# Patient Record
Sex: MALE | Race: WHITE | NOT HISPANIC OR LATINO | Employment: OTHER | URBAN - METROPOLITAN AREA
[De-identification: names, ages, dates, MRNs, and addresses within clinical notes are randomized per-mention and may not be internally consistent; named-entity substitution may affect disease eponyms.]

---

## 2017-01-09 ENCOUNTER — TELEPHONE (OUTPATIENT)
Dept: MEDICAL GROUP | Facility: MEDICAL CENTER | Age: 77
End: 2017-01-09

## 2017-01-09 NOTE — TELEPHONE ENCOUNTER
[facility] left message stating pt is having procedure done tomorrow and they would like labs and ekg faxed to them.     They left no call back number     Fax: [phone]

## 2017-01-10 NOTE — TELEPHONE ENCOUNTER
I have not seen the patient in nearly 1 year, we have no recent EKG or labs, you could fax the most recent EKG and labs in the system to [facility]

## 2017-02-20 RX ORDER — LISINOPRIL 40 MG/1
40 TABLET ORAL DAILY
Qty: 90 TAB | Refills: 0 | Status: SHIPPED | OUTPATIENT
Start: 2017-02-20 | End: 2017-05-22 | Stop reason: SDUPTHER

## 2017-02-20 NOTE — TELEPHONE ENCOUNTER
Please call patient, he has not been seen in one year, have him schedule a 40 minute appointment follow-up high blood pressure and blood sugar

## 2017-03-10 ENCOUNTER — HOSPITAL ENCOUNTER (OUTPATIENT)
Dept: LAB | Facility: MEDICAL CENTER | Age: 77
End: 2017-03-10
Attending: INTERNAL MEDICINE
Payer: MEDICARE

## 2017-03-10 ENCOUNTER — OFFICE VISIT (OUTPATIENT)
Dept: MEDICAL GROUP | Facility: MEDICAL CENTER | Age: 77
End: 2017-03-10
Payer: MEDICARE

## 2017-03-10 ENCOUNTER — HOSPITAL ENCOUNTER (OUTPATIENT)
Facility: MEDICAL CENTER | Age: 77
End: 2017-03-10
Attending: INTERNAL MEDICINE
Payer: MEDICARE

## 2017-03-10 VITALS
OXYGEN SATURATION: 93 % | TEMPERATURE: 98.7 F | SYSTOLIC BLOOD PRESSURE: 124 MMHG | BODY MASS INDEX: 33.18 KG/M2 | DIASTOLIC BLOOD PRESSURE: 78 MMHG | HEART RATE: 82 BPM | HEIGHT: 72 IN | WEIGHT: 245 LBS

## 2017-03-10 DIAGNOSIS — Z12.5 PROSTATE CANCER SCREENING: ICD-10-CM

## 2017-03-10 DIAGNOSIS — Z12.11 COLON CANCER SCREENING: ICD-10-CM

## 2017-03-10 DIAGNOSIS — R73.01 IMPAIRED FASTING GLUCOSE: ICD-10-CM

## 2017-03-10 DIAGNOSIS — E55.9 HYPOVITAMINOSIS D: ICD-10-CM

## 2017-03-10 DIAGNOSIS — E29.1 HYPOGONADISM IN MALE: ICD-10-CM

## 2017-03-10 DIAGNOSIS — Z98.84 S/P GASTRIC BYPASS: Chronic | ICD-10-CM

## 2017-03-10 DIAGNOSIS — E78.5 DYSLIPIDEMIA: Chronic | ICD-10-CM

## 2017-03-10 DIAGNOSIS — I10 ESSENTIAL HYPERTENSION: Chronic | ICD-10-CM

## 2017-03-10 DIAGNOSIS — E66.9 OBESITY, UNSPECIFIED OBESITY SEVERITY, UNSPECIFIED OBESITY TYPE: ICD-10-CM

## 2017-03-10 DIAGNOSIS — R60.0 EDEMA OF LOWER EXTREMITY, UNSPECIFIED LATERALITY: Chronic | ICD-10-CM

## 2017-03-10 DIAGNOSIS — Z98.1 S/P LUMBAR FUSION: Chronic | ICD-10-CM

## 2017-03-10 DIAGNOSIS — Z23 NEED FOR PNEUMOCOCCAL VACCINATION: ICD-10-CM

## 2017-03-10 DIAGNOSIS — Z00.00 MEDICARE ANNUAL WELLNESS VISIT, SUBSEQUENT: ICD-10-CM

## 2017-03-10 LAB
25(OH)D3 SERPL-MCNC: 33 NG/ML (ref 30–100)
ALBUMIN SERPL BCP-MCNC: 4.1 G/DL (ref 3.2–4.9)
ALBUMIN/GLOB SERPL: 1.5 G/DL
ALP SERPL-CCNC: 54 U/L (ref 30–99)
ALT SERPL-CCNC: 20 U/L (ref 2–50)
ANION GAP SERPL CALC-SCNC: 10 MMOL/L (ref 0–11.9)
APPEARANCE UR: CLEAR
AST SERPL-CCNC: 29 U/L (ref 12–45)
BASOPHILS # BLD AUTO: 0.3 % (ref 0–1.8)
BASOPHILS # BLD: 0.02 K/UL (ref 0–0.12)
BILIRUB SERPL-MCNC: 0.9 MG/DL (ref 0.1–1.5)
BILIRUB UR QL STRIP.AUTO: NEGATIVE
BUN SERPL-MCNC: 25 MG/DL (ref 8–22)
CALCIUM SERPL-MCNC: 9.8 MG/DL (ref 8.5–10.5)
CHLORIDE SERPL-SCNC: 106 MMOL/L (ref 96–112)
CHOLEST SERPL-MCNC: 134 MG/DL (ref 100–199)
CO2 SERPL-SCNC: 21 MMOL/L (ref 20–33)
COLOR UR: YELLOW
CREAT SERPL-MCNC: 1.33 MG/DL (ref 0.5–1.4)
CULTURE IF INDICATED INDCX: NO UA CULTURE
EOSINOPHIL # BLD AUTO: 0.23 K/UL (ref 0–0.51)
EOSINOPHIL NFR BLD: 3.9 % (ref 0–6.9)
ERYTHROCYTE [DISTWIDTH] IN BLOOD BY AUTOMATED COUNT: 47.6 FL (ref 35.9–50)
EST. AVERAGE GLUCOSE BLD GHB EST-MCNC: 140 MG/DL
ESTRADIOL SERPL-MCNC: <20 PG/ML
GLOBULIN SER CALC-MCNC: 2.8 G/DL (ref 1.9–3.5)
GLUCOSE SERPL-MCNC: 165 MG/DL (ref 65–99)
GLUCOSE UR STRIP.AUTO-MCNC: NEGATIVE MG/DL
HBA1C MFR BLD: 6.5 % (ref 0–5.6)
HCT VFR BLD AUTO: 55.9 % (ref 42–52)
HCYS SERPL-SCNC: 9.68 UMOL/L
HDLC SERPL-MCNC: 33 MG/DL
HGB BLD-MCNC: 18.1 G/DL (ref 14–18)
IMM GRANULOCYTES # BLD AUTO: 0.02 K/UL (ref 0–0.11)
IMM GRANULOCYTES NFR BLD AUTO: 0.3 % (ref 0–0.9)
IRON SERPL-MCNC: 59 UG/DL (ref 50–180)
KETONES UR STRIP.AUTO-MCNC: NEGATIVE MG/DL
LDLC SERPL CALC-MCNC: 77 MG/DL
LEUKOCYTE ESTERASE UR QL STRIP.AUTO: NEGATIVE
LYMPHOCYTES # BLD AUTO: 1.49 K/UL (ref 1–4.8)
LYMPHOCYTES NFR BLD: 25.3 % (ref 22–41)
MCH RBC QN AUTO: 26.3 PG (ref 27–33)
MCHC RBC AUTO-ENTMCNC: 32.4 G/DL (ref 33.7–35.3)
MCV RBC AUTO: 81.4 FL (ref 81.4–97.8)
MICRO URNS: NORMAL
MONOCYTES # BLD AUTO: 0.63 K/UL (ref 0–0.85)
MONOCYTES NFR BLD AUTO: 10.7 % (ref 0–13.4)
NEUTROPHILS # BLD AUTO: 3.5 K/UL (ref 1.82–7.42)
NEUTROPHILS NFR BLD: 59.5 % (ref 44–72)
NITRITE UR QL STRIP.AUTO: NEGATIVE
NRBC # BLD AUTO: 0 K/UL
NRBC BLD AUTO-RTO: 0 /100 WBC
PH UR STRIP.AUTO: 5.5 [PH]
PLATELET # BLD AUTO: 211 K/UL (ref 164–446)
PMV BLD AUTO: 10.1 FL (ref 9–12.9)
POTASSIUM SERPL-SCNC: 4.4 MMOL/L (ref 3.6–5.5)
PROT SERPL-MCNC: 6.9 G/DL (ref 6–8.2)
PROT UR QL STRIP: NEGATIVE MG/DL
PSA SERPL DL<=0.01 NG/ML-MCNC: 1.18 NG/ML (ref 0–4)
RBC # BLD AUTO: 6.87 M/UL (ref 4.7–6.1)
RBC UR QL AUTO: NEGATIVE
SODIUM SERPL-SCNC: 137 MMOL/L (ref 135–145)
SP GR UR STRIP.AUTO: 1.02
TESTOST SERPL-MCNC: 528 NG/DL (ref 175–781)
TRIGL SERPL-MCNC: 118 MG/DL (ref 0–149)
TSH SERPL DL<=0.005 MIU/L-ACNC: 0.83 UIU/ML (ref 0.35–5.5)
VIT B12 SERPL-MCNC: >1500 PG/ML (ref 211–911)
WBC # BLD AUTO: 5.9 K/UL (ref 4.8–10.8)

## 2017-03-10 PROCEDURE — 83540 ASSAY OF IRON: CPT

## 2017-03-10 PROCEDURE — 80053 COMPREHEN METABOLIC PANEL: CPT

## 2017-03-10 PROCEDURE — 84153 ASSAY OF PSA TOTAL: CPT | Mod: GA

## 2017-03-10 PROCEDURE — G0009 ADMIN PNEUMOCOCCAL VACCINE: HCPCS | Performed by: INTERNAL MEDICINE

## 2017-03-10 PROCEDURE — 82670 ASSAY OF TOTAL ESTRADIOL: CPT

## 2017-03-10 PROCEDURE — 1036F TOBACCO NON-USER: CPT | Performed by: INTERNAL MEDICINE

## 2017-03-10 PROCEDURE — 84443 ASSAY THYROID STIM HORMONE: CPT

## 2017-03-10 PROCEDURE — G8510 SCR DEP NEG, NO PLAN REQD: HCPCS | Performed by: INTERNAL MEDICINE

## 2017-03-10 PROCEDURE — 90670 PCV13 VACCINE IM: CPT | Performed by: INTERNAL MEDICINE

## 2017-03-10 PROCEDURE — 82306 VITAMIN D 25 HYDROXY: CPT

## 2017-03-10 PROCEDURE — 84402 ASSAY OF FREE TESTOSTERONE: CPT

## 2017-03-10 PROCEDURE — 81003 URINALYSIS AUTO W/O SCOPE: CPT

## 2017-03-10 PROCEDURE — 83036 HEMOGLOBIN GLYCOSYLATED A1C: CPT | Mod: GA

## 2017-03-10 PROCEDURE — 36415 COLL VENOUS BLD VENIPUNCTURE: CPT

## 2017-03-10 PROCEDURE — 85025 COMPLETE CBC W/AUTO DIFF WBC: CPT

## 2017-03-10 PROCEDURE — G0438 PPPS, INITIAL VISIT: HCPCS | Mod: 25 | Performed by: INTERNAL MEDICINE

## 2017-03-10 PROCEDURE — 83090 ASSAY OF HOMOCYSTEINE: CPT

## 2017-03-10 PROCEDURE — 84403 ASSAY OF TOTAL TESTOSTERONE: CPT

## 2017-03-10 PROCEDURE — 80061 LIPID PANEL: CPT

## 2017-03-10 PROCEDURE — 82607 VITAMIN B-12: CPT

## 2017-03-10 RX ORDER — FLUTICASONE PROPIONATE 50 MCG
2 SPRAY, SUSPENSION (ML) NASAL DAILY
Qty: 1 BOTTLE | Refills: 11 | Status: SHIPPED | OUTPATIENT
Start: 2017-03-10 | End: 2018-07-27 | Stop reason: SDUPTHER

## 2017-03-10 ASSESSMENT — PATIENT HEALTH QUESTIONNAIRE - PHQ9: CLINICAL INTERPRETATION OF PHQ2 SCORE: 0

## 2017-03-10 NOTE — MR AVS SNAPSHOT
J W Derik III   3/10/2017 11:20 AM   Office Visit   MRN: [id number]    Department:  [facility]   Dept Phone:  [phone]    Description:  Male : 1940   Provider:  Glenn Delaney M.D.           Allergies as of 3/10/2017     Allergen Noted Reactions    Norvasc [Amlodipine] 2016       Constipation      Statins [Hmg-Coa-R Inhibitors] 2010   Rash      You were diagnosed with     Medicare annual wellness visit, subsequent   [904425]       Essential hypertension   [5752880]       Dyslipidemia   [275904]       Edema of lower extremity, unspecified laterality   [8156519]       S/P gastric bypass   [787778]       S/P lumbar fusion   [772400]       Obesity, unspecified obesity severity, unspecified obesity type   [9767824]       Need for pneumococcal vaccination   [508917]       Colon cancer screening   [908432]       Hypovitaminosis D   [563256]       Prostate cancer screening   [987982]       Hypogonadism in male   [8335722]       Impaired fasting glucose   [790.21.ICD-9-CM]         Vital Signs     Blood Pressure Pulse Temperature Height Weight Body Mass Index    124/78 mmHg 82 37.1 °C (98.7 °F) 1.829 m (6') 111.131 kg (245 lb) 33.22 kg/m2    Oxygen Saturation Smoking Status                93% Former Smoker          Basic Information     Date Of Birth Sex Race Ethnicity Preferred Language    1940 Male White Non- English      Problem List              ICD-10-CM Priority Class Noted - Resolved    History of diabetes mellitus Z86.39   2009 - Present    Hypertension (Chronic) I10   2009 - Present    History of peripheral neuropathy Z86.69   2009 - Present    History of shingles Z86.19   2009 - Present    S/P knee replacement Z96.659   2009 - Present    S/P lumbar fusion (Chronic) Z98.1   2009 - Present    Dyslipidemia (Chronic) E78.5   2009 - Present    History of allergic rhinitis Z87.09   2009 - Present    Preventative health care (Chronic)  Z00.00   9/5/2009 - Present    Lower extremity edema (Chronic) R60.0   12/29/2009 - Present    S/P gastric bypass (Chronic) Z98.84   3/26/2010 - Present    Status post shoulder surgery Z98.890   8/23/2012 - Present    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   2/26/2015 - Present    Obesity E66.9   3/3/2016 - Present    Hypotestosteronism E29.1   3/3/2016 - Present      Health Maintenance        Date Due Completion Dates    IMM DTaP/Tdap/Td Vaccine (1 - Tdap) 11/7/1961 ---    IMM INFLUENZA (1) 9/1/2016 12/29/2009    COLONOSCOPY 2/24/2021 2/24/2011 (Done)    Override on 2/24/2011: Done ([facility])            Current Immunizations     13-VALENT PCV PREVNAR 3/10/2017 12:01 PM    Hepatitis A Vaccine, Adult 11/24/2014  2:28 PM    Hepatitis B Vaccine Recombivax (Adol/Adult) 11/24/2014  2:30 PM    Influenza TIV (IM) 12/29/2009    Pneumococcal polysaccharide vaccine (PPSV-23) 8/27/2013    SHINGLES VACCINE 8/27/2013      Below and/or attached are the medications your provider expects you to take. Review all of your home medications and newly ordered medications with your provider and/or pharmacist. Follow medication instructions as directed by your provider and/or pharmacist. Please keep your medication list with you and share with your provider. Update the information when medications are discontinued, doses are changed, or new medications (including over-the-counter products) are added; and carry medication information at all times in the event of emergency situations     Allergies:  NORVASC - (reactions not documented)     STATINS - Rash               Medications  Valid as of: March 10, 2017 -  1:36 PM    Generic Name Brand Name Tablet Size Instructions for use    Cholecalciferol (Tab) cholecalciferol 1000 UNIT Take 5,000 Units by mouth every day.        Coenzyme Q10 (Cap) coenzyme Q-10 30 MG Take 60 mg by mouth every day.        Ethyl Chloride (Aerosol) ethyl chloride  Ethyl chloride jet stram BAL apply prn        Fluticasone  Propionate (Suspension) FLONASE 50 MCG/ACT Spray 2 Sprays in nose every day.        Glucosamine Sulfate (Pack) Glucosamine Sulfate 1500 MG Take  by mouth 2 Times a Day.        HydroCHLOROthiazide (Tab) HYDRODIURIL 25 MG Take 1 Tab by mouth every day.        Lisinopril (Tab) PRINIVIL, ZESTRIL 40 MG Take 1 Tab by mouth every day.        Metoclopramide HCl (Tab) REGLAN 10 MG Take 10 mg by mouth 3 times a day.        Omega-3 Fatty Acids   Take 2 Caps by mouth 2 Times a Day.        Omeprazole   Take  by mouth.        Testosterone Cypionate   by Intramuscular route every 7 days.        Vitamin B1-B12   by Injection route every 7 days.        .                 Medicines prescribed today were sent to:     [facility] [address] [facility], [place] - [address]    [address] [place] [address]    Phone: [phone] Fax: [phone]    Open 24 Hours?: No      Medication refill instructions:       If your prescription bottle indicates you have medication refills left, it is not necessary to call your provider’s office. Please contact your pharmacy and they will refill your medication.    If your prescription bottle indicates you do not have any refills left, you may request refills at any time through one of the following ways: The online [employer] system (except Urgent Care), by calling your provider’s office, or by asking your pharmacy to contact your provider’s office with a refill request. Medication refills are processed only during regular business hours and may not be available until the next business day. Your provider may request additional information or to have a follow-up visit with you prior to refilling your medication.   *Please Note: Medication refills are assigned a new Rx number when refilled electronically. Your pharmacy may indicate that no refills were authorized even though a new prescription for the same medication is available at the pharmacy. Please request the medicine by name with the  pharmacy before contacting your provider for a refill.        Your To Do List     Future Labs/Procedures Complete By Expires    CBC WITH DIFFERENTIAL  As directed 3/11/2018    COMP METABOLIC PANEL  As directed 3/11/2018    ESTRADIOL  As directed 3/10/2018    HEMOGLOBIN A1C  As directed 3/10/2018    HOMOCYSTEINE  As directed 3/10/2018    IRON  As directed 3/11/2018    LIPID PROFILE  As directed 3/11/2018    PROSTATE SPECIFIC AG SCREENING  As directed 3/11/2018    TESTOSTERONE SERUM  As directed 3/11/2018    TESTOSTERONE,FREE ADULT MALE  As directed 3/11/2018    Comments:    To be run by [facility]    TSH  As directed 3/11/2018    URINALYSIS,CULTURE IF INDICATED  As directed 3/11/2018    VITAMIN B12  As directed 3/11/2018    VITAMIN D,25 HYDROXY  As directed 3/11/2018      Other Notes About Your Plan                          [employer] Access Code: [email or web address]  Expires: 3/24/2017  4:35 PM    [employer]  A secure, online tool to manage your health information     [employer] Biscoot® is a secure, online tool that connects you to your personalized health information from the privacy of your home -- day or night - making it very easy for you to manage your healthcare. Once the activation process is completed, you can even access your medical information using the Biscoot magnolia, which is available for free in the Apple Magnolia store or Google Play store.     Biscoot provides the following levels of access (as shown below):   My Chart Features   [facility] Doctor [facility]  Specialists [facility]  Urgent  Care Non-[facility]  Primary Care  Doctor   Email your healthcare team securely and privately 24/7 X X X    Manage appointments: schedule your next appointment; view details of past/upcoming appointments X      Request prescription refills. X      View recent personal medical records, including lab and immunizations X X X X   View health record, including health history, allergies, medications X X X X   Read reports  about your outpatient visits, procedures, consult and ER notes X X X X   See your discharge summary, which is a recap of your hospital and/or ER visit that includes your diagnosis, lab results, and care plan. X X       How to register for [employer]:  1. Go to  [email or web address].  2. Click on the Sign Up Now box, which takes you to the New Member Sign Up page. You will need to provide the following information:  a. Enter your Equ[employer] Access Code exactly as it appears at the top of this page. (You will not need to use this code after you’ve completed the sign-up process. If you do not sign up before the expiration date, you must request a new code.)   b. Enter your date of birth.   c. Enter your home email address.   d. Click Submit, and follow the next screen’s instructions.  3. Create a Equ[employer]ogic ID. This will be your Equ[employer]ic login ID and cannot be changed, so think of one that is secure and easy to remember.  4. Create a [employer] password. You can change your password at any time.  5. Enter your Password Reset Question and Answer. This can be used at a later time if you forget your password.   6. Enter your e-mail address. This allows you to receive e-mail notifications when new information is available in [employer].  7. Click Sign Up. You can now view your health information.    For assistance activating your [employer] account, call [phone]

## 2017-03-10 NOTE — PROGRESS NOTES
Subjective:      LIAN More III is a 76 y.o. male who presents with wellness        HPI     wellness    CC:  Health Risk Assessment Exam.  Arrived 1125    LIAN FARNSWORTH is a 76 y.o. here for Health Risk Assessment.   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 90 Tab 0   • hydrocortisone 2.5 % Cream topical cream Apply 1 Application to affected area(s) 2 times a day as needed (rash). 45 g 1   • hydrochlorothiazide (HYDRODIURIL) 25 MG Tab Take 1 Tab by mouth every day. 90 Tab 3   • ethyl chloride Aerosol Ethyl chloride jet stram BAL apply prn 1 Can 0   • VITAMIN B1-B12 INJ by Injection route every 7 days.     • Testosterone Cypionate (DEPO-TESTOSTERONE IM) by Intramuscular route every 7 days.     • fluticasone (FLONASE) 50 MCG/ACT nasal spray USE TWO SPRAY IN EACH NOSTRIL ONCE DAILY 1 Bottle prn   • metoclopramide (REGLAN) 10 MG TABS Take 10 mg by mouth 3 times a day.     • coenzyme Q-10 30 MG capsule Take 60 mg by mouth every day.     •  vitamin D (CHOLECALCIFEROL) 1000 UNIT TABS Take 5,000 Units by mouth every day.     • Glucosamine Sulfate 1500 MG PACK Take  by mouth 2 Times a Day.     • Omega-3 Fatty Acids (FISH OIL PO) Take 2 Caps by mouth 2 Times a Day.       No current facility-administered medications for this visit.      Current supplements:reviewed  Chronic narcotic pain medicines:no  Allergies: Norvasc and Statins  SH no tobacco, no etoh, retired does golf and use sunscreen   Screening: discussed  Depressive Symptoms: Denies feeling down, depressed or hopeless. Denies loss of interest or pleasure in doing things   ADLs: Denies needing help with using telephone, transportation, shopping, preparing meals, housework, laundry, or managing medication or money.   Independent with bathing, hygiene, feeding, toileting, dressing    Memory concerns: Denies difficulty remembering details of conversations, events and upcoming appointments.  Hearing problems: Denies.   Recent falls no    Current social contact/activities:  golf   Social History   Substance Use Topics   • Smoking status: Former Smoker -- 2.00 packs/day for 2 years     Types: Cigarettes     Quit date: 01/01/1964   • Smokeless tobacco: Never Used      Comment: 2 ppd 4 yrs,quit 1964   • Alcohol Use: No       Family History   Problem Relation Age of Onset   • Diabetes       Past Surgical History   Procedure Laterality Date   • Abdominoplasty  1990   • Knee arthroplasty total  2000     bilateral   • Gastric banding laparoscopic  3/24/2010     Performed by SARINA SUE at SURGERY [facility]S   • Hiatal hernia repair  3/24/2010     Performed by SARINA SUE at SURGERY [facility]   • Inj,epidural/lumb/sac single  3/5/2012     Performed by KRISTIN BALTAZAR at [facility] ORS   • Inj,epidural/lumb/sac single  3/19/2012     Performed by KRISTIN BALTAZAR at SURGERY [facility]   • Lumbar fusion posterior  3/26/2012     Performed by EDITH WHELAN at [facility]    • Lumbar decompression  3/26/2012     Performed by EDITH SEARS at SURGERY [facility]   • Gastric band laparoscopic revision  5/11/2012     Performed by DENIZ SUE at [facility]   • Drainage hematoma  5/25/2012     Performed by DENIZ SUE at [facility]   • Recovery  6/26/2012     Performed by SURGERY, IR-RECOVERY at SURGERY SAME DAY [facility]   • Lumbar fusion posterior  9/5/2013     Performed by Edith Sears M.D. at SURGERY [facility]   • Lumbar decompression  9/5/2013     Performed by Edith Sears M.D. at SURGERY [facility]   • Mass excision neuro  9/5/2013     Performed by Edith Sears M.D. at SURGERY [facility]   • Humerus orif Left 6/25/2015     Procedure: HUMERUS ORIF/ PROXIMAL;  Surgeon: Cristal Guido M.D.;  Location: [facility];  Service:    • Gastroscopy N/A 1/8/2016     Procedure: GASTROSCOPY;  Surgeon: Deniz Sue M.D.;  Location: [facility];  Service:         Ostomy or other tubes or amputations: no  Chronic oxygen use no  Gait: normal. Uses assistive device no     Depression Screening    Little interest or pleasure in doing things?  0 - not at all  Feeling down, depressed , or hopeless? 0 - not at all  Patient Health Questionnaire Score: 0     If depressive symptoms identified deferred to follow up visit unless specifically addressed in assesment and plan.    Screening for Cognitive Impairment    Three Minute Recall (banana, sunrise, fence)  3/3    Draw clock face with all 12 numbers set to the hand to show 10 minures past 11 o'clock  1    Cognitive concerns identified defferred for follow up unless specifically addressed in assesment and plan.    Fall Risk Assessment    Has the patient had two or more falls in the last year or any fall with injury in the last year?  No    Safety Assessment    Throw rugs on floor.  Yes  Handrails on all stairs.  Yes  Good lighting in all hallways.  Yes  Difficulty  hearing.  Yes  Patient counseled about all safety risks that were identified.    Functional Assessment ADLs    Are there any barriers preventing you from cooking for yourself or meeting nutritional needs?  No.    Are there any barriers preventing you from driving safely or obtaining transportation?  No.    Are there any barriers preventing you from using a telephone or calling for help?  No.    Are there any barriers preventing you from shopping?  No.    Are there any barriers preventing you from taking care of your own finances?  No.    Are there any barriers preventing you from managing your medications?  No.    Are currently engaing any exercise or physical activity?  Yes.       Health Maintenance Summary                IMM DTaP/Tdap/Td Vaccine Overdue 11/7/1961     IMM PNEUMOCOCCAL 65+ (ADULT) LOW/MEDIUM RISK SERIES Overdue 8/27/2014      Done 8/27/2013 Imm Admin: Pneumococcal polysaccharide vaccine (PPSV-23)    IMM INFLUENZA Overdue 9/1/2016      Done 12/29/2009 Imm Admin: Influenza TIV (IM)    Annual Wellness Visit Overdue 3/4/2017      Done 3/3/2016 Visit Dx: Medicare annual wellness visit, subsequent     Patient has more history with this topic...    COLONOSCOPY Next Due 2/24/2021      Done 2/24/2011 [facility]          Patient Care Team:  Glenn Delaney M.D. as PCP - General (Internal Medicine)      Health Care Screening recommendations reviewed with patient today and updated or ordered.  DPA/Advanced directive: Completed/Information provided.   Referrals for PT/OT/Nutrition counseling/Behavioral Health/Smoking cessation as above if indicated  Discussion today about general wellness and lifestyle habits:    · Prevent falls and reduce trip hazards;   · Have a working fire alarm and carbon monoxide detector;   · Engage in regular physical activity and social activities;   · Use sun protection when outdoors.    ·   ·     Current Outpatient Prescriptions   Medication Sig Dispense Refill   • lisinopril (PRINIVIL,  ZESTRIL) 40 MG tablet Take 1 Tab by mouth every day. 90 Tab 0   • hydrocortisone 2.5 % Cream topical cream Apply 1 Application to affected area(s) 2 times a day as needed (rash). 45 g 1   • hydrochlorothiazide (HYDRODIURIL) 25 MG Tab Take 1 Tab by mouth every day. 90 Tab 3   • ethyl chloride Aerosol Ethyl chloride jet stram BAL apply prn 1 Can 0   • VITAMIN B1-B12 INJ by Injection route every 7 days.     • Testosterone Cypionate (DEPO-TESTOSTERONE IM) by Intramuscular route every 7 days.     • fluticasone (FLONASE) 50 MCG/ACT nasal spray USE TWO SPRAY IN EACH NOSTRIL ONCE DAILY 1 Bottle prn   • metoclopramide (REGLAN) 10 MG TABS Take 10 mg by mouth 3 times a day.     • coenzyme Q-10 30 MG capsule Take 60 mg by mouth every day.     • vitamin D (CHOLECALCIFEROL) 1000 UNIT TABS Take 5,000 Units by mouth every day.     • Glucosamine Sulfate 1500 MG PACK Take  by mouth 2 Times a Day.     • Omega-3 Fatty Acids (FISH OIL PO) Take 2 Caps by mouth 2 Times a Day.       No current facility-administered medications for this visit.     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   • Status post shoulder surgery 08/23/2012   • S/P gastric bypass 03/26/2010   • Lower extremity edema 12/29/2009   • History of diabetes mellitus 09/05/2009   • Hypertension 09/05/2009   • History of peripheral neuropathy 09/05/2009   • History of shingles 09/05/2009   • S/P knee replacement 09/05/2009   • S/P lumbar fusion 09/05/2009   • Dyslipidemia 09/05/2009   • History of allergic rhinitis 09/05/2009   • Preventative health care 09/05/2009       Haji's  2/4/15 upper GI small bowel follow-through mild to moderate thoracic esophageal dysmotility, small sliding hiatal hernia, laparoscopic gastric band appears appropriately located  2/18/15 EGD per [facility] reactive gastropathy, and h. pylori, haji's mucosa indefinite for dysplasia, no evidence of celiac  disease  4/22/15 upper GI series postoperatively appears laparoscopic band within normal limits, small sliding hiatal hernia  5/14/15 EGD per [facility] esophageal mucosal changes large amount of food residue in stomach, 5 mucosa low grade dysplasia, intestinal metaplasia, gastric emptying study pending  5/22/15 nuclear medicine gastric emptying study per [facility] evidence of reflux, gastric emptying half-time 118 minutes  1/8/16 EGD per  surgery normal  1/4/17 [facility] note history of reflux, treatment of haji's esophagus low-grade dysplasia, discussed radiofrequency ablation and endoscopic ultrasound    Dyslipidemia  3/08 chol 159, trig 75, hdl 44, ldl 100  12/09 chol 162,chol 105, hdl 46,ldl 95  2/11 chol 135,trig 71,hdl 43,ldl 78  12/11 chol 141,trig 32,hdl 49,ldl 86  9/4/13 chol 147,trig 32,hdl 62,ldl 79  1/9/15 chol 152,trig 64,hdl 41,ldl 98   10/2/15 chol 140,trig 54,hdl 43,ldl 86,crp 1.3    History allergic rhinitis    History diabetes  3/08 A1c 5.9%  12/09 A1c 6.4% on insulin  2009 eye exam  3/10 s/p lap banding, now off insulin  2/11 BS 93  5/12 A1c 5.2% off insulin  12/12 eye exam   9/5/13 A1c 5.7% off meds  1/9/15 A1c 5.7%,bs 106  10/2/15 A1c 5.9%    History of peripheral neuropathy  Has tried and failed elavil and neurontin  6/04 lower extremity EMG nerve conduction study 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   12/09 trial of neurontin 300 mg tid    History shingles  Has tried and failed elavil and neurontin  6/04 lower extremity EMG nerve conduction study 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   12/09 trial of neurontin 300 mg tid    3/08 urine microalbumin 21  3/08 lifescan screening JUAQUIN,carotid,aorta negative  2/11 hold norvasc  2/11 urine mac 16  5/12 urine mac 18 on lisinopril 40 mg  9/4/13 urine mac 24 on lisinopril 40 mg  1/9/15 urine mac 30 on lisinopril 40 mg  7/29/15 echo moderate LVH, EF 60-65%  7/29/15  carotid ultrasound negative  3/31/16 add norvasc 5 mg to lisinopril 40 mg  5/4/16 stop norvasc constipation,still on lisinopril 40 mg and add hctz 25 mg  5/18/16 patient on hctz 25 mg, not taking lisinopril, will resume lisinopril 40 mg  (norvasc cause constipation)    preventive health  2/24/11 colon [facility] hemorrhoids  8/27/13 pneumovax  1/14/15 FIT negative  10/2/15 vit d 32  10/2/15 psa 0.6  3/3/16 prevnar and zoster vaccine written to get at pharmacy  4/18/16 zoster vaccine at [facility]    Status post gastric bypass  3/10 lap banding   2/11 cmp,cbc,b12,iron,folate,vit d normal  12/11 vit d 47, iron 87,hgb 15,hct 47,cmp normal, normal folate and B12  5/11/12 laparascopic band port revision   9/5/13 iron 92,B12 >1500, it d 48  1/8/15  surgery note; ultrasound abdomen and CT abdomen pelvis with contrast to evaluate abdominal pain  1/9/15 cbc,cmp normal, iron 101,vit d 41, b12>1500, folate 24, B1 148 normal  1/15/15  surgery note; ordered ultrasound gallbladder and CT abd/pelvis  2/4/15 upper GI small bowel follow-through mild to moderate thoracic esophageal dysmotility, small sliding hiatal hernia, laparoscopic gastric band appears appropriately located  2/18/15 EGD per [facility] reactive gastropathy, and h. pylori, haji's mucosa indefinite for dysplasia, no evidence of celiac disease  4/22/15 upper GI series postoperatively appears laparoscopic band within normal limits, small sliding hiatal hernia  5/14/15 EGD per [facility] esophageal mucosal changes large amount of food residue in stomach, await biopsy results, gastric emptying study pending  5/26/15  surgery note; difficulty with gastric emptying status post bariatric surgery, removal fluid from his band 2 cc, down to 3 cc total volume, started on reglan and nexium  1/8/16 EGD per  surgery normal  10/13/16  bariatric surgery follow-up, BMI 34.7    Status post knee replacement  History right knee replacement  1/25/13  xray knee right postsurgical change consistent with medial knee compartment replacement, DJD, chondrocalcinosis  1/25/13 xray knee left postsurgical change consistent with medial knee compartment replacement, DJD, chondrocalcinosis    Status post lumbar fusion  3/26/12  neurosurgery Laminectomy L3-4, 4-5, 5-1 with pedicle screw fixation of L4-5 and posterolateral arthrodesis   11/12  neurosurgery note; still with right sided pain, EMG NCS pending, referral to   2/13  pain note, MRI pending  2/26/13 MRI lumbar spine post surgical changes fusion L4-L5, L5-S1 severe right foraminal stenosis, L4-L5 moderate severe bilateral stenosis, L3-L4 moderate severe bilateral stenosis  3/11/13  pain; epidural steroid injection on the right at L2-3 and L3-4  7/23/13  neurosurgery note, MRI pending  8/8/13 x-ray lumbar spine postoperative changes stable L3-L5 anterolisthesis lateral flexion and extension views  8/8/13 MRI lumbar spine previous laminectomy L3-L5, bilateral pedicle screw fixation L4-L5, small to moderate right disc extrusion L3-L4, 1.5 cm synovial cyst L4-L5 region, moderate to severe multilevel foraminal stenosis  9/5/13  neurosurgery revision L3L4 decompression and resection synovial cyst, fusion L3L4 PEEK rods and screws  9/18/13 neurosurgery note status post redo L3-L4 decompression and fusion, doing well  10/1/13  neurosurgery note, x-rays look good, no fracture, bone fusion is beginning to take place, start physical therapy, followup 6 weeks AP and lateral lumbar spine  1/9/14 x-ray lumbar spine grade 1 spondylolisthesis L3-L5, status post posterior laminectomy and fusion L3-L5  1/14/14  neurosurgery note, followup as needed  11/28/14 MRI thoracic spine per  neurosurgery mild thoracic kyphosis,mild diskal degenerative changes in the midthoracic region    Status post shoulder surgery in 6/25/15  orthopedics  operative note ORIF left humerus fracture          ROS       Objective:          Physical Exam   Constitutional: He appears well-developed and well-nourished. No distress.   HENT:   Head: Normocephalic and atraumatic.   Right Ear: External ear normal.   Left Ear: External ear normal.   Nose: Nose normal.   Mouth/Throat: Oropharynx is clear and moist.   Eyes: Conjunctivae are normal. Right eye exhibits no discharge. Left eye exhibits no discharge. No scleral icterus.   Neck: Neck supple. No JVD present. No thyromegaly present.   Cardiovascular: Normal rate and regular rhythm.    No murmur heard.  Pulmonary/Chest: Effort normal and breath sounds normal. No respiratory distress. He has no wheezes.   Abdominal: He exhibits no distension.   Genitourinary: Prostate normal. Guaiac negative stool. No penile tenderness.   Musculoskeletal: He exhibits no edema.   Skin: He is not diaphoretic. No erythema.   Psychiatric: He has a normal mood and affect. His behavior is normal.   Nursing note and vitals reviewed.              Assessment/Plan:     Assessment  #! Wellness    #2 Haji's stable followed by GI, no breakthrough symptoms on omeprazole 1/4/17 [facility] note history of reflux, treatment of haji's esophagus low-grade dysplasia, discussed radiofrequency ablation and endoscopic ultrasound    #3 Status post gastric bypass obesity BMI 33.2 3/10 lap banding , most recent visit with bariatric was 10/13/16  bariatric surgery follow-up, BMI 34.7, unable to exercise because of chronic back pain, spent working on maintaining his diet    #4 history of diabetes no medication since bariatric surgery, does not check blood sugars    #5 Obesity BMI 33.2 status post bariatric surgery, has had nutrition consultation previously and education with gastric bypass surgery    #6 History right knee replacement does not affect balance, no falls, no cane or walker for ablation    #7 Status post lumbar fusion surgery done 3/26/12   neurosurgery Laminectomy L3-4, 4-5, 5-1 with pedicle screw fixation of L4-5 and posterolateral arthrodesis , stable back pain, no pain medications, no falls, has completed physical therapy    #8 preventive health  2/24/11 colon [facility] hemorrhoids  8/27/13 pneumovax  1/14/15 FIT negative  10/2/15 vit d 32  10/2/15 psa 0.6  3/3/16 prevnar and zoster vaccine written to get at pharmacy  4/18/16 zoster vaccine at [facility]    #9 Hypogonadism on testosterone supplementation per Dr. Miner, we have no records    #10 hypertension blood pressure stable and controlled on hydrochlorothiazide and lisinopril    #11 allergic rhinitis on Flonase      Plan   #1 health maintenance reviewed and updated    #2 he declines annually influenza    #3 pneumococcal 13 vaccination, has had Pneumovax, shingles vaccine previously    #4 FIT, next colonoscopy 2/21    #5 lab     #6 check iron, B-12, vitamin D status post bariatric surgery    #7 has had nutrition counseling, importance of diet, weight loss discussed with regards to obesity, bariatric surgery    #8 declines physical therapy    #9 no need for hearing test    #10 nutrition, diet, exercise, weight loss discussed and emphasized, no tobacco, no alcohol    #11 follow-up GI    #12 check blood pressure periodically and record    #13 follow-up one year

## 2017-03-11 PROBLEM — D75.1 POLYCYTHEMIA: Chronic | Status: ACTIVE | Noted: 2017-03-11

## 2017-03-11 PROBLEM — R94.4 DECREASED GFR: Status: ACTIVE | Noted: 2017-03-11

## 2017-03-12 LAB — TESTOST FREE SERPL-MCNC: 135 PG/ML (ref 47–244)

## 2017-03-17 LAB — HEMOCCULT STL QL IA: NEGATIVE

## 2017-03-20 ENCOUNTER — TELEPHONE (OUTPATIENT)
Dept: MEDICAL GROUP | Facility: MEDICAL CENTER | Age: 77
End: 2017-03-20

## 2017-03-20 NOTE — TELEPHONE ENCOUNTER
----- Message from Glenn Delaney M.D. sent at 3/17/2017 11:13 PM PDT -----  Please notify patient that the stool test is negative

## 2018-05-16 ENCOUNTER — PATIENT OUTREACH (OUTPATIENT)
Dept: HEALTH INFORMATION MANAGEMENT | Facility: OTHER | Age: 78
End: 2018-05-16

## 2018-05-16 NOTE — PROGRESS NOTES
1. Attempt #:FINAL  2. [facility] Verified: no    3. Verify PCP: yes    4. Care Team Updated:       •   DME Company (gait device, O2, CPAP, etc.): NO       •   Other Specialists (eye doctor, derm, GYN, cardiology, endo, etc): YES    5.  Reviewed/Updated the following with patient:       •   Communication Preference Obtained? YES       •   Preferred Pharmacy? YES       •   Preferred Lab? YES       •   Family History (document living status of immediate family members and if + hx of cancer, diabetes, hypertension, hyperlipidemia, heart attack, stroke) YES. Was Abstract Encounter opened and chart updated? YES    6. [employer] Activation: sent activation code    7. [employer] [place]: no    8. Annual Wellness Visit Scheduling  Scheduling Status:Scheduled      9. Care Gap Scheduling (Attempt to Schedule EACH Overdue Care Gap!)     Health Maintenance Due   Topic Date Due   • IMM DTaP/Tdap/Td Vaccine (1 - Tdap) 11/07/1959   • Annual Wellness Visit  03/11/2018        Scheduled patient for Annual Wellness Visit and Immunizations: TDAP    10. Patient was advised: “This is a free wellness visit. The provider will screen for medical conditions to help you stay healthy. If you have other concerns to address you may be asked to discuss these at a separate visit or there may be an additional fee.”     11. Patient was informed to arrive 15 min prior to their scheduled appointment and bring in their medication bottles.

## 2018-07-02 ENCOUNTER — OFFICE VISIT (OUTPATIENT)
Dept: MEDICAL GROUP | Facility: MEDICAL CENTER | Age: 78
End: 2018-07-02
Payer: MEDICARE

## 2018-07-02 ENCOUNTER — HOSPITAL ENCOUNTER (OUTPATIENT)
Dept: LAB | Facility: MEDICAL CENTER | Age: 78
End: 2018-07-02
Attending: INTERNAL MEDICINE
Payer: MEDICARE

## 2018-07-02 ENCOUNTER — HOSPITAL ENCOUNTER (OUTPATIENT)
Dept: LAB | Facility: MEDICAL CENTER | Age: 78
End: 2018-07-02
Attending: EMERGENCY MEDICINE
Payer: MEDICARE

## 2018-07-02 VITALS
DIASTOLIC BLOOD PRESSURE: 60 MMHG | TEMPERATURE: 97.4 F | WEIGHT: 243 LBS | BODY MASS INDEX: 32.91 KG/M2 | HEIGHT: 72 IN | OXYGEN SATURATION: 94 % | SYSTOLIC BLOOD PRESSURE: 102 MMHG | HEART RATE: 73 BPM

## 2018-07-02 DIAGNOSIS — E53.8 B12 DEFICIENCY: ICD-10-CM

## 2018-07-02 DIAGNOSIS — Z12.5 PROSTATE CANCER SCREENING: ICD-10-CM

## 2018-07-02 DIAGNOSIS — E61.1 IRON DEFICIENCY: ICD-10-CM

## 2018-07-02 DIAGNOSIS — E34.9 HYPOTESTOSTERONISM: ICD-10-CM

## 2018-07-02 DIAGNOSIS — E55.9 HYPOVITAMINOSIS D: ICD-10-CM

## 2018-07-02 DIAGNOSIS — E78.5 DYSLIPIDEMIA: Chronic | ICD-10-CM

## 2018-07-02 DIAGNOSIS — Z00.00 MEDICARE ANNUAL WELLNESS VISIT, SUBSEQUENT: Primary | ICD-10-CM

## 2018-07-02 DIAGNOSIS — R94.4 DECREASED GFR: ICD-10-CM

## 2018-07-02 DIAGNOSIS — Z98.84 S/P GASTRIC BYPASS: Chronic | ICD-10-CM

## 2018-07-02 DIAGNOSIS — I10 ESSENTIAL HYPERTENSION: Chronic | ICD-10-CM

## 2018-07-02 DIAGNOSIS — R60.0 LOWER EXTREMITY EDEMA: Chronic | ICD-10-CM

## 2018-07-02 DIAGNOSIS — Z00.00 PREVENTATIVE HEALTH CARE: Chronic | ICD-10-CM

## 2018-07-02 DIAGNOSIS — D75.1 POLYCYTHEMIA: Chronic | ICD-10-CM

## 2018-07-02 DIAGNOSIS — E51.9 VITAMIN B1 DEFICIENCY: ICD-10-CM

## 2018-07-02 DIAGNOSIS — R73.09 IMPAIRED GLUCOSE METABOLISM: ICD-10-CM

## 2018-07-02 DIAGNOSIS — Z23 NEED FOR SHINGLES VACCINE: ICD-10-CM

## 2018-07-02 LAB
25(OH)D3 SERPL-MCNC: 27 NG/ML (ref 30–100)
ALBUMIN SERPL BCP-MCNC: 4.4 G/DL (ref 3.2–4.9)
ALBUMIN SERPL BCP-MCNC: 4.4 G/DL (ref 3.2–4.9)
ALBUMIN/GLOB SERPL: 1.6 G/DL
ALBUMIN/GLOB SERPL: 1.6 G/DL
ALP SERPL-CCNC: 51 U/L (ref 30–99)
ALP SERPL-CCNC: 52 U/L (ref 30–99)
ALT SERPL-CCNC: 31 U/L (ref 2–50)
ALT SERPL-CCNC: 32 U/L (ref 2–50)
ANION GAP SERPL CALC-SCNC: 8 MMOL/L (ref 0–11.9)
ANION GAP SERPL CALC-SCNC: 9 MMOL/L (ref 0–11.9)
ANISOCYTOSIS BLD QL SMEAR: ABNORMAL
APPEARANCE UR: CLEAR
AST SERPL-CCNC: 27 U/L (ref 12–45)
AST SERPL-CCNC: 28 U/L (ref 12–45)
BACTERIA #/AREA URNS HPF: NEGATIVE /HPF
BASOPHILS # BLD AUTO: 0.2 % (ref 0–1.8)
BASOPHILS # BLD AUTO: 0.5 % (ref 0–1.8)
BASOPHILS # BLD: 0.01 K/UL (ref 0–0.12)
BASOPHILS # BLD: 0.03 K/UL (ref 0–0.12)
BILIRUB SERPL-MCNC: 1.1 MG/DL (ref 0.1–1.5)
BILIRUB SERPL-MCNC: 1.1 MG/DL (ref 0.1–1.5)
BILIRUB UR QL STRIP.AUTO: NEGATIVE
BUN SERPL-MCNC: 27 MG/DL (ref 8–22)
BUN SERPL-MCNC: 28 MG/DL (ref 8–22)
CALCIUM SERPL-MCNC: 9.8 MG/DL (ref 8.5–10.5)
CALCIUM SERPL-MCNC: 9.8 MG/DL (ref 8.5–10.5)
CHLORIDE SERPL-SCNC: 102 MMOL/L (ref 96–112)
CHLORIDE SERPL-SCNC: 102 MMOL/L (ref 96–112)
CHOLEST SERPL-MCNC: 135 MG/DL (ref 100–199)
CHOLEST SERPL-MCNC: 136 MG/DL (ref 100–199)
CO2 SERPL-SCNC: 26 MMOL/L (ref 20–33)
CO2 SERPL-SCNC: 27 MMOL/L (ref 20–33)
COLOR UR: ABNORMAL
COMMENT 1642: NORMAL
CREAT SERPL-MCNC: 1.57 MG/DL (ref 0.5–1.4)
CREAT SERPL-MCNC: 1.58 MG/DL (ref 0.5–1.4)
CREAT UR-MCNC: 256.8 MG/DL
EOSINOPHIL # BLD AUTO: 0.21 K/UL (ref 0–0.51)
EOSINOPHIL # BLD AUTO: 0.24 K/UL (ref 0–0.51)
EOSINOPHIL NFR BLD: 3.9 % (ref 0–6.9)
EOSINOPHIL NFR BLD: 4.1 % (ref 0–6.9)
EPI CELLS #/AREA URNS HPF: NEGATIVE /HPF
ERYTHROCYTE [DISTWIDTH] IN BLOOD BY AUTOMATED COUNT: 45.4 FL (ref 35.9–50)
ERYTHROCYTE [DISTWIDTH] IN BLOOD BY AUTOMATED COUNT: 46.2 FL (ref 35.9–50)
EST. AVERAGE GLUCOSE BLD GHB EST-MCNC: 171 MG/DL
ESTRADIOL SERPL-MCNC: <20 PG/ML
GGT SERPL-CCNC: 17 U/L (ref 7–51)
GLOBULIN SER CALC-MCNC: 2.7 G/DL (ref 1.9–3.5)
GLOBULIN SER CALC-MCNC: 2.8 G/DL (ref 1.9–3.5)
GLUCOSE SERPL-MCNC: 187 MG/DL (ref 65–99)
GLUCOSE SERPL-MCNC: 189 MG/DL (ref 65–99)
GLUCOSE UR STRIP.AUTO-MCNC: NEGATIVE MG/DL
HBA1C MFR BLD: 7.6 % (ref 0–5.6)
HCT VFR BLD AUTO: 53.3 % (ref 42–52)
HCT VFR BLD AUTO: 54.8 % (ref 42–52)
HDLC SERPL-MCNC: 31 MG/DL
HDLC SERPL-MCNC: 32 MG/DL
HGB BLD-MCNC: 16.3 G/DL (ref 14–18)
HGB BLD-MCNC: 16.3 G/DL (ref 14–18)
HYALINE CASTS #/AREA URNS LPF: ABNORMAL /LPF
HYPOCHROMIA BLD QL SMEAR: ABNORMAL
IMM GRANULOCYTES # BLD AUTO: 0.02 K/UL (ref 0–0.11)
IMM GRANULOCYTES # BLD AUTO: 0.03 K/UL (ref 0–0.11)
IMM GRANULOCYTES NFR BLD AUTO: 0.4 % (ref 0–0.9)
IMM GRANULOCYTES NFR BLD AUTO: 0.5 % (ref 0–0.9)
IRON SERPL-MCNC: 59 UG/DL (ref 50–180)
KETONES UR STRIP.AUTO-MCNC: ABNORMAL MG/DL
LDH SERPL L TO P-CCNC: 177 U/L (ref 107–266)
LDLC SERPL CALC-MCNC: 79 MG/DL
LDLC SERPL CALC-MCNC: 79 MG/DL
LEUKOCYTE ESTERASE UR QL STRIP.AUTO: ABNORMAL
LYMPHOCYTES # BLD AUTO: 1.27 K/UL (ref 1–4.8)
LYMPHOCYTES # BLD AUTO: 1.37 K/UL (ref 1–4.8)
LYMPHOCYTES NFR BLD: 23.3 % (ref 22–41)
LYMPHOCYTES NFR BLD: 23.5 % (ref 22–41)
MCH RBC QN AUTO: 22.8 PG (ref 27–33)
MCH RBC QN AUTO: 23.3 PG (ref 27–33)
MCHC RBC AUTO-ENTMCNC: 29.7 G/DL (ref 33.7–35.3)
MCHC RBC AUTO-ENTMCNC: 30.6 G/DL (ref 33.7–35.3)
MCV RBC AUTO: 76.3 FL (ref 81.4–97.8)
MCV RBC AUTO: 76.6 FL (ref 81.4–97.8)
MICRO URNS: ABNORMAL
MICROALBUMIN UR-MCNC: 7.6 MG/DL
MICROALBUMIN/CREAT UR: 30 MG/G (ref 0–30)
MICROCYTES BLD QL SMEAR: ABNORMAL
MONOCYTES # BLD AUTO: 0.69 K/UL (ref 0–0.85)
MONOCYTES # BLD AUTO: 0.72 K/UL (ref 0–0.85)
MONOCYTES NFR BLD AUTO: 11.8 % (ref 0–13.4)
MONOCYTES NFR BLD AUTO: 13.2 % (ref 0–13.4)
MORPHOLOGY BLD-IMP: NORMAL
NEUTROPHILS # BLD AUTO: 3.21 K/UL (ref 1.82–7.42)
NEUTROPHILS # BLD AUTO: 3.47 K/UL (ref 1.82–7.42)
NEUTROPHILS NFR BLD: 59 % (ref 44–72)
NEUTROPHILS NFR BLD: 59.6 % (ref 44–72)
NITRITE UR QL STRIP.AUTO: NEGATIVE
NRBC # BLD AUTO: 0 K/UL
NRBC # BLD AUTO: 0 K/UL
NRBC BLD-RTO: 0 /100 WBC
NRBC BLD-RTO: 0 /100 WBC
OVALOCYTES BLD QL SMEAR: NORMAL
PH UR STRIP.AUTO: 5 [PH]
PHOSPHATE SERPL-MCNC: 2.8 MG/DL (ref 2.5–4.5)
PLATELET # BLD AUTO: 247 K/UL (ref 164–446)
PLATELET # BLD AUTO: 255 K/UL (ref 164–446)
PLATELET BLD QL SMEAR: NORMAL
PMV BLD AUTO: 10.6 FL (ref 9–12.9)
PMV BLD AUTO: 10.8 FL (ref 9–12.9)
POIKILOCYTOSIS BLD QL SMEAR: NORMAL
POLYCHROMASIA BLD QL SMEAR: NORMAL
POTASSIUM SERPL-SCNC: 4.4 MMOL/L (ref 3.6–5.5)
POTASSIUM SERPL-SCNC: 4.4 MMOL/L (ref 3.6–5.5)
PROT SERPL-MCNC: 7.1 G/DL (ref 6–8.2)
PROT SERPL-MCNC: 7.2 G/DL (ref 6–8.2)
PROT UR QL STRIP: NEGATIVE MG/DL
PSA SERPL-MCNC: 2.52 NG/ML (ref 0–4)
PSA SERPL-MCNC: 2.6 NG/ML (ref 0–4)
RBC # BLD AUTO: 6.99 M/UL (ref 4.7–6.1)
RBC # BLD AUTO: 7.15 M/UL (ref 4.7–6.1)
RBC # URNS HPF: ABNORMAL /HPF
RBC BLD AUTO: PRESENT
RBC UR QL AUTO: NEGATIVE
SODIUM SERPL-SCNC: 137 MMOL/L (ref 135–145)
SODIUM SERPL-SCNC: 137 MMOL/L (ref 135–145)
SP GR UR STRIP.AUTO: 1.02
TRIGL SERPL-MCNC: 123 MG/DL (ref 0–149)
TRIGL SERPL-MCNC: 126 MG/DL (ref 0–149)
TSH SERPL DL<=0.005 MIU/L-ACNC: 0.95 UIU/ML (ref 0.38–5.33)
URATE SERPL-MCNC: 6.8 MG/DL (ref 2.5–8.3)
UROBILINOGEN UR STRIP.AUTO-MCNC: 0.2 MG/DL
VIT B12 SERPL-MCNC: >1500 PG/ML (ref 211–911)
WBC # BLD AUTO: 5.4 K/UL (ref 4.8–10.8)
WBC # BLD AUTO: 5.8 K/UL (ref 4.8–10.8)
WBC #/AREA URNS HPF: ABNORMAL /HPF

## 2018-07-02 PROCEDURE — 83540 ASSAY OF IRON: CPT

## 2018-07-02 PROCEDURE — 82607 VITAMIN B-12: CPT

## 2018-07-02 PROCEDURE — 82306 VITAMIN D 25 HYDROXY: CPT

## 2018-07-02 PROCEDURE — 84153 ASSAY OF PSA TOTAL: CPT | Mod: GA,91

## 2018-07-02 PROCEDURE — 82670 ASSAY OF TOTAL ESTRADIOL: CPT

## 2018-07-02 PROCEDURE — 84443 ASSAY THYROID STIM HORMONE: CPT

## 2018-07-02 PROCEDURE — 81001 URINALYSIS AUTO W/SCOPE: CPT

## 2018-07-02 PROCEDURE — 84425 ASSAY OF VITAMIN B-1: CPT

## 2018-07-02 PROCEDURE — 84270 ASSAY OF SEX HORMONE GLOBUL: CPT

## 2018-07-02 PROCEDURE — 82977 ASSAY OF GGT: CPT

## 2018-07-02 PROCEDURE — 83036 HEMOGLOBIN GLYCOSYLATED A1C: CPT | Mod: GA

## 2018-07-02 PROCEDURE — 80061 LIPID PANEL: CPT | Mod: 91

## 2018-07-02 PROCEDURE — 85025 COMPLETE CBC W/AUTO DIFF WBC: CPT

## 2018-07-02 PROCEDURE — 80061 LIPID PANEL: CPT

## 2018-07-02 PROCEDURE — G0439 PPPS, SUBSEQ VISIT: HCPCS | Performed by: INTERNAL MEDICINE

## 2018-07-02 PROCEDURE — 80053 COMPREHEN METABOLIC PANEL: CPT | Mod: 91

## 2018-07-02 PROCEDURE — 80053 COMPREHEN METABOLIC PANEL: CPT

## 2018-07-02 PROCEDURE — 82043 UR ALBUMIN QUANTITATIVE: CPT

## 2018-07-02 PROCEDURE — 84403 ASSAY OF TOTAL TESTOSTERONE: CPT

## 2018-07-02 PROCEDURE — 84305 ASSAY OF SOMATOMEDIN: CPT

## 2018-07-02 PROCEDURE — 85025 COMPLETE CBC W/AUTO DIFF WBC: CPT | Mod: 91

## 2018-07-02 PROCEDURE — 83615 LACTATE (LD) (LDH) ENZYME: CPT

## 2018-07-02 PROCEDURE — 84100 ASSAY OF PHOSPHORUS: CPT

## 2018-07-02 PROCEDURE — 82570 ASSAY OF URINE CREATININE: CPT

## 2018-07-02 PROCEDURE — 36415 COLL VENOUS BLD VENIPUNCTURE: CPT

## 2018-07-02 PROCEDURE — 84153 ASSAY OF PSA TOTAL: CPT | Mod: GA

## 2018-07-02 PROCEDURE — 84550 ASSAY OF BLOOD/URIC ACID: CPT

## 2018-07-02 ASSESSMENT — ENCOUNTER SYMPTOMS: GENERAL WELL-BEING: GOOD

## 2018-07-02 ASSESSMENT — PATIENT HEALTH QUESTIONNAIRE - PHQ9: CLINICAL INTERPRETATION OF PHQ2 SCORE: 0

## 2018-07-02 ASSESSMENT — PAIN SCALES - GENERAL: PAINLEVEL: 4=SLIGHT-MODERATE PAIN

## 2018-07-02 ASSESSMENT — ACTIVITIES OF DAILY LIVING (ADL): BATHING_REQUIRES_ASSISTANCE: 0

## 2018-07-02 NOTE — PROGRESS NOTES
Chief Complaint   Patient presents with   • Annual Wellness Visit         HPI:  LIAN FARNSWORTH is a 77 y.o. here for Medicare Annual Wellness Visit   medications, allergies, medical history, surgical history, social history, family history reviewed and updated  SH no tobacco, no etoh, golfs on a regular basis  No falls   No recent eye exam   Sees  age management who provides him testosterone, I am not involved in this and I am have not been performing any testing regarding testosterone since that is up to age .    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   • Status post shoulder surgery 08/23/2012   • S/P gastric bypass 03/26/2010   • Lower extremity edema 12/29/2009   • History of diabetes mellitus 09/05/2009   • Hypertension 09/05/2009   • History of peripheral neuropathy 09/05/2009   • History of shingles 09/05/2009   • S/P knee replacement 09/05/2009   • S/P lumbar fusion 09/05/2009   • Dyslipidemia 09/05/2009   • History of allergic rhinitis 09/05/2009   • Preventative health care 09/05/2009       Haji's  2/4/15 upper GI small bowel follow-through mild to moderate thoracic esophageal dysmotility, small sliding hiatal hernia, laparoscopic gastric band appears appropriately located  2/18/15 EGD per [facility] reactive gastropathy, and h. pylori, haji's mucosa indefinite for dysplasia, no evidence of celiac disease  4/22/15 upper GI series postoperatively appears laparoscopic band within normal limits, small sliding hiatal hernia  5/14/15 EGD per [facility] esophageal mucosal changes large amount of food residue in stomach, 5 mucosa low grade dysplasia, intestinal metaplasia, gastric emptying study pending  5/22/15 nuclear medicine gastric emptying study per [facility] evidence of reflux, gastric emptying half-time 118 minutes  1/8/16 EGD per   surgery normal  1/4/17 [facility] note history of reflux, treatment of haji's esophagus low-grade dysplasia, discussed radiofrequency ablation and endoscopic ultrasound  7/24/17 EGD per [facility] biopsies performed, follow up haji's    Decreased GFR  9/4/13 bun 22,creat 1.0  1/9/15 bun 25,creat 0.9  10/2/215 bun 22,creat 1.1,GFR>60  3/10/17 bun 25,creat 1.3,GFR 53     Dyslipidemia  3/08 chol 159, trig 75, hdl 44, ldl 100  12/09 chol 162,chol 105, hdl 46,ldl 95  2/11 chol 135,trig 71,hdl 43,ldl 78  12/11 chol 141,trig 32,hdl 49,ldl 86  9/4/13 chol 147,trig 32,hdl 62,ldl 79  1/9/15 chol 152,trig 64,hdl 41,ldl 98   10/2/15 chol 140,trig 54,hdl 43,ldl 86,crp 1.3  3/11/17 chol 134,trig 118,hdl 33,ldl 77     History allergic rhinitis     History diabetes  3/08 A1c 5.9%  12/09 A1c 6.4% on insulin  2009 eye exam  3/10 s/p lap banding, now off insulin  2/11 BS 93  5/12 A1c 5.2% off insulin  12/12 eye exam   9/5/13 A1c 5.7% off meds  1/9/15 A1c 5.7%,bs 106  10/2/15 A1c 5.9%  3/11/17 A1c 6.5%     History of peripheral neuropathy  Has tried and failed elavil and neurontin  6/04 lower extremity EMG nerve conduction study 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   12/09 trial of neurontin 300 mg tid     History shingles  Has tried and failed elavil and neurontin  6/04 lower extremity EMG nerve conduction study 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   12/09 trial of neurontin 300 mg tid     Hypertension  3/08 urine microalbumin 21  3/08 lifescan screening JUAQUIN,carotid,aorta negative  2/11 hold norvasc  2/11 urine mac 16  5/12 urine mac 18 on lisinopril 40 mg  9/4/13 urine mac 24 on lisinopril 40 mg  1/9/15 urine mac 30 on lisinopril 40 mg  7/29/15 echo moderate LVH, EF 60-65%  7/29/15 carotid ultrasound negative  3/31/16 add norvasc 5 mg to lisinopril 40 mg  5/4/16 stop norvasc constipation,still on lisinopril 40 mg and add hctz 25 mg  5/18/16  patient on hctz 25 mg, not taking lisinopril, will resume lisinopril 40 mg  (norvasc cause constipation)    Polycythemia  9/4/13 hgb 16.6,hct 46.7  1/9/15 hgb 15.4,hct 45.7  10/2/15 hgb 17,hct 50  5/18/16 hgb 18.5,hct 57.4  3/10/17 hgb 18,hct 56 on testosterone therapy per  wellness management     preventive health  2/24/11 colon [facility] hemorrhoids  8/27/13 pneumovax  [facility]  3/10/17 prevnar  3/11/17 psa 1.1  3/11/17 vit d 33  3/17/17 FIT negative     Status post gastric bypass  3/10 lap banding   2/11 cmp,cbc,b12,iron,folate,vit d normal  12/11 vit d 47, iron 87,hgb 15,hct 47,cmp normal, normal folate and B12  5/11/12 laparascopic band port revision   9/5/13 iron 92,B12 >1500, it d 48  1/8/15  surgery note; ultrasound abdomen and CT abdomen pelvis with contrast to evaluate abdominal pain  1/9/15 cbc,cmp normal, iron 101,vit d 41, b12>1500, folate 24, B1 148 normal  1/15/15  surgery note; ordered ultrasound gallbladder and CT abd/pelvis  2/4/15 upper GI small bowel follow-through mild to moderate thoracic esophageal dysmotility, small sliding hiatal hernia, laparoscopic gastric band appears appropriately located  2/18/15 EGD per [facility] reactive gastropathy, and h. pylori, haji's mucosa indefinite for dysplasia, no evidence of celiac disease  4/22/15 upper GI series postoperatively appears laparoscopic band within normal limits, small sliding hiatal hernia  5/14/15 EGD per [facility] esophageal mucosal changes large amount of food residue in stomach, await biopsy results, gastric emptying study pending  5/26/15  surgery note; difficulty with gastric emptying status post bariatric surgery, removal fluid from his band 2 cc, down to 3 cc total volume, started on reglan and nexium  1/8/16 EGD per  surgery normal  10/13/16  bariatric surgery follow-up, BMI 34.7  3/10/17 BMI 33.3  3/11/17 b12>1500,vit d 33,iron 59     Status post knee  replacement  History right knee replacement  1/25/13 xray knee right postsurgical change consistent with medial knee compartment replacement, DJD, chondrocalcinosis  1/25/13 xray knee left postsurgical change consistent with medial knee compartment replacement, DJD, chondrocalcinosis     Status post lumbar fusion  3/26/12  neurosurgery Laminectomy L3-4, 4-5, 5-1 with pedicle screw fixation of L4-5 and posterolateral arthrodesis   11/12  neurosurgery note; still with right sided pain, EMG NCS pending, referral to   2/13  pain note, MRI pending  2/26/13 MRI lumbar spine post surgical changes fusion L4-L5, L5-S1 severe right foraminal stenosis, L4-L5 moderate severe bilateral stenosis, L3-L4 moderate severe bilateral stenosis  3/11/13  pain; epidural steroid injection on the right at L2-3 and L3-4  7/23/13  neurosurgery note, MRI pending  8/8/13 x-ray lumbar spine postoperative changes stable L3-L5 anterolisthesis lateral flexion and extension views  8/8/13 MRI lumbar spine previous laminectomy L3-L5, bilateral pedicle screw fixation L4-L5, small to moderate right disc extrusion L3-L4, 1.5 cm synovial cyst L4-L5 region, moderate to severe multilevel foraminal stenosis  9/5/13  neurosurgery revision L3L4 decompression and resection synovial cyst, fusion L3L4 PEEK rods and screws  9/18/13 neurosurgery note status post redo L3-L4 decompression and fusion, doing well  10/1/13  neurosurgery note, x-rays look good, no fracture, bone fusion is beginning to take place, start physical therapy, followup 6 weeks AP and lateral lumbar spine  1/9/14 x-ray lumbar spine grade 1 spondylolisthesis L3-L5, status post posterior laminectomy and fusion L3-L5  1/14/14  neurosurgery note, followup as needed  11/28/14 MRI thoracic spine per  neurosurgery mild thoracic kyphosis,mild diskal degenerative changes in the midthoracic region     Status post  shoulder surgery   8/12  ortho note, mri pending  9/6/12 MRI right shoulder tendinopathy supraspinatus partial thickness tear, there superior glenoid labral extension the biceps, DJD  9/26/13  pain note, u/s guided right subacromial bursa injection  6/22/15 x-ray proximal left humerus fracture  6/25/15  orthopedics operative note ORIF left humerus fracture    Current Outpatient Prescriptions   Medication Sig Dispense Refill   • lisinopril (PRINIVIL, ZESTRIL) 40 MG tablet Take 1 Tab by mouth every day. 90 Tab 0   • hydroCHLOROthiazide (HYDRODIURIL) 25 MG Tab Take 1 Tab by mouth every day. 90 Tab 0   • OMEPRAZOLE PO Take  by mouth.     • VITAMIN B1-B12 INJ by Injection route every 7 days.     • Testosterone Cypionate (DEPO-TESTOSTERONE IM) by Intramuscular route every 7 days.     • metoclopramide (REGLAN) 10 MG TABS Take 10 mg by mouth 3 times a day.     • coenzyme Q-10 30 MG capsule Take 60 mg by mouth every day.     • vitamin D (CHOLECALCIFEROL) 1000 UNIT TABS Take 5,000 Units by mouth every day.     • Glucosamine Sulfate 1500 MG PACK Take  by mouth 2 Times a Day.     • Omega-3 Fatty Acids (FISH OIL PO) Take 2 Caps by mouth 2 Times a Day.     • fluticasone (FLONASE) 50 MCG/ACT nasal spray Spray 2 Sprays in nose every day. 1 Bottle 11   • ethyl chloride Aerosol Ethyl chloride jet stram BAL apply prn 1 Can 0     No current facility-administered medications for this visit.         Patient is taking medications as noted in medication list.  Current supplements as per medication list.     Allergies: Norvasc [amlodipine] and Statins [hmg-coa-r inhibitors]    Current social contact/activities: Pt plays golf      Is patient current with immunizations? No, due for TDAP and SHINGRIX (Shingles). Patient is interested in receiving ZOSTAVAX (Shingles) today.    He  reports that he quit smoking about 54 years ago. His smoking use included Cigarettes. He has a 4.00 pack-year smoking history. He has  never used smokeless tobacco. He reports that he does not drink alcohol or use drugs.  Counseling given: Not Answered        DPA/Advanced directive: Pt is not interested at this time.    ROS:    Gait: Uses no assistive device    Ostomy: No   Other tubes: No   Amputations: No    Chronic oxygen use No   Last eye exam a couple of years ago   Wears hearing aids: No    : Denies any urinary leakage during the last 6 months     No anxiety, no depression, no falls  Screening:    DIABETES    Has patient ever had diabetes education? Yes, and is NOT interested in more at this time.            Depression Screening    Little interest or pleasure in doing things?  0 - not at all  Feeling down, depressed, or hopeless? 0 - not at all  Patient Health Questionnaire Score: 0    If depressive symptoms identified deferred to follow up visit unless specifically addressed in assessment and plan.    Interpretation of PHQ-9 Total Score   Score Severity   1-4 No Depression   5-9 Mild Depression   10-14 Moderate Depression   15-19 Moderately Severe Depression   20-27 Severe Depression    Screening for Cognitive Impairment    Three Minute Recall (leader, season, table)  2/3    Juliano clock face with all 12 numbers and set the hands to show 10 past 11.  Yes 5/5  If cognitive concerns identified, deferred for follow up unless specifically addressed in assessment and plan.    Fall Risk Assessment    Has the patient had two or more falls in the last year or any fall with injury in the last year?  No  If fall risk identified, deferred for follow up unless specifically addressed in assessment and plan.    Safety Assessment    Throw rugs on floor.  Yes  Handrails on all stairs.  Yes  Good lighting in all hallways.  Yes  Difficulty hearing.  No  Patient counseled about all safety risks that were identified.    Functional Assessment ADLs    Are there any barriers preventing you from cooking for yourself or meeting nutritional needs?  No.    Are there  any barriers preventing you from driving safely or obtaining transportation?  No.    Are there any barriers preventing you from using a telephone or calling for help?  No.    Are there any barriers preventing you from shopping?  No.    Are there any barriers preventing you from taking care of your own finances?  No.    Are there any barriers preventing you from managing your medications?  No.    Are there any barriers preventing you from showering, bathing or dressing yourself?  No.    Are you currently engaging in any exercise or physical activity?  Yes.  Pt hits 500 golf balls every other day  What is your perception of your health?  Good.    Health Maintenance Summary                IMM DTaP/Tdap/Td Vaccine Overdue 11/7/1961     Annual Wellness Visit Overdue 3/11/2018      Done 3/10/2017 Visit Dx: Medicare annual wellness visit, subsequent     Patient has more history with this topic...    IMM INFLUENZA Next Due 9/1/2018      Done 12/29/2009 Imm Admin: Influenza TIV (IM)    COLONOSCOPY Next Due 2/24/2021      Done 2/24/2011 [facility]          Patient Care Team:  Glenn Delaney M.D. as PCP - General (Internal Medicine)  Joni Miner M.D. as Medical Home Care Manager (Internal Medicine)  Shalonda Loja M.D. as Consulting Physician (Gastroenterology)  Cristal Guido M.D. as Consulting Physician (Orthopaedics)    Social History   Substance Use Topics   • Smoking status: Former Smoker     Packs/day: 2.00     Years: 2.00     Types: Cigarettes     Quit date: 1/1/1964   • Smokeless tobacco: Never Used      Comment: 2 ppd 4 yrs,quit 1964   • Alcohol use No     Family History   Problem Relation Age of Onset   • No Known Problems Sister    • No Known Problems Sister    • No Known Problems Sister    • Diabetes     • No Known Problems Mother    • No Known Problems Father      He  has a past medical history of Arthritis; CATARACT; Diabetes; Hiatus hernia syndrome; Hypertension; Pain (08-29-13); Pain (6/22/2015); Pain  (1/7/16); Pneumonia (2007); Ulcer (2014); and Unspecified hemorrhagic conditions.   Past Surgical History:   Procedure Laterality Date   • GASTROSCOPY N/A 1/8/2016    Procedure: GASTROSCOPY;  Surgeon: Deniz Sue M.D.;  Location: [facility];  Service:    • HUMERUS ORIF Left 6/25/2015    Procedure: HUMERUS ORIF/ PROXIMAL;  Surgeon: Cristal Guido M.D.;  Location: [facility];  Service:    • LUMBAR FUSION POSTERIOR  9/5/2013    Performed by Edith Sears M.D. at [facility]   • LUMBAR DECOMPRESSION  9/5/2013    Performed by Edith Sears M.D. at [facility]   • MASS EXCISION NEURO  9/5/2013    Performed by Edith Sears M.D. at [facility]   • [facility]2012    [facility]   • DRAINAGE HEMATOMA  5/25/2012    Performed by DENIZ SUE at [facility]   • GASTRIC BAND LAPAROSCOPIC REVISION  5/11/2012    Performed by DENIZ SUE at [facility]   • LUMBAR FUSION POSTERIOR  3/26/2012    Performed by EDITH SEARS at [facility]   • LUMBAR DECOMPRESSION  3/26/2012    Performed by EDITH SEARS at [facility]   • INJ,EPIDURAL/LUMB/SAC SINGLE  3/19/2012    Performed by KRISTIN BALTAZAR at [facility]   • INJ,EPIDURAL/LUMB/SAC SINGLE  3/5/2012    Performed by KRISTIN BALTAZAR at [facility]   • GASTRIC BANDING LAPAROSCOPIC  3/24/2010    Performed by DENIZ SUE at [facility]   • HIATAL HERNIA REPAIR  3/24/2010    Performed by DENIZ SUE at [facility]   • KNEE ARTHROPLASTY TOTAL  2000    bilateral   • ABDOMINOPLASTY  1990           Exam:     Blood pressure 102/60, pulse 73, temperature 36.3 °C (97.4 °F), height 1.829 m (6'), weight 110.2 kg (243 lb), SpO2 94 %. Body mass index is 32.96 kg/m².    Hearing and dentition fair  Alert, oriented in no acute distress.  Eye contact is good, speech  goal directed, affect calm  Lungs clear  Cardiovascular S1-S2 regular    Assessment and Plan. The following treatment and monitoring plan is recommended:     Assessment  #1 Wellness assessment    #2 history of Haji's no dysphagia 7/24/17 EGD per [facility] biopsies performed, follow up haji's    #3 Decreased GFR 3/10/17 bun 25,creat 1.3,GFR 53 no regular NSAIDs     #4 Dyslipidemia diet-controlled 3/11/17 chol 134,trig 118,hdl 33,ldl 77     #5 History diabetes 3/11/17 A1c 6.5% not exercising, trying to watch his diet     #6 History of peripheral neuropathy stable     #7 Hypertension blood pressure controlled 5/18/16 patient on hctz 25 mg, not taking lisinopril, will resume lisinopril 40 mg    #8 Polycythemia on testosterone from age management  3/10/17 hgb 18,hct 56 on testosterone therapy per  wellness management     #9 preventive health  2/24/11 colon [facility] hemorrhoids  8/27/13 pneumovax  [facility]  3/10/17 prevnar  3/11/17 psa 1.1  3/11/17 vit d 33  3/17/17 FIT negative     #10 Status post gastric bypass in 2010  10/13/16  bariatric surgery follow-up, BMI 34.7  3/10/17 BMI 33.3 3/11/17 b12>1500,vit d 33,iron 59      #11 Status post knee replacement no falls     #12 Status post lumbar fusion no back pain 11/28/14 MRI thoracic spine per  neurosurgery mild thoracic kyphosis,mild diskal degenerative changes in the midthoracic region     #13 obesity 7/2/18 BMI 32.9     #14 history of iron, B-12, b1, vitamin D status post gastric bypass surgery    #15 occasional nocturia    Services suggested:    Health Care Screening recommendations as per orders if indicated.  Referrals offered: PT/OT/Nutrition counseling/Behavioral Health/Smoking cessation as per orders if indicated.    Discussion today about general wellness and lifestyle habits:    · Prevent falls and reduce trip hazards; Cautioned about securing or removing rugs.  · Have a working fire alarm and carbon  monoxide detector;   · Engage in regular physical activity and social activities       Follow-up:   Plan  #!  Health maintenance reviewed and updated    #2 prescription shingles vaccine provided to get at pharmacy    #3 has had pneumonia and 13, pneumonia 23 vaccine    #4 FIT card    #5 labs    #6 testosterone per age management  patient understands that testosterone replacement may increase risk for heart disease, heart attack, stroke, polycythemia, liver enzyme elevation and liver inflammation, prostate cancer, elevated cholesterol, he needs to talk to his provider about.  Blood test for testosterone levels and potential side effects    #7 recommend nutrition consultation, diet, exercise, weight loss, obesity increases risk for diabetes recurrence and heart disease    #8 recommend check blood sugar daily and record    #9 recommend physical therapy he declines    #10 recommend nutrition consultation he declines    #11 declines hearing test    #12 follow-up 6 months    #13 check blood pressure regularly and record

## 2018-07-03 ENCOUNTER — TELEPHONE (OUTPATIENT)
Dept: MEDICAL GROUP | Facility: MEDICAL CENTER | Age: 78
End: 2018-07-03

## 2018-07-03 DIAGNOSIS — E11.9 DIABETES MELLITUS WITHOUT COMPLICATION (HCC): ICD-10-CM

## 2018-07-03 LAB
IGF-I SERPL-MCNC: 165 NG/ML (ref 22–221)
IGF-I Z-SCORE SERPL: 1.1
SHBG SERPL-SCNC: 25 NMOL/L (ref 11–80)
TESTOST FREE MFR SERPL: 2.4 % (ref 1.6–2.9)
TESTOST FREE SERPL-MCNC: 243 PG/ML (ref 47–244)
TESTOST SERPL-MCNC: 1009 NG/DL (ref 300–720)

## 2018-07-03 RX ORDER — GLIMEPIRIDE 1 MG/1
1 TABLET ORAL EVERY MORNING
Qty: 30 TAB | Refills: 5 | Status: SHIPPED | OUTPATIENT
Start: 2018-07-03 | End: 2018-10-08 | Stop reason: SDUPTHER

## 2018-07-04 DIAGNOSIS — E11.9 DIABETES MELLITUS WITHOUT COMPLICATION (HCC): ICD-10-CM

## 2018-07-04 RX ORDER — LANCETS 30 GAUGE
EACH MISCELLANEOUS
Qty: 100 EACH | Refills: 11 | Status: SHIPPED
Start: 2018-07-04 | End: 2019-12-26

## 2018-07-04 NOTE — TELEPHONE ENCOUNTER
Notified with results  A1c elevated, diabetes worse, renal function worse, patient does not take NSAIDs, aspirin and avoid all NSAIDs.  Start checking blood sugars daily, One Touch ultra glucometer test strips in to pharmacy  Given renal insufficiency, avoid metformin, will start Amaryl 1 mg daily, discussed with patient side effects of hypoglycemia, check blood sugars daily, keep blood sugar diary  Patient will need to follow up with me in 3 months, understands he has worsening diabetes, renal insufficiency, he needs to start to exercise, lose weight, keep track of his blood sugars and follow-up with me on a regular basis he has not been compliant with regular follow-up.  He gets testosterone through his age , he needs to follow-up with that specialist and inform him about his current labs, I would recommend decreasing testosterone as that can increase risk for heart disease, heart attack, stroke  He will call us on Thursday to schedule his follow-up appointment for 3 months  Prescription test strips and Amaryl to pharmacy

## 2018-07-04 NOTE — TELEPHONE ENCOUNTER
Please call the patient, I spoke to him today.  He needs to schedule a follow-up appointment in 3 months which would be the first or second week of October.  Please call him and schedule that appointment for follow-up diabetes.

## 2018-07-05 LAB — VIT B1 BLD-MCNC: 291 NMOL/L (ref 70–180)

## 2018-10-08 ENCOUNTER — HOSPITAL ENCOUNTER (OUTPATIENT)
Dept: LAB | Facility: MEDICAL CENTER | Age: 78
End: 2018-10-08
Attending: INTERNAL MEDICINE
Payer: MEDICARE

## 2018-10-08 ENCOUNTER — OFFICE VISIT (OUTPATIENT)
Dept: MEDICAL GROUP | Facility: MEDICAL CENTER | Age: 78
End: 2018-10-08
Payer: MEDICARE

## 2018-10-08 ENCOUNTER — TELEPHONE (OUTPATIENT)
Dept: MEDICAL GROUP | Facility: MEDICAL CENTER | Age: 78
End: 2018-10-08

## 2018-10-08 VITALS
TEMPERATURE: 98.7 F | BODY MASS INDEX: 32.64 KG/M2 | WEIGHT: 241 LBS | SYSTOLIC BLOOD PRESSURE: 128 MMHG | OXYGEN SATURATION: 93 % | HEIGHT: 72 IN | DIASTOLIC BLOOD PRESSURE: 76 MMHG | HEART RATE: 77 BPM

## 2018-10-08 DIAGNOSIS — K20.90 ESOPHAGITIS: ICD-10-CM

## 2018-10-08 DIAGNOSIS — E11.9 DIABETES MELLITUS WITHOUT COMPLICATION (HCC): ICD-10-CM

## 2018-10-08 DIAGNOSIS — E11.9 TYPE 2 DIABETES MELLITUS WITHOUT COMPLICATION, WITHOUT LONG-TERM CURRENT USE OF INSULIN (HCC): ICD-10-CM

## 2018-10-08 DIAGNOSIS — I10 ESSENTIAL HYPERTENSION: Chronic | ICD-10-CM

## 2018-10-08 DIAGNOSIS — E29.1 HYPOGONADISM IN MALE: ICD-10-CM

## 2018-10-08 DIAGNOSIS — E78.5 DYSLIPIDEMIA: Chronic | ICD-10-CM

## 2018-10-08 DIAGNOSIS — N18.30 CKD (CHRONIC KIDNEY DISEASE) STAGE 3, GFR 30-59 ML/MIN (HCC): ICD-10-CM

## 2018-10-08 DIAGNOSIS — Z00.00 PREVENTATIVE HEALTH CARE: Chronic | ICD-10-CM

## 2018-10-08 DIAGNOSIS — E66.9 CLASS 2 OBESITY WITHOUT SERIOUS COMORBIDITY WITH BODY MASS INDEX (BMI) OF 35.0 TO 35.9 IN ADULT, UNSPECIFIED OBESITY TYPE: ICD-10-CM

## 2018-10-08 DIAGNOSIS — Z98.84 S/P GASTRIC BYPASS: Chronic | ICD-10-CM

## 2018-10-08 LAB
ALBUMIN SERPL BCP-MCNC: 4 G/DL (ref 3.2–4.9)
ALBUMIN/GLOB SERPL: 1.6 G/DL
ALP SERPL-CCNC: 45 U/L (ref 30–99)
ALT SERPL-CCNC: 28 U/L (ref 2–50)
ANION GAP SERPL CALC-SCNC: 8 MMOL/L (ref 0–11.9)
AST SERPL-CCNC: 21 U/L (ref 12–45)
BASOPHILS # BLD AUTO: 0.6 % (ref 0–1.8)
BASOPHILS # BLD: 0.03 K/UL (ref 0–0.12)
BILIRUB SERPL-MCNC: 0.7 MG/DL (ref 0.1–1.5)
BUN SERPL-MCNC: 24 MG/DL (ref 8–22)
CALCIUM SERPL-MCNC: 10 MG/DL (ref 8.5–10.5)
CHLORIDE SERPL-SCNC: 104 MMOL/L (ref 96–112)
CO2 SERPL-SCNC: 25 MMOL/L (ref 20–33)
CREAT SERPL-MCNC: 1.21 MG/DL (ref 0.5–1.4)
EOSINOPHIL # BLD AUTO: 0.15 K/UL (ref 0–0.51)
EOSINOPHIL NFR BLD: 2.8 % (ref 0–6.9)
ERYTHROCYTE [DISTWIDTH] IN BLOOD BY AUTOMATED COUNT: 55 FL (ref 35.9–50)
EST. AVERAGE GLUCOSE BLD GHB EST-MCNC: 134 MG/DL
FASTING STATUS PATIENT QL REPORTED: NORMAL
GLOBULIN SER CALC-MCNC: 2.5 G/DL (ref 1.9–3.5)
GLUCOSE SERPL-MCNC: 129 MG/DL (ref 65–99)
HBA1C MFR BLD: 6.3 % (ref 0–5.6)
HCT VFR BLD AUTO: 63.3 % (ref 42–52)
HGB BLD-MCNC: 20.9 G/DL (ref 14–18)
IMM GRANULOCYTES # BLD AUTO: 0.03 K/UL (ref 0–0.11)
IMM GRANULOCYTES NFR BLD AUTO: 0.6 % (ref 0–0.9)
LYMPHOCYTES # BLD AUTO: 1.17 K/UL (ref 1–4.8)
LYMPHOCYTES NFR BLD: 22.2 % (ref 22–41)
MCH RBC QN AUTO: 28.7 PG (ref 27–33)
MCHC RBC AUTO-ENTMCNC: 33 G/DL (ref 33.7–35.3)
MCV RBC AUTO: 86.8 FL (ref 81.4–97.8)
MONOCYTES # BLD AUTO: 0.53 K/UL (ref 0–0.85)
MONOCYTES NFR BLD AUTO: 10.1 % (ref 0–13.4)
NEUTROPHILS # BLD AUTO: 3.36 K/UL (ref 1.82–7.42)
NEUTROPHILS NFR BLD: 63.7 % (ref 44–72)
NRBC # BLD AUTO: 0 K/UL
NRBC BLD-RTO: 0 /100 WBC
PHOSPHATE SERPL-MCNC: 3.2 MG/DL (ref 2.5–4.5)
PLATELET # BLD AUTO: 184 K/UL (ref 164–446)
PMV BLD AUTO: 10.7 FL (ref 9–12.9)
POTASSIUM SERPL-SCNC: 4.4 MMOL/L (ref 3.6–5.5)
PROT SERPL-MCNC: 6.5 G/DL (ref 6–8.2)
PTH-INTACT SERPL-MCNC: 11.8 PG/ML (ref 14–72)
RBC # BLD AUTO: 7.29 M/UL (ref 4.7–6.1)
SODIUM SERPL-SCNC: 137 MMOL/L (ref 135–145)
TESTOST SERPL-MCNC: 769 NG/DL (ref 175–781)
WBC # BLD AUTO: 5.3 K/UL (ref 4.8–10.8)

## 2018-10-08 PROCEDURE — 80053 COMPREHEN METABOLIC PANEL: CPT

## 2018-10-08 PROCEDURE — 84403 ASSAY OF TOTAL TESTOSTERONE: CPT

## 2018-10-08 PROCEDURE — 84100 ASSAY OF PHOSPHORUS: CPT

## 2018-10-08 PROCEDURE — 36415 COLL VENOUS BLD VENIPUNCTURE: CPT | Mod: GA

## 2018-10-08 PROCEDURE — 84160 ASSAY OF PROTEIN ANY SOURCE: CPT

## 2018-10-08 PROCEDURE — 85025 COMPLETE CBC W/AUTO DIFF WBC: CPT

## 2018-10-08 PROCEDURE — 92250 FUNDUS PHOTOGRAPHY W/I&R: CPT | Mod: TC | Performed by: INTERNAL MEDICINE

## 2018-10-08 PROCEDURE — 99214 OFFICE O/P EST MOD 30 MIN: CPT | Performed by: INTERNAL MEDICINE

## 2018-10-08 PROCEDURE — 83036 HEMOGLOBIN GLYCOSYLATED A1C: CPT | Mod: GA

## 2018-10-08 PROCEDURE — 84165 PROTEIN E-PHORESIS SERUM: CPT

## 2018-10-08 PROCEDURE — 83970 ASSAY OF PARATHORMONE: CPT

## 2018-10-08 RX ORDER — GLIMEPIRIDE 1 MG/1
1 TABLET ORAL EVERY MORNING
Qty: 90 TAB | Refills: 3 | Status: SHIPPED | OUTPATIENT
Start: 2018-10-08 | End: 2019-09-04 | Stop reason: SDUPTHER

## 2018-10-08 NOTE — PROGRESS NOTES
Subjective:      LIAN More III is a 77 y.o. male who presents diabetes          HPI   Arrived 826 patient here for follow-up diabetes, recently started Amaryl 1 mg in July, A1c had increased,  Patient had been on insulin many years ago but after laparoscopic banding, went off insulin, with A1c 7.6% in July having increased from 6.5% a year ago, has started Amaryl, checking blood sugar once a day and recording. Blood sugar running at home , no hypoglycemia checking blood sugar once a day.  Not exercising on a regular basis. Not checking blood pressure on a regular basis, on lisinopril and hydrochlorothiazide, has declined statin therapy.   On low testosterone therapy with testosterone supplementation by health management physician Dr. Miner, blood test are due, no side effects with testosterone, this is managed and followed by his age management doctor, patient states he would like me to order test so that the insurance will cover it.  Has had decreased libido, decreased energy prior to start of injections, testosterone injections have improved libido, energy level, focus, concentration.  Patient has declined vaccinations.  Keeps active by golfing but no regular exercise program  CKD no regular NSAIDs  No tobacco, no alcohol        Current Outpatient Prescriptions   Medication Sig Dispense Refill   • hydroCHLOROthiazide (HYDRODIURIL) 25 MG Tab Take 1 Tab by mouth every day. 90 Tab 2   • lisinopril (PRINIVIL, ZESTRIL) 40 MG tablet Take 1 Tab by mouth every day. 90 Tab 2   • fluticasone (FLONASE) 50 MCG/ACT nasal spray Spray 2 Sprays in nose every day. 16 Bottle 11   • Blood Glucose Monitoring Suppl Device Meter: Accu-check. E11.9 1 Device 0   • Lancets Misc Lancets order: Accu-check meter and test strips, check blood sugar once a day, E11.9 100 Each 11   • Blood Glucose Monitoring Suppl Supplies Misc Test strips order: Accu-check test trips, check blood sugar once a day before meal, E11.9 50 Strip 11   •  glimepiride (AMARYL) 1 MG tablet Take 1 Tab by mouth every morning. 30 Tab 5   • Blood Glucose Monitoring Suppl Supplies Misc One Touch ultra test strips, check blood sugar once a day, E11.9 50 Strip 11   • OMEPRAZOLE PO Take  by mouth.     • ethyl chloride Aerosol Ethyl chloride jet stram BAL apply prn 1 Can 0   • VITAMIN B1-B12 INJ by Injection route every 7 days.     • Testosterone Cypionate (DEPO-TESTOSTERONE IM) by Intramuscular route every 7 days.     • metoclopramide (REGLAN) 10 MG TABS Take 10 mg by mouth 3 times a day.     • coenzyme Q-10 30 MG capsule Take 60 mg by mouth every day.     • vitamin D (CHOLECALCIFEROL) 1000 UNIT TABS Take 5,000 Units by mouth every day.     • Glucosamine Sulfate 1500 MG PACK Take  by mouth 2 Times a Day.     • Omega-3 Fatty Acids (FISH OIL PO) Take 2 Caps by mouth 2 Times a Day.       No current facility-administered medications for this visit.      Haji's  2/4/15 upper GI small bowel follow-through mild to moderate thoracic esophageal dysmotility, small sliding hiatal hernia, laparoscopic gastric band appears appropriately located  2/18/15 EGD per [facility] reactive gastropathy, and h. pylori, haji's mucosa indefinite for dysplasia, no evidence of celiac disease  4/22/15 upper GI series postoperatively appears laparoscopic band within normal limits, small sliding hiatal hernia  5/14/15 EGD per [facility] esophageal mucosal changes large amount of food residue in stomach, 5 mucosa low grade dysplasia, intestinal metaplasia, gastric emptying study pending  5/22/15 nuclear medicine gastric emptying study per [facility] evidence of reflux, gastric emptying half-time 118 minutes  1/8/16 EGD per  surgery normal  1/4/17 [facility] note history of reflux, treatment of haji's esophagus low-grade dysplasia, discussed radiofrequency ablation and endoscopic ultrasound  7/24/17 EGD per [facility] biopsies performed, follow up haji's  8/16/18 EGD per [facility] salmon colored mucosa suspicious for short  segment haji's, biopsies pathology reactive gastropathy with mucosal iron deposition, negative h.pylori, haji's mucosa, active esophagitis consistent with reflux, no dysplasia or malignancy seen     Decreased GFR  9/4/13 bun 22,creat 1.0  1/9/15 bun 25,creat 0.9  10/2/215 bun 22,creat 1.1,GFR>60  3/10/17 bun 25,creat 1.3,GFR 53  7/3/18 bun 28,creat 1.58,GFR 43,urine mac 30 on lisinopril 40 mg    diabetes  3/08 A1c 5.9%  12/09 A1c 6.4% on insulin  2009 eye exam  3/10 s/p lap banding, now off insulin  2/11 BS 93  5/12 A1c 5.2% off insulin  12/12 eye exam   9/5/13 A1c 5.7% off meds  1/9/15 A1c 5.7%,bs 106  10/2/15 A1c 5.9%  3/11/17 A1c 6.5%  7/3/18 A1c 7.6% (GFR 43) start amaryl 1 mg     Dyslipidemia  3/08 chol 159, trig 75, hdl 44, ldl 100  12/09 chol 162,chol 105, hdl 46,ldl 95  2/11 chol 135,trig 71,hdl 43,ldl 78  12/11 chol 141,trig 32,hdl 49,ldl 86  9/4/13 chol 147,trig 32,hdl 62,ldl 79  1/9/15 chol 152,trig 64,hdl 41,ldl 98   10/2/15 chol 140,trig 54,hdl 43,ldl 86,crp 1.3  3/11/17 chol 134,trig 118,hdl 33,ldl 77  7/3/18 chol 136,trig 123,hdl 32,ldl 79      History allergic rhinitis     History of peripheral neuropathy  Has tried and failed elavil and neurontin  6/04 lower extremity EMG nerve conduction study 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   12/09 trial of neurontin 300 mg tid     History shingles  Has tried and failed elavil and neurontin  6/04 lower extremity EMG nerve conduction study 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   12/09 trial of neurontin 300 mg tid     Hypertension  3/08 urine microalbumin 21  3/08 lifescan screening JUAQUIN,carotid,aorta negative  2/11 hold norvasc  2/11 urine mac 16  5/12 urine mac 18 on lisinopril 40 mg  9/4/13 urine mac 24 on lisinopril 40 mg  1/9/15 urine mac 30 on lisinopril 40 mg  7/29/15 echo moderate LVH, EF 60-65%  7/29/15 carotid ultrasound negative  3/31/16 add norvasc 5  mg to lisinopril 40 mg  5/4/16 stop norvasc constipation,still on lisinopril 40 mg and add hctz 25 mg  5/18/16 patient on hctz 25 mg, not taking lisinopril, will resume lisinopril 40 mg  7/2/18 urine mac 30 on hctz 25 mg and lisinopril 40 mg  (norvasc cause constipation)    Hypotestosterone  10/2/15 testosterone 356, free testosterone 50  3/3/16 total testosterone 528,free testosterone 135, on testosterone replacement per    7/2/18 testosterone 1009, free testosterone 243,SHBG 25,estradiol<20,IGF1 165,hgb 16,hct 53,AST 28,ALT 31,psa 2.5 on testosterone replacement per       Polycythemia  9/4/13 hgb 16.6,hct 46.7  1/9/15 hgb 15.4,hct 45.7  10/2/15 hgb 17,hct 50  5/18/16 hgb 18.5,hct 57.4  3/10/17 hgb 18,hct 56 on testosterone therapy per  wellness management  7/2/18 hgb 16,hct 53,testosterone 1009, free testosterone 243     preventive health  2/24/11 colon [facility] hemorrhoids  8/27/13 pneumovax  4/18/16 zoster vaccine at [facility]  3/10/17 prevnar  3/17/17 FIT negative  7/2/18 shingrix   7/2/18 prescription shingles vaccine provided to get at pharmacy  7/2/18 psa 2.5  7/2/18 vit d 27 start 2000 units     Status post gastric bypass  3/10 lap banding   2/11 cmp,cbc,b12,iron,folate,vit d normal  12/11 vit d 47, iron 87,hgb 15,hct 47,cmp normal, normal folate and B12  5/11/12 laparascopic band port revision   9/5/13 iron 92,B12 >1500, it d 48  1/8/15  surgery note; ultrasound abdomen and CT abdomen pelvis with contrast to evaluate abdominal pain  1/9/15 cbc,cmp normal, iron 101,vit d 41, b12>1500, folate 24, B1 148 normal  1/15/15  surgery note; ordered ultrasound gallbladder and CT abd/pelvis  2/4/15 upper GI small bowel follow-through mild to moderate thoracic esophageal dysmotility, small sliding hiatal hernia, laparoscopic gastric band appears appropriately located  2/18/15 EGD per [facility] reactive gastropathy, and h. pylori, haji's mucosa indefinite for  dysplasia, no evidence of celiac disease  4/22/15 upper GI series postoperatively appears laparoscopic band within normal limits, small sliding hiatal hernia  5/14/15 EGD per [facility] esophageal mucosal changes large amount of food residue in stomach, await biopsy results, gastric emptying study pending  5/26/15  surgery note; difficulty with gastric emptying status post bariatric surgery, removal fluid from his band 2 cc, down to 3 cc total volume, started on reglan and nexium  1/8/16 EGD per  surgery normal  10/13/16  bariatric surgery follow-up, BMI 34.7  3/10/17 BMI 33.3  3/11/17 b12>1500,vit d 33,iron 59  7/2/18 b12>1500,vit d  27,iron 59,b1 291  8/16/18 EGD per [facility] salmon colored mucosa suspicious for short segment haji's, biopsies pathology reactive gastropathy with mucosal iron deposition, negative h.pylori, haji's mucosa, active esophagitis consistent with reflux, no dysplasia or malignancy seen     Status post knee replacement  History right knee replacement  1/25/13 xray knee right postsurgical change consistent with medial knee compartment replacement, DJD, chondrocalcinosis  1/25/13 xray knee left postsurgical change consistent with medial knee compartment replacement, DJD, chondrocalcinosis     Status post lumbar fusion  3/26/12  neurosurgery Laminectomy L3-4, 4-5, 5-1 with pedicle screw fixation of L4-5 and posterolateral arthrodesis   11/12  neurosurgery note; still with right sided pain, EMG NCS pending, referral to   2/13  pain note, MRI pending  2/26/13 MRI lumbar spine post surgical changes fusion L4-L5, L5-S1 severe right foraminal stenosis, L4-L5 moderate severe bilateral stenosis, L3-L4 moderate severe bilateral stenosis  3/11/13  pain; epidural steroid injection on the right at L2-3 and L3-4  7/23/13  neurosurgery note, MRI pending  8/8/13 x-ray lumbar spine postoperative changes stable L3-L5 anterolisthesis  lateral flexion and extension views  8/8/13 MRI lumbar spine previous laminectomy L3-L5, bilateral pedicle screw fixation L4-L5, small to moderate right disc extrusion L3-L4, 1.5 cm synovial cyst L4-L5 region, moderate to severe multilevel foraminal stenosis  9/5/13  neurosurgery revision L3L4 decompression and resection synovial cyst, fusion L3L4 PEEK rods and screws  9/18/13 neurosurgery note status post redo L3-L4 decompression and fusion, doing well  10/1/13  neurosurgery note, x-rays look good, no fracture, bone fusion is beginning to take place, start physical therapy, followup 6 weeks AP and lateral lumbar spine  1/9/14 x-ray lumbar spine grade 1 spondylolisthesis L3-L5, status post posterior laminectomy and fusion L3-L5  1/14/14  neurosurgery note, followup as needed  11/28/14 MRI thoracic spine per  neurosurgery mild thoracic kyphosis,mild diskal degenerative changes in the midthoracic region     Status post shoulder surgery   8/12  ortho note, mri pending  9/6/12 MRI right shoulder tendinopathy supraspinatus partial thickness tear, there superior glenoid labral extension the biceps, DJD  9/26/13  pain note, u/s guided right subacromial bursa injection  6/22/15 x-ray proximal left humerus fracture  6/25/15  orthopedics operative note ORIF left humerus fracture           Patient Active Problem List   Diagnosis   • Type 2 diabetes mellitus without complication, without long-term current use of insulin (HCC)   • Hypertension   • History of peripheral neuropathy   • History of shingles   • S/P knee replacement   • S/P lumbar fusion   • Dyslipidemia   • History of allergic rhinitis   • Preventative health care   • Lower extremity edema   • S/P gastric bypass   • Status post shoulder surgery   • Anderson esophagus   • Obesity   • Hypotestosteronism   • Polycythemia   • Decreased GFR               Health Maintenance Summary                RETINAL  SCREENING Overdue 11/7/1960     IMM DTaP/Tdap/Td Vaccine Overdue 11/7/1961     IMM HEP B VACCINE Overdue 12/22/2014      Done 11/24/2014 Imm Admin: Hepatitis B Vaccine Recombivax (Adol/Adult)    IMM ZOSTER VACCINES Overdue 6/13/2016      Done 4/18/2016 Ext Imm: Zoster, Live (Zostavax)     Patient has more history with this topic...    IMM INFLUENZA Overdue 9/1/2018      Done 12/29/2009 Imm Admin: Influenza TIV (IM)    A1C SCREENING Next Due 1/2/2019      Done 7/2/2018 HEMOGLOBIN A1C      Patient has more history with this topic...    FASTING LIPID PROFILE Next Due 7/2/2019      Done 7/2/2018 LIPID PROFILE      Patient has more history with this topic...    URINE ACR / MICROALBUMIN Next Due 7/2/2019      Done 7/2/2018 MICROALBUMIN CREAT RATIO URINE     Patient has more history with this topic...    SERUM CREATININE Next Due 7/2/2019      Done 7/2/2018 COMP METABOLIC PANEL      Patient has more history with this topic...    Annual Wellness Visit Next Due 7/3/2019      Done 7/2/2018 Visit Dx: Medicare annual wellness visit, subsequent     Patient has more history with this topic...    DIABETES MONOFILAMENT / LE EXAM Next Due 10/8/2019      Done 10/8/2018 AMB DIABETIC MONOFILAMENT LOWER EXTREMITY EXAM    COLONOSCOPY Next Due 2/24/2021      Done 2/24/2011 [facility]          Patient Care Team:  Glenn Delaney M.D. as PCP - General (Internal Medicine)  Joni Miner M.D. as Medical Home Care Manager (Internal Medicine)  Shalonda Loja M.D. as Consulting Physician (Gastroenterology)  Cristal Guido M.D. as Consulting Physician (Orthopaedics)      ROS       Objective:          Physical Exam   Constitutional: He appears well-developed and well-nourished. No distress.   HENT:   Head: Normocephalic and atraumatic.   Mouth/Throat: Oropharynx is clear and moist.   Eyes: Conjunctivae are normal. Right eye exhibits no discharge. Left eye exhibits no discharge.   Neck: Neck supple. No JVD present. No thyromegaly present.    Cardiovascular: Normal rate and regular rhythm.    Pulmonary/Chest: Effort normal and breath sounds normal. No respiratory distress. He has no wheezes.   Abdominal: He exhibits no distension.   Musculoskeletal: He exhibits no edema.   Neurological: He is alert.   Skin: Skin is warm. He is not diaphoretic.   Psychiatric: He has a normal mood and affect. His behavior is normal.   Vitals reviewed.    Monofilament testing with a 10 gram force: sensation intact: intact  Visual Inspection: Feet no maceration, ulcers, fissures.  Pedal pulses: intact            Assessment/Plan:     Assessment  #1 Diabetes improved blood sugar control on Amaryl no hypoglycemia 7/3/18 A1c 7.6% (GFR 43) start amaryl 1 mg    #2 Decreased GFR 7/3/18 bun 28,creat 1.58,GFR 43,urine mac 30 on lisinopril 40 mg     #3 Dyslipidemia 7/3/18 chol 136,trig 123,hdl 32,ldl 79, has declined statin therapy     #4 Hypertension on hctz 25 mg and lisinopril 40 mg, not checking blood pressures on a regular basis    #5 Hypotestosterone on supplementation by age management physician 7/2/18 testosterone 1009, free testosterone 243,SHBG 25,estradiol<20,IGF1 165,hgb 16,hct 53,AST 28,ALT 31,psa 2.5 on testosterone replacement per       #6 BMI 32.6 status post gastric bypass     #7 gerd 8/16/18 EGD per [facility] salmon colored mucosa suspicious for short segment haji's, biopsies pathology reactive gastropathy with mucosal iron deposition, negative h.pylori, haji's mucosa, active esophagitis consistent with reflux, no dysplasia or malignancy seen      Plan   #! Declines flu vaccine    #2 declines hepatitis, tdap, shingrix vaccines    #3 needs to do eye exam     #4 poc retina today understands there is an out-of-pocket expense with Medicare approximately $40-$45, patient agrees    #5 check feet daily, annual ophthalmologic exam recommended    #6 check blood sugar once a day and record, continue Amaryl, hypoglycemic precautions    #7 check blood pressure  regularly    #8 recommend nutrition consultation or weight management evaluation he declines, understands obesity increases risk for worsening diabetes, cardiovascular disease    #9 repeat testosterone labs, understands that testosterone supplementation increases risk for cardiac disease, prostate cancer, polycythemia, liver inflammation    #10 avoid NSAIDs, monitor renal function    #11 follow-up 6 months    #12 labs

## 2018-10-09 ENCOUNTER — TELEPHONE (OUTPATIENT)
Dept: MEDICAL GROUP | Facility: MEDICAL CENTER | Age: 78
End: 2018-10-09

## 2018-10-09 NOTE — TELEPHONE ENCOUNTER
Please notify patient that:  (1)  the A1c is 6.3% which is improved, continue the glimepiride daily  (2) his blood counts show polycythemia or thickened blood because of the testosterone, thickened blood increases his risk for heart attack and stroke.  My recommendation is stopping the testosterone and following up with his age management Dr. Miner to discuss the abnormal results.  Dr. Miner prescribes his testosterone, and his testosterone level is 769.  (3) he should repeat his blood test in 4 weeks, but again since Dr. Miner his wellness doctor prescribed the testosterone he needs to follow-up with that provider and discuss discontinuing the testosterone and following up on those abnormal labs, again my recommendation is to stop the testosterone and repeat the labs in 4 weeks

## 2018-10-09 NOTE — TELEPHONE ENCOUNTER
----- Message from Glenn Delaney M.D. sent at 10/8/2018  9:05 PM PDT -----  Please notify patient that:  (1)  the A1c is 6.3% which is improved, continue the glimepiride daily  (2) his blood counts show polycythemia or thickened blood because of the testosterone, thickened blood increases his risk for heart attack and stroke.  My recommendation is stopping the testosterone and following up with his age management Dr. Miner to discuss the abnormal results.  Dr. Miner prescribes his testosterone, and his testosterone level is 769.  (3) he should repeat his blood test in 4 weeks, but again since Dr. Miner his wellness doctor prescribed the testosterone he needs to follow-up with that provider and discuss discontinuing the testosterone and following up on those abnormal labs, again my recommendation is to stop the testosterone and repeat the labs in 4 weeks

## 2018-10-10 ENCOUNTER — TELEPHONE (OUTPATIENT)
Dept: MEDICAL GROUP | Facility: MEDICAL CENTER | Age: 78
End: 2018-10-10

## 2018-10-10 DIAGNOSIS — H35.30 MACULAR DEGENERATION, UNSPECIFIED LATERALITY, UNSPECIFIED TYPE: ICD-10-CM

## 2018-10-10 LAB
ALBUMIN SERPL-MCNC: 4.02 G/DL (ref 3.75–5.01)
ALPHA1 GLOB SERPL ELPH-MCNC: 0.26 G/DL (ref 0.19–0.46)
ALPHA2 GLOB SERPL ELPH-MCNC: 0.91 G/DL (ref 0.48–1.05)
B-GLOBULIN SERPL ELPH-MCNC: 0.68 G/DL (ref 0.48–1.1)
EER PROT ELECT SER Q1092: NORMAL
GAMMA GLOB SERPL ELPH-MCNC: 0.73 G/DL (ref 0.62–1.51)
INTERPRETATION SERPL IFE-IMP: NORMAL
PROT SERPL-MCNC: 6.6 G/DL (ref 6–8.3)
RETINAL SCREEN: POSITIVE

## 2018-10-11 NOTE — TELEPHONE ENCOUNTER
Please notify patient his point-of-care retina exam shows no evidence of diabetes changes but he does have macular degeneration, it is recommended that he follow-up with a retinal specialist, I am going to refer him to [facility]

## 2018-10-12 ENCOUNTER — TELEPHONE (OUTPATIENT)
Dept: MEDICAL GROUP | Facility: MEDICAL CENTER | Age: 78
End: 2018-10-12

## 2018-10-12 NOTE — TELEPHONE ENCOUNTER
----- Message from Glenn Delaney M.D. sent at 10/10/2018  7:25 PM PDT -----  Please notify patient his point-of-care retina exam shows no evidence of diabetes changes but he does have macular degeneration, it is recommended that he follow-up with a retinal specialist, I am going to refer him to [facility]

## 2019-03-11 ENCOUNTER — OFFICE VISIT (OUTPATIENT)
Dept: URGENT CARE | Facility: CLINIC | Age: 79
End: 2019-03-11
Payer: MEDICARE

## 2019-03-11 VITALS
HEIGHT: 72 IN | HEART RATE: 76 BPM | TEMPERATURE: 98.5 F | BODY MASS INDEX: 33.86 KG/M2 | SYSTOLIC BLOOD PRESSURE: 140 MMHG | OXYGEN SATURATION: 94 % | DIASTOLIC BLOOD PRESSURE: 82 MMHG | RESPIRATION RATE: 16 BRPM | WEIGHT: 250 LBS

## 2019-03-11 DIAGNOSIS — T16.1XXA FOREIGN BODY IN RIGHT AUDITORY CANAL, INITIAL ENCOUNTER: Primary | ICD-10-CM

## 2019-03-11 PROCEDURE — 69200 CLEAR OUTER EAR CANAL: CPT | Performed by: NURSE PRACTITIONER

## 2019-03-11 ASSESSMENT — ENCOUNTER SYMPTOMS
CONSTITUTIONAL NEGATIVE: 1
FEVER: 0

## 2019-03-11 NOTE — PROGRESS NOTES
Subjective:     LIAN More III is a 78 y.o. male who presents for Foreign Body in Ear (Right ear has a foreign object from the hearing aid since last night and the patient said it was so irritated for him.)       Foreign Body in Ear   This is a new problem. The current episode started yesterday. Pertinent negatives include no fever.   Pt reports feeling of irritation and foreign object sensation in right ear canal. This morning he noticed that part of his right hearing aid is missing and believes it is in his right ear canal.    PMH:  has a past medical history of Arthritis; CATARACT; Diabetes; Hiatus hernia syndrome; Hypertension; Pain (08-29-13); Pain (6/22/2015); Pain (1/7/16); Pneumonia (2007); Ulcer (2014); and Unspecified hemorrhagic conditions.    MEDS:   Current Outpatient Prescriptions:   •  glimepiride (AMARYL) 1 MG tablet, Take 1 Tab by mouth every morning., Disp: 90 Tab, Rfl: 3  •  hydroCHLOROthiazide (HYDRODIURIL) 25 MG Tab, Take 1 Tab by mouth every day., Disp: 90 Tab, Rfl: 2  •  lisinopril (PRINIVIL, ZESTRIL) 40 MG tablet, Take 1 Tab by mouth every day., Disp: 90 Tab, Rfl: 2  •  fluticasone (FLONASE) 50 MCG/ACT nasal spray, Spray 2 Sprays in nose every day., Disp: 16 Bottle, Rfl: 11  •  Blood Glucose Monitoring Suppl Device, Meter: Accu-check. E11.9, Disp: 1 Device, Rfl: 0  •  Lancets Misc, Lancets order: Accu-check meter and test strips, check blood sugar once a day, E11.9, Disp: 100 Each, Rfl: 11  •  Blood Glucose Monitoring Suppl Supplies Misc, Test strips order: Accu-check test trips, check blood sugar once a day before meal, E11.9, Disp: 50 Strip, Rfl: 11  •  Blood Glucose Monitoring Suppl Supplies Misc, One Touch ultra test strips, check blood sugar once a day, E11.9, Disp: 50 Strip, Rfl: 11  •  OMEPRAZOLE PO, Take  by mouth., Disp: , Rfl:   •  ethyl chloride Aerosol, Ethyl chloride jet stram BAL apply prn, Disp: 1 Can, Rfl: 0  •  VITAMIN B1-B12 INJ, by Injection route every 7 days., Disp: , Rfl:    •  Testosterone Cypionate (DEPO-TESTOSTERONE IM), by Intramuscular route every 7 days., Disp: , Rfl:   •  metoclopramide (REGLAN) 10 MG TABS, Take 10 mg by mouth 3 times a day., Disp: , Rfl:   •  coenzyme Q-10 30 MG capsule, Take 60 mg by mouth every day., Disp: , Rfl:   •  vitamin D (CHOLECALCIFEROL) 1000 UNIT TABS, Take 5,000 Units by mouth every day., Disp: , Rfl:   •  Glucosamine Sulfate 1500 MG PACK, Take  by mouth 2 Times a Day., Disp: , Rfl:   •  Omega-3 Fatty Acids (FISH OIL PO), Take 2 Caps by mouth 2 Times a Day., Disp: , Rfl:     ALLERGIES:   Allergies   Allergen Reactions   • Statins [Hmg-Coa-R Inhibitors] Rash     SURGHX:   Past Surgical History:   Procedure Laterality Date   • GASTROSCOPY N/A 1/8/2016    Procedure: GASTROSCOPY;  Surgeon: Deniz Sue M.D.;  Location: [facility];  Service:    • HUMERUS ORIF Left 6/25/2015    Procedure: HUMERUS ORIF/ PROXIMAL;  Surgeon: Cristal Guido M.D.;  Location: [facility];  Service:    • LUMBAR FUSION POSTERIOR  9/5/2013    Performed by Edith Sears M.D. at [facility] ORS   • LUMBAR DECOMPRESSION  9/5/2013    Performed by Edith Sears M.D. at [facility]   • MASS EXCISION NEURO  9/5/2013    Performed by Edith Sears M.D. at [facility] ORS   • RECOVERY  6/26/2012    Performed by SURGERY, IR-RECOVERY at [facility] SAME DAY [facility]   • DRAINAGE HEMATOMA  5/25/2012    Performed by DENIZ SUE at [facility]   • GASTRIC BAND LAPAROSCOPIC REVISION  5/11/2012    Performed by DENIZ SUE at [facility]   • LUMBAR FUSION POSTERIOR  3/26/2012    Performed by EDITH SEARS at [facility] ORS   • LUMBAR DECOMPRESSION  3/26/2012    Performed by EDITH SEARS at [facility] ORS   • INJ,EPIDURAL/LUMB/SAC SINGLE  3/19/2012    Performed by KRISTIN BALTAZAR at [facility]   • INJ,EPIDURAL/LUMB/SAC SINGLE  3/5/2012    Performed by KRISTIN BALTAZAR  [facility]   • GASTRIC BANDING LAPAROSCOPIC  3/24/2010    Performed by SARINA SUE at SURGERY [facility] ORS   • HIATAL HERNIA REPAIR  3/24/2010    Performed by SARINA SUE at SURGERY [facility] ORS   • KNEE ARTHROPLASTY TOTAL  2000    bilateral   • ABDOMINOPLASTY  1990     SOCHX:  reports that he quit smoking about 55 years ago. His smoking use included Cigarettes. He has a 4.00 pack-year smoking history. He has never used smokeless tobacco. He reports that he does not drink alcohol or use drugs.     FH: Reviewed with patient, not pertinent to this visit.     Review of Systems   Constitutional: Negative.  Negative for fever.   HENT:        Foreign body sensation and irritation in right ear   All other systems reviewed and are negative.    Objective:     /82 (BP Location: Right arm, Patient Position: Sitting, BP Cuff Size: Adult)   Pulse 76   Temp 36.9 °C (98.5 °F) (Temporal)   Resp 16   Ht 1.829 m (6')   Wt 113.4 kg (250 lb)   SpO2 94%   BMI 33.91 kg/m²     Physical Exam   Constitutional: He is oriented to person, place, and time. He appears well-developed and well-nourished. He is cooperative.  Non-toxic appearance. No distress.   HENT:   Head: Normocephalic and atraumatic.   Right Ear: External ear normal.   Left Ear: External ear normal.   Nose: Nose normal.   Mouth/Throat: Mucous membranes are normal.   Translucent plastic object noted in right auditory canal   Eyes: Conjunctivae and EOM are normal.   Neck: Normal range of motion.   Cardiovascular: Normal rate, regular rhythm, normal heart sounds and normal pulses.    Pulmonary/Chest: Effort normal and breath sounds normal. No respiratory distress. He has no decreased breath sounds.   Abdominal: Soft. Bowel sounds are normal.   Musculoskeletal: Normal range of motion. He exhibits no deformity.   Neurological: He is alert and oriented to person, place, and time. He has normal strength. No sensory deficit.   Skin: Skin is warm,  dry and intact. Capillary refill takes less than 2 seconds.   Psychiatric: He has a normal mood and affect. His behavior is normal.   Vitals reviewed.       Assessment/Plan:     1. Foreign body in right auditory canal, initial encounter    Using alligator forceps, I removed the foreign object from patient's right auditory canal. It was identified as part of patient's right hearing aid. Patient's TM slightly erythematous, otherwise it is intact. No other foreign objects identified.    Patient advised to: Return for 1) Symptoms don't improve or worsen, or go to ER, 2) Follow up with primary care in 7-10 days.    Differential diagnosis, natural history, supportive care, and indications for immediate follow-up discussed. All questions answered. Patient agrees with the plan of care.

## 2019-03-14 ENCOUNTER — HOSPITAL ENCOUNTER (OUTPATIENT)
Dept: RADIOLOGY | Facility: MEDICAL CENTER | Age: 79
End: 2019-03-14
Attending: NURSE PRACTITIONER
Payer: MEDICARE

## 2019-03-14 ENCOUNTER — HOSPITAL ENCOUNTER (OUTPATIENT)
Dept: LAB | Facility: MEDICAL CENTER | Age: 79
End: 2019-03-14
Attending: NURSE PRACTITIONER
Payer: MEDICARE

## 2019-03-14 ENCOUNTER — OFFICE VISIT (OUTPATIENT)
Dept: URGENT CARE | Facility: CLINIC | Age: 79
End: 2019-03-14
Payer: MEDICARE

## 2019-03-14 VITALS
TEMPERATURE: 97.9 F | DIASTOLIC BLOOD PRESSURE: 78 MMHG | HEIGHT: 72 IN | WEIGHT: 254 LBS | BODY MASS INDEX: 34.4 KG/M2 | RESPIRATION RATE: 20 BRPM | HEART RATE: 90 BPM | OXYGEN SATURATION: 93 % | SYSTOLIC BLOOD PRESSURE: 120 MMHG

## 2019-03-14 DIAGNOSIS — R05.9 COUGH: ICD-10-CM

## 2019-03-14 DIAGNOSIS — R06.02 SHORTNESS OF BREATH: ICD-10-CM

## 2019-03-14 DIAGNOSIS — R06.02 SOB (SHORTNESS OF BREATH): ICD-10-CM

## 2019-03-14 DIAGNOSIS — L92.9 GRANULOMATOSIS: ICD-10-CM

## 2019-03-14 DIAGNOSIS — K86.9 PANCREATIC LESION: ICD-10-CM

## 2019-03-14 LAB
ALBUMIN SERPL BCP-MCNC: 4.1 G/DL (ref 3.2–4.9)
ALBUMIN/GLOB SERPL: 1.8 G/DL
ALP SERPL-CCNC: 50 U/L (ref 30–99)
ALT SERPL-CCNC: 30 U/L (ref 2–50)
ANION GAP SERPL CALC-SCNC: 9 MMOL/L (ref 0–11.9)
AST SERPL-CCNC: 32 U/L (ref 12–45)
BASOPHILS # BLD AUTO: 0.3 % (ref 0–1.8)
BASOPHILS # BLD: 0.02 K/UL (ref 0–0.12)
BILIRUB SERPL-MCNC: 0.8 MG/DL (ref 0.1–1.5)
BUN SERPL-MCNC: 25 MG/DL (ref 8–22)
CALCIUM SERPL-MCNC: 9.1 MG/DL (ref 8.4–10.2)
CHLORIDE SERPL-SCNC: 103 MMOL/L (ref 96–112)
CO2 SERPL-SCNC: 22 MMOL/L (ref 20–33)
CREAT SERPL-MCNC: 1.43 MG/DL (ref 0.5–1.4)
EOSINOPHIL # BLD AUTO: 0.09 K/UL (ref 0–0.51)
EOSINOPHIL NFR BLD: 1.3 % (ref 0–6.9)
ERYTHROCYTE [DISTWIDTH] IN BLOOD BY AUTOMATED COUNT: 44.1 FL (ref 35.9–50)
GLOBULIN SER CALC-MCNC: 2.3 G/DL (ref 1.9–3.5)
GLUCOSE SERPL-MCNC: 104 MG/DL (ref 65–99)
HCT VFR BLD AUTO: 54.3 % (ref 42–52)
HGB BLD-MCNC: 17.7 G/DL (ref 14–18)
IMM GRANULOCYTES # BLD AUTO: 0.03 K/UL (ref 0–0.11)
IMM GRANULOCYTES NFR BLD AUTO: 0.4 % (ref 0–0.9)
LYMPHOCYTES # BLD AUTO: 1.44 K/UL (ref 1–4.8)
LYMPHOCYTES NFR BLD: 20.5 % (ref 22–41)
MCH RBC QN AUTO: 27.1 PG (ref 27–33)
MCHC RBC AUTO-ENTMCNC: 32.6 G/DL (ref 33.7–35.3)
MCV RBC AUTO: 83.2 FL (ref 81.4–97.8)
MONOCYTES # BLD AUTO: 0.76 K/UL (ref 0–0.85)
MONOCYTES NFR BLD AUTO: 10.8 % (ref 0–13.4)
NEUTROPHILS # BLD AUTO: 4.67 K/UL (ref 1.82–7.42)
NEUTROPHILS NFR BLD: 66.7 % (ref 44–72)
NRBC # BLD AUTO: 0 K/UL
NRBC BLD-RTO: 0 /100 WBC
PLATELET # BLD AUTO: 220 K/UL (ref 164–446)
PMV BLD AUTO: 9.7 FL (ref 9–12.9)
POTASSIUM SERPL-SCNC: 4.1 MMOL/L (ref 3.6–5.5)
PROT SERPL-MCNC: 6.4 G/DL (ref 6–8.2)
RBC # BLD AUTO: 6.53 M/UL (ref 4.7–6.1)
SODIUM SERPL-SCNC: 134 MMOL/L (ref 135–145)
WBC # BLD AUTO: 7 K/UL (ref 4.8–10.8)

## 2019-03-14 PROCEDURE — 85025 COMPLETE CBC W/AUTO DIFF WBC: CPT

## 2019-03-14 PROCEDURE — 99214 OFFICE O/P EST MOD 30 MIN: CPT | Performed by: NURSE PRACTITIONER

## 2019-03-14 PROCEDURE — 80053 COMPREHEN METABOLIC PANEL: CPT

## 2019-03-14 PROCEDURE — 36415 COLL VENOUS BLD VENIPUNCTURE: CPT

## 2019-03-14 PROCEDURE — 71046 X-RAY EXAM CHEST 2 VIEWS: CPT

## 2019-03-14 NOTE — PROGRESS NOTES
Subjective:      LIAN More III is a 78 y.o. male who presents with Cough (chest congestion x2 month)    Denies past medical, surgical or family history that is significant to today's problem.   RX or OTC medications-- reviewed with patient today.   Allergies   Allergen Reactions   • Statins [Hmg-Coa-R Inhibitors] Rash             Cough   This is a new problem. The current episode started 1 to 4 weeks ago. The problem has been gradually worsening. The problem occurs constantly. The cough is productive of sputum. Associated symptoms include chest pain (chest wall discomfort with coughing.  Denies chest pain) and shortness of breath. Pertinent negatives include no chills, ear congestion, headaches, nasal congestion, sore throat, sweats or weight loss. Nothing aggravates the symptoms. He has tried OTC cough suppressant for the symptoms. The treatment provided mild relief. There is no history of asthma or bronchitis.        Review of Systems   Constitutional: Negative for chills and weight loss.   HENT: Negative for sore throat.    Respiratory: Positive for cough and shortness of breath.    Cardiovascular: Positive for chest pain (chest wall discomfort with coughing.  Denies chest pain).   Neurological: Negative for headaches.          Objective:     /78 (BP Location: Left arm, Patient Position: Sitting)   Pulse 90   Temp 36.6 °C (97.9 °F)   Resp 20   Ht 1.829 m (6')   Wt 115.2 kg (254 lb)   SpO2 93%   BMI 34.45 kg/m²      Physical Exam   Constitutional: He is oriented to person, place, and time. Vital signs are normal. He appears well-developed and well-nourished.  Non-toxic appearance. No distress.   HENT:   Head: Normocephalic and atraumatic.   Right Ear: External ear normal.   Left Ear: External ear normal.   Nose: Nose normal.   Mouth/Throat: Oropharynx is clear and moist.   Eyes: Pupils are equal, round, and reactive to light. Conjunctivae are normal. Right eye exhibits no discharge. Left eye exhibits  no discharge.   Neck: Neck supple.   Cardiovascular: Normal rate and regular rhythm.    Pulmonary/Chest: Effort normal. No accessory muscle usage. He has decreased breath sounds.   Lymphadenopathy:     He has no cervical adenopathy.        Right: No supraclavicular adenopathy present.        Left: No supraclavicular adenopathy present.   Neurological: He is alert and oriented to person, place, and time.   Skin: Skin is warm and dry.   Nursing note and vitals reviewed.       CXR:   3/14/2019 11:22 AM    HISTORY/REASON FOR EXAM: Exertional Shortness of Breath.    TECHNIQUE/EXAM DESCRIPTION AND NUMBER OF VIEWS:  Two views of the chest.    COMPARISON:  August 29, 2013.    FINDINGS:    Cardiomediastinal contours are again notable for mild aortic ectasia.    No pulmonary consolidation is seen. On the lateral overlying the approximately eighth vertebral body superior margin is a nodular density. This could be related to lateral endplate spurs but is only seen inferior to the endplate. There is question of a   nodule or enlarged lymph node overlying the right hilus    Several mild anterior wedge midthoracic compression deformities are unchanged    No pleural space process is evident.   Impression       No radiographic evidence of consolidation    Question right hilar adenopathy and/or nodule, perhaps posterior in location. CT thorax is advised to clarify    Page is pending for the referring clinician 11:41 AM   Reading Provider Reading Date   Nick Onofre M.D. Mar 14, 2019   Lab Information     Lab   RADIOLOGY              Last Resulted Time   Thu Mar 14, 2019 11:43 AM            Assessment/Plan:       1. SOB (shortness of breath)  Comp Metabolic Panel    CBC WITH DIFFERENTIAL   2. Cough  Comp Metabolic Panel    CBC WITH DIFFERENTIAL   3. Shortness of breath  CT-CHEST (THORAX) WITH    CANCELED: CT-CHEST (THORAX) WITH       FU by telephone with pt results of chest x-ray including incidental findings discussed with  patient.  He will go and have his labs drawn today and we will schedule a CT of his chest for tomorrow.  Patient verbalizes understanding of all the instructions.    Results of all diagnostic tests/ screens discussed with patient including incidental findings. .       3/15/2019 1:16 PM    HISTORY/REASON FOR EXAM: Shortness of breath. Abnormal chest x-ray      TECHNIQUE/EXAM DESCRIPTION:  CT scan of the chest with contrast.    Helical images were obtained from the lung apices through the adrenal glands following the bolus administration of  75 mL of Omnipaque 350 nonionic contrast. Sagittal and coronal reconstructions were performed.    Low dose optimization technique was utilized for this CT exam including automated exposure control and adjustment of the mA and/or kV according to patient size.    COMPARISON:  Plain film 3/14/2019.    FINDINGS:    Atherosclerotic plaque is seen in the aorta. Coronary artery calcification is seen. The heart is not enlarged. No pericardial or pleural effusion is identified. There are small mediastinal lymph nodes. Small hilar and axillary lymph nodes are seen.    No focal consolidation or pneumothorax is seen. There are calcified granulomata. There is mild right middle lobe, lingula and left lower lobe atelectasis.    Limited views were obtained of the upper abdomen. There is gastric lap band. Spleen measures 14 cm in length. There is a hypodense lesion in the pancreatic tail measuring 9.4 mm. Moderate amount of colonic stool is seen.    Degenerative changes are seen in the spine. There are postsurgical changes of the proximal right humerus. There are remote anterior bilateral rib fractures.   Impression       No mass or lymphadenopathy is identified.    Mild atelectasis.    Sequelae of prior granulomatous exposure.    Atherosclerotic plaque.    Hypodense 9.4 mm lesion within the pancreatic tail likely presents a cyst or cystic lesion such as IPMN. Follow-up pancreatic protocol CT or  MRI is recommended.    Splenomegaly.         Reading Provider Reading Date   Paola Mustafa M.D. Mar 15, 2019       REPEAT CMP in 5 days to evaluate renal function.   Schedule appt with Dr. Henriquez 1-2 weeks for further evaluation and management of all findings of CT scans.

## 2019-03-15 ENCOUNTER — TELEPHONE (OUTPATIENT)
Dept: MEDICAL GROUP | Facility: MEDICAL CENTER | Age: 79
End: 2019-03-15

## 2019-03-15 ENCOUNTER — HOSPITAL ENCOUNTER (OUTPATIENT)
Dept: RADIOLOGY | Facility: MEDICAL CENTER | Age: 79
End: 2019-03-15
Attending: NURSE PRACTITIONER
Payer: MEDICARE

## 2019-03-15 DIAGNOSIS — D75.1 POLYCYTHEMIA: Chronic | ICD-10-CM

## 2019-03-15 DIAGNOSIS — E11.9 TYPE 2 DIABETES MELLITUS WITHOUT COMPLICATION, WITHOUT LONG-TERM CURRENT USE OF INSULIN (HCC): ICD-10-CM

## 2019-03-15 DIAGNOSIS — E53.8 B12 DEFICIENCY: ICD-10-CM

## 2019-03-15 DIAGNOSIS — K86.89 PANCREATIC MASS: ICD-10-CM

## 2019-03-15 DIAGNOSIS — Z98.84 S/P GASTRIC BYPASS: Chronic | ICD-10-CM

## 2019-03-15 DIAGNOSIS — E55.9 VITAMIN D DEFICIENCY: ICD-10-CM

## 2019-03-15 DIAGNOSIS — E78.5 DYSLIPIDEMIA: ICD-10-CM

## 2019-03-15 DIAGNOSIS — E34.9 HYPOTESTOSTERONISM: ICD-10-CM

## 2019-03-15 DIAGNOSIS — N18.30 CKD (CHRONIC KIDNEY DISEASE) STAGE 3, GFR 30-59 ML/MIN (HCC): ICD-10-CM

## 2019-03-15 DIAGNOSIS — R06.02 SHORTNESS OF BREATH: ICD-10-CM

## 2019-03-15 DIAGNOSIS — E61.1 IRON DEFICIENCY: ICD-10-CM

## 2019-03-15 PROCEDURE — 71260 CT THORAX DX C+: CPT

## 2019-03-15 PROCEDURE — 700117 HCHG RX CONTRAST REV CODE 255: Performed by: NURSE PRACTITIONER

## 2019-03-15 RX ADMIN — IOHEXOL 75 ML: 350 INJECTION, SOLUTION INTRAVENOUS at 13:39

## 2019-03-15 ASSESSMENT — ENCOUNTER SYMPTOMS
HEADACHES: 0
SHORTNESS OF BREATH: 1
COUGH: 1
SWEATS: 0
CHILLS: 0
WEIGHT LOSS: 0
SORE THROAT: 0

## 2019-03-15 NOTE — TELEPHONE ENCOUNTER
Please call the patient and schedule a follow-up appointment for him to see myself or one of the providers here for the abnormal CT scan of the pancreas.  I spoke to him about this earlier today (Friday) but he needs to be seen.    Also let the patient know that the blood test ordered through urgent care did show decreased kidney function from his previous blood test, have him ensure adequate hydration, have him check his blood sugars regularly, and avoid any anti-inflammatories (Motrin, Aleve, Advil, ibuprofen, naproxen).    Because of the decreased kidney function, I would prefer to avoid the CT contrast if possible, I am going to check with radiology about doing an MRI of the pancreas instead that would avoid the intravenous contrast associated with the CT scan since that can irritate the kidneys further.

## 2019-03-15 NOTE — TELEPHONE ENCOUNTER
Notified the patient with chest x-ray and CT scan results ordered by urgent care.  Incidental pancreatic mass, needs follow-up.  Patient understands and agrees.  Initially I was going to order a CT pancreatic protocol but patient does have a creatinine 1.4, is diabetic, I would prefer to order an MRI pancreatic protocol as that was an option suggested by radiology, will place order for MRI abdomen with and without pancreatic protocol, labs ordered follow-up CKD and diabetes, patient will schedule follow-up to discuss.

## 2019-03-19 NOTE — TELEPHONE ENCOUNTER
Pt called through the  explaining that he needed to talk directly to only you and that we were to get you to the phone. I took the call and read mr means the message that you left for him. I tried to schedule his appt with him and he got extremely rude with me and then hung up.

## 2019-03-31 ENCOUNTER — HOSPITAL ENCOUNTER (OUTPATIENT)
Dept: RADIOLOGY | Facility: MEDICAL CENTER | Age: 79
End: 2019-03-31
Attending: INTERNAL MEDICINE
Payer: MEDICARE

## 2019-03-31 DIAGNOSIS — K86.89 PANCREATIC MASS: ICD-10-CM

## 2019-03-31 PROCEDURE — 700117 HCHG RX CONTRAST REV CODE 255: Performed by: INTERNAL MEDICINE

## 2019-03-31 PROCEDURE — A9585 GADOBUTROL INJECTION: HCPCS | Performed by: INTERNAL MEDICINE

## 2019-03-31 PROCEDURE — 74183 MRI ABD W/O CNTR FLWD CNTR: CPT

## 2019-03-31 RX ORDER — GADOBUTROL 604.72 MG/ML
10 INJECTION INTRAVENOUS ONCE
Status: COMPLETED | OUTPATIENT
Start: 2019-03-31 | End: 2019-03-31

## 2019-03-31 RX ADMIN — GADOBUTROL 10 ML: 604.72 INJECTION INTRAVENOUS at 10:10

## 2019-04-01 ENCOUNTER — TELEPHONE (OUTPATIENT)
Dept: MEDICAL GROUP | Facility: MEDICAL CENTER | Age: 79
End: 2019-04-01

## 2019-04-01 PROBLEM — K86.9 PANCREATIC LESION: Status: ACTIVE | Noted: 2019-04-01

## 2019-04-10 ENCOUNTER — HOSPITAL ENCOUNTER (OUTPATIENT)
Dept: RADIOLOGY | Facility: MEDICAL CENTER | Age: 79
End: 2019-04-10
Attending: SPECIALIST
Payer: MEDICARE

## 2019-04-10 DIAGNOSIS — M48.061 SPINAL STENOSIS, LUMBAR REGION, WITHOUT NEUROGENIC CLAUDICATION: ICD-10-CM

## 2019-04-10 DIAGNOSIS — M51.16 NEURITIS OR RADICULITIS DUE TO RUPTURE OF LUMBAR INTERVERTEBRAL DISC: ICD-10-CM

## 2019-04-10 DIAGNOSIS — M47.817 LUMBOSACRAL SPONDYLOSIS WITHOUT MYELOPATHY: ICD-10-CM

## 2019-04-10 PROCEDURE — 72148 MRI LUMBAR SPINE W/O DYE: CPT

## 2019-04-11 ENCOUNTER — HOSPITAL ENCOUNTER (OUTPATIENT)
Dept: RADIOLOGY | Facility: MEDICAL CENTER | Age: 79
End: 2019-04-11
Attending: SPECIALIST
Payer: MEDICARE

## 2019-04-11 DIAGNOSIS — M48.061 SPINAL STENOSIS OF LUMBAR REGION, UNSPECIFIED WHETHER NEUROGENIC CLAUDICATION PRESENT: ICD-10-CM

## 2019-04-11 DIAGNOSIS — M47.817 LUMBOSACRAL SPONDYLOSIS WITHOUT MYELOPATHY: ICD-10-CM

## 2019-04-11 DIAGNOSIS — M51.16 NEURITIS OR RADICULITIS DUE TO RUPTURE OF LUMBAR INTERVERTEBRAL DISC: ICD-10-CM

## 2019-04-11 PROCEDURE — 72110 X-RAY EXAM L-2 SPINE 4/>VWS: CPT

## 2019-04-15 ENCOUNTER — TELEPHONE (OUTPATIENT)
Dept: MEDICAL GROUP | Facility: MEDICAL CENTER | Age: 79
End: 2019-04-15

## 2019-04-15 NOTE — TELEPHONE ENCOUNTER
[facility]  [phone] (home)     Pt called, LM to say that Dr Callejas wants you to order PVD test STAT.

## 2019-04-16 NOTE — TELEPHONE ENCOUNTER
I need to know the diagnosis before I can order any test, I do not have any records from Dr. Callejas.    I need to know what symptoms he is having and I need the records in order to order a specific test since I have not seen him in 6 months.  Medicare requires an office appointment and the associated documentation before we can order any testing

## 2019-04-16 NOTE — TELEPHONE ENCOUNTER
I spoke with patient this morning in regards to getting an appointment. You did not have anything until 5/23, patient will be seeing Janice next Tuesday.

## 2019-04-23 ENCOUNTER — OFFICE VISIT (OUTPATIENT)
Dept: MEDICAL GROUP | Facility: MEDICAL CENTER | Age: 79
End: 2019-04-23
Payer: MEDICARE

## 2019-04-23 VITALS
HEART RATE: 93 BPM | HEIGHT: 72 IN | SYSTOLIC BLOOD PRESSURE: 126 MMHG | WEIGHT: 248 LBS | BODY MASS INDEX: 33.59 KG/M2 | OXYGEN SATURATION: 93 % | TEMPERATURE: 99.3 F | DIASTOLIC BLOOD PRESSURE: 70 MMHG

## 2019-04-23 DIAGNOSIS — R20.9 SENSATION OF COLD IN LEG: ICD-10-CM

## 2019-04-23 DIAGNOSIS — R09.89 DECREASED PEDAL PULSES: ICD-10-CM

## 2019-04-23 DIAGNOSIS — R29.898 WEAKNESS OF LEFT LEG: ICD-10-CM

## 2019-04-23 PROCEDURE — 99214 OFFICE O/P EST MOD 30 MIN: CPT | Performed by: NURSE PRACTITIONER

## 2019-04-23 RX ORDER — ANASTROZOLE 1 MG/1
1 TABLET ORAL DAILY
Status: ON HOLD | COMMUNITY
End: 2020-01-11

## 2019-04-23 ASSESSMENT — PATIENT HEALTH QUESTIONNAIRE - PHQ9: CLINICAL INTERPRETATION OF PHQ2 SCORE: 0

## 2019-04-23 NOTE — PROGRESS NOTES
Subjective:     LIAN More III is a 78 y.o. male who presents with cold legs and feet..    HPI:   Seen in f/u for cold legs and feet.  He is followed by Dr harris for back issues.  They are going to do procedure to determine what is going on numbness in left leg and foot.  They are not sure that its just back and leg circulation test.  His legs are cool and dark in color.  They feel heavy.  Left leg is weak and smaller.  He needs testing for circulation/PVD.   He has pain after exercise.  Exercises freq with golfing.  He golfs professionally.      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   • Status post shoulder surgery 08/23/2012   • S/P gastric bypass 03/26/2010   • Lower extremity edema 12/29/2009   • Type 2 diabetes mellitus without complication, without long-term current use of insulin (HCC) 09/05/2009   • Hypertension 09/05/2009   • History of peripheral neuropathy 09/05/2009   • History of shingles 09/05/2009   • S/P knee replacement 09/05/2009   • S/P lumbar fusion 09/05/2009   • Dyslipidemia 09/05/2009   • History of allergic rhinitis 09/05/2009   • Preventative health care 09/05/2009       Current medicines (including changes today)  Current Outpatient Prescriptions   Medication Sig Dispense Refill   • anastrozole (ARIMIDEX) 1 MG Tab Take 1 mg by mouth every day.     • glimepiride (AMARYL) 1 MG tablet Take 1 Tab by mouth every morning. 90 Tab 3   • hydroCHLOROthiazide (HYDRODIURIL) 25 MG Tab Take 1 Tab by mouth every day. 90 Tab 2   • lisinopril (PRINIVIL, ZESTRIL) 40 MG tablet Take 1 Tab by mouth every day. 90 Tab 2   • OMEPRAZOLE PO Take  by mouth.     • VITAMIN B1-B12 INJ by Injection route every 7 days.     • Testosterone Cypionate (DEPO-TESTOSTERONE  IM) by Intramuscular route every 7 days.     • coenzyme Q-10 30 MG capsule Take 60 mg by mouth every day.     • vitamin D (CHOLECALCIFEROL) 1000 UNIT TABS Take 5,000 Units by mouth every day.     • Glucosamine Sulfate 1500 MG PACK Take  by mouth 2 Times a Day.     • Omega-3 Fatty Acids (FISH OIL PO) Take 2 Caps by mouth 2 Times a Day.     • hydrocortisone 2.5 % Cream topical cream Apply 1 Application to affected area(s) 2 times a day as needed (rash). 28 g 1   • fluticasone (FLONASE) 50 MCG/ACT nasal spray Spray 2 Sprays in nose every day. 16 Bottle 11   • Blood Glucose Monitoring Suppl Device Meter: Accu-check. E11.9 1 Device 0   • Lancets Misc Lancets order: Accu-check meter and test strips, check blood sugar once a day, E11.9 100 Each 11   • Blood Glucose Monitoring Suppl Supplies Misc Test strips order: Accu-check test trips, check blood sugar once a day before meal, E11.9 50 Strip 11   • Blood Glucose Monitoring Suppl Supplies Misc One Touch ultra test strips, check blood sugar once a day, E11.9 50 Strip 11   • ethyl chloride Aerosol Ethyl chloride jet stram BAL apply prn 1 Can 0     No current facility-administered medications for this visit.        Allergies   Allergen Reactions   • Statins [Hmg-Coa-R Inhibitors] Rash       ROS  Constitutional: Negative. Negative for fever, chills, weight loss, malaise/fatigue and diaphoresis.   HENT: Negative. Negative for hearing loss, ear pain, nosebleeds, congestion, sore throat, neck pain, tinnitus and ear discharge.   Respiratory: Negative. Negative for cough, hemoptysis, sputum production, shortness of breath, wheezing and stridor.   Cardiovascular: Negative. Negative for chest pain, palpitations, orthopnea, claudication, PND.   Gastrointestinal: Denies nausea, vomiting, diarrhea, constipation, heartburn, melena or hematochezia.  Genitourinary: Denies dysuria, hematuria, urinary incontinence, frequency or urgency.        Objective:     /70 (BP Location: Right  arm, Patient Position: Sitting, BP Cuff Size: Adult)   Pulse 93   Temp 37.4 °C (99.3 °F)   Ht 1.829 m (6')   Wt 112.5 kg (248 lb)   SpO2 93%  Body mass index is 33.63 kg/m².    Physical Exam:  Vitals reviewed.  Constitutional: Oriented to person, place, and time. appears well-developed and well-nourished. No distress.   Cardiovascular: Normal rate, regular rhythm, normal heart sounds and intact distal pulses. Exam reveals no gallop and no friction rub. 2/6 murmur heard. No carotid bruits.   Pulmonary/Chest: Effort normal and breath sounds normal. No stridor. No respiratory distress. no wheezes or rales. exhibits no tenderness.   Musculoskeletal: Normal range of motion. exhibits no edema. jed pedal pulses 1+.    Neurological: Alert and oriented to person, place, and time. exhibits normal muscle tone.  Skin: Skin is warm and dry. No diaphoresis.   Psychiatric: Normal mood and affect. Behavior is normal.      Assessment and Plan:     The following treatment plan was discussed:    1. Sensation of cold in leg  anastrozole (ARIMIDEX) 1 MG Tab    US-EXTREMITY ARTERY LOWER BILAT W/JUAQUIN (COMBO)    do JUAQUIN.  f/u with pt with results.     2. Weakness of left leg  anastrozole (ARIMIDEX) 1 MG Tab    US-EXTREMITY ARTERY LOWER BILAT W/JUAQUIN (COMBO)   3. Decreased pedal pulses  anastrozole (ARIMIDEX) 1 MG Tab    US-EXTREMITY ARTERY LOWER BILAT W/JUAQUIN (COMBO)         Followup: Return if symptoms worsen or fail to improve.

## 2019-04-26 ENCOUNTER — TELEPHONE (OUTPATIENT)
Dept: MEDICAL GROUP | Facility: MEDICAL CENTER | Age: 79
End: 2019-04-26

## 2019-04-26 ENCOUNTER — HOSPITAL ENCOUNTER (OUTPATIENT)
Dept: RADIOLOGY | Facility: MEDICAL CENTER | Age: 79
End: 2019-04-26
Attending: NURSE PRACTITIONER
Payer: MEDICARE

## 2019-04-26 DIAGNOSIS — R29.898 WEAKNESS OF LEFT LEG: ICD-10-CM

## 2019-04-26 DIAGNOSIS — R09.89 DECREASED PEDAL PULSES: ICD-10-CM

## 2019-04-26 DIAGNOSIS — R20.9 SENSATION OF COLD IN LEG: ICD-10-CM

## 2019-04-26 PROCEDURE — 93922 UPR/L XTREMITY ART 2 LEVELS: CPT

## 2019-04-26 PROCEDURE — 93922 UPR/L XTREMITY ART 2 LEVELS: CPT | Mod: 26 | Performed by: SURGERY

## 2019-05-24 ENCOUNTER — HOSPITAL ENCOUNTER (OUTPATIENT)
Dept: RADIOLOGY | Facility: MEDICAL CENTER | Age: 79
End: 2019-05-24
Attending: NEUROLOGICAL SURGERY
Payer: MEDICARE

## 2019-05-24 DIAGNOSIS — R26.81 UNSTEADY: ICD-10-CM

## 2019-05-24 PROCEDURE — 72141 MRI NECK SPINE W/O DYE: CPT

## 2019-05-24 PROCEDURE — 72146 MRI CHEST SPINE W/O DYE: CPT

## 2019-07-03 PROBLEM — M47.812 ARTHRITIS OF NECK: Status: ACTIVE | Noted: 2019-07-03

## 2019-08-26 PROBLEM — K20.90 ESOPHAGITIS: Status: RESOLVED | Noted: 2018-10-08 | Resolved: 2019-08-26

## 2019-09-04 ENCOUNTER — OFFICE VISIT (OUTPATIENT)
Dept: MEDICAL GROUP | Facility: MEDICAL CENTER | Age: 79
End: 2019-09-04
Payer: MEDICARE

## 2019-09-04 ENCOUNTER — HOSPITAL ENCOUNTER (OUTPATIENT)
Dept: LAB | Facility: MEDICAL CENTER | Age: 79
End: 2019-09-04
Attending: NURSE PRACTITIONER
Payer: MEDICARE

## 2019-09-04 VITALS
HEIGHT: 72 IN | WEIGHT: 242 LBS | DIASTOLIC BLOOD PRESSURE: 60 MMHG | SYSTOLIC BLOOD PRESSURE: 110 MMHG | OXYGEN SATURATION: 95 % | BODY MASS INDEX: 32.78 KG/M2 | HEART RATE: 99 BPM | TEMPERATURE: 97.5 F

## 2019-09-04 DIAGNOSIS — M47.812 ARTHRITIS OF NECK: ICD-10-CM

## 2019-09-04 DIAGNOSIS — Z12.5 SCREENING FOR MALIGNANT NEOPLASM OF PROSTATE: ICD-10-CM

## 2019-09-04 DIAGNOSIS — I10 ESSENTIAL HYPERTENSION: ICD-10-CM

## 2019-09-04 DIAGNOSIS — K22.70 BARRETT'S ESOPHAGUS WITHOUT DYSPLASIA: Chronic | ICD-10-CM

## 2019-09-04 DIAGNOSIS — Z91.81 RISK FOR FALLS: ICD-10-CM

## 2019-09-04 DIAGNOSIS — E78.5 DYSLIPIDEMIA: Chronic | ICD-10-CM

## 2019-09-04 DIAGNOSIS — E11.9 DIABETES MELLITUS WITHOUT COMPLICATION (HCC): ICD-10-CM

## 2019-09-04 DIAGNOSIS — E78.5 HYPERLIPIDEMIA LDL GOAL <100: ICD-10-CM

## 2019-09-04 DIAGNOSIS — E11.9 TYPE 2 DIABETES MELLITUS WITHOUT COMPLICATION, WITHOUT LONG-TERM CURRENT USE OF INSULIN (HCC): ICD-10-CM

## 2019-09-04 DIAGNOSIS — E66.9 OBESITY (BMI 30-39.9): ICD-10-CM

## 2019-09-04 DIAGNOSIS — J30.1 ALLERGIC RHINITIS DUE TO POLLEN, UNSPECIFIED SEASONALITY: ICD-10-CM

## 2019-09-04 DIAGNOSIS — M15.9 PRIMARY OSTEOARTHRITIS INVOLVING MULTIPLE JOINTS: ICD-10-CM

## 2019-09-04 DIAGNOSIS — K86.89 PANCREATIC MASS: ICD-10-CM

## 2019-09-04 DIAGNOSIS — R26.81 UNSTEADY: ICD-10-CM

## 2019-09-04 DIAGNOSIS — K86.9 PANCREATIC LESION: ICD-10-CM

## 2019-09-04 DIAGNOSIS — Z12.11 SCREENING FOR MALIGNANT NEOPLASM OF COLON: ICD-10-CM

## 2019-09-04 DIAGNOSIS — Z87.09 HISTORY OF ALLERGIC RHINITIS: ICD-10-CM

## 2019-09-04 LAB
BASOPHILS # BLD AUTO: 0.5 % (ref 0–1.8)
BASOPHILS # BLD: 0.03 K/UL (ref 0–0.12)
CHOLEST SERPL-MCNC: 135 MG/DL (ref 100–199)
CREAT UR-MCNC: 272.3 MG/DL
EOSINOPHIL # BLD AUTO: 0.16 K/UL (ref 0–0.51)
EOSINOPHIL NFR BLD: 2.5 % (ref 0–6.9)
ERYTHROCYTE [DISTWIDTH] IN BLOOD BY AUTOMATED COUNT: 47.7 FL (ref 35.9–50)
ESTRADIOL SERPL-MCNC: 37 PG/ML
FASTING STATUS PATIENT QL REPORTED: NORMAL
GGT SERPL-CCNC: 13 U/L (ref 7–51)
HCT VFR BLD AUTO: 52.4 % (ref 42–52)
HDLC SERPL-MCNC: 35 MG/DL
HGB BLD-MCNC: 16.4 G/DL (ref 14–18)
IMM GRANULOCYTES # BLD AUTO: 0.03 K/UL (ref 0–0.11)
IMM GRANULOCYTES NFR BLD AUTO: 0.5 % (ref 0–0.9)
LDH SERPL L TO P-CCNC: 163 U/L (ref 107–266)
LDLC SERPL CALC-MCNC: 77 MG/DL
LYMPHOCYTES # BLD AUTO: 1.18 K/UL (ref 1–4.8)
LYMPHOCYTES NFR BLD: 18.8 % (ref 22–41)
MCH RBC QN AUTO: 24.6 PG (ref 27–33)
MCHC RBC AUTO-ENTMCNC: 31.3 G/DL (ref 33.7–35.3)
MCV RBC AUTO: 78.7 FL (ref 81.4–97.8)
MICROALBUMIN UR-MCNC: 10 MG/DL
MICROALBUMIN/CREAT UR: 37 MG/G (ref 0–30)
MONOCYTES # BLD AUTO: 0.7 K/UL (ref 0–0.85)
MONOCYTES NFR BLD AUTO: 11.1 % (ref 0–13.4)
NEUTROPHILS # BLD AUTO: 4.19 K/UL (ref 1.82–7.42)
NEUTROPHILS NFR BLD: 66.6 % (ref 44–72)
NRBC # BLD AUTO: 0 K/UL
NRBC BLD-RTO: 0 /100 WBC
PLATELET # BLD AUTO: 240 K/UL (ref 164–446)
PMV BLD AUTO: 10.7 FL (ref 9–12.9)
RBC # BLD AUTO: 6.66 M/UL (ref 4.7–6.1)
TRIGL SERPL-MCNC: 117 MG/DL (ref 0–149)
URATE SERPL-MCNC: 6.7 MG/DL (ref 2.5–8.3)
WBC # BLD AUTO: 6.3 K/UL (ref 4.8–10.8)

## 2019-09-04 PROCEDURE — 83615 LACTATE (LD) (LDH) ENZYME: CPT

## 2019-09-04 PROCEDURE — 84153 ASSAY OF PSA TOTAL: CPT | Mod: GA

## 2019-09-04 PROCEDURE — 82670 ASSAY OF TOTAL ESTRADIOL: CPT

## 2019-09-04 PROCEDURE — 84550 ASSAY OF BLOOD/URIC ACID: CPT

## 2019-09-04 PROCEDURE — 84154 ASSAY OF PSA FREE: CPT

## 2019-09-04 PROCEDURE — 84305 ASSAY OF SOMATOMEDIN: CPT

## 2019-09-04 PROCEDURE — 80061 LIPID PANEL: CPT

## 2019-09-04 PROCEDURE — 82043 UR ALBUMIN QUANTITATIVE: CPT

## 2019-09-04 PROCEDURE — 84403 ASSAY OF TOTAL TESTOSTERONE: CPT

## 2019-09-04 PROCEDURE — 36415 COLL VENOUS BLD VENIPUNCTURE: CPT

## 2019-09-04 PROCEDURE — 82570 ASSAY OF URINE CREATININE: CPT

## 2019-09-04 PROCEDURE — 85025 COMPLETE CBC W/AUTO DIFF WBC: CPT

## 2019-09-04 PROCEDURE — 84270 ASSAY OF SEX HORMONE GLOBUL: CPT

## 2019-09-04 PROCEDURE — G0439 PPPS, SUBSEQ VISIT: HCPCS | Performed by: NURSE PRACTITIONER

## 2019-09-04 PROCEDURE — 82977 ASSAY OF GGT: CPT

## 2019-09-04 RX ORDER — FLUTICASONE PROPIONATE 50 MCG
2 SPRAY, SUSPENSION (ML) NASAL DAILY
Qty: 3 BOTTLE | Refills: 3 | Status: SHIPPED
Start: 2019-09-04 | End: 2020-01-03

## 2019-09-04 RX ORDER — HYDROCHLOROTHIAZIDE 25 MG/1
25 TABLET ORAL DAILY
Qty: 90 TAB | Refills: 3 | Status: SHIPPED | OUTPATIENT
Start: 2019-09-04 | End: 2019-12-04

## 2019-09-04 RX ORDER — LISINOPRIL 40 MG/1
40 TABLET ORAL DAILY
Qty: 90 TAB | Refills: 0 | Status: SHIPPED | OUTPATIENT
Start: 2019-09-04 | End: 2019-12-04

## 2019-09-04 RX ORDER — GLIMEPIRIDE 1 MG/1
1 TABLET ORAL EVERY MORNING
Qty: 90 TAB | Refills: 3 | Status: SHIPPED
Start: 2019-09-04 | End: 2019-12-26

## 2019-09-04 ASSESSMENT — ENCOUNTER SYMPTOMS: GENERAL WELL-BEING: FAIR

## 2019-09-04 ASSESSMENT — ACTIVITIES OF DAILY LIVING (ADL): BATHING_REQUIRES_ASSISTANCE: 0

## 2019-09-04 ASSESSMENT — PATIENT HEALTH QUESTIONNAIRE - PHQ9: CLINICAL INTERPRETATION OF PHQ2 SCORE: 0

## 2019-09-04 NOTE — PROGRESS NOTES
Patient arrived via EMS with SOB. Patient was  Found sitting on his porch, lethargic with difficulty breathing. Patient placed on CPAP in route, Duoneb X 2 given. Patient with labored,exersion breathing, not responding to commands.    Subjective:      LIAN More III is a 78 y.o. male who presents with HRA          HPI  Seen in f/u for HRA.  He is feeling ok.    He has had a dry cough for about 6 mo.  Having sinus dg at nite.  Using cough med at nite.    He has a MRI abd last year that showed small cystic lesion in pancreas.  They recommended 1 yr f/u.  It is due now.     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  He is awaiting bx results.  Will need repeat in 2 yrs.      Type 2 diabetes mellitus without complication, without long-term current use of insulin ([facility])  His last a1c last year was 6.3.  He is due repeat.  Stable on meds    Arthritis of neck  He has had stem cell injection in both shoulders, neck, lumbar spine and left knee.  He is signicantly improved with pain levels.      Dyslipidemia  He is due updated lab.  He is stable on meds at this time.    Pancreatic lesion  He is due for updated MRI abd to monitor.  Will sched. No sx.     Hypertension  Stable on meds.  Bp controlled    History of allergic rhinitis  Has seen ENT in past.  Currently stable on otc meds.  No sx of infection            Patient Active Problem List    Diagnosis Date Noted   • Risk for falls 09/04/2019   • Obesity (BMI 30-39.9) 09/04/2019   • 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   • Pancreatic lesion 04/01/2019   • Macular degeneration 10/10/2018   • Polycythemia 03/11/2017   • Decreased GFR 03/11/2017   • Obesity 03/03/2016   • Hypotestosteronism 03/03/2016   • 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   • Status post shoulder surgery 08/23/2012   • S/P gastric bypass 03/26/2010   • Lower extremity edema 12/29/2009   • Type 2 diabetes mellitus without complication, without long-term current use of insulin (HCC) 09/05/2009   • Hypertension 09/05/2009   • History of peripheral neuropathy 09/05/2009   • History of shingles 09/05/2009   • S/P knee replacement 09/05/2009   • S/P lumbar fusion 09/05/2009   • Dyslipidemia 09/05/2009   • History of allergic  rhinitis 09/05/2009   • Preventative health care 09/05/2009     Current Outpatient Medications   Medication Sig Dispense Refill   • lisinopril (PRINIVIL, ZESTRIL) 40 MG tablet Take 1 Tab by mouth every day. 90 Tab 0   • glucose blood (ONE TOUCH ULTRA TEST) strip USE  STRIP TO CHECK GLUCOSE ONCE DAILY *DX E11.9* 50 Strip 6   • hydroCHLOROthiazide (HYDRODIURIL) 25 MG Tab Take 1 Tab by mouth every day. 90 Tab 0   • anastrozole (ARIMIDEX) 1 MG Tab Take 1 mg by mouth every day.     • hydrocortisone 2.5 % Cream topical cream Apply 1 Application to affected area(s) 2 times a day as needed (rash). 28 g 1   • glimepiride (AMARYL) 1 MG tablet Take 1 Tab by mouth every morning. 90 Tab 3   • fluticasone (FLONASE) 50 MCG/ACT nasal spray Spray 2 Sprays in nose every day. 16 Bottle 11   • Blood Glucose Monitoring Suppl Device Meter: Accu-check. E11.9 1 Device 0   • Lancets Misc Lancets order: Accu-check meter and test strips, check blood sugar once a day, E11.9 100 Each 11   • Blood Glucose Monitoring Suppl Supplies Misc Test strips order: Accu-check test trips, check blood sugar once a day before meal, E11.9 50 Strip 11   • OMEPRAZOLE PO Take  by mouth.     • ethyl chloride Aerosol Ethyl chloride jet stram BAL apply prn 1 Can 0   • VITAMIN B1-B12 INJ by Injection route every 7 days.     • Testosterone Cypionate (DEPO-TESTOSTERONE IM) by Intramuscular route every 7 days.     • coenzyme Q-10 30 MG capsule Take 60 mg by mouth every day.     • vitamin D (CHOLECALCIFEROL) 1000 UNIT TABS Take 5,000 Units by mouth every day.     • Glucosamine Sulfate 1500 MG PACK Take  by mouth 2 Times a Day.     • Omega-3 Fatty Acids (FISH OIL PO) Take 2 Caps by mouth 2 Times a Day.       No current facility-administered medications for this visit.      Statins [hmg-coa-r inhibitors]    ROS  Review of Systems   Constitutional: Negative.  Negative for fever, chills, weight loss, malaise/fatigue and diaphoresis.   HENT: Negative.  Negative for hearing  loss, ear pain, nosebleeds, congestion, sore throat, neck pain, tinnitus and ear discharge.    Eyes: Negative.  Negative for blurred vision, double vision, photophobia, pain, discharge and redness.   Respiratory: Negative.  Negative for cough, hemoptysis, sputum production, shortness of breath, wheezing and stridor.    Cardiovascular: Negative.  Negative for chest pain, palpitations, orthopnea, claudication, leg swelling and PND.   Gastrointestinal: Negative.  Negative for heartburn, nausea, vomiting, abdominal pain, diarrhea, constipation, blood in stool and melena.   Genitourinary: Negative.  Negative for dysuria, urgency, frequency, incontinence, hematuria and flank pain.   Musculoskeletal: Negative.  Negative for myalgias, back pain, joint pain and falls.   Skin: Negative.  Negative for itching and rash.   Neurological: Negative.  Negative for dizziness, tingling, tremors, sensory change, speech change, focal weakness, seizures, loss of consciousness, weakness and headaches.   Endo/Heme/Allergies: Negative.  Negative for environmental allergies and polydipsia. Does bruise/bleed easily.   Psychiatric/Behavioral: Negative.  Negative for depression, suicidal ideas, hallucinations, memory loss and substance abuse. The patient is not nervous/anxious and does not have insomnia.    All other systems reviewed and are negative.      Patient Active Problem List    Diagnosis Date Noted   • Risk for falls 09/04/2019   • Obesity (BMI 30-39.9) 09/04/2019   • 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   • Pancreatic lesion 04/01/2019   • Macular degeneration 10/10/2018   • Polycythemia 03/11/2017   • Decreased GFR 03/11/2017   • Obesity 03/03/2016   • Hypotestosteronism 03/03/2016   • 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   • Status post shoulder surgery 08/23/2012   • S/P gastric bypass 03/26/2010   • Lower extremity edema 12/29/2009   • Type 2 diabetes mellitus without complication, without long-term current use of insulin ([facility]) 09/05/2009   •  Hypertension 09/05/2009   • History of peripheral neuropathy 09/05/2009   • History of shingles 09/05/2009   • S/P knee replacement 09/05/2009   • S/P lumbar fusion 09/05/2009   • Dyslipidemia 09/05/2009   • History of allergic rhinitis 09/05/2009   • Preventative health care 09/05/2009       Current Outpatient Medications   Medication Sig Dispense Refill   • lisinopril (PRINIVIL, ZESTRIL) 40 MG tablet Take 1 Tab by mouth every day. 90 Tab 0   • glucose blood (ONE TOUCH ULTRA TEST) strip USE  STRIP TO CHECK GLUCOSE ONCE DAILY *DX E11.9* 50 Strip 6   • hydroCHLOROthiazide (HYDRODIURIL) 25 MG Tab Take 1 Tab by mouth every day. 90 Tab 0   • anastrozole (ARIMIDEX) 1 MG Tab Take 1 mg by mouth every day.     • hydrocortisone 2.5 % Cream topical cream Apply 1 Application to affected area(s) 2 times a day as needed (rash). 28 g 1   • glimepiride (AMARYL) 1 MG tablet Take 1 Tab by mouth every morning. 90 Tab 3   • fluticasone (FLONASE) 50 MCG/ACT nasal spray Spray 2 Sprays in nose every day. 16 Bottle 11   • Blood Glucose Monitoring Suppl Device Meter: Accu-check. E11.9 1 Device 0   • Lancets Misc Lancets order: Accu-check meter and test strips, check blood sugar once a day, E11.9 100 Each 11   • Blood Glucose Monitoring Suppl Supplies Misc Test strips order: Accu-check test trips, check blood sugar once a day before meal, E11.9 50 Strip 11   • OMEPRAZOLE PO Take  by mouth.     • ethyl chloride Aerosol Ethyl chloride jet stram BAL apply prn 1 Can 0   • VITAMIN B1-B12 INJ by Injection route every 7 days.     • Testosterone Cypionate (DEPO-TESTOSTERONE IM) by Intramuscular route every 7 days.     • coenzyme Q-10 30 MG capsule Take 60 mg by mouth every day.     • vitamin D (CHOLECALCIFEROL) 1000 UNIT TABS Take 5,000 Units by mouth every day.     • Glucosamine Sulfate 1500 MG PACK Take  by mouth 2 Times a Day.     • Omega-3 Fatty Acids (FISH OIL PO) Take 2 Caps by mouth 2 Times a Day.       No current facility-administered  medications for this visit.             Current supplements as per medication list.       Allergies: Statins [hmg-coa-r inhibitors]    Current social contact/activities:   1.  golf     He  reports that he has never smoked. He has never used smokeless tobacco. He reports that he does not drink alcohol or use drugs.  Counseling given: Not Answered  Comment: 2 ppd 4 yrs,quit 1964      DPA/Advanced Directive:  Patient has Advanced Directive, Living Will and Durable Power of , but it is not on file. Instructed to bring in a copy to scan into their chart.    ROS:    Gait: Uses no assistive device  Ostomy: No  Other tubes: No  Amputations: No  Chronic oxygen use: No  Last eye exam: 1 yr ago  Wears hearing aids: No   : Denies any urinary leakage during the last 6 months    Screening:  none  Depression Screening    Little interest or pleasure in doing things?  0 - not at all  Feeling down, depressed , or hopeless? 0 - not at all  Patient Health Questionnaire Score: 0     If depressive symptoms identified deferred to follow up visit unless specifically addressed in assessment and plan.    Interpretation of PHQ-9 Total Score   Score Severity   1-4 No Depression   5-9 Mild Depression   10-14 Moderate Depression   15-19 Moderately Severe Depression   20-27 Severe Depression    Screening for Cognitive Impairment    Three Minute Recall (village, kitchen, baby) 3/3    Juliano clock face with all 12 numbers and set the hands to show 10 past 10.  Yes    Cognitive concerns identified deferred for follow up unless specifically addressed in assessment and plan.    Fall Risk Assessment    Has the patient had two or more falls in the last year or any fall with injury in the last year?  Yes    Safety Assessment    Throw rugs on floor.  No  Handrails on all stairs.  Yes  Good lighting in all hallways.  Yes  Difficulty hearing.  No  Patient counseled about all safety risks that were identified.    Functional Assessment ADLs    Are  there any barriers preventing you from cooking for yourself or meeting nutritional needs?  No.    Are there any barriers preventing you from driving safely or obtaining transportation?  No.    Are there any barriers preventing you from using a telephone or calling for help?  No.    Are there any barriers preventing you from shopping?  No.    Are there any barriers preventing you from taking care of your own finances?  No.    Are there any barriers preventing you from managing your medications?  No.    Are there any barriers preventing you from showering, bathing or dressing yourself?  No.    Are you currently engaging in any exercise or physical activity?  Yes.     What is your perception of your health?  Fair.      Health Maintenance Summary                IMM DTaP/Tdap/Td Vaccine Overdue 11/7/1961     IMM HEP B VACCINE Overdue 12/22/2014      Done 11/24/2014 Imm Admin: Hepatitis B Vaccine Recombivax (Adol/Adult)    IMM ZOSTER VACCINES Overdue 6/13/2016      Done 4/18/2016 Ext Imm: Zoster, Live (Zostavax)     Patient has more history with this topic...    A1C SCREENING Overdue 4/8/2019      Done 10/8/2018 HEMOGLOBIN A1C     Patient has more history with this topic...    FASTING LIPID PROFILE Overdue 7/2/2019      Done 7/2/2018 LIPID PROFILE     Patient has more history with this topic...    URINE ACR / MICROALBUMIN Overdue 7/2/2019      Done 7/2/2018 MICROALBUMIN CREAT RATIO URINE     Patient has more history with this topic...    Annual Wellness Visit Overdue 7/3/2019      Done 7/2/2018 Visit Dx: Medicare annual wellness visit, subsequent     Patient has more history with this topic...    IMM INFLUENZA Overdue 9/1/2019      Done 12/29/2009 Imm Admin: Influenza TIV (IM)    RETINAL SCREENING Next Due 10/8/2019      Done 10/8/2018 POCT RETINAL EYE EXAM    DIABETES MONOFILAMENT / LE EXAM Next Due 10/8/2019      Done 10/8/2018 AMB DIABETIC MONOFILAMENT LOWER EXTREMITY EXAM    SERUM CREATININE Next Due 3/14/2020       Done 3/14/2019 COMP METABOLIC PANEL     Patient has more history with this topic...    COLONOSCOPY Next Due 2/24/2021      Done 2/24/2011 [facility]          Patient Care Team:  Glenn Delaney M.D. as PCP - General (Internal Medicine)  Joni Miner M.D. as Medical Home Care Manager (Internal Medicine)  Shalonda Loja M.D. as Consulting Physician (Gastroenterology)  Cristal Guido M.D. as Consulting Physician (Orthopaedics)        Social History     Tobacco Use   • Smoking status: Never Smoker   • Smokeless tobacco: Never Used   • Tobacco comment: 2 ppd 4 yrs,quit 1964   Substance Use Topics   • Alcohol use: No     Alcohol/week: 0.0 oz   • Drug use: No     Family History   Problem Relation Age of Onset   • No Known Problems Sister    • No Known Problems Sister    • No Known Problems Sister    • Diabetes Unknown    • No Known Problems Mother    • No Known Problems Father      He  has a past medical history of Arthritis, CATARACT, Diabetes, Hiatus hernia syndrome, Hypertension, Pain (08-29-13), Pain (6/22/2015), Pain (1/7/16), Pneumonia (2007), Ulcer (2014), and Unspecified hemorrhagic conditions.   Past Surgical History:   Procedure Laterality Date   • GASTROSCOPY N/A 1/8/2016    Procedure: GASTROSCOPY;  Surgeon: Deniz Rios M.D.;  Location: [facility];  Service:    • HUMERUS ORIF Left 6/25/2015    Procedure: HUMERUS ORIF/ PROXIMAL;  Surgeon: Cristal Guido M.D.;  Location: [facility];  Service:    • LUMBAR FUSION POSTERIOR  9/5/2013    Performed by Jayro Sears M.D. at [facility] ORS   • LUMBAR DECOMPRESSION  9/5/2013    Performed by Jayro Sears M.D. at [facility]   • MASS EXCISION NEURO  9/5/2013    Performed by Jayro Sears M.D. at [facility]   • RECOVERY  6/26/2012    Performed by SURGERY, IR-RECOVERY at SURGERY SAME DAY [facility]   • DRAINAGE HEMATOMA  5/25/2012    Performed by DENIZ RIOS at [facility]   • GASTRIC  BAND LAPAROSCOPIC REVISION  5/11/2012    Performed by SARINA SUE at SURGERY [facility] ORS   • LUMBAR FUSION POSTERIOR  3/26/2012    Performed by EDITH WHELAN at SURGERY [facility] ORS   • LUMBAR DECOMPRESSION  3/26/2012    Performed by EDITH WHELAN at SURGERY [facility] ORS   • INJ,EPIDURAL/LUMB/SAC SINGLE  3/19/2012    Performed by KRISTIN BALTAZAR at [facility]   • INJ,EPIDURAL/LUMB/SAC SINGLE  3/5/2012    Performed by KRISTIN BALTAZAR at [facility] ORS   • GASTRIC BANDING LAPAROSCOPIC  3/24/2010    Performed by SARINA SUE at SURGERY [facility] ORS   • HIATAL HERNIA REPAIR  3/24/2010    Performed by SARINA SUE at SURGERY [facility] ORS   • KNEE ARTHROPLASTY TOTAL  2000    bilateral   • ABDOMINOPLASTY  1990       Exam:   /60 (BP Location: Right arm, Patient Position: Sitting)   Pulse 99   Temp 36.4 °C (97.5 °F) (Temporal)   Ht 1.829 m (6')   Wt 109.8 kg (242 lb)   SpO2 95%  Body mass index is 32.82 kg/m².    Hearing excellent.    Dentition good  Alert, oriented in no acute distress.  Eye contact is good, speech goal directed, affect calm    Services suggested: No services needed at this time  Health Care Screening: Age-appropriate preventive services recommended by USPTF and ACIP covered by Medicare were discussed today. Services ordered if indicated and agreed upon by the patient.  Referrals offered: Community-based lifestyle interventions to reduce health risks and promote self-management and wellness, fall prevention, nutrition, physical activity, tobacco-use cessation, weight loss, and mental health services as per orders if indicated.    Discussion today about general wellness and lifestyle habits:    · Prevent falls and reduce trip hazards; Cautioned about securing or removing rugs.  · Have a working fire alarm and carbon monoxide detector;   · Engage in regular physical activity and social activities     Follow-up: 1 mo for lab review and cough eval      Objective:     /60 (BP Location: Right arm, Patient Position: Sitting)   Pulse 99   Temp 36.4 °C (97.5 °F) (Temporal)   Ht 1.829 m (6')   Wt 109.8 kg (242 lb)   SpO2 95%   BMI 32.82 kg/m²      Physical Exam  Physical Exam   Vitals reviewed.  Constitutional: oriented to person, place, and time. appears well-developed and well-nourished. No distress.   HENT: Head: Normocephalic and atraumatic. Bilateral tympanic membranes wnl w/o bulging.  Right Ear: External ear normal. Left Ear: External ear normal. Nose: Nose normal.  Mouth/Throat: Oropharynx is clear and moist. No oropharyngeal exudate. jed tm wnl. Eyes: Conjunctivae and EOM are normal. Pupils are equal, round, and reactive to light. Right eye exhibits no discharge. Left eye exhibits no discharge. No scleral icterus.    Neck: Normal range of motion. Neck supple. No JVD present.   Cardiovascular: Normal rate, regular rhythm, normal heart sounds and intact distal pulses.  Exam reveals no gallop and no friction rub.  No murmur heard.  No carotid bruits   Pulmonary/Chest: Effort normal and breath sounds normal. No stridor. No respiratory distress. no wheezes or rales. exhibits no tenderness.   Abdominal: Soft. Bowel sounds are normal. exhibits no distension and no mass. No tenderness. no rebound and no guarding.   Musculoskeletal: Normal range of motion. exhibits no edema or tenderness.  jed pedal pulses 1+.  Lymphadenopathy:  no cervical or supraclavicular adenopathy.   Neurological: alert and oriented to person, place, and time. has normal reflexes. displays normal reflexes. No cranial nerve deficit. exhibits normal muscle tone. Coordination normal.   Skin: Skin is warm and dry. No rash noted. no diaphoresis. No erythema. No pallor.   Psychiatric: normal mood and affect. behavior is normal.      Assessment/Plan:     1. Pancreatic mass  MR-ABDOMEN-WITH & W/O    noted last year and now due for updated eval.  do MRI abd w&w/o.  f/u wiht pt with results.      2. Essential hypertension  Subsequent Annual Wellness Visit - Includes PPPS ()    lisinopril (PRINIVIL, ZESTRIL) 40 MG tablet    hydroCHLOROthiazide (HYDRODIURIL) 25 MG Tab    CBC WITH DIFFERENTIAL    MICROALBUMIN CREAT RATIO URINE    Lipid Profile    GAMMA GT (GGT)    LDH    ESTRADIOL    TESTOSTERONE F&T MALE ADULT    IGF-1 SOMATOMEDIN    bp stable on meds.  refilled meds   3. Diabetes mellitus without complication (HCC)  Subsequent Annual Wellness Visit - Includes PPPS ()    glimepiride (AMARYL) 1 MG tablet    CBC WITH DIFFERENTIAL    Lipid Profile    GAMMA GT (GGT)    LDH    ESTRADIOL    TESTOSTERONE F&T MALE ADULT    IGF-1 SOMATOMEDIN    due for repeat lab.  his last lab stable on meds.    4. Hyperlipidemia LDL goal <100  Lipid Profile    GAMMA GT (GGT)    LDH    ESTRADIOL    TESTOSTERONE F&T MALE ADULT    IGF-1 SOMATOMEDIN    stable on meds.  do lab and f/u for review   5. Allergic rhinitis due to pollen, unspecified seasonality  Subsequent Annual Wellness Visit - Includes PPPS ()    fluticasone (FLONASE) 50 MCG/ACT nasal spray    CBC WITH DIFFERENTIAL    GAMMA GT (GGT)    LDH    ESTRADIOL    TESTOSTERONE F&T MALE ADULT    IGF-1 SOMATOMEDIN    stable.  has seen ENT in past   6. 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  just had updated EGD with them last week   7. Type 2 diabetes mellitus without complication, without long-term current use of insulin (HCC)  Subsequent Annual Wellness Visit - Includes PPPS ()    CBC WITH DIFFERENTIAL    Lipid Profile    GAMMA GT (GGT)    LDH    ESTRADIOL    TESTOSTERONE F&T MALE ADULT    IGF-1 SOMATOMEDIN    e for repeat lab.  his last lab stable on meds.    8. Arthritis of neck  Subsequent Annual Wellness Visit - Includes PPPS ()    CBC WITH DIFFERENTIAL     URIC ACID    GAMMA GT (GGT)    LDH    ESTRADIOL    TESTOSTERONE F&T MALE ADULT    IGF-1 SOMATOMEDIN    stable at this time   9. Dyslipidemia  Subsequent Annual Wellness Visit - Includes PPPS ()    CBC WITH DIFFERENTIAL    GAMMA GT (GGT)    LDH    ESTRADIOL    TESTOSTERONE F&T MALE ADULT    IGF-1 SOMATOMEDIN    stable on meds   10. Primary osteoarthritis involving multiple joints  GAMMA GT (GGT)    LDH    ESTRADIOL    TESTOSTERONE F&T MALE ADULT    IGF-1 SOMATOMEDIN    sx improved with stem cell injection in multiple sites   11. Screening for malignant neoplasm of prostate  PSA TOTAL + %FREE    GAMMA GT (GGT)    LDH    ESTRADIOL    TESTOSTERONE F&T MALE ADULT    IGF-1 SOMATOMEDIN   12. Screening for malignant neoplasm of colon  COLOGUARD (FIT DNA)   13. Risk for falls  Patient identified as fall risk.  Appropriate orders and counseling given.    Subsequent Annual Wellness Visit - Includes PPPS ()    CBC WITH DIFFERENTIAL    GAMMA GT (GGT)    LDH    ESTRADIOL    TESTOSTERONE F&T MALE ADULT    IGF-1 SOMATOMEDIN   14. Obesity (BMI 30-39.9)  Patient identified as having weight management issue.  Appropriate orders and counseling given.    Subsequent Annual Wellness Visit - Includes PPPS ()    CBC WITH DIFFERENTIAL    GAMMA GT (GGT)    LDH    ESTRADIOL    TESTOSTERONE F&T MALE ADULT    IGF-1 SOMATOMEDIN    enc improved healthy lifestyle   15. Pancreatic lesion      noted last year and now due for updated eval.  do MRI abd w&w/o.  f/u wiht pt with results.     16. History of allergic rhinitis         17.  Do lab and f/u for review

## 2019-09-04 NOTE — ASSESSMENT & PLAN NOTE
He has had stem cell injection in both shoulders, neck, lumbar spine and left knee.  He is signicantly improved with pain levels.

## 2019-09-04 NOTE — ASSESSMENT & PLAN NOTE
He had EGD last week.  It continued to show barretts.  He is awaiting bx results.  Will need repeat in 2 yrs.

## 2019-09-05 LAB
IGF-I SERPL-MCNC: 138 NG/ML (ref 22–212)
IGF-I Z-SCORE SERPL: 0.8
PSA FREE MFR SERPL: 32 %
PSA FREE SERPL-MCNC: 0.6 NG/ML
PSA SERPL-MCNC: 1.9 NG/ML (ref 0–4)
SHBG SERPL-SCNC: 32 NMOL/L (ref 11–80)
TESTOST FREE MFR SERPL: 2.1 % (ref 1.6–2.9)
TESTOST FREE SERPL-MCNC: 184 PG/ML (ref 47–244)
TESTOST SERPL-MCNC: 873 NG/DL (ref 300–720)

## 2019-09-06 ENCOUNTER — TELEPHONE (OUTPATIENT)
Dept: MEDICAL GROUP | Facility: MEDICAL CENTER | Age: 79
End: 2019-09-06

## 2019-09-06 NOTE — TELEPHONE ENCOUNTER
----- Message from DARCY Medina sent at 9/5/2019  5:07 PM PDT -----  Please have pt set appointment to review and discuss treatment for labs.

## 2019-09-10 ENCOUNTER — OFFICE VISIT (OUTPATIENT)
Dept: MEDICAL GROUP | Facility: MEDICAL CENTER | Age: 79
End: 2019-09-10
Payer: MEDICARE

## 2019-09-10 VITALS
HEIGHT: 72 IN | OXYGEN SATURATION: 92 % | DIASTOLIC BLOOD PRESSURE: 78 MMHG | WEIGHT: 242 LBS | BODY MASS INDEX: 32.78 KG/M2 | TEMPERATURE: 97.4 F | HEART RATE: 75 BPM | SYSTOLIC BLOOD PRESSURE: 130 MMHG

## 2019-09-10 DIAGNOSIS — R80.8 OTHER PROTEINURIA: ICD-10-CM

## 2019-09-10 DIAGNOSIS — E78.5 HYPERLIPIDEMIA LDL GOAL <100: ICD-10-CM

## 2019-09-10 DIAGNOSIS — I10 ESSENTIAL HYPERTENSION: Chronic | ICD-10-CM

## 2019-09-10 DIAGNOSIS — R05.3 CHRONIC COUGH: ICD-10-CM

## 2019-09-10 DIAGNOSIS — D58.2 ELEVATED HEMOGLOBIN (HCC): ICD-10-CM

## 2019-09-10 PROCEDURE — 99214 OFFICE O/P EST MOD 30 MIN: CPT | Performed by: NURSE PRACTITIONER

## 2019-09-10 RX ORDER — LOSARTAN POTASSIUM 50 MG/1
50 TABLET ORAL DAILY
Qty: 30 TAB | Refills: 1 | Status: SHIPPED | OUTPATIENT
Start: 2019-09-10 | End: 2019-11-11 | Stop reason: SDUPTHER

## 2019-09-11 NOTE — PROGRESS NOTES
Subjective:     LIAN More III is a 78 y.o. male who presents with HTN.    HPI:   Seen in f/u HTN.  He is feeling ok.  Still playing golf regularly.    Still has chronic cough that is dry cough.  Tickle in throat.  He is on meds for allergies and its not helping.  He is on lisinopril.  Will try changing to losartan.  Bp is stable today on lisinopril.  Reviewed lab with pt.  Part of lab not done by lab.  He will get it redone.  Alb/cr ratio is up to 37.  Was 30 last year.  Bp wnl.  a1c pending.   CBC shows chronically elevated h/h.  Hgb is down from last time but hct is still up.  Was 54 and now 52.     Uric acid, PSA, GGT, LDH, estradiol, testosterone, IFG-1 is wnl.  LP shows good trg and LDL.  HDL is chronically low in 30's.    He is followed by [facility] for his high h/h and hypogonadism.  He donates blood regularly.        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   • Status post shoulder surgery 08/23/2012   • S/P gastric bypass 03/26/2010   • Lower extremity edema 12/29/2009   • Type 2 diabetes mellitus without complication, without long-term current use of insulin (HCC) 09/05/2009   • Hypertension 09/05/2009   • History of peripheral neuropathy 09/05/2009   • History of shingles 09/05/2009   • S/P knee replacement 09/05/2009   • S/P lumbar fusion 09/05/2009   • Dyslipidemia 09/05/2009   • History of allergic rhinitis 09/05/2009   • Preventative health care 09/05/2009       Current medicines (including changes today)  Current Outpatient Medications   Medication Sig Dispense Refill   • losartan (COZAAR) 50 MG Tab Take 1 Tab by mouth every day. 30 Tab 1   • lisinopril (PRINIVIL, ZESTRIL) 40 MG tablet Take 1 Tab by mouth every  day. 90 Tab 0   • hydrocortisone 2.5 % Cream topical cream Apply 1 Application to affected area(s) 2 times a day as needed (rash). 28 g 1   • hydroCHLOROthiazide (HYDRODIURIL) 25 MG Tab Take 1 Tab by mouth every day. 90 Tab 3   • glimepiride (AMARYL) 1 MG tablet Take 1 Tab by mouth every morning. 90 Tab 3   • fluticasone (FLONASE) 50 MCG/ACT nasal spray Spray 2 Sprays in nose every day. 3 Bottle 3   • glucose blood (ONE TOUCH ULTRA TEST) strip USE  STRIP TO CHECK GLUCOSE ONCE DAILY *DX E11.9* 50 Strip 6   • anastrozole (ARIMIDEX) 1 MG Tab Take 1 mg by mouth every day.     • Blood Glucose Monitoring Suppl Device Meter: Accu-check. E11.9 1 Device 0   • Lancets Misc Lancets order: Accu-check meter and test strips, check blood sugar once a day, E11.9 100 Each 11   • Blood Glucose Monitoring Suppl Supplies Misc Test strips order: Accu-check test trips, check blood sugar once a day before meal, E11.9 50 Strip 11   • OMEPRAZOLE PO Take  by mouth.     • VITAMIN B1-B12 INJ by Injection route every 7 days.     • Testosterone Cypionate (DEPO-TESTOSTERONE IM) by Intramuscular route every 7 days.     • coenzyme Q-10 30 MG capsule Take 60 mg by mouth every day.     • vitamin D (CHOLECALCIFEROL) 1000 UNIT TABS Take 5,000 Units by mouth every day.     • Glucosamine Sulfate 1500 MG PACK Take  by mouth 2 Times a Day.     • Omega-3 Fatty Acids (FISH OIL PO) Take 2 Caps by mouth 2 Times a Day.     • ethyl chloride Aerosol Ethyl chloride jet stram BAL apply prn 1 Can 0     No current facility-administered medications for this visit.        Allergies   Allergen Reactions   • Statins [Hmg-Coa-R Inhibitors] Rash       ROS  Constitutional: Negative. Negative for fever, chills, weight loss, malaise/fatigue and diaphoresis.   HENT: Negative. Negative for hearing loss, ear pain, nosebleeds, congestion, sore throat, neck pain, tinnitus and ear discharge.   Respiratory: Negative. Negative for cough, hemoptysis, sputum production, shortness of  breath, wheezing and stridor.   Cardiovascular: Negative. Negative for chest pain, palpitations, orthopnea, claudication, leg swelling and PND.   Gastrointestinal: Denies nausea, vomiting, diarrhea, constipation, heartburn, melena or hematochezia.  Genitourinary: Denies dysuria, hematuria, urinary incontinence, frequency or urgency.        Objective:     /78 (BP Location: Right arm, Patient Position: Sitting)   Pulse 75   Temp 36.3 °C (97.4 °F) (Temporal)   Ht 1.829 m (6')   Wt 109.8 kg (242 lb)   SpO2 92%  Body mass index is 32.82 kg/m².    Physical Exam:  Vitals reviewed.  Constitutional: Oriented to person, place, and time. appears well-developed and well-nourished. No distress.   Cardiovascular: Normal rate, regular rhythm, normal heart sounds and intact distal pulses. Exam reveals no gallop and no friction rub. 2+ murmur heard. No carotid bruits.   Pulmonary/Chest: Effort normal and breath sounds normal. No stridor. No respiratory distress. no wheezes or rales. exhibits no tenderness.   Musculoskeletal: Normal range of motion. exhibits 1+ jed pedal edema. jed pedal pulses 1+.  Lymphadenopathy: No cervical or supraclavicular adenopathy.   Neurological: Alert and oriented to person, place, and time. exhibits normal muscle tone.  Skin: Skin is warm and dry. No diaphoresis.   Psychiatric: Normal mood and affect. Behavior is normal.      Assessment and Plan:     The following treatment plan was discussed:    1. Essential hypertension  losartan (COZAAR) 50 MG Tab    has chronic cough poss d/t lisinopril.  dc lisinopril.  chg to losartan.  monitor bp - f/u if elevated.   do additional lab and f/u w/pt w/results.  then f/u yr   2. Chronic cough      poss r/t ace cough.  chg bp med.  f/u 1 yr or sooner if lab abn or bp elevated.  call for lab slip   3. Other proteinuria      alb/cr ratio is up sl from last time.  bp controlled.  he will do a1c with additional lab.  will f/u with pt with results.     4.  Elevated hemoglobin (HCC)      this is chronic and followed by [facility].  donates blood regularly.    5. Hyperlipidemia LDL goal <100      HDL chronically low.  trg and LDL at goal.  stable at this time. repeat lab 1 yr.  call for lab slip         Followup: Return in about 1 year (around 9/10/2020).

## 2019-09-12 ENCOUNTER — HOSPITAL ENCOUNTER (OUTPATIENT)
Dept: LAB | Facility: MEDICAL CENTER | Age: 79
End: 2019-09-12
Attending: INTERNAL MEDICINE
Payer: MEDICARE

## 2019-09-12 DIAGNOSIS — N18.30 CKD (CHRONIC KIDNEY DISEASE) STAGE 3, GFR 30-59 ML/MIN (HCC): ICD-10-CM

## 2019-09-12 DIAGNOSIS — E53.8 B12 DEFICIENCY: ICD-10-CM

## 2019-09-12 DIAGNOSIS — E61.1 IRON DEFICIENCY: ICD-10-CM

## 2019-09-12 DIAGNOSIS — K86.89 PANCREATIC MASS: ICD-10-CM

## 2019-09-12 DIAGNOSIS — E11.9 TYPE 2 DIABETES MELLITUS WITHOUT COMPLICATION, WITHOUT LONG-TERM CURRENT USE OF INSULIN (HCC): ICD-10-CM

## 2019-09-12 DIAGNOSIS — E78.5 DYSLIPIDEMIA: ICD-10-CM

## 2019-09-12 LAB
25(OH)D3 SERPL-MCNC: 49 NG/ML (ref 30–100)
ALBUMIN SERPL BCP-MCNC: 4.3 G/DL (ref 3.2–4.9)
ALBUMIN/GLOB SERPL: 1.8 G/DL
ALP SERPL-CCNC: 41 U/L (ref 30–99)
ALT SERPL-CCNC: 25 U/L (ref 2–50)
ANION GAP SERPL CALC-SCNC: 9 MMOL/L (ref 0–11.9)
AST SERPL-CCNC: 26 U/L (ref 12–45)
BILIRUB SERPL-MCNC: 0.7 MG/DL (ref 0.1–1.5)
BUN SERPL-MCNC: 26 MG/DL (ref 8–22)
CALCIUM SERPL-MCNC: 9.5 MG/DL (ref 8.5–10.5)
CHLORIDE SERPL-SCNC: 105 MMOL/L (ref 96–112)
CO2 SERPL-SCNC: 27 MMOL/L (ref 20–33)
CREAT SERPL-MCNC: 1.43 MG/DL (ref 0.5–1.4)
EST. AVERAGE GLUCOSE BLD GHB EST-MCNC: 140 MG/DL
GLOBULIN SER CALC-MCNC: 2.4 G/DL (ref 1.9–3.5)
GLUCOSE SERPL-MCNC: 143 MG/DL (ref 65–99)
HBA1C MFR BLD: 6.5 % (ref 0–5.6)
IRON SERPL-MCNC: 36 UG/DL (ref 50–180)
PHOSPHATE SERPL-MCNC: 2.7 MG/DL (ref 2.5–4.5)
POTASSIUM SERPL-SCNC: 4.7 MMOL/L (ref 3.6–5.5)
PROT SERPL-MCNC: 6.7 G/DL (ref 6–8.2)
PTH-INTACT SERPL-MCNC: 20 PG/ML (ref 14–72)
SODIUM SERPL-SCNC: 141 MMOL/L (ref 135–145)
TSH SERPL DL<=0.005 MIU/L-ACNC: 1.03 UIU/ML (ref 0.38–5.33)

## 2019-09-12 PROCEDURE — 83540 ASSAY OF IRON: CPT

## 2019-09-12 PROCEDURE — 82607 VITAMIN B-12: CPT

## 2019-09-12 PROCEDURE — 83036 HEMOGLOBIN GLYCOSYLATED A1C: CPT | Mod: GA

## 2019-09-12 PROCEDURE — 84155 ASSAY OF PROTEIN SERUM: CPT

## 2019-09-12 PROCEDURE — 80053 COMPREHEN METABOLIC PANEL: CPT

## 2019-09-12 PROCEDURE — 84100 ASSAY OF PHOSPHORUS: CPT

## 2019-09-12 PROCEDURE — 83970 ASSAY OF PARATHORMONE: CPT

## 2019-09-12 PROCEDURE — 84165 PROTEIN E-PHORESIS SERUM: CPT

## 2019-09-12 PROCEDURE — 82306 VITAMIN D 25 HYDROXY: CPT

## 2019-09-12 PROCEDURE — 84443 ASSAY THYROID STIM HORMONE: CPT

## 2019-09-12 PROCEDURE — 36415 COLL VENOUS BLD VENIPUNCTURE: CPT

## 2019-09-13 ENCOUNTER — TELEPHONE (OUTPATIENT)
Dept: MEDICAL GROUP | Facility: MEDICAL CENTER | Age: 79
End: 2019-09-13

## 2019-09-13 LAB — VIT B12 SERPL-MCNC: >1500 PG/ML (ref 211–911)

## 2019-09-13 NOTE — TELEPHONE ENCOUNTER
Please notify patient that his blood test shows:  (1) his diabetes test the A1c has increased to 6.5%, we do not need to make any medication changes but I would like to repeat that in 3 months have him continue to work on optimizing his nutrition, avoiding sweets, candies, and monitor his carbohydrate portion serving size  (2) his kidney function is still decreased likely related to high blood pressure and diabetes, it is stable, have him avoid anti-inflammatories such as Motrin, Aleve, Advil, we also continue to monitor this in 3 months with a repeat blood test  (3) his vitamin D, B12 levels are normal  (4) his iron level is slightly low, have him take 1 over-the-counter iron pill daily  (5) have him schedule follow-up appointment in 3 to 4 months to repeat his blood test

## 2019-09-15 LAB
ALBUMIN SERPL ELPH-MCNC: 3.94 G/DL (ref 3.75–5.01)
ALPHA1 GLOB SERPL ELPH-MCNC: 0.24 G/DL (ref 0.19–0.46)
ALPHA2 GLOB SERPL ELPH-MCNC: 0.83 G/DL (ref 0.48–1.05)
B-GLOBULIN SERPL ELPH-MCNC: 0.68 G/DL (ref 0.48–1.1)
EER PROT ELECT SER Q1092: NORMAL
GAMMA GLOB SERPL ELPH-MCNC: 0.7 G/DL (ref 0.62–1.51)
INTERPRETATION SERPL IFE-IMP: NORMAL
PROT SERPL-MCNC: 6.4 G/DL (ref 6.3–8.2)

## 2019-09-17 ENCOUNTER — TELEPHONE (OUTPATIENT)
Dept: MEDICAL GROUP | Facility: MEDICAL CENTER | Age: 79
End: 2019-09-17

## 2019-09-17 NOTE — TELEPHONE ENCOUNTER
----- Message from Glenn Delaney M.D. sent at 9/13/2019  4:45 PM PDT -----  Please notify patient that his blood test shows:  (1) his diabetes test the A1c has increased to 6.5%, we do not need to make any medication changes but I would like to repeat that in 3 months have him continue to work on optimizing his nutrition, avoiding sweets, candies, and monitor his carbohydrate portion serving size  (2) his kidney function is still decreased likely related to high blood pressure and diabetes, it is stable, have him avoid anti-inflammatories such as Motrin, Aleve, Advil, we also continue to monitor this in 3 months with a repeat blood test  (3) his vitamin D, B12 levels are normal  (4) his iron level is slightly low, have him take 1 over-the-counter iron pill daily  (5) have him schedule follow-up appointment in 3 to 4 months to repeat his blood test

## 2019-09-20 NOTE — TELEPHONE ENCOUNTER
Spoke with pt and advised of results and recommendations. He is fine with everything EXCEPT:    States that he will not take an iron supplement everyday because the last time you requested he do that, he had an adverse reaction to that treatment.

## 2019-09-24 NOTE — TELEPHONE ENCOUNTER
Pt states that he will not take ANY iron supplementation because it drives his hemoglobin too high.

## 2019-09-24 NOTE — TELEPHONE ENCOUNTER
Noted.  We can try different iron supplementation that he can take every other day, if he is in agreement I will send that to his pharmacy.  Let me know.

## 2019-11-05 ENCOUNTER — TELEPHONE (OUTPATIENT)
Dept: MEDICAL GROUP | Facility: MEDICAL CENTER | Age: 79
End: 2019-11-05

## 2019-11-05 NOTE — TELEPHONE ENCOUNTER
Please check with pt and see if/when he is going to do the MRI abd for the yearly f/u of pancreas mass.   It was ordered in sept

## 2019-11-06 NOTE — TELEPHONE ENCOUNTER
"Pt states you told him last time to \"blow it off\" so that's what he did. Do you still want him to do it?    "

## 2019-11-11 DIAGNOSIS — I10 ESSENTIAL HYPERTENSION: Chronic | ICD-10-CM

## 2019-11-11 RX ORDER — LOSARTAN POTASSIUM 50 MG/1
50 TABLET ORAL DAILY
Qty: 90 TAB | Refills: 1 | Status: SHIPPED | OUTPATIENT
Start: 2019-11-11 | End: 2019-12-04

## 2019-11-27 ENCOUNTER — TELEPHONE (OUTPATIENT)
Dept: PULMONOLOGY | Facility: HOSPICE | Age: 79
End: 2019-11-27

## 2019-11-27 DIAGNOSIS — R55 SYNCOPE, UNSPECIFIED SYNCOPE TYPE: ICD-10-CM

## 2019-11-28 NOTE — TELEPHONE ENCOUNTER
"Pt called states needs to schedule a visit with Dr. Garcia, states urgent. Confirmed pt  has never been seen at the pulmonary or seen by pulmonologist clinic and will need a referral from physician. Pt states he was advised on the golf course after \"passing out\"  he needs a pulmonologist urgently. I informed the patient multiple times he needs a referral to be established with pulmonary. Pt was very adamant about being seen urgently and will get a referral after being seen by a pulmonologist. Pt stated he pays top dollar for his premium  insurance and shouldn't need a referral. I informed per policy needs referral for proper care. I encouraged if urgent can go to the ER and if needed a pulmonologist will be called to see him. Pt requesting call to schedule appt, I informed our referral's gals will need a referral before calling and scheduling appointment.       Please sign a referral to pulmonary    Last seen  9/10/19 LOGAN Jarrell   "

## 2019-12-02 ENCOUNTER — TELEPHONE (OUTPATIENT)
Dept: MEDICAL GROUP | Facility: MEDICAL CENTER | Age: 79
End: 2019-12-02

## 2019-12-02 NOTE — TELEPHONE ENCOUNTER
[facility] already placed the referral on 11/27/19 please ask [facility] if they could change that to urgent.    Notify the patient that this was already placed last week by T[facility]ie.

## 2019-12-02 NOTE — TELEPHONE ENCOUNTER
Please notify the patient that a loss of consciousness normally does not involve a pulmonary evaluation, typically he would need to see cardiology

## 2019-12-02 NOTE — TELEPHONE ENCOUNTER
"Pt states that he was playing with an internest and he recommended PULM because pt has all the symptoms of IPF. That dr said to let you know he has \"diagnosed\" him with IPF.   Wants a REF ASAP to PULM. Pt is very aggravated and wants a response today.  I advised the pt that he may need to be seen and he was not happy.   "

## 2019-12-02 NOTE — TELEPHONE ENCOUNTER
LIAN JAVAD [facility]  [phone]    Pt apparently lost consciousness on the golf course and wants an urgent REF to Pulm. Pt left a very frustrated message on my VM. Does not believe he should need a REF (because he has premium ins).

## 2019-12-02 NOTE — TELEPHONE ENCOUNTER
Per James @ [facility], Tootie has to make that change to URGENT. Jennifer informed and message routed to CHELSY.

## 2019-12-03 ENCOUNTER — TELEPHONE (OUTPATIENT)
Dept: MEDICAL GROUP | Facility: MEDICAL CENTER | Age: 79
End: 2019-12-03

## 2019-12-03 ENCOUNTER — OFFICE VISIT (OUTPATIENT)
Dept: MEDICAL GROUP | Facility: MEDICAL CENTER | Age: 79
End: 2019-12-03
Payer: MEDICARE

## 2019-12-03 ENCOUNTER — HOSPITAL ENCOUNTER (OUTPATIENT)
Dept: CARDIOLOGY | Facility: MEDICAL CENTER | Age: 79
End: 2019-12-03
Attending: NURSE PRACTITIONER
Payer: MEDICARE

## 2019-12-03 VITALS
SYSTOLIC BLOOD PRESSURE: 116 MMHG | TEMPERATURE: 97.5 F | DIASTOLIC BLOOD PRESSURE: 64 MMHG | WEIGHT: 252 LBS | HEART RATE: 86 BPM | HEIGHT: 72 IN | OXYGEN SATURATION: 94 % | BODY MASS INDEX: 34.13 KG/M2

## 2019-12-03 DIAGNOSIS — R09.02 HYPOXIA: ICD-10-CM

## 2019-12-03 DIAGNOSIS — G47.34 NOCTURNAL HYPOXEMIA: ICD-10-CM

## 2019-12-03 DIAGNOSIS — R06.02 SOB (SHORTNESS OF BREATH) ON EXERTION: ICD-10-CM

## 2019-12-03 DIAGNOSIS — R06.02 SOB (SHORTNESS OF BREATH): ICD-10-CM

## 2019-12-03 DIAGNOSIS — I42.0 DILATED CARDIOMYOPATHY (HCC): ICD-10-CM

## 2019-12-03 LAB
LV EJECT FRACT  99904: 25
LV EJECT FRACT MOD 2C 99903: 23.15
LV EJECT FRACT MOD 4C 99902: 29.76
LV EJECT FRACT MOD BP 99901: 25.78

## 2019-12-03 PROCEDURE — 99214 OFFICE O/P EST MOD 30 MIN: CPT | Performed by: NURSE PRACTITIONER

## 2019-12-03 PROCEDURE — 93306 TTE W/DOPPLER COMPLETE: CPT | Mod: 26 | Performed by: INTERNAL MEDICINE

## 2019-12-03 PROCEDURE — 93306 TTE W/DOPPLER COMPLETE: CPT

## 2019-12-03 NOTE — PROGRESS NOTES
Subjective:     LIAN More III is a 79 y.o. male who presents with hypoxia.    HPI:   Seen in f/u for hypoxia.  He had a procedure 3 wks ago.  During and after the procedure he had hypoxia.  He doesn't know how low it went to anesthesia wwas very concerned.  He now has a pulse ox at home that shows 80-92% during day.  Not able to sleep at nite also d/t SOB and wake up gasping for air.  He has referral to pulm but they haven't called.  Will chg to urgent.  Will do sleep apnea study and if abn will get o2 on.    revewed ct chest 3/19 with pt.  Mild atelectasis but no fibrosis.  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  Reviewed path of aortic sclerosis.        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   • Status post shoulder surgery 08/23/2012   • S/P gastric bypass 03/26/2010   • Lower extremity edema 12/29/2009   • Type 2 diabetes mellitus without complication, without long-term current use of insulin (HCC) 09/05/2009   • Hypertension 09/05/2009   • History of peripheral neuropathy 09/05/2009   • History of shingles 09/05/2009   • S/P knee replacement 09/05/2009   • S/P lumbar fusion 09/05/2009   • Dyslipidemia 09/05/2009   • History of allergic rhinitis 09/05/2009   • Preventative health care 09/05/2009       Current medicines (including changes today)  Current Outpatient Medications   Medication Sig Dispense Refill   • losartan (COZAAR) 50 MG Tab Take 1 Tab by mouth every day. 90 Tab 1   • lisinopril (PRINIVIL, ZESTRIL) 40 MG tablet Take 1 Tab by mouth every day. 90 Tab 0   • hydrocortisone 2.5 % Cream topical cream Apply 1 Application to affected area(s) 2 times a day as needed (rash). 28 g 1   • hydroCHLOROthiazide (HYDRODIURIL)  25 MG Tab Take 1 Tab by mouth every day. 90 Tab 3   • glimepiride (AMARYL) 1 MG tablet Take 1 Tab by mouth every morning. 90 Tab 3   • fluticasone (FLONASE) 50 MCG/ACT nasal spray Spray 2 Sprays in nose every day. 3 Bottle 3   • glucose blood (ONE TOUCH ULTRA TEST) strip USE  STRIP TO CHECK GLUCOSE ONCE DAILY *DX E11.9* 50 Strip 6   • anastrozole (ARIMIDEX) 1 MG Tab Take 1 mg by mouth every day.     • Blood Glucose Monitoring Suppl Device Meter: Accu-check. E11.9 1 Device 0   • Lancets Misc Lancets order: Accu-check meter and test strips, check blood sugar once a day, E11.9 100 Each 11   • Blood Glucose Monitoring Suppl Supplies Misc Test strips order: Accu-check test trips, check blood sugar once a day before meal, E11.9 50 Strip 11   • OMEPRAZOLE PO Take  by mouth.     • ethyl chloride Aerosol Ethyl chloride jet stram BAL apply prn 1 Can 0   • VITAMIN B1-B12 INJ by Injection route every 7 days.     • Testosterone Cypionate (DEPO-TESTOSTERONE IM) by Intramuscular route every 7 days.     • coenzyme Q-10 30 MG capsule Take 60 mg by mouth every day.     • vitamin D (CHOLECALCIFEROL) 1000 UNIT TABS Take 5,000 Units by mouth every day.     • Glucosamine Sulfate 1500 MG PACK Take  by mouth 2 Times a Day.     • Omega-3 Fatty Acids (FISH OIL PO) Take 2 Caps by mouth 2 Times a Day.       No current facility-administered medications for this visit.        Allergies   Allergen Reactions   • Statins [Hmg-Coa-R Inhibitors] Rash       ROS  Constitutional: Negative. Negative for fever, chills, weight loss, malaise/fatigue and diaphoresis.   HENT: Negative. Negative for hearing loss, ear pain, nosebleeds, congestion, sore throat, neck pain, tinnitus and ear discharge.   Respiratory: Negative. Negative for cough, hemoptysis, sputum production, shortness of breath, wheezing and stridor.   Cardiovascular: Negative. Negative for chest pain, palpitations, orthopnea, claudication, leg swelling and PND.   Gastrointestinal: Denies nausea,  vomiting, diarrhea, constipation, heartburn, melena or hematochezia.  Genitourinary: Denies dysuria, hematuria, urinary incontinence, frequency or urgency.        Objective:     /64 (BP Location: Right arm, Patient Position: Sitting)   Pulse 86   Temp 36.4 °C (97.5 °F) (Temporal)   Ht 1.829 m (6')   Wt 114.3 kg (252 lb)   SpO2 94%  Body mass index is 34.18 kg/m².    Physical Exam:  Vitals reviewed.  Constitutional: Oriented to person, place, and time. appears well-developed and well-nourished. No distress.   Cardiovascular: Normal rate, regular rhythm, normal heart sounds and intact distal pulses. Exam reveals no gallop and no friction rub. 3/6 murmur heard. No carotid bruits.   Pulmonary/Chest: Effort normal and breath sounds normal. No stridor. No respiratory distress. no wheezes.  Few RLL scattered crackles.  exhibits no tenderness.  amb walk with pulse ox - o2 sat never went down below 90%.  HR inc to 124.  noticable SOB.    Musculoskeletal: Normal range of motion.   Lymphadenopathy: No cervical or supraclavicular adenopathy.   Neurological: Alert and oriented to person, place, and time. exhibits normal muscle tone.  Skin: Skin is warm and dry. No diaphoresis.   Psychiatric: Normal mood and affect. Behavior is normal.      Assessment and Plan:     The following treatment plan was discussed:    1. SOB (shortness of breath) on exertion  REFERRAL TO PULMONOLOGY    EC-ECHOCARDIOGRAM COMPLETE W/O CONT    o2 sat dec to 90% with walk.  do apnea link.  refer urgently to pulm for eval. HR inc signifianctly with SOB w/walk.  do echo.   2. Hypoxia  REFERRAL TO PULMONOLOGY    EC-ECHOCARDIOGRAM COMPLETE W/O CONT    do apnea link asap.  do echo - he will try and do today.   if apnea link abn will add o2.  urgent pulm ref done.  if pt finds pulm to see him sooner will redo.          Followup: Return if symptoms worsen or fail to improve.     Addendum:  Documentation of pts sleep related hypoxia.  Documentation of  the OPO that was done due to the drop of o2 sats.  Pt symptoms might be expected to improve with o2 therapy.  Medications such as inhalers or med treatments during the day do not seem to improve hypoxia at [employer].  His o2 sat on the OPO  Showed less than 80% for 5 hr 49 min.  Mean sat was 80.25%.  This qualifys pt for o2 therapy.  He has nocturnal hypoxemia.

## 2019-12-03 NOTE — TELEPHONE ENCOUNTER
Tc to pt.  Reviewed very abn echo report.  Severe changes from previous.  EF 25%.  Severe dilated LV and LA.  + wall motion abn.  Will refer to cardiac asap

## 2019-12-04 ENCOUNTER — OFFICE VISIT (OUTPATIENT)
Dept: CARDIOLOGY | Facility: MEDICAL CENTER | Age: 79
End: 2019-12-04
Payer: MEDICARE

## 2019-12-04 ENCOUNTER — TELEPHONE (OUTPATIENT)
Dept: CARDIOLOGY | Facility: MEDICAL CENTER | Age: 79
End: 2019-12-04

## 2019-12-04 ENCOUNTER — TELEPHONE (OUTPATIENT)
Dept: MEDICAL GROUP | Facility: MEDICAL CENTER | Age: 79
End: 2019-12-04

## 2019-12-04 VITALS
WEIGHT: 250.44 LBS | DIASTOLIC BLOOD PRESSURE: 74 MMHG | BODY MASS INDEX: 33.92 KG/M2 | HEIGHT: 72 IN | HEART RATE: 80 BPM | OXYGEN SATURATION: 92 % | SYSTOLIC BLOOD PRESSURE: 122 MMHG

## 2019-12-04 DIAGNOSIS — I35.0 SEVERE AORTIC STENOSIS: ICD-10-CM

## 2019-12-04 DIAGNOSIS — E78.5 DYSLIPIDEMIA: Chronic | ICD-10-CM

## 2019-12-04 DIAGNOSIS — I50.20 SYSTOLIC HEART FAILURE, UNSPECIFIED HF CHRONICITY (HCC): ICD-10-CM

## 2019-12-04 DIAGNOSIS — I10 ESSENTIAL HYPERTENSION: Chronic | ICD-10-CM

## 2019-12-04 DIAGNOSIS — N18.9 CHRONIC KIDNEY DISEASE, UNSPECIFIED CKD STAGE: ICD-10-CM

## 2019-12-04 DIAGNOSIS — I25.10 CORONARY ARTERY DISEASE INVOLVING NATIVE CORONARY ARTERY OF NATIVE HEART WITHOUT ANGINA PECTORIS: ICD-10-CM

## 2019-12-04 DIAGNOSIS — E11.9 TYPE 2 DIABETES MELLITUS WITHOUT COMPLICATION, WITHOUT LONG-TERM CURRENT USE OF INSULIN (HCC): ICD-10-CM

## 2019-12-04 LAB — EKG IMPRESSION: NORMAL

## 2019-12-04 PROCEDURE — 99205 OFFICE O/P NEW HI 60 MIN: CPT | Performed by: INTERNAL MEDICINE

## 2019-12-04 PROCEDURE — 93000 ELECTROCARDIOGRAM COMPLETE: CPT | Performed by: INTERNAL MEDICINE

## 2019-12-04 RX ORDER — FUROSEMIDE 40 MG/1
40 TABLET ORAL DAILY
Qty: 30 TAB | Refills: 3 | Status: SHIPPED
Start: 2019-12-04 | End: 2019-12-26

## 2019-12-04 RX ORDER — METOPROLOL SUCCINATE 25 MG/1
25 TABLET, EXTENDED RELEASE ORAL DAILY
Qty: 30 TAB | Refills: 5 | Status: SHIPPED
Start: 2019-12-04 | End: 2019-12-26

## 2019-12-04 RX ORDER — EZETIMIBE 10 MG/1
10 TABLET ORAL DAILY
Qty: 30 TAB | Refills: 3 | Status: SHIPPED
Start: 2019-12-04 | End: 2019-12-26

## 2019-12-04 ASSESSMENT — ENCOUNTER SYMPTOMS
SYNCOPE: 0
ABDOMINAL PAIN: 0
ALTERED MENTAL STATUS: 0
VOMITING: 0
DIARRHEA: 0
WEIGHT LOSS: 0
COUGH: 0
NEAR-SYNCOPE: 0
SHORTNESS OF BREATH: 1
DEPRESSION: 0
PND: 1
IRREGULAR HEARTBEAT: 0
PALPITATIONS: 0
WEIGHT GAIN: 0
CLAUDICATION: 0
BACK PAIN: 0
CONSTIPATION: 0
HEARTBURN: 0
DYSPNEA ON EXERTION: 1
BLURRED VISION: 0
FLANK PAIN: 0
FEVER: 0
ORTHOPNEA: 1
DIZZINESS: 0
NAUSEA: 0
DECREASED APPETITE: 1

## 2019-12-04 NOTE — ASSESSMENT & PLAN NOTE
Describes hypersensitivity to statin. Trial zetia. Will discuss PCSK9 next visit as he is very medication averse.

## 2019-12-04 NOTE — TELEPHONE ENCOUNTER
Patient is scheduled on 12-11-19 for a [facility] w/poss with Dr. Ramirez. Patient was told to glimepiride and lasix AM day of procedure and to check in at 6:00 for a 7:30 procedure. H&P was done on 12-4-19 by Dr. Mo. Pre admit it scheduled on 12-9-19 at 8:15.

## 2019-12-04 NOTE — TELEPHONE ENCOUNTER
Called insurance at [phone]. Was transferred several times before speaking with someone who advised that pt needs prior authorization for Entresto. They do not have anything noting that previous prescriptions for losartan and lisinopril are a problem.    To Tanya

## 2019-12-04 NOTE — ASSESSMENT & PLAN NOTE
Low flow and low gradient with SVi 21. Will continue to follow and consider intervention with SAVR/TAVR. Check LHC. If no significant coronary stenosis will check dobutamine echo.

## 2019-12-04 NOTE — TELEPHONE ENCOUNTER
Returned pt's call. He had been wondering why Entresto wasn't on his AVS, but later found that it was written as the generic. He has no further questions.

## 2019-12-04 NOTE — ASSESSMENT & PLAN NOTE
Discussed asa with patient, however, states he did not tolerate in past due to epistaxis. Refer to ENT. Discussed statin however he says he had diffuse rash. Added zetia and pending pcsk9 discussion.

## 2019-12-04 NOTE — PATIENT INSTRUCTIONS
Take lasix as prescribed for 5 days then call office to discuss. Weigh yourself daily and record. Call in 5 days with results.      Heart Failure  Heart failure means your heart has trouble pumping blood. This makes it hard for your body to work well. Heart failure is usually a long-term (chronic) condition. You must take good care of yourself and follow your doctor's treatment plan.  HOME CARE  · Take your heart medicine as told by your doctor.  ¨ Do not stop taking medicine unless your doctor tells you to.  ¨ Do not skip any dose of medicine.  ¨ Refill your medicines before they run out.  ¨ Take other medicines only as told by your doctor or pharmacist.  · Stay active if told by your doctor. The elderly and people with severe heart failure should talk with a doctor about physical activity.  · Eat heart-healthy foods. Choose foods that are without trans fat and are low in saturated fat, cholesterol, and salt (sodium). This includes fresh or frozen fruits and vegetables, fish, lean meats, fat-free or low-fat dairy foods, whole grains, and high-fiber foods. Lentils and dried peas and beans (legumes) are also good choices.  · Limit salt if told by your doctor.  · Cook in a healthy way. Roast, grill, broil, bake, poach, steam, or stir-garzon foods.  · Limit fluids as told by your doctor.  · Weigh yourself every morning. Do this after you pee (urinate) and before you eat breakfast. Write down your weight to give to your doctor.  · Take your blood pressure and write it down if your doctor tells you to.  · Ask your doctor how to check your pulse. Check your pulse as told.  · Lose weight if told by your doctor.  · Stop smoking or chewing tobacco. Do not use gum or patches that help you quit without your doctor's approval.  · Schedule and go to doctor visits as told.  · Nonpregnant women should have no more than 1 drink a day. Men should have no more than 2 drinks a day. Talk to your doctor about drinking alcohol.  · Stop  illegal drug use.  · Stay current with shots (immunizations).  · Manage your health conditions as told by your doctor.  · Learn to manage your stress.  · Rest when you are tired.  · If it is really hot outside:  ¨ Avoid intense activities.  ¨ Use air conditioning or fans, or get in a cooler place.  ¨ Avoid caffeine and alcohol.  ¨ Wear loose-fitting, lightweight, and light-colored clothing.  · If it is really cold outside:  ¨ Avoid intense activities.  ¨ Layer your clothing.  ¨ Wear mittens or gloves, a hat, and a scarf when going outside.  ¨ Avoid alcohol.  · Learn about heart failure and get support as needed.  · Get help to maintain or improve your quality of life and your ability to care for yourself as needed.  GET HELP IF:   · You gain weight quickly.  · You are more short of breath than usual.  · You cannot do your normal activities.  · You tire easily.  · You cough more than normal, especially with activity.  · You have any or more puffiness (swelling) in areas such as your hands, feet, ankles, or belly (abdomen).  · You cannot sleep because it is hard to breathe.  · You feel like your heart is beating fast (palpitations).  · You get dizzy or light-headed when you stand up.  GET HELP RIGHT AWAY IF:   · You have trouble breathing.  · There is a change in mental status, such as becoming less alert or not being able to focus.  · You have chest pain or discomfort.  · You faint.  MAKE SURE YOU:   · Understand these instructions.  · Will watch your condition.  · Will get help right away if you are not doing well or get worse.  This information is not intended to replace advice given to you by your health care provider. Make sure you discuss any questions you have with your health care provider.  Document Released: 09/26/2009 Document Revised: 01/08/2016 Document Reviewed: 02/03/2014  Elsevier Interactive Patient Education © 2017 [employer]

## 2019-12-04 NOTE — ASSESSMENT & PLAN NOTE
New onset. Start lasix for symptom management. Instructed 5 days and reassess. Start entresto. Add metoprolol. Check [facility].

## 2019-12-04 NOTE — TELEPHONE ENCOUNTER
PENG Haider at [facility] left a message with the answering service today at 12:18. Insurance isn't accepting the patient's Entresto prescription due to previous prescriptions for Losartan and Lisinopril, but patient is no longer taking those medications. Please call insurance at [phone] and advise. Pharmacy #[phone].

## 2019-12-04 NOTE — TELEPHONE ENCOUNTER
"VR    Pt has questions on his after visit summary re: meds. States VR added a med that was \"new & expensive\" but doesn't see it listed. #[phone]   "

## 2019-12-04 NOTE — PROGRESS NOTES
Cardiology Note    Chief Complaint   Patient presents with   • Hypertension     NP DX: HTN       History of Present Illness: LIAN More III is a 79 y.o. male PMH HTN, HLD, DM2, CKD who presents for evaluation of HFrEF.    Currently feels well other than not getting enough oxygen. Describes orthopnea, pnd, LE swelling, shortness of breath. Used to hit 500 golf balls per day, but can't do that anymore. Now short of breath after walking as little as one hallway.     Review of Systems   Constitution: Positive for decreased appetite. Negative for fever, malaise/fatigue, weight gain and weight loss.   HENT: Positive for nosebleeds. Negative for congestion.    Eyes: Negative for blurred vision.   Cardiovascular: Positive for dyspnea on exertion, leg swelling, orthopnea and paroxysmal nocturnal dyspnea. Negative for chest pain, claudication, irregular heartbeat, near-syncope, palpitations and syncope.   Respiratory: Positive for shortness of breath. Negative for cough.    Endocrine: Negative for cold intolerance and heat intolerance.   Skin: Negative for rash.   Musculoskeletal: Negative for back pain.   Gastrointestinal: Negative for abdominal pain, constipation, diarrhea, heartburn, melena, nausea and vomiting.   Genitourinary: Negative for dysuria, flank pain and hematuria.   Neurological: Negative for dizziness.   Psychiatric/Behavioral: Negative for altered mental status and depression.         Past Medical History:   Diagnosis Date   • Arthritis    • CATARACT     removed bilat   • Diabetes     PT REPORTS DIABETES RESOLVED IN 2009. NO LONGER TAKES MEDS   • Hiatus hernia syndrome    • Hypertension    • Pain 08-29-13    back, hips and bilat legs, 4/10   • Pain 6/22/2015    left humerus   • Pain 1/7/16    knees, back and shoulder   • Pneumonia 2007   • Ulcer 2014    Dr. Porter, gastroenterologist   • Unspecified hemorrhagic conditions     bruises easily         Past Surgical History:   Procedure Laterality Date   •  GASTROSCOPY N/A 1/8/2016    Procedure: GASTROSCOPY;  Surgeon: Deniz Sue M.D.;  Location: [facility];  Service:    • HUMERUS ORIF Left 6/25/2015    Procedure: HUMERUS ORIF/ PROXIMAL;  Surgeon: Cristal Guido M.D.;  Location: [facility];  Service:    • LUMBAR FUSION POSTERIOR  9/5/2013    Performed by Edith Sears M.D. at [facility]   • LUMBAR DECOMPRESSION  9/5/2013    Performed by Edith Sears M.D. at [facility]   • MASS EXCISION NEURO  9/5/2013    Performed by Edith Sears M.D. at [facility]   • RECOVERY  6/26/2012    Performed by SURGERY, IR-RECOVERY at [facility] SAME DAY [facility]   • DRAINAGE HEMATOMA  5/25/2012    Performed by DENIZ SUE at [facility]   • GASTRIC BAND LAPAROSCOPIC REVISION  5/11/2012    Performed by DENIZ SUE at [facility]   • LUMBAR FUSION POSTERIOR  3/26/2012    Performed by EDITH SEARS at [facility]   • LUMBAR DECOMPRESSION  3/26/2012    Performed by EDITH SEARS at [facility]   • INJ,EPIDURAL/LUMB/SAC SINGLE  3/19/2012    Performed by KRISTIN BALTAZAR at [facility]   • INJ,EPIDURAL/LUMB/SAC SINGLE  3/5/2012    Performed by KRISTIN BALTAZAR at [facility]   • GASTRIC BANDING LAPAROSCOPIC  3/24/2010    Performed by DENIZ SUE at [facility]   • HIATAL HERNIA REPAIR  3/24/2010    Performed by DENIZ SUE at [facility]   • KNEE ARTHROPLASTY TOTAL  2000    bilateral   • ABDOMINOPLASTY  1990         Current Outpatient Medications   Medication Sig Dispense Refill   • metoprolol SR (TOPROL XL) 25 MG TABLET SR 24 HR Take 1 Tab by mouth every day. 30 Tab 5   • furosemide (LASIX) 40 MG Tab Take 1 Tab by mouth every day. 30 Tab 3   • sacubitril-valsartan (ENTRESTO) 24-26 MG Tab tablet Take 1 Tab by mouth 2 Times a Day. 60 Tab 3   • ezetimibe (ZETIA) 10 MG Tab Take 1 Tab by mouth every day. 30 Tab 3    • hydrocortisone 2.5 % Cream topical cream Apply 1 Application to affected area(s) 2 times a day as needed (rash). 28 g 1   • glimepiride (AMARYL) 1 MG tablet Take 1 Tab by mouth every morning. 90 Tab 3   • fluticasone (FLONASE) 50 MCG/ACT nasal spray Spray 2 Sprays in nose every day. 3 Bottle 3   • glucose blood (ONE TOUCH ULTRA TEST) strip USE  STRIP TO CHECK GLUCOSE ONCE DAILY *DX E11.9* 50 Strip 6   • anastrozole (ARIMIDEX) 1 MG Tab Take 1 mg by mouth every day.     • Blood Glucose Monitoring Suppl Device Meter: Accu-check. E11.9 1 Device 0   • Lancets Misc Lancets order: Accu-check meter and test strips, check blood sugar once a day, E11.9 100 Each 11   • Blood Glucose Monitoring Suppl Supplies Misc Test strips order: Accu-check test trips, check blood sugar once a day before meal, E11.9 50 Strip 11   • OMEPRAZOLE PO Take  by mouth.     • ethyl chloride Aerosol Ethyl chloride jet stram BAL apply prn 1 Can 0   • VITAMIN B1-B12 INJ by Injection route every 7 days.     • Testosterone Cypionate (DEPO-TESTOSTERONE IM) by Intramuscular route every 7 days.     • coenzyme Q-10 30 MG capsule Take 60 mg by mouth every day.     • vitamin D (CHOLECALCIFEROL) 1000 UNIT TABS Take 5,000 Units by mouth every day.     • Glucosamine Sulfate 1500 MG PACK Take  by mouth 2 Times a Day.     • Omega-3 Fatty Acids (FISH OIL PO) Take 2 Caps by mouth 2 Times a Day.       No current facility-administered medications for this visit.          Allergies   Allergen Reactions   • Gabapentin      Other reaction(s): Dizziness   • Statins [Hmg-Coa-R Inhibitors] Rash         Family History   Problem Relation Age of Onset   • No Known Problems Sister    • No Known Problems Sister    • No Known Problems Sister    • Diabetes Other    • No Known Problems Mother    • No Known Problems Father          Social History     Socioeconomic History   • Marital status:      Spouse name: Not on file   • Number of children: Not on file   • Years of  education: Not on file   • Highest education level: Not on file   Occupational History   • Not on file   Social Needs   • Financial resource strain: Not on file   • Food insecurity:     Worry: Not on file     Inability: Not on file   • Transportation needs:     Medical: Not on file     Non-medical: Not on file   Tobacco Use   • Smoking status: Former Smoker     Packs/day: 2.00     Years: 2.00     Pack years: 4.00     Types: Cigarettes     Last attempt to quit: 1964     Years since quittin.9   • Smokeless tobacco: Never Used   • Tobacco comment: 2 ppd 4 yrs,quit    Substance and Sexual Activity   • Alcohol use: No     Alcohol/week: 0.0 oz   • Drug use: No   • Sexual activity: Yes   Lifestyle   • Physical activity:     Days per week: Not on file     Minutes per session: Not on file   • Stress: Not on file   Relationships   • Social connections:     Talks on phone: Not on file     Gets together: Not on file     Attends Zoroastrian service: Not on file     Active member of club or organization: Not on file     Attends meetings of clubs or organizations: Not on file     Relationship status: Not on file   • Intimate partner violence:     Fear of current or ex partner: Not on file     Emotionally abused: Not on file     Physically abused: Not on file     Forced sexual activity: Not on file   Other Topics Concern   • Not on file   Social History Narrative   • Not on file         Physical Exam:  Ambulatory Vitals  /74 (BP Location: Right arm, Patient Position: Sitting, BP Cuff Size: Adult)   Pulse 80   Ht 1.829 m (6')   Wt 113.6 kg (250 lb 7.1 oz)   SpO2 92%    BP Readings from Last 4 Encounters:   19 122/74   19 116/64   09/10/19 130/78   19 110/60     Weight/BMI:   Vitals:    19 0907   BP: 122/74   Weight: 113.6 kg (250 lb 7.1 oz)   Height: 1.829 m (6')    Body mass index is 33.97 kg/m².  Wt Readings from Last 4 Encounters:   19 113.6 kg (250 lb 7.1 oz)   19 114.3 kg  (252 lb)   09/10/19 109.8 kg (242 lb)   09/04/19 109.8 kg (242 lb)       Physical Exam   Constitutional: He is oriented to person, place, and time and well-developed, well-nourished, and in no distress. No distress.   HENT:   Head: Normocephalic and atraumatic.   Eyes: Pupils are equal, round, and reactive to light. Conjunctivae are normal.   Neck: Normal range of motion. Neck supple. No JVD present.   Cardiovascular: Normal rate, regular rhythm and intact distal pulses. Exam reveals no gallop and no friction rub.   Murmur heard.   Crescendo decrescendo systolic murmur is present with a grade of 3/6.  Pulmonary/Chest: Effort normal. No respiratory distress. He has no wheezes. He has rales. He exhibits no tenderness.   Abdominal: Soft. Bowel sounds are normal. He exhibits no distension.   Musculoskeletal:         General: Edema present.   Neurological: He is alert and oriented to person, place, and time.   Skin: Skin is warm and dry.   Psychiatric: Affect and judgment normal.       Lab Data Review:  Lab Results   Component Value Date/Time    CHOLSTRLTOT 135 09/04/2019 11:57 AM    LDL 77 09/04/2019 11:57 AM    HDL 35 (A) 09/04/2019 11:57 AM    TRIGLYCERIDE 117 09/04/2019 11:57 AM       Lab Results   Component Value Date/Time    SODIUM 141 09/12/2019 11:47 AM    POTASSIUM 4.7 09/12/2019 11:47 AM    CHLORIDE 105 09/12/2019 11:47 AM    CO2 27 09/12/2019 11:47 AM    GLUCOSE 143 (H) 09/12/2019 11:47 AM    BUN 26 (H) 09/12/2019 11:47 AM    CREATININE 1.43 (H) 09/12/2019 11:47 AM    CREATININE 1.18 02/08/2011 12:00 AM    BUNCREATRAT 16 02/08/2011 12:00 AM    GLOMRATE >59 02/08/2011 12:00 AM     CrCl cannot be calculated (Patient's most recent lab result is older than the maximum 7 days allowed.).  Lab Results   Component Value Date/Time    ALKPHOSPHAT 41 09/12/2019 11:47 AM    ASTSGOT 26 09/12/2019 11:47 AM    ALTSGPT 25 09/12/2019 11:47 AM    TBILIRUBIN 0.7 09/12/2019 11:47 AM      Lab Results   Component Value Date/Time     WBC 6.3 09/04/2019 11:57 AM    WBC 4.5 02/08/2011 12:00 AM     Lab Results   Component Value Date/Time    HBA1C 6.5 (H) 09/12/2019 11:47 AM     No components found for: TROP      Cardiac Imaging and Procedures Review:      TTE 12/2019  CONCLUSIONS  Left ventricle is severely dilated.  Severely reduced left ventricular systolic function.  Left ventricular ejection fraction is visually estimated to be 25%.  Global hypokinesis with regional variation.  ** [severe aortic stenosis KHALIF 0.7 cm2, low flow SVi 21, low gradient mean 28 mmHg] **  Trace mitral regurgitation.  Severely dilated left atrium.  Mild tricuspid regurgitation.  Right ventricular systolic pressure is estimated to be 40  mmHg.  Normal pericardium without effusion.  Compared to the images of the study done on 7/29/2015  there has been   marked change in LV wall motion and function, moderate aortic stenosis   is also now present.     Medical Decision Making:  Problem List Items Addressed This Visit     Hypertension (Chronic)     D/c arb and start entresto.         Relevant Medications    metoprolol SR (TOPROL XL) 25 MG TABLET SR 24 HR    furosemide (LASIX) 40 MG Tab    sacubitril-valsartan (ENTRESTO) 24-26 MG Tab tablet    ezetimibe (ZETIA) 10 MG Tab    Other Relevant Orders    EKG (Completed)    Dyslipidemia (Chronic)     Describes hypersensitivity to statin. Trial zetia. Will discuss PCSK9 next visit as he is very medication averse.         Type 2 diabetes mellitus without complication, without long-term current use of insulin (HCC)     Add zetia for chol control         Systolic heart failure (HCC)     New onset. Start lasix for symptom management. Instructed 5 days and reassess. Start entresto. Add metoprolol. Check LHC.         Relevant Medications    metoprolol SR (TOPROL XL) 25 MG TABLET SR 24 HR    furosemide (LASIX) 40 MG Tab    sacubitril-valsartan (ENTRESTO) 24-26 MG Tab tablet    ezetimibe (ZETIA) 10 MG Tab    Other Relevant Orders    CL-LEFT  HEART CATHETERIZATION WITH POSSIBLE INTERVENTION    REFERRAL TO ENT    Chronic kidney disease     Referral to nephrology. Likely component of cardiorenal syndrome.         Relevant Orders    REFERRAL TO NEPHROLOGY    Coronary artery disease involving native coronary artery of native heart without angina pectoris     Discussed asa with patient, however, states he did not tolerate in past due to epistaxis. Refer to ENT. Discussed statin however he says he had diffuse rash. Added zetia and pending pcsk9 discussion.         Relevant Medications    metoprolol SR (TOPROL XL) 25 MG TABLET SR 24 HR    furosemide (LASIX) 40 MG Tab    sacubitril-valsartan (ENTRESTO) 24-26 MG Tab tablet    ezetimibe (ZETIA) 10 MG Tab    Severe aortic stenosis     Low flow and low gradient with SVi 21. Will continue to follow and consider intervention with SAVR/TAVR. Check LHC. If no significant coronary stenosis will check dobutamine echo.         Relevant Medications    metoprolol SR (TOPROL XL) 25 MG TABLET SR 24 HR    furosemide (LASIX) 40 MG Tab    sacubitril-valsartan (ENTRESTO) 24-26 MG Tab tablet    ezetimibe (ZETIA) 10 MG Tab          It was my pleasure to meet with Mr. More.

## 2019-12-05 NOTE — TELEPHONE ENCOUNTER
PAR sent to patient's plan  Patient informed of PA process  ID#[id number]  J W III Means ([place]ernadez: [id number])   Entresto 24-26MG tablets   Form  [employer] Electronic PA Form   Created   6 minutes ago   Sent to Plan   3 minutes ago   Plan Response   3 minutes ago   Submit Clinical Questions   1 minute ago   Determination   Wait for Determination  Please wait for [employer] [facility] 2017 to return a determination.

## 2019-12-09 DIAGNOSIS — Z01.810 PRE-OPERATIVE CARDIOVASCULAR EXAMINATION: ICD-10-CM

## 2019-12-09 DIAGNOSIS — Z01.812 PRE-OPERATIVE LABORATORY EXAMINATION: ICD-10-CM

## 2019-12-09 LAB
ALBUMIN SERPL BCP-MCNC: 4 G/DL (ref 3.2–4.9)
ALBUMIN/GLOB SERPL: 2.1 G/DL
ALP SERPL-CCNC: 46 U/L (ref 30–99)
ALT SERPL-CCNC: 20 U/L (ref 2–50)
ANION GAP SERPL CALC-SCNC: 8 MMOL/L (ref 0–11.9)
APTT PPP: 36.1 SEC (ref 24.7–36)
AST SERPL-CCNC: 19 U/L (ref 12–45)
BILIRUB SERPL-MCNC: 0.7 MG/DL (ref 0.1–1.5)
BUN SERPL-MCNC: 26 MG/DL (ref 8–22)
CALCIUM SERPL-MCNC: 8.8 MG/DL (ref 8.5–10.5)
CHLORIDE SERPL-SCNC: 106 MMOL/L (ref 96–112)
CO2 SERPL-SCNC: 26 MMOL/L (ref 20–33)
CREAT SERPL-MCNC: 1.56 MG/DL (ref 0.5–1.4)
EKG IMPRESSION: NORMAL
ERYTHROCYTE [DISTWIDTH] IN BLOOD BY AUTOMATED COUNT: 50.9 FL (ref 35.9–50)
GLOBULIN SER CALC-MCNC: 1.9 G/DL (ref 1.9–3.5)
GLUCOSE SERPL-MCNC: 131 MG/DL (ref 65–99)
HCT VFR BLD AUTO: 48.9 % (ref 42–52)
HGB BLD-MCNC: 14.3 G/DL (ref 14–18)
INR PPP: 1.2 (ref 0.87–1.13)
MCH RBC QN AUTO: 22.7 PG (ref 27–33)
MCHC RBC AUTO-ENTMCNC: 29.2 G/DL (ref 33.7–35.3)
MCV RBC AUTO: 77.6 FL (ref 81.4–97.8)
PLATELET # BLD AUTO: 229 K/UL (ref 164–446)
PMV BLD AUTO: 11 FL (ref 9–12.9)
POTASSIUM SERPL-SCNC: 4.2 MMOL/L (ref 3.6–5.5)
PROT SERPL-MCNC: 5.9 G/DL (ref 6–8.2)
PROTHROMBIN TIME: 15.5 SEC (ref 12–14.6)
RBC # BLD AUTO: 6.3 M/UL (ref 4.7–6.1)
SODIUM SERPL-SCNC: 140 MMOL/L (ref 135–145)
WBC # BLD AUTO: 6.3 K/UL (ref 4.8–10.8)

## 2019-12-09 PROCEDURE — 85730 THROMBOPLASTIN TIME PARTIAL: CPT

## 2019-12-09 PROCEDURE — 85610 PROTHROMBIN TIME: CPT

## 2019-12-09 PROCEDURE — 93010 ELECTROCARDIOGRAM REPORT: CPT | Performed by: INTERNAL MEDICINE

## 2019-12-09 PROCEDURE — 80053 COMPREHEN METABOLIC PANEL: CPT

## 2019-12-09 PROCEDURE — 36415 COLL VENOUS BLD VENIPUNCTURE: CPT

## 2019-12-09 PROCEDURE — 85027 COMPLETE CBC AUTOMATED: CPT

## 2019-12-09 PROCEDURE — 93005 ELECTROCARDIOGRAM TRACING: CPT

## 2019-12-09 RX ORDER — HYDROCORTISONE ACETATE 0.5 %
2 CREAM (GRAM) TOPICAL DAILY
COMMUNITY
End: 2019-12-26

## 2019-12-09 RX ORDER — MULTIVIT WITH MINERALS/LUTEIN
1000 TABLET ORAL 2 TIMES DAILY
COMMUNITY

## 2019-12-09 RX ORDER — VITAMIN E 268 MG
800 CAPSULE ORAL 2 TIMES DAILY
Status: ON HOLD | COMMUNITY
End: 2020-01-27

## 2019-12-09 RX ORDER — HYDROCHLOROTHIAZIDE 25 MG/1
25 TABLET ORAL DAILY
COMMUNITY
End: 2019-12-26

## 2019-12-09 RX ORDER — FERROUS SULFATE 325(65) MG
325 TABLET ORAL DAILY
Status: ON HOLD | COMMUNITY
End: 2020-01-27

## 2019-12-09 RX ORDER — LISINOPRIL 40 MG/1
40 TABLET ORAL DAILY
COMMUNITY
End: 2019-12-26

## 2019-12-10 ENCOUNTER — TELEPHONE (OUTPATIENT)
Dept: MEDICAL GROUP | Facility: MEDICAL CENTER | Age: 79
End: 2019-12-10

## 2019-12-11 ENCOUNTER — APPOINTMENT (OUTPATIENT)
Dept: CARDIOLOGY | Facility: MEDICAL CENTER | Age: 79
End: 2019-12-11
Attending: INTERNAL MEDICINE
Payer: MEDICARE

## 2019-12-11 ENCOUNTER — HOSPITAL ENCOUNTER (OUTPATIENT)
Facility: MEDICAL CENTER | Age: 79
End: 2019-12-11
Attending: INTERNAL MEDICINE | Admitting: INTERNAL MEDICINE
Payer: MEDICARE

## 2019-12-11 VITALS
TEMPERATURE: 98.6 F | HEART RATE: 71 BPM | SYSTOLIC BLOOD PRESSURE: 108 MMHG | OXYGEN SATURATION: 98 % | BODY MASS INDEX: 33.83 KG/M2 | RESPIRATION RATE: 18 BRPM | DIASTOLIC BLOOD PRESSURE: 66 MMHG | HEIGHT: 72 IN | WEIGHT: 249.78 LBS

## 2019-12-11 DIAGNOSIS — I35.0 SEVERE AORTIC STENOSIS: ICD-10-CM

## 2019-12-11 DIAGNOSIS — I50.20 SYSTOLIC HEART FAILURE, UNSPECIFIED HF CHRONICITY (HCC): ICD-10-CM

## 2019-12-11 PROCEDURE — 700105 HCHG RX REV CODE 258: Performed by: INTERNAL MEDICINE

## 2019-12-11 PROCEDURE — 700101 HCHG RX REV CODE 250

## 2019-12-11 PROCEDURE — 700111 HCHG RX REV CODE 636 W/ 250 OVERRIDE (IP)

## 2019-12-11 PROCEDURE — 99152 MOD SED SAME PHYS/QHP 5/>YRS: CPT | Performed by: INTERNAL MEDICINE

## 2019-12-11 PROCEDURE — 93572 IV DOP VEL&/PRESS C FLO EA: CPT | Mod: 26,52,LM | Performed by: INTERNAL MEDICINE

## 2019-12-11 PROCEDURE — 93454 CORONARY ARTERY ANGIO S&I: CPT | Mod: 26 | Performed by: INTERNAL MEDICINE

## 2019-12-11 PROCEDURE — 160002 HCHG RECOVERY MINUTES (STAT)

## 2019-12-11 PROCEDURE — 700117 HCHG RX CONTRAST REV CODE 255: Performed by: INTERNAL MEDICINE

## 2019-12-11 PROCEDURE — 93571 IV DOP VEL&/PRESS C FLO 1ST: CPT | Mod: 26,52,LD | Performed by: INTERNAL MEDICINE

## 2019-12-11 PROCEDURE — C1887 CATHETER, GUIDING: HCPCS

## 2019-12-11 RX ORDER — MIDAZOLAM HYDROCHLORIDE 1 MG/ML
INJECTION INTRAMUSCULAR; INTRAVENOUS
Status: COMPLETED
Start: 2019-12-11 | End: 2019-12-11

## 2019-12-11 RX ORDER — HEPARIN SODIUM,PORCINE 1000/ML
VIAL (ML) INJECTION
Status: COMPLETED
Start: 2019-12-11 | End: 2019-12-11

## 2019-12-11 RX ORDER — LIDOCAINE HYDROCHLORIDE 20 MG/ML
INJECTION, SOLUTION INFILTRATION; PERINEURAL
Status: COMPLETED
Start: 2019-12-11 | End: 2019-12-11

## 2019-12-11 RX ORDER — SODIUM CHLORIDE 9 MG/ML
INJECTION, SOLUTION INTRAVENOUS CONTINUOUS
Status: ACTIVE | OUTPATIENT
Start: 2019-12-11 | End: 2019-12-11

## 2019-12-11 RX ORDER — SODIUM CHLORIDE 9 MG/ML
1000 INJECTION, SOLUTION INTRAVENOUS
Status: DISCONTINUED | OUTPATIENT
Start: 2019-12-11 | End: 2019-12-11 | Stop reason: HOSPADM

## 2019-12-11 RX ORDER — ACETAMINOPHEN 325 MG/1
650 TABLET ORAL EVERY 6 HOURS PRN
Status: DISCONTINUED | OUTPATIENT
Start: 2019-12-11 | End: 2019-12-11 | Stop reason: HOSPADM

## 2019-12-11 RX ORDER — VERAPAMIL HYDROCHLORIDE 2.5 MG/ML
INJECTION, SOLUTION INTRAVENOUS
Status: COMPLETED
Start: 2019-12-11 | End: 2019-12-11

## 2019-12-11 RX ORDER — HEPARIN SODIUM 200 [USP'U]/100ML
INJECTION, SOLUTION INTRAVENOUS
Status: COMPLETED
Start: 2019-12-11 | End: 2019-12-11

## 2019-12-11 RX ADMIN — FENTANYL CITRATE 75 MCG: 0.05 INJECTION, SOLUTION INTRAMUSCULAR; INTRAVENOUS at 08:40

## 2019-12-11 RX ADMIN — SODIUM CHLORIDE 1000 ML: 9 INJECTION, SOLUTION INTRAVENOUS at 07:00

## 2019-12-11 RX ADMIN — VERAPAMIL HYDROCHLORIDE 5 MG: 2.5 INJECTION INTRAVENOUS at 08:17

## 2019-12-11 RX ADMIN — NITROGLYCERIN 10 ML: 20 INJECTION INTRAVENOUS at 08:17

## 2019-12-11 RX ADMIN — LIDOCAINE HYDROCHLORIDE: 20 INJECTION, SOLUTION INFILTRATION; PERINEURAL at 08:18

## 2019-12-11 RX ADMIN — HEPARIN SODIUM 2000 UNITS: 200 INJECTION, SOLUTION INTRAVENOUS at 08:17

## 2019-12-11 RX ADMIN — IOHEXOL 70 ML: 350 INJECTION, SOLUTION INTRAVENOUS at 08:41

## 2019-12-11 RX ADMIN — HEPARIN SODIUM: 1000 INJECTION, SOLUTION INTRAVENOUS; SUBCUTANEOUS at 08:17

## 2019-12-11 RX ADMIN — MIDAZOLAM HYDROCHLORIDE 2 MG: 1 INJECTION, SOLUTION INTRAMUSCULAR; INTRAVENOUS at 08:40

## 2019-12-11 NOTE — TELEPHONE ENCOUNTER
D/w AK. Order should be placed as a CT cardiac score with the comments mentioning that we want to quantify aortic valve calcium. Order placed. D/c'd CTA heart order. LVM with pt at [phone] notifying of additional imaging needed and providing with image schedulers number to set this up.     FYI to PENG Mo M.D.  You; Amauri Ramirez M.D.; Jamel French M.D. 37 minutes ago (10:58 AM)      Thank you Amauri! I am reluctant to perform  stress echo on him then given level of coronary disease. Will instead quantify aortic valve calcium to see if meets criteria for severe AS.     Routing comment       DANIELLE Swan M.D. 2 hours ago (9:18 AM)      Cath showed sig left main and TVD   I made referral to valve team   I did not do any PCI

## 2019-12-11 NOTE — OR NURSING
0900   PT RECEIVED FROM CATH LAB,  S/P LEFT HEART CATH VIA RIGHT WRIST.  TR BAND INTACT. FAMILY IS @ BEDSIDE.  PT DENIES ANY PAIN.      0915   TR BAND INTACT.  PT SLEEPING QUIETLY.    0930   TR BAND INTACT.  PT TAKING SMALL SIPS OF WATER.    1000    TR BAND INTACT.  1ST 2CC OF AIR RELEASED FROM TR BAND.  SITE IS CLEAR.  PT RESTING WELL.      1020   NEXT 3CC OF AIR RELEASED FROM TR BAND.  SITE IS CLEAR.      1040   NEXT 3CC OF AIR RELEASED FROM TR BAND.  SITE IS CLEAR.    1100   NEXT 3CC OF AIR RELEASED FROM TR BAND.  SITE IS CLEAR.    1120   LAST 2CC OF AIR RELEASED FROM TR BAND.  SITE IS CLEAR.    1140   TR BAND REMOVED AND 2X2 WITH TEGADERM AND COBAN APPLIED.  SITE IS CLEAR.    1300   PT VOIDS.   CATH SITE IS CLEAR.   DC INSTRUCTIONS GIVEN TO PT AND WIFE.  VERBALIZED UNDERSTANDING OF ALL.  HL DC.  PT DC TO HOME,  VIA WHEELCHAIR,  ESCORTED OUT BY CNA.

## 2019-12-11 NOTE — DISCHARGE INSTRUCTIONS
ACTIVITY: Rest and take it easy for the first 24 hours.  A responsible adult is recommended to remain with you during that time.  It is normal to feel sleepy.  We encourage you to not do anything that requires balance, judgment or coordination.    MILD FLU-LIKE SYMPTOMS ARE NORMAL. YOU MAY EXPERIENCE GENERALIZED MUSCLE ACHES, THROAT IRRITATION, HEADACHE AND/OR SOME NAUSEA.    FOR 24 HOURS DO NOT:  Drive, operate machinery or run household appliances.  Drink beer or alcoholic beverages.   Make important decisions or sign legal documents.    SPECIAL INSTRUCTIONS: *KEEP INCISION DRY FOR 24 HRS**    DIET: To avoid nausea, slowly advance diet as tolerated, avoiding spicy or greasy foods for the first day.  Add more substantial food to your diet according to your physician's instructions.  Babies can be fed formula or breast milk as soon as they are hungry.  INCREASE FLUIDS AND FIBER TO AVOID CONSTIPATION.    SURGICAL DRESSING/BATHING: *MAY SHOWER TOMORROW AFTERNOON THEN MAY REMOVE DRESSING**    FOLLOW-UP APPOINTMENT:  A follow-up appointment should be arranged with your doctor in **DR BRADSHAW   [phone] /  DR WHITNEY    [phone]*; call to schedule.    You should CALL YOUR PHYSICIAN if you develop:  Fever greater than 101 degrees F.  Pain not relieved by medication, or persistent nausea or vomiting.  Excessive bleeding (blood soaking through dressing) or unexpected drainage from the wound.  Extreme redness or swelling around the incision site, drainage of pus or foul smelling drainage.  Inability to urinate or empty your bladder within 8 hours.  Problems with breathing or chest pain.    You should call 911 if you develop problems with breathing or chest pain.  If you are unable to contact your doctor or surgical center, you should go to the nearest emergency room or urgent care center.  Physician's telephone #: *[phone]**    If any questions arise, call your doctor.  If your doctor is not available, please feel free to  call the Surgical Center at [phone].  The Center is open Monday through Friday from 7AM to 7PM.  You can also call the HEALTH HOTLINE open 24 hours/day, 7 days/week and speak to a nurse at [phone], or toll free at [phone].    A registered nurse may call you a few days after your surgery to see how you are doing after your procedure.    MEDICATIONS: Resume taking daily medication.  Take prescribed pain medication with food.  If no medication is prescribed, you may take non-aspirin pain medication if needed.  PAIN MEDICATION CAN BE VERY CONSTIPATING.  Take a stool softener or laxative such as senokot, pericolace, or milk of magnesia if needed.      If your physician has prescribed pain medication that includes Acetaminophen (Tylenol), do not take additional Acetaminophen (Tylenol) while taking the prescribed medication.    Depression / Suicide Risk    As you are discharged from this [facility] facility, it is important to learn how to keep safe from harming yourself.    Recognize the warning signs:  · Abrupt changes in personality, positive or negative- including increase in energy   · Giving away possessions  · Change in eating patterns- significant weight changes-  positive or negative  · Change in sleeping patterns- unable to sleep or sleeping all the time   · Unwillingness or inability to communicate  · Depression  · Unusual sadness, discouragement and loneliness  · Talk of wanting to die  · Neglect of personal appearance   · Rebelliousness- reckless behavior  · Withdrawal from people/activities they love  · Confusion- inability to concentrate     If you or a loved one observes any of these behaviors or has concerns about self-harm, here's what you can do:  · Talk about it- your feelings and reasons for harming yourself  · Remove any means that you might use to hurt yourself (examples: pills, rope, extension cords, firearm)  · Get professional help from the community (Mental Health,  Substance Abuse, psychological counseling)  · Do not be alone:Call your Safe Contact- someone whom you trust who will be there for you.  · Call your local CRISIS HOTLINE 959-4852 or 618-132-8355  · Call your local Children's Mobile Crisis Response Team [place] (069) 588-4320 or www.[employer]  · Call the toll free National Suicide Prevention Hotlines   · National Suicide Prevention Lifeline 188-754-KOBI (1973)  [facility] Line Network 800-SUICIDE (472-5723)    Radial Catherization Discharge Instructions      · Do not subject hand/arm to any forceful movements for 24 hours    i.e. supporting weight when rising from the chair or bed.   · Do not drive a car for 24 hours  · You may remove the dressing tomorrow  · You may shower on the day following your procedure.  Do not take a tub bath or submerge the puncture site in water for 3 days following the procedure.  · No Lifting more than 3-5 pounds with affected wrist for 5 days  · Follow up with Dr. LEWIS**  2-4 weeks.  · Increase fluids for 2 days post procedure.  · Continue all previous medications unless otherwise instructed.    If bleeding should occur following discharge:  · Sit down and apply firm pressure to site with your fingers for 10 minutes  · If the bleeding stops, continue to sit quietly, keeping your wrist straight for 2 hours.  Notify physician as soon as possible ( [phone])  · If bleeding does not stop after 10 minutes, or if there is a large amount of bleeding or spurting, call 911 immediately.  Do not drive yourself to the hospital.

## 2019-12-11 NOTE — TELEPHONE ENCOUNTER
"Demian Mo M.D.  You 9 minutes ago (1:49 PM)      Loraine Smith,   Please refer to cardiothoracic surgery. No need for further imaging at this time. If a surgical candidate, mod AS would qualify for AVR given need for CABG.   Thank you!      Called pt to discuss cardiac surgery referral and informed him to disregard previous VM about further imaging - no further imaging needed, only a surgery referral. Pt reports that need for cardiac surgery referral was not discussed with him after cath today. He is very frustrated and says, \"Today was a waste of time. They didn't even do anything.\" Explained that not all CAD is treatable with stents, and that sometimes it is necessary to refer for surgery. Regardless of outcome of cath, it is necessary to complete this before heart surgery. Also discussed that with his AS, it may be necessary to treat this with surgery as well. Pt continues to be upset and asks if an appointment can be made with the surgeons ASAP. Explained that the appointment would be handled by their office and I don't personally have the ability to schedule him at this time. When asked if there were any other questions or concerns that could be addressed at this time, he replies, \"Well you haven't been able to help me with anything so far, so I doubt it. You're just going to pass the work on to another office.\" Placed referral for to cardiac surgery (D/w VR, ok to place urgently) and provided pt with their office number to f/u.  "

## 2019-12-11 NOTE — OP REPORT
Cardiac catheterization report    Procedure date: 12/11/2019    Referring physician: Dr. Demian Mo    Pre-operative Diagnosis:  Systolic heart failure with aortic stenosis    Post-operative Diagnosis:   Severe triple-vessel coronary artery disease with calcified 50% left main stenosis (iFR <0.8)    Procedure:  1.  Selective coronary angiography  2.  Instantaneous wave free ratio (iFR) analysis of the left main and the left anterior descending artery  3.  Supervision of moderate conscious sedation    Complications: None    Description of Procedure:  After informed consent was obtained, the patient was brought to cardiac catheterization laboratory in fasting state.   Collin test was carried out on the right hand and was found to be negative.  Right wrist and right groin were then prepped and drapped in sterile fashion.  Versed and fentanyl were used for conscious sedation.  Lidocaine 2% was used to anesthetize the area.  A 6 Norwegian Terumo sheath was then placed in the right radial artery using Seldinger technique.  A 2.5 mg of verapamil, 100 microgram of nitroglycerine and 3000 units of heparin were administered into the radial sheath.    Selective angiography of the right coronary artery were performed in multiple views using 5 Norwegian TIG catheter.   Selective angiography of the left coronary artery were performed in multiple views using 6 Norwegian JL3.5 catheter.   After reviewing diagnostic angiography, we decided to further interrogate the stenosis in the main and left anterior descending artery (LAD) with instantaneous wave free ratio (IFR).  A 6 Norwegian EBU 3.5 guide catheter was used.  Additional 4000 units of heparin was administered via the guide catheter.  After appropriate calibration, an IFR wire was advanced pass the stenosis into the distal portion of the LAD.  The iFR were in the 0.7s range consistent with flow-limiting stenosis.  Left ventricle was not entered due to known aortic stenosis.  At the end  of the case, the catheter and the radial sheath were removed.   Hemostasis was obtained using Terumo TR wrist band.  The patient tolerated procedure well and left cardiac catheterization laboratory in stable condition.    I supervised monitoring the patient under moderate conscious sedation beginning at 8:06 AM until the end of the case at 8:41 AM.    Findings:    Triple-vessel coronary artery disease with left main disease    This is right dominant system.    Left main is large in caliber.   It trifurcated into left anterior descending, medium-sized ramus intermediate artery and left circumflex artery.   It has calcified eccentric 50% stenosis at the distal portion around the trifurcation.  As mentioned above, the IFR was consistent with flow-limiting disease.    Left anterior descending artery (LAD) is large caliber vessel and extends beyond the apex.  It gives rises to 2 relatively long medium size diagonal branches proximally.  Moderate to heavy calcification was noted in proximal LAD along with stenosis up to at least 60-70%.   As mentioned above the IFR of the LAD was in the 0.7 range.  There are also 90% stenoses at the ostia of both first and second diagonal branches.  The antegrade flow is normal.    The ramus intermediate artery is about 1 and half millimeter in diameter. It has 80-90% ostial stenosis with normal antegrade flow    Left circumflex artery is medium in caliber.   This is occluded in the midportion after giveing rise to one very small and short obtuse marginal branch.    Right coronary artery (RCA) is large caliber. It gives rise to several small acute marginal branches, posterior descending artery and posterolateral branch.  There is 99% stenosis in the distal of the right coronary artery.  The antegrade flow is slow at JEN II.      Plan:   Limit right wrist movement for 24 hours.  Risk factor modification.  Consult valve team/cardiac surgeon for further management.        Amauri  CHYNA Ramirez.

## 2019-12-12 ENCOUNTER — TELEPHONE (OUTPATIENT)
Dept: CARDIOLOGY | Facility: MEDICAL CENTER | Age: 79
End: 2019-12-12

## 2019-12-12 DIAGNOSIS — I35.0 SEVERE AORTIC STENOSIS: ICD-10-CM

## 2019-12-12 NOTE — TELEPHONE ENCOUNTER
"Message Received: Yesterday Message Contents   DANIELLE Swan R.N.     Need referral to valve team   Thx      Called pt and reviewed findings.  He is requesting for referral to be expedited.  He voiced frustrations during his last OV that there were \"3 things that they could do\" during the procedure and states when he \"woke up I was told nothing was done.\"  Reassurance given that request will be relayed to valve team.  He verbalizes understanding and states no other concerns or questions at this time.  Pt is appreciative of information given.    Valve referral placed.  Valve team notified.  "

## 2019-12-14 NOTE — TELEPHONE ENCOUNTER
"Patient Call    SHO Villegas.  You; Cely Liz R.N. Yesterday (11:09 AM)      He is seeing Dr. Willson next week. We will wait for the follow up after this visit.     Looks like low flow severe AS and Triple vessel CAD (needs possible CABG?)     Thanks,     Darya      Returned pt call and reviewed APRN recommendations.  Pt voiced frustrations again and states, \"I've been waiting since March since Urgent Care.\"  Pt is requesting for visit with cardiac surgeons to be expedited.  Explained to pt that he would need to talk to the schedulers to the cardiac surgeons to schedule appt.  He verbalizes understanding and states no other concerns or questions at this time and is appreciative of information given.  "

## 2019-12-16 NOTE — PROGRESS NOTES
REFERRING PHYSICIAN: Demian Mo MD.     CONSULTING PHYSICIAN: Enrique Willson MD, FACS     CHIEF COMPLAINT: Fatigue and shortness of breath.    HISTORY OF PRESENT ILLNESS: The patient is a 79 y.o. male with history of hypertension, hyperlipidemia, diabetes mellitus, chronic kidney disease, chronic systolic heart failure, chronic oxygen at night 2 L/min, status post bilateral knee replacement, status post lumbar fusion, coronary artery disease and aortic stenosis. Today, he states he has had increasing shortness of breath and fatigue since last March.  He recently started using nocturnal oxygen 2 L/min at night and does feel somewhat better.  He also complains of occasional dizziness, a pinching-like feeling in his chest that happens daily, and bilateral lower extremity edema. He has an exercise capacity of 20 yards mostly due to his knees and back pain. He denies  chest pain, syncope, orthopnea, or PND.      PAST MEDICAL HISTORY:   Active Ambulatory Problems     Diagnosis Date Noted   • Type 2 diabetes mellitus without complication, without long-term current use of insulin ([facility]ly [facility]) 09/05/2009   • Hypertension 09/05/2009   • History of peripheral neuropathy 09/05/2009   • History of shingles 09/05/2009   • S/P knee replacement 09/05/2009   • S/P lumbar fusion 09/05/2009   • Dyslipidemia 09/05/2009   • History of allergic rhinitis 09/05/2009   • Preventative health care 09/05/2009   • Lower extremity edema 12/29/2009   • S/P gastric bypass 03/26/2010   • Status post shoulder surgery 08/23/2012   • 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   • Obesity 03/03/2016   • Hypotestosteronism 03/03/2016   • Polycythemia 03/11/2017   • Decreased GFR 03/11/2017   • Macular degeneration 10/10/2018   • Pancreatic lesion 04/01/2019   • 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   • Risk for falls 09/04/2019   • Systolic heart failure (HCC) 12/04/2019   • Chronic kidney disease 12/04/2019   • Coronary artery disease involving native coronary artery of  native heart without angina pectoris 12/04/2019   • Severe aortic stenosis 12/04/2019     Resolved Ambulatory Problems     Diagnosis Date Noted   • Erectile dysfunction 09/05/2009   • Tinnitus 02/08/2011   • Thoracic or lumbosacral neuritis or radiculitis, unspecified 09/05/2013   • History of foreign travel 11/24/2014   • Closed fracture of greater tuberosity of humerus 06/25/2015   • Aortic valve sclerosis 07/28/2015   • Left carotid bruit 07/28/2015   • Anderson's esophagus 01/08/2016   • Status post shoulder surgery 02/28/2016   • Esophagitis 10/08/2018     Past Medical History:   Diagnosis Date   • Arthritis    • Breath shortness    • CATARACT    • Diabetes    • Heart murmur    • Heart valve disease    • Hiatus hernia syndrome    • Hyperlipidemia    • Kidney disease    • Pain 08-29-13   • Pain 6/22/2015   • Pain 1/7/16   • Pneumonia 2007   • Ulcer 2014   • Unspecified hemorrhagic conditions        PAST SURGICAL HISTORY:   Past Surgical History:   Procedure Laterality Date   • GASTROSCOPY N/A 1/8/2016    Procedure: GASTROSCOPY;  Surgeon: Deniz Sue M.D.;  Location: [facility];  Service:    • HUMERUS ORIF Left 6/25/2015    Procedure: HUMERUS ORIF/ PROXIMAL;  Surgeon: Cristal Guido M.D.;  Location: [facility];  Service:    • LUMBAR FUSION POSTERIOR  9/5/2013    Performed by Edith Sears M.D. at [facility]   • LUMBAR DECOMPRESSION  9/5/2013    Performed by Edith Sears M.D. at [facility]   • MASS EXCISION NEURO  9/5/2013    Performed by Edith Sears M.D. at [facility]   • RECOVERY  6/26/2012    Performed by SURGERY, IR-RECOVERY at [facility] SAME DAY [facility]   • DRAINAGE HEMATOMA  5/25/2012    Performed by DENIZ SUE at [facility]   • GASTRIC BAND LAPAROSCOPIC REVISION  5/11/2012    Performed by DENIZ SUE at [facility]   • LUMBAR FUSION POSTERIOR  3/26/2012    Performed by EDITH SEARS at [facility]  [facility] ORS   • LUMBAR DECOMPRESSION  3/26/2012    Performed by EDITH WHELAN at SURGERY [facility] ORS   • INJ,EPIDURAL/LUMB/SAC SINGLE  3/19/2012    Performed by KRISTIN BALTAZAR at [facility] ORS   • INJ,EPIDURAL/LUMB/SAC SINGLE  3/5/2012    Performed by KRISTIN BALTAZAR at [facility] ORS   • GASTRIC BANDING LAPAROSCOPIC  3/24/2010    Performed by SARINA SUE at SURGERY [facility]   • HIATAL HERNIA REPAIR  3/24/2010    Performed by SARINA SUE at SURGERY [facility]   • KNEE ARTHROPLASTY TOTAL  2000    bilateral   • ABDOMINOPLASTY  1990        ALLERGIES:   Allergies   Allergen Reactions   • Gabapentin      Other reaction(s): Dizziness   • Statins [Hmg-Coa-R Inhibitors] Rash        CURRENT MEDICATIONS:   Current Outpatient Medications:   •  lisinopril (PRINIVIL) 40 MG tablet, Take 40 mg by mouth every day., Disp: , Rfl:   •  hydroCHLOROthiazide (HYDRODIURIL) 25 MG Tab, Take 25 mg by mouth every day., Disp: , Rfl:   •  B Complex Vitamins (VITAMIN B COMPLEX PO), Take 1 Tab by mouth every day., Disp: , Rfl:   •  Cranberry 300 MG Tab, Take 1 Tab by mouth every day., Disp: , Rfl:   •  Garlic 1000 MG Cap, Take 1 Cap by mouth every day., Disp: , Rfl:   •  Glucosamine-Chondroit-Vit C-Mn (GLUCOSAMINE 1500 COMPLEX) Cap, Take 2 Caps by mouth every day., Disp: , Rfl:   •  ferrous sulfate 325 (65 Fe) MG tablet, Take 325 mg by mouth every day., Disp: , Rfl:   •  L-Lysine 1000 MG Tab, Take 1 Tab by mouth every day., Disp: , Rfl:   •  Non Formulary Request, Domenic red po 1 tab daily, Disp: , Rfl:   •  Probiotic Product (PROBIOTIC PO), Take  by mouth every day., Disp: , Rfl:   •  Multiple Vitamins-Minerals (EYE VITAMINS PO), Take  by mouth every day., Disp: , Rfl:   •  Ascorbic Acid (VITAMIN C) 1000 MG Tab, Take 1 Tab by mouth every day., Disp: , Rfl:   •  vitamin e (VITAMIN E) 400 UNIT Cap, Take 400 Units by mouth every day., Disp: , Rfl:   •  Zinc 50 MG Cap, Take 50 mg by mouth every day.,  Disp: , Rfl:   •  metoprolol SR (TOPROL XL) 25 MG TABLET SR 24 HR, Take 1 Tab by mouth every day., Disp: 30 Tab, Rfl: 5  •  furosemide (LASIX) 40 MG Tab, Take 1 Tab by mouth every day., Disp: 30 Tab, Rfl: 3  •  sacubitril-valsartan (ENTRESTO) 24-26 MG Tab tablet, Take 1 Tab by mouth 2 Times a Day., Disp: 60 Tab, Rfl: 3  •  ezetimibe (ZETIA) 10 MG Tab, Take 1 Tab by mouth every day., Disp: 30 Tab, Rfl: 3  •  hydrocortisone 2.5 % Cream topical cream, Apply 1 Application to affected area(s) 2 times a day as needed (rash)., Disp: 28 g, Rfl: 1  •  glimepiride (AMARYL) 1 MG tablet, Take 1 Tab by mouth every morning., Disp: 90 Tab, Rfl: 3  •  fluticasone (FLONASE) 50 MCG/ACT nasal spray, Spray 2 Sprays in nose every day., Disp: 3 Bottle, Rfl: 3  •  glucose blood (ONE TOUCH ULTRA TEST) strip, USE  STRIP TO CHECK GLUCOSE ONCE DAILY *DX E11.9*, Disp: 50 Strip, Rfl: 6  •  anastrozole (ARIMIDEX) 1 MG Tab, Take 1 mg by mouth every day., Disp: , Rfl:   •  Blood Glucose Monitoring Suppl Device, Meter: Accu-check. E11.9, Disp: 1 Device, Rfl: 0  •  Lancets Misc, Lancets order: Accu-check meter and test strips, check blood sugar once a day, E11.9, Disp: 100 Each, Rfl: 11  •  Blood Glucose Monitoring Suppl Supplies Misc, Test strips order: Accu-check test trips, check blood sugar once a day before meal, E11.9, Disp: 50 Strip, Rfl: 11  •  OMEPRAZOLE PO, Take  by mouth., Disp: , Rfl:   •  ethyl chloride Aerosol, Ethyl chloride jet stram BAL apply prn, Disp: 1 Can, Rfl: 0  •  VITAMIN B1-B12 INJ, by Injection route every 7 days., Disp: , Rfl:   •  Testosterone Cypionate (DEPO-TESTOSTERONE IM), by Intramuscular route every 7 days., Disp: , Rfl:   •  coenzyme Q-10 30 MG capsule, Take 60 mg by mouth every day., Disp: , Rfl:   •  vitamin D (CHOLECALCIFEROL) 1000 UNIT TABS, Take 5,000 Units by mouth every day., Disp: , Rfl:   •  Glucosamine Sulfate 1500 MG PACK, Take  by mouth 2 Times a Day., Disp: , Rfl:   •  Omega-3 Fatty Acids (FISH OIL  PO), Take 2 Caps by mouth 2 Times a Day., Disp: , Rfl:     FAMILY HISTORY:   Family History   Problem Relation Age of Onset   • No Known Problems Sister    • No Known Problems Sister    • No Known Problems Sister    • Diabetes Other    • No Known Problems Mother    • No Known Problems Father         SOCIAL HISTORY:   Social History     Socioeconomic History   • Marital status:      Spouse name: Not on file   • Number of children: Not on file   • Years of education: Not on file   • Highest education level: Not on file   Occupational History   • Not on file   Social Needs   • Financial resource strain: Not on file   • Food insecurity:     Worry: Not on file     Inability: Not on file   • Transportation needs:     Medical: Not on file     Non-medical: Not on file   Tobacco Use   • Smoking status: Never Smoker   • Smokeless tobacco: Never Used   Substance and Sexual Activity   • Alcohol use: No     Alcohol/week: 0.0 oz   • Drug use: No   • Sexual activity: Yes   Lifestyle   • Physical activity:     Days per week: Not on file     Minutes per session: Not on file   • Stress: Not on file   Relationships   • Social connections:     Talks on phone: Not on file     Gets together: Not on file     Attends Mosque service: Not on file     Active member of club or organization: Not on file     Attends meetings of clubs or organizations: Not on file     Relationship status: Not on file   • Intimate partner violence:     Fear of current or ex partner: Not on file     Emotionally abused: Not on file     Physically abused: Not on file     Forced sexual activity: Not on file   Other Topics Concern   • Not on file   Social History Narrative   • Not on file       REVIEW OF SYSTEMS:  Review of Systems   Constitutional: Positive for malaise/fatigue.   HENT: Positive for hearing loss.    Eyes: Negative.    Respiratory: Positive for cough.    Cardiovascular: Positive for leg swelling.   Gastrointestinal: Positive for abdominal  "pain.   Genitourinary: Negative.    Musculoskeletal: Positive for back pain, joint pain and myalgias.   Skin: Negative.    Neurological: Positive for dizziness.   Endo/Heme/Allergies: Negative.    Psychiatric/Behavioral: The patient is nervous/anxious.      PHYSICAL EXAMINATION:    Physical Exam  CONSTITUTIONAL:  /60 (BP Location: Left arm, Patient Position: Sitting, BP Cuff Size: Adult)   Pulse 79   Temp 37.2 °C (99 °F) (Temporal)   Ht 1.854 m (6' 1\")   Wt 114.6 kg (252 lb 10.4 oz)   SpO2 91%     General appearance: Frail, mild distress, normal development  HEENT:  Anicteric sclerae, moist conjunctivae, moist mucous membranes, good dentition   NECK:  Supple without jugular venous distention, without lymphadenopathy    SKIN:   Loose skin turgor, no stasis dermatitis or ulcers noted.  RESPIRATORY:  Clear to auscultation bilaterally, respirations are regular, symmetrical and unlabored.   CARDIAC:  Regular rate and rhythm, systolic murmur grade 2/6. No carotid bruits. Peripheral pulses palpable.   GASTROINTESTINAL:  Obese, soft, non-distended, non-tender with bowel sounds present, no hepatosplenomegaly  EXTREMITIES:  No clubbing, cyanosis, or changes consistent with peripheral vascular disease.  Bilateral lower extremity edema 1+.  NEUROLOGIC/ PSYCHIATRIC: Alert and oriented times three. Mood and affect normal  MUSCULOSKELETAL:  Normal gait and station    LABS REVIEWED:  Lab Results   Component Value Date/Time    SODIUM 140 12/09/2019 09:10 AM    POTASSIUM 4.2 12/09/2019 09:10 AM    CHLORIDE 106 12/09/2019 09:10 AM    CO2 26 12/09/2019 09:10 AM    GLUCOSE 131 (H) 12/09/2019 09:10 AM    BUN 26 (H) 12/09/2019 09:10 AM    CREATININE 1.56 (H) 12/09/2019 09:10 AM    CREATININE 1.18 02/08/2011 12:00 AM    BUNCREATRAT 16 02/08/2011 12:00 AM    GLOMRATE >59 02/08/2011 12:00 AM      Lab Results   Component Value Date/Time    PROTHROMBTM 15.5 (H) 12/09/2019 09:11 AM    INR 1.20 (H) 12/09/2019 09:11 AM      Lab Results "   Component Value Date/Time    WBC 6.3 12/09/2019 09:11 AM    WBC 4.5 02/08/2011 12:00 AM    RBC 6.30 (H) 12/09/2019 09:11 AM    RBC 5.36 02/08/2011 12:00 AM    HEMOGLOBIN 14.3 12/09/2019 09:11 AM    HEMATOCRIT 48.9 12/09/2019 09:11 AM    MCV 77.6 (L) 12/09/2019 09:11 AM    MCV 93 02/08/2011 12:00 AM    MCH 22.7 (L) 12/09/2019 09:11 AM    MCH 31.0 02/08/2011 12:00 AM    MCHC 29.2 (L) 12/09/2019 09:11 AM    MPV 11.0 12/09/2019 09:11 AM    NEUTSPOLYS 66.60 09/04/2019 11:57 AM    LYMPHOCYTES 18.80 (L) 09/04/2019 11:57 AM    MONOCYTES 11.10 09/04/2019 11:57 AM    EOSINOPHILS 2.50 09/04/2019 11:57 AM    BASOPHILS 0.50 09/04/2019 11:57 AM    HYPOCHROMIA 1+ 07/02/2018 10:32 AM    ANISOCYTOSIS 2+ 07/02/2018 10:32 AM        IMAGING REVIEWED AND INTERPRETED:    ECHOCARDIOGRAM [facility] 12/2/2019:  Left ventricle is severely dilated.  Severely reduced left ventricular systolic function, ejection fraction is visually estimated to be 25%.  Moderate aortic stenosis. Transvalvular gradients are - Peak: 44  mmHg, Mean: 28  mmHg.    Aortic valve area calculated from the continuity equation is 0.7 cm2 .   No aortic insufficiency.  Trace mitral regurgitation.  Severely dilated left atrium.  Mild tricuspid regurgitation.  Right ventricular systolic pressure is estimated to be 40  mmHg.    ANGIOGRAM [facility] 12/11/2019:  Triple-vessel coronary artery disease with left main disease  This is right dominant system.  Left main is large in caliber.   It trifurcated into left anterior descending, medium-sized ramus intermediate artery and left circumflex artery.   It has calcified eccentric 50% stenosis at the distal portion around the trifurcation.  As mentioned above, the IFR was consistent with flow-limiting disease.  Left anterior descending artery (LAD) is large caliber vessel and extends beyond the apex.  It gives rises to 2 relatively long medium size diagonal branches proximally.  Moderate to heavy calcification was noted in proximal LAD along  with stenosis up to at least 60-70%.   As mentioned above the IFR of the LAD was in the 0.7 range.  There are also 90% stenoses at the ostia of both first and second diagonal branches.  The antegrade flow is normal.  The ramus intermediate artery is about 1 and half millimeter in diameter. It has 80-90% ostial stenosis with normal antegrade flow  Left circumflex artery is medium in caliber.   This is occluded in the midportion after giveing rise to one very small and short obtuse marginal branch.  Right coronary artery (RCA) is large caliber. It gives rise to several small acute marginal branches, posterior descending artery and posterolateral branch.  There is 99% stenosis in the distal of the right coronary artery.  The antegrade flow is slow at JEN II.      IMPRESSION:  Severe symptomatic aortic stenosis (likely low flow low gradient), coronary artery disease, severe ischemic cardiomyopathy, acute on chronic left ventricular systolic and diastolic failure, congestive heart failure (NYHA class III), s/p gastric bypass, diabetes mellitus, hypertension, chronic kidney disease.      PLAN:  I do not recommend surgical aortic valve replacement and coronary artery bypass grafting due to excessive operative risk.  I estimate this to be greater than 10%. The STS mortality risk score is 5% and the morbidity and mortality risk score is 28%. The scores were discussed with patient.    He may be a candidate for percutaneous coronary intervention followed by transcatheter aortic valve replacement.  Therefore, I recommend proceeding with work-up.    Findings and recommendations have been discussed with Surendra Castro MD.  Thank you for this very challenging consultation and participation in the patient’s care.  I will keep you apprised of all future developments.      Sincerely,      Enrique Willson MD, FACS.

## 2019-12-17 ENCOUNTER — OFFICE VISIT (OUTPATIENT)
Dept: NEPHROLOGY | Facility: MEDICAL CENTER | Age: 79
End: 2019-12-17
Payer: MEDICARE

## 2019-12-17 VITALS
WEIGHT: 252 LBS | OXYGEN SATURATION: 92 % | SYSTOLIC BLOOD PRESSURE: 96 MMHG | HEIGHT: 72 IN | HEART RATE: 69 BPM | RESPIRATION RATE: 14 BRPM | TEMPERATURE: 98.6 F | BODY MASS INDEX: 34.13 KG/M2 | DIASTOLIC BLOOD PRESSURE: 58 MMHG

## 2019-12-17 DIAGNOSIS — I42.9 CARDIOMYOPATHY, UNSPECIFIED TYPE (HCC): ICD-10-CM

## 2019-12-17 DIAGNOSIS — N18.30 CKD (CHRONIC KIDNEY DISEASE) STAGE 3, GFR 30-59 ML/MIN (HCC): ICD-10-CM

## 2019-12-17 DIAGNOSIS — I10 ESSENTIAL HYPERTENSION: Chronic | ICD-10-CM

## 2019-12-17 DIAGNOSIS — I25.10 CORONARY ARTERY DISEASE INVOLVING NATIVE CORONARY ARTERY OF NATIVE HEART WITHOUT ANGINA PECTORIS: ICD-10-CM

## 2019-12-17 PROCEDURE — 99204 OFFICE O/P NEW MOD 45 MIN: CPT | Performed by: INTERNAL MEDICINE

## 2019-12-17 ASSESSMENT — ENCOUNTER SYMPTOMS
VOMITING: 0
SHORTNESS OF BREATH: 1
COUGH: 0
NAUSEA: 0
CHILLS: 0
FEVER: 0
NERVOUS/ANXIOUS: 1

## 2019-12-18 ENCOUNTER — OFFICE VISIT (OUTPATIENT)
Dept: CARDIOTHORACIC SURGERY | Facility: MEDICAL CENTER | Age: 79
End: 2019-12-18
Payer: MEDICARE

## 2019-12-18 VITALS
HEART RATE: 79 BPM | SYSTOLIC BLOOD PRESSURE: 114 MMHG | HEIGHT: 73 IN | WEIGHT: 252.65 LBS | DIASTOLIC BLOOD PRESSURE: 60 MMHG | TEMPERATURE: 99 F | BODY MASS INDEX: 33.48 KG/M2 | OXYGEN SATURATION: 91 %

## 2019-12-18 DIAGNOSIS — I35.0 SEVERE AORTIC STENOSIS: ICD-10-CM

## 2019-12-18 DIAGNOSIS — I25.10 CORONARY ARTERY DISEASE INVOLVING NATIVE CORONARY ARTERY OF NATIVE HEART WITHOUT ANGINA PECTORIS: ICD-10-CM

## 2019-12-18 PROCEDURE — 99205 OFFICE O/P NEW HI 60 MIN: CPT | Performed by: THORACIC SURGERY (CARDIOTHORACIC VASCULAR SURGERY)

## 2019-12-18 ASSESSMENT — ENCOUNTER SYMPTOMS
ABDOMINAL PAIN: 1
DIZZINESS: 1
EYES NEGATIVE: 1
NERVOUS/ANXIOUS: 1
MYALGIAS: 1
BACK PAIN: 1
COUGH: 1

## 2019-12-18 NOTE — PROGRESS NOTES
Subjective:      LIAN More III is a 79 y.o. male who presents with Chronic Kidney Disease            Patient was recently diagnosed with cardiomyopathy, had coronary angiogram which showed multiple vessel disease, he is scheduled to see CT surgeon tomorrow.  Patient is very anxious about his heart disease as well as kidney disease.      Chronic Kidney Disease   This is a chronic problem. The current episode started more than 1 year ago. The problem occurs constantly. The problem has been waxing and waning. Pertinent negatives include no chest pain, chills, coughing, fever, nausea, urinary symptoms or vomiting. Exacerbated by: Cardiomyopathy. The treatment provided no relief.       Review of Systems   Constitutional: Negative for chills, fever and malaise/fatigue.   Respiratory: Positive for shortness of breath. Negative for cough.         Dyspnea on exertion   Cardiovascular: Negative for chest pain and leg swelling.   Gastrointestinal: Negative for nausea and vomiting.   Genitourinary: Negative for dysuria, frequency and urgency.   Psychiatric/Behavioral: The patient is nervous/anxious.    All other systems reviewed and are negative.         Objective:     BP (!) 96/58 (BP Location: Right arm, Patient Position: Sitting, BP Cuff Size: Adult)   Pulse 69   Temp 37 °C (98.6 °F) (Temporal)   Resp 14   Ht 1.829 m (6')   Wt 114.3 kg (252 lb)   SpO2 92%   BMI 34.18 kg/m²      Physical Exam  Vitals signs and nursing note reviewed.   Constitutional:       General: He is not in acute distress.     Appearance: He is not ill-appearing, toxic-appearing or diaphoretic.   HENT:      Head: Normocephalic and atraumatic.      Right Ear: External ear normal.      Left Ear: External ear normal.      Nose: Nose normal.      Mouth/Throat:      Mouth: Mucous membranes are moist.      Pharynx: No oropharyngeal exudate or posterior oropharyngeal erythema.   Eyes:      General: No scleral icterus.        Right eye: No discharge.          Left eye: No discharge.      Conjunctiva/sclera: Conjunctivae normal.   Neck:      Musculoskeletal: Normal range of motion. No muscular tenderness.   Cardiovascular:      Rate and Rhythm: Regular rhythm. Tachycardia present.      Heart sounds: Murmur present.   Pulmonary:      Effort: Pulmonary effort is normal. No respiratory distress.      Breath sounds: Normal breath sounds. No wheezing.   Abdominal:      General: Abdomen is flat. Bowel sounds are normal. There is no distension.   Musculoskeletal:         General: No swelling, tenderness or deformity.   Lymphadenopathy:      Cervical: No cervical adenopathy.   Skin:     General: Skin is warm and dry.      Coloration: Skin is not jaundiced.   Neurological:      General: No focal deficit present.      Mental Status: He is alert and oriented to person, place, and time. Mental status is at baseline.   Psychiatric:         Mood and Affect: Mood is anxious.         Behavior: Behavior normal.         Thought Content: Thought content normal.       Past Medical History:   Diagnosis Date   • Arthritis    • Breath shortness     pt states short of breath 24/7   • CATARACT     removed bilat   • Diabetes     PT REPORTS DIABETES RESOLVED IN 2009. NO LONGER TAKES MEDS   • Heart murmur    • Heart valve disease    • Hiatus hernia syndrome    • Hypertension     pt states well controlled on meds   • Pain 08-29-13    back, hips and bilat legs, 4/10   • Pain 6/22/2015    left humerus   • Pain 1/7/16    knees, back and shoulder   • Pneumonia 2007   • Ulcer 2014    Dr. Porter, gastroenterologist   • Unspecified hemorrhagic conditions     bruises easily     Social History     Socioeconomic History   • Marital status:      Spouse name: Not on file   • Number of children: Not on file   • Years of education: Not on file   • Highest education level: Not on file   Occupational History   • Not on file   Social Needs   • Financial resource strain: Not on file   • Food insecurity:      Worry: Not on file     Inability: Not on file   • Transportation needs:     Medical: Not on file     Non-medical: Not on file   Tobacco Use   • Smoking status: Never Smoker   • Smokeless tobacco: Never Used   Substance and Sexual Activity   • Alcohol use: No     Alcohol/week: 0.0 oz   • Drug use: No   • Sexual activity: Yes   Lifestyle   • Physical activity:     Days per week: Not on file     Minutes per session: Not on file   • Stress: Not on file   Relationships   • Social connections:     Talks on phone: Not on file     Gets together: Not on file     Attends [employer] service: Not on file     Active member of club or organization: Not on file     Attends meetings of clubs or organizations: Not on file     Relationship status: Not on file   • Intimate partner violence:     Fear of current or ex partner: Not on file     Emotionally abused: Not on file     Physically abused: Not on file     Forced sexual activity: Not on file   Other Topics Concern   • Not on file   Social History Narrative   • Not on file     Family History   Problem Relation Age of Onset   • No Known Problems Sister    • No Known Problems Sister    • No Known Problems Sister    • Diabetes Other    • No Known Problems Mother    • No Known Problems Father      Recent Labs     03/14/19  1740 09/04/19  1157 09/12/19  1147 12/09/19  0910   ALBUMIN 4.1  --  3.94  4.3 4.0   HDL  --  35*  --   --    TRIGLYCERIDE  --  117  --   --    SODIUM 134*  --  141 140   POTASSIUM 4.1  --  4.7 4.2   CHLORIDE 103  --  105 106   CO2 22  --  27 26   BUN 25*  --  26* 26*   CREATININE 1.43*  --  1.43* 1.56*   PHOSPHORUS  --   --  2.7  --                Assessment/Plan:       1. CKD (chronic kidney disease) stage 3, GFR 30-59 ml/min ([facility])  Etiology is most likely cardiorenal syndrome with some age-related changes  No uremic symptoms  Renal dose of medication  Avoid nephrotoxins  Continue same medication regimen      2. Essential hypertension  Controlled  Continue  low-sodium diet    3. Coronary artery disease involving native coronary artery of native heart without angina pectoris  Patient has an appointment with cardiothoracic surgeon tomorrow    4. Cardiomyopathy, unspecified type (HCC)  Optimize cardiac function

## 2019-12-20 ENCOUNTER — TELEPHONE (OUTPATIENT)
Dept: CARDIOLOGY | Facility: MEDICAL CENTER | Age: 79
End: 2019-12-20

## 2019-12-20 NOTE — TELEPHONE ENCOUNTER
"Patient call was as soft transfer from .    Patient calling verbalizing extreme frustration that he has not been contacted by \"Cely or Billie when I saw Dr. Willson 2 days ago and he said Cely or Billie would be calling me to schedule my valve replacement on Jan 6, 2020!\"    Attempted to explain to patient that I am Cely that was being referred to and unfortunately I had not yet been informed of referral to valve program. Attempted to inform patient that the way our program work, since he has already been seen by CTS, is that he will be arranged for consult with IC, and scheduled for cardiac testing FIRST, to determine if he is a candidate for TAVR. Explained that unfortunately TAVR cannot be preformed prior to these test and consults confirming his candidacy for such.    Patient goes on to verbalize extreme frustration that \"Now I have to wait because a BAD DOCTOR DIDN'T DO HIS JOB?!\" Patient further reports that Dr. Willson told him that the physician whom previously preformed his cardiac catheterization \"Didn't do his job right and should have just placed a stent in my heart the first time instead of waiting and having me see the surgeon,and now I have to have another catheterization to NOW have the stent put in.\"     Attempted to explain that sometimes our internationalist will choose not to intervene and place stents, as there may have been questions regarding surgeons' recommendations to proceed with surgical repair of CA, as opposed to placing a stent, and this doesn't make an internationalist a \"Bad doctor\", it just means they valued the recommendations of surgeon before proceeding with interventions.  Patient interrupt by further explaining that the surgeon told him the \"other doctor messed up and should have just put the stent in\".    Redirected conversation by trying to explain that my role is to assist patient in coordinating IC consult and cardiac testing. Patient states that I " "should schedule the PCI STAT! Explained to patient that I am not a procedure , however I will reach out to Dr. Willson to send order to procedure schedulers and they will be reaching out to him to arrange such recommended PCI.     Offered patient SOONEST available clinic appointment. Patient declines stating that \"This is not soon enough! I will just go to the emergency room then!\" Atempted to explain that TAVR is not an emergent procedure done via ED admit. Explained that each patient completes testing and consultations as outpatients and are actually scheduled for procedure as outpatient. Again, tried to redirect patient and offer soonest available clinic consult. Patient again declines consult stating this is not soon enough. Patient again digresses into a rant about how frustrated he is with all the \"bad doctors\" he has encountered and how long he has had to wait for anything to be done.     Offered to transfer call to supervisor, as maybe they will be able to better help him. Unfortunately no available supervisors at this time. Assured patient I will send message to supervisor right away and request return call. Patient would like to know if he should expect supervisor call TODAY. Explained that I am unaware of time frame for return call from supervisor.     Assured patient that I will reach out and have Dr. Willson send order/referral for PCI, and procedure schedulers will contact him to arrange such. Also assured patient I will request supervisor contact patient at soonest availability. Asked patient if there is anything else I may be of assistance with today. Patient states no and call was ended.     Dr. Stringer, please advise regarding PCI.    DS, AE, [facility] please see patients request for return call from supervisor.         "

## 2019-12-21 ENCOUNTER — TELEPHONE (OUTPATIENT)
Dept: CARDIOTHORACIC SURGERY | Facility: MEDICAL CENTER | Age: 79
End: 2019-12-21

## 2019-12-21 NOTE — TELEPHONE ENCOUNTER
Mr. More called yesterday stating he had not heard from Cely or Billie.  He states his medications have also been adjusted.  I told him he needed to call cardiology.  I gave him the number [phone].

## 2019-12-24 ENCOUNTER — TELEPHONE (OUTPATIENT)
Dept: CARDIOLOGY | Facility: MEDICAL CENTER | Age: 79
End: 2019-12-24

## 2019-12-24 NOTE — TELEPHONE ENCOUNTER
"Patient called, transferred by .  Patient upset that he didn't get a phone call on Friday like he was told he would receive.      This RN unsure of status regarding TAVR consideration.  No information to give patient.      L.A.S.T. initiated.      Patient keeps repeating he's going to let VR know that he referred him to an \"idiot and he needs to be made aware immediately\".      Patient upset about initial LHC with no intervention done at that time.  Patient states he can't have a TAVR until \"his arteries are repaired\".  Patient complains since March he's \"only had idiots for doctors at [facility]\".     Patient was rude but not directly rude to this RN.     Again, apologized for patient's experience and informed patient management will be made aware of issue.  Patient would like a call back right away.  Informed patient clinic is closing at noon for holiday.  Patient states, \"yeah you all would rather go celebrate the holiday instead of care for your patients\".    Explained to the patient that if there is any emergent active symptoms patient is worried about he should go to the ER.  Otherwise will have management follow up with patient.     Patient ended the call with \"thank you\".     "

## 2019-12-26 ENCOUNTER — APPOINTMENT (OUTPATIENT)
Dept: RADIOLOGY | Facility: MEDICAL CENTER | Age: 79
DRG: 246 | End: 2019-12-26
Attending: EMERGENCY MEDICINE
Payer: MEDICARE

## 2019-12-26 ENCOUNTER — OFFICE VISIT (OUTPATIENT)
Dept: CARDIOLOGY | Facility: MEDICAL CENTER | Age: 79
End: 2019-12-26
Payer: MEDICARE

## 2019-12-26 ENCOUNTER — HOSPITAL ENCOUNTER (INPATIENT)
Facility: MEDICAL CENTER | Age: 79
LOS: 3 days | DRG: 246 | End: 2019-12-29
Attending: EMERGENCY MEDICINE | Admitting: HOSPITALIST
Payer: MEDICARE

## 2019-12-26 ENCOUNTER — TELEPHONE (OUTPATIENT)
Dept: CARDIOLOGY | Facility: MEDICAL CENTER | Age: 79
End: 2019-12-26

## 2019-12-26 VITALS
OXYGEN SATURATION: 94 % | BODY MASS INDEX: 34.16 KG/M2 | DIASTOLIC BLOOD PRESSURE: 82 MMHG | HEIGHT: 72 IN | SYSTOLIC BLOOD PRESSURE: 136 MMHG | HEART RATE: 76 BPM | WEIGHT: 252.21 LBS

## 2019-12-26 DIAGNOSIS — I25.10 CORONARY ARTERY DISEASE INVOLVING NATIVE CORONARY ARTERY OF NATIVE HEART WITHOUT ANGINA PECTORIS: ICD-10-CM

## 2019-12-26 DIAGNOSIS — I50.23 ACUTE ON CHRONIC SYSTOLIC HEART FAILURE (HCC): ICD-10-CM

## 2019-12-26 DIAGNOSIS — N18.9 CHRONIC KIDNEY DISEASE, UNSPECIFIED CKD STAGE: ICD-10-CM

## 2019-12-26 DIAGNOSIS — I10 ESSENTIAL HYPERTENSION: Chronic | ICD-10-CM

## 2019-12-26 DIAGNOSIS — E78.5 DYSLIPIDEMIA: Chronic | ICD-10-CM

## 2019-12-26 DIAGNOSIS — I50.9 CONGESTIVE HEART FAILURE, UNSPECIFIED HF CHRONICITY, UNSPECIFIED HEART FAILURE TYPE (HCC): ICD-10-CM

## 2019-12-26 DIAGNOSIS — Z79.899 HIGH RISK MEDICATION USE: ICD-10-CM

## 2019-12-26 DIAGNOSIS — I35.0 SEVERE AORTIC STENOSIS: ICD-10-CM

## 2019-12-26 DIAGNOSIS — I50.22 CHRONIC SYSTOLIC HEART FAILURE (HCC): ICD-10-CM

## 2019-12-26 LAB
ALBUMIN SERPL BCP-MCNC: 4.1 G/DL (ref 3.2–4.9)
ALBUMIN/GLOB SERPL: 1.7 G/DL
ALP SERPL-CCNC: 44 U/L (ref 30–99)
ALT SERPL-CCNC: 16 U/L (ref 2–50)
ANION GAP SERPL CALC-SCNC: 8 MMOL/L (ref 0–11.9)
ANISOCYTOSIS BLD QL SMEAR: ABNORMAL
AST SERPL-CCNC: 18 U/L (ref 12–45)
BASOPHILS # BLD AUTO: 0 % (ref 0–1.8)
BASOPHILS # BLD: 0 K/UL (ref 0–0.12)
BILIRUB SERPL-MCNC: 0.6 MG/DL (ref 0.1–1.5)
BUN SERPL-MCNC: 25 MG/DL (ref 8–22)
CALCIUM SERPL-MCNC: 9.7 MG/DL (ref 8.5–10.5)
CHLORIDE SERPL-SCNC: 104 MMOL/L (ref 96–112)
CO2 SERPL-SCNC: 27 MMOL/L (ref 20–33)
CREAT SERPL-MCNC: 1.56 MG/DL (ref 0.5–1.4)
EKG IMPRESSION: NORMAL
EOSINOPHIL # BLD AUTO: 0.38 K/UL (ref 0–0.51)
EOSINOPHIL NFR BLD: 6.1 % (ref 0–6.9)
ERYTHROCYTE [DISTWIDTH] IN BLOOD BY AUTOMATED COUNT: 57.6 FL (ref 35.9–50)
GLOBULIN SER CALC-MCNC: 2.4 G/DL (ref 1.9–3.5)
GLUCOSE BLD-MCNC: 140 MG/DL (ref 65–99)
GLUCOSE BLD-MCNC: 141 MG/DL (ref 65–99)
GLUCOSE SERPL-MCNC: 165 MG/DL (ref 65–99)
HCT VFR BLD AUTO: 52.7 % (ref 42–52)
HGB BLD-MCNC: 15.6 G/DL (ref 14–18)
LYMPHOCYTES # BLD AUTO: 1.44 K/UL (ref 1–4.8)
LYMPHOCYTES NFR BLD: 22.8 % (ref 22–41)
MANUAL DIFF BLD: NORMAL
MCH RBC QN AUTO: 23.1 PG (ref 27–33)
MCHC RBC AUTO-ENTMCNC: 29.6 G/DL (ref 33.7–35.3)
MCV RBC AUTO: 78.1 FL (ref 81.4–97.8)
MICROCYTES BLD QL SMEAR: ABNORMAL
MONOCYTES # BLD AUTO: 0.28 K/UL (ref 0–0.85)
MONOCYTES NFR BLD AUTO: 4.4 % (ref 0–13.4)
MORPHOLOGY BLD-IMP: NORMAL
NEUTROPHILS # BLD AUTO: 4.2 K/UL (ref 1.82–7.42)
NEUTROPHILS NFR BLD: 66.7 % (ref 44–72)
NRBC # BLD AUTO: 0 K/UL
NRBC BLD-RTO: 0 /100 WBC
NT-PROBNP SERPL IA-MCNC: 6512 PG/ML (ref 0–125)
OVALOCYTES BLD QL SMEAR: NORMAL
PLATELET # BLD AUTO: 202 K/UL (ref 164–446)
PLATELET BLD QL SMEAR: NORMAL
PMV BLD AUTO: 9.8 FL (ref 9–12.9)
POIKILOCYTOSIS BLD QL SMEAR: NORMAL
POLYCHROMASIA BLD QL SMEAR: NORMAL
POTASSIUM SERPL-SCNC: 4.3 MMOL/L (ref 3.6–5.5)
PROT SERPL-MCNC: 6.5 G/DL (ref 6–8.2)
RBC # BLD AUTO: 6.75 M/UL (ref 4.7–6.1)
RBC BLD AUTO: PRESENT
SODIUM SERPL-SCNC: 139 MMOL/L (ref 135–145)
TROPONIN T SERPL-MCNC: 36 NG/L (ref 6–19)
WBC # BLD AUTO: 6.3 K/UL (ref 4.8–10.8)

## 2019-12-26 PROCEDURE — 80053 COMPREHEN METABOLIC PANEL: CPT

## 2019-12-26 PROCEDURE — 82962 GLUCOSE BLOOD TEST: CPT | Mod: 91

## 2019-12-26 PROCEDURE — 85027 COMPLETE CBC AUTOMATED: CPT

## 2019-12-26 PROCEDURE — 99223 1ST HOSP IP/OBS HIGH 75: CPT | Performed by: HOSPITALIST

## 2019-12-26 PROCEDURE — 85007 BL SMEAR W/DIFF WBC COUNT: CPT

## 2019-12-26 PROCEDURE — 99214 OFFICE O/P EST MOD 30 MIN: CPT | Performed by: INTERNAL MEDICINE

## 2019-12-26 PROCEDURE — 71045 X-RAY EXAM CHEST 1 VIEW: CPT

## 2019-12-26 PROCEDURE — 83880 ASSAY OF NATRIURETIC PEPTIDE: CPT

## 2019-12-26 PROCEDURE — 700111 HCHG RX REV CODE 636 W/ 250 OVERRIDE (IP): Performed by: EMERGENCY MEDICINE

## 2019-12-26 PROCEDURE — A9270 NON-COVERED ITEM OR SERVICE: HCPCS | Performed by: HOSPITALIST

## 2019-12-26 PROCEDURE — 93005 ELECTROCARDIOGRAM TRACING: CPT | Performed by: EMERGENCY MEDICINE

## 2019-12-26 PROCEDURE — 84484 ASSAY OF TROPONIN QUANT: CPT

## 2019-12-26 PROCEDURE — 96374 THER/PROPH/DIAG INJ IV PUSH: CPT

## 2019-12-26 PROCEDURE — 99285 EMERGENCY DEPT VISIT HI MDM: CPT

## 2019-12-26 PROCEDURE — 770020 HCHG ROOM/CARE - TELE (206)

## 2019-12-26 PROCEDURE — 700102 HCHG RX REV CODE 250 W/ 637 OVERRIDE(OP): Performed by: HOSPITALIST

## 2019-12-26 PROCEDURE — 94760 N-INVAS EAR/PLS OXIMETRY 1: CPT

## 2019-12-26 PROCEDURE — 99223 1ST HOSP IP/OBS HIGH 75: CPT | Performed by: INTERNAL MEDICINE

## 2019-12-26 PROCEDURE — 93005 ELECTROCARDIOGRAM TRACING: CPT

## 2019-12-26 PROCEDURE — 700111 HCHG RX REV CODE 636 W/ 250 OVERRIDE (IP): Performed by: HOSPITALIST

## 2019-12-26 RX ORDER — OXYCODONE HYDROCHLORIDE 5 MG/1
2.5 TABLET ORAL
Status: DISCONTINUED | OUTPATIENT
Start: 2019-12-26 | End: 2019-12-29 | Stop reason: HOSPADM

## 2019-12-26 RX ORDER — FUROSEMIDE 10 MG/ML
40 INJECTION INTRAMUSCULAR; INTRAVENOUS ONCE
Status: COMPLETED | OUTPATIENT
Start: 2019-12-26 | End: 2019-12-26

## 2019-12-26 RX ORDER — GLIMEPIRIDE 1 MG/1
1 TABLET ORAL
Status: ON HOLD | COMMUNITY
End: 2022-03-31

## 2019-12-26 RX ORDER — EZETIMIBE 10 MG/1
10 TABLET ORAL EVERY MORNING
Status: DISCONTINUED | OUTPATIENT
Start: 2019-12-27 | End: 2019-12-29 | Stop reason: HOSPADM

## 2019-12-26 RX ORDER — OXYCODONE HYDROCHLORIDE 5 MG/1
5 TABLET ORAL
Status: DISCONTINUED | OUTPATIENT
Start: 2019-12-26 | End: 2019-12-29 | Stop reason: HOSPADM

## 2019-12-26 RX ORDER — ACETAMINOPHEN 325 MG/1
650 TABLET ORAL EVERY 6 HOURS PRN
Status: DISCONTINUED | OUTPATIENT
Start: 2019-12-26 | End: 2019-12-29 | Stop reason: HOSPADM

## 2019-12-26 RX ORDER — BISACODYL 10 MG
10 SUPPOSITORY, RECTAL RECTAL
Status: DISCONTINUED | OUTPATIENT
Start: 2019-12-26 | End: 2019-12-29 | Stop reason: HOSPADM

## 2019-12-26 RX ORDER — MORPHINE SULFATE 4 MG/ML
2 INJECTION, SOLUTION INTRAMUSCULAR; INTRAVENOUS
Status: DISCONTINUED | OUTPATIENT
Start: 2019-12-26 | End: 2019-12-29 | Stop reason: HOSPADM

## 2019-12-26 RX ORDER — METOPROLOL SUCCINATE 25 MG/1
25 TABLET, EXTENDED RELEASE ORAL DAILY
Status: DISCONTINUED | OUTPATIENT
Start: 2019-12-27 | End: 2019-12-29 | Stop reason: HOSPADM

## 2019-12-26 RX ORDER — FUROSEMIDE 10 MG/ML
40 INJECTION INTRAMUSCULAR; INTRAVENOUS
Status: DISCONTINUED | OUTPATIENT
Start: 2019-12-27 | End: 2019-12-28

## 2019-12-26 RX ORDER — ONDANSETRON 4 MG/1
4 TABLET, ORALLY DISINTEGRATING ORAL EVERY 4 HOURS PRN
Status: DISCONTINUED | OUTPATIENT
Start: 2019-12-26 | End: 2019-12-29 | Stop reason: HOSPADM

## 2019-12-26 RX ORDER — FUROSEMIDE 40 MG/1
40 TABLET ORAL EVERY MORNING
Status: ON HOLD | COMMUNITY
End: 2019-12-29

## 2019-12-26 RX ORDER — METOPROLOL SUCCINATE 25 MG/1
25 TABLET, EXTENDED RELEASE ORAL DAILY
COMMUNITY
End: 2020-01-03 | Stop reason: SDUPTHER

## 2019-12-26 RX ORDER — GLIMEPIRIDE 2 MG/1
1 TABLET ORAL EVERY EVENING
Status: DISCONTINUED | OUTPATIENT
Start: 2019-12-26 | End: 2019-12-26

## 2019-12-26 RX ORDER — FLUTICASONE PROPIONATE 50 MCG
2 SPRAY, SUSPENSION (ML) NASAL DAILY
Status: DISCONTINUED | OUTPATIENT
Start: 2019-12-26 | End: 2019-12-26

## 2019-12-26 RX ORDER — EZETIMIBE 10 MG/1
10 TABLET ORAL EVERY MORNING
COMMUNITY
End: 2020-04-08

## 2019-12-26 RX ORDER — ETHYL CHLORIDE 100 %
1 AEROSOL, SPRAY (ML) TOPICAL
Status: ON HOLD | COMMUNITY
End: 2020-01-27

## 2019-12-26 RX ORDER — ONDANSETRON 2 MG/ML
4 INJECTION INTRAMUSCULAR; INTRAVENOUS EVERY 4 HOURS PRN
Status: DISCONTINUED | OUTPATIENT
Start: 2019-12-26 | End: 2019-12-29 | Stop reason: HOSPADM

## 2019-12-26 RX ORDER — POLYETHYLENE GLYCOL 3350 17 G/17G
1 POWDER, FOR SOLUTION ORAL
Status: DISCONTINUED | OUTPATIENT
Start: 2019-12-26 | End: 2019-12-29 | Stop reason: HOSPADM

## 2019-12-26 RX ORDER — AMOXICILLIN 250 MG
2 CAPSULE ORAL 2 TIMES DAILY
Status: DISCONTINUED | OUTPATIENT
Start: 2019-12-27 | End: 2019-12-29 | Stop reason: HOSPADM

## 2019-12-26 RX ORDER — HEPARIN SODIUM 5000 [USP'U]/ML
5000 INJECTION, SOLUTION INTRAVENOUS; SUBCUTANEOUS EVERY 8 HOURS
Status: DISCONTINUED | OUTPATIENT
Start: 2019-12-26 | End: 2019-12-27

## 2019-12-26 RX ORDER — ANASTROZOLE 1 MG/1
1 TABLET ORAL DAILY
Status: DISCONTINUED | OUTPATIENT
Start: 2019-12-27 | End: 2019-12-29 | Stop reason: HOSPADM

## 2019-12-26 RX ADMIN — SACUBITRIL AND VALSARTAN 1 TABLET: 24; 26 TABLET, FILM COATED ORAL at 16:58

## 2019-12-26 RX ADMIN — HEPARIN SODIUM 5000 UNITS: 5000 INJECTION, SOLUTION INTRAVENOUS; SUBCUTANEOUS at 22:13

## 2019-12-26 RX ADMIN — FUROSEMIDE 40 MG: 10 INJECTION, SOLUTION INTRAVENOUS at 13:02

## 2019-12-26 RX ADMIN — GLIMEPIRIDE 1 MG: 2 TABLET ORAL at 16:59

## 2019-12-26 ASSESSMENT — ENCOUNTER SYMPTOMS
NAUSEA: 0
ORTHOPNEA: 1
DIARRHEA: 0
ORTHOPNEA: 1
FLANK PAIN: 0
IRREGULAR HEARTBEAT: 0
VOMITING: 0
NERVOUS/ANXIOUS: 1
ALTERED MENTAL STATUS: 0
DECREASED APPETITE: 1
PALPITATIONS: 0
EYES NEGATIVE: 1
GASTROINTESTINAL NEGATIVE: 1
BACK PAIN: 0
BACK PAIN: 1
CONSTIPATION: 0
NEUROLOGICAL NEGATIVE: 1
DEPRESSION: 0
DYSPNEA ON EXERTION: 1
FEVER: 0
SHORTNESS OF BREATH: 1
ABDOMINAL PAIN: 0
NEAR-SYNCOPE: 0
COUGH: 0
SHORTNESS OF BREATH: 1
HEARTBURN: 0
SYNCOPE: 0
WEIGHT GAIN: 0
BLURRED VISION: 0
PND: 1
CLAUDICATION: 0
DIZZINESS: 0
WEIGHT LOSS: 0

## 2019-12-26 ASSESSMENT — LIFESTYLE VARIABLES
EVER HAD A DRINK FIRST THING IN THE MORNING TO STEADY YOUR NERVES TO GET RID OF A HANGOVER: NO
EVER FELT BAD OR GUILTY ABOUT YOUR DRINKING: NO
EVER HAD A DRINK FIRST THING IN THE MORNING TO STEADY YOUR NERVES TO GET RID OF A HANGOVER: NO
EVER FELT BAD OR GUILTY ABOUT YOUR DRINKING: NO
TOTAL SCORE: 0
EVER_SMOKED: NEVER
EVER_SMOKED: NEVER
TOTAL SCORE: 0
ON A TYPICAL DAY WHEN YOU DRINK ALCOHOL HOW MANY DRINKS DO YOU HAVE: 0
HAVE PEOPLE ANNOYED YOU BY CRITICIZING YOUR DRINKING: NO
ALCOHOL_USE: NO
AVERAGE NUMBER OF DAYS PER WEEK YOU HAVE A DRINK CONTAINING ALCOHOL: 0
CONSUMPTION TOTAL: INCOMPLETE
TOTAL SCORE: 0
TOTAL SCORE: 0
HAVE YOU EVER FELT YOU SHOULD CUT DOWN ON YOUR DRINKING: NO
ALCOHOL_USE: NO
HOW MANY TIMES IN THE PAST YEAR HAVE YOU HAD 5 OR MORE DRINKS IN A DAY: 0
TOTAL SCORE: 0
HAVE PEOPLE ANNOYED YOU BY CRITICIZING YOUR DRINKING: NO
CONSUMPTION TOTAL: NEGATIVE
TOTAL SCORE: 0
HAVE YOU EVER FELT YOU SHOULD CUT DOWN ON YOUR DRINKING: NO

## 2019-12-26 ASSESSMENT — COPD QUESTIONNAIRES
HAVE YOU SMOKED AT LEAST 100 CIGARETTES IN YOUR ENTIRE LIFE: NO/DON'T KNOW
DO YOU EVER COUGH UP ANY MUCUS OR PHLEGM?: NO/ONLY WITH OCCASIONAL COLDS OR INFECTIONS
COPD SCREENING SCORE: 4
IN THE PAST 12 MONTHS DO YOU DO LESS THAN YOU USED TO BECAUSE OF YOUR BREATHING PROBLEMS: AGREE
DURING THE PAST 4 WEEKS HOW MUCH DID YOU FEEL SHORT OF BREATH: SOME OF THE TIME
DO YOU EVER COUGH UP ANY MUCUS OR PHLEGM?: YES, A FEW DAYS A WEEK OR MONTH
HAVE YOU SMOKED AT LEAST 100 CIGARETTES IN YOUR ENTIRE LIFE: NO/DON'T KNOW
DURING THE PAST 4 WEEKS HOW MUCH DID YOU FEEL SHORT OF BREATH: NONE/LITTLE OF THE TIME
COPD SCREENING SCORE: 3

## 2019-12-26 ASSESSMENT — COGNITIVE AND FUNCTIONAL STATUS - GENERAL
TURNING FROM BACK TO SIDE WHILE IN FLAT BAD: A LITTLE
DRESSING REGULAR UPPER BODY CLOTHING: A LITTLE
SUGGESTED CMS G CODE MODIFIER MOBILITY: CK
DRESSING REGULAR LOWER BODY CLOTHING: A LITTLE
MOVING TO AND FROM BED TO CHAIR: A LITTLE
PERSONAL GROOMING: A LITTLE
EATING MEALS: A LITTLE
TOILETING: A LOT
WALKING IN HOSPITAL ROOM: A LOT
MOVING FROM LYING ON BACK TO SITTING ON SIDE OF FLAT BED: A LITTLE
DAILY ACTIVITIY SCORE: 17
SUGGESTED CMS G CODE MODIFIER DAILY ACTIVITY: CK
HELP NEEDED FOR BATHING: A LITTLE
STANDING UP FROM CHAIR USING ARMS: A LITTLE
MOBILITY SCORE: 16
CLIMB 3 TO 5 STEPS WITH RAILING: A LOT

## 2019-12-26 ASSESSMENT — PATIENT HEALTH QUESTIONNAIRE - PHQ9
2. FEELING DOWN, DEPRESSED, IRRITABLE, OR HOPELESS: NOT AT ALL
1. LITTLE INTEREST OR PLEASURE IN DOING THINGS: NOT AT ALL
SUM OF ALL RESPONSES TO PHQ9 QUESTIONS 1 AND 2: 0

## 2019-12-26 ASSESSMENT — PAIN DESCRIPTION - DESCRIPTORS: DESCRIPTORS: SHARP

## 2019-12-26 NOTE — ED TRIAGE NOTES
"Chief Complaint   Patient presents with   • Chest Pain   • Shortness of Breath   • Weakness   States symptoms for 9 months but just  told today he has \"heart failure\".  Has no activity tolerance.  USes O2 at 2-3 L at home, mostly at night.    "

## 2019-12-26 NOTE — ASSESSMENT & PLAN NOTE
Multivessel disease in need of cabg. May need second opinion. Discussed with cardiac interventionists and low likelihood  of revascularization success with PCI.

## 2019-12-26 NOTE — ED NOTES
Pt made aware that surgery will not be able to happen until next week. If surgery is needed, pt upset at this update

## 2019-12-26 NOTE — TELEPHONE ENCOUNTER
Per Dr. Arciniega's request transferred pt to ER via wheelchair c/o chest pain and fluid retention. Report was given to ER triage nurse and patient was stable during transport.

## 2019-12-26 NOTE — ED NOTES
"Pt upset that his heart failure has not been fixed. Pt reports increased chest pain, fatigue, and decreased appetite for 9 months. States \"I am in a [place] jerk of dumb doctors\". Reports being sent here by his cardiologist for new heart failure   "

## 2019-12-26 NOTE — CONSULTS
Reason of Consult: Acute decompensated systolic heart failure and CAD    Consulting Physician: Dr. Chad Liao, ERP    HPI:  79-year-old male with a history of type 2 diabetes mellitus, hypertension recently discovered to have symptomatic systolic heart failure with an LVEF of 25% which is ischemic in nature with a  nondominant LCx, high-grade RCA, high-grade distal left main and proximal LAD stenosis with 2 diagonal stenoses in combination with moderate aortic stenosis.  He was appropriately referred to CABG with AVR is a class I indication.  He was declined by Dr. Hylton and Dr. Rivera.  He was declined for intervention by the original angiographer.  Since that time he said progressive heart failure symptoms and has become somewhat recalcitrant to diuretics.  He is about 25 pounds heavier than his baseline weight.  He is frustrated with the fact this is been taking many months since his original symptom onset 9 months ago.  In [facility] he is only been under cardiology care for less than 1 month, most of the delay came from the primary care side.  He is a current non-smoker does not drink excessively or do drugs and has no family history precocious CAD.    Past Medical History:   Diagnosis Date   • Arthritis    • Breath shortness     pt states short of breath 24/7   • CATARACT     removed bilat   • Diabetes     PT REPORTS DIABETES RESOLVED IN 2009. NO LONGER TAKES MEDS   • Heart murmur    • Heart valve disease    • Hiatus hernia syndrome    • Hyperlipidemia    • Hypertension     pt states well controlled on meds   • Kidney disease    • Pain 08-29-13    back, hips and bilat legs, 4/10   • Pain 6/22/2015    left humerus   • Pain 1/7/16    knees, back and shoulder   • Pneumonia 2007   • Ulcer 2014    Dr. Porter, gastroenterologist   • Unspecified hemorrhagic conditions     bruises easily       Social History     Socioeconomic History   • Marital status:      Spouse name: Not on file   • Number of  children: Not on file   • Years of education: Not on file   • Highest education level: Not on file   Occupational History   • Not on file   Social Needs   • Financial resource strain: Not on file   • Food insecurity:     Worry: Not on file     Inability: Not on file   • Transportation needs:     Medical: Not on file     Non-medical: Not on file   Tobacco Use   • Smoking status: Never Smoker   • Smokeless tobacco: Never Used   Substance and Sexual Activity   • Alcohol use: No     Alcohol/week: 0.0 oz   • Drug use: No   • Sexual activity: Yes   Lifestyle   • Physical activity:     Days per week: Not on file     Minutes per session: Not on file   • Stress: Not on file   Relationships   • Social connections:     Talks on phone: Not on file     Gets together: Not on file     Attends Zoroastrianism service: Not on file     Active member of club or organization: Not on file     Attends meetings of clubs or organizations: Not on file     Relationship status: Not on file   • Intimate partner violence:     Fear of current or ex partner: Not on file     Emotionally abused: Not on file     Physically abused: Not on file     Forced sexual activity: Not on file   Other Topics Concern   • Not on file   Social History Narrative   • Not on file       No current facility-administered medications on file prior to encounter.      Current Outpatient Medications on File Prior to Encounter   Medication Sig Dispense Refill   • Coenzyme Q10 (CO Q-10 PO) Take 1 Tab by mouth 2 Times a Day.     • ethyl chloride Aerosol Apply 1 Application to affected area(s) every evening as needed (pain).     • ezetimibe (ZETIA) 10 MG Tab Take 10 mg by mouth every morning.     • furosemide (LASIX) 40 MG Tab Take 40 mg by mouth every morning.     • glimepiride (AMARYL) 1 MG tablet Take 1 mg by mouth every evening.     • metoprolol SR (TOPROL XL) 25 MG TABLET SR 24 HR Take 25 mg by mouth every day.     • B Complex Vitamins (VITAMIN B COMPLEX PO) Take 1 Tab by mouth  every day.     • Cranberry 300 MG Tab Take 300 mg by mouth every morning.     • Garlic 1000 MG Cap Take 1,000 mg by mouth every morning.     • ferrous sulfate 325 (65 Fe) MG tablet Take 325 mg by mouth every day.     • L-Lysine 1000 MG Tab Take 1,000 mg by mouth every day.     • Probiotic Product (PROBIOTIC PO) Take 1 Cap by mouth every morning.     • Ascorbic Acid (VITAMIN C) 1000 MG Tab Take 1 Tab by mouth every day.     • vitamin e (VITAMIN E) 400 UNIT Cap Take 800 Units by mouth 2 times a day.     • Zinc 50 MG Cap Take 50 mg by mouth every day.     • sacubitril-valsartan (ENTRESTO) 24-26 MG Tab tablet Take 1 Tab by mouth 2 Times a Day. 60 Tab 3   • hydrocortisone 2.5 % Cream topical cream Apply 1 Application to affected area(s) 2 times a day as needed (rash). 28 g 1   • fluticasone (FLONASE) 50 MCG/ACT nasal spray Spray 2 Sprays in nose every day. 3 Bottle 3   • anastrozole (ARIMIDEX) 1 MG Tab Take 1 mg by mouth every day.     • VITAMIN B1-B12 INJ 1 Each by Injection route every 7 days. Thursday     • Testosterone Cypionate (DEPO-TESTOSTERONE IM) 1 Each by Intramuscular route every 7 days. Thursday     • vitamin D (CHOLECALCIFEROL) 1000 UNIT TABS Take 1,000 Units by mouth every day.     • GLUCOSAMINE SULFATE PO Take 1 Tab by mouth 2 Times a Day.     • Omega-3 Fatty Acids (FISH OIL PO) Take 2 Caps by mouth 2 Times a Day.         Current Facility-Administered Medications   Medication Dose Frequency Provider Last Rate Last Dose   • [START ON 12/27/2019] anastrozole (ARIMIDEX) tablet 1 mg  1 mg DAILY Alan Malcolm M.D.       • [START ON 12/27/2019] ezetimibe (ZETIA) tablet 10 mg  10 mg QAM Alan Malcolm M.D.       • glimepiride (AMARYL) tablet 1 mg  1 mg Q EVENING Alan Malcolm M.D.       • [START ON 12/27/2019] metoprolol SR (TOPROL XL) tablet 25 mg  25 mg DAILY Alan Malcolm M.D.       • sacubitril-valsartan (ENTRESTO) 24-26 MG tablet 1 Tab  1 Tab BID Alan Malcolm M.D.       • [START  ON 12/27/2019] senna-docusate (PERICOLACE or SENOKOT S) 8.6-50 MG per tablet 2 Tab  2 Tab BID Alan Malcolm M.D.        And   • polyethylene glycol/lytes (MIRALAX) PACKET 1 Packet  1 Packet QDAY PRN Alan Malcolm M.D.        And   • magnesium hydroxide (MILK OF MAGNESIA) suspension 30 mL  30 mL QDAY PRN Alan Malcolm M.D.        And   • bisacodyl (DULCOLAX) suppository 10 mg  10 mg QDAY PRN Alan Malcolm M.D.       • Respiratory Care per Protocol   Continuous RT Alan Malcolm M.D.       • heparin injection 5,000 Units  5,000 Units Q8HRS Alan Malcolm M.D.       • acetaminophen (TYLENOL) tablet 650 mg  650 mg Q6HRS PRN Alan Malcolm M.D.       • Pharmacy Consult Request ...Pain Management Review 1 Each  1 Each PHARMACY TO DOSE Alan Malcolm M.D.        And   • oxyCODONE immediate-release (ROXICODONE) tablet 2.5 mg  2.5 mg Q3HRS PRN Alan Malcolm M.D.        And   • oxyCODONE immediate-release (ROXICODONE) tablet 5 mg  5 mg Q3HRS PRN Alan Malcolm M.D.        And   • morphine (pf) 4 MG/ML injection 2 mg  2 mg Q3HRS PRN Alan Malcolm M.D.       • ondansetron (ZOFRAN) syringe/vial injection 4 mg  4 mg Q4HRS PRN Alan Malcolm M.D.       • ondansetron (ZOFRAN ODT) dispertab 4 mg  4 mg Q4HRS PRN Alan Malcolm M.D.       • insulin regular (HUMULIN R) injection 2-9 Units  2-9 Units 4X/DAY ACHSTEPHON Malcolm M.D.        And   • glucose 4 g chewable tablet 16 g  16 g Q15 MIN PRN Alan Malcolm M.D.        And   • DEXTROSE 10% BOLUS 250 mL  250 mL Q15 MIN PRN Alan Malcolm M.D.       • [START ON 12/27/2019] furosemide (LASIX) injection 40 mg  40 mg BID DIURETIC Alan Malcolm M.D.       Last reviewed on 12/26/2019 12:46 PM by Demian Mo M.D.      Statins [hmg-coa-r inhibitors] and Gabapentin    Family History   Problem Relation Age of Onset   • No Known Problems Sister    • No Known Problems Sister    • No Known Problems  "Sister    • Diabetes Other    • No Known Problems Mother    • No Known Problems Father        ROS: As per HPI all other systems reviewed and negative     Physical Exam   Blood pressure 122/85, pulse 79, temperature 36.8 °C (98.2 °F), temperature source Oral, resp. rate 18, height 1.854 m (6' 1\"), weight 113.4 kg (250 lb), SpO2 98 %.    Constitutional: Overweight.  Appears well-developed.   HENT: Normocephalic and atraumatic. No scleral icterus.   Neck: JVD present.   Cardiovascular: Normal rate. Exam reveals no gallop and no friction rub.  2/6 systolic ejection murmur heard.   Pulmonary/Chest: CTAB    Abdominal: S/NT/ND BS+   Musculoskeletal:  Pulses present. No atrophy. Strength normal.  Extremities: Exhibits 1-2+ lower extremity edema. No clubbing or cyanosis.   Skin: Skin is warm and dry.   Neuro: Non-focal, CN 2-12 intact grossly      Intake/Output Summary (Last 24 hours) at 12/26/2019 1513  Last data filed at 12/26/2019 1442  Gross per 24 hour   Intake --   Output 1050 ml   Net -1050 ml       Recent Labs     12/26/19  0955   WBC 6.3   RBC 6.75*   HEMOGLOBIN 15.6   HEMATOCRIT 52.7*   MCV 78.1*   MCH 23.1*   MCHC 29.6*   RDW 57.6*   PLATELETCT 202   MPV 9.8     Recent Labs     12/26/19  0955   SODIUM 139   POTASSIUM 4.3   CHLORIDE 104   CO2 27   GLUCOSE 165*   BUN 25*   CREATININE 1.56*   CALCIUM 9.7                       EKG (12/26/2019 ):  I have personally reviewed the EKG this visit and discussed with the patient. It shows sinus rhythm and incomplete left bundle branch block.    CORONARY ANGIOGRAM(12/11/2019):  Severe triple-vessel coronary artery disease with calcified 50% left main stenosis (iFR <0.8)    ECHO CONCLUSIONS (12/3/2019):  Left ventricle is severely dilated.  Severely reduced left ventricular systolic function.  Left ventricular ejection fraction is visually estimated to be 25%.  Global hypokinesis with regional variation.  Moderate aortic stenosis.  Trace mitral regurgitation.  Severely dilated " left atrium.  Mild tricuspid regurgitation.  Right ventricular systolic pressure is estimated to be 40  mmHg.  Normal pericardium without effusion.  Compared to the images of the study done on 7/29/2015  there has been   marked change in LV wall motion and function, moderate aortic stenosis   is also now present.       Imaging reviewed    Impressions:  1.  Acutely decompensated systolic heart failure, ischemic  2.  Ischemic cardiomyopathy, multivessel coronary disease  3.  Incomplete left bundle branch block  4.  Diabetes mellitus  5.  NYHA class III dyspnea on exertion related to systolic heart failure    Recommendations:  He has been declined for cardiothoracic surgical intervention.  He does have significant multivessel disease.  His reduced ejection fraction would make revascularization difficult but not impossible.  We discussed that there is a class I indication for surgery including AVR and multivessel bypass which is superior to PCI and TAVR.  Suggested and recommended a second opinion at an outside tertiary care facility.  In discussion he prefers to proceed with a high risk PCI if this is an option and subsequent evaluation for TAVR should it be necessary.  This is very reasonable.  In the meantime he has acutely decompensated heart failure and I recommend intravenous diuresis as already initiated in the emergency department and continuation of optimal medical therapy.    Thank you for this interesting consultation. It was my pleasure to see LIAN W Means III today.    Eliazar Queen MD, FACC, [facility]  Division of Interventional Cardiology  [facility]      Discussed with the referring physician and bedside nursing.    ADDENDUM:  Plan for staging his complex MVPCI with RCA PCI in AM if renal function stable followed by interval LM/LAD PCI as outpatient. Continue diuresis.

## 2019-12-26 NOTE — ED NOTES
Medication reconciliation updated and complete per pt at bedside  Allergies have been verified and updated   No oral ABX within the last 14 days  Pt home pharmacy:[facility]

## 2019-12-26 NOTE — ASSESSMENT & PLAN NOTE
ICM. NYHA III, stage C. Pending revasc and correction AS. Persists with symptoms. He is irate at multiple appointments and feels there is a delay in care. He is especially furious since visiting CTS as he was told he should have received a stent and was told not a surgical candidate. He is not satisfied with current control of symptoms. For these reasons, he will be admitted inpatient to optimize diuresis, titration of HF meds, and further cardiac TEAM evaluation for structural heart disease.

## 2019-12-26 NOTE — ED PROVIDER NOTES
ED Provider Note    CHIEF COMPLAINT  Chief Complaint   Patient presents with   • Chest Pain   • Shortness of Breath   • Weakness       HPI  LIAN More III is a 79 y.o. male who presents with shortness of breath and chest pain.  Patient has been having similar symptoms over the last 7 months.  Seems to be worsening.  He can barely get out of a chair and walk any significant distance.  He develops shortness of breath and chest pain.  Central aching nonradiating.  No tearing pain or pleuritic symptoms.  He is short of breath when he lays down.  Also wakes up at night short of breath.  He has not had a fever.  His legs are always swollen and they do not look much different.    Patient has a history of coronary artery disease.  He underwent angiogram earlier this month.  He was identified to have triple-vessel disease.  He has aortic valve stenosis.  He was referred to cardiothoracic surgery.  On review of the chart he was seen by Dr. Hylton who recommended against open valve repair or CABG and advised percutaneous intervention.  The patient had a follow-up with his cardiologist today.  Because of the severity of his symptoms he was referred to the ER.  At this point he does not have significant symptoms.    REVIEW OF SYSTEMS  As per HPI, otherwise a 10 point review of systems is negative    PAST MEDICAL HISTORY  Past Medical History:   Diagnosis Date   • Arthritis    • Breath shortness     pt states short of breath 24/7   • CATARACT     removed bilat   • Diabetes     PT REPORTS DIABETES RESOLVED IN 2009. NO LONGER TAKES MEDS   • Heart murmur    • Heart valve disease    • Hiatus hernia syndrome    • Hyperlipidemia    • Hypertension     pt states well controlled on meds   • Kidney disease    • Pain 08-29-13    back, hips and bilat legs, 4/10   • Pain 6/22/2015    left humerus   • Pain 1/7/16    knees, back and shoulder   • Pneumonia 2007   • Ulcer 2014    Dr. Porter, gastroenterologist   • Unspecified hemorrhagic  conditions     bruises easily       SOCIAL HISTORY  Social History     Tobacco Use   • Smoking status: Never Smoker   • Smokeless tobacco: Never Used   Substance Use Topics   • Alcohol use: No     Alcohol/week: 0.0 oz   • Drug use: No       SURGICAL HISTORY  Past Surgical History:   Procedure Laterality Date   • GASTROSCOPY N/A 1/8/2016    Procedure: GASTROSCOPY;  Surgeon: Deniz Sue M.D.;  Location: [facility];  Service:    • HUMERUS ORIF Left 6/25/2015    Procedure: HUMERUS ORIF/ PROXIMAL;  Surgeon: Cristal Guido M.D.;  Location: [facility];  Service:    • LUMBAR FUSION POSTERIOR  9/5/2013    Performed by Edith Sears M.D. at [facility]   • LUMBAR DECOMPRESSION  9/5/2013    Performed by Edith Sears M.D. at [facility]   • MASS EXCISION NEURO  9/5/2013    Performed by Edith Sears M.D. at [facility]   • RECOVERY  6/26/2012    Performed by [facility], IR-RECOVERY at [facility] SAME DAY [facility]   • DRAINAGE HEMATOMA  5/25/2012    Performed by DENIZ SUE at [facility]   • GASTRIC BAND LAPAROSCOPIC REVISION  5/11/2012    Performed by DENIZ SUE at [facility]   • LUMBAR FUSION POSTERIOR  3/26/2012    Performed by EDITH SEARS at [facility]   • LUMBAR DECOMPRESSION  3/26/2012    Performed by EDITH SEARS at [facility]   • INJ,EPIDURAL/LUMB/SAC SINGLE  3/19/2012    Performed by KRISTIN BALTAZAR at [facility]   • INJ,EPIDURAL/LUMB/SAC SINGLE  3/5/2012    Performed by KRISTIN BALTAZAR at [facility]   • GASTRIC BANDING LAPAROSCOPIC  3/24/2010    Performed by DENIZ SUE at [facility]   • HIATAL HERNIA REPAIR  3/24/2010    Performed by DENIZ SUE at [facility]   • KNEE ARTHROPLASTY TOTAL  2000    bilateral   • ABDOMINOPLASTY  1990       CURRENT MEDICATIONS  Home Medications     Reviewed by Rigoberto Loera (Pharmacy Tech)  "on 12/26/19 at 1127  Med List Status: Complete   Medication Last Dose Status   anastrozole (ARIMIDEX) 1 MG Tab 12/26/2019 Active   Ascorbic Acid (VITAMIN C) 1000 MG Tab 12/26/2019 Active   B Complex Vitamins (VITAMIN B COMPLEX PO) 12/26/2019 Active   Coenzyme Q10 (CO Q-10 PO) 12/26/2019 Active   Cranberry 300 MG Tab 12/26/2019 Active   ethyl chloride Aerosol <6months Active   ezetimibe (ZETIA) 10 MG Tab 12/26/2019 Active   ferrous sulfate 325 (65 Fe) MG tablet 12/26/2019 Active   fluticasone (FLONASE) 50 MCG/ACT nasal spray <last week Active   furosemide (LASIX) 40 MG Tab 12/26/2019 Active   Garlic 1000 MG Cap 12/26/2019 Active   glimepiride (AMARYL) 1 MG tablet 12/25/2019 Active   GLUCOSAMINE SULFATE PO 12/26/2019 Active   hydrocortisone 2.5 % Cream topical cream 12/24/2019 Active   L-Lysine 1000 MG Tab 12/26/2019 Active   metoprolol SR (TOPROL XL) 25 MG TABLET SR 24 HR 12/26/2019 Active   Omega-3 Fatty Acids (FISH OIL PO) 12/26/2019 Active   Probiotic Product (PROBIOTIC PO) 12/26/2019 Active   sacubitril-valsartan (ENTRESTO) 24-26 MG Tab tablet 12/26/2019 Active   Testosterone Cypionate (DEPO-TESTOSTERONE IM) 12/19/2019 Active   VITAMIN B1-B12 INJ 12/19/2019 Active   vitamin D (CHOLECALCIFEROL) 1000 UNIT TABS 12/26/2019 Active   vitamin e (VITAMIN E) 400 UNIT Cap 12/26/2019 Active   Zinc 50 MG Cap 12/26/2019 Active                ALLERGIES  Allergies   Allergen Reactions   • Statins [Hmg-Coa-R Inhibitors] Rash     Blisters     • Gabapentin Unspecified     Other reaction(s): Dizziness       PHYSICAL EXAM  VITAL SIGNS: /63   Pulse 68   Temp 36.8 °C (98.2 °F) (Oral)   Resp 15   Ht 1.854 m (6' 1\")   Wt 113.4 kg (250 lb)   SpO2 98%   BMI 32.98 kg/m²    Constitutional: Awake and alert  HENT:  Atraumatic, Normocephalic.Oropharynx dry mucus membranes, Nose normal inspection.   Eyes: Normal inspection  Neck: Supple  Cardiovascular: Normal heart rate, Normal rhythm.  Symmetric peripheral pulses.   Thorax & " Lungs: Decreased breath sounds.  Crackles both bases  Abdomen: Bowel sounds normal, soft, non-distended, nontender, no mass  Skin: Warm, Dry, No rash.   Back: No tenderness, No CVA tenderness.   Extremities: Fetal edema  Neurologic: Grossly normal   Psychiatric: Anxious appearing    RADIOLOGY/PROCEDURES  DX-CHEST-PORTABLE (1 VIEW)   Final Result      1.  Mild bibasilar atelectasis. No focal consolidation or pleural effusions.   2.  Borderline cardiomegaly.           Imaging is interpreted by radiologist    Labs:  Results for orders placed or performed during the hospital encounter of 12/26/19   CBC WITH DIFFERENTIAL   Result Value Ref Range    WBC 6.3 4.8 - 10.8 K/uL    RBC 6.75 (H) 4.70 - 6.10 M/uL    Hemoglobin 15.6 14.0 - 18.0 g/dL    Hematocrit 52.7 (H) 42.0 - 52.0 %    MCV 78.1 (L) 81.4 - 97.8 fL    MCH 23.1 (L) 27.0 - 33.0 pg    MCHC 29.6 (L) 33.7 - 35.3 g/dL    RDW 57.6 (H) 35.9 - 50.0 fL    Platelet Count 202 164 - 446 K/uL    MPV 9.8 9.0 - 12.9 fL    Neutrophils-Polys 66.70 44.00 - 72.00 %    Lymphocytes 22.80 22.00 - 41.00 %    Monocytes 4.40 0.00 - 13.40 %    Eosinophils 6.10 0.00 - 6.90 %    Basophils 0.00 0.00 - 1.80 %    Nucleated RBC 0.00 /100 WBC    Neutrophils (Absolute) 4.20 1.82 - 7.42 K/uL    Lymphs (Absolute) 1.44 1.00 - 4.80 K/uL    Monos (Absolute) 0.28 0.00 - 0.85 K/uL    Eos (Absolute) 0.38 0.00 - 0.51 K/uL    Baso (Absolute) 0.00 0.00 - 0.12 K/uL    NRBC (Absolute) 0.00 K/uL    Anisocytosis 2+     Microcytosis 2+    COMP METABOLIC PANEL   Result Value Ref Range    Sodium 139 135 - 145 mmol/L    Potassium 4.3 3.6 - 5.5 mmol/L    Chloride 104 96 - 112 mmol/L    Co2 27 20 - 33 mmol/L    Anion Gap 8.0 0.0 - 11.9    Glucose 165 (H) 65 - 99 mg/dL    Bun 25 (H) 8 - 22 mg/dL    Creatinine 1.56 (H) 0.50 - 1.40 mg/dL    Calcium 9.7 8.5 - 10.5 mg/dL    AST(SGOT) 18 12 - 45 U/L    ALT(SGPT) 16 2 - 50 U/L    Alkaline Phosphatase 44 30 - 99 U/L    Total Bilirubin 0.6 0.1 - 1.5 mg/dL    Albumin 4.1 3.2 -  4.9 g/dL    Total Protein 6.5 6.0 - 8.2 g/dL    Globulin 2.4 1.9 - 3.5 g/dL    A-G Ratio 1.7 g/dL   TROPONIN   Result Value Ref Range    Troponin T 36 (H) 6 - 19 ng/L   proBrain Natriuretic Peptide, NT   Result Value Ref Range    NT-proBNP 6512 (H) 0 - 125 pg/mL   ESTIMATED GFR   Result Value Ref Range    GFR If  52 (A) >60 mL/min/1.73 m 2    GFR If Non  43 (A) >60 mL/min/1.73 m 2   PLATELET ESTIMATE   Result Value Ref Range    Plt Estimation Normal    MORPHOLOGY   Result Value Ref Range    RBC Morphology Present     Polychromia 1+     Poikilocytosis 1+     Ovalocytes 1+    PERIPHERAL SMEAR REVIEW   Result Value Ref Range    Peripheral Smear Review see below    DIFFERENTIAL MANUAL   Result Value Ref Range    Manual Diff Status PERFORMED    EKG (NOW)   Result Value Ref Range    Report       [facility] Emergency Dept.    Test Date:  2019  Pt Name:    LIAN VILLA                    Department: ER  MRN:        [id number]                      Room:  Gender:     Male                         Technician: [id number]  :        [id number]                   Requested By:ER TRIAGE PROTOCOL  Order #:    [id number]                    Reading MD: GRETA URIBE MD    Measurements  Intervals                                Axis  Rate:       76                           P:          -30  WV:         232                          QRS:        -3  QRSD:       114                          T:          127  QT:         384  QTc:        432    Interpretive Statements  SINUS RHYTHM  FIRST DEGREE AV BLOCK  INCOMPLETE LEFT BUNDLE BRANCH BLOCK  Compared to ECG 2019 09:05:48  First degree AV block now present  Electronically Signed On 2019 13:18:21 PST by GRETA URIBE MD         Medications   furosemide (LASIX) injection 40 mg (40 mg Intravenous Given 19 1302)       COURSE & MEDICAL DECISION MAKING  Patient presents with history and physical consistent with congestive heart  failure.  Also having exertional dyspnea and angina is a possibility.  EKG does not show acute ischemia.    I reviewed the medical record.  Seen Dr. Hylton and advised against surgical procedure.  Advised percutaneous treatment.    I consulted Dr. Zurita back.  Reviewed the case.  He stated he would talk to his partner who sent the patient to the ER.    Work-up was initiated for congestive heart failure.  Patient does have a significantly elevated proBNP.  40 mg of Lasix was given.  .  Patient will be admitted to the hospitalist service for diuresis with plan for PCI.  I discussed this with the patient.    FINAL IMPRESSION  1.  Congestive heart failure  2.  Coronary artery disease      This dictation was created using voice recognition software. The accuracy of the dictation is limited to the abilities of the software.  The nursing notes were reviewed and certain aspects of this information were incorporated into this note.      Electronically signed by: Chad Liao, 12/26/2019 1:16 PM

## 2019-12-26 NOTE — PROGRESS NOTES
Cardiology Note    Chief Complaint   Patient presents with   • Hypertension       History of Present Illness: LIAN More III is a 79 y.o. male PMH HTN, HLD, DM2, CKD who presents for evaluation of ICM HFrEF 25%, low flow/low gradient at least mod AS, and multivessel CAD.     Denies orthopnea when using oxygen, but feels as though needs oxygen. Describes losing 5 lbs but doesn't feel much better. States still has dyspnea especially with exertion. Still has significantly decreased physical stamina. Still with LE edema, abd fullness.     Review of Systems   Constitution: Positive for decreased appetite. Negative for fever, malaise/fatigue, weight gain and weight loss.   HENT: Negative for congestion and nosebleeds.    Eyes: Negative for blurred vision.   Cardiovascular: Positive for dyspnea on exertion, leg swelling, orthopnea and paroxysmal nocturnal dyspnea. Negative for chest pain, claudication, irregular heartbeat, near-syncope, palpitations and syncope.   Respiratory: Positive for shortness of breath. Negative for cough.    Endocrine: Negative for cold intolerance and heat intolerance.   Skin: Negative for rash.   Musculoskeletal: Negative for back pain.   Gastrointestinal: Negative for abdominal pain, constipation, diarrhea, heartburn, melena, nausea and vomiting.   Genitourinary: Negative for dysuria, flank pain and hematuria.   Neurological: Negative for dizziness.   Psychiatric/Behavioral: Negative for altered mental status and depression.         Past Medical History:   Diagnosis Date   • Arthritis    • Breath shortness     pt states short of breath 24/7   • CATARACT     removed bilat   • Diabetes     PT REPORTS DIABETES RESOLVED IN 2009. NO LONGER TAKES MEDS   • Heart murmur    • Heart valve disease    • Hiatus hernia syndrome    • Hyperlipidemia    • Hypertension     pt states well controlled on meds   • Kidney disease    • Pain 08-29-13    back, hips and bilat legs, 4/10   • Pain 6/22/2015    left humerus    • Pain 1/7/16    knees, back and shoulder   • Pneumonia 2007   • Ulcer 2014    Dr. Porter, gastroenterologist   • Unspecified hemorrhagic conditions     bruises easily         Past Surgical History:   Procedure Laterality Date   • GASTROSCOPY N/A 1/8/2016    Procedure: GASTROSCOPY;  Surgeon: Deniz Sue M.D.;  Location: [facility];  Service:    • HUMERUS ORIF Left 6/25/2015    Procedure: HUMERUS ORIF/ PROXIMAL;  Surgeon: Cristal Guido M.D.;  Location: [facility];  Service:    • LUMBAR FUSION POSTERIOR  9/5/2013    Performed by Edith Sears M.D. at [facility]   • LUMBAR DECOMPRESSION  9/5/2013    Performed by Edith Sears M.D. at [facility]   • MASS EXCISION NEURO  9/5/2013    Performed by Edith Sears M.D. at [facility]   • [facility]2012    [facility]   • DRAINAGE HEMATOMA  5/25/2012    Performed by DENIZ SUE at [facility]   • GASTRIC BAND LAPAROSCOPIC REVISION  5/11/2012    Performed by DENIZ SUE at [facility]   • LUMBAR FUSION POSTERIOR  3/26/2012    Performed by EDITH SEARS at [facility]   • LUMBAR DECOMPRESSION  3/26/2012    Performed by EDITH SEARS at [facility]   • INJ,EPIDURAL/LUMB/SAC SINGLE  3/19/2012    Performed by KRISTIN BALTAZAR at [facility]   • INJ,EPIDURAL/LUMB/SAC SINGLE  3/5/2012    Performed by KRISTIN BALTAZAR at [facility]   • GASTRIC BANDING LAPAROSCOPIC  3/24/2010    Performed by DENIZ SUE at [facility]   • HIATAL HERNIA REPAIR  3/24/2010    Performed by DENIZ SUE at [facility]   • KNEE ARTHROPLASTY TOTAL  2000    bilateral   • ABDOMINOPLASTY  1990         No current facility-administered medications for this visit.      Current Outpatient Medications   Medication Sig Dispense Refill   • B Complex Vitamins (VITAMIN B COMPLEX  PO) Take 1 Tab by mouth every day.     • Cranberry 300 MG Tab Take 300 mg by mouth every morning.     • Garlic 1000 MG Cap Take 1,000 mg by mouth every morning.     • ferrous sulfate 325 (65 Fe) MG tablet Take 325 mg by mouth every day.     • L-Lysine 1000 MG Tab Take 1,000 mg by mouth every day.     • Probiotic Product (PROBIOTIC PO) Take 1 Cap by mouth every morning.     • Ascorbic Acid (VITAMIN C) 1000 MG Tab Take 1 Tab by mouth every day.     • vitamin e (VITAMIN E) 400 UNIT Cap Take 800 Units by mouth 2 times a day.     • Zinc 50 MG Cap Take 50 mg by mouth every day.     • sacubitril-valsartan (ENTRESTO) 24-26 MG Tab tablet Take 1 Tab by mouth 2 Times a Day. 60 Tab 3   • hydrocortisone 2.5 % Cream topical cream Apply 1 Application to affected area(s) 2 times a day as needed (rash). 28 g 1   • fluticasone (FLONASE) 50 MCG/ACT nasal spray Spray 2 Sprays in nose every day. 3 Bottle 3   • anastrozole (ARIMIDEX) 1 MG Tab Take 1 mg by mouth every day.     • VITAMIN B1-B12 INJ 1 Each by Injection route every 7 days. Thursday     • Testosterone Cypionate (DEPO-TESTOSTERONE IM) 1 Each by Intramuscular route every 7 days. Thursday     • vitamin D (CHOLECALCIFEROL) 1000 UNIT TABS Take 1,000 Units by mouth every day.     • GLUCOSAMINE SULFATE PO Take 1 Tab by mouth 2 Times a Day.     • Omega-3 Fatty Acids (FISH OIL PO) Take 2 Caps by mouth 2 Times a Day.     • Coenzyme Q10 (CO Q-10 PO) Take 1 Tab by mouth 2 Times a Day.     • ethyl chloride Aerosol Apply 1 Application to affected area(s) every evening as needed (pain).     • ezetimibe (ZETIA) 10 MG Tab Take 10 mg by mouth every morning.     • furosemide (LASIX) 40 MG Tab Take 40 mg by mouth every morning.     • glimepiride (AMARYL) 1 MG tablet Take 1 mg by mouth every evening.     • metoprolol SR (TOPROL XL) 25 MG TABLET SR 24 HR Take 25 mg by mouth every day.       Facility-Administered Medications Ordered in Other Visits   Medication Dose Route Frequency Provider Last  Rate Last Dose   • furosemide (LASIX) injection 40 mg  40 mg Intravenous Once Chad Liao M.D.             Allergies   Allergen Reactions   • Statins [Hmg-Coa-R Inhibitors] Rash     Blisters     • Gabapentin Unspecified     Other reaction(s): Dizziness         Family History   Problem Relation Age of Onset   • No Known Problems Sister    • No Known Problems Sister    • No Known Problems Sister    • Diabetes Other    • No Known Problems Mother    • No Known Problems Father          Social History     Socioeconomic History   • Marital status:      Spouse name: Not on file   • Number of children: Not on file   • Years of education: Not on file   • Highest education level: Not on file   Occupational History   • Not on file   Social Needs   • Financial resource strain: Not on file   • Food insecurity:     Worry: Not on file     Inability: Not on file   • Transportation needs:     Medical: Not on file     Non-medical: Not on file   Tobacco Use   • Smoking status: Never Smoker   • Smokeless tobacco: Never Used   Substance and Sexual Activity   • Alcohol use: No     Alcohol/week: 0.0 oz   • Drug use: No   • Sexual activity: Yes   Lifestyle   • Physical activity:     Days per week: Not on file     Minutes per session: Not on file   • Stress: Not on file   Relationships   • Social connections:     Talks on phone: Not on file     Gets together: Not on file     Attends Confucianist service: Not on file     Active member of club or organization: Not on file     Attends meetings of clubs or organizations: Not on file     Relationship status: Not on file   • Intimate partner violence:     Fear of current or ex partner: Not on file     Emotionally abused: Not on file     Physically abused: Not on file     Forced sexual activity: Not on file   Other Topics Concern   • Not on file   Social History Narrative   • Not on file         Physical Exam:  Ambulatory Vitals  /82 (BP Location: Left arm, Patient Position: Sitting,  BP Cuff Size: Adult)   Pulse 76   Ht 1.829 m (6')   Wt 114.4 kg (252 lb 3.3 oz)   SpO2 94%    BP Readings from Last 4 Encounters:   12/26/19 137/91   12/26/19 136/82   12/18/19 114/60   12/17/19 (!) 96/58     Weight/BMI:   Vitals:    12/26/19 0759   BP: 136/82   Weight: 114.4 kg (252 lb 3.3 oz)   Height: 1.829 m (6')    Body mass index is 34.21 kg/m².  Wt Readings from Last 4 Encounters:   12/26/19 113.4 kg (250 lb)   12/26/19 114.4 kg (252 lb 3.3 oz)   12/18/19 114.6 kg (252 lb 10.4 oz)   12/17/19 114.3 kg (252 lb)       Physical Exam   Constitutional: He is oriented to person, place, and time and well-developed, well-nourished, and in no distress. No distress.   HENT:   Head: Normocephalic and atraumatic.   Eyes: Pupils are equal, round, and reactive to light. Conjunctivae are normal.   Neck: Normal range of motion. Neck supple. No JVD present.   Cardiovascular: Normal rate, regular rhythm and intact distal pulses. Exam reveals no gallop and no friction rub.   Murmur heard.   Crescendo decrescendo systolic murmur is present with a grade of 3/6.  Pulmonary/Chest: Effort normal. No respiratory distress. He has no wheezes. He has rales. He exhibits no tenderness.   Abdominal: Soft. Bowel sounds are normal. He exhibits no distension.   Musculoskeletal:         General: Edema present.   Neurological: He is alert and oriented to person, place, and time.   Skin: Skin is warm and dry.   Psychiatric: Affect and judgment normal.       Lab Data Review:  Lab Results   Component Value Date/Time    CHOLSTRLTOT 135 09/04/2019 11:57 AM    LDL 77 09/04/2019 11:57 AM    HDL 35 (A) 09/04/2019 11:57 AM    TRIGLYCERIDE 117 09/04/2019 11:57 AM       Lab Results   Component Value Date/Time    SODIUM 139 12/26/2019 09:55 AM    POTASSIUM 4.3 12/26/2019 09:55 AM    CHLORIDE 104 12/26/2019 09:55 AM    CO2 27 12/26/2019 09:55 AM    GLUCOSE 165 (H) 12/26/2019 09:55 AM    BUN 25 (H) 12/26/2019 09:55 AM    CREATININE 1.56 (H) 12/26/2019  09:55 AM    CREATININE 1.18 02/08/2011 12:00 AM    BUNCREATRAT 16 02/08/2011 12:00 AM    GLOMRATE >59 02/08/2011 12:00 AM     Estimated Creatinine Clearance: 51.8 mL/min (A) (by C-G formula based on SCr of 1.56 mg/dL (H)).  Lab Results   Component Value Date/Time    ALKPHOSPHAT 44 12/26/2019 09:55 AM    ASTSGOT 18 12/26/2019 09:55 AM    ALTSGPT 16 12/26/2019 09:55 AM    TBILIRUBIN 0.6 12/26/2019 09:55 AM      Lab Results   Component Value Date/Time    WBC 6.3 12/26/2019 09:55 AM    WBC 4.5 02/08/2011 12:00 AM     Lab Results   Component Value Date/Time    HBA1C 6.5 (H) 09/12/2019 11:47 AM     No components found for: TROP      Cardiac Imaging and Procedures Review:      TTE 12/2019  CONCLUSIONS  Left ventricle is severely dilated.  Severely reduced left ventricular systolic function.  Left ventricular ejection fraction is visually estimated to be 25%.  Global hypokinesis with regional variation.  ** [severe aortic stenosis KHALIF 0.7 cm2, low flow SVi 21, low gradient mean 28 mmHg] **  Trace mitral regurgitation.  Severely dilated left atrium.  Mild tricuspid regurgitation.  Right ventricular systolic pressure is estimated to be 40  mmHg.  Normal pericardium without effusion.  Compared to the images of the study done on 7/29/2015  there has been   marked change in LV wall motion and function, moderate aortic stenosis   is also now present.     [facility] 12/11/19  Post-operative Diagnosis:   Severe triple-vessel coronary artery disease with calcified 50% left main stenosis (iFR <0.8)  pLAD 60-70% iFR 0.7, diag 90%, ramus 80-90%, LCx occluded, RCA 99% distal    Medical Decision Making:  Problem List Items Addressed This Visit     Hypertension (Chronic)     Goal <130/80. Will adjust regimen.         Dyslipidemia (Chronic)     Well controlled. Cont zetia. Will need statin.         Severe aortic stenosis     Low flow low gradient. Will need calcium quantification aortic valve in case falls to tavr. If surgical candidate can be  operated with mod AS given need for cabg.          Coronary artery disease involving native coronary artery of native heart without angina pectoris     Multivessel disease in need of cabg. May need second opinion. Discussed with cardiac interventionists and low likelihood  of revascularization success with PCI.          Acute on chronic systolic heart failure (HCC)     ICM. NYHA III, stage C. Pending revasc and correction AS. Persists with symptoms. He is irate at multiple appointments and feels there is a delay in care. He is especially furious since visiting CTS as he was told he should have received a stent and was told not a surgical candidate. He is not satisfied with current control of symptoms. For these reasons, he will be admitted inpatient to optimize diuresis, titration of HF meds, and further cardiac TEAM evaluation for structural heart disease.                 It was my pleasure to meet with Mr. More.

## 2019-12-26 NOTE — ASSESSMENT & PLAN NOTE
Low flow low gradient. Will need calcium quantification aortic valve in case falls to tavr. If surgical candidate can be operated with mod AS given need for cabg.

## 2019-12-27 ENCOUNTER — APPOINTMENT (OUTPATIENT)
Dept: CARDIOLOGY | Facility: MEDICAL CENTER | Age: 79
DRG: 246 | End: 2019-12-27
Attending: NURSE PRACTITIONER
Payer: MEDICARE

## 2019-12-27 LAB
ALBUMIN SERPL BCP-MCNC: 3.8 G/DL (ref 3.2–4.9)
ALBUMIN SERPL BCP-MCNC: 4.1 G/DL (ref 3.2–4.9)
ALBUMIN/GLOB SERPL: 1.6 G/DL
ALBUMIN/GLOB SERPL: 1.8 G/DL
ALP SERPL-CCNC: 40 U/L (ref 30–99)
ALP SERPL-CCNC: 46 U/L (ref 30–99)
ALT SERPL-CCNC: 13 U/L (ref 2–50)
ALT SERPL-CCNC: 17 U/L (ref 2–50)
ANION GAP SERPL CALC-SCNC: 11 MMOL/L (ref 0–11.9)
ANION GAP SERPL CALC-SCNC: 7 MMOL/L (ref 0–11.9)
APTT PPP: 36.7 SEC (ref 24.7–36)
AST SERPL-CCNC: 15 U/L (ref 12–45)
AST SERPL-CCNC: 18 U/L (ref 12–45)
BASOPHILS # BLD AUTO: 0.3 % (ref 0–1.8)
BASOPHILS # BLD: 0.02 K/UL (ref 0–0.12)
BILIRUB SERPL-MCNC: 0.7 MG/DL (ref 0.1–1.5)
BILIRUB SERPL-MCNC: 0.9 MG/DL (ref 0.1–1.5)
BUN SERPL-MCNC: 29 MG/DL (ref 8–22)
BUN SERPL-MCNC: 29 MG/DL (ref 8–22)
CALCIUM SERPL-MCNC: 9.4 MG/DL (ref 8.5–10.5)
CALCIUM SERPL-MCNC: 9.6 MG/DL (ref 8.5–10.5)
CHLORIDE SERPL-SCNC: 103 MMOL/L (ref 96–112)
CHLORIDE SERPL-SCNC: 103 MMOL/L (ref 96–112)
CHOLEST SERPL-MCNC: 105 MG/DL (ref 100–199)
CO2 SERPL-SCNC: 25 MMOL/L (ref 20–33)
CO2 SERPL-SCNC: 27 MMOL/L (ref 20–33)
CREAT SERPL-MCNC: 1.55 MG/DL (ref 0.5–1.4)
CREAT SERPL-MCNC: 1.66 MG/DL (ref 0.5–1.4)
EOSINOPHIL # BLD AUTO: 0.23 K/UL (ref 0–0.51)
EOSINOPHIL NFR BLD: 3.7 % (ref 0–6.9)
ERYTHROCYTE [DISTWIDTH] IN BLOOD BY AUTOMATED COUNT: 57.7 FL (ref 35.9–50)
ERYTHROCYTE [DISTWIDTH] IN BLOOD BY AUTOMATED COUNT: 57.8 FL (ref 35.9–50)
GLOBULIN SER CALC-MCNC: 2.1 G/DL (ref 1.9–3.5)
GLOBULIN SER CALC-MCNC: 2.5 G/DL (ref 1.9–3.5)
GLUCOSE BLD-MCNC: 117 MG/DL (ref 65–99)
GLUCOSE BLD-MCNC: 119 MG/DL (ref 65–99)
GLUCOSE BLD-MCNC: 124 MG/DL (ref 65–99)
GLUCOSE BLD-MCNC: 148 MG/DL (ref 65–99)
GLUCOSE SERPL-MCNC: 131 MG/DL (ref 65–99)
GLUCOSE SERPL-MCNC: 155 MG/DL (ref 65–99)
HCT VFR BLD AUTO: 49.6 % (ref 42–52)
HCT VFR BLD AUTO: 53.9 % (ref 42–52)
HDLC SERPL-MCNC: 29 MG/DL
HGB BLD-MCNC: 14.7 G/DL (ref 14–18)
HGB BLD-MCNC: 16.1 G/DL (ref 14–18)
IMM GRANULOCYTES # BLD AUTO: 0.03 K/UL (ref 0–0.11)
IMM GRANULOCYTES NFR BLD AUTO: 0.5 % (ref 0–0.9)
INR PPP: 1.09 (ref 0.87–1.13)
LDLC SERPL CALC-MCNC: 56 MG/DL
LYMPHOCYTES # BLD AUTO: 1.48 K/UL (ref 1–4.8)
LYMPHOCYTES NFR BLD: 23.6 % (ref 22–41)
MAGNESIUM SERPL-MCNC: 1.8 MG/DL (ref 1.5–2.5)
MCH RBC QN AUTO: 22.9 PG (ref 27–33)
MCH RBC QN AUTO: 23 PG (ref 27–33)
MCHC RBC AUTO-ENTMCNC: 29.6 G/DL (ref 33.7–35.3)
MCHC RBC AUTO-ENTMCNC: 29.9 G/DL (ref 33.7–35.3)
MCV RBC AUTO: 76.9 FL (ref 81.4–97.8)
MCV RBC AUTO: 77.4 FL (ref 81.4–97.8)
MONOCYTES # BLD AUTO: 0.77 K/UL (ref 0–0.85)
MONOCYTES NFR BLD AUTO: 12.3 % (ref 0–13.4)
NEUTROPHILS # BLD AUTO: 3.73 K/UL (ref 1.82–7.42)
NEUTROPHILS NFR BLD: 59.6 % (ref 44–72)
NRBC # BLD AUTO: 0 K/UL
NRBC BLD-RTO: 0 /100 WBC
NT-PROBNP SERPL IA-MCNC: 5539 PG/ML (ref 0–125)
PLATELET # BLD AUTO: 199 K/UL (ref 164–446)
PLATELET # BLD AUTO: 221 K/UL (ref 164–446)
PMV BLD AUTO: 10 FL (ref 9–12.9)
PMV BLD AUTO: 10.2 FL (ref 9–12.9)
POTASSIUM SERPL-SCNC: 4 MMOL/L (ref 3.6–5.5)
POTASSIUM SERPL-SCNC: 4.3 MMOL/L (ref 3.6–5.5)
PROT SERPL-MCNC: 5.9 G/DL (ref 6–8.2)
PROT SERPL-MCNC: 6.6 G/DL (ref 6–8.2)
PROTHROMBIN TIME: 14.3 SEC (ref 12–14.6)
RBC # BLD AUTO: 6.41 M/UL (ref 4.7–6.1)
RBC # BLD AUTO: 7.01 M/UL (ref 4.7–6.1)
SODIUM SERPL-SCNC: 137 MMOL/L (ref 135–145)
SODIUM SERPL-SCNC: 139 MMOL/L (ref 135–145)
TRIGL SERPL-MCNC: 101 MG/DL (ref 0–149)
WBC # BLD AUTO: 6.3 K/UL (ref 4.8–10.8)
WBC # BLD AUTO: 7 K/UL (ref 4.8–10.8)

## 2019-12-27 PROCEDURE — 027034Z DILATION OF CORONARY ARTERY, ONE ARTERY WITH DRUG-ELUTING INTRALUMINAL DEVICE, PERCUTANEOUS APPROACH: ICD-10-PCS | Performed by: INTERNAL MEDICINE

## 2019-12-27 PROCEDURE — 82962 GLUCOSE BLOOD TEST: CPT | Mod: 91

## 2019-12-27 PROCEDURE — 83735 ASSAY OF MAGNESIUM: CPT

## 2019-12-27 PROCEDURE — G0278 ILIAC ART ANGIO,CARDIAC CATH: HCPCS

## 2019-12-27 PROCEDURE — 700117 HCHG RX CONTRAST REV CODE 255: Performed by: INTERNAL MEDICINE

## 2019-12-27 PROCEDURE — 75630 X-RAY AORTA LEG ARTERIES: CPT | Mod: 26 | Performed by: INTERNAL MEDICINE

## 2019-12-27 PROCEDURE — C1760 CLOSURE DEV, VASC: HCPCS

## 2019-12-27 PROCEDURE — 92928 PRQ TCAT PLMT NTRAC ST 1 LES: CPT | Mod: RC | Performed by: INTERNAL MEDICINE

## 2019-12-27 PROCEDURE — 99152 MOD SED SAME PHYS/QHP 5/>YRS: CPT | Performed by: INTERNAL MEDICINE

## 2019-12-27 PROCEDURE — 85730 THROMBOPLASTIN TIME PARTIAL: CPT

## 2019-12-27 PROCEDURE — 80061 LIPID PANEL: CPT

## 2019-12-27 PROCEDURE — A9270 NON-COVERED ITEM OR SERVICE: HCPCS

## 2019-12-27 PROCEDURE — 80053 COMPREHEN METABOLIC PANEL: CPT | Mod: 91

## 2019-12-27 PROCEDURE — A9270 NON-COVERED ITEM OR SERVICE: HCPCS | Performed by: INTERNAL MEDICINE

## 2019-12-27 PROCEDURE — 700105 HCHG RX REV CODE 258: Performed by: NURSE PRACTITIONER

## 2019-12-27 PROCEDURE — 85025 COMPLETE CBC W/AUTO DIFF WBC: CPT

## 2019-12-27 PROCEDURE — 93454 CORONARY ARTERY ANGIO S&I: CPT | Mod: XU

## 2019-12-27 PROCEDURE — 700111 HCHG RX REV CODE 636 W/ 250 OVERRIDE (IP): Performed by: HOSPITALIST

## 2019-12-27 PROCEDURE — 700101 HCHG RX REV CODE 250

## 2019-12-27 PROCEDURE — 83880 ASSAY OF NATRIURETIC PEPTIDE: CPT

## 2019-12-27 PROCEDURE — B2111ZZ FLUOROSCOPY OF MULTIPLE CORONARY ARTERIES USING LOW OSMOLAR CONTRAST: ICD-10-PCS | Performed by: INTERNAL MEDICINE

## 2019-12-27 PROCEDURE — 93005 ELECTROCARDIOGRAM TRACING: CPT | Performed by: INTERNAL MEDICINE

## 2019-12-27 PROCEDURE — B41D1ZZ FLUOROSCOPY OF AORTA AND BILATERAL LOWER EXTREMITY ARTERIES USING LOW OSMOLAR CONTRAST: ICD-10-PCS | Performed by: INTERNAL MEDICINE

## 2019-12-27 PROCEDURE — 770020 HCHG ROOM/CARE - TELE (206)

## 2019-12-27 PROCEDURE — 700102 HCHG RX REV CODE 250 W/ 637 OVERRIDE(OP): Performed by: HOSPITALIST

## 2019-12-27 PROCEDURE — 85027 COMPLETE CBC AUTOMATED: CPT

## 2019-12-27 PROCEDURE — 700102 HCHG RX REV CODE 250 W/ 637 OVERRIDE(OP)

## 2019-12-27 PROCEDURE — 700102 HCHG RX REV CODE 250 W/ 637 OVERRIDE(OP): Performed by: INTERNAL MEDICINE

## 2019-12-27 PROCEDURE — 99233 SBSQ HOSP IP/OBS HIGH 50: CPT | Performed by: HOSPITALIST

## 2019-12-27 PROCEDURE — A9270 NON-COVERED ITEM OR SERVICE: HCPCS | Performed by: HOSPITALIST

## 2019-12-27 PROCEDURE — 97165 OT EVAL LOW COMPLEX 30 MIN: CPT

## 2019-12-27 PROCEDURE — 700111 HCHG RX REV CODE 636 W/ 250 OVERRIDE (IP)

## 2019-12-27 PROCEDURE — 700105 HCHG RX REV CODE 258: Performed by: INTERNAL MEDICINE

## 2019-12-27 PROCEDURE — 85610 PROTHROMBIN TIME: CPT

## 2019-12-27 PROCEDURE — 36415 COLL VENOUS BLD VENIPUNCTURE: CPT

## 2019-12-27 RX ORDER — MIDAZOLAM HYDROCHLORIDE 1 MG/ML
INJECTION INTRAMUSCULAR; INTRAVENOUS
Status: COMPLETED
Start: 2019-12-27 | End: 2019-12-27

## 2019-12-27 RX ORDER — BIVALIRUDIN 250 MG/5ML
INJECTION, POWDER, LYOPHILIZED, FOR SOLUTION INTRAVENOUS
Status: COMPLETED
Start: 2019-12-27 | End: 2019-12-27

## 2019-12-27 RX ORDER — ASPIRIN 300 MG/1
300 SUPPOSITORY RECTAL DAILY
Status: DISCONTINUED | OUTPATIENT
Start: 2019-12-28 | End: 2019-12-28

## 2019-12-27 RX ORDER — HEPARIN SODIUM,PORCINE 1000/ML
VIAL (ML) INJECTION
Status: COMPLETED
Start: 2019-12-27 | End: 2019-12-27

## 2019-12-27 RX ORDER — SODIUM CHLORIDE 9 MG/ML
INJECTION, SOLUTION INTRAVENOUS CONTINUOUS
Status: DISCONTINUED | OUTPATIENT
Start: 2019-12-27 | End: 2019-12-28

## 2019-12-27 RX ORDER — VERAPAMIL HYDROCHLORIDE 2.5 MG/ML
INJECTION, SOLUTION INTRAVENOUS
Status: DISPENSED
Start: 2019-12-27 | End: 2019-12-28

## 2019-12-27 RX ORDER — HEPARIN SODIUM 200 [USP'U]/100ML
INJECTION, SOLUTION INTRAVENOUS
Status: COMPLETED
Start: 2019-12-27 | End: 2019-12-27

## 2019-12-27 RX ORDER — CLOPIDOGREL BISULFATE 75 MG/1
75 TABLET ORAL DAILY
Status: DISCONTINUED | OUTPATIENT
Start: 2019-12-28 | End: 2019-12-29 | Stop reason: HOSPADM

## 2019-12-27 RX ORDER — SODIUM CHLORIDE 9 MG/ML
INJECTION, SOLUTION INTRAVENOUS CONTINUOUS
Status: DISCONTINUED | OUTPATIENT
Start: 2019-12-27 | End: 2019-12-27

## 2019-12-27 RX ORDER — ASPIRIN 81 MG/1
324 TABLET, CHEWABLE ORAL DAILY
Status: DISCONTINUED | OUTPATIENT
Start: 2019-12-28 | End: 2019-12-28

## 2019-12-27 RX ORDER — ASPIRIN 81 MG/1
TABLET, CHEWABLE ORAL
Status: COMPLETED
Start: 2019-12-27 | End: 2019-12-27

## 2019-12-27 RX ORDER — HEPARIN SODIUM 5000 [USP'U]/100ML
INJECTION, SOLUTION INTRAVENOUS
Status: COMPLETED
Start: 2019-12-27 | End: 2019-12-27

## 2019-12-27 RX ORDER — ASPIRIN 325 MG
325 TABLET ORAL DAILY
Status: DISCONTINUED | OUTPATIENT
Start: 2019-12-28 | End: 2019-12-28

## 2019-12-27 RX ORDER — CLOPIDOGREL 300 MG/1
600 TABLET, FILM COATED ORAL ONCE
Status: DISCONTINUED | OUTPATIENT
Start: 2019-12-27 | End: 2019-12-27

## 2019-12-27 RX ORDER — DIPHENHYDRAMINE HCL 25 MG
25 TABLET ORAL ONCE
Status: COMPLETED | OUTPATIENT
Start: 2019-12-27 | End: 2019-12-27

## 2019-12-27 RX ORDER — LIDOCAINE HYDROCHLORIDE 20 MG/ML
INJECTION, SOLUTION INFILTRATION; PERINEURAL
Status: COMPLETED
Start: 2019-12-27 | End: 2019-12-27

## 2019-12-27 RX ORDER — CLOPIDOGREL 300 MG/1
TABLET, FILM COATED ORAL
Status: COMPLETED
Start: 2019-12-27 | End: 2019-12-27

## 2019-12-27 RX ADMIN — FUROSEMIDE 40 MG: 10 INJECTION, SOLUTION INTRAVENOUS at 17:28

## 2019-12-27 RX ADMIN — HEPARIN SODIUM 5000 UNITS: 5000 INJECTION, SOLUTION INTRAVENOUS; SUBCUTANEOUS at 06:36

## 2019-12-27 RX ADMIN — FENTANYL CITRATE 50 MCG: 0.05 INJECTION, SOLUTION INTRAMUSCULAR; INTRAVENOUS at 16:53

## 2019-12-27 RX ADMIN — ASPIRIN 81 MG 324 MG: 81 TABLET ORAL at 16:58

## 2019-12-27 RX ADMIN — ANASTROZOLE 1 MG: 1 TABLET, COATED ORAL at 06:36

## 2019-12-27 RX ADMIN — SACUBITRIL AND VALSARTAN 1 TABLET: 24; 26 TABLET, FILM COATED ORAL at 17:28

## 2019-12-27 RX ADMIN — HEPARIN SODIUM 2000 UNITS: 200 INJECTION, SOLUTION INTRAVENOUS at 16:08

## 2019-12-27 RX ADMIN — SACUBITRIL AND VALSARTAN 1 TABLET: 24; 26 TABLET, FILM COATED ORAL at 06:39

## 2019-12-27 RX ADMIN — EZETIMIBE 10 MG: 10 TABLET ORAL at 06:35

## 2019-12-27 RX ADMIN — HEPARIN SODIUM: 1000 INJECTION, SOLUTION INTRAVENOUS; SUBCUTANEOUS at 16:08

## 2019-12-27 RX ADMIN — SENNOSIDES AND DOCUSATE SODIUM 2 TABLET: 8.6; 5 TABLET ORAL at 17:28

## 2019-12-27 RX ADMIN — METOPROLOL SUCCINATE 25 MG: 25 TABLET, EXTENDED RELEASE ORAL at 06:36

## 2019-12-27 RX ADMIN — LIDOCAINE HYDROCHLORIDE: 20 INJECTION, SOLUTION INFILTRATION; PERINEURAL at 16:08

## 2019-12-27 RX ADMIN — NITROGLYCERIN 10 ML: 20 INJECTION INTRAVENOUS at 16:08

## 2019-12-27 RX ADMIN — BIVALIRUDIN 250 MG: 250 INJECTION INTRACAVERNOUS at 16:50

## 2019-12-27 RX ADMIN — IOHEXOL 120 ML: 350 INJECTION, SOLUTION INTRAVENOUS at 16:51

## 2019-12-27 RX ADMIN — CLOPIDOGREL BISULFATE 600 MG: 300 TABLET, FILM COATED ORAL at 16:56

## 2019-12-27 RX ADMIN — FUROSEMIDE 40 MG: 10 INJECTION, SOLUTION INTRAVENOUS at 06:36

## 2019-12-27 RX ADMIN — BIVALIRUDIN 250 MG: 250 INJECTION INTRACAVERNOUS at 16:22

## 2019-12-27 RX ADMIN — DIPHENHYDRAMINE HYDROCHLORIDE 25 MG: 25 TABLET ORAL at 02:28

## 2019-12-27 RX ADMIN — SODIUM CHLORIDE: 9 INJECTION, SOLUTION INTRAVENOUS at 11:08

## 2019-12-27 RX ADMIN — SENNOSIDES AND DOCUSATE SODIUM 2 TABLET: 8.6; 5 TABLET ORAL at 06:36

## 2019-12-27 RX ADMIN — SODIUM CHLORIDE: 9 INJECTION, SOLUTION INTRAVENOUS at 21:24

## 2019-12-27 RX ADMIN — MIDAZOLAM HYDROCHLORIDE 1 MG: 1 INJECTION, SOLUTION INTRAMUSCULAR; INTRAVENOUS at 16:53

## 2019-12-27 ASSESSMENT — COGNITIVE AND FUNCTIONAL STATUS - GENERAL
SUGGESTED CMS G CODE MODIFIER DAILY ACTIVITY: CH
DAILY ACTIVITIY SCORE: 24

## 2019-12-27 ASSESSMENT — ENCOUNTER SYMPTOMS
BRUISES/BLEEDS EASILY: 0
BLURRED VISION: 0
HEMOPTYSIS: 0
MYALGIAS: 0
DIZZINESS: 0
NECK PAIN: 0
COUGH: 0
PALPITATIONS: 0
HEADACHES: 0
DEPRESSION: 0
FEVER: 0
ORTHOPNEA: 0
NAUSEA: 0
HEARTBURN: 0
VOMITING: 0
CHILLS: 0

## 2019-12-27 ASSESSMENT — ACTIVITIES OF DAILY LIVING (ADL): TOILETING: INDEPENDENT

## 2019-12-27 NOTE — PROGRESS NOTES
[facility] Medicine Daily Progress Note    Date of Service  12/27/2019    Chief Complaint  79 y.o. male admitted 12/26/2019 with acute chf exacerbation      Interval Problem Update  Patient is resting in bed, sob stable worse with exertion, no fever or chills, no chest pain.    Consultants/Specialty  cardio    Code Status  Full code    Disposition  Tbd.     Review of Systems  Review of Systems   Constitutional: Negative for chills and fever.   HENT: Negative for congestion and nosebleeds.    Eyes: Negative for blurred vision.   Respiratory: Negative for cough and hemoptysis.    Cardiovascular: Negative for chest pain, palpitations and orthopnea.   Gastrointestinal: Negative for heartburn, nausea and vomiting.   Genitourinary: Negative for dysuria and urgency.   Musculoskeletal: Negative for myalgias and neck pain.   Skin: Negative for itching.   Neurological: Negative for dizziness and headaches.   Endo/Heme/Allergies: Does not bruise/bleed easily.   Psychiatric/Behavioral: Negative for depression.        Physical Exam  Temp:  [36.4 °C (97.6 °F)-37.2 °C (99 °F)] 36.4 °C (97.6 °F)  Pulse:  [63-75] 75  Resp:  [14-17] 17  BP: (100-115)/(56-68) 104/56  SpO2:  [93 %-99 %] 95 %    Physical Exam  Vitals signs reviewed.   Constitutional:       Appearance: Normal appearance.   HENT:      Head: Normocephalic and atraumatic.      Nose: Nose normal.   Eyes:      General: No scleral icterus.     Extraocular Movements: Extraocular movements intact.      Conjunctiva/sclera: Conjunctivae normal.      Pupils: Pupils are equal, round, and reactive to light.   Neck:      Musculoskeletal: Normal range of motion and neck supple. No neck rigidity or muscular tenderness.   Cardiovascular:      Rate and Rhythm: Normal rate and regular rhythm.      Heart sounds: Murmur present.   Pulmonary:      Effort: Pulmonary effort is normal. No respiratory distress.      Breath sounds: Normal breath sounds. No wheezing.   Abdominal:      General: Bowel  sounds are normal. There is no distension.      Palpations: Abdomen is soft.      Tenderness: There is no tenderness.   Musculoskeletal: Normal range of motion.         General: No swelling.   Skin:     General: Skin is warm and dry.   Neurological:      General: No focal deficit present.      Mental Status: He is alert.      Cranial Nerves: No cranial nerve deficit.   Psychiatric:         Mood and Affect: Mood normal.         Fluids    Intake/Output Summary (Last 24 hours) at 12/27/2019 1429  Last data filed at 12/27/2019 1213  Gross per 24 hour   Intake 720 ml   Output 3650 ml   Net -2930 ml       Laboratory  Recent Labs     12/26/19  0955 12/27/19  0220 12/27/19  1001   WBC 6.3 6.3 7.0   RBC 6.75* 6.41* 7.01*   HEMOGLOBIN 15.6 14.7 16.1   HEMATOCRIT 52.7* 49.6 53.9*   MCV 78.1* 77.4* 76.9*   MCH 23.1* 22.9* 23.0*   MCHC 29.6* 29.6* 29.9*   RDW 57.6* 57.8* 57.7*   PLATELETCT 202 199 221   MPV 9.8 10.0 10.2     Recent Labs     12/26/19  0955 12/27/19  0220 12/27/19  1001   SODIUM 139 137 139   POTASSIUM 4.3 4.0 4.3   CHLORIDE 104 103 103   CO2 27 27 25   GLUCOSE 165* 131* 155*   BUN 25* 29* 29*   CREATININE 1.56* 1.55* 1.66*   CALCIUM 9.7 9.4 9.6     Recent Labs     12/27/19  1001   APTT 36.7*   INR 1.09         Recent Labs     12/27/19  0220   TRIGLYCERIDE 101   HDL 29*   LDL 56       Imaging  DX-CHEST-PORTABLE (1 VIEW)   Final Result      1.  Mild bibasilar atelectasis. No focal consolidation or pleural effusions.   2.  Borderline cardiomegaly.      CL-LEFT HEART CATHETERIZATION WITH POSSIBLE INTERVENTION    (Results Pending)        Assessment/Plan  Acute on chronic systolic heart failure (HCC)- (present on admission)  Assessment & Plan  Continue lasix, beta-blocker, Entresto    Severe aortic stenosis- (present on admission)  Assessment & Plan  Was deemed not a candidate for open valve repair, possible TAVR, revascularization is first now attempted,  Should get referred to the TAVR program at least for  evaluation  Continue monitoring.     Coronary artery disease involving native coronary artery of native heart without angina pectoris- (present on admission)  Assessment & Plan  Patient with multivessel severe disease, referred to surgery for possible intervention but was felt to be too high of a risk  Cardio consulted, cardiac cath today.       Systolic heart failure (HCC)- (present on admission)  Assessment & Plan  Recent evaluation development patient with ejection fraction of 25%  Cardio consulted  Continue lasix bid.     Chronic kidney disease- (present on admission)  Assessment & Plan  ckd stage 3, continue close monitoring.     Dyslipidemia- (present on admission)  Assessment & Plan  Continue statin    Hypertension- (present on admission)  Assessment & Plan  Continue monitoring.     Type 2 diabetes mellitus without complication, without long-term current use of insulin (HCC)- (present on admission)  Assessment & Plan  Continue close monitoring, SSI         VTE prophylaxis: heparin      Case and plan of care discussed with nurse staff  Case and plan of care discussed during multidisciplinary rounds.

## 2019-12-27 NOTE — ASSESSMENT & PLAN NOTE
ckd stage 3.  Acute on chronic creatinine increased to 1.7 holding Lasix, gentle IV hydration follow-up BMP in a.m.

## 2019-12-27 NOTE — ASSESSMENT & PLAN NOTE
Patient with multivessel severe disease, referred to surgery for possible intervention but was felt to be too high of a risk  Cardio consulted, s/p cardiac cath stent to RCA on 12/27/2019, plan for high risk left main PCI in a couple weeks after his renal has recovered

## 2019-12-27 NOTE — CARE PLAN
Problem: Bowel/Gastric:  Goal: Will not experience complications related to bowel motility  Outcome: PROGRESSING AS EXPECTED  Note:   Pt without complaints of bowel dysfunction at this time.     Problem: Skin Integrity  Goal: Risk for impaired skin integrity will decrease  Outcome: PROGRESSING AS EXPECTED  Note:   Skin breakdown prevention measures in place. Moisturizer offered at bedside; q2 turn prompting; encouraged mobilization

## 2019-12-27 NOTE — PROGRESS NOTES
2 RN skin check complete with Diego RN  Devices in place O2 tubing.  Skin assessed under devices yes, ears red/blanching.  Confirmed pressure ulcers found on n/a.  The following interventions in place grey foam pads, silicone O2 tubing, pillows to float elbows/heels, reminder to reposition.           Generalized redness/warmth  BL ears red/blanching  BL elbows pink/blanching  RLE dusky, edema 2+  LLE redness, shiny, edema 3+  BL  Heels red/boggy/slow to wilmer

## 2019-12-27 NOTE — THERAPY
Physical Therapy Contact Note    PT consult received and acknowledged. Per chart review patient pending cardiology POC. Will hold PT eval until POC established for accurate assessment of needs.    Digna Herrera, PT, DPT  [phone]

## 2019-12-27 NOTE — ASSESSMENT & PLAN NOTE
Was deemed not a candidate for open valve repair, possible TAVR, revascularization is first now attempted,  Should get referred to the TAVR program at least for evaluation

## 2019-12-27 NOTE — CARE PLAN
Problem: Safety  Goal: Will remain free from falls  Outcome: PROGRESSING AS EXPECTED  Note:   All appropriate fall precautions in place. Bed alarm on. Calls appropriately.     Problem: Infection  Goal: Will remain free from infection  Outcome: PROGRESSING AS EXPECTED  Note:   No active s/s of infection noted or reported.

## 2019-12-27 NOTE — THERAPY
"Occupational Therapy Evaluation completed.   Functional Status:  Supervision supine to sit.  Supervision sock management.  Pt stood and walked hallway without AD.  Pt's HR in 80's during session (pt had expressed concern it might get high).  Pt refused further ADL's and returned to supine supervised.  Pt initially argumentative and angry about OT eval.  As session progressed, pt became cooperative and pleasant and was motivated by talking about golf.  Plan of Care: Patient with no further skilled OT needs in the acute care setting at this time  Discharge Recommendations:  Equipment: No Equipment Needed. .Anticipate that the patient will have no further occupational therapy needs after discharge from the hospital.     Pt is 78 y/o M seen for OT evaluation.  Pt admitted with dyspnea and acute on chronic CHF.  Pt with hx of aortic stenosis, CKD, diabetes, and HTN.  Pt reports he is very active at baseline and has all needed AE.  Pt appears strong and can complete simple self care tasks without assistance at this time.  Pt educated on importance of OOB activity while he remains in house.  Pt with no further acute OT needs.     See \"Rehab Therapy-Acute\" Patient Summary Report for complete documentation.    "

## 2019-12-27 NOTE — CARE PLAN
Problem: Knowledge Deficit  Goal: Knowledge of disease process/condition, treatment plan, diagnostic tests, and medications will improve  Outcome: NOT MET     Problem: Bowel/Gastric:  Goal: Normal bowel function is maintained or improved  Outcome: PROGRESSING AS EXPECTED     Problem: Respiratory:  Goal: Respiratory status will improve  Outcome: PROGRESSING AS EXPECTED     Problem: Mobility  Goal: Risk for activity intolerance will decrease  Outcome: PROGRESSING SLOWER THAN EXPECTED     Problem: Communication  Goal: The ability to communicate needs accurately and effectively will improve  Outcome: MET     Problem: Safety  Goal: Will remain free from injury  Outcome: MET

## 2019-12-27 NOTE — H&P
Hospital Medicine History & Physical Note    Date of Service  12/26/2019    Primary Care Physician  Glenn Delaney M.D.    Consultants  Cardiology  Cardiothoracic surgery  Code Status  Full code    Chief Complaint  Dyspnea, dyspnea on exertion, congestive heart failure, coronary artery disease    History of Presenting Illness  79 y.o. male who presented 12/26/2019 with a recent complicated cardiac history, initially had complaints of dyspnea in March, eventually the patient underwent coronary artery evaluation by angiogram and was found to have multivessel diffuse disease, not initially thought to be a candidate for PCI, referred for cardiothoracic surgery eval in conjunction with findings of a aortic stenosis.  The patient was evaluated by Dr. Hylton and was felt to be too high of her risk for surgery, he was followed by cardiology and sent in now because of increasing symptoms of heart failure, dyspnea on exertion, chest pressure.  The patient here again seen by cardiology and extensive discussion was held in terms of further procedures and the patient is in agreement to undergo possible PCI to improve his coronary status making him possibly a candidate for future TAVR.  The patient is currently chest pain-free, he states that he cannot walk further than 25 yards without getting significantly dyspneic.  The case is discussed with cardiology as well    Review of Systems  Review of Systems   Constitutional: Positive for malaise/fatigue.   HENT: Negative.    Eyes: Negative.    Respiratory: Positive for shortness of breath.    Cardiovascular: Positive for chest pain, orthopnea and leg swelling.   Gastrointestinal: Negative.    Genitourinary: Negative.    Musculoskeletal: Positive for back pain.   Skin: Negative.    Neurological: Negative.    Endo/Heme/Allergies: Negative.    Psychiatric/Behavioral: The patient is nervous/anxious.    All other systems reviewed and are negative.      Past Medical History   has a past  medical history of Arthritis, Breath shortness, CATARACT, Diabetes, Heart murmur, Heart valve disease, Hiatus hernia syndrome, Hyperlipidemia, Hypertension, Kidney disease, Pain (08-29-13), Pain (6/22/2015), Pain (1/7/16), Pneumonia (2007), Ulcer (2014), and Unspecified hemorrhagic conditions.    Surgical History   has a past surgical history that includes abdominoplasty (1990); gastric banding laparoscopic (3/24/2010); hiatal hernia repair (3/24/2010); inj,epidural/lumb/sac single (3/5/2012); inj,epidural/lumb/sac single (3/19/2012); lumbar fusion posterior (3/26/2012); lumbar decompression (3/26/2012); gastric band laparoscopic revision (5/11/2012); drainage hematoma (5/25/2012); recovery (6/26/2012); lumbar fusion posterior (9/5/2013); lumbar decompression (9/5/2013); mass excision neuro (9/5/2013); gastroscopy (N/A, 1/8/2016); knee arthroplasty total (2000); and humerus orif (Left, 6/25/2015).     Family History  family history includes Diabetes in an other family member; No Known Problems in his father, mother, sister, sister, and sister.     Social History   reports that he has never smoked. He has never used smokeless tobacco. He reports that he does not drink alcohol or use drugs.    Allergies  Allergies   Allergen Reactions   • Statins [Hmg-Coa-R Inhibitors] Rash     Blisters     • Gabapentin Unspecified     Other reaction(s): Dizziness       Medications  Prior to Admission Medications   Prescriptions Last Dose Informant Patient Reported? Taking?   Ascorbic Acid (VITAMIN C) 1000 MG Tab 12/26/2019 at 0700 Patient Yes No   Sig: Take 1 Tab by mouth every day.   B Complex Vitamins (VITAMIN B COMPLEX PO) 12/26/2019 at 0700 Patient Yes No   Sig: Take 1 Tab by mouth every day.   Coenzyme Q10 (CO Q-10 PO) 12/26/2019 at 0700 Patient Yes Yes   Sig: Take 1 Tab by mouth 2 Times a Day.   Cranberry 300 MG Tab 12/26/2019 at 0700 Patient Yes No   Sig: Take 300 mg by mouth every morning.   GLUCOSAMINE SULFATE PO 12/26/2019  at 0700  Patient Yes No   Sig: Take 1 Tab by mouth 2 Times a Day.   Garlic 1000 MG Cap 2019 at 0700 Patient Yes No   Sig: Take 1,000 mg by mouth every morning.   L-Lysine 1000 MG Tab 2019 at 0700 Patient Yes No   Sig: Take 1,000 mg by mouth every day.   Omega-3 Fatty Acids (FISH OIL PO) 2019 at 0700 Patient Yes No   Sig: Take 2 Caps by mouth 2 Times a Day.   Probiotic Product (PROBIOTIC PO) 2019 at 0700 Patient Yes No   Sig: Take 1 Cap by mouth every morning.   Testosterone Cypionate (DEPO-TESTOSTERONE IM) 2019 at am Patient Yes No   Si Each by Intramuscular route every 7 days. Thursday   VITAMIN B1-B12 INJ 2019 at am Patient Yes No   Si Each by Injection route every 7 days. Thursday   Zinc 50 MG Cap 2019 at 0700 Patient Yes No   Sig: Take 50 mg by mouth every day.   anastrozole (ARIMIDEX) 1 MG Tab 2019 at 0700 Patient Yes No   Sig: Take 1 mg by mouth every day.   ethyl chloride Aerosol <6months at prn Patient Yes Yes   Sig: Apply 1 Application to affected area(s) every evening as needed (pain).   ezetimibe (ZETIA) 10 MG Tab 2019 at 0700 Patient Yes Yes   Sig: Take 10 mg by mouth every morning.   ferrous sulfate 325 (65 Fe) MG tablet 2019 at 0700 Patient Yes No   Sig: Take 325 mg by mouth every day.   fluticasone (FLONASE) 50 MCG/ACT nasal spray <last week at prn Patient No No   Sig: Spray 2 Sprays in nose every day.   furosemide (LASIX) 40 MG Tab 2019 at 0700 Patient Yes Yes   Sig: Take 40 mg by mouth every morning.   glimepiride (AMARYL) 1 MG tablet 2019 at pm Patient Yes Yes   Sig: Take 1 mg by mouth every evening.   hydrocortisone 2.5 % Cream topical cream 2019 at prn Patient No No   Sig: Apply 1 Application to affected area(s) 2 times a day as needed (rash).   metoprolol SR (TOPROL XL) 25 MG TABLET SR 24 HR 2019 at 0700 Patient Yes Yes   Sig: Take 25 mg by mouth every day.   sacubitril-valsartan (ENTRESTO) 24-26 MG Tab  tablet 12/26/2019 at 0700 Patient No No   Sig: Take 1 Tab by mouth 2 Times a Day.   vitamin D (CHOLECALCIFEROL) 1000 UNIT TABS 12/26/2019 at 0700 Patient Yes No   Sig: Take 1,000 Units by mouth every day.   vitamin e (VITAMIN E) 400 UNIT Cap 12/26/2019 at 0700 Patient Yes No   Sig: Take 800 Units by mouth 2 times a day.      Facility-Administered Medications: None       Physical Exam  Temp:  [36.7 °C (98 °F)-36.8 °C (98.2 °F)] 36.7 °C (98 °F)  Pulse:  [68-79] 75  Resp:  [14-24] 14  BP: (104-137)/(63-91) 106/65  SpO2:  [92 %-98 %] 93 %    Physical Exam  Vitals signs and nursing note reviewed.   Constitutional:       Appearance: He is well-developed.   HENT:      Head: Normocephalic.   Eyes:      Pupils: Pupils are equal, round, and reactive to light.   Neck:      Musculoskeletal: Normal range of motion.   Cardiovascular:      Rate and Rhythm: Normal rate and regular rhythm.      Heart sounds: Murmur present.   Pulmonary:      Effort: Pulmonary effort is normal.      Breath sounds: Rhonchi and rales present.   Abdominal:      General: Bowel sounds are normal.      Palpations: Abdomen is soft.   Genitourinary:     Penis: Normal.       Rectum: Normal.   Musculoskeletal: Normal range of motion.         General: Swelling present.   Skin:     General: Skin is warm.   Neurological:      Mental Status: He is alert and oriented to person, place, and time.         Laboratory:  Recent Labs     12/26/19  0955   WBC 6.3   RBC 6.75*   HEMOGLOBIN 15.6   HEMATOCRIT 52.7*   MCV 78.1*   MCH 23.1*   MCHC 29.6*   RDW 57.6*   PLATELETCT 202   MPV 9.8     Recent Labs     12/26/19  0955   SODIUM 139   POTASSIUM 4.3   CHLORIDE 104   CO2 27   GLUCOSE 165*   BUN 25*   CREATININE 1.56*   CALCIUM 9.7     Recent Labs     12/26/19  0955   ALTSGPT 16   ASTSGOT 18   ALKPHOSPHAT 44   TBILIRUBIN 0.6   GLUCOSE 165*         Recent Labs     12/26/19  0955   NTPROBNP 6512*         Recent Labs     12/26/19  0955   TROPONINT 36*       Urinalysis:    No  results found     Imaging:  DX-CHEST-PORTABLE (1 VIEW)   Final Result      1.  Mild bibasilar atelectasis. No focal consolidation or pleural effusions.   2.  Borderline cardiomegaly.            Assessment/Plan:  I anticipate this patient will require at least two midnights for appropriate medical management, necessitating inpatient admission.    Acute on chronic systolic heart failure (HCC)- (present on admission)  Assessment & Plan  Treat with increased diuretics, beta-blocker, Entresto    Severe aortic stenosis- (present on admission)  Assessment & Plan  Was deemed not a candidate for open valve repair, possible TAVR, revascularization is first now attempted,  Should get referred to the TAVR program at least for evaluation    Coronary artery disease involving native coronary artery of native heart without angina pectoris- (present on admission)  Assessment & Plan  Patient with multivessel severe disease, referred to surgery for possible intervention but was felt to be too high of a risk  Again referred to cardiology for evaluation, discussed with the current cardiologist and he will attempt PCI      Systolic heart failure (HCC)- (present on admission)  Assessment & Plan  Recent evaluation development patient with ejection fraction of 25%  Adjust diuretics for improved symptoms at this time  Cardiology evaluation    Chronic kidney disease- (present on admission)  Assessment & Plan  Monitor closely with adjusted diuretics    Dyslipidemia- (present on admission)  Assessment & Plan  Continue statin    Hypertension- (present on admission)  Assessment & Plan  Continue with control, might need to adjust medication since the patient is getting more aggressively diuresed    Type 2 diabetes mellitus without complication, without long-term current use of insulin (HCC)- (present on admission)  Assessment & Plan  Attempt tight control while in the hospital    Plan  This is a rather complicated Cardiologic issue in conjunction  with multivessel coronary disease and aortic stenosis, felt to be too high of a surgical risk for coronary artery bypass grafting and valve repair, evaluated by 2 different cardiac surgeons at this time,  Patient is referred to cardiology for further evaluation and a PCI will be attempted  Patient will be medically optimized, increased diuretics at this time  The [facility] also [facility]  With all further details refer to the orders and additional epic documentation  Patient is medically complex high risk  See orders      VTE prophylaxis: Heparin

## 2019-12-27 NOTE — ASSESSMENT & PLAN NOTE
Recent evaluation development patient with ejection fraction of 25%  Cardio consulted  Holding Lasix due to acute kidney injury

## 2019-12-27 NOTE — PROGRESS NOTES
Received bedside report from Slivia HERNANDEZ, assumed patient care. Patient is A&Ox4 and denies any pain at this time. Respirations regular and non-labored on 3L NC. Tele monitor on and verified. POC reviewed with verbalized understanding. All appropriate fall precautions are in place. Bed in low position, side rails up x2, bed alarm on and call light within reach. Hourly rounding initiated.

## 2019-12-28 LAB
ANION GAP SERPL CALC-SCNC: 9 MMOL/L (ref 0–11.9)
BASOPHILS # BLD AUTO: 0.2 % (ref 0–1.8)
BASOPHILS # BLD: 0.01 K/UL (ref 0–0.12)
BUN SERPL-MCNC: 32 MG/DL (ref 8–22)
CALCIUM SERPL-MCNC: 9.3 MG/DL (ref 8.5–10.5)
CHLORIDE SERPL-SCNC: 103 MMOL/L (ref 96–112)
CO2 SERPL-SCNC: 28 MMOL/L (ref 20–33)
CREAT SERPL-MCNC: 1.74 MG/DL (ref 0.5–1.4)
EKG IMPRESSION: NORMAL
EKG IMPRESSION: NORMAL
EOSINOPHIL # BLD AUTO: 0.23 K/UL (ref 0–0.51)
EOSINOPHIL NFR BLD: 3.5 % (ref 0–6.9)
ERYTHROCYTE [DISTWIDTH] IN BLOOD BY AUTOMATED COUNT: 58.2 FL (ref 35.9–50)
GLUCOSE BLD-MCNC: 117 MG/DL (ref 65–99)
GLUCOSE BLD-MCNC: 131 MG/DL (ref 65–99)
GLUCOSE BLD-MCNC: 165 MG/DL (ref 65–99)
GLUCOSE SERPL-MCNC: 122 MG/DL (ref 65–99)
HCT VFR BLD AUTO: 53.8 % (ref 42–52)
HGB BLD-MCNC: 15.8 G/DL (ref 14–18)
IMM GRANULOCYTES # BLD AUTO: 0.02 K/UL (ref 0–0.11)
IMM GRANULOCYTES NFR BLD AUTO: 0.3 % (ref 0–0.9)
LYMPHOCYTES # BLD AUTO: 1.43 K/UL (ref 1–4.8)
LYMPHOCYTES NFR BLD: 22 % (ref 22–41)
MCH RBC QN AUTO: 22.5 PG (ref 27–33)
MCHC RBC AUTO-ENTMCNC: 29.4 G/DL (ref 33.7–35.3)
MCV RBC AUTO: 76.7 FL (ref 81.4–97.8)
MONOCYTES # BLD AUTO: 0.86 K/UL (ref 0–0.85)
MONOCYTES NFR BLD AUTO: 13.3 % (ref 0–13.4)
NEUTROPHILS # BLD AUTO: 3.94 K/UL (ref 1.82–7.42)
NEUTROPHILS NFR BLD: 60.7 % (ref 44–72)
NRBC # BLD AUTO: 0 K/UL
NRBC BLD-RTO: 0 /100 WBC
PLATELET # BLD AUTO: 212 K/UL (ref 164–446)
PMV BLD AUTO: 9.6 FL (ref 9–12.9)
POTASSIUM SERPL-SCNC: 4 MMOL/L (ref 3.6–5.5)
RBC # BLD AUTO: 7.01 M/UL (ref 4.7–6.1)
SODIUM SERPL-SCNC: 140 MMOL/L (ref 135–145)
WBC # BLD AUTO: 6.5 K/UL (ref 4.8–10.8)

## 2019-12-28 PROCEDURE — 97161 PT EVAL LOW COMPLEX 20 MIN: CPT

## 2019-12-28 PROCEDURE — 85025 COMPLETE CBC W/AUTO DIFF WBC: CPT

## 2019-12-28 PROCEDURE — A9270 NON-COVERED ITEM OR SERVICE: HCPCS | Performed by: HOSPITALIST

## 2019-12-28 PROCEDURE — 80048 BASIC METABOLIC PNL TOTAL CA: CPT

## 2019-12-28 PROCEDURE — 93010 ELECTROCARDIOGRAM REPORT: CPT | Mod: 76 | Performed by: INTERNAL MEDICINE

## 2019-12-28 PROCEDURE — 36415 COLL VENOUS BLD VENIPUNCTURE: CPT

## 2019-12-28 PROCEDURE — 700102 HCHG RX REV CODE 250 W/ 637 OVERRIDE(OP): Performed by: HOSPITALIST

## 2019-12-28 PROCEDURE — 93005 ELECTROCARDIOGRAM TRACING: CPT | Performed by: INTERNAL MEDICINE

## 2019-12-28 PROCEDURE — A9270 NON-COVERED ITEM OR SERVICE: HCPCS | Performed by: INTERNAL MEDICINE

## 2019-12-28 PROCEDURE — 99232 SBSQ HOSP IP/OBS MODERATE 35: CPT | Performed by: HOSPITALIST

## 2019-12-28 PROCEDURE — 93010 ELECTROCARDIOGRAM REPORT: CPT | Performed by: INTERNAL MEDICINE

## 2019-12-28 PROCEDURE — 700111 HCHG RX REV CODE 636 W/ 250 OVERRIDE (IP): Performed by: HOSPITALIST

## 2019-12-28 PROCEDURE — 700105 HCHG RX REV CODE 258: Performed by: INTERNAL MEDICINE

## 2019-12-28 PROCEDURE — 82962 GLUCOSE BLOOD TEST: CPT | Mod: 91

## 2019-12-28 PROCEDURE — 770020 HCHG ROOM/CARE - TELE (206)

## 2019-12-28 PROCEDURE — 700102 HCHG RX REV CODE 250 W/ 637 OVERRIDE(OP): Performed by: INTERNAL MEDICINE

## 2019-12-28 PROCEDURE — 99233 SBSQ HOSP IP/OBS HIGH 50: CPT | Performed by: INTERNAL MEDICINE

## 2019-12-28 RX ORDER — FUROSEMIDE 10 MG/ML
40 INJECTION INTRAMUSCULAR; INTRAVENOUS
Status: DISCONTINUED | OUTPATIENT
Start: 2019-12-29 | End: 2019-12-29

## 2019-12-28 RX ORDER — FUROSEMIDE 10 MG/ML
40 INJECTION INTRAMUSCULAR; INTRAVENOUS
Status: DISCONTINUED | OUTPATIENT
Start: 2019-12-28 | End: 2019-12-28

## 2019-12-28 RX ADMIN — SACUBITRIL AND VALSARTAN 1 TABLET: 24; 26 TABLET, FILM COATED ORAL at 18:12

## 2019-12-28 RX ADMIN — FUROSEMIDE 40 MG: 10 INJECTION, SOLUTION INTRAVENOUS at 05:45

## 2019-12-28 RX ADMIN — ASPIRIN 325 MG: 325 TABLET, FILM COATED ORAL at 05:45

## 2019-12-28 RX ADMIN — CLOPIDOGREL BISULFATE 75 MG: 75 TABLET ORAL at 05:46

## 2019-12-28 RX ADMIN — INSULIN HUMAN 2 UNITS: 100 INJECTION, SOLUTION PARENTERAL at 11:19

## 2019-12-28 RX ADMIN — METOPROLOL SUCCINATE 25 MG: 25 TABLET, EXTENDED RELEASE ORAL at 05:45

## 2019-12-28 RX ADMIN — ANASTROZOLE 1 MG: 1 TABLET, COATED ORAL at 06:38

## 2019-12-28 RX ADMIN — SACUBITRIL AND VALSARTAN 1 TABLET: 24; 26 TABLET, FILM COATED ORAL at 06:39

## 2019-12-28 RX ADMIN — SODIUM CHLORIDE: 9 INJECTION, SOLUTION INTRAVENOUS at 06:41

## 2019-12-28 RX ADMIN — EZETIMIBE 10 MG: 10 TABLET ORAL at 05:45

## 2019-12-28 ASSESSMENT — ENCOUNTER SYMPTOMS
SHORTNESS OF BREATH: 1
NECK PAIN: 0
HEARTBURN: 0
ABDOMINAL PAIN: 0
PALPITATIONS: 0
FEVER: 0
BRUISES/BLEEDS EASILY: 0
SPUTUM PRODUCTION: 0
NAUSEA: 0
DIZZINESS: 0
SHORTNESS OF BREATH: 0
BLURRED VISION: 0
DEPRESSION: 0
MYALGIAS: 0
CHILLS: 0
CHEST TIGHTNESS: 0
COUGH: 0
HEADACHES: 0
VOMITING: 0
WHEEZING: 0

## 2019-12-28 ASSESSMENT — COGNITIVE AND FUNCTIONAL STATUS - GENERAL
MOBILITY SCORE: 22
SUGGESTED CMS G CODE MODIFIER MOBILITY: CJ
WALKING IN HOSPITAL ROOM: A LITTLE
CLIMB 3 TO 5 STEPS WITH RAILING: A LITTLE

## 2019-12-28 ASSESSMENT — GAIT ASSESSMENTS
DISTANCE (FEET): 350
GAIT LEVEL OF ASSIST: SUPERVISED
DEVIATION: ATAXIC

## 2019-12-28 NOTE — PROGRESS NOTES
Cardiology Follow Up Progress Note    Date of Service  12/28/2019    Attending Physician  LUX Bar.*    Chief Complaint   Acute decompensated systolic heart failure and CAD    HPI  LIAN More III is a 79 y.o. male admitted 12/26/2019 with HTN, DM 2, recently discovered HFrEF 25%, ischemic in nature with  nondominant LCx, high-grade RCA, high-grade distal left main, proximal LAD stenosis with 2 diagonals stenoses in combination with moderate AR.  He was declined for CABG and AVR.  Since that time patient's heart failure symptoms have progressed he is about 25 pounds heavier from his baseline weight.    Interim Events  12/29/2019: Patient doing well.  No issues with heart rhythm overnight.  Patient denies chest pain, palpitations.  Has ongoing lower extremity edema.  Has shortness of breath and will be discharged today on a 6 L of oxygen with ambulation.  Will refer patient to sleep studies for further evaluation of his obstructive sleep apnea.  12/28/2019: Cardiac catheterization 12/27/2019 with 3.5 x 28 mm Synergy BRANDI placed to dRCA.  Plan is for staged PCI later this week.  Patient denies chest discomfort however has issues with orthopnea and SOB.  Lower extremity edema has improved.    SBP 100s, Telemetry: SR 70s    Review of Systems  Review of Systems   Constitutional: Negative for chills and fever.   Respiratory: Negative for cough, chest tightness, shortness of breath and wheezing.    Cardiovascular: Positive for leg swelling. Negative for chest pain and palpitations.   Gastrointestinal: Negative for nausea and vomiting.   Neurological: Negative for dizziness and headaches.   All other systems reviewed and are negative.      Vital signs in last 24 hours  Temp:  [36.1 °C (97 °F)-37.2 °C (98.9 °F)] 36.9 °C (98.4 °F)  Pulse:  [66-81] 70  Resp:  [16-18] 16  BP: (100-142)/(67-95) 106/67  SpO2:  [92 %-98 %] 96 %    Physical Exam  Physical Exam  Vitals signs and nursing note reviewed.    Constitutional:       Appearance: Normal appearance.   HENT:      Head: Normocephalic and atraumatic.      Mouth/Throat:      Mouth: Mucous membranes are moist.   Eyes:      Extraocular Movements: Extraocular movements intact.   Neck:      Musculoskeletal: Normal range of motion and neck supple.      Comments: No JVD  Cardiovascular:      Rate and Rhythm: Normal rate and regular rhythm.      Pulses: Normal pulses.           Carotid pulses are 2+ on the right side and 2+ on the left side.       Radial pulses are 2+ on the right side and 2+ on the left side.      Heart sounds: Murmur present. Systolic murmur present with a grade of 3/6.   Pulmonary:      Effort: Pulmonary effort is normal.      Breath sounds: Normal breath sounds.   Abdominal:      Palpations: Abdomen is soft.   Musculoskeletal:      Right lower le+ Edema present.      Left lower le+ Edema present.   Skin:     General: Skin is warm and dry.   Neurological:      General: No focal deficit present.      Mental Status: He is alert and oriented to person, place, and time.   Psychiatric:         Mood and Affect: Mood normal.         Behavior: Behavior normal.         Lab Review  Lab Results   Component Value Date/Time    WBC 6.5 2019 03:02 AM    WBC 4.5 2011 12:00 AM    RBC 7.01 (H) 2019 03:02 AM    RBC 5.36 2011 12:00 AM    HEMOGLOBIN 15.8 2019 03:02 AM    HEMATOCRIT 53.8 (H) 2019 03:02 AM    MCV 76.7 (L) 2019 03:02 AM    MCV 93 2011 12:00 AM    MCH 22.5 (L) 2019 03:02 AM    MCH 31.0 2011 12:00 AM    MCHC 29.4 (L) 2019 03:02 AM    MPV 9.6 2019 03:02 AM      Lab Results   Component Value Date/Time    SODIUM 140 2019 03:02 AM    POTASSIUM 4.0 2019 03:02 AM    CHLORIDE 103 2019 03:02 AM    CO2 28 2019 03:02 AM    GLUCOSE 122 (H) 2019 03:02 AM    BUN 32 (H) 2019 03:02 AM    CREATININE 1.74 (H) 2019 03:02 AM    CREATININE 1.18 2011  12:00 AM    BUNCREATRAT 16 2011 12:00 AM    GLOMRATE >59 2011 12:00 AM      Lab Results   Component Value Date/Time    ASTSGOT 18 2019 10:01 AM    ALTSGPT 17 2019 10:01 AM     Lab Results   Component Value Date/Time    CHOLSTRLTOT 105 2019 02:20 AM    LDL 56 2019 02:20 AM    HDL 29 (A) 2019 02:20 AM    TRIGLYCERIDE 101 2019 02:20 AM    TROPONINT 36 (H) 2019 09:55 AM       Recent Labs     19  0955 19  0220   NTPROBNP 6512* 5539*       Cardiac Imaging and Procedures Review  EK2019  SINUS RHYTHM   IVCD   NONSPECIFIC ST-T ABNORMALITIES   BORDERLINE FIRST DEGREE AV BLOCK   BASELINE WANDER IN LEAD(S) V2   Compared to ECG 2019 09:11:05   No significant change.   Electronically Signed On 2019 9:29:53 PST by Ilan Medina MD     Cardiac Catheterization: 2019  PROCEDURE:  Cardiac catheterization and percutaneous coronary intervention.  A.  Selective coronary angiography.  B.  Coronary stent implantation, distal right coronary artery 3.5x28 mm   Synergy drug-eluting stent postdilated 4.0 mm.  C.  Distal abdominal aortogram with bilateral iliofemoral angiography.  D. Right femoral artery approach.  E.  Conscious sedation supervision.  F.  Perclose deployment.    CORONARY ANGIOGRAM(2019):  Severe triple-vessel coronary artery disease with calcified 50% left main stenosis (iFR <0.8)     ECHO CONCLUSIONS (12/3/2019):  Left ventricle is severely dilated.  Severely reduced left ventricular systolic function.  Left ventricular ejection fraction is visually estimated to be 25%.  Global hypokinesis with regional variation.  Moderate aortic stenosis.  Trace mitral regurgitation.  Severely dilated left atrium.  Mild tricuspid regurgitation.  Right ventricular systolic pressure is estimated to be 40  mmHg.  Normal pericardium without effusion.  Compared to the images of the study done on 2015  there has been   marked change in LV wall  motion and function, moderate aortic stenosis   is also now present.        Imaging  Chest X-Ray: 12/26/2019  1.  Mild bibasilar atelectasis. No focal consolidation or pleural effusions.  2.  Borderline cardiomegaly.      Assessment/Plan  1. CAD  -Stent placed to distal RCA  -Planned staged PCI later this month to left main  -Known multivessel disease  -Continue  mg, clopidogrel 75 mg, and metoprolol SR 25 mg daily      2.  Acutely decompensated systolic heart failure  -EF 25%  -Continue metoprolol SR 25 mg daily and Entresto 24/26 mg twice daily  -DC furosemide  -Start Bumex 1 mg daily starting tomorrow  -Weight 108 kg, -5.3 kg  -I/O -6,330  -GFR 38  -CMP in 1 week              Thank you for allowing me to participate in the care of this patient.  Cardiology will sign off    Please contact me with any questions.        LOGAN Butt  [facility]  [phone]    No future appointments.    Please note that this dictation was created using voice recognition software. I have worked with consultants from the vendor as well as technical experts from [facility] to optimize the interface. I have made every reasonable attempt to correct obvious errors, but I expect that there are errors of grammar and possibly content I did not discover before finalizing the note.

## 2019-12-28 NOTE — PROGRESS NOTES
Bedside report received. Pt A&Ox4. POC discussed with pt. Pt verbalizes understanding. Call light and belongings within reach. Bed locked and in lowest position. Alarm and fall precautions in place.

## 2019-12-28 NOTE — PROGRESS NOTES
Cardiology Follow Up Progress Note    Date of Service  12/28/2019    Attending Physician  LUX Bar.*    Chief Complaint   Acute decompensated systolic heart failure and CAD    HPI  LIAN More III is a 79 y.o. male admitted 12/26/2019 with HTN, DM 2, recently discovered HFrEF 25%, ischemic in nature with  nondominant LCx, high-grade RCA, high-grade distal left main, proximal LAD stenosis with 2 diagonals stenoses in combination with moderate AR.  He was declined for CABG and AVR.  Since that time patient's heart failure symptoms have progressed he is about 25 pounds heavier from his baseline weight.    Interim Events  12/28/2019: Cardiac catheterization 12/27/2019 with 3.5 x 28 mm Synergy BRANDI placed to dRCA.  Plan is for staged PCI later this week.  Patient denies chest discomfort however has issues with orthopnea and SOB.  Lower extremity edema has improved.    Review of Systems  Review of Systems   Constitutional: Negative for chills and fever.   Respiratory: Positive for shortness of breath. Negative for cough, chest tightness and wheezing.    Cardiovascular: Positive for leg swelling. Negative for chest pain and palpitations.        +Orthopnea   Gastrointestinal: Negative for nausea and vomiting.   Neurological: Negative for dizziness and headaches.   All other systems reviewed and are negative.      Vital signs in last 24 hours  Temp:  [36.1 °C (97 °F)-37.2 °C (98.9 °F)] 37 °C (98.6 °F)  Pulse:  [66-81] 78  Resp:  [16-18] 16  BP: (100-142)/(56-95) 106/70  SpO2:  [92 %-98 %] 92 %    Physical Exam  Physical Exam  Vitals signs and nursing note reviewed.   Constitutional:       Appearance: Normal appearance.   HENT:      Head: Normocephalic and atraumatic.      Mouth/Throat:      Mouth: Mucous membranes are moist.   Eyes:      Extraocular Movements: Extraocular movements intact.   Neck:      Musculoskeletal: Normal range of motion and neck supple.      Comments: No JVD  Cardiovascular:      Rate and  Rhythm: Normal rate and regular rhythm.      Pulses: Normal pulses.           Carotid pulses are 2+ on the right side and 2+ on the left side.       Radial pulses are 2+ on the right side and 2+ on the left side.      Heart sounds: Normal heart sounds. No murmur.   Pulmonary:      Effort: Pulmonary effort is normal.      Breath sounds: Normal breath sounds.   Abdominal:      Palpations: Abdomen is soft.   Musculoskeletal:      Right lower le+ Edema present.      Left lower le+ Edema present.   Skin:     General: Skin is warm and dry.      Comments: Right femoral cath site CDI, no hematoma, no ecchymoses   Neurological:      General: No focal deficit present.      Mental Status: He is alert and oriented to person, place, and time.   Psychiatric:         Mood and Affect: Mood normal.         Behavior: Behavior normal.         Lab Review  Lab Results   Component Value Date/Time    WBC 6.5 2019 03:02 AM    WBC 4.5 2011 12:00 AM    RBC 7.01 (H) 2019 03:02 AM    RBC 5.36 2011 12:00 AM    HEMOGLOBIN 15.8 2019 03:02 AM    HEMATOCRIT 53.8 (H) 2019 03:02 AM    MCV 76.7 (L) 2019 03:02 AM    MCV 93 2011 12:00 AM    MCH 22.5 (L) 2019 03:02 AM    MCH 31.0 2011 12:00 AM    MCHC 29.4 (L) 2019 03:02 AM    MPV 9.6 2019 03:02 AM      Lab Results   Component Value Date/Time    SODIUM 140 2019 03:02 AM    POTASSIUM 4.0 2019 03:02 AM    CHLORIDE 103 2019 03:02 AM    CO2 28 2019 03:02 AM    GLUCOSE 122 (H) 2019 03:02 AM    BUN 32 (H) 2019 03:02 AM    CREATININE 1.74 (H) 2019 03:02 AM    CREATININE 1.18 2011 12:00 AM    BUNCREATRAT 16 2011 12:00 AM    GLOMRATE >59 2011 12:00 AM      Lab Results   Component Value Date/Time    ASTSGOT 18 2019 10:01 AM    ALTSGPT 17 2019 10:01 AM     Lab Results   Component Value Date/Time    CHOLSTRLTOT 105 2019 02:20 AM    LDL 56 2019 02:20 AM     HDL 29 (A) 2019 02:20 AM    TRIGLYCERIDE 101 2019 02:20 AM    TROPONINT 36 (H) 2019 09:55 AM       Recent Labs     19  0955 19  0220   NTPROBNP 6512* 5539*       Cardiac Imaging and Procedures Review  EK2019  SINUS RHYTHM   IVCD   NONSPECIFIC ST-T ABNORMALITIES   BORDERLINE FIRST DEGREE AV BLOCK   BASELINE WANDER IN LEAD(S) V2   Compared to ECG 2019 09:11:05   No significant change.   Electronically Signed On 2019 9:29:53 PST by Ilan Medina MD     Cardiac Catheterization: 2019  PROCEDURE:  Cardiac catheterization and percutaneous coronary intervention.  A.  Selective coronary angiography.  B.  Coronary stent implantation, distal right coronary artery 3.5x28 mm   Synergy drug-eluting stent postdilated 4.0 mm.  C.  Distal abdominal aortogram with bilateral iliofemoral angiography.  D. Right femoral artery approach.  E.  Conscious sedation supervision.  F.  Perclose deployment.    CORONARY ANGIOGRAM(2019):  Severe triple-vessel coronary artery disease with calcified 50% left main stenosis (iFR <0.8)     ECHO CONCLUSIONS (12/3/2019):  Left ventricle is severely dilated.  Severely reduced left ventricular systolic function.  Left ventricular ejection fraction is visually estimated to be 25%.  Global hypokinesis with regional variation.  Moderate aortic stenosis.  Trace mitral regurgitation.  Severely dilated left atrium.  Mild tricuspid regurgitation.  Right ventricular systolic pressure is estimated to be 40  mmHg.  Normal pericardium without effusion.  Compared to the images of the study done on 2015  there has been   marked change in LV wall motion and function, moderate aortic stenosis   is also now present.        Imaging  Chest X-Ray: 2019  1.  Mild bibasilar atelectasis. No focal consolidation or pleural effusions.  2.  Borderline cardiomegaly.      Assessment/Plan  1. CAD  -Stent placed to distal RCA  -Planned staged PCI later this  week  -Known multivessel disease  -Continue  mg, clopidogrel 75 mg, and metoprolol SR 25 mg daily      2.  Acutely decompensated systolic heart failure  -EF 25%  -Continue metoprolol SR 25 mg daily and Entresto 24/26 mg twice daily  -Weight 108 kg, -5.3 kg  -I/O -5,330  -GFR 38              Thank you for allowing me to participate in the care of this patient.  I will continue to follow this patient    Please contact me with any questions.        LOGAN Butt  [facility]  [phone]    No future appointments.    Please note that this dictation was created using voice recognition software. I have worked with consultants from the vendor as well as technical experts from [facility] to optimize the interface. I have made every reasonable attempt to correct obvious errors, but I expect that there are errors of grammar and possibly content I did not discover before finalizing the note.

## 2019-12-28 NOTE — CARE PLAN
Problem: Safety  Goal: Will remain free from falls  Outcome: PROGRESSING AS EXPECTED  Treaded socks in use  Call light within reach and patient calls appropriately  Medication education provided prior to administration  Medications administered as ordered  Bed alarm on and bed locked in lowest position       Problem: Infection  Goal: Will remain free from infection  Outcome: PROGRESSING AS EXPECTED  No active infections

## 2019-12-28 NOTE — PROGRESS NOTES
Received report from day shift RNRebeca. Assumed care of pt. Pt reports 4/10 pain at this time. Updated pt on plan of care. Pt resting comfortably in bed. bed alarm on. Educated on use of call light. Hourly rounding and continuous monitoring in place.

## 2019-12-28 NOTE — CARE PLAN
Problem: Mobility  Goal: Risk for activity intolerance will decrease  Outcome: PROGRESSING AS EXPECTED  Intervention: Assess and monitor signs of activity intolerance  Note:   Pt ambulating unit frequently. Motivated for increased mobilization.      Problem: Pain Management  Goal: Pain level will decrease to patient's comfort goal  Outcome: PROGRESSING AS EXPECTED  Note:   Pt without complaints of acute discomfort at this time. Pt within comfort goal

## 2019-12-28 NOTE — THERAPY
"78 y/o male adm for ALLISON, dx: CHF and CAD, S/P cardiac cath thru right fem. a. Hx of bilateral TKA and lumbar sx. Cardiac education given, ect and activity pacing and progression. Oxygen utilized during mobility, pt able to propel tank indpeendnetly. No ALLISON noted with mobility. No furtther acute PT services required at this time.  Physical Therapy Evaluation completed.   Bed Mobility:  Supine to Sit: Modified Independent  Transfers: Sit to Stand: Supervised  Gait: Level Of Assist: Supervised with No Equipment Needed       Plan of Care: Patient with no further skilled PT needs in the acute care setting at this time  Discharge Recommendations: Equipment: No Equipment Needed. Post-acute therapy Currently anticipate no further skilled therapy needs once patient is discharged from the inpatient setting.    See \"Rehab Therapy-Acute\" Patient Summary Report for complete documentation.     "

## 2019-12-28 NOTE — PROCEDURES
DATE OF SERVICE:  12/27/2019    PROCEDURE:  Cardiac catheterization and percutaneous coronary intervention.  A.  Selective coronary angiography.  B.  Coronary stent implantation, distal right coronary artery 3.5x28 mm   Synergy drug-eluting stent postdilated 4.0 mm.  C.  Distal abdominal aortogram with bilateral iliofemoral angiography.  D. Right femoral artery approach.  E.  Conscious sedation supervision.  F.  Perclose deployment.    PREPROCEDURE DIAGNOSES:  1.  Acute decompensated systolic heart failure.  2.  Ischemic cardiomyopathy, left ventricular ejection fraction 25%.  3.  Multivessel coronary artery disease.  4.  Diabetes mellitus.  5.  Aortic stenosis, moderate, possible low flow, low gradient, severe aortic stenosis.  6.  Staged multivessel coronary intervention.  7.  CKD, GFR 40.    POSTPROCEDURE DIAGNOSIS:  Successful deployment of the distal right coronary   artery stent.    PHYSICIAN:  Dougie Gabriel MD    REFERRING PHYSICIAN:  Eliazar Queen MD    COMPLICATIONS:  None.    DESCRIPTION OF PROCEDURE:  After informed consent was obtained, the patient   was brought to the cardiac catheterization laboratory where he was prepped,   draped, and anesthetized in usual manner.    Using ultrasound guidance and a modified Seldinger technique, a 6-Syrian x 10   cm introducer sheath was inserted in the right femoral artery.  Next, a   4-Syrian pigtail catheter was advanced into the distal abdominal aorta,   bilateral iliofemoral angiography was performed.    Next, the 6-Syrian introducer sheath was upsized to a 7-Syrian 10 cm introducer   sheath.  Next, a 7-Syrian AL 0.75 guiding catheter was inserted, but could not   be manipulated into the right coronary artery with resultant torquing of the   guide catheter.    Next, the 7-Syrian 10 cm introducer sheath was exchanged for a 7-Syrian 25 cm   introducer sheath.  Next, a new 7-Syrian AL 0.75 guiding catheter was inserted   in the ostium of the right  coronary artery.  Next, 0.014 Whisper wire was   advanced to the distal right coronary artery.  Next, a 3.0x15 mm balloon   catheter was inserted across the distal stenosis and inflations of 13 atmospheres   were performed.  Next, a 3.5x20 mm balloon catheter was inserted across the distal   stenosis and inflated up to 8 atmospheres.  Next, a 3.5x28 mm Synergy drug-eluting   stent was advanced across the distal residual stenosis and deployed at 14 atmospheres.    Next, the stent was postdilated with a 4.0x15 mm noncompliant balloon at 14 atmospheres   and a 4.0x8 mm noncompliant balloon at 17 atmospheres.  The balloon wire removed   and final angiograms were performed.    Right femoral artery angiogram was performed and a Perclose device was   deployed for successful hemostasis.  The patient tolerated the procedure well.    CONSCIOUS SEDATION SUPERVISION:  I supervised and monitored the administration   of intravenous conscious sedation from 4:00 p.m. until 4:52 p.m.    CONTRAST DYE:  Omnipaque 350 120 mL.  GFR 40.    MEDICATIONS:  1. Versed 1 mg IV.  2. Fentanyl 50 mcg IV.  3. Lidocaine 2% subq.  4. Angiomax IV bolus and infusion.  5. Plavix 600 mg po.    FINDINGS:  HEMODYNAMICS:  Central aortic pressure systolic 131, diastolic 59, mean of 59   MmHg.    PERIPHERAL ANGIOGRAPHY: The distal abdominal aorta and both iliac   and femoral arteries are patent with at least moderate tortuosity of the   right iliac artery.    CORONARY ARTERIOGRAPHY:  Right coronary artery:  The right coronary artery is a large-caliber vessel   with a significant walsh's crook and midvessel acute tortuosity with a   distal diseased segment containing a 99% stenosis with JEN-2 antegrade flow.     POST-SENT IMPLANTATION, distal right coronary artery demonstrates a residual   10% focal eccentric stenosis in the proximal portion of the stent; otherwise,   the remainder of the stent is well expanded with no dissection or thrombus and   JEN-3  antegrade flow.    PLAN:  1.  Postprocedure Angiomax x4 hours.  2.  Dual antiplatelet therapy with aspirin and Plavix.  3.  IV fluids.  4.  Follow up basic metabolic panel.  5.  Future staged coronary intervention of the left main artery with   subsequent evaluation of need for TAVR procedure.       ____________________________________     MD MEL PIZARRO / DARIEL    DD:  12/27/2019 17:14:36  DT:  12/27/2019 18:20:06    D#:  [id number]  Job#:  [id number]

## 2019-12-28 NOTE — PROGRESS NOTES
Monitor Summary  Rhythm: SR  Rate: 66 - 76  Ectopy: (O)(F)PVC; (R)Bigem; (R)(O)Coup  0.20 / 0.10 / 0.40

## 2019-12-29 VITALS
SYSTOLIC BLOOD PRESSURE: 104 MMHG | TEMPERATURE: 97.4 F | HEIGHT: 73 IN | HEART RATE: 83 BPM | RESPIRATION RATE: 18 BRPM | DIASTOLIC BLOOD PRESSURE: 69 MMHG | OXYGEN SATURATION: 94 % | BODY MASS INDEX: 31.56 KG/M2 | WEIGHT: 238.1 LBS

## 2019-12-29 PROBLEM — I50.23 ACUTE ON CHRONIC SYSTOLIC HEART FAILURE (HCC): Status: RESOLVED | Noted: 2019-12-26 | Resolved: 2019-12-29

## 2019-12-29 LAB
ANION GAP SERPL CALC-SCNC: 8 MMOL/L (ref 0–11.9)
BUN SERPL-MCNC: 36 MG/DL (ref 8–22)
CALCIUM SERPL-MCNC: 8.7 MG/DL (ref 8.5–10.5)
CHLORIDE SERPL-SCNC: 102 MMOL/L (ref 96–112)
CO2 SERPL-SCNC: 27 MMOL/L (ref 20–33)
CREAT SERPL-MCNC: 1.75 MG/DL (ref 0.5–1.4)
GLUCOSE BLD-MCNC: 104 MG/DL (ref 65–99)
GLUCOSE BLD-MCNC: 117 MG/DL (ref 65–99)
GLUCOSE BLD-MCNC: 131 MG/DL (ref 65–99)
GLUCOSE BLD-MCNC: 136 MG/DL (ref 65–99)
GLUCOSE SERPL-MCNC: 117 MG/DL (ref 65–99)
POTASSIUM SERPL-SCNC: 4 MMOL/L (ref 3.6–5.5)
SODIUM SERPL-SCNC: 137 MMOL/L (ref 135–145)

## 2019-12-29 PROCEDURE — A9270 NON-COVERED ITEM OR SERVICE: HCPCS | Performed by: INTERNAL MEDICINE

## 2019-12-29 PROCEDURE — 700102 HCHG RX REV CODE 250 W/ 637 OVERRIDE(OP): Performed by: HOSPITALIST

## 2019-12-29 PROCEDURE — A9270 NON-COVERED ITEM OR SERVICE: HCPCS | Performed by: HOSPITALIST

## 2019-12-29 PROCEDURE — 700102 HCHG RX REV CODE 250 W/ 637 OVERRIDE(OP): Performed by: INTERNAL MEDICINE

## 2019-12-29 PROCEDURE — 99232 SBSQ HOSP IP/OBS MODERATE 35: CPT | Performed by: INTERNAL MEDICINE

## 2019-12-29 PROCEDURE — 82962 GLUCOSE BLOOD TEST: CPT | Mod: 91

## 2019-12-29 PROCEDURE — 36415 COLL VENOUS BLD VENIPUNCTURE: CPT

## 2019-12-29 PROCEDURE — 700111 HCHG RX REV CODE 636 W/ 250 OVERRIDE (IP): Performed by: HOSPITALIST

## 2019-12-29 PROCEDURE — 99239 HOSP IP/OBS DSCHRG MGMT >30: CPT | Performed by: HOSPITALIST

## 2019-12-29 PROCEDURE — 80048 BASIC METABOLIC PNL TOTAL CA: CPT

## 2019-12-29 RX ORDER — ASPIRIN 81 MG/1
81 TABLET ORAL DAILY
Qty: 30 TAB | Refills: 0 | Status: ON HOLD | OUTPATIENT
Start: 2019-12-30 | End: 2020-06-05

## 2019-12-29 RX ORDER — BUMETANIDE 1 MG/1
1 TABLET ORAL DAILY
Qty: 30 TAB | Refills: 0 | Status: SHIPPED | OUTPATIENT
Start: 2019-12-30 | End: 2020-01-03 | Stop reason: SDUPTHER

## 2019-12-29 RX ORDER — CLOPIDOGREL BISULFATE 75 MG/1
75 TABLET ORAL DAILY
Qty: 30 TAB | Refills: 0 | Status: SHIPPED | OUTPATIENT
Start: 2019-12-30 | End: 2020-01-03 | Stop reason: SDUPTHER

## 2019-12-29 RX ORDER — BUMETANIDE 1 MG/1
1 TABLET ORAL
Status: DISCONTINUED | OUTPATIENT
Start: 2019-12-30 | End: 2019-12-29 | Stop reason: HOSPADM

## 2019-12-29 RX ADMIN — ANASTROZOLE 1 MG: 1 TABLET, COATED ORAL at 05:17

## 2019-12-29 RX ADMIN — SENNOSIDES AND DOCUSATE SODIUM 2 TABLET: 8.6; 5 TABLET ORAL at 05:17

## 2019-12-29 RX ADMIN — FUROSEMIDE 40 MG: 10 INJECTION, SOLUTION INTRAMUSCULAR; INTRAVENOUS at 05:17

## 2019-12-29 RX ADMIN — EZETIMIBE 10 MG: 10 TABLET ORAL at 05:17

## 2019-12-29 RX ADMIN — SACUBITRIL AND VALSARTAN 1 TABLET: 24; 26 TABLET, FILM COATED ORAL at 16:03

## 2019-12-29 RX ADMIN — METOPROLOL SUCCINATE 25 MG: 25 TABLET, EXTENDED RELEASE ORAL at 05:17

## 2019-12-29 RX ADMIN — SACUBITRIL AND VALSARTAN 1 TABLET: 24; 26 TABLET, FILM COATED ORAL at 05:16

## 2019-12-29 RX ADMIN — ASPIRIN 81 MG: 81 TABLET, COATED ORAL at 05:16

## 2019-12-29 RX ADMIN — CLOPIDOGREL BISULFATE 75 MG: 75 TABLET ORAL at 05:16

## 2019-12-29 ASSESSMENT — PATIENT HEALTH QUESTIONNAIRE - PHQ9
SUM OF ALL RESPONSES TO PHQ9 QUESTIONS 1 AND 2: 0
1. LITTLE INTEREST OR PLEASURE IN DOING THINGS: NOT AT ALL
2. FEELING DOWN, DEPRESSED, IRRITABLE, OR HOPELESS: NOT AT ALL

## 2019-12-29 NOTE — DISCHARGE PLANNING
Agency/Facility Name: [facility]  Spoke To: Chad  Outcome: Cannot authorize ins until 12/30.    RN CM informed

## 2019-12-29 NOTE — PROGRESS NOTES
Report received from christo RN. Pt care assumed. Assessment performed. Pt AOx4. Pt laying supine in bed. Pt denies pain and no signs of distress. Bed in low, locked position. Pt educated on calling to ambulate. Call light within reach. Pt refusing treaded socks. Educated on the importance.  Hourly rounding in place.

## 2019-12-29 NOTE — DISCHARGE PLANNING
Agency/Facility Name: [facility]  Spoke To: Betzy  Outcome: Paging on-call . Awaiting callback.    RN CM informed

## 2019-12-29 NOTE — FACE TO FACE
"Face to Face Note  -  Durable Medical Equipment    Davin Adorno M.D. - NPI: [id number]  I certify that this patient is under my care and that they had a durable medical equipment(DME)face to face encounter by myself that meets the physician DME face-to-face encounter requirements with this patient on:    Date of encounter:   Patient:                    MRN:                       YOB: 2017  LIAN More III  [id number]  1940     The encounter with the patient was in whole, or in part, for the following medical condition, which is the primary reason for durable medical equipment:  CHF    I certify that, based on my findings, the following durable medical equipment is medically necessary:  Oxygen.    HOME O2 Saturation Measurements:(Values must be present for Home Oxygen orders)  Room air sat at rest: 88  Room air sat with amb: 83  With liters of O2: 2, O2 sat at rest with O2: 94  With Liters of O2: 6, O2 sat with amb with O2 : 94  Is the patient mobile?: Yes    My Clinical findings support the need for the above equipment due to:  Hypoxia    Supporting Symptoms: The patient requires supplemental oxygen, as the following interventions have been tried with limited or no improvement: \"Positive expiratory pressure therapies and \"Oral and/or IV steroids    "

## 2019-12-29 NOTE — CARE PLAN
Problem: Safety  Goal: Will remain free from falls  Outcome: PROGRESSING AS EXPECTED     Problem: Infection  Goal: Will remain free from infection  Outcome: PROGRESSING AS EXPECTED     Problem: Mobility  Goal: Risk for activity intolerance will decrease  Outcome: PROGRESSING AS EXPECTED

## 2019-12-29 NOTE — DISCHARGE PLANNING
Hospital Care management Discharge Planning     Anticipated Discharge Disposition:  · Home w/ updated O2 orders with [facility]     Action:  · This RN CM met with pt at bedside to obtain DME choice.  · Choice:  · (1) [facility]  · This RN CM faxed choice form to weekend [facility].     Barriers to Discharge:  · DME approval and delivery.     Plan:  · Follow up with [facility] and treatment team.

## 2019-12-29 NOTE — DISCHARGE PLANNING
Agency/Facility Name: [facility]  Spoke To: Chad  Outcome: Pt. Is verified to already be on service. O2 will be delivered to pt. joseph T811-2 around 1430.    RN CM informed

## 2019-12-29 NOTE — PROGRESS NOTES
[facility] Medicine Daily Progress Note    Date of Service  12/28/2019    Chief Complaint  79 y.o. male admitted 12/26/2019 with acute chf exacerbation      Interval Problem Update  Patient is resting in bed, he feels better, he has been able to ambulate with not major shortness of breath, no fever no chills no nausea no vomiting.    Consultants/Specialty  cardio    Code Status  Full code    Disposition  Home when kidney function is improved    Review of Systems  Review of Systems   Constitutional: Negative for fever.   HENT: Negative for congestion and nosebleeds.    Eyes: Negative for blurred vision.   Respiratory: Negative for cough and sputum production.    Cardiovascular: Negative for chest pain and palpitations.   Gastrointestinal: Negative for abdominal pain, heartburn and nausea.   Genitourinary: Negative for dysuria and frequency.   Musculoskeletal: Negative for myalgias and neck pain.   Skin: Negative for itching.   Neurological: Negative for dizziness and headaches.   Endo/Heme/Allergies: Does not bruise/bleed easily.   Psychiatric/Behavioral: Negative for depression.        Physical Exam  Temp:  [36.1 °C (97 °F)-37.2 °C (98.9 °F)] 36.9 °C (98.4 °F)  Pulse:  [66-81] 70  Resp:  [16-18] 16  BP: (100-142)/(67-95) 106/67  SpO2:  [92 %-98 %] 96 %    Physical Exam  Vitals signs reviewed.   Constitutional:       Appearance: Normal appearance.   HENT:      Head: Normocephalic and atraumatic.      Nose: Nose normal.   Eyes:      General: No scleral icterus.     Extraocular Movements: Extraocular movements intact.      Pupils: Pupils are equal, round, and reactive to light.   Neck:      Musculoskeletal: Normal range of motion and neck supple. No neck rigidity or muscular tenderness.   Cardiovascular:      Rate and Rhythm: Normal rate and regular rhythm.      Heart sounds: Murmur present.   Pulmonary:      Effort: Pulmonary effort is normal. No respiratory distress.      Breath sounds: Normal breath sounds. No rales.    Abdominal:      General: Bowel sounds are normal. There is no distension.      Palpations: Abdomen is soft.      Tenderness: There is no guarding.   Musculoskeletal: Normal range of motion.         General: No swelling.   Skin:     General: Skin is warm and dry.   Neurological:      General: No focal deficit present.      Mental Status: He is alert.      Cranial Nerves: No cranial nerve deficit.   Psychiatric:         Mood and Affect: Mood normal.         Fluids    Intake/Output Summary (Last 24 hours) at 12/28/2019 1624  Last data filed at 12/28/2019 0745  Gross per 24 hour   Intake 120 ml   Output 2400 ml   Net -2280 ml       Laboratory  Recent Labs     12/27/19  0220 12/27/19  1001 12/28/19  0302   WBC 6.3 7.0 6.5   RBC 6.41* 7.01* 7.01*   HEMOGLOBIN 14.7 16.1 15.8   HEMATOCRIT 49.6 53.9* 53.8*   MCV 77.4* 76.9* 76.7*   MCH 22.9* 23.0* 22.5*   MCHC 29.6* 29.9* 29.4*   RDW 57.8* 57.7* 58.2*   PLATELETCT 199 221 212   MPV 10.0 10.2 9.6     Recent Labs     12/27/19  0220 12/27/19  1001 12/28/19  0302   SODIUM 137 139 140   POTASSIUM 4.0 4.3 4.0   CHLORIDE 103 103 103   CO2 27 25 28   GLUCOSE 131* 155* 122*   BUN 29* 29* 32*   CREATININE 1.55* 1.66* 1.74*   CALCIUM 9.4 9.6 9.3     Recent Labs     12/27/19  1001   APTT 36.7*   INR 1.09         Recent Labs     12/27/19  0220   TRIGLYCERIDE 101   HDL 29*   LDL 56       Imaging  DX-CHEST-PORTABLE (1 VIEW)   Final Result      1.  Mild bibasilar atelectasis. No focal consolidation or pleural effusions.   2.  Borderline cardiomegaly.      CL-LEFT HEART CATHETERIZATION WITH POSSIBLE INTERVENTION    (Results Pending)        Assessment/Plan  Acute on chronic systolic heart failure (HCC)- (present on admission)  Assessment & Plan  Holding Lasix, continue beta-blocker, Entresto    Severe aortic stenosis- (present on admission)  Assessment & Plan  Was deemed not a candidate for open valve repair, possible TAVR, revascularization is first now attempted,  Should get referred to  the TAVR program at least for evaluation      Coronary artery disease involving native coronary artery of native heart without angina pectoris- (present on admission)  Assessment & Plan  Patient with multivessel severe disease, referred to surgery for possible intervention but was felt to be too high of a risk  Cardio consulted, s/p cardiac cath stent to RCA on 12/27/2019, plan for high risk left main PCI in a couple weeks after his renal has recovered      Systolic heart failure (HCC)- (present on admission)  Assessment & Plan  Recent evaluation development patient with ejection fraction of 25%  Cardio consulted  Holding Lasix due to acute kidney injury    Chronic kidney disease- (present on admission)  Assessment & Plan  ckd stage 3.  Acute on chronic creatinine increased to 1.7 holding Lasix, gentle IV hydration follow-up BMP in a.m.    Dyslipidemia- (present on admission)  Assessment & Plan  Continue statin.    Hypertension- (present on admission)  Assessment & Plan  Continue monitoring.     Type 2 diabetes mellitus without complication, without long-term current use of insulin (HCC)- (present on admission)  Assessment & Plan  Continue close monitoring, SSI       VTE prophylaxis: heparin      Case and plan of care discussed with nurse staff  Case and plan of care discussed during multidisciplinary rounds.

## 2019-12-29 NOTE — PROGRESS NOTES
Monitor Summary  Rhythm: SR  Rate: 70 - 89  Ectopy: (O)PVC; Bigem; (R)Coup; 4 beats VT  0.18 / 0.10 / 0.38

## 2019-12-29 NOTE — CARE PLAN
Pt is walking around unit a lot and working on stability and mobility, pt wants to know plan for discharge

## 2019-12-30 NOTE — DISCHARGE INSTRUCTIONS
Discharge Instructions    Discharged to home by car with relative. Discharged via wheelchair, hospital escort: Yes.  Special equipment needed: Not Applicable    Be sure to schedule a follow-up appointment with your primary care doctor or any specialists as instructed.     Discharge Plan:   Diet Plan: Discussed  Activity Level: Discussed  Confirmed Follow up Appointment: Appointment Scheduled  Confirmed Symptoms Management: Discussed  Medication Reconciliation Updated: Yes  Influenza Vaccine Indication: Patient Refuses    I understand that a diet low in cholesterol, fat, and sodium is recommended for good health. Unless I have been given specific instructions below for another diet, I accept this instruction as my diet prescription.   Other diet: heart healthy     Special Instructions: Diagnosis:  Acute Coronary Syndrome (ACS) is a diagnosis that encompasses cardiac-related chest pain and heart attack. ACS occurs when the blood flow to the heart muscle is severely reduced or cut off completely due to a slow process called atherosclerosis.  Atherosclerosis is a disease in which the coronary arteries become narrow from a buildup of fat, cholesterol, and other substances that combine to form plaque. If the plaque breaks, a blood clot will form and block the blood flow to the heart muscle. This lack of blood flow can cause damage or death to the heart muscle which is called a heart attack or Myocardial Infarction (MI). There are two different types of MIs:  ST Elevation Myocardial Infarction or STEMI (the most severe type of heart attack) and Non-ST Elevation Myocardial Infarction or NSTEMI.    Treatment Plan:  · Cardiac Diet  - Low fat, low salt, low cholesterol   · Cardiac Rehab  - Your doctor has ordered you a referral to [facility].  Call [phone] to schedule an appointment.  · Attend my follow-up appointment with my Cardiologist.  · Take my medications as prescribed by my doctor  · Exercise daily  · Lower my bad  cholesterol and raise my good cholesterol, lower my blood pressure, Reduce stress and Control my diabetes    Medications:  Certain medications are used to treat ACS.  Remember to always take medications as prescribed and never stop talking medications unless told by your doctor.    You have been prescribed the following medicatons:    Aspirin - Aspirin is used as a blood thinning medication and you will require this medication indefinitely.  Anti-platelet/blood thinner - Your Anti-platelet/Blood thinning medication is called clopidogrel, and is used in combination with aspirin to prevent clots from forming in your heart and/or around your stent.  Your doctor will determine how long you need to be on this medicine.  Beta-Blocker - Beta-Blocker metoprolol is used to lower blood pressure and heart rate, and/or helps your heart heal after a heart attack.  Angiotensin Receptor Blocker (ARB) - Angiotensin Receptor Blocker entresto  HF Patient Discharge Instructions  · Monitor your weight daily, and maintain a weight chart, to track your weight changes.   · Activity as tolerated, unless your Doctor has ordered otherwise. Other activity order: walking.  · Follow a low fat, low cholesterol, low salt diet unless instructed otherwise by your Doctor. Read the labels on the back of food products and track your intake of fat, cholesterol and salt.   · Fluid Restriction No. If a Fluid Restriction has been ordered by your Doctor, measure fluids with a measuring cup to ensure that you are not exceeding the restriction.   · No smoking.  · Oxygen Yes. If your Doctor has ordered that you wear Oxygen at home, it is important to wear it as ordered.  · Did you receive an explanation from staff on the importance of taking each of your medications and why it is necessary to keep taking them unless your doctor says to stop? Yes  · Were all of your questions answered about how to manage your heart failure and what to do if you have increased  signs and symptoms after you go home? Yes  · Do you feel like your heart failure care team involved you in the care treatment plan and allowed you to make decisions regarding your care while in the hospital and addressed any discharge needs you might have? Yes    See the educational handout provided at discharge for more information on monitoring your daily weight, activity and diet. This also explains more about Heart Failure, symptoms of a flare-up and some of the tests that you have undergone.     Warning Signs of a Flare-Up include:  · Swelling in the ankles or lower legs.  · Shortness of breath, while at rest, or while doing normal activities.   · Shortness of breath at night when in bed, or coughing in bed.   · Requiring more pillows to sleep at night, or needing to sit up at night to sleep.  · Feeling weak, dizzy or fatigued.     When to call your Doctor:  · Call [facility] seven days a week from 8:00 a.m. to 8:00 p.m. for medical questions [phone].  · Call your Primary Care Physician or Cardiologist if:   1. You experience any pain radiating to your jaw or neck.  2. You have any difficulty breathing.  3. You experience weight gain of 3 lbs in a day or 5 lbs in a week.   4. You feel any palpitations or irregular heartbeats.  5. You become dizzy or lose consciousness.   If you have had an angiogram or had a pacemaker or AICD placed, and experience:  1. Bleeding, drainage or swelling at the surgical / puncture site.  2. Fever greater than 100.0 F  3. Shock from internal defibrillator.  4. Cool and / or numb extremities.   is used to lower blood pressure and treat heart failure.    · Is patient discharged on Warfarin / Coumadin?   No     Depression / Suicide Risk    As you are discharged from this [facility], it is important to learn how to keep safe from harming yourself.    Recognize the warning signs:  · Abrupt changes in personality, positive or negative- including increase in  energy   · Giving away possessions  · Change in eating patterns- significant weight changes-  positive or negative  · Change in sleeping patterns- unable to sleep or sleeping all the time   · Unwillingness or inability to communicate  · Depression  · Unusual sadness, discouragement and loneliness  · Talk of wanting to die  · Neglect of personal appearance   · Rebelliousness- reckless behavior  · Withdrawal from people/activities they love  · Confusion- inability to concentrate     If you or a loved one observes any of these behaviors or has concerns about self-harm, here's what you can do:  · Talk about it- your feelings and reasons for harming yourself  · Remove any means that you might use to hurt yourself (examples: pills, rope, extension cords, firearm)  · Get professional help from the community (Mental Health, Substance Abuse, psychological counseling)  · Do not be alone:Call your Safe Contact- someone whom you trust who will be there for you.  · Call your local CRISIS HOTLINE 602-0138 or 431-056-3967  · Call your local Children's Mobile Crisis Response Team [place] (921) 244-1374 or wwwWikia  · Call the toll free National Suicide Prevention Hotlines   · National Suicide Prevention Lifeline 067-669-WUVB (2411)  · National Hope Line Network 800-SUICIDE (916-3449)      Discharge Instructions per Davni Adorno M.D.    Follow up with primary care doctor in 1 week  Follow up with cardiologist as scheduled.   Repeat bmp in 5 days to check on kidney function, primary care doctor to follow up.     DIET: healthy diet    ACTIVITY: as tolerated    DIAGNOSIS: heart failure, coronary artery disease    Return to ER if symptoms return, chest pain, dizziness, palpitation, shortness of breath.        Heart Failure  Heart failure means your heart has trouble pumping blood. This makes it hard for your body to work well. Heart failure is usually a long-term (chronic) condition. You must take good care of  yourself and follow your doctor's treatment plan.  HOME CARE  · Take your heart medicine as told by your doctor.  ¨ Do not stop taking medicine unless your doctor tells you to.  ¨ Do not skip any dose of medicine.  ¨ Refill your medicines before they run out.  ¨ Take other medicines only as told by your doctor or pharmacist.  · Stay active if told by your doctor. The elderly and people with severe heart failure should talk with a doctor about physical activity.  · Eat heart-healthy foods. Choose foods that are without trans fat and are low in saturated fat, cholesterol, and salt (sodium). This includes fresh or frozen fruits and vegetables, fish, lean meats, fat-free or low-fat dairy foods, whole grains, and high-fiber foods. Lentils and dried peas and beans (legumes) are also good choices.  · Limit salt if told by your doctor.  · Cook in a healthy way. Roast, grill, broil, bake, poach, steam, or stir-garzon foods.  · Limit fluids as told by your doctor.  · Weigh yourself every morning. Do this after you pee (urinate) and before you eat breakfast. Write down your weight to give to your doctor.  · Take your blood pressure and write it down if your doctor tells you to.  · Ask your doctor how to check your pulse. Check your pulse as told.  · Lose weight if told by your doctor.  · Stop smoking or chewing tobacco. Do not use gum or patches that help you quit without your doctor's approval.  · Schedule and go to doctor visits as told.  · Nonpregnant women should have no more than 1 drink a day. Men should have no more than 2 drinks a day. Talk to your doctor about drinking alcohol.  · Stop illegal drug use.  · Stay current with shots (immunizations).  · Manage your health conditions as told by your doctor.  · Learn to manage your stress.  · Rest when you are tired.  · If it is really hot outside:  ¨ Avoid intense activities.  ¨ Use air conditioning or fans, or get in a cooler place.  ¨ Avoid caffeine and alcohol.  ¨ Wear  loose-fitting, lightweight, and light-colored clothing.  · If it is really cold outside:  ¨ Avoid intense activities.  ¨ Layer your clothing.  ¨ Wear mittens or gloves, a hat, and a scarf when going outside.  ¨ Avoid alcohol.  · Learn about heart failure and get support as needed.  · Get help to maintain or improve your quality of life and your ability to care for yourself as needed.  GET HELP IF:   · You gain weight quickly.  · You are more short of breath than usual.  · You cannot do your normal activities.  · You tire easily.  · You cough more than normal, especially with activity.  · You have any or more puffiness (swelling) in areas such as your hands, feet, ankles, or belly (abdomen).  · You cannot sleep because it is hard to breathe.  · You feel like your heart is beating fast (palpitations).  · You get dizzy or light-headed when you stand up.  GET HELP RIGHT AWAY IF:   · You have trouble breathing.  · There is a change in mental status, such as becoming less alert or not being able to focus.  · You have chest pain or discomfort.  · You faint.  MAKE SURE YOU:   · Understand these instructions.  · Will watch your condition.  · Will get help right away if you are not doing well or get worse.  This information is not intended to replace advice given to you by your health care provider. Make sure you discuss any questions you have with your health care provider.  Document Released: 09/26/2009 Document Revised: 01/08/2016 Document Reviewed: 02/03/2014  [employer] Interactive Patient Education © 2017 [employer]      Heart Valve Problems  Heart valves are gatelike structures that control the flow of blood through and from the heart. They are made of flaps of tissue called cusps. Cusps open when a heart chamber squeezes to pump blood, and they close when the chamber relaxes. When heart valves work the way they should, blood flows forward through the heart.   The heart valves are located between the upper chambers of  the heart (atria) and the lower chambers of the heart (ventricles), and between the ventricles and blood pathways that lead out of the heart. There are four heart valves:   · Mitral The mitral valve separates the left atrium from the left ventricle.    · Tricuspid The tricuspid valve separates the right atrium from the right ventricle.    · Pulmonary The pulmonary valve separates the right ventricle and the blood pathway that leads to the lungs (pulmonary artery).    · Aortic The aortic valve separates the left ventricle and the blood pathway that leads away from the heart to the rest of the body (aorta).    A problem with any of these heart valves can cause problems with the movement of blood through and from the heart. It can also lead to other problems. For example, a problem with the aortic valve can cause the left ventricle to stretch out of shape. In general, heart valve problems continue throughout life. They may also worsen with time.  TYPES OF HEART VALVE PROBLEMS  · Stenosis This is a condition in which a valve is too narrow. Valvular stenosis results in less blood moving through the heart than is normal.    · Regurgitation This occurs when blood moves backward through the heart. Regurgitation usually happens when a cusp does not close all the way.    CAUSES  Heart valve problems may be present at birth (congenital) or may occur later in life. Causes of congenital heart valve problems are not known. Causes of acquired heart valve problems include:  · High blood pressure (hypertension).    · Arteriosclerosis.    · Heart attack that damages a wall of the heart.  · Cardiomyopathy.  · Rheumatic fever.    · Traumatic damage to the aorta (aortic dissection).  · Radiation therapy.  · Syphilis.  · Endocarditis.    · Connective tissue disorders.  · Aortitis.  SYMPTOMS   · A bluish color to the skin (cyanosis). This may only occur if you physically exert yourself.    · Shortness of breath with normal activity, such  as climbing stairs or while lying down.    · Chest pain.    · Fainting.    · An abnormally rapid heartbeat (palpitations).    · Abnormal sweating.    · Coughing up blood.    · Fatigue.    Many people with heart valve problems do not have symptoms or do not develop symptoms for many years. The problem is discovered only when an unusual heart sound (heart murmur) is heard by a caregiver during an exam.   DIAGNOSIS   A diagnosis is based on your history, symptoms, physical examination, and diagnostic tests. Diagnostic tests for heart valve problems may include:  · Electrocardiography.    · A chest X-ray.    · Blood tests.    · Echocardiography.    · CT angiography.  · Cardiac magnetic resonance imaging (cardiac MRI).  · Cardiac catheterization.  TREATMENT   Treatment depends on what type of heart valve problem you have and how severe it is. You may need to take medicines to prevent the problem from getting worse or to treat your symptoms. You may also need surgery to repair or replace the valve that is causing problems. This may be done with one of the following procedures:   · Percutaneous balloon valvuloplasty. During this procedure, a thin, flexible tube (catheter) with a tiny balloon at its tip is passed through the narrowed heart valve. The balloon is then inflated and pulled back through the narrowed valve to widen it.    · Valvotomy. During this procedure, the heart is opened and the cusps that are stuck together are .    · Valve replacement. During this procedure, the heart valve causing a problem is replaced. The replacement heart valve may be mechanical (made of a material such as plastic) or biological (made of tissue taken from an animal or person who has ). After surgery, patients with mechanical valves must take medicines to prevent blood clots.    PREVENTION   Because the causes of congenital heart valve problems are not known, it is not known whether they can be prevented. However, research  suggests that a pregnant woman may be able to lower the risk of a congenital heart valve problem in her baby by:   · Receiving the measles vaccine.    · Controlling diabetes if she has it.    · Taking medicines only as directed.    · Avoiding alcohol and other harmful substances.    · Taking folic acid during pregnancy.  Acquired heart valve problems can sometimes be prevented. If you are being treated for another condition with antibiotic medicines, take them exactly as prescribed. If you have strep throat or scarlet fever, get treatment right away. This can prevent rheumatic fever.   Document Released: 03/09/2005 Document Revised: 12/04/2013 Document Reviewed: 10/24/2013  [employer]® Patient Information ©2014 [employer].

## 2019-12-30 NOTE — DISCHARGE SUMMARY
Discharge Summary    CHIEF COMPLAINT ON ADMISSION  Chief Complaint   Patient presents with   • Chest Pain   • Shortness of Breath   • Weakness       Reason for Admission  Sent by MD     Admission Date  12/26/2019    CODE STATUS  Full Code    HPI & HOSPITAL COURSE  Please see original H&P and consult note for specific information, patient was admitted due to acute CHF exacerbation, he was sent by his cardiology, patient has history of severe aortic stenosis and coronary artery disease he has been found to be high risk for open heart surgery, cardiology was consulted, patient went for cardiac cath patient is status post RCA stent placement, patient received CHF protocol patient was started on IV diuresis, patient has oxygen at home but only at night, patient has been requiring oxygen at rest and with ambulation, patient today is feeling much better, patient will require oxygen as outpatient, patient is going to be discharged home today and he will follow-up with cardiology in 2 weeks for repeat cardiac cath and possible high risk left main PCI, patient is alert oriented he has been able to ambulate, he is tolerating diet, denies any chest pain shortness of breath palpitations.  Patient has chronic kidney disease stage III, his creatinine is stable at 1.7, patient will need repeat BMP in 5 days and follow-up with primary care physician, patient follows with Dr. Najjar nephrology recommending to follow-up with him in 1 to 2 weeks.  Patient expressed understanding of his discharge plan and agree with it all questions have been answered.       Therefore, he is discharged in good and stable condition to home with close outpatient follow-up.    The patient met 2-midnight criteria for an inpatient stay at the time of discharge.    Discharge Date  12/29/2019    FOLLOW UP ITEMS POST DISCHARGE  Primary care physician 1 week  Nephrology  Cardiology in 2 weeks  BMP in 5 days    DISCHARGE DIAGNOSES  Active Problems:    Systolic  heart failure (HCC) POA: Yes    Coronary artery disease involving native coronary artery of native heart without angina pectoris POA: Yes    Severe aortic stenosis POA: Yes    Type 2 diabetes mellitus without complication, without long-term current use of insulin (HCC) POA: Yes      Overview: 3/08 A1c 5.9%      12/09 A1c 6.4% on insulin      2009 eye exam      3/10 s/p lap banding, now off insulin      2/11 BS 93      5/12 A1c 5.2% off insulin      12/12 eye exam       9/5/13 A1c 5.7% off meds      1/9/15 A1c 5.7%,bs 106      10/2/15 A1c 5.9%      3/11/17 A1c 6.5%      7/3/18 A1c 7.6% (GFR 43) start amaryl 1 mg      10/8/18 poc retina no diabetic neuropathy, macular degeneration grade 3       recommend ophthalmology evaluation      10/8/18 A1c 6.3% on amaryl 1 mg      9/13/19 A1c 6.5%                Hypertension (Chronic) POA: Yes      Overview: 3/08 urine microalbumin 21      3/08 lifescan screening JUAQUIN,carotid,aorta negative      2/11 hold norvasc      2/11 urine mac 16      5/12 urine mac 18 on lisinopril 40 mg      9/4/13 urine mac 24 on lisinopril 40 mg      1/9/15 urine mac 30 on lisinopril 40 mg      7/29/15 echo moderate LVH, EF 60-65%      7/29/15 carotid ultrasound negative      3/31/16 add norvasc 5 mg to lisinopril 40 mg      5/4/16 stop norvasc constipation,still on lisinopril 40 mg and add hctz 25       mg      5/18/16 patient on hctz 25 mg, not taking lisinopril, will resume       lisinopril 40 mg      7/2/18 urine mac 30 on hctz 25 mg and lisinopril 40 mg      (norvasc cause constipation)          Dyslipidemia (Chronic) POA: Yes      Overview: 3/08 chol 159, trig 75, hdl 44, ldl 100      12/09 chol 162,chol 105, hdl 46,ldl 95      2/11 chol 135,trig 71,hdl 43,ldl 78      12/11 chol 141,trig 32,hdl 49,ldl 86      9/4/13 chol 147,trig 32,hdl 62,ldl 79      1/9/15 chol 152,trig 64,hdl 41,ldl 98       10/2/15 chol 140,trig 54,hdl 43,ldl 86,crp 1.3      3/11/17 chol 134,trig 118,hdl 33,ldl 77       7/3/18 chol 136,trig 123,hdl 32,ldl 79     Chronic kidney disease POA: Yes  Resolved Problems:    Acute on chronic systolic heart failure (HCC) POA: Yes      FOLLOW UP  Future Appointments   Date Time Provider Department Center   1/3/2020  8:00 AM Rebeca Harding P.A.-C. [facility] None     Glenn Delaney M.D.  [address]  [address]  [place] [id number] [phone]    In 1 week      Eliazar Queen M.D.  [address]  [address]  [place] [address]  [phone]    In 2 weeks        MEDICATIONS ON DISCHARGE     Medication List      START taking these medications      Instructions   aspirin 81 MG EC tablet  Start taking on:  December 30, 2019   Take 1 Tab by mouth every day.  Dose:  81 mg     bumetanide 1 MG Tabs  Start taking on:  December 30, 2019  Commonly known as:  BUMEX   Take 1 Tab by mouth every day.  Dose:  1 mg     clopidogrel 75 MG Tabs  Start taking on:  December 30, 2019  Commonly known as:  PLAVIX   Take 1 Tab by mouth every day.  Dose:  75 mg        CONTINUE taking these medications      Instructions   anastrozole 1 MG Tabs  Commonly known as:  ARIMIDEX   Take 1 mg by mouth every day.  Dose:  1 mg     CO Q-10 PO   Take 1 Tab by mouth 2 Times a Day.  Dose:  1 Tab     Cranberry 300 MG Tabs   Take 300 mg by mouth every morning.  Dose:  300 mg     DEPO-TESTOSTERONE IM   1 Each by Intramuscular route every 7 days. Thursday  Dose:  1 Each     ethyl chloride Aero   Apply 1 Application to affected area(s) every evening as needed (pain).  Dose:  1 Application     ezetimibe 10 MG Tabs  Commonly known as:  ZETIA   Take 10 mg by mouth every morning.  Dose:  10 mg     ferrous sulfate 325 (65 Fe) MG tablet   Take 325 mg by mouth every day.  Dose:  325 mg     FISH OIL PO   Take 2 Caps by mouth 2 Times a Day.  Dose:  2 Cap     fluticasone 50 MCG/ACT nasal spray  Commonly known as:  FLONASE   Doctor's comments:  Please consider 90 day supplies to promote better adherence  Spray 2 Sprays in nose every  day.  Dose:  2 Spray     Garlic 1000 MG Caps   Take 1,000 mg by mouth every morning.  Dose:  1,000 mg     glimepiride 1 MG tablet  Commonly known as:  AMARYL   Take 1 mg by mouth every evening.  Dose:  1 mg     GLUCOSAMINE SULFATE PO   Take 1 Tab by mouth 2 Times a Day.  Dose:  1 Tab     hydrocortisone 2.5 % Crea topical cream   Doctor's comments:  Please consider 90 day supplies to promote better adherence  Apply 1 Application to affected area(s) 2 times a day as needed (rash).  Dose:  1 Application     L-Lysine 1000 MG Tabs   Take 1,000 mg by mouth every day.  Dose:  1,000 mg     metoprolol SR 25 MG Tb24  Commonly known as:  TOPROL XL   Take 25 mg by mouth every day.  Dose:  25 mg     PROBIOTIC PO   Take 1 Cap by mouth every morning.  Dose:  1 Cap     sacubitril-valsartan 24-26 MG Tabs tablet  Commonly known as:  ENTRESTO   Take 1 Tab by mouth 2 Times a Day.  Dose:  1 Tab     VITAMIN B COMPLEX PO   Take 1 Tab by mouth every day.  Dose:  1 Tab     VITAMIN B1-B12 INJ   1 Each by Injection route every 7 days. Thursday  Dose:  1 Each     Vitamin C 1000 MG Tabs   Take 1 Tab by mouth every day.  Dose:  1 Tab     vitamin D 1000 UNIT Tabs  Commonly known as:  cholecalciferol   Take 1,000 Units by mouth every day.  Dose:  1,000 Units     vitamin e 400 UNIT Caps  Commonly known as:  VITAMIN E   Take 800 Units by mouth 2 times a day.  Dose:  800 Units     Zinc 50 MG Caps   Take 50 mg by mouth every day.  Dose:  50 mg        STOP taking these medications    furosemide 40 MG Tabs  Commonly known as:  LASIX            Allergies  Allergies   Allergen Reactions   • Statins [Hmg-Coa-R Inhibitors] Rash     Blisters     • Gabapentin Unspecified     Other reaction(s): Dizziness       DIET  Orders Placed This Encounter   Procedures   • Diet Order Diabetic     Standing Status:   Standing     Number of Occurrences:   1     Order Specific Question:   Diet:     Answer:   Diabetic [3]   • Discontinue Diet Tray     Standing Status:    Standing     Number of Occurrences:   1       ACTIVITY  As tolerated.  Weight bearing as tolerated    CONSULTATIONS  Cardiology    PROCEDURES  Cardiac cath    LABORATORY  Lab Results   Component Value Date    SODIUM 137 12/29/2019    POTASSIUM 4.0 12/29/2019    CHLORIDE 102 12/29/2019    CO2 27 12/29/2019    GLUCOSE 117 (H) 12/29/2019    BUN 36 (H) 12/29/2019    CREATININE 1.75 (H) 12/29/2019    CREATININE 1.18 02/08/2011    GLOMRATE >59 02/08/2011        Lab Results   Component Value Date    WBC 6.5 12/28/2019    WBC 4.5 02/08/2011    HEMOGLOBIN 15.8 12/28/2019    HEMATOCRIT 53.8 (H) 12/28/2019    PLATELETCT 212 12/28/2019        Total time of the discharge process exceeds 40 minutes.

## 2020-01-03 ENCOUNTER — OFFICE VISIT (OUTPATIENT)
Dept: CARDIOLOGY | Facility: MEDICAL CENTER | Age: 80
End: 2020-01-03
Payer: MEDICARE

## 2020-01-03 ENCOUNTER — DOCUMENTATION (OUTPATIENT)
Dept: CARDIOLOGY | Facility: MEDICAL CENTER | Age: 80
End: 2020-01-03

## 2020-01-03 ENCOUNTER — TELEPHONE (OUTPATIENT)
Dept: CARDIOLOGY | Facility: MEDICAL CENTER | Age: 80
End: 2020-01-03

## 2020-01-03 VITALS
HEART RATE: 114 BPM | BODY MASS INDEX: 34.59 KG/M2 | SYSTOLIC BLOOD PRESSURE: 120 MMHG | DIASTOLIC BLOOD PRESSURE: 74 MMHG | HEIGHT: 72 IN | WEIGHT: 255.4 LBS | OXYGEN SATURATION: 90 %

## 2020-01-03 DIAGNOSIS — I50.20 SYSTOLIC HEART FAILURE, UNSPECIFIED HF CHRONICITY (HCC): ICD-10-CM

## 2020-01-03 DIAGNOSIS — I35.0 SEVERE AORTIC STENOSIS: ICD-10-CM

## 2020-01-03 DIAGNOSIS — I10 ESSENTIAL HYPERTENSION: ICD-10-CM

## 2020-01-03 DIAGNOSIS — I25.10 LEFT MAIN CORONARY ARTERY DISEASE: ICD-10-CM

## 2020-01-03 DIAGNOSIS — G47.34 NOCTURNAL HYPOXIA: ICD-10-CM

## 2020-01-03 DIAGNOSIS — I25.10 CORONARY ARTERY DISEASE INVOLVING NATIVE CORONARY ARTERY OF NATIVE HEART WITHOUT ANGINA PECTORIS: ICD-10-CM

## 2020-01-03 PROBLEM — N18.9 CHRONIC KIDNEY DISEASE: Status: RESOLVED | Noted: 2019-12-04 | Resolved: 2020-01-03

## 2020-01-03 PROBLEM — M96.1 LUMBAR POST-LAMINECTOMY SYNDROME: Status: ACTIVE | Noted: 2019-07-02

## 2020-01-03 PROCEDURE — 99214 OFFICE O/P EST MOD 30 MIN: CPT | Performed by: NURSE PRACTITIONER

## 2020-01-03 RX ORDER — CLOPIDOGREL BISULFATE 75 MG/1
75 TABLET ORAL DAILY
Qty: 90 TAB | Refills: 3 | Status: ON HOLD | OUTPATIENT
Start: 2020-01-03 | End: 2020-06-05

## 2020-01-03 RX ORDER — BUMETANIDE 1 MG/1
1 TABLET ORAL DAILY
Qty: 90 TAB | Refills: 3 | Status: SHIPPED | OUTPATIENT
Start: 2020-01-03 | End: 2020-01-31

## 2020-01-03 RX ORDER — METOPROLOL SUCCINATE 25 MG/1
25 TABLET, EXTENDED RELEASE ORAL DAILY
Qty: 90 TAB | Refills: 3 | Status: ON HOLD
Start: 2020-01-03 | End: 2020-01-28

## 2020-01-03 ASSESSMENT — ENCOUNTER SYMPTOMS
PALPITATIONS: 0
HEADACHES: 0
ORTHOPNEA: 0
COUGH: 0
SHORTNESS OF BREATH: 1
VOMITING: 0
WHEEZING: 0
DOUBLE VISION: 0
FALLS: 0
LOSS OF CONSCIOUSNESS: 0
NERVOUS/ANXIOUS: 0
FOCAL WEAKNESS: 0
BLURRED VISION: 0
DIZZINESS: 0
NAUSEA: 0
WEAKNESS: 0

## 2020-01-03 NOTE — TELEPHONE ENCOUNTER
Patient is scheduled on 1-10-20 for a Pre TAVR R&L hrt cath w/PCI with Dr. Queen. Patient was told to hold glimepiride AM day of procedure and to check in at 6:00 for a 7:30 procedure. H&P was done on 1-3-20 by Rubia MAKI Pre admit is scheduled on 1-8-20 at 7:15.

## 2020-01-03 NOTE — PROGRESS NOTES
Chief Complaint   Patient presents with   • Coronary Artery Disease       Subjective:   USMAN More III is a 79 y.o. male who presents today for hospital follow up CAD.     He is followed by Dr. Mo in our clinic, history of hypertension, hyperlipidemia, severe aortic stenosis, diabetes, CKD, and ischemic cardiomyopathy ef 25%.    Patient was hospitalized December 26, 2019 for chest pain and acute CHF exacerbation.  Discovered HFrEF 25%, ischemic in nature with  nondominant LCx, high-grade RCA, high-grade distal left main, proximal LAD stenosis with 2 diagonals stenoses in combination with moderate AR. He was deemed high risk for open heart surgery underwent PCI to RCA. Needs high risk PCI to left main in couple weeks if CKD improves. He was also noted to be in heart failure with weight gain of 25lbs, IV diuretics was given. Sent home on GDMT.     CAD: denies any chest pain or dizziness. Experience some shortness of breath during exertion which can be contributed to his severe aortic stenosis.  Tolerating medication well.    Nocturnal hypoxic: Continues to wear oxygen at night    Congestive heart failure: Lower extremity edema has improved he continues to take Bumex every day.     Past Medical History:   Diagnosis Date   • Arthritis    • Breath shortness     pt states short of breath 24/7   • CATARACT     removed bilat   • Diabetes     PT REPORTS DIABETES RESOLVED IN 2009. NO LONGER TAKES MEDS   • Heart murmur    • Heart valve disease    • Hiatus hernia syndrome    • Hyperlipidemia    • Hypertension     pt states well controlled on meds   • Kidney disease    • Pain 08-29-13    back, hips and bilat legs, 4/10   • Pain 6/22/2015    left humerus   • Pain 1/7/16    knees, back and shoulder   • Pneumonia 2007   • Ulcer 2014    Dr. Porter, gastroenterologist   • Unspecified hemorrhagic conditions     bruises easily     Past Surgical History:   Procedure Laterality Date   • GASTROSCOPY N/A 1/8/2016    Procedure:  GASTROSCOPY;  Surgeon: Deniz Sue M.D.;  Location: [facility];  Service:    • HUMERUS ORIF Left 6/25/2015    Procedure: HUMERUS ORIF/ PROXIMAL;  Surgeon: Cristal Guido M.D.;  Location: [facility];  Service:    • LUMBAR FUSION POSTERIOR  9/5/2013    Performed by Edith Sears M.D. at [facility]   • LUMBAR DECOMPRESSION  9/5/2013    Performed by Edith Sears M.D. at [facility]   • MASS EXCISION NEURO  9/5/2013    Performed by Edith Sears M.D. at [facility]   • RECOVERY  6/26/2012    Performed by [facility], IRRECOVERY at [facility] SAME DAY [facility]   • DRAINAGE HEMATOMA  5/25/2012    Performed by DENIZ SUE at [facility]   • GASTRIC BAND LAPAROSCOPIC REVISION  5/11/2012    Performed by DENIZ SUE at [facility]   • LUMBAR FUSION POSTERIOR  3/26/2012    Performed by EDITH SEARS at [facility] ORS   • LUMBAR DECOMPRESSION  3/26/2012    Performed by EDITH SEARS at [facility] ORS   • INJ,EPIDURAL/LUMB/SAC SINGLE  3/19/2012    Performed by KRISITN BALTAZAR at [facility]   • INJ,EPIDURAL/LUMB/SAC SINGLE  3/5/2012    Performed by KRISTIN BALTAZAR at [facility]   • GASTRIC BANDING LAPAROSCOPIC  3/24/2010    Performed by DENIZ SUE at [facility]   • HIATAL HERNIA REPAIR  3/24/2010    Performed by DENIZ SUE at [facility]   • KNEE ARTHROPLASTY TOTAL  2000    bilateral   • ABDOMINOPLASTY  1990     Family History   Problem Relation Age of Onset   • No Known Problems Sister    • No Known Problems Sister    • No Known Problems Sister    • Diabetes Other    • No Known Problems Mother    • No Known Problems Father      Social History     Socioeconomic History   • Marital status:      Spouse name: Not on file   • Number of children: Not on file   • Years of education: Not on file   • Highest education level: Not on file   Occupational  History   • Not on file   Social Needs   • Financial resource strain: Not on file   • Food insecurity:     Worry: Not on file     Inability: Not on file   • Transportation needs:     Medical: Not on file     Non-medical: Not on file   Tobacco Use   • Smoking status: Never Smoker   • Smokeless tobacco: Never Used   Substance and Sexual Activity   • Alcohol use: No     Alcohol/week: 0.0 oz   • Drug use: No   • Sexual activity: Yes   Lifestyle   • Physical activity:     Days per week: Not on file     Minutes per session: Not on file   • Stress: Not on file   Relationships   • Social connections:     Talks on phone: Not on file     Gets together: Not on file     Attends Sabianist service: Not on file     Active member of club or organization: Not on file     Attends meetings of clubs or organizations: Not on file     Relationship status: Not on file   • Intimate partner violence:     Fear of current or ex partner: Not on file     Emotionally abused: Not on file     Physically abused: Not on file     Forced sexual activity: Not on file   Other Topics Concern   • Not on file   Social History Narrative   • Not on file     Allergies   Allergen Reactions   • Statins [Hmg-Coa-R Inhibitors] Rash     Blisters       Outpatient Encounter Medications as of 1/3/2020   Medication Sig Dispense Refill   • bumetanide (BUMEX) 1 MG Tab Take 1 Tab by mouth every day. 90 Tab 3   • clopidogrel (PLAVIX) 75 MG Tab Take 1 Tab by mouth every day. 90 Tab 3   • metoprolol SR (TOPROL XL) 25 MG TABLET SR 24 HR Take 1 Tab by mouth every day. 90 Tab 3   • aspirin EC 81 MG EC tablet Take 1 Tab by mouth every day. 30 Tab 0   • Coenzyme Q10 (CO Q-10 PO) Take 1 Tab by mouth 2 Times a Day.     • ethyl chloride Aerosol Apply 1 Application to affected area(s) every evening as needed (pain).     • ezetimibe (ZETIA) 10 MG Tab Take 10 mg by mouth every morning.     • glimepiride (AMARYL) 1 MG tablet Take 1 mg by mouth every evening.     • B Complex Vitamins  (VITAMIN B COMPLEX PO) Take 1 Tab by mouth every day.     • Cranberry 300 MG Tab Take 300 mg by mouth every morning.     • Garlic 1000 MG Cap Take 1,000 mg by mouth every morning.     • ferrous sulfate 325 (65 Fe) MG tablet Take 325 mg by mouth every day.     • L-Lysine 1000 MG Tab Take 1,000 mg by mouth every day.     • Probiotic Product (PROBIOTIC PO) Take 1 Cap by mouth every morning.     • Ascorbic Acid (VITAMIN C) 1000 MG Tab Take 1 Tab by mouth every day.     • vitamin e (VITAMIN E) 400 UNIT Cap Take 800 Units by mouth 2 times a day.     • Zinc 50 MG Cap Take 50 mg by mouth every day.     • sacubitril-valsartan (ENTRESTO) 24-26 MG Tab tablet Take 1 Tab by mouth 2 Times a Day. 60 Tab 3   • hydrocortisone 2.5 % Cream topical cream Apply 1 Application to affected area(s) 2 times a day as needed (rash). 28 g 1   • anastrozole (ARIMIDEX) 1 MG Tab Take 1 mg by mouth every day.     • VITAMIN B1-B12 INJ 1 Each by Injection route every 7 days. Thursday     • Testosterone Cypionate (DEPO-TESTOSTERONE IM) 1 Each by Intramuscular route every 7 days. Thursday     • vitamin D (CHOLECALCIFEROL) 1000 UNIT TABS Take 1,000 Units by mouth every day.     • GLUCOSAMINE SULFATE PO Take 1 Tab by mouth 2 Times a Day.     • [DISCONTINUED] bumetanide (BUMEX) 1 MG Tab Take 1 Tab by mouth every day. 30 Tab 0   • [DISCONTINUED] clopidogrel (PLAVIX) 75 MG Tab Take 1 Tab by mouth every day. 30 Tab 0   • [DISCONTINUED] metoprolol SR (TOPROL XL) 25 MG TABLET SR 24 HR Take 25 mg by mouth every day.     • [DISCONTINUED] fluticasone (FLONASE) 50 MCG/ACT nasal spray Spray 2 Sprays in nose every day. (Patient not taking: Reported on 1/3/2020) 3 Bottle 3   • [DISCONTINUED] Omega-3 Fatty Acids (FISH OIL PO) Take 2 Caps by mouth 2 Times a Day.       No facility-administered encounter medications on file as of 1/3/2020.      Review of Systems   Constitutional: Negative for malaise/fatigue.   Eyes: Negative for blurred vision and double vision.    Respiratory: Positive for shortness of breath. Negative for cough and wheezing.    Cardiovascular: Positive for leg swelling. Negative for chest pain, palpitations and orthopnea.   Gastrointestinal: Negative for nausea and vomiting.   Musculoskeletal: Negative for falls.   Neurological: Negative for dizziness, focal weakness, loss of consciousness, weakness and headaches.   Psychiatric/Behavioral: The patient is not nervous/anxious.    All other systems reviewed and are negative.       Objective:   /74 (BP Location: Left arm, Patient Position: Sitting, BP Cuff Size: Adult)   Pulse (!) 114   Ht 1.829 m (6')   Wt 115.8 kg (255 lb 6.4 oz)   SpO2 90%   BMI 34.64 kg/m²     Physical Exam   Constitutional: He is oriented to person, place, and time. He appears well-developed and well-nourished. No distress.   HENT:   Head: Normocephalic and atraumatic.   Eyes: Pupils are equal, round, and reactive to light.   Neck: No JVD present.   Cardiovascular: Normal rate and regular rhythm.   Murmur heard.  Pulmonary/Chest: Effort normal and breath sounds normal. No respiratory distress.   Abdominal: Soft. Bowel sounds are normal. He exhibits no distension.   Musculoskeletal:         General: Edema (1+ pitting edema LE) present.   Neurological: He is alert and oriented to person, place, and time.   Skin: Skin is warm and dry. He is not diaphoretic.   Psychiatric: He has a normal mood and affect. His behavior is normal. Judgment and thought content normal.       Coronary angiogram   12/27/2019  PROCEDURE:  Cardiac catheterization and percutaneous coronary intervention.  A.  Selective coronary angiography.  B.  Coronary stent implantation, distal right coronary artery 3.5x28 mm   Synergy drug-eluting stent postdilated 4.0 mm.  C.  Distal abdominal aortogram with bilateral iliofemoral angiography.  D. Right femoral artery approach.  E.  Conscious sedation supervision.  F.  Perclose deployment.     CORONARY  ANGIOGRAM  12/11/2019  Severe triple-vessel coronary artery disease with calcified 50% left main stenosis (iFR <0.8)  Left main is large in caliber.   It trifurcated into left anterior descending, medium-sized ramus intermediate artery and left circumflex artery.   It has calcified eccentric 50% stenosis at the distal portion around the trifurcation.  As mentioned above, the IFR was consistent with flow-limiting disease.     Left anterior descending artery (LAD) is large caliber vessel and extends beyond the apex.  It gives rises to 2 relatively long medium size diagonal branches proximally.  Moderate to heavy calcification was noted in proximal LAD along with stenosis up to at least 60-70%.   As mentioned above the IFR of the LAD was in the 0.7 range.  There are also 90% stenoses at the ostia of both first and second diagonal branches.  The antegrade flow is normal.     The ramus intermediate artery is about 1 and half millimeter in diameter. It has 80-90% ostial stenosis with normal antegrade flow     Left circumflex artery is medium in caliber.   This is occluded in the midportion after giveing rise to one very small and short obtuse marginal branch.     Right coronary artery (RCA) is large caliber. It gives rise to several small acute marginal branches, posterior descending artery and posterolateral branch.  There is 99% stenosis in the distal of the right coronary artery.  The antegrade flow is slow at JEN II.       ECHO   12/3/2019  Left ventricle is severely dilated.  Severely reduced left ventricular systolic function.  Left ventricular ejection fraction is visually estimated to be 25%.  Global hypokinesis with regional variation.  Moderate aortic stenosis.  Trace mitral regurgitation.  Severely dilated left atrium.  Mild tricuspid regurgitation.  Right ventricular systolic pressure is estimated to be 40  mmHg.  Normal pericardium without effusion.  Compared to the images of the study done on 7/29/2015   there has been   marked change in LV wall motion and function, moderate aortic stenosis   is also now present.       Lab Results   Component Value Date/Time    CHOLSTRLTOT 105 12/27/2019 02:20 AM    LDL 56 12/27/2019 02:20 AM    HDL 29 (A) 12/27/2019 02:20 AM    TRIGLYCERIDE 101 12/27/2019 02:20 AM       Lab Results   Component Value Date/Time    SODIUM 137 12/29/2019 12:33 AM    POTASSIUM 4.0 12/29/2019 12:33 AM    CHLORIDE 102 12/29/2019 12:33 AM    CO2 27 12/29/2019 12:33 AM    GLUCOSE 117 (H) 12/29/2019 12:33 AM    BUN 36 (H) 12/29/2019 12:33 AM    CREATININE 1.75 (H) 12/29/2019 12:33 AM    CREATININE 1.18 02/08/2011 12:00 AM    BUNCREATRAT 16 02/08/2011 12:00 AM    GLOMRATE >59 02/08/2011 12:00 AM     Lab Results   Component Value Date/Time    ALKPHOSPHAT 46 12/27/2019 10:01 AM    ASTSGOT 18 12/27/2019 10:01 AM    ALTSGPT 17 12/27/2019 10:01 AM    TBILIRUBIN 0.9 12/27/2019 10:01 AM        Assessment:     1. Nocturnal hypoxia  REFERRAL TO SLEEP STUDIES    REFERRAL TO PULMONOLOGY   2. Coronary artery disease involving native coronary artery of native heart without angina pectoris  CL-PRE-TRANSCATHETER AORTIC VALVE REPLACEMENT    CANCELED: CL-LEFT HEART CATHETERIZATION WITH POSSIBLE INTERVENTION    CANCELED: CL-RIGHT AND LEFT HEART CATHETERIZATION   3. Essential hypertension     4. Systolic heart failure, unspecified HF chronicity (HCC)  CL-PRE-TRANSCATHETER AORTIC VALVE REPLACEMENT    CANCELED: CL-LEFT HEART CATHETERIZATION WITH POSSIBLE INTERVENTION    CANCELED: CL-RIGHT AND LEFT HEART CATHETERIZATION   5. Severe aortic stenosis  CL-PRE-TRANSCATHETER AORTIC VALVE REPLACEMENT    REFERRAL TO CARDIOLOGY    CANCELED: CL-RIGHT AND LEFT HEART CATHETERIZATION   6. Left main coronary artery disease  CL-PRE-TRANSCATHETER AORTIC VALVE REPLACEMENT       Medical Decision Making:  Today's Assessment / Status / Plan:     1. CAD:  - s/p PCI to RCA and plan for high risk PCI to left main   - denies any chest pain   -Continue  aspirin 81 mg, Plavix 75 mg daily, Zetia, and metoprolol XL 25 mg daily.  -Unable to tolerate statin due to systemic rash, evaluate next follow up PCSK9 inhibitor     2. Congestive heart failure with reduced ef :  Ischemic cardiomyopathy   - today noted 1-2+ pitting LE edema   - continue entresto 24-26mg BID   - jalen diuretics with severe AS bumex 1mg qd     3. Severe AS:  - referral to valve program, has pre-tavr appt on 1/15 and 1/16  - pretavr cath ordered    4. nocturnal hypoxia:  - referral to sleep studies and pulmonary     Follow up with Dr. Castro with valve program for TAVR, sooner as needed.     Collaborating Provider: Dr. Wilkinson

## 2020-01-06 ENCOUNTER — TELEPHONE (OUTPATIENT)
Dept: CARDIOLOGY | Facility: MEDICAL CENTER | Age: 80
End: 2020-01-06

## 2020-01-06 NOTE — TELEPHONE ENCOUNTER
At 1-3-20 [facility] ordered referrals to Pulmonology, Sleep Studies, and to Dr. Castro. Pt. Is aware that it has only been 3 days; advised him that referrals can take 2 weeks.   Nurse will forward note to Cely in Valve Clinic to help expedite referral to Dr. Castro.

## 2020-01-06 NOTE — TELEPHONE ENCOUNTER
RT      Patient said Rubia told him to call her if he didn't hear back from someone about his referral. He said he hasn't heard from anyone yet. He can be reached at [phone].

## 2020-01-08 ENCOUNTER — TELEPHONE (OUTPATIENT)
Dept: CARDIOLOGY | Facility: MEDICAL CENTER | Age: 80
End: 2020-01-08

## 2020-01-08 DIAGNOSIS — Z01.812 PRE-OPERATIVE LABORATORY EXAMINATION: ICD-10-CM

## 2020-01-08 DIAGNOSIS — I35.0 SEVERE AORTIC STENOSIS: ICD-10-CM

## 2020-01-08 DIAGNOSIS — R06.02 SOB (SHORTNESS OF BREATH) ON EXERTION: ICD-10-CM

## 2020-01-08 DIAGNOSIS — Z01.810 PRE-OPERATIVE CARDIOVASCULAR EXAMINATION: ICD-10-CM

## 2020-01-08 LAB
ALBUMIN SERPL BCP-MCNC: 3.8 G/DL (ref 3.2–4.9)
ALBUMIN/GLOB SERPL: 1.7 G/DL
ALP SERPL-CCNC: 42 U/L (ref 30–99)
ALT SERPL-CCNC: 17 U/L (ref 2–50)
ANION GAP SERPL CALC-SCNC: 10 MMOL/L (ref 0–11.9)
APTT PPP: 35.2 SEC (ref 24.7–36)
AST SERPL-CCNC: 20 U/L (ref 12–45)
BASOPHILS # BLD AUTO: 0.3 % (ref 0–1.8)
BASOPHILS # BLD: 0.02 K/UL (ref 0–0.12)
BILIRUB SERPL-MCNC: 0.6 MG/DL (ref 0.1–1.5)
BUN SERPL-MCNC: 28 MG/DL (ref 8–22)
CALCIUM SERPL-MCNC: 9.4 MG/DL (ref 8.5–10.5)
CHLORIDE SERPL-SCNC: 103 MMOL/L (ref 96–112)
CO2 SERPL-SCNC: 27 MMOL/L (ref 20–33)
CREAT SERPL-MCNC: 1.52 MG/DL (ref 0.5–1.4)
EKG IMPRESSION: NORMAL
EOSINOPHIL # BLD AUTO: 0.38 K/UL (ref 0–0.51)
EOSINOPHIL NFR BLD: 6.3 % (ref 0–6.9)
ERYTHROCYTE [DISTWIDTH] IN BLOOD BY AUTOMATED COUNT: 60.4 FL (ref 35.9–50)
GLOBULIN SER CALC-MCNC: 2.2 G/DL (ref 1.9–3.5)
GLUCOSE SERPL-MCNC: 112 MG/DL (ref 65–99)
HCT VFR BLD AUTO: 52.4 % (ref 42–52)
HGB BLD-MCNC: 15.7 G/DL (ref 14–18)
IMM GRANULOCYTES # BLD AUTO: 0.02 K/UL (ref 0–0.11)
IMM GRANULOCYTES NFR BLD AUTO: 0.3 % (ref 0–0.9)
INR PPP: 1.11 (ref 0.87–1.13)
LYMPHOCYTES # BLD AUTO: 1.66 K/UL (ref 1–4.8)
LYMPHOCYTES NFR BLD: 27.4 % (ref 22–41)
MCH RBC QN AUTO: 23.4 PG (ref 27–33)
MCHC RBC AUTO-ENTMCNC: 30 G/DL (ref 33.7–35.3)
MCV RBC AUTO: 78.1 FL (ref 81.4–97.8)
MONOCYTES # BLD AUTO: 0.69 K/UL (ref 0–0.85)
MONOCYTES NFR BLD AUTO: 11.4 % (ref 0–13.4)
NEUTROPHILS # BLD AUTO: 3.29 K/UL (ref 1.82–7.42)
NEUTROPHILS NFR BLD: 54.3 % (ref 44–72)
NRBC # BLD AUTO: 0 K/UL
NRBC BLD-RTO: 0 /100 WBC
PLATELET # BLD AUTO: 202 K/UL (ref 164–446)
PMV BLD AUTO: 10.4 FL (ref 9–12.9)
POTASSIUM SERPL-SCNC: 3.8 MMOL/L (ref 3.6–5.5)
PROT SERPL-MCNC: 6 G/DL (ref 6–8.2)
PROTHROMBIN TIME: 14.5 SEC (ref 12–14.6)
RBC # BLD AUTO: 6.71 M/UL (ref 4.7–6.1)
SODIUM SERPL-SCNC: 140 MMOL/L (ref 135–145)
WBC # BLD AUTO: 6.1 K/UL (ref 4.8–10.8)

## 2020-01-08 PROCEDURE — 93005 ELECTROCARDIOGRAM TRACING: CPT

## 2020-01-08 PROCEDURE — 36415 COLL VENOUS BLD VENIPUNCTURE: CPT

## 2020-01-08 PROCEDURE — 85610 PROTHROMBIN TIME: CPT

## 2020-01-08 PROCEDURE — 80053 COMPREHEN METABOLIC PANEL: CPT

## 2020-01-08 PROCEDURE — 85730 THROMBOPLASTIN TIME PARTIAL: CPT

## 2020-01-08 PROCEDURE — 93010 ELECTROCARDIOGRAM REPORT: CPT | Performed by: INTERNAL MEDICINE

## 2020-01-08 PROCEDURE — 85025 COMPLETE CBC W/AUTO DIFF WBC: CPT

## 2020-01-08 NOTE — TELEPHONE ENCOUNTER
"Received message that patient requesting return call to discuss valve clinic referral.     Returned call to patient. He reports that he does not have any questions about his upcoming valve clinic referral, other than wanting to verify that such appointments will be notified to him via his MyChart. Assured patient that he will receive MyChart notifications for such appointments as soon as they are put into his medical records.     Patient states that the reason he called is that he would like to verify how long he is expected to be on Entresto. He states that Dr. Mo is the original prescriber of Entresto, however he has \"severed that relationship\" and is scheduled to see Dr. Wilkinson 1/27/20. He further reports that one prescription of Entresto costs him $500, and he would like to verify that he needs to continue such medication, as he does not want to spend the money to refill such prescription only to be recommended to discontinue such shortly after refilling.     Assured patient that I will send request to both Dr. Jenkins and Dr. Wilkinson to clarify length of expected treatment with such prescription and their nurse will return call to him with that advise. Patient states that he wants a call back today with answer. Explained that I do not know exact timing of return call with answer, however I will send the message right away and the nurse will return call as soon as advise is provided. Patient reiterates that he wants a call back today.     VR/BE please advise.   "

## 2020-01-08 NOTE — TELEPHONE ENCOUNTER
Called pt to discuss. Advised coupon card for Entresto for 30-day supply is available in our office. Customer service phone number and multiple numbers that are on the coupon card given to pt per request. Advised to give us a call back if he has any issues.

## 2020-01-08 NOTE — TELEPHONE ENCOUNTER
Paul Wilkinson M.D.  Cely Liz R.N. 3 hours ago (10:55 AM)      The medicine is commonly prescribed indefinitely.  If cost is a barrier there are alternate options.  Would suggest a clinic visit to ensure I am up to speak under the thing that is been happening and this circumstance      Called pt and discussed that Entresto is typically taken indefinitely if he is responding well to it. He previously s/w a nurse today about a discount card for Entresto. His pharmacy is currently working on this, but he s/w [employer] who informed him that there shouldn't be a problem using card. D/w pt the option of applying for pt assistance as well. He is open to this idea and requests that the paperwork be faxed to him at [phone]. Faxed Entresto form to this number. Receipt confirmed. Requested that pt complete his portions, sign, and return to our office.

## 2020-01-08 NOTE — TELEPHONE ENCOUNTER
RT      Patient is asking how long he will be taking Entresto. He said the medication is costing him about $500 monthly. He can be reached at [phone].

## 2020-01-10 ENCOUNTER — APPOINTMENT (OUTPATIENT)
Dept: CARDIOLOGY | Facility: MEDICAL CENTER | Age: 80
End: 2020-01-10
Attending: NURSE PRACTITIONER
Payer: MEDICARE

## 2020-01-10 ENCOUNTER — HOSPITAL ENCOUNTER (OUTPATIENT)
Facility: MEDICAL CENTER | Age: 80
End: 2020-01-11
Attending: INTERNAL MEDICINE | Admitting: INTERNAL MEDICINE
Payer: MEDICARE

## 2020-01-10 DIAGNOSIS — I50.20 SYSTOLIC HEART FAILURE, UNSPECIFIED HF CHRONICITY (HCC): ICD-10-CM

## 2020-01-10 DIAGNOSIS — I25.10 CORONARY ARTERY DISEASE INVOLVING NATIVE CORONARY ARTERY OF NATIVE HEART WITHOUT ANGINA PECTORIS: ICD-10-CM

## 2020-01-10 DIAGNOSIS — I35.0 SEVERE AORTIC STENOSIS: ICD-10-CM

## 2020-01-10 DIAGNOSIS — I25.10 LEFT MAIN CORONARY ARTERY DISEASE: ICD-10-CM

## 2020-01-10 LAB — EKG IMPRESSION: NORMAL

## 2020-01-10 PROCEDURE — 93005 ELECTROCARDIOGRAM TRACING: CPT | Performed by: INTERNAL MEDICINE

## 2020-01-10 PROCEDURE — 700102 HCHG RX REV CODE 250 W/ 637 OVERRIDE(OP): Performed by: NURSE PRACTITIONER

## 2020-01-10 PROCEDURE — 93452 LEFT HRT CATH W/VENTRCLGRPHY: CPT | Mod: 26 | Performed by: INTERNAL MEDICINE

## 2020-01-10 PROCEDURE — 700101 HCHG RX REV CODE 250

## 2020-01-10 PROCEDURE — 92978 ENDOLUMINL IVUS OCT C 1ST: CPT

## 2020-01-10 PROCEDURE — 92978 ENDOLUMINL IVUS OCT C 1ST: CPT | Mod: 26 | Performed by: INTERNAL MEDICINE

## 2020-01-10 PROCEDURE — 700111 HCHG RX REV CODE 636 W/ 250 OVERRIDE (IP)

## 2020-01-10 PROCEDURE — 92920 PRQ TRLUML C ANGIOP 1ART&/BR: CPT | Mod: 59,LD | Performed by: INTERNAL MEDICINE

## 2020-01-10 PROCEDURE — 99152 MOD SED SAME PHYS/QHP 5/>YRS: CPT | Performed by: INTERNAL MEDICINE

## 2020-01-10 PROCEDURE — 92928 PRQ TCAT PLMT NTRAC ST 1 LES: CPT | Mod: LM | Performed by: INTERNAL MEDICINE

## 2020-01-10 PROCEDURE — A9270 NON-COVERED ITEM OR SERVICE: HCPCS | Performed by: NURSE PRACTITIONER

## 2020-01-10 PROCEDURE — 93010 ELECTROCARDIOGRAM REPORT: CPT | Performed by: INTERNAL MEDICINE

## 2020-01-10 PROCEDURE — 700117 HCHG RX CONTRAST REV CODE 255: Performed by: INTERNAL MEDICINE

## 2020-01-10 PROCEDURE — 160002 HCHG RECOVERY MINUTES (STAT)

## 2020-01-10 PROCEDURE — G0378 HOSPITAL OBSERVATION PER HR: HCPCS

## 2020-01-10 RX ORDER — FERROUS SULFATE 325(65) MG
325 TABLET ORAL DAILY
Status: DISCONTINUED | OUTPATIENT
Start: 2020-01-11 | End: 2020-01-11 | Stop reason: HOSPADM

## 2020-01-10 RX ORDER — LIDOCAINE HYDROCHLORIDE 20 MG/ML
INJECTION, SOLUTION INFILTRATION; PERINEURAL
Status: COMPLETED
Start: 2020-01-10 | End: 2020-01-10

## 2020-01-10 RX ORDER — ACETAMINOPHEN 325 MG/1
650 TABLET ORAL EVERY 6 HOURS PRN
Status: DISCONTINUED | OUTPATIENT
Start: 2020-01-10 | End: 2020-01-11 | Stop reason: HOSPADM

## 2020-01-10 RX ORDER — BIVALIRUDIN 250 MG/5ML
INJECTION, POWDER, LYOPHILIZED, FOR SOLUTION INTRAVENOUS
Status: COMPLETED
Start: 2020-01-10 | End: 2020-01-10

## 2020-01-10 RX ORDER — METOPROLOL SUCCINATE 25 MG/1
25 TABLET, EXTENDED RELEASE ORAL DAILY
Status: DISCONTINUED | OUTPATIENT
Start: 2020-01-11 | End: 2020-01-11 | Stop reason: HOSPADM

## 2020-01-10 RX ORDER — HEPARIN SODIUM 200 [USP'U]/100ML
INJECTION, SOLUTION INTRAVENOUS
Status: COMPLETED
Start: 2020-01-10 | End: 2020-01-10

## 2020-01-10 RX ORDER — GLIMEPIRIDE 2 MG/1
1 TABLET ORAL EVERY EVENING
Status: DISCONTINUED | OUTPATIENT
Start: 2020-01-10 | End: 2020-01-11 | Stop reason: HOSPADM

## 2020-01-10 RX ORDER — VERAPAMIL HYDROCHLORIDE 2.5 MG/ML
INJECTION, SOLUTION INTRAVENOUS
Status: COMPLETED
Start: 2020-01-10 | End: 2020-01-10

## 2020-01-10 RX ORDER — OMEPRAZOLE 20 MG/1
20 CAPSULE, DELAYED RELEASE ORAL 2 TIMES DAILY
Status: ON HOLD | COMMUNITY
End: 2020-01-27

## 2020-01-10 RX ORDER — EZETIMIBE 10 MG/1
10 TABLET ORAL EVERY MORNING
Status: DISCONTINUED | OUTPATIENT
Start: 2020-01-11 | End: 2020-01-11 | Stop reason: HOSPADM

## 2020-01-10 RX ORDER — CLOPIDOGREL BISULFATE 75 MG/1
75 TABLET ORAL DAILY
Status: DISCONTINUED | OUTPATIENT
Start: 2020-01-11 | End: 2020-01-11 | Stop reason: HOSPADM

## 2020-01-10 RX ORDER — HEPARIN SODIUM,PORCINE 1000/ML
VIAL (ML) INJECTION
Status: COMPLETED
Start: 2020-01-10 | End: 2020-01-10

## 2020-01-10 RX ORDER — SODIUM CHLORIDE 9 MG/ML
INJECTION, SOLUTION INTRAVENOUS CONTINUOUS
Status: ACTIVE | OUTPATIENT
Start: 2020-01-10 | End: 2020-01-10

## 2020-01-10 RX ORDER — SODIUM CHLORIDE 9 MG/ML
INJECTION, SOLUTION INTRAVENOUS
Status: DISCONTINUED | OUTPATIENT
Start: 2020-01-10 | End: 2020-01-10

## 2020-01-10 RX ORDER — MIDAZOLAM HYDROCHLORIDE 1 MG/ML
INJECTION INTRAMUSCULAR; INTRAVENOUS
Status: COMPLETED
Start: 2020-01-10 | End: 2020-01-10

## 2020-01-10 RX ADMIN — NITROGLYCERIN 10 ML: 20 INJECTION INTRAVENOUS at 08:16

## 2020-01-10 RX ADMIN — GLIMEPIRIDE 1 MG: 2 TABLET ORAL at 17:16

## 2020-01-10 RX ADMIN — VERAPAMIL HYDROCHLORIDE 2.5 MG: 2.5 INJECTION INTRAVENOUS at 08:17

## 2020-01-10 RX ADMIN — LIDOCAINE HYDROCHLORIDE: 20 INJECTION, SOLUTION INFILTRATION; PERINEURAL at 08:16

## 2020-01-10 RX ADMIN — BIVALIRUDIN 250 MG: 250 INJECTION INTRACAVERNOUS at 08:28

## 2020-01-10 RX ADMIN — HEPARIN SODIUM 2000 UNITS: 200 INJECTION, SOLUTION INTRAVENOUS at 08:29

## 2020-01-10 RX ADMIN — HEPARIN SODIUM: 1000 INJECTION, SOLUTION INTRAVENOUS; SUBCUTANEOUS at 08:16

## 2020-01-10 RX ADMIN — IOHEXOL 246 ML: 350 INJECTION, SOLUTION INTRAVENOUS at 09:06

## 2020-01-10 RX ADMIN — MIDAZOLAM HYDROCHLORIDE 2 MG: 1 INJECTION, SOLUTION INTRAMUSCULAR; INTRAVENOUS at 08:56

## 2020-01-10 RX ADMIN — BIVALIRUDIN 250 MG: 250 INJECTION INTRACAVERNOUS at 09:02

## 2020-01-10 RX ADMIN — FENTANYL CITRATE 100 MCG: 0.05 INJECTION, SOLUTION INTRAMUSCULAR; INTRAVENOUS at 08:57

## 2020-01-10 ASSESSMENT — LIFESTYLE VARIABLES
TOTAL SCORE: 0
CONSUMPTION TOTAL: INCOMPLETE
TOTAL SCORE: 0
TOTAL SCORE: 0
ALCOHOL_USE: NO
EVER FELT BAD OR GUILTY ABOUT YOUR DRINKING: NO
HAVE YOU EVER FELT YOU SHOULD CUT DOWN ON YOUR DRINKING: NO
DOES PATIENT WANT TO STOP DRINKING: NO
EVER_SMOKED: NEVER
EVER HAD A DRINK FIRST THING IN THE MORNING TO STEADY YOUR NERVES TO GET RID OF A HANGOVER: NO
HAVE PEOPLE ANNOYED YOU BY CRITICIZING YOUR DRINKING: NO

## 2020-01-10 ASSESSMENT — COGNITIVE AND FUNCTIONAL STATUS - GENERAL
SUGGESTED CMS G CODE MODIFIER DAILY ACTIVITY: CH
DAILY ACTIVITIY SCORE: 24
SUGGESTED CMS G CODE MODIFIER MOBILITY: CH
MOBILITY SCORE: 24

## 2020-01-10 ASSESSMENT — PATIENT HEALTH QUESTIONNAIRE - PHQ9
1. LITTLE INTEREST OR PLEASURE IN DOING THINGS: NOT AT ALL
2. FEELING DOWN, DEPRESSED, IRRITABLE, OR HOPELESS: NOT AT ALL
SUM OF ALL RESPONSES TO PHQ9 QUESTIONS 1 AND 2: 0

## 2020-01-10 NOTE — OR NURSING
0927: Patient from cath lab to [facility] via [facility] s/p L heart cath with stent placement. Patient is awake. VSS. RUE CMS intact. R TRband intact. Vascular access care management per MD order (see assessment). No c/o pain or nausea at this time. Will monitor closely. Vital signs per MD order.   0950: VSS. Patient tolerated PO intake well. Warm blankets for comfort. Wife at bedside. Angiomax gtt infusing at 5 ml/hr X 4 hours, started in cath lab.   1045: EKG done at bedside.   1130: VSS. Patient denies pain or nausea at this time. R TRband intact.  1200: Air completely off hemoband per MD order. Gauze and tegaderm dressing to R radial site. RUE circulation, movement and sensation intact.  Patient met criteria to transfer out of PPU.

## 2020-01-10 NOTE — DISCHARGE SUMMARY
Admission date  1/10/2020  Discharge date  1/11/2020  Discharge diagnosis    Known MVCAD, surgical turndown, underwent coronary angiography 1/10/2020:  revealed 70% distal left main and proximal LAD stenosis, successful PCI to proximal LAD and distal left main, moderate aortic stenosis, mean gradient 27 to 30 mmHg.    Coronary angiography 12/27/19 successful PCI to distal RCA.    Hospitalized 12/26/19 for acute decompensated heart failure.    Coronary angiography 12/11/19 severe three-vessel CAD with calcified 50% left main stenosis ( nondominant left circumflex, high-grade RCA, high-grade distal left main and proximal LAD, 2 diagonal stenosis), patient was declined by Dr. Hylton and Dr. Richter for CABG.      Moderate aortic stenosis    Ischemic cardiomyopathy, EF 25%    Hypertension    Hyperlipidemia      Type 2 diabetes      Chronic kidney disease      Referral to pulmonary for sleep study was done 1/3/2020        Consults  None      Procedures:     Left heart cath 1/10/2020  1. LVEF 30% prior to intervention.    2. 70% distal left main and proximal LAD stenosis, IFR positive from prior procedure.    3. Successful planned PCI proximal LAD and distal left main trifurcation stenosis (3.5 x 26 mm [place] BRANDI postdilated to 4.5 mm proximally), IVUS guided.    4. Moderate aortic stenosis, mean gradient 27 to 30mmHg.       HPI/hospital course  79-year-old male with known MVCAD, surgical turndown, s/p successful PCI to RCA 12/27/19 presents to undergo elective left heart cath for PCI proximal LAD and distal left main.    Day of discharge, asymptomatic, ambulated in the [place]oo, rhythm stable, no complaints, questions answered, we discussed continuation of dual antiplatelet therapy, he verbalized understanding, right radial cath site without evidence of hematoma.        Labs reviewed    WBC 5.6, hemoglobin 14, hematocrit 49.8, platelet count 188, sodium 137, potassium 4, BUN 30, creatinine 1.59    Discharge meds    Resume  ascorbic acid 1000 mg daily  Resume aspirin 81 mg p.o. daily  Resume vitamin B complex 1 daily  Resume Bumex 1 tab daily  Resume coenzyme Q 10 1 tab twice a day  Resume cranberry 300 daily  Resume garlic thousand milligrams daily  Resume glucosamine 1 tab twice a day  Resume lysine thousand milligrams daily  Resume probiotic 1 tab daily  Resume testosterone IM every 7-day  Resume vitamin B12 injection every 7 days  Is resume vitamin D 2000 units every day  Resume vitamin D 800 twice a day  Resume zinc 50 daily  Resume Plavix 75 mg daily  Resume Zetia 10 mg daily (statin allergies, rash and blisters)  Resume ferrous sulfate 325 mg daily  Resume glimepiride 1 mg daily  Resume Entresto 24/26 daily  Resume Toprol-XL 25 mg daily        Discharge instructions    Patient is scheduled to follow-up with valve team 1/15/2020.  General cardiology follow-up 1/27/2020.  Discussed the importance of compliance with dual antiplatelet therapy (aspirin, Plavix), aspirin indefinitely, Plavix x12 months.  Close monitoring of blood pressure and heart rate.  Cath site care discussed.  Patient verbalized understanding.

## 2020-01-11 VITALS
DIASTOLIC BLOOD PRESSURE: 68 MMHG | SYSTOLIC BLOOD PRESSURE: 123 MMHG | HEART RATE: 74 BPM | BODY MASS INDEX: 32.68 KG/M2 | OXYGEN SATURATION: 95 % | HEIGHT: 73 IN | TEMPERATURE: 98.7 F | WEIGHT: 246.6 LBS | RESPIRATION RATE: 16 BRPM

## 2020-01-11 DIAGNOSIS — I25.10 CORONARY ARTERY DISEASE INVOLVING NATIVE CORONARY ARTERY OF NATIVE HEART WITHOUT ANGINA PECTORIS: ICD-10-CM

## 2020-01-11 LAB
ANION GAP SERPL CALC-SCNC: 9 MMOL/L (ref 0–11.9)
BUN SERPL-MCNC: 30 MG/DL (ref 8–22)
CALCIUM SERPL-MCNC: 8.6 MG/DL (ref 8.5–10.5)
CHLORIDE SERPL-SCNC: 102 MMOL/L (ref 96–112)
CO2 SERPL-SCNC: 26 MMOL/L (ref 20–33)
CREAT SERPL-MCNC: 1.59 MG/DL (ref 0.5–1.4)
ERYTHROCYTE [DISTWIDTH] IN BLOOD BY AUTOMATED COUNT: 61.8 FL (ref 35.9–50)
GLUCOSE SERPL-MCNC: 139 MG/DL (ref 65–99)
HCT VFR BLD AUTO: 49.8 % (ref 42–52)
HGB BLD-MCNC: 14.6 G/DL (ref 14–18)
MCH RBC QN AUTO: 22.9 PG (ref 27–33)
MCHC RBC AUTO-ENTMCNC: 29.3 G/DL (ref 33.7–35.3)
MCV RBC AUTO: 78.2 FL (ref 81.4–97.8)
PLATELET # BLD AUTO: 188 K/UL (ref 164–446)
PMV BLD AUTO: 10.5 FL (ref 9–12.9)
POTASSIUM SERPL-SCNC: 4 MMOL/L (ref 3.6–5.5)
RBC # BLD AUTO: 6.37 M/UL (ref 4.7–6.1)
SODIUM SERPL-SCNC: 137 MMOL/L (ref 135–145)
WBC # BLD AUTO: 5.6 K/UL (ref 4.8–10.8)

## 2020-01-11 PROCEDURE — 700102 HCHG RX REV CODE 250 W/ 637 OVERRIDE(OP): Performed by: NURSE PRACTITIONER

## 2020-01-11 PROCEDURE — A9270 NON-COVERED ITEM OR SERVICE: HCPCS | Performed by: NURSE PRACTITIONER

## 2020-01-11 PROCEDURE — A9270 NON-COVERED ITEM OR SERVICE: HCPCS | Performed by: INTERNAL MEDICINE

## 2020-01-11 PROCEDURE — G0378 HOSPITAL OBSERVATION PER HR: HCPCS

## 2020-01-11 PROCEDURE — 700102 HCHG RX REV CODE 250 W/ 637 OVERRIDE(OP): Performed by: INTERNAL MEDICINE

## 2020-01-11 PROCEDURE — 85027 COMPLETE CBC AUTOMATED: CPT

## 2020-01-11 PROCEDURE — 99217 PR OBSERVATION CARE DISCHARGE: CPT | Performed by: NURSE PRACTITIONER

## 2020-01-11 PROCEDURE — 36415 COLL VENOUS BLD VENIPUNCTURE: CPT

## 2020-01-11 PROCEDURE — 80048 BASIC METABOLIC PNL TOTAL CA: CPT

## 2020-01-11 RX ORDER — CLOPIDOGREL BISULFATE 75 MG/1
75 TABLET ORAL DAILY
Qty: 30 TAB | Refills: 0 | Status: SHIPPED
Start: 2020-01-11 | End: 2020-01-15

## 2020-01-11 RX ADMIN — METOPROLOL SUCCINATE 25 MG: 25 TABLET, EXTENDED RELEASE ORAL at 06:19

## 2020-01-11 RX ADMIN — ASPIRIN 81 MG: 81 TABLET, COATED ORAL at 06:20

## 2020-01-11 RX ADMIN — SACUBITRIL AND VALSARTAN 1 TABLET: 24; 26 TABLET, FILM COATED ORAL at 06:20

## 2020-01-11 RX ADMIN — CLOPIDOGREL BISULFATE 75 MG: 75 TABLET ORAL at 06:20

## 2020-01-11 RX ADMIN — EZETIMIBE 10 MG: 10 TABLET ORAL at 06:19

## 2020-01-11 NOTE — PROGRESS NOTES
Patient arrived to 731-2. No complaints. VSS. Radial site intact. Neuro vasc intact. Will continue to monitor. See assessment in chart.

## 2020-01-11 NOTE — PROGRESS NOTES
Bedside report received from night RN; assumed pt care. Pt assessment complete. Pt A&Ox4 Reviewed plan of care with pt. Tele box on and rhythm verified. Chart and labs reviewed. Bed in lowest position, and 2 side rails up. Pt educated on call light; call light with in reach.'

## 2020-01-11 NOTE — DISCHARGE INSTRUCTIONS
Discharge Instructions    Discharged to home by car with relative. Discharged via wheelchair, hospital escort: Yes.  Special equipment needed: Not Applicable    Be sure to schedule a follow-up appointment with your primary care doctor or any specialists as instructed.     Discharge Plan:   Diet Plan: Discussed  Activity Level: Discussed  Confirmed Follow up Appointment: Appointment Scheduled  Confirmed Symptoms Management: Discussed  Medication Reconciliation Updated: Yes  Influenza Vaccine Indication: Patient Refuses    I understand that a diet low in cholesterol, fat, and sodium is recommended for good health. Unless I have been given specific instructions below for another diet, I accept this instruction as my diet prescription.   Other diet: Cardiac    Special Instructions: Diagnosis:  Acute Coronary Syndrome (ACS) is a diagnosis that encompasses cardiac-related chest pain and heart attack. ACS occurs when the blood flow to the heart muscle is severely reduced or cut off completely due to a slow process called atherosclerosis.  Atherosclerosis is a disease in which the coronary arteries become narrow from a buildup of fat, cholesterol, and other substances that combine to form plaque. If the plaque breaks, a blood clot will form and block the blood flow to the heart muscle. This lack of blood flow can cause damage or death to the heart muscle which is called a heart attack or Myocardial Infarction (MI). There are two different types of MIs:  ST Elevation Myocardial Infarction or STEMI (the most severe type of heart attack) and Non-ST Elevation Myocardial Infarction or NSTEMI.    Treatment Plan:  · Cardiac Diet  - Low fat, low salt, low cholesterol   · Cardiac Rehab  - Your doctor has ordered you a referral to [facility].  Call [phone] to schedule an appointment.  · Attend my follow-up appointment with my Cardiologist.  · Take my medications as prescribed by my doctor  · Exercise daily  · Lower my bad  cholesterol and raise my good cholesterol, lower my blood pressure and Reduce stress    Medications:  Certain medications are used to treat ACS.  Remember to always take medications as prescribed and never stop talking medications unless told by your doctor.    You have been prescribed the following medicatons:    Aspirin - Aspirin is used as a blood thinning medication and you will require this medication indefinitely.  Anti-platelet/blood thinner - Your Anti-platelet/Blood thinning medication is called Plavix, and is used in combination with aspirin to prevent clots from forming in your heart and/or around your stent.  Your doctor will determine how long you need to be on this medicine.  Beta-Blocker - Beta-Blocker Metroprolol is used to lower blood pressure and heart rate, and/or helps your heart heal after a heart attack.  Statin - Statin Zetia is used to lower cholesterol.    · Is patient discharged on Warfarin / Coumadin?   No     Coronary Angiogram With Stent, Care After  Refer to this sheet in the next few weeks. These instructions provide you with information about caring for yourself after your procedure. Your health care provider may also give you more specific instructions. Your treatment has been planned according to current medical practices, but problems sometimes occur. Call your health care provider if you have any problems or questions after your procedure.  WHAT TO EXPECT AFTER THE PROCEDURE   After your procedure, it is typical to have the following:  · Bruising at the catheter insertion site that usually fades within 1-2 weeks.  · Blood collecting in the tissue (hematoma) that may be painful to the touch. It should usually decrease in size and tenderness within 1-2 weeks.  HOME CARE INSTRUCTIONS  · Take medicines only as directed by your health care provider. Blood thinners may be prescribed after your procedure to improve blood flow through the stent.  · You may shower 24-48 hours after the  procedure or as directed by your health care provider. Remove the bandage (dressing) and gently wash the catheter insertion site with plain soap and water. Pat the area dry with a clean towel. Do not rub the site, because this may cause bleeding.  · Do not take baths, swim, or use a hot tub until your health care provider approves.  · Check your catheter insertion site every day for redness, swelling, or drainage.  · Do not apply powder or lotion to the site.  · Do not lift over 10 lb (4.5 kg) for 5 days after your procedure or as directed by your health care provider.  · Ask your health care provider when it is okay to:  ¨ Return to work or school.  ¨ Resume usual physical activities or sports.  ¨ Resume sexual activity.  · Eat a heart-healthy diet. This should include plenty of fresh fruits and vegetables. Meat should be lean cuts. Avoid the following types of food:  ¨ Food that is high in salt.  ¨ Canned or highly processed food.  ¨ Food that is high in saturated fat or sugar.  ¨ Fried food.  · Make any other lifestyle changes as recommended by your health care provider. These may include:  ¨ Not using any tobacco products, including cigarettes, chewing tobacco, or electronic cigarettes. If you need help quitting, ask your health care provider.  ¨ Managing your weight.  ¨ Getting regular exercise.  ¨ Managing your blood pressure.  ¨ Limiting your alcohol intake.  ¨ Managing other health problems, such as diabetes.  · If you need an MRI after your heart stent has been placed, be sure to tell the health care provider who orders the MRI that you have a heart stent.  · Keep all follow-up visits as directed by your health care provider. This is important.  SEEK MEDICAL CARE IF:  · You have a fever.  · You have chills.  · You have increased bleeding from the catheter insertion site. Hold pressure on the site.  SEEK IMMEDIATE MEDICAL CARE IF:  · You develop chest pain or shortness of breath, feel faint, or pass  out.  · You have unusual pain at the catheter insertion site.  · You have redness, warmth, or swelling at the catheter insertion site.  · You have drainage (other than a small amount of blood on the dressing) from the catheter insertion site.  · The catheter insertion site is bleeding, and the bleeding does not stop after 30 minutes of holding steady pressure on the site.  · You develop bleeding from any other place, such as from your rectum. There may be bright red blood in your urine or stool, or it may appear as black, tarry stool.     This information is not intended to replace advice given to you by your health care provider. Make sure you discuss any questions you have with your health care provider.     Document Released: 07/07/2006 Document Revised: 01/08/2016 Document Reviewed: 07/06/2015  [employer] Interactive Patient Education ©2016 [employer]      Radial Site Care  Introduction  Refer to this sheet in the next few weeks. These instructions provide you with information about caring for yourself after your procedure. Your health care provider may also give you more specific instructions. Your treatment has been planned according to current medical practices, but problems sometimes occur. Call your health care provider if you have any problems or questions after your procedure.  What can I expect after the procedure?  After your procedure, it is typical to have the following:  · Bruising at the radial site that usually fades within 1-2 weeks.  · Blood collecting in the tissue (hematoma) that may be painful to the touch. It should usually decrease in size and tenderness within 1-2 weeks.  Follow these instructions at home:  · Take medicines only as directed by your health care provider.  · You may shower 24-48 hours after the procedure or as directed by your health care provider. Remove the bandage (dressing) and gently wash the site with plain soap and water. Pat the area dry with a clean towel. Do not  rub the site, because this may cause bleeding.  · Do not take baths, swim, or use a hot tub until your health care provider approves.  · Check your insertion site every day for redness, swelling, or drainage.  · Do not apply powder or lotion to the site.  · Do not flex or bend the affected arm for 24 hours or as directed by your health care provider.  · Do not push or pull heavy objects with the affected arm for 24 hours or as directed by your health care provider.  · Do not lift over 10 lb (4.5 kg) for 5 days after your procedure or as directed by your health care provider.  · Ask your health care provider when it is okay to:  ¨ Return to work or school.  ¨ Resume usual physical activities or sports.  ¨ Resume sexual activity.  · Do not drive home if you are discharged the same day as the procedure. Have someone else drive you.  · You may drive 24 hours after the procedure unless otherwise instructed by your health care provider.  · Do not operate machinery or power tools for 24 hours after the procedure.  · If your procedure was done as an outpatient procedure, which means that you went home the same day as your procedure, a responsible adult should be with you for the first 24 hours after you arrive home.  · Keep all follow-up visits as directed by your health care provider. This is important.  Contact a health care provider if:  · You have a fever.  · You have chills.  · You have increased bleeding from the radial site. Hold pressure on the site.  Get help right away if:  · You have unusual pain at the radial site.  · You have redness, warmth, or swelling at the radial site.  · You have drainage (other than a small amount of blood on the dressing) from the radial site.  · The radial site is bleeding, and the bleeding does not stop after 30 minutes of holding steady pressure on the site.  · Your arm or hand becomes pale, cool, tingly, or numb.  This information is not intended to replace advice given to you by  "your health care provider. Make sure you discuss any questions you have with your health care provider.  Document Released: 01/20/2012 Document Revised: 05/25/2017 Document Reviewed: 07/06/2015  © 2017 [employer]      2 Gram Low Sodium Diet  A 2 gram sodium diet restricts the amount of sodium in the diet to no more than 2 g or 2000 mg daily. Limiting the amount of sodium is often used to help lower blood pressure. It is important if you have heart, liver, or kidney problems. Many foods contain sodium for flavor and sometimes as a preservative. When the amount of sodium in a diet needs to be low, it is important to know what to look for when choosing foods and drinks. The following includes some information and guidelines to help make it easier for you to adapt to a low sodium diet.  QUICK TIPS  · Do not add salt to food.  · Avoid convenience items and fast food.  · Choose unsalted snack foods.  · Buy lower sodium products, often labeled as \"lower sodium\" or \"no salt added.\"  · Check food labels to learn how much sodium is in 1 serving.  · When eating at a restaurant, ask that your food be prepared with less salt or none, if possible.  READING FOOD LABELS FOR SODIUM INFORMATION  The nutrition facts label is a good place to find how much sodium is in foods. Look for products with no more than 500 to 600 mg of sodium per meal and no more than 150 mg per serving.  Remember that 2 g = 2000 mg.  The food label may also list foods as:  · Sodium-free: Less than 5 mg in a serving.  · Very low sodium: 35 mg or less in a serving.  · Low-sodium: 140 mg or less in a serving.  · Light in sodium: 50% less sodium in a serving. For example, if a food that usually has 300 mg of sodium is changed to become light in sodium, it will have 150 mg of sodium.  · Reduced sodium: 25% less sodium in a serving. For example, if a food that usually has 400 mg of sodium is changed to reduced sodium, it will have 300 mg of sodium.  CHOOSING " FOODS  Grains  · Avoid: Salted crackers and snack items. Some cereals, including instant hot cereals. Bread stuffing and biscuit mixes. Seasoned rice or pasta mixes.  · Choose: Unsalted snack items. Low-sodium cereals, oats, puffed wheat and rice, shredded wheat. English muffins and bread. Pasta.  Meats  · Avoid: Salted, canned, smoked, spiced, pickled meats, including fish and poultry. Akhtar, ham, sausage, cold cuts, hot dogs, anchovies.  · Choose: Low-sodium canned tuna and salmon. Fresh or frozen meat, poultry, and fish.  Dairy  · Avoid: Processed cheese and spreads. Cottage cheese. Buttermilk and condensed milk. Regular cheese.  · Choose: Milk. Low-sodium cottage cheese. Yogurt. Sour cream. Low-sodium cheese.  Fruits and Vegetables  · Avoid: Regular canned vegetables. Regular canned tomato sauce and paste. Frozen vegetables in sauces. Olives. Pickles. Relishes. Sauerkraut.  · Choose: Low-sodium canned vegetables. Low-sodium tomato sauce and paste. Frozen or fresh vegetables. Fresh and frozen fruit.  Condiments  · Avoid: Canned and packaged gravies. Worcestershire sauce. Tartar sauce. Barbecue sauce. Soy sauce. Steak sauce. Ketchup. Onion, garlic, and table salt. Meat flavorings and tenderizers.  · Choose: Fresh and dried herbs and spices. Low-sodium varieties of mustard and ketchup. Lemon juice. Tabasco sauce. Horseradish.  SAMPLE 2 GRAM SODIUM MEAL PLAN  Breakfast / Sodium (mg)  · 1 cup low-fat milk / 143 mg  · 2 slices whole-wheat toast / 270 mg  · 1 tbs heart-healthy margarine / 153 mg  · 1 hard-boiled egg / 139 mg  · 1 small orange / 0 mg  Lunch / Sodium (mg)  · 1 cup raw carrots / 76 mg  · ½ cup hummus / 298 mg  · 1 cup low-fat milk / 143 mg  · ½ cup red grapes / 2 mg  · 1 whole-wheat kezia bread / 356 mg  Dinner / Sodium (mg)  · 1 cup whole-wheat pasta / 2 mg  · 1 cup low-sodium tomato sauce / 73 mg  · 3 oz lean ground beef / 57 mg  · 1 small side salad (1 cup raw spinach leaves, ½ cup cucumber, ¼ cup  yellow bell pepper) with 1 tsp olive oil and 1 tsp red wine vinegar / 25 mg  Snack / Sodium (mg)  · 1 container low-fat vanilla yogurt / 107 mg  · 3 vladimir cracker squares / 127 mg  Nutrient Analysis  · Calories: 2033  · Protein: 77 g  · Carbohydrate: 282 g  · Fat: 72 g  · Sodium: 1971 mg  Document Released: 12/18/2006 Document Revised: 03/11/2013 Document Reviewed: 03/21/2011  [employer]® Patient Information ©2014 [employer].      Angiogram, Angioplasty, or Stent Placement  Care After  One of the following procedures was done today.  ANGIOGRAM:  A catheter was placed through the blood vessel in your groin, contrast was injected into the vessels, and pictures were taken.  ANGIOPLASTY:  A catheter was placed through the blood vessel in your groin and directed to an area of blocked blood flow. A balloon, and possibly a metal stent were used to open the blockage. If no other blockages are present below this area, your symptoms should improve. If blockages are present below this area, surgery may still be necessary.  STENT:  A catheter was placed in your groin through which a metal mesh tube was placed in a narrowed part of the artery to facilitate blood flow.  You were given intravenous sedation. These medications are rapidly cleared from your bloodstream. You may feel some discomfort at the insertion site after the local anesthetic wears off. This discomfort should gradually improve over the next several days.  · Only take over-the-counter or prescription medicines for pain, discomfort, or fever as directed by your caregiver.  · Complications are very uncommon after this procedure. Go to the nearest emergency department if you develop any of the following symptoms:  · Worsening pain.  · Bleeding.  · Swelling at the puncture site.  · Lightheadedness.  · Dizziness or fainting.  · Fever or chills.  · If oozing, bleeding, or a lump appears at the puncture site, apply firm pressure directly to the site steadily for 15  minutes and go to the emergency department.  · Keep the skin around the insertion site dry. You may take showers after 24 hours. If the area does get wet, dry the skin completely. Avoid baths until the skin puncture site heals, usually 5 to 7 days.  · Development of redness, increased soreness, or swelling may be signs of a skin infection. Contact your physician.  · Rest for the remainder of the day and avoid any heavy lifting (more than 10 pounds or 4.5 kg). Do not operate heavy machinery, drive, or make legal decisions for the first 24 hours after the procedure. Have a responsible person drive you home.  · You may resume your usual diet after the procedure. Avoid alcoholic beverages for 24 hours after the procedure.  Document Released: 12/18/2006 Document Revised: 03/11/2013 Document Reviewed: 10/17/2007  [employer]® Patient Information ©2014 [employer].      Depression / Suicide Risk    As you are discharged from this [facility] facility, it is important to learn how to keep safe from harming yourself.    Recognize the warning signs:  · Abrupt changes in personality, positive or negative- including increase in energy   · Giving away possessions  · Change in eating patterns- significant weight changes-  positive or negative  · Change in sleeping patterns- unable to sleep or sleeping all the time   · Unwillingness or inability to communicate  · Depression  · Unusual sadness, discouragement and loneliness  · Talk of wanting to die  · Neglect of personal appearance   · Rebelliousness- reckless behavior  · Withdrawal from people/activities they love  · Confusion- inability to concentrate     If you or a loved one observes any of these behaviors or has concerns about self-harm, here's what you can do:  · Talk about it- your feelings and reasons for harming yourself  · Remove any means that you might use to hurt yourself (examples: pills, rope, extension cords, firearm)  · Get professional help from the community  (Mental Health, Substance Abuse, psychological counseling)  · Do not be alone:Call your Safe Contact- someone whom you trust who will be there for you.  · Call your local CRISIS HOTLINE 810-9940 or 290-671-3950  · Call your local Children's Mobile Crisis Response Team [place] (064) 676-1470 or www.[employer]  · Call the toll free National Suicide Prevention Hotlines   · National Suicide Prevention Lifeline 659-286-EWYM (0506)  · National Hope Line Network 800-SUICIDE (371-6378)

## 2020-01-11 NOTE — CARE PLAN
Problem: Safety  Goal: Will remain free from injury  Outcome: PROGRESSING AS EXPECTED  Goal: Will remain free from falls  Outcome: PROGRESSING AS EXPECTED     Problem: Infection  Goal: Will remain free from infection  Outcome: PROGRESSING AS EXPECTED     Problem: Knowledge Deficit  Goal: Knowledge of disease process/condition, treatment plan, diagnostic tests, and medications will improve  Outcome: PROGRESSING AS EXPECTED     Problem: Communication  Goal: The ability to communicate needs accurately and effectively will improve  Outcome: MET     Problem: Knowledge Deficit  Goal: Knowledge of the prescribed therapeutic regimen will improve  Outcome: MET

## 2020-01-11 NOTE — PROGRESS NOTES
Pt. D/c'd home with family. Discharge teaching completed, no further questions or concerns. All personal belongings with pt. Pt is A&OX4. No signs of distress. All lines removed, tele box removed and placed in monitor room, and monitors notified.

## 2020-01-11 NOTE — DISCHARGE PLANNING
[facility]     Meds-to-Bed program initiated for clopidogrel 75 mg tab #30. However, pt already has a 90 day supply filled from [facility] on 1/03/2020. Confirmed w/ pt that he still has clopidogrel at home and informed him to continue that medication upon discharge. Answered pt's questions.     Bambi Yarbrough, PharmD  [phone]

## 2020-01-14 ENCOUNTER — TELEPHONE (OUTPATIENT)
Dept: CARDIOLOGY | Facility: MEDICAL CENTER | Age: 80
End: 2020-01-14

## 2020-01-14 NOTE — TELEPHONE ENCOUNTER
"Patient calling to report that he was \"just seen by Dr. Queen\" and he had his assistant \"Katalina Gonsales\" place an order for patient to be seen with pulmonology. He reports that he has been waiting for some time without any further contact regarding such order/referral.     Reviewed patients active referral orders with patient. A new pulmonology referral was placed Olayinka 3, 2020, and subsequently cancelled with note stating \"There is already a referral in to Pulmonary to address pulmonary issues + a referral to Sleep Medicine to address sleep diagnosis on this referral. Cancelling duplicate referral\".     Provided patient with contact information and instructions to contact pulmonology office for further assistance verifying referral status and arranging pulmonology appointment. Patient states understanding and thankful for assistance today.       "

## 2020-01-15 ENCOUNTER — OFFICE VISIT (OUTPATIENT)
Dept: CARDIOLOGY | Facility: MEDICAL CENTER | Age: 80
End: 2020-01-15
Payer: MEDICARE

## 2020-01-15 VITALS
BODY MASS INDEX: 33.59 KG/M2 | HEIGHT: 72 IN | OXYGEN SATURATION: 91 % | SYSTOLIC BLOOD PRESSURE: 106 MMHG | HEART RATE: 90 BPM | WEIGHT: 248.02 LBS | DIASTOLIC BLOOD PRESSURE: 58 MMHG

## 2020-01-15 DIAGNOSIS — I50.22 CHRONIC SYSTOLIC HEART FAILURE (HCC): ICD-10-CM

## 2020-01-15 DIAGNOSIS — I35.0 SEVERE AORTIC STENOSIS: ICD-10-CM

## 2020-01-15 DIAGNOSIS — Z95.5 STENTED CORONARY ARTERY: ICD-10-CM

## 2020-01-15 DIAGNOSIS — I25.10 CORONARY ARTERY DISEASE INVOLVING NATIVE CORONARY ARTERY OF NATIVE HEART WITHOUT ANGINA PECTORIS: ICD-10-CM

## 2020-01-15 PROBLEM — R94.4 DECREASED GFR: Status: RESOLVED | Noted: 2017-03-11 | Resolved: 2020-01-15

## 2020-01-15 PROBLEM — M96.1 LUMBAR POST-LAMINECTOMY SYNDROME: Status: RESOLVED | Noted: 2019-07-02 | Resolved: 2020-01-15

## 2020-01-15 PROCEDURE — 99215 OFFICE O/P EST HI 40 MIN: CPT | Performed by: INTERNAL MEDICINE

## 2020-01-15 RX ORDER — ANASTROZOLE 1 MG/1
0.5 TABLET ORAL SEE ADMIN INSTRUCTIONS
Status: ON HOLD | COMMUNITY
End: 2022-03-31

## 2020-01-15 RX ORDER — GABAPENTIN 300 MG/1
300 CAPSULE ORAL EVERY 6 HOURS PRN
Status: ON HOLD | COMMUNITY
End: 2022-02-03

## 2020-01-15 RX ORDER — FLUTICASONE PROPIONATE 50 MCG
1 SPRAY, SUSPENSION (ML) NASAL DAILY
Status: ON HOLD | COMMUNITY
End: 2020-01-27

## 2020-01-15 ASSESSMENT — ENCOUNTER SYMPTOMS
EYES NEGATIVE: 1
NEUROLOGICAL NEGATIVE: 1
FEVER: 0
WEIGHT LOSS: 0
SHORTNESS OF BREATH: 1
DYSPNEA AT REST: 0
FOCAL WEAKNESS: 0
SHORTNESS OF BREATH: 1
DEPRESSION: 0
NERVOUS/ANXIOUS: 0
NECK PAIN: 1
VOMITING: 0
HEARTBURN: 1
ABDOMINAL PAIN: 0
BLURRED VISION: 0
CHILLS: 0
CLAUDICATION: 0
DIZZINESS: 0
BRUISES/BLEEDS EASILY: 0
RESPIRATORY SYMPTOMS COMMENTS: YES
DOUBLE VISION: 0
HEMOPTYSIS: 0
COUGH: 1
CHEST TIGHTNESS: 0
WEAKNESS: 0
PSYCHIATRIC NEGATIVE: 1
NAUSEA: 0
HEADACHES: 0
PALPITATIONS: 1
MYALGIAS: 0
BACK PAIN: 1

## 2020-01-15 NOTE — PROGRESS NOTES
Chief Complaint   Patient presents with   • Aortic Stenosis     VALVE NEW DX: AS       Subjective:   LIAN More III is a 79 y.o. male who presents today for interventional consult/TAVR evaluation requested by Paul Kim for severe symptomatic aortic stenosis.    Thank you for allowing me to evaluate Mr. More, who as you know is a 79 year old male with complex cardiovascular history including severe aortic stenosis, coronary artery disease status post stent placement, chronic systolic congestive heart failure. He was well until 9 months ago when he began to experience moderate shortness of breath and dyspnea on exertion. He denies chest pain, dizziness or syncope. He likes to hit golf balls however he has been unable to do so for over one months.    Past Medical History:   Diagnosis Date   • Aortic stenosis    • Arthritis    • Breath shortness     pt states short of breath 24/7   • CAD (coronary artery disease)    • CATARACT     removed bilat   • Congestive heart failure (HCC)    • Diabetes     PT REPORTS DIABETES RESOLVED IN 2009. NO LONGER TAKES MEDS   • Heart murmur    • Heart valve disease    • Hiatus hernia syndrome    • Hyperlipidemia    • Hypertension     pt states well controlled on meds   • Kidney disease    • Pain 08-29-13    back, hips and bilat legs, 4/10   • Pain 6/22/2015    left humerus   • Pain 1/7/16    knees, back and shoulder   • Pneumonia 2007   • Ulcer 2014    Dr. Porter, gastroenterologist   • Unspecified hemorrhagic conditions     bruises easily     Past Surgical History:   Procedure Laterality Date   • GASTROSCOPY N/A 1/8/2016    Procedure: GASTROSCOPY;  Surgeon: Deniz Rios M.D.;  Location: [facility];  Service:    • HUMERUS ORIF Left 6/25/2015    Procedure: HUMERUS ORIF/ PROXIMAL;  Surgeon: Cristal Guido M.D.;  Location: [facility];  Service:    • LUMBAR FUSION POSTERIOR  9/5/2013    Performed by Jayro Sears M.D. at [facility]   •  LUMBAR DECOMPRESSION  9/5/2013    Performed by Edith Whelan M.D. at SURGERY [facility] ORS   • MASS EXCISION NEURO  9/5/2013    Performed by Edith Whelan M.D. at SURGERY [facility] ORS   • RECOVERY  6/26/2012    Performed by SURGERY, IR-RECOVERY at SURGERY SAME DAY [facility]   • DRAINAGE HEMATOMA  5/25/2012    Performed by SARINA SUE at SURGERY [facility]S   • GASTRIC BAND LAPAROSCOPIC REVISION  5/11/2012    Performed by SARINA SUE at [facility] ORS   • LUMBAR FUSION POSTERIOR  3/26/2012    Performed by EDITH WHELAN at SURGERY [facility] ORS   • LUMBAR DECOMPRESSION  3/26/2012    Performed by EDITH WHELAN at SURGERY [facility] ORS   • INJ,EPIDURAL/LUMB/SAC SINGLE  3/19/2012    Performed by KRISTIN BALTAZAR at [facility]   • INJ,EPIDURAL/LUMB/SAC SINGLE  3/5/2012    Performed by KRISTIN BALTAZAR at [facility] ORS   • GASTRIC BANDING LAPAROSCOPIC  3/24/2010    Performed by SARINA SUE at SURGERY [facility] ORS   • HIATAL HERNIA REPAIR  3/24/2010    Performed by SARINA SUE at SURGERY [facility] ORS   • KNEE ARTHROPLASTY TOTAL  2000    bilateral   • ABDOMINOPLASTY  1990   • [facility] CARDIAC CATH       Family History   Problem Relation Age of Onset   • No Known Problems Sister    • No Known Problems Sister    • No Known Problems Sister    • Diabetes Other    • No Known Problems Mother    • No Known Problems Father      Social History     Socioeconomic History   • Marital status:      Spouse name: Not on file   • Number of children: Not on file   • Years of education: Not on file   • Highest education level: Not on file   Occupational History   • Not on file   Social Needs   • Financial resource strain: Not on file   • Food insecurity:     Worry: Not on file     Inability: Not on file   • Transportation needs:     Medical: Not on file     Non-medical: Not on file   Tobacco Use   • Smoking status: Never Smoker   • Smokeless tobacco: Never Used   Substance  and Sexual Activity   • Alcohol use: No     Alcohol/week: 0.0 oz   • Drug use: No   • Sexual activity: Yes   Lifestyle   • Physical activity:     Days per week: Not on file     Minutes per session: Not on file   • Stress: Not on file   Relationships   • Social connections:     Talks on phone: Not on file     Gets together: Not on file     Attends [employer] service: Not on file     Active member of club or organization: Not on file     Attends meetings of clubs or organizations: Not on file     Relationship status: Not on file   • Intimate partner violence:     Fear of current or ex partner: Not on file     Emotionally abused: Not on file     Physically abused: Not on file     Forced sexual activity: Not on file   Other Topics Concern   • Not on file   Social History Narrative   • Not on file     Allergies   Allergen Reactions   • Statins [Hmg-Coa-R Inhibitors] Rash     Blisters       (Medications reviewed.)  Outpatient Encounter Medications as of 1/15/2020   Medication Sig Dispense Refill   • anastrozole (ARIMIDEX) 1 MG Tab Take 1 mg by mouth every day. 1/2 tab 2x/wk     • fluticasone (FLONASE) 50 MCG/ACT nasal spray Spray 1 Spray in nose every day.     • gabapentin (NEURONTIN) 300 MG Cap Take 300 mg by mouth as needed.     • omeprazole (PRILOSEC) 20 MG delayed-release capsule Take 20 mg by mouth 2 times a day. Pt takes BID, one in the morning and one at night   Indications: Gastroesophageal Reflux Disease     • bumetanide (BUMEX) 1 MG Tab Take 1 Tab by mouth every day. 90 Tab 3   • clopidogrel (PLAVIX) 75 MG Tab Take 1 Tab by mouth every day. 90 Tab 3   • metoprolol SR (TOPROL XL) 25 MG TABLET SR 24 HR Take 1 Tab by mouth every day. 90 Tab 3   • aspirin EC 81 MG EC tablet Take 1 Tab by mouth every day. 30 Tab 0   • Coenzyme Q10 (CO Q-10 PO) Take 1 Tab by mouth 2 Times a Day.     • ethyl chloride Aerosol Apply 1 Application to affected area(s) every evening as needed (pain).     • ezetimibe (ZETIA) 10 MG Tab Take 10  mg by mouth every morning.     • glimepiride (AMARYL) 1 MG tablet Take 1 mg by mouth every evening.     • B Complex Vitamins (VITAMIN B COMPLEX PO) Take 1 Tab by mouth every day.     • Cranberry 300 MG Tab Take 300 mg by mouth every morning.     • Garlic 1000 MG Cap Take 1,000 mg by mouth every morning.     • ferrous sulfate 325 (65 Fe) MG tablet Take 325 mg by mouth every day.     • L-Lysine 1000 MG Tab Take 1,000 mg by mouth every day.     • Probiotic Product (PROBIOTIC PO) Take 1 Cap by mouth every morning.     • Ascorbic Acid (VITAMIN C) 1000 MG Tab Take 1 Tab by mouth every day.     • vitamin e (VITAMIN E) 400 UNIT Cap Take 800 Units by mouth 2 times a day.     • Zinc 50 MG Cap Take 50 mg by mouth every day.     • sacubitril-valsartan (ENTRESTO) 24-26 MG Tab tablet Take 1 Tab by mouth 2 Times a Day. 60 Tab 3   • hydrocortisone 2.5 % Cream topical cream Apply 1 Application to affected area(s) 2 times a day as needed (rash). 28 g 1   • VITAMIN B1-B12 INJ 1 Each by Injection route every 7 days. Thursday     • Testosterone Cypionate (DEPO-TESTOSTERONE IM) 1 Each by Intramuscular route every 7 days. Thursday     • vitamin D (CHOLECALCIFEROL) 1000 UNIT TABS Take 1,000 Units by mouth every day.     • GLUCOSAMINE SULFATE PO Take 1 Tab by mouth 2 Times a Day.     • [DISCONTINUED] clopidogrel (PLAVIX) 75 MG Tab Take 1 Tab by mouth every day. 30 Tab 0     No facility-administered encounter medications on file as of 1/15/2020.      Review of Systems   Constitutional: Positive for malaise/fatigue. Negative for chills, fever and weight loss.   HENT: Positive for hearing loss.    Eyes: Negative.  Negative for blurred vision and double vision.   Respiratory: Positive for cough and shortness of breath.    Cardiovascular: Positive for palpitations. Negative for chest pain, claudication and leg swelling.   Gastrointestinal: Positive for heartburn. Negative for abdominal pain, nausea and vomiting.   Genitourinary: Negative.   Negative for dysuria and urgency.   Musculoskeletal: Positive for back pain, joint pain and neck pain. Negative for myalgias.   Skin: Negative.  Negative for itching and rash.   Neurological: Negative.  Negative for dizziness, focal weakness, weakness and headaches.   Endo/Heme/Allergies: Positive for environmental allergies. Does not bruise/bleed easily.   Psychiatric/Behavioral: Negative.  Negative for depression. The patient is not nervous/anxious.         Objective:   /58 (BP Location: Right arm, Patient Position: Sitting, BP Cuff Size: Adult)   Pulse 90   Ht 1.829 m (6')   Wt 112.5 kg (248 lb 0.3 oz)   SpO2 91%   BMI 33.64 kg/m²     Physical Exam   Constitutional: He is oriented to person, place, and time. He appears well-developed and well-nourished.   HENT:   Head: Normocephalic and atraumatic.   Eyes: EOM are normal.   Neck: No JVD present.   Cardiovascular: Normal rate and regular rhythm.   Murmur heard.  Pulmonary/Chest: Effort normal and breath sounds normal.   Abdominal: Soft. Bowel sounds are normal.   No hepatosplenomegaly.   Musculoskeletal: Normal range of motion.   Lymphadenopathy:     He has no cervical adenopathy.   Neurological: He is alert and oriented to person, place, and time.   Skin: Skin is warm and dry.   Psychiatric: He has a normal mood and affect.     CARDIAC STUDIES/PROCEDURES:     CARDIAC CATHETERIZATION CONCLUSIONS by Dougie Polo (12/27/19)  Coronary stent implantation, distal right coronary artery 3.5x28 mm   Synergy drug-eluting stent postdilated 4.0 mm.  (study result reviewed)    CARDIAC CATHETERIZATION CONCLUSIONS by Amauri Amador (12/11/19)  Severe triple-vessel coronary artery disease with calcified 50% left main stenosis (iFR <0.8)   (study result reviewed)    ECHOCARDIOGRAM CONCLUSIONS (12/03/19)  Left ventricle is severely dilated.  Severely reduced left ventricular systolic function.  Left ventricular ejection fraction is visually  estimated to be 25%.  Global hypokinesis with regional variation.  Moderate aortic stenosis.  Trace mitral regurgitation.  Severely dilated left atrium.  Mild tricuspid regurgitation.  Right ventricular systolic pressure is estimated to be 40  mmHg.  Normal pericardium without effusion.  Compared to the images of the study done on 7/29/2015  there has been   marked change in LV wall motion and function, moderate aortic stenosis   is also now present.  (study result reviewed)    EKG performed on (01/10/20) was reviewed: EKG personally interpreted shows sinus rhythm with inferior Q waves.    Laboratory results of (01/11/20) were reviewed. Bun of 30 mg/dl, creatinine levels of 1.59 mg/dl noted.    Assessment:     1. Severe aortic stenosis     2. Chronic systolic heart failure (HCC)     3. Coronary artery disease involving native coronary artery of native heart without angina pectoris     4. Stented coronary artery       Medical Decision Making:  Today's Assessment / Status / Plan:     1. Aortic stenosis: He is symptomatic with his severe aortic stenosis, NYHA class IIIB. His STS score >10% per Enrique Alejandro. We will schedule him for TAVR. He understands the risks and benefits and agrees with plan.  2. Chronic systolic congestive heart failure with cardiomyopathy: The overall volume status is adequate.  3. Coronary artery disease with stent placement: He is clinically doing well without recurrence of his angina.  We will continue with current medical care including aspirin, clopidogrel, metoprolol.    The risks, benefits, and alternatives to TAVR, general anesthesia and transesophageal echocardiogram were discussed in great detail. Specific risks mentioned include bleeding, infection, kidney damage, allergic reaction, cardiac perforation with possible tamponade requiring hailee-cardiocentesis or possible open heart surgery if the patient is a candidate. Lastly the risks of heart attack, stroke, and death were  discussed; the risks of major complications includingall cause mortality of 2.2%, disabling stroke of 1.1%, the risk on new pacemaker of 12% and the risk of vascular complications of 4.1%. The patient verbalized understanding of these potential complications and wishes to proceed with this procedure. ([facility]).  The procedure will be performed completely in collaboration with cardiac surgery.    We will follow up in one month in valve clinic.    Thank you for this consult.    CC Radha Avila

## 2020-01-16 ENCOUNTER — HOSPITAL ENCOUNTER (OUTPATIENT)
Dept: RADIOLOGY | Facility: MEDICAL CENTER | Age: 80
DRG: 267 | End: 2020-01-16
Attending: INTERNAL MEDICINE
Payer: MEDICARE

## 2020-01-16 ENCOUNTER — DOCUMENTATION (OUTPATIENT)
Dept: CARDIOLOGY | Facility: MEDICAL CENTER | Age: 80
End: 2020-01-16

## 2020-01-16 DIAGNOSIS — R06.02 SOB (SHORTNESS OF BREATH) ON EXERTION: ICD-10-CM

## 2020-01-16 DIAGNOSIS — I35.0 SEVERE AORTIC STENOSIS: ICD-10-CM

## 2020-01-16 DIAGNOSIS — Z01.810 PRE-OPERATIVE CARDIOVASCULAR EXAMINATION: ICD-10-CM

## 2020-01-16 DIAGNOSIS — Z00.6 EXAMINATION OF PARTICIPANT IN CLINICAL TRIAL: ICD-10-CM

## 2020-01-16 LAB
ABO GROUP BLD: NORMAL
ALBUMIN SERPL BCP-MCNC: 4.1 G/DL (ref 3.2–4.9)
ALBUMIN/GLOB SERPL: 1.8 G/DL
ALP SERPL-CCNC: 44 U/L (ref 30–99)
ALT SERPL-CCNC: 22 U/L (ref 2–50)
ANION GAP SERPL CALC-SCNC: 7 MMOL/L (ref 0–11.9)
ANISOCYTOSIS BLD QL SMEAR: ABNORMAL
APTT PPP: 35.8 SEC (ref 24.7–36)
AST SERPL-CCNC: 22 U/L (ref 12–45)
BASOPHILS # BLD AUTO: 0.4 % (ref 0–1.8)
BASOPHILS # BLD: 0.02 K/UL (ref 0–0.12)
BILIRUB SERPL-MCNC: 0.6 MG/DL (ref 0.1–1.5)
BLD GP AB SCN SERPL QL: NORMAL
BUN SERPL-MCNC: 32 MG/DL (ref 8–22)
CALCIUM SERPL-MCNC: 9.4 MG/DL (ref 8.5–10.5)
CHLORIDE SERPL-SCNC: 102 MMOL/L (ref 96–112)
CO2 SERPL-SCNC: 27 MMOL/L (ref 20–33)
COMMENT 1642: NORMAL
CREAT SERPL-MCNC: 1.6 MG/DL (ref 0.5–1.4)
EOSINOPHIL # BLD AUTO: 0.35 K/UL (ref 0–0.51)
EOSINOPHIL NFR BLD: 6.1 % (ref 0–6.9)
ERYTHROCYTE [DISTWIDTH] IN BLOOD BY AUTOMATED COUNT: 61.3 FL (ref 35.9–50)
GLOBULIN SER CALC-MCNC: 2.3 G/DL (ref 1.9–3.5)
GLUCOSE SERPL-MCNC: 141 MG/DL (ref 65–99)
HCT VFR BLD AUTO: 53.8 % (ref 42–52)
HGB BLD-MCNC: 15.8 G/DL (ref 14–18)
HYPOCHROMIA BLD QL SMEAR: ABNORMAL
IMM GRANULOCYTES # BLD AUTO: 0.02 K/UL (ref 0–0.11)
IMM GRANULOCYTES NFR BLD AUTO: 0.4 % (ref 0–0.9)
INR PPP: 1.11 (ref 0.87–1.13)
LYMPHOCYTES # BLD AUTO: 1.47 K/UL (ref 1–4.8)
LYMPHOCYTES NFR BLD: 25.7 % (ref 22–41)
MCH RBC QN AUTO: 23.2 PG (ref 27–33)
MCHC RBC AUTO-ENTMCNC: 29.4 G/DL (ref 33.7–35.3)
MCV RBC AUTO: 79.1 FL (ref 81.4–97.8)
MICROCYTES BLD QL SMEAR: ABNORMAL
MONOCYTES # BLD AUTO: 0.67 K/UL (ref 0–0.85)
MONOCYTES NFR BLD AUTO: 11.7 % (ref 0–13.4)
MORPHOLOGY BLD-IMP: NORMAL
NEUTROPHILS # BLD AUTO: 3.18 K/UL (ref 1.82–7.42)
NEUTROPHILS NFR BLD: 55.7 % (ref 44–72)
NRBC # BLD AUTO: 0 K/UL
NRBC BLD-RTO: 0 /100 WBC
NT-PROBNP SERPL IA-MCNC: 4888 PG/ML (ref 0–125)
OVALOCYTES BLD QL SMEAR: NORMAL
PLATELET # BLD AUTO: 200 K/UL (ref 164–446)
PLATELET BLD QL SMEAR: NORMAL
PMV BLD AUTO: 11 FL (ref 9–12.9)
POIKILOCYTOSIS BLD QL SMEAR: NORMAL
POTASSIUM SERPL-SCNC: 4.1 MMOL/L (ref 3.6–5.5)
PROT SERPL-MCNC: 6.4 G/DL (ref 6–8.2)
PROTHROMBIN TIME: 14.6 SEC (ref 12–14.6)
RBC # BLD AUTO: 6.8 M/UL (ref 4.7–6.1)
RBC BLD AUTO: PRESENT
RH BLD: NORMAL
SODIUM SERPL-SCNC: 136 MMOL/L (ref 135–145)
WBC # BLD AUTO: 5.7 K/UL (ref 4.8–10.8)

## 2020-01-16 PROCEDURE — 80053 COMPREHEN METABOLIC PANEL: CPT

## 2020-01-16 PROCEDURE — 71275 CT ANGIOGRAPHY CHEST: CPT

## 2020-01-16 PROCEDURE — 86850 RBC ANTIBODY SCREEN: CPT

## 2020-01-16 PROCEDURE — 85025 COMPLETE CBC W/AUTO DIFF WBC: CPT

## 2020-01-16 PROCEDURE — 93880 EXTRACRANIAL BILAT STUDY: CPT

## 2020-01-16 PROCEDURE — 85730 THROMBOPLASTIN TIME PARTIAL: CPT

## 2020-01-16 PROCEDURE — 85610 PROTHROMBIN TIME: CPT

## 2020-01-16 PROCEDURE — 86900 BLOOD TYPING SEROLOGIC ABO: CPT

## 2020-01-16 PROCEDURE — 86901 BLOOD TYPING SEROLOGIC RH(D): CPT

## 2020-01-16 PROCEDURE — 700117 HCHG RX CONTRAST REV CODE 255: Performed by: INTERNAL MEDICINE

## 2020-01-16 PROCEDURE — 36415 COLL VENOUS BLD VENIPUNCTURE: CPT

## 2020-01-16 PROCEDURE — 74174 CTA ABD&PLVS W/CONTRAST: CPT

## 2020-01-16 PROCEDURE — 83880 ASSAY OF NATRIURETIC PEPTIDE: CPT

## 2020-01-16 RX ADMIN — IOHEXOL 100 ML: 350 INJECTION, SOLUTION INTRAVENOUS at 08:31

## 2020-01-16 NOTE — PROGRESS NOTES
Valve Program Functional Assessment: 1/15/20 pre op    [id number]   1a) Showering/bathin  1b) Walking 1 block on ground: 2  1c) Hurrying or joggin  2) Swellin  3) Fatigue: 1  4) Shortness of breath: 1  5) Sleep sitting up: 4  6) Limited enjoyment of life: 2  7) Spend the rest of your life with HF: 1  8a) Hobbies, recreational activities:1  8b) Working or doing household chores:3  8c) Visiting family or friends: 4    5 meter walk test  1) __4.58____ s/5m  2) __4.39____ s/5m  3) __4.57____ s/5m     Strength   1) _27_____ kg  2) _29_____ kg  3) _29_____ kg    MOYA ADLs  Patient independently preforms...   - Bathing: Yes   - Dressing: Yes   - Toileting: Yes   - Transferring: Yes   - Continence: Yes   - Feeding: Yes     Living Situation  Patient lives:  with spouse    Mobility Aids   Patient uses:  none    Patients post operative goal: continue golfing in [employer].     Met with patient during New TAVR consult.     Reviewed patient TAVR education packet explaining that information is provided regarding preparation for TAVR the night prior, what to expect during the hospital stay, average LOS, and what to look out for post TAVR discharge.Explained that patient will require SBE prophylactic antibiotic prior to any procedures or surgery, including dental post TAVR. Patient states understanding and agrees to carry SBE antibiotic wallet card at all times.    Explained that patient should not eat or drink anything past midnight . Encouraged patient to wear something clean and comfortable, easy to get on/off to check in Monday morning at 0530. Patient may have friends and family in the pre-operational area until patient is taken to the operating room, at which time family and friends will be asked to wait on floor 1 [facility] surgical waiting area. On completion of TAVR heart team will update family. Once update is given there is some time before family/friends may visit patient in their assigned room.  Length of stay on average is one night, however patient may stay a couple days depending on specific needs at that time. Patient given printed instructions sheet with all above stated instructions.Patient states understanding of all material and education presented today and has no further questions at this time. Encouraged patient again, to contact me with any questions or concerns during this work-up process. Patient states understanding and thankful.

## 2020-01-20 ENCOUNTER — NON-PROVIDER VISIT (OUTPATIENT)
Dept: PULMONOLOGY | Facility: HOSPICE | Age: 80
End: 2020-01-20
Attending: INTERNAL MEDICINE
Payer: MEDICARE

## 2020-01-20 ENCOUNTER — OFFICE VISIT (OUTPATIENT)
Dept: PULMONOLOGY | Facility: HOSPICE | Age: 80
End: 2020-01-20
Payer: MEDICARE

## 2020-01-20 VITALS
HEART RATE: 73 BPM | HEIGHT: 73 IN | BODY MASS INDEX: 33.02 KG/M2 | DIASTOLIC BLOOD PRESSURE: 66 MMHG | OXYGEN SATURATION: 92 % | WEIGHT: 249.13 LBS | SYSTOLIC BLOOD PRESSURE: 94 MMHG

## 2020-01-20 DIAGNOSIS — I35.0 SEVERE AORTIC STENOSIS: ICD-10-CM

## 2020-01-20 DIAGNOSIS — K21.9 GASTROESOPHAGEAL REFLUX DISEASE WITHOUT ESOPHAGITIS: ICD-10-CM

## 2020-01-20 DIAGNOSIS — I50.22 CHRONIC SYSTOLIC HEART FAILURE (HCC): ICD-10-CM

## 2020-01-20 DIAGNOSIS — R06.00 DYSPNEA, UNSPECIFIED TYPE: ICD-10-CM

## 2020-01-20 DIAGNOSIS — E66.09 CLASS 1 OBESITY DUE TO EXCESS CALORIES WITHOUT SERIOUS COMORBIDITY WITH BODY MASS INDEX (BMI) OF 31.0 TO 31.9 IN ADULT: ICD-10-CM

## 2020-01-20 PROCEDURE — 94060 EVALUATION OF WHEEZING: CPT | Performed by: INTERNAL MEDICINE

## 2020-01-20 PROCEDURE — 94726 PLETHYSMOGRAPHY LUNG VOLUMES: CPT | Performed by: INTERNAL MEDICINE

## 2020-01-20 PROCEDURE — 99204 OFFICE O/P NEW MOD 45 MIN: CPT | Mod: 25 | Performed by: INTERNAL MEDICINE

## 2020-01-20 PROCEDURE — 94729 DIFFUSING CAPACITY: CPT | Performed by: INTERNAL MEDICINE

## 2020-01-20 ASSESSMENT — PULMONARY FUNCTION TESTS
FEV1/FVC_PREDICTED: 75
FEV1/FVC_PERCENT_CHANGE: 50
FVC: 3.61
FEV1_LLN: 2.73
FEV1_PREDICTED: 3.26
FEV1/FVC_PERCENT_PREDICTED: 104
FEV1/FVC: 78
FEV1_PERCENT_CHANGE: 2
FEV1: 2.76
FEV1_PERCENT_PREDICTED: 84
FEV1: 2.69
FVC_PERCENT_PREDICTED: 81
FVC_PERCENT_PREDICTED: 78
FEV1/FVC_PERCENT_PREDICTED: 102
FEV1/FVC_PERCENT_PREDICTED: 74
FVC_PREDICTED: 4.42
FEV1/FVC_PERCENT_PREDICTED: 105
FEV1_PERCENT_PREDICTED: 82
FVC: 3.47
FVC_LLN: 3.69
FEV1/FVC_PERCENT_PREDICTED: 103
FEV1/FVC: 77
FEV1/FVC_PERCENT_LLN: 62
FEV1/FVC: 76.45
FEV1/FVC_PERCENT_CHANGE: -1
FEV1/FVC: 77
FEV1_PERCENT_CHANGE: 4

## 2020-01-20 NOTE — PATIENT INSTRUCTIONS
This gentleman comes in for evaluation of dyspnea on exertion and shortness of breath.  He is referred by cardiology.  I strongly suspect this is related to deconditioning, his knees have limited him, his body mass index is high at 33 but most importantly he has significant aortic stenosis.  He is going to undergo a valve repair this coming week.    His room air saturation is adequate, his lungs are clear, I strongly suspect that as a non-smoker he will not have much in the way of abnormalities on lung function testing.  No hemoptysis or mucopurulence.  His dry cough may be his subtle congestive heart failure, I reviewed his medication list and I do not see anything obvious that would trigger cough.    He declines the flu vaccine, and the last issue is he does have elevated body mass index, has undergone gastric bypass, the CAT scan showed the lap band, and he is awaiting a sleep study.  He is wearing oxygen at night presently.  I strongly suspect he has sleep disordered breathing with marked oropharyngeal crowding, and hypoxemia noted on a prior endoscopy, sleep evaluation is pending.  We will measure his lung function testing, follow him up after the valve was repaired, and I suspect his dyspnea on exertion is predominantly on the basis of the severe aortic stenosis as my exam finds it prominently, and his lungs are remarkably clear

## 2020-01-20 NOTE — PROCEDURES
Technician: FRANCHESKA Tang    Technician Comment:  Good patient effort & cooperation.  The results of this test meet the ATS/ERS standards for acceptability & reproducibility.  Test was performed on the Livestream Body Plethysmograph-Elite DX system.  Predicted values were GLI-2012 for spirometry, GLI-2017 for DLCO, ITS for Lung Volumes.  The DLCO was uncorrected for Hgb.  A bronchodilator of Pro Air HFA -2puffs via spacer administered.  DLCO performed during dilation period.    Interpretation:  Lung function testing was completed on January 20, 2020.  The predominant pattern is 1 of mild restriction, based on reduction of expiratory reserve volume at 23%.  Body mass index was high at 32.8.  Mid flows were normal, mild reduction of forced vital capacity is consistent with restriction, as the FEV1/FVC ratio is 77%.  No bronchodilator response.  Oxygen transfer was normal.  Flow volume loop confirms a restrictive process, with good patient effort noted

## 2020-01-20 NOTE — PROGRESS NOTES
LIAN More III is a 79 y.o. male here for dyspnea on exertion. Patient was referred by cardiology.    History of Present Illness:      This gentleman comes in for evaluation of dyspnea on exertion and shortness of breath.  He is referred by cardiology.  I strongly suspect this is related to deconditioning, his knees have limited him, his body mass index is high at 33 but most importantly he has significant aortic stenosis.  He is going to undergo a valve repair this coming week.    His room air saturation is adequate, his lungs are clear, I strongly suspect that as a non-smoker he will not have much in the way of abnormalities on lung function testing.  No hemoptysis or mucopurulence.  His dry cough may be his subtle congestive heart failure, I reviewed his medication list and I do not see anything obvious that would trigger cough.    He declines the flu vaccine, and the last issue is he does have elevated body mass index, has undergone gastric bypass, the CAT scan showed the lap band, and he is awaiting a sleep study.  He is wearing oxygen at night presently.  I strongly suspect he has sleep disordered breathing with marked oropharyngeal crowding, and hypoxemia noted on a prior endoscopy, sleep evaluation is pending.  We will measure his lung function testing, follow him up after the valve was repaired, and I suspect his dyspnea on exertion is predominantly on the basis of the severe aortic stenosis as my exam finds it prominently, and his lungs are remarkably clear    Constitutional ROS: No unexpected change in weight, No unexplained fevers  Eyes: No change in vision or blurring or double vision  Mouth/Throat ROS: No sore throat, No recent change in voice or hoarseness  Pulmonary ROS: See present history for pertinent positives  Cardiovascular ROS: No chest pain to suggest acute coronary syndrome  Gastrointestinal ROS: No abdominal pain to suggest peptic disease  Musculoskeletal/Extremities ROS: pretibial  edema  Hematologic/Lymphatic ROS: No easy bleeding or unusual lymph node swelling  Neurologic ROS: No new or unusual weakness  Psychiatric ROS: No hallucinations  Allergic/Immunologic: No  urticaria or allergic rash      Current Outpatient Medications   Medication Sig Dispense Refill   • anastrozole (ARIMIDEX) 1 MG Tab Take 1 mg by mouth every day. 1/2 tab 2x/wk     • fluticasone (FLONASE) 50 MCG/ACT nasal spray Spray 1 Spray in nose every day.     • gabapentin (NEURONTIN) 300 MG Cap Take 300 mg by mouth as needed.     • omeprazole (PRILOSEC) 20 MG delayed-release capsule Take 20 mg by mouth 2 times a day. Pt takes BID, one in the morning and one at night   Indications: Gastroesophageal Reflux Disease     • bumetanide (BUMEX) 1 MG Tab Take 1 Tab by mouth every day. 90 Tab 3   • clopidogrel (PLAVIX) 75 MG Tab Take 1 Tab by mouth every day. 90 Tab 3   • metoprolol SR (TOPROL XL) 25 MG TABLET SR 24 HR Take 1 Tab by mouth every day. 90 Tab 3   • aspirin EC 81 MG EC tablet Take 1 Tab by mouth every day. 30 Tab 0   • Coenzyme Q10 (CO Q-10 PO) Take 1 Tab by mouth 2 Times a Day.     • ethyl chloride Aerosol Apply 1 Application to affected area(s) every evening as needed (pain).     • ezetimibe (ZETIA) 10 MG Tab Take 10 mg by mouth every morning.     • glimepiride (AMARYL) 1 MG tablet Take 1 mg by mouth every evening.     • B Complex Vitamins (VITAMIN B COMPLEX PO) Take 1 Tab by mouth every day.     • Cranberry 300 MG Tab Take 300 mg by mouth every morning.     • Garlic 1000 MG Cap Take 1,000 mg by mouth every morning.     • ferrous sulfate 325 (65 Fe) MG tablet Take 325 mg by mouth every day.     • L-Lysine 1000 MG Tab Take 1,000 mg by mouth every day.     • Probiotic Product (PROBIOTIC PO) Take 1 Cap by mouth every morning.     • Ascorbic Acid (VITAMIN C) 1000 MG Tab Take 1 Tab by mouth every day.     • vitamin e (VITAMIN E) 400 UNIT Cap Take 800 Units by mouth 2 times a day.     • Zinc 50 MG Cap Take 50 mg by mouth every  day.     • sacubitril-valsartan (ENTRESTO) 24-26 MG Tab tablet Take 1 Tab by mouth 2 Times a Day. 60 Tab 3   • hydrocortisone 2.5 % Cream topical cream Apply 1 Application to affected area(s) 2 times a day as needed (rash). 28 g 1   • VITAMIN B1-B12 INJ 1 Each by Injection route every 7 days. Thursday     • Testosterone Cypionate (DEPO-TESTOSTERONE IM) 1 Each by Intramuscular route every 7 days. Thursday     • vitamin D (CHOLECALCIFEROL) 1000 UNIT TABS Take 1,000 Units by mouth every day.     • GLUCOSAMINE SULFATE PO Take 1 Tab by mouth 2 Times a Day.       No current facility-administered medications for this visit.        Social History     Tobacco Use   • Smoking status: Never Smoker   • Smokeless tobacco: Never Used   • Tobacco comment: 0   Substance Use Topics   • Alcohol use: No     Alcohol/week: 0.0 oz   • Drug use: No        Past Medical History:   Diagnosis Date   • Aortic stenosis    • Arthritis    • Back pain    • Breath shortness     pt states short of breath 24/7   • CAD (coronary artery disease)    • CATARACT     removed bilat   • Chest pain    • Chest tightness    • Chickenpox    • Congestive heart failure (HCC)    • Constipation    • Coronary heart disease    • Cough    • Daytime sleepiness    • Diabetes     PT REPORTS DIABETES RESOLVED IN 2009. NO LONGER TAKES MEDS   • Difficulty breathing    • Fatigue    • Hearing difficulty    • Heart murmur    • Heart valve disease    • Heartburn    • Hiatus hernia syndrome    • Hyperlipidemia    • Hypertension     pt states well controlled on meds   • Kidney disease    • Mumps    • Pain 08-29-13    back, hips and bilat legs, 4/10   • Pain 6/22/2015    left humerus   • Pain 1/7/16    knees, back and shoulder   • Painful joint    • Palpitations    • Pneumonia 2007   • Rhinitis    • Ringing in ears    • Shortness of breath    • Swelling of lower extremity    • Tonsillitis    • Ulcer 2014    Dr. Porter, gastroenterologist   • Unspecified hemorrhagic conditions      bruises easily       Past Surgical History:   Procedure Laterality Date   • GASTROSCOPY N/A 1/8/2016    Procedure: GASTROSCOPY;  Surgeon: Deniz Sue M.D.;  Location: [facility];  Service:    • HUMERUS ORIF Left 6/25/2015    Procedure: HUMERUS ORIF/ PROXIMAL;  Surgeon: Cristal Guido M.D.;  Location: [facility];  Service:    • LUMBAR FUSION POSTERIOR  9/5/2013    Performed by Edith Sears M.D. at [facility] ORS   • LUMBAR DECOMPRESSION  9/5/2013    Performed by Edith Sears M.D. at [facility]   • MASS EXCISION NEURO  9/5/2013    Performed by Edith Sears M.D. at [facility]   • RECOVERY  6/26/2012    Performed by SURGERY, IR-RECOVERY at [facility] SAME DAY [facility]   • DRAINAGE HEMATOMA  5/25/2012    Performed by DENIZ SUE at [facility]   • GASTRIC BAND LAPAROSCOPIC REVISION  5/11/2012    Performed by DENIZ SUE at [facility]   • LUMBAR FUSION POSTERIOR  3/26/2012    Performed by EIDTH SEARS at [facility] ORS   • LUMBAR DECOMPRESSION  3/26/2012    Performed by EDITH SEARS at [facility]   • INJ,EPIDURAL/LUMB/SAC SINGLE  3/19/2012    Performed by KRISTIN BALTAZAR at [facility]   • INJ,EPIDURAL/LUMB/SAC SINGLE  3/5/2012    Performed by KRISTIN BALTAZAR at [facility]   • GASTRIC BANDING LAPAROSCOPIC  3/24/2010    Performed by DENIZ SUE at [facility] ORS   • HIATAL HERNIA REPAIR  3/24/2010    Performed by DENIZ SUE at [facility] ORS   • KNEE ARTHROPLASTY TOTAL  2000    bilateral   • ABDOMINOPLASTY  1990   • AORTIC VALVE REPLACEMENT     • ARTHROSCOPY, KNEE     • LAMINOTOMY     • PB REMV 2ND CATARACT,CORN-SCLER SECTN     • SINUSCOPE     • SLEEVE,CARRIE VASO THIGH     • TONSILLECTOMY     • ZZZ CARDIAC CATH         Allergies: Statins [hmg-coa-r inhibitors]    Family History   Problem Relation Age of Onset   • No Known Problems Sister    •  "No Known Problems Sister    • No Known Problems Sister    • Diabetes Other    • No Known Problems Mother    • No Known Problems Father    • Heart Disease Neg Hx        Physical Examination    Vitals:    01/20/20 0938   Height: 1.854 m (6' 1\")   Weight: 113 kg (249 lb 2 oz)   Weight % change since last entry.: 0 %   BP: (!) 94/66   Pulse: 73   BMI (Calculated): 32.87   O2 sat % room air: 92 %       General Appearance: alert, no distress  Skin: Skin color, texture, turgor normal. No rashes or lesions.  Eyes: negative  Oropharynx: Lips, mucosa, and tongue normal. Teeth and gums normal. Oropharynx moist and without lesion  Lungs: positive findings: Remarkably clear  Heart: negative. RRR loud systolic murmur, gallop, or rubs.  No ectopy.  Abdomen: Abdomen soft, non-tender. . No masses,  No organomegaly  Extremities:  No deformities, edema, or skin discoloration  Joints: No acute arthritis  Peripheral Pulses:perfused  Neurologic: intact grossly  Oropharyngeal crowding    IV (only hard palate visible)    Imaging: CT scan of the chest noted, no acute findings    PFTS: Ordered and pending      Assessment and Plan  1. Dyspnea, unspecified type  Suspect predominantly aortic stenosis, not pulmonary contribution  - PULMONARY FUNCTION TESTS -Test requested: Complete Pulmonary Function Test; Future  - Influenza Vaccine, High Dose (65+ Only)    2. Gastroesophageal reflux disease without esophagitis  Dry cough    3. Severe aortic stenosis  Awaiting valve replacement or repair    4. Chronic systolic heart failure (HCC)  Seems compensated presently  - Influenza Vaccine, High Dose (65+ Only)    5. Class 1 obesity due to excess calories without serious comorbidity with body mass index (BMI) of 31.0 to 31.9 in adult  - OBESITY COUNSELING (No Charge): Patient identified as having weight management issue.  Appropriate orders and counseling given.      Followup Return in about 6 weeks (around 3/2/2020) for with PFT, follow up visit with " Catrachita Arreguin.    Answers for HPI/ROS submitted by the patient on 1/15/2020   What is the reason for your visit today?: referred by doctor  Do you cough first thing in the morning or at other times during the day?: yes  Do you have a cough on most days? If so, how long have you had this cough?: yes  Bring up phlegm (mucus, sputum) in the morning or other times during the day?: no  If so, how long have you produced phlegm (Months, Years), and what is the usual color of your phlegm?: n/a  Do you cough up blood from your chest?: No  If so, how many times, and approximately how much?: n/a  How often?: rarely  Do you experience any chest tightness?: No  Experience shortness of breath?: Yes  Experience shortness of breath at rest?: No  How far can you walk before becoming short of breath or need to rest? : 25 yds  Please list what causes you to become short of breath:: unknown  Have you ever been hospitalized?: Yes  Reason, year, and hospital in which you were hospitalized:: massive congested heart  Have you ever needed to be intubated or placed on a ventilator? : No  Have you ever had problems with anesthesia?: No  Have you experienced post-operative delirium?: No  Any complications with surgery?: No  What year did you receive your last Flu shot?: 20 years ago  What year did you receive you last Pneumonia shot?: 2018  Have you had a TB skin test? If so, please list the year and result:: no  Have you had Allergy skin testing? If so, please list the year and result:: yes 1990  Please list all your occupations from your first job to your current job. Include Industry/Company, location, year, and your specific job.: professsional athlete  a edel Job: no  a Mine or Quarry: no  a Foundry: no  with Pottery: no  Cotton Mill: no  Flax Mill: no  Hemp Mill: no  Brick Plant: no  with Asbestos: no  as a Sandblaster: no  manufacturing of glass, ceramics, or abrasives: no  Acids: no  Arsenics: no  Cadmium: no  Chromium: no  Fibrogenic  Dust: no  Lead: no  Nickel: no  Plastic: no  Solvents: no  TDI: no  Uranium: no  Please list the places you have lived, the year, and Country or State in the [place], [id number][place] [id number],[place],[id number], [place] slater [id number]  Birds?: Yes  Dogs?: Yes  Cats?: Yes  Mice and/or Deer Mice?: No  Reptiles?: No  Blood Chemistries: [facility] last 20 years  Blood Count: [facility] 20 yrs  Cardiac Ultrasound: [facility] 2019  Chest X-ray: [facility] care mar. 14 2019  Pulmonary Function Test: no  CT Scan, Sinus: no  Electrocardiogram: yes 2019 and 2020  Sleep Study: no  Coffee: 0  Decaffeinated coffee: 0  Energy drinks: 1oz every other day  Tea: 0  Carbonated soft drinks: 0

## 2020-01-22 ENCOUNTER — DOCUMENTATION (OUTPATIENT)
Dept: CARDIOLOGY | Facility: MEDICAL CENTER | Age: 80
End: 2020-01-22

## 2020-01-22 NOTE — PROGRESS NOTES
Valve Conference Heart Team Plan of Care: 1/22/2020    TAVR candidate: Yes   With Possible open heart: No  Access: TF, right iliac  Valve Size: 29 +1 S#  Anesthesia: GA   Unit: Tele   Incidental findings: none  Clearance needed prior to procedure: none  Plan of care: Proceed with TAVR as planned 1/27/2020.      Spoke with patient and made aware of heart team's recommendations. Reviewed pre-op instructions including to begin holding vit E, vit C, and garlic supplement now, NPO midnight prior, and check in 0530 1/27/2020. Patient agrees with the plan and states no further questions at this time.

## 2020-01-24 ENCOUNTER — DOCUMENTATION (OUTPATIENT)
Dept: CARDIOLOGY | Facility: MEDICAL CENTER | Age: 80
End: 2020-01-24

## 2020-01-27 ENCOUNTER — HOSPITAL ENCOUNTER (INPATIENT)
Facility: MEDICAL CENTER | Age: 80
LOS: 1 days | DRG: 267 | End: 2020-01-28
Attending: INTERNAL MEDICINE | Admitting: INTERNAL MEDICINE
Payer: MEDICARE

## 2020-01-27 ENCOUNTER — ANESTHESIA (OUTPATIENT)
Dept: SURGERY | Facility: MEDICAL CENTER | Age: 80
DRG: 267 | End: 2020-01-27
Payer: MEDICARE

## 2020-01-27 ENCOUNTER — APPOINTMENT (OUTPATIENT)
Dept: CARDIOLOGY | Facility: MEDICAL CENTER | Age: 80
DRG: 267 | End: 2020-01-27
Attending: ANESTHESIOLOGY
Payer: MEDICARE

## 2020-01-27 ENCOUNTER — APPOINTMENT (OUTPATIENT)
Dept: RADIOLOGY | Facility: MEDICAL CENTER | Age: 80
DRG: 267 | End: 2020-01-27
Attending: NURSE PRACTITIONER
Payer: MEDICARE

## 2020-01-27 ENCOUNTER — ANESTHESIA EVENT (OUTPATIENT)
Dept: SURGERY | Facility: MEDICAL CENTER | Age: 80
DRG: 267 | End: 2020-01-27
Payer: MEDICARE

## 2020-01-27 DIAGNOSIS — I35.0 SEVERE AORTIC STENOSIS: ICD-10-CM

## 2020-01-27 PROBLEM — Z95.2 S/P TAVR (TRANSCATHETER AORTIC VALVE REPLACEMENT): Status: ACTIVE | Noted: 2020-01-27

## 2020-01-27 PROBLEM — R06.00 DYSPNEA: Status: RESOLVED | Noted: 2020-01-20 | Resolved: 2020-01-27

## 2020-01-27 LAB
ABO GROUP BLD: NORMAL
ACT BLD: 373 SEC (ref 74–137)
ALBUMIN SERPL BCP-MCNC: 3.6 G/DL (ref 3.2–4.9)
ALBUMIN/GLOB SERPL: 1.7 G/DL
ALP SERPL-CCNC: 36 U/L (ref 30–99)
ALT SERPL-CCNC: 16 U/L (ref 2–50)
ANION GAP SERPL CALC-SCNC: 7 MMOL/L (ref 0–11.9)
AST SERPL-CCNC: 24 U/L (ref 12–45)
BILIRUB SERPL-MCNC: 0.8 MG/DL (ref 0.1–1.5)
BLD GP AB SCN SERPL QL: NORMAL
BUN SERPL-MCNC: 30 MG/DL (ref 8–22)
CALCIUM SERPL-MCNC: 9 MG/DL (ref 8.5–10.5)
CHLORIDE SERPL-SCNC: 105 MMOL/L (ref 96–112)
CO2 SERPL-SCNC: 26 MMOL/L (ref 20–33)
CREAT SERPL-MCNC: 1.46 MG/DL (ref 0.5–1.4)
EKG IMPRESSION: NORMAL
EKG IMPRESSION: NORMAL
ERYTHROCYTE [DISTWIDTH] IN BLOOD BY AUTOMATED COUNT: 61.5 FL (ref 35.9–50)
GLOBULIN SER CALC-MCNC: 2.1 G/DL (ref 1.9–3.5)
GLUCOSE BLD-MCNC: 124 MG/DL (ref 65–99)
GLUCOSE BLD-MCNC: 126 MG/DL (ref 65–99)
GLUCOSE BLD-MCNC: 127 MG/DL (ref 65–99)
GLUCOSE BLD-MCNC: 128 MG/DL (ref 65–99)
GLUCOSE SERPL-MCNC: 147 MG/DL (ref 65–99)
HCT VFR BLD AUTO: 49.5 % (ref 42–52)
HGB BLD-MCNC: 14.9 G/DL (ref 14–18)
MCH RBC QN AUTO: 24 PG (ref 27–33)
MCHC RBC AUTO-ENTMCNC: 30.1 G/DL (ref 33.7–35.3)
MCV RBC AUTO: 79.7 FL (ref 81.4–97.8)
NT-PROBNP SERPL IA-MCNC: 3896 PG/ML (ref 0–125)
PLATELET # BLD AUTO: 152 K/UL (ref 164–446)
PMV BLD AUTO: 9.6 FL (ref 9–12.9)
POTASSIUM SERPL-SCNC: 3.7 MMOL/L (ref 3.6–5.5)
PROT SERPL-MCNC: 5.7 G/DL (ref 6–8.2)
RBC # BLD AUTO: 6.21 M/UL (ref 4.7–6.1)
RH BLD: NORMAL
SODIUM SERPL-SCNC: 138 MMOL/L (ref 135–145)
WBC # BLD AUTO: 5.2 K/UL (ref 4.8–10.8)

## 2020-01-27 PROCEDURE — A6402 STERILE GAUZE <= 16 SQ IN: HCPCS | Performed by: INTERNAL MEDICINE

## 2020-01-27 PROCEDURE — 83880 ASSAY OF NATRIURETIC PEPTIDE: CPT

## 2020-01-27 PROCEDURE — 85347 COAGULATION TIME ACTIVATED: CPT

## 2020-01-27 PROCEDURE — 86850 RBC ANTIBODY SCREEN: CPT

## 2020-01-27 PROCEDURE — 700102 HCHG RX REV CODE 250 W/ 637 OVERRIDE(OP): Performed by: NURSE PRACTITIONER

## 2020-01-27 PROCEDURE — 93005 ELECTROCARDIOGRAM TRACING: CPT | Performed by: INTERNAL MEDICINE

## 2020-01-27 PROCEDURE — 503001 HCHG PERFUSION: Performed by: INTERNAL MEDICINE

## 2020-01-27 PROCEDURE — 82962 GLUCOSE BLOOD TEST: CPT | Mod: 91

## 2020-01-27 PROCEDURE — 33361 REPLACE AORTIC VALVE PERQ: CPT | Mod: 62,Q0 | Performed by: THORACIC SURGERY (CARDIOTHORACIC VASCULAR SURGERY)

## 2020-01-27 PROCEDURE — 700105 HCHG RX REV CODE 258: Performed by: INTERNAL MEDICINE

## 2020-01-27 PROCEDURE — 71045 X-RAY EXAM CHEST 1 VIEW: CPT

## 2020-01-27 PROCEDURE — 770020 HCHG ROOM/CARE - TELE (206)

## 2020-01-27 PROCEDURE — 93005 ELECTROCARDIOGRAM TRACING: CPT | Performed by: NURSE PRACTITIONER

## 2020-01-27 PROCEDURE — 700111 HCHG RX REV CODE 636 W/ 250 OVERRIDE (IP): Performed by: ANESTHESIOLOGY

## 2020-01-27 PROCEDURE — 500002 HCHG ADHESIVE, DERMABOND: Performed by: INTERNAL MEDICINE

## 2020-01-27 PROCEDURE — B24BZZ4 ULTRASONOGRAPHY OF HEART WITH AORTA, TRANSESOPHAGEAL: ICD-10-PCS | Performed by: ANESTHESIOLOGY

## 2020-01-27 PROCEDURE — 93010 ELECTROCARDIOGRAM REPORT: CPT | Mod: 76 | Performed by: INTERNAL MEDICINE

## 2020-01-27 PROCEDURE — 160002 HCHG RECOVERY MINUTES (STAT): Performed by: INTERNAL MEDICINE

## 2020-01-27 PROCEDURE — 160031 HCHG SURGERY MINUTES - 1ST 30 MINS LEVEL 5: Performed by: INTERNAL MEDICINE

## 2020-01-27 PROCEDURE — 700101 HCHG RX REV CODE 250: Performed by: INTERNAL MEDICINE

## 2020-01-27 PROCEDURE — 700111 HCHG RX REV CODE 636 W/ 250 OVERRIDE (IP): Performed by: INTERNAL MEDICINE

## 2020-01-27 PROCEDURE — B3101ZZ FLUOROSCOPY OF THORACIC AORTA USING LOW OSMOLAR CONTRAST: ICD-10-PCS | Performed by: INTERNAL MEDICINE

## 2020-01-27 PROCEDURE — 86901 BLOOD TYPING SEROLOGIC RH(D): CPT

## 2020-01-27 PROCEDURE — C1883 ADAPT/EXT, PACING/NEURO LEAD: HCPCS | Performed by: INTERNAL MEDICINE

## 2020-01-27 PROCEDURE — C1725 CATH, TRANSLUMIN NON-LASER: HCPCS | Performed by: INTERNAL MEDICINE

## 2020-01-27 PROCEDURE — 86900 BLOOD TYPING SEROLOGIC ABO: CPT

## 2020-01-27 PROCEDURE — 160035 HCHG PACU - 1ST 60 MINS PHASE I: Performed by: INTERNAL MEDICINE

## 2020-01-27 PROCEDURE — A9270 NON-COVERED ITEM OR SERVICE: HCPCS | Performed by: NURSE PRACTITIONER

## 2020-01-27 PROCEDURE — 503000 HCHG SUTURE, OHS: Performed by: INTERNAL MEDICINE

## 2020-01-27 PROCEDURE — 700117 HCHG RX CONTRAST REV CODE 255: Performed by: INTERNAL MEDICINE

## 2020-01-27 PROCEDURE — 160009 HCHG ANES TIME/MIN: Performed by: INTERNAL MEDICINE

## 2020-01-27 PROCEDURE — 93010 ELECTROCARDIOGRAM REPORT: CPT | Performed by: INTERNAL MEDICINE

## 2020-01-27 PROCEDURE — 80053 COMPREHEN METABOLIC PANEL: CPT

## 2020-01-27 PROCEDURE — C1769 GUIDE WIRE: HCPCS | Performed by: INTERNAL MEDICINE

## 2020-01-27 PROCEDURE — 160048 HCHG OR STATISTICAL LEVEL 1-5: Performed by: INTERNAL MEDICINE

## 2020-01-27 PROCEDURE — 110372 HCHG SHELL REV 278: Performed by: INTERNAL MEDICINE

## 2020-01-27 PROCEDURE — 93355 ECHO TRANSESOPHAGEAL (TEE): CPT

## 2020-01-27 PROCEDURE — 33361 REPLACE AORTIC VALVE PERQ: CPT | Mod: 62,Q0 | Performed by: INTERNAL MEDICINE

## 2020-01-27 PROCEDURE — 700105 HCHG RX REV CODE 258: Performed by: NURSE PRACTITIONER

## 2020-01-27 PROCEDURE — 502240 HCHG MISC OR SUPPLY RC 0272: Performed by: INTERNAL MEDICINE

## 2020-01-27 PROCEDURE — 160036 HCHG PACU - EA ADDL 30 MINS PHASE I: Performed by: INTERNAL MEDICINE

## 2020-01-27 PROCEDURE — 700111 HCHG RX REV CODE 636 W/ 250 OVERRIDE (IP)

## 2020-01-27 PROCEDURE — C1760 CLOSURE DEV, VASC: HCPCS | Performed by: INTERNAL MEDICINE

## 2020-01-27 PROCEDURE — 02RF38Z REPLACEMENT OF AORTIC VALVE WITH ZOOPLASTIC TISSUE, PERCUTANEOUS APPROACH: ICD-10-PCS | Performed by: INTERNAL MEDICINE

## 2020-01-27 PROCEDURE — 160042 HCHG SURGERY MINUTES - EA ADDL 1 MIN LEVEL 5: Performed by: INTERNAL MEDICINE

## 2020-01-27 PROCEDURE — 700101 HCHG RX REV CODE 250: Performed by: ANESTHESIOLOGY

## 2020-01-27 PROCEDURE — 85027 COMPLETE CBC AUTOMATED: CPT

## 2020-01-27 PROCEDURE — C1894 INTRO/SHEATH, NON-LASER: HCPCS | Performed by: INTERNAL MEDICINE

## 2020-01-27 DEVICE — KIT TRANSCATHETER HEART VALVE  SAPIEN-3 29MM: Type: IMPLANTABLE DEVICE | Status: FUNCTIONAL

## 2020-01-27 DEVICE — DEVICE CLSR 6FR HMST IMPL SLF STS PLUS ANGIOSEAL (10EA/CA): Type: IMPLANTABLE DEVICE | Status: FUNCTIONAL

## 2020-01-27 RX ORDER — SODIUM CHLORIDE 9 MG/ML
INJECTION, SOLUTION INTRAVENOUS
Status: DISCONTINUED
Start: 2020-01-27 | End: 2020-01-27

## 2020-01-27 RX ORDER — MEPERIDINE HYDROCHLORIDE 25 MG/ML
12.5 INJECTION INTRAMUSCULAR; INTRAVENOUS; SUBCUTANEOUS
Status: DISCONTINUED | OUTPATIENT
Start: 2020-01-27 | End: 2020-01-27 | Stop reason: HOSPADM

## 2020-01-27 RX ORDER — METOCLOPRAMIDE HYDROCHLORIDE 5 MG/ML
INJECTION INTRAMUSCULAR; INTRAVENOUS PRN
Status: DISCONTINUED | OUTPATIENT
Start: 2020-01-27 | End: 2020-01-27 | Stop reason: SURG

## 2020-01-27 RX ORDER — ACETAMINOPHEN 500 MG
1500 TABLET ORAL EVERY 6 HOURS PRN
Status: ON HOLD | COMMUNITY
End: 2020-02-05

## 2020-01-27 RX ORDER — CLOPIDOGREL BISULFATE 75 MG/1
75 TABLET ORAL DAILY
Status: DISCONTINUED | OUTPATIENT
Start: 2020-01-27 | End: 2020-01-28 | Stop reason: HOSPADM

## 2020-01-27 RX ORDER — HYDROMORPHONE HYDROCHLORIDE 1 MG/ML
0.2 INJECTION, SOLUTION INTRAMUSCULAR; INTRAVENOUS; SUBCUTANEOUS
Status: DISCONTINUED | OUTPATIENT
Start: 2020-01-27 | End: 2020-01-27 | Stop reason: HOSPADM

## 2020-01-27 RX ORDER — BISACODYL 10 MG
10 SUPPOSITORY, RECTAL RECTAL
Status: DISCONTINUED | OUTPATIENT
Start: 2020-01-27 | End: 2020-01-28 | Stop reason: HOSPADM

## 2020-01-27 RX ORDER — FLUTICASONE PROPIONATE 50 MCG
1 SPRAY, SUSPENSION (ML) NASAL PRN
COMMUNITY
End: 2020-02-03

## 2020-01-27 RX ORDER — BUPIVACAINE HYDROCHLORIDE 2.5 MG/ML
INJECTION, SOLUTION EPIDURAL; INFILTRATION; INTRACAUDAL
Status: DISCONTINUED | OUTPATIENT
Start: 2020-01-27 | End: 2020-01-27 | Stop reason: HOSPADM

## 2020-01-27 RX ORDER — OXYCODONE HCL 5 MG/5 ML
5 SOLUTION, ORAL ORAL
Status: DISCONTINUED | OUTPATIENT
Start: 2020-01-27 | End: 2020-01-27 | Stop reason: HOSPADM

## 2020-01-27 RX ORDER — ACETAMINOPHEN 325 MG/1
650 TABLET ORAL EVERY 6 HOURS PRN
Status: DISCONTINUED | OUTPATIENT
Start: 2020-01-27 | End: 2020-01-28 | Stop reason: HOSPADM

## 2020-01-27 RX ORDER — OMEPRAZOLE 20 MG/1
40 CAPSULE, DELAYED RELEASE ORAL 2 TIMES DAILY
Status: DISCONTINUED | OUTPATIENT
Start: 2020-01-27 | End: 2020-01-28 | Stop reason: HOSPADM

## 2020-01-27 RX ORDER — SODIUM CHLORIDE, SODIUM LACTATE, POTASSIUM CHLORIDE, CALCIUM CHLORIDE 600; 310; 30; 20 MG/100ML; MG/100ML; MG/100ML; MG/100ML
INJECTION, SOLUTION INTRAVENOUS CONTINUOUS
Status: DISCONTINUED | OUTPATIENT
Start: 2020-01-27 | End: 2020-01-27 | Stop reason: HOSPADM

## 2020-01-27 RX ORDER — ROCURONIUM BROMIDE 10 MG/ML
INJECTION, SOLUTION INTRAVENOUS PRN
Status: DISCONTINUED | OUTPATIENT
Start: 2020-01-27 | End: 2020-01-27 | Stop reason: SURG

## 2020-01-27 RX ORDER — ACETAMINOPHEN 160 MG
2000 TABLET,DISINTEGRATING ORAL 2 TIMES DAILY
COMMUNITY

## 2020-01-27 RX ORDER — HYDROMORPHONE HYDROCHLORIDE 1 MG/ML
0.4 INJECTION, SOLUTION INTRAMUSCULAR; INTRAVENOUS; SUBCUTANEOUS
Status: DISCONTINUED | OUTPATIENT
Start: 2020-01-27 | End: 2020-01-27 | Stop reason: HOSPADM

## 2020-01-27 RX ORDER — HYDRALAZINE HYDROCHLORIDE 20 MG/ML
5 INJECTION INTRAMUSCULAR; INTRAVENOUS
Status: DISCONTINUED | OUTPATIENT
Start: 2020-01-27 | End: 2020-01-27 | Stop reason: HOSPADM

## 2020-01-27 RX ORDER — CYANOCOBALAMIN 1000 UG/ML
1000 INJECTION, SOLUTION INTRAMUSCULAR; SUBCUTANEOUS
COMMUNITY
End: 2020-01-31

## 2020-01-27 RX ORDER — DIPHENHYDRAMINE HYDROCHLORIDE 50 MG/ML
25 INJECTION INTRAMUSCULAR; INTRAVENOUS EVERY 6 HOURS PRN
Status: DISCONTINUED | OUTPATIENT
Start: 2020-01-27 | End: 2020-01-28 | Stop reason: HOSPADM

## 2020-01-27 RX ORDER — HYDRALAZINE HYDROCHLORIDE 20 MG/ML
10 INJECTION INTRAMUSCULAR; INTRAVENOUS
Status: DISCONTINUED | OUTPATIENT
Start: 2020-01-27 | End: 2020-01-28 | Stop reason: HOSPADM

## 2020-01-27 RX ORDER — AMOXICILLIN 250 MG
2 CAPSULE ORAL 2 TIMES DAILY PRN
Status: DISCONTINUED | OUTPATIENT
Start: 2020-01-27 | End: 2020-01-28 | Stop reason: HOSPADM

## 2020-01-27 RX ORDER — OXYCODONE HCL 5 MG/5 ML
10 SOLUTION, ORAL ORAL
Status: DISCONTINUED | OUTPATIENT
Start: 2020-01-27 | End: 2020-01-27 | Stop reason: HOSPADM

## 2020-01-27 RX ORDER — LIDOCAINE HYDROCHLORIDE 10 MG/ML
INJECTION, SOLUTION EPIDURAL; INFILTRATION; INTRACAUDAL; PERINEURAL
Status: COMPLETED
Start: 2020-01-27 | End: 2020-01-27

## 2020-01-27 RX ORDER — LIDOCAINE HYDROCHLORIDE 20 MG/ML
INJECTION, SOLUTION INFILTRATION; PERINEURAL
Status: DISCONTINUED | OUTPATIENT
Start: 2020-01-27 | End: 2020-01-27 | Stop reason: HOSPADM

## 2020-01-27 RX ORDER — CYANOCOBALAMIN 1000 UG/ML
1000 INJECTION, SOLUTION INTRAMUSCULAR; SUBCUTANEOUS
COMMUNITY
End: 2020-02-03

## 2020-01-27 RX ORDER — EZETIMIBE 10 MG/1
10 TABLET ORAL EVERY MORNING
Status: DISCONTINUED | OUTPATIENT
Start: 2020-01-27 | End: 2020-01-28 | Stop reason: HOSPADM

## 2020-01-27 RX ORDER — ONDANSETRON 2 MG/ML
INJECTION INTRAMUSCULAR; INTRAVENOUS PRN
Status: DISCONTINUED | OUTPATIENT
Start: 2020-01-27 | End: 2020-01-27 | Stop reason: SURG

## 2020-01-27 RX ORDER — ONDANSETRON 2 MG/ML
4 INJECTION INTRAMUSCULAR; INTRAVENOUS EVERY 4 HOURS PRN
Status: DISCONTINUED | OUTPATIENT
Start: 2020-01-27 | End: 2020-01-28 | Stop reason: HOSPADM

## 2020-01-27 RX ORDER — SODIUM CHLORIDE 9 MG/ML
INJECTION, SOLUTION INTRAVENOUS CONTINUOUS
Status: ACTIVE | OUTPATIENT
Start: 2020-01-27 | End: 2020-01-27

## 2020-01-27 RX ORDER — ETOMIDATE 2 MG/ML
INJECTION INTRAVENOUS PRN
Status: DISCONTINUED | OUTPATIENT
Start: 2020-01-27 | End: 2020-01-27 | Stop reason: SURG

## 2020-01-27 RX ORDER — GABAPENTIN 300 MG/1
300 CAPSULE ORAL 2 TIMES DAILY PRN
Status: DISCONTINUED | OUTPATIENT
Start: 2020-01-27 | End: 2020-01-28 | Stop reason: HOSPADM

## 2020-01-27 RX ORDER — GINSENG 100 MG
50 CAPSULE ORAL 2 TIMES DAILY
Status: ON HOLD | COMMUNITY
End: 2022-03-31

## 2020-01-27 RX ORDER — ONDANSETRON 2 MG/ML
4 INJECTION INTRAMUSCULAR; INTRAVENOUS
Status: DISCONTINUED | OUTPATIENT
Start: 2020-01-27 | End: 2020-01-27 | Stop reason: HOSPADM

## 2020-01-27 RX ORDER — CEFAZOLIN SODIUM 1 G/3ML
INJECTION, POWDER, FOR SOLUTION INTRAMUSCULAR; INTRAVENOUS PRN
Status: DISCONTINUED | OUTPATIENT
Start: 2020-01-27 | End: 2020-01-27 | Stop reason: SURG

## 2020-01-27 RX ORDER — OMEPRAZOLE 40 MG/1
40 CAPSULE, DELAYED RELEASE ORAL 2 TIMES DAILY
COMMUNITY

## 2020-01-27 RX ORDER — BUMETANIDE 1 MG/1
1 TABLET ORAL DAILY
Status: DISCONTINUED | OUTPATIENT
Start: 2020-01-27 | End: 2020-01-28 | Stop reason: HOSPADM

## 2020-01-27 RX ORDER — HALOPERIDOL 5 MG/ML
1 INJECTION INTRAMUSCULAR
Status: DISCONTINUED | OUTPATIENT
Start: 2020-01-27 | End: 2020-01-27 | Stop reason: HOSPADM

## 2020-01-27 RX ORDER — FLUTICASONE PROPIONATE 50 MCG
1 SPRAY, SUSPENSION (ML) NASAL
Status: DISCONTINUED | OUTPATIENT
Start: 2020-01-27 | End: 2020-01-28 | Stop reason: HOSPADM

## 2020-01-27 RX ORDER — LIDOCAINE HYDROCHLORIDE 20 MG/ML
INJECTION, SOLUTION EPIDURAL; INFILTRATION; INTRACAUDAL; PERINEURAL PRN
Status: DISCONTINUED | OUTPATIENT
Start: 2020-01-27 | End: 2020-01-27 | Stop reason: SURG

## 2020-01-27 RX ORDER — HYDROMORPHONE HYDROCHLORIDE 1 MG/ML
0.1 INJECTION, SOLUTION INTRAMUSCULAR; INTRAVENOUS; SUBCUTANEOUS
Status: DISCONTINUED | OUTPATIENT
Start: 2020-01-27 | End: 2020-01-27 | Stop reason: HOSPADM

## 2020-01-27 RX ORDER — VITAMIN E 268 MG
400 CAPSULE ORAL 2 TIMES DAILY
Status: ON HOLD | COMMUNITY
End: 2022-03-31

## 2020-01-27 RX ORDER — HEPARIN SODIUM 1000 [USP'U]/ML
INJECTION, SOLUTION INTRAVENOUS; SUBCUTANEOUS PRN
Status: DISCONTINUED | OUTPATIENT
Start: 2020-01-27 | End: 2020-01-27 | Stop reason: SURG

## 2020-01-27 RX ORDER — PROTAMINE SULFATE 10 MG/ML
INJECTION, SOLUTION INTRAVENOUS PRN
Status: DISCONTINUED | OUTPATIENT
Start: 2020-01-27 | End: 2020-01-27 | Stop reason: SURG

## 2020-01-27 RX ORDER — DIPHENHYDRAMINE HCL 25 MG
25 TABLET ORAL NIGHTLY PRN
Status: DISCONTINUED | OUTPATIENT
Start: 2020-01-27 | End: 2020-01-28 | Stop reason: HOSPADM

## 2020-01-27 RX ORDER — ANASTROZOLE 1 MG/1
0.5 TABLET ORAL DAILY
Status: DISCONTINUED | OUTPATIENT
Start: 2020-01-27 | End: 2020-01-28 | Stop reason: HOSPADM

## 2020-01-27 RX ORDER — POLYETHYLENE GLYCOL 3350 17 G/17G
1 POWDER, FOR SOLUTION ORAL
Status: DISCONTINUED | OUTPATIENT
Start: 2020-01-27 | End: 2020-01-28 | Stop reason: HOSPADM

## 2020-01-27 RX ORDER — SODIUM CHLORIDE, SODIUM LACTATE, POTASSIUM CHLORIDE, CALCIUM CHLORIDE 600; 310; 30; 20 MG/100ML; MG/100ML; MG/100ML; MG/100ML
INJECTION, SOLUTION INTRAVENOUS CONTINUOUS
Status: ACTIVE | OUTPATIENT
Start: 2020-01-27 | End: 2020-01-27

## 2020-01-27 RX ADMIN — SUGAMMADEX 200 MG: 100 INJECTION, SOLUTION INTRAVENOUS at 10:07

## 2020-01-27 RX ADMIN — OMEPRAZOLE 40 MG: 20 CAPSULE, DELAYED RELEASE ORAL at 16:49

## 2020-01-27 RX ADMIN — CLOPIDOGREL BISULFATE 75 MG: 75 TABLET ORAL at 15:14

## 2020-01-27 RX ADMIN — SODIUM CHLORIDE, POTASSIUM CHLORIDE, SODIUM LACTATE AND CALCIUM CHLORIDE: 600; 310; 30; 20 INJECTION, SOLUTION INTRAVENOUS at 07:54

## 2020-01-27 RX ADMIN — CEFAZOLIN 2 G: 330 INJECTION, POWDER, FOR SOLUTION INTRAMUSCULAR; INTRAVENOUS at 09:14

## 2020-01-27 RX ADMIN — HEPARIN SODIUM 15000 UNITS: 1000 INJECTION, SOLUTION INTRAVENOUS; SUBCUTANEOUS at 09:44

## 2020-01-27 RX ADMIN — METOCLOPRAMIDE 10 MG: 5 INJECTION, SOLUTION INTRAMUSCULAR; INTRAVENOUS at 09:59

## 2020-01-27 RX ADMIN — BUMETANIDE 1 MG: 1 TABLET ORAL at 15:14

## 2020-01-27 RX ADMIN — SACUBITRIL AND VALSARTAN 1 TABLET: 24; 26 TABLET, FILM COATED ORAL at 16:49

## 2020-01-27 RX ADMIN — EZETIMIBE 10 MG: 10 TABLET ORAL at 15:14

## 2020-01-27 RX ADMIN — LIDOCAINE HYDROCHLORIDE 0.5 ML: 10 INJECTION, SOLUTION EPIDURAL; INFILTRATION; INTRACAUDAL at 07:53

## 2020-01-27 RX ADMIN — SODIUM CHLORIDE: 9 INJECTION, SOLUTION INTRAVENOUS at 12:30

## 2020-01-27 RX ADMIN — ROCURONIUM BROMIDE 100 MG: 10 INJECTION, SOLUTION INTRAVENOUS at 09:14

## 2020-01-27 RX ADMIN — LIDOCAINE HYDROCHLORIDE 40 MG: 20 INJECTION, SOLUTION EPIDURAL; INFILTRATION; INTRACAUDAL at 09:14

## 2020-01-27 RX ADMIN — ASPIRIN 81 MG: 81 TABLET, COATED ORAL at 15:14

## 2020-01-27 RX ADMIN — ONDANSETRON 4 MG: 2 INJECTION INTRAMUSCULAR; INTRAVENOUS at 09:59

## 2020-01-27 RX ADMIN — Medication 0.5 ML: at 07:53

## 2020-01-27 RX ADMIN — PROTAMINE SULFATE 80 MG: 10 INJECTION, SOLUTION INTRAVENOUS at 09:59

## 2020-01-27 RX ADMIN — ETOMIDATE 20 MG: 2 INJECTION INTRAVENOUS at 09:14

## 2020-01-27 ASSESSMENT — LIFESTYLE VARIABLES
ALCOHOL_USE: NO
CONSUMPTION TOTAL: NEGATIVE
EVER HAD A DRINK FIRST THING IN THE MORNING TO STEADY YOUR NERVES TO GET RID OF A HANGOVER: NO
TOTAL SCORE: 0
ON A TYPICAL DAY WHEN YOU DRINK ALCOHOL HOW MANY DRINKS DO YOU HAVE: 0
AVERAGE NUMBER OF DAYS PER WEEK YOU HAVE A DRINK CONTAINING ALCOHOL: 0
DOES PATIENT WANT TO STOP DRINKING: NO
HOW MANY TIMES IN THE PAST YEAR HAVE YOU HAD 5 OR MORE DRINKS IN A DAY: 0
TOTAL SCORE: 0
HAVE PEOPLE ANNOYED YOU BY CRITICIZING YOUR DRINKING: NO
TOTAL SCORE: 0
HAVE YOU EVER FELT YOU SHOULD CUT DOWN ON YOUR DRINKING: NO
EVER FELT BAD OR GUILTY ABOUT YOUR DRINKING: NO

## 2020-01-27 ASSESSMENT — ENCOUNTER SYMPTOMS
PALPITATIONS: 0
MYALGIAS: 0
ABDOMINAL PAIN: 0
CLAUDICATION: 0
VOMITING: 0
FEVER: 0
DIZZINESS: 0
DOUBLE VISION: 0
CHILLS: 0
NERVOUS/ANXIOUS: 0
FOCAL WEAKNESS: 0
CARDIOVASCULAR NEGATIVE: 1
EYES NEGATIVE: 1
GASTROINTESTINAL NEGATIVE: 1
BLURRED VISION: 0
COUGH: 0
BRUISES/BLEEDS EASILY: 0
SHORTNESS OF BREATH: 1
NEUROLOGICAL NEGATIVE: 1
DEPRESSION: 0
PSYCHIATRIC NEGATIVE: 1
NAUSEA: 0
WEIGHT LOSS: 0
WEAKNESS: 0
HEADACHES: 0
MUSCULOSKELETAL NEGATIVE: 1

## 2020-01-27 ASSESSMENT — COGNITIVE AND FUNCTIONAL STATUS - GENERAL
STANDING UP FROM CHAIR USING ARMS: A LITTLE
SUGGESTED CMS G CODE MODIFIER MOBILITY: CJ
SUGGESTED CMS G CODE MODIFIER DAILY ACTIVITY: CH
MOVING TO AND FROM BED TO CHAIR: A LITTLE
DAILY ACTIVITIY SCORE: 24
MOBILITY SCORE: 22

## 2020-01-27 NOTE — OR NURSING
Pt requiring 4LNC.  Oxygen tank attached to [facility] reading 60% full at time pt transferred out of PACU.

## 2020-01-27 NOTE — OR SURGEON
Immediate Post OP Note    PreOp Diagnosis: Severe symptomatic aortic stenosis, coronary artery disease, cardiomyopathy.    PostOp Diagnosis: Same    Procedure(s):  TAVR (29 mm S3 Ultra Khan pericardial valve)  OLGA    Surgeon(s):  DANIELLE Quintanilla M.D.    Anesthesiologist/Type of Anesthesia:  Anesthesiologist: Hu Martinez M.D.  Anesthesia Technician: Francisco Anderson/General    Surgical Staff:  Circulator: Belkis Anaya R.N.; Santana Gan R.N.  Perfusionist: Quinton Cardona  Scrub Person: Benja Ni  Cath Lab Tech: Vj Polanco    Specimens removed if any: None    Estimated Blood Loss: Minimal    Findings: Severe aortic stenosis    Complications: None        1/27/2020 10:13 AM Enrique Willson M.D.

## 2020-01-27 NOTE — OP REPORT
DATE OF SERVICE:  01/27/2020    REFERRING PHYSICIAN:  Paul Wilkinson MD    PREOPERATIVE DIAGNOSES:  Severe symptomatic aortic stenosis, coronary artery   disease, cardiomyopathy.    POSTOPERATIVE DIAGNOSES:  Severe symptomatic aortic stenosis, coronary artery   disease, cardiomyopathy.    PROCEDURES PERFORMED:  Transcatheter aortic valve replacement (TAVR 29 mm S3   Ultra Khan pericardial valve), and intraoperative transesophageal   echocardiography.    SURGEONS:  Dr. Willson and Dr. Castro.    ANESTHESIOLOGIST:  Hu Martinez MD    ANESTHESIA:  General endotracheal.    ESTIMATED BLOOD LOSS:  Minimal.    COMPLICATIONS:  None.    INDICATIONS:  The patient is a 79-year-old male with severe symptomatic aortic   stenosis.  Echocardiography showed severe aortic stenosis and reduced left   ventricular ejection fraction of approximately 25%.    DESCRIPTION OF PROCEDURE:  The patient was brought to the operating room and   placed on the operating room table in the supine position.  After successful   induction of general anesthesia and endotracheal intubation, the patient was   prepped and draped in the usual sterile fashion.  In collaboration with Dr. Castro, bilateral femoral ultrasound-guided arteriovenous access was obtained.    Dr. Castro positioned a temporary transvenous pacemaker in the right   ventricle and a pigtail catheter in the right coronary sinus.  The deployment   angle was determined.  A 1 cm incision was made in the right groin and 2   Perclose sutures were deployed.  A 16-Japanese eSheath was then placed in the   right common femoral artery and its tip was positioned in the abdominal aorta.    The aortic valve was crossed with a wire.  A deployment catheter with a 29   mm S3 Ultra Khan pericardial valve was placed across the aortic valve   annulus.  Dr. Castro inflated the balloon during valve implantation.  The   wires and catheters were removed.  Intraoperative transesophageal   echocardiography  did not show any paravalvular or central leaks.  The right   femoral eSheath was removed and the Perclose sutures were tightened down.    Dermabond was then applied over the right groin incision.  The remainder of   the operation will be dictated by Dr. Castro.  There were no apparent   complications.  The patient tolerated the procedure well and left the   operating room in stable condition.       ____________________________________     Enrique MCCURDY / DARIEL    DD:  01/27/2020 10:21:49  DT:  01/27/2020 10:35:45    D#:  [id number]  Job#:  [id number]

## 2020-01-27 NOTE — H&P
Physician H&P    Patient ID:  LIAN More III  [id number]  79 y.o. male  1940    History:  Primary Diagnosis: Outpatient TAVR.    HPI:  79 year old male with complex cardiovascular history including severe aortic stenosis, coronary artery disease status post stent placement, chronic systolic congestive heart failure, chronic kidney disease.Due to his symptoms he was scheduled for TAVR.      Past Medical History:  has a past medical history of Aortic stenosis, Arthritis, Back pain, Breath shortness, CAD (coronary artery disease), CATARACT, Chest pain, Chest tightness, Chickenpox, Congestive heart failure (HCC), Constipation, Coronary heart disease, Cough, Daytime sleepiness, Diabetes, Difficulty breathing, Fatigue, Hearing difficulty, Heart murmur, Heart valve disease, Heartburn, Hiatus hernia syndrome, Hyperlipidemia, Hypertension, Kidney disease, Mumps, Pain (08-29-13), Pain (6/22/2015), Pain (1/7/16), Painful joint, Palpitations, Pneumonia (2007), Rhinitis, Ringing in ears, Shortness of breath, Swelling of lower extremity, Tonsillitis, Ulcer (2014), and Unspecified hemorrhagic conditions.  Past Surgical History:  has a past surgical history that includes abdominoplasty (1990); gastric banding laparoscopic (3/24/2010); hiatal hernia repair (3/24/2010); inj,epidural/lumb/sac single (3/5/2012); inj,epidural/lumb/sac single (3/19/2012); lumbar fusion posterior (3/26/2012); lumbar decompression (3/26/2012); gastric band laparoscopic revision (5/11/2012); drainage hematoma (5/25/2012); recovery (6/26/2012); lumbar fusion posterior (9/5/2013); lumbar decompression (9/5/2013); mass excision neuro (9/5/2013); gastroscopy (N/A, 1/8/2016); knee arthroplasty total (2000); humerus orif (Left, 6/25/2015); zzz cardiac cath; laminotomy; Sleeve,Grant Vaso Thigh; pr remv 2nd cataract,corn-scler sectn; sinuscope; tonsillectomy; aortic valve replacement; and arthroscopy, knee.  Past Social History:  reports that he has never smoked.  He has never used smokeless tobacco. He reports that he does not drink alcohol or use drugs.  Past Family History:   Family History   Problem Relation Age of Onset   • No Known Problems Sister    • No Known Problems Sister    • No Known Problems Sister    • Diabetes Other    • No Known Problems Mother    • No Known Problems Father    • Heart Disease Neg Hx      Allergies: Statins [hmg-coa-r inhibitors]    (Medications reviewed.)  Current Medications:  Prior to Admission medications    Medication Sig Start Date End Date Taking? Authorizing Provider   anastrozole (ARIMIDEX) 1 MG Tab Take 1 mg by mouth every day. 1/2 tab 2x/wk    Physician Outpatient   fluticasone (FLONASE) 50 MCG/ACT nasal spray Spray 1 Spray in nose every day.    Physician Outpatient   gabapentin (NEURONTIN) 300 MG Cap Take 300 mg by mouth as needed.    Physician Outpatient   omeprazole (PRILOSEC) 20 MG delayed-release capsule Take 20 mg by mouth 2 times a day. Pt takes BID, one in the morning and one at night   Indications: Gastroesophageal Reflux Disease    Physician Outpatient   bumetanide (BUMEX) 1 MG Tab Take 1 Tab by mouth every day. 1/3/20   Redet Scott, A.P.R.N.   clopidogrel (PLAVIX) 75 MG Tab Take 1 Tab by mouth every day. 1/3/20   Maxet Scott, A.P.R.N.   metoprolol SR (TOPROL XL) 25 MG TABLET SR 24 HR Take 1 Tab by mouth every day. 1/3/20   Maxet Scott, A.P.R.N.   aspirin EC 81 MG EC tablet Take 1 Tab by mouth every day. 12/30/19   Davin Adorno M.D.   Coenzyme Q10 (CO Q-10 PO) Take 1 Tab by mouth 2 Times a Day.    Physician Outpatient   ethyl chloride Aerosol Apply 1 Application to affected area(s) every evening as needed (pain).    Physician Outpatient   ezetimibe (ZETIA) 10 MG Tab Take 10 mg by mouth every morning.    Physician Outpatient   glimepiride (AMARYL) 1 MG tablet Take 1 mg by mouth every evening.    Physician Outpatient   B Complex Vitamins (VITAMIN B COMPLEX PO) Take 1 Tab by mouth every day.    Physician  Outpatient   Cranberry 300 MG Tab Take 300 mg by mouth every morning.    Physician Outpatient   Garlic 1000 MG Cap Take 1,000 mg by mouth every morning.    Physician Outpatient   ferrous sulfate 325 (65 Fe) MG tablet Take 325 mg by mouth every day.    Physician Outpatient   L-Lysine 1000 MG Tab Take 1,000 mg by mouth every day.    Physician Outpatient   Probiotic Product (PROBIOTIC PO) Take 1 Cap by mouth every morning.    Physician Outpatient   Ascorbic Acid (VITAMIN C) 1000 MG Tab Take 1 Tab by mouth every day.    Physician Outpatient   vitamin e (VITAMIN E) 400 UNIT Cap Take 800 Units by mouth 2 times a day.    Physician Outpatient   Zinc 50 MG Cap Take 50 mg by mouth every day.    Physician Outpatient   sacubitril-valsartan (ENTRESTO) 24-26 MG Tab tablet Take 1 Tab by mouth 2 Times a Day. 12/4/19   Demian Mo M.D.   hydrocortisone 2.5 % Cream topical cream Apply 1 Application to affected area(s) 2 times a day as needed (rash). 9/4/19   HAMZAH MedinaPElizabetNElizabet   VITAMIN B1-B12 INJ 1 Each by Injection route every 7 days. Thursday    Physician Outpatient   Testosterone Cypionate (DEPO-TESTOSTERONE IM) 1 Each by Intramuscular route every 7 days. Thursday    Physician Outpatient   vitamin D (CHOLECALCIFEROL) 1000 UNIT TABS Take 1,000 Units by mouth every day.    Physician Outpatient   GLUCOSAMINE SULFATE PO Take 1 Tab by mouth 2 Times a Day.    Physician Outpatient       Review of Systems:  Review of Systems   Constitutional: Positive for malaise/fatigue. Negative for chills, fever and weight loss.   HENT: Negative.  Negative for hearing loss.    Eyes: Negative.  Negative for blurred vision and double vision.   Respiratory: Positive for shortness of breath. Negative for cough.    Cardiovascular: Negative.  Negative for chest pain, palpitations, claudication and leg swelling.   Gastrointestinal: Negative.  Negative for abdominal pain, nausea and vomiting.   Genitourinary: Negative.  Negative for dysuria and urgency.  "  Musculoskeletal: Negative.  Negative for joint pain and myalgias.   Skin: Negative.  Negative for itching and rash.   Neurological: Negative.  Negative for dizziness, focal weakness, weakness and headaches.   Endo/Heme/Allergies: Negative.  Does not bruise/bleed easily.   Psychiatric/Behavioral: Negative.  Negative for depression. The patient is not nervous/anxious.      /74   Pulse 74   Temp 36.3 °C (97.4 °F) (Temporal)   Resp 18   Ht 1.854 m (6' 1\")   Wt 112.3 kg (247 lb 9.2 oz)   SpO2 95%     Physical Examination:  Physical Exam  Constitutional:       Appearance: He is well-developed.   HENT:      Head: Normocephalic and atraumatic.   Eyes:      Conjunctiva/sclera: Conjunctivae normal.      Pupils: Pupils are equal, round, and reactive to light.   Neck:      Musculoskeletal: Normal range of motion and neck supple.   Cardiovascular:      Rate and Rhythm: Normal rate and regular rhythm.      Heart sounds: Murmur present.   Pulmonary:      Effort: Pulmonary effort is normal.      Breath sounds: Normal breath sounds.   Abdominal:      General: Bowel sounds are normal.      Palpations: Abdomen is soft.   Musculoskeletal: Normal range of motion.   Skin:     General: Skin is warm and dry.   Neurological:      Mental Status: He is alert and oriented to person, place, and time.     CARDIAC STUDIES/PROCEDURES:     CARDIAC CATHETERIZATION CONCLUSIONS by Eliazar Sanchez (01/10/20)  1. LVEF 30% prior to intervention.  2. 70% distal left main and proximal LAD stenosis, IFR positive from prior procedure.  3. Successful planned PCI proximal LAD and distal left main trifurcation  stenosis (3.5 x 26 mm [place] BRANDI postdilated to 4.5 mm proximally), IVUS guided.  4. Moderate aortic stenosis, mean gradient 27 to 30mmHg.  (study result reviewed)     CARDIAC CATHETERIZATION CONCLUSIONS by Dougie Polo (12/27/19)  Coronary stent implantation, distal right coronary artery 3.5x28 mm   Synergy drug-eluting stent " postdilated 4.0 mm.     CARDIAC CATHETERIZATION CONCLUSIONS by Amauri Amador (12/11/19)  Severe triple-vessel coronary artery disease with calcified 50% left main stenosis (iFR <0.8)      CAROTID ULTRASOUND (=01/16/20)  1.  There is mild to moderate amount of atherosclerotic plaque.  Plaque is located in carotid bulbs and proximal internal carotid arteries.  Plaque characterization:  homogeneous, calcific  2.  There is no evidence of carotid occlusion.  3. Vertebral arteries demonstrate antegrade flow.  4. Diameter reduction in the internal carotid arteries: less than 50%. There is no hemodynamically significant stenosis.  (study result reviewed)    CTA OF CHEST AND ABDOMEN (01/16/20)  1.  Aortic annulus measures 660 mm?  2.  Coronary arteries or over a centimeter above the annulus  3.  Iliofemoral runoff is satisfactory with right side less tortuous  4.  Lower lobe dependent bronchial thickening could indicate aspiration. Edema or bronchitis are possible  5.  Numerous incidental chronic findings including left heart enlargement, severe coronary artery disease, colonic diverticulosis, LAP-BAND device, hepatic steatosis and splenomegaly  (study result reviewed)    ECHOCARDIOGRAM CONCLUSIONS (12/03/19)  Left ventricle is severely dilated.  Severely reduced left ventricular systolic function.  Left ventricular ejection fraction is visually estimated to be 25%.  Global hypokinesis with regional variation.  Moderate aortic stenosis.  Trace mitral regurgitation.  Severely dilated left atrium.  Mild tricuspid regurgitation.  Right ventricular systolic pressure is estimated to be 40  mmHg.  Normal pericardium without effusion.  Compared to the images of the study done on 7/29/2015  there has been   marked change in LV wall motion and function, moderate aortic stenosis   is also now present.  (study result reviewed)     EKG performed on (01/10/20) EKG shows sinus rhythm with inferior Q waves.     Laboratory  results of (01/16/20) were reviewed. Bun of 32 mg/dl, creatinine levels of 1.6 mg/dl noted.  Laboratory results of (01/11/20) Bun of 30 mg/dl, creatinine levels of 1.59 mg/dl noted.    Impression/Plan:  1. Aortic stenosis: She is symptomatic with his severe aortic stenosis, NYHA class IIIB. We will proceed with TAVR. She understands the risks and benefits and agrees with plan.  2. Chronic systolic congestive heart failure with cardiomyopathy: The overall volume status is adequate.  3. Coronary artery disease with stent placement: He is clinically doing well without recurrence of his angina. We will continue with current medical care including aspirin, clopidogrel, metoprolol.    The risks, benefits, and alternatives to TAVR, general anesthesia and transesophageal echocardiogram were discussed in great detail. Specific risks mentioned include bleeding, infection, kidney damage, allergic reaction, cardiac perforation with possible tamponade requiring hailee-cardiocentesis or possible open heart surgery if the patient is a candidate. Lastly the risks of heart attack, stroke, and death were discussed; the risks of major complications includingall cause mortality of 2.2%, disabling stroke of 1.1%, the risk on new pacemaker of 12% and the risk of vascular complications of 4.1%. The patient verbalized understanding of these potential complications and wishes to proceed with this procedure. ([facility]).  The procedure will be performed completely in collaboration with cardiac surgery.    CC Paul Kim and Radha Avila     Pre Procedure Assessment:  <EXAMSHORT2>      Pre Procedure update:   No changes.    Surendra Castro M.D.  1/27/2020

## 2020-01-27 NOTE — DISCHARGE PLANNING
Patient is a 79 year old  man who lives with his spouse, Anni between [place] and [place]. Patient is current staying in [place] for now, receives all medical treatment in [place]. 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 Insurance is [employer].     PCP is Dr. Radha Ramirez. Also see's Dr. Tee for cardiology. No issues with ADLs or IADLs.Pharmacy is [facility].     Patient uses night home O2, 2L at night with [employer]. No issues with AOD or MH.     No requests or issues currently.     Care Transition Team Assessment    Information Source  Orientation : Oriented x 4  Information Given By: Patient  Informant's Name: JW  Who is responsible for making decisions for patient? : Patient    Readmission Evaluation  Is this a readmission?: Yes - unplanned readmission  Why do you think you were readmitted?: Severe Aortic Stenosis    Elopement Risk  Legal Hold: No  Ambulatory or Self Mobile in Wheelchair: Yes  Disoriented: No  Psychiatric Symptoms: None  History of Wandering: No  Elopement this Admit: No  Vocalizing Wanting to Leave: No  Displays Behaviors, Body Language Wanting to Leave: No-Not at Risk for Elopement  Elopement Risk: Not at Risk for Elopement    Interdisciplinary Discharge Planning  Patient or legal guardian wants to designate a caregiver (see row info): No    Discharge Preparedness  What is your plan after discharge?: Home with help  What are your discharge supports?: Spouse  Prior Functional Level: Ambulatory, Independent with Activities of Daily Living, Independent with Medication Management  Difficulity with ADLs: None  Difficulity with IADLs: None    Functional Assesment  Prior Functional Level: Ambulatory, Independent with Activities of Daily Living, Independent with Medication Management    Finances  Financial Barriers to Discharge: No  Prescription Coverage: Yes    Vision / Hearing Impairment  Hearing Impairment: Both Ears, Patient Declines to Wear Hearing  Device(s)  Does Pt Need Special Equipment for the Hearing Impaired?: No         Advance Directive  Advance Directive?: None    Domestic Abuse  Have you ever been the victim of abuse or violence?: No  Physical Abuse or Sexual Abuse: No  Verbal Abuse or Emotional Abuse: No  Possible Abuse Reported to:: Not Applicable    Psychological Assessment  History of Substance Abuse: None  History of Psychiatric Problems: No  Non-compliant with Treatment: No  Newly Diagnosed Illness: Yes    Discharge Risks or Barriers  Discharge risks or barriers?: No    Anticipated Discharge Information  Anticipated discharge disposition: Home  Discharge Address: [facility]  Discharge Contact Phone Number: [phone]

## 2020-01-27 NOTE — OR NURSING
Billie Sol with cardiology updated that patient's post op TAVR 12-lead EKG revealed an incomplete left bundle branch block. Pt denies chest pain, SOB, lightheadedness or dizziness. No new orders at this time.

## 2020-01-27 NOTE — PROGRESS NOTES
Med rec updated and complete  Allergies reviewed  Interviewed pt with wife at bedside with permission from pt.  Pts wife had a list of medications at bedside, went over list of medications and returned list of medications back to pts wife.  Pt reports no antibiotics in the last 2 weeks

## 2020-01-27 NOTE — OR NURSING
Patient's spouse, Anni, called per patient request. Patient's family updated via telephone number listed on chart. Patient's family updated on current POC and patient status. All questions and concerns addressed.    1155: Anni updated that pt is now settled in room. [id number].

## 2020-01-27 NOTE — PROGRESS NOTES
Pt arrived to tele floor from PACU. Pt transferred to bed via slide board. Pt is currently AOx4, on 4L NC, no signs of distress or complaints of pain at this time. Bilateral gemoral sites assessed, CDI with small amount of swelling, marked with pen, no bruising, will continue to monitor closely. Pt placed on tele monitor, monitor room notified. Post-op vitals initiated. Pt on educated on bed rest, will be off bed rest at 1430 per order. No additional needs at this time.     Monitor room notified this RN of ST elevation in two leads. Leads repositioned, no change. Pt denies chest pain, SOB with VSS. EKG ordered. Showed no significant change from previous EKG.

## 2020-01-27 NOTE — ANESTHESIA PROCEDURE NOTES
Arterial Line  Performed by: Hu Martinez M.D.  Authorized by: Hu Martinez M.D.     Start Time:  1/27/2020 9:17 AM  End Time:  1/27/2020 9:20 AM  Localization: surface landmarks    Patient Location:  OR  Indication: continuous blood pressure monitoring and blood sampling needed    Catheter Size:  20 G  Seldinger Technique?: Yes    Laterality:  Right  Site:  Radial artery  Line Secured:  Antimicrobial disc, tape and transparent dressing  Events: patient tolerated procedure well with no complications

## 2020-01-27 NOTE — ANESTHESIA POSTPROCEDURE EVALUATION
Patient: LIAN More III    Procedure Summary     Date:  01/27/20 Room / Location:  [place] [address] / SURGERY [facility]    Anesthesia Start:  0907 Anesthesia Stop:  1016    Procedures:       REPLACEMENT, AORTIC VALVE, TRANSCATHETER- (N/A Groin)      ECHOCARDIOGRAM, TRANSESOPHAGEAL (Mouth) Diagnosis:  (SEVERE AORTIC STENOSIS)    Surgeon:  Surendra Castro M.D. Responsible Provider:  Hu Martinez M.D.    Anesthesia Type:  general ASA Status:  4          Final Anesthesia Type: general  Last vitals  BP   Blood Pressure : 126/66, NIBP: 113/72(left arm), Arterial BP: 129/62    Temp   36.4 °C (97.5 °F)    Pulse   Pulse: 72   Resp   19    SpO2   96 %      Anesthesia Post Evaluation    Patient location during evaluation: PACU  Patient participation: complete - patient participated  Level of consciousness: awake and alert    Airway patency: patent  Anesthetic complications: no  Cardiovascular status: hemodynamically stable  Respiratory status: acceptable  Hydration status: euvolemic    PONV: none           Nurse Pain Score: 0 (NPRS)

## 2020-01-27 NOTE — OP REPORT
DATE OF PROCEDURE: 01/27/20    REFERRING PHYSICIAN: Paul Kim    PROCEDURES:  1. Transcatheter aortic valve replacement.  2. Preclose closure.  3. Angio-Seal closure.  4. Ultrasound guided femoral artery and femoral vein access.    PRE PROCEDURAL DIAGNOSIS:  1. Severe symptomatic aortic stenosis, NYHA IIIB.    POST PROCEDURAL DIAGNOSIS:  1. Successful transcatheter aortic valve replacement (TAVR) with # 29 Khan Salvador S3 Ultra valve, transfemoral approach, under general anesthesia.  2. Successful Preclose closure.  3. Successful Angio-Seal closure.    INDICATION:    79 year old male with complex cardiovascular history including severe aortic stenosis, coronary artery disease status post stent placement, chronic systolic congestive heart failure, chronic kidney disease. Due to his symptoms he was scheduled for TAVR.     DESCRIPTION OF PROCEDURE:  After informed consent was signed by the   patient, the patient was brought into the OR 19.  He was prepped and draped in   usual sterile manner.  Prior to the procedure, right radial arterial insertion, intubation   and transesophageal echocardiogram were performed by Hu Patrick.    The initial timeout was performed.     A 6-[place] arterial sheath was placed in the right femoral artery by Modified Seldinger   Technique by myself. A 6-[place] sheath was placed in the left femoral artery and a 6-[place] sheath was placed in the left femoral vein by Enrique Alejandro.    Of note, all under sheath were placed under ultrasound guidance.     Through the venous femoral sheath, a temporary pacemaker was positioned at the   right ventricle.  Pacing was confirmed.  Through the left arterial sheath, a pigtail catheter   was positioned in the distal aorta and aortogram was performed. This catheter was advanced   to the ascending aorta at the valve level and aortogram was repeated. Over the right femoral   sheath PreClose closure was performed with 2 devices. An 8  -Russian Sheath was placed.   Through this 8-Russian sheath, an AL1 was positioned into the ascending aorta. This catheter   and sheath were removed after the insertion of a Lunderquist wire.  Over the Lunderquist   wire a 16-Russian expandable-sheath was advanced by myself.  IV heparin was given. Through   the expandable sheath,  an AL1 catheter was positioned at the ascending aorta.  The   Lunderquist wire was removed and exchanged for a straightwire. The AL1 catheter was   used to cross the aortic valve with this wire. An Amplatz extra stiff wire was positioned   across  the aortic valve. The Salvador S3 valve stent was loaded onto the balloon and positioned   across the aortic valve.  Rapid pacing was performed again and the stent was deployed.   The delivery system was removed. The valve was deployed by Enrique Alejandro.    The patient tolerated the procedure well. At the end of procedure, all pacemaker wire,   pigtail catheters and sheaths were removed.  The right femoral arteriotomy site was closed   via the PreClose system.  The left femoral arteriotomy site was closed via Angio-Seal.   The patient was then extubated and transferred to PACU in stable condition.    IMPRESSION:  1. Successful transcatheter aortic valve replacement (TAVR) with # 29 Khan Salvador S3 Ultra valve, transfemoral approach, under general anesthesia.  2. Successful Preclose closure.  3. Successful Angio-Seal closure.    RECOMMENDATION: Medical therapy with addition of Plavix for 3 months.

## 2020-01-27 NOTE — ANESTHESIA PREPROCEDURE EVALUATION
Relevant Problems   CARDIAC   (+) Coronary artery disease involving native coronary artery of native heart without angina pectoris   (+) Hypertension   (+) Severe aortic stenosis   (+) Stented coronary artery      ENDO   (+) Type 2 diabetes mellitus without complication, without long-term current use of insulin (HCC)      Other   (+) Arthritis of neck   (+) Dyslipidemia   (+) Obesity   (+) S/P gastric bypass   (+) S/P lumbar fusion   (+) Systolic heart failure (HCC)       Physical Exam    Airway   Mallampati: II  TM distance: >3 FB  Neck ROM: full       Cardiovascular - normal exam  Rhythm: regular  Rate: normal  (+) murmur     Dental - normal exam         Pulmonary - normal exam  (+) decreased breath sounds     Abdominal    Neurological - normal exam         Other findings: Chronic generalized pain 3/10            Anesthesia Plan    ASA 4   ASA physical status 4 criteria: severe valve dysfunction    Plan - general       Airway plan will be ETT        Induction: intravenous      Pertinent diagnostic labs and testing reviewed    Informed Consent:    Anesthetic plan and risks discussed with patient and spouse.    Use of blood products discussed with: patient and spouse whom.

## 2020-01-27 NOTE — OR NURSING
Report called to Brittney HERNANDEZ. All pertinent events, medical information and care plan details reported to receiving RN. Reviewed lines and drains including PIV, fluids received perioperatively and removed right radial arterial site. Right wrist soft/nontender after removing art line. Hemostasis achieved with 5 minutes of manual pressure. Reviewed hemodynamics, allergies, code status, applicable medications, and pertinent assessment findings including focused cardiovascular and surgical site assessment. Pt has bilateral groin sites - LLE & RLE arterial site & LLE venous site. Sites remains CDI/nontender. Very small hematoma felt on palpation of LLE site. Site marked for ongoing assessment and reassessed with receiving RN at bedside. NIH completed at bedside with receiving RN. No changes from NIH performed at bedside by this RN. All questions and concerns addressed. Patient remains HDS on 4 LNC, AOx4 at this time. Pt tolerating PO liquids. Patient educated on next phase of care.

## 2020-01-27 NOTE — CARE PLAN
Problem: Communication  Goal: The ability to communicate needs accurately and effectively will improve  Outcome: PROGRESSING AS EXPECTED  Intervention: [place] patient and significant other/support system to call light to alert staff of needs  Flowsheets (Taken 1/27/2020 1257)  Oriented to:: All of the Following : Location of Bathroom, Visiting Policy, Unit Routine, Call Light and Bedside Controls, Bedside Rail Policy, Smoking Policy, Rights and Responsibilities, Bedside Report, and Patient Education Notebook     Problem: Safety  Goal: Will remain free from falls  Outcome: PROGRESSING AS EXPECTED  Intervention: Implement fall precautions  Flowsheets (Taken 1/27/2020 1200)  Environmental Precautions: Treaded Slipper Socks on Patient;Personal Belongings, Wastebasket, Call Bell etc. in Easy Reach;Transferred to Stronger Side;Report Given to Other Health Care Providers Regarding Fall Risk;Bed in Low Position;Communication Sign for Patients & Families;Mobility Assessed & Appropriate Sign Placed

## 2020-01-27 NOTE — ANESTHESIA PROCEDURE NOTES
Airway  Date/Time: 1/27/2020 9:15 AM  Performed by: Hu Martinez M.D.  Authorized by: Hu Martinez M.D.     Location:  OR  Urgency:  Elective  Difficult Airway: No    Indications for Airway Management:  Anesthesia  Spontaneous Ventilation: absent    Sedation Level:  Deep  Preoxygenated: Yes    Patient Position:  Sniffing  Final Airway Type:  Endotracheal airway  Final Endotracheal Airway:  ETT  Cuffed: Yes    Technique Used for Successful ETT Placement:  Direct laryngoscopy  Insertion Site:  Oral  Blade Type:  Carmella  Laryngoscope Blade/Videolaryngoscope Blade Size:  4  ETT Size (mm):  7.5  Measured from:  Teeth  ETT to Teeth (cm):  23  Placement Verified by: auscultation and capnometry    Cormack-Lehane Classification:  Grade I - full view of glottis  Number of Attempts at Approach:  1

## 2020-01-27 NOTE — ANESTHESIA TIME REPORT
Anesthesia Start and Stop Event Times     Date Time Event    1/27/2020 0854 Ready for Procedure     0907 Anesthesia Start     1016 Anesthesia Stop        Responsible Staff  01/27/20    Name Role Begin End    Hu Martinez M.D. Anesth 0907 1016        Preop Diagnosis (Free Text):  Pre-op Diagnosis     SEVERE AORTIC STENOSIS        Preop Diagnosis (Codes):    Post op Diagnosis  Aortic stenosis      Premium Reason  Non-Premium    Comments:

## 2020-01-28 ENCOUNTER — TELEPHONE (OUTPATIENT)
Dept: MEDICAL GROUP | Facility: MEDICAL CENTER | Age: 80
End: 2020-01-28

## 2020-01-28 ENCOUNTER — APPOINTMENT (OUTPATIENT)
Dept: RADIOLOGY | Facility: MEDICAL CENTER | Age: 80
DRG: 267 | End: 2020-01-28
Attending: NURSE PRACTITIONER
Payer: MEDICARE

## 2020-01-28 ENCOUNTER — APPOINTMENT (OUTPATIENT)
Dept: CARDIOLOGY | Facility: MEDICAL CENTER | Age: 80
DRG: 267 | End: 2020-01-28
Attending: NURSE PRACTITIONER
Payer: MEDICARE

## 2020-01-28 VITALS
OXYGEN SATURATION: 90 % | RESPIRATION RATE: 18 BRPM | WEIGHT: 246.47 LBS | HEIGHT: 73 IN | HEART RATE: 82 BPM | SYSTOLIC BLOOD PRESSURE: 121 MMHG | DIASTOLIC BLOOD PRESSURE: 75 MMHG | TEMPERATURE: 99.8 F | BODY MASS INDEX: 32.67 KG/M2

## 2020-01-28 PROBLEM — I50.20 SYSTOLIC HEART FAILURE (HCC): Status: RESOLVED | Noted: 2019-12-04 | Resolved: 2020-01-28

## 2020-01-28 LAB
ALBUMIN SERPL BCP-MCNC: 3.7 G/DL (ref 3.2–4.9)
ALBUMIN/GLOB SERPL: 1.5 G/DL
ALP SERPL-CCNC: 39 U/L (ref 30–99)
ALT SERPL-CCNC: 13 U/L (ref 2–50)
ANION GAP SERPL CALC-SCNC: 9 MMOL/L (ref 0–11.9)
AST SERPL-CCNC: 18 U/L (ref 12–45)
BILIRUB SERPL-MCNC: 0.7 MG/DL (ref 0.1–1.5)
BUN SERPL-MCNC: 29 MG/DL (ref 8–22)
CALCIUM SERPL-MCNC: 8.8 MG/DL (ref 8.5–10.5)
CHLORIDE SERPL-SCNC: 104 MMOL/L (ref 96–112)
CO2 SERPL-SCNC: 28 MMOL/L (ref 20–33)
CREAT SERPL-MCNC: 1.61 MG/DL (ref 0.5–1.4)
EKG IMPRESSION: NORMAL
ERYTHROCYTE [DISTWIDTH] IN BLOOD BY AUTOMATED COUNT: 61.8 FL (ref 35.9–50)
GLOBULIN SER CALC-MCNC: 2.4 G/DL (ref 1.9–3.5)
GLUCOSE BLD-MCNC: 100 MG/DL (ref 65–99)
GLUCOSE SERPL-MCNC: 109 MG/DL (ref 65–99)
HCT VFR BLD AUTO: 49.9 % (ref 42–52)
HGB BLD-MCNC: 14.7 G/DL (ref 14–18)
LV EJECT FRACT  99904: 25
LV EJECT FRACT  99904: 35
LV EJECT FRACT MOD 2C 99903: 37.67
LV EJECT FRACT MOD 4C 99902: 28.61
LV EJECT FRACT MOD BP 99901: 32.52
MCH RBC QN AUTO: 23.3 PG (ref 27–33)
MCHC RBC AUTO-ENTMCNC: 29.5 G/DL (ref 33.7–35.3)
MCV RBC AUTO: 79.2 FL (ref 81.4–97.8)
NT-PROBNP SERPL IA-MCNC: 2996 PG/ML (ref 0–125)
PLATELET # BLD AUTO: 154 K/UL (ref 164–446)
PMV BLD AUTO: 9.8 FL (ref 9–12.9)
POTASSIUM SERPL-SCNC: 4.1 MMOL/L (ref 3.6–5.5)
PROT SERPL-MCNC: 6.1 G/DL (ref 6–8.2)
RBC # BLD AUTO: 6.3 M/UL (ref 4.7–6.1)
SODIUM SERPL-SCNC: 141 MMOL/L (ref 135–145)
WBC # BLD AUTO: 6.6 K/UL (ref 4.8–10.8)

## 2020-01-28 PROCEDURE — 80053 COMPREHEN METABOLIC PANEL: CPT

## 2020-01-28 PROCEDURE — 83880 ASSAY OF NATRIURETIC PEPTIDE: CPT

## 2020-01-28 PROCEDURE — 93306 TTE W/DOPPLER COMPLETE: CPT | Mod: 26 | Performed by: INTERNAL MEDICINE

## 2020-01-28 PROCEDURE — 99239 HOSP IP/OBS DSCHRG MGMT >30: CPT | Performed by: INTERNAL MEDICINE

## 2020-01-28 PROCEDURE — 93306 TTE W/DOPPLER COMPLETE: CPT

## 2020-01-28 PROCEDURE — A9270 NON-COVERED ITEM OR SERVICE: HCPCS | Performed by: NURSE PRACTITIONER

## 2020-01-28 PROCEDURE — 700102 HCHG RX REV CODE 250 W/ 637 OVERRIDE(OP): Performed by: NURSE PRACTITIONER

## 2020-01-28 PROCEDURE — 82962 GLUCOSE BLOOD TEST: CPT

## 2020-01-28 PROCEDURE — 97161 PT EVAL LOW COMPLEX 20 MIN: CPT

## 2020-01-28 PROCEDURE — 71045 X-RAY EXAM CHEST 1 VIEW: CPT

## 2020-01-28 PROCEDURE — 93005 ELECTROCARDIOGRAM TRACING: CPT | Performed by: NURSE PRACTITIONER

## 2020-01-28 PROCEDURE — 36415 COLL VENOUS BLD VENIPUNCTURE: CPT

## 2020-01-28 PROCEDURE — 93010 ELECTROCARDIOGRAM REPORT: CPT | Performed by: INTERNAL MEDICINE

## 2020-01-28 PROCEDURE — 85027 COMPLETE CBC AUTOMATED: CPT

## 2020-01-28 RX ADMIN — ASPIRIN 81 MG: 81 TABLET, COATED ORAL at 05:28

## 2020-01-28 RX ADMIN — OMEPRAZOLE 40 MG: 20 CAPSULE, DELAYED RELEASE ORAL at 05:28

## 2020-01-28 RX ADMIN — BUMETANIDE 1 MG: 1 TABLET ORAL at 05:28

## 2020-01-28 RX ADMIN — CLOPIDOGREL BISULFATE 75 MG: 75 TABLET ORAL at 05:28

## 2020-01-28 RX ADMIN — EZETIMIBE 10 MG: 10 TABLET ORAL at 05:28

## 2020-01-28 RX ADMIN — SACUBITRIL AND VALSARTAN 1 TABLET: 24; 26 TABLET, FILM COATED ORAL at 05:28

## 2020-01-28 RX ADMIN — ANASTROZOLE 0.5 MG: 1 TABLET, COATED ORAL at 05:27

## 2020-01-28 ASSESSMENT — ENCOUNTER SYMPTOMS
NERVOUS/ANXIOUS: 0
RESPIRATORY NEGATIVE: 1
FEVER: 0
BRUISES/BLEEDS EASILY: 0
NAUSEA: 0
WEAKNESS: 0
EYES NEGATIVE: 1
HEADACHES: 0
GASTROINTESTINAL NEGATIVE: 1
CONSTITUTIONAL NEGATIVE: 1
NEUROLOGICAL NEGATIVE: 1
DIZZINESS: 0
SHORTNESS OF BREATH: 0
ABDOMINAL PAIN: 0
PALPITATIONS: 0
MYALGIAS: 0
CHILLS: 0
COUGH: 0
CARDIOVASCULAR NEGATIVE: 1
MUSCULOSKELETAL NEGATIVE: 1
VOMITING: 0
PSYCHIATRIC NEGATIVE: 1

## 2020-01-28 ASSESSMENT — COGNITIVE AND FUNCTIONAL STATUS - GENERAL
SUGGESTED CMS G CODE MODIFIER MOBILITY: CH
MOBILITY SCORE: 24

## 2020-01-28 NOTE — CARE PLAN
Problem: Infection  Goal: Will remain free from infection  Outcome: PROGRESSING AS EXPECTED  Intervention: Implement standard precautions and perform hand washing before and after patient contact  Note:   Proper hand hygiene before and after patient care to ensure patient will remain free from infection.       Problem: Knowledge Deficit  Goal: Knowledge of the prescribed therapeutic regimen will improve  Outcome: PROGRESSING AS EXPECTED  Intervention: Discuss information regarding therpeutic regimen and document in education  Note:   Provided patient with a verbal discussion related to POC for POD#2 following TAVR procedure. Patient verbalized understanding and had no questions or concerns.      Problem: Communication  Goal: The ability to communicate needs accurately and effectively will improve  Outcome: MET  Intervention: [place] patient and significant other/support system to call light to alert staff of needs  Flowsheets (Taken 1/27/2020 1257 by Brittney Marie R.N.)  Oriented to:: All of the Following : Location of Bathroom, Visiting Policy, Unit Routine, Call Light and Bedside Controls, Bedside Rail Policy, Smoking Policy, Rights and Responsibilities, Bedside Report, and Patient Education Notebook     Problem: Safety  Goal: Will remain free from injury  Outcome: MET  Goal: Will remain free from falls  Outcome: MET  Intervention: Implement fall precautions  Flowsheets (Taken 1/27/2020 2000)  Environmental Precautions: Treaded Slipper Socks on Patient;Personal Belongings, Wastebasket, Call Bell etc. in Easy Reach;Transferred to Stronger Side;Report Given to Other Health Care Providers Regarding Fall Risk;Bed in Low Position;Communication Sign for Patients & Families;Mobility Assessed & Appropriate Sign Placed  Note:   Patient's gait assessed and deemed capable of being up to self ad dayo. Provided patient with directions to turn on room light, change positions slowly, to dangle legs at edge of bed prior to  standing, and to stand slowly. Patient verbalized understanding.      Problem: Pain Management  Goal: Pain level will decrease to patient's comfort goal  Outcome: MET  Intervention: Follow pain managment plan developed in collaboration with patient and Interdisciplinary Team  Note:   Assessed patient's history with chronic pain. Established mutual defined pain management goal. Pt is able to verbalize pain on a scale of 1-10 and is able to verbalize comfort goal. Pain management plan followed and pt educated on nonpharmacologic and pharmacological comfort measures.       Problem: Discharge Barriers/Planning  Goal: Patient's continuum of care needs will be met  Intervention: Explain discharge instructions and medication reconcilliation to patient and significant other/support system  Note:   Provided patient with a verbal discussion and printed handout related to after care instructions following TAVR procedure. Patient verbalized understanding and had no questions or concerns.

## 2020-01-28 NOTE — PROGRESS NOTES
Bedside report received, pt care assumed, tele box on. Groin sites checked. Site is soft to palpation and no bleeding noted. Neuro check completed with no new deficits at this time. Pt denies any additional needs at this time. Bed in lowest position, bed alarm on pt educated on fall risk and verbalized understanding, call light within reach.

## 2020-01-28 NOTE — PROGRESS NOTES
Cardiology Follow Up Progress Note    Date of Service  1/28/2020    Attending Physician  Surendra Castro M.D.    Chief Complaint   Status post TAVR.    AMBER More III is a 79 y.o. male admitted 1/27/2020 with above.    Interim Events  No significant changes noted from cardiac standpoint within the past 24 hours.    Review of Systems  Review of Systems   Constitutional: Negative.  Negative for chills and fever.   HENT: Negative.  Negative for hearing loss.    Eyes: Negative.    Respiratory: Negative.  Negative for cough and shortness of breath.    Cardiovascular: Negative.  Negative for chest pain, palpitations and leg swelling.   Gastrointestinal: Negative.  Negative for abdominal pain, nausea and vomiting.   Genitourinary: Negative.  Negative for dysuria and urgency.   Musculoskeletal: Negative.  Negative for myalgias.   Skin: Negative.  Negative for rash.   Neurological: Negative.  Negative for dizziness, weakness and headaches.   Hematological: Does not bruise/bleed easily.   Psychiatric/Behavioral: Negative.  The patient is not nervous/anxious.        Vital signs in last 24 hours  Temp:  [36.4 °C (97.5 °F)-37.3 °C (99.1 °F)] 36.9 °C (98.4 °F)  Pulse:  [69-82] 80  Resp:  [13-30] 16  BP: (102-141)/(58-81) 108/67  SpO2:  [89 %-99 %] 95 %    Physical Exam  Physical Exam  Constitutional:       Appearance: He is well-developed.   HENT:      Head: Normocephalic and atraumatic.   Eyes:      Conjunctiva/sclera: Conjunctivae normal.      Pupils: Pupils are equal, round, and reactive to light.   Neck:      Musculoskeletal: Normal range of motion and neck supple.   Cardiovascular:      Rate and Rhythm: Normal rate and regular rhythm.   Pulmonary:      Effort: Pulmonary effort is normal.      Breath sounds: Normal breath sounds.   Abdominal:      General: Bowel sounds are normal.      Palpations: Abdomen is soft.   Musculoskeletal: Normal range of motion.   Skin:     General: Skin is warm and dry.   Neurological:       Mental Status: He is alert and oriented to person, place, and time.         Lab Review  Lab Results   Component Value Date/Time    WBC 6.6 01/28/2020 04:42 AM    WBC 4.5 02/08/2011 12:00 AM    RBC 6.30 (H) 01/28/2020 04:42 AM    RBC 5.36 02/08/2011 12:00 AM    HEMOGLOBIN 14.7 01/28/2020 04:42 AM    HEMATOCRIT 49.9 01/28/2020 04:42 AM    MCV 79.2 (L) 01/28/2020 04:42 AM    MCV 93 02/08/2011 12:00 AM    MCH 23.3 (L) 01/28/2020 04:42 AM    MCH 31.0 02/08/2011 12:00 AM    MCHC 29.5 (L) 01/28/2020 04:42 AM    MPV 9.8 01/28/2020 04:42 AM      Lab Results   Component Value Date/Time    SODIUM 141 01/28/2020 04:42 AM    POTASSIUM 4.1 01/28/2020 04:42 AM    CHLORIDE 104 01/28/2020 04:42 AM    CO2 28 01/28/2020 04:42 AM    GLUCOSE 109 (H) 01/28/2020 04:42 AM    BUN 29 (H) 01/28/2020 04:42 AM    CREATININE 1.61 (H) 01/28/2020 04:42 AM    CREATININE 1.18 02/08/2011 12:00 AM    BUNCREATRAT 16 02/08/2011 12:00 AM    GLOMRATE >59 02/08/2011 12:00 AM      Lab Results   Component Value Date/Time    ASTSGOT 18 01/28/2020 04:42 AM    ALTSGPT 13 01/28/2020 04:42 AM     Lab Results   Component Value Date/Time    CHOLSTRLTOT 105 12/27/2019 02:20 AM    LDL 56 12/27/2019 02:20 AM    HDL 29 (A) 12/27/2019 02:20 AM    TRIGLYCERIDE 101 12/27/2019 02:20 AM    TROPONINT 36 (H) 12/26/2019 09:55 AM       Recent Labs     01/27/20  1052 01/28/20  0442   NTPROBNP 3896* 2996*   (Above labs reviewed.)       Current Facility-Administered Medications:   •  anastrozole (ARIMIDEX) tablet 0.5 mg, 0.5 mg, Oral, DAILY, Billie Sol, A.P.R.N., 0.5 mg at 01/28/20 0527  •  aspirin EC (ECOTRIN) tablet 81 mg, 81 mg, Oral, DAILY, LOGAN Villegas.P.R.N., 81 mg at 01/28/20 0528  •  bumetanide (BUMEX) tablet 1 mg, 1 mg, Oral, DAILY, Billie Sol A.P.R.N., 1 mg at 01/28/20 0528  •  clopidogrel (PLAVIX) tablet 75 mg, 75 mg, Oral, DAILY, Billie Sol A.P.R.N., 75 mg at 01/28/20 0528  •  ezetimibe (ZETIA) tablet 10 mg, 10 mg, Oral, QAM,  Billie Sol, A.P.R.N., 10 mg at 01/28/20 0528  •  gabapentin (NEURONTIN) capsule 300 mg, 300 mg, Oral, BID PRN, Billie Sol, A.P.R.N.  •  fluticasone (FLONASE) nasal spray 50 mcg, 1 Spray, Nasal, QDAY PRN, Billie Sol A.P.R.N.  •  omeprazole (PRILOSEC) capsule 40 mg, 40 mg, Oral, BID, Billie Sol, A.P.R.N., 40 mg at 01/28/20 0528  •  sacubitril-valsartan (ENTRESTO) 24-26 MG tablet 1 Tab, 1 Tab, Oral, BID, Billie Sol A.P.R.N., 1 Tab at 01/28/20 0528  •  hydrALAZINE (APRESOLINE) injection 10 mg, 10 mg, Intravenous, Q30 MIN PRN, Billie Sol A.P.R.N.  •  acetaminophen (TYLENOL) tablet 650 mg, 650 mg, Oral, Q6HRS PRN, Billie Sol, A.P.R.N.  •  diphenhydrAMINE (BENADRYL) tablet/capsule 25 mg, 25 mg, Oral, HS PRN, Billie Sol, A.P.R.N.  •  ondansetron (ZOFRAN) syringe/vial injection 4 mg, 4 mg, Intravenous, Q4HRS PRN, Billie Sol, A.P.R.N.  •  diphenhydrAMINE (BENADRYL) injection 25 mg, 25 mg, Intravenous, Q6HRS PRN, Billie Sol, A.P.R.N.  •  senna-docusate (PERICOLACE or SENOKOT S) 8.6-50 MG per tablet 2 Tab, 2 Tab, Oral, BID PRN **AND** polyethylene glycol/lytes (MIRALAX) PACKET 1 Packet, 1 Packet, Oral, QDAY PRN **AND** magnesium hydroxide (MILK OF MAGNESIA) suspension 30 mL, 30 mL, Oral, QDAY PRN **AND** bisacodyl (DULCOLAX) suppository 10 mg, 10 mg, Rectal, QDAY PRN, Billie Sol, A.P.R.N.  •  insulin regular (HUMULIN R) injection 2-9 Units, 2-9 Units, Subcutaneous, 4X/DAY ACHS, Stopped at 01/27/20 1700 **AND** Accu-Chek ACHS, , , Q AC AND BEDTIME(S) **AND** NOTIFY MD and PharmD, , , Once **AND** glucose 4 g chewable tablet 16 g, 16 g, Oral, Q15 MIN PRN **AND** DEXTROSE 10% BOLUS 250 mL, 250 mL, Intravenous, Q15 MIN PRN, Billie Sol, A.P.R.N.  (Medications reviewed.)    Cardiac Imaging and Procedures Review  CARDIAC STUDIES/PROCEDURES:     CARDIAC CATHETERIZATION CONCLUSIONS by Eliazar Sanchez (01/10/20)  1. LVEF  30% prior to intervention.  2. 70% distal left main and proximal LAD stenosis, IFR positive from prior procedure.  3. Successful planned PCI proximal LAD and distal left main trifurcation  stenosis (3.5 x 26 mm [place] BRANDI postdilated to 4.5 mm proximally), IVUS guided.  4. Moderate aortic stenosis, mean gradient 27 to 30mmHg.     CARDIAC CATHETERIZATION CONCLUSIONS by Dougie Polo (12/27/19)  Coronary stent implantation, distal right coronary artery 3.5x28 mm   Synergy drug-eluting stent postdilated 4.0 mm.     CARDIAC CATHETERIZATION CONCLUSIONS by Amauri Amador (12/11/19)  Severe triple-vessel coronary artery disease with calcified 50% left main stenosis (iFR <0.8)      CAROTID ULTRASOUND (=01/16/20)  1.  There is mild to moderate amount of atherosclerotic plaque.  Plaque is located in carotid bulbs and proximal internal carotid arteries.  Plaque characterization:  homogeneous, calcific  2.  There is no evidence of carotid occlusion.  3. Vertebral arteries demonstrate antegrade flow.  4. Diameter reduction in the internal carotid arteries: less than 50%. There is no hemodynamically significant stenosis.    CTA OF CHEST AND ABDOMEN (01/16/20)  1.  Aortic annulus measures 660 mm?  2.  Coronary arteries or over a centimeter above the annulus  3.  Iliofemoral runoff is satisfactory with right side less tortuous  4.  Lower lobe dependent bronchial thickening could indicate aspiration. Edema or bronchitis are possible  5.  Numerous incidental chronic findings including left heart enlargement, severe coronary artery disease, colonic diverticulosis, LAP-BAND device, hepatic steatosis and splenomegaly    ECHOCARDIOGRAM CONCLUSIONS (12/03/19)  Left ventricle is severely dilated.  Severely reduced left ventricular systolic function.  Left ventricular ejection fraction is visually estimated to be 25%.  Global hypokinesis with regional variation.  Moderate aortic stenosis.  Trace mitral  regurgitation.  Severely dilated left atrium.  Mild tricuspid regurgitation.  Right ventricular systolic pressure is estimated to be 40  mmHg.  Normal pericardium without effusion.  Compared to the images of the study done on 7/29/2015  there has been   marked change in LV wall motion and function, moderate aortic stenosis   is also now present.     EKG performed on (01/28/20) was reviewed: EKG personally interpreted shows sinus rhythm.  EKG performed on (01/10/20) EKG shows sinus rhythm with inferior Q waves.    Assessment/Plan  1. Successful transcatheter aortic valve replacement (TAVR) with # 29 Khan Salvador S3 Ultra valve, transfemoral approach, under general anesthesia (01/27/20): He is clinically doing well. We will perform an echocardiogram. He will be discharged to home today.I started discussions of discharge instructions including but not excluded to limitation of activity, medications, follow ups as well as answering questions or concerns.  2. Chronic systolic congestive heart failure with cardiomyopathy: The overall volume status is adequate.  3. Coronary artery disease with stent placement: He is clinically doing well without recurrence of his angina. We will continue with current medical care including aspirin, clopidogrel, metoprolol    CC Paul Kim and Radha Avila        Thank you for allowing me to participate in the care of this patient.  I will continue to follow this patient    Please contact me with any questions.    Surendra Castro M.D.   Cardiologist, [facility]  [phone]

## 2020-01-28 NOTE — PROGRESS NOTES
Received bedside report from RN Brittney, pt care assumed, VSS, pt assessment complete. Pt AAOx4, c/o 3/10 chronic pain at this time. No signs of acute distress noted at this time. POC discussed with pt and verbalizes no questions. Pt denies any additional needs at this time. Bed in lowest position, bed alarm off as patient is no risk, pt educated on fall risk and verbalized understanding, call light within reach, hourly rounding initiated.

## 2020-01-28 NOTE — DISCHARGE INSTRUCTIONS
Discharge Instructions    Discharged to home by car with relative. Discharged via wheelchair, hospital escort: Yes.  Special equipment needed: Not Applicable    Be sure to schedule a follow-up appointment with your primary care doctor or any specialists as instructed.     Discharge Plan:   Influenza Vaccine Indication: Patient Refuses    I understand that a diet low in cholesterol, fat, and sodium is recommended for good health. Unless I have been given specific instructions below for another diet, I accept this instruction as my diet prescription.   Other diet: Cardiac    Special Instructions:   Transcatheter Aortic Valve Replacement (TAVR)    General Instructions:  1. Take Medication as directed.  You will likely need to take aspirin and another blood thinner (antiplatelet medication) for a period.  You'll need to take the aspirin for the rest of your life.  Be sure your doctor knows about all other medicines you take, including over-the-counter medicines and dietary supplements.    Incision Care Instructions:  1. Look and feel the site(s) of your TAVR TODAY so you can recognize changes that should be called to your doctor (see below).  2. It's normal for your incision to be bruised, itchy, or sore while it's healing.  Your incision may take a week or more to heal.  3. Bruising may occur.  Some of the discoloration may travel down the leg.  As it resolves, the color should change from blue to green to yellow-brown.  4. A small, round lump under the TAVR site may remain for up to 6 weeks.  5. Wash your incision site every day with warm water and soap.  Gently pat it dry.  Don't put powder, lotion, or ointment on the incision until it's healed.    Activity:  1. No driving for one week  2. If you must take a long car ride (not as a ), stop every hour and walk around the car.  3. No lifting or pulling objects over 5 pounds for 4-5 days.  4. Warm showers or baths are permitted after the bandage is  removed.  5. Walk regularly.  One of the best ways to get stronger is to walk.  Walk a little more each day.  Take someone with you until you feel OK to walk alone.    When to call your health care provider:  1. You develop a fever.  2. Redness, swelling, bleeding, warmth, or fluid draining at the incision site.  3. Bruising appears to be new or does not look like it is getting better.  4. The small, round lump in the groin in the area of the TAVR site increases in size.    Seek immediate medical help if you have any of the following symptoms:  1. You experience any leg numbness, aching, or discomfort  2. Chest pain or trouble breathing (call 911)  3. Sudden numbness or weakness in your face, arms, or legs (call 911)  4. Bowel movement that is bright red  5. Dizziness or fainting  6. Weight gain of more than 2 pounds in 24 hours or more than 5 pounds in 1 week  7. Swelling in your hands, feet, or ankles  8. Shortness of breath that doesn't get better when you rest  9. Pain that gets worse or doesn't go away  10. Fast or irregular pulse       · Is patient discharged on Warfarin / Coumadin?   No     Depression / Suicide Risk    As you are discharged from this [facility] facility, it is important to learn how to keep safe from harming yourself.    Recognize the warning signs:  · Abrupt changes in personality, positive or negative- including increase in energy   · Giving away possessions  · Change in eating patterns- significant weight changes-  positive or negative  · Change in sleeping patterns- unable to sleep or sleeping all the time   · Unwillingness or inability to communicate  · Depression  · Unusual sadness, discouragement and loneliness  · Talk of wanting to die  · Neglect of personal appearance   · Rebelliousness- reckless behavior  · Withdrawal from people/activities they love  · Confusion- inability to concentrate     If you or a loved one observes any of these behaviors or has concerns about self-harm,  here's what you can do:  · Talk about it- your feelings and reasons for harming yourself  · Remove any means that you might use to hurt yourself (examples: pills, rope, extension cords, firearm)  · Get professional help from the community (Mental Health, Substance Abuse, psychological counseling)  · Do not be alone:Call your Safe Contact- someone whom you trust who will be there for you.  · Call your local CRISIS HOTLINE 231-8406 or 664-729-0141  · Call your local Children's Mobile Crisis Response Team [place] (922) 702-4313 or www.[place]  · Call the toll free National Suicide Prevention Hotlines   · National Suicide Prevention Lifeline 527-420-QQYO (0003)  · National Hope Line Network 800-SUICIDE (024-8758)

## 2020-01-28 NOTE — DISCHARGE SUMMARY
PRIMARY DISCHARGE DIAGNOSIS: Status post transcatheter aortic valve replacement.    PROCEDURES:    1. Successful transcatheter aortic valve replacement (TAVR) with #29 Edward Salvador 3 ultra valve, transfemoral approach under general anesthesia on 1/27/20  2. Intraoperative transesophageal echocardiogram showing Pre-Intervention:  LV:  Severely reduced left ventricular systolic function. Severe -    ejection fraction <30 %. Global hypokinesis with regional variation.   RV:  Mildly dilated right ventricle. Reduced right ventricular systolic   function (mild reduction).   AV:  Calcified aortic valve leaflets.  Aortic valve annulus measured at   2.7 cm.  Sinus of valsalva measured at 3.3 cm.  Sinotubular junction   measured at 2.9 cm.  Aortic valve area measured at 6.59 cm2.  Moderate   aortic stenosis with a mean pressure gradient of 22 mmHg.  However,   gradient may be under-estimated in the setting of low EF.     Post-Intervention:  AV:  Nicely seated bioprosthetic aortic valve.  All three leaflets seen   opening in cross sectional view.  No evidence of trans-valvular   regurgitation.  Trivial para-valvular regurgitations seen at the left   coronary cusp and at either the right or non-coronary cusp.  No   evidence of aortic valve stenosis with mean pressure gradient of 4   mmHg.    Please see body of report for further findings.  3. Echocardiogram on 1/28/20 showing:  Compared to the images of the study done 12- there has been an   interval placement of a TAVR valve.  The EF appears mildly improved.  Moderately reduced left ventricular systolic function.  Left ventricular ejection fraction is visually estimated to be 35%.  4. CXR on 1/28/20 showing: Stable cardiomegaly.   5. EKG on 1/28/20 showing:  SINUS RHYTHM rate of 81  FIRST DEGREE AV BLOCK   INCOMPLETE LEFT BUNDLE BRANCH BLOCK   PROBABLE INFERIOR INFARCT, AGE INDETERMINATE     HOSPITAL COURSE: The patient is a 79 year old male with severe symptomatic  aortic stenosis, chronic systolic heart failure, coronary artery disease with prior stent placement, obesity, DM II, HLd, and HTN. Due to the patient's symptoms, the patient underwent successful TAVR described as above. Post-procedure, the patient did well. They didn't require IV diuresis during their stay but will continue on their home dose of oral diuretics post-operatively. They were able to ambulate without difficulty. His EF remains low at 35% post-operatively. He will remain on Entresto and bumex. Due to MAKEDA on CKD, we will not add aldactone. We will follow his kidney function with outpatient labs in one week. He will not be on beta blocker therapy on discharge due to high risk of AV block and two events of post-operative pauses. No further events were noted during their stay. They are now off oxygen and are to be discharged to home with his family.    DISCHARGE MEDICATIONS:     Medication List      CONTINUE taking these medications      Instructions   acetaminophen 500 MG Tabs  Commonly known as:  TYLENOL   Take 1,500 mg by mouth every 6 hours as needed for Moderate Pain.  Dose:  1,500 mg     anastrozole 1 MG Tabs  Commonly known as:  ARIMIDEX   Take 0.5 mg by mouth every day. 1/2 tab 2x/wk On Tue and Sat  Dose:  0.5 mg     aspirin 81 MG EC tablet   Take 1 Tab by mouth every day.  Dose:  81 mg     bumetanide 1 MG Tabs  Commonly known as:  BUMEX   Take 1 Tab by mouth every day.  Dose:  1 mg     CITRACAL PO   Take 1,200 mg by mouth every day.  Dose:  1,200 mg     clopidogrel 75 MG Tabs  Commonly known as:  PLAVIX   Take 1 Tab by mouth every day.  Dose:  75 mg     CO Q-10 PO   Take 1 Tab by mouth 2 Times a Day.  Dose:  1 Tab     Cranberry 300 MG Tabs   Take 300 mg by mouth every morning.  Dose:  300 mg     * cyanocobalamin 1000 MCG/ML Soln  Commonly known as:  VITAMIN B-12   1,000 mcg by Intramuscular route every 7 days. On Thur  Dose:  1,000 mcg     * cyanocobalamin 1000 MCG/ML Soln  Commonly known as:   VITAMIN B-12   1,000 mcg by Intramuscular route every 7 days. On thur  Dose:  1,000 mcg     DEPO-TESTOSTERONE IM   1 Each by Intramuscular route every 7 days. Thursday  Dose:  1 Each     ezetimibe 10 MG Tabs  Commonly known as:  ZETIA   Take 10 mg by mouth every morning.  Dose:  10 mg     fluticasone 50 MCG/ACT nasal spray  Commonly known as:  FLONASE   Spray 1 Spray in nose as needed. Indications: Signs and Symptoms of Nose Diseases  Dose:  1 Spray     gabapentin 300 MG Caps  Commonly known as:  NEURONTIN   Take 300 mg by mouth as needed. Indications: Neuropathic Pain  Dose:  300 mg     GARLIC PO   Take 1,000 mg by mouth 2 Times a Day.  Dose:  1,000 mg     glimepiride 1 MG tablet  Commonly known as:  AMARYL   Take 1 mg by mouth every evening.  Dose:  1 mg     GLUCOSAMINE SULFATE PO   Take 1,500 mg by mouth 2 Times a Day.  Dose:  1,500 mg     hydrocortisone 2.5 % Crea topical cream   Doctor's comments:  Please consider 90 day supplies to promote better adherence  Apply 1 Application to affected area(s) 2 times a day as needed (rash).  Dose:  1 Application     L-Lysine 1000 MG Tabs   Take 1,000 mg by mouth every day.  Dose:  1,000 mg     Non Formulary Request   Inject 1 Each as instructed See Admin Instructions. HCG/ARGININE 58745t/5mg/cc, pt does not take on Thur  Dose:  1 Each     Non Formulary Request   Inject 1 Each as instructed See Admin Instructions. HGH 5.8mg/2.5mg/cc, pt does not take on THUR  Dose:  1 Each     Non Formulary Request   Take 1 Cap by mouth 2 Times a Day. Cholox (OTC)  Dose:  1 Cap     Non Formulary Request   Take 1 Cap by mouth 2 Times a Day. Domenic red (OTC)  Dose:  1 Cap     Non Formulary Request   Take 1 Cap by mouth every day. Nitric OXIDE (OTC)  Dose:  1 Cap     Non Formulary Request   Take 1 Cap by mouth every day. anit-oxidant (OTC)  Dose:  1 Cap     OCULAR VITAMINS PO   Take 1 Cap by mouth every day.  Dose:  1 Cap     omeprazole 40 MG delayed-release capsule  Commonly known as:   PRILOSEC   Take 40 mg by mouth 2 times a day.  Dose:  40 mg     PROBIOTIC DAILY PO   Take 1 Cap by mouth every day.  Dose:  1 Cap     sacubitril-valsartan 24-26 MG Tabs tablet  Commonly known as:  ENTRESTO   Take 1 Tab by mouth 2 Times a Day.  Dose:  1 Tab     VITAMIN B COMPLEX PO   Take 1 Tab by mouth every day.  Dose:  1 Tab     Vitamin C 1000 MG Tabs   Take 1,000 mg by mouth 2 Times a Day.  Dose:  1,000 mg     Vitamin D3 2000 UNIT Caps   Take 2,000 Units by mouth every day.  Dose:  2,000 Units     vitamin e 400 UNIT Caps  Commonly known as:  VITAMIN E   Take 400 Units by mouth 2 times a day.  Dose:  400 Units     Zinc 50 MG Tabs   Take 50 mg by mouth every day.  Dose:  50 mg         * This list has 2 medication(s) that are the same as other medications prescribed for you. Read the directions carefully, and ask your doctor or other care provider to review them with you.            STOP taking these medications    metoprolol SR 25 MG Tb24  Commonly known as:  TOPROL XL          DISCHARGE INSTRUCTIONS: They are given discharge instructions on potential post-operative complications and symptoms to watch out for. Their groin sites were checked and were clean, dry, and intact. Patient or family to notify us for any complications noted on the discharge instructions. They will follow up with myself, Billie FORD, on Friday in our cardiology office. They will not get labs before their follow up appointment. They will then follow up with Dr. Castro with a repeat echocardiogram in one month for post TAVR assessment.      FOLLOW UP  Future Appointments   Date Time Provider Department Center   1/31/2020  1:45 PM [facility] Villegas. [facility] None   5/1/2020  7:40 AM Sae Mera M.D. [facility] None

## 2020-01-28 NOTE — THERAPY
"Physical Therapy Cardiac Rehab Evaluation completed.     Plan of Care: Patient with no further skilled PT needs in the acute care setting at this time  Discharge Recommendations: Equipment: No Equipment Needed. Post-acute therapy Anticipate that the patient will have no further physical therapy needs after discharge from the hospital.    Mr. More is a 79 yt/o male who presents to acute secondary to TAVR. Provided pt with cardiac rehab handout and reviewed including walking program, talk test, RPE scale, and activity precautions. Discussed return to exercise. Pt demonstrated good understanding. Is able to mobilize without symptoms and vitals WFL. No additional acute PT needs.     See \"Rehab Therapy-Acute\" Patient Summary Report for complete documentation.     "

## 2020-01-28 NOTE — PROGRESS NOTES
Monitor tech notified this RN of 1.4 and 1.6 second pause and 4 beats of VT. Pt asymptomatic. LOGAN Sol notified.     Instructions to notify on-call cardiologist if pauses increase in time or frequency. Or if pt develops 2nd degree block.

## 2020-01-28 NOTE — PROGRESS NOTES
Monitor Summary    SR 68-74  (R-O) PVC  (R) Coup  1.4 and 1.6 second pause  4 bts VT  1st degree block    0.24/0.08/0.40

## 2020-01-29 NOTE — DOCUMENTATION QUERY
[facility]                                                                       Query Response Note      PATIENT:               LIAN VILLA  ACCT #:                  [id number]  MRN:                     [id number]  :                      1940  ADMIT DATE:       2020 5:34 AM  DISCH DATE:        2020 2:19 PM  RESPONDING  PROVIDER #:        [id number]           QUERY TEXT:    Please provide additional clinical indicators supportive of your documented diagnosis of acute on chronic systolic heart failure, as noted in your d/c summary.    NOTE: If an appropriate response is not listed below, please respond with a new note.    The patient's Clinical Indicators include:  Clinical indicators- BNP:19 6512; 20 3896; 20 2996  Per d/c summary they didn't require IV diuresis during stay but continuing on home oral diuretics postop  20 PN overall volume status is adequate  20 CXR no evidence of pulmonary edema    Treatment- PO bumex & entresto, repeat ProBNP, CXR    Risks- Age, s/p TAVR, chronic systolic HF w cardiomyopathy    Thank you,  Azucena Page RN  Clinical   [phone] office  [phone] cell  Options provided:   -- Condition of acute on chronic systolic heart failure exists and additional clinical indicators documented in the medical record   -- Condition of acute on chronic systolic heart failure does not exist and amended documentation provided in the medical record   -- Unable to provide additional clarity regarding the diagnosis   -- Unable to determine      Query created by: Mell Avalos on 2020 11:25 AM    RESPONSE TEXT:    Condition of acute on chronic systolic heart failure does not exist and amended documentation provided in the medical record       QUERY TEXT:    Chronic Kidney Disease (CKD) is documented in the Medical Record.  Please specify the disease stage  (includes probable or suspected)    Stages are defined by the [employer] as follows:  CKD Stage I  GFR >= 90 ml / min per 1.73 m2 and persistent albuminuria  CKD Stage 2 GFR between 60 and 89 with persistent albuminuria  CKD Stage 3 GFR between 30 and 59   CKD Stage 4 GFR between 15 and 29   CKD Stage 5 GFR between <15 or End Stage Renal Disease    The patient's Clinical Indicators include:  Clinical indicators-  documented CKD unstaged;  BUN/CR- Bun/cr- 32 & 1.60 w/GFR 42;  30 & 1.46 w/GFR 47    Treatments - BMP's    Risks- HTN; DM2    Thank You,  Mell Avalos RN, CCDS, CDIP, CCS  Clinical  Lead  [phone] .  Options provided:   -- Chronic kidney disease Stage 1   -- Chronic kidney disease Stage 2   -- Chronic kidney disease Stage 3   -- Chronic kidney disease Stage 4   -- Chronic kidney disease Stage 5   -- Chronic kidney disease Stage 5, requiring dialysis   -- Unable to determine      Query created by: Mell Avalos on 1/29/2020 11:27 AM    RESPONSE TEXT:    Unable to determine          Electronically signed by:  DENICE FORD 1/29/2020 11:46 AM

## 2020-01-31 ENCOUNTER — OFFICE VISIT (OUTPATIENT)
Dept: CARDIOLOGY | Facility: MEDICAL CENTER | Age: 80
End: 2020-01-31
Payer: MEDICARE

## 2020-01-31 ENCOUNTER — TELEPHONE (OUTPATIENT)
Dept: CARDIOLOGY | Facility: MEDICAL CENTER | Age: 80
End: 2020-01-31

## 2020-01-31 ENCOUNTER — NON-PROVIDER VISIT (OUTPATIENT)
Dept: CARDIOLOGY | Facility: MEDICAL CENTER | Age: 80
End: 2020-01-31
Payer: MEDICARE

## 2020-01-31 VITALS
HEIGHT: 73 IN | DIASTOLIC BLOOD PRESSURE: 84 MMHG | WEIGHT: 252.32 LBS | HEART RATE: 80 BPM | BODY MASS INDEX: 33.44 KG/M2 | SYSTOLIC BLOOD PRESSURE: 124 MMHG | RESPIRATION RATE: 12 BRPM | OXYGEN SATURATION: 92 %
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
## 2020-01-31 DIAGNOSIS — I10 ESSENTIAL HYPERTENSION: Chronic | ICD-10-CM

## 2020-01-31 DIAGNOSIS — E78.5 DYSLIPIDEMIA: Chronic | ICD-10-CM

## 2020-01-31 DIAGNOSIS — Z95.2 S/P TAVR (TRANSCATHETER AORTIC VALVE REPLACEMENT): ICD-10-CM

## 2020-01-31 DIAGNOSIS — Z95.5 STENTED CORONARY ARTERY: ICD-10-CM

## 2020-01-31 DIAGNOSIS — I49.3 PVC (PREMATURE VENTRICULAR CONTRACTION): ICD-10-CM

## 2020-01-31 DIAGNOSIS — I25.10 CORONARY ARTERY DISEASE INVOLVING NATIVE CORONARY ARTERY OF NATIVE HEART WITHOUT ANGINA PECTORIS: ICD-10-CM

## 2020-01-31 DIAGNOSIS — R55 SYNCOPE AND COLLAPSE: ICD-10-CM

## 2020-01-31 DIAGNOSIS — I50.23 ACUTE ON CHRONIC SYSTOLIC HEART FAILURE (HCC): ICD-10-CM

## 2020-01-31 DIAGNOSIS — E11.9 TYPE 2 DIABETES MELLITUS WITHOUT COMPLICATION, WITHOUT LONG-TERM CURRENT USE OF INSULIN (HCC): ICD-10-CM

## 2020-01-31 LAB — EKG IMPRESSION: NORMAL

## 2020-01-31 PROCEDURE — 99215 OFFICE O/P EST HI 40 MIN: CPT | Mod: 25 | Performed by: NURSE PRACTITIONER

## 2020-01-31 PROCEDURE — 93268 ECG RECORD/REVIEW: CPT | Performed by: INTERNAL MEDICINE

## 2020-01-31 PROCEDURE — 93000 ELECTROCARDIOGRAM COMPLETE: CPT | Mod: 59 | Performed by: INTERNAL MEDICINE

## 2020-01-31 RX ORDER — BUMETANIDE 1 MG/1
1 TABLET ORAL
Status: ON HOLD | COMMUNITY
Start: 2020-01-31 | End: 2020-02-05

## 2020-01-31 ASSESSMENT — ENCOUNTER SYMPTOMS
DIZZINESS: 1
FEVER: 0
PND: 0
COUGH: 0
MYALGIAS: 0
PALPITATIONS: 0
FALLS: 1
ORTHOPNEA: 0
ABDOMINAL PAIN: 0
CLAUDICATION: 0
SHORTNESS OF BREATH: 0

## 2020-01-31 NOTE — PROGRESS NOTES
Chief Complaint   Patient presents with   • Aortic Stenosis     s/p TAVR- 1 week     Subjective:   LIAN More III is a 79 y.o. male who presents today for hospital follow up S/P TAVR.    He is a patient of Dr. Wilkinson in our office. Hx of CAD with stent placements, chronic systolic heart failure with rEF of 35%, CKD, HTN, HLD, and DM.    He admits to having some lightheadedness and one fall (unknown if unconscious for some time or not). He states the fall was witnessed by his wife, but can't answer how long he was out for. He has injury to his front lip, R side, and R knee.    He has been compliant with his medication. He has no other complaints. He is eager to get back to golf.    Past Medical History:   Diagnosis Date   • Aortic stenosis    • Arthritis    • Back pain    • Breath shortness     pt states short of breath 24/7   • CAD (coronary artery disease)    • CATARACT     removed bilat   • Chest pain    • Chest tightness    • Chickenpox    • Congestive heart failure (HCC)    • Constipation    • Coronary heart disease    • Cough    • Daytime sleepiness    • Diabetes     PT REPORTS DIABETES RESOLVED IN 2009. NO LONGER TAKES MEDS   • Difficulty breathing    • Fatigue    • Hearing difficulty    • Heart murmur    • Heart valve disease    • Heartburn    • Hiatus hernia syndrome    • Hyperlipidemia    • Hypertension     pt states well controlled on meds   • Kidney disease    • Mumps    • Pain 08-29-13    back, hips and bilat legs, 4/10   • Pain 6/22/2015    left humerus   • Pain 1/7/16    knees, back and shoulder   • Painful joint    • Palpitations    • Pneumonia 2007   • Rhinitis    • Ringing in ears    • Severe aortic stenosis 12/4/2019   • Shortness of breath    • Swelling of lower extremity    • Tonsillitis    • Ulcer 2014    Dr. Porter, gastroenterologist   • Unspecified hemorrhagic conditions     bruises easily     Past Surgical History:   Procedure Laterality Date   • TRANSCATHETER AORTIC VALVE REPLACEMENT N/A  1/27/2020    Procedure: REPLACEMENT, AORTIC VALVE, TRANSCATHETER-;  Surgeon: Surendra Castro M.D.;  Location: SURGERY [facility];  Service: Cardiac   • OLGA  1/27/2020    Procedure: ECHOCARDIOGRAM, TRANSESOPHAGEAL;  Surgeon: Surendra Castro M.D.;  Location: SURGERY [facility];  Service: Cardiac   • GASTROSCOPY N/A 1/8/2016    Procedure: GASTROSCOPY;  Surgeon: Deniz Sue M.D.;  Location: [facility];  Service:    • HUMERUS ORIF Left 6/25/2015    Procedure: HUMERUS ORIF/ PROXIMAL;  Surgeon: Cristal Guido M.D.;  Location: [facility];  Service:    • LUMBAR FUSION POSTERIOR  9/5/2013    Performed by Edith Sears M.D. at [facility]   • LUMBAR DECOMPRESSION  9/5/2013    Performed by Edith Sears M.D. at [facility]   • MASS EXCISION NEURO  9/5/2013    Performed by Edith Sears M.D. at [facility]   • RECOVERY  6/26/2012    Performed by SURGERY, IR-RECOVERY at [facility] DAY [facility]   • DRAINAGE HEMATOMA  5/25/2012    Performed by DENIZ SUE at [facility]   • GASTRIC BAND LAPAROSCOPIC REVISION  5/11/2012    Performed by DENIZ SUE at [facility]   • LUMBAR FUSION POSTERIOR  3/26/2012    Performed by EDITH SEARS at [facility]   • LUMBAR DECOMPRESSION  3/26/2012    Performed by EDITH SEARS at [facility]   • INJ,EPIDURAL/LUMB/SAC SINGLE  3/19/2012    Performed by KRISTIN BALTAZAR at [facility]   • INJ,EPIDURAL/LUMB/SAC SINGLE  3/5/2012    Performed by KRISTIN BALTAZAR at [facility]   • GASTRIC BANDING LAPAROSCOPIC  3/24/2010    Performed by DENIZ SUE at [facility]   • HIATAL HERNIA REPAIR  3/24/2010    Performed by DENIZ SUE at [facility]   • KNEE ARTHROPLASTY TOTAL  2000    bilateral   • ABDOMINOPLASTY  1990   • AORTIC VALVE REPLACEMENT     • ARTHROSCOPY, KNEE     • LAMINOTOMY     • PB REMV 2ND  CATARACT,CORN-SCLER SECTN     • SINUSCOPE     • SLEEVE,CARRIE VASO THIGH     • TONSILLECTOMY     • ZZZ CARDIAC CATH       Family History   Problem Relation Age of Onset   • No Known Problems Sister    • No Known Problems Sister    • No Known Problems Sister    • Diabetes Other    • No Known Problems Mother    • No Known Problems Father    • Heart Disease Neg Hx      Social History     Socioeconomic History   • Marital status:      Spouse name: Not on file   • Number of children: Not on file   • Years of education: Not on file   • Highest education level: Not on file   Occupational History   • Not on file   Social Needs   • Financial resource strain: Not on file   • Food insecurity:     Worry: Not on file     Inability: Not on file   • Transportation needs:     Medical: Not on file     Non-medical: Not on file   Tobacco Use   • Smoking status: Never Smoker   • Smokeless tobacco: Never Used   • Tobacco comment: 0   Substance and Sexual Activity   • Alcohol use: No     Alcohol/week: 0.0 oz   • Drug use: No   • Sexual activity: Yes   Lifestyle   • Physical activity:     Days per week: Not on file     Minutes per session: Not on file   • Stress: Not on file   Relationships   • Social connections:     Talks on phone: Not on file     Gets together: Not on file     Attends [employer] service: Not on file     Active member of club or organization: Not on file     Attends meetings of clubs or organizations: Not on file     Relationship status: Not on file   • Intimate partner violence:     Fear of current or ex partner: Not on file     Emotionally abused: Not on file     Physically abused: Not on file     Forced sexual activity: Not on file   Other Topics Concern   • Not on file   Social History Narrative   • Not on file     Allergies   Allergen Reactions   • Statins [Hmg-Coa-R Inhibitors] Rash     Blisters       Outpatient Encounter Medications as of 1/31/2020   Medication Sig Dispense Refill   • bumetanide (BUMEX) 1 MG  Tab Take 1 Tab by mouth every 48 hours.     • fluticasone (FLONASE) 50 MCG/ACT nasal spray Spray 1 Spray in nose as needed. Indications: Signs and Symptoms of Nose Diseases     • Non Formulary Request Inject 1 Each as instructed See Admin Instructions. HCG/ARGININE 07538i/5mg/cc, pt does not take on Thur      • Non Formulary Request Inject 1 Each as instructed See Admin Instructions. HGH 5.8mg/2.5mg/cc, pt does not take on THUR      • GARLIC PO Take 1,000 mg by mouth 2 Times a Day.     • vitamin e (VITAMIN E) 400 UNIT Cap Take 400 Units by mouth 2 times a day.     • omeprazole (PRILOSEC) 40 MG delayed-release capsule Take 40 mg by mouth 2 times a day.     • cyanocobalamin (VITAMIN B-12) 1000 MCG/ML Solution 1,000 mcg by Intramuscular route every 7 days. On Thur     • Calcium Citrate (CITRACAL PO) Take 1,200 mg by mouth every day.     • Non Formulary Request Take 1 Cap by mouth 2 Times a Day. Cholox (OTC)     • Cholecalciferol (VITAMIN D3) 2000 UNIT Cap Take 2,000 Units by mouth every day.     • Non Formulary Request Take 1 Cap by mouth 2 Times a Day. Domenic red (OTC)     • Non Formulary Request Take 1 Cap by mouth every day. Nitric OXIDE (OTC)     • Multiple Vitamins-Minerals (OCULAR VITAMINS PO) Take 1 Cap by mouth every day.     • Probiotic Product (PROBIOTIC DAILY PO) Take 1 Cap by mouth every day.     • Non Formulary Request Take 1 Cap by mouth every day. anit-oxidant (OTC)     • Zinc 50 MG Tab Take 50 mg by mouth every day.     • acetaminophen (TYLENOL) 500 MG Tab Take 1,500 mg by mouth every 6 hours as needed for Moderate Pain.     • anastrozole (ARIMIDEX) 1 MG Tab Take 0.5 mg by mouth every day. 1/2 tab 2x/wk On Tue and Sat     • gabapentin (NEURONTIN) 300 MG Cap Take 300 mg by mouth as needed. Indications: Neuropathic Pain     • clopidogrel (PLAVIX) 75 MG Tab Take 1 Tab by mouth every day. 90 Tab 3   • aspirin EC 81 MG EC tablet Take 1 Tab by mouth every day. 30 Tab 0   • Coenzyme Q10 (CO Q-10 PO) Take 1 Tab  "by mouth 2 Times a Day.     • ezetimibe (ZETIA) 10 MG Tab Take 10 mg by mouth every morning.     • glimepiride (AMARYL) 1 MG tablet Take 1 mg by mouth every evening.     • B Complex Vitamins (VITAMIN B COMPLEX PO) Take 1 Tab by mouth every day.     • Cranberry 300 MG Tab Take 300 mg by mouth every morning.     • L-Lysine 1000 MG Tab Take 1,000 mg by mouth every day.     • Ascorbic Acid (VITAMIN C) 1000 MG Tab Take 1,000 mg by mouth 2 Times a Day.     • sacubitril-valsartan (ENTRESTO) 24-26 MG Tab tablet Take 1 Tab by mouth 2 Times a Day. 60 Tab 3   • hydrocortisone 2.5 % Cream topical cream Apply 1 Application to affected area(s) 2 times a day as needed (rash). 28 g 1   • Testosterone Cypionate (DEPO-TESTOSTERONE IM) 1 Each by Intramuscular route every 7 days. Thursday     • GLUCOSAMINE SULFATE PO Take 1,500 mg by mouth 2 Times a Day.     • [DISCONTINUED] cyanocobalamin (VITAMIN B-12) 1000 MCG/ML Solution 1,000 mcg by Intramuscular route every 7 days. On thur     • [DISCONTINUED] bumetanide (BUMEX) 1 MG Tab Take 1 Tab by mouth every day. 90 Tab 3     No facility-administered encounter medications on file as of 1/31/2020.      Review of Systems   Constitutional: Negative for fever and malaise/fatigue.   Respiratory: Negative for cough and shortness of breath.    Cardiovascular: Negative for chest pain, palpitations, orthopnea, claudication, leg swelling and PND.   Gastrointestinal: Negative for abdominal pain.   Musculoskeletal: Positive for falls. Negative for myalgias.        Abrasion to lip, R knee, and R side   Neurological: Positive for dizziness.        Lightheadedness with fall        Objective:   /84 (BP Location: Right arm, Patient Position: Standing, BP Cuff Size: Adult)   Pulse 80   Resp 12   Ht 1.854 m (6' 1\")   Wt 114.4 kg (252 lb 5.1 oz)   SpO2 92%   BMI 33.29 kg/m²     Physical Exam   Constitutional: He appears well-developed.   obese   HENT:   Head: Normocephalic and atraumatic.   Eyes: " EOM are normal.   Neck: No JVD present.   Cardiovascular: Normal rate, regular rhythm, normal heart sounds and intact distal pulses.   Pulmonary/Chest: Effort normal and breath sounds normal.   Musculoskeletal: Normal range of motion.         General: Edema present.      Comments: Bilateral LE edema 1+ pitting with micaela appearance to lower legs   Skin: Skin is warm and dry.   Psychiatric: He has a normal mood and affect.   Nursing note and vitals reviewed.      Assessment:     1. S/P TAVR (transcatheter aortic valve replacement)  EKG    Holter Monitor / Event Recorder   2. Acute on chronic systolic heart failure (HCC)  Basic Metabolic Panel    proBrain Natriuretic Peptide, NT    CBC WITHOUT DIFFERENTIAL   3. Type 2 diabetes mellitus without complication, without long-term current use of insulin (HCC)     4. Stented coronary artery     5. Essential hypertension     6. Dyslipidemia     7. Coronary artery disease involving native coronary artery of native heart without angina pectoris     8. Fall, initial encounter  Holter Monitor / Event Recorder     Medical Decision Making:  Today's Assessment / Status / Plan:     1. S/P TAVR with post-operative fall  -doing well besides one fall (witnessed, unsure of timing of unconsciousness)  -concern for AV block and hypotension post-op  -will decrease bumex to QOD, recommend watch BP/HR at home  -orthostatic hypotension assessment negative   -recommend biotel 14 day today to monitor for AV block  -cont asa, plavix (1 year for coronary stent)  -groins CDI  -EKG shows SR with wenckebach conduction with PVC  -repeat labs on Monday    2. Chronic systolic heart failure  -slight edema in legs, chronic/stable  -cont entresto, no bb for risk of AV block post TAVR, bumex reduce to QOD  -repeat echo 1 month with valve clinic follow up    3. CAD with stent placements  -no angina or ALLISON  -cont asa lifelong, plavix (1 year), statin  -follow clinically    4. HLD  -statin  -LDL goal <70 with  CAD  -yearly labs at goal    5. HTN  -good control  -follow at home  -cont entresto and bumex as above    6. Obesity with DM  -follow up with cardiac rehab, brochure given to patient    FU in clinic in 1 month with JI with echo, labs, and biotel 14 day    Patient verbalizes understanding and agrees with the plan of care.     Collaborating MD: Minh SIMMS

## 2020-02-01 NOTE — TELEPHONE ENCOUNTER
Patient enrolled in the 14 day Bio-[employer] Heart monitoring program per Billie Sol, APRN.  >In office hookup with Baseline transmitted  >Pending EOS.

## 2020-02-03 ENCOUNTER — APPOINTMENT (OUTPATIENT)
Dept: CARDIOLOGY | Facility: MEDICAL CENTER | Age: 80
DRG: 227 | End: 2020-02-03
Attending: NURSE PRACTITIONER
Payer: MEDICARE

## 2020-02-03 ENCOUNTER — TELEPHONE (OUTPATIENT)
Dept: CARDIOLOGY | Facility: MEDICAL CENTER | Age: 80
End: 2020-02-03

## 2020-02-03 ENCOUNTER — HOSPITAL ENCOUNTER (INPATIENT)
Facility: MEDICAL CENTER | Age: 80
LOS: 2 days | DRG: 227 | End: 2020-02-05
Attending: EMERGENCY MEDICINE | Admitting: HOSPITALIST
Payer: MEDICARE

## 2020-02-03 ENCOUNTER — APPOINTMENT (OUTPATIENT)
Dept: RADIOLOGY | Facility: MEDICAL CENTER | Age: 80
DRG: 227 | End: 2020-02-03
Attending: EMERGENCY MEDICINE
Payer: MEDICARE

## 2020-02-03 DIAGNOSIS — I44.2 HEART BLOCK AV COMPLETE (HCC): ICD-10-CM

## 2020-02-03 PROBLEM — R55 SYNCOPE AND COLLAPSE: Status: ACTIVE | Noted: 2020-02-03

## 2020-02-03 PROBLEM — N28.9 RENAL INSUFFICIENCY: Status: ACTIVE | Noted: 2019-12-04

## 2020-02-03 LAB
ALBUMIN SERPL BCP-MCNC: 4 G/DL (ref 3.2–4.9)
ALBUMIN/GLOB SERPL: 1.4 G/DL
ALP SERPL-CCNC: 42 U/L (ref 30–99)
ALT SERPL-CCNC: 15 U/L (ref 2–50)
ANION GAP SERPL CALC-SCNC: 8 MMOL/L (ref 0–11.9)
ANISOCYTOSIS BLD QL SMEAR: ABNORMAL
AST SERPL-CCNC: 28 U/L (ref 12–45)
BASOPHILS # BLD AUTO: 0.3 % (ref 0–1.8)
BASOPHILS # BLD: 0.02 K/UL (ref 0–0.12)
BILIRUB SERPL-MCNC: 0.8 MG/DL (ref 0.1–1.5)
BUN SERPL-MCNC: 29 MG/DL (ref 8–22)
BURR CELLS BLD QL SMEAR: NORMAL
CALCIUM SERPL-MCNC: 9.6 MG/DL (ref 8.5–10.5)
CHLORIDE SERPL-SCNC: 106 MMOL/L (ref 96–112)
CO2 SERPL-SCNC: 25 MMOL/L (ref 20–33)
COMMENT 1642: NORMAL
CREAT SERPL-MCNC: 1.64 MG/DL (ref 0.5–1.4)
EKG IMPRESSION: NORMAL
EOSINOPHIL # BLD AUTO: 0.3 K/UL (ref 0–0.51)
EOSINOPHIL NFR BLD: 5 % (ref 0–6.9)
ERYTHROCYTE [DISTWIDTH] IN BLOOD BY AUTOMATED COUNT: 63.5 FL (ref 35.9–50)
GLOBULIN SER CALC-MCNC: 2.8 G/DL (ref 1.9–3.5)
GLUCOSE BLD-MCNC: 109 MG/DL (ref 65–99)
GLUCOSE BLD-MCNC: 111 MG/DL (ref 65–99)
GLUCOSE SERPL-MCNC: 138 MG/DL (ref 65–99)
HCT VFR BLD AUTO: 53.4 % (ref 42–52)
HGB BLD-MCNC: 15.9 G/DL (ref 14–18)
IMM GRANULOCYTES # BLD AUTO: 0.02 K/UL (ref 0–0.11)
IMM GRANULOCYTES NFR BLD AUTO: 0.3 % (ref 0–0.9)
LIPASE SERPL-CCNC: 41 U/L (ref 11–82)
LYMPHOCYTES # BLD AUTO: 1.25 K/UL (ref 1–4.8)
LYMPHOCYTES NFR BLD: 20.8 % (ref 22–41)
MCH RBC QN AUTO: 23.9 PG (ref 27–33)
MCHC RBC AUTO-ENTMCNC: 29.8 G/DL (ref 33.7–35.3)
MCV RBC AUTO: 80.3 FL (ref 81.4–97.8)
MICROCYTES BLD QL SMEAR: ABNORMAL
MONOCYTES # BLD AUTO: 0.67 K/UL (ref 0–0.85)
MONOCYTES NFR BLD AUTO: 11.2 % (ref 0–13.4)
MORPHOLOGY BLD-IMP: NORMAL
NEUTROPHILS # BLD AUTO: 3.74 K/UL (ref 1.82–7.42)
NEUTROPHILS NFR BLD: 62.4 % (ref 44–72)
NRBC # BLD AUTO: 0 K/UL
NRBC BLD-RTO: 0 /100 WBC
PLATELET # BLD AUTO: 126 K/UL (ref 164–446)
PLATELET BLD QL SMEAR: NORMAL
POIKILOCYTOSIS BLD QL SMEAR: NORMAL
POLYCHROMASIA BLD QL SMEAR: NORMAL
POTASSIUM SERPL-SCNC: 4.4 MMOL/L (ref 3.6–5.5)
PROT SERPL-MCNC: 6.8 G/DL (ref 6–8.2)
RBC # BLD AUTO: 6.65 M/UL (ref 4.7–6.1)
RBC BLD AUTO: PRESENT
SODIUM SERPL-SCNC: 139 MMOL/L (ref 135–145)
TROPONIN T SERPL-MCNC: 33 NG/L (ref 6–19)
WBC # BLD AUTO: 6 K/UL (ref 4.8–10.8)

## 2020-02-03 PROCEDURE — 93005 ELECTROCARDIOGRAM TRACING: CPT

## 2020-02-03 PROCEDURE — 85025 COMPLETE CBC W/AUTO DIFF WBC: CPT

## 2020-02-03 PROCEDURE — 82962 GLUCOSE BLOOD TEST: CPT

## 2020-02-03 PROCEDURE — 80053 COMPREHEN METABOLIC PANEL: CPT

## 2020-02-03 PROCEDURE — 36415 COLL VENOUS BLD VENIPUNCTURE: CPT

## 2020-02-03 PROCEDURE — 84484 ASSAY OF TROPONIN QUANT: CPT

## 2020-02-03 PROCEDURE — 770020 HCHG ROOM/CARE - TELE (206)

## 2020-02-03 PROCEDURE — 71045 X-RAY EXAM CHEST 1 VIEW: CPT

## 2020-02-03 PROCEDURE — 83690 ASSAY OF LIPASE: CPT

## 2020-02-03 PROCEDURE — A9270 NON-COVERED ITEM OR SERVICE: HCPCS | Performed by: HOSPITALIST

## 2020-02-03 PROCEDURE — 99285 EMERGENCY DEPT VISIT HI MDM: CPT

## 2020-02-03 PROCEDURE — 99223 1ST HOSP IP/OBS HIGH 75: CPT | Performed by: INTERNAL MEDICINE

## 2020-02-03 PROCEDURE — 700102 HCHG RX REV CODE 250 W/ 637 OVERRIDE(OP): Performed by: HOSPITALIST

## 2020-02-03 PROCEDURE — 93005 ELECTROCARDIOGRAM TRACING: CPT | Performed by: EMERGENCY MEDICINE

## 2020-02-03 PROCEDURE — 99223 1ST HOSP IP/OBS HIGH 75: CPT | Performed by: HOSPITALIST

## 2020-02-03 RX ORDER — ACETAMINOPHEN 325 MG/1
650 TABLET ORAL EVERY 6 HOURS PRN
Status: DISCONTINUED | OUTPATIENT
Start: 2020-02-03 | End: 2020-02-05 | Stop reason: HOSPADM

## 2020-02-03 RX ORDER — ASCORBIC ACID 500 MG
1000 TABLET ORAL 2 TIMES DAILY
Status: DISCONTINUED | OUTPATIENT
Start: 2020-02-03 | End: 2020-02-05 | Stop reason: HOSPADM

## 2020-02-03 RX ORDER — ONDANSETRON 4 MG/1
4 TABLET, ORALLY DISINTEGRATING ORAL EVERY 4 HOURS PRN
Status: DISCONTINUED | OUTPATIENT
Start: 2020-02-03 | End: 2020-02-05 | Stop reason: HOSPADM

## 2020-02-03 RX ORDER — ANASTROZOLE 1 MG/1
0.5 TABLET ORAL
Status: DISCONTINUED | OUTPATIENT
Start: 2020-02-04 | End: 2020-02-05 | Stop reason: HOSPADM

## 2020-02-03 RX ORDER — ENALAPRILAT 1.25 MG/ML
1.25 INJECTION INTRAVENOUS EVERY 6 HOURS PRN
Status: DISCONTINUED | OUTPATIENT
Start: 2020-02-03 | End: 2020-02-05 | Stop reason: HOSPADM

## 2020-02-03 RX ORDER — ONDANSETRON 2 MG/ML
4 INJECTION INTRAMUSCULAR; INTRAVENOUS EVERY 4 HOURS PRN
Status: DISCONTINUED | OUTPATIENT
Start: 2020-02-03 | End: 2020-02-05 | Stop reason: HOSPADM

## 2020-02-03 RX ORDER — SODIUM PHOSPHATE,MONO-DIBASIC 19G-7G/118
1500 ENEMA (ML) RECTAL 2 TIMES DAILY
Status: ON HOLD | COMMUNITY
End: 2022-03-31

## 2020-02-03 RX ORDER — IBUPROFEN 200 MG
1900 CAPSULE ORAL DAILY
Status: ON HOLD | COMMUNITY
End: 2020-06-05

## 2020-02-03 RX ORDER — AMOXICILLIN 250 MG
2 CAPSULE ORAL 2 TIMES DAILY
Status: DISCONTINUED | OUTPATIENT
Start: 2020-02-03 | End: 2020-02-05 | Stop reason: HOSPADM

## 2020-02-03 RX ORDER — POLYETHYLENE GLYCOL 3350 17 G/17G
1 POWDER, FOR SOLUTION ORAL
Status: DISCONTINUED | OUTPATIENT
Start: 2020-02-03 | End: 2020-02-05 | Stop reason: HOSPADM

## 2020-02-03 RX ORDER — CLOPIDOGREL BISULFATE 75 MG/1
75 TABLET ORAL DAILY
Status: DISCONTINUED | OUTPATIENT
Start: 2020-02-04 | End: 2020-02-04

## 2020-02-03 RX ORDER — OMEPRAZOLE 20 MG/1
40 CAPSULE, DELAYED RELEASE ORAL 2 TIMES DAILY
Status: DISCONTINUED | OUTPATIENT
Start: 2020-02-03 | End: 2020-02-05 | Stop reason: HOSPADM

## 2020-02-03 RX ORDER — BISACODYL 10 MG
10 SUPPOSITORY, RECTAL RECTAL
Status: DISCONTINUED | OUTPATIENT
Start: 2020-02-03 | End: 2020-02-05 | Stop reason: HOSPADM

## 2020-02-03 RX ORDER — GABAPENTIN 300 MG/1
300 CAPSULE ORAL 3 TIMES DAILY
Status: DISCONTINUED | OUTPATIENT
Start: 2020-02-03 | End: 2020-02-05 | Stop reason: HOSPADM

## 2020-02-03 RX ORDER — EZETIMIBE 10 MG/1
10 TABLET ORAL EVERY MORNING
Status: DISCONTINUED | OUTPATIENT
Start: 2020-02-04 | End: 2020-02-05 | Stop reason: HOSPADM

## 2020-02-03 RX ORDER — VITAMIN B COMPLEX
2000 TABLET ORAL DAILY
Status: DISCONTINUED | OUTPATIENT
Start: 2020-02-04 | End: 2020-02-05 | Stop reason: HOSPADM

## 2020-02-03 RX ADMIN — SACUBITRIL AND VALSARTAN 1 TABLET: 24; 26 TABLET, FILM COATED ORAL at 20:56

## 2020-02-03 RX ADMIN — OMEPRAZOLE 40 MG: 20 CAPSULE, DELAYED RELEASE ORAL at 18:52

## 2020-02-03 RX ADMIN — ACETAMINOPHEN 650 MG: 325 TABLET, FILM COATED ORAL at 18:52

## 2020-02-03 RX ADMIN — SENNOSIDES AND DOCUSATE SODIUM 2 TABLET: 8.6; 5 TABLET ORAL at 20:56

## 2020-02-03 RX ADMIN — OXYCODONE HYDROCHLORIDE AND ACETAMINOPHEN 1000 MG: 500 TABLET ORAL at 18:52

## 2020-02-03 RX ADMIN — GABAPENTIN 300 MG: 300 CAPSULE ORAL at 18:52

## 2020-02-03 SDOH — HEALTH STABILITY: MENTAL HEALTH: HOW OFTEN DO YOU HAVE 6 OR MORE DRINKS ON ONE OCCASION?: NEVER

## 2020-02-03 SDOH — HEALTH STABILITY: MENTAL HEALTH: HOW OFTEN DO YOU HAVE A DRINK CONTAINING ALCOHOL?: NEVER

## 2020-02-03 ASSESSMENT — COGNITIVE AND FUNCTIONAL STATUS - GENERAL
SUGGESTED CMS G CODE MODIFIER DAILY ACTIVITY: CH
DAILY ACTIVITIY SCORE: 24
MOVING FROM LYING ON BACK TO SITTING ON SIDE OF FLAT BED: A LITTLE
SUGGESTED CMS G CODE MODIFIER MOBILITY: CI
MOBILITY SCORE: 23

## 2020-02-03 ASSESSMENT — ENCOUNTER SYMPTOMS
NECK PAIN: 0
BLURRED VISION: 0
LOSS OF CONSCIOUSNESS: 1
STRIDOR: 0
FEVER: 0
SHORTNESS OF BREATH: 0
DIARRHEA: 0
WEIGHT LOSS: 0
NERVOUS/ANXIOUS: 0
ABDOMINAL PAIN: 0
NAUSEA: 0
COUGH: 0
BLOOD IN STOOL: 0
DIZZINESS: 0

## 2020-02-03 ASSESSMENT — LIFESTYLE VARIABLES
EVER_SMOKED: NEVER
EVER_SMOKED: NEVER

## 2020-02-03 ASSESSMENT — COPD QUESTIONNAIRES
HAVE YOU SMOKED AT LEAST 100 CIGARETTES IN YOUR ENTIRE LIFE: NO/DON'T KNOW
DURING THE PAST 4 WEEKS HOW MUCH DID YOU FEEL SHORT OF BREATH: SOME OF THE TIME
DO YOU EVER COUGH UP ANY MUCUS OR PHLEGM?: NO/ONLY WITH OCCASIONAL COLDS OR INFECTIONS
COPD SCREENING SCORE: 3
HAVE YOU SMOKED AT LEAST 100 CIGARETTES IN YOUR ENTIRE LIFE: NO/DON'T KNOW
DURING THE PAST 4 WEEKS HOW MUCH DID YOU FEEL SHORT OF BREATH: SOME OF THE TIME
IN THE PAST 12 MONTHS DO YOU DO LESS THAN YOU USED TO BECAUSE OF YOUR BREATHING PROBLEMS: AGREE
COPD SCREENING SCORE: 4
DO YOU EVER COUGH UP ANY MUCUS OR PHLEGM?: NO/ONLY WITH OCCASIONAL COLDS OR INFECTIONS

## 2020-02-03 NOTE — TELEPHONE ENCOUNTER
----- Message from Sybil Velasquez R.N. sent at 2/3/2020  8:40 AM PST -----  Regarding: FW: request [employer] report and call patient to check in. SC    ----- Message -----  From: NEAL Villegas  Sent: 2/3/2020   8:12 AM PST  To: Sybil Velasquez R.N.  Subject: request andra report and call patient to [facility]      ----- Message -----  From: DARCY Gaming  Sent: 2/2/2020  10:23 AM PST  To: NEAL Villegas    Good morning    Received a call from Yasuu in regards to patient's rhythm activities.    On 2/1/20  at 7:30 am ( for ~ 40-second patient had what the [employer] Called  A-V dissociation )    Spoke with Mr. olivarez, he said he was asleep at 730 at that time, absolutely asymptomatic on Saturday and when I spoke with him on Sunday at 10:30 AM he was feeling very well and without any symptoms.    Thank you so much

## 2020-02-03 NOTE — TELEPHONE ENCOUNTER
Discussed with REA. Confirm 3rd degree HB post TAVR. Recommend ER until PPM can be placed. Also, please contact [facility] to let them know to see if patient can get PPM today or tomorrow. SC

## 2020-02-03 NOTE — TELEPHONE ENCOUNTER
SC      Patient is calling form the ER, he said he was told to call when he got to the ER. He can be reached at [phone].

## 2020-02-03 NOTE — ED TRIAGE NOTES
Chief Complaint   Patient presents with   • Sent by MD     Pt has heart monitor on and states that the cardiologists office called and told him to come to the ER and that he needed a pacemaker.  Denies symptoms.  Triage process explained to patient.  Pt back to waiting room.  Pt instructed to inform RN if any changes or questions arise.

## 2020-02-03 NOTE — ED NOTES
Pt resting comfortably,  Updated on POC/ NPO status.  Pt on Zoll with pads.  Call light in reach.

## 2020-02-03 NOTE — ED NOTES
Pt with multiple procedures/ testing done over last few weeks, showing aortic stenosis. Pt with BiTel heart monitor.    Pt c/o increasing SOB over last year.

## 2020-02-03 NOTE — CONSULTS
Reason of Consult: Heart block    Consulting Physician: Dr. Tse    HPI:  79-year-old male with a history of multivessel coronary artery disease, ischemic and valvular cardiomyopathy, and aortic stenosis declined for open heart surgery.  He is status post RCA and left main PCI with subsequent TAVR completed last week.  He had a syncopal event post TAVR resulting in bruised ribs knee and facial trauma.  It was brief.  It recurred with lightheadedness while he was laying down.  He had a noninvasive event recorder placed which documented episodic complete heart block.  He was contacted by the cardiology office and sent in for consideration of pacemaker placement.  Other than the lightheadedness he has had some further increase in his lower extremity edema due to Lasix being dropped to half due to his dizziness which now is explained by his heart rhythm rather than dehydration.  Non-smoker does not drink excessively or do drugs.  No precocious family history of CAD currently in sinus rhythm in the emergency department.  Intermittent bouts of complete heart block.    Past Medical History:   Diagnosis Date   • Aortic stenosis    • Arthritis    • Back pain    • Breath shortness     pt states short of breath 24/7   • CAD (coronary artery disease)    • CATARACT     removed bilat   • Chest pain    • Chest tightness    • Chickenpox    • Congestive heart failure (HCC)    • Constipation    • Coronary heart disease    • Cough    • Daytime sleepiness    • Diabetes     PT REPORTS DIABETES RESOLVED IN 2009. NO LONGER TAKES MEDS   • Difficulty breathing    • Fatigue    • Hearing difficulty    • Heart murmur    • Heart valve disease    • Heartburn    • Hiatus hernia syndrome    • Hyperlipidemia    • Hypertension     pt states well controlled on meds   • Kidney disease    • Mumps    • Pain 08-29-13    back, hips and bilat legs, 4/10   • Pain 6/22/2015    left humerus   • Pain 1/7/16    knees, back and shoulder   • Painful joint     • Palpitations    • Pneumonia 2007   • Rhinitis    • Ringing in ears    • Severe aortic stenosis 12/4/2019   • Shortness of breath    • Swelling of lower extremity    • Tonsillitis    • Ulcer 2014    Dr. Porter, gastroenterologist   • Unspecified hemorrhagic conditions     bruises easily       Social History     Socioeconomic History   • Marital status:      Spouse name: Not on file   • Number of children: Not on file   • Years of education: Not on file   • Highest education level: Not on file   Occupational History   • Not on file   Social Needs   • Financial resource strain: Not on file   • Food insecurity:     Worry: Not on file     Inability: Not on file   • Transportation needs:     Medical: Not on file     Non-medical: Not on file   Tobacco Use   • Smoking status: Never Smoker   • Smokeless tobacco: Never Used   • Tobacco comment: 0   Substance and Sexual Activity   • Alcohol use: No     Alcohol/week: 0.0 oz   • Drug use: No   • Sexual activity: Yes   Lifestyle   • Physical activity:     Days per week: Not on file     Minutes per session: Not on file   • Stress: Not on file   Relationships   • Social connections:     Talks on phone: Not on file     Gets together: Not on file     Attends Judaism service: Not on file     Active member of club or organization: Not on file     Attends meetings of clubs or organizations: Not on file     Relationship status: Not on file   • Intimate partner violence:     Fear of current or ex partner: Not on file     Emotionally abused: Not on file     Physically abused: Not on file     Forced sexual activity: Not on file   Other Topics Concern   • Not on file   Social History Narrative   • Not on file       No current facility-administered medications on file prior to encounter.      Current Outpatient Medications on File Prior to Encounter   Medication Sig Dispense Refill   • calcium citrate (CALCITRATE) 950 MG Tab Take 1,900 mg by mouth every day.     • coenzyme Q-10 30  MG capsule Take 60 mg by mouth 2 Times a Day.     • Garlic 1000 MG Cap Take 1,000 mg by mouth 2 Times a Day.     • glucosamine Sulfate 500 MG Cap Take 1,500 mg by mouth 2 Times a Day.     • bumetanide (BUMEX) 1 MG Tab Take 1 Tab by mouth every 48 hours.     • Non Formulary Request Inject 1 Each as instructed See Admin Instructions. HCG/ARGININE 40075g/5mg/cc, pt does not take on Thur      • Non Formulary Request Inject 1 Each as instructed See Admin Instructions. HGH 5.8mg/2.5mg/cc,   Monday, Tuesday, Wednesday, Friday, Saturday, sunday     • vitamin e (VITAMIN E) 400 UNIT Cap Take 400 Units by mouth 2 times a day.     • omeprazole (PRILOSEC) 40 MG delayed-release capsule Take 40 mg by mouth 2 times a day.     • Non Formulary Request Take 1 Cap by mouth 2 Times a Day. Cholox (OTC)     • Cholecalciferol (VITAMIN D3) 2000 UNIT Cap Take 2,000 Units by mouth every day.     • Non Formulary Request Take 1 Cap by mouth 2 Times a Day. Domenic red (OTC)     • Non Formulary Request Take 1 Cap by mouth every day. Nitric OXIDE (OTC)     • Multiple Vitamins-Minerals (OCULAR VITAMINS PO) Take 1 Cap by mouth every day.     • Probiotic Product (PROBIOTIC DAILY PO) Take 1 Cap by mouth every day.     • Non Formulary Request Take 1 Cap by mouth every day. anit-oxidant (OTC)     • Zinc 50 MG Tab Take 50 mg by mouth every day.     • acetaminophen (TYLENOL) 500 MG Tab Take 1,500 mg by mouth every 6 hours as needed for Moderate Pain.     • anastrozole (ARIMIDEX) 1 MG Tab Take 0.5 mg by mouth every day. 1/2 tab 2x/wk On Tue and Sat     • gabapentin (NEURONTIN) 300 MG Cap Take 300 mg by mouth 3 times a day as needed. Indications: Neuropathic Pain     • clopidogrel (PLAVIX) 75 MG Tab Take 1 Tab by mouth every day. 90 Tab 3   • aspirin EC 81 MG EC tablet Take 1 Tab by mouth every day. 30 Tab 0   • ezetimibe (ZETIA) 10 MG Tab Take 10 mg by mouth every morning.     • glimepiride (AMARYL) 1 MG tablet Take 1 mg by mouth every evening.     • B  "Complex Vitamins (VITAMIN B COMPLEX PO) Take 1 Tab by mouth every day.     • Cranberry 300 MG Tab Take 300 mg by mouth every morning.     • L-Lysine 1000 MG Tab Take 1,000 mg by mouth every day.     • Ascorbic Acid (VITAMIN C) 1000 MG Tab Take 1,000 mg by mouth 2 Times a Day.     • sacubitril-valsartan (ENTRESTO) 24-26 MG Tab tablet Take 1 Tab by mouth 2 Times a Day. 60 Tab 3   • Testosterone Cypionate (DEPO-TESTOSTERONE IM) 1 Each by Intramuscular route every 7 days. Thursday         No current facility-administered medications for this encounter.      Last reviewed on 2/3/2020  2:24 PM by Rigoberto Perry      Statins [hmg-coa-r inhibitors]    Family History   Problem Relation Age of Onset   • No Known Problems Sister    • No Known Problems Sister    • No Known Problems Sister    • Diabetes Other    • No Known Problems Mother    • No Known Problems Father    • Heart Disease Neg Hx        ROS: As per HPI all other systems reviewed and negative     Physical Exam   Blood pressure 134/64, pulse 75, temperature 36.1 °C (97 °F), temperature source Oral, resp. rate 16, height 1.854 m (6' 1\"), weight 116 kg (255 lb 11.7 oz), SpO2 93 %.    Constitutional: Elderly.  Appears well-developed.   HENT: Normocephalic and atraumatic. No scleral icterus.   Neck: No JVD present.   Cardiovascular: Normal rate. Exam reveals no gallop and no friction rub. No murmur heard.   Pulmonary/Chest: CTAB   Abdominal: S/NT/ND BS+   Musculoskeletal:  Pulses present. No atrophy. Strength normal.  Extremities: Exhibits no edema. No clubbing or cyanosis.   Skin: Skin is warm and dry.   Neuro: Non-focal, CN 2-12 intact grossly    No intake or output data in the 24 hours ending 02/03/20 1431                              EKG (2/3/2020):  I have personally reviewed the EKG this visit and discussed with the patient. It shows sinus rhythm multiple PVCs incomplete left bundle branch block first-degree AV block old inferior infarct.    Bouts over the " monitor reviewed showing episodes of complete heart block with a narrow complex QRS.    ECHO CONCLUSIONS (1/28/2020):  Compared to the images of the study done 12- there has been an   interval placement of a TAVR valve.  The EF appears mildly improved.  Moderately reduced left ventricular systolic function.  Left ventricular ejection fraction is visually estimated to be 35%.    Imaging reviewed    Impressions:  1.  Symptomatic complete heart block  2.  CAD status post multivessel PCI  3.  Ischemic and valvular cardiomyopathy, LVEF improved to 35%  4.  Volume overload related to medication changes  5.  Severe aortic stenosis status post recent TAVR  6.  Diabetes mellitus  7.  Incomplete left bundle branch block    Recommendations:  Recommend permanent pacemaker placement.  We will attempt to accomplish this today and if not today then tomorrow morning.  Recommend continuing his current cardiac medical therapy including his Entresto 24/26 mg, Zetia, aspirin, Plavix.  Recommend increasing his Bumex to daily and adding a day or 2 of intravenous furosemide to augment his diuresis.  He is currently not on a beta-blocker however once pacemaker is placed I would recommend initiation of Coreg 6.25 mg twice daily uptitrated as tolerated.    Thank you for this interesting consultation. It was my pleasure to see LIAN More III today.    Eliazar Queen MD, FACC, [facility]  Division of Interventional Cardiology  [facility]

## 2020-02-03 NOTE — ED PROVIDER NOTES
ED Provider Note    Scribed for Antoni Tse M.D. by Zoran Gallardo. 2/3/2020, 1:30 PM.    Primary care provider: DARCY Medina  Means of arrival: Walk in  History obtained from: Patient  History limited by: None    CHIEF COMPLAINT  Chief Complaint   Patient presents with   • Sent by MD CLARK  LIAN More III is a 79 y.o. male who presents to the Emergency Department for cardiac workup. Patient has a heart monitor on and he reports the cardiologist office called and told him to go to the ER, and that he will also need a pacemaker. Patient is asymptomatic, he denies lightheadedness, however had a syncopal event 6 days ago. He hit his head but denies loss of consciousness. Patient was not worked up after his syncope. He is anticoagulated with plavix.  Denies any chest pain or shortness of breath.  No fevers or chills.  Denies any other aggravating or alleviating factors or associated complaints.    REVIEW OF SYSTEMS  Review of Systems   Neurological:        Negative lightheadedness  Positive syncope   All other systems reviewed and are negative.  See [facility] for details    PAST MEDICAL HISTORY   has a past medical history of Aortic stenosis, Arthritis, Back pain, Breath shortness, CAD (coronary artery disease), CATARACT, Chest pain, Chest tightness, Chickenpox, Congestive heart failure (HCC), Constipation, Coronary heart disease, Cough, Daytime sleepiness, Diabetes, Difficulty breathing, Fatigue, Hearing difficulty, Heart murmur, Heart valve disease, Heartburn, Hiatus hernia syndrome, Hyperlipidemia, Hypertension, Kidney disease, Mumps, Pain (08-29-13), Pain (6/22/2015), Pain (1/7/16), Painful joint, Palpitations, Pneumonia (2007), Rhinitis, Ringing in ears, Severe aortic stenosis (12/4/2019), Shortness of breath, Swelling of lower extremity, Tonsillitis, Ulcer (2014), and Unspecified hemorrhagic conditions.    SURGICAL HISTORY   has a past surgical history that includes abdominoplasty (1990); gastric  banding laparoscopic (3/24/2010); hiatal hernia repair (3/24/2010); inj,epidural/lumb/sac single (3/5/2012); inj,epidural/lumb/sac single (3/19/2012); lumbar fusion posterior (3/26/2012); lumbar decompression (3/26/2012); gastric band laparoscopic revision (5/11/2012); drainage hematoma (5/25/2012); recovery (6/26/2012); lumbar fusion posterior (9/5/2013); lumbar decompression (9/5/2013); mass excision neuro (9/5/2013); gastroscopy (N/A, 1/8/2016); knee arthroplasty total (2000); humerus orif (Left, 6/25/2015); zzz cardiac cath; laminotomy; Sleeve,Grant Vaso Thigh; remv 2nd cataract,corn-scler sectn; sinuscope; tonsillectomy; aortic valve replacement; arthroscopy, knee; transcatheter aortic valve replacement (N/A, 1/27/2020); and stephon (1/27/2020).    SOCIAL HISTORY  Social History     Tobacco Use   • Smoking status: Never Smoker   • Smokeless tobacco: Never Used   • Tobacco comment: 0   Substance Use Topics   • Alcohol use: No     Alcohol/week: 0.0 oz   • Drug use: No      Social History     Substance and Sexual Activity   Drug Use No       FAMILY HISTORY  Family History   Problem Relation Age of Onset   • No Known Problems Sister    • No Known Problems Sister    • No Known Problems Sister    • Diabetes Other    • No Known Problems Mother    • No Known Problems Father    • Heart Disease Neg Hx        CURRENT MEDICATIONS  No current facility-administered medications on file prior to encounter.      Current Outpatient Medications on File Prior to Encounter   Medication Sig Dispense Refill   • bumetanide (BUMEX) 1 MG Tab Take 1 Tab by mouth every 48 hours.     • fluticasone (FLONASE) 50 MCG/ACT nasal spray Spray 1 Spray in nose as needed. Indications: Signs and Symptoms of Nose Diseases     • Non Formulary Request Inject 1 Each as instructed See Admin Instructions. HCG/ARGININE 60835y/5mg/cc, pt does not take on Thur      • Non Formulary Request Inject 1 Each as instructed See Admin Instructions. HGH 5.8mg/2.5mg/cc, pt  does not take on THUR      • GARLIC PO Take 1,000 mg by mouth 2 Times a Day.     • vitamin e (VITAMIN E) 400 UNIT Cap Take 400 Units by mouth 2 times a day.     • omeprazole (PRILOSEC) 40 MG delayed-release capsule Take 40 mg by mouth 2 times a day.     • cyanocobalamin (VITAMIN B-12) 1000 MCG/ML Solution 1,000 mcg by Intramuscular route every 7 days. On Thur     • Calcium Citrate (CITRACAL PO) Take 1,200 mg by mouth every day.     • Non Formulary Request Take 1 Cap by mouth 2 Times a Day. Cholox (OTC)     • Cholecalciferol (VITAMIN D3) 2000 UNIT Cap Take 2,000 Units by mouth every day.     • Non Formulary Request Take 1 Cap by mouth 2 Times a Day. Domenic red (OTC)     • Non Formulary Request Take 1 Cap by mouth every day. Nitric OXIDE (OTC)     • Multiple Vitamins-Minerals (OCULAR VITAMINS PO) Take 1 Cap by mouth every day.     • Probiotic Product (PROBIOTIC DAILY PO) Take 1 Cap by mouth every day.     • Non Formulary Request Take 1 Cap by mouth every day. anit-oxidant (OTC)     • Zinc 50 MG Tab Take 50 mg by mouth every day.     • acetaminophen (TYLENOL) 500 MG Tab Take 1,500 mg by mouth every 6 hours as needed for Moderate Pain.     • anastrozole (ARIMIDEX) 1 MG Tab Take 0.5 mg by mouth every day. 1/2 tab 2x/wk On Tue and Sat     • gabapentin (NEURONTIN) 300 MG Cap Take 300 mg by mouth as needed. Indications: Neuropathic Pain     • clopidogrel (PLAVIX) 75 MG Tab Take 1 Tab by mouth every day. 90 Tab 3   • aspirin EC 81 MG EC tablet Take 1 Tab by mouth every day. 30 Tab 0   • Coenzyme Q10 (CO Q-10 PO) Take 1 Tab by mouth 2 Times a Day.     • ezetimibe (ZETIA) 10 MG Tab Take 10 mg by mouth every morning.     • glimepiride (AMARYL) 1 MG tablet Take 1 mg by mouth every evening.     • B Complex Vitamins (VITAMIN B COMPLEX PO) Take 1 Tab by mouth every day.     • Cranberry 300 MG Tab Take 300 mg by mouth every morning.     • L-Lysine 1000 MG Tab Take 1,000 mg by mouth every day.     • Ascorbic Acid (VITAMIN C) 1000 MG  "Tab Take 1,000 mg by mouth 2 Times a Day.     • sacubitril-valsartan (ENTRESTO) 24-26 MG Tab tablet Take 1 Tab by mouth 2 Times a Day. 60 Tab 3   • hydrocortisone 2.5 % Cream topical cream Apply 1 Application to affected area(s) 2 times a day as needed (rash). 28 g 1   • Testosterone Cypionate (DEPO-TESTOSTERONE IM) 1 Each by Intramuscular route every 7 days. Thursday     • GLUCOSAMINE SULFATE PO Take 1,500 mg by mouth 2 Times a Day.        ALLERGIES  Allergies   Allergen Reactions   • Statins [Hmg-Coa-R Inhibitors] Rash     Blisters         PHYSICAL EXAM  VITAL SIGNS: /70   Pulse 70   Temp 36.1 °C (97 °F) (Oral)   Resp 16   Ht 1.854 m (6' 1\")   Wt 116 kg (255 lb 11.7 oz)   SpO2 92%   BMI 33.74 kg/m²   Vitals reviewed.  Constitutional: Well developed, Well nourished, No acute distress, Non-toxic appearance.   HENT: Normocephalic, Atraumatic, Bilateral external ears normal, Oropharynx moist, No oral exudates, Nose normal. Scab to upper left lip.  Eyes: PERRL, EOMI, Conjunctiva normal, No discharge.   Neck: Normal range of motion, No tenderness, Supple, No stridor.   Cardiovascular: Normal heart rate, Normal rhythm, No murmurs, No rubs, No gallops.   Thorax & Lungs: Normal breath sounds, No respiratory distress, No wheezing, No chest tenderness.   Abdomen: Bowel sounds normal, Soft, No tenderness  Skin: Warm, Dry, No erythema, No rash.   Back: No tenderness, No CVA tenderness.   Musculoskeletal: Good range of motion in all major joints. Edema noted to lower extremities. No tenderness to palpation or major deformities noted.   Neurologic: Alert, Normal motor function, Normal sensory function, No focal deficits noted.   Psychiatric: Affect normal    LABS  Results for orders placed or performed during the hospital encounter of 02/03/20   CBC WITH DIFFERENTIAL   Result Value Ref Range    WBC 6.0 4.8 - 10.8 K/uL    RBC 6.65 (H) 4.70 - 6.10 M/uL    Hemoglobin 15.9 14.0 - 18.0 g/dL    Hematocrit 53.4 (H) 42.0 - " 52.0 %    MCV 80.3 (L) 81.4 - 97.8 fL    MCH 23.9 (L) 27.0 - 33.0 pg    MCHC 29.8 (L) 33.7 - 35.3 g/dL    RDW 63.5 (H) 35.9 - 50.0 fL    Platelet Count 126 (L) 164 - 446 K/uL    Neutrophils-Polys 62.40 44.00 - 72.00 %    Lymphocytes 20.80 (L) 22.00 - 41.00 %    Monocytes 11.20 0.00 - 13.40 %    Eosinophils 5.00 0.00 - 6.90 %    Basophils 0.30 0.00 - 1.80 %    Immature Granulocytes 0.30 0.00 - 0.90 %    Nucleated RBC 0.00 /100 WBC    Neutrophils (Absolute) 3.74 1.82 - 7.42 K/uL    Lymphs (Absolute) 1.25 1.00 - 4.80 K/uL    Monos (Absolute) 0.67 0.00 - 0.85 K/uL    Eos (Absolute) 0.30 0.00 - 0.51 K/uL    Baso (Absolute) 0.02 0.00 - 0.12 K/uL    Immature Granulocytes (abs) 0.02 0.00 - 0.11 K/uL    NRBC (Absolute) 0.00 K/uL    Anisocytosis 2+     Microcytosis 1+    COMP METABOLIC PANEL   Result Value Ref Range    Sodium 139 135 - 145 mmol/L    Potassium 4.4 3.6 - 5.5 mmol/L    Chloride 106 96 - 112 mmol/L    Co2 25 20 - 33 mmol/L    Anion Gap 8.0 0.0 - 11.9    Glucose 138 (H) 65 - 99 mg/dL    Bun 29 (H) 8 - 22 mg/dL    Creatinine 1.64 (H) 0.50 - 1.40 mg/dL    Calcium 9.6 8.5 - 10.5 mg/dL    AST(SGOT) 28 12 - 45 U/L    ALT(SGPT) 15 2 - 50 U/L    Alkaline Phosphatase 42 30 - 99 U/L    Total Bilirubin 0.8 0.1 - 1.5 mg/dL    Albumin 4.0 3.2 - 4.9 g/dL    Total Protein 6.8 6.0 - 8.2 g/dL    Globulin 2.8 1.9 - 3.5 g/dL    A-G Ratio 1.4 g/dL   LIPASE   Result Value Ref Range    Lipase 41 11 - 82 U/L   TROPONIN   Result Value Ref Range    Troponin T 33 (H) 6 - 19 ng/L   ESTIMATED GFR   Result Value Ref Range    GFR If African American 50 (A) >60 mL/min/1.73 m 2    GFR If Non  41 (A) >60 mL/min/1.73 m 2   PERIPHERAL SMEAR REVIEW   Result Value Ref Range    Peripheral Smear Review see below    PLATELET ESTIMATE   Result Value Ref Range    Plt Estimation Decreased    MORPHOLOGY   Result Value Ref Range    RBC Morphology Present     Polychromia 1+     Poikilocytosis 1+     Echinocytes 1+    DIFFERENTIAL COMMENT    Result Value Ref Range    Comments-Diff see below    EKG (NOW)   Result Value Ref Range    Report       [facility] Emergency Dept.    Test Date:  2020  Pt Name:    LIAN VILLA                    Department: ER  MRN:        [id number]                      Room:  Gender:     Male                         Technician: [id number]  :        [id number]                   Requested By:ER TRIAGE PROTOCOL  Order #:    [id number]                    Reading MD: ROSY TRAMMELL. 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 AMD        All labs reviewed by me.    EKG Interpretation  Interpreted by me as abvoe    RADIOLOGY  DX-CHEST-PORTABLE (1 VIEW)   Final Result      1.  There is no acute cardiopulmonary process.      CL-PERMANENT PACEMAKER INSERTION    (Results Pending)     The radiologist's interpretation of all radiological studies have been reviewed by me.    COURSE & MEDICAL DECISION MAKING  Pertinent Labs & Imaging studies reviewed. (See chart for details)    Obtained and reviewed past medical records from outpatient physician notes and cardiology notes for plan today.  Reviewed previous records for baseline labs.    1:30 PM Patient seen and examined at bedside. The patient presents with abnormal heart monitor readings, and the differential diagnosis includes  but is not limited to syncope, ground-level fall, arrhythmia, including heart block.. Ordered for EKG, chest xray, CBC, CMP, lipase, troponin to evaluate.    1:54 PM  Paged cardiology, Dr. Waddell.    Patient was placed on the Zoll.  Per report from outpatient note reviewing outpatient chart review the patient is having intermittent heart block.  Seen by cardiology who will admit him for permanent pacemaker placement.  Case will be discussed with the hospitalist.  He requires hospitalization for ongoing work-up and treatment discussed the case hospitalist and care transferred at 3:20 PM    DISPOSITION:  Patient will be hospitalized by the [facility] hospitalist in guarded condition      FINAL IMPRESSION  1. Heart block AV complete (HCC)          Zoran ANDERSON (Scribe), am scribing for, and in the presence of, Antoni Tse M.D..    Electronically signed by: Zoran Gallardo (Scribe), 2/3/2020    Antoni ANDERSON M.D. personally performed the services described in this documentation, as scribed by Zoran Gallardo in my presence, and it is both accurate and complete. C.    The note accurately reflects work and decisions made by me.  Antoni Tse M.D.  2/3/2020  3:15 PM

## 2020-02-03 NOTE — TELEPHONE ENCOUNTER
S/w pt this morning, he states he is still feeling well. No cp or sob. Pt wondering if biotel screen always supposed to be lit up. Advised him to quickly click the on/off button for the screen to go black. Pt encouraged to call if they have any questions or concerns.

## 2020-02-03 NOTE — TELEPHONE ENCOUNTER
Received a tiger text from [place] she and Dr Castro discussed rhythm strip. Recommending pt have urgent pacer placed. S/w pt on the phone and after a lot of questions and reassurance that the rhythm he has had is very dangerous. Pt agreeable to go to ER. Discussed at length that a pacer is not open heart surgery.     Notifed ER Charge Antoni pt is on his way.

## 2020-02-04 ENCOUNTER — APPOINTMENT (OUTPATIENT)
Dept: RADIOLOGY | Facility: MEDICAL CENTER | Age: 80
DRG: 227 | End: 2020-02-04
Attending: INTERNAL MEDICINE
Payer: MEDICARE

## 2020-02-04 ENCOUNTER — APPOINTMENT (OUTPATIENT)
Dept: CARDIOLOGY | Facility: MEDICAL CENTER | Age: 80
DRG: 227 | End: 2020-02-04
Attending: INTERNAL MEDICINE
Payer: MEDICARE

## 2020-02-04 ENCOUNTER — APPOINTMENT (OUTPATIENT)
Dept: CARDIOLOGY | Facility: MEDICAL CENTER | Age: 80
DRG: 227 | End: 2020-02-04
Attending: NURSE PRACTITIONER
Payer: MEDICARE

## 2020-02-04 LAB
ANION GAP SERPL CALC-SCNC: 6 MMOL/L (ref 0–11.9)
BUN SERPL-MCNC: 27 MG/DL (ref 8–22)
CALCIUM SERPL-MCNC: 9.3 MG/DL (ref 8.5–10.5)
CHLORIDE SERPL-SCNC: 105 MMOL/L (ref 96–112)
CO2 SERPL-SCNC: 25 MMOL/L (ref 20–33)
CREAT SERPL-MCNC: 1.49 MG/DL (ref 0.5–1.4)
EKG IMPRESSION: NORMAL
ERYTHROCYTE [DISTWIDTH] IN BLOOD BY AUTOMATED COUNT: 64.2 FL (ref 35.9–50)
GLUCOSE BLD-MCNC: 104 MG/DL (ref 65–99)
GLUCOSE BLD-MCNC: 154 MG/DL (ref 65–99)
GLUCOSE BLD-MCNC: 95 MG/DL (ref 65–99)
GLUCOSE SERPL-MCNC: 125 MG/DL (ref 65–99)
HCT VFR BLD AUTO: 51.1 % (ref 42–52)
HGB BLD-MCNC: 15.2 G/DL (ref 14–18)
INR PPP: 1.09 (ref 0.87–1.13)
LV EJECT FRACT  99904: 40
LV EJECT FRACT MOD 2C 99903: 48.55
LV EJECT FRACT MOD 4C 99902: 41.44
LV EJECT FRACT MOD BP 99901: 50.41
MCH RBC QN AUTO: 24.1 PG (ref 27–33)
MCHC RBC AUTO-ENTMCNC: 29.7 G/DL (ref 33.7–35.3)
MCV RBC AUTO: 81 FL (ref 81.4–97.8)
PLATELET # BLD AUTO: 125 K/UL (ref 164–446)
PMV BLD AUTO: 9.3 FL (ref 9–12.9)
POTASSIUM SERPL-SCNC: 4 MMOL/L (ref 3.6–5.5)
PROTHROMBIN TIME: 14.3 SEC (ref 12–14.6)
RBC # BLD AUTO: 6.31 M/UL (ref 4.7–6.1)
SODIUM SERPL-SCNC: 136 MMOL/L (ref 135–145)
WBC # BLD AUTO: 5.8 K/UL (ref 4.8–10.8)

## 2020-02-04 PROCEDURE — 71045 X-RAY EXAM CHEST 1 VIEW: CPT

## 2020-02-04 PROCEDURE — 82962 GLUCOSE BLOOD TEST: CPT | Mod: 91

## 2020-02-04 PROCEDURE — 99233 SBSQ HOSP IP/OBS HIGH 50: CPT | Performed by: INTERNAL MEDICINE

## 2020-02-04 PROCEDURE — 93308 TTE F-UP OR LMTD: CPT | Mod: 26,59,76 | Performed by: INTERNAL MEDICINE

## 2020-02-04 PROCEDURE — 93308 TTE F-UP OR LMTD: CPT

## 2020-02-04 PROCEDURE — 85610 PROTHROMBIN TIME: CPT

## 2020-02-04 PROCEDURE — 770022 HCHG ROOM/CARE - ICU (200)

## 2020-02-04 PROCEDURE — 02HK3KZ INSERTION OF DEFIBRILLATOR LEAD INTO RIGHT VENTRICLE, PERCUTANEOUS APPROACH: ICD-10-PCS | Performed by: INTERNAL MEDICINE

## 2020-02-04 PROCEDURE — 33249 INSJ/RPLCMT DEFIB W/LEAD(S): CPT

## 2020-02-04 PROCEDURE — 99223 1ST HOSP IP/OBS HIGH 75: CPT | Mod: 57 | Performed by: INTERNAL MEDICINE

## 2020-02-04 PROCEDURE — 93010 ELECTROCARDIOGRAM REPORT: CPT | Performed by: INTERNAL MEDICINE

## 2020-02-04 PROCEDURE — 700102 HCHG RX REV CODE 250 W/ 637 OVERRIDE(OP): Performed by: HOSPITALIST

## 2020-02-04 PROCEDURE — 02H63KZ INSERTION OF DEFIBRILLATOR LEAD INTO RIGHT ATRIUM, PERCUTANEOUS APPROACH: ICD-10-PCS | Performed by: INTERNAL MEDICINE

## 2020-02-04 PROCEDURE — 99233 SBSQ HOSP IP/OBS HIGH 50: CPT | Performed by: HOSPITALIST

## 2020-02-04 PROCEDURE — A9270 NON-COVERED ITEM OR SERVICE: HCPCS | Performed by: HOSPITALIST

## 2020-02-04 PROCEDURE — 93306 TTE W/DOPPLER COMPLETE: CPT | Mod: 26,59 | Performed by: INTERNAL MEDICINE

## 2020-02-04 PROCEDURE — 93306 TTE W/DOPPLER COMPLETE: CPT

## 2020-02-04 PROCEDURE — 700111 HCHG RX REV CODE 636 W/ 250 OVERRIDE (IP)

## 2020-02-04 PROCEDURE — 80048 BASIC METABOLIC PNL TOTAL CA: CPT

## 2020-02-04 PROCEDURE — 93308 TTE F-UP OR LMTD: CPT | Mod: 26 | Performed by: INTERNAL MEDICINE

## 2020-02-04 PROCEDURE — 0JH608Z INSERTION OF DEFIBRILLATOR GENERATOR INTO CHEST SUBCUTANEOUS TISSUE AND FASCIA, OPEN APPROACH: ICD-10-PCS | Performed by: INTERNAL MEDICINE

## 2020-02-04 PROCEDURE — 700101 HCHG RX REV CODE 250

## 2020-02-04 PROCEDURE — 33249 INSJ/RPLCMT DEFIB W/LEAD(S): CPT | Performed by: INTERNAL MEDICINE

## 2020-02-04 PROCEDURE — 85027 COMPLETE CBC AUTOMATED: CPT

## 2020-02-04 PROCEDURE — 99152 MOD SED SAME PHYS/QHP 5/>YRS: CPT | Performed by: INTERNAL MEDICINE

## 2020-02-04 PROCEDURE — 93005 ELECTROCARDIOGRAM TRACING: CPT | Performed by: INTERNAL MEDICINE

## 2020-02-04 RX ORDER — LIDOCAINE HYDROCHLORIDE 20 MG/ML
INJECTION, SOLUTION INFILTRATION; PERINEURAL
Status: COMPLETED
Start: 2020-02-04 | End: 2020-02-04

## 2020-02-04 RX ORDER — VANCOMYCIN HYDROCHLORIDE 1 G/20ML
INJECTION, POWDER, LYOPHILIZED, FOR SOLUTION INTRAVENOUS
Status: COMPLETED
Start: 2020-02-04 | End: 2020-02-04

## 2020-02-04 RX ORDER — BUPIVACAINE HYDROCHLORIDE 2.5 MG/ML
INJECTION, SOLUTION EPIDURAL; INFILTRATION; INTRACAUDAL
Status: COMPLETED
Start: 2020-02-04 | End: 2020-02-04

## 2020-02-04 RX ORDER — MIDAZOLAM HYDROCHLORIDE 1 MG/ML
INJECTION INTRAMUSCULAR; INTRAVENOUS
Status: COMPLETED
Start: 2020-02-04 | End: 2020-02-04

## 2020-02-04 RX ADMIN — FENTANYL CITRATE 75 MCG: 0.05 INJECTION, SOLUTION INTRAMUSCULAR; INTRAVENOUS at 17:24

## 2020-02-04 RX ADMIN — CLOPIDOGREL BISULFATE 75 MG: 75 TABLET ORAL at 05:10

## 2020-02-04 RX ADMIN — GABAPENTIN 300 MG: 300 CAPSULE ORAL at 13:44

## 2020-02-04 RX ADMIN — OMEPRAZOLE 40 MG: 20 CAPSULE, DELAYED RELEASE ORAL at 18:27

## 2020-02-04 RX ADMIN — LIDOCAINE HYDROCHLORIDE: 20 INJECTION, SOLUTION INFILTRATION; PERINEURAL at 16:22

## 2020-02-04 RX ADMIN — ACETAMINOPHEN 650 MG: 325 TABLET, FILM COATED ORAL at 06:07

## 2020-02-04 RX ADMIN — BUPIVACAINE HYDROCHLORIDE: 2.5 INJECTION, SOLUTION EPIDURAL; INFILTRATION; INTRACAUDAL; PERINEURAL at 16:22

## 2020-02-04 RX ADMIN — OMEPRAZOLE 40 MG: 20 CAPSULE, DELAYED RELEASE ORAL at 05:11

## 2020-02-04 RX ADMIN — GABAPENTIN 300 MG: 300 CAPSULE ORAL at 05:11

## 2020-02-04 RX ADMIN — VANCOMYCIN HYDROCHLORIDE 1725 MG: 1 INJECTION, POWDER, LYOPHILIZED, FOR SOLUTION INTRAVENOUS at 16:22

## 2020-02-04 RX ADMIN — OXYCODONE HYDROCHLORIDE AND ACETAMINOPHEN 1000 MG: 500 TABLET ORAL at 05:10

## 2020-02-04 RX ADMIN — SENNOSIDES AND DOCUSATE SODIUM 2 TABLET: 8.6; 5 TABLET ORAL at 05:11

## 2020-02-04 RX ADMIN — EZETIMIBE 10 MG: 10 TABLET ORAL at 05:10

## 2020-02-04 RX ADMIN — SACUBITRIL AND VALSARTAN 1 TABLET: 24; 26 TABLET, FILM COATED ORAL at 18:36

## 2020-02-04 RX ADMIN — OXYCODONE HYDROCHLORIDE AND ACETAMINOPHEN 1000 MG: 500 TABLET ORAL at 18:27

## 2020-02-04 RX ADMIN — ANASTROZOLE 0.5 MG: 1 TABLET, COATED ORAL at 05:10

## 2020-02-04 RX ADMIN — MELATONIN 2000 UNITS: at 05:11

## 2020-02-04 RX ADMIN — SACUBITRIL AND VALSARTAN 1 TABLET: 24; 26 TABLET, FILM COATED ORAL at 05:22

## 2020-02-04 RX ADMIN — ASPIRIN 81 MG: 81 TABLET, COATED ORAL at 05:11

## 2020-02-04 RX ADMIN — INSULIN HUMAN 2 UNITS: 100 INJECTION, SOLUTION PARENTERAL at 22:00

## 2020-02-04 RX ADMIN — GABAPENTIN 300 MG: 300 CAPSULE ORAL at 18:27

## 2020-02-04 RX ADMIN — MIDAZOLAM HYDROCHLORIDE 1.5 MG: 1 INJECTION, SOLUTION INTRAMUSCULAR; INTRAVENOUS at 17:24

## 2020-02-04 ASSESSMENT — LIFESTYLE VARIABLES
CONSUMPTION TOTAL: NEGATIVE
ON A TYPICAL DAY WHEN YOU DRINK ALCOHOL HOW MANY DRINKS DO YOU HAVE: 0
TOTAL SCORE: 0
DOES PATIENT WANT TO STOP DRINKING: CANNOT ASSESS
AVERAGE NUMBER OF DAYS PER WEEK YOU HAVE A DRINK CONTAINING ALCOHOL: 0
HOW MANY TIMES IN THE PAST YEAR HAVE YOU HAD 5 OR MORE DRINKS IN A DAY: 0
EVER HAD A DRINK FIRST THING IN THE MORNING TO STEADY YOUR NERVES TO GET RID OF A HANGOVER: NO
TOTAL SCORE: 0
EVER FELT BAD OR GUILTY ABOUT YOUR DRINKING: NO
ALCOHOL_USE: NO
HAVE YOU EVER FELT YOU SHOULD CUT DOWN ON YOUR DRINKING: NO
TOTAL SCORE: 0
HAVE PEOPLE ANNOYED YOU BY CRITICIZING YOUR DRINKING: NO

## 2020-02-04 ASSESSMENT — ENCOUNTER SYMPTOMS
CHOKING: 0
COUGH: 0
DIZZINESS: 1
NAUSEA: 0
STRIDOR: 0
CHEST TIGHTNESS: 0
TROUBLE SWALLOWING: 0
FEVER: 0
HEMATOLOGIC/LYMPHATIC NEGATIVE: 1
EYES NEGATIVE: 1
HEMOPTYSIS: 0
DIZZINESS: 0
CHILLS: 0
SHORTNESS OF BREATH: 0
LIGHT-HEADEDNESS: 0
MUSCULOSKELETAL NEGATIVE: 1
FATIGUE: 0
ABDOMINAL PAIN: 0
PALPITATIONS: 0
ENDOCRINE NEGATIVE: 1
PSYCHIATRIC NEGATIVE: 1
SPEECH DIFFICULTY: 0
VOMITING: 0
ORTHOPNEA: 0
WEAKNESS: 0
GASTROINTESTINAL NEGATIVE: 1
BLOOD IN STOOL: 0
WHEEZING: 0
NUMBNESS: 0

## 2020-02-04 NOTE — CONSULTS
EP Consult Note    DOS: 2/5/2020    Consulting physician: Eliazar Queen    Chief complaint/Reason for consult: AV block    HPI:  Patient is a 78 yo M. He has a history of systolic CHF 2/2 to ischemic cardiomyopathy, multivessel coronary disease s/p prior stage PCI with revascularization to the RCA/LM systems completed most in January. At this time also found to have low flow low gradient severe AS. This was evaluated for TAVR, which was completed about a week ago. He went home post TAVR and when walking out of his garage had a sudden syncopal episode. Lost consciousness for about 2 seconds he thinks. Resulted in a bruised R knee when he fell on a car in his drive way. Since then has had more dizziness spells. Outpatient monitoring was done consistent with intermittent complete AV block, suggesting likely symptomatic high grade block. He was then asked to come to the hospital for consideration of permanent pacing.     ROS (+ highlighted in red):  Constitutional: Fevers/chills/fatigue/weightloss  HEENT: Blurry vision/eye pain/sore throat/hearing loss  Respiratory: Shortness of breath/cough  Cardiovascular: Chest pain/palpitations/edema/orthopnea/syncope  GI: Nausea/vomitting/diarrhea  MSK: Arthralgias/myagias/muscle weakness  Skin: Rash/sores  Neurological: Numbness/tremors/vertigo  Endocrine: Excessive thirst/polyuria/cold intolerance/heat intolerance  Psych: Depression/anxiety    Past Medical History:   Diagnosis Date   • Aortic stenosis    • Arthritis    • Back pain    • Breath shortness     pt states short of breath 24/7   • CAD (coronary artery disease)    • CATARACT     removed bilat   • Chest pain    • Chest tightness    • Chickenpox    • Congestive heart failure (HCC)    • Constipation    • Coronary heart disease    • Cough    • Daytime sleepiness    • Diabetes     PT REPORTS DIABETES RESOLVED IN 2009. NO LONGER TAKES MEDS   • Difficulty breathing    • Fatigue    • Hearing difficulty    • Heart murmur    •  Heart valve disease    • Heartburn    • Hiatus hernia syndrome    • Hyperlipidemia    • Hypertension     pt states well controlled on meds   • Kidney disease    • Mumps    • Pain 08-29-13    back, hips and bilat legs, 4/10   • Pain 6/22/2015    left humerus   • Pain 1/7/16    knees, back and shoulder   • Painful joint    • Palpitations    • Pneumonia 2007   • Rhinitis    • Ringing in ears    • Severe aortic stenosis 12/4/2019   • Shortness of breath    • Swelling of lower extremity    • Tonsillitis    • Ulcer 2014    Dr. Porter, gastroenterologist   • Unspecified hemorrhagic conditions     bruises easily       Past Surgical History:   Procedure Laterality Date   • TRANSCATHETER AORTIC VALVE REPLACEMENT N/A 1/27/2020    Procedure: REPLACEMENT, AORTIC VALVE, TRANSCATHETER-;  Surgeon: Surendra Castro M.D.;  Location: [facility];  Service: Cardiac   • OLGA  1/27/2020    Procedure: ECHOCARDIOGRAM, TRANSESOPHAGEAL;  Surgeon: Surendra Castro M.D.;  Location: [facility];  Service: Cardiac   • GASTROSCOPY N/A 1/8/2016    Procedure: GASTROSCOPY;  Surgeon: Deniz Sue M.D.;  Location: [facility];  Service:    • HUMERUS ORIF Left 6/25/2015    Procedure: HUMERUS ORIF/ PROXIMAL;  Surgeon: Cristal Guido M.D.;  Location: [facility];  Service:    • LUMBAR FUSION POSTERIOR  9/5/2013    Performed by Jayro Sears M.D. at [facility]   • LUMBAR DECOMPRESSION  9/5/2013    Performed by Jayro Sears M.D. at [facility]   • MASS EXCISION NEURO  9/5/2013    Performed by Jayro Sears M.D. at [facility]   • RECOVERY  6/26/2012    Performed by SURGERY, IR-RECOVERY at SURGERY SAME DAY [facility] [place]   • DRAINAGE HEMATOMA  5/25/2012    Performed by DENIZ SUE at [facility]   • GASTRIC BAND LAPAROSCOPIC REVISION  5/11/2012    Performed by DENIZ SUE at [facility]   • LUMBAR FUSION POSTERIOR  3/26/2012    Performed by  EDITH WHELAN at SURGERY [facility]S   • LUMBAR DECOMPRESSION  3/26/2012    Performed by EDITH WHELAN at SURGERY [facility]   • INJ,EPIDURAL/LUMB/SAC SINGLE  3/19/2012    Performed by KRISTIN BALTAZAR at SURGERY [facility] ORS   • INJ,EPIDURAL/LUMB/SAC SINGLE  3/5/2012    Performed by KRISTIN BALTAZAR at SURGERY [facility] ORS   • GASTRIC BANDING LAPAROSCOPIC  3/24/2010    Performed by SARINA SUE at SURGERY [facility]S   • HIATAL HERNIA REPAIR  3/24/2010    Performed by SARINA SUE at SURGERY [facility]   • KNEE ARTHROPLASTY TOTAL  2000    bilateral   • ABDOMINOPLASTY  1990   • AORTIC VALVE REPLACEMENT     • ARTHROSCOPY, KNEE     • LAMINOTOMY     • PB REMV 2ND CATARACT,CORN-SCLER SECTN     • SINUSCOPE     • SLEEVE,CARRIE VASO THIGH     • TONSILLECTOMY     • ZZZ CARDIAC CATH         Social History     Socioeconomic History   • Marital status:      Spouse name: Not on file   • Number of children: Not on file   • Years of education: Not on file   • Highest education level: Not on file   Occupational History   • Not on file   Social Needs   • Financial resource strain: Not on file   • Food insecurity:     Worry: Not on file     Inability: Not on file   • Transportation needs:     Medical: Not on file     Non-medical: Not on file   Tobacco Use   • Smoking status: Never Smoker   • Smokeless tobacco: Never Used   • Tobacco comment: 0   Substance and Sexual Activity   • Alcohol use: No     Alcohol/week: 0.0 oz     Frequency: Never     Binge frequency: Never   • Drug use: No   • Sexual activity: Yes   Lifestyle   • Physical activity:     Days per week: Not on file     Minutes per session: Not on file   • Stress: Not on file   Relationships   • Social connections:     Talks on phone: Not on file     Gets together: Not on file     Attends Sabianist service: Not on file     Active member of club or organization: Not on file     Attends meetings of clubs or organizations: Not on file      Relationship status: Not on file   • Intimate partner violence:     Fear of current or ex partner: Not on file     Emotionally abused: Not on file     Physically abused: Not on file     Forced sexual activity: Not on file   Other Topics Concern   • Not on file   Social History Narrative   • Not on file       Family History   Problem Relation Age of Onset   • No Known Problems Sister    • No Known Problems Sister    • No Known Problems Sister    • Diabetes Other    • No Known Problems Mother    • No Known Problems Father    • Heart Disease Neg Hx        Allergies   Allergen Reactions   • Statins [Hmg-Coa-R Inhibitors] Rash     Blisters         Current Facility-Administered Medications   Medication Dose Route Frequency Provider Last Rate Last Dose   • acetaminophen (TYLENOL) tablet 650 mg  650 mg Oral Q6HRS PRN PATEL Gil.O.   650 mg at 02/04/20 0607   • anastrozole (ARIMIDEX) tablet 0.5 mg  0.5 mg Oral Once per day on Tue Sat PATEL Gil.O.   0.5 mg at 02/04/20 0510   • ascorbic acid tablet 1,000 mg  1,000 mg Oral BID PATEL Gil.O.   1,000 mg at 02/04/20 0510   • aspirin EC (ECOTRIN) tablet 81 mg  81 mg Oral DAILY PATEL Gil.O.   81 mg at 02/04/20 0511   • vitamin D (cholecalciferol) tablet 2,000 Units  2,000 Units Oral DAILY PATEL Gil.O.   2,000 Units at 02/04/20 0511   • clopidogrel (PLAVIX) tablet 75 mg  75 mg Oral DAILY PATEL Gil.O.   75 mg at 02/04/20 0510   • ezetimibe (ZETIA) tablet 10 mg  10 mg Oral QAM PATEL Gil.O.   10 mg at 02/04/20 0510   • gabapentin (NEURONTIN) capsule 300 mg  300 mg Oral TID MELISSA GilO.   300 mg at 02/04/20 0511   • omeprazole (PRILOSEC) capsule 40 mg  40 mg Oral BID PATEL Gil.O.   40 mg at 02/04/20 0511   • sacubitril-valsartan (ENTRESTO) 24-26 MG tablet 1 Tab  1 Tab Oral BID PATEL Gil.O.   1 Tab at 02/04/20 0522   • senna-docusate (PERICOLACE or SENOKOT S) 8.6-50 MG per tablet 2 Tab  2 Tab Oral BID Augustus LEY  DORIS Smith   2 Tab at 02/04/20 0511    And   • polyethylene glycol/lytes (MIRALAX) PACKET 1 Packet  1 Packet Oral QDAY PRN Augustus Smith D.O.        And   • magnesium hydroxide (MILK OF MAGNESIA) suspension 30 mL  30 mL Oral QDAY PRN Augustus Smith D.O.        And   • bisacodyl (DULCOLAX) suppository 10 mg  10 mg Rectal QDAY PRN Augustus Smith D.O.       • Respiratory Care per Protocol   Nebulization Continuous RT Augustus Smith D.O.       • enalaprilat (VASOTEC) injection 1.25 mg  1.25 mg Intravenous Q6HRS PRN Augustus Smith D.O.       • ondansetron (ZOFRAN) syringe/vial injection 4 mg  4 mg Intravenous Q4HRS PRN Augustus Smith D.O.       • ondansetron (ZOFRAN ODT) dispertab 4 mg  4 mg Oral Q4HRS PRN Augustus Smith D.O.       • insulin regular (HUMULIN R) injection 2-9 Units  2-9 Units Subcutaneous 4X/DAY ACHS Augustus Smith D.O.   Stopped at 02/03/20 1700    And   • glucose 4 g chewable tablet 16 g  16 g Oral Q15 MIN PRN Augustus Smith D.O.        And   • DEXTROSE 10% BOLUS 250 mL  250 mL Intravenous Q15 MIN PRN Augustus Smith D.O.           Physical Exam:  Vitals:    02/03/20 2327 02/04/20 0443 02/04/20 0800 02/04/20 1200   BP: 103/60 119/72 132/71 121/68   Pulse: 64 63 65 66   Resp: 20 18 18 18   Temp: 36.3 °C (97.4 °F) 36.6 °C (97.9 °F) 36.5 °C (97.7 °F) 36.1 °C (97 °F)   TempSrc: Temporal Temporal Temporal Temporal   SpO2: 96% 96% 96% 95%   Weight:       Height:         General appearance: NAD, conversant   Eyes: anicteric sclerae, moist conjunctivae; no lid-lag; PERRLA  HENT: Atraumatic; oropharynx clear with moist mucous membranes and no mucosal ulcerations; normal hard and soft palate  Neck: Trachea midline; FROM, supple, no thyromegaly or lymphadenopathy  Lungs: CTA, with normal respiratory effort and no intercostal retractions  CV: RRR, no MRGs, no JVD   Abdomen: Soft, non-tender; no masses or HSM  Extremities: No peripheral edema or extremity lymphadenopathy  Skin: Normal temperature, turgor  and texture; no rash, ulcers or subcutaneous nodules  Psych: Appropriate affect, alert and oriented to person, place and time    Data:  Labs reviewed:  Cr 1.5    Prior echo/stress results reviewed:  LVEF 35% with focal WMAs    Prior cath results reviewed  Successful LM PCI    CXR interpreted by me:  WNL    EKG interpreted by me:   Sinus first degree AV block    Impression/Plan:  1)Complete heart block   2)Ischemic cardiomyopathy/Systolic CHF  3)PAF  4)S/p TAVR  5)CAD s/p prior PCI    -I have reviewed the outpatient strips, showing period of complete heart block with AV dissociation and escape in the low 40s  -Currently conducting 1:1 with long AV delay, suggests existence of infrahisian conduction disease and high grade AV block (Mobitz II), and will likely require higher burden of RV pacing  -I think BiV pacing if feasible is reasonable  -His LV function on repeat echo is the same at about 35%  -He did have recent revascularization, but as he is getting a device for pacing indication, he may likely not improve further in which case primary prevention CRT-D device should be the device of choice  -Risks/benefits/alternatives were discussed with him, all questions answered, he agrees to proceed    Denilson Steiner MD

## 2020-02-04 NOTE — ED NOTES
Pt sitting up at side of [facility],  Updated on POC.  Denies needs at this time. Call light in reach.    .

## 2020-02-04 NOTE — ASSESSMENT & PLAN NOTE
Creatinine was 1.64 on admission but is down to 1.49 today.  Continue to monitor.  Avoid nephro-toxic meds.  Avoid IV contrast.

## 2020-02-04 NOTE — TELEPHONE ENCOUNTER
Pt wondering if we were tracking his progress through the ER and if Billie wanted any more labs drawn that he should have the ER team draw. Advised pt that ER team has covered their bases. Pt in good spirits but anxious to get procedure done since he has been in the ER since 11am. Advised pt that I dont have any control over how soon he gets his pacer. Pt encouraged to hang in there.

## 2020-02-04 NOTE — ASSESSMENT & PLAN NOTE
Readmitted due to complete heart block.  Patient awaiting permanent pacemaker placement.  Cardiology on board.    Continue to monitor on telemetry

## 2020-02-04 NOTE — ASSESSMENT & PLAN NOTE
Monitor accuchecks and cover with SSI.  A.m. glucose 125.  Continue to hold glimepiride  A1c:6.5 in past 5 months

## 2020-02-04 NOTE — PROGRESS NOTES
Received Bedside report. Assumed care at 2000. This pt is AOx4, ambulatory with stby assist, voiding in bathroom/urinal, 0 pain. Patient and RN discussed plan of care: questions answered. Labs noted, assessment complete, patient tolerating cardiac dm diet. Tele box in place. Pt is on 2L of O2 via NC. Call light in place, fall precautions in place, patient educated on importance of calling for assistance. No additional needs at this time. VSS     2 RN skin check complete.   Devices in place cardiac monitor, pacer pads, holter monitor.  Skin assessed under devices intact.  Confirmed pressure ulcers found on n/a.  New potential pressure ulcers noted on n/a. Wound consult placed n/a.  The following interventions in place pt encouraged to change position frequently.

## 2020-02-04 NOTE — CARE PLAN
Problem: Communication  Goal: The ability to communicate needs accurately and effectively will improve  Outcome: PROGRESSING AS EXPECTED  Intervention: [place] patient and significant other/support system to call light to alert staff of needs  Note:   Patient educated to utilize call light. Patient and family oriented to hospital room. Patient encouraged to ask questions about plan of care. Patient effectively uses call light and is involved in POC.      Problem: Safety  Goal: Will remain free from injury  Outcome: PROGRESSING AS EXPECTED  Intervention: Provide assistance with mobility  Note:   Patient's risk for injury and falls assessed. Appropriate safety precautions in place. Patient educated to utilize call light for needs. Patient verbalizes understanding.

## 2020-02-04 NOTE — PROGRESS NOTES
Cardiology Follow Up Progress Note    Date of Service  2/4/2020    Attending Physician  Meaghan Irwin M.D.    Chief Complaint   Lightheadedness and edema.    Cardiology consulted for complete heart block.     AMBER More III is a 79 y.o. male advised by our office to present to the ER on  2/3/2020 after transient complete heart block was observed on his cardiac event monitor that was placed after a syncopal episode he experienced post TAVR.    Patient did admit to being symptomatic with intermittent light headedness and lower extremity edema which he recently increased his home dose of lasix for.     Past medical history significant for Ischemic and valvular CM (LVEF 20%), moderate AS S/P TAVR, MV CAD (known  of nondominant LCx, high grade RCA, distal left main and proximal LAD stenosis),     Interim Events  2/4/2020: NPO for PPM placement. NSR with intermittent Mobitz II. Denies any significant concerns or complaints at this time. Anxious to have procedure completed.     Review of Systems  Review of Systems   Constitutional: Negative for fever.   Respiratory: Negative for chest tightness and shortness of breath.    Cardiovascular: Positive for leg swelling (Improved). Negative for chest pain and palpitations.   Gastrointestinal: Negative for abdominal pain and blood in stool.   Genitourinary: Negative for hematuria. Penile discharge:     Musculoskeletal: Negative for gait problem.   Neurological: Negative for dizziness and syncope.   All other systems reviewed and are negative.      Vital signs in last 24 hours  Temp:  [36.1 °C (97 °F)-36.8 °C (98.3 °F)] 36.5 °C (97.7 °F)  Pulse:  [63-83] 65  Resp:  [16-25] 18  BP: (103-140)/(56-76) 132/71  SpO2:  [91 %-97 %] 96 %    Physical Exam  Physical Exam  Constitutional:       General: He is not in acute distress.     Appearance: Normal appearance.   HENT:      Head: Normocephalic.   Eyes:      Extraocular Movements: Extraocular movements intact.   Neck:       Musculoskeletal: Normal range of motion.   Cardiovascular:      Rate and Rhythm: Normal rate and regular rhythm.      Pulses: Normal pulses.      Heart sounds: Normal heart sounds. No murmur.   Pulmonary:      Effort: Pulmonary effort is normal.      Breath sounds: Normal breath sounds.   Abdominal:      General: There is no distension.      Palpations: Abdomen is soft.      Tenderness: There is no tenderness.   Musculoskeletal:      Right lower leg: Edema (trace) present.      Left lower leg: Edema (trace) present.   Skin:     General: Skin is warm and dry.      Comments: BLE chronic venous statis skin discoloration.    Neurological:      Mental Status: He is alert and oriented to person, place, and time.   Psychiatric:         Mood and Affect: Mood normal.         Thought Content: Thought content normal.         Judgment: Judgment normal.         Lab Review  Lab Results   Component Value Date/Time    WBC 5.8 02/04/2020 05:31 AM    WBC 4.5 02/08/2011 12:00 AM    RBC 6.31 (H) 02/04/2020 05:31 AM    RBC 5.36 02/08/2011 12:00 AM    HEMOGLOBIN 15.2 02/04/2020 05:31 AM    HEMATOCRIT 51.1 02/04/2020 05:31 AM    MCV 81.0 (L) 02/04/2020 05:31 AM    MCV 93 02/08/2011 12:00 AM    MCH 24.1 (L) 02/04/2020 05:31 AM    MCH 31.0 02/08/2011 12:00 AM    MCHC 29.7 (L) 02/04/2020 05:31 AM    MPV 9.3 02/04/2020 05:31 AM      Lab Results   Component Value Date/Time    SODIUM 136 02/04/2020 05:31 AM    POTASSIUM 4.0 02/04/2020 05:31 AM    CHLORIDE 105 02/04/2020 05:31 AM    CO2 25 02/04/2020 05:31 AM    GLUCOSE 125 (H) 02/04/2020 05:31 AM    BUN 27 (H) 02/04/2020 05:31 AM    CREATININE 1.49 (H) 02/04/2020 05:31 AM    CREATININE 1.18 02/08/2011 12:00 AM    BUNCREATRAT 16 02/08/2011 12:00 AM    GLOMRATE >59 02/08/2011 12:00 AM      Lab Results   Component Value Date/Time    ASTSGOT 28 02/03/2020 02:00 PM    ALTSGPT 15 02/03/2020 02:00 PM     Lab Results   Component Value Date/Time    CHOLSTRLTOT 105 12/27/2019 02:20 AM    LDL 56  12/27/2019 02:20 AM    HDL 29 (A) 12/27/2019 02:20 AM    TRIGLYCERIDE 101 12/27/2019 02:20 AM    TROPONINT 33 (H) 02/03/2020 02:00 PM       No results for input(s): NTPROBNP in the last 72 hours.    Cardiac Imaging and Procedures Review    Cardiac Catheterization 12/27/19:  PERIPHERAL ANGIOGRAPHY: The distal abdominal aorta and both iliac and femoral arteries are patent with at least moderate tortuosity of the right iliac artery.     CORONARY ARTERIOGRAPHY:  Right coronary artery:  The right coronary artery is a large-caliber vessel with a significant walsh's crook and midvessel acute tortuosity with a distal diseased segment containing a 99% stenosis with JEN-2 antegrade flow.      POST-SENT IMPLANTATION, distal right coronary artery demonstrates a residual   10% focal eccentric stenosis in the proximal portion of the stent; otherwise, the remainder of the stent is well expanded with no dissection or thrombus and JEN-3 antegrade flow.    Cardiac Catheterization 1/10/2020:  Conclusions  1. LVEF 30% prior to intervention.  2. 70% distal left main and proximal LAD stenosis, IFR positive from priorprocedure.  3. Successful planned PCI proximal LAD and distal left main trifurcationstenosis (3.5 x 26 mm Jack BRANDI postdilated to 4.5 mm proximally), IVUS guided.  4. Moderate aortic stenosis, mean gradient 27 to 30mmHg.     Recommendations    * Long-term dual antiplatelet therapy.    * Bivalirudin for 4 hours post procedure.    * Continue outpatient cardiac medications.    * Follow-up as scheduled after observation admission overnight.    * Continue medical therapy for diabetes.    TAVR 1/27/2020:     PRE PROCEDURAL DIAGNOSIS:  1. Severe symptomatic aortic stenosis, NYHA IIIB.     POST PROCEDURAL DIAGNOSIS:  1. Successful transcatheter aortic valve replacement (TAVR) with # 29 Khan Salvador S3 Ultra valve, transfemoral approach, under general anesthesia.  2. Successful Preclose closure.  3. Successful Angio-Seal  closure.       Assessment/Plan  CHB:  -Observed on patients Biotel cardiac event monitor.   -Plan is for PPM placement today.     Ischemic CM:  -LVEF on TTE on 1/28/2020 was 35%.   -I/O not accurately documented, discussed with nursing.   -Dry weight per patient is 240 lbs, currently weighs 253 lbs.   -Continue Entresto 24-26 mg BID and   -Coreg 6.25 mg BID once PPM is placed.   -Repeat echo ordered by EP, results pending.     CAD:  -S/P PCI of RCA on 12/27/19.   -S/P PCI with BRANDI of the proximal LAD and distal left main trifurcation on 1/10/20.  -DAPT (ASA 81 mg and Plavix 75 mg).  -Entresto and Coreg as above.     Aortic Stenosis:  -S/P TAVR on 1/27/2020.  -Repeat TTE pending.     Thank you for allowing me to participate in the care of this patient. We will continue to follow alongside you in the care of this patient. Please let us know if you have any questions or concerns.     Future Appointments   Date Time Provider Department Center   3/3/2020  8:15 AM [facility] ECHO 1 [facility] Otero   3/5/2020  8:00 AM Surendra Castro M.D. [facility] None   5/1/2020  7:40 AM Sae Mera M.D. [facility] None       SHO Brannon.   [facility]  [phone]

## 2020-02-04 NOTE — H&P
Hospital Medicine History & Physical Note    Date of Service  2/3/2020    Primary Care Physician  STEPHANIE Medina.    Consultants  cardiology    Code Status  FULL    Chief Complaint  Called by his loop recorder personal and told to come to hospital.    History of Presenting Illness  79 y.o. male w/ hx of TAVR, CAD w/ stents, obesity w/ prior gastric banding, CHF, DM, who presented 2/3/2020 with a concern of complete heart block noted on his noninvasive event recorder. He was contacted by his cardiology office and told to come to hospital immediately for pacemaker placement. He did have a recent brief syncopal episode while in his garage and fell hitting his face but other then abraision to upper lip.  He is alert, denies any SOB, dizziness, chest pain.  He does state some weight gain since his TAVR and some leg edema.    Review of Systems  Review of Systems   Constitutional: Negative for fever and weight loss.   HENT: Negative for congestion.    Eyes: Negative for blurred vision.   Respiratory: Negative for cough, shortness of breath and stridor.    Cardiovascular: Positive for leg swelling.   Gastrointestinal: Negative for abdominal pain, blood in stool, diarrhea and nausea.   Genitourinary: Negative for dysuria, frequency and hematuria.   Musculoskeletal: Negative for joint pain and neck pain.   Neurological: Positive for loss of consciousness. Negative for dizziness.   Psychiatric/Behavioral: The patient is not nervous/anxious.        Past Medical History   has a past medical history of Aortic stenosis, Arthritis, Back pain, Breath shortness, CAD (coronary artery disease), CATARACT, Chest pain, Chest tightness, Chickenpox, Congestive heart failure (HCC), Constipation, Coronary heart disease, Cough, Daytime sleepiness, Diabetes, Difficulty breathing, Fatigue, Hearing difficulty, Heart murmur, Heart valve disease, Heartburn, Hiatus hernia syndrome, Hyperlipidemia, Hypertension, Kidney disease, Mumps, Pain  (08-29-13), Pain (6/22/2015), Pain (1/7/16), Painful joint, Palpitations, Pneumonia (2007), Rhinitis, Ringing in ears, Severe aortic stenosis (12/4/2019), Shortness of breath, Swelling of lower extremity, Tonsillitis, Ulcer (2014), and Unspecified hemorrhagic conditions.    Surgical History   has a past surgical history that includes abdominoplasty (1990); gastric banding laparoscopic (3/24/2010); hiatal hernia repair (3/24/2010); inj,epidural/lumb/sac single (3/5/2012); inj,epidural/lumb/sac single (3/19/2012); lumbar fusion posterior (3/26/2012); lumbar decompression (3/26/2012); gastric band laparoscopic revision (5/11/2012); drainage hematoma (5/25/2012); recovery (6/26/2012); lumbar fusion posterior (9/5/2013); lumbar decompression (9/5/2013); mass excision neuro (9/5/2013); gastroscopy (N/A, 1/8/2016); knee arthroplasty total (2000); humerus orif (Left, 6/25/2015); zzz cardiac cath; laminotomy; Sleeve,Grant Vaso Thigh; pr remv 2nd cataract,corn-scler sectn; sinuscope; tonsillectomy; aortic valve replacement; arthroscopy, knee; transcatheter aortic valve replacement (N/A, 1/27/2020); and stephon (1/27/2020).     Family History  States father unknown  States mother was a prostitute but no know heart issues.     Social History   reports that he has never smoked. He has never used smokeless tobacco. He reports that he does not drink alcohol or use drugs.    Allergies  Allergies   Allergen Reactions   • Statins [Hmg-Coa-R Inhibitors] Rash     Blisters         Medications  Prior to Admission Medications   Prescriptions Last Dose Informant Patient Reported? Taking?   Ascorbic Acid (VITAMIN C) 1000 MG Tab 2/3/2020 at 1000 Significant Other Yes No   Sig: Take 1,000 mg by mouth 2 Times a Day.   B Complex Vitamins (VITAMIN B COMPLEX PO) 2/3/2020 at 1000 Significant Other Yes No   Sig: Take 1 Tab by mouth every day.   Cholecalciferol (VITAMIN D3) 2000 UNIT Cap 2/3/2020 at 1000 Significant Other Yes No   Sig: Take 2,000 Units  by mouth every day.   Cranberry 300 MG Tab 2/3/2020 at 1000 Significant Other Yes No   Sig: Take 300 mg by mouth every morning.   Garlic 1000 MG Cap 2/3/2020 at 1000 Significant Other Yes Yes   Sig: Take 1,000 mg by mouth 2 Times a Day.   L-Lysine 1000 MG Tab 2/3/2020 at 1000 Significant Other Yes No   Sig: Take 1,000 mg by mouth every day.   Multiple Vitamins-Minerals (OCULAR VITAMINS PO) 2/3/2020 at 1000 Significant Other Yes No   Sig: Take 1 Cap by mouth every day.   Non Formulary Request 2/3/2020 at 1000 Significant Other Yes No   Sig: Inject 1 Each as instructed See Admin Instructions. HCG/ARGININE 73377b/5mg/cc, pt does not take on Thur    Non Formulary Request 2/3/2020 at 1000 Significant Other Yes No   Sig: Inject 1 Each as instructed See Admin Instructions. HGH 5.8mg/2.5mg/cc,   Monday, Tuesday, Wednesday, Friday, Saturday,    Non Formulary Request 2/3/2020 at 1000 Significant Other Yes No   Sig: Take 1 Cap by mouth 2 Times a Day. Cholox (OTC)   Non Formulary Request 2/3/2020 at 1000 Significant Other Yes No   Sig: Take 1 Cap by mouth 2 Times a Day. Domenic red (OTC)   Non Formulary Request 2/3/2020 at 1000 Significant Other Yes No   Sig: Take 1 Cap by mouth every day. Nitric OXIDE (OTC)   Non Formulary Request 2/3/2020 at 1000 Significant Other Yes No   Sig: Take 1 Cap by mouth every day. anit-oxidant (OTC)   Probiotic Product (PROBIOTIC DAILY PO) 2/3/2020 at 1000 Significant Other Yes No   Sig: Take 1 Cap by mouth every day.   Testosterone Cypionate (DEPO-TESTOSTERONE IM) 2020 at 1000 Significant Other Yes No   Si Each by Intramuscular route every 7 days. Thursday   Zinc 50 MG Tab 2/3/2020 at 1000 Significant Other Yes No   Sig: Take 50 mg by mouth every day.   acetaminophen (TYLENOL) 500 MG Tab 2020 at 1930 Significant Other Yes No   Sig: Take 1,500 mg by mouth every 6 hours as needed for Moderate Pain.   anastrozole (ARIMIDEX) 1 MG Tab 2020 at 1000 Significant Other Yes No   Sig:  Take 0.5 mg by mouth See Admin Instructions. 1/2 tab 2x/wk On Tue and Sat   aspirin EC 81 MG EC tablet 2/3/2020 at 1000 Significant Other No No   Sig: Take 1 Tab by mouth every day.   bumetanide (BUMEX) 1 MG Tab 2/3/2020 at 1000 Significant Other Yes No   Sig: Take 1 Tab by mouth every 48 hours.   calcium citrate (CALCITRATE) 950 MG Tab 2/3/2020 at 1000 Significant Other Yes Yes   Sig: Take 1,900 mg by mouth every day.   clopidogrel (PLAVIX) 75 MG Tab 2/3/2020 at 1000 Significant Other No No   Sig: Take 1 Tab by mouth every day.   coenzyme Q-10 30 MG capsule 2/3/2020 at 1000 Significant Other Yes Yes   Sig: Take 60 mg by mouth 2 Times a Day.   ezetimibe (ZETIA) 10 MG Tab 2/3/2020 at 1000 Significant Other Yes No   Sig: Take 10 mg by mouth every morning.   gabapentin (NEURONTIN) 300 MG Cap 2/2/2020 at 1900 Significant Other Yes No   Sig: Take 300 mg by mouth 3 times a day as needed. Indications: Neuropathic Pain   glimepiride (AMARYL) 1 MG tablet 2/2/2020 at 1900 Significant Other Yes No   Sig: Take 1 mg by mouth every evening.   glucosamine Sulfate 500 MG Cap 2/3/2020 at 1000 Significant Other Yes Yes   Sig: Take 1,500 mg by mouth 2 Times a Day.   omeprazole (PRILOSEC) 40 MG delayed-release capsule 2/3/2020 at 1000 Significant Other Yes No   Sig: Take 40 mg by mouth 2 times a day.   sacubitril-valsartan (ENTRESTO) 24-26 MG Tab tablet 2/3/2020 at 1000 Significant Other No No   Sig: Take 1 Tab by mouth 2 Times a Day.   vitamin e (VITAMIN E) 400 UNIT Cap 2/3/2020 at 1000 Significant Other Yes No   Sig: Take 400 Units by mouth 2 times a day.      Facility-Administered Medications: None       Physical Exam  Temp:  [36.1 °C (97 °F)-36.8 °C (98.2 °F)] 36.1 °C (97 °F)  Pulse:  [68-83] 68  Resp:  [16-25] 20  BP: (108-143)/(56-76) 129/59  SpO2:  [91 %-97 %] 97 %    Physical Exam  Vitals signs reviewed.   Constitutional:       Appearance: Normal appearance. He is obese. He is not diaphoretic.   HENT:      Head: Normocephalic  and atraumatic.      Nose: Nose normal.      Mouth/Throat:      Mouth: Mucous membranes are moist.      Pharynx: No oropharyngeal exudate.   Eyes:      General: No scleral icterus.        Right eye: No discharge.         Left eye: No discharge.      Extraocular Movements: Extraocular movements intact.      Conjunctiva/sclera: Conjunctivae normal.   Neck:      Musculoskeletal: Neck supple. No muscular tenderness.   Cardiovascular:      Rate and Rhythm: Normal rate and regular rhythm.      Pulses:           Radial pulses are 2+ on the right side and 2+ on the left side.        Dorsalis pedis pulses are 2+ on the right side and 2+ on the left side.      Heart sounds: Normal heart sounds. No murmur.   Pulmonary:      Effort: Pulmonary effort is normal. No respiratory distress.      Breath sounds: Normal breath sounds. No stridor. No wheezing or rales.   Abdominal:      General: Bowel sounds are normal. There is no distension.      Palpations: Abdomen is soft.      Tenderness: There is no tenderness.   Musculoskeletal:         General: No swelling or tenderness.   Skin:     Coloration: Skin is not jaundiced or pale.   Neurological:      General: No focal deficit present.      Mental Status: He is alert and oriented to person, place, and time. Mental status is at baseline.      Cranial Nerves: No cranial nerve deficit.   Psychiatric:         Mood and Affect: Mood normal.         Behavior: Behavior normal.         Laboratory:  Recent Labs     02/03/20  1400   WBC 6.0   RBC 6.65*   HEMOGLOBIN 15.9   HEMATOCRIT 53.4*   MCV 80.3*   MCH 23.9*   MCHC 29.8*   RDW 63.5*   PLATELETCT 126*     Recent Labs     02/03/20  1400   SODIUM 139   POTASSIUM 4.4   CHLORIDE 106   CO2 25   GLUCOSE 138*   BUN 29*   CREATININE 1.64*   CALCIUM 9.6     Recent Labs     02/03/20  1400   ALTSGPT 15   ASTSGOT 28   ALKPHOSPHAT 42   TBILIRUBIN 0.8   LIPASE 41   GLUCOSE 138*         No results for input(s): NTPROBNP in the last 72 hours.      Recent Labs      02/03/20  1400   TROPONINT 33*       Urinalysis:    No results found     Imaging:  DX-CHEST-PORTABLE (1 VIEW)   Final Result      1.  There is no acute cardiopulmonary process.      CL-PERMANENT PACEMAKER INSERTION    (Results Pending)         Assessment/Plan:  I anticipate this patient will require at least two midnights for appropriate medical management, necessitating inpatient admission.    * Complete heart block (HCC)  Assessment & Plan  2/3 EKG in ER showing NSR  3rd degree heart block reportedly found on event monitor  Monitor on telemetry  Cardiology consulting for probable pacemaker placement    S/P TAVR (transcatheter aortic valve replacement)- (present on admission)  Assessment & Plan  Follows with cardiology  Monitor on telemetry    Coronary artery disease involving native coronary artery of native heart without angina pectoris- (present on admission)  Assessment & Plan  Ezetimibe, entresto  Allergic to statins    Renal insufficiency  Assessment & Plan  2/3 BUN:29, Cr:1.64  Follow up on labs  Avoid nephro-toxic meds    Hypertension- (present on admission)  Assessment & Plan  Monitor vitals.  Continue Entresto for active treatment  Hold bumex    Syncope and collapse  Assessment & Plan  S/p recent TAVR.  Has event monitor that captured complete heart block per report (likely etiology)  1/16/2020 US carotids w/o any significant stenosis    Obesity- (present on admission)  Assessment & Plan  Hx of gastric band per pt.  Body mass index is 33.74 kg/m².  Encourage healthy lifestyle modifications    Type 2 diabetes mellitus without complication, without long-term current use of insulin (HCC)- (present on admission)  Assessment & Plan  Monitor accuchecks and cover with SSI  Hold glimepiride  A1c:6.5 in past 5 months      VTE prophylaxis: SCDs

## 2020-02-04 NOTE — ED NOTES
Pt resting quietly,  No assessment changes, remains on cardiac monitoring.  FSBS 109, boxed lunch given.  Pt updated on POC,  Denies other needs at this time.

## 2020-02-04 NOTE — ASSESSMENT & PLAN NOTE
Hx of gastric band per pt.  Body mass index is 33.74 kg/m².  Encourage healthy lifestyle modifications

## 2020-02-04 NOTE — TELEPHONE ENCOUNTER
Spoke with pt regarding him getting pacer. Pt on the tele floor. Advised pt to follow up with his inpatient nurse to discuss what time his pacer will be placed because we are unable to change things around on our end.     Also received urgent biotel for 2/3 at 0037 for new onset afib with PVCs, pt on tele monitor since 2/3 around 1400. Notified SC. Pt monitored on inpatient side.

## 2020-02-04 NOTE — PROGRESS NOTES
Cardiology Follow Up Progress Note    Date of Service  2/4/2020    Attending Physician  Meaghan Irwin M.D.    Chief Complaint   Syncope and HB seen on Biotel     HPI  LIAN More III is a 79 y.o. male advised by our office to present to the ER on  2/3/2020 after transient complete heart block was observed on his cardiac event monitor. Patient did complain of having intermittent light headedness and lower extremity edema which he recently increased his home dose of lasix for.      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    Monitored rhythm: Sinus with first deg AVB.   NPO for PPM.     Review of Systems  Review of Systems   Constitutional: Negative for chills, fatigue and fever.   HENT: Negative for congestion, nosebleeds, tinnitus and trouble swallowing.    Eyes: Negative.    Respiratory: Negative for cough, choking, shortness of breath, wheezing and stridor.    Cardiovascular: Negative for chest pain, palpitations and leg swelling.   Gastrointestinal: Negative for abdominal pain, blood in stool, nausea and vomiting.   Endocrine: Negative.    Genitourinary: Negative for hematuria.   Musculoskeletal: Negative.    Skin: Negative.    Neurological: Negative for dizziness, syncope, speech difficulty, weakness, light-headedness and numbness.        Recent history of syncope    Hematological: Negative.    Psychiatric/Behavioral: Negative.        Vital signs in last 24 hours  Temp:  [36.1 °C (97 °F)-36.8 °C (98.3 °F)] 36.5 °C (97.7 °F)  Pulse:  [63-83] 65  Resp:  [16-25] 18  BP: (103-143)/(56-76) 132/71  SpO2:  [91 %-97 %] 96 %    Physical Exam  Physical Exam  Vitals signs and nursing note reviewed.   Constitutional:       Appearance: Normal appearance.   HENT:      Head: Normocephalic and atraumatic.      Nose: No congestion.    Eyes:      Conjunctiva/sclera: Conjunctivae normal.      Pupils: Pupils are equal, round, and reactive to light.   Neck:      Musculoskeletal: Normal range of motion.   Cardiovascular:      Rate and Rhythm: Normal rate and regular rhythm.      Pulses: Normal pulses.      Heart sounds: Normal heart sounds. No murmur. No friction rub. No gallop.    Pulmonary:      Effort: Pulmonary effort is normal.      Breath sounds: Normal breath sounds. No wheezing, rhonchi or rales.   Abdominal:      General: Bowel sounds are normal.      Tenderness: There is no tenderness.   Musculoskeletal: Normal range of motion.      Right lower leg: No edema.      Left lower leg: No edema.   Skin:     General: Skin is warm and dry.   Neurological:      Mental Status: He is alert and oriented to person, place, and time.      Gait: Gait normal.   Psychiatric:         Mood and Affect: Mood normal.         Behavior: Behavior normal.         Thought Content: Thought content normal.         Judgment: Judgment normal.         Lab Review  Lab Results   Component Value Date/Time    WBC 5.8 02/04/2020 05:31 AM    WBC 4.5 02/08/2011 12:00 AM    RBC 6.31 (H) 02/04/2020 05:31 AM    RBC 5.36 02/08/2011 12:00 AM    HEMOGLOBIN 15.2 02/04/2020 05:31 AM    HEMATOCRIT 51.1 02/04/2020 05:31 AM    MCV 81.0 (L) 02/04/2020 05:31 AM    MCV 93 02/08/2011 12:00 AM    MCH 24.1 (L) 02/04/2020 05:31 AM    MCH 31.0 02/08/2011 12:00 AM    MCHC 29.7 (L) 02/04/2020 05:31 AM    MPV 9.3 02/04/2020 05:31 AM      Lab Results   Component Value Date/Time    SODIUM 136 02/04/2020 05:31 AM    POTASSIUM 4.0 02/04/2020 05:31 AM    CHLORIDE 105 02/04/2020 05:31 AM    CO2 25 02/04/2020 05:31 AM    GLUCOSE 125 (H) 02/04/2020 05:31 AM    BUN 27 (H) 02/04/2020 05:31 AM    CREATININE 1.49 (H) 02/04/2020 05:31 AM    CREATININE 1.18 02/08/2011 12:00 AM    BUNCREATRAT 16 02/08/2011 12:00 AM    GLOMRATE >59 02/08/2011 12:00 AM      Lab Results   Component Value Date/Time    ASTSGOT 28  02/03/2020 02:00 PM    ALTSGPT 15 02/03/2020 02:00 PM     Lab Results   Component Value Date/Time    CHOLSTRLTOT 105 12/27/2019 02:20 AM    LDL 56 12/27/2019 02:20 AM    HDL 29 (A) 12/27/2019 02:20 AM    TRIGLYCERIDE 101 12/27/2019 02:20 AM    TROPONINT 33 (H) 02/03/2020 02:00 PM       No results for input(s): NTPROBNP in the last 72 hours.    Cardiac Imaging and Procedures Review  Echocardiogram:  1/28/20  Compared to the images of the study done 12- there has been an   interval placement of a TAVR valve.  The EF appears mildly improved.  Moderately reduced left ventricular systolic function.  Left ventricular ejection fraction is visually estimated to be 35%.    Cardiac Catheterization 1/10/2020:  Conclusions  1. LVEF 30% prior to intervention.  2. 70% distal left main and proximal LAD stenosis, IFR positive from priorprocedure.  3. Successful planned PCI proximal LAD and distal left main trifurcationstenosis (3.5 x 26 mm [place] BRANDI postdilated to 4.5 mm proximally), IVUS guided.  4. Moderate aortic stenosis, mean gradient 27 to 30mmHg.     Recommendations    * Long-term dual antiplatelet therapy.    * Bivalirudin for 4 hours post procedure.    * Continue outpatient cardiac medications.    * Follow-up as scheduled after observation admission overnight.    * Continue medical therapy for diabetes.     TAVR 1/27/2020:   POST PROCEDURAL DIAGNOSIS:  1. Successful transcatheter aortic valve replacement (TAVR) with # 29 Khan Salvador S3 Ultra valve, transfemoral approach, under general anesthesia.  2. Successful Preclose closure.  3. Successful Angio-Seal closure.       Assessment/Plan  1 High Grade HB with syncope:  - Biotel strips reviewed.  - Recent TAVR with prior revascularization.   - Updated echo pending to check LVEF post TAVR.   - I have discussed potential for CRT-P vs CRT-D with patient, pending results of most recent echo.  I have discussed risks/benefots of procedure with patient and answered his  questions.   - Decision on Pacemaker vs icd to be made after Dr. Steiner reviewed echo and discusses findings with patient.   - Keep NPO.       Please contact me with any questions.    SHO Glass.   [facility]  [phone]

## 2020-02-04 NOTE — ASSESSMENT & PLAN NOTE
Intermittent third-degree blocks w brief in nature without orthostasis.  Found on an event monitor hence patient counseled to return to the hospital.  He is awaiting a permanent pacemaker placement today.  Continue telemetry monitoring.

## 2020-02-04 NOTE — ASSESSMENT & PLAN NOTE
Secondary to severe aortic stenosis.  He is s/p recent TAVR.  Has event monitor that captured complete heart block per report (likely etiology)  1/16/2020 US carotids w/o any significant stenosis  Permanent pacemaker placement planned for today.

## 2020-02-05 ENCOUNTER — APPOINTMENT (OUTPATIENT)
Dept: RADIOLOGY | Facility: MEDICAL CENTER | Age: 80
DRG: 227 | End: 2020-02-05
Attending: INTERNAL MEDICINE
Payer: MEDICARE

## 2020-02-05 VITALS
TEMPERATURE: 98 F | SYSTOLIC BLOOD PRESSURE: 127 MMHG | BODY MASS INDEX: 32.72 KG/M2 | RESPIRATION RATE: 17 BRPM | OXYGEN SATURATION: 93 % | HEART RATE: 95 BPM | DIASTOLIC BLOOD PRESSURE: 71 MMHG | HEIGHT: 73 IN | WEIGHT: 246.91 LBS

## 2020-02-05 PROBLEM — R55 SYNCOPE AND COLLAPSE: Status: RESOLVED | Noted: 2020-02-03 | Resolved: 2020-02-05

## 2020-02-05 LAB — EKG IMPRESSION: NORMAL

## 2020-02-05 PROCEDURE — A9270 NON-COVERED ITEM OR SERVICE: HCPCS | Performed by: HOSPITALIST

## 2020-02-05 PROCEDURE — 93005 ELECTROCARDIOGRAM TRACING: CPT | Performed by: INTERNAL MEDICINE

## 2020-02-05 PROCEDURE — 99024 POSTOP FOLLOW-UP VISIT: CPT | Performed by: NURSE PRACTITIONER

## 2020-02-05 PROCEDURE — 93010 ELECTROCARDIOGRAM REPORT: CPT | Performed by: INTERNAL MEDICINE

## 2020-02-05 PROCEDURE — 99233 SBSQ HOSP IP/OBS HIGH 50: CPT | Performed by: INTERNAL MEDICINE

## 2020-02-05 PROCEDURE — 700102 HCHG RX REV CODE 250 W/ 637 OVERRIDE(OP): Performed by: HOSPITALIST

## 2020-02-05 PROCEDURE — 71045 X-RAY EXAM CHEST 1 VIEW: CPT

## 2020-02-05 PROCEDURE — 99239 HOSP IP/OBS DSCHRG MGMT >30: CPT | Performed by: HOSPITALIST

## 2020-02-05 PROCEDURE — 700102 HCHG RX REV CODE 250 W/ 637 OVERRIDE(OP): Performed by: INTERNAL MEDICINE

## 2020-02-05 PROCEDURE — A9270 NON-COVERED ITEM OR SERVICE: HCPCS | Performed by: INTERNAL MEDICINE

## 2020-02-05 RX ORDER — CARVEDILOL 3.12 MG/1
3.12 TABLET ORAL 2 TIMES DAILY WITH MEALS
Status: DISCONTINUED | OUTPATIENT
Start: 2020-02-05 | End: 2020-02-05 | Stop reason: HOSPADM

## 2020-02-05 RX ORDER — BUMETANIDE 1 MG/1
1 TABLET ORAL DAILY
Qty: 30 TAB | Refills: 0 | Status: SHIPPED | OUTPATIENT
Start: 2020-02-06 | End: 2020-02-12

## 2020-02-05 RX ORDER — ACETAMINOPHEN 325 MG/1
650 TABLET ORAL EVERY 6 HOURS PRN
Qty: 30 TAB | Refills: 0 | Status: SHIPPED
Start: 2020-02-05 | End: 2020-04-26

## 2020-02-05 RX ORDER — BUMETANIDE 1 MG/1
1 TABLET ORAL
Status: DISCONTINUED | OUTPATIENT
Start: 2020-02-05 | End: 2020-02-05 | Stop reason: HOSPADM

## 2020-02-05 RX ORDER — CARVEDILOL 3.12 MG/1
3.12 TABLET ORAL 2 TIMES DAILY WITH MEALS
Qty: 60 TAB | Refills: 0 | Status: SHIPPED | OUTPATIENT
Start: 2020-02-05 | End: 2020-03-05 | Stop reason: SDUPTHER

## 2020-02-05 RX ADMIN — GABAPENTIN 300 MG: 300 CAPSULE ORAL at 06:07

## 2020-02-05 RX ADMIN — CARVEDILOL 3.12 MG: 3.12 TABLET, FILM COATED ORAL at 10:45

## 2020-02-05 RX ADMIN — BUMETANIDE 1 MG: 1 TABLET ORAL at 10:45

## 2020-02-05 RX ADMIN — MELATONIN 2000 UNITS: at 06:09

## 2020-02-05 RX ADMIN — EZETIMIBE 10 MG: 10 TABLET ORAL at 06:08

## 2020-02-05 RX ADMIN — OXYCODONE HYDROCHLORIDE AND ACETAMINOPHEN 1000 MG: 500 TABLET ORAL at 06:08

## 2020-02-05 RX ADMIN — SACUBITRIL AND VALSARTAN 1 TABLET: 24; 26 TABLET, FILM COATED ORAL at 06:07

## 2020-02-05 RX ADMIN — OMEPRAZOLE 40 MG: 20 CAPSULE, DELAYED RELEASE ORAL at 06:07

## 2020-02-05 ASSESSMENT — ENCOUNTER SYMPTOMS
WEAKNESS: 0
TROUBLE SWALLOWING: 0
CHOKING: 0
NAUSEA: 0
DIZZINESS: 0
EYES NEGATIVE: 1
VOMITING: 0
BLOOD IN STOOL: 0
COUGH: 0
STRIDOR: 0
ABDOMINAL PAIN: 0
NUMBNESS: 0
HEMATOLOGIC/LYMPHATIC NEGATIVE: 1
SPEECH DIFFICULTY: 0
SHORTNESS OF BREATH: 0
CHILLS: 0
PSYCHIATRIC NEGATIVE: 1
LIGHT-HEADEDNESS: 0
PALPITATIONS: 0
ENDOCRINE NEGATIVE: 1
FEVER: 0
WHEEZING: 0
MUSCULOSKELETAL NEGATIVE: 1
FATIGUE: 0

## 2020-02-05 NOTE — PROGRESS NOTES
"Interval History:  79-year-old male with a history of multivessel coronary artery disease, ischemic and valvular cardiomyopathy, and aortic stenosis declined for open heart surgery.  He is status post RCA and left main PCI with subsequent TAVR completed last week.  He had a syncopal event post TAVR resulting in bruised ribs knee and facial trauma.  Ambulatory monitoring showed complete heart block.  2/5: Status post dual-chamber ICD placement with aborted CS lead due to CS perforation.  No obvious effusion on repeat echocardiogram.  Feels same, I/O net negative for volume overload.    Physical Exam   Blood pressure 106/65, pulse 80, temperature 36.7 °C (98 °F), temperature source Temporal, resp. rate 17, height 1.854 m (6' 1\"), weight 112 kg (246 lb 14.6 oz), SpO2 94 %.    Constitutional: Elderly.  Appears well-developed.   HENT: Normocephalic and atraumatic. No scleral icterus.   Neck: No JVD present.   Cardiovascular: Normal rate. Exam reveals no gallop and no friction rub. No murmur heard.   Pulmonary/Chest: CTAB    Abdominal: S/NT/ND BS+   Musculoskeletal: Exhibits no edema. Pulses present.  Skin: Skin is warm and dry.     ROS: As HPI other reviewed and negative       Intake/Output Summary (Last 24 hours) at 2/5/2020 0840  Last data filed at 2/5/2020 0800  Gross per 24 hour   Intake 720 ml   Output 1300 ml   Net -580 ml       Recent Labs     02/03/20  1400 02/04/20  0531   WBC 6.0 5.8   RBC 6.65* 6.31*   HEMOGLOBIN 15.9 15.2   HEMATOCRIT 53.4* 51.1   MCV 80.3* 81.0*   MCH 23.9* 24.1*   MCHC 29.8* 29.7*   RDW 63.5* 64.2*   PLATELETCT 126* 125*   MPV  --  9.3     Recent Labs     02/03/20  1400 02/04/20  0531   SODIUM 139 136   POTASSIUM 4.4 4.0   CHLORIDE 106 105   CO2 25 25   GLUCOSE 138* 125*   BUN 29* 27*   CREATININE 1.64* 1.49*   CALCIUM 9.6 9.3     Recent Labs     02/04/20  0922   INR 1.09                   No current facility-administered medications on file prior to encounter.      Current Outpatient " Medications on File Prior to Encounter   Medication Sig Dispense Refill   • calcium citrate (CALCITRATE) 950 MG Tab Take 1,900 mg by mouth every day.     • coenzyme Q-10 30 MG capsule Take 60 mg by mouth 2 Times a Day.     • Garlic 1000 MG Cap Take 1,000 mg by mouth 2 Times a Day.     • glucosamine Sulfate 500 MG Cap Take 1,500 mg by mouth 2 Times a Day.     • bumetanide (BUMEX) 1 MG Tab Take 1 Tab by mouth every 48 hours.     • Non Formulary Request Inject 1 Each as instructed See Admin Instructions. HCG/ARGININE 99109w/5mg/cc, pt does not take on Thur      • Non Formulary Request Inject 1 Each as instructed See Admin Instructions. HGH 5.8mg/2.5mg/cc,   Monday, Tuesday, Wednesday, Friday, Saturday, sunday     • vitamin e (VITAMIN E) 400 UNIT Cap Take 400 Units by mouth 2 times a day.     • omeprazole (PRILOSEC) 40 MG delayed-release capsule Take 40 mg by mouth 2 times a day.     • Non Formulary Request Take 1 Cap by mouth 2 Times a Day. Cholox (OTC)     • Cholecalciferol (VITAMIN D3) 2000 UNIT Cap Take 2,000 Units by mouth every day.     • Non Formulary Request Take 1 Cap by mouth 2 Times a Day. Domenic red (OTC)     • Non Formulary Request Take 1 Cap by mouth every day. Nitric OXIDE (OTC)     • Multiple Vitamins-Minerals (OCULAR VITAMINS PO) Take 1 Cap by mouth every day.     • Probiotic Product (PROBIOTIC DAILY PO) Take 1 Cap by mouth every day.     • Non Formulary Request Take 1 Cap by mouth every day. anit-oxidant (OTC)     • Zinc 50 MG Tab Take 50 mg by mouth every day.     • acetaminophen (TYLENOL) 500 MG Tab Take 1,500 mg by mouth every 6 hours as needed for Moderate Pain.     • anastrozole (ARIMIDEX) 1 MG Tab Take 0.5 mg by mouth See Admin Instructions. 1/2 tab 2x/wk On Tue and Sat     • gabapentin (NEURONTIN) 300 MG Cap Take 300 mg by mouth 3 times a day as needed. Indications: Neuropathic Pain     • clopidogrel (PLAVIX) 75 MG Tab Take 1 Tab by mouth every day. 90 Tab 3   • aspirin EC 81 MG EC tablet Take 1  Tab by mouth every day. 30 Tab 0   • ezetimibe (ZETIA) 10 MG Tab Take 10 mg by mouth every morning.     • glimepiride (AMARYL) 1 MG tablet Take 1 mg by mouth every evening.     • B Complex Vitamins (VITAMIN B COMPLEX PO) Take 1 Tab by mouth every day.     • Cranberry 300 MG Tab Take 300 mg by mouth every morning.     • L-Lysine 1000 MG Tab Take 1,000 mg by mouth every day.     • Ascorbic Acid (VITAMIN C) 1000 MG Tab Take 1,000 mg by mouth 2 Times a Day.     • sacubitril-valsartan (ENTRESTO) 24-26 MG Tab tablet Take 1 Tab by mouth 2 Times a Day. 60 Tab 3   • Testosterone Cypionate (DEPO-TESTOSTERONE IM) 1 Each by Intramuscular route every 7 days. Thursday         Current Facility-Administered Medications   Medication Dose Frequency Provider Last Rate Last Dose   • acetaminophen (TYLENOL) tablet 650 mg  650 mg Q6HRS PRN Augustus Smith D.O.   650 mg at 02/04/20 0607   • anastrozole (ARIMIDEX) tablet 0.5 mg  0.5 mg Once per day on Tue Sat PATEL Gil.O.   0.5 mg at 02/04/20 0510   • ascorbic acid tablet 1,000 mg  1,000 mg BID PATEL Gil.O.   1,000 mg at 02/05/20 0608   • vitamin D (cholecalciferol) tablet 2,000 Units  2,000 Units DAILY PATEL Gil.O.   2,000 Units at 02/05/20 0609   • ezetimibe (ZETIA) tablet 10 mg  10 mg QAM PATEL Gil.O.   10 mg at 02/05/20 0608   • gabapentin (NEURONTIN) capsule 300 mg  300 mg TID PATEL Gil.O.   300 mg at 02/05/20 0607   • omeprazole (PRILOSEC) capsule 40 mg  40 mg BID PATEL Gil.O.   40 mg at 02/05/20 0607   • sacubitril-valsartan (ENTRESTO) 24-26 MG tablet 1 Tab  1 Tab BID PATEL Gil.O.   1 Tab at 02/05/20 0607   • senna-docusate (PERICOLACE or SENOKOT S) 8.6-50 MG per tablet 2 Tab  2 Tab BID Augustus Smith D.O.   2 Tab at 02/04/20 0511    And   • polyethylene glycol/lytes (MIRALAX) PACKET 1 Packet  1 Packet QDAY PRN Augustus Smith D.O.        And   • magnesium hydroxide (MILK OF MAGNESIA) suspension 30 mL  30 mL QDAY PRN Augustus LEY  DORIS Smith        And   • bisacodyl (DULCOLAX) suppository 10 mg  10 mg QDAY PRN Augustus Smith D.O.       • Respiratory Care per Protocol   Continuous RT Augustus Smith D.O.       • enalaprilat (VASOTEC) injection 1.25 mg  1.25 mg Q6HRS PRN Augustus Smith D.O.       • ondansetron (ZOFRAN) syringe/vial injection 4 mg  4 mg Q4HRS PRN Augustus Smith D.O.       • ondansetron (ZOFRAN ODT) dispertab 4 mg  4 mg Q4HRS PRN Augustus Smith D.O.       • insulin regular (HUMULIN R) injection 2-9 Units  2-9 Units 4X/DAY ACHS Augustus Smith D.O.   Stopped at 02/05/20 0700    And   • glucose 4 g chewable tablet 16 g  16 g Q15 MIN PRN Augustus Smith D.O.        And   • DEXTROSE 10% BOLUS 250 mL  250 mL Q15 MIN PRN Augustus Smith D.O.       Last reviewed on 2/3/2020  9:17 PM by Shalonda Ramires R.N.    Medications reviewed    Imaging reviewed    Impressions:  1.  Symptomatic CHB status post dual-chamber ICD  2.  Procedure-related coronary sinus perforation without sequela  3.  Volume overload, improving  4.  Ischemic and valvular cardiomyopathy LVEF 35%  5.  Severe aortic stenosis status post recent TAVR  6.  Diabetes mellitus  7.  CAD status post complex multivessel and left main PCI recently    Recommendations:  I recommend transitioning him back to daily rather than every other day Bumex at discharge as he did not tolerate the every other day dosing due to volume overload.  Recommend continuing his other heart failure medications including Entresto.  Recommend addition of Coreg now that he has a pacemaker in place to augment his heart failure therapy.  Device per EP.    1.  Continue Entresto at current dosing, Zetia, aspirin, Plavix  2.  Bumex daily rather than every other day at discharge  3.  Add Coreg 3.125 mg twice daily  4.  Routine device clinic follow-up.  Device per EP.    Discussed with primary physician and bedside nursing.

## 2020-02-05 NOTE — OP REPORT
"Electrophysiology Procedure Note  [facility]    Patient ID:  Name:             LIAN More     YOB: 1940  Age:                 79 y.o.  male   MRN:               [id number]    Date of procedure: 2/4/2020     Procedure(s) Performed:   1) ICD implantation  2) Aborted CS lead    Indication(s):  Paroxysmal complete heart block  Syncope  Ischemic cardiomyopathy  Chronic systolic CHF    Physician(s): Denilson Steiner M.D.     Resident/Assistant(s): None     Anesthesia: Local and moderate sedation (start 1606, stop time 1710, total dose given 1.5 mg IV versed, 75 mcg IV fentanyl)     Specimen(s) Removed: None     Estimated Blood Loss:  50cc    Complications: Pericardial effusion    DESCRIPTION OF PROCEDURE: After informed written consent, the patient was brought to the electrophysiology lab in the fasting, unsedated state. The patient was prepped and draped in the usual sterile fashion. The procedure was performed under moderate sedation with local anesthetic. A left upper extremity venogram was performed, demonstrating a patent subclavian vein. A left infraclavicular incision was made with a scalpel and the pre-pectoral device pocket was created using a combination of blunt dissection and electrocautery. The modified Seldinger technique was used to gain access to the left axillary vein. A peel-away hemostasis sheath was placed in the vein. Under fluoroscopic guidance, the ICD lead was introduced into the heart. The ventricular lead was advanced into the RVOT position the pulled back and advanced to the RV apex. The lead was tested and had satisfactory sensing and pacing parameters. High output ventricular pacing did not produce extracardiac stimulation.  The lead was sutured to the underlying pectoral muscle with interrupted silk over a silastic suture sleeve. We accessed the coronary sinus with a combination of the standard multipurpose curve MDT CS sheath and a stiff 0.035\" glidewire. The " glidewire easily advanced into the coronary sinus but we had some difficulty advancing the CS due to tortuosity at the os, although we eventually advanced the sheath. We attempted a venogram with contrast but the proximal portion of the sheath was kinked. We advanced the glidewire back into the CS sheath along with the included dilator to straighten the kink and advanced the sheath back over wire. When we attempted another venogram, it was apparent the sheath had somehow gone into the epicardial space showing contrast in the pericardium. Reattempts to access the CS were unsuccessful (wire repeated went epicardial) and given the small effusion, we abandoned the CS placement. The RA lead was advanced to the RAA, and the lead was tested and had satisfactory sensing and pacing parameters. High output ventricular pacing did not produce extracardiac stimulation.  The lead was sutured to the underlying pectoral muscle with interrupted silk over a silastic suture sleeve. The lead was sutured to the underlying pectoral muscle with interrupted silk over a silastic suture sleeve. The device pocket was irrigated with antibiotic solution, inspected, and no bleeding was seen. The lead was connected to the ICD pulse generator and the device was inserted into the pocket. The wound was closed with three layers of absorbable sutures and covered with Steri-Strips. At the end of the case, the effusion remained trace on fluroscopy and echo. Following recovery from sedation, the patient was transferred to a monitored bed in good condition.    IMPLANTED DEVICE INFORMATION:    Pulse generator is a MDT model JIKV7J9  Serial number [id number]    LEAD INFORMATION:  1. Right atrial lead is a MDT model 5076-52, serial number [id number], P wave 2.3 millivolts, threshold 1.25 Volts at 0.4 milliseconds, pacing impedance 589 Ohms.  2. Right ventricular lead is a MDT model 4326C61, serial number [id number], R wave 15.9 millivolts, threshold 0.5  Volts at 0.4 milliseconds, pacing impedance 570 Ohms.     DEVICE PROGRAMMING:    Ricki therapy: MVP   Tachy therapy:    VF zone  250 bpm, defib 35 J x 6  VT zone 188 bpm, ATP x 3 then, CDV 35 J x 5    FLUOROSCOPY TIME: 9:18    SUMMARY/CONCLUSIONS:  1. Successful automatic implantable cardioverter defibrillator implant.  2. Unsuccessful CS lead placement due to perforation of the CS resulting in small but stable effusion    RECOMMENDATIONS:  1. Transfer to ICU  2. Chest x-ray.  3. Repeat echo in 2 hours  4. Implantable cardioverter defibrillator interrogation prior to hospital discharge.  5. Follow-up in device clinic for wound check and device interrogation.

## 2020-02-05 NOTE — PROGRESS NOTES
[facility] Medicine Daily Progress Note    Date of Service  2/4/2020    Chief Complaint  79 y.o. male admitted 2/3/2020 with recent admission for syncope who underwent TAVR procedure and sent home on event monitor found to have complete heart block and asked to come in for permanent pacemaker placement.    Hospital Course    Patient has had intermittent brief blocks without associated syncope or orthostasis.  EP is planning for permanent pacemaker placement today.      Interval Problem Update  Stable.  No hypotension.  Denies chest pain or shortness of breath.    Consultants/Specialty  EP cardiology    Code Status  DNR    Disposition  Likely to home following procedure    Review of Systems  Review of Systems   Constitutional: Negative for chills and fever.   HENT: Negative.  Negative for hearing loss.    Eyes: Negative.    Respiratory: Negative for cough, hemoptysis and shortness of breath.    Cardiovascular: Negative for chest pain, palpitations and orthopnea.   Gastrointestinal: Negative.    Genitourinary: Negative.    Neurological: Positive for dizziness.   Psychiatric/Behavioral: Negative.         Physical Exam  Temp:  [36.1 °C (97 °F)-36.8 °C (98.3 °F)] 36.3 °C (97.3 °F)  Pulse:  [63-82] 73  Resp:  [18-25] 20  BP: (103-140)/(59-83) 119/83  SpO2:  [91 %-97 %] 93 %    Physical Exam  Constitutional:       General: He is not in acute distress.     Appearance: He is not ill-appearing.      Comments: Pleasant obese male no acute distress.   HENT:      Head: Normocephalic and atraumatic.      Right Ear: External ear normal.      Left Ear: External ear normal.   Eyes:      Extraocular Movements: Extraocular movements intact.      Pupils: Pupils are equal, round, and reactive to light.   Neck:      Musculoskeletal: Normal range of motion and neck supple.   Cardiovascular:      Rate and Rhythm: Normal rate and regular rhythm.      Pulses: Normal pulses.   Pulmonary:      Effort: Pulmonary effort is normal.      Breath  sounds: Normal breath sounds. No wheezing or rales.   Abdominal:      General: Bowel sounds are normal. There is no distension.      Palpations: Abdomen is soft.      Tenderness: There is no tenderness. There is no guarding.      Comments: Obese abdomen   Skin:     General: Skin is warm and dry.      Capillary Refill: Capillary refill takes 2 to 3 seconds.   Neurological:      General: No focal deficit present.      Mental Status: He is alert and oriented to person, place, and time.   Psychiatric:         Mood and Affect: Mood normal.         Behavior: Behavior normal.         Thought Content: Thought content normal.         Judgment: Judgment normal.         Fluids    Intake/Output Summary (Last 24 hours) at 2/4/2020 1852  Last data filed at 2/4/2020 1800  Gross per 24 hour   Intake 240 ml   Output 600 ml   Net -360 ml       Laboratory  Recent Labs     02/03/20  1400 02/04/20  0531   WBC 6.0 5.8   RBC 6.65* 6.31*   HEMOGLOBIN 15.9 15.2   HEMATOCRIT 53.4* 51.1   MCV 80.3* 81.0*   MCH 23.9* 24.1*   MCHC 29.8* 29.7*   RDW 63.5* 64.2*   PLATELETCT 126* 125*   MPV  --  9.3     Recent Labs     02/03/20  1400 02/04/20  0531   SODIUM 139 136   POTASSIUM 4.4 4.0   CHLORIDE 106 105   CO2 25 25   GLUCOSE 138* 125*   BUN 29* 27*   CREATININE 1.64* 1.49*   CALCIUM 9.6 9.3     Recent Labs     02/04/20  0922   INR 1.09               Imaging  DX-CHEST-PORTABLE (1 VIEW)   Final Result      1.  No pneumothorax identified status post pacemaker/AICD placement.      EC-ECHOCARDIOGRAM LTD W/O CONT         EC-ECHOCARDIOGRAM COMPLETE W/O CONT   Final Result      DX-CHEST-PORTABLE (1 VIEW)   Final Result      1.  There is no acute cardiopulmonary process.      CL-PERMANENT PACEMAKER INSERTION    (Results Pending)   EC-ECHOCARDIOGRAM COMPLETE W/O CONT    (Results Pending)        Assessment/Plan  * Complete heart block (HCC)  Assessment & Plan  Intermittent third-degree blocks w brief in nature without orthostasis.  Found on an event monitor  hence patient counseled to return to the hospital.  He is awaiting a permanent pacemaker placement today.  Continue telemetry monitoring.        S/P TAVR (transcatheter aortic valve replacement)- (present on admission)  Assessment & Plan  Readmitted due to complete heart block.  Patient awaiting permanent pacemaker placement.  Cardiology on board.    Continue to monitor on telemetry    Coronary artery disease involving native coronary artery of native heart without angina pectoris- (present on admission)  Assessment & Plan  Continue ezetimibe, entresto  Allergic to statins    Renal insufficiency  Assessment & Plan  Creatinine was 1.64 on admission but is down to 1.49 today.  Continue to monitor.  Avoid nephro-toxic meds.  Avoid IV contrast.    Hypertension- (present on admission)  Assessment & Plan  Stable with systolic above 110.  No orthostasis.  Continue Entresto for active treatment      Syncope and collapse  Assessment & Plan  Secondary to severe aortic stenosis.  He is s/p recent TAVR.  Has event monitor that captured complete heart block per report (likely etiology)  1/16/2020 US carotids w/o any significant stenosis  Permanent pacemaker placement planned for today.    Obesity- (present on admission)  Assessment & Plan  Hx of gastric band per pt.  Body mass index is 33.74 kg/m².  Encourage healthy lifestyle modifications    Type 2 diabetes mellitus without complication, without long-term current use of insulin (HCC)- (present on admission)  Assessment & Plan  Monitor accuchecks and cover with SSI.  A.m. glucose 125.  Continue to hold glimepiride  A1c:6.5 in past 5 months       VTE prophylaxis: SCDs

## 2020-02-05 NOTE — DISCHARGE SUMMARY
Discharge Summary    CHIEF COMPLAINT ON ADMISSION  Chief Complaint   Patient presents with   • Sent by MD       Reason for Admission  Sent by MD     Admission Date  2/3/2020    CODE STATUS  Full Code    HPI & HOSPITAL COURSE  This is a 79 y.o. male here with syncope.    Mr Derik has a history of aortic stenosis, status post TAVR, coronary artery disease, and prior gastric banding.  TAVR procedure was completed recently on 1/27/2020.  He returned to the hospital on 2/3/2020 after he was called and informed that he had abnormal loop recorder findings, he came to the hospital on on interview admitted to a syncopal episode.  EKG was concerning for complete heart block and on 2/4/2020 he had successful AICD/pacemaker placement and procedure related coronary sinus perforation.  Echocardiogram on 2/4/2020 shows no significant pericardial effusion.  Patient was seen by cardiology and electrophysiology today and has been cleared for discharge.  His pacemaker has been interrogated and is functioning as expected.  Changes have been recommended to his heart failure medications and I have prescribed coreg at discharge.  He has close follow up with cardiology.    Therefore, he is discharged in fair and stable condition to home with close outpatient follow-up.    The patient met 2-midnight criteria for an inpatient stay at the time of discharge.    Discharge Date  2/5/2020    FOLLOW UP ITEMS POST DISCHARGE  Follow up with [facility] at scheduled appointments    DISCHARGE DIAGNOSES  Principal Problem:    Complete heart block (HCC) POA: Yes  Active Problems:    S/P TAVR (transcatheter aortic valve replacement) POA: Yes    Hypertension (Chronic) POA: Yes      Overview: 3/08 urine microalbumin 21      3/08 lifescan screening JUAQUIN,carotid,aorta negative      2/11 hold norvasc      2/11 urine mac 16      5/12 urine mac 18 on lisinopril 40 mg      9/4/13 urine mac 24 on lisinopril 40 mg      1/9/15 urine mac 30 on lisinopril 40  mg      7/29/15 echo moderate LVH, EF 60-65%      7/29/15 carotid ultrasound negative      3/31/16 add norvasc 5 mg to lisinopril 40 mg      5/4/16 stop norvasc constipation,still on lisinopril 40 mg and add hctz 25       mg      5/18/16 patient on hctz 25 mg, not taking lisinopril, will resume       lisinopril 40 mg      7/2/18 urine mac 30 on hctz 25 mg and lisinopril 40 mg      (norvasc cause constipation)          Renal insufficiency POA: Yes    Coronary artery disease involving native coronary artery of native heart without angina pectoris POA: Yes    Type 2 diabetes mellitus without complication, without long-term current use of insulin ([facility]) POA: Yes      Overview: 3/08 A1c 5.9%      12/09 A1c 6.4% on insulin      2009 eye exam      3/10 s/p lap banding, now off insulin      2/11 BS 93      5/12 A1c 5.2% off insulin      12/12 eye exam       9/5/13 A1c 5.7% off meds      1/9/15 A1c 5.7%,bs 106      10/2/15 A1c 5.9%      3/11/17 A1c 6.5%      7/3/18 A1c 7.6% (GFR 43) start amaryl 1 mg      10/8/18 poc retina no diabetic neuropathy, macular degeneration grade 3       recommend ophthalmology evaluation      10/8/18 A1c 6.3% on amaryl 1 mg      9/13/19 A1c 6.5%                Obesity POA: Yes      Overview: 3/3/16 BMI 33.6      3/10/17 BMI 33.3 sees  bariatric       10/8/18 BMI 32.6  Resolved Problems:    Syncope and collapse POA: Yes      FOLLOW UP  Future Appointments   Date Time Provider Department Center   2/10/2020  3:40 PM DARCY Medina [facility] [place]   2/11/2020  8:30 AM PACER CHECK-CAM B [facility] None   2/11/2020  9:45 AM NEAL Villegas [facility] None   3/3/2020  8:15 AM [facility] ECHO 1 [facility] [place]   3/5/2020  8:00 AM Surendra Castro M.D. [facility] None   5/1/2020  7:40 AM Sae Mera M.D. [facility] None     No follow-up provider specified.    MEDICATIONS ON DISCHARGE     Medication List      START taking these medications      Instructions   carvedilol 3.125 MG Tabs  Commonly  known as:  COREG   Take 1 Tab by mouth 2 times a day, with meals.  Dose:  3.125 mg        CHANGE how you take these medications      Instructions   acetaminophen 325 MG Tabs  What changed:    · medication strength  · how much to take  Commonly known as:  TYLENOL   Take 2 Tabs by mouth every 6 hours as needed for Moderate Pain.  Dose:  650 mg     bumetanide 1 MG Tabs  Start taking on:  February 6, 2020  What changed:  when to take this  Commonly known as:  BUMEX   Take 1 Tab by mouth every day.  Dose:  1 mg        CONTINUE taking these medications      Instructions   anastrozole 1 MG Tabs  Commonly known as:  ARIMIDEX   Take 0.5 mg by mouth See Admin Instructions. 1/2 tab 2x/wk On Tue and Sat  Dose:  0.5 mg     aspirin 81 MG EC tablet   Take 1 Tab by mouth every day.  Dose:  81 mg     calcium citrate 950 MG Tabs  Commonly known as:  CALCITRATE   Take 1,900 mg by mouth every day.  Dose:  1,900 mg     clopidogrel 75 MG Tabs  Commonly known as:  PLAVIX   Take 1 Tab by mouth every day.  Dose:  75 mg     coenzyme Q-10 30 MG capsule   Take 60 mg by mouth 2 Times a Day.  Dose:  60 mg     Cranberry 300 MG Tabs   Take 300 mg by mouth every morning.  Dose:  300 mg     DEPO-TESTOSTERONE IM   1 Each by Intramuscular route every 7 days. Thursday  Dose:  1 Each     ezetimibe 10 MG Tabs  Commonly known as:  ZETIA   Take 10 mg by mouth every morning.  Dose:  10 mg     gabapentin 300 MG Caps  Commonly known as:  NEURONTIN   Take 300 mg by mouth 3 times a day as needed. Indications: Neuropathic Pain  Dose:  300 mg     Garlic 1000 MG Caps   Take 1,000 mg by mouth 2 Times a Day.  Dose:  1,000 mg     glimepiride 1 MG tablet  Commonly known as:  AMARYL   Take 1 mg by mouth every evening.  Dose:  1 mg     glucosamine Sulfate 500 MG Caps   Take 1,500 mg by mouth 2 Times a Day.  Dose:  1,500 mg     L-Lysine 1000 MG Tabs   Take 1,000 mg by mouth every day.  Dose:  1,000 mg     Non Formulary Request   Inject 1 Each as instructed See Admin  Instructions. HCG/ARGININE 75266m/5mg/cc, pt does not take on Thur  Dose:  1 Each     Non Formulary Request   Inject 1 Each as instructed See Admin Instructions. HGH 5.8mg/2.5mg/cc,   Monday, Tuesday, Wednesday, Friday, Saturday, sunday  Dose:  1 Each     Non Formulary Request   Take 1 Cap by mouth 2 Times a Day. Cholox (OTC)  Dose:  1 Cap     Non Formulary Request   Take 1 Cap by mouth 2 Times a Day. Domenic red (OTC)  Dose:  1 Cap     Non Formulary Request   Take 1 Cap by mouth every day. Nitric OXIDE (OTC)  Dose:  1 Cap     Non Formulary Request   Take 1 Cap by mouth every day. anit-oxidant (OTC)  Dose:  1 Cap     OCULAR VITAMINS PO   Take 1 Cap by mouth every day.  Dose:  1 Cap     omeprazole 40 MG delayed-release capsule  Commonly known as:  PRILOSEC   Take 40 mg by mouth 2 times a day.  Dose:  40 mg     PROBIOTIC DAILY PO   Take 1 Cap by mouth every day.  Dose:  1 Cap     sacubitril-valsartan 24-26 MG Tabs tablet  Commonly known as:  Entresto   Take 1 Tab by mouth 2 Times a Day.  Dose:  1 Tab     VITAMIN B COMPLEX PO   Take 1 Tab by mouth every day.  Dose:  1 Tab     Vitamin C 1000 MG Tabs   Take 1,000 mg by mouth 2 Times a Day.  Dose:  1,000 mg     Vitamin D3 2000 UNIT Caps   Take 2,000 Units by mouth every day.  Dose:  2,000 Units     vitamin e 400 UNIT Caps  Commonly known as:  VITAMIN E   Take 400 Units by mouth 2 times a day.  Dose:  400 Units     Zinc 50 MG Tabs   Take 50 mg by mouth every day.  Dose:  50 mg            Allergies  Allergies   Allergen Reactions   • Statins [Hmg-Coa-R Inhibitors] Rash     Blisters         DIET  Orders Placed This Encounter   Procedures   • Diet Order Cardiac     Standing Status:   Standing     Number of Occurrences:   1     Order Specific Question:   Diet:     Answer:   Cardiac [6]       ACTIVITY  As tolerated. with pacemaker precautions  Weight bearing as tolerated    CONSULTATIONS  Cardiology  Electrophysiology    PROCEDURES  Echocardiogram 2/4/2020 @ 12:30:  Prior  01/28/2020, No significant change.  Left ventricular ejection fraction is visually estimated to be 35-40%.  Known TAVR aortic valve that is functioning normally with normal   transvalvular gradients.  Vmax is   2.8m/s.Transvalvular gradients are - Peak: 14 mmHg,  Mean: 30   MmHg.    Echocardiogram 2/4/2020 @ 17:29:  Trivial pericardial effusion.    Echocardiogram 2/4/2020 @ 20:28  No pericardial effusion    LABORATORY  Lab Results   Component Value Date    SODIUM 136 02/04/2020    POTASSIUM 4.0 02/04/2020    CHLORIDE 105 02/04/2020    CO2 25 02/04/2020    GLUCOSE 125 (H) 02/04/2020    BUN 27 (H) 02/04/2020    CREATININE 1.49 (H) 02/04/2020    CREATININE 1.18 02/08/2011    GLOMRATE >59 02/08/2011        Lab Results   Component Value Date    WBC 5.8 02/04/2020    WBC 4.5 02/08/2011    HEMOGLOBIN 15.2 02/04/2020    HEMATOCRIT 51.1 02/04/2020    PLATELETCT 125 (L) 02/04/2020        Total time of the discharge process exceeds 40 minutes.

## 2020-02-05 NOTE — PROGRESS NOTES
Pt discharged home with wife by this RN. VSS, pt toileted and dressed in clothing. IVs removed, pt stable and ready to go! Discussed discharge instructions with patient and wife, medications sent to their correct pharmacy, follow up appointments established.

## 2020-02-05 NOTE — PROGRESS NOTES
Cardiology Follow Up Progress Note    Date of Service  2/5/2020    Attending Physician  Meaghan Irwin M.D.    Chief Complaint   Syncope and HB seen on [employer]     HPI  LIAN More III is a 79 y.o. male advised by our office to present to the ER on  2/3/2020 after transient complete heart block was observed on his cardiac event monitor. Patient did complain of having intermittent light headedness and lower extremity edema which he recently increased his home dose of lasix for.      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 Steiner.  Could not place CS lead secondary to peferation of the CS causing small effusion.   Procedural Conclusions:  IMPLANTED DEVICE INFORMATION:    Pulse generator is a MDT model [id number]  Serial number [id number]  LEAD INFORMATION:  1. Right atrial lead is a MDT model 5076-52, serial number [id number], P wave 2.3 millivolts, threshold 1.25 Volts at 0.4 milliseconds, pacing impedance 589 Ohms.  2. Right ventricular lead is a MDT model 0463V24, serial number [id number], R wave 15.9 millivolts, threshold 0.5 Volts at 0.4 milliseconds, pacing impedance 570 Ohms.   DEVICE PROGRAMMING:    Ricki therapy: MVP   Tachy therapy:    VF zone  250 bpm, defib 35 J x 6  VT zone 188 bpm, ATP x 3 then, CDV 35 J x 5  SUMMARY/CONCLUSIONS:  1. Successful automatic implantable cardioverter defibrillator implant.  2. Unsuccessful CS lead placement due to perforation of the CS resulting in small but stable effusion      Feeling well this AM.  No chest pain, dizziness, dyspnea.   Repeat Echo last night with no effusion.   CXR this AM with no evidence of late PTX.   VSS, Afebrile.     MDT Device Interrogation: reveals appropriate function of device.  RA: p waves 2.1 mV, Impedance 513 ohms, Threshold 0.5 V.  RV:  r waves 14.4 mV, Impedance 570 ohms, Threshold 0.5 V.        Review of Systems  Review of Systems   Constitutional: Negative for chills, fatigue and fever.   HENT: Negative for congestion, nosebleeds, tinnitus and trouble swallowing.    Eyes: Negative.    Respiratory: Negative for cough, choking, shortness of breath, wheezing and stridor.    Cardiovascular: Negative for chest pain, palpitations and leg swelling.   Gastrointestinal: Negative for abdominal pain, blood in stool, nausea and vomiting.   Endocrine: Negative.    Genitourinary: Negative for hematuria.   Musculoskeletal: Negative.    Skin: Negative.    Neurological: Negative for dizziness, syncope, speech difficulty, weakness, light-headedness and numbness.        Recent history of syncope    Hematological: Negative.    Psychiatric/Behavioral: Negative.        Vital signs in last 24 hours  Temp:  [36.1 °C (97 °F)-36.9 °C (98.5 °F)] 36.9 °C (98.5 °F)  Pulse:  [65-84] 83  Resp:  [17-49] 49  BP: (108-132)/(54-83) 108/60  SpO2:  [90 %-97 %] 94 %    Physical Exam  Physical Exam  Vitals signs and nursing note reviewed.   Constitutional:       Appearance: Normal appearance.   HENT:      Head: Normocephalic and atraumatic.      Nose: No congestion.   Eyes:      Conjunctiva/sclera: Conjunctivae normal.      Pupils: Pupils are equal, round, and reactive to light.   Neck:      Musculoskeletal: Normal range of motion.   Cardiovascular:      Rate and Rhythm: Normal rate and regular rhythm.      Pulses: Normal pulses.      Heart sounds: Normal heart sounds. No murmur. No friction rub. No gallop.    Pulmonary:      Effort: Pulmonary effort is normal.      Breath sounds: Normal breath sounds. No wheezing, rhonchi or rales.   Chest:      Comments: Left chest ICD site is clean and dry.  There is mild soft ecchymosis, scant edema noted.  No hematoma present.   Abdominal:      General: Bowel sounds are normal.   Musculoskeletal: Normal range of motion.      Right lower leg:  Edema present.      Left lower leg: Edema present.   Skin:     General: Skin is warm and dry.   Neurological:      Mental Status: He is alert and oriented to person, place, and time.   Psychiatric:         Mood and Affect: Mood normal.         Behavior: Behavior normal.         Thought Content: Thought content normal.         Judgment: Judgment normal.         Lab Review  Lab Results   Component Value Date/Time    WBC 5.8 02/04/2020 05:31 AM    WBC 4.5 02/08/2011 12:00 AM    RBC 6.31 (H) 02/04/2020 05:31 AM    RBC 5.36 02/08/2011 12:00 AM    HEMOGLOBIN 15.2 02/04/2020 05:31 AM    HEMATOCRIT 51.1 02/04/2020 05:31 AM    MCV 81.0 (L) 02/04/2020 05:31 AM    MCV 93 02/08/2011 12:00 AM    MCH 24.1 (L) 02/04/2020 05:31 AM    MCH 31.0 02/08/2011 12:00 AM    MCHC 29.7 (L) 02/04/2020 05:31 AM    MPV 9.3 02/04/2020 05:31 AM      Lab Results   Component Value Date/Time    SODIUM 136 02/04/2020 05:31 AM    POTASSIUM 4.0 02/04/2020 05:31 AM    CHLORIDE 105 02/04/2020 05:31 AM    CO2 25 02/04/2020 05:31 AM    GLUCOSE 125 (H) 02/04/2020 05:31 AM    BUN 27 (H) 02/04/2020 05:31 AM    CREATININE 1.49 (H) 02/04/2020 05:31 AM    CREATININE 1.18 02/08/2011 12:00 AM    BUNCREATRAT 16 02/08/2011 12:00 AM    GLOMRATE >59 02/08/2011 12:00 AM      Lab Results   Component Value Date/Time    ASTSGOT 28 02/03/2020 02:00 PM    ALTSGPT 15 02/03/2020 02:00 PM     Lab Results   Component Value Date/Time    CHOLSTRLTOT 105 12/27/2019 02:20 AM    LDL 56 12/27/2019 02:20 AM    HDL 29 (A) 12/27/2019 02:20 AM    TRIGLYCERIDE 101 12/27/2019 02:20 AM    TROPONINT 33 (H) 02/03/2020 02:00 PM       No results for input(s): NTPROBNP in the last 72 hours.    Cardiac Imaging and Procedures Review  Echocardiogram:  1/28/20  Compared to the images of the study done 12- there has been an   interval placement of a TAVR valve.  The EF appears mildly improved.  Moderately reduced left ventricular systolic function.  Left ventricular ejection fraction is  visually estimated to be 35%.    Cardiac Catheterization 1/10/2020:  Conclusions  1. LVEF 30% prior to intervention.  2. 70% distal left main and proximal LAD stenosis, IFR positive from priorprocedure.  3. Successful planned PCI proximal LAD and distal left main trifurcationstenosis (3.5 x 26 mm [place] BRANDI postdilated to 4.5 mm proximally), IVUS guided.  4. Moderate aortic stenosis, mean gradient 27 to 30mmHg.     Recommendations    * Long-term dual antiplatelet therapy.    * Bivalirudin for 4 hours post procedure.    * Continue outpatient cardiac medications.    * Follow-up as scheduled after observation admission overnight.    * Continue medical therapy for diabetes.     TAVR 1/27/2020:   POST PROCEDURAL DIAGNOSIS:  1. Successful transcatheter aortic valve replacement (TAVR) with # 29 Khan Salvador S3 Ultra valve, transfemoral approach, under general anesthesia.  2. Successful Preclose closure.  3. Successful Angio-Seal closure.       Assessment/Plan  1 High Grade HB with syncope:  - S/P [employer] dual chamber ICD placement by Dr. Steiner.  Procedure complicated by perforation of the CS with attempted CS placement with evidence of small effusion.    - Repeat echo last night without evidence of effusion.  Discussed with Dr. Steiner, do not need to repeat LTD echo this am.   - MDT device is working normally today on interrogation.  CXR without evidence of late PTX; lead position appears stable.  Wound site is clear.   - I have discussed post ICD instructions with the patient and he verbalizes understanding.  - HF medication regimen per Dr. Queen.  OK from EP standpoint to resume ASA/Plavix.    - EP will sign off. Follow up is arranged in device clinic.  Please call with any questions.    SHO Glass.   [facility]  [phone]

## 2020-02-07 NOTE — DOCUMENTATION QUERY
[facility]                                                                       Query Response Note      PATIENT:               LIAN VILLA  ACCT #:                  [id number]  MRN:                     [id number]  :                      1940  ADMIT DATE:       2020 9:39 AM  DISCH DATE:          RESPONDING  PROVIDER #:        [id number]           QUERY TEXT:    Please clarify documentation or clinical relevance for the clinical / diagnostic findings or whether those are insignificant or unable to be further specified.    To ensure appropriate coding of the patient?s diagnosis, can you please clarify if the moderate aortic  regurgitation documented in the patient?s H&P is:    Please respond with a new note if an appropriate response is not listed below      The patient's Clinical Indicators include:  Echocardiogram:    Moderate aortic stenosis  Trace mitral regurgitation  Mild tricuspid regurgitation    The World Health Organization (WHO) and ICD 10-CM coding classification assumes a rheumatic origin when valve disease affects multiple valves and the valvular heart disease is unspecified or not described as non-rheumatic.  Options provided:   -- the aortic valve disease is rheumatic in origin   -- the aortic valve disease is non-rheumatic in origin   -- unable to determine      Query created by: Daily Rebollar on 2020 3:53 PM    RESPONSE TEXT:    the aortic valve disease is non-rheumatic in origin          Electronically signed by:  SOPHIA SÁNCHEZ MD 2020 11:20 AM

## 2020-02-08 NOTE — DOCUMENTATION QUERY
[facility]                                                                       Query Response Note      PATIENT:               LIAN VILLA  ACCT #:                  [id number]  MRN:                     [id number]  :                      1940  ADMIT DATE:       2/3/2020 1:07 PM  DISCH DATE:        2020 12:30 PM  RESPONDING  PROVIDER #:        [id number]           QUERY TEXT:    Renal insufficiency is documented in the Medical Record.  Chronic Kidney Disease (CKD) was documented in previous admit.  Further clarification is needed.     NOTE: If an appropriate response is not listed below please respond with a new note.    Stages are defined by the [employer] as follows:  CKD Stage I  GFR >= 90 ml / min per 1.73 m2 and persistent albuminuria  CKD Stage 2 GFR between 60 and 89 with persistent albuminuria  CKD Stage 3 GFR between 30 and 59   CKD Stage 4 GFR between 15 and 29   CKD Stage 5 GFR between <15 or End Stage Renal Disease    The patient's Clinical Indicators include:  Clinical indicators-   19 BUN 29, Cr 1.55, GFR 40  20 BUN 29, Cr 1.61, GFR 42  2/3/20 BUN 29, Cr 1.64, GFR 41  20 BUN 27, Cr 1.49, GFR 46    Treatment-  Continue to monitor   Avoid nephro-toxic meds.    Avoid IV contrast    Risks-  Age, DM, HTN, Obesity, chronically decreased GFR    Thank you,  Azucena Page [facility] RN  [phone]  Options provided:   -- Agree with Chronic kidney disease Stage 1   -- Agree with Chronic kidney disease Stage 2   -- Agree with Chronic kidney disease Stage 3   -- Agree with Chronic kidney disease Stage 4   -- Agree with Chronic kidney disease Stage 5   -- Agree with Chronic kidney disease, unable to determine stage   -- Disagree with Chronic kidney disease   -- Unable to determine      Query created by: Mell Avalos on 2020 8:21 AM    RESPONSE TEXT:    Agree with Chronic kidney disease Stage 3           Electronically signed by:  YFN HERNANDEZ MD 2/7/2020 5:23 PM

## 2020-02-11 ENCOUNTER — HOSPITAL ENCOUNTER (OUTPATIENT)
Dept: LAB | Facility: MEDICAL CENTER | Age: 80
End: 2020-02-11
Attending: NURSE PRACTITIONER
Payer: MEDICARE

## 2020-02-11 ENCOUNTER — OFFICE VISIT (OUTPATIENT)
Dept: CARDIOLOGY | Facility: MEDICAL CENTER | Age: 80
End: 2020-02-11
Payer: MEDICARE

## 2020-02-11 ENCOUNTER — NON-PROVIDER VISIT (OUTPATIENT)
Dept: CARDIOLOGY | Facility: MEDICAL CENTER | Age: 80
End: 2020-02-11
Payer: MEDICARE

## 2020-02-11 VITALS
WEIGHT: 246 LBS | OXYGEN SATURATION: 92 % | HEIGHT: 73 IN | SYSTOLIC BLOOD PRESSURE: 122 MMHG | HEART RATE: 88 BPM | DIASTOLIC BLOOD PRESSURE: 82 MMHG | BODY MASS INDEX: 32.6 KG/M2

## 2020-02-11 DIAGNOSIS — I50.23 ACUTE ON CHRONIC SYSTOLIC HEART FAILURE (HCC): ICD-10-CM

## 2020-02-11 DIAGNOSIS — N28.9 RENAL INSUFFICIENCY: ICD-10-CM

## 2020-02-11 DIAGNOSIS — I25.10 CORONARY ARTERY DISEASE INVOLVING NATIVE CORONARY ARTERY OF NATIVE HEART WITHOUT ANGINA PECTORIS: ICD-10-CM

## 2020-02-11 DIAGNOSIS — E11.9 TYPE 2 DIABETES MELLITUS WITHOUT COMPLICATION, WITHOUT LONG-TERM CURRENT USE OF INSULIN (HCC): ICD-10-CM

## 2020-02-11 DIAGNOSIS — Z95.0 S/P PLACEMENT OF CARDIAC PACEMAKER: ICD-10-CM

## 2020-02-11 DIAGNOSIS — E78.5 DYSLIPIDEMIA: Chronic | ICD-10-CM

## 2020-02-11 DIAGNOSIS — Z95.810 AUTOMATIC IMPLANTABLE CARDIOVERTER-DEFIBRILLATOR IN SITU: ICD-10-CM

## 2020-02-11 DIAGNOSIS — Z95.5 STENTED CORONARY ARTERY: ICD-10-CM

## 2020-02-11 DIAGNOSIS — E66.09 CLASS 1 OBESITY DUE TO EXCESS CALORIES WITHOUT SERIOUS COMORBIDITY WITH BODY MASS INDEX (BMI) OF 33.0 TO 33.9 IN ADULT: ICD-10-CM

## 2020-02-11 DIAGNOSIS — I10 ESSENTIAL HYPERTENSION: Chronic | ICD-10-CM

## 2020-02-11 DIAGNOSIS — Z95.2 S/P TAVR (TRANSCATHETER AORTIC VALVE REPLACEMENT): ICD-10-CM
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
## 2020-02-11 LAB
ERYTHROCYTE [DISTWIDTH] IN BLOOD BY AUTOMATED COUNT: 66.4 FL (ref 35.9–50)
HCT VFR BLD AUTO: 53.5 % (ref 42–52)
HGB BLD-MCNC: 15.7 G/DL (ref 14–18)
MCH RBC QN AUTO: 24 PG (ref 27–33)
MCHC RBC AUTO-ENTMCNC: 29.3 G/DL (ref 33.7–35.3)
MCV RBC AUTO: 81.8 FL (ref 81.4–97.8)
MORPHOLOGY BLD-IMP: NORMAL
NT-PROBNP SERPL IA-MCNC: 4485 PG/ML (ref 0–125)
PLATELET # BLD AUTO: 156 K/UL (ref 164–446)
RBC # BLD AUTO: 6.54 M/UL (ref 4.7–6.1)
WBC # BLD AUTO: 5.9 K/UL (ref 4.8–10.8)

## 2020-02-11 PROCEDURE — 85027 COMPLETE CBC AUTOMATED: CPT

## 2020-02-11 PROCEDURE — 93283 PRGRMG EVAL IMPLANTABLE DFB: CPT | Performed by: INTERNAL MEDICINE

## 2020-02-11 PROCEDURE — 99214 OFFICE O/P EST MOD 30 MIN: CPT | Mod: 25 | Performed by: NURSE PRACTITIONER

## 2020-02-11 PROCEDURE — 36415 COLL VENOUS BLD VENIPUNCTURE: CPT

## 2020-02-11 PROCEDURE — 83880 ASSAY OF NATRIURETIC PEPTIDE: CPT

## 2020-02-11 PROCEDURE — 80048 BASIC METABOLIC PNL TOTAL CA: CPT

## 2020-02-11 ASSESSMENT — MINNESOTA LIVING WITH HEART FAILURE QUESTIONNAIRE (MLHF)
DIFFICULTY WORKING TO EARN A LIVING: 0
DIFFICULTY SLEEPING WELL AT NIGHT: 5
MAKING YOU STAY IN A HOSPITAL: 5
COSTING YOU MONEY FOR MEDICAL CARE: 5
LOSS OF SELF CONTROL IN YOUR LIFE: 5
HAVING TO SIT OR LIE DOWN DURING THE DAY: 5
MAKING YOU FEEL DEPRESSED: 0
WORKING AROUND THE HOUSE OR YARD DIFFICULT: 5
EATING LESS FOODS YOU LIKE: 0
GIVING YOU SIDE EFFECTS FROM TREATMENTS: 0
SWELLING IN ANKLES OR LEGS: 5
MAKING YOU WORRY: 0
DIFFICULTY WITH SEXUAL ACTIVITIES: 0
TIRED, FATIGUED OR LOW ON ENERGY: 5
FEELING LIKE A BURDEN TO FAMILY AND FRIENDS: 0
DIFFICULTY GOING AWAY FROM HOME: 5
WALKING ABOUT OR CLIMBING STAIRS DIFFICULT: 5
MAKING YOU SHORT OF BREATH: 5
DIFFICULTY WITH RECREATIONAL PASTIMES, SPORTS, HOBBIES: 5
DIFFICULTY SOCIALIZING WITH FAMILY OR FRIENDS: 5
DIFFICULTY TO CONCENTRATE OR REMEMBERING THINGS: 0
TOTAL_SCORE: 65

## 2020-02-11 ASSESSMENT — 6 MINUTE WALK TEST (6MWT): TOTAL DISTANCE WALKED (METERS): 384

## 2020-02-11 ASSESSMENT — ENCOUNTER SYMPTOMS
FALLS: 0
PND: 0
FEVER: 0
DIZZINESS: 0
PALPITATIONS: 0
SHORTNESS OF BREATH: 1
ABDOMINAL PAIN: 0
CLAUDICATION: 0
ORTHOPNEA: 1
COUGH: 0
MYALGIAS: 0

## 2020-02-11 NOTE — PROGRESS NOTES
Chief Complaint   Patient presents with   • Aortic Stenosis     Subjective:   LIAN More III is a 79 y.o. male who presents today for hospital follow up S/P AICD placement for heart block one week post TAVR found on [employer] monitor.    He is a patient of Dr. Wilkinson in our office. Hx of CAD with stent placements, chronic systolic heart failure with rEF of 25-40%, CKD, HTN, HLD, DM, and now S/P AICD placement (for rEF and heart block post TAVR).    He admits to feeling okay but does have exertional shortness of breath, fatigue, and some orthopnea when laying flat.    He has been compliant with his medication. He has no other complaints. He is eager to get back to golf and feeling better.    He has moderate edema in his legs that remain.    Past Medical History:   Diagnosis Date   • Aortic stenosis    • Arthritis    • Back pain    • Breath shortness     pt states short of breath 24/7   • CAD (coronary artery disease)    • CATARACT     removed bilat   • Chest pain    • Chest tightness    • Chickenpox    • Congestive heart failure (HCC)    • Constipation    • Coronary heart disease    • Cough    • Daytime sleepiness    • Diabetes     PT REPORTS DIABETES RESOLVED IN 2009. NO LONGER TAKES MEDS   • Difficulty breathing    • Fatigue    • Hearing difficulty    • Heart murmur    • Heart valve disease    • Heartburn    • Hiatus hernia syndrome    • Hyperlipidemia    • Hypertension     pt states well controlled on meds   • Kidney disease    • Mumps    • Pain 08-29-13    back, hips and bilat legs, 4/10   • Pain 6/22/2015    left humerus   • Pain 1/7/16    knees, back and shoulder   • Painful joint    • Palpitations    • Pneumonia 2007   • Rhinitis    • Ringing in ears    • Severe aortic stenosis 12/4/2019   • Shortness of breath    • Swelling of lower extremity    • Tonsillitis    • Ulcer 2014    Dr. Porter, gastroenterologist   • Unspecified hemorrhagic conditions     bruises easily     Past Surgical History:   Procedure Laterality  Date   • TRANSCATHETER AORTIC VALVE REPLACEMENT N/A 1/27/2020    Procedure: REPLACEMENT, AORTIC VALVE, TRANSCATHETER-;  Surgeon: Surendra Castro M.D.;  Location: [facility];  Service: Cardiac   • OLGA  1/27/2020    Procedure: ECHOCARDIOGRAM, TRANSESOPHAGEAL;  Surgeon: Surendra Castro M.D.;  Location: [facility];  Service: Cardiac   • GASTROSCOPY N/A 1/8/2016    Procedure: GASTROSCOPY;  Surgeon: Deniz Sue M.D.;  Location: [facility];  Service:    • HUMERUS ORIF Left 6/25/2015    Procedure: HUMERUS ORIF/ PROXIMAL;  Surgeon: Cristal Guido M.D.;  Location: [facility];  Service:    • LUMBAR FUSION POSTERIOR  9/5/2013    Performed by Edith Sears M.D. at [facility]   • LUMBAR DECOMPRESSION  9/5/2013    Performed by Edith Sears M.D. at [facility]   • MASS EXCISION NEURO  9/5/2013    Performed by Edith Sears M.D. at [facility]   • RECOVERY  6/26/2012    Performed by SURGERY, IR-RECOVERY at [facility] SAME DAY [facility]   • DRAINAGE HEMATOMA  5/25/2012    Performed by DENIZ SUE at [facility]   • GASTRIC BAND LAPAROSCOPIC REVISION  5/11/2012    Performed by DENIZ SUE at [facility]   • LUMBAR FUSION POSTERIOR  3/26/2012    Performed by EDITH SEARS at [facility]   • LUMBAR DECOMPRESSION  3/26/2012    Performed by EDITH SEARS at [facility]   • INJ,EPIDURAL/LUMB/SAC SINGLE  3/19/2012    Performed by KRISTIN BALTAZAR at [facility]   • INJ,EPIDURAL/LUMB/SAC SINGLE  3/5/2012    Performed by KRISTIN BALTAZAR at [facility]   • GASTRIC BANDING LAPAROSCOPIC  3/24/2010    Performed by DENIZ SUE at [facility]   • HIATAL HERNIA REPAIR  3/24/2010    Performed by DENIZ SUE at [facility]   • KNEE ARTHROPLASTY TOTAL  2000    bilateral   • ABDOMINOPLASTY  1990   • AORTIC VALVE REPLACEMENT     • ARTHROSCOPY, KNEE      • LAMINOTOMY     • PB REMV 2ND CATARACT,CORN-SCLER SECTN     • SINUSCOPE     • SLEEVE,CARRIE VASO THIGH     • TONSILLECTOMY     • ZZZ CARDIAC CATH       Family History   Problem Relation Age of Onset   • No Known Problems Sister    • No Known Problems Sister    • No Known Problems Sister    • Diabetes Other    • No Known Problems Mother    • No Known Problems Father    • Heart Disease Neg Hx      Social History     Socioeconomic History   • Marital status:      Spouse name: Not on file   • Number of children: Not on file   • Years of education: Not on file   • Highest education level: Not on file   Occupational History   • Not on file   Social Needs   • Financial resource strain: Not on file   • Food insecurity:     Worry: Not on file     Inability: Not on file   • Transportation needs:     Medical: Not on file     Non-medical: Not on file   Tobacco Use   • Smoking status: Never Smoker   • Smokeless tobacco: Never Used   • Tobacco comment: 0   Substance and Sexual Activity   • Alcohol use: No     Alcohol/week: 0.0 oz     Frequency: Never     Binge frequency: Never   • Drug use: No   • Sexual activity: Yes   Lifestyle   • Physical activity:     Days per week: Not on file     Minutes per session: Not on file   • Stress: Not on file   Relationships   • Social connections:     Talks on phone: Not on file     Gets together: Not on file     Attends Religion service: Not on file     Active member of club or organization: Not on file     Attends meetings of clubs or organizations: Not on file     Relationship status: Not on file   • Intimate partner violence:     Fear of current or ex partner: Not on file     Emotionally abused: Not on file     Physically abused: Not on file     Forced sexual activity: Not on file   Other Topics Concern   • Not on file   Social History Narrative   • Not on file     Allergies   Allergen Reactions   • Statins [Hmg-Coa-R Inhibitors] Rash     Blisters       Outpatient Encounter  Medications as of 2/11/2020   Medication Sig Dispense Refill   • acetaminophen (TYLENOL) 325 MG Tab Take 2 Tabs by mouth every 6 hours as needed for Moderate Pain. 30 Tab 0   • bumetanide (BUMEX) 1 MG Tab Take 1 Tab by mouth every day. 30 Tab 0   • carvedilol (COREG) 3.125 MG Tab Take 1 Tab by mouth 2 times a day, with meals. 60 Tab 0   • calcium citrate (CALCITRATE) 950 MG Tab Take 1,900 mg by mouth every day.     • coenzyme Q-10 30 MG capsule Take 60 mg by mouth 2 Times a Day.     • Garlic 1000 MG Cap Take 1,000 mg by mouth 2 Times a Day.     • glucosamine Sulfate 500 MG Cap Take 1,500 mg by mouth 2 Times a Day.     • Non Formulary Request Inject 1 Each as instructed See Admin Instructions. HCG/ARGININE 45127a/5mg/cc, pt does not take on Thur      • Non Formulary Request Inject 1 Each as instructed See Admin Instructions. HGH 5.8mg/2.5mg/cc,   Monday, Tuesday, Wednesday, Friday, Saturday, sunday     • vitamin e (VITAMIN E) 400 UNIT Cap Take 400 Units by mouth 2 times a day.     • omeprazole (PRILOSEC) 40 MG delayed-release capsule Take 40 mg by mouth 2 times a day.     • Non Formulary Request Take 1 Cap by mouth 2 Times a Day. Cholox (OTC)     • Cholecalciferol (VITAMIN D3) 2000 UNIT Cap Take 2,000 Units by mouth every day.     • Non Formulary Request Take 1 Cap by mouth 2 Times a Day. Domenic red (OTC)     • Non Formulary Request Take 1 Cap by mouth every day. Nitric OXIDE (OTC)     • Multiple Vitamins-Minerals (OCULAR VITAMINS PO) Take 1 Cap by mouth every day.     • Probiotic Product (PROBIOTIC DAILY PO) Take 1 Cap by mouth every day.     • Non Formulary Request Take 1 Cap by mouth every day. anit-oxidant (OTC)     • Zinc 50 MG Tab Take 50 mg by mouth every day.     • anastrozole (ARIMIDEX) 1 MG Tab Take 0.5 mg by mouth See Admin Instructions. 1/2 tab 2x/wk On Tue and Sat     • gabapentin (NEURONTIN) 300 MG Cap Take 300 mg by mouth 3 times a day as needed. Indications: Neuropathic Pain     • clopidogrel (PLAVIX)  "75 MG Tab Take 1 Tab by mouth every day. 90 Tab 3   • aspirin EC 81 MG EC tablet Take 1 Tab by mouth every day. 30 Tab 0   • ezetimibe (ZETIA) 10 MG Tab Take 10 mg by mouth every morning.     • glimepiride (AMARYL) 1 MG tablet Take 1 mg by mouth every evening.     • B Complex Vitamins (VITAMIN B COMPLEX PO) Take 1 Tab by mouth every day.     • Cranberry 300 MG Tab Take 300 mg by mouth every morning.     • L-Lysine 1000 MG Tab Take 1,000 mg by mouth every day.     • Ascorbic Acid (VITAMIN C) 1000 MG Tab Take 1,000 mg by mouth 2 Times a Day.     • sacubitril-valsartan (ENTRESTO) 24-26 MG Tab tablet Take 1 Tab by mouth 2 Times a Day. 60 Tab 3   • Testosterone Cypionate (DEPO-TESTOSTERONE IM) 1 Each by Intramuscular route every 7 days. Thursday       No facility-administered encounter medications on file as of 2/11/2020.      Review of Systems   Constitutional: Negative for fever and malaise/fatigue.   Respiratory: Positive for shortness of breath. Negative for cough.    Cardiovascular: Positive for orthopnea and leg swelling. Negative for chest pain, palpitations, claudication and PND.   Gastrointestinal: Negative for abdominal pain.   Musculoskeletal: Negative for falls and myalgias.   Neurological: Negative for dizziness.        Objective:   /82 (BP Location: Right arm, Patient Position: Sitting, BP Cuff Size: Adult)   Pulse 88   Ht 1.854 m (6' 1\")   Wt 111.6 kg (246 lb)   SpO2 92%   BMI 32.46 kg/m²     Physical Exam   Constitutional: He appears well-developed.   obese   HENT:   Head: Normocephalic and atraumatic.   Eyes: EOM are normal.   Neck: No JVD present.   Cardiovascular: Normal rate, regular rhythm, normal heart sounds and intact distal pulses.   Pulmonary/Chest: Effort normal. He has decreased breath sounds in the left lower field.   Musculoskeletal: Normal range of motion.         General: Edema present.      Comments: Bilateral LE edema 1+ pitting with micaela appearance to lower legs   Skin: Skin " is warm and dry.   Psychiatric: He has a normal mood and affect.   Nursing note and vitals reviewed.      Assessment:     1. S/P TAVR (transcatheter aortic valve replacement)     2. S/P placement of cardiac pacemaker     3. Renal insufficiency     4. Essential hypertension     5. Dyslipidemia     6. Coronary artery disease involving native coronary artery of native heart without angina pectoris     7. Acute on chronic systolic heart failure (HCC)     8. Type 2 diabetes mellitus without complication, without long-term current use of insulin (HCC)     9. Stented coronary artery     10. Class 1 obesity due to excess calories without serious comorbidity with body mass index (BMI) of 33.0 to 33.9 in adult       Medical Decision Making:  Today's Assessment / Status / Plan:     1. S/P TAVR   -doing well besides heart block requiring AICD placement  -cont asa, plavix (1 year for coronary stent)  -groins CDI and healing well  -follow up in one month with echo in valve clinic    2. Chronic systolic heart failure with rEF of 25-40%  -slight improvement in EF post TAVR  -edema in legs, orthopnea, and some ALLISON/shortness of breath  -recommend urgent bnp and bmp today or tomorrow, call with results and med changes  -consider adjustment of diuretic therapy depending on lab results-will discuss with Dr. Wilkinson after resulted  -HF assessments done today, education completed  -cont entresto, coreg, and bumex for now  -repeat echo 1 month as planned for valve    3. CAD with stent placements  -no angina or ALLISON  -cont asa lifelong, plavix (1 year), statin  -follow clinically    4. HLD  -statin  -LDL goal <70 with CAD  -yearly labs at goal    5. HTN  -good control  -follow at home  -cont entresto,coreg, and bumex as above    6. Obesity with DM  -follow up with cardiac rehab, brochure given to patient    7. CKD  -follow labs this week  -diurese with caution    FU in clinic in 1 month with JI with echo; 4 weeks with device check and BE with  labs soon-call with results    Patient verbalizes understanding and agrees with the plan of care.     Collaborating MD: Gloria SIMMS

## 2020-02-12 ENCOUNTER — TELEPHONE (OUTPATIENT)
Dept: CARDIOLOGY | Facility: MEDICAL CENTER | Age: 80
End: 2020-02-12

## 2020-02-12 DIAGNOSIS — I50.23 ACUTE ON CHRONIC SYSTOLIC HEART FAILURE (HCC): ICD-10-CM

## 2020-02-12 LAB
ANION GAP SERPL CALC-SCNC: 8 MMOL/L (ref 0–11.9)
BUN SERPL-MCNC: 26 MG/DL (ref 8–22)
CALCIUM SERPL-MCNC: 10.1 MG/DL (ref 8.5–10.5)
CHLORIDE SERPL-SCNC: 103 MMOL/L (ref 96–112)
CO2 SERPL-SCNC: 27 MMOL/L (ref 20–33)
CREAT SERPL-MCNC: 1.55 MG/DL (ref 0.5–1.4)
GLUCOSE SERPL-MCNC: 131 MG/DL (ref 65–99)
POTASSIUM SERPL-SCNC: 4.1 MMOL/L (ref 3.6–5.5)
SODIUM SERPL-SCNC: 138 MMOL/L (ref 135–145)

## 2020-02-12 RX ORDER — POTASSIUM CHLORIDE 750 MG/1
10 TABLET, EXTENDED RELEASE ORAL DAILY
Qty: 30 TAB | Refills: 3 | Status: SHIPPED
Start: 2020-02-12 | End: 2020-04-16

## 2020-02-12 RX ORDER — BUMETANIDE 1 MG/1
1 TABLET ORAL 2 TIMES DAILY
Qty: 60 TAB | Refills: 3 | Status: SHIPPED | OUTPATIENT
Start: 2020-02-12 | End: 2020-02-26 | Stop reason: SDUPTHER

## 2020-02-12 NOTE — TELEPHONE ENCOUNTER
Result Notes for proBrain Natriuretic Peptide, NT     Notes recorded by NEAL Villegas on 2/12/2020 at 9:30 AM PST  Recommend increasing bumex to 2 mg BID. Wait for final CMP to result to discuss CKD and need for extra K. SC  -----     -------------------------------------------------------------    Clarified Bumex dosage with Billie FORD and bumex 2mg is an error. APRN clarified that she wants Bumex 1mg BID prescribed so patient should increase the diuretic from once a day to twice a day (morning and afternoon). APRN would also like to add Potassium 10meq and repeat BNP and BMP in one week.     Pt notified of recommendations and agrees with plan.  Instructions repeated to confirm clarification. Prescriptions sent.     Pt wants to start cardiac rehab, and wants to know if that's okay. He also has not received a call from the program. There was a note from Luh Duarte that she had called about a week ago but the patient was on his way to the emergency room so they would call back. Billie said it was okay to start ICR.    He also asked about getting a device ID card for his AICD/PPM. Pt notified that he will get one in the mail in about 2 weeks.     His lab orders were e-mailed to him upon request.

## 2020-02-13 NOTE — TELEPHONE ENCOUNTER
Pt says the pharmacy told him their is no such thing as 10meq potassium.     Called [facility] pharmacy and was told there was no issue with the prescription and that it is ready for .     Pt notified.

## 2020-02-13 NOTE — NON-PROVIDER
S/p new AICD, implanted on 2-4-2020. Appropriate device function demonstrated. Wound site appears clear. Advised to watch for redness, swelling, oozing or fever. Pt verbalized understanding. See back in 6 weeks.  Remote monitor ordered today.

## 2020-02-18 ENCOUNTER — TELEPHONE (OUTPATIENT)
Dept: CARDIOLOGY | Facility: MEDICAL CENTER | Age: 80
End: 2020-02-18

## 2020-02-18 NOTE — TELEPHONE ENCOUNTER
SC    Pt reports he's having extreme nerve shoulder pain following recent procedure. #[phone]

## 2020-02-19 ENCOUNTER — HOSPITAL ENCOUNTER (OUTPATIENT)
Dept: LAB | Facility: MEDICAL CENTER | Age: 80
End: 2020-02-19
Attending: NURSE PRACTITIONER
Payer: MEDICARE

## 2020-02-19 DIAGNOSIS — Z95.0 S/P PLACEMENT OF CARDIAC PACEMAKER: ICD-10-CM

## 2020-02-19 DIAGNOSIS — I50.23 ACUTE ON CHRONIC SYSTOLIC HEART FAILURE (HCC): ICD-10-CM

## 2020-02-19 LAB
ANION GAP SERPL CALC-SCNC: 9 MMOL/L (ref 0–11.9)
BUN SERPL-MCNC: 35 MG/DL (ref 8–22)
CALCIUM SERPL-MCNC: 9.4 MG/DL (ref 8.5–10.5)
CHLORIDE SERPL-SCNC: 102 MMOL/L (ref 96–112)
CO2 SERPL-SCNC: 28 MMOL/L (ref 20–33)
CREAT SERPL-MCNC: 1.72 MG/DL (ref 0.5–1.4)
GLUCOSE SERPL-MCNC: 141 MG/DL (ref 65–99)
NT-PROBNP SERPL IA-MCNC: 2818 PG/ML (ref 0–125)
POTASSIUM SERPL-SCNC: 4.2 MMOL/L (ref 3.6–5.5)
SODIUM SERPL-SCNC: 139 MMOL/L (ref 135–145)

## 2020-02-19 PROCEDURE — 36415 COLL VENOUS BLD VENIPUNCTURE: CPT

## 2020-02-19 PROCEDURE — 80048 BASIC METABOLIC PNL TOTAL CA: CPT

## 2020-02-19 PROCEDURE — 83880 ASSAY OF NATRIURETIC PEPTIDE: CPT

## 2020-02-19 NOTE — TELEPHONE ENCOUNTER
Spoke with patient and discussed SC's recommendations.  Patient verbalized understanding and was transferred to scheduling.    Schedulers: Please schedule patient with an EP LANCE per SC.  First available appointment please.  Thank you.    Encounter routed to Scheduling.

## 2020-02-19 NOTE — TELEPHONE ENCOUNTER
"Spoke with patient who reported 8-9 out of 10 pain in his rotator cuff area radiating down his bursa, as well as pain \"at the tip of the shoulder\" and is wondering if it is related to the recent placement of his device.  Advised patient that I would consult with [place] HERNANDEZ in  to seek more information and advice, and I would return his phone call once consulting her.    [place] HERNANDEZ stated that there can be nerve pain around the area where the device was placed, however, based on the patient's description, she is suspecting that perhaps the pain could possibly be from a shoulder injury and perhaps was aggravated during placement of the AICD.  She did state that if the pain is localized around the device, it often resolves but we are unable to give a specific amount of time.  An anti-inflammatory is often recommended if the patient is allows to take those.    Called patient back with the above information and assessed further.  He states that the pain has worsened since his procedure.  He has metal in his arm and is able to differentiate the shoulder/arm/ortho pain from the pain he is currently experiencing, and he feels that this is nerve pain related to his device.    Advised patient that this information would be routed to SC since she is the last provider to have seen him, and we will call back with any recommendations that she may have.  Patient verbalized understanding and was agreeable to this plan.     Information routed to SC for advice and recommendations.  "

## 2020-02-19 NOTE — TELEPHONE ENCOUNTER
Please have patient follow up with EP APRN to discuss shoulder pain and possible nerve injury related to placement. Thank you, SC

## 2020-02-20 ENCOUNTER — OFFICE VISIT (OUTPATIENT)
Dept: CARDIOLOGY | Facility: MEDICAL CENTER | Age: 80
End: 2020-02-20
Payer: MEDICARE

## 2020-02-20 VITALS
DIASTOLIC BLOOD PRESSURE: 74 MMHG | BODY MASS INDEX: 32.6 KG/M2 | HEART RATE: 79 BPM | HEIGHT: 73 IN | OXYGEN SATURATION: 93 % | SYSTOLIC BLOOD PRESSURE: 126 MMHG | WEIGHT: 246 LBS

## 2020-02-20 DIAGNOSIS — Z95.0 PACEMAKER: Primary | ICD-10-CM

## 2020-02-20 DIAGNOSIS — M25.512 LEFT SHOULDER PAIN, UNSPECIFIED CHRONICITY: ICD-10-CM

## 2020-02-20 PROCEDURE — 93000 ELECTROCARDIOGRAM COMPLETE: CPT | Performed by: INTERNAL MEDICINE

## 2020-02-20 PROCEDURE — 99213 OFFICE O/P EST LOW 20 MIN: CPT | Performed by: NURSE PRACTITIONER

## 2020-02-20 ASSESSMENT — ENCOUNTER SYMPTOMS
DOUBLE VISION: 0
FOCAL WEAKNESS: 0
SORE THROAT: 0
TREMORS: 0
BLURRED VISION: 0
SPEECH CHANGE: 0
ABDOMINAL PAIN: 0
ORTHOPNEA: 0
HEARTBURN: 0
SPUTUM PRODUCTION: 0
VOMITING: 0
HEMOPTYSIS: 0
CHILLS: 0
LOSS OF CONSCIOUSNESS: 0
WEIGHT LOSS: 0
HEADACHES: 0
PND: 0
TINGLING: 0
WHEEZING: 0
PALPITATIONS: 0
COUGH: 0
FEVER: 0
DIZZINESS: 0
STRIDOR: 0
SENSORY CHANGE: 0
DIARRHEA: 0
SHORTNESS OF BREATH: 0
NAUSEA: 0

## 2020-02-21 NOTE — PROGRESS NOTES
Cardiology/Electrophysiology Follow-up Note      Subjective:   Chief Complaint:   Chief Complaint   Patient presents with   • Other     post device shoulder pain        LIAN More III is a 79 y.o. male who presents today for follow up after MDT PPM placed for transient CHB and concerns for should pain post device placement.    He is followed by Dr. Castro.  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  He states that it feels like tendonitis.  Wondering if it is secondary to weight put on his shoulder device device implantation.  He would like to know what modalities he can use to alleviate his pain.    He currently denies chest pain, dizziness, palpitations, pre syncope or syncope, dyspnea, PND, orthopnea.  States lower extremity edema stable, was recently placed on BID dosing of Bumex.     Patient endorses medication compliance        Past Medical History:   Diagnosis Date   • Aortic stenosis    • Arthritis    • Back pain    • Breath shortness     pt states short of breath 24/7   • CAD (coronary artery disease)    • CATARACT     removed bilat   • Chest pain    • Chest tightness    • Chickenpox    • Congestive heart failure (HCC)    • Constipation    • Coronary heart disease    • Cough    • Daytime sleepiness    • Diabetes     PT REPORTS DIABETES RESOLVED IN 2009. NO LONGER TAKES MEDS   • Difficulty breathing    • Fatigue    • Hearing difficulty    • Heart murmur    • Heart valve disease    • Heartburn    • Hiatus hernia syndrome    • Hyperlipidemia    • Hypertension     pt states well controlled on meds   • Kidney disease    • Mumps    • Pain 08-29-13    back, hips and bilat legs, 4/10   • Pain 6/22/2015     left humerus   • Pain 1/7/16    knees, back and shoulder   • Painful joint    • Palpitations    • Pneumonia 2007   • Rhinitis    • Ringing in ears    • Severe aortic stenosis 12/4/2019   • Shortness of breath    • Swelling of lower extremity    • Tonsillitis    • Ulcer 2014    Dr. Porter, gastroenterologist   • Unspecified hemorrhagic conditions     bruises easily     Past Surgical History:   Procedure Laterality Date   • TRANSCATHETER AORTIC VALVE REPLACEMENT N/A 1/27/2020    Procedure: REPLACEMENT, AORTIC VALVE, TRANSCATHETER-;  Surgeon: Surendra Castro M.D.;  Location: [facility];  Service: Cardiac   • OLGA  1/27/2020    Procedure: ECHOCARDIOGRAM, TRANSESOPHAGEAL;  Surgeon: Surendra Castro M.D.;  Location: SURGERY [facility];  Service: Cardiac   • GASTROSCOPY N/A 1/8/2016    Procedure: GASTROSCOPY;  Surgeon: Deniz Sue M.D.;  Location: [facility];  Service:    • HUMERUS ORIF Left 6/25/2015    Procedure: HUMERUS ORIF/ PROXIMAL;  Surgeon: Cristal Guido M.D.;  Location: [facility];  Service:    • LUMBAR FUSION POSTERIOR  9/5/2013    Performed by Edith Sears M.D. at [facility]   • LUMBAR DECOMPRESSION  9/5/2013    Performed by Edith Sears M.D. at [facility]   • MASS EXCISION NEURO  9/5/2013    Performed by Edith Sears M.D. at [facility]   • RECOVERY  6/26/2012    Performed by SURGERY, IR-RECOVERY at [facility] SAME DAY [facility]   • DRAINAGE HEMATOMA  5/25/2012    Performed by DENIZ SUE at [facility]   • GASTRIC BAND LAPAROSCOPIC REVISION  5/11/2012    Performed by DENIZ SUE at [facility]   • LUMBAR FUSION POSTERIOR  3/26/2012    Performed by EDITH SEARS at [facility]   • LUMBAR DECOMPRESSION  3/26/2012    Performed by EDITH SEARS at [facility]   • INJ,EPIDURAL/LUMB/SAC SINGLE  3/19/2012    Performed by KRISTIN BALTAZAR at [facility]   •  INJ,EPIDURAL/LUMB/SAC SINGLE  3/5/2012    Performed by KRISTIN BALTAZAR at SURGERY [facility] ORS   • GASTRIC BANDING LAPAROSCOPIC  3/24/2010    Performed by SARINA SUE at SURGERY [facility] ORS   • HIATAL HERNIA REPAIR  3/24/2010    Performed by SARINA SUE at SURGERY [facility] ORS   • KNEE ARTHROPLASTY TOTAL  2000    bilateral   • ABDOMINOPLASTY  1990   • AORTIC VALVE REPLACEMENT     • ARTHROSCOPY, KNEE     • LAMINOTOMY     • PB REMV 2ND CATARACT,CORN-SCLER SECTN     • SINUSCOPE     • SLEEVE,CARRIE VASO THIGH     • TONSILLECTOMY     • ZZZ CARDIAC CATH       Family History   Problem Relation Age of Onset   • No Known Problems Sister    • No Known Problems Sister    • No Known Problems Sister    • Diabetes Other    • No Known Problems Mother    • No Known Problems Father    • Heart Disease Neg Hx      Social History     Socioeconomic History   • Marital status:      Spouse name: Not on file   • Number of children: Not on file   • Years of education: Not on file   • Highest education level: Not on file   Occupational History   • Not on file   Social Needs   • Financial resource strain: Not on file   • Food insecurity     Worry: Not on file     Inability: Not on file   • Transportation needs     Medical: Not on file     Non-medical: Not on file   Tobacco Use   • Smoking status: Never Smoker   • Smokeless tobacco: Never Used   • Tobacco comment: 0   Substance and Sexual Activity   • Alcohol use: No     Alcohol/week: 0.0 oz     Frequency: Never     Binge frequency: Never   • Drug use: No   • Sexual activity: Yes   Lifestyle   • Physical activity     Days per week: Not on file     Minutes per session: Not on file   • Stress: Not on file   Relationships   • Social connections     Talks on phone: Not on file     Gets together: Not on file     Attends [employer] service: Not on file     Active member of club or organization: Not on file     Attends meetings of clubs or organizations: Not on file      Relationship status: Not on file   • Intimate partner violence     Fear of current or ex partner: Not on file     Emotionally abused: Not on file     Physically abused: Not on file     Forced sexual activity: Not on file   Other Topics Concern   • Not on file   Social History Narrative   • Not on file     Allergies   Allergen Reactions   • Statins [Hmg-Coa-R Inhibitors] Rash     Blisters         Current Outpatient Medications   Medication Sig Dispense Refill   • bumetanide (BUMEX) 1 MG Tab Take 1 Tab by mouth 2 times a day. 60 Tab 3   • potassium chloride SA (K-DUR) 10 MEQ Tab CR Take 1 Tab by mouth every day. 30 Tab 3   • acetaminophen (TYLENOL) 325 MG Tab Take 2 Tabs by mouth every 6 hours as needed for Moderate Pain. 30 Tab 0   • carvedilol (COREG) 3.125 MG Tab Take 1 Tab by mouth 2 times a day, with meals. 60 Tab 0   • calcium citrate (CALCITRATE) 950 MG Tab Take 1,900 mg by mouth every day.     • coenzyme Q-10 30 MG capsule Take 60 mg by mouth 2 Times a Day.     • Garlic 1000 MG Cap Take 1,000 mg by mouth 2 Times a Day.     • glucosamine Sulfate 500 MG Cap Take 1,500 mg by mouth 2 Times a Day.     • Non Formulary Request Inject 1 Each as instructed See Admin Instructions. HGH 5.8mg/2.5mg/cc,   Monday, Tuesday, Wednesday, Friday, Saturday, sunday     • Non Formulary Request Take 1 Cap by mouth 2 Times a Day. Cholox (OTC)     • Cholecalciferol (VITAMIN D3) 2000 UNIT Cap Take 2,000 Units by mouth every day.     • Non Formulary Request Take 1 Cap by mouth 2 Times a Day. Domenic red (OTC)     • Non Formulary Request Take 1 Cap by mouth every day. Nitric OXIDE (OTC)     • Multiple Vitamins-Minerals (OCULAR VITAMINS PO) Take 1 Cap by mouth every day.     • Probiotic Product (PROBIOTIC DAILY PO) Take 1 Cap by mouth every day.     • Non Formulary Request Take 1 Cap by mouth every day. anit-oxidant (OTC)     • Zinc 50 MG Tab Take 50 mg by mouth every day.     • anastrozole (ARIMIDEX) 1 MG Tab Take 0.5 mg by mouth See Admin  Instructions. 1/2 tab 2x/wk On Tue and Sat     • gabapentin (NEURONTIN) 300 MG Cap Take 300 mg by mouth 3 times a day as needed. Indications: Neuropathic Pain     • clopidogrel (PLAVIX) 75 MG Tab Take 1 Tab by mouth every day. 90 Tab 3   • aspirin EC 81 MG EC tablet Take 1 Tab by mouth every day. 30 Tab 0   • B Complex Vitamins (VITAMIN B COMPLEX PO) Take 1 Tab by mouth every day.     • Cranberry 300 MG Tab Take 300 mg by mouth every morning.     • Ascorbic Acid (VITAMIN C) 1000 MG Tab Take 1,000 mg by mouth 2 Times a Day.     • Testosterone Cypionate (DEPO-TESTOSTERONE IM) 1 Each by Intramuscular route every 7 days. Thursday     • Non Formulary Request Inject 1 Each as instructed See Admin Instructions. HCG/ARGININE 37159e/5mg/cc, pt does not take on Thur      • vitamin e (VITAMIN E) 400 UNIT Cap Take 400 Units by mouth 2 times a day.     • omeprazole (PRILOSEC) 40 MG delayed-release capsule Take 40 mg by mouth 2 times a day.     • ezetimibe (ZETIA) 10 MG Tab Take 10 mg by mouth every morning.     • glimepiride (AMARYL) 1 MG tablet Take 1 mg by mouth every evening.     • L-Lysine 1000 MG Tab Take 1,000 mg by mouth every day.     • sacubitril-valsartan (ENTRESTO) 24-26 MG Tab tablet Take 1 Tab by mouth 2 Times a Day. 60 Tab 3     No current facility-administered medications for this visit.        Review of Systems   Constitutional: Negative for chills, fever, malaise/fatigue and weight loss.   HENT: Negative for congestion, sore throat and tinnitus.    Eyes: Negative for blurred vision and double vision.   Respiratory: Negative for cough, hemoptysis, sputum production, shortness of breath, wheezing and stridor.    Cardiovascular: Positive for leg swelling. Negative for chest pain, palpitations, orthopnea and PND.   Gastrointestinal: Negative for abdominal pain, diarrhea, heartburn, nausea and vomiting.   Musculoskeletal: Positive for joint pain.        Left shoulder pain    Skin: Negative for rash.   Neurological:  "Negative for dizziness, tingling, tremors, sensory change, speech change, focal weakness, loss of consciousness and headaches.     All others systems reviewed and negative.     Objective:     /74 (BP Location: Left arm, Patient Position: Sitting, BP Cuff Size: Adult)   Pulse 79   Ht 1.854 m (6' 1\")   Wt 111.6 kg (246 lb)   SpO2 93%  Body mass index is 32.46 kg/m².    Physical Exam   Constitutional: He is oriented to person, place, and time and well-developed, well-nourished, and in no distress.   HENT:   Head: Normocephalic and atraumatic.   Eyes: Pupils are equal, round, and reactive to light. Conjunctivae and EOM are normal.   Neck: Normal range of motion. Neck supple. No JVD present. No tracheal deviation present.   Cardiovascular: Normal rate, regular rhythm and intact distal pulses. Exam reveals no gallop and no friction rub.   Murmur heard.  Pulses:       Radial pulses are 2+ on the right side and 2+ on the left side.        Dorsalis pedis pulses are 2+ on the right side and 2+ on the left side.        Posterior tibial pulses are 2+ on the right side and 2+ on the left side.   Pulmonary/Chest: Effort normal and breath sounds normal. No respiratory distress. He has no wheezes. He has no rales. He exhibits no tenderness.   Left chest device site is clean and dey, incision is well approximated, no erythema.  Mild soft edema remains.    Abdominal: Soft. Bowel sounds are normal.   Musculoskeletal: Normal range of motion.         General: Edema (1+ BLE) present.   Neurological: He is alert and oriented to person, place, and time.   Skin: Skin is warm and dry.   Psychiatric: Mood, memory, affect and judgment normal.         Cardiac Imaging and Procedures Review:    EKG (2/20/20) reviewed by myself:   Sinus rhythm, incomplete LBBB, occasional PVCs.     Echo (2/4/20):   Left Ventricle  Normal left ventricular chamber size. Normal left ventricular wall   thickness. Left ventricular ejection fraction is " visually estimated to   be 35-40%. Global hypokinesis with regional variation. Indeterminate   diastolic function.     Right Ventricle  Mildly dilated right ventricle. Reduced right ventricular systolic   function.     Right Atrium  Dilated right atrium. Dilated inferior vena cava with inspiratory   collapse.     Left Atrium  Mildly dilated left atrium. Left atrial volume index is 35  mL/sq m.     Mitral Valve  Structurally normal mitral valve. Thickened mitral valve leaflets.   Trace mitral regurgitation.     Aortic Valve  Known TAVR aortic valve that is functioning normally with normal   transvalvular gradients. Vmax is   2.8m/s. Transvalvular gradients are   - Peak: 14 mmHg,  Mean: 30 mmHg. No aortic insufficiency.     Tricuspid Valve  Structurally normal tricuspid valve. No tricuspid stenosis. No   tricuspid regurgitation. Right atrial pressure is estimated to be 8   mmHg. Unable to estimate pulmonary artery pressure due to an inadequate   tricuspid regurgitant jet.     Pulmonic Valve  Structurally normal pulmonic valve without significant stenosis or   regurgitation.     Pericardium  Normal pericardium without effusion.     Aorta  Normal aortic root for body surface area. Ascending aorta diameter is   2.7 cm.    [facility] (1/10/20):   Conclusions    1. LVEF 30% prior to intervention.    2. 70% distal left main and proximal LAD stenosis, IFR positive from prior  procedure.    3. Successful planned PCI proximal LAD and distal left main trifurcation  stenosis (3.5 x 26 mm [place] BRANDI postdilated to 4.5 mm proximally), IVUS guided.    4. Moderate aortic stenosis, mean gradient 27 to 30mmHg.     Recommendations    * Long-term dual antiplatelet therapy.    * Bivalirudin for 4 hours post procedure.    * Continue outpatient cardiac medications.    * Follow-up as scheduled after observation admission overnight.    * Continue medical therapy for diabetes.    Labs (personally reviewed and notable for):   Lab Results   Component  Value Date/Time    SODIUM 139 02/19/2020 01:57 PM    POTASSIUM 4.2 02/19/2020 01:57 PM    CHLORIDE 102 02/19/2020 01:57 PM    CO2 28 02/19/2020 01:57 PM    GLUCOSE 141 (H) 02/19/2020 01:57 PM    BUN 35 (H) 02/19/2020 01:57 PM    CREATININE 1.72 (H) 02/19/2020 01:57 PM    CREATININE 1.18 02/08/2011 12:00 AM    BUNCREATRAT 16 02/08/2011 12:00 AM    GLOMRATE >59 02/08/2011 12:00 AM      Lab Results   Component Value Date/Time    WBC 5.9 02/11/2020 01:11 PM    WBC 4.5 02/08/2011 12:00 AM    RBC 6.54 (H) 02/11/2020 01:11 PM    RBC 5.36 02/08/2011 12:00 AM    HEMOGLOBIN 15.7 02/11/2020 01:11 PM    HEMATOCRIT 53.5 (H) 02/11/2020 01:11 PM    MCV 81.8 02/11/2020 01:11 PM    MCV 93 02/08/2011 12:00 AM    MCH 24.0 (L) 02/11/2020 01:11 PM    MCH 31.0 02/08/2011 12:00 AM    MCHC 29.3 (L) 02/11/2020 01:11 PM    MPV 9.3 02/04/2020 05:31 AM    NEUTSPOLYS 62.40 02/03/2020 02:00 PM    LYMPHOCYTES 20.80 (L) 02/03/2020 02:00 PM    MONOCYTES 11.20 02/03/2020 02:00 PM    EOSINOPHILS 5.00 02/03/2020 02:00 PM    BASOPHILS 0.30 02/03/2020 02:00 PM    HYPOCHROMIA 1+ 01/16/2020 09:35 AM    ANISOCYTOSIS 2+ 02/03/2020 02:00 PM      PT/INR:   Lab Results   Component Value Date/Time    PROTHROMBTM 14.3 02/04/2020 09:22 AM    INR 1.09 02/04/2020 09:22 AM         Assessment:     1. Pacemaker  EKG   2. Left shoulder pain, unspecified chronicity         Medical Decision Making:  Today's Assessment / Status / Plan:   Mr. More came in for evaluation today secondary to left shoulder pain post ICD placement.  He tells me it feels like tendinitis.  He has had extensive shoulder surgery done on that side with significant arthritis as well.  He wanted to discuss what modalities he could use for pain control that would be safe with his pacemaker.      I have discussed with him that I do not think his pain is secondary to actual ICD as placment is not close enough to his hardware in his shoulder, but rather concur that it is likely secondary to his  longstanding orthopedic issues.  we have discussed that he can continue to use his pain medication as prescribed by his pain medicine specialist, lidocaine patch if prescribed by his pain medicine specialist, possible injections to the shoulder would be okay, laser therapy to the shoulder would be okay, indirect ice pack to the shoulder would be okay as well.  He is okay to continue using Voltaren gel.    I have discussed all this with Mr. More today and he verbalizes understanding.  He will keep his future follow-up appointments and echocardiogram prior to seeing Dr. Serra.  He has a follow-up device interrogation towards the end of March.      Plan reviewed in detail with the patient and he verbalizes understanding and is in agreement.   RTC next months as scheduled, sooner if clinical condition changes  Collaborating MD/ADD: Gloria    Please note that this dictation was created using voice recognition software. I have made every reasonable attempt to correct obvious errors, but I expect that there are errors of grammar and possibly content that I did not discover before finalizing the note.    SHO Glass.

## 2020-02-24 ENCOUNTER — TELEPHONE (OUTPATIENT)
Dept: MEDICAL GROUP | Facility: MEDICAL CENTER | Age: 80
End: 2020-02-24

## 2020-02-24 ENCOUNTER — TELEPHONE (OUTPATIENT)
Dept: CARDIOLOGY | Facility: MEDICAL CENTER | Age: 80
End: 2020-02-24

## 2020-02-24 ENCOUNTER — APPOINTMENT (OUTPATIENT)
Dept: RADIOLOGY | Facility: MEDICAL CENTER | Age: 80
End: 2020-02-24
Attending: EMERGENCY MEDICINE
Payer: MEDICARE

## 2020-02-24 ENCOUNTER — HOSPITAL ENCOUNTER (OUTPATIENT)
Facility: MEDICAL CENTER | Age: 80
End: 2020-02-26
Attending: EMERGENCY MEDICINE | Admitting: INTERNAL MEDICINE
Payer: MEDICARE

## 2020-02-24 DIAGNOSIS — R06.02 SHORTNESS OF BREATH: ICD-10-CM

## 2020-02-24 DIAGNOSIS — J10.1 INFLUENZA A: ICD-10-CM

## 2020-02-24 DIAGNOSIS — N28.9 RENAL INSUFFICIENCY: ICD-10-CM

## 2020-02-24 DIAGNOSIS — I50.23 ACUTE ON CHRONIC SYSTOLIC HEART FAILURE (HCC): ICD-10-CM

## 2020-02-24 DIAGNOSIS — R06.02 SOB (SHORTNESS OF BREATH) ON EXERTION: ICD-10-CM

## 2020-02-24 LAB
ALBUMIN SERPL BCP-MCNC: 4 G/DL (ref 3.2–4.9)
ALBUMIN/GLOB SERPL: 1.6 G/DL
ALP SERPL-CCNC: 52 U/L (ref 30–99)
ALT SERPL-CCNC: 22 U/L (ref 2–50)
ANION GAP SERPL CALC-SCNC: 6 MMOL/L (ref 0–11.9)
AST SERPL-CCNC: 30 U/L (ref 12–45)
BASOPHILS # BLD AUTO: 0.3 % (ref 0–1.8)
BASOPHILS # BLD: 0.01 K/UL (ref 0–0.12)
BILIRUB SERPL-MCNC: 0.4 MG/DL (ref 0.1–1.5)
BUN SERPL-MCNC: 34 MG/DL (ref 8–22)
CALCIUM SERPL-MCNC: 8.8 MG/DL (ref 8.5–10.5)
CHLORIDE SERPL-SCNC: 108 MMOL/L (ref 96–112)
CO2 SERPL-SCNC: 25 MMOL/L (ref 20–33)
CREAT SERPL-MCNC: 1.61 MG/DL (ref 0.5–1.4)
EKG IMPRESSION: NORMAL
EKG IMPRESSION: NORMAL
EOSINOPHIL # BLD AUTO: 0.19 K/UL (ref 0–0.51)
EOSINOPHIL NFR BLD: 5.5 % (ref 0–6.9)
ERYTHROCYTE [DISTWIDTH] IN BLOOD BY AUTOMATED COUNT: 64.5 FL (ref 35.9–50)
FLUAV RNA SPEC QL NAA+PROBE: POSITIVE
FLUBV RNA SPEC QL NAA+PROBE: NEGATIVE
GLOBULIN SER CALC-MCNC: 2.5 G/DL (ref 1.9–3.5)
GLUCOSE BLD-MCNC: 104 MG/DL (ref 65–99)
GLUCOSE BLD-MCNC: 61 MG/DL (ref 65–99)
GLUCOSE SERPL-MCNC: 131 MG/DL (ref 65–99)
HCT VFR BLD AUTO: 49.1 % (ref 42–52)
HGB BLD-MCNC: 14.9 G/DL (ref 14–18)
IMM GRANULOCYTES # BLD AUTO: 0.01 K/UL (ref 0–0.11)
IMM GRANULOCYTES NFR BLD AUTO: 0.3 % (ref 0–0.9)
LYMPHOCYTES # BLD AUTO: 0.93 K/UL (ref 1–4.8)
LYMPHOCYTES NFR BLD: 26.8 % (ref 22–41)
MCH RBC QN AUTO: 25.5 PG (ref 27–33)
MCHC RBC AUTO-ENTMCNC: 30.3 G/DL (ref 33.7–35.3)
MCV RBC AUTO: 83.9 FL (ref 81.4–97.8)
MONOCYTES # BLD AUTO: 0.76 K/UL (ref 0–0.85)
MONOCYTES NFR BLD AUTO: 21.9 % (ref 0–13.4)
NEUTROPHILS # BLD AUTO: 1.57 K/UL (ref 1.82–7.42)
NEUTROPHILS NFR BLD: 45.2 % (ref 44–72)
NRBC # BLD AUTO: 0 K/UL
NRBC BLD-RTO: 0 /100 WBC
NT-PROBNP SERPL IA-MCNC: 3267 PG/ML (ref 0–125)
PLATELET # BLD AUTO: 114 K/UL (ref 164–446)
PMV BLD AUTO: 10.3 FL (ref 9–12.9)
POTASSIUM SERPL-SCNC: 4.7 MMOL/L (ref 3.6–5.5)
PROCALCITONIN SERPL-MCNC: <0.05 NG/ML
PROT SERPL-MCNC: 6.5 G/DL (ref 6–8.2)
RBC # BLD AUTO: 5.85 M/UL (ref 4.7–6.1)
SODIUM SERPL-SCNC: 139 MMOL/L (ref 135–145)
TROPONIN T SERPL-MCNC: 38 NG/L (ref 6–19)
TROPONIN T SERPL-MCNC: 38 NG/L (ref 6–19)
WBC # BLD AUTO: 3.5 K/UL (ref 4.8–10.8)

## 2020-02-24 PROCEDURE — 700102 HCHG RX REV CODE 250 W/ 637 OVERRIDE(OP): Performed by: INTERNAL MEDICINE

## 2020-02-24 PROCEDURE — G0378 HOSPITAL OBSERVATION PER HR: HCPCS

## 2020-02-24 PROCEDURE — 99220 PR INITIAL OBSERVATION CARE,LEVL III: CPT | Performed by: INTERNAL MEDICINE

## 2020-02-24 PROCEDURE — A9270 NON-COVERED ITEM OR SERVICE: HCPCS | Performed by: INTERNAL MEDICINE

## 2020-02-24 PROCEDURE — 93005 ELECTROCARDIOGRAM TRACING: CPT | Performed by: EMERGENCY MEDICINE

## 2020-02-24 PROCEDURE — 99285 EMERGENCY DEPT VISIT HI MDM: CPT

## 2020-02-24 PROCEDURE — 80053 COMPREHEN METABOLIC PANEL: CPT

## 2020-02-24 PROCEDURE — 84484 ASSAY OF TROPONIN QUANT: CPT | Mod: 91

## 2020-02-24 PROCEDURE — 83880 ASSAY OF NATRIURETIC PEPTIDE: CPT

## 2020-02-24 PROCEDURE — 71045 X-RAY EXAM CHEST 1 VIEW: CPT

## 2020-02-24 PROCEDURE — 82962 GLUCOSE BLOOD TEST: CPT

## 2020-02-24 PROCEDURE — 87502 INFLUENZA DNA AMP PROBE: CPT

## 2020-02-24 PROCEDURE — 84145 PROCALCITONIN (PCT): CPT

## 2020-02-24 PROCEDURE — 85025 COMPLETE CBC W/AUTO DIFF WBC: CPT

## 2020-02-24 RX ORDER — CYANOCOBALAMIN 1000 UG/ML
1000 INJECTION, SOLUTION INTRAMUSCULAR; SUBCUTANEOUS
Status: ON HOLD | COMMUNITY
End: 2022-03-31

## 2020-02-24 RX ORDER — ACETAMINOPHEN 325 MG/1
650 TABLET ORAL EVERY 6 HOURS PRN
Status: DISCONTINUED | OUTPATIENT
Start: 2020-02-24 | End: 2020-02-26 | Stop reason: HOSPADM

## 2020-02-24 RX ORDER — CARVEDILOL 3.12 MG/1
3.12 TABLET ORAL 2 TIMES DAILY WITH MEALS
Status: DISCONTINUED | OUTPATIENT
Start: 2020-02-25 | End: 2020-02-26 | Stop reason: HOSPADM

## 2020-02-24 RX ORDER — DOXYCYCLINE 100 MG/1
100 TABLET ORAL EVERY 12 HOURS
Status: DISCONTINUED | OUTPATIENT
Start: 2020-02-24 | End: 2020-02-26 | Stop reason: HOSPADM

## 2020-02-24 RX ORDER — ONDANSETRON 4 MG/1
4 TABLET, ORALLY DISINTEGRATING ORAL EVERY 4 HOURS PRN
Status: DISCONTINUED | OUTPATIENT
Start: 2020-02-24 | End: 2020-02-26 | Stop reason: HOSPADM

## 2020-02-24 RX ORDER — OMEPRAZOLE 20 MG/1
40 CAPSULE, DELAYED RELEASE ORAL 2 TIMES DAILY
Status: DISCONTINUED | OUTPATIENT
Start: 2020-02-24 | End: 2020-02-26 | Stop reason: HOSPADM

## 2020-02-24 RX ORDER — LACTOBACILLUS RHAMNOSUS GG 10B CELL
1 CAPSULE ORAL DAILY
Status: ON HOLD | COMMUNITY
End: 2022-03-31

## 2020-02-24 RX ORDER — GUAIFENESIN/DEXTROMETHORPHAN 100-10MG/5
10 SYRUP ORAL EVERY 6 HOURS PRN
Status: DISCONTINUED | OUTPATIENT
Start: 2020-02-24 | End: 2020-02-26 | Stop reason: HOSPADM

## 2020-02-24 RX ORDER — POLYETHYLENE GLYCOL 3350 17 G/17G
1 POWDER, FOR SOLUTION ORAL
Status: DISCONTINUED | OUTPATIENT
Start: 2020-02-24 | End: 2020-02-26 | Stop reason: HOSPADM

## 2020-02-24 RX ORDER — BUMETANIDE 1 MG/1
1 TABLET ORAL
Status: DISCONTINUED | OUTPATIENT
Start: 2020-02-25 | End: 2020-02-26 | Stop reason: HOSPADM

## 2020-02-24 RX ORDER — ONDANSETRON 2 MG/ML
4 INJECTION INTRAMUSCULAR; INTRAVENOUS EVERY 4 HOURS PRN
Status: DISCONTINUED | OUTPATIENT
Start: 2020-02-24 | End: 2020-02-26 | Stop reason: HOSPADM

## 2020-02-24 RX ORDER — VIT A/VIT C/VIT E/ZINC/COPPER 7160-113
1 TABLET, DELAYED RELEASE (ENTERIC COATED) ORAL 2 TIMES DAILY
COMMUNITY
End: 2021-04-19

## 2020-02-24 RX ORDER — BISACODYL 10 MG
10 SUPPOSITORY, RECTAL RECTAL
Status: DISCONTINUED | OUTPATIENT
Start: 2020-02-24 | End: 2020-02-26 | Stop reason: HOSPADM

## 2020-02-24 RX ORDER — ENALAPRILAT 1.25 MG/ML
1.25 INJECTION INTRAVENOUS EVERY 6 HOURS PRN
Status: DISCONTINUED | OUTPATIENT
Start: 2020-02-24 | End: 2020-02-26 | Stop reason: HOSPADM

## 2020-02-24 RX ORDER — FLUTICASONE PROPIONATE 50 MCG
2 SPRAY, SUSPENSION (ML) NASAL DAILY
COMMUNITY
End: 2020-03-31

## 2020-02-24 RX ORDER — ANASTROZOLE 1 MG/1
0.5 TABLET ORAL
Status: DISCONTINUED | OUTPATIENT
Start: 2020-02-25 | End: 2020-02-26 | Stop reason: HOSPADM

## 2020-02-24 RX ORDER — HEPARIN SODIUM 5000 [USP'U]/ML
5000 INJECTION, SOLUTION INTRAVENOUS; SUBCUTANEOUS EVERY 8 HOURS
Status: DISCONTINUED | OUTPATIENT
Start: 2020-02-24 | End: 2020-02-26 | Stop reason: HOSPADM

## 2020-02-24 RX ORDER — AMOXICILLIN 250 MG
2 CAPSULE ORAL 2 TIMES DAILY
Status: DISCONTINUED | OUTPATIENT
Start: 2020-02-24 | End: 2020-02-26 | Stop reason: HOSPADM

## 2020-02-24 RX ORDER — TESTOSTERONE CYPIONATE 200 MG/ML
160 INJECTION, SOLUTION INTRAMUSCULAR
Status: ON HOLD | COMMUNITY
End: 2022-03-31

## 2020-02-24 RX ORDER — CLOPIDOGREL BISULFATE 75 MG/1
75 TABLET ORAL DAILY
Status: DISCONTINUED | OUTPATIENT
Start: 2020-02-25 | End: 2020-02-26 | Stop reason: HOSPADM

## 2020-02-24 RX ORDER — DIPHENHYDRAMINE HCL 25 MG
25 TABLET ORAL NIGHTLY PRN
Status: DISCONTINUED | OUTPATIENT
Start: 2020-02-24 | End: 2020-02-26 | Stop reason: HOSPADM

## 2020-02-24 RX ORDER — OSELTAMIVIR PHOSPHATE 30 MG/1
30 CAPSULE ORAL 2 TIMES DAILY
Status: DISCONTINUED | OUTPATIENT
Start: 2020-02-24 | End: 2020-02-26 | Stop reason: HOSPADM

## 2020-02-24 RX ADMIN — DOXYCYCLINE 100 MG: 100 TABLET, FILM COATED ORAL at 22:07

## 2020-02-24 RX ADMIN — OSELTAMIVIR PHOSPHATE 30 MG: 30 CAPSULE ORAL at 22:07

## 2020-02-24 RX ADMIN — OMEPRAZOLE 40 MG: 20 CAPSULE, DELAYED RELEASE ORAL at 22:07

## 2020-02-24 ASSESSMENT — ENCOUNTER SYMPTOMS
DEPRESSION: 0
DIZZINESS: 0
HEADACHES: 0
CONSTIPATION: 0
COUGH: 1
PALPITATIONS: 0
SPUTUM PRODUCTION: 0
SHORTNESS OF BREATH: 1
DIARRHEA: 0
ABDOMINAL PAIN: 0
LOSS OF CONSCIOUSNESS: 0
WEAKNESS: 0
TINGLING: 0
FEVER: 0
NAUSEA: 0
MYALGIAS: 0
FALLS: 0
STRIDOR: 0
VOMITING: 0
INSOMNIA: 1
CHILLS: 1

## 2020-02-24 ASSESSMENT — COPD QUESTIONNAIRES
DURING THE PAST 4 WEEKS HOW MUCH DID YOU FEEL SHORT OF BREATH: SOME OF THE TIME
DO YOU EVER COUGH UP ANY MUCUS OR PHLEGM?: NO/ONLY WITH OCCASIONAL COLDS OR INFECTIONS
HAVE YOU SMOKED AT LEAST 100 CIGARETTES IN YOUR ENTIRE LIFE: NO/DON'T KNOW
COPD SCREENING SCORE: 4

## 2020-02-24 ASSESSMENT — LIFESTYLE VARIABLES: EVER_SMOKED: NEVER

## 2020-02-24 NOTE — TELEPHONE ENCOUNTER
"Result Notes for Basic Metabolic Panel     Notes recorded by NEAL Villegas on 2/20/2020 at 9:52 AM PST  Cr remains stable. GFR dropped a little with increase in diuretic therapy. Improvement in BNP though so hopefully his breathing and orthopnea has improved. He has apt with EP today to discuss his nerve pain from his new device. I don't see a HF apt coming up soon, this would be beneficial for him before he sees Dr. Wilkinson. SC     ---------------------------------------------------------    S/w pt and he is still out of breath and says he has \"little stamina\". He also says he has difficulty sleeping and discribes orthopnea. The pain in the left shoulder is improving post PPM placement.     He is agreeable to HF clinic consultation. Referral placed and pt is able to see Dr. Hendrix 3/26 at 3pm. Pt confirmed appt  "

## 2020-02-24 NOTE — TELEPHONE ENCOUNTER
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  i looked at the ct abd that he just had and it doesn't say anything about it.

## 2020-02-25 ENCOUNTER — APPOINTMENT (OUTPATIENT)
Dept: RADIOLOGY | Facility: MEDICAL CENTER | Age: 80
End: 2020-02-25
Attending: INTERNAL MEDICINE
Payer: MEDICARE

## 2020-02-25 PROBLEM — G47.00 INSOMNIA: Status: ACTIVE | Noted: 2020-02-25

## 2020-02-25 PROBLEM — J10.1 INFLUENZA A: Status: ACTIVE | Noted: 2020-02-25

## 2020-02-25 PROBLEM — R79.89 ELEVATED TROPONIN: Status: ACTIVE | Noted: 2020-02-25

## 2020-02-25 LAB
ANION GAP SERPL CALC-SCNC: 8 MMOL/L (ref 0–11.9)
BUN SERPL-MCNC: 28 MG/DL (ref 8–22)
CALCIUM SERPL-MCNC: 8.7 MG/DL (ref 8.5–10.5)
CHLORIDE SERPL-SCNC: 106 MMOL/L (ref 96–112)
CO2 SERPL-SCNC: 25 MMOL/L (ref 20–33)
CREAT SERPL-MCNC: 1.39 MG/DL (ref 0.5–1.4)
ERYTHROCYTE [DISTWIDTH] IN BLOOD BY AUTOMATED COUNT: 65.4 FL (ref 35.9–50)
GLUCOSE BLD-MCNC: 101 MG/DL (ref 65–99)
GLUCOSE BLD-MCNC: 111 MG/DL (ref 65–99)
GLUCOSE SERPL-MCNC: 106 MG/DL (ref 65–99)
HCT VFR BLD AUTO: 52.4 % (ref 42–52)
HGB BLD-MCNC: 15.3 G/DL (ref 14–18)
MCH RBC QN AUTO: 24.6 PG (ref 27–33)
MCHC RBC AUTO-ENTMCNC: 29.2 G/DL (ref 33.7–35.3)
MCV RBC AUTO: 84.4 FL (ref 81.4–97.8)
PLATELET # BLD AUTO: 108 K/UL (ref 164–446)
PMV BLD AUTO: 10.2 FL (ref 9–12.9)
POTASSIUM SERPL-SCNC: 4.7 MMOL/L (ref 3.6–5.5)
RBC # BLD AUTO: 6.21 M/UL (ref 4.7–6.1)
SODIUM SERPL-SCNC: 139 MMOL/L (ref 135–145)
TROPONIN T SERPL-MCNC: 38 NG/L (ref 6–19)
WBC # BLD AUTO: 3.4 K/UL (ref 4.8–10.8)

## 2020-02-25 PROCEDURE — A9270 NON-COVERED ITEM OR SERVICE: HCPCS | Performed by: INTERNAL MEDICINE

## 2020-02-25 PROCEDURE — 84484 ASSAY OF TROPONIN QUANT: CPT

## 2020-02-25 PROCEDURE — 99225 PR SUBSEQUENT OBSERVATION CARE,LEVEL II: CPT | Performed by: INTERNAL MEDICINE

## 2020-02-25 PROCEDURE — 96374 THER/PROPH/DIAG INJ IV PUSH: CPT

## 2020-02-25 PROCEDURE — G0378 HOSPITAL OBSERVATION PER HR: HCPCS

## 2020-02-25 PROCEDURE — 700102 HCHG RX REV CODE 250 W/ 637 OVERRIDE(OP): Performed by: INTERNAL MEDICINE

## 2020-02-25 PROCEDURE — 96372 THER/PROPH/DIAG INJ SC/IM: CPT | Mod: XU

## 2020-02-25 PROCEDURE — 80048 BASIC METABOLIC PNL TOTAL CA: CPT

## 2020-02-25 PROCEDURE — 99285 EMERGENCY DEPT VISIT HI MDM: CPT

## 2020-02-25 PROCEDURE — 94660 CPAP INITIATION&MGMT: CPT

## 2020-02-25 PROCEDURE — 700111 HCHG RX REV CODE 636 W/ 250 OVERRIDE (IP): Performed by: INTERNAL MEDICINE

## 2020-02-25 PROCEDURE — 82962 GLUCOSE BLOOD TEST: CPT

## 2020-02-25 PROCEDURE — 85027 COMPLETE CBC AUTOMATED: CPT

## 2020-02-25 PROCEDURE — 71045 X-RAY EXAM CHEST 1 VIEW: CPT

## 2020-02-25 RX ORDER — GLIMEPIRIDE 2 MG/1
1 TABLET ORAL EVERY EVENING
Status: DISCONTINUED | OUTPATIENT
Start: 2020-02-25 | End: 2020-02-26 | Stop reason: HOSPADM

## 2020-02-25 RX ORDER — LORAZEPAM 2 MG/ML
0.5 INJECTION INTRAMUSCULAR ONCE
Status: COMPLETED | OUTPATIENT
Start: 2020-02-25 | End: 2020-02-25

## 2020-02-25 RX ADMIN — OSELTAMIVIR PHOSPHATE 30 MG: 30 CAPSULE ORAL at 20:20

## 2020-02-25 RX ADMIN — CARVEDILOL 3.12 MG: 3.12 TABLET, FILM COATED ORAL at 10:07

## 2020-02-25 RX ADMIN — DOXYCYCLINE 100 MG: 100 TABLET, FILM COATED ORAL at 05:56

## 2020-02-25 RX ADMIN — HEPARIN SODIUM 5000 UNITS: 5000 INJECTION, SOLUTION INTRAVENOUS; SUBCUTANEOUS at 20:19

## 2020-02-25 RX ADMIN — GLIMEPIRIDE 1 MG: 2 TABLET ORAL at 20:20

## 2020-02-25 RX ADMIN — BUMETANIDE 1 MG: 1 TABLET ORAL at 05:57

## 2020-02-25 RX ADMIN — SENNOSIDES AND DOCUSATE SODIUM 2 TABLET: 8.6; 5 TABLET ORAL at 10:08

## 2020-02-25 RX ADMIN — SACUBITRIL AND VALSARTAN 1 TABLET: 24; 26 TABLET, FILM COATED ORAL at 05:56

## 2020-02-25 RX ADMIN — DOXYCYCLINE 100 MG: 100 TABLET, FILM COATED ORAL at 17:29

## 2020-02-25 RX ADMIN — LORAZEPAM 0.5 MG: 2 INJECTION INTRAMUSCULAR; INTRAVENOUS at 02:19

## 2020-02-25 RX ADMIN — OSELTAMIVIR PHOSPHATE 30 MG: 30 CAPSULE ORAL at 10:07

## 2020-02-25 RX ADMIN — OMEPRAZOLE 40 MG: 20 CAPSULE, DELAYED RELEASE ORAL at 20:22

## 2020-02-25 RX ADMIN — CLOPIDOGREL BISULFATE 75 MG: 75 TABLET ORAL at 06:18

## 2020-02-25 RX ADMIN — ANASTROZOLE 0.5 MG: 1 TABLET, COATED ORAL at 05:57

## 2020-02-25 RX ADMIN — HEPARIN SODIUM 5000 UNITS: 5000 INJECTION, SOLUTION INTRAVENOUS; SUBCUTANEOUS at 00:32

## 2020-02-25 RX ADMIN — OMEPRAZOLE 40 MG: 20 CAPSULE, DELAYED RELEASE ORAL at 10:08

## 2020-02-25 RX ADMIN — SACUBITRIL AND VALSARTAN 1 TABLET: 24; 26 TABLET, FILM COATED ORAL at 17:28

## 2020-02-25 RX ADMIN — DIPHENHYDRAMINE HYDROCHLORIDE 25 MG: 25 TABLET ORAL at 00:32

## 2020-02-25 RX ADMIN — ASPIRIN 81 MG: 81 TABLET, COATED ORAL at 05:56

## 2020-02-25 RX ADMIN — CARVEDILOL 3.12 MG: 3.12 TABLET, FILM COATED ORAL at 17:29

## 2020-02-25 RX ADMIN — BUMETANIDE 1 MG: 1 TABLET ORAL at 17:29

## 2020-02-25 RX ADMIN — SACUBITRIL AND VALSARTAN 1 TABLET: 24; 26 TABLET, FILM COATED ORAL at 00:33

## 2020-02-25 ASSESSMENT — PATIENT HEALTH QUESTIONNAIRE - PHQ9
2. FEELING DOWN, DEPRESSED, IRRITABLE, OR HOPELESS: NOT AT ALL
SUM OF ALL RESPONSES TO PHQ9 QUESTIONS 1 AND 2: 0
1. LITTLE INTEREST OR PLEASURE IN DOING THINGS: NOT AT ALL

## 2020-02-25 ASSESSMENT — ENCOUNTER SYMPTOMS
BACK PAIN: 0
CONSTIPATION: 0
ABDOMINAL PAIN: 0
ORTHOPNEA: 0
COUGH: 0
TREMORS: 0
HEADACHES: 0
INSOMNIA: 1
SHORTNESS OF BREATH: 1
DIAPHORESIS: 0
DIZZINESS: 0
SORE THROAT: 0
WHEEZING: 0
SPUTUM PRODUCTION: 0
BRUISES/BLEEDS EASILY: 0
PALPITATIONS: 0
VOMITING: 0
SINUS PAIN: 0
NERVOUS/ANXIOUS: 0
DEPRESSION: 0
MYALGIAS: 0
FEVER: 0
CHILLS: 0

## 2020-02-25 ASSESSMENT — LIFESTYLE VARIABLES
EVER HAD A DRINK FIRST THING IN THE MORNING TO STEADY YOUR NERVES TO GET RID OF A HANGOVER: NO
TOTAL SCORE: 0
ON A TYPICAL DAY WHEN YOU DRINK ALCOHOL HOW MANY DRINKS DO YOU HAVE: 0
AVERAGE NUMBER OF DAYS PER WEEK YOU HAVE A DRINK CONTAINING ALCOHOL: 0
TOTAL SCORE: 0
CONSUMPTION TOTAL: NEGATIVE
HAVE PEOPLE ANNOYED YOU BY CRITICIZING YOUR DRINKING: NO
TOTAL SCORE: 0
HOW MANY TIMES IN THE PAST YEAR HAVE YOU HAD 5 OR MORE DRINKS IN A DAY: 0
EVER FELT BAD OR GUILTY ABOUT YOUR DRINKING: NO
HAVE YOU EVER FELT YOU SHOULD CUT DOWN ON YOUR DRINKING: NO
EVER_SMOKED: NEVER
ALCOHOL_USE: NO

## 2020-02-25 ASSESSMENT — COGNITIVE AND FUNCTIONAL STATUS - GENERAL
SUGGESTED CMS G CODE MODIFIER DAILY ACTIVITY: CJ
HELP NEEDED FOR BATHING: A LITTLE
CLIMB 3 TO 5 STEPS WITH RAILING: A LITTLE
MOBILITY SCORE: 23
TOILETING: A LITTLE
DAILY ACTIVITIY SCORE: 22
SUGGESTED CMS G CODE MODIFIER MOBILITY: CI

## 2020-02-25 NOTE — ED NOTES
Report received from Cely HERNANDEZ; Pt resting on hospital bed; pt updated on plan of care; pt has no questions at this time.

## 2020-02-25 NOTE — ED NOTES
To room to medicate pt with due meds. Pt is sleeping. Pt meds held due to this and knowing he desperately wants to get some sleep. Will recheck in on pt

## 2020-02-25 NOTE — PROGRESS NOTES
[facility] Medicine Daily Progress Note    Date of Service  2/25/2020    Chief Complaint  79 y.o. male admitted 2/24/2020 with fatigue and shortness of breath    Hospital Course    This is a 79 y.o. male who presented 2/24/2020 with shortness of breath.  Patient states his symptoms initially started on Saturday evening when he was unable to sleep.  Later that night he noted shortness of breath. He was evaluated in the emergency department and found to have influenza A positive. He is on nighttime oxygen but none during the day.        Interval Problem Update  The patient is on 2-3 liters supplemental oxygen  He did sleep 2 hours after benadryl last night    Consultants/Specialty  none    Code Status  full    Disposition  home    Review of Systems  Review of Systems   Constitutional: Positive for malaise/fatigue. Negative for chills, diaphoresis and fever.   HENT: Negative for sinus pain and sore throat.    Respiratory: Positive for shortness of breath. Negative for cough, sputum production and wheezing.    Cardiovascular: Positive for leg swelling. Negative for chest pain, palpitations and orthopnea.   Gastrointestinal: Negative for abdominal pain, constipation and vomiting.   Genitourinary: Negative for hematuria and urgency.   Musculoskeletal: Negative for back pain and myalgias.   Skin: Negative for itching and rash.   Neurological: Negative for dizziness, tremors and headaches.   Endo/Heme/Allergies: Does not bruise/bleed easily.   Psychiatric/Behavioral: Negative for depression. The patient has insomnia. The patient is not nervous/anxious.         Physical Exam  Temp:  [36.4 °C (97.6 °F)] 36.4 °C (97.6 °F)  Pulse:  [68-89] 86  Resp:  [16] 16  BP: (106-148)/(62-87) 144/87  SpO2:  [94 %-99 %] 97 %    Physical Exam  Vitals signs and nursing note reviewed.   Constitutional:       Appearance: He is obese. He is not ill-appearing or diaphoretic.   HENT:      Nose: No congestion or rhinorrhea.      Mouth/Throat:       Mouth: Mucous membranes are moist.      Pharynx: Oropharynx is clear.   Eyes:      General: No scleral icterus.     Conjunctiva/sclera: Conjunctivae normal.      Pupils: Pupils are equal, round, and reactive to light.   Neck:      Musculoskeletal: Neck supple. No neck rigidity.   Cardiovascular:      Rate and Rhythm: Normal rate and regular rhythm.      Heart sounds: Normal heart sounds. No murmur. No friction rub.   Pulmonary:      Effort: No respiratory distress.      Breath sounds: Normal breath sounds. No stridor. No wheezing or rhonchi.   Abdominal:      General: Bowel sounds are normal. There is no distension.      Tenderness: There is no abdominal tenderness. There is no rebound.   Musculoskeletal:         General: No swelling or tenderness.   Skin:     General: Skin is dry.      Capillary Refill: Capillary refill takes less than 2 seconds.      Coloration: Skin is not jaundiced or pale.      Findings: No erythema.   Neurological:      General: No focal deficit present.      Mental Status: He is alert and oriented to person, place, and time.      Motor: No weakness.   Psychiatric:         Mood and Affect: Mood normal.         Thought Content: Thought content normal.         Fluids  No intake or output data in the 24 hours ending 02/25/20 0809    Laboratory  Recent Labs     02/24/20  1826 02/25/20  0355   WBC 3.5* 3.4*   RBC 5.85 6.21*   HEMOGLOBIN 14.9 15.3   HEMATOCRIT 49.1 52.4*   MCV 83.9 84.4   MCH 25.5* 24.6*   MCHC 30.3* 29.2*   RDW 64.5* 65.4*   PLATELETCT 114* 108*   MPV 10.3 10.2     Recent Labs     02/24/20  1750 02/25/20  0355   SODIUM 139 139   POTASSIUM 4.7 4.7   CHLORIDE 108 106   CO2 25 25   GLUCOSE 131* 106*   BUN 34* 28*   CREATININE 1.61* 1.39   CALCIUM 8.8 8.7                   Imaging  DX-CHEST-LIMITED (1 VIEW)   Final Result      1.  No significant change in LEFT greater than RIGHT basilar atelectasis which could obscure an additional process   2.  Persistently enlarged cardiac silhouette       DX-CHEST-PORTABLE (1 VIEW)   Final Result      1.  Left lung base atelectasis/consolidation and small left pleural effusion.      2.  Cardiomegaly.           Assessment/Plan  Influenza A- (present on admission)  Assessment & Plan  -New, symptom onset Saturday night  continue Tamiflu  continue doxycycline  procacitonin normal    Hypertension- (present on admission)  Assessment & Plan   home Entresto, Coreg      Elevated troponin- (present on admission)  Assessment & Plan  Stable, no acute ischemic changes, due to acute cardiac demand    Insomnia- (present on admission)  Assessment & Plan  PRN Benadryl  Patient is on oxygen at night and has outpatient sleep study scheduled    Lower extremity edema- (present on admission)  Assessment & Plan  bumex    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

## 2020-02-25 NOTE — ED NOTES
Noticed pt monitors where not on, to room and pt sitting to bedside. Pt had used urinal. Pt also ate 50% meal tray. Medicated per orders. Denies needs denies pain. Back on all monitors

## 2020-02-25 NOTE — ED NOTES
PT able to sleep for 2 hours after medication and CPAP placement.  Pt now awake, plan to transfer to [facility] to wait for telemetry/obs admission bed.

## 2020-02-25 NOTE — ED NOTES
Pt rounded on, resting in bed, c/o dyspnea at rest.  Provided mouth swab and updated pt and his wife about POC.

## 2020-02-25 NOTE — ED TRIAGE NOTES
"..  Chief Complaint   Patient presents with   • Shortness of Breath     pt states he hasn't slept for two days because he's not \"getting enough air\". reports my \"diaphram isn't working right\". reports weakness   ../71   Pulse 80   Temp 36.4 °C (97.6 °F) (Temporal)   Resp 16   Ht 1.854 m (6' 1\")   Wt 114.2 kg (251 lb 12.3 oz)   SpO2 97%    ..Explained triage process, to waiting room. Asked to inform RN if questions or concerns arise.   "

## 2020-02-25 NOTE — PROGRESS NOTES
Assumed care of pt, he is alert and oriented. Transported pt to floor on zoll. Transferred to tele monitor. Confirmed SR with monitor tech. Discussed POC with pt and ED RN at bedside. Discussed safety. Bed is locked in lowest position and call light/personal belongings are within reach.

## 2020-02-25 NOTE — ED PROVIDER NOTES
"ED Provider Note    Scribed for Diego Beauchamp M.D. by Alea Marie. 2/24/2020, 6:03 PM.    Primary care provider: Ebony Ramirez M.D.  Means of arrival: Walk-in  History obtained from: Patient  History limited by: None    CHIEF COMPLAINT  Chief Complaint   Patient presents with   • Shortness of Breath     pt states he hasn't slept for two days because he's not \"getting enough air\". reports my \"diaphram isn't working right\". reports weakness     HPI  J JAVAD More III is a 79 y.o. male who presents to the Emergency Department for shortness of breath that began two days ago. He states he wears oxygen when he sleeps at night. His shortness of breath is worse when he sleeps and lays down. He has associated bilateral leg swelling. He denies chest pain. The patient takes diuretics and states his dosage was increased a week ago. The patient took his dose this morning, but did not take it this afternoon due to his symptoms. His Cardiologist is Dr. Queen. The patient had a pacemaker placed last week.     REVIEW OF SYSTEMS  Pertinent positives include shortness of breath and bilateral leg swelling. Pertinent negatives include no chest pain. As above, all other systems reviewed and are negative.   See HPI for further details.     PAST MEDICAL HISTORY   has a past medical history of Aortic stenosis, Arthritis, Back pain, Breath shortness, CAD (coronary artery disease), CATARACT, Chest pain, Chest tightness, Chickenpox, Congestive heart failure (HCC), Constipation, Coronary heart disease, Cough, Daytime sleepiness, Diabetes, Difficulty breathing, Fatigue, Hearing difficulty, Heart murmur, Heart valve disease, Heartburn, Hiatus hernia syndrome, Hyperlipidemia, Hypertension, Kidney disease, Mumps, Pain (08-29-13), Pain (6/22/2015), Pain (1/7/16), Painful joint, Palpitations, Pneumonia (2007), Rhinitis, Ringing in ears, Severe aortic stenosis (12/4/2019), Shortness of breath, Swelling of lower extremity, Tonsillitis, " Ulcer (2014), and Unspecified hemorrhagic conditions.    SURGICAL HISTORY   has a past surgical history that includes abdominoplasty (1990); gastric banding laparoscopic (3/24/2010); hiatal hernia repair (3/24/2010); inj,epidural/lumb/sac single (3/5/2012); inj,epidural/lumb/sac single (3/19/2012); lumbar fusion posterior (3/26/2012); lumbar decompression (3/26/2012); gastric band laparoscopic revision (5/11/2012); drainage hematoma (5/25/2012); recovery (6/26/2012); lumbar fusion posterior (9/5/2013); lumbar decompression (9/5/2013); mass excision neuro (9/5/2013); gastroscopy (N/A, 1/8/2016); knee arthroplasty total (2000); humerus orif (Left, 6/25/2015); zzz cardiac cath; laminotomy; Sleeve,Grant Vaso Thigh; remv 2nd cataract,corn-scler sectn; sinuscope; tonsillectomy; aortic valve replacement; arthroscopy, knee; transcatheter aortic valve replacement (N/A, 1/27/2020); and stephon (1/27/2020).    SOCIAL HISTORY  Social History     Tobacco Use   • Smoking status: Never Smoker   • Smokeless tobacco: Never Used   • Tobacco comment: 0   Substance Use Topics   • Alcohol use: No     Alcohol/week: 0.0 oz     Frequency: Never     Binge frequency: Never   • Drug use: No      Social History     Substance and Sexual Activity   Drug Use No     FAMILY HISTORY  Family History   Problem Relation Age of Onset   • No Known Problems Sister    • No Known Problems Sister    • No Known Problems Sister    • Diabetes Other    • No Known Problems Mother    • No Known Problems Father    • Heart Disease Neg Hx      CURRENT MEDICATIONS  Home Medications     Reviewed by Cely Antonio R.N. (Registered Nurse) on 02/24/20 at 1931  Med List Status: Partial   Medication Last Dose Status   acetaminophen (TYLENOL) 325 MG Tab  Active   anastrozole (ARIMIDEX) 1 MG Tab  Active   Ascorbic Acid (VITAMIN C) 1000 MG Tab  Active   aspirin EC 81 MG EC tablet 2/24/2020 Active   B Complex Vitamins (VITAMIN B COMPLEX PO)  Active   bumetanide (BUMEX) 1 MG Tab  "2/24/2020 Active   calcium citrate (CALCITRATE) 950 MG Tab  Active   carvedilol (COREG) 3.125 MG Tab 2/24/2020 Active   Cholecalciferol (VITAMIN D3) 2000 UNIT Cap  Active   clopidogrel (PLAVIX) 75 MG Tab 2/24/2020 Active   coenzyme Q-10 30 MG capsule  Active   Cranberry 300 MG Tab  Active   ezetimibe (ZETIA) 10 MG Tab  Active   gabapentin (NEURONTIN) 300 MG Cap  Active   Garlic 1000 MG Cap  Active   glimepiride (AMARYL) 1 MG tablet  Active   glucosamine Sulfate 500 MG Cap  Active   L-Lysine 1000 MG Tab  Active   Multiple Vitamins-Minerals (OCULAR VITAMINS PO)  Active   Non Formulary Request  Active   Non Formulary Request  Active   Non Formulary Request  Active   Non Formulary Request  Active   Non Formulary Request  Active   Non Formulary Request  Active   omeprazole (PRILOSEC) 40 MG delayed-release capsule  Active   potassium chloride SA (K-DUR) 10 MEQ Tab CR  Active   Probiotic Product (PROBIOTIC DAILY PO)  Active   sacubitril-valsartan (ENTRESTO) 24-26 MG Tab tablet 2/24/2020 Active   Testosterone Cypionate (DEPO-TESTOSTERONE IM)  Active   vitamin e (VITAMIN E) 400 UNIT Cap  Active   Zinc 50 MG Tab  Active              ALLERGIES  Allergies   Allergen Reactions   • Statins [Hmg-Coa-R Inhibitors] Rash     Blisters       PHYSICAL EXAM  VITAL SIGNS: /71   Pulse 74   Temp 36.4 °C (97.6 °F) (Temporal)   Resp 16   Ht 1.854 m (6' 1\")   Wt 114.2 kg (251 lb 12.3 oz)   SpO2 95%   BMI 33.22 kg/m²   Vitals reviewed.  Constitutional: Alert in moderate respiratory distress on oxygen.  HENT: No signs of trauma, Bilateral external ears normal, Nose normal.   Eyes: Pupils are equal and reactive, Conjunctiva normal, Non-icteric.   Neck: Normal range of motion, No tenderness, Supple, No stridor.   Lymphatic: No lymphadenopathy noted.   Cardiovascular: Regular rate and rhythm, no murmurs.   Thorax & Lungs: Moderate respiratory distress on oxygen, Crackles to both bases. No wheezing, No chest tenderness.   Abdomen: Bowel " sounds normal, Soft, No tenderness, No peritoneal signs, No masses, No pulsatile masses.   Skin: Warm, Dry, No erythema, No rash.   Back: No bony tenderness, No CVA tenderness.   Extremities: Intact distal pulses, No edema, No tenderness, No cyanosis  Musculoskeletal: Good range of motion in all major joints. No tenderness to palpation or major deformities noted.   Neurologic: Alert , Normal motor function, Normal sensory function, No focal deficits noted.   Psychiatric: Affect normal, Judgment normal, Mood normal.     DIAGNOSTIC STUDIES / PROCEDURES    LABS  Labs Reviewed   COMP METABOLIC PANEL - Abnormal; Notable for the following components:       Result Value    Glucose 131 (*)     Bun 34 (*)     Creatinine 1.61 (*)     All other components within normal limits   INFLUENZA A/B BY PCR - Abnormal; Notable for the following components:    Influenza virus A RNA POSITIVE (*)     All other components within normal limits   CBC WITH DIFFERENTIAL - Abnormal; Notable for the following components:    WBC 3.5 (*)     MCH 25.5 (*)     MCHC 30.3 (*)     RDW 64.5 (*)     Platelet Count 114 (*)     Monocytes 21.90 (*)     Neutrophils (Absolute) 1.57 (*)     Lymphs (Absolute) 0.93 (*)     All other components within normal limits    Narrative:     SPECIMEN IS A RECOLLECT   ESTIMATED GFR - Abnormal; Notable for the following components:    GFR If  51 (*)     GFR If Non  42 (*)     All other components within normal limits   PROBRAIN NATRIURETIC PEPTIDE, NT   TROPONIN      All labs reviewed by me.    EKG Interpretation:  Interpreted by me    12 Lead EKG interpreted by me to show:  Normal sinus rhythm  Rate 75  Axis: Normal  Intervals:   SC interval prolonged 210  QRS prolonged 112  Poor R wave progression anteriorly  Nonspecific ST T wave changes  Compare with EKG on February 20, 2020, no significant changes  My impression of this EKG: Does not meet STEMI criteria at this  time.    RADIOLOGY  DX-CHEST-PORTABLE (1 VIEW)   Final Result      1.  Left lung base atelectasis/consolidation and small left pleural effusion.      2.  Cardiomegaly.        The radiologist's interpretation of all radiological studies have been reviewed by me.    COURSE & MEDICAL DECISION MAKING  Nursing notes, VS, PMSFHx reviewed in chart.  Differential diagnoses include but not limited to: influenza versus CHF versus ACS versus pneumonia          6:03 PM Patient seen and examined at bedside. Ordered for DX-Chest-Portable, Influenza A/B by PCR (Adult - Flu Only), BNP, Troponin, CBC with Differential, CMP, and EKG to evaluate.     6:50 PM - Reviewed lab and radiology results.     7:00 PM - Paged Hospitalist.     7:02 PM - I discussed the patient's case and the above findings with Dr. Grant (Hospitalist) who agrees to evaluate patient for hospitalization.      7:10 PM - Reviewed lab results, which show patient is positive for influenza A.  The patient's BNP is pending.      DISPOSITION:  Patient will be hospitalized by Dr. Grant, Hospitalist, in guarded condition.     FINAL IMPRESSION  1. Influenza A    2. Shortness of breath    3.      Hypoxia       I, Alea Marie (Ann), am scribing for, and in the presence of, Diego Beauchamp M.D..    Electronically signed by: Alea Marie (Ann), 2/24/2020    IDiego M.D. personally performed the services described in this documentation, as scribed by Alea Marie in my presence, and it is both accurate and complete. C    The note accurately reflects work and decisions made by me.  Diego Beauchamp M.D.  2/24/2020  7:48 PM

## 2020-02-25 NOTE — ED NOTES
Med rec updated and complete. Allergies reviewed. Met with pt/family at bedside.   No antibiotic use in last 14 days.    Home pharmacy [facility].

## 2020-02-25 NOTE — H&P
Hospital Medicine History & Physical Note    Date of Service  2/24/2020    Primary Care Physician  Ebony Ramirez M.D.    Consultants  None    Code Status  Full    Chief Complaint  Shortness of breath    History of Presenting Illness  79 y.o. male who presented 2/24/2020 with shortness of breath.  Patient states his symptoms initially started on Saturday evening when he was unable to sleep.  Later that night he noted shortness of breath.  Patient became fatigued which is continued to worsen.  He also complains of chills.  He has had a cough that is slightly productive but he states he swallows the sputum.  Patient was unable to sleep again Sunday night.  He does have a history of leg swelling, recently had increased diuretic dose a week ago with improvement.  Upon arrival, patient was noted to be influenza a positive.  I did discuss the case including labs and imaging with the ER physician.    Review of Systems  Review of Systems   Constitutional: Positive for chills. Negative for fever and malaise/fatigue.   HENT: Negative for congestion.    Respiratory: Positive for cough and shortness of breath. Negative for sputum production and stridor.    Cardiovascular: Negative for chest pain, palpitations and leg swelling.   Gastrointestinal: Negative for abdominal pain, constipation, diarrhea, nausea and vomiting.   Genitourinary: Negative for dysuria and urgency.   Musculoskeletal: Negative for falls and myalgias.   Neurological: Negative for dizziness, tingling, loss of consciousness, weakness and headaches.   Psychiatric/Behavioral: Negative for depression and suicidal ideas. The patient has insomnia.    All other systems reviewed and are negative.      Past Medical History   has a past medical history of Aortic stenosis, Arthritis, Back pain, Breath shortness, CAD (coronary artery disease), CATARACT, Chest pain, Chest tightness, Chickenpox, Congestive heart failure (HCC), Constipation, Coronary heart disease, Cough,  Daytime sleepiness, Diabetes, Difficulty breathing, Fatigue, Hearing difficulty, Heart murmur, Heart valve disease, Heartburn, Hiatus hernia syndrome, Hyperlipidemia, Hypertension, Kidney disease, Mumps, Pain (08-29-13), Pain (6/22/2015), Pain (1/7/16), Painful joint, Palpitations, Pneumonia (2007), Rhinitis, Ringing in ears, Severe aortic stenosis (12/4/2019), Shortness of breath, Swelling of lower extremity, Tonsillitis, Ulcer (2014), and Unspecified hemorrhagic conditions.    Surgical History   has a past surgical history that includes abdominoplasty (1990); gastric banding laparoscopic (3/24/2010); hiatal hernia repair (3/24/2010); inj,epidural/lumb/sac single (3/5/2012); inj,epidural/lumb/sac single (3/19/2012); lumbar fusion posterior (3/26/2012); lumbar decompression (3/26/2012); gastric band laparoscopic revision (5/11/2012); drainage hematoma (5/25/2012); recovery (6/26/2012); lumbar fusion posterior (9/5/2013); lumbar decompression (9/5/2013); mass excision neuro (9/5/2013); gastroscopy (N/A, 1/8/2016); knee arthroplasty total (2000); humerus orif (Left, 6/25/2015); zzz cardiac cath; laminotomy; Sleeve,Grant Vaso Thigh; pr remv 2nd cataract,corn-scler sectn; sinuscope; tonsillectomy; aortic valve replacement; arthroscopy, knee; transcatheter aortic valve replacement (N/A, 1/27/2020); and stephon (1/27/2020).     Family History  family history includes Diabetes in an other family member; No Known Problems in his father, mother, sister, sister, and sister.     Social History   reports that he has never smoked. He has never used smokeless tobacco. He reports that he does not drink alcohol or use drugs.    Allergies  Allergies   Allergen Reactions   • Statins [Hmg-Coa-R Inhibitors] Rash     Blisters         Medications  Prior to Admission Medications   Prescriptions Last Dose Informant Patient Reported? Taking?   Ascorbic Acid (VITAMIN C) 1000 MG Tab 2/24/2020 at 0700 Patient Yes No   Sig: Take 1,000 mg by mouth 2  Times a Day.   B Complex Vitamins (VITAMIN B COMPLEX PO) 2/24/2020 at 0700 Patient Yes No   Sig: Take 1 Tab by mouth every day.   Cholecalciferol (VITAMIN D3) 2000 UNIT Cap 2/24/2020 at 0700 Patient Yes No   Sig: Take 2,000 Units by mouth every day.   Cranberry 300 MG Tab 2/24/2020 at 0700 Patient Yes No   Sig: Take 300 mg by mouth every morning.   Garlic 1000 MG Cap 2/24/2020 at 0700 Patient Yes No   Sig: Take 1,000 mg by mouth 2 Times a Day.   L-Lysine 1000 MG Tab 2/24/2020 at 0700 Patient Yes No   Sig: Take 1,000 mg by mouth every day.   Multiple Vitamins-Minerals (ICAPS) Tab 2/24/2020 at 0700 Patient Yes Yes   Sig: Take 1 Tab by mouth 2 Times a Day.   Non Formulary Request 2/24/2020 at 0700 Patient Yes No   Sig: Inject 1 Each as instructed See Admin Instructions. HCG/ARGININE 03511f/5mg/cc, pt does not take on Thur    Non Formulary Request 2/24/2020 at 0700 Patient Yes No   Sig: Inject 1 Each as instructed See Admin Instructions. HGH 5.8mg/2.5mg/cc,   Monday, Tuesday, Wednesday, Friday, Saturday, sunday   Non Formulary Request 2/24/2020 at 0700 Patient Yes No   Sig: Take 1 Cap by mouth 2 Times a Day. Cholox (OTC)   Non Formulary Request 2/24/2020 at 0700 Patient Yes No   Sig: Take 1 Cap by mouth 2 Times a Day. Domenic red (OTC)   Non Formulary Request 2/24/2020 at 0700 Patient Yes No   Sig: Take 1 Cap by mouth every day. Nitric OXIDE (OTC)   Non Formulary Request 2/24/2020 at 0700 Patient Yes No   Sig: Take 1 Cap by mouth every day. anit-oxidant (OTC)   Zinc 50 MG Tab 2/24/2020 at 0700 Patient Yes No   Sig: Take 50 mg by mouth every day.   acetaminophen (TYLENOL) 325 MG Tab 2/24/2020 at 1300 Patient No No   Sig: Take 2 Tabs by mouth every 6 hours as needed for Moderate Pain.   anastrozole (ARIMIDEX) 1 MG Tab 2/22/2020 at 1300 Patient Yes No   Sig: Take 0.5 mg by mouth See Admin Instructions. On Tue and Sat   aspirin EC 81 MG EC tablet 2/24/2020 at 0700 Patient No No   Sig: Take 1 Tab by mouth every day.    bumetanide (BUMEX) 1 MG Tab 2020 at 1300 Patient No No   Sig: Take 1 Tab by mouth 2 times a day.   calcium citrate (CALCITRATE) 950 MG Tab 2020 at 1300 Patient Yes No   Sig: Take 1,900 mg by mouth every day.   carvedilol (COREG) 3.125 MG Tab 2020 at 1300 Patient No No   Sig: Take 1 Tab by mouth 2 times a day, with meals.   clopidogrel (PLAVIX) 75 MG Tab 2020 at 0700 Patient No No   Sig: Take 1 Tab by mouth every day.   coenzyme Q-10 30 MG capsule 2020 at 0700 Patient Yes No   Sig: Take 60 mg by mouth 2 Times a Day.   cyanocobalamin (VITAMIN B-12) 1000 MCG/ML Solution 2020 at 0700 Patient Yes Yes   Si,000 mcg by Intramuscular route every thursday.   ezetimibe (ZETIA) 10 MG Tab 2020 at 0700 Patient Yes No   Sig: Take 10 mg by mouth every morning.   fluticasone (FLONASE) 50 MCG/ACT nasal spray 2020 at 0700 Patient Yes Yes   Sig: Spray 2 Sprays in nose every day.   gabapentin (NEURONTIN) 300 MG Cap 2020 at 0700 Patient Yes No   Sig: Take 300 mg by mouth every 6 hours as needed. Indications: Neuropathic Pain   glimepiride (AMARYL) 1 MG tablet 2020 at 1700 Patient Yes No   Sig: Take 1 mg by mouth every evening.   glucosamine Sulfate 500 MG Cap 2020 at 0700 Patient Yes No   Sig: Take 1,500 mg by mouth 2 Times a Day.   lactobacillus rhamnosus (CULTURELLE) Cap capsule 2020 at 0700 Patient Yes Yes   Sig: Take 1 Cap by mouth every day.   omeprazole (PRILOSEC) 40 MG delayed-release capsule 2020 at 0700 Patient Yes No   Sig: Take 40 mg by mouth 2 times a day.   potassium chloride SA (K-DUR) 10 MEQ Tab CR 2020 at 0700 Patient No No   Sig: Take 1 Tab by mouth every day.   sacubitril-valsartan (ENTRESTO) 24-26 MG Tab tablet 2020 at 0700 Patient No No   Sig: Take 1 Tab by mouth 2 Times a Day.   testosterone cypionate (DEPO-TESTOSTERONE) 200 MG/ML Solution injection 2020 at 0700 Patient Yes Yes   Si mg by Intramuscular route every thursday.    vitamin e (VITAMIN E) 400 UNIT Cap 2/24/2020 at 0700 Patient Yes No   Sig: Take 400 Units by mouth 2 times a day.      Facility-Administered Medications: None       Physical Exam  Temp:  [36.4 °C (97.6 °F)] 36.4 °C (97.6 °F)  Pulse:  [69-80] 78  Resp:  [16] 16  BP: (118-144)/(62-76) 144/75  SpO2:  [95 %-97 %] 97 %    Physical Exam  Vitals signs and nursing note reviewed.   Constitutional:       General: He is not in acute distress.     Appearance: He is well-developed. He is not toxic-appearing or diaphoretic.   HENT:      Head: Normocephalic and atraumatic.      Right Ear: External ear normal.      Left Ear: External ear normal.      Nose: Nose normal. No congestion or rhinorrhea.      Mouth/Throat:      Mouth: Mucous membranes are moist.      Pharynx: No oropharyngeal exudate.   Eyes:      General: No scleral icterus.        Right eye: No discharge.         Left eye: No discharge.      Extraocular Movements: Extraocular movements intact.   Neck:      Musculoskeletal: Normal range of motion and neck supple. No edema or erythema.      Trachea: No tracheal deviation.   Cardiovascular:      Rate and Rhythm: Normal rate and regular rhythm.      Heart sounds: No murmur. No friction rub. No gallop.    Pulmonary:      Effort: Pulmonary effort is normal. No respiratory distress.      Breath sounds: No stridor. Decreased breath sounds and rales present. No wheezing.   Chest:      Chest wall: No tenderness.   Abdominal:      General: Bowel sounds are normal. There is no distension.      Palpations: Abdomen is soft.      Tenderness: There is no abdominal tenderness.   Musculoskeletal: Normal range of motion.         General: No tenderness.      Right lower leg: No edema.      Left lower leg: No edema.   Lymphadenopathy:      Cervical: No cervical adenopathy.   Skin:     General: Skin is warm and dry.      Coloration: Skin is not jaundiced.      Findings: No erythema or rash.   Neurological:      General: No focal deficit  present.      Mental Status: He is alert and oriented to person, place, and time.      Cranial Nerves: No cranial nerve deficit.   Psychiatric:         Mood and Affect: Mood normal.         Behavior: Behavior normal.         Thought Content: Thought content normal.         Judgment: Judgment normal.         Laboratory:  Recent Labs     02/24/20  1826   WBC 3.5*   RBC 5.85   HEMOGLOBIN 14.9   HEMATOCRIT 49.1   MCV 83.9   MCH 25.5*   MCHC 30.3*   RDW 64.5*   PLATELETCT 114*   MPV 10.3     Recent Labs     02/24/20  1750   SODIUM 139   POTASSIUM 4.7   CHLORIDE 108   CO2 25   GLUCOSE 131*   BUN 34*   CREATININE 1.61*   CALCIUM 8.8     Recent Labs     02/24/20  1750   ALTSGPT 22   ASTSGOT 30   ALKPHOSPHAT 52   TBILIRUBIN 0.4   GLUCOSE 131*         No results for input(s): NTPROBNP in the last 72 hours.      Recent Labs     02/24/20  1750   TROPONINT 38*       Urinalysis:    No results found     Imaging:  DX-CHEST-PORTABLE (1 VIEW)   Final Result      1.  Left lung base atelectasis/consolidation and small left pleural effusion.      2.  Cardiomegaly.            Assessment/Plan:  I anticipate this patient is appropriate for observation status at this time.    Influenza A- (present on admission)  Assessment & Plan  -New, symptom onset Saturday night  -Start Tamiflu  -I did personally review his chest x-ray, noted significant left base consolidation, concerning there is an associated pneumonia as well  -Start doxycycline and obtain a procalcitonin    Hypertension- (present on admission)  Assessment & Plan  -Continue home Entresto, Coreg  -Start PRN enalapril  -Adjust as needed    Elevated troponin- (present on admission)  Assessment & Plan  -Mild elevation, likely demand, continue to trend troponin  -Monitor on telemetry    Insomnia- (present on admission)  Assessment & Plan  -Start PRN Benadryl    Lower extremity edema- (present on admission)  Assessment & Plan  -Improved from his previous baseline with adjustment of his  diuretic    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

## 2020-02-25 NOTE — ED NOTES
Rn at bedside for morning medication administration. Pt able to swallow medications with no difficulties. Pt states he has no needs at this time.

## 2020-02-25 NOTE — ED NOTES
"Pt provided anxiety medication due to \"I cant fall asleep, because whenever I do, I awake feeling like I can't breathe.\"  Placed on CPAP as well.  "

## 2020-02-25 NOTE — ED NOTES
from Lab called with critical result of Positive Influenza A. Critical lab result read back to .   Dr. Blakely notified of critical lab result at 1913.  Critical lab result read back by Dr. Blakely.

## 2020-02-26 ENCOUNTER — OFFICE VISIT (OUTPATIENT)
Dept: CARDIOLOGY | Facility: MEDICAL CENTER | Age: 80
End: 2020-02-26
Payer: MEDICARE

## 2020-02-26 VITALS
SYSTOLIC BLOOD PRESSURE: 118 MMHG | WEIGHT: 243.17 LBS | HEIGHT: 73 IN | DIASTOLIC BLOOD PRESSURE: 72 MMHG | BODY MASS INDEX: 32.23 KG/M2 | HEART RATE: 75 BPM | OXYGEN SATURATION: 95 % | TEMPERATURE: 98 F | RESPIRATION RATE: 18 BRPM

## 2020-02-26 VITALS
DIASTOLIC BLOOD PRESSURE: 78 MMHG | HEIGHT: 73 IN | WEIGHT: 242.8 LBS | HEART RATE: 89 BPM | BODY MASS INDEX: 32.18 KG/M2 | OXYGEN SATURATION: 94 % | RESPIRATION RATE: 20 BRPM | SYSTOLIC BLOOD PRESSURE: 116 MMHG

## 2020-02-26 DIAGNOSIS — Z95.0 S/P PLACEMENT OF CARDIAC PACEMAKER: ICD-10-CM

## 2020-02-26 DIAGNOSIS — I25.10 CORONARY ARTERY DISEASE INVOLVING NATIVE CORONARY ARTERY OF NATIVE HEART WITHOUT ANGINA PECTORIS: ICD-10-CM

## 2020-02-26 DIAGNOSIS — Z95.5 STENTED CORONARY ARTERY: ICD-10-CM

## 2020-02-26 DIAGNOSIS — E78.5 DYSLIPIDEMIA: Chronic | ICD-10-CM

## 2020-02-26 DIAGNOSIS — I50.23 ACUTE ON CHRONIC SYSTOLIC HEART FAILURE (HCC): ICD-10-CM

## 2020-02-26 DIAGNOSIS — D75.1 POLYCYTHEMIA: Chronic | ICD-10-CM

## 2020-02-26 DIAGNOSIS — Z79.899 HIGH RISK MEDICATION USE: ICD-10-CM

## 2020-02-26 DIAGNOSIS — R60.0 LOWER EXTREMITY EDEMA: Chronic | ICD-10-CM

## 2020-02-26 DIAGNOSIS — Z98.84 S/P GASTRIC BYPASS: Chronic | ICD-10-CM

## 2020-02-26 DIAGNOSIS — N28.9 RENAL INSUFFICIENCY: ICD-10-CM

## 2020-02-26 DIAGNOSIS — E11.65 TYPE 2 DIABETES MELLITUS WITH HYPERGLYCEMIA, WITHOUT LONG-TERM CURRENT USE OF INSULIN (HCC): ICD-10-CM

## 2020-02-26 DIAGNOSIS — I10 ESSENTIAL HYPERTENSION: Chronic | ICD-10-CM

## 2020-02-26 DIAGNOSIS — J10.1 INFLUENZA A: ICD-10-CM

## 2020-02-26 DIAGNOSIS — Z95.2 S/P TAVR (TRANSCATHETER AORTIC VALVE REPLACEMENT): ICD-10-CM

## 2020-02-26 LAB
ANION GAP SERPL CALC-SCNC: 10 MMOL/L (ref 0–11.9)
ANISOCYTOSIS BLD QL SMEAR: ABNORMAL
BASOPHILS # BLD AUTO: 1.7 % (ref 0–1.8)
BASOPHILS # BLD: 0.06 K/UL (ref 0–0.12)
BUN SERPL-MCNC: 26 MG/DL (ref 8–22)
CALCIUM SERPL-MCNC: 8.8 MG/DL (ref 8.5–10.5)
CHLORIDE SERPL-SCNC: 104 MMOL/L (ref 96–112)
CO2 SERPL-SCNC: 27 MMOL/L (ref 20–33)
CREAT SERPL-MCNC: 1.41 MG/DL (ref 0.5–1.4)
EOSINOPHIL # BLD AUTO: 0.06 K/UL (ref 0–0.51)
EOSINOPHIL NFR BLD: 1.8 % (ref 0–6.9)
ERYTHROCYTE [DISTWIDTH] IN BLOOD BY AUTOMATED COUNT: 64.1 FL (ref 35.9–50)
GLUCOSE SERPL-MCNC: 86 MG/DL (ref 65–99)
HCT VFR BLD AUTO: 55.3 % (ref 42–52)
HGB BLD-MCNC: 16.5 G/DL (ref 14–18)
HYPOCHROMIA BLD QL SMEAR: ABNORMAL
LYMPHOCYTES # BLD AUTO: 1.19 K/UL (ref 1–4.8)
LYMPHOCYTES NFR BLD: 33.9 % (ref 22–41)
MANUAL DIFF BLD: NORMAL
MCH RBC QN AUTO: 24.8 PG (ref 27–33)
MCHC RBC AUTO-ENTMCNC: 29.8 G/DL (ref 33.7–35.3)
MCV RBC AUTO: 83.3 FL (ref 81.4–97.8)
MICROCYTES BLD QL SMEAR: ABNORMAL
MONOCYTES # BLD AUTO: 0.27 K/UL (ref 0–0.85)
MONOCYTES NFR BLD AUTO: 7.8 % (ref 0–13.4)
MORPHOLOGY BLD-IMP: NORMAL
NEUTROPHILS # BLD AUTO: 1.92 K/UL (ref 1.82–7.42)
NEUTROPHILS NFR BLD: 53.1 % (ref 44–72)
NEUTS BAND NFR BLD MANUAL: 1.7 % (ref 0–10)
NRBC # BLD AUTO: 0 K/UL
NRBC BLD-RTO: 0 /100 WBC
OVALOCYTES BLD QL SMEAR: NORMAL
PLATELET # BLD AUTO: 103 K/UL (ref 164–446)
PLATELET BLD QL SMEAR: NORMAL
POIKILOCYTOSIS BLD QL SMEAR: NORMAL
POTASSIUM SERPL-SCNC: 4.1 MMOL/L (ref 3.6–5.5)
RBC # BLD AUTO: 6.64 M/UL (ref 4.7–6.1)
RBC BLD AUTO: PRESENT
SODIUM SERPL-SCNC: 141 MMOL/L (ref 135–145)
TOXIC GRANULES BLD QL SMEAR: SLIGHT
WBC # BLD AUTO: 3.5 K/UL (ref 4.8–10.8)

## 2020-02-26 PROCEDURE — 96376 TX/PRO/DX INJ SAME DRUG ADON: CPT

## 2020-02-26 PROCEDURE — 85007 BL SMEAR W/DIFF WBC COUNT: CPT

## 2020-02-26 PROCEDURE — 700111 HCHG RX REV CODE 636 W/ 250 OVERRIDE (IP): Performed by: INTERNAL MEDICINE

## 2020-02-26 PROCEDURE — 85027 COMPLETE CBC AUTOMATED: CPT

## 2020-02-26 PROCEDURE — G0378 HOSPITAL OBSERVATION PER HR: HCPCS

## 2020-02-26 PROCEDURE — 96372 THER/PROPH/DIAG INJ SC/IM: CPT

## 2020-02-26 PROCEDURE — 700102 HCHG RX REV CODE 250 W/ 637 OVERRIDE(OP): Performed by: INTERNAL MEDICINE

## 2020-02-26 PROCEDURE — 99214 OFFICE O/P EST MOD 30 MIN: CPT | Mod: 24 | Performed by: INTERNAL MEDICINE

## 2020-02-26 PROCEDURE — 99217 PR OBSERVATION CARE DISCHARGE: CPT | Performed by: INTERNAL MEDICINE

## 2020-02-26 PROCEDURE — 80048 BASIC METABOLIC PNL TOTAL CA: CPT

## 2020-02-26 PROCEDURE — A9270 NON-COVERED ITEM OR SERVICE: HCPCS | Performed by: INTERNAL MEDICINE

## 2020-02-26 PROCEDURE — 700111 HCHG RX REV CODE 636 W/ 250 OVERRIDE (IP): Performed by: HOSPITALIST

## 2020-02-26 PROCEDURE — 94660 CPAP INITIATION&MGMT: CPT

## 2020-02-26 PROCEDURE — 36415 COLL VENOUS BLD VENIPUNCTURE: CPT

## 2020-02-26 RX ORDER — OSELTAMIVIR PHOSPHATE 30 MG/1
30 CAPSULE ORAL 2 TIMES DAILY
Qty: 6 CAP | Refills: 0 | Status: SHIPPED | OUTPATIENT
Start: 2020-02-26 | End: 2020-02-29

## 2020-02-26 RX ORDER — BUMETANIDE 1 MG/1
2 TABLET ORAL 2 TIMES DAILY
Qty: 120 TAB | Refills: 3 | Status: SHIPPED | OUTPATIENT
Start: 2020-02-26 | End: 2020-05-29 | Stop reason: SDUPTHER

## 2020-02-26 RX ORDER — LORAZEPAM 2 MG/ML
0.5 INJECTION INTRAMUSCULAR ONCE
Status: COMPLETED | OUTPATIENT
Start: 2020-02-26 | End: 2020-02-26

## 2020-02-26 RX ADMIN — ASPIRIN 81 MG: 81 TABLET, COATED ORAL at 04:45

## 2020-02-26 RX ADMIN — OMEPRAZOLE 40 MG: 20 CAPSULE, DELAYED RELEASE ORAL at 08:44

## 2020-02-26 RX ADMIN — LORAZEPAM 0.5 MG: 2 INJECTION INTRAMUSCULAR; INTRAVENOUS at 01:00

## 2020-02-26 RX ADMIN — CARVEDILOL 3.12 MG: 3.12 TABLET, FILM COATED ORAL at 08:44

## 2020-02-26 RX ADMIN — CLOPIDOGREL BISULFATE 75 MG: 75 TABLET ORAL at 04:45

## 2020-02-26 RX ADMIN — OSELTAMIVIR PHOSPHATE 30 MG: 30 CAPSULE ORAL at 08:44

## 2020-02-26 RX ADMIN — HEPARIN SODIUM 5000 UNITS: 5000 INJECTION, SOLUTION INTRAVENOUS; SUBCUTANEOUS at 04:45

## 2020-02-26 RX ADMIN — SACUBITRIL AND VALSARTAN 1 TABLET: 24; 26 TABLET, FILM COATED ORAL at 04:45

## 2020-02-26 RX ADMIN — BUMETANIDE 1 MG: 1 TABLET ORAL at 04:45

## 2020-02-26 RX ADMIN — DOXYCYCLINE 100 MG: 100 TABLET, FILM COATED ORAL at 04:45

## 2020-02-26 ASSESSMENT — MINNESOTA LIVING WITH HEART FAILURE QUESTIONNAIRE (MLHF)
DIFFICULTY SOCIALIZING WITH FAMILY OR FRIENDS: 4
COSTING YOU MONEY FOR MEDICAL CARE: 5
MAKING YOU WORRY: 4
FEELING LIKE A BURDEN TO FAMILY AND FRIENDS: 4
TIRED, FATIGUED OR LOW ON ENERGY: 4
WALKING ABOUT OR CLIMBING STAIRS DIFFICULT: 4
DIFFICULTY TO CONCENTRATE OR REMEMBERING THINGS: 2
HAVING TO SIT OR LIE DOWN DURING THE DAY: 4
MAKING YOU STAY IN A HOSPITAL: 3
SWELLING IN ANKLES OR LEGS: 4
MAKING YOU SHORT OF BREATH: 4
TOTAL_SCORE: 77
WORKING AROUND THE HOUSE OR YARD DIFFICULT: 4
DIFFICULTY WITH SEXUAL ACTIVITIES: 4
MAKING YOU FEEL DEPRESSED: 2
LOSS OF SELF CONTROL IN YOUR LIFE: 5
DIFFICULTY WITH RECREATIONAL PASTIMES, SPORTS, HOBBIES: 5
DIFFICULTY SLEEPING WELL AT NIGHT: 5
EATING LESS FOODS YOU LIKE: 4
GIVING YOU SIDE EFFECTS FROM TREATMENTS: 3
DIFFICULTY GOING AWAY FROM HOME: 3
DIFFICULTY WORKING TO EARN A LIVING: 0

## 2020-02-26 ASSESSMENT — ENCOUNTER SYMPTOMS
FEVER: 0
SPUTUM PRODUCTION: 0
MUSCULOSKELETAL NEGATIVE: 1
BRUISES/BLEEDS EASILY: 0
PND: 0
CLAUDICATION: 0
COUGH: 0
ORTHOPNEA: 0
CARDIOVASCULAR NEGATIVE: 1
CONSTITUTIONAL NEGATIVE: 1
EYES NEGATIVE: 1
CHILLS: 0
GASTROINTESTINAL NEGATIVE: 1
WEAKNESS: 0
NEUROLOGICAL NEGATIVE: 1
LOSS OF CONSCIOUSNESS: 0
SORE THROAT: 0
DIZZINESS: 0
WHEEZING: 0
PALPITATIONS: 0
SHORTNESS OF BREATH: 0
RESPIRATORY NEGATIVE: 1
STRIDOR: 0
HEMOPTYSIS: 0

## 2020-02-26 ASSESSMENT — 6 MINUTE WALK TEST (6MWT): TOTAL DISTANCE WALKED (METERS): 292

## 2020-02-26 NOTE — CARE PLAN
Problem: Communication  Goal: The ability to communicate needs accurately and effectively will improve  Outcome: PROGRESSING AS EXPECTED  Intervention: [place] patient and significant other/support system to call light to alert staff of needs  Flowsheets (Taken 2/25/2020 2051)  Oriented to:: All of the Following : Location of Bathroom, Visiting Policy, Unit Routine, Call Light and Bedside Controls, Bedside Rail Policy, Smoking Policy, Rights and Responsibilities, Bedside Report, and Patient Education Notebook  Note: Pt educated on POC and medications. Pt verbalized understanding.      Problem: Safety  Goal: Will remain free from falls  Outcome: PROGRESSING AS EXPECTED  Intervention: Assess risk factors for falls  Flowsheets  Taken 2/25/2020 1940 by Meagan Crain  Pt Calls for Assistance: Yes  Taken 2/25/2020 2051 by Juan Kramer R.N.  Fall Risk: Risk to Fall -  0 - 1 point  History of fall: 0  Mobility Status Assessment: 1-1 Healthcare Provider Required for Assistance with Ambulation & Transfer  Risk for Injury-Any positive answers results in the pt being at high risk for fall related injury: Not Applicable  Note: Pt bedside table and call-light are within reach, bed in lowest position and locked. Treaded socks on and pt has been educated on call light use and asked to call before getting up.

## 2020-02-26 NOTE — PROGRESS NOTES
Pt dc'd home. IV and monitor removed; monitor room notified. Pt left unit via walking with self. Personal belongings with pt when leaving unit. Pt given discharge instructions prior to leaving unit including prescription and when to visit with physician; verbalizes understanding. Copy of discharge instructions with pt and in the chart. Pt provided with droplet masks and advised to wear for a few days while in public.

## 2020-02-26 NOTE — PROGRESS NOTES
Assumed care of pt, beside report received from MARY Vega. Updated on POC, call light within reach all fall precautions within place. Bed locked and lowered. Pt instructed to call for assistance before getting up. All questions answered, no other needs at this time.

## 2020-02-26 NOTE — PROGRESS NOTES
Chief Complaint   Patient presents with   • Congestive Heart Failure       Subjective:   LIAN More III is a 79 y.o. male who presents today as a new consult for heart failure with reduced ejection fraction.  He is a 79-year-old male with a past medical history of aortic stenosis status post TAVR.  He had this done and then developed acute influenza A was admitted to outside hospital.  He is been on Bumex but he still has PND.  He has JVD on exam.  He otherwise has no chest pain palpitations or PND.  His blood pressure is at goal and under control.    Past Medical History:   Diagnosis Date   • Aortic stenosis    • Arthritis    • Back pain    • Breath shortness     pt states short of breath 24/7   • CAD (coronary artery disease)    • CATARACT     removed bilat   • Chest pain    • Chest tightness    • Chickenpox    • Congestive heart failure (HCC)    • Constipation    • Coronary heart disease    • Cough    • Daytime sleepiness    • Diabetes     PT REPORTS DIABETES RESOLVED IN 2009. NO LONGER TAKES MEDS   • Difficulty breathing    • Fatigue    • Hearing difficulty    • Heart murmur    • Heart valve disease    • Heartburn    • Hiatus hernia syndrome    • Hyperlipidemia    • Hypertension     pt states well controlled on meds   • Kidney disease    • Mumps    • Pain 08-29-13    back, hips and bilat legs, 4/10   • Pain 6/22/2015    left humerus   • Pain 1/7/16    knees, back and shoulder   • Painful joint    • Palpitations    • Pneumonia 2007   • Rhinitis    • Ringing in ears    • Severe aortic stenosis 12/4/2019   • Shortness of breath    • Swelling of lower extremity    • Tonsillitis    • Ulcer 2014    Dr. Porter, gastroenterologist   • Unspecified hemorrhagic conditions     bruises easily     Past Surgical History:   Procedure Laterality Date   • TRANSCATHETER AORTIC VALVE REPLACEMENT N/A 1/27/2020    Procedure: REPLACEMENT, AORTIC VALVE, TRANSCATHETER-;  Surgeon: Surendra Castro M.D.;  Location: SURGERY [facility];   Service: Cardiac   • OLGA  1/27/2020    Procedure: ECHOCARDIOGRAM, TRANSESOPHAGEAL;  Surgeon: Surendra Castro M.D.;  Location: [facility];  Service: Cardiac   • GASTROSCOPY N/A 1/8/2016    Procedure: GASTROSCOPY;  Surgeon: Deniz Sue M.D.;  Location: [facility];  Service:    • HUMERUS ORIF Left 6/25/2015    Procedure: HUMERUS ORIF/ PROXIMAL;  Surgeon: Cristal Guido M.D.;  Location: [facility];  Service:    • LUMBAR FUSION POSTERIOR  9/5/2013    Performed by Edith Sears M.D. at [facility]   • LUMBAR DECOMPRESSION  9/5/2013    Performed by Edith Sears M.D. at [facility]   • MASS EXCISION NEURO  9/5/2013    Performed by Edith Sears M.D. at [facility]   • RECOVERY  6/26/2012    Performed by SURGERY, IR-RECOVERY at [facility] SAME DAY [facility]   • DRAINAGE HEMATOMA  5/25/2012    Performed by DENIZ SUE at [facility]   • GASTRIC BAND LAPAROSCOPIC REVISION  5/11/2012    Performed by DENIZ SUE at [facility]   • LUMBAR FUSION POSTERIOR  3/26/2012    Performed by EDITH SEARS at [facility]   • LUMBAR DECOMPRESSION  3/26/2012    Performed by EDITH SEARS at [facility]   • INJ,EPIDURAL/LUMB/SAC SINGLE  3/19/2012    Performed by KRISTIN BALTAZAR at [facility]   • INJ,EPIDURAL/LUMB/SAC SINGLE  3/5/2012    Performed by KRISTIN BALTAZAR at [facility]   • GASTRIC BANDING LAPAROSCOPIC  3/24/2010    Performed by DENIZ SUE at [facility]   • HIATAL HERNIA REPAIR  3/24/2010    Performed by DENIZ SUE at [facility]   • KNEE ARTHROPLASTY TOTAL  2000    bilateral   • ABDOMINOPLASTY  1990   • AORTIC VALVE REPLACEMENT     • ARTHROSCOPY, KNEE     • LAMINOTOMY     • PB REMV 2ND CATARACT,CORN-SCLER SECTN     • SINUSCOPE     • SLEEVE,CARRIE VASO THIGH     • TONSILLECTOMY     • ZZZ CARDIAC CATH       Family History   Problem  Relation Age of Onset   • No Known Problems Sister    • No Known Problems Sister    • No Known Problems Sister    • Diabetes Other    • No Known Problems Mother    • No Known Problems Father    • Heart Disease Neg Hx      Social History     Socioeconomic History   • Marital status:      Spouse name: Not on file   • Number of children: Not on file   • Years of education: Not on file   • Highest education level: Not on file   Occupational History   • Not on file   Social Needs   • Financial resource strain: Not on file   • Food insecurity     Worry: Not on file     Inability: Not on file   • Transportation needs     Medical: Not on file     Non-medical: Not on file   Tobacco Use   • Smoking status: Never Smoker   • Smokeless tobacco: Never Used   • Tobacco comment: 0   Substance and Sexual Activity   • Alcohol use: No     Alcohol/week: 0.0 oz     Frequency: Never     Binge frequency: Never   • Drug use: No   • Sexual activity: Yes   Lifestyle   • Physical activity     Days per week: Not on file     Minutes per session: Not on file   • Stress: Not on file   Relationships   • Social connections     Talks on phone: Not on file     Gets together: Not on file     Attends [employer] service: Not on file     Active member of club or organization: Not on file     Attends meetings of clubs or organizations: Not on file     Relationship status: Not on file   • Intimate partner violence     Fear of current or ex partner: Not on file     Emotionally abused: Not on file     Physically abused: Not on file     Forced sexual activity: Not on file   Other Topics Concern   • Not on file   Social History Narrative   • Not on file     Allergies   Allergen Reactions   • Statins [Hmg-Coa-R Inhibitors] Rash     Blisters       Outpatient Encounter Medications as of 2/26/2020   Medication Sig Dispense Refill   • oseltamivir (TAMIFLU) 30 MG Cap Take 1 Cap by mouth 2 times a day for 3 days. 6 Cap 0   • bumetanide (BUMEX) 1 MG Tab Take 2  Tabs by mouth 2 times a day. 120 Tab 3   • Multiple Vitamins-Minerals (ICAPS) Tab Take 1 Tab by mouth 2 Times a Day.     • lactobacillus rhamnosus (CULTURELLE) Cap capsule Take 1 Cap by mouth every day.     • testosterone cypionate (DEPO-TESTOSTERONE) 200 MG/ML Solution injection 50 mg by Intramuscular route every thursday.     • cyanocobalamin (VITAMIN B-12) 1000 MCG/ML Solution 1,000 mcg by Intramuscular route every thursday.     • fluticasone (FLONASE) 50 MCG/ACT nasal spray Spray 2 Sprays in nose every day.     • potassium chloride SA (K-DUR) 10 MEQ Tab CR Take 1 Tab by mouth every day. 30 Tab 3   • acetaminophen (TYLENOL) 325 MG Tab Take 2 Tabs by mouth every 6 hours as needed for Moderate Pain. 30 Tab 0   • carvedilol (COREG) 3.125 MG Tab Take 1 Tab by mouth 2 times a day, with meals. 60 Tab 0   • calcium citrate (CALCITRATE) 950 MG Tab Take 1,900 mg by mouth every day.     • coenzyme Q-10 30 MG capsule Take 60 mg by mouth 2 Times a Day.     • Garlic 1000 MG Cap Take 1,000 mg by mouth 2 Times a Day.     • glucosamine Sulfate 500 MG Cap Take 1,500 mg by mouth 2 Times a Day.     • Non Formulary Request Inject 1 Each as instructed See Admin Instructions. HCG/ARGININE 90472e/5mg/cc, pt does not take on Thur      • Non Formulary Request Inject 1 Each as instructed See Admin Instructions. HGH 5.8mg/2.5mg/cc,   Monday, Tuesday, Wednesday, Friday, Saturday, sunday     • vitamin e (VITAMIN E) 400 UNIT Cap Take 400 Units by mouth 2 times a day.     • omeprazole (PRILOSEC) 40 MG delayed-release capsule Take 40 mg by mouth 2 times a day.     • Non Formulary Request Take 1 Cap by mouth 2 Times a Day. Cholox (OTC)     • Cholecalciferol (VITAMIN D3) 2000 UNIT Cap Take 2,000 Units by mouth every day.     • Non Formulary Request Take 1 Cap by mouth 2 Times a Day. Domenic red (OTC)     • Non Formulary Request Take 1 Cap by mouth every day. Nitric OXIDE (OTC)     • Non Formulary Request Take 1 Cap by mouth every day. anit-oxidant  (OTC)     • Zinc 50 MG Tab Take 50 mg by mouth every day.     • anastrozole (ARIMIDEX) 1 MG Tab Take 0.5 mg by mouth See Admin Instructions. On Tue and Sat     • gabapentin (NEURONTIN) 300 MG Cap Take 300 mg by mouth every 6 hours as needed. Indications: Neuropathic Pain     • clopidogrel (PLAVIX) 75 MG Tab Take 1 Tab by mouth every day. 90 Tab 3   • aspirin EC 81 MG EC tablet Take 1 Tab by mouth every day. 30 Tab 0   • ezetimibe (ZETIA) 10 MG Tab Take 10 mg by mouth every morning.     • glimepiride (AMARYL) 1 MG tablet Take 1 mg by mouth every evening.     • B Complex Vitamins (VITAMIN B COMPLEX PO) Take 1 Tab by mouth every day.     • Cranberry 300 MG Tab Take 300 mg by mouth every morning.     • L-Lysine 1000 MG Tab Take 1,000 mg by mouth every day.     • Ascorbic Acid (VITAMIN C) 1000 MG Tab Take 1,000 mg by mouth 2 Times a Day.     • sacubitril-valsartan (ENTRESTO) 24-26 MG Tab tablet Take 1 Tab by mouth 2 Times a Day. 60 Tab 3   • [DISCONTINUED] bumetanide (BUMEX) 1 MG Tab Take 1 Tab by mouth 2 times a day. 60 Tab 3   • [DISCONTINUED] Influenza Vaccine High-Dose pf injection 0.5 mL      • [DISCONTINUED] glimepiride (AMARYL) tablet 1 mg      • [DISCONTINUED] anastrozole (ARIMIDEX) tablet 0.5 mg      • [DISCONTINUED] aspirin EC (ECOTRIN) tablet 81 mg      • [DISCONTINUED] bumetanide (BUMEX) tablet 1 mg      • [DISCONTINUED] carvedilol (COREG) tablet 3.125 mg      • [DISCONTINUED] clopidogrel (PLAVIX) tablet 75 mg      • [DISCONTINUED] omeprazole (PRILOSEC) capsule 40 mg      • [DISCONTINUED] sacubitril-valsartan (ENTRESTO) 24-26 MG tablet 1 Tab      • [DISCONTINUED] senna-docusate (PERICOLACE or SENOKOT S) 8.6-50 MG per tablet 2 Tab      • [DISCONTINUED] polyethylene glycol/lytes (MIRALAX) PACKET 1 Packet      • [DISCONTINUED] magnesium hydroxide (MILK OF MAGNESIA) suspension 30 mL      • [DISCONTINUED] bisacodyl (DULCOLAX) suppository 10 mg      • [DISCONTINUED] Respiratory Therapy Consult      • [DISCONTINUED]  "acetaminophen (TYLENOL) tablet 650 mg      • [DISCONTINUED] heparin injection 5,000 Units      • [DISCONTINUED] enalaprilat (VASOTEC) injection 1.25 mg      • [DISCONTINUED] ondansetron (ZOFRAN) syringe/vial injection 4 mg      • [DISCONTINUED] ondansetron (ZOFRAN ODT) dispertab 4 mg      • [DISCONTINUED] guaiFENesin dextromethorphan (ROBITUSSIN DM) 100-10 MG/5ML syrup 10 mL      • [DISCONTINUED] oseltamivir (TAMIFLU) capsule 30 mg      • [DISCONTINUED] doxycycline monohydrate (ADOXA) tablet 100 mg      • [DISCONTINUED] diphenhydrAMINE (BENADRYL) tablet/capsule 25 mg        No facility-administered encounter medications on file as of 2/26/2020.      Review of Systems   Constitutional: Negative.  Negative for chills, fever and malaise/fatigue.   HENT: Negative.  Negative for sore throat.    Eyes: Negative.    Respiratory: Negative.  Negative for cough, hemoptysis, sputum production, shortness of breath, wheezing and stridor.    Cardiovascular: Negative.  Negative for chest pain, palpitations, orthopnea, claudication, leg swelling and PND.   Gastrointestinal: Negative.    Genitourinary: Negative.    Musculoskeletal: Negative.    Skin: Negative.    Neurological: Negative.  Negative for dizziness, loss of consciousness and weakness.   Endo/Heme/Allergies: Negative.  Does not bruise/bleed easily.   All other systems reviewed and are negative.       Objective:   /78 (BP Location: Left arm, Patient Position: Sitting, BP Cuff Size: Adult)   Pulse 89   Resp 20   Ht 1.854 m (6' 1\")   Wt 110.1 kg (242 lb 12.8 oz)   SpO2 94%   BMI 32.03 kg/m²     Physical Exam   Constitutional: He appears well-developed and well-nourished. No distress.   HENT:   Head: Normocephalic and atraumatic.   Right Ear: External ear normal.   Left Ear: External ear normal.   Nose: Nose normal.   Mouth/Throat: No oropharyngeal exudate.   Eyes: Pupils are equal, round, and reactive to light. Conjunctivae and EOM are normal. Right eye exhibits " no discharge. Left eye exhibits no discharge. No scleral icterus.   Neck: Neck supple. No JVD present.   Cardiovascular: Normal rate, regular rhythm and intact distal pulses. Exam reveals no gallop and no friction rub.   No murmur heard.  Pulmonary/Chest: Effort normal. No stridor. No respiratory distress. He has no wheezes. He has no rales. He exhibits no tenderness.   Abdominal: Soft. He exhibits no distension. There is no guarding.   Musculoskeletal: Normal range of motion.         General: No tenderness, deformity or edema.   Neurological: He is alert. He has normal reflexes. He displays normal reflexes. No cranial nerve deficit. He exhibits normal muscle tone. Coordination normal.   Skin: Skin is warm and dry. No rash noted. He is not diaphoretic. No erythema. No pallor.   Psychiatric: He has a normal mood and affect. His behavior is normal. Judgment and thought content normal.   Nursing note and vitals reviewed.      Assessment:     1. Acute on chronic systolic heart failure (HCC)  bumetanide (BUMEX) 1 MG Tab    Basic Metabolic Panel   2. Dyslipidemia     3. Coronary artery disease involving native coronary artery of native heart without angina pectoris     4. Essential hypertension     5. Influenza A     6. Lower extremity edema     7. Polycythemia     8. Renal insufficiency     9. S/P gastric bypass     10. S/P placement of cardiac pacemaker     11. S/P TAVR (transcatheter aortic valve replacement)     12. Stented coronary artery     13. Type 2 diabetes mellitus with hyperglycemia, without long-term current use of insulin (HCC)     14. High risk medication use         Medical Decision Making:  Today's Assessment / Status / Plan:     79-year-old male with aortic stenosis status post Noreen with heart failure with reduced ejection fraction.  I will have him increase his Bumex to 2 mg twice daily.  I would like to check a basic metabolic panel in 1 week for his high risk medication usage.  Otherwise he is  already scheduled for follow-ups.  We can see him back on as-needed basis.

## 2020-02-26 NOTE — DISCHARGE INSTRUCTIONS
Discharge Instructions per Trudi Morrow M.D.    Follow up with primary care provider as needed  Follow up with cardiology 2/26 as scheduled    DIET: cardiac    ACTIVITY: as tolerated    DIAGNOSIS: influenza A pneumonia    Return to ER if symptoms worsen          Discharge Instructions    Discharged to home by car with self. Discharged via wheelchair, hospital escort: Yes.  Special equipment needed: Not Applicable and Oxygen    Be sure to schedule a follow-up appointment with your primary care doctor or any specialists as instructed.     Discharge Plan:   Diet Plan: Discussed  Activity Level: Discussed  Confirmed Follow up Appointment: Appointment Scheduled  Confirmed Symptoms Management: Discussed  Medication Reconciliation Updated: Yes  Influenza Vaccine Indication: Indicated: 65 years and older    I understand that a diet low in cholesterol, fat, and sodium is recommended for good health. Unless I have been given specific instructions below for another diet, I accept this instruction as my diet prescription.   Other diet: Cardiac      Special Instructions: None    · Is patient discharged on Warfarin / Coumadin?   No     Depression / Suicide Risk    As you are discharged from this [facility] facility, it is important to learn how to keep safe from harming yourself.    Recognize the warning signs:  · Abrupt changes in personality, positive or negative- including increase in energy   · Giving away possessions  · Change in eating patterns- significant weight changes-  positive or negative  · Change in sleeping patterns- unable to sleep or sleeping all the time   · Unwillingness or inability to communicate  · Depression  · Unusual sadness, discouragement and loneliness  · Talk of wanting to die  · Neglect of personal appearance   · Rebelliousness- reckless behavior  · Withdrawal from people/activities they love  · Confusion- inability to concentrate     If you or a loved one observes any of these behaviors or  "has concerns about self-harm, here's what you can do:  · Talk about it- your feelings and reasons for harming yourself  · Remove any means that you might use to hurt yourself (examples: pills, rope, extension cords, firearm)  · Get professional help from the community (Mental Health, Substance Abuse, psychological counseling)  · Do not be alone:Call your Safe Contact- someone whom you trust who will be there for you.  · Call your local CRISIS HOTLINE 738-4796 or 151-481-6206  · Call your local Children's Mobile Crisis Response Team [place] (062) 724-4520 or www.Acceleforce  · Call the toll free National Suicide Prevention Hotlines   · National Suicide Prevention Lifeline 618-400-JSWT (3086)  · National Hoods Line Network 800-SUICIDE (389-0750)      Influenza A (H1N1)  H1N1 formerly called \"swine flu\" is a new influenza virus causing sickness in people. The H1N1 virus is different from seasonal influenza viruses. However, the H1N1 symptoms are similar to seasonal influenza and it is spread from person to person. You may be at higher risk for serious problems if you have underlying serious medical conditions. The CDC and the World Health Organization are following reported cases around the world.  CAUSES   · The flu is thought to spread mainly person-to-person through coughing or sneezing of infected people.  · A person may become infected by touching something with the virus on it and then touching their mouth or nose.  SYMPTOMS   · Fever.  · Headache.  · Tiredness.  · Cough.  · Sore throat.  · Runny or stuffy nose.  · Body aches.  · Diarrhea and vomiting  These symptoms are referred to as \"flu-like symptoms.\" A lot of different illnesses, including the common cold, may have similar symptoms.  DIAGNOSIS   · There are tests that can tell if you have the H1N1 virus.  · Confirmed cases of H1N1 will be reported to the state or local health department.  · A doctor's exam may be needed to tell whether you have an " infection that is a complication of the flu.  HOME CARE INSTRUCTIONS   · Stay informed. Visit the CDC website for current recommendations. Visit www.cdc.gov/N0Q9axs/. You may also call 6-705-TRK-INFO (1-297.813.7321).  · Get help early if you develop any of the above symptoms.  · If you are at high risk from complications of the flu, talk to your caregiver as soon as you develop flu-like symptoms. Those at higher risk for complications include:  · People 65 years or older.  · People with chronic medical conditions.  · Pregnant women.  · Young children.  · Your caregiver may recommend antiviral medicine to help treat the flu.  · If you get the flu, get plenty of rest, drink enough water and fluids to keep your urine clear or pale yellow, and avoid using alcohol or tobacco.  · You may take over-the-counter medicine to relieve the symptoms of the flu if your caregiver approves. (Never give aspirin to children or teenagers who have flu-like symptoms, particularly fever).  TREATMENT   If you do get sick, antiviral drugs are available. These drugs can make your illness milder and make you feel better faster. Treatment should start soon after illness starts. It is only effective if taken within the first day of becoming ill. Only your caregiver can prescribe antiviral medication.   PREVENTION   · Cover your nose and mouth with a tissue or your arm when you cough or sneeze. Throw the tissue away.  · Wash your hands often with soap and warm water, especially after you cough or sneeze. Alcohol-based  are also effective against germs.  · Avoid touching your eyes, nose or mouth. This is one way germs spread.  · Try to avoid contact with sick people. Follow public health advice regarding school closures. Avoid crowds.  · Stay home if you get sick. Limit contact with others to keep from infecting them. People infected with the H1N1 virus may be able to infect others anywhere from 1 day before feeling sick to 5-7 days  after getting flu symptoms.  · An H1N1 vaccine is available to help protect against the virus. In addition to the H1N1 vaccine, you will need to be vaccinated for seasonal influenza. The H1N1 and seasonal vaccines may be given on the same day. The CDC especially recommends the H1N1 vaccine for:  · Pregnant women.  · People who live with or care for children younger than 6 months of age.  · Health care and emergency services personnel.  · Persons between the ages of 6 months through 24 years of age.  · People from ages 25 through 64 years who are at higher risk for H1N1 because of chronic health disorders or immune system problems.  FACEMASKS  In community and home settings, the use of facemasks and N95 respirators are not normally recommended. In certain circumstances, a facemask or N95 respirator may be used for persons at increased risk of severe illness from influenza. Your caregiver can give additional recommendations for facemask use.  IN CHILDREN, EMERGENCY WARNING SIGNS THAT NEED URGENT MEDICAL CARE:  · Fast breathing or trouble breathing.  · Bluish skin color.  · Not drinking enough fluids.  · Not waking up or not interacting normally.  · Being so fussy that the child does not want to be held.  · Your child has an oral temperature above 102° F (38.9° C), not controlled by medicine.  · Your baby is older than 3 months with a rectal temperature of 102° F (38.9° C) or higher.  · Your baby is 3 months old or younger with a rectal temperature of 100.4° F (38° C) or higher.  · Flu-like symptoms improve but then return with fever and worse cough.  IN ADULTS, EMERGENCY WARNING SIGNS THAT NEED URGENT MEDICAL CARE:  · Difficulty breathing or shortness of breath.  · Pain or pressure in the chest or abdomen.  · Sudden dizziness.  · Confusion.  · Severe or persistent vomiting.  · Bluish color.  · You have a oral temperature above 102° F (38.9° C), not controlled by medicine.  · Flu-like symptoms improve but return with  fever and worse cough.  SEEK IMMEDIATE MEDICAL CARE IF:   You or someone you know is experiencing any of the above symptoms. When you arrive at the emergency center, report that you think you have the flu. You may be asked to wear a mask and/or sit in a secluded area to protect others from getting sick.  MAKE SURE YOU:   · Understand these instructions.  · Will watch your condition.  · Will get help right away if you are not doing well or get worse.  Some of this information courtesy of the CDC.   Document Released: 06/05/2009 Document Revised: 03/11/2013 Document Reviewed: 06/05/2009  ExitCare® Patient Information ©2014 [employer].        Community-Acquired Pneumonia, Adult  Introduction  Pneumonia is an infection of the lungs. One type of pneumonia can happen while a person is in a hospital. A different type can happen when a person is not in a hospital (community-acquired pneumonia). It is easy for this kind to spread from person to person. It can spread to you if you breathe near an infected person who coughs or sneezes. Some symptoms include:  · A dry cough.  · A wet (productive) cough.  · Fever.  · Sweating.  · Chest pain.  Follow these instructions at home:  · Take over-the-counter and prescription medicines only as told by your doctor.  ¨ Only take cough medicine if you are losing sleep.  ¨ If you were prescribed an antibiotic medicine, take it as told by your doctor. Do not stop taking the antibiotic even if you start to feel better.  · Sleep with your head and neck raised (elevated). You can do this by putting a few pillows under your head, or you can sleep in a recliner.  · Do not use tobacco products. These include cigarettes, chewing tobacco, and e-cigarettes. If you need help quitting, ask your doctor.  · Drink enough water to keep your pee (urine) clear or pale yellow.  A shot (vaccine) can help prevent pneumonia. Shots are often suggested for:  · People older than 65 years of age.  · People older  than 19 years of age:  ¨ Who are having cancer treatment.  ¨ Who have long-term (chronic) lung disease.  ¨ Who have problems with their body's defense system (immune system).  You may also prevent pneumonia if you take these actions:  · Get the flu (influenza) shot every year.  · Go to the dentist as often as told.  · Wash your hands often. If soap and water are not available, use hand .  Contact a doctor if:  · You have a fever.  · You lose sleep because your cough medicine does not help.  Get help right away if:  · You are short of breath and it gets worse.  · You have more chest pain.  · Your sickness gets worse. This is very serious if:  ¨ You are an older adult.  ¨ Your body's defense system is weak.  · You cough up blood.  This information is not intended to replace advice given to you by your health care provider. Make sure you discuss any questions you have with your health care provider.  Document Released: 06/05/2009 Document Revised: 05/25/2017 Document Reviewed: 04/13/2016  © 2017 Elsevier

## 2020-02-26 NOTE — CARE PLAN
Problem: Communication  Goal: The ability to communicate needs accurately and effectively will improve  Outcome: PROGRESSING AS EXPECTED     Problem: Safety  Goal: Will remain free from injury  Outcome: PROGRESSING AS EXPECTED     Problem: Bowel/Gastric:  Goal: Normal bowel function is maintained or improved  Outcome: PROGRESSING AS EXPECTED     Problem: Respiratory:  Goal: Respiratory status will improve  Outcome: PROGRESSING AS EXPECTED     Problem: Knowledge Deficit  Goal: Knowledge of disease process/condition, treatment plan, diagnostic tests, and medications will improve  Outcome: PROGRESSING SLOWER THAN EXPECTED

## 2020-02-26 NOTE — DISCHARGE SUMMARY
"Discharge Summary    CHIEF COMPLAINT ON ADMISSION  Chief Complaint   Patient presents with   • Shortness of Breath     pt states he hasn't slept for two days because he's not \"getting enough air\". reports my \"diaphram isn't working right\". reports weakness       Reason for Admission  Lack of Sleep, SOB     Admission Date  2/24/2020    CODE STATUS  Full Code    HPI & HOSPITAL COURSE  This is a 79 y.o. male who presented 2/24/2020 with shortness of breath.  Patient states his symptoms initially started on Saturday evening when he was unable to sleep.  Later that night he noted shortness of breath. He was evaluated in the emergency department and found to have influenza A positive. He is on nighttime oxygen but none during the day.   The patient was treated with tamiflu and doxycycline. His cough resolved and shortness of breath improved to his baseline. He is recommended to follow up with cardiology and for an outpatient sleep study. He did have an elevated troponin which remained stable during his hospital stay and had no evidence of acute ischemia.    Therefore, he is discharged in good and stable condition to home with close outpatient follow-up.         Discharge Date  2/26/20    FOLLOW UP ITEMS POST DISCHARGE  Primary care provider as needed Dr. Ebony Ramirez  Cardiology Dr. Castro 2/26 as scheduled at 3pm    DISCHARGE DIAGNOSES  Active Problems:    Influenza A POA: Yes    Hypertension (Chronic) POA: Yes      Overview: 3/08 urine microalbumin 21      3/08 lifescan screening JUAQUIN,carotid,aorta negative      2/11 hold norvasc      2/11 urine mac 16      5/12 urine mac 18 on lisinopril 40 mg      9/4/13 urine mac 24 on lisinopril 40 mg      1/9/15 urine mac 30 on lisinopril 40 mg      7/29/15 echo moderate LVH, EF 60-65%      7/29/15 carotid ultrasound negative      3/31/16 add norvasc 5 mg to lisinopril 40 mg      5/4/16 stop norvasc constipation,still on lisinopril 40 mg and add hctz 25       mg      5/18/16 " patient on hctz 25 mg, not taking lisinopril, will resume       lisinopril 40 mg      7/2/18 urine mac 30 on hctz 25 mg and lisinopril 40 mg      (norvasc cause constipation)          Lower extremity edema (Chronic) POA: Yes      Overview: 12/09 lasix 20 mg daily      1/8/15 on lasix 20 mg prn    Insomnia POA: Yes    Elevated troponin POA: Yes    Type 2 diabetes mellitus with hyperglycemia, without long-term current use of insulin (HCC) POA: Yes      Overview: 3/08 A1c 5.9%      12/09 A1c 6.4% on insulin      2009 eye exam      3/10 s/p lap banding, now off insulin      2/11 BS 93      5/12 A1c 5.2% off insulin      12/12 eye exam       9/5/13 A1c 5.7% off meds      1/9/15 A1c 5.7%,bs 106      10/2/15 A1c 5.9%      3/11/17 A1c 6.5%      7/3/18 A1c 7.6% (GFR 43) start amaryl 1 mg      10/8/18 poc retina no diabetic neuropathy, macular degeneration grade 3       recommend ophthalmology evaluation      10/8/18 A1c 6.3% on amaryl 1 mg      9/13/19 A1c 6.5%                Haji esophagus (Chronic) POA: Yes      Overview: 2/4/15 upper GI small bowel follow-through mild to moderate thoracic       esophageal dysmotility, small sliding hiatal hernia, laparoscopic gastric       band appears appropriately located      2/18/15 EGD per [facility] reactive gastropathy, and h. pylori, haji's mucosa       indefinite for dysplasia, no evidence of celiac disease      4/22/15 upper GI series postoperatively appears laparoscopic band within       normal limits, small sliding hiatal hernia      5/14/15 EGD per [facility] esophageal mucosal changes large amount of food       residue in stomach, 5 mucosa low grade dysplasia, intestinal metaplasia,       gastric emptying study pending      5/22/15 nuclear medicine gastric emptying study per [facility] evidence of       reflux, gastric emptying half-time 118 minutes      1/8/16 EGD per  surgery normal      1/4/17 [facility] note history of reflux, treatment of haji's esophagus       low-grade  dysplasia, discussed radiofrequency ablation and endoscopic       ultrasound      7/24/17 EGD per [facility] biopsies performed, follow up haji's      8/16/18 EGD per [facility] salmon colored mucosa suspicious for short segment       haji's, biopsies pathology reactive gastropathy with mucosal iron       deposition, negative h.pylori, haji's mucosa, active esophagitis       consistent with reflux, no dysplasia or malignancy seen      8/26/19 EGD per [facility] esophageal mucosal changes consistent with short       segment haji's, biopsied                    Resolved Problems:    * No resolved hospital problems. *      FOLLOW UP  Future Appointments   Date Time Provider Department Center   2/26/2020  3:00 PM Shar Hendrix M.D. [facility] None   3/3/2020  8:15 AM [facility] ECHO 1 [facility] [place]   3/5/2020  8:00 AM Surendra Castro M.D. [facility] None   3/10/2020  8:00 AM ICR RN ICR [facility] [place]   3/13/2020  8:00 AM Paul Wilkinson M.D. [facility] None   3/27/2020  8:15 AM PACER CHECK-CAM B 2 [facility] None   5/1/2020  7:40 AM Sae Mera M.D. [facility] None     Ebony Ramirez M.D.  [address]  [address]  [facility] [address] [phone]      As needed    Billie Sol, LOGAN.P.RElizbaetNElizabet  [address]  [address]  [facility] [address]  [phone]    On 2/26/2020  as scheduled at 3pm      MEDICATIONS ON DISCHARGE     Medication List      START taking these medications      Instructions   oseltamivir 30 MG Caps  Commonly known as:  TAMIFLU   Take 1 Cap by mouth 2 times a day for 3 days.  Dose:  30 mg        CONTINUE taking these medications      Instructions   acetaminophen 325 MG Tabs  Commonly known as:  TYLENOL   Take 2 Tabs by mouth every 6 hours as needed for Moderate Pain.  Dose:  650 mg     anastrozole 1 MG Tabs  Commonly known as:  ARIMIDEX   Take 0.5 mg by mouth See Admin Instructions. On Tue and Sat  Dose:  0.5 mg     aspirin 81 MG EC tablet   Take 1 Tab by mouth every day.  Dose:  81 mg     bumetanide 1 MG Tabs  Commonly known as:   BUMEX   Take 1 Tab by mouth 2 times a day.  Dose:  1 mg     calcium citrate 950 MG Tabs  Commonly known as:  CALCITRATE   Take 1,900 mg by mouth every day.  Dose:  1,900 mg     carvedilol 3.125 MG Tabs  Commonly known as:  COREG   Take 1 Tab by mouth 2 times a day, with meals.  Dose:  3.125 mg     clopidogrel 75 MG Tabs  Commonly known as:  PLAVIX   Take 1 Tab by mouth every day.  Dose:  75 mg     coenzyme Q-10 30 MG capsule   Take 60 mg by mouth 2 Times a Day.  Dose:  60 mg     Cranberry 300 MG Tabs   Take 300 mg by mouth every morning.  Dose:  300 mg     cyanocobalamin 1000 MCG/ML Soln  Commonly known as:  VITAMIN B-12   1,000 mcg by Intramuscular route every thursday.  Dose:  1,000 mcg     ezetimibe 10 MG Tabs  Commonly known as:  ZETIA   Take 10 mg by mouth every morning.  Dose:  10 mg     fluticasone 50 MCG/ACT nasal spray  Commonly known as:  FLONASE   Spray 2 Sprays in nose every day.  Dose:  2 Spray     gabapentin 300 MG Caps  Commonly known as:  NEURONTIN   Take 300 mg by mouth every 6 hours as needed. Indications: Neuropathic Pain  Dose:  300 mg     Garlic 1000 MG Caps   Take 1,000 mg by mouth 2 Times a Day.  Dose:  1,000 mg     glimepiride 1 MG tablet  Commonly known as:  AMARYL   Take 1 mg by mouth every evening.  Dose:  1 mg     glucosamine Sulfate 500 MG Caps   Take 1,500 mg by mouth 2 Times a Day.  Dose:  1,500 mg     ICAPS Tabs   Take 1 Tab by mouth 2 Times a Day.  Dose:  1 Tab     L-Lysine 1000 MG Tabs   Take 1,000 mg by mouth every day.  Dose:  1,000 mg     lactobacillus rhamnosus Caps capsule   Take 1 Cap by mouth every day.  Dose:  1 Cap     Non Formulary Request   Inject 1 Each as instructed See Admin Instructions. HCG/ARGININE 31918l/5mg/cc, pt does not take on Thur  Dose:  1 Each     Non Formulary Request   Inject 1 Each as instructed See Admin Instructions. HGH 5.8mg/2.5mg/cc,   Monday, Tuesday, Wednesday, Friday, Saturday, sunday  Dose:  1 Each     Non Formulary Request   Take 1 Cap by mouth  2 Times a Day. Cholox (OTC)  Dose:  1 Cap     Non Formulary Request   Take 1 Cap by mouth 2 Times a Day. Domenic red (OTC)  Dose:  1 Cap     Non Formulary Request   Take 1 Cap by mouth every day. Nitric OXIDE (OTC)  Dose:  1 Cap     Non Formulary Request   Take 1 Cap by mouth every day. anit-oxidant (OTC)  Dose:  1 Cap     omeprazole 40 MG delayed-release capsule  Commonly known as:  PRILOSEC   Take 40 mg by mouth 2 times a day.  Dose:  40 mg     potassium chloride SA 10 MEQ Tbcr  Commonly known as:  K-DUR   Take 1 Tab by mouth every day.  Dose:  10 mEq     sacubitril-valsartan 24-26 MG Tabs tablet  Commonly known as:  Entresto   Take 1 Tab by mouth 2 Times a Day.  Dose:  1 Tab     testosterone cypionate 200 MG/ML Soln injection  Commonly known as:  DEPO-TESTOSTERONE   50 mg by Intramuscular route every thursday.  Dose:  50 mg     VITAMIN B COMPLEX PO   Take 1 Tab by mouth every day.  Dose:  1 Tab     Vitamin C 1000 MG Tabs   Take 1,000 mg by mouth 2 Times a Day.  Dose:  1,000 mg     Vitamin D3 2000 UNIT Caps   Take 2,000 Units by mouth every day.  Dose:  2,000 Units     vitamin e 400 UNIT Caps  Commonly known as:  VITAMIN E   Take 400 Units by mouth 2 times a day.  Dose:  400 Units     Zinc 50 MG Tabs   Take 50 mg by mouth every day.  Dose:  50 mg            Allergies  Allergies   Allergen Reactions   • Statins [Hmg-Coa-R Inhibitors] Rash     Blisters         DIET  Orders Placed This Encounter   Procedures   • Diet Order Diabetic     Standing Status:   Standing     Number of Occurrences:   1     Order Specific Question:   Diet:     Answer:   Diabetic [3]       ACTIVITY  As tolerated.  Weight bearing as tolerated    CONSULTATIONS  none    PROCEDURES  none    LABORATORY  Lab Results   Component Value Date    SODIUM 141 02/26/2020    POTASSIUM 4.1 02/26/2020    CHLORIDE 104 02/26/2020    CO2 27 02/26/2020    GLUCOSE 86 02/26/2020    BUN 26 (H) 02/26/2020    CREATININE 1.41 (H) 02/26/2020    CREATININE 1.18 02/08/2011     GLOMRATE >59 02/08/2011        Lab Results   Component Value Date    WBC 3.5 (L) 02/26/2020    WBC 4.5 02/08/2011    HEMOGLOBIN 16.5 02/26/2020    HEMATOCRIT 55.3 (H) 02/26/2020    PLATELETCT 103 (L) 02/26/2020        Total time of the discharge process exceeds 22 minutes.

## 2020-03-03 ENCOUNTER — HOSPITAL ENCOUNTER (OUTPATIENT)
Dept: CARDIOLOGY | Facility: MEDICAL CENTER | Age: 80
End: 2020-03-03
Attending: INTERNAL MEDICINE
Payer: MEDICARE

## 2020-03-03 ENCOUNTER — HOSPITAL ENCOUNTER (OUTPATIENT)
Dept: RADIOLOGY | Facility: MEDICAL CENTER | Age: 80
End: 2020-03-03
Attending: COLON & RECTAL SURGERY
Payer: MEDICARE

## 2020-03-03 ENCOUNTER — HOSPITAL ENCOUNTER (OUTPATIENT)
Dept: LAB | Facility: MEDICAL CENTER | Age: 80
End: 2020-03-03
Attending: INTERNAL MEDICINE
Payer: MEDICARE

## 2020-03-03 DIAGNOSIS — R14.0 BLOATING: ICD-10-CM

## 2020-03-03 DIAGNOSIS — Z95.2 S/P TAVR (TRANSCATHETER AORTIC VALVE REPLACEMENT): ICD-10-CM

## 2020-03-03 DIAGNOSIS — R14.1 GAS PAIN: ICD-10-CM

## 2020-03-03 DIAGNOSIS — I50.23 ACUTE ON CHRONIC SYSTOLIC HEART FAILURE (HCC): ICD-10-CM

## 2020-03-03 DIAGNOSIS — Z98.84 HISTORY OF LAPAROSCOPIC ADJUSTABLE GASTRIC BANDING: ICD-10-CM

## 2020-03-03 DIAGNOSIS — R13.10 DYSPHAGIA, IDIOPATHIC: ICD-10-CM

## 2020-03-03 DIAGNOSIS — T85.9XXA COMPLICATIONS DUE TO NERVOUS SYSTEM DEVICE, IMPLANT, AND GRAFT, INITIAL ENCOUNTER: ICD-10-CM

## 2020-03-03 DIAGNOSIS — K21.9 GASTROESOPHAGEAL REFLUX DISEASE, ESOPHAGITIS PRESENCE NOT SPECIFIED: ICD-10-CM

## 2020-03-03 PROCEDURE — 36415 COLL VENOUS BLD VENIPUNCTURE: CPT

## 2020-03-03 PROCEDURE — 93306 TTE W/DOPPLER COMPLETE: CPT

## 2020-03-03 PROCEDURE — 80048 BASIC METABOLIC PNL TOTAL CA: CPT

## 2020-03-03 PROCEDURE — 74246 X-RAY XM UPR GI TRC 2CNTRST: CPT

## 2020-03-04 ENCOUNTER — TELEPHONE (OUTPATIENT)
Dept: CARDIOLOGY | Facility: MEDICAL CENTER | Age: 80
End: 2020-03-04

## 2020-03-04 LAB
ANION GAP SERPL CALC-SCNC: 9 MMOL/L (ref 0–11.9)
BUN SERPL-MCNC: 31 MG/DL (ref 8–22)
CALCIUM SERPL-MCNC: 9.7 MG/DL (ref 8.5–10.5)
CHLORIDE SERPL-SCNC: 100 MMOL/L (ref 96–112)
CO2 SERPL-SCNC: 31 MMOL/L (ref 20–33)
CREAT SERPL-MCNC: 1.68 MG/DL (ref 0.5–1.4)
GLUCOSE SERPL-MCNC: 121 MG/DL (ref 65–99)
LV EJECT FRACT  99904: 35
LV EJECT FRACT MOD 2C 99903: 42.99
LV EJECT FRACT MOD 4C 99902: 45.41
LV EJECT FRACT MOD BP 99901: 43.01
POTASSIUM SERPL-SCNC: 4 MMOL/L (ref 3.6–5.5)
SODIUM SERPL-SCNC: 140 MMOL/L (ref 135–145)

## 2020-03-04 PROCEDURE — 93306 TTE W/DOPPLER COMPLETE: CPT | Mod: 26 | Performed by: INTERNAL MEDICINE

## 2020-03-04 NOTE — TELEPHONE ENCOUNTER
Called and spoke with patient regarding whether or not his home monitor was set up correctly and working. Walked pt through sending a manual transmission successfully. Pt was appreciative of phone call.

## 2020-03-04 NOTE — TELEPHONE ENCOUNTER
Message   Received: Today   Message Contents   DANIELLE Macedo R.N.             Please call patient with unremarkable echocardiogram results showing normally functioning TAVR valve and known moderately reduced left ventricular systolic function.     Thanks.  REA.      ------------------------------------------------------------------------------    Pt scheduled to be seen tomorrow, 3/05. Results can be discussed at this time.

## 2020-03-05 ENCOUNTER — OFFICE VISIT (OUTPATIENT)
Dept: CARDIOLOGY | Facility: MEDICAL CENTER | Age: 80
End: 2020-03-05
Payer: MEDICARE

## 2020-03-05 ENCOUNTER — DOCUMENTATION (OUTPATIENT)
Dept: CARDIOLOGY | Facility: MEDICAL CENTER | Age: 80
End: 2020-03-05

## 2020-03-05 VITALS
HEART RATE: 74 BPM | RESPIRATION RATE: 12 BRPM | HEIGHT: 73 IN | SYSTOLIC BLOOD PRESSURE: 102 MMHG | BODY MASS INDEX: 31.69 KG/M2 | DIASTOLIC BLOOD PRESSURE: 72 MMHG | OXYGEN SATURATION: 92 % | WEIGHT: 239.09 LBS

## 2020-03-05 DIAGNOSIS — Z95.2 S/P TAVR (TRANSCATHETER AORTIC VALVE REPLACEMENT): ICD-10-CM

## 2020-03-05 DIAGNOSIS — I25.10 CORONARY ARTERY DISEASE INVOLVING NATIVE CORONARY ARTERY OF NATIVE HEART WITHOUT ANGINA PECTORIS: ICD-10-CM

## 2020-03-05 DIAGNOSIS — Z95.5 STENTED CORONARY ARTERY: ICD-10-CM

## 2020-03-05 DIAGNOSIS — I50.22 CHF (CONGESTIVE HEART FAILURE), NYHA CLASS II, CHRONIC, SYSTOLIC (HCC): ICD-10-CM

## 2020-03-05 DIAGNOSIS — Z95.0 S/P PLACEMENT OF CARDIAC PACEMAKER: ICD-10-CM

## 2020-03-05 PROCEDURE — 99215 OFFICE O/P EST HI 40 MIN: CPT | Mod: 24 | Performed by: INTERNAL MEDICINE

## 2020-03-05 RX ORDER — CARVEDILOL 3.12 MG/1
3.12 TABLET ORAL 2 TIMES DAILY WITH MEALS
Qty: 90 TAB | Refills: 3 | Status: SHIPPED | OUTPATIENT
Start: 2020-03-05 | End: 2020-08-27

## 2020-03-05 ASSESSMENT — ENCOUNTER SYMPTOMS
INSOMNIA: 1
HEADACHES: 0
GASTROINTESTINAL NEGATIVE: 1
CHILLS: 0
EYES NEGATIVE: 1
COUGH: 0
NERVOUS/ANXIOUS: 0
DOUBLE VISION: 0
NAUSEA: 0
BRUISES/BLEEDS EASILY: 0
VOMITING: 0
DEPRESSION: 0
FOCAL WEAKNESS: 0
SHORTNESS OF BREATH: 1
WEAKNESS: 0
PALPITATIONS: 0
DIZZINESS: 0
BLURRED VISION: 0
FEVER: 0
CARDIOVASCULAR NEGATIVE: 1
WEIGHT LOSS: 0
CLAUDICATION: 0
MYALGIAS: 0
ABDOMINAL PAIN: 0
NEUROLOGICAL NEGATIVE: 1

## 2020-03-05 ASSESSMENT — FIBROSIS 4 INDEX: FIB4 SCORE: 4.78

## 2020-03-05 NOTE — PROGRESS NOTES
Valve Program Functional Assessment: 3/4/20 1 month post op    [id number]   1a) Showering/bathin  1b) Walking 1 block on ground: 2  1c) Hurrying or joggin  2) Swelling: 3  3) Fatigue: 3  4) Shortness of breath: 1  5) Sleep sitting up: 1  6) Limited enjoyment of life: 1  7) Spend the rest of your life with HF: 1  8a) Hobbies, recreational activities:1  8b) Working or doing household chores:1  8c) Visiting family or friends: 3    5 meter walk test  1) __4.66____ s/5m  2) __4.97____ s/5m  3) __5.02____ s/5m     Strength   1) _22_____ kg  2) _22_____ kg  3) _22_____ kg    MOYA ADLs  Patient independently preforms...   - Bathing: Yes   - Dressing: Yes   - Toileting: Yes   - Transferring: Yes   - Continence: Yes   - Feeding: Yes     Living Situation  Patient lives: with spouse    Mobility Aids   Patient uses: none

## 2020-03-05 NOTE — PROGRESS NOTES
Chief Complaint   Patient presents with   • Aortic Stenosis     s/p TAVR- 1 month       Subjective:   LIAN More III is a 79 y.o. male who presents today for hospital follow up.    Since the discharge from [facility] on 02/26/20 for Influenza A, pacemaker placement status post TAVR, he has been doing well. He admits to moderate fatigue, shortness of breath and orthopnea. He denies dyspnea on exertion, chest pain, dizziness or syncope. His left shoulder has been painful.     Past Medical History:   Diagnosis Date   • Arthritis    • Back pain    • Breath shortness     pt states short of breath 24/7   • CAD (coronary artery disease)    • CATARACT     removed bilat   • Chest pain    • Chest tightness    • Chickenpox    • Congestive heart failure (HCC)    • Constipation    • Coronary heart disease    • Cough    • Daytime sleepiness    • Diabetes     PT REPORTS DIABETES RESOLVED IN 2009. NO LONGER TAKES MEDS   • Difficulty breathing    • Dilated cardiomyopathy (HCC)    • Fatigue    • Hearing difficulty    • Heart murmur    • Heart valve disease    • Heartburn    • Hiatus hernia syndrome    • Hyperlipidemia    • Hypertension     pt states well controlled on meds   • Kidney disease    • Mumps    • Pain 08-29-13    back, hips and bilat legs, 4/10   • Pain 6/22/2015    left humerus   • Pain 1/7/16    knees, back and shoulder   • Painful joint    • Palpitations    • Pneumonia 2007   • Rhinitis    • Ringing in ears    • Severe aortic stenosis 12/4/2019   • Shortness of breath    • Swelling of lower extremity    • Tonsillitis    • Ulcer 2014    Dr. Porter, gastroenterologist   • Unspecified hemorrhagic conditions     bruises easily     Past Surgical History:   Procedure Laterality Date   • TRANSCATHETER AORTIC VALVE REPLACEMENT N/A 1/27/2020    Procedure: REPLACEMENT, AORTIC VALVE, TRANSCATHETER-;  Surgeon: Surendra Castro M.D.;  Location: SURGERY [facility];  Service: Cardiac   • OLGA  1/27/2020    Procedure:  ECHOCARDIOGRAM, TRANSESOPHAGEAL;  Surgeon: Surendra Castro M.D.;  Location: [facility];  Service: Cardiac   • GASTROSCOPY N/A 1/8/2016    Procedure: GASTROSCOPY;  Surgeon: Deniz Sue M.D.;  Location: [facility];  Service:    • HUMERUS ORIF Left 6/25/2015    Procedure: HUMERUS ORIF/ PROXIMAL;  Surgeon: Cristal Guido M.D.;  Location: [facility];  Service:    • LUMBAR FUSION POSTERIOR  9/5/2013    Performed by Edith Sears M.D. at [facility]   • LUMBAR DECOMPRESSION  9/5/2013    Performed by Edith Sears M.D. at [facility]   • MASS EXCISION NEURO  9/5/2013    Performed by Edith Sears M.D. at [facility]   • RECOVERY  6/26/2012    Performed by [facility] IRRECOVERY at [facility] SAME DAY [facility]   • DRAINAGE HEMATOMA  5/25/2012    Performed by DENIZ SUE at [facility]   • GASTRIC BAND LAPAROSCOPIC REVISION  5/11/2012    Performed by DENIZ SUE at [facility]   • LUMBAR FUSION POSTERIOR  3/26/2012    Performed by EDITH SEARS at [facility]   • LUMBAR DECOMPRESSION  3/26/2012    Performed by EDITH SEARS at [facility]   • INJ,EPIDURAL/LUMB/SAC SINGLE  3/19/2012    Performed by KRISTIN BALTAZAR at [facility]   • INJ,EPIDURAL/LUMB/SAC SINGLE  3/5/2012    Performed by KRISTIN BALTAZAR at [facility]   • GASTRIC BANDING LAPAROSCOPIC  3/24/2010    Performed by DENIZ SUE at [facility]   • HIATAL HERNIA REPAIR  3/24/2010    Performed by DENIZ SUE at [facility]   • KNEE ARTHROPLASTY TOTAL  2000    bilateral   • ABDOMINOPLASTY  1990   • AORTIC VALVE REPLACEMENT     • ARTHROSCOPY, KNEE     • LAMINOTOMY     • PB REMV 2ND CATARACT,CORN-SCLER SECTN     • SINUSCOPE     • SLEEVE,CARRIE VASO THIGH     • TONSILLECTOMY     • ZZZ CARDIAC CATH       Family History   Problem Relation Age of Onset   • No Known Problems Sister    •  No Known Problems Sister    • No Known Problems Sister    • Diabetes Other    • No Known Problems Mother    • No Known Problems Father    • Heart Disease Neg Hx      Social History     Socioeconomic History   • Marital status:      Spouse name: Not on file   • Number of children: Not on file   • Years of education: Not on file   • Highest education level: Not on file   Occupational History   • Not on file   Social Needs   • Financial resource strain: Not on file   • Food insecurity     Worry: Not on file     Inability: Not on file   • Transportation needs     Medical: Not on file     Non-medical: Not on file   Tobacco Use   • Smoking status: Never Smoker   • Smokeless tobacco: Never Used   • Tobacco comment: 0   Substance and Sexual Activity   • Alcohol use: No     Alcohol/week: 0.0 oz     Frequency: Never     Binge frequency: Never   • Drug use: No   • Sexual activity: Yes   Lifestyle   • Physical activity     Days per week: Not on file     Minutes per session: Not on file   • Stress: Not on file   Relationships   • Social connections     Talks on phone: Not on file     Gets together: Not on file     Attends Sikh service: Not on file     Active member of club or organization: Not on file     Attends meetings of clubs or organizations: Not on file     Relationship status: Not on file   • Intimate partner violence     Fear of current or ex partner: Not on file     Emotionally abused: Not on file     Physically abused: Not on file     Forced sexual activity: Not on file   Other Topics Concern   • Not on file   Social History Narrative   • Not on file     Allergies   Allergen Reactions   • Statins [Hmg-Coa-R Inhibitors] Rash     Blisters     • Atorvastatin      (Medications reviewed.)  Outpatient Encounter Medications as of 3/5/2020   Medication Sig Dispense Refill   • bumetanide (BUMEX) 1 MG Tab Take 2 Tabs by mouth 2 times a day. 120 Tab 3   • Multiple Vitamins-Minerals (ICAPS) Tab Take 1 Tab by mouth 2  Times a Day.     • lactobacillus rhamnosus (CULTURELLE) Cap capsule Take 1 Cap by mouth every day.     • testosterone cypionate (DEPO-TESTOSTERONE) 200 MG/ML Solution injection 50 mg by Intramuscular route every thursday.     • cyanocobalamin (VITAMIN B-12) 1000 MCG/ML Solution 1,000 mcg by Intramuscular route every thursday.     • fluticasone (FLONASE) 50 MCG/ACT nasal spray Spray 2 Sprays in nose every day.     • potassium chloride SA (K-DUR) 10 MEQ Tab CR Take 1 Tab by mouth every day. 30 Tab 3   • acetaminophen (TYLENOL) 325 MG Tab Take 2 Tabs by mouth every 6 hours as needed for Moderate Pain. 30 Tab 0   • carvedilol (COREG) 3.125 MG Tab Take 1 Tab by mouth 2 times a day, with meals. 60 Tab 0   • calcium citrate (CALCITRATE) 950 MG Tab Take 1,900 mg by mouth every day.     • coenzyme Q-10 30 MG capsule Take 60 mg by mouth 2 Times a Day.     • Garlic 1000 MG Cap Take 1,000 mg by mouth 2 Times a Day.     • glucosamine Sulfate 500 MG Cap Take 1,500 mg by mouth 2 Times a Day.     • Non Formulary Request Inject 1 Each as instructed See Admin Instructions. HCG/ARGININE 10731n/5mg/cc, pt does not take on Thur      • Non Formulary Request Inject 1 Each as instructed See Admin Instructions. HGH 5.8mg/2.5mg/cc,   Monday, Tuesday, Wednesday, Friday, Saturday, sunday     • vitamin e (VITAMIN E) 400 UNIT Cap Take 400 Units by mouth 2 times a day.     • omeprazole (PRILOSEC) 40 MG delayed-release capsule Take 40 mg by mouth 2 times a day.     • Non Formulary Request Take 1 Cap by mouth 2 Times a Day. Cholox (OTC)     • Cholecalciferol (VITAMIN D3) 2000 UNIT Cap Take 2,000 Units by mouth every day.     • Non Formulary Request Take 1 Cap by mouth 2 Times a Day. Domenic red (OTC)     • Non Formulary Request Take 1 Cap by mouth every day. Nitric OXIDE (OTC)     • Non Formulary Request Take 1 Cap by mouth every day. anit-oxidant (OTC)     • Zinc 50 MG Tab Take 50 mg by mouth every day.     • anastrozole (ARIMIDEX) 1 MG Tab Take 0.5  mg by mouth See Admin Instructions. On Tue and Sat     • gabapentin (NEURONTIN) 300 MG Cap Take 300 mg by mouth every 6 hours as needed. Indications: Neuropathic Pain     • clopidogrel (PLAVIX) 75 MG Tab Take 1 Tab by mouth every day. 90 Tab 3   • aspirin EC 81 MG EC tablet Take 1 Tab by mouth every day. 30 Tab 0   • ezetimibe (ZETIA) 10 MG Tab Take 10 mg by mouth every morning.     • glimepiride (AMARYL) 1 MG tablet Take 1 mg by mouth every evening.     • B Complex Vitamins (VITAMIN B COMPLEX PO) Take 1 Tab by mouth every day.     • Cranberry 300 MG Tab Take 300 mg by mouth every morning.     • L-Lysine 1000 MG Tab Take 1,000 mg by mouth every day.     • Ascorbic Acid (VITAMIN C) 1000 MG Tab Take 1,000 mg by mouth 2 Times a Day.     • sacubitril-valsartan (ENTRESTO) 24-26 MG Tab tablet Take 1 Tab by mouth 2 Times a Day. 60 Tab 3     No facility-administered encounter medications on file as of 3/5/2020.      Review of Systems   Constitutional: Positive for malaise/fatigue. Negative for chills, fever and weight loss.   HENT: Negative.  Negative for hearing loss.    Eyes: Negative.  Negative for blurred vision and double vision.   Respiratory: Positive for shortness of breath. Negative for cough.    Cardiovascular: Negative.  Negative for chest pain, palpitations, claudication and leg swelling.   Gastrointestinal: Negative.  Negative for abdominal pain, nausea and vomiting.   Genitourinary: Negative.  Negative for dysuria and urgency.   Musculoskeletal: Positive for joint pain. Negative for myalgias.   Skin: Negative.  Negative for itching and rash.   Neurological: Negative.  Negative for dizziness, focal weakness, weakness and headaches.   Endo/Heme/Allergies: Negative.  Does not bruise/bleed easily.   Psychiatric/Behavioral: Negative for depression. The patient has insomnia. The patient is not nervous/anxious.         Objective:   /72 (BP Location: Right arm, Patient Position: Sitting, BP Cuff Size: Adult)    "Pulse 74   Resp 12   Ht 1.854 m (6' 1\")   Wt 108.4 kg (239 lb 1.4 oz)   SpO2 92%   BMI 31.54 kg/m²     Physical Exam   Constitutional: He is oriented to person, place, and time. He appears well-developed and well-nourished.   HENT:   Head: Normocephalic and atraumatic.   Eyes: EOM are normal.   Neck: No JVD present.   Cardiovascular: Normal rate, regular rhythm and normal heart sounds.   Pulmonary/Chest: Effort normal and breath sounds normal.   Abdominal: Soft. Bowel sounds are normal.   No hepatosplenomegaly.   Musculoskeletal: Normal range of motion.   Lymphadenopathy:     He has no cervical adenopathy.   Neurological: He is alert and oriented to person, place, and time.   Skin: Skin is warm and dry.   Psychiatric: He has a normal mood and affect.     CARDIAC STUDIES/PROCEDURES:     AICD PLACEMENT by Theo Knapp (02/04/20)  1. Successful automatic implantable cardioverter defibrillator implant.  2. Unsuccessful CS lead placement due to perforation of the CS resulting in small but stable effusion     CARDIAC CATHETERIZATION CONCLUSIONS by Eliazar Sanchez (01/10/20)  1. LVEF 30% prior to intervention.  2. 70% distal left main and proximal LAD stenosis, IFR positive from prior procedure.  3. Successful planned PCI proximal LAD and distal left main trifurcation  stenosis (3.5 x 26 mm Jack BRANDI postdilated to 4.5 mm proximally), IVUS guided.  4. Moderate aortic stenosis, mean gradient 27 to 30mmHg.     CARDIAC CATHETERIZATION CONCLUSIONS by Dougie Polo (12/27/19)  Coronary stent implantation, distal right coronary artery 3.5x28 mm   Synergy drug-eluting stent postdilated 4.0 mm.     CARDIAC CATHETERIZATION CONCLUSIONS by Amauri Amador (12/11/19)  Severe triple-vessel coronary artery disease with calcified 50% left main stenosis (iFR <0.8)      CAROTID ULTRASOUND (01/16/20)  1.  There is mild to moderate amount of atherosclerotic plaque.  Plaque is located in carotid bulbs and " proximal internal carotid arteries.  Plaque characterization:  homogeneous, calcific  2.  There is no evidence of carotid occlusion.  3. Vertebral arteries demonstrate antegrade flow.  4. Diameter reduction in the internal carotid arteries: less than 50%. There is no hemodynamically significant stenosis.     CTA OF CHEST AND ABDOMEN (01/16/20)  1.  Aortic annulus measures 660 mm?  2.  Coronary arteries or over a centimeter above the annulus  3.  Iliofemoral runoff is satisfactory with right side less tortuous  4.  Lower lobe dependent bronchial thickening could indicate aspiration. Edema or bronchitis are possible  5.  Numerous incidental chronic findings including left heart enlargement, severe coronary artery disease, colonic diverticulosis, LAP-BAND device, hepatic steatosis and splenomegaly    ECHOCARDIOGRAM CONCLUSIONS (03/03/20)  Compared to the report of the prior study done on 2/4/2020   there has   been no significant change.   Moderately reduced left ventricular systolic function.  Left ventricular ejection fraction is visually estimated to be 35%.  Pacer/ICD wire seen in right ventricle.  Known TAVR aortic valve that is functioning normally with normal   transvalvular gradients.  Vmax is   m/s. Transvalvular gradients are- peak 21 mmHg and mean   gradient is 11 mmHg.   Minimal paravalvular leak.  (study result reviewed)     ECHOCARDIOGRAM CONCLUSIONS (12/03/19)  Left ventricle is severely dilated.  Severely reduced left ventricular systolic function.  Left ventricular ejection fraction is visually estimated to be 25%.  Global hypokinesis with regional variation.  Moderate aortic stenosis.  Trace mitral regurgitation.  Severely dilated left atrium.  Mild tricuspid regurgitation.  Right ventricular systolic pressure is estimated to be 40  mmHg.  Normal pericardium without effusion.  Compared to the images of the study done on 7/29/2015  there has been   marked change in LV wall motion and function, moderate  aortic stenosis   is also now present.    Laboratory results of (03/03/20) were reviewed. Bun of 31 mg/dl, creatinine levels of 1.68 mg/dl noted.     EKG performed on (02/24/20) was reviewed: EKG personally interpreted shows sinus rhythm with incomplete right bundle branch block.  EKG performed on (01/28/20) EKG personally interpreted shows sinus rhythm.  EKG performed on (01/10/20) EKG shows sinus rhythm with inferior Q waves.    Assessment:     1. S/P TAVR (transcatheter aortic valve replacement)     2. CHF (congestive heart failure), NYHA class II, chronic, systolic (HCC)     3. S/P placement of cardiac pacemaker     4. Coronary artery disease involving native coronary artery of native heart without angina pectoris     5. Stented coronary artery       Medical Decision Making:  Today's Assessment / Status / Plan:     1. Status post transcatheter aortic valve replacement (TAVR) with # 29 Khan Salvador S3 Ultra valve, transfemoral approach, under general anesthesia (01/27/20): He is clinically doing well, NYHA class III. We will repeat an echocardiogram in one year.  2. Chronic systolic congestive heart failure with cardiomyopathy with AICD: The overall volume status is adequate.  Coronary artery disease with left main stent placement: He is clinically doing well without recurrence of his angina.  We will continue with current medical care including aspirin, clopidogrel and carvedilol.    We will follow up the patient in one year with echocardiogram.     CC Paul Kim and Radha Avila

## 2020-03-10 ENCOUNTER — NON-PROVIDER VISIT (OUTPATIENT)
Dept: CARDIOLOGY | Facility: MEDICAL CENTER | Age: 80
End: 2020-03-10
Payer: MEDICARE

## 2020-03-10 DIAGNOSIS — Z95.3 STATUS POST TRANSCATHETER AORTIC VALVE REPLACEMENT (TAVR) USING BIOPROSTHESIS: Primary | ICD-10-CM

## 2020-03-10 LAB — EKG IMPRESSION: NORMAL

## 2020-03-10 PROCEDURE — G0422 INTENS CARDIAC REHAB W/EXERC: HCPCS | Performed by: INTERNAL MEDICINE

## 2020-03-10 PROCEDURE — G0423 INTENS CARDIAC REHAB NO EXER: HCPCS | Mod: 59 | Performed by: INTERNAL MEDICINE

## 2020-03-10 ASSESSMENT — PATIENT HEALTH QUESTIONNAIRE - PHQ9
4. FEELING TIRED OR HAVING LITTLE ENERGY: 3
8. MOVING OR SPEAKING SO SLOWLY THAT OTHER PEOPLE COULD HAVE NOTICED. OR THE OPPOSITE, BEING SO FIGETY OR RESTLESS THAT YOU HAVE BEEN MOVING AROUND A LOT MORE THAN USUAL: 0
9. THOUGHTS THAT YOU WOULD BE BETTER OFF DEAD, OR OF HURTING YOURSELF: 0
6. FEELING BAD ABOUT YOURSELF - OR THAT YOU ARE A FAILURE OR HAVE LET YOURSELF OR YOUR FAMILY DOWN: 0
SUM OF ALL RESPONSES TO PHQ9 QUESTIONS 1 AND 2: 4
SUM OF ALL RESPONSES TO PHQ QUESTIONS 1-9: 10
2. FEELING DOWN, DEPRESSED, IRRITABLE, OR HOPELESS: 1
5. POOR APPETITE OR OVEREATING: 0
1. LITTLE INTEREST OR PLEASURE IN DOING THINGS: 3
3. TROUBLE FALLING OR STAYING ASLEEP OR SLEEPING TOO MUCH: 3
7. TROUBLE CONCENTRATING ON THINGS, SUCH AS READING THE NEWSPAPER OR WATCHING TELEVISION: 0
SUM OF ALL RESPONSES TO PHQ QUESTIONS 1-9: 10

## 2020-03-10 NOTE — PROGRESS NOTES
Individualized Treatment Plan   Cardiac Rehab Individual Treatment Plan  Initial/Reassessment/Discharge Assessment/ ReAssessment/ Discharge: Initial 03/10/20 Session #     1   MRN: [id number] Allergies: Statins [hmg-coa-r inhibitors] and Atorvastatin   Patient Name: LIAN More III : 1940 Risk Stratification: High    Diagnoses:   1. 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 Physician: Ebony Ramirez M.D.    Date of Event: 20 Specialist: Minh   Risk Factors:  Hypertension, Hyperlipidemia, Diabetes, Obesity, Stress, Sedentary Lifestyle, Age, Male > 45   Exercise Nutrition Other Core Components/ Risk Factors Psychosocial   Stages of change Stages of change Stages of change Stages of change   Preparation Preparation Preparation Preparation   Fitness Test Lipids Learning Barriers Psychosocial Plan   DASI: (na) Available: Yes Learning Barriers: None Psychosocial Plan: Yes   DIST: 1132 Date: 19 Family Support Goals   Max HR: 118 Total: 105 mg/dL Yes Assess presence or absence of depression using a valid screening: Yes   Max BP: Peak Ex BP: 138/75 Tri mg/dL Lives: Spouse Use Stress Management: Yes   RPE: 12 HDL: 29 mg/dL Other Risk Factors Plan Adverse Events: Yes   SPO2: 100 %       MET: 2.62 LDL: 56 mg/dL Other Risk Factors/Plan: Yes Unexpected Events: Yes   EF= 35(2020) Diabetes Tobacco Use Intervention   Ambulatory Status Diabetes: Yes Social History     Tobacco Use   Smoking Status Never Smoker   Smokeless Tobacco Never Used   Tobacco Comment    0    Behavioral Health Consult: Yes   Fall Risk Assessed: Yes HbA1C: 6.5 % Date: 19 Goals Physician Referral: No   Exercise Ambulatory Status Assist Devices: None Monitors BS at Home: Yes(occasionally, a couple times a week. ) Educate / Review and have understanding of cardiac disease prevention: Identifies Stressors: Yes   Exercise Plan Frequency: once a week Random BS: 121(03/10/2020)   "Drug Intervention: No     Weight Management  Outcome Survey Tools   Goals Weight: 109.8 kg (242 lb) Educate / Review and have understanding of cardiac disease prevention: Yes FP QOL Overall Score: 19.29   Home Exercise: Yes Height: 185.4 cm (6' 0.99\") Medication Compliance: Yes PHQ-9: 10   Mode: Walk, Bike(recumbant bike) BMI (Calculated): 31.94 Complete Tobacco Cessation: Education   Duration: 10 minutes up to 3 times a day as tolerated.  Goal weight: 220lb Complete Tobacco Cessation: Yes MBSR Lectures/Videos: Yes   Frequency: 7 days active.  Waist: 46.5 inch Intervention Psychosocial Education: Coping Techniques, Positive Support System, S/S Depression, MBSR Lectures   Intervention Social History     Substance and Sexual Activity   Alcohol Use No   • Alcohol/week: 0.0 oz   • Frequency: Never   • Binge frequency: Never    Smoking Cessation Referral: N/A     Intervention: Yes Nutrition Plan Ind. Education / Counseling: N/A    Exercise Prescription Nutrition Plan: Yes Tobacco Adjunct: N/A    Mode: Treadmill, NuStep, UBE, Airdyne Nutrition Related Target Goals Healthy Heart Education: Class Schedule Given, Medications Reviewed, Patient Education Binder Provided, Risk Factors Discussed, Videos Viewed per [employer]    Frequency: 3 days/week LDL-C <100 if trig. > 200: N/A Weekly Lectures/ Videos/ Interactive Cooking School: Yes    Duration: 20-30 min LDL-C < 70 for high risk patient:  LDL-C<70 for high risk patients: Yes Education    Intensity: 11-13 RPE  Healthy Heart Education: Class Schedule Given, Medications Reviewed, Patient Education Binder Provided, Risk Factors Discussed, Videos Viewed per [employer]    METS: 1.0-3.0 Non HDL-C Should be < 130:  Non HDL-C Should be < 130: Yes Hypertension Management   (Active Problem)    Progression: ^ increments of 1-3 to THR/RPE as tolerated      THR: THR: 20-30 BPM ^Resting HR(for the first 3 visits then max of 121 age predicated.) HbA1C < 7%: Yes Resting BP: 114/70    Angina " with Exercise?   Angina with Exercise: No   BMI < 25: No(<29) Hypertension Education      Dietary Goal: >=58 rate your plate, low sodium.      Resistance Training? Resistance Training: Yes Rate your Plate: Pre(65) Lectures/ Videos/ Cooking School: Yes    Education Intervention Hypertension Goals    Yes Dietician Consult/Class: Pending Medication Adherence : Yes    Equipment Orientation, Exercise Safety, S/S to Report, RPE Scale, Warm Up / Cool Down, Physically Active Nurse/Patient Discussion: Yes Home Self Monitoring : Yes    Exercise “Target Goals” Diabetes Ed Referral: Yes     Start Individual Exercise Rx Yes Discuss Maintenance /Wt Loss: Yes       Attend Cooking School: Pending     BP < 140/90 or < 130/80 if DM or CKD Yes Education       Nutrition Education: S&S Hypo/Hyper glycemia, Relate Diabetes to CAD, Healthy Plant Based Eating, Sodium Reduction Cooking/ Lectures/ Cooking School/  RD 1:1     Aerobic activity 30 + min / day 5 days / wk Yes        Initial Assessment  Heart Sounds: S1S2 WNL.           Lung Sounds: Diminished throughout bilaterally.  Encouraged incentive spirometer use at home.           Edema: 1+ non-pitting edema hayden in color, dry and flakey skin bilateral lower extremities.       Right Peripheral Pulses:  Carotid: 2+  Radial: 2+  Dorsalis Pedis: 1+  Posterior Tibialis: 1+      Left Peripheral Pulses:  Carotid: 2+  Radial: 2+  Dorsalis Pedis: 1+  Posterior Tibialis: 1+       Incision: None present.       Color: Hayden bilateral lower extremities.     Frame Size: Large       Cognitive Assessment: A&O X 4, no cognitive deficits reported.     Fall Assessment:    Gait: Unsteady, bilateral artificial knees, back fusions.   Balance: Unsteady.   Upper Body: Recent AICD placement. 2/2020.  Upper body precautions in place till 4/2020.   Lower Body: Bilateral knee replacements, hip and back problems intermittently, will modify exercise as needed.    Recent Falls: Reports that he fell early in 2020.     "  Current Medications and Vaccinations: Reviewed     Patient Stated Goals: \"I would like to lose some weight, get back to golfing, and ride my recombent bike again.\"            Exercise    Current : Not currently exercising.   Goal: To play golf again and to start riding his recumbent bike up to 30 minutes two days a week after 2nd week in the program.   Progress: Willing to start ICR and see how things go, will hold upper body exercise till 4/1/2020.      Nutrition     Current: Already eats generally heart healthy.  He does not add salt to any food, eats out only once a week, has dessert only once a week.  He has been watching his diet due to having diabetes and his wife is an RN and helps with the diet as well.   Goal: To learn about the [employer] program.   Progress: Willing to make some dietary adjustments as needed for heart health.      Hypertension     Current: Blood pressure WNL today.   Goal: Low sodium diet, medication adherence, home monitoring.   Progress: Willing to maintain above goals for good blood pressure control.      Stress     Current : He states his stress currently is lack of sleep.   Goal: Pending a sleep study in 4 months to evaluate sleep patterns and possibility of sleep apnea.   Progress: Taking Melantonin to aid with his sleep.     Other     Notes: Monitor first 3 visits, several body limitations due to prior injuries.  Upper body precautions till April 1st.  Will modify exercise as needed.                  "

## 2020-03-10 NOTE — PROGRESS NOTES
Intensive cardiac rehabilitation (ICR) individual treatment plan (ITP) reviewed and signed.    Evelin Hawthorne MD, [facility]  Cardiologist  [facility]

## 2020-03-11 ENCOUNTER — NON-PROVIDER VISIT (OUTPATIENT)
Dept: CARDIOLOGY | Facility: MEDICAL CENTER | Age: 80
End: 2020-03-11
Payer: MEDICARE

## 2020-03-11 DIAGNOSIS — Z95.3 STATUS POST TRANSCATHETER AORTIC VALVE REPLACEMENT (TAVR) USING BIOPROSTHESIS: ICD-10-CM

## 2020-03-11 LAB — EKG IMPRESSION: NORMAL

## 2020-03-11 PROCEDURE — G0422 INTENS CARDIAC REHAB W/EXERC: HCPCS | Performed by: INTERNAL MEDICINE

## 2020-03-11 PROCEDURE — G0423 INTENS CARDIAC REHAB NO EXER: HCPCS | Mod: 59 | Performed by: INTERNAL MEDICINE

## 2020-03-11 NOTE — PROGRESS NOTES
LIAN More III attended: cooking school from 9 to 10 am.  Today  prepared Black Bean Burger.    Patient received handouts and nutrition information regarding the specific recipes.

## 2020-03-11 NOTE — PROGRESS NOTES
Intensive cardiac rehabilitation (ICR) individual treatment plan (ITP) reviewed and signed.    Evelin Hawthorne MD, [facility]  Cardiologist  [facility]

## 2020-03-13 ENCOUNTER — NON-PROVIDER VISIT (OUTPATIENT)
Dept: CARDIOLOGY | Facility: MEDICAL CENTER | Age: 80
End: 2020-03-13
Payer: MEDICARE

## 2020-03-13 ENCOUNTER — OFFICE VISIT (OUTPATIENT)
Dept: CARDIOLOGY | Facility: MEDICAL CENTER | Age: 80
End: 2020-03-13
Payer: MEDICARE

## 2020-03-13 VITALS
OXYGEN SATURATION: 91 % | HEART RATE: 80 BPM | SYSTOLIC BLOOD PRESSURE: 104 MMHG | HEIGHT: 73 IN | BODY MASS INDEX: 31.98 KG/M2 | DIASTOLIC BLOOD PRESSURE: 68 MMHG | WEIGHT: 241.29 LBS

## 2020-03-13 DIAGNOSIS — I25.10 CORONARY ARTERY DISEASE INVOLVING NATIVE CORONARY ARTERY OF NATIVE HEART WITHOUT ANGINA PECTORIS: ICD-10-CM

## 2020-03-13 DIAGNOSIS — I50.22 CHF (CONGESTIVE HEART FAILURE), NYHA CLASS II, CHRONIC, SYSTOLIC (HCC): ICD-10-CM

## 2020-03-13 DIAGNOSIS — Z95.0 S/P PLACEMENT OF CARDIAC PACEMAKER: ICD-10-CM

## 2020-03-13 DIAGNOSIS — G89.29 CHRONIC LEFT SHOULDER PAIN: ICD-10-CM

## 2020-03-13 DIAGNOSIS — Z95.3 STATUS POST TRANSCATHETER AORTIC VALVE REPLACEMENT (TAVR) USING BIOPROSTHESIS: ICD-10-CM

## 2020-03-13 DIAGNOSIS — N18.30 STAGE 3 CHRONIC KIDNEY DISEASE: ICD-10-CM

## 2020-03-13 DIAGNOSIS — M25.512 CHRONIC LEFT SHOULDER PAIN: ICD-10-CM

## 2020-03-13 DIAGNOSIS — Z95.2 S/P TAVR (TRANSCATHETER AORTIC VALVE REPLACEMENT): ICD-10-CM

## 2020-03-13 LAB — EKG IMPRESSION: NORMAL

## 2020-03-13 PROCEDURE — G0422 INTENS CARDIAC REHAB W/EXERC: HCPCS | Performed by: INTERNAL MEDICINE

## 2020-03-13 PROCEDURE — G0423 INTENS CARDIAC REHAB NO EXER: HCPCS | Mod: 59 | Performed by: INTERNAL MEDICINE

## 2020-03-13 PROCEDURE — 99214 OFFICE O/P EST MOD 30 MIN: CPT | Performed by: INTERNAL MEDICINE

## 2020-03-13 ASSESSMENT — FIBROSIS 4 INDEX: FIB4 SCORE: 4.78

## 2020-03-13 NOTE — PROGRESS NOTES
CARDIOLOGY OUTPATIENT FOLLOWUP    PCP: Ebony Ramirez M.D.    1. CHF (congestive heart failure), NYHA class II, chronic, systolic (HCC)  LVEF 35%. ICM. Ongoing orthopnea- unclear if cardiac as he otherwise appears euvolemic.   - Continue carvedilol,Entresto  -Spironolactone could be cautiously added in the future  - Cardiac rehab to continue, if symptoms persist in 2 weeks I would recommend RHC with possible admission if confirmed fluid overload  - Continue bumetanide 2 mg twice daily.  - labs in 2 weeks (BNP, BMP)    2. Coronary artery disease involving native coronary artery of native heart without angina pectoris  PCI of left main and right coronary artery, recent.  No angina.  Continue dual antiplatelet therapy and Zetia.  He is statin allergic.    3. S/P TAVR (transcatheter aortic valve replacement)  Normally functioning device on recent echocardiogram.  Mild paravalvular insufficiency    4. S/P placement of cardiac pacemaker  Dual chamber ICD in place. Aborted CS lead. I informed him he no longer has activity restrictions from the ICD and cleared him for superficial laser therapy to the left shoulder    5. Chronic left shoulder pain  Worsening following pacemaker implant.  He brings a radiograph with a fixation plate and chronic angulated deformity.  - X-ray of the shoulder ordered    6. Stage 3 chronic kidney disease (HCC)      Follow up with Paul Wilkinson M.D. in 2 weeks    Chief Complaint   Patient presents with   • Aortic Stenosis       History: LIAN More III is a 79 y.o. male with a past medical history of CKD, broken shoulded/chronic pain presenting for follow up of coronary disease, recent TAVR and pacemaker/ICD. He has two main concerns today. First, he reports orthopnea which has been present since last May. He goes back and forth on whether this was improved following the recent cardiac procedures. He gets relief sleeping in a recliner and has been unsuccessful using melatonin. He is mostly  "adherent to Bumex 2 BID but has had a stable weight.     He is also concerned about left shoulder pain. There was an acute worsening of chronic discomfort following pacer implant 6 weeks ago.  He feels focal point tenderness and has been unable to get into his orthopedist for evaluation. He continues to restrict movement of the shoulder out of concerns it will dislodge the ICD.     He just started cardiac rehab. He hopes to resume golfing soon.     ROS:   All other systems reviewed and negative except as per the HPI    PE:  /68 (BP Location: Left arm, Patient Position: Sitting, BP Cuff Size: Adult)   Pulse 80   Ht 1.854 m (6' 1\")   Wt 109.5 kg (241 lb 4.7 oz)   SpO2 91%   BMI 31.83 kg/m²   Gen: Well appearing  HEENT: Symmetric face. Anicteric sclerae. Moist mucus membranes  NECK: No JVD. No lymphadenopathy  CARDIAC: Normal PMI, regular, normal S1, S2.    VASCULATURE: Normal carotid amplitude without bruit.   RESP: Clear to auscultation bilaterally  ABD: Soft, non-tender, non-distended  EXT: No edema, no clubbing or cyanosis  SKIN: Warm and dry  NEURO: No gross deficits   PSYCH: Appropriate affect, participates in conversation    Past Medical History:   Diagnosis Date   • Arthritis    • Back pain    • Breath shortness     pt states short of breath 24/7   • CAD (coronary artery disease)    • CATARACT     removed bilat   • Chest pain    • Chest tightness    • Chickenpox    • Congestive heart failure (HCC)    • Constipation    • Coronary heart disease    • Cough    • Daytime sleepiness    • Diabetes     PT REPORTS DIABETES RESOLVED IN 2009. NO LONGER TAKES MEDS   • Difficulty breathing    • Dilated cardiomyopathy (HCC)    • Fatigue    • Hearing difficulty    • Heart murmur    • Heart valve disease    • Heartburn    • Hiatus hernia syndrome    • Hyperlipidemia    • Hypertension     pt states well controlled on meds   • Kidney disease    • Mumps    • Pain 08-29-13    back, hips and bilat legs, 4/10   • Pain " 6/22/2015    left humerus   • Pain 1/7/16    knees, back and shoulder   • Painful joint    • Palpitations    • Pneumonia 2007   • Rhinitis    • Ringing in ears    • Severe aortic stenosis 12/4/2019   • Shortness of breath    • Swelling of lower extremity    • Tonsillitis    • Ulcer 2014    Dr. Porter, gastroenterologist   • Unspecified hemorrhagic conditions     bruises easily     Allergies   Allergen Reactions   • Statins [Hmg-Coa-R Inhibitors] Rash     Blisters     • Atorvastatin      Outpatient Encounter Medications as of 3/13/2020   Medication Sig Dispense Refill   • carvedilol (COREG) 3.125 MG Tab Take 1 Tab by mouth 2 times a day, with meals. 90 Tab 3   • bumetanide (BUMEX) 1 MG Tab Take 2 Tabs by mouth 2 times a day. 120 Tab 3   • Multiple Vitamins-Minerals (ICAPS) Tab Take 1 Tab by mouth 2 Times a Day.     • lactobacillus rhamnosus (CULTURELLE) Cap capsule Take 1 Cap by mouth every day.     • testosterone cypionate (DEPO-TESTOSTERONE) 200 MG/ML Solution injection 50 mg by Intramuscular route every thursday.     • cyanocobalamin (VITAMIN B-12) 1000 MCG/ML Solution 1,000 mcg by Intramuscular route every thursday.     • fluticasone (FLONASE) 50 MCG/ACT nasal spray Spray 2 Sprays in nose every day.     • potassium chloride SA (K-DUR) 10 MEQ Tab CR Take 1 Tab by mouth every day. 30 Tab 3   • acetaminophen (TYLENOL) 325 MG Tab Take 2 Tabs by mouth every 6 hours as needed for Moderate Pain. 30 Tab 0   • calcium citrate (CALCITRATE) 950 MG Tab Take 1,900 mg by mouth every day.     • coenzyme Q-10 30 MG capsule Take 60 mg by mouth 2 Times a Day.     • Garlic 1000 MG Cap Take 1,000 mg by mouth 2 Times a Day.     • glucosamine Sulfate 500 MG Cap Take 1,500 mg by mouth 2 Times a Day.     • Non Formulary Request Inject 1 Each as instructed See Admin Instructions. HCG/ARGININE 89013b/5mg/cc, pt does not take on Thur      • Non Formulary Request Inject 1 Each as instructed See Admin Instructions. HGH 5.8mg/2.5mg/cc,    Monday, Tuesday, Wednesday, Friday, Saturday, sunday     • vitamin e (VITAMIN E) 400 UNIT Cap Take 400 Units by mouth 2 times a day.     • omeprazole (PRILOSEC) 40 MG delayed-release capsule Take 40 mg by mouth 2 times a day.     • Non Formulary Request Take 1 Cap by mouth 2 Times a Day. Cholox (OTC)     • Cholecalciferol (VITAMIN D3) 2000 UNIT Cap Take 2,000 Units by mouth every day.     • Non Formulary Request Take 1 Cap by mouth 2 Times a Day. Domenic red (OTC)     • Non Formulary Request Take 1 Cap by mouth every day. Nitric OXIDE (OTC)     • Zinc 50 MG Tab Take 50 mg by mouth every day.     • anastrozole (ARIMIDEX) 1 MG Tab Take 0.5 mg by mouth See Admin Instructions. On Tue and Sat     • gabapentin (NEURONTIN) 300 MG Cap Take 300 mg by mouth every 6 hours as needed. Indications: Neuropathic Pain     • clopidogrel (PLAVIX) 75 MG Tab Take 1 Tab by mouth every day. 90 Tab 3   • aspirin EC 81 MG EC tablet Take 1 Tab by mouth every day. 30 Tab 0   • ezetimibe (ZETIA) 10 MG Tab Take 10 mg by mouth every morning.     • glimepiride (AMARYL) 1 MG tablet Take 1 mg by mouth every evening.     • B Complex Vitamins (VITAMIN B COMPLEX PO) Take 1 Tab by mouth every day.     • Cranberry 300 MG Tab Take 300 mg by mouth every morning.     • L-Lysine 1000 MG Tab Take 1,000 mg by mouth every day.     • Ascorbic Acid (VITAMIN C) 1000 MG Tab Take 1,000 mg by mouth 2 Times a Day.     • sacubitril-valsartan (ENTRESTO) 24-26 MG Tab tablet Take 1 Tab by mouth 2 Times a Day. 60 Tab 3   • Non Formulary Request Take 1 Cap by mouth every day. anit-oxidant (OTC)       No facility-administered encounter medications on file as of 3/13/2020.      Social History     Socioeconomic History   • Marital status:      Spouse name: Not on file   • Number of children: Not on file   • Years of education: Not on file   • Highest education level: Not on file   Occupational History   • Not on file   Social Needs   • Financial resource strain: Not on  file   • Food insecurity     Worry: Not on file     Inability: Not on file   • Transportation needs     Medical: Not on file     Non-medical: Not on file   Tobacco Use   • Smoking status: Never Smoker   • Smokeless tobacco: Never Used   • Tobacco comment: 0   Substance and Sexual Activity   • Alcohol use: No     Alcohol/week: 0.0 oz     Frequency: Never     Binge frequency: Never   • Drug use: No   • Sexual activity: Yes   Lifestyle   • Physical activity     Days per week: Not on file     Minutes per session: Not on file   • Stress: Not on file   Relationships   • Social connections     Talks on phone: Not on file     Gets together: Not on file     Attends Yarsanism service: Not on file     Active member of club or organization: Not on file     Attends meetings of clubs or organizations: Not on file     Relationship status: Not on file   • Intimate partner violence     Fear of current or ex partner: Not on file     Emotionally abused: Not on file     Physically abused: Not on file     Forced sexual activity: Not on file   Other Topics Concern   • Not on file   Social History Narrative   • Not on file       Studies  Lab Results   Component Value Date/Time    CHOLSTRLTOT 105 12/27/2019 02:20 AM    LDL 56 12/27/2019 02:20 AM    HDL 29 (A) 12/27/2019 02:20 AM    TRIGLYCERIDE 101 12/27/2019 02:20 AM       Lab Results   Component Value Date/Time    SODIUM 140 03/03/2020 11:15 AM    POTASSIUM 4.0 03/03/2020 11:15 AM    CHLORIDE 100 03/03/2020 11:15 AM    CO2 31 03/03/2020 11:15 AM    GLUCOSE 121 (H) 03/03/2020 11:15 AM    BUN 31 (H) 03/03/2020 11:15 AM    CREATININE 1.68 (H) 03/03/2020 11:15 AM    CREATININE 1.18 02/08/2011 12:00 AM    BUNCREATRAT 16 02/08/2011 12:00 AM    GLOMRATE >59 02/08/2011 12:00 AM     Lab Results   Component Value Date/Time    ALKPHOSPHAT 52 02/24/2020 05:50 PM    ASTSGOT 30 02/24/2020 05:50 PM    ALTSGPT 22 02/24/2020 05:50 PM    TBILIRUBIN 0.4 02/24/2020 05:50 PM        For this encounter I  directly reviewed catherization films and medical records I agree with the interpretations in the electronic health record

## 2020-03-16 ENCOUNTER — HOSPITAL ENCOUNTER (OUTPATIENT)
Dept: LAB | Facility: MEDICAL CENTER | Age: 80
End: 2020-03-16
Attending: INTERNAL MEDICINE
Payer: MEDICARE

## 2020-03-16 ENCOUNTER — NON-PROVIDER VISIT (OUTPATIENT)
Dept: CARDIOLOGY | Facility: MEDICAL CENTER | Age: 80
End: 2020-03-16
Payer: MEDICARE

## 2020-03-16 ENCOUNTER — HOSPITAL ENCOUNTER (OUTPATIENT)
Dept: RADIOLOGY | Facility: MEDICAL CENTER | Age: 80
End: 2020-03-16
Attending: INTERNAL MEDICINE
Payer: MEDICARE

## 2020-03-16 DIAGNOSIS — Z95.3 STATUS POST TRANSCATHETER AORTIC VALVE REPLACEMENT (TAVR) USING BIOPROSTHESIS: ICD-10-CM

## 2020-03-16 DIAGNOSIS — M25.512 CHRONIC LEFT SHOULDER PAIN: ICD-10-CM

## 2020-03-16 DIAGNOSIS — G89.29 CHRONIC LEFT SHOULDER PAIN: ICD-10-CM

## 2020-03-16 DIAGNOSIS — I50.22 CHF (CONGESTIVE HEART FAILURE), NYHA CLASS II, CHRONIC, SYSTOLIC (HCC): ICD-10-CM

## 2020-03-16 LAB
ANION GAP SERPL CALC-SCNC: 12 MMOL/L (ref 7–16)
BUN SERPL-MCNC: 39 MG/DL (ref 8–22)
CALCIUM SERPL-MCNC: 9.6 MG/DL (ref 8.4–10.2)
CHLORIDE SERPL-SCNC: 101 MMOL/L (ref 96–112)
CO2 SERPL-SCNC: 26 MMOL/L (ref 20–33)
CREAT SERPL-MCNC: 1.62 MG/DL (ref 0.5–1.4)
EKG IMPRESSION: NORMAL
GLUCOSE SERPL-MCNC: 152 MG/DL (ref 65–99)
NT-PROBNP SERPL IA-MCNC: 1254 PG/ML (ref 0–125)
POTASSIUM SERPL-SCNC: 4.5 MMOL/L (ref 3.6–5.5)
SODIUM SERPL-SCNC: 139 MMOL/L (ref 135–145)

## 2020-03-16 PROCEDURE — 73030 X-RAY EXAM OF SHOULDER: CPT | Mod: LT

## 2020-03-16 PROCEDURE — 36415 COLL VENOUS BLD VENIPUNCTURE: CPT

## 2020-03-16 PROCEDURE — 80048 BASIC METABOLIC PNL TOTAL CA: CPT

## 2020-03-16 PROCEDURE — G0422 INTENS CARDIAC REHAB W/EXERC: HCPCS | Performed by: INTERNAL MEDICINE

## 2020-03-16 PROCEDURE — 83880 ASSAY OF NATRIURETIC PEPTIDE: CPT

## 2020-03-16 PROCEDURE — G0423 INTENS CARDIAC REHAB NO EXER: HCPCS | Mod: 59 | Performed by: INTERNAL MEDICINE

## 2020-03-16 NOTE — PROGRESS NOTES
Patient attended Dining out and Menu Workshop from 9:00 to 10:00am    Lecture was attended and patient questions addressed. The patient will continue workshops and nutrition education.

## 2020-03-20 ENCOUNTER — TELEPHONE (OUTPATIENT)
Dept: CARDIOLOGY | Facility: MEDICAL CENTER | Age: 80
End: 2020-03-20

## 2020-03-20 NOTE — TELEPHONE ENCOUNTER
JW was called to discuss maintenance of his Cardiac Rehabilitation during our [facility] gym closure.  He says he is doing fine.  He is using his stationary bike for 5 to 7 minutes at a time and is maintaining a healthy diet.  He says that he has a good support system and that he is  to a registered nurse so all is well.  He was informed that he will be called weekly throughout the time of the gym closure to keep him informed when we will be able to reopen and answer any questions he may have.

## 2020-03-27 ENCOUNTER — NON-PROVIDER VISIT (OUTPATIENT)
Dept: CARDIOLOGY | Facility: MEDICAL CENTER | Age: 80
End: 2020-03-27
Payer: MEDICARE

## 2020-03-27 DIAGNOSIS — Z95.810 AUTOMATIC IMPLANTABLE CARDIOVERTER-DEFIBRILLATOR IN SITU: ICD-10-CM

## 2020-03-27 PROCEDURE — 93283 PRGRMG EVAL IMPLANTABLE DFB: CPT | Performed by: INTERNAL MEDICINE

## 2020-03-27 NOTE — NON-PROVIDER
"Final testing. Appropriate device function demonstrated. Wound site appears clear. Follow remotely.    Pt offers c/o \"nerve pain next to device.\" Pt will make an appt with SS to discuss.  "

## 2020-03-30 ENCOUNTER — OFFICE VISIT (OUTPATIENT)
Dept: CARDIOLOGY | Facility: MEDICAL CENTER | Age: 80
End: 2020-03-30
Payer: MEDICARE

## 2020-03-30 VITALS
HEIGHT: 73 IN | TEMPERATURE: 97.6 F | SYSTOLIC BLOOD PRESSURE: 115 MMHG | BODY MASS INDEX: 31.14 KG/M2 | HEART RATE: 85 BPM | OXYGEN SATURATION: 93 % | WEIGHT: 235 LBS | DIASTOLIC BLOOD PRESSURE: 71 MMHG

## 2020-03-30 DIAGNOSIS — Z95.2 S/P TAVR (TRANSCATHETER AORTIC VALVE REPLACEMENT): ICD-10-CM

## 2020-03-30 DIAGNOSIS — Z95.810 ICD (IMPLANTABLE CARDIOVERTER-DEFIBRILLATOR) IN PLACE: ICD-10-CM

## 2020-03-30 DIAGNOSIS — I50.22 CHF (CONGESTIVE HEART FAILURE), NYHA CLASS II, CHRONIC, SYSTOLIC (HCC): ICD-10-CM

## 2020-03-30 PROCEDURE — 99442 PR PHYSICIAN TELEPHONE EVALUATION 11-20 MIN: CPT | Performed by: INTERNAL MEDICINE

## 2020-03-30 ASSESSMENT — FIBROSIS 4 INDEX: FIB4 SCORE: 4.78

## 2020-03-30 NOTE — PROGRESS NOTES
Telephone Appointment Visit   As a means of avoiding spread of COVID-19, this visit is being conducted by telephone. This telephone visit was initiated by the patient and they verbally consented.    Time at start of call: 155 PM    Reason for Call:  Nerve pain post ICD implantation    Patient Comments / History:   Patient is a 78 yo M. He has a history of TAVR with heart block s/p ICD Implantation. Post device he has had nerve pain localizing to L shoulder. He has had multiple shoulder surgeries before, though discomfort is severe and causing him to lose sleep. S/p cortisone shot which has improved his symptoms but he is wary they may return when cortisone wears off.    Labs / Images Reviewed   Labs reviewed    L shoulder films    There are surgical changes consistent with plate and screw fixation of the proximal humerus. This extends across a chronic fracture. There is a left AICD.  There is mild degenerative changes the glenohumeral joint characterized by osteophytic spurring.     Assessment and Plan:     1. S/P TAVR (transcatheter aortic valve replacement)     2. CHF (congestive heart failure), NYHA class II, chronic, systolic (HCC)     3. ICD (implantable cardioverter-defibrillator) in place       -We will revisit this issue in 3-4 weeks  -If still persistent pain and we think the device and its proximity to the shoulder joint or nerve is the issue, I can move device to contralateral side though I warned him this may not improve his symptoms    Follow-up: F/u with me in 4 weeks    Time at end of call: 212    Total Time Spent: 11-20 minutes    Denilson Steiner M.D.

## 2020-03-31 ENCOUNTER — OFFICE VISIT (OUTPATIENT)
Dept: CARDIOLOGY | Facility: MEDICAL CENTER | Age: 80
End: 2020-03-31
Payer: MEDICARE

## 2020-03-31 VITALS
BODY MASS INDEX: 31.14 KG/M2 | HEART RATE: 64 BPM | HEIGHT: 73 IN | SYSTOLIC BLOOD PRESSURE: 116 MMHG | OXYGEN SATURATION: 93 % | WEIGHT: 235 LBS | DIASTOLIC BLOOD PRESSURE: 68 MMHG

## 2020-03-31 DIAGNOSIS — N18.30 STAGE 3 CHRONIC KIDNEY DISEASE: ICD-10-CM

## 2020-03-31 DIAGNOSIS — I50.9 HEART FAILURE, NYHA CLASS 3 (HCC): ICD-10-CM

## 2020-03-31 DIAGNOSIS — R06.02 SOB (SHORTNESS OF BREATH): ICD-10-CM

## 2020-03-31 DIAGNOSIS — Z79.899 HIGH RISK MEDICATION USE: ICD-10-CM

## 2020-03-31 DIAGNOSIS — I50.20 ACC/AHA STAGE C SYSTOLIC HEART FAILURE (HCC): ICD-10-CM

## 2020-03-31 PROCEDURE — 99443 PR PHYSICIAN TELEPHONE EVALUATION 21-30 MIN: CPT | Performed by: NURSE PRACTITIONER

## 2020-03-31 RX ORDER — FLUTICASONE PROPIONATE 50 MCG
SPRAY, SUSPENSION (ML) NASAL
Qty: 48 G | Refills: 3 | Status: SHIPPED | OUTPATIENT
Start: 2020-03-31 | End: 2022-05-10

## 2020-03-31 ASSESSMENT — FIBROSIS 4 INDEX: FIB4 SCORE: 4.78

## 2020-03-31 NOTE — PROGRESS NOTES
Telephone Appointment Visit   As a means of avoiding spread of COVID-19, this visit is being conducted by telephone. This telephone visit was initiated by the patient and they verbally consented.    Time at start of call: 08:37 AM    Reason for Call:  Symptom Follow-up    Patient Comments / History:   Patient of Dr. Wilkinson, Dr. Castro for his Valve, Dr. Hendrix for Heart Failure and Dr. Steiner for his ICD.  He was last seen yesterday with Dr. Steiner for left shoulder pain/nerve pain.  Patient did receive a cortisone shot by his orthopedic surgeon and will see if that helps with the pain before considering moving his ICD.    Aside from his left shoulder/nerve pain, patient reports occasional palpitations, shortness of breath at rest, and with some exertional activities, although patient does not exert much due to joint and back pain.  Patient also reports PND and not sleeping well.  He also reports some dizziness when standing too quickly.  He reports improvement of his lower extremity edema, denies orthopnea, chest pain.    He reports his weights have been stable, his last weight was 235 pounds, his weights tend to range between 231-238 pounds depending on bowel movements.  His weights have come down from 250 pounds.    He does report drinking about 100 ounces of water per day.    Additonally, patient has the following medical problems:    -CAD, had triple vessel disease, was not recommended for CABG, Had BRANDI to distal RCA on 12/27/2019 and subsequent staged PCI to Proximal LAD and distal Left Main on 1/10/2020    -Aortic Stenosis, not a SAVR candidate and had TAVR on 1/27/2020 with Dr. Castro    -Had Complete Heart Block from event monitor after TAVR and had a dual chamber ICD placed on 2/4/2020. Could not place CS lead secondary to perforation of the CS causing small Stable effusion    -HTN    -HLD    -Diabetes    -CKD    Labs / Images Reviewed     Lab Results   Component Value Date/Time    CHOLSTRLTOT 105 12/27/2019  02:20 AM    LDL 56 12/27/2019 02:20 AM    HDL 29 (A) 12/27/2019 02:20 AM    TRIGLYCERIDE 101 12/27/2019 02:20 AM       Lab Results   Component Value Date/Time    SODIUM 139 03/16/2020 11:02 AM    POTASSIUM 4.5 03/16/2020 11:02 AM    CHLORIDE 101 03/16/2020 11:02 AM    CO2 26 03/16/2020 11:02 AM    GLUCOSE 152 (H) 03/16/2020 11:02 AM    BUN 39 (H) 03/16/2020 11:02 AM    CREATININE 1.62 (H) 03/16/2020 11:02 AM    CREATININE 1.18 02/08/2011 12:00 AM    BUNCREATRAT 16 02/08/2011 12:00 AM    GLOMRATE >59 02/08/2011 12:00 AM     Lab Results   Component Value Date/Time    ALKPHOSPHAT 52 02/24/2020 05:50 PM    ASTSGOT 30 02/24/2020 05:50 PM    ALTSGPT 22 02/24/2020 05:50 PM    TBILIRUBIN 0.4 02/24/2020 05:50 PM      Transthoracic Echo Report 7/29/2015  Mild concentric left ventricular hypertrophy.   Mildly dilated left atrium. Left atrial volume index is 32 ml/sq m.  Left ventricular ejection fraction is 60% to 65%.     Transthoracic Echo Report 12/3/2019  Left ventricle is severely dilated.  Severely reduced left ventricular systolic function.  Left ventricular ejection fraction is visually estimated to be 25%.  Global hypokinesis with regional variation.  Moderate aortic stenosis.  Trace mitral regurgitation.  Severely dilated left atrium.  Mild tricuspid regurgitation.  Right ventricular systolic pressure is estimated to be 40  mmHg.  Normal pericardium without effusion.  Compared to the images of the study done on 7/29/2015  there has been marked change in LV wall motion and function, moderate aortic stenosis is also now present.     Heart Cath 12/11/2019  Findings:     Triple-vessel coronary artery disease with left main disease     This is right dominant system.     Left main is large in caliber.   It trifurcated into left anterior descending, medium-sized ramus intermediate artery and left circumflex artery.   It has calcified eccentric 50% stenosis at the distal portion around the trifurcation.  As mentioned above,  the IFR was consistent with flow-limiting disease.     Left anterior descending artery (LAD) is large caliber vessel and extends beyond the apex.  It gives rises to 2 relatively long medium size diagonal branches proximally.  Moderate to heavy calcification was noted in proximal LAD along with stenosis up to at least 60-70%.   As mentioned above the IFR of the LAD was in the 0.7 range.  There are also 90% stenoses at the ostia of both first and second diagonal branches.  The antegrade flow is normal.     The ramus intermediate artery is about 1 and half millimeter in diameter. It has 80-90% ostial stenosis with normal antegrade flow     Left circumflex artery is medium in caliber.   This is occluded in the midportion after giveing rise to one very small and short obtuse marginal branch.     Right coronary artery (RCA) is large caliber. It gives rise to several small acute marginal branches, posterior descending artery and posterolateral branch.  There is 99% stenosis in the distal of the right coronary artery.  The antegrade flow is slow at JEN II.        Plan:   Limit right wrist movement for 24 hours.  Risk factor modification.  Consult valve team/cardiac surgeon for further management.       Heart Cath 12/27/2019   FINDINGS:  HEMODYNAMICS:  Central aortic pressure systolic 131, diastolic 59, mean of 59   MmHg.     PERIPHERAL ANGIOGRAPHY: The distal abdominal aorta and both iliac and femoral arteries are patent with at least moderate tortuosity of the right iliac artery.     CORONARY ARTERIOGRAPHY:  Right coronary artery:  The right coronary artery is a large-caliber vessel with a significant walsh's crook and midvessel acute tortuosity with a distal diseased segment containing a 99% stenosis with JEN-2 antegrade flow.      POST-SENT IMPLANTATION, distal right coronary artery demonstrates a residual 10% focal eccentric stenosis in the proximal portion of the stent; otherwise, the remainder of the stent is  well expanded with no dissection or thrombus and JEN-3 antegrade flow.     PLAN:  1.  Postprocedure Angiomax x4 hours.  2.  Dual antiplatelet therapy with aspirin and Plavix.  3.  IV fluids.  4.  Follow up basic metabolic panel.  5.  Future staged coronary intervention of the left main artery with   subsequent evaluation of need for TAVR procedure.    PROCEDURE:  Cardiac catheterization and percutaneous coronary intervention.  A.  Selective coronary angiography.  B.  Coronary stent implantation, distal right coronary artery 3.5x28 mm Synergy drug-eluting stent postdilated 4.0 mm.  C.  Distal abdominal aortogram with bilateral iliofemoral angiography.  D. Right femoral artery approach.  E.  Conscious sedation supervision.  F.  Perclose deployment.    Coronary angiogram 1/10/2020  Conclusions    1. LVEF 30% prior to intervention.    2. 70% distal left main and proximal LAD stenosis, IFR positive from prior procedure.    3. Successful planned PCI proximal LAD and distal left main trifurcation stenosis (3.5 x 26 mm [place] BRANDI postdilated to 4.5 mm proximally), IVUS guided.    4. Moderate aortic stenosis, mean gradient 27 to 30mmHg.     Recommendations    * Long-term dual antiplatelet therapy.    * Bivalirudin for 4 hours post procedure.    * Continue outpatient cardiac medications.    * Follow-up as scheduled after observation admission overnight.    * Continue medical therapy for diabetes.       Transesophageal Echo Report 1/27/2020  Pre-Intervention:  LV:  Severely reduced left ventricular systolic function. Severe -    ejection fraction <30 %. Global hypokinesis with regional variation.   RV:  Mildly dilated right ventricle. Reduced right ventricular systolic function (mild reduction).   AV:  Calcified aortic valve leaflets.  Aortic valve annulus measured at \2.7 cm.  Sinus of valsalva measured at 3.3 cm.  Sinotubular junction   measured at 2.9 cm.  Aortic valve area measured at 6.59 cm2.  Moderate aortic stenosis  with a mean pressure gradient of 22 mmHg.  However, gradient may be under-estimated in the setting of low EF.       Post-Intervention:  AV:  Nicely seated bioprosthetic aortic valve.  All three leaflets seen opening in cross sectional view.  No evidence of trans-valvular regurgitation.  Trivial para-valvular regurgitations seen at the left coronary cusp and at either the right or non-coronary cusp.  No evidence of aortic valve stenosis with mean pressure gradient of 4 mmHg.       Please see body of report for further findings.     Transthoracic Echo Report 1/28/2020  Compared to the images of the study done 12- there has been an interval placement of a TAVR valve.  The EF appears mildly improved.  Moderately reduced left ventricular systolic function.  Left ventricular ejection fraction is visually estimated to be 35%.     Transthoracic Echo Report 2/4/2020  Prior 01/28/2020, No significant change.  Left ventricular ejection fraction is visually estimated to be 35-40%.  Known TAVR aortic valve that is functioning normally with normal   transvalvular gradients.  Vmax is   2.8m/s.Transvalvular gradients are - Peak: 14 mmHg,  Mean: 30 mmHg.     Transthoracic Echo Report 2/4/2020  Trivial pericardial effusion.     Transthoracic Echo Report 2/4/2020  Prior echocardiogram 2/4/2020.  No pericardial effusion seen. Fat pad present.     Transthoracic Echo Report 3/3/2020  Compared to the report of the prior study done on 2/4/2020   there has been no significant change.   Moderately reduced left ventricular systolic function.  Left ventricular ejection fraction is visually estimated to be 35%.  Pacer/ICD wire seen in right ventricle.  Known TAVR aortic valve that is functioning normally with normal   transvalvular gradients.  Vmax is   m/s. Transvalvular gradients are- peak 21 mmHg and mean gradient is 11 mmHg.   Minimal paravalvular leak.    Assessment and Plan:     1. Stage 3 chronic kidney disease (HCC)  - Basic  Metabolic Panel; Future  - proBrain Natriuretic Peptide, NT; Future    2. High risk medication use  - Basic Metabolic Panel; Future  - proBrain Natriuretic Peptide, NT; Future    3. ACC/AHA stage C systolic heart failure (HCC)  - Basic Metabolic Panel; Future  - proBrain Natriuretic Peptide, NT; Future    4. SOB (shortness of breath)  - proBrain Natriuretic Peptide, NT; Future    5. Heart failure, NYHA class 3 (HCC)  - proBrain Natriuretic Peptide, NT; Future    1. HFrEF, Stage C, Class 2-3, LVEF 35%: pt reports improvement of his LE   -Heart failure due to Ischemic Cardiomyopathy  -Decrease Bumex to 2 mg in the morning and 1 mg in the afternoon due to worsening kidney function  -Monitor fluid intake to about 64 ounces per day  -Continue carvedilol 3.125 mg twice a day  -Continue Entresto 24-26 mg twice a day  -Consider adding Aldactone once kidney function improves and/or stabilizes  -Obtain a BMP and NT proBNP in 2 weeks  -Consider repeat Echo in 2 to 3 months months to follow EF  -Has ICD, last device check 3/27/2020  -Reinforced s/sx of worsening heart failure with patient and weight monitoring. Pt verbalizes understanding. Pt to call office or RTC if present.     2.  CAD, PCI of the pLAD/distal left main and the distal RCA: Last LDL was 56 on 12/27/2019  -Continue aspirin 81 mg daily  -Continue clopidogrel 75 mg daily  -Continue ezetimibe 10 mg daily  -Patient has intolerance to statins  -May need to consider PCSK-9 inhibitor in the future.     3.  Aortic stenosis, s/p TAVR pm 1/27/2020:  -Valve functioning normally per recent echo  -Patient will repeat echo in 1 year for Dr. Castro at the Valve clinic    4.  Left shoulder pain/nerve pain:  -Currently being followed by orthopedic surgeon and Dr. Steiner.  -Pt Will have a follow-up in the month to see if his pain resolves or if he needs his ICD moved    5. CKD, Stage 3:  -Labs in 2 weeks  -Diuretic decreased per above for kidney function      Follow-up: Return in  about 2 weeks (around 4/14/2020). with Dr. Wilkinson.  Pt to return to the Heart failure clinic as needed. Pt given [facility] number to call for worsening symptoms.      Time at end of call: 09:01 AM  Total Time Spent: 21-30 minutes    NEAL Hanna

## 2020-04-01 ENCOUNTER — TELEPHONE (OUTPATIENT)
Dept: CARDIOLOGY | Facility: MEDICAL CENTER | Age: 80
End: 2020-04-01

## 2020-04-01 NOTE — TELEPHONE ENCOUNTER
"JW was called to support and encourage his Cardiac Rehabilitation at home due to our [facility] gym closure since COVID-19 outbreak.  He says he is exercising as much as possible which has not been much due to shortness of breath but that he is doing fine.  He had a Telehealth visit with his Doctor yesterday.  He says he is doing okay with his diet and that he is \"Hanging in there just like everyone else.\"   He was informed that we will touch base over the next week or two to check on his progress and to let him know of any changes to our new possible re-open date of 5/1/2020.  "

## 2020-04-02 ENCOUNTER — TELEPHONE (OUTPATIENT)
Dept: CARDIOLOGY | Facility: MEDICAL CENTER | Age: 80
End: 2020-04-02

## 2020-04-02 NOTE — TELEPHONE ENCOUNTER
MARIAMA    Patient stated that the orders that MARIAMA dicussed with the PT was not placed. PT would like the orders to be placed and a call back at [phone].      Thank you,  Bina NEWTON

## 2020-04-02 NOTE — TELEPHONE ENCOUNTER
Pt called. Pt states his call is about a medication and not PT. He states he distinctly remembers MARIAMA stating she was going to add a new medication to his regimen but that pharmacy has not received any Rx yet. He states he specifically asked if he could stop one of his others and she said it would be in addition to. Provider notes reviewed with patient and medication review completed. Pt states he swears there was a new medication and to please check with her. Pt reassure that I would do so.

## 2020-04-02 NOTE — TELEPHONE ENCOUNTER
Please apologize for the misunderstanding. I want to add one more med but with his kidney function, I want to decrease his diuretic as discussed (please verify this) first before adding the med to his medical regimen. I did mention the PCSK-9 inhibitor but will wait on this. Remind his to get labs done in 2 weeks, please.

## 2020-04-06 ENCOUNTER — TELEPHONE (OUTPATIENT)
Dept: CARDIOLOGY | Facility: MEDICAL CENTER | Age: 80
End: 2020-04-06

## 2020-04-08 DIAGNOSIS — E78.5 DYSLIPIDEMIA: Primary | Chronic | ICD-10-CM

## 2020-04-08 RX ORDER — EZETIMIBE 10 MG/1
TABLET ORAL
Qty: 90 TAB | Refills: 0 | Status: SHIPPED | OUTPATIENT
Start: 2020-04-08 | End: 2020-07-17

## 2020-04-13 DIAGNOSIS — I50.22 CHF (CONGESTIVE HEART FAILURE), NYHA CLASS II, CHRONIC, SYSTOLIC (HCC): Primary | ICD-10-CM

## 2020-04-13 RX ORDER — SACUBITRIL AND VALSARTAN 24; 26 MG/1; MG/1
TABLET, FILM COATED ORAL
Qty: 60 TAB | Refills: 6 | Status: SHIPPED | OUTPATIENT
Start: 2020-04-13 | End: 2020-11-30

## 2020-04-14 ENCOUNTER — TELEPHONE (OUTPATIENT)
Dept: CARDIOLOGY | Facility: MEDICAL CENTER | Age: 80
End: 2020-04-14

## 2020-04-14 ENCOUNTER — HOSPITAL ENCOUNTER (OUTPATIENT)
Dept: CARDIOLOGY | Facility: MEDICAL CENTER | Age: 80
End: 2020-04-14
Attending: INTERNAL MEDICINE
Payer: MEDICARE

## 2020-04-14 ENCOUNTER — HOSPITAL ENCOUNTER (OUTPATIENT)
Dept: LAB | Facility: MEDICAL CENTER | Age: 80
End: 2020-04-14
Attending: NURSE PRACTITIONER
Payer: MEDICARE

## 2020-04-14 DIAGNOSIS — I50.20 ACC/AHA STAGE C SYSTOLIC HEART FAILURE (HCC): ICD-10-CM

## 2020-04-14 DIAGNOSIS — I50.9 HEART FAILURE, NYHA CLASS 3 (HCC): ICD-10-CM

## 2020-04-14 DIAGNOSIS — Z95.2 S/P TAVR (TRANSCATHETER AORTIC VALVE REPLACEMENT): ICD-10-CM

## 2020-04-14 DIAGNOSIS — N18.30 STAGE 3 CHRONIC KIDNEY DISEASE: ICD-10-CM

## 2020-04-14 DIAGNOSIS — Z79.899 HIGH RISK MEDICATION USE: ICD-10-CM

## 2020-04-14 DIAGNOSIS — R06.02 SOB (SHORTNESS OF BREATH): ICD-10-CM

## 2020-04-14 LAB
ANION GAP SERPL CALC-SCNC: 13 MMOL/L (ref 7–16)
BUN SERPL-MCNC: 47 MG/DL (ref 8–22)
CALCIUM SERPL-MCNC: 9.9 MG/DL (ref 8.4–10.2)
CHLORIDE SERPL-SCNC: 99 MMOL/L (ref 96–112)
CO2 SERPL-SCNC: 25 MMOL/L (ref 20–33)
CREAT SERPL-MCNC: 1.62 MG/DL (ref 0.5–1.4)
GLUCOSE SERPL-MCNC: 151 MG/DL (ref 65–99)
LV EJECT FRACT  99904: 35
LV EJECT FRACT MOD 2C 99903: 46.25
LV EJECT FRACT MOD 4C 99902: 29.55
LV EJECT FRACT MOD BP 99901: 40.51
NT-PROBNP SERPL IA-MCNC: 1476 PG/ML (ref 0–125)
POTASSIUM SERPL-SCNC: 5 MMOL/L (ref 3.6–5.5)
SODIUM SERPL-SCNC: 137 MMOL/L (ref 135–145)

## 2020-04-14 PROCEDURE — 80048 BASIC METABOLIC PNL TOTAL CA: CPT

## 2020-04-14 PROCEDURE — 93306 TTE W/DOPPLER COMPLETE: CPT

## 2020-04-14 PROCEDURE — 83880 ASSAY OF NATRIURETIC PEPTIDE: CPT

## 2020-04-14 PROCEDURE — 36415 COLL VENOUS BLD VENIPUNCTURE: CPT

## 2020-04-14 PROCEDURE — 93306 TTE W/DOPPLER COMPLETE: CPT | Mod: 26 | Performed by: INTERNAL MEDICINE

## 2020-04-14 NOTE — TELEPHONE ENCOUNTER
Message   Received: Today   Message Contents   MARGE Roland R.N.          Previous Messages      ----- Message -----   From: Surendra Castro M.D.   Sent: 4/14/2020  11:22 AM PDT   To: Sybil Velasquez R.N.     Please call patient with unremarkable echocardiogram results showing normally functioning TAVR valve with unchanged ejection fraction of 35%.     ThanksElizabet LUCIA.      --------------------------------------------------------------------------------------------------------------    Attempted to reach patient.  Phone rang several times without going to a voicemail box or answering machine.    [employer] message sent with findings.

## 2020-04-16 ENCOUNTER — TELEMEDICINE (OUTPATIENT)
Dept: CARDIOLOGY | Facility: MEDICAL CENTER | Age: 80
End: 2020-04-16
Payer: MEDICARE

## 2020-04-16 VITALS
DIASTOLIC BLOOD PRESSURE: 81 MMHG | SYSTOLIC BLOOD PRESSURE: 127 MMHG | BODY MASS INDEX: 30.35 KG/M2 | HEIGHT: 73 IN | TEMPERATURE: 96.5 F | HEART RATE: 80 BPM | WEIGHT: 229 LBS

## 2020-04-16 DIAGNOSIS — Z95.0 S/P PLACEMENT OF CARDIAC PACEMAKER: ICD-10-CM

## 2020-04-16 DIAGNOSIS — I25.10 CORONARY ARTERY DISEASE INVOLVING NATIVE CORONARY ARTERY OF NATIVE HEART WITHOUT ANGINA PECTORIS: ICD-10-CM

## 2020-04-16 DIAGNOSIS — N28.9 RENAL INSUFFICIENCY: ICD-10-CM

## 2020-04-16 DIAGNOSIS — Z95.810 ICD (IMPLANTABLE CARDIOVERTER-DEFIBRILLATOR) IN PLACE: ICD-10-CM

## 2020-04-16 DIAGNOSIS — Z95.2 S/P TAVR (TRANSCATHETER AORTIC VALVE REPLACEMENT): ICD-10-CM

## 2020-04-16 DIAGNOSIS — E78.5 DYSLIPIDEMIA: Chronic | ICD-10-CM

## 2020-04-16 PROBLEM — J10.1 INFLUENZA A: Status: RESOLVED | Noted: 2020-02-25 | Resolved: 2020-04-16

## 2020-04-16 PROCEDURE — 99443 PR PHYSICIAN TELEPHONE EVALUATION 21-30 MIN: CPT | Performed by: INTERNAL MEDICINE

## 2020-04-16 ASSESSMENT — FIBROSIS 4 INDEX: FIB4 SCORE: 4.78

## 2020-04-16 NOTE — PROGRESS NOTES
Telephone Appointment Visit   As a means of avoiding spread of COVID-19, this visit is being conducted by telephone. This telephone visit was initiated by the patient and they verbally consented.    Time at start of call: 8:06    Reason for Call:  Symptom Follow-up    Patient Comments / History:   LIAN More III is a 79 y.o. male with a past medical history of hypertension, hyperlipidemia and diabetes and CKD presenting for follow up of coronary artery disease, TAVR, pacemaker/ICD placement and ischemic cardiomyopathy.  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  In May 2019 he began experiencing dyspnea.  He was found to have multifactorial cardiovascular disease and underwent stenting of the left main and right coronary arteries as well as TAVR and appropriate adjunct medical therapy which really has not made substantial impact on his symptoms.    She reports ongoing dyspnea.  Most of which is nonexertional and occurs a sporadic episodes of loss of breath.  He also does seem to experience breathlessness at night which is partially relieved by sitting upright or sleeping with oxygen.  Despite this, he does not have the symptoms all the time.  He tracks his weight and and is ±4 pounds on any given day.  He continues to use Bumex 2 twice daily and reports no evidence of peripheral edema per his wife who is an RN.  He is unable to perform regular physical activity due to his musculoskeletal complaints and was disappointed that cardiac rehabilitation closed on account of the pandemic.  We discussed several potential ways in which he might exercise, though he continued to mention why these would be challenging for him.    Regarding his left shoulder discomfort.  This was aggravated following pacemaker implantation.  He recently had a cortisone injection which has improved the symptoms.    Labs / Images Reviewed   Labs show hyperkalemia, stable Cr and  nt-BNP    Assessment and Plan:     1. Coronary artery disease involving native coronary artery of native heart without angina pectoris  PCI LM and RCA. No angina but ALLISON, atypical. Continue DAPT and Zetia. Statin allergic.     2. Dyslipidemia  Well controlled with Zetia. No changes advised     3. ICD (implantable cardioverter-defibrillator) in place dual chamber  Normal function. Shoulder pain resolved with cortisone injection.     4. S/P TAVR (transcatheter aortic valve replacement)  Normal device function on recent echo    5. Renal insufficiency  CKD IIIb. Hyperkalemia. K stopped. Repeat BMP in one week    6. Dyspnea and nocturnal shortness of breath  With stable weight and no peripheral edema. Doubt CHF but could consider invasive determination of filling pressures after the pandemic. Awaiting sleep study. Using O2 for palliation.     7. Chronic ischemic cardiomyopathy  LVEF 35%. ICD in place. Continue Coreg and Entresto. Await K+ repeat.  Continue Bumex 2 twice daily ambiguous volume status with some ALLISON which could be CHF or not.    I recommended engaging in a regular exercise regimen and asked that he be creative in finding a program which will work with his multiple musculoskeletal pains.    Follow-up: Return in about 2 weeks (around 4/30/2020).    Time at end of call: 8:30  Total Time Spent: 21-30 minutes    Paul Wilkinson M.D.    This encounter was conducted via Telephone.   Verbal consent was obtained. Patient's identity was verified.

## 2020-04-24 ENCOUNTER — TELEPHONE (OUTPATIENT)
Dept: CARDIOLOGY | Facility: MEDICAL CENTER | Age: 80
End: 2020-04-24

## 2020-04-24 NOTE — TELEPHONE ENCOUNTER
USMAN More III was contacted to offer Cardiac Rehabilitation support during clinic closure for the COVID-19 crisis. USMAN states that he is not exercising in or outside his home. RN offered to refer him to [facility] Exercise Physiologist to tailor an at-home weight routine for him taking into account his orthopedic restrictions and limited equipment at home. He was encouraged to use canned goods to perform upper body exercises. He states he has been receiving the emails from [facility] RD and EP. He was very vocal about being disappointed that the clinic has been closed. RN stated that he will be updated when [facility] has a predicted date to re-open.

## 2020-04-26 ENCOUNTER — APPOINTMENT (OUTPATIENT)
Dept: RADIOLOGY | Facility: MEDICAL CENTER | Age: 80
DRG: 698 | End: 2020-04-26
Attending: EMERGENCY MEDICINE
Payer: MEDICARE

## 2020-04-26 ENCOUNTER — HOSPITAL ENCOUNTER (INPATIENT)
Facility: MEDICAL CENTER | Age: 80
LOS: 4 days | DRG: 698 | End: 2020-04-30
Attending: EMERGENCY MEDICINE | Admitting: SURGERY
Payer: MEDICARE

## 2020-04-26 DIAGNOSIS — I50.31 ACUTE DIASTOLIC CONGESTIVE HEART FAILURE (HCC): ICD-10-CM

## 2020-04-26 DIAGNOSIS — R00.0 TACHYCARDIA: ICD-10-CM

## 2020-04-26 DIAGNOSIS — R10.84 GENERALIZED ABDOMINAL PAIN: ICD-10-CM

## 2020-04-26 DIAGNOSIS — I10 HYPERTENSION, UNSPECIFIED TYPE: ICD-10-CM

## 2020-04-26 DIAGNOSIS — I50.22 CHF (CONGESTIVE HEART FAILURE), NYHA CLASS II, CHRONIC, SYSTOLIC (HCC): ICD-10-CM
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
## 2020-04-26 DIAGNOSIS — R07.89 CHEST WALL PAIN: ICD-10-CM

## 2020-04-26 PROBLEM — R94.31 PROLONGED Q-T INTERVAL ON ECG: Status: ACTIVE | Noted: 2020-04-26

## 2020-04-26 PROBLEM — E11.9 TYPE 2 DIABETES MELLITUS (HCC): Status: ACTIVE | Noted: 2020-04-26

## 2020-04-26 PROBLEM — Z79.02 PLATELET INHIBITION DUE TO PLAVIX: Status: ACTIVE | Noted: 2020-04-26

## 2020-04-26 PROBLEM — I50.9 CHF (CONGESTIVE HEART FAILURE) (HCC): Status: ACTIVE | Noted: 2020-04-26

## 2020-04-26 PROBLEM — S37.012A RENAL HEMATOMA, LEFT, INITIAL ENCOUNTER: Status: ACTIVE | Noted: 2020-04-26

## 2020-04-26 PROBLEM — T14.90XA TRAUMA: Status: ACTIVE | Noted: 2020-04-26

## 2020-04-26 LAB
ABO GROUP BLD: NORMAL
ALBUMIN SERPL BCP-MCNC: 3.9 G/DL (ref 3.2–4.9)
ALBUMIN/GLOB SERPL: 1.6 G/DL
ALP SERPL-CCNC: 52 U/L (ref 30–99)
ALT SERPL-CCNC: 20 U/L (ref 2–50)
ANION GAP SERPL CALC-SCNC: 18 MMOL/L (ref 7–16)
APTT PPP: 30.3 SEC (ref 24.7–36)
AST SERPL-CCNC: 27 U/L (ref 12–45)
BARCODED ABORH UBTYP: 9500
BARCODED ABORH UBTYP: 9500
BARCODED PRD CODE UBPRD: NORMAL
BARCODED PRD CODE UBPRD: NORMAL
BARCODED UNIT NUM UBUNT: NORMAL
BARCODED UNIT NUM UBUNT: NORMAL
BASOPHILS # BLD AUTO: 0.4 % (ref 0–1.8)
BASOPHILS # BLD: 0.07 K/UL (ref 0–0.12)
BILIRUB SERPL-MCNC: 0.7 MG/DL (ref 0.1–1.5)
BLD GP AB SCN SERPL QL: NORMAL
BUN SERPL-MCNC: 45 MG/DL (ref 8–22)
CALCIUM SERPL-MCNC: 9.4 MG/DL (ref 8.5–10.5)
CFT BLD TEG: 4.8 MIN (ref 5–10)
CHLORIDE SERPL-SCNC: 97 MMOL/L (ref 96–112)
CLOT ANGLE BLD TEG: 52.3 DEGREES (ref 53–72)
CLOT LYSIS 30M P MA LENFR BLD TEG: 0 % (ref 0–8)
CO2 SERPL-SCNC: 21 MMOL/L (ref 20–33)
COMPONENT R 8504R: NORMAL
COMPONENT R 8504R: NORMAL
CREAT SERPL-MCNC: 1.93 MG/DL (ref 0.5–1.4)
CT.EXTRINSIC BLD ROTEM: 3.2 MIN (ref 1–3)
EKG IMPRESSION: NORMAL
EOSINOPHIL # BLD AUTO: 0.27 K/UL (ref 0–0.51)
EOSINOPHIL NFR BLD: 1.4 % (ref 0–6.9)
ERYTHROCYTE [DISTWIDTH] IN BLOOD BY AUTOMATED COUNT: 56.1 FL (ref 35.9–50)
GLOBULIN SER CALC-MCNC: 2.5 G/DL (ref 1.9–3.5)
GLUCOSE SERPL-MCNC: 306 MG/DL (ref 65–99)
HCT VFR BLD AUTO: 58.9 % (ref 42–52)
HGB BLD-MCNC: 14.4 G/DL (ref 14–18)
HGB BLD-MCNC: 14.7 G/DL (ref 14–18)
HGB BLD-MCNC: 18.9 G/DL (ref 14–18)
IMM GRANULOCYTES # BLD AUTO: 0.16 K/UL (ref 0–0.11)
IMM GRANULOCYTES NFR BLD AUTO: 0.8 % (ref 0–0.9)
INR PPP: 1.14 (ref 0.87–1.13)
LACTATE BLD-SCNC: 2 MMOL/L (ref 0.5–2)
LYMPHOCYTES # BLD AUTO: 4.13 K/UL (ref 1–4.8)
LYMPHOCYTES NFR BLD: 21.3 % (ref 22–41)
MCF BLD TEG: 55.8 MM (ref 50–70)
MCH RBC QN AUTO: 27.6 PG (ref 27–33)
MCHC RBC AUTO-ENTMCNC: 32.1 G/DL (ref 33.7–35.3)
MCV RBC AUTO: 86 FL (ref 81.4–97.8)
MONOCYTES # BLD AUTO: 1.5 K/UL (ref 0–0.85)
MONOCYTES NFR BLD AUTO: 7.7 % (ref 0–13.4)
NEUTROPHILS # BLD AUTO: 13.24 K/UL (ref 1.82–7.42)
NEUTROPHILS NFR BLD: 68.4 % (ref 44–72)
NRBC # BLD AUTO: 0 K/UL
NRBC BLD-RTO: 0 /100 WBC
PA AA BLD-ACNC: 100 %
PA ADP BLD-ACNC: 100 %
PLATELET # BLD AUTO: 225 K/UL (ref 164–446)
PMV BLD AUTO: 10.1 FL (ref 9–12.9)
POTASSIUM SERPL-SCNC: 4.8 MMOL/L (ref 3.6–5.5)
PRODUCT TYPE UPROD: NORMAL
PRODUCT TYPE UPROD: NORMAL
PROT SERPL-MCNC: 6.4 G/DL (ref 6–8.2)
PROTHROMBIN TIME: 14.8 SEC (ref 12–14.6)
RBC # BLD AUTO: 6.85 M/UL (ref 4.7–6.1)
RH BLD: NORMAL
SODIUM SERPL-SCNC: 136 MMOL/L (ref 135–145)
TEG ALGORITHM TGALG: ABNORMAL
TROPONIN T SERPL-MCNC: 43 NG/L (ref 6–19)
UNIT STATUS USTAT: NORMAL
UNIT STATUS USTAT: NORMAL
WBC # BLD AUTO: 19.4 K/UL (ref 4.8–10.8)

## 2020-04-26 PROCEDURE — 74175 CTA ABDOMEN W/CONTRAST: CPT

## 2020-04-26 PROCEDURE — 85347 COAGULATION TIME ACTIVATED: CPT

## 2020-04-26 PROCEDURE — 84484 ASSAY OF TROPONIN QUANT: CPT

## 2020-04-26 PROCEDURE — 85610 PROTHROMBIN TIME: CPT

## 2020-04-26 PROCEDURE — 85576 BLOOD PLATELET AGGREGATION: CPT

## 2020-04-26 PROCEDURE — 700111 HCHG RX REV CODE 636 W/ 250 OVERRIDE (IP): Performed by: EMERGENCY MEDICINE

## 2020-04-26 PROCEDURE — 93005 ELECTROCARDIOGRAM TRACING: CPT | Performed by: EMERGENCY MEDICINE

## 2020-04-26 PROCEDURE — 36415 COLL VENOUS BLD VENIPUNCTURE: CPT

## 2020-04-26 PROCEDURE — 86900 BLOOD TYPING SEROLOGIC ABO: CPT

## 2020-04-26 PROCEDURE — 86850 RBC ANTIBODY SCREEN: CPT

## 2020-04-26 PROCEDURE — 770022 HCHG ROOM/CARE - ICU (200)

## 2020-04-26 PROCEDURE — 700105 HCHG RX REV CODE 258: Performed by: EMERGENCY MEDICINE

## 2020-04-26 PROCEDURE — 700111 HCHG RX REV CODE 636 W/ 250 OVERRIDE (IP): Performed by: SURGERY

## 2020-04-26 PROCEDURE — 83605 ASSAY OF LACTIC ACID: CPT

## 2020-04-26 PROCEDURE — 700117 HCHG RX CONTRAST REV CODE 255: Performed by: EMERGENCY MEDICINE

## 2020-04-26 PROCEDURE — 700105 HCHG RX REV CODE 258: Performed by: NURSE PRACTITIONER

## 2020-04-26 PROCEDURE — 82962 GLUCOSE BLOOD TEST: CPT

## 2020-04-26 PROCEDURE — 86901 BLOOD TYPING SEROLOGIC RH(D): CPT

## 2020-04-26 PROCEDURE — 80053 COMPREHEN METABOLIC PANEL: CPT

## 2020-04-26 PROCEDURE — 85730 THROMBOPLASTIN TIME PARTIAL: CPT

## 2020-04-26 PROCEDURE — 85384 FIBRINOGEN ACTIVITY: CPT

## 2020-04-26 PROCEDURE — 96374 THER/PROPH/DIAG INJ IV PUSH: CPT

## 2020-04-26 PROCEDURE — 99291 CRITICAL CARE FIRST HOUR: CPT

## 2020-04-26 PROCEDURE — 700111 HCHG RX REV CODE 636 W/ 250 OVERRIDE (IP): Performed by: NURSE PRACTITIONER

## 2020-04-26 PROCEDURE — 85025 COMPLETE CBC W/AUTO DIFF WBC: CPT

## 2020-04-26 PROCEDURE — 96375 TX/PRO/DX INJ NEW DRUG ADDON: CPT

## 2020-04-26 PROCEDURE — 85018 HEMOGLOBIN: CPT

## 2020-04-26 PROCEDURE — 71045 X-RAY EXAM CHEST 1 VIEW: CPT

## 2020-04-26 RX ORDER — SODIUM CHLORIDE, SODIUM LACTATE, POTASSIUM CHLORIDE, CALCIUM CHLORIDE 600; 310; 30; 20 MG/100ML; MG/100ML; MG/100ML; MG/100ML
INJECTION, SOLUTION INTRAVENOUS CONTINUOUS
Status: DISCONTINUED | OUTPATIENT
Start: 2020-04-26 | End: 2020-04-28

## 2020-04-26 RX ORDER — AMOXICILLIN 250 MG
1 CAPSULE ORAL
Status: DISCONTINUED | OUTPATIENT
Start: 2020-04-26 | End: 2020-04-30 | Stop reason: HOSPADM

## 2020-04-26 RX ORDER — FAMOTIDINE 20 MG/1
20 TABLET, FILM COATED ORAL DAILY
Status: DISCONTINUED | OUTPATIENT
Start: 2020-04-27 | End: 2020-04-28

## 2020-04-26 RX ORDER — ONDANSETRON 2 MG/ML
4 INJECTION INTRAMUSCULAR; INTRAVENOUS ONCE
Status: COMPLETED | OUTPATIENT
Start: 2020-04-26 | End: 2020-04-26

## 2020-04-26 RX ORDER — AMOXICILLIN 250 MG
1 CAPSULE ORAL NIGHTLY
Status: DISCONTINUED | OUTPATIENT
Start: 2020-04-26 | End: 2020-04-30 | Stop reason: HOSPADM

## 2020-04-26 RX ORDER — ONDANSETRON 2 MG/ML
4 INJECTION INTRAMUSCULAR; INTRAVENOUS EVERY 4 HOURS PRN
Status: DISCONTINUED | OUTPATIENT
Start: 2020-04-26 | End: 2020-04-26

## 2020-04-26 RX ORDER — SODIUM CHLORIDE 9 MG/ML
1000 INJECTION, SOLUTION INTRAVENOUS ONCE
Status: COMPLETED | OUTPATIENT
Start: 2020-04-26 | End: 2020-04-26

## 2020-04-26 RX ORDER — PROCHLORPERAZINE EDISYLATE 5 MG/ML
5 INJECTION INTRAMUSCULAR; INTRAVENOUS EVERY 6 HOURS PRN
Status: DISCONTINUED | OUTPATIENT
Start: 2020-04-26 | End: 2020-04-30 | Stop reason: HOSPADM

## 2020-04-26 RX ORDER — DOCUSATE SODIUM 100 MG/1
100 CAPSULE, LIQUID FILLED ORAL 2 TIMES DAILY
Status: DISCONTINUED | OUTPATIENT
Start: 2020-04-26 | End: 2020-04-30 | Stop reason: HOSPADM

## 2020-04-26 RX ORDER — SODIUM CHLORIDE 9 MG/ML
1000 INJECTION, SOLUTION INTRAVENOUS ONCE
Status: COMPLETED | OUTPATIENT
Start: 2020-04-26 | End: 2020-04-27

## 2020-04-26 RX ORDER — ENEMA 19; 7 G/133ML; G/133ML
1 ENEMA RECTAL
Status: DISCONTINUED | OUTPATIENT
Start: 2020-04-26 | End: 2020-04-30 | Stop reason: HOSPADM

## 2020-04-26 RX ORDER — BISACODYL 10 MG
10 SUPPOSITORY, RECTAL RECTAL
Status: DISCONTINUED | OUTPATIENT
Start: 2020-04-26 | End: 2020-04-30 | Stop reason: HOSPADM

## 2020-04-26 RX ORDER — POLYETHYLENE GLYCOL 3350 17 G/17G
1 POWDER, FOR SOLUTION ORAL 2 TIMES DAILY
Status: DISCONTINUED | OUTPATIENT
Start: 2020-04-26 | End: 2020-04-30 | Stop reason: HOSPADM

## 2020-04-26 RX ORDER — DEXTROSE MONOHYDRATE 25 G/50ML
50 INJECTION, SOLUTION INTRAVENOUS
Status: DISCONTINUED | OUTPATIENT
Start: 2020-04-26 | End: 2020-04-30 | Stop reason: HOSPADM

## 2020-04-26 RX ORDER — HYDROMORPHONE HYDROCHLORIDE 1 MG/ML
1 INJECTION, SOLUTION INTRAMUSCULAR; INTRAVENOUS; SUBCUTANEOUS ONCE
Status: COMPLETED | OUTPATIENT
Start: 2020-04-26 | End: 2020-04-26

## 2020-04-26 RX ADMIN — SODIUM CHLORIDE 1000 ML: 9 INJECTION, SOLUTION INTRAVENOUS at 19:06

## 2020-04-26 RX ADMIN — IOHEXOL 100 ML: 350 INJECTION, SOLUTION INTRAVENOUS at 19:27

## 2020-04-26 RX ADMIN — FENTANYL CITRATE 100 MCG: 50 INJECTION, SOLUTION INTRAMUSCULAR; INTRAVENOUS at 22:03

## 2020-04-26 RX ADMIN — SODIUM CHLORIDE, POTASSIUM CHLORIDE, SODIUM LACTATE AND CALCIUM CHLORIDE: 600; 310; 30; 20 INJECTION, SOLUTION INTRAVENOUS at 20:33

## 2020-04-26 RX ADMIN — FAMOTIDINE 20 MG: 10 INJECTION INTRAVENOUS at 19:06

## 2020-04-26 RX ADMIN — ONDANSETRON HYDROCHLORIDE 4 MG: 2 INJECTION INTRAMUSCULAR; INTRAVENOUS at 19:06

## 2020-04-26 RX ADMIN — FENTANYL CITRATE 100 MCG: 50 INJECTION, SOLUTION INTRAMUSCULAR; INTRAVENOUS at 23:07

## 2020-04-26 RX ADMIN — FENTANYL CITRATE 50 MCG: 50 INJECTION, SOLUTION INTRAMUSCULAR; INTRAVENOUS at 19:52

## 2020-04-26 RX ADMIN — PROCHLORPERAZINE EDISYLATE 5 MG: 5 INJECTION INTRAMUSCULAR; INTRAVENOUS at 22:51

## 2020-04-26 RX ADMIN — HYDROMORPHONE HYDROCHLORIDE 1 MG: 1 INJECTION, SOLUTION INTRAMUSCULAR; INTRAVENOUS; SUBCUTANEOUS at 19:06

## 2020-04-26 RX ADMIN — INSULIN HUMAN 3 UNITS: 100 INJECTION, SOLUTION PARENTERAL at 23:55

## 2020-04-26 RX ADMIN — FENTANYL CITRATE 100 MCG: 50 INJECTION, SOLUTION INTRAMUSCULAR; INTRAVENOUS at 20:23

## 2020-04-26 RX ADMIN — FENTANYL CITRATE 50 MCG: 50 INJECTION, SOLUTION INTRAMUSCULAR; INTRAVENOUS at 19:37

## 2020-04-26 RX ADMIN — SODIUM CHLORIDE 1000 ML: 9 INJECTION, SOLUTION INTRAVENOUS at 19:59

## 2020-04-26 ASSESSMENT — LIFESTYLE VARIABLES
HAVE PEOPLE ANNOYED YOU BY CRITICIZING YOUR DRINKING: NO
HAVE YOU EVER FELT YOU SHOULD CUT DOWN ON YOUR DRINKING: NO
HOW MANY TIMES IN THE PAST YEAR HAVE YOU HAD 5 OR MORE DRINKS IN A DAY: 0
TOTAL SCORE: 0
TOTAL SCORE: 0
EVER FELT BAD OR GUILTY ABOUT YOUR DRINKING: NO
TOTAL SCORE: 0
AVERAGE NUMBER OF DAYS PER WEEK YOU HAVE A DRINK CONTAINING ALCOHOL: 0
DO YOU DRINK ALCOHOL: NO
EVER HAD A DRINK FIRST THING IN THE MORNING TO STEADY YOUR NERVES TO GET RID OF A HANGOVER: NO
DOES PATIENT WANT TO STOP DRINKING: NO
CONSUMPTION TOTAL: NEGATIVE
ON A TYPICAL DAY WHEN YOU DRINK ALCOHOL HOW MANY DRINKS DO YOU HAVE: 0

## 2020-04-26 ASSESSMENT — FIBROSIS 4 INDEX
FIB4 SCORE: 2.07
FIB4 SCORE: 4.78

## 2020-04-26 ASSESSMENT — PAIN DESCRIPTION - DESCRIPTORS: DESCRIPTORS: PRESSURE

## 2020-04-27 ENCOUNTER — APPOINTMENT (OUTPATIENT)
Dept: RADIOLOGY | Facility: MEDICAL CENTER | Age: 80
DRG: 698 | End: 2020-04-27
Attending: SURGERY
Payer: MEDICARE

## 2020-04-27 ENCOUNTER — APPOINTMENT (OUTPATIENT)
Dept: RADIOLOGY | Facility: MEDICAL CENTER | Age: 80
DRG: 698 | End: 2020-04-27
Attending: NURSE PRACTITIONER
Payer: MEDICARE

## 2020-04-27 ENCOUNTER — APPOINTMENT (OUTPATIENT)
Dept: CARDIOLOGY | Facility: MEDICAL CENTER | Age: 80
DRG: 698 | End: 2020-04-27
Attending: SURGERY
Payer: MEDICARE

## 2020-04-27 PROBLEM — Z11.9 SCREENING EXAMINATION FOR INFECTIOUS DISEASE: Status: ACTIVE | Noted: 2020-04-27

## 2020-04-27 PROBLEM — R33.9 URINARY RETENTION: Status: ACTIVE | Noted: 2020-04-27

## 2020-04-27 LAB
ALBUMIN SERPL BCP-MCNC: 2.8 G/DL (ref 3.2–4.9)
ALBUMIN/GLOB SERPL: 1.6 G/DL
ALP SERPL-CCNC: 35 U/L (ref 30–99)
ALT SERPL-CCNC: 15 U/L (ref 2–50)
ANION GAP SERPL CALC-SCNC: 11 MMOL/L (ref 7–16)
AST SERPL-CCNC: 21 U/L (ref 12–45)
BARCODED ABORH UBTYP: 7300
BARCODED PRD CODE UBPRD: NORMAL
BARCODED UNIT NUM UBUNT: NORMAL
BASOPHILS # BLD AUTO: 0.1 % (ref 0–1.8)
BASOPHILS # BLD: 0.02 K/UL (ref 0–0.12)
BILIRUB SERPL-MCNC: 0.5 MG/DL (ref 0.1–1.5)
BUN SERPL-MCNC: 45 MG/DL (ref 8–22)
CALCIUM SERPL-MCNC: 7.7 MG/DL (ref 8.5–10.5)
CHLORIDE SERPL-SCNC: 103 MMOL/L (ref 96–112)
CO2 SERPL-SCNC: 23 MMOL/L (ref 20–33)
COMPONENT P 8504P: NORMAL
CREAT SERPL-MCNC: 2.34 MG/DL (ref 0.5–1.4)
EKG IMPRESSION: NORMAL
EOSINOPHIL # BLD AUTO: 0 K/UL (ref 0–0.51)
EOSINOPHIL NFR BLD: 0 % (ref 0–6.9)
ERYTHROCYTE [DISTWIDTH] IN BLOOD BY AUTOMATED COUNT: 57.8 FL (ref 35.9–50)
GLOBULIN SER CALC-MCNC: 1.7 G/DL (ref 1.9–3.5)
GLUCOSE BLD-MCNC: 147 MG/DL (ref 65–99)
GLUCOSE BLD-MCNC: 164 MG/DL (ref 65–99)
GLUCOSE BLD-MCNC: 204 MG/DL (ref 65–99)
GLUCOSE BLD-MCNC: 295 MG/DL (ref 65–99)
GLUCOSE SERPL-MCNC: 216 MG/DL (ref 65–99)
HCT VFR BLD AUTO: 30.4 % (ref 42–52)
HCT VFR BLD AUTO: 34.5 % (ref 42–52)
HGB BLD-MCNC: 10.3 G/DL (ref 14–18)
HGB BLD-MCNC: 10.7 G/DL (ref 14–18)
HGB BLD-MCNC: 11.7 G/DL (ref 14–18)
HGB BLD-MCNC: 9.7 G/DL (ref 14–18)
IMM GRANULOCYTES # BLD AUTO: 0.09 K/UL (ref 0–0.11)
IMM GRANULOCYTES NFR BLD AUTO: 0.6 % (ref 0–0.9)
LYMPHOCYTES # BLD AUTO: 0.93 K/UL (ref 1–4.8)
LYMPHOCYTES NFR BLD: 6.6 % (ref 22–41)
MAGNESIUM SERPL-MCNC: 1.8 MG/DL (ref 1.5–2.5)
MCH RBC QN AUTO: 27.2 PG (ref 27–33)
MCHC RBC AUTO-ENTMCNC: 31 G/DL (ref 33.7–35.3)
MCV RBC AUTO: 87.6 FL (ref 81.4–97.8)
MONOCYTES # BLD AUTO: 1.41 K/UL (ref 0–0.85)
MONOCYTES NFR BLD AUTO: 10 % (ref 0–13.4)
NEUTROPHILS # BLD AUTO: 11.59 K/UL (ref 1.82–7.42)
NEUTROPHILS NFR BLD: 82.7 % (ref 44–72)
NRBC # BLD AUTO: 0 K/UL
NRBC BLD-RTO: 0 /100 WBC
PHOSPHATE SERPL-MCNC: 5.5 MG/DL (ref 2.5–4.5)
PLATELET # BLD AUTO: 174 K/UL (ref 164–446)
PMV BLD AUTO: 10.2 FL (ref 9–12.9)
POTASSIUM SERPL-SCNC: 5.1 MMOL/L (ref 3.6–5.5)
PRODUCT TYPE UPROD: NORMAL
PROT SERPL-MCNC: 4.5 G/DL (ref 6–8.2)
RBC # BLD AUTO: 3.94 M/UL (ref 4.7–6.1)
SODIUM SERPL-SCNC: 137 MMOL/L (ref 135–145)
UNIT STATUS USTAT: NORMAL
WBC # BLD AUTO: 14 K/UL (ref 4.8–10.8)

## 2020-04-27 PROCEDURE — 93306 TTE W/DOPPLER COMPLETE: CPT | Mod: 26 | Performed by: INTERNAL MEDICINE

## 2020-04-27 PROCEDURE — 36620 INSERTION CATHETER ARTERY: CPT

## 2020-04-27 PROCEDURE — 94669 MECHANICAL CHEST WALL OSCILL: CPT

## 2020-04-27 PROCEDURE — 85025 COMPLETE CBC W/AUTO DIFF WBC: CPT

## 2020-04-27 PROCEDURE — 93970 EXTREMITY STUDY: CPT

## 2020-04-27 PROCEDURE — 71045 X-RAY EXAM CHEST 1 VIEW: CPT

## 2020-04-27 PROCEDURE — 700112 HCHG RX REV CODE 229: Performed by: NURSE PRACTITIONER

## 2020-04-27 PROCEDURE — 700101 HCHG RX REV CODE 250

## 2020-04-27 PROCEDURE — 700102 HCHG RX REV CODE 250 W/ 637 OVERRIDE(OP): Performed by: SURGERY

## 2020-04-27 PROCEDURE — 80053 COMPREHEN METABOLIC PANEL: CPT

## 2020-04-27 PROCEDURE — 83735 ASSAY OF MAGNESIUM: CPT

## 2020-04-27 PROCEDURE — 700111 HCHG RX REV CODE 636 W/ 250 OVERRIDE (IP): Performed by: NURSE PRACTITIONER

## 2020-04-27 PROCEDURE — A9270 NON-COVERED ITEM OR SERVICE: HCPCS | Performed by: NURSE PRACTITIONER

## 2020-04-27 PROCEDURE — A9270 NON-COVERED ITEM OR SERVICE: HCPCS | Performed by: SURGERY

## 2020-04-27 PROCEDURE — P9034 PLATELETS, PHERESIS: HCPCS

## 2020-04-27 PROCEDURE — 03HY32Z INSERTION OF MONITORING DEVICE INTO UPPER ARTERY, PERCUTANEOUS APPROACH: ICD-10-PCS | Performed by: SURGERY

## 2020-04-27 PROCEDURE — 700102 HCHG RX REV CODE 250 W/ 637 OVERRIDE(OP): Performed by: NURSE PRACTITIONER

## 2020-04-27 PROCEDURE — 36556 INSERT NON-TUNNEL CV CATH: CPT | Performed by: SURGERY

## 2020-04-27 PROCEDURE — 93005 ELECTROCARDIOGRAM TRACING: CPT | Performed by: SURGERY

## 2020-04-27 PROCEDURE — 700105 HCHG RX REV CODE 258: Performed by: SURGERY

## 2020-04-27 PROCEDURE — 99291 CRITICAL CARE FIRST HOUR: CPT | Mod: 25 | Performed by: SURGERY

## 2020-04-27 PROCEDURE — 30243R1 TRANSFUSION OF NONAUTOLOGOUS PLATELETS INTO CENTRAL VEIN, PERCUTANEOUS APPROACH: ICD-10-PCS | Performed by: SURGERY

## 2020-04-27 PROCEDURE — 770022 HCHG ROOM/CARE - ICU (200)

## 2020-04-27 PROCEDURE — 02H633Z INSERTION OF INFUSION DEVICE INTO RIGHT ATRIUM, PERCUTANEOUS APPROACH: ICD-10-PCS | Performed by: SURGERY

## 2020-04-27 PROCEDURE — 93306 TTE W/DOPPLER COMPLETE: CPT

## 2020-04-27 PROCEDURE — 93010 ELECTROCARDIOGRAM REPORT: CPT | Performed by: INTERNAL MEDICINE

## 2020-04-27 PROCEDURE — 36430 TRANSFUSION BLD/BLD COMPNT: CPT

## 2020-04-27 PROCEDURE — 84100 ASSAY OF PHOSPHORUS: CPT

## 2020-04-27 PROCEDURE — 93970 EXTREMITY STUDY: CPT | Mod: 26 | Performed by: INTERNAL MEDICINE

## 2020-04-27 PROCEDURE — 85014 HEMATOCRIT: CPT

## 2020-04-27 PROCEDURE — 85018 HEMOGLOBIN: CPT | Mod: 91

## 2020-04-27 PROCEDURE — 700105 HCHG RX REV CODE 258: Performed by: NURSE PRACTITIONER

## 2020-04-27 PROCEDURE — 82962 GLUCOSE BLOOD TEST: CPT | Mod: 91

## 2020-04-27 PROCEDURE — C1751 CATH, INF, PER/CENT/MIDLINE: HCPCS

## 2020-04-27 PROCEDURE — 36620 INSERTION CATHETER ARTERY: CPT | Performed by: SURGERY

## 2020-04-27 PROCEDURE — 36556 INSERT NON-TUNNEL CV CATH: CPT

## 2020-04-27 RX ORDER — NOREPINEPHRINE BITARTRATE 1 MG/ML
INJECTION, SOLUTION INTRAVENOUS
Status: COMPLETED
Start: 2020-04-27 | End: 2020-04-27

## 2020-04-27 RX ORDER — SODIUM CHLORIDE, SODIUM LACTATE, POTASSIUM CHLORIDE, AND CALCIUM CHLORIDE .6; .31; .03; .02 G/100ML; G/100ML; G/100ML; G/100ML
500 INJECTION, SOLUTION INTRAVENOUS ONCE
Status: COMPLETED | OUTPATIENT
Start: 2020-04-27 | End: 2020-04-27

## 2020-04-27 RX ORDER — NOREPINEPHRINE BITARTRATE 0.03 MG/ML
0-30 INJECTION, SOLUTION INTRAVENOUS CONTINUOUS
Status: DISCONTINUED | OUTPATIENT
Start: 2020-04-27 | End: 2020-04-29

## 2020-04-27 RX ORDER — OXYCODONE HYDROCHLORIDE 5 MG/1
5 TABLET ORAL
Status: DISCONTINUED | OUTPATIENT
Start: 2020-04-27 | End: 2020-04-30 | Stop reason: HOSPADM

## 2020-04-27 RX ORDER — OXYCODONE HYDROCHLORIDE 10 MG/1
10 TABLET ORAL EVERY 4 HOURS PRN
Status: DISCONTINUED | OUTPATIENT
Start: 2020-04-27 | End: 2020-04-30 | Stop reason: HOSPADM

## 2020-04-27 RX ADMIN — OXYCODONE HYDROCHLORIDE 10 MG: 10 TABLET ORAL at 21:58

## 2020-04-27 RX ADMIN — SODIUM CHLORIDE, POTASSIUM CHLORIDE, SODIUM LACTATE AND CALCIUM CHLORIDE: 600; 310; 30; 20 INJECTION, SOLUTION INTRAVENOUS at 19:10

## 2020-04-27 RX ADMIN — SODIUM CHLORIDE, POTASSIUM CHLORIDE, SODIUM LACTATE AND CALCIUM CHLORIDE 500 ML: 600; 310; 30; 20 INJECTION, SOLUTION INTRAVENOUS at 15:16

## 2020-04-27 RX ADMIN — SENNOSIDES AND DOCUSATE SODIUM 1 TABLET: 8.6; 5 TABLET ORAL at 21:58

## 2020-04-27 RX ADMIN — FENTANYL CITRATE 100 MCG: 50 INJECTION, SOLUTION INTRAMUSCULAR; INTRAVENOUS at 05:30

## 2020-04-27 RX ADMIN — INSULIN HUMAN 2 UNITS: 100 INJECTION, SOLUTION PARENTERAL at 05:29

## 2020-04-27 RX ADMIN — POLYETHYLENE GLYCOL 3350 1 PACKET: 17 POWDER, FOR SOLUTION ORAL at 15:20

## 2020-04-27 RX ADMIN — NOREPINEPHRINE BITARTRATE 8 MG: 1 INJECTION INTRAVENOUS at 08:30

## 2020-04-27 RX ADMIN — OXYCODONE HYDROCHLORIDE 5 MG: 5 TABLET ORAL at 17:28

## 2020-04-27 RX ADMIN — INSULIN HUMAN 1 UNITS: 100 INJECTION, SOLUTION PARENTERAL at 17:37

## 2020-04-27 RX ADMIN — DOCUSATE SODIUM 100 MG: 100 CAPSULE, LIQUID FILLED ORAL at 15:20

## 2020-04-27 RX ADMIN — FAMOTIDINE 20 MG: 10 INJECTION INTRAVENOUS at 05:30

## 2020-04-27 RX ADMIN — DOCUSATE SODIUM 100 MG: 100 CAPSULE, LIQUID FILLED ORAL at 17:27

## 2020-04-27 RX ADMIN — FENTANYL CITRATE 100 MCG: 50 INJECTION, SOLUTION INTRAMUSCULAR; INTRAVENOUS at 03:50

## 2020-04-27 RX ADMIN — FENTANYL CITRATE 100 MCG: 50 INJECTION, SOLUTION INTRAMUSCULAR; INTRAVENOUS at 00:35

## 2020-04-27 RX ADMIN — FENTANYL CITRATE 100 MCG: 50 INJECTION, SOLUTION INTRAMUSCULAR; INTRAVENOUS at 01:49

## 2020-04-27 ASSESSMENT — FIBROSIS 4 INDEX: FIB4 SCORE: 2.07

## 2020-04-27 ASSESSMENT — ENCOUNTER SYMPTOMS
ARTHRALGIAS: 1
MYALGIAS: 1

## 2020-04-27 NOTE — CARE PLAN
Problem: Safety  Goal: Will remain free from injury  Note: bed in low locked position, bed alarm on, call light in reach.      Problem: Pain Management  Goal: Pain level will decrease to patient's comfort goal  Note: Assess pts pain level and treat as needed.

## 2020-04-27 NOTE — PROGRESS NOTES
"TRAUMA TERTIARY SURVEY     Mental status adequate for full examination?: Yes    Spine cleared (radiologically and/or clinically): Yes    PHYSICAL EXAMINATION:  Vitals: BP (!) 93/63   Pulse (!) 101   Temp 36.1 °C (97 °F)   Resp (!) 28   Ht 1.854 m (6' 1\")   Wt 113 kg (249 lb 1.9 oz)   SpO2 100%   BMI 32.87 kg/m²   Constitutional:     General Appearance: appears stated age.  HEENT:     No significant external craniofacial trauma. The pupils are equal, round, and reactive to light bilaterally. The extraocular muscles are intact bilaterally.. The nares and oropharynx are clear. The midface and jaw are stable. No malocclusion is evident.  Neck:    Normal range of motion.  Respiratory:   Inspection: Unlabored respirations, no intercostal retractions, paradoxical motion, or accessory muscle use.   Palpation:  The chest is nontender. The clavicles are non deformed bilaterally..   Auscultation: clear to auscultation.  Cardiovascular:   Auscultation: regular rate and rhythm.   Peripheral Pulses: Normal.   Abdomen:   palpation left sided/flank tenderness with palpation tenderness: LLQ moderate  Genitourinary:   (MALE): minimal urine output, concentrated.  Musculoskeletal:   The pelvis is stable.  No significant angulation, deformity, or soft tissue injury involving the upper and lower extremities. Normal range of motion.   Back:   The thoracolumbar spine was examined. Examination is remarkable for no significant tenderness, swelling, or deformity in the thoracolumbar region.  Skin:   The skin is warm.  Neurologic:    Yolie Coma Scale (GCS) 15 [id number]. Neurologic examination revealed no focal deficits noted.  Psychiatric:   The patient does not appear depressed or anxious.    IMAGING:  EC-ECHOCARDIOGRAM COMPLETE W/O CONT   Final Result      DX-CHEST-FOR LINE PLACEMENT Perform procedure in: Patient's Room   Final Result      1.  Supportive tubing as described above.   2.  No pneumothorax.   3.  No other significant " change from prior exam.         DX-CHEST-PORTABLE (1 VIEW)   Final Result      1.  Slight increased inflation from prior exam.   2.  No other significant change.         CT-CTA COMPLETE THORACOABDOMINAL AORTA   Final Result      1.  Left subcapsular hematoma with hemorrhage extending into the left perinephric space and pelvis. Several foci of active extravasation in the left mid kidney and lower pole. No solid mass is identified in the left kidney on prior CTA.      2.  Atherosclerosis and cardiomegaly.      3.  Diverticulosis.            Comment: Results discussed with Dr. Kramer at approximately 7:35 PM            DX-CHEST-PORTABLE (1 VIEW)   Final Result      1.  Low lung volumes with elevation of the left hemidiaphragm. Mild left basilar opacification likely represents atelectasis.      2.  Stable cardiomegaly        All current laboratory studies/radiology exams reviewed: Yes    Completed Consultations:  Dr. SUKHWINDER Lee - Cardiology     Pending Consultations:  none    Newly Identified Diagnoses and Injuries:  No  Further findings    TOTAL RAP SCORE:  RAP Score Total: 8      ETOH Screening  CAGE Score: 0  Assessment complete date: 4/27/2020

## 2020-04-27 NOTE — PROGRESS NOTES
[facility] Cardiology Follow-up Note    Date of Service:    4/27/2020      Name:   LIAN More     YOB: 1940  Age:   79 y.o.  male   MRN:   [id number]      Chief Complaint: hypovolemic shock    HPI:  Mr More is a 80 y/o fellow with PMH including HRr EF, CAD with hx of PCI to the RCA in 12/2019 and LM - LAD in 1/2020, severe AS s/p TAVR 1/27/20, CKD III, paroxysmal atrial fibrillation (low burden - not on OAC).    He presented to [facility] on 4/26/20 after a ground level fall where his L elbow traumatized his L abdomen.  CT shows L subcapsular  with extravasation into the L kidney.  His hgb has declined from 18.9 to 10.7 this morning.   He has received IV fluid (5 L) + a unit of platelet.    Dr. Lee was consulted 4/26 for recommendations given his recent PCI and antiplatelet therapy.      Interim Events:  This morning I received call from RN noting BP 58/38, trauma surgeon requesting Cards recommendation on starting pressor.     I went to see patient promptly.  He states his L side abd pain is improved.   He is somewhat sleepy, but alert and oriented.    Denies dizziness, shortness of breath or CP.    He was started on 4 mcg/min of levophed - MAP now in the 60-65 range.       ROS  Constitutional:  + fatigue.  Respiratory:  Denies shortness of breath, no cough.  Cardiovascular:  No chest pain.  no lower extremity edema.  Denies orthopnea or PND.  : oliguria - per nurse.  GI:  Denies nausea/vomiting. LLQ abd pain.  Neuro:  Denies dizziness, syncope..      All other review of systems reviewed and negative.    Past medical, surgical, social, and family history reviewed and unchanged from admission except as noted in assessment and plan.    Medications: Reviewed in MAR  Current Facility-Administered Medications   Medication Dose Frequency Provider Last Rate Last Dose   • norepinephrine (Levophed) infusion 8 mg/250 mL (premix)  0-30 mcg/min Continuous Rebeca Harding P.A.-C. 11.3  mL/hr at 04/27/20 0900 6 mcg/min at 04/27/20 0900   • Respiratory Therapy Consult   Continuous RT EDIE WhiteRLATOYA.       • Pharmacy Consult Request ...Pain Management Review 1 Each  1 Each PHARMACY TO DOSE EDIE WhiteRLATOYA.       • docusate sodium (COLACE) capsule 100 mg  100 mg BID HAMZAH WhiteP.R.N.   Stopped at 04/26/20 2100   • senna-docusate (PERICOLACE or SENOKOT S) 8.6-50 MG per tablet 1 Tab  1 Tab Nightly HAMZAH WhiteP.R.N.   Stopped at 04/26/20 2100   • senna-docusate (PERICOLACE or SENOKOT S) 8.6-50 MG per tablet 1 Tab  1 Tab Q24HRS PRN HAMZAH WhitePElizabetRLATOYA.       • polyethylene glycol/lytes (MIRALAX) PACKET 1 Packet  1 Packet BID HAMZAH WhitePElizabetRElizabetN.   Stopped at 04/26/20 2100   • magnesium hydroxide (MILK OF MAGNESIA) suspension 30 mL  30 mL DAILY HAMZAH WhitePElizabetR.N.   Stopped at 04/27/20 0600   • bisacodyl (DULCOLAX) suppository 10 mg  10 mg Q24HRS PRN HAMZAH WhiteP.R.N.       • fleet enema 133 mL  1 Each Once PRN HAMZAH WhitePElizabetRLATOYA.       • LR infusion   Continuous EDIE WhiteR.N. 50 mL/hr at 04/26/20 2033     • fentaNYL (SUBLIMAZE) injection  mcg   mcg Q HOUR PRN HAMZAH WhiteP.R.N.   100 mcg at 04/27/20 0530   • famotidine (PEPCID) tablet 20 mg  20 mg DAILY HAMZAH WhitePElizabetRElizabetN.        Or   • famotidine (PEPCID) injection 20 mg  20 mg DAILY HAMZAH WhiteP.R.N.   20 mg at 04/27/20 0530   • insulin regular (HUMULIN R) injection 1-6 Units  1-6 Units Q6HRS HAMZAH WhiteP.RElizabetN.   2 Units at 04/27/20 0529    And   • glucose 4 g chewable tablet 16 g  16 g Q15 MIN PRN HAMZAH WhitePElizabetRLATOYA.        And   • dextrose 50% (D50W) injection 50 mL  50 mL Q15 MIN PRN HAMZAH WhitePElizabetRElizabetN.       • prochlorperazine (COMPAZINE) injection 5 mg  5 mg Q6HRS PRN Vj Rajan M.D.   5 mg at 04/26/20 2251   Last reviewed on 4/26/2020  8:23 PM by Rigoberto Perry    Allergies   Allergen Reactions   • Statins [Hmg-Coa-R  "Inhibitors] Rash     Blisters     • Atorvastatin        Physical Exam  Body mass index is 32.87 kg/m². BP (!) 83/51   Pulse (!) 103   Temp 36.1 °C (97 °F)   Resp (!) 29   Ht 1.854 m (6' 0.99\")   Wt 113 kg (249 lb 1.9 oz)   SpO2 98%    Vitals:    04/27/20 0530 04/27/20 0800 04/27/20 0952 04/27/20 0953   BP: (!) 93/63 (!) 83/51     Pulse: (!) 101 (!) 102 (!) 103    Resp: (!) 28 (!) 0 (!) 29    Temp:       TempSrc:       SpO2: 100% 98% 98%    Weight:       Height:    1.854 m (6' 0.99\")    Oxygen Therapy:  Pulse Oximetry: 98 %, O2 (LPM): 4, O2 Delivery Device: Oxymask    General: no apparent distress.  Eyes: normal conjunctiva  ENT: OP clear  Neck: no JVD   Lungs: normal respiratory effort,  without crackles, no wheezing or rhonchi.  Heart: normal rate,  regular rhythm, no murmur, no rubs or gallops.   EXT: no edema bilateral lower extremities. + bilateral pedal pulses. no cyanosis  Abdomen: soft, no ecchymosis noted.  LLQ tenderness to palpation.  Neurological: No focal deficits, no facial asymmetry.  Normal speech.  Psychiatric: Appropriate affect, alert and oriented x 3.   Skin: Warm extremities, no rash. Pale.     Labs (personally reviewed):     Lab Results   Component Value Date/Time    SODIUM 137 04/27/2020 05:26 AM    POTASSIUM 5.1 04/27/2020 05:26 AM    CHLORIDE 103 04/27/2020 05:26 AM    CO2 23 04/27/2020 05:26 AM    GLUCOSE 216 (H) 04/27/2020 05:26 AM    BUN 45 (H) 04/27/2020 05:26 AM    CREATININE 2.34 (H) 04/27/2020 05:26 AM    CREATININE 1.18 02/08/2011 12:00 AM    BUNCREATRAT 16 02/08/2011 12:00 AM    GLOMRATE >59 02/08/2011 12:00 AM     Lab Results   Component Value Date/Time    ALKPHOSPHAT 35 04/27/2020 05:26 AM    ASTSGOT 21 04/27/2020 05:26 AM    ALTSGPT 15 04/27/2020 05:26 AM    TBILIRUBIN 0.5 04/27/2020 05:26 AM      Lab Results   Component Value Date/Time    CHOLSTRLTOT 105 12/27/2019 02:20 AM    LDL 56 12/27/2019 02:20 AM    HDL 29 (A) 12/27/2019 02:20 AM    TRIGLYCERIDE 101 12/27/2019 02:20 " AM     No results found for: BNPBTYPENAT      Cardiac Imaging and Procedures Review:      Personal Telemetry Review:    Personal EKG Interpretation:    Echo 20:  CONCLUSIONS  Compared to the images of the prior study done 2020 - estimated   ejection fraction is improved, previously 35%. The patient is now   tachycardic with smaller ventricular size suggesting hypovolemia.     Moderately reduced left ventricular systolic function.  Left ventricular ejection fraction is visually estimated to be 40%.  Global hypokinesis.  Diastolic function is abnormal, but grade cannot be determined.  Normal right ventricular systolic function.  Known TAVR aortic valve that is functioning normally with normal   transvalvular gradients.    [facility] 2019:  3.5 x 28 mm Syndergy BRANDI to the distal RCA, post dilated to 4 cm.    [facility] 1/10/2020:  3.5 x 26 mm Onxy BRANDI to the distal LM into the LAD.        Assessment and Medical Decision Makin   Shock, likely hypovolemic in the setting of acute blood loss.  I did recommend starting levophed this AM when patient's BP was 58/38 per nurse.  MAP was 60-65 when I saw him several minutes later.   Ultimately, volume replacement and cessation of bleeding is paramount.      I did communicate with Dr. Gan and Dr. Queen.    2   Acute blood loss anemia.  hgb drop from 18.9 to 10.7 this AM.  Needs PRBC transfusion.    3   CAD with recent PCI to the RCA 2019, LM into LAD 2020.  May hold DAPT for now, however, resume aspirin 81 mg and Plavix 75 mg ASAP after bleeding has been stopped.     4   HFrEF, NYHA class III, Stage C. EF 35%.      5   Ischemic cardiomyopathy.  S/p revascularization, on GDMT, resume when appropriate.    6   Acute on chronic renal failure. Worsening renal function in the setting of hypovolemic shock.     7   Hx of AFIB seen on Holter monitor, very low burden and not on OAC.        Please see further recommendations in Dr. Queen's attestation.      Rebeca  MELANY Harding  [facility]

## 2020-04-27 NOTE — ED PROVIDER NOTES
ED Provider Note    CHIEF COMPLAINT  Chief Complaint   Patient presents with   • GLF   • LLQ Pain       HPI  Mr. More is a 79-year-old male with past medical history of hypertension, dyslipidemia, diabetes, CKD and history of TAVR/pacemaker/ICD placement and ischemic cardiomyopathy.  He had TAVR along with stenting of left main and right coronary arteries in May 2019.  Who presents emergency room for acute midline and left upper tenderness.  The patient reportedly was doing well at his rehab facility however he had a ground-level fall on the left side where he landed on his arm tucked against his chest.  This caused immediate abdominal distention, firmness and severe 10 out of 10 pain throughout the left side of the belly radiating to the back.  Per EMS he was diaphoretic and tachycardic and dyspneic on arrival which improved with pain medications.  Denies any history of any known vascular abnormalities of the aorta though he does have extensive cardiovascular disease.    Does not recall being on blood thinners, poor historian for hx, chart review shows antiplatelet medications    Respiratory Precautions  During the information gathering assessment, Pt noted that he has not travelled outside the [place], has not had contact with sick individuals, or mentions any other concerning risk factors at this time.    Based on the concern for risk of exposures, and the possibility of delayed exposure notification, this practitioner used extensive PPI during the history gathering, exam and discussions with the patient.  Pt was wearing a mask during these encounters    REVIEW OF SYSTEMS  Constitutional: No fevers, chills, or recent illness.  Skin: No rashes or diaphoresis.  Eyes: No change in vision, no discharge.  ENT: No hearing change. No rhinorrhea or nasal congestion, no ST or difficulty swallowing.  Respiratory: No SOB. No coughing or hemoptysis. No Wheezing.  Cardiac: as above, no currentpalpitations, edema. No PND or  orthopnea.  GI: as above, no N/V; diarrhea or blood in stool.  Hc of chronic constipation.   : No dysuria/hematuria. No D/C. No frequency or urgency. No hesitancy.   MSK: No pain in joints or muscles. No calf pain or swelling.  Neuro: No HA or paresthesias. No focal weakness.  Endocrine: No polyuria or polydipsia. No heat or cold intolerance.  Heme: No easy bruising. No history of bleeding disorders or anemia.    PAST MEDICAL HISTORY   has a past medical history of Arthritis, Back pain, Breath shortness, CAD (coronary artery disease), CATARACT, Chest pain, Chest tightness, Chickenpox, Congestive heart failure (HCC), Constipation, Coronary heart disease, Cough, Daytime sleepiness, Diabetes, Difficulty breathing, Dilated cardiomyopathy (HCC), Fatigue, Hearing difficulty, Heart murmur, Heart valve disease, Heartburn, Hiatus hernia syndrome, Hyperlipidemia, Hypertension, Kidney disease, Mumps, Pain (08-29-13), Pain (6/22/2015), Pain (1/7/16), Painful joint, Palpitations, Pneumonia (2007), Rhinitis, Ringing in ears, Severe aortic stenosis (12/4/2019), Shortness of breath, Swelling of lower extremity, Tonsillitis, Ulcer (2014), and Unspecified hemorrhagic conditions.    SOCIAL HISTORY  Social History     Tobacco Use   • Smoking status: Never Smoker   • Smokeless tobacco: Never Used   • Tobacco comment: 0   Substance and Sexual Activity   • Alcohol use: No     Alcohol/week: 0.0 oz     Frequency: Never     Binge frequency: Never   • Drug use: No   • Sexual activity: Yes       SURGICAL HISTORY   has a past surgical history that includes abdominoplasty (1990); gastric banding laparoscopic (3/24/2010); hiatal hernia repair (3/24/2010); inj,epidural/lumb/sac single (3/5/2012); inj,epidural/lumb/sac single (3/19/2012); lumbar fusion posterior (3/26/2012); lumbar decompression (3/26/2012); gastric band laparoscopic revision (5/11/2012); drainage hematoma (5/25/2012); recovery (6/26/2012); lumbar fusion posterior (9/5/2013);  "lumbar decompression (9/5/2013); mass excision neuro (9/5/2013); gastroscopy (N/A, 1/8/2016); knee arthroplasty total (2000); humerus orif (Left, 6/25/2015); zzz cardiac cath; laminotomy; Sleeve,Grant Vaso Thigh; remv 2nd cataract,corn-scler sectn; sinuscope; tonsillectomy; aortic valve replacement; arthroscopy, knee; transcatheter aortic valve replacement (N/A, 1/27/2020); and stephon (1/27/2020).    CURRENT MEDICATIONS  Home Medications    **Home medications have not yet been reviewed for this encounter**       ALLERGIES  Allergies   Allergen Reactions   • Statins [Hmg-Coa-R Inhibitors] Rash     Blisters     • Atorvastatin        PHYSICAL EXAM  VITAL SIGNS: /69   Pulse (!) 133   Temp 36.2 °C (97.2 °F) (Oral)   Resp (!) 58   Ht 1.854 m (6' 1\")   Wt 105.2 kg (232 lb)   SpO2 91%   BMI 30.61 kg/m²   Pulse ox interpretation: I interpret this pulse ox as normal.  Genl: M sitting in gurney uncomfortably, speaking clearly, appears in moderate acute distress  Head: NC/AT   ENT: Mucous membranes moist, posterior pharynx clear, uvula midline, nares patent bilaterally   Eyes: Normal sclera, pupils equal round reactive to light  Neck: Supple, FROM, no LAD appreciated  Pulmonary: Lungs are clear to auscultation bilaterally  Chest: No TTP  CV:  tachycardia, no murmur appreciated, pulses 2+ in both upper and lower extremities,  Abdomen: soft, mild distention in the left abdomen with severe tenderness globally throughout the epigastrium, lower left chest wall, left flank and.  Left lower quadrant.  Unable to fully assess true rebound and guarding due to the severity of his pain.  No obvious masses or hepatosplenomegaly though this is difficult due to the patient's habitus  : Generalized flank discomfort without bruising.  No midline spinal tenderness   Musculoskeletal: Pain free ROM of the neck. Moving upper and lower extremities and spontaneous in coordinated fashion  Neuro: A&Ox4 (person, place, time, situation), " speech fluent, gait not assessed, no focal deficits appreciated, sensory, motor and cerebellar exams are difficult to fully assess due to the patient's acute pain complaints though no gross deficits appreciated.    Psych: Patient has an appropriate affect and behavior  Skin: No rash or lesions.  No pallor or jaundice.  No cyanosis.  Warm and dry.     DIAGNOSTIC STUDIES / PROCEDURES    EKG  Results for orders placed or performed during the hospital encounter of 20   EKG   Result Value Ref Range    Report       [facility] Emergency Dept.    Test Date:  2020  Pt Name:    LIAN VILLA                    Department: ER  MRN:        [id number]                      Room:        22  Gender:     Male                         Technician: [id number]  :        1940                   Requested By:ANDREW WHITEHEAD  Order #:    [id number]                    Reading MD:    Measurements  Intervals                                Axis  Rate:       127                          P:          0  ME:         220                          QRS:        -8  QRSD:       104                          T:          105  QT:         356  QTc:        518    Interpretive Statements  SINUS TACHYCARDIA  VENTRICULAR PREMATURE COMPLEX  FIRST DEGREE AV BLOCK  LEFT ATRIAL ABNORMALITY  LVH WITH SECONDARY REPOLARIZATION ABNORMALITY  PROBABLE INFERIOR INFARCT, AGE INDETERMINATE  PROLONGED QT INTERVAL  Compared to ECG 2020 18:35:19  Ventricular premature complex(es) now present  First degree AV block now present   Atrial abnormality now present  Left ventricular hypertrophy now present  Early repolarization now present  Myocardial infarct finding now present  Prolonged QT interval now present  Sinus rhythm no longer present  Left bundle-branch block no longer present  ST (T wave) deviation no longer present       LABS  Labs Reviewed   CBC WITH DIFFERENTIAL - Abnormal; Notable for the following components:       Result Value    WBC  19.4 (*)     RBC 6.85 (*)     Hemoglobin 18.9 (*)     Hematocrit 58.9 (*)     MCHC 32.1 (*)     RDW 56.1 (*)     Lymphocytes 21.30 (*)     Neutrophils (Absolute) 13.24 (*)     Monos (Absolute) 1.50 (*)     Immature Granulocytes (abs) 0.16 (*)     All other components within normal limits    Narrative:     Indicate which anticoagulants the patient is on:->UNKNOWN   COMP METABOLIC PANEL - Abnormal; Notable for the following components:    Anion Gap 18.0 (*)     Glucose 306 (*)     Bun 45 (*)     Creatinine 1.93 (*)     All other components within normal limits    Narrative:     Indicate which anticoagulants the patient is on:->UNKNOWN   TROPONIN - Abnormal; Notable for the following components:    Troponin T 43 (*)     All other components within normal limits    Narrative:     Indicate which anticoagulants the patient is on:->UNKNOWN   PROTHROMBIN TIME - Abnormal; Notable for the following components:    PT 14.8 (*)     INR 1.14 (*)     All other components within normal limits    Narrative:     Indicate which anticoagulants the patient is on:->UNKNOWN   ESTIMATED GFR - Abnormal; Notable for the following components:    GFR If  41 (*)     GFR If Non  34 (*)     All other components within normal limits    Narrative:     Indicate which anticoagulants the patient is on:->UNKNOWN   LACTIC ACID   APTT    Narrative:     Indicate which anticoagulants the patient is on:->UNKNOWN   COD (ADULT)   PLATELET MAPPING WITH BASIC TEG    Narrative:     Is the patient on heparin (including low molecular weight  heparin, subcutaneous heparin, or lovenox)?->No  Do you want to extend TEG graph to LY30? (If no, graph will  terminate at MA)->Yes   URINALYSIS,CULTURE IF INDICATED     RADIOLOGY  CT-CTA COMPLETE THORACOABDOMINAL AORTA   Final Result      1.  Left subcapsular hematoma with hemorrhage extending into the left perinephric space and pelvis. Several foci of active extravasation in the left mid  kidney and lower pole. No solid mass is identified in the left kidney on prior CTA.      2.  Atherosclerosis and cardiomegaly.      3.  Diverticulosis.            Comment: Results discussed with Dr. Kramer at approximately 7:35 PM            DX-CHEST-PORTABLE (1 VIEW)   Final Result      1.  Low lung volumes with elevation of the left hemidiaphragm. Mild left basilar opacification likely represents atelectasis.      2.  Stable cardiomegaly        COURSE & MEDICAL DECISION MAKING  Pertinent Labs & Imaging studies reviewed. (See chart for details)    DDX:  Dissection  Splenic rupture  Constipation  Volvulus  Rib fracture  Pneumothorax  Abdominal Viscera injury    MDM  Number of Diagnoses or Management Options  Chest wall pain:   Fall, initial encounter:   Generalized abdominal pain:   Hypertension, unspecified type:   Tachycardia:       Initial evaluation at 1855:  Patient presents emergency room brought in by EMS and appears to be extremely severe distress.  He had an apparent ground-level fall and does not have any evidence of gross angulation or extremity trauma but he is tachycardic and hypertensive and any slight palpation of the abdomen or chest wall on the left side causes excruciating pain for the individual.  He concern based on his age, risk factors and the severity of his pain based on the relatively minimal mechanism as described above though dissections leading concern is possible diaphragmatic rupture and the patient is elevated to a code aorta.  IV access established, patient made n.p.o. and hydration initiated in anticipation of possible surgical intervention.  Multiple doses of pain medications are administered and portable chest and CTA rapidly obtained.  Chest x-ray was difficult due to the patient's habitus though there is some mediastinal widening, tracheal deviation that is extremely concerning.    Discussed the case with the radiologist at approximately 1940: Per the CT there is evidence of Left  subcapsular hematoma with hemorrhage extending into the left perinephric space and pelvis. Several foci of active extravasation in the left mid kidney and lower pole. No solid mass is identified in the left kidney on prior CTA.     Discussed with Dr. Velasquez of trauma surgery who would like a thromboelastogram added on, the patient will be admitted to the trauma service.  Currently we are holding off on blood product administration as the patient does not have adequate pain control and is not hypotensive.  HYDRATION: Based on the patient's presentation of Tachycardia the patient was given IV fluids. IV Hydration was used because oral hydration was not adequate alone. Upon recheck following hydration, the patient was improved.    CRITICAL CARE:  I saw and evaluated this patient. I personally provided 40 minutes of critical care time to the patient excluding billable procedures and directly and personally provided the following treatment and critical care management:  Critical Care Interventions  Multispecialty coordination, Multiple bedside assessments, coordination of care with family and other historical sources, Continuous hemodynamic and respiratory monitoring, Serial neurologic exams, Serial airway observations, Fluid resuscitation and Accompany patient to the CT scan    BLOOD CONSENT  I have explained to the patient the risks and benefits of transfusion of blood products.  This includes, as appropriate, the risk of mild allergic reaction, hemolytic reaction, transfusion-associated lung injury, febrile reactions, circulatory or iron overload, and infection.  We discussed possible alternatives and their risks, including directed donation, autologous transfusion, and no transfusion, including IV or oral iron supplementation, as appropriate.  I believe the patient understands the risks and benefits and was able to express understanding.    FINAL IMPRESSION  Visit Diagnoses     ICD-10-CM   1. Generalized abdominal  pain R10.84   2. Fall, initial encounter W19.XXXA   3. Chest wall pain R07.89   4. Hypertension, unspecified type I10   5. Tachycardia R00.0     Electronically signed by: Zeb Kramer M.D., 4/26/2020 6:51 PM

## 2020-04-27 NOTE — ASSESSMENT & PLAN NOTE
Chronic condition treated with glimepiride.  Insulin sliding scale   Glimepiride held secondary to renal injury

## 2020-04-27 NOTE — CONSULTS
Asked by Dr Oconnell to assist in cardiac management given severe CAD and ICM    Would advise hold plavix but as little as possible given recent PCI to LEFT MAIN/LAD 1/2020 and RCA 12/2019.  Would hold aspirin only if absolutely necessary, if becomes unstable or has more evidence of bleeding could consider platelet transfusion.    His weight has been stable around 235 on home readings, would track daily weight as best marker for CHF, avoid excessive fluids but may need IVF in setting of hematoma, keep MAP >60    Given his functional class and history (left main disease with severely reduced EF) he is at high risk of clinical decompensation.with his worrisome presentation     Cardiology will assess in the AM. Certainly call overnight for acute concerns.    It is my pleasure to participate in the care of Mr. More.  Please do not hesitate to contact me with questions or concerns.    Shar Lee MD PhD [facility]  Cardiologist [facility]    Please note that this dictation was created using voice recognition software. There may be errors I did not discover before finalizing the note.

## 2020-04-27 NOTE — ASSESSMENT & PLAN NOTE
Acute exacerbation of a chronic condition, CHF may be playing a role  Avoid nephrotoxins  Trend renal indices.

## 2020-04-27 NOTE — ED NOTES
Patient still very painful with movement and decreased respirations with no movement, see Mar for medication administration, O2 increased for decrease in SPo2

## 2020-04-27 NOTE — PROGRESS NOTES
"Trauma Progress Note 4/27/2020 2:15 AM    This is a 79 y.o. male who fell at home. His elbow struck his left abdomen during the fall. He sustained acute left kidney injury.   The patient has a significant cardiac history including recent TAVR procedure and placement of two cardiac stents, he has been on ASA and Plavix since the surgery (5/2019). Cardiology was consulted on admission (Dr Lee).   Cardiac ECHO was performed with cardiac function unchanged from 4/14/2020.    Approximate resuscitation given to this point includes: 1 U platelets and 6500 L crystalloid.    Plan:   - continue serial hemograms and abdominal exams    Assessment: lethargic, alert, pain control adequate.     BP (!) 72/49   Pulse (!) 104   Temp 36.1 °C (97 °F)   Resp (!) 25   Ht 1.854 m (6' 1\")   Wt 109.6 kg (241 lb 10 oz)   SpO2 95%   BMI 31.88 kg/m²     Hemoglobin: 10.7 g/dL  Hematocrit: 34.5 %  BUN 45   Cr 2.34 from 1.93  GFR 27 from 34    Urine Output: Gutierres catheter / gross hematuria / minimal output    Recent Labs     04/26/20 1902   APTT 30.3   INR 1.14*      Recent Labs     04/26/20 1956   REACTMIN 4.8*   CLOTKINET 3.2*   CLOTANGL 52.3*   MAXCLOTS 55.8   MOV84HOU 0.0   PRCINADP 100.0   PRCINAA 100.0       Platelet inhibition due to Plavix- (present on admission)  Assessment & Plan  Long term use of ASA and Plavix   Admission platelet mapping thromboelastogram showed 100% inhibition at AA and ADP receptors  Platelet reversal decision pending trajectory of serial h/h and hemodynamics    Renal hematoma, left, initial encounter- (present on admission)  Assessment & Plan  Left subcapsular hematoma with hemorrhage extending into the left perinephric space and pelvis. Several foci of active extravasation in the left mid kidney and lower pole.   ICU admission  Serial hemograms and abdominal exams.    Urinary retention- (present on admission)  Assessment & Plan  4/26 Gutierres catheter placed  Gross hematuria.   Strict I&O    Screening " examination for infectious disease- (present on admission)  Assessment & Plan  4/26 COVID-19 Screening completed.  No fever, pulmonary symptoms, contact with infected individual, and travel to/from a high risk region.    Prolonged Q-T interval on ECG- (present on admission)  Assessment & Plan  4/26 QTc >500  Caution with medications affecting the QTc.    Type 2 diabetes mellitus (HCC)- (present on admission)  Assessment & Plan  Chronic condition treated with glimepiride.  Definitive medication reconciliation pending.   Insulin sliding scale during acute illness.    CHF (congestive heart failure) (HCC)- (present on admission)  Assessment & Plan  Chronic condition treated with bumetanide and carvedilol  Definitive medication reconciliation pending.    Essential hypertension- (present on admission)  Assessment & Plan  Chronic condition treated with sacubitlril and valsartan, carvedilol,  .  Definitive medication reconciliation pending.    S/P TAVR (transcatheter aortic valve replacement)- (present on admission)  Assessment & Plan  Stenting of the left main and right coronary arteries as well as TAVR in May 2019  Patient is on ASA and Plavix.  Dr Lee, Cardiology consulted    CKD (chronic kidney disease)- (present on admission)  Assessment & Plan  Chronic condition  BUN 45 / Cr 1.93 / GFR 34  Gentle hydration.  Trend renal indices.    Trauma- (present on admission)  Assessment & Plan  GLF. Struck his left abdomen.  Non Trauma Activation.  Vj Rajan MD. Trauma Surgery.

## 2020-04-27 NOTE — ASSESSMENT & PLAN NOTE
4/26 Lopez catheter placed  Gross hematuria, likely placement related.   Strict I&O  4/29 Trial lopez removal

## 2020-04-27 NOTE — CARE PLAN
Problem: Pain Management  Goal: Pain level will decrease to patient's comfort goal  Intervention: Follow pain managment plan developed in collaboration with patient and Interdisciplinary Team  Note: Use pharmacologocal and nonpharmacological methods to control pain.      Problem: Hemodynamic Status  Goal: Vital Signs and Fluid Balance Management  Intervention: Hemodynamic Monitoring  Note: Levophed for MAP > 65, q 6 hemoglobin

## 2020-04-27 NOTE — ED TRIAGE NOTES
Chief Complaint   Patient presents with   • GLF   • LLQ Pain     Patient bib ambulance. Patient had a GLF earlier today with his Left elbow jamming into the Left side of his abdomin. Patient then began having 10/10 LLQ abdominal pain with radiation to his back. Patient stated immediate abdominal distention. Patient found to be clammy and diaphoretic. 50 mcg of fentanyl and 4 mg zofran by ems.

## 2020-04-27 NOTE — ASSESSMENT & PLAN NOTE
Long term use of ASA and Plavix   Admission platelet mapping thromboelastogram showed 100% inhibition at AA and ADP receptors  Platelet reversal decision pending trajectory of serial h/h and hemodynamics  4/27 One unit of platelets transfused.  4/29 ASA and Plavix resumed

## 2020-04-27 NOTE — PROGRESS NOTES
2 RN pt belonging check.     - pair of white and navy blue new balance sneakers  - pair of white socks  - black [employer] bag with multiple cards and pill pack  - white athletic shorts  - purple t-shirt that was cut off pt; pt state it was okay to throw away  - cell phone at bedside    Safe keeping offered to pt. Pt declined at this time and wishes for belongings to remain in [address] closet.

## 2020-04-27 NOTE — ED NOTES
Med rec updated and  Complete.  Allergies reviewed. VIA phone call with family ( wife).   all morning medications taken.    No antibiotic use in last 14 days.      Home pharmacy [facility]  [place].

## 2020-04-27 NOTE — OP REPORT
DATE OF OPERATION: 4/27/2020    DIAGNOSES: hemodynamic instability and hemorrhagic shock.    PROCEDURE PERFORMED: Right  subclavian central venous catheter placement.     SURGEON: Mayur Gan M.D.     INDICATIONS: The patient is a critically ill 79 year-old elderly man with hemodynamic instability and hemorrhagic shock. A central venous line is placed at the bedside.     DESCRIPTION OF PROCEDURE: Following informed consent, the patient was properly identified and optimally positioned. Intravenous sedation and analgesia was administered. The procedural site was prepped with ChloraPrep® and draped in a standard fashion. Full barrier precautions were employed. The patient was placed in shallow Trendelenburg position. The subclavian vein was accessed percutaneously and a .032 flexible guide wire was advanced without resistance. A 7 Fr ARROWg+leni® Blue PLUS triple lumen catheter was passed using sterile Seldinger technique and secured to the skin with silk sutures. A sterile dressing was applied. All ports flushed and aspirated freely.      The patient tolerated the procedure well. There were no apparent complications. A stat portable chest radiograph was ordered.         ____________________________________     Mayur Gan M.D.    DD: 4/27/2020  1:04 AM

## 2020-04-27 NOTE — PROGRESS NOTES
0743 Discussed with Dr Gan pt's blood pressure 50-60/40s at times.  Pt remains awake and oriented.    3970 Page placed to cardiology    0805 Cardiology repaged, no response >15 min

## 2020-04-27 NOTE — PROGRESS NOTES
Trauma / Surgical Daily Progress Note    Date of Service  4/27/2020    Chief Complaint  79 y.o. male admitted 4/26/2020 after glf with renal injury    Interval Events  Critically ill  Requires continued ICU and hospital admission  Seen on rounds and discussed with multidisciplinary team  Critical care interventions include:  integration of multiple data points and associated complex medical decisions   Ongoing post traumatic resuscitation with fluids and pressors  Close monitoring of hemodynamics and hemoglobin  Aggressive pulmonary toilet-at high risk for decompensation  Mgt of constipation  Pain control    Review of Systems  Review of Systems   Musculoskeletal: Positive for arthralgias and myalgias.   All other systems reviewed and are negative.       Vital Signs for last 24 hours  Temp:  [35.9 °C (96.6 °F)-36.2 °C (97.2 °F)] 36.1 °C (97 °F)  Pulse:  [] 109  Resp:  [0-58] 34  BP: ()/() 101/60  SpO2:  [91 %-100 %] 96 %    Hemodynamic parameters for last 24 hours  CVP:  [3 MM HG-19 MM HG] 3 MM HG    Respiratory Data     Respiration: (!) 34, Pulse Oximetry: 96 %     Work Of Breathing / Effort: Mild;Shallow  RUL Breath Sounds: Diminished, RML Breath Sounds: Diminished, RLL Breath Sounds: Diminished, EDWIGE Breath Sounds: Diminished, LLL Breath Sounds: Diminished    Physical Exam  Physical Exam  HENT:      Head: Normocephalic and atraumatic.      Right Ear: External ear normal.      Left Ear: External ear normal.      Mouth/Throat:      Mouth: Mucous membranes are moist.   Eyes:      General:         Right eye: No discharge.         Left eye: No discharge.      Extraocular Movements: Extraocular movements intact.      Pupils: Pupils are equal, round, and reactive to light.   Neck:      Musculoskeletal: Normal range of motion and neck supple.   Cardiovascular:      Rate and Rhythm: Regular rhythm. Tachycardia present.      Pulses: Normal pulses.      Heart sounds: Normal heart sounds.   Pulmonary:       Effort: No respiratory distress.      Breath sounds: No wheezing or rales.   Neurological:      Mental Status: He is alert.         Laboratory  Recent Results (from the past 24 hour(s))   CBC w/ Differential    Collection Time: 04/26/20  7:02 PM   Result Value Ref Range    WBC 19.4 (H) 4.8 - 10.8 K/uL    RBC 6.85 (H) 4.70 - 6.10 M/uL    Hemoglobin 18.9 (H) 14.0 - 18.0 g/dL    Hematocrit 58.9 (H) 42.0 - 52.0 %    MCV 86.0 81.4 - 97.8 fL    MCH 27.6 27.0 - 33.0 pg    MCHC 32.1 (L) 33.7 - 35.3 g/dL    RDW 56.1 (H) 35.9 - 50.0 fL    Platelet Count 225 164 - 446 K/uL    MPV 10.1 9.0 - 12.9 fL    Neutrophils-Polys 68.40 44.00 - 72.00 %    Lymphocytes 21.30 (L) 22.00 - 41.00 %    Monocytes 7.70 0.00 - 13.40 %    Eosinophils 1.40 0.00 - 6.90 %    Basophils 0.40 0.00 - 1.80 %    Immature Granulocytes 0.80 0.00 - 0.90 %    Nucleated RBC 0.00 /100 WBC    Neutrophils (Absolute) 13.24 (H) 1.82 - 7.42 K/uL    Lymphs (Absolute) 4.13 1.00 - 4.80 K/uL    Monos (Absolute) 1.50 (H) 0.00 - 0.85 K/uL    Eos (Absolute) 0.27 0.00 - 0.51 K/uL    Baso (Absolute) 0.07 0.00 - 0.12 K/uL    Immature Granulocytes (abs) 0.16 (H) 0.00 - 0.11 K/uL    NRBC (Absolute) 0.00 K/uL   Complete Metabolic Panel (CMP)    Collection Time: 04/26/20  7:02 PM   Result Value Ref Range    Sodium 136 135 - 145 mmol/L    Potassium 4.8 3.6 - 5.5 mmol/L    Chloride 97 96 - 112 mmol/L    Co2 21 20 - 33 mmol/L    Anion Gap 18.0 (H) 7.0 - 16.0    Glucose 306 (H) 65 - 99 mg/dL    Bun 45 (H) 8 - 22 mg/dL    Creatinine 1.93 (H) 0.50 - 1.40 mg/dL    Calcium 9.4 8.5 - 10.5 mg/dL    AST(SGOT) 27 12 - 45 U/L    ALT(SGPT) 20 2 - 50 U/L    Alkaline Phosphatase 52 30 - 99 U/L    Total Bilirubin 0.7 0.1 - 1.5 mg/dL    Albumin 3.9 3.2 - 4.9 g/dL    Total Protein 6.4 6.0 - 8.2 g/dL    Globulin 2.5 1.9 - 3.5 g/dL    A-G Ratio 1.6 g/dL   Troponin STAT    Collection Time: 04/26/20  7:02 PM   Result Value Ref Range    Troponin T 43 (H) 6 - 19 ng/L   LACTIC ACID    Collection Time:  20  7:02 PM   Result Value Ref Range    Lactic Acid 2.0 0.5 - 2.0 mmol/L   PT/INR    Collection Time: 20  7:02 PM   Result Value Ref Range    PT 14.8 (H) 12.0 - 14.6 sec    INR 1.14 (H) 0.87 - 1.13   APTT    Collection Time: 20  7:02 PM   Result Value Ref Range    APTT 30.3 24.7 - 36.0 sec   ESTIMATED GFR    Collection Time: 20  7:02 PM   Result Value Ref Range    GFR If  41 (A) >60 mL/min/1.73 m 2    GFR If Non  34 (A) >60 mL/min/1.73 m 2   EKG    Collection Time: 20  7:32 PM   Result Value Ref Range    Report       [facility] Emergency Dept.    Test Date:  2020  Pt Name:    LIAN VILLA                    Department: ER  MRN:        [id number]                      Room:       [facility]  Gender:     Male                         Technician: [id number]  :        1940                   Requested By:ANDREW WHITEHEAD  Order #:    [id number]                    Reading MD:    Measurements  Intervals                                Axis  Rate:       127                          P:          0  CT:         220                          QRS:        -8  QRSD:       104                          T:          105  QT:         356  QTc:        518    Interpretive Statements  SINUS TACHYCARDIA  VENTRICULAR PREMATURE COMPLEX  FIRST DEGREE AV BLOCK  LEFT ATRIAL ABNORMALITY  LVH WITH SECONDARY REPOLARIZATION ABNORMALITY  PROBABLE INFERIOR INFARCT, AGE INDETERMINATE  PROLONGED QT INTERVAL  Compared to ECG 2020 18:35:19  Ventricular premature complex(es) now present  First degree AV block now present   Atrial abnormality now present  Left ventricular hypertrophy now present  Early repolarization now present  Myocardial infarct finding now present  Prolonged QT interval now present  Sinus rhythm no longer present  Left bundle-branch block no longer present  ST (T wave) deviation no longer present     COD - Adult (Type and Screen)    Collection Time: 20   7:42 PM   Result Value Ref Range    ABO Grouping Only O     Rh Grouping Only NEG     Antibody Screen-Cod NEG    PLATELET MAPPING WITH BASIC TEG    Collection Time: 20  7:56 PM   Result Value Ref Range    Reaction Time Initial-R 4.8 (L) 5.0 - 10.0 min    Clot Kinetics-K 3.2 (H) 1.0 - 3.0 min    Clot Angle-Angle 52.3 (L) 53.0 - 72.0 degrees    Maximum Clot Strength-MA 55.8 50.0 - 70.0 mm    Lysis 30 minutes-LY30 0.0 0.0 - 8.0 %    % Inhibition .0 %    % Inhibition .0 %    TEG Algorithm Link Algorithm    Hemoglobin - STAT    Collection Time: 20  8:30 PM   Result Value Ref Range    Hemoglobin 14.4 14.0 - 18.0 g/dL   Hemoglobin - Q6 hours x4    Collection Time: 20 11:52 PM   Result Value Ref Range    Hemoglobin 14.7 14.0 - 18.0 g/dL   ACCU-CHEK GLUCOSE    Collection Time: 20 11:52 PM   Result Value Ref Range    Glucose - Accu-Ck 295 (H) 65 - 99 mg/dL   EKG    Collection Time: 20 12:17 AM   Result Value Ref Range    Report       [facility]    Test Date:  2020  Pt Name:    LIAN VILLA                    Department: 19  MRN:        [id number]                      Room:       [facility]  Gender:     Male                         Technician: FRANKO  :        1940                   Requested By:SAI BLUM  Order #:    [id number]                    Reading MD: Ilan Medina MD    Measurements  Intervals                                Axis  Rate:       122                          P:          -11  OH:         152                          QRS:        10  QRSD:       94                           T:          207  QT:         296  QTc:        422    Interpretive Statements  SINUS TACHYCARDIA  VENTRICULAR PREMATURE COMPLEX    BORDERLINE INFERIOR Q WAVES  NONSPECIFIC ST-T ABNORMALITIES  Compared to ECG 2020 19:32:23  First degree AV block no longer present  Electronically Signed On 2020 7:53:14 PDT by Ilan Medina MD     HGB    Collection Time: 20  2:22 AM    Result Value Ref Range    Hemoglobin 11.7 (L) 14.0 - 18.0 g/dL   PLATELETS REQUEST    Collection Time: 04/27/20  2:42 AM   Result Value Ref Range    Component P       P1                  Plt Pheresis        [id number]   issued       04/27/20   02:47      Product Type P1     Dispense Status issued     Unit Number (Barcoded) [id number]     Product Code (Barcoded) J5658I65     Blood Type (Barcoded) 7300    CBC with Differential: Tomorrow AM    Collection Time: 04/27/20  5:26 AM   Result Value Ref Range    WBC 14.0 (H) 4.8 - 10.8 K/uL    RBC 3.94 (L) 4.70 - 6.10 M/uL    Hemoglobin 10.7 (L) 14.0 - 18.0 g/dL    Hematocrit 34.5 (L) 42.0 - 52.0 %    MCV 87.6 81.4 - 97.8 fL    MCH 27.2 27.0 - 33.0 pg    MCHC 31.0 (L) 33.7 - 35.3 g/dL    RDW 57.8 (H) 35.9 - 50.0 fL    Platelet Count 174 164 - 446 K/uL    MPV 10.2 9.0 - 12.9 fL    Neutrophils-Polys 82.70 (H) 44.00 - 72.00 %    Lymphocytes 6.60 (L) 22.00 - 41.00 %    Monocytes 10.00 0.00 - 13.40 %    Eosinophils 0.00 0.00 - 6.90 %    Basophils 0.10 0.00 - 1.80 %    Immature Granulocytes 0.60 0.00 - 0.90 %    Nucleated RBC 0.00 /100 WBC    Neutrophils (Absolute) 11.59 (H) 1.82 - 7.42 K/uL    Lymphs (Absolute) 0.93 (L) 1.00 - 4.80 K/uL    Monos (Absolute) 1.41 (H) 0.00 - 0.85 K/uL    Eos (Absolute) 0.00 0.00 - 0.51 K/uL    Baso (Absolute) 0.02 0.00 - 0.12 K/uL    Immature Granulocytes (abs) 0.09 0.00 - 0.11 K/uL    NRBC (Absolute) 0.00 K/uL   Comp Metabolic Panel (CMP): Tomorrow AM    Collection Time: 04/27/20  5:26 AM   Result Value Ref Range    Sodium 137 135 - 145 mmol/L    Potassium 5.1 3.6 - 5.5 mmol/L    Chloride 103 96 - 112 mmol/L    Co2 23 20 - 33 mmol/L    Anion Gap 11.0 7.0 - 16.0    Glucose 216 (H) 65 - 99 mg/dL    Bun 45 (H) 8 - 22 mg/dL    Creatinine 2.34 (H) 0.50 - 1.40 mg/dL    Calcium 7.7 (L) 8.5 - 10.5 mg/dL    AST(SGOT) 21 12 - 45 U/L    ALT(SGPT) 15 2 - 50 U/L    Alkaline Phosphatase 35 30 - 99 U/L    Total Bilirubin 0.5 0.1 - 1.5 mg/dL    Albumin 2.8  (L) 3.2 - 4.9 g/dL    Total Protein 4.5 (L) 6.0 - 8.2 g/dL    Globulin 1.7 (L) 1.9 - 3.5 g/dL    A-G Ratio 1.6 g/dL   MAGNESIUM    Collection Time: 04/27/20  5:26 AM   Result Value Ref Range    Magnesium 1.8 1.5 - 2.5 mg/dL   PHOSPHORUS    Collection Time: 04/27/20  5:26 AM   Result Value Ref Range    Phosphorus 5.5 (H) 2.5 - 4.5 mg/dL   ACCU-CHEK GLUCOSE    Collection Time: 04/27/20  5:26 AM   Result Value Ref Range    Glucose - Accu-Ck 204 (H) 65 - 99 mg/dL   ESTIMATED GFR    Collection Time: 04/27/20  5:26 AM   Result Value Ref Range    GFR If  33 (A) >60 mL/min/1.73 m 2    GFR If Non  27 (A) >60 mL/min/1.73 m 2   Hemoglobin - Q6 hours x4    Collection Time: 04/27/20 11:12 AM   Result Value Ref Range    Hemoglobin 10.3 (L) 14.0 - 18.0 g/dL   ACCU-CHEK GLUCOSE    Collection Time: 04/27/20 11:12 AM   Result Value Ref Range    Glucose - Accu-Ck 147 (H) 65 - 99 mg/dL       Fluids    Intake/Output Summary (Last 24 hours) at 4/27/2020 1503  Last data filed at 4/27/2020 1200  Gross per 24 hour   Intake 6314.57 ml   Output 350 ml   Net 5964.57 ml       Core Measures & Quality Metrics  Core Measures & Quality Metrics  SHERIN Score  ETOH Screening    Assessment/Plan  Platelet inhibition due to Plavix- (present on admission)  Assessment & Plan  Long term use of ASA and Plavix   Admission platelet mapping thromboelastogram showed 100% inhibition at AA and ADP receptors  Platelet reversal decision pending trajectory of serial h/h and hemodynamics  4/27 One unit of platelets transfused    Renal hematoma, left, initial encounter- (present on admission)  Assessment & Plan  Left subcapsular hematoma with hemorrhage extending into the left perinephric space and pelvis.   Several foci of active extravasation in the left mid kidney and lower pole.   ICU admission.  Serial hemograms and abdominal exams.    Urinary retention- (present on admission)  Assessment & Plan  4/26 Gutierres catheter placed  Gross  hematuria.   Strict I&O    Screening examination for infectious disease- (present on admission)  Assessment & Plan  4/26 COVID-19 Screening completed.  No fever, pulmonary symptoms, contact with infected individual, and travel to/from a high risk region.    Prolonged Q-T interval on ECG- (present on admission)  Assessment & Plan  4/26 QTc >500  Caution with medications affecting the QTc.    Type 2 diabetes mellitus (HCC)- (present on admission)  Assessment & Plan  Chronic condition treated with glimepiride.  Definitive medication reconciliation pending.   Insulin sliding scale during acute illness.    CHF (congestive heart failure) (HCC)- (present on admission)  Assessment & Plan  Chronic condition treated with bumetanide and carvedilol  Definitive medication reconciliation pending.    Essential hypertension- (present on admission)  Assessment & Plan  Chronic condition treated with sacubitlril and valsartan, carvedilol,  .  Definitive medication reconciliation pending.    S/P TAVR (transcatheter aortic valve replacement)- (present on admission)  Assessment & Plan  Stenting of the left main and right coronary arteries as well as TAVR in May 2019  Patient is on ASA and Plavix.  Dr Lee, Cardiology consulted    CKD (chronic kidney disease)- (present on admission)  Assessment & Plan  Chronic condition  BUN 45 / Cr 1.93 / GFR 34  Gentle hydration.  Trend renal indices.    Trauma- (present on admission)  Assessment & Plan  GLF. Struck his left abdomen.  Non Trauma Activation.  Vj Rajan MD. Trauma Surgery.        Discussed patient condition with RN, RT, Pharmacy and trauma surgery.  CRITICAL CARE TIME EXCLUDING PROCEDURES: 33    minutes

## 2020-04-27 NOTE — H&P
Trauma History and Physical  4/26/2020    Attending Physician: Vj Rajan MD.     CC: Trauma The patient was triaged as a T-5000 in accordance with established pre hospital protocols. An expeditious primary and secondary survey with required adjuncts was conducted. See trauma narrator for full details.    HPI: This is a 79 y.o. male with many many chronic medical problems who sustained a ground level fall today, landing with his left elbow jamming him in the left abdomen. He did not hit his head or lose consciousness but [place] [facility] [place]. Subsequent CT work up revealed a kidney injury and I have therefore been asked to admit the patient. He denies taking blood thinners, though his MAR and his cardiology note from ten days ago indicate he takes Plavix and aspirin. No thromboelastogram is available at this time.     Past Medical History:   Diagnosis Date   • Arthritis    • Back pain    • Breath shortness     pt states short of breath 24/7   • CAD (coronary artery disease)    • CATARACT     removed bilat   • Chest pain    • Chest tightness    • Chickenpox    • Congestive heart failure (HCC)    • Constipation    • Coronary heart disease    • Cough    • Daytime sleepiness    • Diabetes     PT REPORTS DIABETES RESOLVED IN 2009. NO LONGER TAKES MEDS   • Difficulty breathing    • Dilated cardiomyopathy (HCC)    • Fatigue    • Hearing difficulty    • Heart murmur    • Heart valve disease    • Heartburn    • Hiatus hernia syndrome    • Hyperlipidemia    • Hypertension     pt states well controlled on meds   • Kidney disease    • Mumps    • Pain 08-29-13    back, hips and bilat legs, 4/10   • Pain 6/22/2015    left humerus   • Pain 1/7/16    knees, back and shoulder   • Painful joint    • Palpitations    • Pneumonia 2007   • Rhinitis    • Ringing in ears    • Severe aortic stenosis 12/4/2019   • Shortness of breath    • Swelling of lower extremity    • Tonsillitis    • Ulcer 2014     Dr. Porter, gastroenterologist   • Unspecified hemorrhagic conditions     bruises easily       Past Surgical History:   Procedure Laterality Date   • TRANSCATHETER AORTIC VALVE REPLACEMENT N/A 1/27/2020    Procedure: REPLACEMENT, AORTIC VALVE, TRANSCATHETER-;  Surgeon: Surendra Castro M.D.;  Location: [facility];  Service: Cardiac   • OLGA  1/27/2020    Procedure: ECHOCARDIOGRAM, TRANSESOPHAGEAL;  Surgeon: Surendra Castro M.D.;  Location: [facility];  Service: Cardiac   • GASTROSCOPY N/A 1/8/2016    Procedure: GASTROSCOPY;  Surgeon: Deniz Sue M.D.;  Location: [facility];  Service:    • HUMERUS ORIF Left 6/25/2015    Procedure: HUMERUS ORIF/ PROXIMAL;  Surgeon: Crisatl Guido M.D.;  Location: [facility];  Service:    • LUMBAR FUSION POSTERIOR  9/5/2013    Performed by Edith Sears M.D. at [facility]   • LUMBAR DECOMPRESSION  9/5/2013    Performed by Edith Sears M.D. at [facility]   • MASS EXCISION NEURO  9/5/2013    Performed by Edith Sears M.D. at [facility]   • RECOVERY  6/26/2012    Performed by [facility], IR-RECOVERY at [facility] SAME DAY [facility]   • DRAINAGE HEMATOMA  5/25/2012    Performed by DEINZ SUE at [facility]   • GASTRIC BAND LAPAROSCOPIC REVISION  5/11/2012    Performed by DENIZ SUE at [facility]   • LUMBAR FUSION POSTERIOR  3/26/2012    Performed by EDITH SEARS at [facility]   • LUMBAR DECOMPRESSION  3/26/2012    Performed by EDITH SEARS at [facility]   • INJ,EPIDURAL/LUMB/SAC SINGLE  3/19/2012    Performed by KRISTIN BALTAZAR at [facility]   • INJ,EPIDURAL/LUMB/SAC SINGLE  3/5/2012    Performed by KRISTIN BALTAZAR at [facility]   • GASTRIC BANDING LAPAROSCOPIC  3/24/2010    Performed by DENIZ SUE at [facility]   • HIATAL HERNIA REPAIR  3/24/2010    Performed by DENIZ SUE at [facility]  [facility] ORS   • KNEE ARTHROPLASTY TOTAL  2000    bilateral   • ABDOMINOPLASTY  1990   • AORTIC VALVE REPLACEMENT     • ARTHROSCOPY, KNEE     • LAMINOTOMY     • PB REMV 2ND CATARACT,CORN-SCLER SECTN     • SINUSCOPE     • SLEEVE,CARRIE VASO THIGH     • TONSILLECTOMY     • ZZZ CARDIAC CATH         Current Facility-Administered Medications   Medication Dose Route Frequency Provider Last Rate Last Dose   • Respiratory Therapy Consult   Nebulization Continuous RT HAMZAH WhitePElizabetRMARIA VICTORIA       • Pharmacy Consult Request ...Pain Management Review 1 Each  1 Each Other PHARMACY TO DOSE HAMZAH WhiteP.RLATOYA.       • docusate sodium (COLACE) capsule 100 mg  100 mg Oral BID HAMZAH WhitePElizabetRLATOYA.       • senna-docusate (PERICOLACE or SENOKOT S) 8.6-50 MG per tablet 1 Tab  1 Tab Oral Nightly HAMZAH WhiteP.R.N.       • senna-docusate (PERICOLACE or SENOKOT S) 8.6-50 MG per tablet 1 Tab  1 Tab Oral Q24HRS PRN HAMZAH WhiteP.R.N.       • polyethylene glycol/lytes (MIRALAX) PACKET 1 Packet  1 Packet Oral BID HAMZAH WhitePElizabetRLATOYA.       • [START ON 4/27/2020] magnesium hydroxide (MILK OF MAGNESIA) suspension 30 mL  30 mL Oral DAILY HAMZAH WhiteP.R.N.       • bisacodyl (DULCOLAX) suppository 10 mg  10 mg Rectal Q24HRS PRN HAMZAH WhiteP.R.JESSICA.       • fleet enema 133 mL  1 Each Rectal Once PRN HAMZAH WhiteP.R.N.       • LR infusion   Intravenous Continuous HAMZAH WhiteP.R.JESSICA.       • fentaNYL (SUBLIMAZE) injection  mcg   mcg Intravenous Q HOUR PRN HAMZAH WhiteP.R.N.       • famotidine (PEPCID) tablet 20 mg  20 mg Oral BID HAMZAH WhiteP.RLATOYA.        Or   • famotidine (PEPCID) injection 20 mg  20 mg Intravenous BID HAMZAH WhiteP.RElizabetN.       • ondansetron (ZOFRAN) syringe/vial injection 4 mg  4 mg Intravenous Q4HRS PRN NEAL White       • [START ON 4/27/2020] insulin regular (HUMULIN R) injection 1-6 Units  1-6 Units Subcutaneous Q6HRS Alyson Calvo,  "A.P.RElizabetN.        And   • glucose 4 g chewable tablet 16 g  16 g Oral Q15 MIN PRN NEAL Whtie        And   • dextrose 50% (D50W) injection 50 mL  50 mL Intravenous Q15 MIN PRN NEAL White           Social History     Tobacco Use   • Smoking status: Never Smoker   • Smokeless tobacco: Never Used   • Tobacco comment: 0   Substance Use Topics   • Alcohol use: No     Alcohol/week: 0.0 oz     Frequency: Never     Binge frequency: Never       Family History   Problem Relation Age of Onset   • No Known Problems Sister    • No Known Problems Sister    • No Known Problems Sister    • Diabetes Other    • No Known Problems Mother    • No Known Problems Father    • Heart Disease Neg Hx        Allergies:  Statins [hmg-coa-r inhibitors] and Atorvastatin    Review of Systems:  Constitutional: Negative for fever, chills, weight loss, malaise/fatigue and diaphoresis.   HENT: Negative for hearing loss, ear pain, nosebleeds, congestion, sore throat, neck pain, and ear discharge.    Eyes: Negative for blurred vision, double vision, and redness.   Respiratory: Negative for cough, sputum production. He has chronic shortness of breath. Denies wheezing.  Cardiovascular: Negative for chest pain, palpitations.   Gastrointestinal: Negative for heartburn, nausea, vomiting, abdominal pain, diarrhea, constipation.  Genitourinary: Negative for dysuria, urgency, frequency.   Musculoskeletal: Positive for myalgias, back pain, joint pain and falls.   Skin: Negative for itching and rash.  Neurological: Negative for dizziness, loss of consciousness, weakness and headaches.   Endo/Heme/Allergies: Negative for environmental allergies. Does bruise/bleed easily.   Psychiatric/Behavioral: Negative for depression and substance abuse. The patient is not nervous/anxious.    Physical Exam:  /69   Pulse 66   Temp 36.2 °C (97.2 °F) (Oral)   Resp (!) 29   Ht 1.854 m (6' 1\")   Wt 105.2 kg (232 lb)   SpO2 98%     Constitutional: " Awake, alert, oriented x3. No acute distress. GCS 15. E4 V5 M6.  Head: No cephalohematoma. Pupils 4-3 reactive bilaterally. Midface stable. No malocclusion.    Neck: No tracheal deviation. No midline cervical spine tenderness. C-collar in place. No cervical seatbelt sign.  Cardiovascular: Normal rate, regular rhythm, normal heart sounds and intact distal pulses.  Exam reveals no gallop and no friction rub.  No murmur heard.  Pulmonary/Chest: Clavicles nontender to palpation. There is not any chest wall tenderness bilaterally.  No crepitus. Positive breath sounds bilaterally.   Abdominal: Soft, nondistended. Nontender to palpation. Pelvis is stable to anterior-posterior compression. No abdominal seatbelt sign.   Musculoskeletal: Right upper extremity grossly atraumatic, palpable radial pulse. 5/5  strength. Full ROM and strength at elbow.  Left upper extremity grossly atraumatic, palpable radial pulse. 5/5  strength. Full ROM and strength at elbow.  Right lower extremity grossly atraumatic. 5/5 strength in ankle plantar flexion and dorsiflexion. No pain and full ROM at right knee and hip. 2+ DP pulse.  Left  lower extremity grossly atraumatic. 5/5 strength in ankle plantar flexion and dorsiflexion. No pain and full ROM at left knee and hip. 2+ DP pulse.  Back: Midline thoracic and lumbar spines are generally nontender to palpation. No step-offs. Mild sacral erythema present.  : Normal male external genitalia. Rectal exam not done. No blood visible at urethral meatus.   Neurological: Sensation grossly intact to light touch dorsum and plantar surfaces of both feet and the medial and lateral aspects of both lower legs.  Sensation grossly intact to light touch dorsum and plantar surfaces of both hands.   Skin: Skin is warm and dry.  No diaphoresis. No erythema. No pallor.   Psychiatric:  Normal mood and affect.  Behavior is appropriate.       Labs:  Recent Labs     04/26/20 1902   WBC 19.4*   RBC 6.85*    HEMOGLOBIN 18.9*   HEMATOCRIT 58.9*   MCV 86.0   MCH 27.6   MCHC 32.1*   RDW 56.1*   PLATELETCT 225   MPV 10.1     Recent Labs     04/26/20 1902   SODIUM 136   POTASSIUM 4.8   CHLORIDE 97   CO2 21   GLUCOSE 306*   BUN 45*   CREATININE 1.93*   CALCIUM 9.4     Recent Labs     04/26/20 1902   APTT 30.3   INR 1.14*     Recent Labs     04/26/20 1902   ASTSGOT 27   ALTSGPT 20   TBILIRUBIN 0.7   ALKPHOSPHAT 52   GLOBULIN 2.5   INR 1.14*         Radiology:  CT-CTA COMPLETE THORACOABDOMINAL AORTA   Final Result      1.  Left subcapsular hematoma with hemorrhage extending into the left perinephric space and pelvis. Several foci of active extravasation in the left mid kidney and lower pole. No solid mass is identified in the left kidney on prior CTA.      2.  Atherosclerosis and cardiomegaly.      3.  Diverticulosis.            Comment: Results discussed with Dr. Kramer at approximately 7:35 PM            DX-CHEST-PORTABLE (1 VIEW)   Final Result      1.  Low lung volumes with elevation of the left hemidiaphragm. Mild left basilar opacification likely represents atelectasis.      2.  Stable cardiomegaly      DX-CHEST-PORTABLE (1 VIEW)    (Results Pending)         Assessment: This is a 79 y.o. male with a large cohort of chronic medical problems now with an acute kidney injury from a ground level fall. I have ordered a STAT platelet mapping TEG. He is at very high risk of cardiac complications due to his Left mainstem PCI and for that reason I have not empirically reversed his Plavix and Aspirin. I will await the results of his platelet mapping TEG and his serial h/h to determine whether or not platelet transfusion is worth the risk. His condition is critical.     I have asked Dr. Lee of cardiology to help manage the complicated acute and chronic cardiac issues.     Plan: Admit to ICU  Platelet inhibition due to Plavix- (present on admission)  Assessment & Plan  Long term use of ASA and Plavix   TEG pending     Renal  hematoma, left, initial encounter- (present on admission)  Assessment & Plan  Left subcapsular hematoma with hemorrhage extending into the left perinephric space and pelvis. Several foci of active extravasation in the left mid kidney and lower pole.   ICU admission  Serial hemograms and abdominal exams.    Type 2 diabetes mellitus (HCC)- (present on admission)  Assessment & Plan  Chronic condition treated with glimepiride.  Definitive medication reconciliation pending.   Insulin sliding scale during acute illness.    CHF (congestive heart failure) (HCC)- (present on admission)  Assessment & Plan  Chronic condition treated with bumetanide.  Definitive medication reconciliation pending.    Essential hypertension- (present on admission)  Assessment & Plan  Chronic condition treated with sacubitlril and valsartan, carvedilol,  .  Definitive medication reconciliation pending.    S/P TAVR (transcatheter aortic valve replacement)- (present on admission)  Assessment & Plan  Stenting of the left main and right coronary arteries as well as TAVR in May 2019  Patient is on ASA and Plavix.  Dr Lee, Cardiology consulted    CKD (chronic kidney disease)- (present on admission)  Assessment & Plan  Chronic condition  BUN 45 / Cr 1.93 / GFR 34  Gentle hydration.  Trend renal indices.    Trauma- (present on admission)  Assessment & Plan  GLF. Struck his left abdomen.  Non Trauma Activation.  Vj Rajan MD. Trauma Surgery.        The patient is/remains critically ill with severe solid organ injury, anticoagulation.    I provided the following critical care services: resuscitation, management of above.    Critical care time spent exclusive of procedures: 85 minutes thus far.              Vj Rajan MD  [phone]

## 2020-04-27 NOTE — ASSESSMENT & PLAN NOTE
4/26 COVID-19 Screening completed.  No fever, pulmonary symptoms, contact with infected individual, and travel to/from a high risk region.

## 2020-04-27 NOTE — HEART FAILURE PROGRAM
"Cardiovascular Nurse Navigator () Advanced Heart Failure Program Inpatient Progress Note:    Per Dr. Landers's H&P, this patient's known HF is not acutely decompensated. Therefore, a seven day HF f/u appt is not currently indicated. Nor does the HF discharge checklist need to be used.    Patient is being primarily treated for GLF causing renal hematoma in the setting of DAPT use for PCI in December 2019.    Given his need for fluids and blood products, patient will be very high risk for HF decompensation, cardiology is fallowing. I've ordered I's and O's and daily weights to assist with monitoring of volume status.    Should an iatrogenic exacerbation happen, patient will require a seven calendar day f/u appt which can be achieved by placing a \"schedule heart failure follow up appointment\" order per protocol or by calling the hospital schedulers at [phone].      Many thanks, Kirstin, Cardiovascular Nurse Navigator, RN, [facility] [phone], & [employer] M-F (excluding holidays).      "

## 2020-04-27 NOTE — PROGRESS NOTES
Pt to room S121 at 2017.    Skin assessment completed. Discoloration to feet and lower legs. Abrasions to bilateral knees. Elbows red but blanching. Photos taken.

## 2020-04-27 NOTE — OP REPORT
DATE OF OPERATION: 4/27/2020    DIAGNOSES:   1. hemodynamic instability and hemorrhagic shock.   2. same.     PROCEDURE PERFORMED: Left radial artery catheter placement.     SURGEON: Mayur Gan M.D.     ASSISTANT: LOGAN White.    INDICATIONS: The patient is a 79 year-old elderly man with hemodynamic instability and hemorrhagic shock. A radial arterial line is placed at the bedside.     DESCRIPTION OF PROCEDURE: Following informed consent, the patient was properly identified and optimally positioned. Intravenous sedation and analgesia was administered. The procedural site was prepped and draped in a standard fashion. The left radial artery was accessed percutaneously and a wire was advanced without resistance. A single lumen arterial catheter was passed using sterile Seldinger technique. The catheter was connected to the invasive hemodynamic monitoring apparatus. A satisfactory arterial waveform was observed. The catheter was secured to the skin with silk sutures.  A sterile dressing was applied. The patient tolerated the procedure well. There were no apparent complications.          ____________________________________     Mayur Gan M.D.    DD: 4/27/2020  1:05 AM

## 2020-04-27 NOTE — ASSESSMENT & PLAN NOTE
Left subcapsular hematoma with hemorrhage extending into the left perinephric space and pelvis.   Several foci of active extravasation in the left mid kidney and lower pole.   4/28 IR Angio for hypotension and hemoglobin drop, negative for bleed, no interventions  Serial hemograms and abdominal exams.

## 2020-04-28 ENCOUNTER — APPOINTMENT (OUTPATIENT)
Dept: RADIOLOGY | Facility: MEDICAL CENTER | Age: 80
DRG: 698 | End: 2020-04-28
Attending: SURGERY
Payer: MEDICARE

## 2020-04-28 ENCOUNTER — APPOINTMENT (OUTPATIENT)
Dept: CARDIOLOGY | Facility: MEDICAL CENTER | Age: 80
End: 2020-04-28
Payer: MEDICARE

## 2020-04-28 ENCOUNTER — APPOINTMENT (OUTPATIENT)
Dept: RADIOLOGY | Facility: MEDICAL CENTER | Age: 80
DRG: 698 | End: 2020-04-28
Attending: NURSE PRACTITIONER
Payer: MEDICARE

## 2020-04-28 PROBLEM — R57.9 SHOCK CIRCULATORY (HCC): Status: ACTIVE | Noted: 2020-04-28

## 2020-04-28 LAB
ALBUMIN SERPL BCP-MCNC: 2.8 G/DL (ref 3.2–4.9)
ALBUMIN/GLOB SERPL: 1.4 G/DL
ALP SERPL-CCNC: 39 U/L (ref 30–99)
ALT SERPL-CCNC: 23 U/L (ref 2–50)
ANION GAP SERPL CALC-SCNC: 11 MMOL/L (ref 7–16)
AST SERPL-CCNC: 27 U/L (ref 12–45)
BASOPHILS # BLD AUTO: 0.1 % (ref 0–1.8)
BASOPHILS # BLD: 0.01 K/UL (ref 0–0.12)
BILIRUB SERPL-MCNC: 1 MG/DL (ref 0.1–1.5)
BUN SERPL-MCNC: 48 MG/DL (ref 8–22)
CALCIUM SERPL-MCNC: 7.7 MG/DL (ref 8.5–10.5)
CHLORIDE SERPL-SCNC: 101 MMOL/L (ref 96–112)
CO2 SERPL-SCNC: 24 MMOL/L (ref 20–33)
CORTIS SERPL-MCNC: 11 UG/DL (ref 0–23)
CREAT SERPL-MCNC: 2.64 MG/DL (ref 0.5–1.4)
EOSINOPHIL # BLD AUTO: 0.19 K/UL (ref 0–0.51)
EOSINOPHIL NFR BLD: 1.9 % (ref 0–6.9)
ERYTHROCYTE [DISTWIDTH] IN BLOOD BY AUTOMATED COUNT: 56.1 FL (ref 35.9–50)
GLOBULIN SER CALC-MCNC: 2 G/DL (ref 1.9–3.5)
GLUCOSE BLD-MCNC: 111 MG/DL (ref 65–99)
GLUCOSE BLD-MCNC: 134 MG/DL (ref 65–99)
GLUCOSE BLD-MCNC: 159 MG/DL (ref 65–99)
GLUCOSE BLD-MCNC: 176 MG/DL (ref 65–99)
GLUCOSE SERPL-MCNC: 180 MG/DL (ref 65–99)
HCT VFR BLD AUTO: 29.7 % (ref 42–52)
HGB BLD-MCNC: 6.4 G/DL (ref 14–18)
HGB BLD-MCNC: 9.2 G/DL (ref 14–18)
HGB BLD-MCNC: 9.5 G/DL (ref 14–18)
IMM GRANULOCYTES # BLD AUTO: 0.04 K/UL (ref 0–0.11)
IMM GRANULOCYTES NFR BLD AUTO: 0.4 % (ref 0–0.9)
LYMPHOCYTES # BLD AUTO: 0.99 K/UL (ref 1–4.8)
LYMPHOCYTES NFR BLD: 9.6 % (ref 22–41)
MCH RBC QN AUTO: 27.7 PG (ref 27–33)
MCHC RBC AUTO-ENTMCNC: 32 G/DL (ref 33.7–35.3)
MCV RBC AUTO: 86.6 FL (ref 81.4–97.8)
MONOCYTES # BLD AUTO: 1.22 K/UL (ref 0–0.85)
MONOCYTES NFR BLD AUTO: 11.9 % (ref 0–13.4)
NEUTROPHILS # BLD AUTO: 7.82 K/UL (ref 1.82–7.42)
NEUTROPHILS NFR BLD: 76.1 % (ref 44–72)
NRBC # BLD AUTO: 0 K/UL
NRBC BLD-RTO: 0 /100 WBC
PLATELET # BLD AUTO: 120 K/UL (ref 164–446)
PMV BLD AUTO: 9.6 FL (ref 9–12.9)
POTASSIUM SERPL-SCNC: 4.7 MMOL/L (ref 3.6–5.5)
PROT SERPL-MCNC: 4.8 G/DL (ref 6–8.2)
RBC # BLD AUTO: 3.43 M/UL (ref 4.7–6.1)
SODIUM SERPL-SCNC: 136 MMOL/L (ref 135–145)
WBC # BLD AUTO: 10.3 K/UL (ref 4.8–10.8)

## 2020-04-28 PROCEDURE — A9270 NON-COVERED ITEM OR SERVICE: HCPCS | Performed by: SURGERY

## 2020-04-28 PROCEDURE — 80053 COMPREHEN METABOLIC PANEL: CPT

## 2020-04-28 PROCEDURE — 99232 SBSQ HOSP IP/OBS MODERATE 35: CPT | Performed by: PHYSICIAN ASSISTANT

## 2020-04-28 PROCEDURE — 700102 HCHG RX REV CODE 250 W/ 637 OVERRIDE(OP): Performed by: SURGERY

## 2020-04-28 PROCEDURE — 700117 HCHG RX CONTRAST REV CODE 255: Performed by: SURGERY

## 2020-04-28 PROCEDURE — 85018 HEMOGLOBIN: CPT

## 2020-04-28 PROCEDURE — 71045 X-RAY EXAM CHEST 1 VIEW: CPT

## 2020-04-28 PROCEDURE — 85025 COMPLETE CBC W/AUTO DIFF WBC: CPT

## 2020-04-28 PROCEDURE — B410YZZ FLUOROSCOPY OF ABDOMINAL AORTA USING OTHER CONTRAST: ICD-10-PCS | Performed by: RADIOLOGY

## 2020-04-28 PROCEDURE — 700111 HCHG RX REV CODE 636 W/ 250 OVERRIDE (IP)

## 2020-04-28 PROCEDURE — 82533 TOTAL CORTISOL: CPT

## 2020-04-28 PROCEDURE — 700101 HCHG RX REV CODE 250: Performed by: PHYSICIAN ASSISTANT

## 2020-04-28 PROCEDURE — 770022 HCHG ROOM/CARE - ICU (200)

## 2020-04-28 PROCEDURE — B417YZZ FLUOROSCOPY OF LEFT RENAL ARTERY USING OTHER CONTRAST: ICD-10-PCS | Performed by: RADIOLOGY

## 2020-04-28 PROCEDURE — A9270 NON-COVERED ITEM OR SERVICE: HCPCS | Performed by: NURSE PRACTITIONER

## 2020-04-28 PROCEDURE — 700112 HCHG RX REV CODE 229: Performed by: NURSE PRACTITIONER

## 2020-04-28 PROCEDURE — P9016 RBC LEUKOCYTES REDUCED: HCPCS | Mod: 91

## 2020-04-28 PROCEDURE — 86923 COMPATIBILITY TEST ELECTRIC: CPT | Mod: 91

## 2020-04-28 PROCEDURE — 700111 HCHG RX REV CODE 636 W/ 250 OVERRIDE (IP): Performed by: RADIOLOGY

## 2020-04-28 PROCEDURE — 94669 MECHANICAL CHEST WALL OSCILL: CPT

## 2020-04-28 PROCEDURE — 36430 TRANSFUSION BLD/BLD COMPNT: CPT

## 2020-04-28 PROCEDURE — 99291 CRITICAL CARE FIRST HOUR: CPT | Performed by: SURGERY

## 2020-04-28 PROCEDURE — 82962 GLUCOSE BLOOD TEST: CPT | Mod: 91

## 2020-04-28 PROCEDURE — 99153 MOD SED SAME PHYS/QHP EA: CPT

## 2020-04-28 PROCEDURE — 30243N1 TRANSFUSION OF NONAUTOLOGOUS RED BLOOD CELLS INTO CENTRAL VEIN, PERCUTANEOUS APPROACH: ICD-10-PCS | Performed by: INTERNAL MEDICINE

## 2020-04-28 PROCEDURE — 700111 HCHG RX REV CODE 636 W/ 250 OVERRIDE (IP): Performed by: SURGERY

## 2020-04-28 PROCEDURE — 700102 HCHG RX REV CODE 250 W/ 637 OVERRIDE(OP): Performed by: NURSE PRACTITIONER

## 2020-04-28 RX ORDER — IODIXANOL 270 MG/ML
78 INJECTION, SOLUTION INTRAVASCULAR ONCE
Status: COMPLETED | OUTPATIENT
Start: 2020-04-28 | End: 2020-04-28

## 2020-04-28 RX ORDER — MIDAZOLAM HYDROCHLORIDE 1 MG/ML
.5-2 INJECTION INTRAMUSCULAR; INTRAVENOUS PRN
Status: ACTIVE | OUTPATIENT
Start: 2020-04-28 | End: 2020-04-28

## 2020-04-28 RX ORDER — SODIUM CHLORIDE 9 MG/ML
500 INJECTION, SOLUTION INTRAVENOUS
Status: ACTIVE | OUTPATIENT
Start: 2020-04-28 | End: 2020-04-28

## 2020-04-28 RX ORDER — ONDANSETRON 2 MG/ML
4 INJECTION INTRAMUSCULAR; INTRAVENOUS PRN
Status: ACTIVE | OUTPATIENT
Start: 2020-04-28 | End: 2020-04-28

## 2020-04-28 RX ORDER — MIDAZOLAM HYDROCHLORIDE 1 MG/ML
INJECTION INTRAMUSCULAR; INTRAVENOUS
Status: COMPLETED
Start: 2020-04-28 | End: 2020-04-28

## 2020-04-28 RX ORDER — CEFAZOLIN SODIUM 2 G/100ML
2 INJECTION, SOLUTION INTRAVENOUS ONCE
Status: COMPLETED | OUTPATIENT
Start: 2020-04-28 | End: 2020-04-28

## 2020-04-28 RX ORDER — CARVEDILOL 6.25 MG/1
3.12 TABLET ORAL 2 TIMES DAILY WITH MEALS
Status: DISCONTINUED | OUTPATIENT
Start: 2020-04-28 | End: 2020-04-30 | Stop reason: HOSPADM

## 2020-04-28 RX ADMIN — OXYCODONE HYDROCHLORIDE 10 MG: 10 TABLET ORAL at 12:56

## 2020-04-28 RX ADMIN — HYDROCORTISONE SODIUM SUCCINATE 50 MG: 100 INJECTION, POWDER, FOR SOLUTION INTRAMUSCULAR; INTRAVENOUS at 20:35

## 2020-04-28 RX ADMIN — MIDAZOLAM HYDROCHLORIDE 1 MG: 1 INJECTION INTRAMUSCULAR; INTRAVENOUS at 01:33

## 2020-04-28 RX ADMIN — MIDAZOLAM HYDROCHLORIDE 1 MG: 1 INJECTION, SOLUTION INTRAMUSCULAR; INTRAVENOUS at 01:33

## 2020-04-28 RX ADMIN — SENNOSIDES AND DOCUSATE SODIUM 1 TABLET: 8.6; 5 TABLET ORAL at 20:32

## 2020-04-28 RX ADMIN — IODIXANOL 78 ML: 270 INJECTION, SOLUTION INTRAVASCULAR at 01:45

## 2020-04-28 RX ADMIN — CARVEDILOL 3.12 MG: 6.25 TABLET, FILM COATED ORAL at 18:10

## 2020-04-28 RX ADMIN — FAMOTIDINE 20 MG: 20 TABLET ORAL at 05:25

## 2020-04-28 RX ADMIN — INSULIN HUMAN 1 UNITS: 100 INJECTION, SOLUTION PARENTERAL at 11:43

## 2020-04-28 RX ADMIN — CARVEDILOL 3.12 MG: 6.25 TABLET, FILM COATED ORAL at 11:45

## 2020-04-28 RX ADMIN — MAGNESIUM HYDROXIDE 30 ML: 400 SUSPENSION ORAL at 05:25

## 2020-04-28 RX ADMIN — INSULIN HUMAN 1 UNITS: 100 INJECTION, SOLUTION PARENTERAL at 05:25

## 2020-04-28 RX ADMIN — POLYETHYLENE GLYCOL 3350 1 PACKET: 17 POWDER, FOR SOLUTION ORAL at 05:25

## 2020-04-28 RX ADMIN — CEFAZOLIN SODIUM 2 G: 2 INJECTION, SOLUTION INTRAVENOUS at 03:39

## 2020-04-28 RX ADMIN — DOCUSATE SODIUM 100 MG: 100 CAPSULE, LIQUID FILLED ORAL at 18:10

## 2020-04-28 RX ADMIN — HYDROCORTISONE SODIUM SUCCINATE 50 MG: 100 INJECTION, POWDER, FOR SOLUTION INTRAMUSCULAR; INTRAVENOUS at 11:46

## 2020-04-28 RX ADMIN — FENTANYL CITRATE 25 MCG: 0.05 INJECTION, SOLUTION INTRAMUSCULAR; INTRAVENOUS at 01:30

## 2020-04-28 RX ADMIN — FENTANYL CITRATE 25 MCG: 50 INJECTION, SOLUTION INTRAMUSCULAR; INTRAVENOUS at 01:37

## 2020-04-28 RX ADMIN — NOREPINEPHRINE BITARTRATE 8 MCG/MIN: 1 INJECTION INTRAVENOUS at 00:36

## 2020-04-28 RX ADMIN — DOCUSATE SODIUM 100 MG: 100 CAPSULE, LIQUID FILLED ORAL at 05:25

## 2020-04-28 RX ADMIN — FENTANYL CITRATE 25 MCG: 50 INJECTION, SOLUTION INTRAMUSCULAR; INTRAVENOUS at 01:30

## 2020-04-28 ASSESSMENT — FIBROSIS 4 INDEX
FIB4 SCORE: 3.61
FIB4 SCORE: 2.4

## 2020-04-28 NOTE — PROGRESS NOTES
Left renal angiogram performed by Dr Fan via right femoral access. Procedure BARs explained to patient by physician and consent obtained. Patient to IR2 and assisted to table. Patient was monitored and assessed continuously throughout procedure; capnography with consistent waveform. Active bleeding site not identified. Procedure completed without complication. Right femoral puncture closed with angioseal and site CDI; sterile guaze/tegaderm dressing applied. Patient tolerated procedure well, was slightly drowsy but appropriately responsive afterward. Patient returned to SICU with intensive care staff.    [id number] ref [id number]

## 2020-04-28 NOTE — PROGRESS NOTES
Trauma / Surgical Daily Progress Note    Date of Service  4/28/2020    Interval Events  Events overnight discussed at length with Dr. Mejia  Cardiology documentation reviewed  Remains vasopressor dependent this morning  Critical cortisol addressed  Resumed Coreg for ongoing, frequent ectopy on bedside telemetry    Review of Systems  Review of Systems     Vital Signs for last 24 hours  Temp:  [37.5 °C (99.5 °F)] 37.5 °C (99.5 °F)  Pulse:  [] 105  Resp:  [16-40] 24  BP: ()/(44-87) 135/68  SpO2:  [92 %-100 %] 98 %    Hemodynamic parameters for last 24 hours  CVP:  [3 MM HG-18 MM HG] 11 MM HG    Respiratory Data     Respiration: (!) 24, Pulse Oximetry: 98 %     Work Of Breathing / Effort: Moderate  RUL Breath Sounds: Diminished, RML Breath Sounds: Diminished, RLL Breath Sounds: Diminished, EDWIGE Breath Sounds: Diminished, LLL Breath Sounds: Diminished    Physical Exam  Physical Exam  Vitals signs and nursing note reviewed.   Constitutional:       General: He is not in acute distress.     Appearance: He is obese. He is not toxic-appearing.      Interventions: Nasal cannula in place.   HENT:      Head: Normocephalic and atraumatic.      Right Ear: Hearing and external ear normal.      Left Ear: Hearing and external ear normal.      Nose: Nose normal.      Mouth/Throat:      Mouth: Mucous membranes are moist.      Pharynx: Oropharynx is clear.   Eyes:      General:         Right eye: No discharge.         Left eye: No discharge.      Extraocular Movements: Extraocular movements intact.      Pupils: Pupils are equal, round, and reactive to light.   Neck:      Musculoskeletal: Normal range of motion and neck supple.      Trachea: Trachea normal.   Cardiovascular:      Rate and Rhythm: Regular rhythm. Tachycardia present.  Extrasystoles are present.     Pulses: Normal pulses.   Pulmonary:      Effort: No respiratory distress.      Breath sounds: No stridor, decreased air movement or transmitted upper airway sounds.  No wheezing or rales.   Chest:      Chest wall: No tenderness.   Abdominal:      General: Abdomen is protuberant. Bowel sounds are normal. There is no distension.      Palpations: Abdomen is soft.      Tenderness: There is no abdominal tenderness.   Genitourinary:     Comments: Gutierres in place draining clear yellow urine  Skin:     General: Skin is warm and dry.      Capillary Refill: Capillary refill takes less than 2 seconds.   Neurological:      Mental Status: He is alert.      GCS: GCS eye subscore is 4. GCS verbal subscore is 5. GCS motor subscore is 6.      Cranial Nerves: Cranial nerves are intact.      Sensory: Sensation is intact.   Psychiatric:         Attention and Perception: Attention normal.         Mood and Affect: Mood normal.         Behavior: Behavior is cooperative.         Laboratory  Recent Results (from the past 24 hour(s))   Hemoglobin - Q6 hours x4    Collection Time: 04/27/20 11:12 AM   Result Value Ref Range    Hemoglobin 10.3 (L) 14.0 - 18.0 g/dL   ACCU-CHEK GLUCOSE    Collection Time: 04/27/20 11:12 AM   Result Value Ref Range    Glucose - Accu-Ck 147 (H) 65 - 99 mg/dL   ACCU-CHEK GLUCOSE    Collection Time: 04/27/20  5:34 PM   Result Value Ref Range    Glucose - Accu-Ck 164 (H) 65 - 99 mg/dL   HEMOGLOBIN AND HEMATOCRIT    Collection Time: 04/27/20  5:35 PM   Result Value Ref Range    Hemoglobin 9.7 (L) 14.0 - 18.0 g/dL    Hematocrit 30.4 (L) 42.0 - 52.0 %   ACCU-CHEK GLUCOSE    Collection Time: 04/27/20 11:53 PM   Result Value Ref Range    Glucose - Accu-Ck 111 (H) 65 - 99 mg/dL   HGB    Collection Time: 04/27/20 11:55 PM   Result Value Ref Range    Hemoglobin 6.4 (L) 14.0 - 18.0 g/dL   CBC with Differential: Tomorrow AM    Collection Time: 04/28/20  5:10 AM   Result Value Ref Range    WBC 10.3 4.8 - 10.8 K/uL    RBC 3.43 (L) 4.70 - 6.10 M/uL    Hemoglobin 9.5 (L) 14.0 - 18.0 g/dL    Hematocrit 29.7 (L) 42.0 - 52.0 %    MCV 86.6 81.4 - 97.8 fL    MCH 27.7 27.0 - 33.0 pg    MCHC 32.0 (L)  33.7 - 35.3 g/dL    RDW 56.1 (H) 35.9 - 50.0 fL    Platelet Count 120 (L) 164 - 446 K/uL    MPV 9.6 9.0 - 12.9 fL    Neutrophils-Polys 76.10 (H) 44.00 - 72.00 %    Lymphocytes 9.60 (L) 22.00 - 41.00 %    Monocytes 11.90 0.00 - 13.40 %    Eosinophils 1.90 0.00 - 6.90 %    Basophils 0.10 0.00 - 1.80 %    Immature Granulocytes 0.40 0.00 - 0.90 %    Nucleated RBC 0.00 /100 WBC    Neutrophils (Absolute) 7.82 (H) 1.82 - 7.42 K/uL    Lymphs (Absolute) 0.99 (L) 1.00 - 4.80 K/uL    Monos (Absolute) 1.22 (H) 0.00 - 0.85 K/uL    Eos (Absolute) 0.19 0.00 - 0.51 K/uL    Baso (Absolute) 0.01 0.00 - 0.12 K/uL    Immature Granulocytes (abs) 0.04 0.00 - 0.11 K/uL    NRBC (Absolute) 0.00 K/uL   Comp Metabolic Panel (CMP): Tomorrow AM    Collection Time: 04/28/20  5:10 AM   Result Value Ref Range    Sodium 136 135 - 145 mmol/L    Potassium 4.7 3.6 - 5.5 mmol/L    Chloride 101 96 - 112 mmol/L    Co2 24 20 - 33 mmol/L    Anion Gap 11.0 7.0 - 16.0    Glucose 180 (H) 65 - 99 mg/dL    Bun 48 (H) 8 - 22 mg/dL    Creatinine 2.64 (H) 0.50 - 1.40 mg/dL    Calcium 7.7 (L) 8.5 - 10.5 mg/dL    AST(SGOT) 27 12 - 45 U/L    ALT(SGPT) 23 2 - 50 U/L    Alkaline Phosphatase 39 30 - 99 U/L    Total Bilirubin 1.0 0.1 - 1.5 mg/dL    Albumin 2.8 (L) 3.2 - 4.9 g/dL    Total Protein 4.8 (L) 6.0 - 8.2 g/dL    Globulin 2.0 1.9 - 3.5 g/dL    A-G Ratio 1.4 g/dL   ESTIMATED GFR    Collection Time: 04/28/20  5:10 AM   Result Value Ref Range    GFR If  29 (A) >60 mL/min/1.73 m 2    GFR If Non  24 (A) >60 mL/min/1.73 m 2   ACCU-CHEK GLUCOSE    Collection Time: 04/28/20  5:10 AM   Result Value Ref Range    Glucose - Accu-Ck 159 (H) 65 - 99 mg/dL   CORTISOL    Collection Time: 04/28/20  8:25 AM   Result Value Ref Range    Cortisol 11.0 0.0 - 23.0 ug/dL       Fluids    Intake/Output Summary (Last 24 hours) at 4/28/2020 1040  Last data filed at 4/28/2020 1000  Gross per 24 hour   Intake 5090.29 ml   Output 1210 ml   Net 3880.29 ml        Core Measures & Quality Metrics  Labs reviewed, Medications reviewed and Radiology images reviewed  Gutierres catheter: Critically Ill - Requiring Accurate Measurement of Urinary Output  Central line in place: Shock and Vasopressors    DVT Prophylaxis: Contraindicated - High bleeding risk  DVT prophylaxis - mechanical: SCDs  Ulcer prophylaxis: Not indicated        SHERIN Score  ETOH Screening    Assessment/Plan  Shock circulatory (HCC)  Assessment & Plan  Requiring continuous infusion of vasoactive medication  4/28 Cortisol 11, stress dose steroids initiated    Platelet inhibition due to Plavix- (present on admission)  Assessment & Plan  Long term use of ASA and Plavix   Admission platelet mapping thromboelastogram showed 100% inhibition at AA and ADP receptors  Platelet reversal decision pending trajectory of serial h/h and hemodynamics  4/27 One unit of platelets transfused.    Renal hematoma, left, initial encounter- (present on admission)  Assessment & Plan  Left subcapsular hematoma with hemorrhage extending into the left perinephric space and pelvis.   Several foci of active extravasation in the left mid kidney and lower pole.   4/28 IR Angio for hypotension and hemoglobin drop, negative for bleed, no interventions  Serial hemograms and abdominal exams.    Urinary retention- (present on admission)  Assessment & Plan  4/26 Gutierres catheter placed  Gross hematuria, likely placement related.   Strict I&O    Screening examination for infectious disease- (present on admission)  Assessment & Plan  4/26 COVID-19 Screening completed.  No fever, pulmonary symptoms, contact with infected individual, and travel to/from a high risk region.    Prolonged Q-T interval on ECG- (present on admission)  Assessment & Plan  4/26 QTc >500  Caution with medications affecting the QTc.    Type 2 diabetes mellitus (HCC)- (present on admission)  Assessment & Plan  Chronic condition treated with glimepiride.  Insulin sliding scale     CHF  (congestive heart failure) (HCC)- (present on admission)  Assessment & Plan  Chronic condition treated with bumetanide and carvedilol  4/28 Carvedilol resumed    Essential hypertension- (present on admission)  Assessment & Plan  Chronic condition treated with sacubitlril and valsartan  Presently held    S/P TAVR (transcatheter aortic valve replacement)- (present on admission)  Assessment & Plan  Stenting of the left main and right coronary arteries as well as TAVR in May 2019  Patient is on ASA and Plavix - admit platelet mapping with 100% AA/ADP inhibition.  Dr Lee, Cardiology consulted    CKD (chronic kidney disease)- (present on admission)  Assessment & Plan  Acute exacerbation of a chronic condition, CHF may be playing a role  Avoid nephrotoxins  Trend renal indices.    Trauma- (present on admission)  Assessment & Plan  GLF. Struck his left abdomen.  Non Trauma Activation.  Vj Rajan MD. Trauma Surgery.    ICD (implantable cardioverter-defibrillator) in place- (present on admission)  Assessment & Plan  Placed 2/4/2020 for:  Paroxysmal complete heart block  Syncope  Ischemic cardiomyopathy  Chronic systolic CHF  [facility]      I independently reviewed pertinent clinical lab tests from the last 48 hours and ordered additional follow up clinical lab tests.  I independently reviewed pertinent radiographic images and the radiologist's reports from the last 48 hours and ordered additional follow up radiographic studies.  The details of the available patient records in [facility] (including laboratory tests, culture data, medications, imaging, and other pertinent diagnostic tests) and documentation by consulting physicians were reviewed, summated, and that information was utilized as warranted in today's medical decision making for this patient.  I personally evaluated the patient condition at bedside, discussed the daily plan(s) with available nursing staff, dieticians, social workers, pharmacists on  multi-disciplinary rounds, and performed frequent reassessments through out the day as clinically warranted.    The patient is critically ill with high gradae renal injury and circulatory shock.  This patient requires continued ICU management and hospital admission.  The patient has impairment of one or more vital organ systems and a high probability of imminent or life-threatening deterioration in condition. High complexity decision making and medically necessary care were provided by frequent assessment, manipulation, and support of circulatory function and cardiac function to prevent further life-threatening deterioration of the patient's condition.     Critical care interventions include: integration of multiple data points and associated complex medical decision making and titration of vasopressors.    Aggregated critical care time spent evaluating, reassessing, reviewing documentation, providing care, and managing this patient exclusive of procedures: 45 minutes    Mehran Gomez MD

## 2020-04-28 NOTE — PROGRESS NOTES
2  RN skin note with MARY Wilcox       Skin assessment completed. Discoloration to feet and lower legs. Abrasions to bilateral knees. Elbows red but blanching. Devices checked (sequentials, oxy Mask, IVs), mepilex in place on coccyx, mepilex placed under mask. Low air loss mattress, pressure redistribution.

## 2020-04-28 NOTE — OR SURGEON
Immediate Post- Operative Note        PostOp Diagnosis: trauma, anemia, LEFT renal injury      Procedure(s): LEFT upper/main and lower/accessory renal arteriogram, no bleeding or vessel injury seen, no therapy, d/w Dr Mejia      Estimated Blood Loss: Less than 5 ml        Complications: None            4/28/2020     2:01 AM     Diomedes Fan M.D.

## 2020-04-28 NOTE — ASSESSMENT & PLAN NOTE
Requiring continuous infusion of vasoactive medication  4/28 Cortisol 11, stress dose steroids initiated  4/29 Decrease solucortef dose  Taper over next two days

## 2020-04-28 NOTE — PROGRESS NOTES
2 RN skin check completed with Dianna HERNANDEZ.    Bony prominences assessed. Elbows red and blanching, floated on pillows.  Lower extremities pale and micaela.  Abrasions to bilateral knees.  Sacrum intact.

## 2020-04-28 NOTE — ASSESSMENT & PLAN NOTE
Placed 2/4/2020 for:  Paroxysmal complete heart block  Syncope  Ischemic cardiomyopathy  Chronic systolic CHF  [facility]

## 2020-04-28 NOTE — PROGRESS NOTES
Lab called with Hbg drop from 9.7 to 6.4. Dr. Mejia notified, order received for 2 units of PRBCs.

## 2020-04-28 NOTE — DISCHARGE PLANNING
Care Transition Team Assessment    RN IMELDA spoke with patient's wife via the phone to complete assessment.  Patient was independent w/ all ADLs/IADLs PTA.  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  Verified that all information on face sheet is correct.  Wife denies any needs on discharge.  States they have several friends and neighbors that can help if needed.  When asked about advance directives, wife states that they have the information, however, the patient has not agreed to complete them to this point.      DC Plan:  Unsure what patient's dc needs will be at this point.  CM will continue to follow as needed.    Information Source  Orientation : Oriented x 4  Information Given By: Spouse  Informant's Name: José Luis Means  Who is responsible for making decisions for patient? : Patient    Readmission Evaluation  Is this a readmission?: No    Elopement Risk  Legal Hold: No  Ambulatory or Self Mobile in Wheelchair: No-Not an Elopement Risk  Elopement Risk: Not at Risk for Elopement    Interdisciplinary Discharge Planning  Primary Care Physician: Dr. Ebony Ramirez  Lives with - Patient's Self Care Capacity: Spouse  Patient or legal guardian wants to designate a caregiver (see row info): No  Support Systems: Friends / Neighbors, Spouse / Significant Other  Housing / Facility: [address]  Do You Take your Prescribed Medications Regularly: Yes  Able to Return to Previous ADL's: Future Time w/Therapy  Prior Services: None  Patient Expects to be Discharged to:: Home w/ assistance  Assistance Needed: Unknown at this Time  Durable Medical Equipment: Not Applicable    Discharge Preparedness  What is your plan after discharge?: Home with help  What are your discharge supports?: Spouse, Other (comment)(Friends and neighbors available to help as needed per wife)  Prior Functional Level: Independent with Activities of Daily Living, Independent with Medication Management  Difficulity with ADLs:  None  Difficulity with IADLs: None    Functional Assesment  Prior Functional Level: Independent with Activities of Daily Living, Independent with Medication Management    Finances  Financial Barriers to Discharge: No  Prescription Coverage: Yes              Advance Directive  Advance Directive?: None  Advance Directive offered?: AD Booklet refused(Wife states they have information but haven't completed)    Domestic Abuse  Have you ever been the victim of abuse or violence?: No  Physical Abuse or Sexual Abuse: No  Verbal Abuse or Emotional Abuse: No  Possible Abuse Reported to:: Not Applicable    Psychological Assessment  History of Substance Abuse: None  History of Psychiatric Problems: No    Discharge Risks or Barriers  Discharge risks or barriers?: No    Anticipated Discharge Information  Anticipated discharge disposition: Home  Discharge Address: [address] [place] [address] , [place]  [id number]  Discharge Contact Phone Number: [phone]

## 2020-04-28 NOTE — CARE PLAN
Problem: Pain Management  Goal: Pain level will decrease to patient's comfort goal  Intervention: Follow pain managment plan developed in collaboration with patient and Interdisciplinary Team  Note: Pt assessed for pain and medication administered as needed per the MAR.     Problem: Skin Integrity  Goal: Risk for impaired skin integrity will decrease  Intervention: Assess risk factors for impaired skin integrity and/or pressure ulcers  Note: Pt assessed for risk factors for impaired skin integrity and/or pressure ulcers.

## 2020-04-28 NOTE — PROGRESS NOTES
"       [facility] Cardiology Follow-up Note    Date of Service:    2020      Name:   LIAN More     YOB: 1940  Age:   79 y.o.  male   MRN:   [id number]      Chief Complaint: hypovolemic shock    HPI:  Mr More is a 78 y/o fellow with PMH including HRr EF, CAD with hx of PCI to the RCA in 2019 and LM - LAD in 2020, severe AS s/p TAVR 20 with heart block s/p Medtronic dual chamber ICD, CKD III, paroxysmal atrial fibrillation (low burden - not on OAC).    He presented to [facility] on 20 after a ground level fall where his L elbow traumatized his L abdomen.  CT shows L subcapsular renal hematoma with extravasation.  His hgb has declined from 18.9 6.4 overnight.  He was transfused 2 U PRBC in addition to the 5 L of IV fluid + a unit of platelet on .    He was taken back to IR overnight as well, no signs of active bleedin/28/20 \"Procedure(s): LEFT upper/main and lower/accessory renal arteriogram, no bleeding or vessel injury seen, no therapy, d/w Dr Mejia\"    Interim Events:  Patient's BP improved this morning.  MAPs in the 70s.  Still on 4 mcg/min of Levo - weaning in progress  States his LLQ pain is somewhat improved.   Feels distended.      ROS  Constitutional:  + fatigue.  Respiratory:  Denies shortness of breath, no cough.  Cardiovascular:  No chest pain.  no lower extremity edema.  Denies orthopnea or PND.  : oliguria - per nurse.  GI:  Denies nausea/vomiting. + LLQ abd pain.  Neuro:  Denies dizziness, syncope.    All other review of systems reviewed and negative.    Past medical, surgical, social, and family history reviewed and unchanged from admission except as noted in assessment and plan.    Medications: Reviewed in MAR  Current Facility-Administered Medications   Medication Dose Frequency Provider Last Rate Last Dose   • norepinephrine (Levophed) infusion 8 mg/250 mL (premix)  0-30 mcg/min Continuous Rebeca Harding P.AHIRA 7.5 mL/hr at 20 " 0615 4 mcg/min at 04/28/20 0615   • oxyCODONE immediate-release (ROXICODONE) tablet 5 mg  5 mg Q3HRS PRN Jun Mejia M.D.   5 mg at 04/27/20 1728    Or   • oxyCODONE immediate release (ROXICODONE) tablet 10 mg  10 mg Q4HRS PRN Jun Mejia M.D.   10 mg at 04/27/20 2158   • Respiratory Therapy Consult   Continuous RT HAMZAH WhitePElizabetRMARIA VICTORIA       • Pharmacy Consult Request ...Pain Management Review 1 Each  1 Each PHARMACY TO DOSE EDIE WhiteRLATOYA.       • docusate sodium (COLACE) capsule 100 mg  100 mg BID HAMZAH WhiteP.R.N.   100 mg at 04/28/20 0525   • senna-docusate (PERICOLACE or SENOKOT S) 8.6-50 MG per tablet 1 Tab  1 Tab Nightly HAMZAH WhiteP.R.N.   1 Tab at 04/27/20 2158   • senna-docusate (PERICOLACE or SENOKOT S) 8.6-50 MG per tablet 1 Tab  1 Tab Q24HRS PRN HAMZAH WhitePElizabetRElizabetN.       • polyethylene glycol/lytes (MIRALAX) PACKET 1 Packet  1 Packet BID HAMZAH WhiteP.R.N.   1 Packet at 04/28/20 0525   • magnesium hydroxide (MILK OF MAGNESIA) suspension 30 mL  30 mL DAILY HAMZAH WhiteP.R.N.   30 mL at 04/28/20 0525   • bisacodyl (DULCOLAX) suppository 10 mg  10 mg Q24HRS PRN HAMZAH WhiteP.R.N.       • fleet enema 133 mL  1 Each Once PRN HAMZAH WhitePElizabetR.N.       • LR infusion   Continuous DEVEN White.R.N. 50 mL/hr at 04/27/20 1910     • fentaNYL (SUBLIMAZE) injection  mcg   mcg Q HOUR PRN HAMZAH WhiteP.R.N.   100 mcg at 04/27/20 0530   • famotidine (PEPCID) tablet 20 mg  20 mg DAILY HAMZAH WhiteP.R.N.   20 mg at 04/28/20 0525    Or   • famotidine (PEPCID) injection 20 mg  20 mg DAILY HAMZAH WhiteP.R.N.   20 mg at 04/27/20 0530   • insulin regular (HUMULIN R) injection 1-6 Units  1-6 Units Q6HRS HAMZAH WhiteP.R.N.   1 Units at 04/28/20 0525    And   • glucose 4 g chewable tablet 16 g  16 g Q15 MIN PRN HAMZAH WhiteP.R.N.        And   • dextrose 50% (D50W) injection 50 mL  50 mL Q15 MIN PRN Alyson Calvo  "A.P.R.N.       • prochlorperazine (COMPAZINE) injection 5 mg  5 mg Q6HRS PRN Vj Rajan M.D.   5 mg at 04/26/20 2251   Last reviewed on 4/26/2020  8:23 PM by Rigoberto Perry    Allergies   Allergen Reactions   • Statins [Hmg-Coa-R Inhibitors] Rash     Blisters     • Atorvastatin        Physical Exam  Body mass index is 33.83 kg/m². /62   Pulse (!) 105   Temp 37.5 °C (99.5 °F)   Resp (!) 25   Ht 1.854 m (6' 0.99\")   Wt 116.3 kg (256 lb 6.3 oz)   SpO2 98%    Vitals:    04/28/20 0530 04/28/20 0600 04/28/20 0630 04/28/20 0700   BP: 117/57 127/63 121/66 117/62   Pulse:  (!) 105  (!) 105   Resp:  (!) 27  (!) 25   Temp:       TempSrc:  Bladder     SpO2:  99%  98%   Weight:       Height:        Oxygen Therapy:  Pulse Oximetry: 98 %, O2 (LPM): 4, O2 Delivery Device: Oxymask    General: no apparent distress.  Neck: no JVD   Lungs: normal respiratory effort,  without crackles, no wheezing or rhonchi.  Heart: normal rate,  regular rhythm, no murmur, no rubs or gallops.   EXT: no edema bilateral lower extremities. + bilateral pedal pulses. no cyanosis  Abdomen: slightly distended, no ecchymosis noted.  LLQ tenderness to palpation.  Neurological: No focal deficits, no facial asymmetry.  Normal speech.  Psychiatric: Appropriate affect, alert and oriented x 3.   Skin: Warm extremities, no rash. Pale.     Labs (personally reviewed):     Lab Results   Component Value Date/Time    SODIUM 136 04/28/2020 05:10 AM    POTASSIUM 4.7 04/28/2020 05:10 AM    CHLORIDE 101 04/28/2020 05:10 AM    CO2 24 04/28/2020 05:10 AM    GLUCOSE 180 (H) 04/28/2020 05:10 AM    BUN 48 (H) 04/28/2020 05:10 AM    CREATININE 2.64 (H) 04/28/2020 05:10 AM    CREATININE 1.18 02/08/2011 12:00 AM    BUNCREATRAT 16 02/08/2011 12:00 AM    GLOMRATE >59 02/08/2011 12:00 AM     Lab Results   Component Value Date/Time    ALKPHOSPHAT 39 04/28/2020 05:10 AM    ASTSGOT 27 04/28/2020 05:10 AM    ALTSGPT 23 04/28/2020 05:10 AM    TBILIRUBIN 1.0 04/28/2020 " 05:10 AM      Lab Results   Component Value Date/Time    CHOLSTRLTOT 105 2019 02:20 AM    LDL 56 2019 02:20 AM    HDL 29 (A) 2019 02:20 AM    TRIGLYCERIDE 101 2019 02:20 AM     No results found for: BNPBTYPENAT      Cardiac Imaging and Procedures Review:      Echo 20:  CONCLUSIONS  Compared to the images of the prior study done 2020 - estimated   ejection fraction is improved, previously 35%. The patient is now   tachycardic with smaller ventricular size suggesting hypovolemia.     Moderately reduced left ventricular systolic function.  Left ventricular ejection fraction is visually estimated to be 40%.  Global hypokinesis.  Diastolic function is abnormal, but grade cannot be determined.  Normal right ventricular systolic function.  Known TAVR aortic valve that is functioning normally with normal   transvalvular gradients.    [facility] 2019:  3.5 x 28 mm Syndergy BRANDI to the distal RCA, post dilated to 4 cm.    [facility] 1/10/2020:  3.5 x 26 mm Onxy BRANDI to the distal LM into the LAD.    Device Implant 20:  Medtronic dual chamber PPM - for occasional 2nd and 3rd degree HB.      Assessment and Medical Decision Makin   Hypovolemic shock.  Improved, MAP in the 70s this morning.  Encouraged weaning levophed.    2   L renal hematoma/hemorrhage.  Taken back to IR this morning around 0200, no active bleeding noted.     3   Acute blood loss anemia.  hgb drop from 18.9 to 6.4 overnight.  Transfused 2 U PRBC.  hgb improved to 9.5 at 0510.    4   CAD with recent PCI to the RCA 2019, LM into LAD 2020.  May hold DAPT for now, however, resume aspirin 81 mg and Plavix 75 mg ASAP when safe from bleeding perspective.     5   HFrEF, NYHA class III, Stage C. EF 35% and stable.    6   Ischemic cardiomyopathy.  S/p revascularization, on GDMT, resume when appropriate.    7   Acute on chronic renal failure. Worsening renal function in the setting of hypovolemic shock.     8   Hx of AFIB seen on  Holter monitor, very low burden and not on OAC.      9   History of intermittent high degree heart block s/p Medtronic dual chamber ICD.    10  Severe AS s/p TAVR 1/27/2020.    See further recommendations in Dr. Queen's attestation above.     Rebeca Harding PA-C  [facility]

## 2020-04-28 NOTE — CARE PLAN
Problem: Hyperinflation:  Goal: Prevent or improve atelectasis  Outcome: PROGRESSING AS EXPECTED  Note:     Respiratory Update    Treatment modality: IS  Frequency:QID    Pt tolerating current treatments well with no adverse reactions.

## 2020-04-28 NOTE — CARE PLAN
Problem: Respiratory:  Goal: Respiratory status will improve  Note: Pt medicated per MAR. Non-pharmacological interventions per flowsheets.     Problem: Hemodynamic Status  Goal: Vital Signs and Fluid Balance Management  Note: Levophed titrated to keep MAP > 65 see MAR.

## 2020-04-29 ENCOUNTER — APPOINTMENT (OUTPATIENT)
Dept: RADIOLOGY | Facility: MEDICAL CENTER | Age: 80
DRG: 698 | End: 2020-04-29
Attending: NURSE PRACTITIONER
Payer: MEDICARE

## 2020-04-29 ENCOUNTER — APPOINTMENT (OUTPATIENT)
Dept: RADIOLOGY | Facility: MEDICAL CENTER | Age: 80
DRG: 698 | End: 2020-04-29
Attending: SURGERY
Payer: MEDICARE

## 2020-04-29 PROBLEM — E87.5 HYPERKALEMIA: Status: ACTIVE | Noted: 2020-04-29

## 2020-04-29 PROBLEM — M79.89 LEFT LEG SWELLING: Status: ACTIVE | Noted: 2020-04-29

## 2020-04-29 LAB
ANION GAP SERPL CALC-SCNC: 7 MMOL/L (ref 7–16)
BASOPHILS # BLD AUTO: 0 % (ref 0–1.8)
BASOPHILS # BLD: 0 K/UL (ref 0–0.12)
BUN SERPL-MCNC: 44 MG/DL (ref 8–22)
CALCIUM SERPL-MCNC: 7.9 MG/DL (ref 8.5–10.5)
CHLORIDE SERPL-SCNC: 102 MMOL/L (ref 96–112)
CO2 SERPL-SCNC: 27 MMOL/L (ref 20–33)
CREAT SERPL-MCNC: 1.9 MG/DL (ref 0.5–1.4)
EOSINOPHIL # BLD AUTO: 0.07 K/UL (ref 0–0.51)
EOSINOPHIL NFR BLD: 1 % (ref 0–6.9)
ERYTHROCYTE [DISTWIDTH] IN BLOOD BY AUTOMATED COUNT: 55.7 FL (ref 35.9–50)
GLUCOSE BLD-MCNC: 128 MG/DL (ref 65–99)
GLUCOSE BLD-MCNC: 152 MG/DL (ref 65–99)
GLUCOSE BLD-MCNC: 170 MG/DL (ref 65–99)
GLUCOSE BLD-MCNC: 173 MG/DL (ref 65–99)
GLUCOSE BLD-MCNC: 221 MG/DL (ref 65–99)
GLUCOSE SERPL-MCNC: 143 MG/DL (ref 65–99)
HCT VFR BLD AUTO: 28 % (ref 42–52)
HGB BLD-MCNC: 8.7 G/DL (ref 14–18)
IMM GRANULOCYTES # BLD AUTO: 0.02 K/UL (ref 0–0.11)
IMM GRANULOCYTES NFR BLD AUTO: 0.3 % (ref 0–0.9)
LYMPHOCYTES # BLD AUTO: 0.75 K/UL (ref 1–4.8)
LYMPHOCYTES NFR BLD: 10.4 % (ref 22–41)
MCH RBC QN AUTO: 27.3 PG (ref 27–33)
MCHC RBC AUTO-ENTMCNC: 31.1 G/DL (ref 33.7–35.3)
MCV RBC AUTO: 87.8 FL (ref 81.4–97.8)
MONOCYTES # BLD AUTO: 0.76 K/UL (ref 0–0.85)
MONOCYTES NFR BLD AUTO: 10.6 % (ref 0–13.4)
NEUTROPHILS # BLD AUTO: 5.59 K/UL (ref 1.82–7.42)
NEUTROPHILS NFR BLD: 77.7 % (ref 44–72)
NRBC # BLD AUTO: 0 K/UL
NRBC BLD-RTO: 0 /100 WBC
PLATELET # BLD AUTO: 102 K/UL (ref 164–446)
PMV BLD AUTO: 10 FL (ref 9–12.9)
POTASSIUM SERPL-SCNC: 5.3 MMOL/L (ref 3.6–5.5)
RBC # BLD AUTO: 3.19 M/UL (ref 4.7–6.1)
SODIUM SERPL-SCNC: 136 MMOL/L (ref 135–145)
WBC # BLD AUTO: 7.2 K/UL (ref 4.8–10.8)

## 2020-04-29 PROCEDURE — A9270 NON-COVERED ITEM OR SERVICE: HCPCS | Performed by: SURGERY

## 2020-04-29 PROCEDURE — 700102 HCHG RX REV CODE 250 W/ 637 OVERRIDE(OP): Performed by: NURSE PRACTITIONER

## 2020-04-29 PROCEDURE — 82962 GLUCOSE BLOOD TEST: CPT | Mod: 91

## 2020-04-29 PROCEDURE — A9270 NON-COVERED ITEM OR SERVICE: HCPCS | Performed by: NURSE PRACTITIONER

## 2020-04-29 PROCEDURE — 80048 BASIC METABOLIC PNL TOTAL CA: CPT

## 2020-04-29 PROCEDURE — 700111 HCHG RX REV CODE 636 W/ 250 OVERRIDE (IP): Performed by: SURGERY

## 2020-04-29 PROCEDURE — 770020 HCHG ROOM/CARE - TELE (206)

## 2020-04-29 PROCEDURE — 700112 HCHG RX REV CODE 229: Performed by: NURSE PRACTITIONER

## 2020-04-29 PROCEDURE — 97165 OT EVAL LOW COMPLEX 30 MIN: CPT

## 2020-04-29 PROCEDURE — 97161 PT EVAL LOW COMPLEX 20 MIN: CPT

## 2020-04-29 PROCEDURE — 700111 HCHG RX REV CODE 636 W/ 250 OVERRIDE (IP): Performed by: NURSE PRACTITIONER

## 2020-04-29 PROCEDURE — 99232 SBSQ HOSP IP/OBS MODERATE 35: CPT | Performed by: PHYSICIAN ASSISTANT

## 2020-04-29 PROCEDURE — 700102 HCHG RX REV CODE 250 W/ 637 OVERRIDE(OP): Performed by: SURGERY

## 2020-04-29 PROCEDURE — 94669 MECHANICAL CHEST WALL OSCILL: CPT

## 2020-04-29 PROCEDURE — 93971 EXTREMITY STUDY: CPT | Mod: LT

## 2020-04-29 PROCEDURE — 85025 COMPLETE CBC W/AUTO DIFF WBC: CPT

## 2020-04-29 PROCEDURE — 99233 SBSQ HOSP IP/OBS HIGH 50: CPT | Performed by: SURGERY

## 2020-04-29 PROCEDURE — 71045 X-RAY EXAM CHEST 1 VIEW: CPT

## 2020-04-29 RX ORDER — EZETIMIBE 10 MG/1
10 TABLET ORAL DAILY
Status: DISCONTINUED | OUTPATIENT
Start: 2020-04-29 | End: 2020-04-30 | Stop reason: HOSPADM

## 2020-04-29 RX ORDER — CLOPIDOGREL BISULFATE 75 MG/1
75 TABLET ORAL DAILY
Status: DISCONTINUED | OUTPATIENT
Start: 2020-04-29 | End: 2020-04-30 | Stop reason: HOSPADM

## 2020-04-29 RX ORDER — BUMETANIDE 1 MG/1
2 TABLET ORAL 2 TIMES DAILY
Status: DISCONTINUED | OUTPATIENT
Start: 2020-04-29 | End: 2020-04-30 | Stop reason: HOSPADM

## 2020-04-29 RX ORDER — OMEPRAZOLE 20 MG/1
40 CAPSULE, DELAYED RELEASE ORAL 2 TIMES DAILY
Status: DISCONTINUED | OUTPATIENT
Start: 2020-04-29 | End: 2020-04-30 | Stop reason: HOSPADM

## 2020-04-29 RX ADMIN — CARVEDILOL 3.12 MG: 6.25 TABLET, FILM COATED ORAL at 07:50

## 2020-04-29 RX ADMIN — CARVEDILOL 3.12 MG: 6.25 TABLET, FILM COATED ORAL at 16:57

## 2020-04-29 RX ADMIN — DOCUSATE SODIUM 100 MG: 100 CAPSULE, LIQUID FILLED ORAL at 05:58

## 2020-04-29 RX ADMIN — SACUBITRIL AND VALSARTAN 1 TABLET: 24; 26 TABLET, FILM COATED ORAL at 16:57

## 2020-04-29 RX ADMIN — HYDROCORTISONE SODIUM SUCCINATE 25 MG: 100 INJECTION, POWDER, FOR SOLUTION INTRAMUSCULAR; INTRAVENOUS at 21:19

## 2020-04-29 RX ADMIN — INSULIN HUMAN 1 UNITS: 100 INJECTION, SOLUTION PARENTERAL at 00:18

## 2020-04-29 RX ADMIN — POLYETHYLENE GLYCOL 3350 1 PACKET: 17 POWDER, FOR SOLUTION ORAL at 05:57

## 2020-04-29 RX ADMIN — INSULIN HUMAN 1 UNITS: 100 INJECTION, SOLUTION PARENTERAL at 23:23

## 2020-04-29 RX ADMIN — INSULIN HUMAN 2 UNITS: 100 INJECTION, SOLUTION PARENTERAL at 11:35

## 2020-04-29 RX ADMIN — EZETIMIBE 10 MG: 10 TABLET ORAL at 11:37

## 2020-04-29 RX ADMIN — MAGNESIUM HYDROXIDE 30 ML: 400 SUSPENSION ORAL at 05:58

## 2020-04-29 RX ADMIN — CLOPIDOGREL BISULFATE 75 MG: 75 TABLET ORAL at 11:37

## 2020-04-29 RX ADMIN — OMEPRAZOLE 40 MG: 20 CAPSULE, DELAYED RELEASE ORAL at 11:37

## 2020-04-29 RX ADMIN — ASPIRIN 81 MG: 81 TABLET, COATED ORAL at 11:38

## 2020-04-29 RX ADMIN — OMEPRAZOLE 40 MG: 20 CAPSULE, DELAYED RELEASE ORAL at 21:16

## 2020-04-29 RX ADMIN — BUMETANIDE 1 MG: 1 TABLET ORAL at 21:16

## 2020-04-29 RX ADMIN — HYDROCORTISONE SODIUM SUCCINATE 50 MG: 100 INJECTION, POWDER, FOR SOLUTION INTRAMUSCULAR; INTRAVENOUS at 05:56

## 2020-04-29 RX ADMIN — BUMETANIDE 2 MG: 1 TABLET ORAL at 11:37

## 2020-04-29 RX ADMIN — HYDROCORTISONE SODIUM SUCCINATE 25 MG: 100 INJECTION, POWDER, FOR SOLUTION INTRAMUSCULAR; INTRAVENOUS at 14:15

## 2020-04-29 ASSESSMENT — ENCOUNTER SYMPTOMS
VOMITING: 0
CONSTIPATION: 0
DIZZINESS: 0
SENSORY CHANGE: 0
HEADACHES: 0
NAUSEA: 0
ROS GI COMMENTS: BM 4/29
FEVER: 0
SHORTNESS OF BREATH: 1
COUGH: 0
ABDOMINAL PAIN: 0
BLURRED VISION: 0

## 2020-04-29 ASSESSMENT — COGNITIVE AND FUNCTIONAL STATUS - GENERAL
MOBILITY SCORE: 17
SUGGESTED CMS G CODE MODIFIER MOBILITY: CK
CLIMB 3 TO 5 STEPS WITH RAILING: A LOT
SUGGESTED CMS G CODE MODIFIER DAILY ACTIVITY: CJ
STANDING UP FROM CHAIR USING ARMS: A LITTLE
DAILY ACTIVITIY SCORE: 22
MOVING TO AND FROM BED TO CHAIR: A LITTLE
MOVING FROM LYING ON BACK TO SITTING ON SIDE OF FLAT BED: A LITTLE
TURNING FROM BACK TO SIDE WHILE IN FLAT BAD: A LITTLE
DRESSING REGULAR LOWER BODY CLOTHING: A LITTLE
TOILETING: A LITTLE
WALKING IN HOSPITAL ROOM: A LITTLE

## 2020-04-29 ASSESSMENT — GAIT ASSESSMENTS
DISTANCE (FEET): 500
ASSISTIVE DEVICE: FRONT WHEEL WALKER
GAIT LEVEL OF ASSIST: CONTACT GUARD ASSIST

## 2020-04-29 ASSESSMENT — ACTIVITIES OF DAILY LIVING (ADL): TOILETING: INDEPENDENT

## 2020-04-29 NOTE — THERAPY
"Occupational Therapy Evaluation completed.   Functional Status:  Currently min/CGA with ADLs and ADL transfers. Pt currently limited by decreased functional mobility, activity tolerance, balance, which are affecting pt's ability to complete ADLs/IADLs at baseline. Pt would benefit from OT services in the acute care setting to maximize functional recovery.   Plan of Care: Will benefit from Occupational Therapy 3 times per week  Discharge Recommendations: Recommend home health for continued occupational therapy services.   See \"Rehab Therapy-Acute\" Patient Summary Report for complete documentation.    "

## 2020-04-29 NOTE — PROGRESS NOTES
Trauma / Surgical Daily Progress Note    Date of Service  4/29/2020    Chief Complaint  79 y.o. male admitted 4/26/2020 with Trauma    Interval Events  Up ambulating with therapies  Renal indices slightly improved  Elevated K, stable Hgb, no EKG changes noted    - Home medications reviewed, Plavix, ASA, Bumex resumed  - Diabetic diet  - PT eval  - Central line and arterial catheter removed  - Check labs in AM  - Transfer to [facility] with tele    Review of Systems  Review of Systems   Constitutional: Negative for fever and malaise/fatigue.   HENT: Negative for hearing loss.    Eyes: Negative for blurred vision.   Respiratory: Positive for shortness of breath. Negative for cough.    Cardiovascular: Positive for leg swelling. Negative for chest pain.   Gastrointestinal: Negative for abdominal pain, constipation, nausea and vomiting.        BM 4/29   Genitourinary:        Urinary retention   Musculoskeletal:        Chronic back, knee and left shoulder pain   Skin: Negative for rash.   Neurological: Negative for dizziness, sensory change and headaches.        Vital Signs  Pulse:  [] 89  Resp:  [19-48] 29  BP: (150)/(54) 150/54  SpO2:  [91 %-100 %] 98 %    Physical Exam  Physical Exam  Vitals signs and nursing note reviewed.   Constitutional:       General: He is not in acute distress.     Appearance: He is not toxic-appearing.   HENT:      Head: Normocephalic.      Nose: Nose normal.      Mouth/Throat:      Mouth: Mucous membranes are moist.   Eyes:      Pupils: Pupils are equal, round, and reactive to light.   Neck:      Musculoskeletal: Normal range of motion and neck supple. No muscular tenderness.   Cardiovascular:      Rate and Rhythm: Normal rate and regular rhythm.      Pulses: Normal pulses.   Pulmonary:      Effort: Pulmonary effort is normal.      Breath sounds: Normal breath sounds.      Comments: Increased work of breathing on exertion  Supplemental O2  Chest:      Chest wall: No tenderness.   Abdominal:       General: Abdomen is flat. Bowel sounds are normal. There is no distension.      Tenderness: There is no abdominal tenderness.   Genitourinary:     Comments: Trial lopez removal  Musculoskeletal: Normal range of motion.         General: Swelling present. No tenderness.   Skin:     General: Skin is warm and dry.      Comments: PVD   Neurological:      Mental Status: He is alert and oriented to person, place, and time.   Psychiatric:         Mood and Affect: Mood normal.         Behavior: Behavior normal.         Thought Content: Thought content normal.         Laboratory  Recent Results (from the past 24 hour(s))   HGB    Collection Time: 04/28/20 11:40 AM   Result Value Ref Range    Hemoglobin 9.2 (L) 14.0 - 18.0 g/dL   ACCU-CHEK GLUCOSE    Collection Time: 04/28/20 11:40 AM   Result Value Ref Range    Glucose - Accu-Ck 176 (H) 65 - 99 mg/dL   ACCU-CHEK GLUCOSE    Collection Time: 04/28/20  6:06 PM   Result Value Ref Range    Glucose - Accu-Ck 134 (H) 65 - 99 mg/dL   ACCU-CHEK GLUCOSE    Collection Time: 04/29/20 12:17 AM   Result Value Ref Range    Glucose - Accu-Ck 170 (H) 65 - 99 mg/dL   CBC with Differential: Tomorrow AM    Collection Time: 04/29/20  5:00 AM   Result Value Ref Range    WBC 7.2 4.8 - 10.8 K/uL    RBC 3.19 (L) 4.70 - 6.10 M/uL    Hemoglobin 8.7 (L) 14.0 - 18.0 g/dL    Hematocrit 28.0 (L) 42.0 - 52.0 %    MCV 87.8 81.4 - 97.8 fL    MCH 27.3 27.0 - 33.0 pg    MCHC 31.1 (L) 33.7 - 35.3 g/dL    RDW 55.7 (H) 35.9 - 50.0 fL    Platelet Count 102 (L) 164 - 446 K/uL    MPV 10.0 9.0 - 12.9 fL    Neutrophils-Polys 77.70 (H) 44.00 - 72.00 %    Lymphocytes 10.40 (L) 22.00 - 41.00 %    Monocytes 10.60 0.00 - 13.40 %    Eosinophils 1.00 0.00 - 6.90 %    Basophils 0.00 0.00 - 1.80 %    Immature Granulocytes 0.30 0.00 - 0.90 %    Nucleated RBC 0.00 /100 WBC    Neutrophils (Absolute) 5.59 1.82 - 7.42 K/uL    Lymphs (Absolute) 0.75 (L) 1.00 - 4.80 K/uL    Monos (Absolute) 0.76 0.00 - 0.85 K/uL    Eos (Absolute)  0.07 0.00 - 0.51 K/uL    Baso (Absolute) 0.00 0.00 - 0.12 K/uL    Immature Granulocytes (abs) 0.02 0.00 - 0.11 K/uL    NRBC (Absolute) 0.00 K/uL   Basic Metabolic Panel    Collection Time: 04/29/20  5:00 AM   Result Value Ref Range    Sodium 136 135 - 145 mmol/L    Potassium 5.3 3.6 - 5.5 mmol/L    Chloride 102 96 - 112 mmol/L    Co2 27 20 - 33 mmol/L    Glucose 143 (H) 65 - 99 mg/dL    Bun 44 (H) 8 - 22 mg/dL    Creatinine 1.90 (H) 0.50 - 1.40 mg/dL    Calcium 7.9 (L) 8.5 - 10.5 mg/dL    Anion Gap 7.0 7.0 - 16.0   ESTIMATED GFR    Collection Time: 04/29/20  5:00 AM   Result Value Ref Range    GFR If  42 (A) >60 mL/min/1.73 m 2    GFR If Non African American 35 (A) >60 mL/min/1.73 m 2   ACCU-CHEK GLUCOSE    Collection Time: 04/29/20  5:03 AM   Result Value Ref Range    Glucose - Accu-Ck 128 (H) 65 - 99 mg/dL       Fluids    Intake/Output Summary (Last 24 hours) at 4/29/2020 1018  Last data filed at 4/29/2020 1000  Gross per 24 hour   Intake 857.88 ml   Output 1435 ml   Net -577.12 ml       Core Measures & Quality Metrics  Labs reviewed, Medications reviewed, Radiology images reviewed and EKG reviewed  Lopez Catheter: trial lopez removal.      DVT: Plavix.  DVT prophylaxis - mechanical: SCDs  Ulcer prophylaxis: Not indicated    Assessed for rehab: Patient returned to prior level of function, rehabilitation not indicated at this time    RAP Score Total: 8    ETOH Screening  CAGE Score: 0  Assessment complete date: 4/27/2020        Assessment/Plan  Platelet inhibition due to Plavix- (present on admission)  Assessment & Plan  Long term use of ASA and Plavix   Admission platelet mapping thromboelastogram showed 100% inhibition at AA and ADP receptors  Platelet reversal decision pending trajectory of serial h/h and hemodynamics  4/27 One unit of platelets transfused.  4/29 ASA and Plavix resumed    Renal hematoma, left, initial encounter- (present on admission)  Assessment & Plan  Left subcapsular hematoma  with hemorrhage extending into the left perinephric space and pelvis.   Several foci of active extravasation in the left mid kidney and lower pole.   4/28 IR Angio for hypotension and hemoglobin drop, negative for bleed, no interventions  Serial hemograms and abdominal exams.    Hyperkalemia- (present on admission)  Assessment & Plan  Bumex resumed  Trend laboratory studies     Shock circulatory ([facility])  Assessment & Plan  Requiring continuous infusion of vasoactive medication  4/28 Cortisol 11, stress dose steroids initiated  4/29 Decrease solucortef dose  Taper over next two days    Urinary retention- (present on admission)  Assessment & Plan  4/26 Lopez catheter placed  Gross hematuria, likely placement related.   Strict I&O  4/29 Trial lopez removal    Prolonged Q-T interval on ECG- (present on admission)  Assessment & Plan  4/26 QTc >500  Caution with medications affecting the QTc.    Type 2 diabetes mellitus ([facility])- (present on admission)  Assessment & Plan  Chronic condition treated with glimepiride.  Insulin sliding scale   Glimepiride held secondary to renal injury    CHF (congestive heart failure) ([facility])- (present on admission)  Assessment & Plan  Chronic condition treated with bumetanide and carvedilol  4/28 Carvedilol resumed  4/29 Zetia and Bumex resumed    Essential hypertension- (present on admission)  Assessment & Plan  Chronic condition treated with Entresto, Zetia, Coreg and Bumex  Resumed all but Entresto    S/P TAVR (transcatheter aortic valve replacement)- (present on admission)  Assessment & Plan  Stenting of the left main and right coronary arteries as well as TAVR in May 2019  Patient is on ASA and Plavix - admit platelet mapping with 100% AA/ADP inhibition.  4/29 ASA and Plavix resumed  Dr. Lee, Cardiology consulted    CKD (chronic kidney disease)- (present on admission)  Assessment & Plan  Acute exacerbation of a chronic condition, CHF may be playing a role  Avoid nephrotoxins  Trend renal  indices.    Screening examination for infectious disease- (present on admission)  Assessment & Plan  4/26 COVID-19 Screening completed.  No fever, pulmonary symptoms, contact with infected individual, and travel to/from a high risk region.    Trauma- (present on admission)  Assessment & Plan  GLF. Struck his left abdomen.  Non Trauma Activation.  Vj Rajan MD. Trauma Surgery.    ICD (implantable cardioverter-defibrillator) in place- (present on admission)  Assessment & Plan  Placed 2/4/2020 for:  Paroxysmal complete heart block  Syncope  Ischemic cardiomyopathy  Chronic systolic CHF  [facility]    I independently reviewed pertinent clinical lab tests from the last 48 hours and ordered additional follow up clinical lab tests.  I independently reviewed pertinent radiographic images and the radiologist's reports from the last 48 hours and ordered additional follow up radiographic studies.  The details of the available patient records in [facility] (including laboratory tests, culture data, medications, imaging, and other pertinent diagnostic tests) and documentation by consulting physicians were reviewed, summated, and that information was utilized as warranted in today's medical decision making for this patient.  I personally evaluated the patient condition at bedside, discussed the daily plan(s) with available nursing staff, dieticians, social workers, pharmacists on multi-disciplinary rounds.    Aggregated care time spent evaluating, reassessing, reviewing documentation, providing care, and managing this patient exclusive of procedures: 35 minutes    Mehran Gomez MD

## 2020-04-29 NOTE — CARE PLAN
Problem: Communication  Goal: The ability to communicate needs accurately and effectively will improve  Outcome: PROGRESSING AS EXPECTED  Intervention: Educate patient and significant other/support system about the plan of care, procedures, treatments, medications and allow for questions  Note: Patient updated on POC.  All questions answered     Problem: Mobility  Goal: Risk for activity intolerance will decrease  Outcome: PROGRESSING AS EXPECTED  Intervention: Encourage patient to increase activity level in collaboration with Interdisciplinary Team  Note: Patient encouraged to sit in chair and ambulate.

## 2020-04-29 NOTE — CARE PLAN
Problem: Safety  Goal: Will remain free from injury  Outcome: PROGRESSING AS EXPECTED  Intervention: Provide assistance with mobility  Note: Educated patient on use of call light. Identified fall risks. Appropriate fall precautions in place. Patient calling appropriately       Problem: Pain Management  Goal: Pain level will decrease to patient's comfort goal  Outcome: PROGRESSING AS EXPECTED  Intervention: Follow pain managment plan developed in collaboration with patient and Interdisciplinary Team  Note: Medicating patient appropriately with prescribed regimen. Patient reporting decreased pain and showing no signs of distress

## 2020-04-29 NOTE — ASSESSMENT & PLAN NOTE
4/29/2020 left leg swelling.  - Venous duplex with no evidence of deep vein thrombosis or superficial thrombophlebitis in the left lower extremity

## 2020-04-29 NOTE — PROGRESS NOTES
Patient to be transferred to [facility] bed 2.  Report given to MARY Samuel.  All questions answered.

## 2020-04-29 NOTE — PROGRESS NOTES
2 RN skin check complete with Ryan HERNANDEZ  Bony prominences assessed. Elbows red and blanching, floated on pillows.  Lower extremities pale and micaela. Floated on pillows  Abrasions to bilateral knees.  Sacrum red, blanching. Mepilex in place

## 2020-04-29 NOTE — THERAPY
"Physical Therapy Evaluation completed.   Bed Mobility: Found up in and returned to chair    Transfers: Contact Guard Assist    Gait: Contact Guard Assist with Front-Wheel Walker       Plan of Care: Will benefit from Physical Therapy 3 times per week  Discharge Recommendations: Equipment: Will Continue to Assess for Equipment Needs. Post-acute therapy Discharge to home with home health for additional skilled therapy services.    Pt admitted after suffering a fall at home with subsequent L abdominal pain. He presented to PT today rather mobile and was able to complete x500' of gait with FWW, but with erratic pattern and quick gait velocity. Pt reports he does not use FWW at home, but he demonstrates a wide base of support and flexed trunk posture suggesting balance impairments. Recommend continued use of FWW to reduce fall risk. While here, PT will follow to address gait deviations, instability, and stair management. Anticipate pt to progress such that he will be functionally capable of return home with HHPT.     See \"Rehab Therapy-Acute\" Patient Summary Report for complete documentation.     "

## 2020-04-29 NOTE — CARE PLAN
Problem: Hyperinflation:  Goal: Prevent or improve atelectasis  Outcome: PROGRESSING AS EXPECTED  Note:     Respiratory Update    Treatment modality: PEP  Frequency:QID    Pt tolerating current treatments well with no adverse reactions.

## 2020-04-29 NOTE — PROGRESS NOTES
2 RN skin check with MARY Aguilera.    - Bilat elbows red but blanching, floated on pillow  - Sacrum red, intact but blanching - mepilex removed r/t patient having multiple BMs  - Bilat LE pale and micaela, floated on pillows  - Abrasions to Bilat knees  - Bilat posterior ears red but blanching - gray foam and nasal cannula in place   - Bruising to Bilat arms

## 2020-04-29 NOTE — PROGRESS NOTES
"       [facility] Cardiology Follow-up Note    Date of Service:    2020      Name:   LIAN More     YOB: 1940  Age:   79 y.o.  male   MRN:   [id number]      Chief Complaint: hypovolemic shock    HPI:  Mr More is a 78 y/o fellow with PMH including HRr EF, CAD with hx of PCI to the RCA in 2019 and LM - LAD in 2020, severe AS s/p TAVR 20 with heart block s/p Medtronic dual chamber ICD, CKD III, paroxysmal atrial fibrillation (low burden - not on OAC).    He presented to [facility] on 20 after a ground level fall where his L elbow traumatized his L abdomen.  CT shows L subcapsular renal hematoma with extravasation.  His hgb has declined from 18.9 6.4 overnight.  He was transfused 2 U PRBC in addition to the 5 L of IV fluid + a unit of platelet on .    He was taken back to IR overnight as well, no signs of active bleedin/28/20 \"Procedure(s): LEFT upper/main and lower/accessory renal arteriogram, no bleeding or vessel injury seen, no therapy, d/w Dr Mejia\"    Given 2 U PRBC 20.     Interim Events:  Patient is feeling much better today  BP stable off levophed  He states he feels \"full or bloated\"  His diuretic also resumed  Otherwise, stronger.    Denies shortness of breath at rest or CP.  His L foot is swollen, red and warm.    ROS  Constitutional:  improved fatigue.  Respiratory:  Denies shortness of breath, no cough.  Cardiovascular:  No chest pain.  no lower extremity edema.  Denies orthopnea or PND.  : notes more UOP.  GI:  Denies nausea/vomiting. + LLQ abd pain improved.  Neuro:  Denies dizziness, syncope.  Ext:  R swollen R foot    All other review of systems reviewed and negative.    Past medical, surgical, social, and family history reviewed and unchanged from admission except as noted in assessment and plan.    Medications: Reviewed in MAR  Current Facility-Administered Medications   Medication Dose Frequency Provider Last Rate Last Dose   • " aspirin EC (ECOTRIN) tablet 81 mg  81 mg DAILY Brenden Gomez M.D.   81 mg at 04/29/20 1138   • bumetanide (BUMEX) tablet 2 mg  2 mg BID Brenden Gomez M.D.   2 mg at 04/29/20 1137   • clopidogrel (PLAVIX) tablet 75 mg  75 mg DAILY Brenden Gomez M.D.   75 mg at 04/29/20 1137   • ezetimibe (ZETIA) tablet 10 mg  10 mg DAILY Brenden Gomez M.D.   10 mg at 04/29/20 1137   • omeprazole (PRILOSEC) capsule 40 mg  40 mg BID Brenden Gomez M.D.   40 mg at 04/29/20 1137   • hydrocortisone sodium succinate PF (SOLU-CORTEF) 100 MG injection 25 mg  25 mg Q8HRS Odette Gutierres A.P.R.N.   25 mg at 04/29/20 1415   • [START ON 4/30/2020] hydrocortisone sodium succinate PF (SOLU-CORTEF) 100 MG injection 25 mg  25 mg BID Odette Gutierres, A.P.R.N.        Followed by   • [START ON 5/1/2020] hydrocortisone sodium succinate PF (SOLU-CORTEF) 100 MG injection 25 mg  25 mg DAILY Odette Gutierres, A.P.R.N.       • sacubitril-valsartan (ENTRESTO) 24-26 MG tablet 1 Tab  1 Tab BID Brenden Gomez M.D.       • carvedilol (COREG) tablet 3.125 mg  3.125 mg BID WITH MEALS Brenden Gomez M.D.   3.125 mg at 04/29/20 0750   • oxyCODONE immediate-release (ROXICODONE) tablet 5 mg  5 mg Q3HRS PRN Jun Mejia M.D.   5 mg at 04/27/20 1728    Or   • oxyCODONE immediate release (ROXICODONE) tablet 10 mg  10 mg Q4HRS PRN Jun Mejia M.D.   10 mg at 04/28/20 1256   • Respiratory Therapy Consult   Continuous RT EDIE WhiteRMARIA VICTORIA       • Pharmacy Consult Request ...Pain Management Review 1 Each  1 Each PHARMACY TO DOSE EDIE WhiteRLATOYA.       • docusate sodium (COLACE) capsule 100 mg  100 mg BID EDIE WhiteRElizabetN.   100 mg at 04/29/20 0558   • senna-docusate (PERICOLACE or SENOKOT S) 8.6-50 MG per tablet 1 Tab  1 Tab Nightly HAMZAH WhiteP.R.N.   1 Tab at 04/28/20 2032   • senna-docusate (PERICOLACE or SENOKOT S) 8.6-50 MG per tablet 1 Tab  1 Tab Q24HRS PRN EDIE WhiteRLATOYA.       • polyethylene glycol/lytes  "(MIRALAX) PACKET 1 Packet  1 Packet BID HAMZAH WhitePElizabetRElizabetN.   1 Packet at 04/29/20 0557   • magnesium hydroxide (MILK OF MAGNESIA) suspension 30 mL  30 mL DAILY HAMZAH WhitePElizabetRElizabetN.   30 mL at 04/29/20 0558   • bisacodyl (DULCOLAX) suppository 10 mg  10 mg Q24HRS PRN HAMZAH WhitePElizabetRElizabetN.       • fleet enema 133 mL  1 Each Once PRN HAMZAH WhiteP.RElizabetN.       • insulin regular (HUMULIN R) injection 1-6 Units  1-6 Units Q6HRS HAMZAH WhiteP.R.N.   2 Units at 04/29/20 1135    And   • glucose 4 g chewable tablet 16 g  16 g Q15 MIN PRN HAMZAH WhiteP.R.JESSICA.        And   • dextrose 50% (D50W) injection 50 mL  50 mL Q15 MIN PRN HAMZAH WhiteP.R.N.       • prochlorperazine (COMPAZINE) injection 5 mg  5 mg Q6HRS PRN Vj Rajan M.D.   5 mg at 04/26/20 2251   Last reviewed on 4/26/2020  8:23 PM by Rigoberto Perry    Allergies   Allergen Reactions   • Statins [Hmg-Coa-R Inhibitors] Rash     Blisters     • Atorvastatin        Physical Exam  Body mass index is 38.11 kg/m². /68   Pulse 93   Temp 37.2 °C (98.9 °F) (Temporal)   Resp (!) 21   Ht 1.854 m (6' 0.99\")   Wt (!) 131 kg (288 lb 12.8 oz)   SpO2 95%    Vitals:    04/29/20 1200 04/29/20 1300 04/29/20 1400 04/29/20 1500   BP: 126/58   134/68   Pulse: 97 91 99 93   Resp: 16 12 (!) 11 (!) 21   Temp: 37.2 °C (98.9 °F)  37.2 °C (98.9 °F)    TempSrc: Temporal  Temporal    SpO2: 98% 95% 94% 95%   Weight:       Height:        Oxygen Therapy:  Pulse Oximetry: 95 %, O2 (LPM): 2, O2 Delivery Device: Silicone Nasal Cannula    General: no apparent distress.  Neck: no JVD   Lungs: normal respiratory effort,  without crackles, no wheezing or rhonchi.  Heart: normal rate,  regular rhythm, no murmur, no rubs or gallops.   EXT: + bilateral pedal pulses.  L foot looks red, swollen, mild pitting edema, the foot and toes are warm compared to the R.    Abdomen: slightly distended, no ecchymosis noted.   Neurological: No focal deficits, no facial " asymmetry.  Normal speech.  Psychiatric: Appropriate affect, alert and oriented x 3.   Skin: Warm extremities, no rash.  Color improved.     Labs (personally reviewed):     Lab Results   Component Value Date/Time    SODIUM 136 2020 05:00 AM    POTASSIUM 5.3 2020 05:00 AM    CHLORIDE 102 2020 05:00 AM    CO2 27 2020 05:00 AM    GLUCOSE 143 (H) 2020 05:00 AM    BUN 44 (H) 2020 05:00 AM    CREATININE 1.90 (H) 2020 05:00 AM    CREATININE 1.18 2011 12:00 AM    BUNCREATRAT 16 2011 12:00 AM    GLOMRATE >59 2011 12:00 AM     Lab Results   Component Value Date/Time    ALKPHOSPHAT 39 2020 05:10 AM    ASTSGOT 27 2020 05:10 AM    ALTSGPT 23 2020 05:10 AM    TBILIRUBIN 1.0 2020 05:10 AM      Lab Results   Component Value Date/Time    CHOLSTRLTOT 105 2019 02:20 AM    LDL 56 2019 02:20 AM    HDL 29 (A) 2019 02:20 AM    TRIGLYCERIDE 101 2019 02:20 AM         Cardiac Imaging and Procedures Review:      Tele:  NS in the 80- 90s.    Echo 20:  CONCLUSIONS  Compared to the images of the prior study done 2020 - estimated   ejection fraction is improved, previously 35%. The patient is now   tachycardic with smaller ventricular size suggesting hypovolemia.     Moderately reduced left ventricular systolic function.  Left ventricular ejection fraction is visually estimated to be 40%.  Global hypokinesis.  Diastolic function is abnormal, but grade cannot be determined.  Normal right ventricular systolic function.  Known TAVR aortic valve that is functioning normally with normal   transvalvular gradients.    [facility] 2019:  3.5 x 28 mm Syndergy BRANDI to the distal RCA, post dilated to 4 cm.    [facility] 1/10/2020:  3.5 x 26 mm Onxy BRANDI to the distal LM into the LAD.    Device Implant 20:  Medtronic dual chamber PPM - for occasional 2nd and 3rd degree HB.      Assessment and Medical Decision Makin   Hypovolemic shock.   Resolved, off Levophed 4/28 .  /68 and stable.    2   L renal hematoma/hemorrhage.  Taken back to IR 4/28/20 around 0200, no active bleeding noted.     3   Acute blood loss anemia.  hgb drop from 18.9 to 6.4 4/28 - transfused 2 U PRBC.    4   CAD with recent PCI to the RCA 12/2019, LM into LAD 1/2020.  ASA and Plavix resumed this AM 4/29/20, Zetia started back.    5   HFrEF, NYHA class III, Stage C. EF 35% and stable.  Bumex resumed 2 mg BID.      6   Ischemic cardiomyopathy.  S/p revascularization, all GDMT resumed by surgeon this AM: Entresto 24/26 and carvedilol 3.125.    7   Acute on chronic renal failure. Renal function improving from Cr 2.6 to 1.9 today.    8   Hx of AFIB seen on Holter monitor, very low burden and not on OAC.      9   History of intermittent high degree heart block s/p Medtronic dual chamber ICD.    10  Severe AS s/p TAVR 1/27/2020.    11   Red, swollen L foot.  Concern for either cellulitis or thrombus.  Alerted nurse, she said she noted this AM and will page primary team NP to address.     Cardiology will sign off at this time.  Please contact our service directly with further questions/concerns.      He has a virtual visit with Dr. Castro tomorrow.  This can be reschedule for 1-2 weeks.      Rebeca Harding PA-C  [facility]

## 2020-04-30 ENCOUNTER — APPOINTMENT (OUTPATIENT)
Dept: RADIOLOGY | Facility: MEDICAL CENTER | Age: 80
DRG: 698 | End: 2020-04-30
Attending: NURSE PRACTITIONER
Payer: MEDICARE

## 2020-04-30 ENCOUNTER — HOME HEALTH ADMISSION (OUTPATIENT)
Dept: HOME HEALTH SERVICES | Facility: HOME HEALTHCARE | Age: 80
End: 2020-04-30
Payer: MEDICARE

## 2020-04-30 VITALS
BODY MASS INDEX: 38.28 KG/M2 | HEART RATE: 96 BPM | TEMPERATURE: 98.4 F | HEIGHT: 73 IN | OXYGEN SATURATION: 91 % | RESPIRATION RATE: 18 BRPM | DIASTOLIC BLOOD PRESSURE: 70 MMHG | SYSTOLIC BLOOD PRESSURE: 125 MMHG | WEIGHT: 288.8 LBS

## 2020-04-30 PROBLEM — G47.33 OSA (OBSTRUCTIVE SLEEP APNEA): Status: ACTIVE | Noted: 2020-04-30

## 2020-04-30 LAB
ANION GAP SERPL CALC-SCNC: 10 MMOL/L (ref 7–16)
BASOPHILS # BLD AUTO: 0 % (ref 0–1.8)
BASOPHILS # BLD: 0 K/UL (ref 0–0.12)
BUN SERPL-MCNC: 44 MG/DL (ref 8–22)
CALCIUM SERPL-MCNC: 8 MG/DL (ref 8.5–10.5)
CHLORIDE SERPL-SCNC: 99 MMOL/L (ref 96–112)
CO2 SERPL-SCNC: 28 MMOL/L (ref 20–33)
CREAT SERPL-MCNC: 1.57 MG/DL (ref 0.5–1.4)
EOSINOPHIL # BLD AUTO: 0.08 K/UL (ref 0–0.51)
EOSINOPHIL NFR BLD: 1.1 % (ref 0–6.9)
ERYTHROCYTE [DISTWIDTH] IN BLOOD BY AUTOMATED COUNT: 57 FL (ref 35.9–50)
GLUCOSE BLD-MCNC: 163 MG/DL (ref 65–99)
GLUCOSE BLD-MCNC: 173 MG/DL (ref 65–99)
GLUCOSE SERPL-MCNC: 202 MG/DL (ref 65–99)
HCT VFR BLD AUTO: 27.2 % (ref 42–52)
HGB BLD-MCNC: 8.4 G/DL (ref 14–18)
IMM GRANULOCYTES # BLD AUTO: 0.03 K/UL (ref 0–0.11)
IMM GRANULOCYTES NFR BLD AUTO: 0.4 % (ref 0–0.9)
LYMPHOCYTES # BLD AUTO: 0.86 K/UL (ref 1–4.8)
LYMPHOCYTES NFR BLD: 12.3 % (ref 22–41)
MCH RBC QN AUTO: 27.5 PG (ref 27–33)
MCHC RBC AUTO-ENTMCNC: 30.9 G/DL (ref 33.7–35.3)
MCV RBC AUTO: 89.2 FL (ref 81.4–97.8)
MONOCYTES # BLD AUTO: 0.81 K/UL (ref 0–0.85)
MONOCYTES NFR BLD AUTO: 11.6 % (ref 0–13.4)
NEUTROPHILS # BLD AUTO: 5.21 K/UL (ref 1.82–7.42)
NEUTROPHILS NFR BLD: 74.6 % (ref 44–72)
NRBC # BLD AUTO: 0 K/UL
NRBC BLD-RTO: 0 /100 WBC
PLATELET # BLD AUTO: 120 K/UL (ref 164–446)
PMV BLD AUTO: 10.7 FL (ref 9–12.9)
POTASSIUM SERPL-SCNC: 4.4 MMOL/L (ref 3.6–5.5)
RBC # BLD AUTO: 3.05 M/UL (ref 4.7–6.1)
SODIUM SERPL-SCNC: 137 MMOL/L (ref 135–145)
WBC # BLD AUTO: 7 K/UL (ref 4.8–10.8)

## 2020-04-30 PROCEDURE — A9270 NON-COVERED ITEM OR SERVICE: HCPCS | Performed by: SURGERY

## 2020-04-30 PROCEDURE — 82962 GLUCOSE BLOOD TEST: CPT | Mod: 91

## 2020-04-30 PROCEDURE — 85025 COMPLETE CBC W/AUTO DIFF WBC: CPT

## 2020-04-30 PROCEDURE — 36415 COLL VENOUS BLD VENIPUNCTURE: CPT

## 2020-04-30 PROCEDURE — 80048 BASIC METABOLIC PNL TOTAL CA: CPT

## 2020-04-30 PROCEDURE — 94669 MECHANICAL CHEST WALL OSCILL: CPT

## 2020-04-30 PROCEDURE — 71045 X-RAY EXAM CHEST 1 VIEW: CPT

## 2020-04-30 PROCEDURE — 700102 HCHG RX REV CODE 250 W/ 637 OVERRIDE(OP): Performed by: SURGERY

## 2020-04-30 PROCEDURE — 94760 N-INVAS EAR/PLS OXIMETRY 1: CPT

## 2020-04-30 PROCEDURE — 700111 HCHG RX REV CODE 636 W/ 250 OVERRIDE (IP): Performed by: NURSE PRACTITIONER

## 2020-04-30 RX ORDER — OXYCODONE HYDROCHLORIDE 5 MG/1
5 TABLET ORAL EVERY 4 HOURS PRN
Qty: 20 TAB | Refills: 0 | Status: SHIPPED | OUTPATIENT
Start: 2020-04-30 | End: 2020-05-04

## 2020-04-30 RX ORDER — OXYCODONE HYDROCHLORIDE 5 MG/1
5 TABLET ORAL EVERY 4 HOURS PRN
Qty: 20 TAB | Refills: 0 | Status: SHIPPED | OUTPATIENT
Start: 2020-04-30 | End: 2020-04-30

## 2020-04-30 RX ADMIN — SACUBITRIL AND VALSARTAN 1 TABLET: 24; 26 TABLET, FILM COATED ORAL at 06:06

## 2020-04-30 RX ADMIN — BUMETANIDE 2 MG: 1 TABLET ORAL at 09:50

## 2020-04-30 RX ADMIN — CARVEDILOL 3.12 MG: 6.25 TABLET, FILM COATED ORAL at 09:51

## 2020-04-30 RX ADMIN — HYDROCORTISONE SODIUM SUCCINATE 25 MG: 100 INJECTION, POWDER, FOR SOLUTION INTRAMUSCULAR; INTRAVENOUS at 05:44

## 2020-04-30 RX ADMIN — EZETIMIBE 10 MG: 10 TABLET ORAL at 05:44

## 2020-04-30 RX ADMIN — INSULIN HUMAN 1 UNITS: 100 INJECTION, SOLUTION PARENTERAL at 05:45

## 2020-04-30 RX ADMIN — INSULIN HUMAN 1 UNITS: 100 INJECTION, SOLUTION PARENTERAL at 12:50

## 2020-04-30 RX ADMIN — CARVEDILOL 3.12 MG: 6.25 TABLET, FILM COATED ORAL at 16:49

## 2020-04-30 RX ADMIN — CLOPIDOGREL BISULFATE 75 MG: 75 TABLET ORAL at 05:44

## 2020-04-30 RX ADMIN — ASPIRIN 81 MG: 81 TABLET, COATED ORAL at 05:44

## 2020-04-30 RX ADMIN — HYDROCORTISONE SODIUM SUCCINATE 25 MG: 100 INJECTION, POWDER, FOR SOLUTION INTRAMUSCULAR; INTRAVENOUS at 16:46

## 2020-04-30 RX ADMIN — OMEPRAZOLE 40 MG: 20 CAPSULE, DELAYED RELEASE ORAL at 09:51

## 2020-04-30 ASSESSMENT — ENCOUNTER SYMPTOMS
NAUSEA: 0
VOMITING: 0
SENSORY CHANGE: 0
ROS GI COMMENTS: BM 4/29
DIZZINESS: 0
HEADACHES: 0
BLURRED VISION: 0
COUGH: 0
SHORTNESS OF BREATH: 1
CONSTIPATION: 0
ABDOMINAL PAIN: 0
FEVER: 0

## 2020-04-30 NOTE — PROGRESS NOTES
Patient transferred via wheelchair to T4 bed 2.  All belongings including cell phone, shoes,  Shorts and pill box.

## 2020-04-30 NOTE — DISCHARGE PLANNING
Thank you for sending this referral to [facility]. Please provide us the address the patient will be discharging too. Referral on hold until the receipt of the following information.

## 2020-04-30 NOTE — DISCHARGE PLANNING
Received Choice form at 7160  Agency/Facility Name: [facility]  Referral sent per Choice form @ 4219

## 2020-04-30 NOTE — FACE TO FACE
Face to Face Supporting Documentation - Home Health    The encounter with this patient was in whole or in part the primary reason for home health admission.    Date of encounter:   Patient:                    MRN:                       YOB: 2018  LIAN More III  [id number]  1940     Home health to see patient for:  Skilled Nursing care for assessment, interventions & education, Physical Therapy evaluation and treatment and Occupational therapy evaluation and treatment    Skilled need for:  Exacerbation of Chronic Disease State CHF    Skilled nursing interventions to include:  Comment: home check    Homebound status evidenced by:  Need the aid of supportive devices such as crutches, canes, wheelchairs or walkers or Needs the assistance of another person in order to leave the home. Leaving home requires a considerable and taxing effort. There is a normal inability to leave the home.    Community Physician to provide follow up care: Ebony Ramirez M.D.     Optional Interventions? Yes, Details: Pt/OT      I certify the face to face encounter for this home health care referral meets the CMS requirements and the encounter/clinical assessment with the patient was, in whole, or in part, for the medical condition(s) listed above, which is the primary reason for home health care. Based on my clinical findings: the service(s) are medically necessary, support the need for home health care, and the homebound criteria are met.  I certify that this patient has had a face to face encounter by myself and the nurse practitioner working collaboratively with me.  STEPHANIE Mcknight. - NPI: [id number]

## 2020-04-30 NOTE — PROGRESS NOTES
Pt received on floor in T436 bed 2, recieved report from Kristie RN via telephone before arrival. Placed on tele monitor. Agree with previous RN assessment.    2 RN skin check completed with Dacia HERNANDEZ.   *Scabbing on left knee  *Hot, swollen, pink/red, warm left foot  *Bilat shins to bottom of feet rubor/dutsy/red color  *Bruising on right/left forearm and left elbow  *Dressing from art line CDI  *Blanching redness in sacrum area/buttox    Dacia retrieved pts IPad, IPad  and phone  from pts wife Gigi and returned it to him in T436.    Pt states no needs at this time. Resting comfortably in bed with Iphone in hand.

## 2020-04-30 NOTE — DISCHARGE SUMMARY
Trauma Discharge Summary    DATE OF ADMISSION: 4/26/2020    DATE OF DISCHARGE: 4/30/2020    ATTENDING PHYSICIAN: Vj Rajan M.D.    CONSULTING PHYSICIAN:   1.  Shar Lee MD, cardiology      DISCHARGE DIAGNOSIS:  1.  Left leg swelling  2.  Platelet inhibition due to Plavix  3.  Renal hematoma  4. Congestive heart failure, chronic  5. Chronic kidney disease, chronic  6.  Shock circulatory  7.  Type 2 diabetes, chronic  8.  Urinary retention  9.  ICD, chronic  10.  Trauma  11.  Prolonged QT interval on EKG  12.  Hyperkalemia, resolved  13.  Essential hypertension    PROCEDURES:  1. Procedure completed by Dr. Fan, on April 28, 2020, left upper/mid and lower/accessory renal arteriogram      HISTORY OF PRESENT ILLNESS: The patient is a 79 y.o. male who was injured in a ground-level fall in which his abdomen was jammed by his left elbow during the fall..  He was subsequently transferred to [facility] for definite trauma care.  He was triaged as a Trauma in accordance with established pre-hospital protocols.    HOSPITAL COURSE: On arrival, he underwent extensive radiographic and laboratory studies and was admitted to the critical care team under the direction and supervision of Dr. Rajan.  He sustained the listed injuries and incurred the listed diagnosis during his  stay.    He was transferred from the emergency department to the trauma ICU where a tertiary exam was performed.     Patient is had a left subcapsular hematoma with hemorrhage extending to the left perinephric space and pelvis, several foci of active extrication of the left mid kidney and lower pole are noted.  Patient was noted to be hypotensive and therefore was taken to interventional radiology for an angiogram.  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  Reversal was done with 1 units of platelets.  His laboratory studies and abdominal  exams were trended.  Upon stabilization of his hemoglobin his aspirin and Plavix were resumed.    Patient was found to have left leg swelling on April 22 is an 20 he did have a venous duplex which did not show any evidence of DVT thrombosis or superficial thrombophlebitis.    Patient does have a chronic history of CHF, CKD, diabetes, hypertension, ICD placement history which were treated with his home medications during his acute hospital stay.  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  Cardiology was consulted to monitor his brittle congestive heart failure regiment and his antiplatelet therapy.  Recommendations were appreciated.    Patient was found to have a prolonged QT on his EKG with a QTC measuring greater than 500.  Precaution was taken with medications that could prolong his QTC even further.  Patient did have hyperkalemia during his stay.  His Bumex was resumed which did resolve his hyperkalemia.    Patient have episode of urine retention a Gutierres catheter was placed at that time.  He was found to have gross hematuria.  The Gutierres was removed on April 29 and patient was able to void successfully with complete emptying of his bladder post removal.  No hematuria was noted post Gutierres removal.    Patient was screened for COVID-19 on arrival.  Did not show any high risk categories of recent travel, exposure or unexplained fever.    On the day of discharge patient is very eager to be discharged secondary to a sleep study evaluation that he has waited 6 months for on May 1.  He does state he has oxygen at home.  Home health was set up for skilled nursing, physical therapy and Occupational Therapy for patient.  His pain is been managed, his weight is near baseline and his respiratory status is near baseline.    DISCHARGE PHYSICAL EXAM: See [facility] physical exam dated 4/30/2020    DISCHARGE MEDICATIONS:  I reviewed the patients controlled  substance history and obtained a controlled substance use informed consent (if applicable) provided by [facility] and the patient has been prescribed.       Medication List      START taking these medications      Instructions   oxyCODONE immediate-release 5 MG Tabs  Commonly known as:  ROXICODONE   Take 1 Tab by mouth every four hours as needed for up to 7 days.  Dose:  5 mg        CONTINUE taking these medications      Instructions   anastrozole 1 MG Tabs  Commonly known as:  ARIMIDEX   Take 0.5 mg by mouth See Admin Instructions. On Tue and Sat  Dose:  0.5 mg     aspirin 81 MG EC tablet   Take 1 Tab by mouth every day.  Dose:  81 mg     bumetanide 1 MG Tabs  Commonly known as:  BUMEX   Take 2 Tabs by mouth 2 times a day.  Dose:  2 mg     calcium citrate 950 MG Tabs  Commonly known as:  CALCITRATE   Take 1,900 mg by mouth every day.  Dose:  1,900 mg     carvedilol 3.125 MG Tabs  Commonly known as:  COREG   Take 1 Tab by mouth 2 times a day, with meals.  Dose:  3.125 mg     clopidogrel 75 MG Tabs  Commonly known as:  PLAVIX   Take 1 Tab by mouth every day.  Dose:  75 mg     coenzyme Q-10 30 MG capsule   Take 60 mg by mouth 2 Times a Day.  Dose:  60 mg     Cranberry 300 MG Tabs   Take 300 mg by mouth every morning.  Dose:  300 mg     cyanocobalamin 1000 MCG/ML Soln  Commonly known as:  VITAMIN B-12   1,000 mcg by Intramuscular route every thursday.  Dose:  1,000 mcg     Entresto 24-26 MG Tabs tablet  Generic drug:  sacubitril-valsartan   Take 1 tablet by mouth twice daily     ezetimibe 10 MG Tabs  Commonly known as:  ZETIA   Take 1 tablet by mouth once daily     fluticasone 50 MCG/ACT nasal spray  Commonly known as:  FLONASE   Use 2 spray(s) in each nostril once daily     gabapentin 300 MG Caps  Commonly known as:  NEURONTIN   Take 300 mg by mouth every 6 hours as needed. Indications: Neuropathic Pain  Dose:  300 mg     Garlic 1000 MG Caps   Take 1,000 mg by mouth 2 Times a Day.  Dose:  1,000  mg     glimepiride 1 MG tablet  Commonly known as:  AMARYL   Take 1 mg by mouth every evening.  Dose:  1 mg     glucosamine Sulfate 500 MG Caps   Take 1,500 mg by mouth 2 Times a Day.  Dose:  1,500 mg     ICAPS Tabs   Take 1 Tab by mouth 2 Times a Day.  Dose:  1 Tab     L-Lysine 1000 MG Tabs   Take 1,000 mg by mouth every day.  Dose:  1,000 mg     lactobacillus rhamnosus Caps capsule   Take 1 Cap by mouth every day.  Dose:  1 Cap     Non Formulary Request   Inject 1 Each as instructed See Admin Instructions. HCG/ARGININE 30322b/5mg/cc, pt does not take on Thur  Dose:  1 Each     Non Formulary Request   Inject 1 Each as instructed See Admin Instructions. HGH 5.8mg/2.5mg/cc,   Monday, Tuesday, Wednesday, Friday, Saturday, sunday  Dose:  1 Each     Non Formulary Request   Take 1 Cap by mouth 2 Times a Day. Cholox (OTC)  Dose:  1 Cap     Non Formulary Request   Take 1 Cap by mouth 2 Times a Day. Domenic red (OTC)  Dose:  1 Cap     Non Formulary Request   Take 1 Cap by mouth every day. Nitric OXIDE (OTC)  Dose:  1 Cap     Non Formulary Request   Take 1 Cap by mouth every day. anit-oxidant (OTC)  Dose:  1 Cap     omeprazole 40 MG delayed-release capsule  Commonly known as:  PRILOSEC   Take 40 mg by mouth 2 times a day.  Dose:  40 mg     testosterone cypionate 200 MG/ML Soln injection  Commonly known as:  DEPO-TESTOSTERONE   50 mg by Intramuscular route every thursday.  Dose:  50 mg     VITAMIN B COMPLEX PO   Take 1 Tab by mouth every day.  Dose:  1 Tab     Vitamin C 1000 MG Tabs   Take 1,000 mg by mouth 2 Times a Day.  Dose:  1,000 mg     Vitamin D3 2000 UNIT Caps   Take 2,000 Units by mouth every day.  Dose:  2,000 Units     vitamin e 400 UNIT Caps  Commonly known as:  VITAMIN E   Take 400 Units by mouth 2 times a day.  Dose:  400 Units     Zinc 50 MG Tabs   Take 50 mg by mouth every day.  Dose:  50 mg            DISPOSITION: The patient will be discharged home in stable condition on April 30, 2020.  He will follow up with  cardiology, pulmonology and primary care in the next week.    The patient has and family have been extensively counseled and all questions have been answered. Special attention was paid to respiratory decompensation, follow-up and signs and symptoms of infection and to seek immediate medical attention if these develop. The patient demonstrates understanding and gives verbal compliance with discharge instructions.    TIME SPENT ON DISCHARGE: 40 minutes      ____________________________________________  STEPHANIE Mcknight.    DD: 4/30/2020 1:19 PM

## 2020-04-30 NOTE — PROGRESS NOTES
Called US Vascular tech. US Tech stated he could perform the exam in 1 hour. Notified patient who stated he called 911 because it had been 7 hours since US was ordered and it still wasn't done. Informed patient that use of 911 was inappropriate and that the US tech may have been busy with emergent exams.

## 2020-04-30 NOTE — PROGRESS NOTES
[employer] dispatch called U charge nurse to inform that this patient has called 911. Pt is upset that a procedure that has been ordered has not been performed yet. Pt is upset with the level of care that he is not receiving. Charge RN notified [facility] security to come educated the patient about calling 911 for non-emergent requests. Primary nurse Zachery, Charge RN, and 2 security guards spoke with the patient and educated that the procedure that has been ordered is not STAT nor is it an emergency. Pt educated that calling 911 will no longer be tolerated. Pt educated that US tech will arrive and perform the procedure as soon as available.

## 2020-04-30 NOTE — PROGRESS NOTES
Report received. Assessment complete. Patient AOx4. Incontinence episode while assisting patient to bathroom. [facility] dvascular US and left message to call back for time estimate. Call light within reach.

## 2020-04-30 NOTE — DISCHARGE PLANNING
ATTN: Case Management  RE: Referral for Home Health    As of 04/30/2020, we have accepted the Home Health referral for the patient listed above.    A [facility] clinician will be out to see the patient within 48 hours. If you have any questions or concerns regarding the patient's transition to Home Health, please do not hesitate to contact us at [phone].      We look forward to collaborating with you,  [facility] Home Health Team

## 2020-04-30 NOTE — DISCHARGE INSTRUCTIONS
Discharge Instructions    Discharged to home by car with relative. Discharged via wheelchair, hospital escort: Yes.  Special equipment needed: Not Applicable    Be sure to schedule a follow-up appointment with your primary care doctor or any specialists as instructed.     Discharge Plan:   Diet Plan: Discussed  Activity Level: Discussed  Confirmed Follow up Appointment: Patient to Call and Schedule Appointment  Confirmed Symptoms Management: Discussed  Medication Reconciliation Updated: Yes  Influenza Vaccine Indication: Patient Refuses    I understand that a diet low in cholesterol, fat, and sodium is recommended for good health. Unless I have been given specific instructions below for another diet, I accept this instruction as my diet prescription.       Special Instructions:      Fall Prevention in the Home  Introduction  Falls can cause injuries. They can happen to people of all ages. There are many things you can do to make your home safe and to help prevent falls.  What can I do on the outside of my home?  Regularly fix the edges of walkways and driveways and fix any cracks.  Remove anything that might make you trip as you walk through a door, such as a raised step or threshold.  Trim any bushes or trees on the path to your home.  Use bright outdoor lighting.  Clear any walking paths of anything that might make someone trip, such as rocks or tools.  Regularly check to see if handrails are loose or broken. Make sure that both sides of any steps have handrails.  Any raised decks and porches should have guardrails on the edges.  Have any leaves, snow, or ice cleared regularly.  Use sand or salt on walking paths during winter.  Clean up any spills in your garage right away. This includes oil or grease spills.  What can I do in the bathroom?  Use night lights.  Install grab bars by the toilet and in the tub and shower. Do not use towel bars as grab bars.  Use non-skid mats or decals in the tub or shower.  If you  need to sit down in the shower, use a plastic, non-slip stool.  Keep the floor dry. Clean up any water that spills on the floor as soon as it happens.  Remove soap buildup in the tub or shower regularly.  Attach bath mats securely with double-sided non-slip rug tape.  Do not have throw rugs and other things on the floor that can make you trip.  What can I do in the bedroom?  Use night lights.  Make sure that you have a light by your bed that is easy to reach.  Do not use any sheets or blankets that are too big for your bed. They should not hang down onto the floor.  Have a firm chair that has side arms. You can use this for support while you get dressed.  Do not have throw rugs and other things on the floor that can make you trip.  What can I do in the kitchen?  Clean up any spills right away.  Avoid walking on wet floors.  Keep items that you use a lot in easy-to-reach places.  If you need to reach something above you, use a strong step stool that has a grab bar.  Keep electrical cords out of the way.  Do not use floor polish or wax that makes floors slippery. If you must use wax, use non-skid floor wax.  Do not have throw rugs and other things on the floor that can make you trip.  What can I do with my stairs?  Do not leave any items on the stairs.  Make sure that there are handrails on both sides of the stairs and use them. Fix handrails that are broken or loose. Make sure that handrails are as long as the stairways.  Check any carpeting to make sure that it is firmly attached to the stairs. Fix any carpet that is loose or worn.  Avoid having throw rugs at the top or bottom of the stairs. If you do have throw rugs, attach them to the floor with carpet tape.  Make sure that you have a light switch at the top of the stairs and the bottom of the stairs. If you do not have them, ask someone to add them for you.  What else can I do to help prevent falls?  Wear shoes that:  Do not have high heels.  Have rubber  bottoms.  Are comfortable and fit you well.  Are closed at the toe. Do not wear sandals.  If you use a stepladder:  Make sure that it is fully opened. Do not climb a closed stepladder.  Make sure that both sides of the stepladder are locked into place.  Ask someone to hold it for you, if possible.  Clearly kalpana and make sure that you can see:  Any grab bars or handrails.  First and last steps.  Where the edge of each step is.  Use tools that help you move around (mobility aids) if they are needed. These include:  Canes.  Walkers.  Scooters.  Crutches.  Turn on the lights when you go into a dark area. Replace any light bulbs as soon as they burn out.  Set up your furniture so you have a clear path. Avoid moving your furniture around.  If any of your floors are uneven, fix them.  If there are any pets around you, be aware of where they are.  Review your medicines with your doctor. Some medicines can make you feel dizzy. This can increase your chance of falling.  Ask your doctor what other things that you can do to help prevent falls.  This information is not intended to replace advice given to you by your health care provider. Make sure you discuss any questions you have with your health care provider.  Document Released: 10/14/2010 Document Revised: 05/25/2017 Document Reviewed: 01/22/2016  © 2017 [employer]  · Is patient discharged on Warfarin / Coumadin?   No     Depression / Suicide Risk    As you are discharged from this [facility] facility, it is important to learn how to keep safe from harming yourself.    Recognize the warning signs:  · Abrupt changes in personality, positive or negative- including increase in energy   · Giving away possessions  · Change in eating patterns- significant weight changes-  positive or negative  · Change in sleeping patterns- unable to sleep or sleeping all the time   · Unwillingness or inability to communicate  · Depression  · Unusual sadness, discouragement and  loneliness  · Talk of wanting to die  · Neglect of personal appearance   · Rebelliousness- reckless behavior  · Withdrawal from people/activities they love  · Confusion- inability to concentrate     If you or a loved one observes any of these behaviors or has concerns about self-harm, here's what you can do:  · Talk about it- your feelings and reasons for harming yourself  · Remove any means that you might use to hurt yourself (examples: pills, rope, extension cords, firearm)  · Get professional help from the community (Mental Health, Substance Abuse, psychological counseling)  · Do not be alone:Call your Safe Contact- someone whom you trust who will be there for you.  · Call your local CRISIS HOTLINE 646-9891 or 580-112-3464  · Call your local Children's Mobile Crisis Response Team [place] (601) 647-7592 or www.Patreon  · Call the toll free National Suicide Prevention Hotlines   · National Suicide Prevention Lifeline 659-469-YRMU (4338)  · National Hope Line Network 800-SUICIDE (722-9815)

## 2020-04-30 NOTE — DISCHARGE PLANNING
Anticipated Disposition:  Home with Home Health     Action:   PASTORA Farah came to this CM's office, reported, Pt needs Home Health service.   This CM called wife, [facility] selected, choice form signed and faxed to [facility].   Wife stated, Pt has sleep study appointment tomorrow at 8am, and unknown if its can be done over the phone or in office in person. W also reported Pt uses home oxygen at night at home via [employer]. Pt also has a FWW at home.           Barriers to Discharge:   Home Health acceptance.   Medical clearance.      Plan:  Please follow up with [facility] for HH acceptance.   Please follow up with attending physician for medical clearance.

## 2020-04-30 NOTE — PROGRESS NOTES
"CC: \"Referral Dr. Castro\"    HPI:  Mr. USMAN olivarez is a 79-year-old retired male referred by  NEAL Proctor  for evaluation of possible obstructive sleep apnea.    He describes his sleep problem as \"SOB due to cardiac issues since age 78.\"  This affects his life and daily activities by \"HOB elevated and low flow O2 to facilitate sleep.\"  He rates the severity of his problem is 8 out of 10.    He has a regular bed partner.  His sleep latency is variable.  He may watch TV just prior to turn off lights and attempting to go to sleep.  He experiences 2 nocturnal awakenings and reports one episode of nocturia.    Symptoms include waking up too early in the morning and being unable to return to sleep, napping or returning to bed after arising, sleepiness during the day, too little sleep at night, and tiredness during the day which she attributes to cardiac issues.  He may fall asleep accidentally and his naps can last 15 minutes to 1 hour but they do not provide refreshment.    He reports nocturnal shortness of breath 5-7 times per week.  He snores occasionally and this may disturb his bed partner.  He does not report any parasomnias and has not been told that his legs or arms jerk or twitch during sleep.    He does use nocturnal oxygen.      Significant comorbidities modifying factors include recent trauma admission discharged yesterday, ICD in place, chronic kidney disease, renal hematoma, type 2 diabetes, hypertension, back pain, pneumonia, lap band, and never smoker.    His trauma admission was secondary to a ground-level fall with a subsequent left subcapsular kidney hematoma.  Labs revealed a hemoglobin of 8.4, hematocrit of 27.2, last BUN 44 creatinine 1.57, last glucose 202, at his last x-ray showed slightly increased inflation but persisting left sided parenchymal opacities, prostatic heart valve, and left chest pacemaker/AICD.    4/27/2020 echo showed a left ventricular ejection fraction of 40%, global " hypokinesis, abnormal diastolic function, TAVR aortic valve with normal transvalvular gradients, but no mention of RVSP.    Patient Active Problem List    Diagnosis Date Noted   • Left leg swelling 04/29/2020     Priority: High   • Renal hematoma, left, initial encounter 04/26/2020     Priority: High   • Platelet inhibition due to Plavix 04/26/2020     Priority: High   • S/P placement of cardiac pacemaker 02/03/2020     Priority: High   • Hyperkalemia 04/29/2020     Priority: Medium   • Shock circulatory ([facility]) 04/28/2020     Priority: Medium   • Urinary retention 04/27/2020     Priority: Medium   • Essential hypertension 04/26/2020     Priority: Medium   • CHF (congestive heart failure) ([facility]) 04/26/2020     Priority: Medium   • Type 2 diabetes mellitus ([facility]) 04/26/2020     Priority: Medium   • Prolonged Q-T interval on ECG 04/26/2020     Priority: Medium   • S/P TAVR (transcatheter aortic valve replacement) 01/27/2020     Priority: Medium   • CHF (congestive heart failure), NYHA class II, chronic, systolic ([facility]) 12/26/2019     Priority: Medium   • CKD (chronic kidney disease) 12/04/2019     Priority: Medium   • Coronary artery disease involving native coronary artery of native heart without angina pectoris 12/04/2019     Priority: Medium   • Hypertension 09/05/2009     Priority: Medium   • Dyslipidemia 09/05/2009     Priority: Medium   • Screening examination for infectious disease 04/27/2020     Priority: Low   • Trauma 04/26/2020     Priority: Low   • ICD (implantable cardioverter-defibrillator) in place 03/30/2020     Priority: Low   • Gastroesophageal reflux disease without esophagitis 01/20/2020     Priority: Low   • Stented coronary artery 01/15/2020     Priority: Low   • Risk for falls 09/04/2019     Priority: Low   • 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     Priority: Low   • Pancreatic lesion 04/01/2019     Priority: Low   • Macular degeneration 10/10/2018     Priority: Low   • Polycythemia 03/11/2017     Priority:  Low   • Obesity 03/03/2016     Priority: Low   • Hypotestosteronism 03/03/2016     Priority: Low   • 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     Priority: Low   • S/P gastric bypass 03/26/2010     Priority: Low   • Type 2 diabetes mellitus with hyperglycemia, without long-term current use of insulin (HCC) 09/05/2009     Priority: Low   • S/P lumbar fusion 09/05/2009     Priority: Low   • JAMES (obstructive sleep apnea) 04/30/2020   • High risk medication use 02/26/2020   • Insomnia 02/25/2020   • Elevated troponin 02/25/2020   • Lower extremity edema 12/29/2009       Past Medical History:   Diagnosis Date   • Arthritis    • Back pain    • Breath shortness     pt states short of breath 24/7   • CAD (coronary artery disease)    • CATARACT     removed bilat   • Chest pain    • Chest tightness    • Chickenpox    • Congestive heart failure (HCC)    • Constipation    • Coronary heart disease    • Cough    • Daytime sleepiness    • Diabetes     PT REPORTS DIABETES RESOLVED IN 2009. NO LONGER TAKES MEDS   • Difficulty breathing    • Dilated cardiomyopathy (HCC)    • Fatigue    • Hearing difficulty    • Heart murmur    • Heart valve disease    • Heartburn    • Hiatus hernia syndrome    • Hyperlipidemia    • Hypertension     pt states well controlled on meds   • Kidney disease    • Mumps    • Pain 08-29-13    back, hips and bilat legs, 4/10   • Pain 6/22/2015    left humerus   • Pain 1/7/16    knees, back and shoulder   • Painful joint    • Palpitations    • Pneumonia 2007   • Rhinitis    • Ringing in ears    • Severe aortic stenosis 12/4/2019   • Shortness of breath    • Swelling of lower extremity    • Tonsillitis    • Ulcer 2014    Dr. Porter, gastroenterologist   • Unspecified hemorrhagic conditions     bruises easily        Past Surgical History:   Procedure Laterality Date   • TRANSCATHETER AORTIC VALVE REPLACEMENT N/A 1/27/2020    Procedure: REPLACEMENT, AORTIC VALVE, TRANSCATHETER-;  Surgeon: Surendra Castro M.D.;  Location: SURGERY  [facility];  Service: Cardiac   • OLGA  1/27/2020    Procedure: ECHOCARDIOGRAM, TRANSESOPHAGEAL;  Surgeon: Surendra Castro M.D.;  Location: [facility];  Service: Cardiac   • GASTROSCOPY N/A 1/8/2016    Procedure: GASTROSCOPY;  Surgeon: Deniz Sue M.D.;  Location: [facility];  Service:    • HUMERUS ORIF Left 6/25/2015    Procedure: HUMERUS ORIF/ PROXIMAL;  Surgeon: Cristal Guido M.D.;  Location: [facility];  Service:    • LUMBAR FUSION POSTERIOR  9/5/2013    Performed by Edith Sears M.D. at [facility]   • LUMBAR DECOMPRESSION  9/5/2013    Performed by Edith Sears M.D. at [facility]   • MASS EXCISION NEURO  9/5/2013    Performed by Edith Sears M.D. at [facility]   • RECOVERY  6/26/2012    Performed by SURGERY, IR-RECOVERY at SURGERY SAME DAY [facility]   • DRAINAGE HEMATOMA  5/25/2012    Performed by DENIZ SUE at [facility]   • GASTRIC BAND LAPAROSCOPIC REVISION  5/11/2012    Performed by DENIZ SUE at [facility]   • LUMBAR FUSION POSTERIOR  3/26/2012    Performed by EDITH SEARS at [facility]   • LUMBAR DECOMPRESSION  3/26/2012    Performed by EDITH SEARS at [facility]   • INJ,EPIDURAL/LUMB/SAC SINGLE  3/19/2012    Performed by KRISTIN BALTAZAR at [facility]   • INJ,EPIDURAL/LUMB/SAC SINGLE  3/5/2012    Performed by KRISTIN BALTAZAR at [facility]   • GASTRIC BANDING LAPAROSCOPIC  3/24/2010    Performed by DENIZ SUE at [facility]   • HIATAL HERNIA REPAIR  3/24/2010    Performed by DENIZ SUE at [facility]   • KNEE ARTHROPLASTY TOTAL  2000    bilateral   • ABDOMINOPLASTY  1990   • AORTIC VALVE REPLACEMENT     • ARTHROSCOPY, KNEE     • LAMINOTOMY     • PB REMV 2ND CATARACT,CORN-SCLER SECTN     • SINUSCOPE     • SLEEVE,CARRIE VASO THIGH     • TONSILLECTOMY     • ZZZ CARDIAC CATH         Family  History   Problem Relation Age of Onset   • No Known Problems Sister    • No Known Problems Sister    • No Known Problems Sister    • Diabetes Other    • No Known Problems Mother    • No Known Problems Father    • Heart Disease Neg Hx        Social History     Socioeconomic History   • Marital status:      Spouse name: Not on file   • Number of children: Not on file   • Years of education: Not on file   • Highest education level: Not on file   Occupational History   • Not on file   Social Needs   • Financial resource strain: Not on file   • Food insecurity     Worry: Not on file     Inability: Not on file   • Transportation needs     Medical: Not on file     Non-medical: Not on file   Tobacco Use   • Smoking status: Never Smoker   • Smokeless tobacco: Never Used   • Tobacco comment: 0   Substance and Sexual Activity   • Alcohol use: No     Alcohol/week: 0.0 oz     Frequency: Never     Binge frequency: Never   • Drug use: No   • Sexual activity: Yes   Lifestyle   • Physical activity     Days per week: Not on file     Minutes per session: Not on file   • Stress: Not on file   Relationships   • Social connections     Talks on phone: Not on file     Gets together: Not on file     Attends [employer] service: Not on file     Active member of club or organization: Not on file     Attends meetings of clubs or organizations: Not on file     Relationship status: Not on file   • Intimate partner violence     Fear of current or ex partner: Not on file     Emotionally abused: Not on file     Physically abused: Not on file     Forced sexual activity: Not on file   Other Topics Concern   • Not on file   Social History Narrative   • Not on file       Current Outpatient Medications   Medication Sig Dispense Refill   • zolpidem (AMBIEN) 5 MG Tab Take 1 Tab by mouth at bedtime as needed for Sleep (1 to 2 po qhs prn insomnia/sleep study. Bring to sleep study.) for up to 2 doses. 2 Tab 0   • ENTRESTO 24-26 MG Tab tablet Take 1  tablet by mouth twice daily 60 Tab 6   • ezetimibe (ZETIA) 10 MG Tab Take 1 tablet by mouth once daily 90 Tab 0   • fluticasone (FLONASE) 50 MCG/ACT nasal spray Use 2 spray(s) in each nostril once daily 48 g 3   • carvedilol (COREG) 3.125 MG Tab Take 1 Tab by mouth 2 times a day, with meals. 90 Tab 3   • bumetanide (BUMEX) 1 MG Tab Take 2 Tabs by mouth 2 times a day. 120 Tab 3   • Multiple Vitamins-Minerals (ICAPS) Tab Take 1 Tab by mouth 2 Times a Day.     • lactobacillus rhamnosus (CULTURELLE) Cap capsule Take 1 Cap by mouth every day.     • testosterone cypionate (DEPO-TESTOSTERONE) 200 MG/ML Solution injection 50 mg by Intramuscular route every thursday.     • cyanocobalamin (VITAMIN B-12) 1000 MCG/ML Solution 1,000 mcg by Intramuscular route every thursday.     • calcium citrate (CALCITRATE) 950 MG Tab Take 1,900 mg by mouth every day.     • coenzyme Q-10 30 MG capsule Take 60 mg by mouth 2 Times a Day.     • Garlic 1000 MG Cap Take 1,000 mg by mouth 2 Times a Day.     • glucosamine Sulfate 500 MG Cap Take 1,500 mg by mouth 2 Times a Day.     • Non Formulary Request Inject 1 Each as instructed See Admin Instructions. HCG/ARGININE 55629k/5mg/cc, pt does not take on Thur      • Non Formulary Request Inject 1 Each as instructed See Admin Instructions. HGH 5.8mg/2.5mg/cc,   Monday, Tuesday, Wednesday, Friday, Saturday, sunday     • vitamin e (VITAMIN E) 400 UNIT Cap Take 400 Units by mouth 2 times a day.     • omeprazole (PRILOSEC) 40 MG delayed-release capsule Take 40 mg by mouth 2 times a day.     • Non Formulary Request Take 1 Cap by mouth 2 Times a Day. Cholox (OTC)     • Cholecalciferol (VITAMIN D3) 2000 UNIT Cap Take 2,000 Units by mouth every day.     • Non Formulary Request Take 1 Cap by mouth 2 Times a Day. Domenic red (OTC)     • Non Formulary Request Take 1 Cap by mouth every day. Nitric OXIDE (OTC)     • Non Formulary Request Take 1 Cap by mouth every day. anit-oxidant (OTC)     • Zinc 50 MG Tab Take 50 mg  "by mouth every day.     • anastrozole (ARIMIDEX) 1 MG Tab Take 0.5 mg by mouth See Admin Instructions. On Tue and Sat     • gabapentin (NEURONTIN) 300 MG Cap Take 300 mg by mouth every 6 hours as needed. Indications: Neuropathic Pain     • clopidogrel (PLAVIX) 75 MG Tab Take 1 Tab by mouth every day. 90 Tab 3   • aspirin EC 81 MG EC tablet Take 1 Tab by mouth every day. 30 Tab 0   • glimepiride (AMARYL) 1 MG tablet Take 1 mg by mouth every evening.     • B Complex Vitamins (VITAMIN B COMPLEX PO) Take 1 Tab by mouth every day.     • Cranberry 300 MG Tab Take 300 mg by mouth every morning.     • L-Lysine 1000 MG Tab Take 1,000 mg by mouth every day.     • Ascorbic Acid (VITAMIN C) 1000 MG Tab Take 1,000 mg by mouth 2 Times a Day.     • oxyCODONE immediate-release (ROXICODONE) 5 MG Tab Take 1 Tab by mouth every four hours as needed for up to 7 days. (Patient not taking: Reported on 5/1/2020) 20 Tab 0     No current facility-administered medications for this visit.     \"CURRENT RX\"    ALLERGIES: Statins [hmg-coa-r inhibitors] and Atorvastatin    ROS    Constitutional: Denies fever, chills, sweats,  weight loss, positive for fatigue.  Eyes: Denies vision loss, pain, drainage, double vision, glasses.  Ears/Nose/Mouth/Throat: Positive for difficulty hearing, ringing or buzzing in the ears, and rhinitis or nasal congestion  Cardiovascular: Positive for chest pain, chest tightness, palpitations, swelling in legs or feet, difficulty breathing lying down..   Respiratory: Positive for shortness of breath and cough  Sleep: per HPI  Gastrointestinal: Positive for heartburn and constipation  Genitourinary: Denies  blood in urine, discharge, frequent urination.   Musculoskeletal: Positive for painful joints and back pain  Integumentary: Denies rashes, lumps, color changes.   Neurological: Denies frequent headaches,weakness, dizziness.    PHYSICAL EXAM  Obese    /56 (BP Location: Right arm, Patient Position: Sitting, BP Cuff " "Size: Adult)   Pulse (!) 102   Resp 18   Ht 1.854 m (6' 1\")   Wt 116.6 kg (257 lb)   SpO2 88%   BMI 33.91 kg/m²   Appearance: Well-nourished, well-developed, no acute distress  Eyes:  PERRLA, EOMI; googles on  ENMT: without lesions, deformities;hearing grossly intact; tongue normal, posterior pharynx without erythema or exudate;   Modified Mallampati (AKA Sommer) Score: Not examined as he has a mask for COVID-19 prophylaxis  Neck: Supple, trachea midline, no masses  Respiratory effort:  No intercostal retractions or use of accessory muscles  Lung auscultation:  No wheezes rhonchi rubs or rales  Cardiac: No murmurs, rubs, or gallops; regular rhythm, normal rate; 3+ leg edema  Abdomen:  Left abd tenderness  Obese  Musculoskeletal:  Grossly normal; gait and station normal; digits and nails normal  Skin:  No rashes, petechiae, cyanosis; finger eccymoses from fall  Neurologic: without focal signs; oriented to person, time, place, and purpose; judgement intact  Psychiatric:  No depression, anxiety, agitation        PROBLEMS:  1. JAMES (obstructive sleep apnea)    - zolpidem (AMBIEN) 5 MG Tab; Take 1 Tab by mouth at bedtime as needed for Sleep (1 to 2 po qhs prn insomnia/sleep study. Bring to sleep study.) for up to 2 doses.  Dispense: 2 Tab; Refill: 0  - Polysomnography, 4 or More; Future    2. CHF (congestive heart failure), NYHA class II, chronic, systolic (HCC)      3. Coronary artery disease involving native coronary artery of native heart without angina pectoris      4. Essential hypertension      5. Gastroesophageal reflux disease without esophagitis      6. ICD (implantable cardioverter-defibrillator) in place      7. Class 3 severe obesity due to excess calories in adult, unspecified BMI, unspecified whether serious comorbidity present ([facility])        - OBESITY COUNSELING (No Charge): Patient identified as having weight management issue.  Appropriate orders and counseling given.      PLAN:   The patient has " "signs and symptoms consistent with obstructive sleep apnea hypopnea syndrome. Will schedule to have a nocturnal polysomnogram using zolpidem to assist with sleep onset and maintenance should the need arise. Will return after the results are available to determine further diagnostic needs and/or treatment options.    The risks of untreated sleep apnea were discussed with the patient at length. Patients with JAMES are at increased risk of cardiovascular disease including coronary artery disease, systemic arterial hypertension, pulmonary arterial hypertension, cardiac arrythmias, and stroke. JAMES patients have an increased risk of motor vehicle accidents, type 2 diabetes, chronic kidney disease, and non-alcoholic liver disease. The patient was advised to avoid driving a motor vehicle when drowsy.    Positive airway pressure, such as CPAP, is considered first-line and preferred therapy for sleep apnea and may reverse both symptoms and risks.    He will likely need positive airway pressure and bleed-in oxygen as his saturation on room air today is 88%.  Review of his chest x-ray shows significant left basilar opacification compatible with contusion/atelectasis.  His CT scan from 4/26/2020 did not show any pleural effusion.    Have advised the patient to follow up with the appropriate healthcare practitioners for all other medical problems and issues.          Return for after sleep study.                      Answers for HPI/ROS submitted by the patient on 4/30/2020   Year of your last physical exam: 2019  Results of exam: Massive heart failure  Occupation : retired  Height: 6'1\"  Current weight: 232 lbs  6 months ago: 252  At age 22: 524  What is the reason for your visit today?: Referral Dr Castro  Name of person referring you to the Sleep Center: Dr Castro  Have you ever been hospitalized?: Yes  Reason, year, and hospital in which you were hospitalized:: Multiple admits 2020  Have you ever had problems with anesthesia?: " No  Have you experienced post-operative delirium?: No  What year did you receive your last Flu shot?: 2020  What year did you receive you last Pneumonia shot?: unsure  Have you had a TB skin test? If so, please list the year and result:: no  Have you had Allergy skin testing? If so, please list the year and result:: 1975 - seasonal allergies  Please briefly describe your sleep problem and how old you were when it began.: SOB due to cardiac issues - 78  How does this affect your daily life and activities? Please also rate how serious of a problem this is (1 = Not at all, 10 = Very Serious).: HOB elevated and low flow O2 facilitates sleep. 8  Have you had any previous evaluations, examinations, or treatment for this sleep problem or any other problems with your sleep? If so, please describe the evaluation, treatment, and results.: No  Have you used any medications (prescribed or otherwise) to help your sleep problem? If yes, include name, amount, frequency, and the prescribing physician.: No  If employed, what time do you usually start and end work?: N/A  Do you ever change work shifts? If yes, describe how often (never, infrequently, regularly).: N/A  What time do you usually go to bed and wake up on: Weekdays? Weekends?: 2 AM; 10 AM daily  Do you have a regular bed partner?: Yes  How many minutes does it usually take to fall asleep at night after turning off the lights?: Variable  What do you ordinarily do just prior to turning out the lights and attempting to go to sleep (e.g., reading, TV, baths, etc.)?: TV  On average, how many times do you wake up during the night?: 2  On average, how many times do you wake up to use the bathroom?: 1  Do you often wake up too early in the morning and are unable to return to sleep?: 2-3  On average, how many hours of sleep do you get per night?: 4-6  How do you usually awaken?  Alarm, spontaneously, or other?: spontaneously; with alarm if have a morning appointment  Is it  "difficult for you to awaken and get out of bed after sleeping? (Not at all, Sometimes, Very): Not at all  Do you nap or return to bed after arising?: Yes  Are you bothered by sleepiness during the day?: Yes  Do you feel that you get too much sleep at night?: No  Do you feel that you get too little sleep at night?: Yes  Do you usually feel tired during the day? If so, what do you attribute this to?: Yes. Cardiac issues  Do you find yourself falling asleep when you don't mean to? : Yes  If yes, how long does your sleep episode last?: Variable. 15 min to 1 hr  Do you feel rested or refreshed after the sleep episode?: No  Have you ever suddenly fallen?: Yes  Have you ever experienced sudden body weakness?: Yes  If yes, were you aware of the things around you?: Yes  Was the weakness brought on by any particular event or feeling? If so, briefly describe.: No  Have you ever experienced weakness or paralysis upon going to sleep?: No  Have you ever experienced weakness or paralysis upon awakening from sleep?: No  Have you ever experienced seeing things or hearing voices/noises: That weren't real? On going to sleep? During the night? On awakening from sleep? During the day?: No  Do you have difficulty breathing at night? If yes, briefly describe.: Yes. Seem to be unable to get enough air; but oximeter indicates my O2 sat >92%.  How many times per week?: 5-7  How did you become aware of this, at what age did this first occur, and how many years has this occurred?: 2020. 79. 1  Have you been told you snore while asleep? If so, does it disturb a bed partner (or someone in the same room), or someone in the next room?: Occasionaly; same room  Have you ever experienced doing something without being aware of the action? If yes, please describe.: No  How many times per week does this occur?: N/A  Have you ever experienced upon lying in bed before sleep or on awakening from sleep: Restlessness of legs, \"nervous legs\", \"creeping " "crawling\" sensation of legs, or twitching of legs?: No  How many times per week does this occur, and how many minutes does the sensation last?: N/A  Have you ever been told that your arms or legs jerk or twitch while you are asleep? If yes, how many times per night does this occur?: No  Do you know, or have you ever been told that you do any of the following while sleeping: talk, walk, grit teeth, wet the bed, wake up screaming or seemingly afraid, have disturbing dreams, have unusual movements, wake up with headaches, (males) have erections? If yes to any of these, please indicate how many times per week, age started, last occurrence, treatment received.: no  Has anyone in your family been known to have any sleep problems? If yes, please list the type of problem (e.g., trouble getting to sleep, too sleepy, bed wetting, etc.), the relationship of this person to you, and the treatment received.: No    "

## 2020-04-30 NOTE — CARE PLAN
Problem: Safety  Goal: Will remain free from falls  Outcome: PROGRESSING AS EXPECTED  Note: Hourly rounding.  Non-skid socks. Bed locked & in low position. Personal belongings and call light  within reach. .      Problem: Knowledge Deficit  Goal: Knowledge of disease process/condition, treatment plan, diagnostic tests, and medications will improve  Outcome: PROGRESSING AS EXPECTED  Note: Information regarding disease process/condition explained,question answered.  Updated with plan of care, verbalized understanding.      Problem: Pain Management  Goal: Pain level will decrease to patient's comfort goal  Outcome: PROGRESSING AS EXPECTED  Note: Taught pt 0-10 pain scale and  non pharmacological method of pain mgt, encouraged to verbalize when in pain. Administered PRN pain med as needed.

## 2020-04-30 NOTE — PROGRESS NOTES
Trauma / Surgical Daily Progress Note    Date of Service  4/30/2020    Chief Complaint  79 y.o. male admitted 4/26/2020 with Trauma    Interval Events  [facility]   Per nursing, patient called 911 regarding his ultrasound behind performed  Home health recommendations reviewed   No DVT noted on US  Cardiology signed off 4/29    -home health ordered for PT/OT  -DME walker for DC   -Ok to dc with home health, verification of oxygen and walker      Review of Systems  Review of Systems   Constitutional: Negative for fever and malaise/fatigue.   HENT: Negative for hearing loss.    Eyes: Negative for blurred vision.   Respiratory: Positive for shortness of breath (chronic). Negative for cough.    Cardiovascular: Positive for leg swelling. Negative for chest pain.   Gastrointestinal: Negative for abdominal pain, constipation, nausea and vomiting.        BM 4/29   Genitourinary:        Urinary retention   Musculoskeletal:        Chronic back, knee and left shoulder pain   Skin: Negative for rash.   Neurological: Negative for dizziness, sensory change and headaches.        Vital Signs  Temp:  [36 °C (96.8 °F)-37.3 °C (99.1 °F)] 37.1 °C (98.7 °F)  Pulse:  [83-99] 83  Resp:  [11-21] 18  BP: (110-141)/(61-71) 118/71  SpO2:  [93 %-99 %] 98 %    Physical Exam  Physical Exam  Vitals signs and nursing note reviewed.   Constitutional:       General: He is not in acute distress.     Appearance: He is not toxic-appearing.   HENT:      Head: Normocephalic.      Nose: Nose normal.      Mouth/Throat:      Mouth: Mucous membranes are moist.   Eyes:      Pupils: Pupils are equal, round, and reactive to light.   Neck:      Musculoskeletal: Normal range of motion and neck supple. No muscular tenderness.   Cardiovascular:      Rate and Rhythm: Normal rate and regular rhythm.      Pulses: Normal pulses.   Pulmonary:      Effort: Pulmonary effort is normal.      Breath sounds: Normal breath sounds.      Comments: Increased work of  breathing on exertion  Supplemental O2  Chest:      Chest wall: No tenderness.   Abdominal:      General: Abdomen is flat. Bowel sounds are normal. There is no distension.      Tenderness: There is no abdominal tenderness.   Genitourinary:     Comments: voiding  Musculoskeletal: Normal range of motion.         General: Swelling present. No tenderness.      Comments: Chronic venous status    Skin:     General: Skin is warm and dry.      Comments: PVD   Neurological:      Mental Status: He is alert and oriented to person, place, and time.   Psychiatric:         Mood and Affect: Mood normal.         Behavior: Behavior normal.         Thought Content: Thought content normal.         Laboratory  Recent Results (from the past 24 hour(s))   ACCU-CHEK GLUCOSE    Collection Time: 04/29/20  5:27 PM   Result Value Ref Range    Glucose - Accu-Ck 152 (H) 65 - 99 mg/dL   ACCU-CHEK GLUCOSE    Collection Time: 04/29/20 11:19 PM   Result Value Ref Range    Glucose - Accu-Ck 173 (H) 65 - 99 mg/dL   CBC WITH DIFFERENTIAL    Collection Time: 04/30/20  4:06 AM   Result Value Ref Range    WBC 7.0 4.8 - 10.8 K/uL    RBC 3.05 (L) 4.70 - 6.10 M/uL    Hemoglobin 8.4 (L) 14.0 - 18.0 g/dL    Hematocrit 27.2 (L) 42.0 - 52.0 %    MCV 89.2 81.4 - 97.8 fL    MCH 27.5 27.0 - 33.0 pg    MCHC 30.9 (L) 33.7 - 35.3 g/dL    RDW 57.0 (H) 35.9 - 50.0 fL    Platelet Count 120 (L) 164 - 446 K/uL    MPV 10.7 9.0 - 12.9 fL    Neutrophils-Polys 74.60 (H) 44.00 - 72.00 %    Lymphocytes 12.30 (L) 22.00 - 41.00 %    Monocytes 11.60 0.00 - 13.40 %    Eosinophils 1.10 0.00 - 6.90 %    Basophils 0.00 0.00 - 1.80 %    Immature Granulocytes 0.40 0.00 - 0.90 %    Nucleated RBC 0.00 /100 WBC    Neutrophils (Absolute) 5.21 1.82 - 7.42 K/uL    Lymphs (Absolute) 0.86 (L) 1.00 - 4.80 K/uL    Monos (Absolute) 0.81 0.00 - 0.85 K/uL    Eos (Absolute) 0.08 0.00 - 0.51 K/uL    Baso (Absolute) 0.00 0.00 - 0.12 K/uL    Immature Granulocytes (abs) 0.03 0.00 - 0.11 K/uL    NRBC  (Absolute) 0.00 K/uL   Basic Metabolic Panel    Collection Time: 04/30/20  4:06 AM   Result Value Ref Range    Sodium 137 135 - 145 mmol/L    Potassium 4.4 3.6 - 5.5 mmol/L    Chloride 99 96 - 112 mmol/L    Co2 28 20 - 33 mmol/L    Glucose 202 (H) 65 - 99 mg/dL    Bun 44 (H) 8 - 22 mg/dL    Creatinine 1.57 (H) 0.50 - 1.40 mg/dL    Calcium 8.0 (L) 8.5 - 10.5 mg/dL    Anion Gap 10.0 7.0 - 16.0   ESTIMATED GFR    Collection Time: 04/30/20  4:06 AM   Result Value Ref Range    GFR If  52 (A) >60 mL/min/1.73 m 2    GFR If Non  43 (A) >60 mL/min/1.73 m 2   ACCU-CHEK GLUCOSE    Collection Time: 04/30/20  5:38 AM   Result Value Ref Range    Glucose - Accu-Ck 163 (H) 65 - 99 mg/dL       Fluids    Intake/Output Summary (Last 24 hours) at 4/30/2020 1251  Last data filed at 4/30/2020 0800  Gross per 24 hour   Intake 820 ml   Output 890 ml   Net -70 ml       Core Measures & Quality Metrics  Labs reviewed, Medications reviewed, Radiology images reviewed and EKG reviewed  Lopez Catheter: trial lopez removal.      DVT: Plavix.  DVT prophylaxis - mechanical: SCDs  Ulcer prophylaxis: Not indicated    Assessed for rehab: Patient returned to prior level of function, rehabilitation not indicated at this time    RAP Score Total: 8    ETOH Screening  CAGE Score: 0  Assessment complete date: 4/27/2020        Assessment/Plan  Left leg swelling- (present on admission)  Assessment & Plan  4/29/2020 left leg swelling.  - Venous duplex with no evidence of deep vein thrombosis or superficial thrombophlebitis in the left lower extremity    Platelet inhibition due to Plavix- (present on admission)  Assessment & Plan  Long term use of ASA and Plavix   Admission platelet mapping thromboelastogram showed 100% inhibition at AA and ADP receptors  Platelet reversal decision pending trajectory of serial h/h and hemodynamics  4/27 One unit of platelets transfused.  4/29 ASA and Plavix resumed     Renal hematoma, left,  initial encounter- (present on admission)  Assessment & Plan  Left subcapsular hematoma with hemorrhage extending into the left perinephric space and pelvis.   Several foci of active extravasation in the left mid kidney and lower pole.   4/28 IR Angio for hypotension and hemoglobin drop, negative for bleed, no interventions  Serial hemograms and abdominal exams.     Hyperkalemia- (present on admission)  Assessment & Plan  Bumex resumed  Trend laboratory studies     Shock circulatory ([facility])  Assessment & Plan  Requiring continuous infusion of vasoactive medication  4/28 Cortisol 11, stress dose steroids initiated  4/29 Decrease solucortef dose  Taper over next two days    Urinary retention- (present on admission)  Assessment & Plan  4/26 Lopez catheter placed  Gross hematuria, likely placement related.   Strict I&O  4/29 Trial lopez removal    Prolonged Q-T interval on ECG- (present on admission)  Assessment & Plan  4/26 QTc >500  Caution with medications affecting the QTc.    Type 2 diabetes mellitus ([facility])- (present on admission)  Assessment & Plan  Chronic condition treated with glimepiride.  Insulin sliding scale   Glimepiride held secondary to renal injury    CHF (congestive heart failure) ([facility])- (present on admission)  Assessment & Plan  Chronic condition treated with bumetanide and carvedilol  4/28 Carvedilol resumed  4/29 Zetia and Bumex resumed    Essential hypertension- (present on admission)  Assessment & Plan  Chronic condition treated with Entresto, Zetia, Coreg and Bumex  Resumed all but Entresto    S/P TAVR (transcatheter aortic valve replacement)- (present on admission)  Assessment & Plan  Stenting of the left main and right coronary arteries as well as TAVR in May 2019  Patient is on ASA and Plavix - admit platelet mapping with 100% AA/ADP inhibition.  4/29 ASA and Plavix resumed  Dr. Lee, Cardiology consulted    CKD (chronic kidney disease)- (present on admission)  Assessment & Plan  Acute  exacerbation of a chronic condition, CHF may be playing a role  Avoid nephrotoxins  Trend renal indices.    Screening examination for infectious disease- (present on admission)  Assessment & Plan  4/26 COVID-19 Screening completed.  No fever, pulmonary symptoms, contact with infected individual, and travel to/from a high risk region.    Trauma- (present on admission)  Assessment & Plan  GLF. Struck his left abdomen.  Non Trauma Activation.  Vj Rajan MD. Trauma Surgery.    ICD (implantable cardioverter-defibrillator) in place- (present on admission)  Assessment & Plan  Placed 2/4/2020 for:  Paroxysmal complete heart block  Syncope  Ischemic cardiomyopathy  Chronic systolic CHF  [facility]        Discussed patient condition with RN, , Patient and trauma surgery.     Patient seen, data reviewed and discussed.  Agree with assessment and plan.         Vj Rajan MD  [phone]

## 2020-05-01 ENCOUNTER — SLEEP CENTER VISIT (OUTPATIENT)
Dept: SLEEP MEDICINE | Facility: MEDICAL CENTER | Age: 80
End: 2020-05-01
Payer: MEDICARE

## 2020-05-01 VITALS
HEIGHT: 73 IN | BODY MASS INDEX: 34.06 KG/M2 | SYSTOLIC BLOOD PRESSURE: 126 MMHG | OXYGEN SATURATION: 88 % | WEIGHT: 257 LBS | DIASTOLIC BLOOD PRESSURE: 56 MMHG | RESPIRATION RATE: 18 BRPM | HEART RATE: 102 BPM

## 2020-05-01 DIAGNOSIS — I50.22 CHF (CONGESTIVE HEART FAILURE), NYHA CLASS II, CHRONIC, SYSTOLIC (HCC): ICD-10-CM

## 2020-05-01 DIAGNOSIS — I25.10 CORONARY ARTERY DISEASE INVOLVING NATIVE CORONARY ARTERY OF NATIVE HEART WITHOUT ANGINA PECTORIS: ICD-10-CM

## 2020-05-01 DIAGNOSIS — Z95.810 ICD (IMPLANTABLE CARDIOVERTER-DEFIBRILLATOR) IN PLACE: ICD-10-CM

## 2020-05-01 DIAGNOSIS — E66.01 CLASS 3 SEVERE OBESITY DUE TO EXCESS CALORIES IN ADULT, UNSPECIFIED BMI, UNSPECIFIED WHETHER SERIOUS COMORBIDITY PRESENT (HCC): ICD-10-CM

## 2020-05-01 DIAGNOSIS — I10 ESSENTIAL HYPERTENSION: ICD-10-CM

## 2020-05-01 DIAGNOSIS — K21.9 GASTROESOPHAGEAL REFLUX DISEASE WITHOUT ESOPHAGITIS: ICD-10-CM

## 2020-05-01 DIAGNOSIS — G47.33 OSA (OBSTRUCTIVE SLEEP APNEA): ICD-10-CM

## 2020-05-01 PROBLEM — Z11.9 SCREENING EXAMINATION FOR INFECTIOUS DISEASE: Status: RESOLVED | Noted: 2020-04-27 | Resolved: 2020-05-01

## 2020-05-01 PROCEDURE — 99214 OFFICE O/P EST MOD 30 MIN: CPT | Performed by: INTERNAL MEDICINE

## 2020-05-01 RX ORDER — ZOLPIDEM TARTRATE 5 MG/1
5 TABLET ORAL NIGHTLY PRN
Qty: 2 TAB | Refills: 0 | Status: SHIPPED | OUTPATIENT
Start: 2020-05-01 | End: 2020-05-20

## 2020-05-01 ASSESSMENT — FIBROSIS 4 INDEX: FIB4 SCORE: 3.61

## 2020-05-01 NOTE — PROGRESS NOTES
Discharge document and RX was Provided and explained to pt. Verbalized understanding. Iv removed, tip intact. personal belongings ready, patient waiting for his wife to pick him up, Report given to Roselyn HERNANDEZ

## 2020-05-01 NOTE — PROGRESS NOTES
Monitor Summary    SR 83-90  Frequent PVCs, rare couplets, 1.4 second pause at 0800, rare multifocal couplets, occasional PVCs, 1.2 second pause at 1200  .16/.10/.36    Per monitor strip report

## 2020-05-02 ENCOUNTER — HOME CARE VISIT (OUTPATIENT)
Dept: HOME HEALTH SERVICES | Facility: HOME HEALTHCARE | Age: 80
End: 2020-05-02
Payer: MEDICARE

## 2020-05-02 VITALS
BODY MASS INDEX: 33.25 KG/M2 | OXYGEN SATURATION: 88 % | SYSTOLIC BLOOD PRESSURE: 100 MMHG | HEART RATE: 88 BPM | TEMPERATURE: 98.9 F | RESPIRATION RATE: 20 BRPM | DIASTOLIC BLOOD PRESSURE: 50 MMHG | WEIGHT: 252 LBS

## 2020-05-02 PROCEDURE — 665001 SOC-HOME HEALTH

## 2020-05-02 PROCEDURE — 665999 HH PPS REVENUE DEBIT

## 2020-05-02 PROCEDURE — G0493 RN CARE EA 15 MIN HH/HOSPICE: HCPCS

## 2020-05-02 PROCEDURE — 665998 HH PPS REVENUE CREDIT

## 2020-05-02 SDOH — ECONOMIC STABILITY: HOUSING INSECURITY: EVIDENCE OF SMOKING MATERIAL: 0

## 2020-05-02 ASSESSMENT — PATIENT HEALTH QUESTIONNAIRE - PHQ9
5. POOR APPETITE OR OVEREATING: 2 - MORE THAN HALF THE DAYS
1. LITTLE INTEREST OR PLEASURE IN DOING THINGS: 03
CLINICAL INTERPRETATION OF PHQ2 SCORE: 3
2. FEELING DOWN, DEPRESSED, IRRITABLE, OR HOPELESS: 00

## 2020-05-02 ASSESSMENT — FIBROSIS 4 INDEX: FIB4 SCORE: 3.61

## 2020-05-03 ENCOUNTER — HOME CARE VISIT (OUTPATIENT)
Dept: HOME HEALTH SERVICES | Facility: HOME HEALTHCARE | Age: 80
End: 2020-05-03
Payer: MEDICARE

## 2020-05-03 PROCEDURE — 665998 HH PPS REVENUE CREDIT

## 2020-05-03 PROCEDURE — 665999 HH PPS REVENUE DEBIT

## 2020-05-03 SDOH — ECONOMIC STABILITY: HOUSING INSECURITY
HOME SAFETY: IN THE HOME. SMOKE ALARMS ARE PRESENT AND FUNCTIONAL ON EACH LEVEL OF THE HOME. PATIENT DOES HAVE A FIRE ESCAPE PLAN DEVELOPED. PATIENT DOES NOT HAVE FLAMMABLE MATERIALS PRESENT IN THE HOME PRESENTING A FIRE HAZARD. NO EVIDENCE FOUND OF SMOKING MATER

## 2020-05-03 SDOH — ECONOMIC STABILITY: HOUSING INSECURITY
HOME SAFETY: OXYGEN SAFETY RISK ASSESSMENT PERFORMED. PATIENT DOES NOT HAVE A NO SMOKING SIGN POSTED IN THE HOME., PT WAS GIVEN A NO SMOKING SIGN AND PROVIDED EDUCATION ABOUT WHY IT IS IMPORTANT TO PLACE ONE. PATIENT DOES HAVE A WORKING FIRE EXTINGUISHER PRESENT

## 2020-05-03 SDOH — ECONOMIC STABILITY: HOUSING INSECURITY: HOME SAFETY: IALS PRESENT IN THE HOME.

## 2020-05-03 ASSESSMENT — ENCOUNTER SYMPTOMS
SHORTNESS OF BREATH: T
SEVERE DYSPNEA: 1
DEPRESSED MOOD: 1

## 2020-05-03 ASSESSMENT — PATIENT HEALTH QUESTIONNAIRE - PHQ9: SUM OF ALL RESPONSES TO PHQ QUESTIONS 1-9: 12

## 2020-05-03 NOTE — PROGRESS NOTES
Primary dx/Skilled need:CHF, Kidney laceration from fall.  SN frequency: 1w1,2w3,1w6,3 PRN.  Zip code:[id number]   Disciplines ordered: PT/OT  Insurance & authorization: [employer]  Certification period:5/2-6/30  Special considerations: Cardiac Rehab closed due to COVID. Dyspneic walking short distances, does not always wear O2

## 2020-05-04 ENCOUNTER — HOME CARE VISIT (OUTPATIENT)
Dept: HOME HEALTH SERVICES | Facility: HOME HEALTHCARE | Age: 80
End: 2020-05-04
Payer: MEDICARE

## 2020-05-04 PROCEDURE — 665999 HH PPS REVENUE DEBIT

## 2020-05-04 PROCEDURE — G0151 HHCP-SERV OF PT,EA 15 MIN: HCPCS

## 2020-05-04 PROCEDURE — 665998 HH PPS REVENUE CREDIT

## 2020-05-05 ENCOUNTER — SLEEP STUDY (OUTPATIENT)
Dept: SLEEP MEDICINE | Facility: MEDICAL CENTER | Age: 80
End: 2020-05-05
Attending: INTERNAL MEDICINE
Payer: MEDICARE

## 2020-05-05 ENCOUNTER — HOME CARE VISIT (OUTPATIENT)
Dept: HOME HEALTH SERVICES | Facility: HOME HEALTHCARE | Age: 80
End: 2020-05-05
Payer: MEDICARE

## 2020-05-05 VITALS
OXYGEN SATURATION: 94 % | RESPIRATION RATE: 20 BRPM | HEART RATE: 88 BPM | DIASTOLIC BLOOD PRESSURE: 60 MMHG | TEMPERATURE: 98.9 F | SYSTOLIC BLOOD PRESSURE: 112 MMHG

## 2020-05-05 VITALS
WEIGHT: 248.5 LBS | SYSTOLIC BLOOD PRESSURE: 110 MMHG | BODY MASS INDEX: 32.79 KG/M2 | HEART RATE: 94 BPM | TEMPERATURE: 99.1 F | OXYGEN SATURATION: 94 % | RESPIRATION RATE: 20 BRPM | DIASTOLIC BLOOD PRESSURE: 60 MMHG

## 2020-05-05 DIAGNOSIS — G47.33 OSA (OBSTRUCTIVE SLEEP APNEA): ICD-10-CM

## 2020-05-05 PROCEDURE — 665998 HH PPS REVENUE CREDIT

## 2020-05-05 PROCEDURE — G0152 HHCP-SERV OF OT,EA 15 MIN: HCPCS

## 2020-05-05 PROCEDURE — 95811 POLYSOM 6/>YRS CPAP 4/> PARM: CPT | Performed by: INTERNAL MEDICINE

## 2020-05-05 PROCEDURE — G0299 HHS/HOSPICE OF RN EA 15 MIN: HCPCS

## 2020-05-05 PROCEDURE — 665999 HH PPS REVENUE DEBIT

## 2020-05-05 ASSESSMENT — FIBROSIS 4 INDEX: FIB4 SCORE: 3.61

## 2020-05-05 ASSESSMENT — ACTIVITIES OF DAILY LIVING (ADL)
ADLS_COMMENTS: PLEASE REFER TO OT ASSESSMENT
BATHING_COMMENTS: PLEASE REFER TO OT ASSESSMENT
GROOMING_COMMENTS: PLEASE REFER TO OT ASSESSMENT
AMBULATION ASSISTANCE ON FLAT SURFACES: 1
FEEDING_COMMENTS: PLEASE REFER TO OT ASSESSMENT

## 2020-05-05 ASSESSMENT — ENCOUNTER SYMPTOMS
ARTHRALGIAS: 1
MUSCLE WEAKNESS: 1

## 2020-05-06 VITALS
OXYGEN SATURATION: 94 % | HEART RATE: 94 BPM | SYSTOLIC BLOOD PRESSURE: 110 MMHG | TEMPERATURE: 99.1 F | DIASTOLIC BLOOD PRESSURE: 60 MMHG | RESPIRATION RATE: 20 BRPM

## 2020-05-06 PROCEDURE — 665998 HH PPS REVENUE CREDIT

## 2020-05-06 PROCEDURE — 665999 HH PPS REVENUE DEBIT

## 2020-05-06 SDOH — ECONOMIC STABILITY: HOUSING INSECURITY: EVIDENCE OF SMOKING MATERIAL: 0

## 2020-05-06 ASSESSMENT — ACTIVITIES OF DAILY LIVING (ADL)
FEEDING_WITHIN_DEFINED_LIMITS: 1
GROOMING_WITHIN_DEFINED_LIMITS: 1
FEEDING: INDEPENDENT
TELEPHONE USE ASSESSED: 1
TOILETING: 1
ORAL_CARE_CURRENT_FUNCTION: INDEPENDENT
GROOMING_CURRENT_FUNCTION: INDEPENDENT
ORAL_CARE_ASSESSED: 1
AMBULATION ASSISTANCE: 1
GROOMING ASSESSED: 1
PHYSICAL TRANSFERS ASSESSED: 1
DRESSING_LB_CURRENT_FUNCTION: MAXIMUM ASSIST
DRESSING_UB_CURRENT_FUNCTION: INDEPENDENT
USING THE TELPHONE: INDEPENDENT
TOILETING: INDEPENDENT
FEEDING ASSESSED: 1
AMBULATION ASSISTANCE: STAND BY ASSIST

## 2020-05-06 ASSESSMENT — ENCOUNTER SYMPTOMS
NAUSEA: DENIES
MUSCLE WEAKNESS: 1
MENTAL STATUS CHANGE: 0
VOMITING: DENIES

## 2020-05-06 NOTE — PROCEDURES
Clinical Comments:    The patient underwent a split night polysomnogram with a CPAP titration using the standard montage for measurement of paramaters of sleep, respiratory events, movement abnormalities, heart rate and rhythm.  A Microphone was used to monitior snoring.  Interpretation:  Study start time was 09:05:29 PM.  Total recording time was 3h 42.5m (222 minutes) with a total sleep time of 2h 38.0m (158 minutes) resulting in a sleep efficiency of 71.01%.  Sleep latency from the start fo the study was 02 minutes minutes and REM latency from sleep onset was 00 minutes minutes.  Respiratory:   There were 9 apneas in total consisting of 9 obstructive apneas, 0 mixed apneas, and 0 central apneas.  There were 127 hypopneas in total.  The apnea index was 3.42 per hour and the hypopnea index was 48.23 per hour.  The overall AHI was 51.6, with a REM AHI of 0.00, and a supine AHI of 51.65.  Limb Movements:  There were a total of 4 periodic leg movements, of which 1 were PLMS arousals.  This resulted in a PLMS index of 1.5 and a PLMS arousal index of 0.4  Oximetry:  The mean SaO2 was 82.0% for the diagnostic portion of the study, with a minimum SaO2 of 67.0%.    Treatment:  Interpretation:  Treatment recording time was 5h 0.0m (300 minutes) with a total sleep time of 4h 32.0m (272 minutes) resulting in a sleep efficiency of 90.7%.    Sleep latency from the start of treatment was 01 minutes minutes and REM latency from sleep onset was 0h 32.5m minutes.    The patient had 74 arousals in total for an arousal index of 16.3.  Respiratory:   There were 7 apneas in total consisting of  5 obstructive apneas, 2 central apneas, and 0 mixed apneas for an apnea index of 1.54.    The patient had 60 hypopneas in total, which resulted in a hypopnea index of 13.24.    The overall AHI was 14.78, with a REM AHI of 15.37, and a supine AHI of 14.83.     Limb Movements:  There were a total of 46 periodic leg movements, of which 2 were PLMS  arousals.  This resulted in a PLMS index of 10.1 and a PLMS arousal index of 0.4.  Oximetry:  The mean SaO2 during treatment was 88.0%, with a minimum oxygen saturation of 70.0%.     CPAP was tried from 5 to 15cm H2O. 2Lpm. nocturnal supplemental O2 was added.      On the baseline component of the study sleep efficiency was 71% and there was a lack of stage III and REM sleep.  Sleep latency was reduced at 2 minutes.  No periodic limb movements observed.  EKG showed sinus rhythm.    Once asleep moderate snoring was noted associated with obstructive hypopneas.  The overall AHI was 51/h and associated with severe desaturations, to a kenny of 67%.  The patient desaturated below 90% for 81% of the night.    After meeting split-night criteria CPAP therapy was started with REM rebound noted.  Sleep efficiency improved to 90%.  CPAP was titrated to 15 cm of water with resultant AHI:4;  oxygen bleed in was required up to 2 L/min to maintain mean oximetry at 92%.    Interpretation:  Severe JAMES, AHI 51/h associated with desaturations to 67% SPO2.  Successful CPAP titration at 15 cm of water with 2 L oxygen bleed in.    Recommendations:  CPAP: 15 cm of water with 2 L oxygen bleed in using a large F20 mask.

## 2020-05-07 ENCOUNTER — SLEEP CENTER VISIT (OUTPATIENT)
Dept: SLEEP MEDICINE | Facility: MEDICAL CENTER | Age: 80
End: 2020-05-07
Payer: MEDICARE

## 2020-05-07 VITALS
HEIGHT: 73 IN | DIASTOLIC BLOOD PRESSURE: 50 MMHG | SYSTOLIC BLOOD PRESSURE: 106 MMHG | BODY MASS INDEX: 32.87 KG/M2 | WEIGHT: 248 LBS | HEART RATE: 88 BPM | RESPIRATION RATE: 20 BRPM | OXYGEN SATURATION: 92 %

## 2020-05-07 DIAGNOSIS — G47.10 HYPERSOMNOLENCE: ICD-10-CM

## 2020-05-07 DIAGNOSIS — R06.83 SNORING: ICD-10-CM

## 2020-05-07 DIAGNOSIS — D75.1 POLYCYTHEMIA: Chronic | ICD-10-CM

## 2020-05-07 DIAGNOSIS — I50.22 CHF (CONGESTIVE HEART FAILURE), NYHA CLASS II, CHRONIC, SYSTOLIC (HCC): ICD-10-CM

## 2020-05-07 DIAGNOSIS — G47.33 OBSTRUCTIVE SLEEP APNEA SYNDROME: ICD-10-CM

## 2020-05-07 DIAGNOSIS — I10 ESSENTIAL HYPERTENSION: Chronic | ICD-10-CM

## 2020-05-07 PROCEDURE — 99214 OFFICE O/P EST MOD 30 MIN: CPT | Performed by: INTERNAL MEDICINE

## 2020-05-07 PROCEDURE — 665998 HH PPS REVENUE CREDIT

## 2020-05-07 PROCEDURE — 665999 HH PPS REVENUE DEBIT

## 2020-05-07 ASSESSMENT — FIBROSIS 4 INDEX: FIB4 SCORE: 3.61

## 2020-05-07 NOTE — PROGRESS NOTES
CC: Sleep apnea hypopnea syndrome.    HPI:   Mr. More was evaluated here on May 1, 2024 snoring, fragmented sleep and daytime fatigue and somnolence as well as documented nocturnal hypoxemia.  He returns now to review the results of a polysomnogram.    He has a history of cardiomyopathy and previous TAVR aortic valve repair.  He has an implantable cardiac defibrillator in place.  An echocardiogram on April 27, 2020 demonstrated a left ventricular systolic ejection fraction of 40% with global hypokinesis.  The right ventricle appeared normal in size and systolic function but right-sided pressures could not be evaluated.  The prosthetic aortic valve was functioning appropriately.  He also has a history of systemic arterial hypertension and coronary artery disease.    The split-night polysomnogram dated May 5, 2020 is reviewed with the patient.  In the diagnostic phase there is fragmentation of sleep and no slow-wave sleep or REM sleep time are included.  The apnea hypopnea index is 51.6 events per hour including hypopnea and occasional obstructive apnea episodes.  All the diagnostic study was done in the supine body position.  The lowest arterial oxygen saturation was 67% and he spent 81% of the diagnostic time with a saturation below 90%.  In the treatment phase of the study there was a marked improvement in sleep consolidation and the 101 minutes of REM sleep time were included.  CPAP was applied at 5 to 15 cm of water pressure.  In the final pressure stage the apnea hypopnea index was 4 events per hour with a mean arterial oxygen saturation of 92% but a minimum saturation of 83%.  Oxygen was added at 2 L/min with preservation of arterial oxygen saturation.  The final stage in the titration included 53 minutes of REM sleep time and was done in the supine body position.    He did sustained a ground-level fall without syncope on April 26, 2020 resulting in a left subcapsular renal hematoma.  He still has significant  pain related to that injury but he is improving.      Patient Active Problem List    Diagnosis Date Noted   • Left leg swelling 04/29/2020     Priority: High   • Renal hematoma, left, initial encounter 04/26/2020     Priority: High   • Platelet inhibition due to Plavix 04/26/2020     Priority: High   • S/P placement of cardiac pacemaker 02/03/2020     Priority: High   • Hyperkalemia 04/29/2020     Priority: Medium   • Shock circulatory ([facility]) 04/28/2020     Priority: Medium   • Urinary retention 04/27/2020     Priority: Medium   • Essential hypertension 04/26/2020     Priority: Medium   • CHF (congestive heart failure) ([facility]) 04/26/2020     Priority: Medium   • Type 2 diabetes mellitus ([facility]) 04/26/2020     Priority: Medium   • Prolonged Q-T interval on ECG 04/26/2020     Priority: Medium   • S/P TAVR (transcatheter aortic valve replacement) 01/27/2020     Priority: Medium   • CHF (congestive heart failure), NYHA class II, chronic, systolic ([facility]) 12/26/2019     Priority: Medium   • CKD (chronic kidney disease) 12/04/2019     Priority: Medium   • Coronary artery disease involving native coronary artery of native heart without angina pectoris 12/04/2019     Priority: Medium   • Hypertension 09/05/2009     Priority: Medium   • Dyslipidemia 09/05/2009     Priority: Medium   • Trauma 04/26/2020     Priority: Low   • ICD (implantable cardioverter-defibrillator) in place 03/30/2020     Priority: Low   • Gastroesophageal reflux disease without esophagitis 01/20/2020     Priority: Low   • Stented coronary artery 01/15/2020     Priority: Low   • Risk for falls 09/04/2019     Priority: Low   • 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     Priority: Low   • Pancreatic lesion 04/01/2019     Priority: Low   • Macular degeneration 10/10/2018     Priority: Low   • Polycythemia 03/11/2017     Priority: Low   • Obesity 03/03/2016     Priority: Low   • Hypotestosteronism 03/03/2016     Priority: Low   • 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     Priority: Low    • S/P gastric bypass 03/26/2010     Priority: Low   • Type 2 diabetes mellitus with hyperglycemia, without long-term current use of insulin (HCC) 09/05/2009     Priority: Low   • S/P lumbar fusion 09/05/2009     Priority: Low   • JAMES (obstructive sleep apnea) 04/30/2020   • High risk medication use 02/26/2020   • Insomnia 02/25/2020   • Elevated troponin 02/25/2020   • Lower extremity edema 12/29/2009       Past Medical History:   Diagnosis Date   • Arthritis    • Back pain    • Breath shortness     pt states short of breath 24/7   • CAD (coronary artery disease)    • CATARACT     removed bilat   • Chest pain    • Chest tightness    • Chickenpox    • Congestive heart failure (HCC)    • Constipation    • Coronary heart disease    • Cough    • Daytime sleepiness    • Diabetes     PT REPORTS DIABETES RESOLVED IN 2009. NO LONGER TAKES MEDS   • Difficulty breathing    • Dilated cardiomyopathy ([facility])    • Fatigue    • Hearing difficulty    • Heart murmur    • Heart valve disease    • Heartburn    • Hiatus hernia syndrome    • Hyperlipidemia    • Hypertension     pt states well controlled on meds   • Kidney disease    • Mumps    • Pain 08-29-13    back, hips and bilat legs, 4/10   • Pain 6/22/2015    left humerus   • Pain 1/7/16    knees, back and shoulder   • Painful joint    • Palpitations    • Pneumonia 2007   • Rhinitis    • Ringing in ears    • Severe aortic stenosis 12/4/2019   • Shortness of breath    • Swelling of lower extremity    • Tonsillitis    • Ulcer 2014    Dr. Porter, gastroenterologist   • Unspecified hemorrhagic conditions     bruises easily       Past Surgical History:   Procedure Laterality Date   • TRANSCATHETER AORTIC VALVE REPLACEMENT N/A 1/27/2020    Procedure: REPLACEMENT, AORTIC VALVE, TRANSCATHETER-;  Surgeon: Surendra Castro M.D.;  Location: SURGERY [facility];  Service: Cardiac   • OLGA  1/27/2020    Procedure: ECHOCARDIOGRAM, TRANSESOPHAGEAL;  Surgeon: Surendra Castro M.D.;  Location:  [facility];  Service: Cardiac   • GASTROSCOPY N/A 1/8/2016    Procedure: GASTROSCOPY;  Surgeon: Deniz Sue M.D.;  Location: [facility];  Service:    • HUMERUS ORIF Left 6/25/2015    Procedure: HUMERUS ORIF/ PROXIMAL;  Surgeon: Cristal Guido M.D.;  Location: [facility];  Service:    • LUMBAR FUSION POSTERIOR  9/5/2013    Performed by Edith Sears M.D. at [facility]   • LUMBAR DECOMPRESSION  9/5/2013    Performed by Edith Sears M.D. at [facility]   • MASS EXCISION NEURO  9/5/2013    Performed by Edith Sears M.D. at [facility]   • RECOVERY  6/26/2012    Performed by SURGERY, IR-RECOVERY at [facility]   • DRAINAGE HEMATOMA  5/25/2012    Performed by DENIZ SUE at [facility]   • GASTRIC BAND LAPAROSCOPIC REVISION  5/11/2012    Performed by DENIZ SUE at [facility]   • LUMBAR FUSION POSTERIOR  3/26/2012    Performed by EDITH SEARS at [facility]   • LUMBAR DECOMPRESSION  3/26/2012    Performed by EDITH SEARS at [facility]   • INJ,EPIDURAL/LUMB/SAC SINGLE  3/19/2012    Performed by KRISTIN BALTAZAR at [facility]   • INJ,EPIDURAL/LUMB/SAC SINGLE  3/5/2012    Performed by KRISTIN BALTAZAR at [facility]   • GASTRIC BANDING LAPAROSCOPIC  3/24/2010    Performed by DENIZ SUE at [facility]   • HIATAL HERNIA REPAIR  3/24/2010    Performed by DENIZ SUE at [facility]   • KNEE ARTHROPLASTY TOTAL  2000    bilateral   • ABDOMINOPLASTY  1990   • AORTIC VALVE REPLACEMENT     • ARTHROSCOPY, KNEE     • LAMINOTOMY     • PB REMV 2ND CATARACT,CORN-SCLER SECTN     • SINUSCOPE     • SLEEVE,CARRIE VASO THIGH     • TONSILLECTOMY     • ZZZ CARDIAC CATH         Family History   Problem Relation Age of Onset   • No Known Problems Sister    • No Known Problems Sister    • No Known Problems Sister    • Diabetes  Other    • No Known Problems Mother    • No Known Problems Father    • Heart Disease Neg Hx        Social History     Socioeconomic History   • Marital status:      Spouse name: Not on file   • Number of children: Not on file   • Years of education: Not on file   • Highest education level: Not on file   Occupational History   • Not on file   Social Needs   • Financial resource strain: Not on file   • Food insecurity     Worry: Not on file     Inability: Not on file   • Transportation needs     Medical: Not on file     Non-medical: Not on file   Tobacco Use   • Smoking status: Never Smoker   • Smokeless tobacco: Never Used   • Tobacco comment: 0   Substance and Sexual Activity   • Alcohol use: No     Alcohol/week: 0.0 oz     Frequency: Never     Binge frequency: Never   • Drug use: No   • Sexual activity: Yes   Lifestyle   • Physical activity     Days per week: Not on file     Minutes per session: Not on file   • Stress: Not on file   Relationships   • Social connections     Talks on phone: Not on file     Gets together: Not on file     Attends Buddhist service: Not on file     Active member of club or organization: Not on file     Attends meetings of clubs or organizations: Not on file     Relationship status: Not on file   • Intimate partner violence     Fear of current or ex partner: Not on file     Emotionally abused: Not on file     Physically abused: Not on file     Forced sexual activity: Not on file   Other Topics Concern   • Not on file   Social History Narrative   • Not on file       Current Outpatient Medications   Medication Sig Dispense Refill   • Diclofenac Sodium 1 % Gel Apply 1 Application to affected area(s) 4 times a day as needed (joint pain).     • Hydrocortisone 1 % Cream Apply 1 Application to affected area(s) 2 times a day as needed (itching).     • Home Care Oxygen Inhale 2 L/min by mouth Continuous. Indications: Hypoxia, to keep O2 sat over 90%     • zolpidem (AMBIEN) 5 MG Tab Take 1  Tab by mouth at bedtime as needed for Sleep (1 to 2 po qhs prn insomnia/sleep study. Bring to sleep study.) for up to 2 doses. 2 Tab 0   • ENTRESTO 24-26 MG Tab tablet Take 1 tablet by mouth twice daily 60 Tab 6   • ezetimibe (ZETIA) 10 MG Tab Take 1 tablet by mouth once daily 90 Tab 0   • fluticasone (FLONASE) 50 MCG/ACT nasal spray Use 2 spray(s) in each nostril once daily 48 g 3   • carvedilol (COREG) 3.125 MG Tab Take 1 Tab by mouth 2 times a day, with meals. 90 Tab 3   • bumetanide (BUMEX) 1 MG Tab Take 2 Tabs by mouth 2 times a day. 120 Tab 3   • Multiple Vitamins-Minerals (ICAPS) Tab Take 1 Tab by mouth 2 Times a Day.     • lactobacillus rhamnosus (CULTURELLE) Cap capsule Take 1 Cap by mouth every day.     • testosterone cypionate (DEPO-TESTOSTERONE) 200 MG/ML Solution injection 50 mg by Intramuscular route every thursday.     • cyanocobalamin (VITAMIN B-12) 1000 MCG/ML Solution 1,000 mcg by Intramuscular route every thursday.     • calcium citrate (CALCITRATE) 950 MG Tab Take 1,900 mg by mouth every day.     • coenzyme Q-10 30 MG capsule Take 60 mg by mouth 2 Times a Day.     • Garlic 1000 MG Cap Take 1,000 mg by mouth 2 Times a Day.     • glucosamine Sulfate 500 MG Cap Take 1,500 mg by mouth 2 Times a Day.     • Non Formulary Request Inject 1 Each as instructed See Admin Instructions. HCG/ARGININE 08911d/5mg/cc, pt does not take on Thur      • Non Formulary Request Inject 1 Each as instructed See Admin Instructions. HGH 5.8mg/2.5mg/cc,   Monday, Tuesday, Wednesday, Friday, Saturday, sunday     • vitamin e (VITAMIN E) 400 UNIT Cap Take 400 Units by mouth 2 times a day.     • omeprazole (PRILOSEC) 40 MG delayed-release capsule Take 40 mg by mouth 2 times a day.     • Non Formulary Request Take 1 Cap by mouth 2 Times a Day. Cholox (OTC)     • Cholecalciferol (VITAMIN D3) 2000 UNIT Cap Take 2,000 Units by mouth every day.     • Non Formulary Request Take 1 Cap by mouth 2 Times a Day. Domenic red (OTC)     • Non  "Formulary Request Take 1 Cap by mouth every day. Nitric OXIDE (OTC)     • Non Formulary Request Take 1 Cap by mouth every day. anit-oxidant (OTC)     • Zinc 50 MG Tab Take 50 mg by mouth every day.     • anastrozole (ARIMIDEX) 1 MG Tab Take 0.5 mg by mouth See Admin Instructions. On Tue and Sat     • gabapentin (NEURONTIN) 300 MG Cap Take 300 mg by mouth every 6 hours as needed. Indications: Neuropathic Pain     • clopidogrel (PLAVIX) 75 MG Tab Take 1 Tab by mouth every day. 90 Tab 3   • aspirin EC 81 MG EC tablet Take 1 Tab by mouth every day. 30 Tab 0   • glimepiride (AMARYL) 1 MG tablet Take 1 mg by mouth every evening.     • B Complex Vitamins (VITAMIN B COMPLEX PO) Take 1 Tab by mouth every day.     • Cranberry 300 MG Tab Take 300 mg by mouth every morning.     • L-Lysine 1000 MG Tab Take 1,000 mg by mouth every day.     • Ascorbic Acid (VITAMIN C) 1000 MG Tab Take 1,000 mg by mouth 2 Times a Day.       No current facility-administered medications for this visit.     \"CURRENT RX\"      Allergies: Statins [hmg-coa-r inhibitors] and Atorvastatin      ROS  Unchanged from prior.      Physical Exam:   /50 (BP Location: Right arm, Patient Position: Sitting, BP Cuff Size: Adult)   Pulse 88   Resp 20   Ht 1.854 m (6' 1\")   Wt 112.5 kg (248 lb)   SpO2 92%   BMI 32.72 kg/m²    Limited by the coronavirus precautions.  He is a well-developed, well-nourished man who is alert, oriented and appropriately responsive.  He is not dyspneic and is not coughing.  He is wearing a mask.      Problems:  1. Obstructive sleep apnea syndrome  He presented with snoring and excessive daytime somnolence.  The polysomnogram confirms severe obstructive sleep apnea hypopnea with an apnea hypopnea index of 51.6 events per hour and a lowest arterial oxygen saturation of 67%.  He responded well to CPAP at 15 cm of water with normalization of the apnea hypopnea index but some hypoxemia persisted at optimal CPAP pressures requiring the " addition of supplemental oxygen.  Effective treatment is required to improve daytime alertness and also to reduce the cardiac and neurologic risks associated with untreated sleep apnea hypopnea.    2. BMI 32.0-32.9,adult    3. Snoring    4. Hypersomnolence  Related to the sleep disordered breathing.    5. Essential hypertension  Controlled with a blood pressure of 106/50 mmHg today.    6. Polycythemia  His blood counts on April 26, 2020 included a hemoglobin of 18.9 g% and a hematocrit of 58.9%.  After his injury with renal hematoma his most recent hemoglobin is 8.4 g%.    7. CHF (congestive heart failure), NYHA class II, chronic, systolic (HCC)  The echocardiogram confirms a systolic ejection fraction of 40%.      Plan:   1.  Initiate CPAP at 15 cm water pressure with oxygen at 2 L/min.  He did best with a large air fit F 20 fullface mask.  We have talked about acclimating to the CPAP with proper mask fit, effective humidification and consistent use.    2.  Return visit here about 2 months after starting therapy, bringing the CPAP data recording chip with him.  After the coronavirus precautions are lifted he may drop into the technician clinic at any time for assistance.  He will message us with any questions or concerns.    We appreciate the opportunity to assist in his care.

## 2020-05-08 ENCOUNTER — HOME CARE VISIT (OUTPATIENT)
Dept: HOME HEALTH SERVICES | Facility: HOME HEALTHCARE | Age: 80
End: 2020-05-08
Payer: MEDICARE

## 2020-05-08 VITALS
BODY MASS INDEX: 31.8 KG/M2 | WEIGHT: 241 LBS | HEART RATE: 89 BPM | TEMPERATURE: 99.1 F | DIASTOLIC BLOOD PRESSURE: 70 MMHG | OXYGEN SATURATION: 98 % | RESPIRATION RATE: 20 BRPM | SYSTOLIC BLOOD PRESSURE: 120 MMHG

## 2020-05-08 DIAGNOSIS — G47.33 OBSTRUCTIVE SLEEP APNEA SYNDROME: ICD-10-CM

## 2020-05-08 PROCEDURE — 665998 HH PPS REVENUE CREDIT

## 2020-05-08 PROCEDURE — G0495 RN CARE TRAIN/EDU IN HH: HCPCS

## 2020-05-08 PROCEDURE — 665999 HH PPS REVENUE DEBIT

## 2020-05-08 ASSESSMENT — FIBROSIS 4 INDEX: FIB4 SCORE: 3.61

## 2020-05-08 ASSESSMENT — ENCOUNTER SYMPTOMS: SHORTNESS OF BREATH: T

## 2020-05-08 NOTE — PROGRESS NOTES
And ordering to discontinue nocturnal oxygen is written as the patient will now be using BiPAP and an oxygen bleed in.

## 2020-05-09 PROCEDURE — 665999 HH PPS REVENUE DEBIT

## 2020-05-09 PROCEDURE — 665998 HH PPS REVENUE CREDIT

## 2020-05-10 PROCEDURE — 665998 HH PPS REVENUE CREDIT

## 2020-05-10 PROCEDURE — 665999 HH PPS REVENUE DEBIT

## 2020-05-10 ASSESSMENT — ACTIVITIES OF DAILY LIVING (ADL): CURRENT_FUNCTION: STAND BY ASSIST

## 2020-05-11 ENCOUNTER — HOME CARE VISIT (OUTPATIENT)
Dept: HOME HEALTH SERVICES | Facility: HOME HEALTHCARE | Age: 80
End: 2020-05-11
Payer: MEDICARE

## 2020-05-11 ENCOUNTER — TELEPHONE (OUTPATIENT)
Dept: CARDIOLOGY | Facility: MEDICAL CENTER | Age: 80
End: 2020-05-11

## 2020-05-11 VITALS
HEART RATE: 72 BPM | DIASTOLIC BLOOD PRESSURE: 52 MMHG | TEMPERATURE: 99 F | OXYGEN SATURATION: 94 % | SYSTOLIC BLOOD PRESSURE: 106 MMHG | BODY MASS INDEX: 31.4 KG/M2 | RESPIRATION RATE: 36 BRPM | WEIGHT: 238 LBS

## 2020-05-11 PROCEDURE — G0495 RN CARE TRAIN/EDU IN HH: HCPCS

## 2020-05-11 PROCEDURE — 665998 HH PPS REVENUE CREDIT

## 2020-05-11 PROCEDURE — 665999 HH PPS REVENUE DEBIT

## 2020-05-11 SDOH — ECONOMIC STABILITY: HOUSING INSECURITY: EVIDENCE OF SMOKING MATERIAL: 0

## 2020-05-11 ASSESSMENT — ENCOUNTER SYMPTOMS: SHORTNESS OF BREATH: T

## 2020-05-11 ASSESSMENT — FIBROSIS 4 INDEX: FIB4 SCORE: 3.61

## 2020-05-11 NOTE — TELEPHONE ENCOUNTER
Called pt to discuss converting 5/14/20 BE appt to an in person appt. He is agreeable to this. He denies any recent travel, exposure to COVID 19, or respiratory symptoms or fever. Baseline SOB but this has remained stable. Instructed him to notify us if he develops any of the above prior to appt. Informed him that he must wear a mask to appt.     Paul Wilkinson M.D.  Sarah Holden R.N.             This patient needs an in person visit    Previous Messages      ----- Message -----   From: Marietta Sainz R.N.   Sent: 5/11/2020   2:43 PM PDT   To: Paul Wilkinson M.D.     Patient does not wear oxygen most of the time. His RR is 36-40 both on and off oxygen and at rest. His LLL is dimished and he has dyspnea with walking short distances.

## 2020-05-12 ENCOUNTER — HOME CARE VISIT (OUTPATIENT)
Dept: HOME HEALTH SERVICES | Facility: HOME HEALTHCARE | Age: 80
End: 2020-05-12
Payer: MEDICARE

## 2020-05-12 VITALS
DIASTOLIC BLOOD PRESSURE: 65 MMHG | RESPIRATION RATE: 20 BRPM | SYSTOLIC BLOOD PRESSURE: 112 MMHG | WEIGHT: 238 LBS | HEART RATE: 76 BPM | OXYGEN SATURATION: 92 % | TEMPERATURE: 97.5 F | BODY MASS INDEX: 31.4 KG/M2

## 2020-05-12 PROCEDURE — 665999 HH PPS REVENUE DEBIT

## 2020-05-12 PROCEDURE — G0151 HHCP-SERV OF PT,EA 15 MIN: HCPCS

## 2020-05-12 PROCEDURE — 665998 HH PPS REVENUE CREDIT

## 2020-05-12 ASSESSMENT — ACTIVITIES OF DAILY LIVING (ADL): AMBULATION ASSISTANCE ON FLAT SURFACES: 1

## 2020-05-12 ASSESSMENT — ENCOUNTER SYMPTOMS: DEBILITATING PAIN: 1

## 2020-05-12 ASSESSMENT — FIBROSIS 4 INDEX: FIB4 SCORE: 3.61

## 2020-05-13 PROCEDURE — 665998 HH PPS REVENUE CREDIT

## 2020-05-13 PROCEDURE — 665999 HH PPS REVENUE DEBIT

## 2020-05-14 ENCOUNTER — TELEPHONE (OUTPATIENT)
Dept: CARDIOLOGY | Facility: MEDICAL CENTER | Age: 80
End: 2020-05-14

## 2020-05-14 ENCOUNTER — HOME CARE VISIT (OUTPATIENT)
Dept: HOME HEALTH SERVICES | Facility: HOME HEALTHCARE | Age: 80
End: 2020-05-14
Payer: MEDICARE

## 2020-05-14 ENCOUNTER — OFFICE VISIT (OUTPATIENT)
Dept: CARDIOLOGY | Facility: MEDICAL CENTER | Age: 80
End: 2020-05-14
Payer: MEDICARE

## 2020-05-14 VITALS
HEART RATE: 92 BPM | DIASTOLIC BLOOD PRESSURE: 62 MMHG | SYSTOLIC BLOOD PRESSURE: 98 MMHG | HEIGHT: 73 IN | WEIGHT: 248.79 LBS | RESPIRATION RATE: 16 BRPM | OXYGEN SATURATION: 92 % | BODY MASS INDEX: 32.97 KG/M2

## 2020-05-14 DIAGNOSIS — I50.22 CHF (CONGESTIVE HEART FAILURE), NYHA CLASS II, CHRONIC, SYSTOLIC (HCC): ICD-10-CM

## 2020-05-14 DIAGNOSIS — I25.10 CORONARY ARTERY DISEASE INVOLVING NATIVE CORONARY ARTERY OF NATIVE HEART WITHOUT ANGINA PECTORIS: ICD-10-CM

## 2020-05-14 DIAGNOSIS — R79.89 ELEVATED BRAIN NATRIURETIC PEPTIDE (BNP) LEVEL: ICD-10-CM

## 2020-05-14 DIAGNOSIS — Z95.2 S/P TAVR (TRANSCATHETER AORTIC VALVE REPLACEMENT): ICD-10-CM

## 2020-05-14 DIAGNOSIS — E78.5 DYSLIPIDEMIA: Chronic | ICD-10-CM

## 2020-05-14 DIAGNOSIS — Z01.810 PREOPERATIVE CARDIOVASCULAR EXAMINATION: ICD-10-CM

## 2020-05-14 DIAGNOSIS — N18.9 CHRONIC KIDNEY DISEASE, UNSPECIFIED CKD STAGE: ICD-10-CM

## 2020-05-14 DIAGNOSIS — Z95.810 ICD (IMPLANTABLE CARDIOVERTER-DEFIBRILLATOR) IN PLACE: ICD-10-CM

## 2020-05-14 PROCEDURE — 665998 HH PPS REVENUE CREDIT

## 2020-05-14 PROCEDURE — 99214 OFFICE O/P EST MOD 30 MIN: CPT | Performed by: INTERNAL MEDICINE

## 2020-05-14 PROCEDURE — 665999 HH PPS REVENUE DEBIT

## 2020-05-14 PROCEDURE — G0151 HHCP-SERV OF PT,EA 15 MIN: HCPCS

## 2020-05-14 RX ORDER — METOLAZONE 5 MG/1
5 TABLET ORAL
Qty: 45 TAB | Refills: 3 | Status: SHIPPED | OUTPATIENT
Start: 2020-05-14 | End: 2020-05-21

## 2020-05-14 ASSESSMENT — FIBROSIS 4 INDEX: FIB4 SCORE: 3.61

## 2020-05-14 ASSESSMENT — ACTIVITIES OF DAILY LIVING (ADL): OASIS_M1830: 03

## 2020-05-14 NOTE — TELEPHONE ENCOUNTER
BE    Pharmacist @ [facility] called re: metOLazone. He says pt is on other diuretics prescribed by other doctors, he is asking for clarification. [phone]

## 2020-05-14 NOTE — LETTER
[facility]   [address], [address]  [place] [address]  Phone: [phone]  Fax: [phone]              LIAN More III  1940    Encounter Date: 5/14/2020    Paul Wilkinson M.D.          PROGRESS NOTE:  CARDIOLOGY OUTPATIENT FOLLOWUP    PCP: No primary care provider on file.    1. Coronary artery disease involving native coronary artery of native heart without angina pectoris  PCI of RCA December, LM-LAD 1/10/20 with BRANDI. LVEF 35-40%. No angina.   - Continue DAPT with ASA/Plavix  - Statin allergic    2. CHF (congestive heart failure), NYHA class II, chronic, systolic (HCC)  LVEF 35%. Worsening LE edema and weight continues to be up 10 lbs over baseline  - Metolazone q48 hours x2 weeks. BMP weekly with nt-BNP    3. ICD (implantable cardioverter-defibrillator) in place  Normal function    4. S/P TAVR (transcatheter aortic valve replacement)  Statin allergic. Normal function. Placed 1/2020    5. Dyslipidemia  Good control with zetia. No changes advised.     6. Chronic kidney disease, unspecified CKD stage    7. Preoperative cardiovascular examination  Wishing to have gastric band removed.  Currently with excess volume and undergoing diuresis.  Once optimized and having completed 6 months of dual antiplatelet therapy (mid July 2020) would be reasonable to proceed with surgery.     Follow up with Paul Wilkinson M.D. in 1 month    Chief Complaint   Patient presents with   • Congestive Heart Failure       History: LIAN More III is a 79 y.o. male presenting for follow up of hospitalization following a fall with internal bleeding.  He has a history of coronary disease, TAVR, pacemaker/ICD, ischemic cardiomyopathy, CKD, diabetes, hypertension and hyperlipidemia.  In May 2019 he was diagnosed with cardiovascular disease and underwent the above treatments which he feels have made ambiguous impact on his symptoms of exertional dyspnea.  He has however predominantly  "limited by multiple musculoskeletal complaints.    He was recently hospitalized after a fall which he believes was precipitated by tremors.  He suffered multiple bleeding complications including subcapsular renal hematoma.  He lost a substantial amount of blood; with hemoglobin falling from normal to around 6-ultimately recovering to around 8 at hospital discharge.    Since discharge he continues to be 10 to 15 pounds above his dry weight and is bothered by lower extremity edema.  Furthermore, he continues to work with physical therapy on rehab.  This is incredibly difficult for him as he has extensive musculoskeletal problems.  He also is in the process of converting from nocturnal oxygen to CPAP and is found it a frustrating process which is left him without any nocturnal therapy for the past week.  He otherwise continues to experience orthopnea but does not experience chest pain, palpitations or near syncope.    He is also bothered by tremors which are aggravated by looking upward.  He finds that are present in his arms and his legs.  The producing profound weakness which has him prone to falling.  He has struggled to connect with a primary care provider and I provided him with a few names today    ROS:  All other systems reviewed and negative except as per the HPI    PE:  BP (!) 98/62 (BP Location: Right arm, Patient Position: Sitting, BP Cuff Size: Adult)   Pulse 92   Resp 16   Ht 1.854 m (6' 1\")   Wt 112.9 kg (248 lb 12.6 oz)   SpO2 92%   BMI 32.82 kg/m²   Gen: Well appearing  HEENT: Symmetric face. Anicteric sclerae. Moist mucus membranes  NECK: No JVD. No lymphadenopathy  CARDIAC: Normal PMI, regular, normal S1, S2.  VASCULATURE: Normal carotid amplitude without bruit.   RESP: Clear to auscultation bilaterally  ABD: Soft, non-tender, non-distended  EXT: No edema, no clubbing or cyanosis  SKIN: Warm and dry  NEURO: No gross deficits  PSYCH: Appropriate affect, participates in conversation    Past " Medical History:   Diagnosis Date   • Arthritis    • Back pain    • Breath shortness     pt states short of breath 24/7   • CAD (coronary artery disease)    • CATARACT     removed bilat   • Chest pain    • Chest tightness    • Chickenpox    • Congestive heart failure (HCC)    • Constipation    • Coronary heart disease    • Cough    • Daytime sleepiness    • Diabetes     PT REPORTS DIABETES RESOLVED IN 2009. NO LONGER TAKES MEDS   • Difficulty breathing    • Dilated cardiomyopathy (HCC)    • Fatigue    • Hearing difficulty    • Heart murmur    • Heart valve disease    • Heartburn    • Hiatus hernia syndrome    • Hyperlipidemia    • Hypertension     pt states well controlled on meds   • Kidney disease    • Mumps    • Pain 08-29-13    back, hips and bilat legs, 4/10   • Pain 6/22/2015    left humerus   • Pain 1/7/16    knees, back and shoulder   • Painful joint    • Palpitations    • Pneumonia 2007   • Rhinitis    • Ringing in ears    • Severe aortic stenosis 12/4/2019   • Shortness of breath    • Swelling of lower extremity    • Tonsillitis    • Ulcer 2014    Dr. Porter, gastroenterologist   • Unspecified hemorrhagic conditions     bruises easily     Allergies   Allergen Reactions   • Statins [Hmg-Coa-R Inhibitors] Rash     Blisters     • Atorvastatin      Outpatient Encounter Medications as of 5/14/2020   Medication Sig Dispense Refill   • metOLazone (ZAROXOLYN) 5 MG Tab Take 1 Tab by mouth every 48 hours. 45 Tab 3   • oxyCODONE HCl 5 MG Tablet Abuse-Deterrent Take 5 mg by mouth every four hours as needed.     • Diclofenac Sodium 1 % Gel Apply 1 Application to affected area(s) 4 times a day as needed (joint pain).     • Hydrocortisone 1 % Cream Apply 1 Application to affected area(s) 2 times a day as needed (itching).     • Home Care Oxygen Inhale 2 L/min by mouth Continuous. Indications: Hypoxia, to keep O2 sat over 90%     • zolpidem (AMBIEN) 5 MG Tab Take 1 Tab by mouth at bedtime as needed for Sleep (1 to 2 po qhs  prn insomnia/sleep study. Bring to sleep study.) for up to 2 doses. 2 Tab 0   • ENTRESTO 24-26 MG Tab tablet Take 1 tablet by mouth twice daily 60 Tab 6   • ezetimibe (ZETIA) 10 MG Tab Take 1 tablet by mouth once daily 90 Tab 0   • fluticasone (FLONASE) 50 MCG/ACT nasal spray Use 2 spray(s) in each nostril once daily 48 g 3   • carvedilol (COREG) 3.125 MG Tab Take 1 Tab by mouth 2 times a day, with meals. 90 Tab 3   • bumetanide (BUMEX) 1 MG Tab Take 2 Tabs by mouth 2 times a day. 120 Tab 3   • Multiple Vitamins-Minerals (ICAPS) Tab Take 1 Tab by mouth 2 Times a Day.     • lactobacillus rhamnosus (CULTURELLE) Cap capsule Take 1 Cap by mouth every day.     • testosterone cypionate (DEPO-TESTOSTERONE) 200 MG/ML Solution injection 50 mg by Intramuscular route every thursday.     • cyanocobalamin (VITAMIN B-12) 1000 MCG/ML Solution 1,000 mcg by Intramuscular route every thursday.     • calcium citrate (CALCITRATE) 950 MG Tab Take 1,900 mg by mouth every day.     • coenzyme Q-10 30 MG capsule Take 60 mg by mouth 2 Times a Day.     • Garlic 1000 MG Cap Take 1,000 mg by mouth 2 Times a Day.     • glucosamine Sulfate 500 MG Cap Take 1,500 mg by mouth 2 Times a Day.     • Non Formulary Request Inject 1 Each as instructed See Admin Instructions. HCG/ARGININE 69859x/5mg/cc, pt does not take on Thur      • Non Formulary Request Inject 1 Each as instructed See Admin Instructions. HGH 5.8mg/2.5mg/cc,   Monday, Tuesday, Wednesday, Friday, Saturday, sunday     • vitamin e (VITAMIN E) 400 UNIT Cap Take 400 Units by mouth 2 times a day.     • omeprazole (PRILOSEC) 40 MG delayed-release capsule Take 40 mg by mouth 2 times a day.     • Non Formulary Request Take 1 Cap by mouth 2 Times a Day. Cholox (OTC)     • Cholecalciferol (VITAMIN D3) 2000 UNIT Cap Take 2,000 Units by mouth every day.     • Non Formulary Request Take 1 Cap by mouth 2 Times a Day. Domenic red (OTC)     • Non Formulary Request Take 1 Cap by mouth every day. Nitric OXIDE  (OTC)     • Non Formulary Request Take 1 Cap by mouth every day. anit-oxidant (OTC)     • Zinc 50 MG Tab Take 50 mg by mouth every day.     • anastrozole (ARIMIDEX) 1 MG Tab Take 0.5 mg by mouth See Admin Instructions. On Tue and Sat     • gabapentin (NEURONTIN) 300 MG Cap Take 300 mg by mouth every 6 hours as needed. Indications: Neuropathic Pain     • clopidogrel (PLAVIX) 75 MG Tab Take 1 Tab by mouth every day. 90 Tab 3   • aspirin EC 81 MG EC tablet Take 1 Tab by mouth every day. 30 Tab 0   • glimepiride (AMARYL) 1 MG tablet Take 1 mg by mouth every evening.     • B Complex Vitamins (VITAMIN B COMPLEX PO) Take 1 Tab by mouth every day.     • Cranberry 300 MG Tab Take 300 mg by mouth every morning.     • L-Lysine 1000 MG Tab Take 1,000 mg by mouth every day.     • Ascorbic Acid (VITAMIN C) 1000 MG Tab Take 1,000 mg by mouth 2 Times a Day.       No facility-administered encounter medications on file as of 5/14/2020.      Social History     Socioeconomic History   • Marital status:      Spouse name: Not on file   • Number of children: Not on file   • Years of education: Not on file   • Highest education level: Not on file   Occupational History   • Not on file   Social Needs   • Financial resource strain: Not on file   • Food insecurity     Worry: Not on file     Inability: Not on file   • Transportation needs     Medical: Not on file     Non-medical: Not on file   Tobacco Use   • Smoking status: Never Smoker   • Smokeless tobacco: Never Used   • Tobacco comment: 0   Substance and Sexual Activity   • Alcohol use: No     Alcohol/week: 0.0 oz     Frequency: Never     Binge frequency: Never   • Drug use: No   • Sexual activity: Yes   Lifestyle   • Physical activity     Days per week: Not on file     Minutes per session: Not on file   • Stress: Not on file   Relationships   • Social connections     Talks on phone: Not on file     Gets together: Not on file     Attends [employer] service: Not on file     Active  member of club or organization: Not on file     Attends meetings of clubs or organizations: Not on file     Relationship status: Not on file   • Intimate partner violence     Fear of current or ex partner: Not on file     Emotionally abused: Not on file     Physically abused: Not on file     Forced sexual activity: Not on file   Other Topics Concern   • Not on file   Social History Narrative   • Not on file       Studies  Lab Results   Component Value Date/Time    CHOLSTRLTOT 105 12/27/2019 02:20 AM    LDL 56 12/27/2019 02:20 AM    HDL 29 (A) 12/27/2019 02:20 AM    TRIGLYCERIDE 101 12/27/2019 02:20 AM       Lab Results   Component Value Date/Time    SODIUM 137 04/30/2020 04:06 AM    POTASSIUM 4.4 04/30/2020 04:06 AM    CHLORIDE 99 04/30/2020 04:06 AM    CO2 28 04/30/2020 04:06 AM    GLUCOSE 202 (H) 04/30/2020 04:06 AM    BUN 44 (H) 04/30/2020 04:06 AM    CREATININE 1.57 (H) 04/30/2020 04:06 AM    CREATININE 1.18 02/08/2011 12:00 AM    BUNCREATRAT 16 02/08/2011 12:00 AM    GLOMRATE >59 02/08/2011 12:00 AM     Lab Results   Component Value Date/Time    ALKPHOSPHAT 39 04/28/2020 05:10 AM    ASTSGOT 27 04/28/2020 05:10 AM    ALTSGPT 23 04/28/2020 05:10 AM    TBILIRUBIN 1.0 04/28/2020 05:10 AM        For this encounter I directly reviewed ECG tracings and medical records I agree with the interpretations in the electronic health record          [facility]  [id number]  [place] [id number]  VIA Facsimile: [phone]

## 2020-05-14 NOTE — PROGRESS NOTES
CARDIOLOGY OUTPATIENT FOLLOWUP    PCP: No primary care provider on file.    1. Coronary artery disease involving native coronary artery of native heart without angina pectoris  PCI of RCA December, LM-LAD 1/10/20 with BRANDI. LVEF 35-40%. No angina.   - Continue DAPT with ASA/Plavix  - Statin allergic    2. CHF (congestive heart failure), NYHA class II, chronic, systolic (HCC)  LVEF 35%. Worsening LE edema and weight continues to be up 10 lbs over baseline  - Metolazone q48 hours x2 weeks. BMP weekly with nt-BNP    3. ICD (implantable cardioverter-defibrillator) in place  Normal function    4. S/P TAVR (transcatheter aortic valve replacement)  Statin allergic. Normal function. Placed 1/2020    5. Dyslipidemia  Good control with zetia. No changes advised.     6. Chronic kidney disease, unspecified CKD stage    7. Preoperative cardiovascular examination  Wishing to have gastric band removed.  Currently with excess volume and undergoing diuresis.  Once optimized and having completed 6 months of dual antiplatelet therapy (mid July 2020) would be reasonable to proceed with surgery.     Follow up with Paul Wilkinson M.D. in 1 month    Chief Complaint   Patient presents with   • Congestive Heart Failure       History: LIAN More III is a 79 y.o. male presenting for follow up of hospitalization following a fall with internal bleeding.  He has a history of coronary disease, TAVR, pacemaker/ICD, ischemic cardiomyopathy, CKD, diabetes, hypertension and hyperlipidemia.  In May 2019 he was diagnosed with cardiovascular disease and underwent the above treatments which he feels have made ambiguous impact on his symptoms of exertional dyspnea.  He has however predominantly limited by multiple musculoskeletal complaints.    He was recently hospitalized after a fall which he believes was precipitated by tremors.  He suffered multiple bleeding complications including subcapsular renal hematoma.  He lost a substantial amount of blood;  "with hemoglobin falling from normal to around 6-ultimately recovering to around 8 at hospital discharge.    Since discharge he continues to be 10 to 15 pounds above his dry weight and is bothered by lower extremity edema.  Furthermore, he continues to work with physical therapy on rehab.  This is incredibly difficult for him as he has extensive musculoskeletal problems.  He also is in the process of converting from nocturnal oxygen to CPAP and is found it a frustrating process which is left him without any nocturnal therapy for the past week.  He otherwise continues to experience orthopnea but does not experience chest pain, palpitations or near syncope.    He is also bothered by tremors which are aggravated by looking upward.  He finds that are present in his arms and his legs.  The producing profound weakness which has him prone to falling.  He has struggled to connect with a primary care provider and I provided him with a few names today    ROS:  All other systems reviewed and negative except as per the HPI    PE:  BP (!) 98/62 (BP Location: Right arm, Patient Position: Sitting, BP Cuff Size: Adult)   Pulse 92   Resp 16   Ht 1.854 m (6' 1\")   Wt 112.9 kg (248 lb 12.6 oz)   SpO2 92%   BMI 32.82 kg/m²   Gen: Well appearing  HEENT: Symmetric face. Anicteric sclerae. Moist mucus membranes  NECK: No JVD. No lymphadenopathy  CARDIAC: Normal PMI, regular, normal S1, S2.  VASCULATURE: Normal carotid amplitude without bruit.   RESP: Clear to auscultation bilaterally  ABD: Soft, non-tender, non-distended  EXT: No edema, no clubbing or cyanosis  SKIN: Warm and dry  NEURO: No gross deficits  PSYCH: Appropriate affect, participates in conversation    Past Medical History:   Diagnosis Date   • Arthritis    • Back pain    • Breath shortness     pt states short of breath 24/7   • CAD (coronary artery disease)    • CATARACT     removed bilat   • Chest pain    • Chest tightness    • Chickenpox    • Congestive heart failure " ([facility])    • Constipation    • Coronary heart disease    • Cough    • Daytime sleepiness    • Diabetes     PT REPORTS DIABETES RESOLVED IN 2009. NO LONGER TAKES MEDS   • Difficulty breathing    • Dilated cardiomyopathy ([facility])    • Fatigue    • Hearing difficulty    • Heart murmur    • Heart valve disease    • Heartburn    • Hiatus hernia syndrome    • Hyperlipidemia    • Hypertension     pt states well controlled on meds   • Kidney disease    • Mumps    • Pain 08-29-13    back, hips and bilat legs, 4/10   • Pain 6/22/2015    left humerus   • Pain 1/7/16    knees, back and shoulder   • Painful joint    • Palpitations    • Pneumonia 2007   • Rhinitis    • Ringing in ears    • Severe aortic stenosis 12/4/2019   • Shortness of breath    • Swelling of lower extremity    • Tonsillitis    • Ulcer 2014    Dr. Porter, gastroenterologist   • Unspecified hemorrhagic conditions     bruises easily     Allergies   Allergen Reactions   • Statins [Hmg-Coa-R Inhibitors] Rash     Blisters     • Atorvastatin      Outpatient Encounter Medications as of 5/14/2020   Medication Sig Dispense Refill   • metOLazone (ZAROXOLYN) 5 MG Tab Take 1 Tab by mouth every 48 hours. 45 Tab 3   • oxyCODONE HCl 5 MG Tablet Abuse-Deterrent Take 5 mg by mouth every four hours as needed.     • Diclofenac Sodium 1 % Gel Apply 1 Application to affected area(s) 4 times a day as needed (joint pain).     • Hydrocortisone 1 % Cream Apply 1 Application to affected area(s) 2 times a day as needed (itching).     • Home Care Oxygen Inhale 2 L/min by mouth Continuous. Indications: Hypoxia, to keep O2 sat over 90%     • zolpidem (AMBIEN) 5 MG Tab Take 1 Tab by mouth at bedtime as needed for Sleep (1 to 2 po qhs prn insomnia/sleep study. Bring to sleep study.) for up to 2 doses. 2 Tab 0   • ENTRESTO 24-26 MG Tab tablet Take 1 tablet by mouth twice daily 60 Tab 6   • ezetimibe (ZETIA) 10 MG Tab Take 1 tablet by mouth once daily 90 Tab 0   • fluticasone (FLONASE) 50 MCG/ACT  nasal spray Use 2 spray(s) in each nostril once daily 48 g 3   • carvedilol (COREG) 3.125 MG Tab Take 1 Tab by mouth 2 times a day, with meals. 90 Tab 3   • bumetanide (BUMEX) 1 MG Tab Take 2 Tabs by mouth 2 times a day. 120 Tab 3   • Multiple Vitamins-Minerals (ICAPS) Tab Take 1 Tab by mouth 2 Times a Day.     • lactobacillus rhamnosus (CULTURELLE) Cap capsule Take 1 Cap by mouth every day.     • testosterone cypionate (DEPO-TESTOSTERONE) 200 MG/ML Solution injection 50 mg by Intramuscular route every thursday.     • cyanocobalamin (VITAMIN B-12) 1000 MCG/ML Solution 1,000 mcg by Intramuscular route every thursday.     • calcium citrate (CALCITRATE) 950 MG Tab Take 1,900 mg by mouth every day.     • coenzyme Q-10 30 MG capsule Take 60 mg by mouth 2 Times a Day.     • Garlic 1000 MG Cap Take 1,000 mg by mouth 2 Times a Day.     • glucosamine Sulfate 500 MG Cap Take 1,500 mg by mouth 2 Times a Day.     • Non Formulary Request Inject 1 Each as instructed See Admin Instructions. HCG/ARGININE 35276u/5mg/cc, pt does not take on Thur      • Non Formulary Request Inject 1 Each as instructed See Admin Instructions. HGH 5.8mg/2.5mg/cc,   Monday, Tuesday, Wednesday, Friday, Saturday, sunday     • vitamin e (VITAMIN E) 400 UNIT Cap Take 400 Units by mouth 2 times a day.     • omeprazole (PRILOSEC) 40 MG delayed-release capsule Take 40 mg by mouth 2 times a day.     • Non Formulary Request Take 1 Cap by mouth 2 Times a Day. Cholox (OTC)     • Cholecalciferol (VITAMIN D3) 2000 UNIT Cap Take 2,000 Units by mouth every day.     • Non Formulary Request Take 1 Cap by mouth 2 Times a Day. Domenic red (OTC)     • Non Formulary Request Take 1 Cap by mouth every day. Nitric OXIDE (OTC)     • Non Formulary Request Take 1 Cap by mouth every day. anit-oxidant (OTC)     • Zinc 50 MG Tab Take 50 mg by mouth every day.     • anastrozole (ARIMIDEX) 1 MG Tab Take 0.5 mg by mouth See Admin Instructions. On Tue and Sat     • gabapentin (NEURONTIN)  300 MG Cap Take 300 mg by mouth every 6 hours as needed. Indications: Neuropathic Pain     • clopidogrel (PLAVIX) 75 MG Tab Take 1 Tab by mouth every day. 90 Tab 3   • aspirin EC 81 MG EC tablet Take 1 Tab by mouth every day. 30 Tab 0   • glimepiride (AMARYL) 1 MG tablet Take 1 mg by mouth every evening.     • B Complex Vitamins (VITAMIN B COMPLEX PO) Take 1 Tab by mouth every day.     • Cranberry 300 MG Tab Take 300 mg by mouth every morning.     • L-Lysine 1000 MG Tab Take 1,000 mg by mouth every day.     • Ascorbic Acid (VITAMIN C) 1000 MG Tab Take 1,000 mg by mouth 2 Times a Day.       No facility-administered encounter medications on file as of 5/14/2020.      Social History     Socioeconomic History   • Marital status:      Spouse name: Not on file   • Number of children: Not on file   • Years of education: Not on file   • Highest education level: Not on file   Occupational History   • Not on file   Social Needs   • Financial resource strain: Not on file   • Food insecurity     Worry: Not on file     Inability: Not on file   • Transportation needs     Medical: Not on file     Non-medical: Not on file   Tobacco Use   • Smoking status: Never Smoker   • Smokeless tobacco: Never Used   • Tobacco comment: 0   Substance and Sexual Activity   • Alcohol use: No     Alcohol/week: 0.0 oz     Frequency: Never     Binge frequency: Never   • Drug use: No   • Sexual activity: Yes   Lifestyle   • Physical activity     Days per week: Not on file     Minutes per session: Not on file   • Stress: Not on file   Relationships   • Social connections     Talks on phone: Not on file     Gets together: Not on file     Attends Restoration[employer] service: Not on file     Active member of club or organization: Not on file     Attends meetings of clubs or organizations: Not on file     Relationship status: Not on file   • Intimate partner violence     Fear of current or ex partner: Not on file     Emotionally abused: Not on file      Physically abused: Not on file     Forced sexual activity: Not on file   Other Topics Concern   • Not on file   Social History Narrative   • Not on file       Studies  Lab Results   Component Value Date/Time    CHOLSTRLTOT 105 12/27/2019 02:20 AM    LDL 56 12/27/2019 02:20 AM    HDL 29 (A) 12/27/2019 02:20 AM    TRIGLYCERIDE 101 12/27/2019 02:20 AM       Lab Results   Component Value Date/Time    SODIUM 137 04/30/2020 04:06 AM    POTASSIUM 4.4 04/30/2020 04:06 AM    CHLORIDE 99 04/30/2020 04:06 AM    CO2 28 04/30/2020 04:06 AM    GLUCOSE 202 (H) 04/30/2020 04:06 AM    BUN 44 (H) 04/30/2020 04:06 AM    CREATININE 1.57 (H) 04/30/2020 04:06 AM    CREATININE 1.18 02/08/2011 12:00 AM    BUNCREATRAT 16 02/08/2011 12:00 AM    GLOMRATE >59 02/08/2011 12:00 AM     Lab Results   Component Value Date/Time    ALKPHOSPHAT 39 04/28/2020 05:10 AM    ASTSGOT 27 04/28/2020 05:10 AM    ALTSGPT 23 04/28/2020 05:10 AM    TBILIRUBIN 1.0 04/28/2020 05:10 AM        For this encounter I directly reviewed ECG tracings and medical records I agree with the interpretations in the electronic health record

## 2020-05-15 ENCOUNTER — HOME CARE VISIT (OUTPATIENT)
Dept: HOME HEALTH SERVICES | Facility: HOME HEALTHCARE | Age: 80
End: 2020-05-15
Payer: MEDICARE

## 2020-05-15 ENCOUNTER — TELEPHONE (OUTPATIENT)
Dept: CARDIOLOGY | Facility: MEDICAL CENTER | Age: 80
End: 2020-05-15

## 2020-05-15 VITALS
HEART RATE: 96 BPM | BODY MASS INDEX: 31.8 KG/M2 | WEIGHT: 241 LBS | DIASTOLIC BLOOD PRESSURE: 64 MMHG | OXYGEN SATURATION: 98 % | TEMPERATURE: 98.7 F | RESPIRATION RATE: 32 BRPM | SYSTOLIC BLOOD PRESSURE: 102 MMHG

## 2020-05-15 VITALS
OXYGEN SATURATION: 92 % | TEMPERATURE: 97.8 F | RESPIRATION RATE: 20 BRPM | DIASTOLIC BLOOD PRESSURE: 60 MMHG | SYSTOLIC BLOOD PRESSURE: 100 MMHG | HEART RATE: 89 BPM

## 2020-05-15 PROCEDURE — 665999 HH PPS REVENUE DEBIT

## 2020-05-15 PROCEDURE — G0495 RN CARE TRAIN/EDU IN HH: HCPCS

## 2020-05-15 PROCEDURE — 665998 HH PPS REVENUE CREDIT

## 2020-05-15 ASSESSMENT — ACTIVITIES OF DAILY LIVING (ADL): AMBULATION ASSISTANCE ON FLAT SURFACES: 1

## 2020-05-15 ASSESSMENT — FIBROSIS 4 INDEX: FIB4 SCORE: 3.61

## 2020-05-15 ASSESSMENT — ENCOUNTER SYMPTOMS
SHORTNESS OF BREATH: T
LIMITED RANGE OF MOTION: 1
DEBILITATING PAIN: 1
SEVERE DYSPNEA: 1
MUSCLE WEAKNESS: 1
MENTAL STATUS CHANGE: 0

## 2020-05-15 NOTE — TELEPHONE ENCOUNTER
BE    Received STAT clearance request from [facility] for a EGD & requesting to hold plavix for 5 days.    Procedure date: TBD - pt will be scheduled within the next 2-4 weeks      Clearance form placed on RN's desk by Zulema salamanca the daily mail.          Just an FYI

## 2020-05-15 NOTE — TELEPHONE ENCOUNTER
Received clearance request for pt to hold Plavix 5 days prior to EGD. Per BE note yesterday 5/14/20, pt will need to remain on DAPT for 6 months, until mid July 2020. Faxed note to [facility] at [phone] notifying them of this. Receipt confirmed.

## 2020-05-16 PROCEDURE — 665998 HH PPS REVENUE CREDIT

## 2020-05-16 PROCEDURE — 665999 HH PPS REVENUE DEBIT

## 2020-05-17 PROCEDURE — 665999 HH PPS REVENUE DEBIT

## 2020-05-17 PROCEDURE — 665998 HH PPS REVENUE CREDIT

## 2020-05-18 ENCOUNTER — HOME CARE VISIT (OUTPATIENT)
Dept: HOME HEALTH SERVICES | Facility: HOME HEALTHCARE | Age: 80
End: 2020-05-18
Payer: MEDICARE

## 2020-05-18 VITALS
RESPIRATION RATE: 32 BRPM | HEART RATE: 77 BPM | OXYGEN SATURATION: 93 % | DIASTOLIC BLOOD PRESSURE: 60 MMHG | WEIGHT: 241 LBS | SYSTOLIC BLOOD PRESSURE: 100 MMHG | BODY MASS INDEX: 31.8 KG/M2 | TEMPERATURE: 98.5 F

## 2020-05-18 PROCEDURE — 665999 HH PPS REVENUE DEBIT

## 2020-05-18 PROCEDURE — 665998 HH PPS REVENUE CREDIT

## 2020-05-18 PROCEDURE — G0299 HHS/HOSPICE OF RN EA 15 MIN: HCPCS

## 2020-05-18 SDOH — ECONOMIC STABILITY: HOUSING INSECURITY: EVIDENCE OF SMOKING MATERIAL: 0

## 2020-05-18 SDOH — ECONOMIC STABILITY: HOUSING INSECURITY
HOME SAFETY: HAS AN OXYGEN CONCENTRATOR FOR HIS CPAP AT NIGHT. HE HAS ONE SPARE OXYGEN TANK. INSTRUCTED HIM TO LAY IT DOWN ON FLOOR.

## 2020-05-18 ASSESSMENT — ENCOUNTER SYMPTOMS
SHORTNESS OF BREATH: T
SEVERE DYSPNEA: 1
DEBILITATING PAIN: 1
MUSCLE WEAKNESS: 1

## 2020-05-18 ASSESSMENT — FIBROSIS 4 INDEX: FIB4 SCORE: 3.61

## 2020-05-19 ENCOUNTER — HOME CARE VISIT (OUTPATIENT)
Dept: HOME HEALTH SERVICES | Facility: HOME HEALTHCARE | Age: 80
End: 2020-05-19
Payer: MEDICARE

## 2020-05-19 PROCEDURE — G0180 MD CERTIFICATION HHA PATIENT: HCPCS | Performed by: FAMILY MEDICINE

## 2020-05-19 PROCEDURE — 665998 HH PPS REVENUE CREDIT

## 2020-05-19 PROCEDURE — G0151 HHCP-SERV OF PT,EA 15 MIN: HCPCS

## 2020-05-19 PROCEDURE — 665999 HH PPS REVENUE DEBIT

## 2020-05-19 ASSESSMENT — ACTIVITIES OF DAILY LIVING (ADL): AMBULATION ASSISTANCE ON FLAT SURFACES: 1

## 2020-05-19 ASSESSMENT — ENCOUNTER SYMPTOMS: SEVERE DYSPNEA: 1

## 2020-05-20 ENCOUNTER — TELEPHONE (OUTPATIENT)
Dept: CARDIOLOGY | Facility: MEDICAL CENTER | Age: 80
End: 2020-05-20

## 2020-05-20 ENCOUNTER — HOSPITAL ENCOUNTER (OUTPATIENT)
Dept: LAB | Facility: MEDICAL CENTER | Age: 80
End: 2020-05-20
Attending: INTERNAL MEDICINE
Payer: MEDICARE

## 2020-05-20 DIAGNOSIS — I25.10 CORONARY ARTERY DISEASE INVOLVING NATIVE CORONARY ARTERY OF NATIVE HEART WITHOUT ANGINA PECTORIS: ICD-10-CM

## 2020-05-20 DIAGNOSIS — R25.1 OCCASIONAL TREMORS: ICD-10-CM

## 2020-05-20 LAB
ANION GAP SERPL CALC-SCNC: 13 MMOL/L (ref 7–16)
BUN SERPL-MCNC: 49 MG/DL (ref 8–22)
CALCIUM SERPL-MCNC: 9.4 MG/DL (ref 8.4–10.2)
CHLORIDE SERPL-SCNC: 99 MMOL/L (ref 96–112)
CO2 SERPL-SCNC: 28 MMOL/L (ref 20–33)
CREAT SERPL-MCNC: 2.33 MG/DL (ref 0.5–1.4)
GLUCOSE SERPL-MCNC: 135 MG/DL (ref 65–99)
NT-PROBNP SERPL IA-MCNC: 1730 PG/ML (ref 0–125)
POTASSIUM SERPL-SCNC: 4.2 MMOL/L (ref 3.6–5.5)
SODIUM SERPL-SCNC: 140 MMOL/L (ref 135–145)

## 2020-05-20 PROCEDURE — 665999 HH PPS REVENUE DEBIT

## 2020-05-20 PROCEDURE — 83880 ASSAY OF NATRIURETIC PEPTIDE: CPT | Mod: GA

## 2020-05-20 PROCEDURE — 665998 HH PPS REVENUE CREDIT

## 2020-05-20 PROCEDURE — 36415 COLL VENOUS BLD VENIPUNCTURE: CPT | Mod: GA

## 2020-05-20 PROCEDURE — 80048 BASIC METABOLIC PNL TOTAL CA: CPT

## 2020-05-20 NOTE — TELEPHONE ENCOUNTER
Jaqueline Ruiz, Med Ass't  Sarah Holden R.N.               Carlos Smith,       Patient would like a referral placed to neurology to see Dr. Valderrama?      LVM with pt at [phone] requesting call back.

## 2020-05-20 NOTE — TELEPHONE ENCOUNTER
Pt returned call. He said that he continues to have tremors, which have resulted in 2 major falls, one of them resulting in an ICU admission. He wants Dr. Wilkinson to place a referral to neurology with Dr. Valderrama for further evaluation.    To BE

## 2020-05-20 NOTE — TELEPHONE ENCOUNTER
Paul Wilkinson M.D.  You 1 hour ago (3:17 PM)       Please make referral to neurology.  Thank you      Referral placed. Called pt and notified.

## 2020-05-21 ENCOUNTER — TELEPHONE (OUTPATIENT)
Dept: CARDIOLOGY | Facility: MEDICAL CENTER | Age: 80
End: 2020-05-21

## 2020-05-21 ENCOUNTER — HOME CARE VISIT (OUTPATIENT)
Dept: HOME HEALTH SERVICES | Facility: HOME HEALTHCARE | Age: 80
End: 2020-05-21
Payer: MEDICARE

## 2020-05-21 DIAGNOSIS — I50.9 HEART FAILURE, NYHA CLASS 3 (HCC): ICD-10-CM

## 2020-05-21 DIAGNOSIS — N28.9 RENAL INSUFFICIENCY: ICD-10-CM

## 2020-05-21 DIAGNOSIS — I50.22 CHF (CONGESTIVE HEART FAILURE), NYHA CLASS II, CHRONIC, SYSTOLIC (HCC): ICD-10-CM

## 2020-05-21 PROCEDURE — 665999 HH PPS REVENUE DEBIT

## 2020-05-21 PROCEDURE — G0151 HHCP-SERV OF PT,EA 15 MIN: HCPCS

## 2020-05-21 PROCEDURE — 665998 HH PPS REVENUE CREDIT

## 2020-05-21 NOTE — TELEPHONE ENCOUNTER
Result Notes for proBrain Natriuretic Peptide, NT   Notes recorded by Paul Wilkinson M.D. on 5/21/2020 at 12:41 PM PDT   The laboratory studies suggest that he may not be tolerating the recently added metolazone.  I suggest discontinuing doing this medication.  Basic metabolic panel and NT BNP again in 1 week      Called pt and notified of lab results and instructed him to stop metolazone and repeat labs in one week. Med list updated and orders placed. He said that he was previously instructed to reduce Bumex to a total of 3mg daily and he is wondering if he should continue with this or go back to 2mg BID.    To BE

## 2020-05-22 ENCOUNTER — HOME CARE VISIT (OUTPATIENT)
Dept: HOME HEALTH SERVICES | Facility: HOME HEALTHCARE | Age: 80
End: 2020-05-22
Payer: MEDICARE

## 2020-05-22 VITALS
HEART RATE: 88 BPM | WEIGHT: 237 LBS | TEMPERATURE: 98.2 F | RESPIRATION RATE: 12 BRPM | BODY MASS INDEX: 31.27 KG/M2 | SYSTOLIC BLOOD PRESSURE: 98 MMHG | OXYGEN SATURATION: 99 % | DIASTOLIC BLOOD PRESSURE: 52 MMHG

## 2020-05-22 PROCEDURE — G0495 RN CARE TRAIN/EDU IN HH: HCPCS

## 2020-05-22 PROCEDURE — 665999 HH PPS REVENUE DEBIT

## 2020-05-22 PROCEDURE — 665998 HH PPS REVENUE CREDIT

## 2020-05-22 SDOH — ECONOMIC STABILITY: HOUSING INSECURITY: EVIDENCE OF SMOKING MATERIAL: 0

## 2020-05-22 ASSESSMENT — ENCOUNTER SYMPTOMS
SHORTNESS OF BREATH: T
MUSCLE WEAKNESS: 1
SEVERE DYSPNEA: 1

## 2020-05-22 ASSESSMENT — ACTIVITIES OF DAILY LIVING (ADL): AMBULATION ASSISTANCE ON FLAT SURFACES: 1

## 2020-05-22 ASSESSMENT — FIBROSIS 4 INDEX: FIB4 SCORE: 3.61

## 2020-05-22 NOTE — TELEPHONE ENCOUNTER
You  You; Paul Wilkinson M.D. 16 hours ago (4:49 PM)       Should pt be taking Bumex 2mg BID?      Paul Wilkinson M.D.  You 15 hours ago (5:48 PM)       I would stick with that dose of bumex as he had been stable with it until I added metolazone. Thanks for the thought!      Called pt and notified him that he should be taking Bumex 2mg BID.

## 2020-05-23 PROCEDURE — 665999 HH PPS REVENUE DEBIT

## 2020-05-23 PROCEDURE — 665998 HH PPS REVENUE CREDIT

## 2020-05-24 PROCEDURE — 665999 HH PPS REVENUE DEBIT

## 2020-05-24 PROCEDURE — 665998 HH PPS REVENUE CREDIT

## 2020-05-25 ENCOUNTER — HOME CARE VISIT (OUTPATIENT)
Dept: HOME HEALTH SERVICES | Facility: HOME HEALTHCARE | Age: 80
End: 2020-05-25
Payer: MEDICARE

## 2020-05-25 VITALS
RESPIRATION RATE: 24 BRPM | HEART RATE: 68 BPM | DIASTOLIC BLOOD PRESSURE: 46 MMHG | TEMPERATURE: 97.6 F | SYSTOLIC BLOOD PRESSURE: 96 MMHG | OXYGEN SATURATION: 96 % | WEIGHT: 232 LBS | BODY MASS INDEX: 30.61 KG/M2

## 2020-05-25 PROCEDURE — G0495 RN CARE TRAIN/EDU IN HH: HCPCS

## 2020-05-25 PROCEDURE — 665999 HH PPS REVENUE DEBIT

## 2020-05-25 PROCEDURE — 665998 HH PPS REVENUE CREDIT

## 2020-05-25 SDOH — ECONOMIC STABILITY: HOUSING INSECURITY: EVIDENCE OF SMOKING MATERIAL: 0

## 2020-05-25 ASSESSMENT — ENCOUNTER SYMPTOMS
MUSCLE WEAKNESS: 1
SHORTNESS OF BREATH: T
DEBILITATING PAIN: 1
LIMITED RANGE OF MOTION: 1
SEVERE DYSPNEA: 1

## 2020-05-25 ASSESSMENT — FIBROSIS 4 INDEX
FIB4 SCORE: 3.61
FIB4 SCORE: 3.61

## 2020-05-26 ENCOUNTER — TELEPHONE (OUTPATIENT)
Dept: NEPHROLOGY | Facility: MEDICAL CENTER | Age: 80
End: 2020-05-26

## 2020-05-26 ENCOUNTER — HOME CARE VISIT (OUTPATIENT)
Dept: HOME HEALTH SERVICES | Facility: HOME HEALTHCARE | Age: 80
End: 2020-05-26
Payer: MEDICARE

## 2020-05-26 PROCEDURE — 665999 HH PPS REVENUE DEBIT

## 2020-05-26 PROCEDURE — G0151 HHCP-SERV OF PT,EA 15 MIN: HCPCS

## 2020-05-26 PROCEDURE — 665998 HH PPS REVENUE CREDIT

## 2020-05-26 ASSESSMENT — ACTIVITIES OF DAILY LIVING (ADL): AMBULATION ASSISTANCE ON FLAT SURFACES: 1

## 2020-05-26 NOTE — TELEPHONE ENCOUNTER
"Patient has a follow up appointment with you on 6/30/2020. Patient wanted me to let you know that he was in the ICU for a few days back in April because he \"ruptured his kidney\" after a fall.     Thank you  "

## 2020-05-27 PROCEDURE — 665998 HH PPS REVENUE CREDIT

## 2020-05-27 PROCEDURE — 665999 HH PPS REVENUE DEBIT

## 2020-05-28 ENCOUNTER — HOME CARE VISIT (OUTPATIENT)
Dept: HOME HEALTH SERVICES | Facility: HOME HEALTHCARE | Age: 80
End: 2020-05-28
Payer: MEDICARE

## 2020-05-28 PROCEDURE — G0151 HHCP-SERV OF PT,EA 15 MIN: HCPCS

## 2020-05-28 PROCEDURE — 665999 HH PPS REVENUE DEBIT

## 2020-05-28 PROCEDURE — 665998 HH PPS REVENUE CREDIT

## 2020-05-29 ENCOUNTER — TELEPHONE (OUTPATIENT)
Dept: CARDIOLOGY | Facility: MEDICAL CENTER | Age: 80
End: 2020-05-29

## 2020-05-29 ENCOUNTER — HOME CARE VISIT (OUTPATIENT)
Dept: HOME HEALTH SERVICES | Facility: HOME HEALTHCARE | Age: 80
End: 2020-05-29
Payer: MEDICARE

## 2020-05-29 ENCOUNTER — HOSPITAL ENCOUNTER (OUTPATIENT)
Dept: LAB | Facility: MEDICAL CENTER | Age: 80
End: 2020-05-29
Attending: INTERNAL MEDICINE
Payer: MEDICARE

## 2020-05-29 VITALS
DIASTOLIC BLOOD PRESSURE: 60 MMHG | RESPIRATION RATE: 20 BRPM | SYSTOLIC BLOOD PRESSURE: 100 MMHG | BODY MASS INDEX: 29.95 KG/M2 | WEIGHT: 227 LBS | TEMPERATURE: 97.7 F | OXYGEN SATURATION: 90 % | HEART RATE: 90 BPM

## 2020-05-29 DIAGNOSIS — I50.9 HEART FAILURE, NYHA CLASS 3 (HCC): ICD-10-CM

## 2020-05-29 DIAGNOSIS — N28.9 RENAL INSUFFICIENCY: ICD-10-CM

## 2020-05-29 DIAGNOSIS — I50.22 CHF (CONGESTIVE HEART FAILURE), NYHA CLASS II, CHRONIC, SYSTOLIC (HCC): ICD-10-CM

## 2020-05-29 DIAGNOSIS — N18.9 CHRONIC KIDNEY DISEASE, UNSPECIFIED CKD STAGE: ICD-10-CM

## 2020-05-29 DIAGNOSIS — I25.10 CORONARY ARTERY DISEASE INVOLVING NATIVE CORONARY ARTERY OF NATIVE HEART WITHOUT ANGINA PECTORIS: ICD-10-CM

## 2020-05-29 DIAGNOSIS — I50.23 ACUTE ON CHRONIC SYSTOLIC HEART FAILURE (HCC): ICD-10-CM

## 2020-05-29 LAB
ANION GAP SERPL CALC-SCNC: 13 MMOL/L (ref 7–16)
BUN SERPL-MCNC: 75 MG/DL (ref 8–22)
CALCIUM SERPL-MCNC: 9.7 MG/DL (ref 8.4–10.2)
CHLORIDE SERPL-SCNC: 100 MMOL/L (ref 96–112)
CO2 SERPL-SCNC: 26 MMOL/L (ref 20–33)
CREAT SERPL-MCNC: 2.58 MG/DL (ref 0.5–1.4)
GLUCOSE SERPL-MCNC: 173 MG/DL (ref 65–99)
NT-PROBNP SERPL IA-MCNC: 1945 PG/ML (ref 0–125)
POTASSIUM SERPL-SCNC: 4.3 MMOL/L (ref 3.6–5.5)
SODIUM SERPL-SCNC: 139 MMOL/L (ref 135–145)

## 2020-05-29 PROCEDURE — 665999 HH PPS REVENUE DEBIT

## 2020-05-29 PROCEDURE — 665997 HH PPS REVENUE ADJ

## 2020-05-29 PROCEDURE — 36415 COLL VENOUS BLD VENIPUNCTURE: CPT

## 2020-05-29 PROCEDURE — 80048 BASIC METABOLIC PNL TOTAL CA: CPT

## 2020-05-29 PROCEDURE — 83880 ASSAY OF NATRIURETIC PEPTIDE: CPT

## 2020-05-29 PROCEDURE — 665998 HH PPS REVENUE CREDIT

## 2020-05-29 PROCEDURE — G0493 RN CARE EA 15 MIN HH/HOSPICE: HCPCS

## 2020-05-29 RX ORDER — BUMETANIDE 1 MG/1
1 TABLET ORAL 2 TIMES DAILY
Qty: 120 TAB | Refills: 3 | COMMUNITY
Start: 2020-05-29 | End: 2020-12-14

## 2020-05-29 SDOH — ECONOMIC STABILITY: HOUSING INSECURITY: EVIDENCE OF SMOKING MATERIAL: 0

## 2020-05-29 ASSESSMENT — PATIENT HEALTH QUESTIONNAIRE - PHQ9: CLINICAL INTERPRETATION OF PHQ2 SCORE: 0

## 2020-05-29 ASSESSMENT — FIBROSIS 4 INDEX: FIB4 SCORE: 3.61

## 2020-05-29 ASSESSMENT — ACTIVITIES OF DAILY LIVING (ADL)
OASIS_M1830: 00
HOME_HEALTH_OASIS: 01

## 2020-05-29 NOTE — LETTER
PROCEDURE/SURGERY CLEARANCE FORM      Encounter Date: 6/3/2020    Patient: LIAN More III  YOB: 1940    CARDIOLOGIST:  Paul Wilkinson MD    REFERRING DOCTOR:  Deniz Rios MD        The above patient is ok to proceed to have the following procedure/surgery: Gastric Band Removal                                           Additional comments: Patient should remain on uninterrupted DAPT with Plavix and aspirin until mid July 2020. He may hold Entresto 24 hours prior to surgery if needed, but this may result in elevation in perioperative BP.                  MD Signature   Paul Wilkinson MD

## 2020-05-29 NOTE — TELEPHONE ENCOUNTER
D/w VR. If pt sounds like he is decompensated, he should go to ER. If he is still feeling well, We can cut Bumex dose in half and repeat labs in one week. Called pt. He states that his SOB with activity and his daily weights have remained stable. He denies any SOB at rest. His edema has improved. Advised pt that he can cut Bumex to 1mg BID and repeat labs in 1 week. Orders placed and med list updated.

## 2020-05-30 PROCEDURE — 665998 HH PPS REVENUE CREDIT

## 2020-05-30 PROCEDURE — 665999 HH PPS REVENUE DEBIT

## 2020-05-31 PROCEDURE — 665999 HH PPS REVENUE DEBIT

## 2020-05-31 PROCEDURE — 665997 HH PPS REVENUE ADJ

## 2020-05-31 PROCEDURE — 665998 HH PPS REVENUE CREDIT

## 2020-06-01 ENCOUNTER — NON-PROVIDER VISIT (OUTPATIENT)
Dept: CARDIOLOGY | Facility: MEDICAL CENTER | Age: 80
End: 2020-06-01
Payer: MEDICARE

## 2020-06-01 DIAGNOSIS — Z95.2 S/P TAVR (TRANSCATHETER AORTIC VALVE REPLACEMENT): ICD-10-CM

## 2020-06-01 DIAGNOSIS — Z95.3 STATUS POST TRANSCATHETER AORTIC VALVE REPLACEMENT (TAVR) USING BIOPROSTHESIS: ICD-10-CM

## 2020-06-01 LAB — EKG IMPRESSION: NORMAL

## 2020-06-01 PROCEDURE — G0422 INTENS CARDIAC REHAB W/EXERC: HCPCS | Performed by: INTERNAL MEDICINE

## 2020-06-01 PROCEDURE — G0423 INTENS CARDIAC REHAB NO EXER: HCPCS | Mod: 59 | Performed by: INTERNAL MEDICINE

## 2020-06-01 ASSESSMENT — PATIENT HEALTH QUESTIONNAIRE - PHQ9: SUM OF ALL RESPONSES TO PHQ QUESTIONS 1-9: 10

## 2020-06-01 NOTE — PROGRESS NOTES
Intensive Cardiac Rehabilitation (ICR) and Individual Treatment Plan (ITP) reviewed and signed.  Kristina Reich MD

## 2020-06-01 NOTE — PROGRESS NOTES
LIAN More III attended the Nutrition Workshop from 8:00 am- 8:55 am  Workshop Title: Fueling a Health Body.      Lecture was attended and patient questions addressed. The patient will continue workshops and nutrition education.

## 2020-06-01 NOTE — PROGRESS NOTES
Individualized Treatment Plan   Cardiac Rehab Individual Treatment Plan  Initial/Reassessment/Discharge Assessment/ ReAssessment/ Discharge: (P) 60 day 20 Session #     (P) 5   MRN: [id number] Allergies: Statins [hmg-coa-r inhibitors] and Atorvastatin   Patient Name: LIAN More III : 1940 Risk Stratification: (P) High    Diagnoses:   1. S/P TAVR (transcatheter aortic valve replacement)    2. 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 Physician: No primary care provider on file.    Date of Event: (P) 20 Specialist: (P) Minh   Risk Factors:  (P) Hypertension, Hyperlipidemia, Diabetes, Obesity, Stress, Sedentary Lifestyle, Age, Male > 45   Exercise Nutrition Other Core Components/ Risk Factors Psychosocial   Stages of change Stages of change Stages of change Stages of change   (P) Preparation (P) Preparation (P) Preparation (P) Preparation   Fitness Test Lipids Learning Barriers Psychosocial Plan   DASI: (P) (na) Available: (P) Yes Learning Barriers: (P) None Psychosocial Plan: (P) Yes   DIST: (P) 1132 Date: (P) 19 Family Support Goals   Max HR: (P) 118 Total: (P) 105 mg/dL (P) Yes Assess presence or absence of depression using a valid screening: (P) Yes   Max BP: Peak Ex BP: (P) 138/75 Trig: (P) 101 mg/dL Lives: (P) Spouse Use Stress Management: (P) Yes   RPE: (P) 12 HDL: (P) 29 mg/dL Other Risk Factors Plan Adverse Events: (P) Yes   SPO2: (P) 100 %       MET: (P) 2.62 LDL: (P) 56 mg/dL Other Risk Factors/Plan: (P) Yes Unexpected Events: (P) Yes   EF= (P) 35(2020) Diabetes Tobacco Use Intervention   Ambulatory Status Diabetes: (P) Yes Social History     Tobacco Use   Smoking Status Never Smoker   Smokeless Tobacco Never Used   Tobacco Comment    0    Behavioral Health Consult: (P) Yes   Fall Risk Assessed: (P) Yes HbA1C: (P) 6.5 % Date: (P) 19 Goals Physician Referral: (P) No   Exercise Ambulatory Status Assist Devices: (P) None  "Monitors BS at Home: (P) Yes(occasionally, a couple times a week. ) Educate / Review and have understanding of cardiac disease prevention: Identifies Stressors: (P) Yes   Exercise Plan Frequency: (P) once a week Random BS: (P) 121(03/10/2020)  Drug Intervention: (P) No     Weight Management  Outcome Survey Tools   Goals Weight: (P) 109.8 kg (242 lb) Educate / Review and have understanding of cardiac disease prevention: (P) Yes FP QOL Overall Score: (P) 19.29   Home Exercise: (P) Yes Height: (P) 185.4 cm (6' 0.99\") Medication Compliance: (P) Yes PHQ-9: (P) 10   Mode: (P) Walk, Bike(recumbant bike) BMI (Calculated): (P) 31.94 Complete Tobacco Cessation: Education   Duration: (P) 10 minutes up to 3 times a day as tolerated.  Goal weight: (P) 220lb Complete Tobacco Cessation: (P) Yes MBSR Lectures/Videos: (P) Yes   Frequency: (P) 7 days active.  Waist: (P) 46.5 inch Intervention Psychosocial Education: (P) Coping Techniques, Positive Support System, S/S Depression, MBSR Lectures   Intervention Social History     Substance and Sexual Activity   Alcohol Use No   • Alcohol/week: 0.0 oz   • Frequency: Never   • Binge frequency: Never    Smoking Cessation Referral: (P) N/A     Intervention: (P) Yes Nutrition Plan Ind. Education / Counseling: (P) N/A    Exercise Prescription Nutrition Plan: (P) Yes Tobacco Adjunct: (P) N/A    Mode: (P) Treadmill, NuStep, UBE, Airdyne Nutrition Related Target Goals Healthy Heart Education: (P) Class Schedule Given, Medications Reviewed, Patient Education Binder Provided, Risk Factors Discussed, Videos Viewed per [employer]    Frequency: (P) 3 days/week LDL-C <100 if trig. > 200: (P) N/A Weekly Lectures/ Videos/ Interactive Cooking School: (P) Yes    Duration: (P) 20-30 min LDL-C < 70 for high risk patient:  LDL-C<70 for high risk patients: (P) Yes Education    Intensity: (P) 11-13 RPE  Healthy Heart Education: (P) Class Schedule Given, Medications Reviewed, Patient Education Binder Provided, " Risk Factors Discussed, Videos Viewed per [employer]    METS: (P) 1.0-3.0 Non HDL-C Should be < 130:  Non HDL-C Should be < 130: (P) Yes Hypertension Management   (Active Problem)    Progression: (P) ^ increments of 1-3 to THR/RPE as tolerated      THR: THR: (P) 20-30 BPM ^Resting HR(for the first 3 visits then max of 121 age predicated.) HbA1C < 7%: (P) Yes Resting BP: (P) 114/70    Angina with Exercise?   Angina with Exercise: (P) No   BMI < 25: (P) No(<29) Hypertension Education      Dietary Goal: (P) >=58 rate your plate, low sodium.      Resistance Training? Resistance Training: (P) Yes Rate your Plate: (P) Pre(65) Lectures/ Videos/ Cooking School: (P) Yes    Education Intervention Hypertension Goals    (P) Yes Dietician Consult/Class: (P) Pending Medication Adherence : (P) Yes    (P) Equipment Orientation, Exercise Safety, S/S to Report, RPE Scale, Warm Up / Cool Down, Physically Active Nurse/Patient Discussion: (P) Yes Home Self Monitoring : (P) Yes    Exercise “Target Goals” Diabetes Ed Referral: (P) Yes     Start Individual Exercise Rx (P) Yes Discuss Maintenance /Wt Loss: (P) Yes       Attend Cooking School: (P) Pending     BP < 140/90 or < 130/80 if DM or CKD (P) Yes Education       Nutrition Education: (P) S&S Hypo/Hyper glycemia, Relate Diabetes to CAD, Healthy Plant Based Eating, Sodium Reduction Cooking/ Lectures/ Cooking School/  RD 1:1     Aerobic activity 30 + min / day 5 days / wk (P) Yes          Exercise    Current : During ICR shut down USMAN had a fall and burst his kidney. He feels that he is recovered from that but he did have physical therapy coming to his house weekly to show him exercises. USMAN said he exercised alot on his own with bands.  Goal: To be able to exercise without getting SOB.  Progress: USMAN is feeling pretty good with his breathlessness and was able to walk on the treadmill for 20 minutes.     Nutrition     Current: Already eats generally heart healthy.  He does not add salt to any  "food, eats out only once a week, has dessert only once a week.  He has been watching his diet due to having diabetes and his wife is an RN and helps with the diet as well.   Goal: JW says he is still following a heart healthy low sodium diet.  Progress:  Willing to make some dietary adjustments as needed for heart health.      Hypertension     Current: Blood pressure WNL today 110/67.   Goal: Low sodium diet, medication adherence, home monitoring.   Progress: Willing to maintain above goals for good blood pressure control.      Stress     Current :\"I am still having difficulty sleeping.\"  Goal: Pending a sleep study in 4 months to evaluate sleep patterns and possibility of sleep apnea.   Progress:  \"I am hanging in there just like everyone else.\"     Other                       "

## 2020-06-02 ENCOUNTER — OFFICE VISIT (OUTPATIENT)
Dept: ADMISSIONS | Facility: MEDICAL CENTER | Age: 80
End: 2020-06-02
Attending: COLON & RECTAL SURGERY
Payer: MEDICARE

## 2020-06-02 DIAGNOSIS — Z01.812 PRE-OPERATIVE LABORATORY EXAMINATION: ICD-10-CM

## 2020-06-02 LAB
COVID ORDER STATUS COVID19: NORMAL
ERYTHROCYTE [DISTWIDTH] IN BLOOD BY AUTOMATED COUNT: 56.7 FL (ref 35.9–50)
EST. AVERAGE GLUCOSE BLD GHB EST-MCNC: 134 MG/DL
HBA1C MFR BLD: 6.3 % (ref 0–5.6)
HCT VFR BLD AUTO: 50.7 % (ref 42–52)
HGB BLD-MCNC: 15.8 G/DL (ref 14–18)
MCH RBC QN AUTO: 28.3 PG (ref 27–33)
MCHC RBC AUTO-ENTMCNC: 31.2 G/DL (ref 33.7–35.3)
MCV RBC AUTO: 90.9 FL (ref 81.4–97.8)
PLATELET # BLD AUTO: 183 K/UL (ref 164–446)
PMV BLD AUTO: 9.8 FL (ref 9–12.9)
RBC # BLD AUTO: 5.58 M/UL (ref 4.7–6.1)
WBC # BLD AUTO: 5.8 K/UL (ref 4.8–10.8)

## 2020-06-02 PROCEDURE — C9803 HOPD COVID-19 SPEC COLLECT: HCPCS

## 2020-06-02 PROCEDURE — 83036 HEMOGLOBIN GLYCOSYLATED A1C: CPT

## 2020-06-02 PROCEDURE — 85027 COMPLETE CBC AUTOMATED: CPT

## 2020-06-02 PROCEDURE — 36415 COLL VENOUS BLD VENIPUNCTURE: CPT

## 2020-06-02 RX ORDER — ACETAMINOPHEN 325 MG/1
650 TABLET ORAL EVERY 4 HOURS PRN
Status: ON HOLD | COMMUNITY
End: 2020-06-05

## 2020-06-02 ASSESSMENT — FIBROSIS 4 INDEX: FIB4 SCORE: 3.61

## 2020-06-02 NOTE — OR NURSING
Kristie at Dr Rios's office notified that patient needs instructions on ASA, Plavix and check in time.  Also informed that anesthesia requests that Entresto be held 24 hours prior to surgery

## 2020-06-02 NOTE — TELEPHONE ENCOUNTER
Pt returned call. States that the anesthesiologist for his procedure (removal of gastric band) on 6/5 wants to know if pt can come of his Entresto for 24 hours prior to sx. Pt states that he was told he did not have to come off his plavix or aspirin though.     To BE

## 2020-06-02 NOTE — TELEPHONE ENCOUNTER
ILDA/jonathan    Pt calling with more information about 6/5 surgery for stomach band to be removed.    Pt states Sarah knows all about this surgery.    Please call .

## 2020-06-03 ENCOUNTER — NON-PROVIDER VISIT (OUTPATIENT)
Dept: CARDIOLOGY | Facility: MEDICAL CENTER | Age: 80
End: 2020-06-03
Payer: MEDICARE

## 2020-06-03 DIAGNOSIS — Z95.3 STATUS POST TRANSCATHETER AORTIC VALVE REPLACEMENT (TAVR) USING BIOPROSTHESIS: ICD-10-CM

## 2020-06-03 LAB — EKG IMPRESSION: NORMAL

## 2020-06-03 PROCEDURE — G0422 INTENS CARDIAC REHAB W/EXERC: HCPCS | Performed by: INTERNAL MEDICINE

## 2020-06-03 PROCEDURE — G0423 INTENS CARDIAC REHAB NO EXER: HCPCS | Mod: 59 | Performed by: INTERNAL MEDICINE

## 2020-06-03 NOTE — TELEPHONE ENCOUNTER
Paul Wilkinson M.D.  Sydnie Jacobs R.N. 48 minutes ago (8:13 AM)       It is ok to hold entresto if the anesthesia team prefers. This may result in elevation in perioperative BP.       D/w BE. Pt ok to proceed as long as remains on DAPT. Letter drafted, signed by BE, faxed to Dr. Deniz Rios's office at [phone]. Receipt confirmed. Letter to scanning. Called and notified pt.

## 2020-06-04 NOTE — OR NURSING
COVID-19 Pre-surgery screenin. Do you have an undiagnosed respiratory illness or symptoms such as coughing or sneezing? No  a. Onset of Sx   b. Acute vs. chronic respiratory illness     2. Do you have an unexplained fever greater than 100.4 degrees Fahrenheit or 38 degrees Celsius?     No    3. Have you had direct exposure to a patient who tested positive for Covid-19?    No    4. Have you traveled outside [facility] in the last 14 days? no    5. Have you had any lost of taste, smell, N/V or sore throat? no    Patient has been informed of no visitor policy and asked to wear a mask upon entering the hospital   Yes

## 2020-06-05 ENCOUNTER — ANESTHESIA EVENT (OUTPATIENT)
Dept: SURGERY | Facility: MEDICAL CENTER | Age: 80
End: 2020-06-05
Payer: MEDICARE

## 2020-06-05 ENCOUNTER — HOSPITAL ENCOUNTER (OUTPATIENT)
Facility: MEDICAL CENTER | Age: 80
End: 2020-06-05
Attending: COLON & RECTAL SURGERY | Admitting: COLON & RECTAL SURGERY
Payer: MEDICARE

## 2020-06-05 ENCOUNTER — ANESTHESIA (OUTPATIENT)
Dept: SURGERY | Facility: MEDICAL CENTER | Age: 80
End: 2020-06-05
Payer: MEDICARE

## 2020-06-05 VITALS
OXYGEN SATURATION: 94 % | HEIGHT: 73 IN | WEIGHT: 228.62 LBS | RESPIRATION RATE: 18 BRPM | DIASTOLIC BLOOD PRESSURE: 74 MMHG | HEART RATE: 78 BPM | TEMPERATURE: 97 F | SYSTOLIC BLOOD PRESSURE: 124 MMHG | BODY MASS INDEX: 30.3 KG/M2

## 2020-06-05 DIAGNOSIS — G89.18 POST-OP PAIN: ICD-10-CM

## 2020-06-05 LAB
GLUCOSE BLD-MCNC: 137 MG/DL (ref 65–99)
SARS-COV-2 RNA RESP QL NAA+PROBE: NOT DETECTED
SPECIMEN SOURCE: NORMAL

## 2020-06-05 PROCEDURE — A9270 NON-COVERED ITEM OR SERVICE: HCPCS | Performed by: COLON & RECTAL SURGERY

## 2020-06-05 PROCEDURE — 502570 HCHG PACK, GASTRIC BANDING: Performed by: COLON & RECTAL SURGERY

## 2020-06-05 PROCEDURE — 700111 HCHG RX REV CODE 636 W/ 250 OVERRIDE (IP): Performed by: ANESTHESIOLOGY

## 2020-06-05 PROCEDURE — 82962 GLUCOSE BLOOD TEST: CPT

## 2020-06-05 PROCEDURE — 700102 HCHG RX REV CODE 250 W/ 637 OVERRIDE(OP): Performed by: COLON & RECTAL SURGERY

## 2020-06-05 PROCEDURE — 501838 HCHG SUTURE GENERAL: Performed by: COLON & RECTAL SURGERY

## 2020-06-05 PROCEDURE — 160025 RECOVERY II MINUTES (STATS): Performed by: COLON & RECTAL SURGERY

## 2020-06-05 PROCEDURE — 501497 HCHG SURGICLIP: Performed by: COLON & RECTAL SURGERY

## 2020-06-05 PROCEDURE — 160035 HCHG PACU - 1ST 60 MINS PHASE I: Performed by: COLON & RECTAL SURGERY

## 2020-06-05 PROCEDURE — 501570 HCHG TROCAR, SEPARATOR: Performed by: COLON & RECTAL SURGERY

## 2020-06-05 PROCEDURE — 160048 HCHG OR STATISTICAL LEVEL 1-5: Performed by: COLON & RECTAL SURGERY

## 2020-06-05 PROCEDURE — 700101 HCHG RX REV CODE 250: Performed by: ANESTHESIOLOGY

## 2020-06-05 PROCEDURE — 501583 HCHG TROCAR, THRD CAN&SEAL 5X100: Performed by: COLON & RECTAL SURGERY

## 2020-06-05 PROCEDURE — 500800 HCHG LAPAROSCOPIC J/L HOOK: Performed by: COLON & RECTAL SURGERY

## 2020-06-05 PROCEDURE — 160046 HCHG PACU - 1ST 60 MINS PHASE II: Performed by: COLON & RECTAL SURGERY

## 2020-06-05 PROCEDURE — 700102 HCHG RX REV CODE 250 W/ 637 OVERRIDE(OP): Performed by: ANESTHESIOLOGY

## 2020-06-05 PROCEDURE — 160041 HCHG SURGERY MINUTES - EA ADDL 1 MIN LEVEL 4: Performed by: COLON & RECTAL SURGERY

## 2020-06-05 PROCEDURE — 502648 HCHG APPLICATOR, EVICEL: Performed by: COLON & RECTAL SURGERY

## 2020-06-05 PROCEDURE — 501338 HCHG SHEARS, ENDO: Performed by: COLON & RECTAL SURGERY

## 2020-06-05 PROCEDURE — 160029 HCHG SURGERY MINUTES - 1ST 30 MINS LEVEL 4: Performed by: COLON & RECTAL SURGERY

## 2020-06-05 PROCEDURE — 160009 HCHG ANES TIME/MIN: Performed by: COLON & RECTAL SURGERY

## 2020-06-05 PROCEDURE — A6266 IMPREG GAUZE NO H20/SAL/YARD: HCPCS | Performed by: COLON & RECTAL SURGERY

## 2020-06-05 PROCEDURE — 501571 HCHG TROCAR, SEPARATOR 12X100: Performed by: COLON & RECTAL SURGERY

## 2020-06-05 PROCEDURE — 160002 HCHG RECOVERY MINUTES (STAT): Performed by: COLON & RECTAL SURGERY

## 2020-06-05 PROCEDURE — 160047 HCHG PACU  - EA ADDL 30 MINS PHASE II: Performed by: COLON & RECTAL SURGERY

## 2020-06-05 PROCEDURE — 700105 HCHG RX REV CODE 258: Performed by: COLON & RECTAL SURGERY

## 2020-06-05 PROCEDURE — A6402 STERILE GAUZE <= 16 SQ IN: HCPCS | Performed by: COLON & RECTAL SURGERY

## 2020-06-05 PROCEDURE — 700101 HCHG RX REV CODE 250: Performed by: COLON & RECTAL SURGERY

## 2020-06-05 PROCEDURE — A9270 NON-COVERED ITEM OR SERVICE: HCPCS | Performed by: ANESTHESIOLOGY

## 2020-06-05 PROCEDURE — 110454 HCHG SHELL REV 250: Performed by: COLON & RECTAL SURGERY

## 2020-06-05 PROCEDURE — 160036 HCHG PACU - EA ADDL 30 MINS PHASE I: Performed by: COLON & RECTAL SURGERY

## 2020-06-05 DEVICE — MATRISTEM MICROMATRIX 500MG (1/EA): Type: IMPLANTABLE DEVICE | Status: FUNCTIONAL

## 2020-06-05 RX ORDER — OXYCODONE HCL 5 MG/5 ML
10 SOLUTION, ORAL ORAL
Status: COMPLETED | OUTPATIENT
Start: 2020-06-05 | End: 2020-06-05

## 2020-06-05 RX ORDER — HYDROMORPHONE HYDROCHLORIDE 1 MG/ML
0.2 INJECTION, SOLUTION INTRAMUSCULAR; INTRAVENOUS; SUBCUTANEOUS
Status: DISCONTINUED | OUTPATIENT
Start: 2020-06-05 | End: 2020-06-05 | Stop reason: HOSPADM

## 2020-06-05 RX ORDER — ONDANSETRON 2 MG/ML
4 INJECTION INTRAMUSCULAR; INTRAVENOUS
Status: DISCONTINUED | OUTPATIENT
Start: 2020-06-05 | End: 2020-06-05 | Stop reason: HOSPADM

## 2020-06-05 RX ORDER — HALOPERIDOL 5 MG/ML
1 INJECTION INTRAMUSCULAR
Status: DISCONTINUED | OUTPATIENT
Start: 2020-06-05 | End: 2020-06-05 | Stop reason: HOSPADM

## 2020-06-05 RX ORDER — HYDROMORPHONE HYDROCHLORIDE 1 MG/ML
0.1 INJECTION, SOLUTION INTRAMUSCULAR; INTRAVENOUS; SUBCUTANEOUS
Status: DISCONTINUED | OUTPATIENT
Start: 2020-06-05 | End: 2020-06-05 | Stop reason: HOSPADM

## 2020-06-05 RX ORDER — SODIUM CHLORIDE, SODIUM LACTATE, POTASSIUM CHLORIDE, CALCIUM CHLORIDE 600; 310; 30; 20 MG/100ML; MG/100ML; MG/100ML; MG/100ML
INJECTION, SOLUTION INTRAVENOUS CONTINUOUS
Status: DISCONTINUED | OUTPATIENT
Start: 2020-06-05 | End: 2020-06-05 | Stop reason: HOSPADM

## 2020-06-05 RX ORDER — OXYCODONE HCL 10 MG/1
10 TABLET, FILM COATED, EXTENDED RELEASE ORAL ONCE
Status: COMPLETED | OUTPATIENT
Start: 2020-06-05 | End: 2020-06-05

## 2020-06-05 RX ORDER — HYDROCODONE BITARTRATE AND ACETAMINOPHEN 5; 325 MG/1; MG/1
1 TABLET ORAL EVERY 6 HOURS PRN
Qty: 16 TAB | Refills: 0 | Status: SHIPPED | OUTPATIENT
Start: 2020-06-05 | End: 2020-06-09

## 2020-06-05 RX ORDER — OXYCODONE HCL 5 MG/5 ML
5 SOLUTION, ORAL ORAL
Status: COMPLETED | OUTPATIENT
Start: 2020-06-05 | End: 2020-06-05

## 2020-06-05 RX ORDER — ROCURONIUM BROMIDE 10 MG/ML
INJECTION, SOLUTION INTRAVENOUS PRN
Status: DISCONTINUED | OUTPATIENT
Start: 2020-06-05 | End: 2020-06-05 | Stop reason: SURG

## 2020-06-05 RX ORDER — MEPERIDINE HYDROCHLORIDE 25 MG/ML
6.25 INJECTION INTRAMUSCULAR; INTRAVENOUS; SUBCUTANEOUS
Status: DISCONTINUED | OUTPATIENT
Start: 2020-06-05 | End: 2020-06-05 | Stop reason: HOSPADM

## 2020-06-05 RX ORDER — ACETAMINOPHEN 500 MG
1000 TABLET ORAL ONCE
Status: COMPLETED | OUTPATIENT
Start: 2020-06-05 | End: 2020-06-05

## 2020-06-05 RX ORDER — DIPHENHYDRAMINE HYDROCHLORIDE 50 MG/ML
12.5 INJECTION INTRAMUSCULAR; INTRAVENOUS
Status: DISCONTINUED | OUTPATIENT
Start: 2020-06-05 | End: 2020-06-05 | Stop reason: HOSPADM

## 2020-06-05 RX ORDER — LIDOCAINE HYDROCHLORIDE 20 MG/ML
INJECTION, SOLUTION EPIDURAL; INFILTRATION; INTRACAUDAL; PERINEURAL PRN
Status: DISCONTINUED | OUTPATIENT
Start: 2020-06-05 | End: 2020-06-05 | Stop reason: SURG

## 2020-06-05 RX ORDER — HYDROMORPHONE HYDROCHLORIDE 1 MG/ML
0.4 INJECTION, SOLUTION INTRAMUSCULAR; INTRAVENOUS; SUBCUTANEOUS
Status: DISCONTINUED | OUTPATIENT
Start: 2020-06-05 | End: 2020-06-05 | Stop reason: HOSPADM

## 2020-06-05 RX ORDER — CEFAZOLIN SODIUM 1 G/3ML
INJECTION, POWDER, FOR SOLUTION INTRAMUSCULAR; INTRAVENOUS PRN
Status: DISCONTINUED | OUTPATIENT
Start: 2020-06-05 | End: 2020-06-05 | Stop reason: SURG

## 2020-06-05 RX ORDER — GABAPENTIN 300 MG/1
300 CAPSULE ORAL ONCE
Status: COMPLETED | OUTPATIENT
Start: 2020-06-05 | End: 2020-06-05

## 2020-06-05 RX ORDER — BUPIVACAINE HYDROCHLORIDE AND EPINEPHRINE 5; 5 MG/ML; UG/ML
INJECTION, SOLUTION PERINEURAL
Status: DISCONTINUED | OUTPATIENT
Start: 2020-06-05 | End: 2020-06-05 | Stop reason: HOSPADM

## 2020-06-05 RX ADMIN — GABAPENTIN 300 MG: 300 CAPSULE ORAL at 07:30

## 2020-06-05 RX ADMIN — SODIUM CHLORIDE, POTASSIUM CHLORIDE, SODIUM LACTATE AND CALCIUM CHLORIDE: 600; 310; 30; 20 INJECTION, SOLUTION INTRAVENOUS at 07:57

## 2020-06-05 RX ADMIN — OXYCODONE HYDROCHLORIDE 10 MG: 5 SOLUTION ORAL at 10:46

## 2020-06-05 RX ADMIN — FENTANYL CITRATE 50 MCG: 50 INJECTION INTRAMUSCULAR; INTRAVENOUS at 10:49

## 2020-06-05 RX ADMIN — LIDOCAINE HYDROCHLORIDE 100 MG: 20 INJECTION, SOLUTION EPIDURAL; INFILTRATION; INTRACAUDAL at 09:29

## 2020-06-05 RX ADMIN — ROCURONIUM BROMIDE 40 MG: 10 INJECTION, SOLUTION INTRAVENOUS at 09:29

## 2020-06-05 RX ADMIN — ACETAMINOPHEN 1000 MG: 500 TABLET ORAL at 07:30

## 2020-06-05 RX ADMIN — POVIDONE-IODINE 15 ML: 10 SOLUTION TOPICAL at 07:30

## 2020-06-05 RX ADMIN — LIDOCAINE HYDROCHLORIDE 0.5 ML: 10 INJECTION, SOLUTION EPIDURAL; INFILTRATION; INTRACAUDAL at 07:30

## 2020-06-05 RX ADMIN — PROPOFOL 200 MG: 10 INJECTION, EMULSION INTRAVENOUS at 09:29

## 2020-06-05 RX ADMIN — FENTANYL CITRATE 100 MCG: 50 INJECTION INTRAMUSCULAR; INTRAVENOUS at 09:29

## 2020-06-05 RX ADMIN — FENTANYL CITRATE 50 MCG: 50 INJECTION INTRAMUSCULAR; INTRAVENOUS at 10:57

## 2020-06-05 RX ADMIN — SUGAMMADEX 200 MG: 100 INJECTION, SOLUTION INTRAVENOUS at 10:18

## 2020-06-05 RX ADMIN — CEFAZOLIN 2 G: 330 INJECTION, POWDER, FOR SOLUTION INTRAMUSCULAR; INTRAVENOUS at 09:20

## 2020-06-05 RX ADMIN — Medication 100 MG: at 09:29

## 2020-06-05 RX ADMIN — OXYCODONE HYDROCHLORIDE 10 MG: 10 TABLET, FILM COATED, EXTENDED RELEASE ORAL at 07:30

## 2020-06-05 ASSESSMENT — FIBROSIS 4 INDEX: FIB4 SCORE: 2.37

## 2020-06-05 NOTE — DISCHARGE INSTRUCTIONS
ACTIVITY: Rest and take it easy for the first 24 hours.  A responsible adult is recommended to remain with you during that time.  It is normal to feel sleepy.  We encourage you to not do anything that requires balance, judgment or coordination.    MILD FLU-LIKE SYMPTOMS ARE NORMAL. YOU MAY EXPERIENCE GENERALIZED MUSCLE ACHES, THROAT IRRITATION, HEADACHE AND/OR SOME NAUSEA.    FOR 24 HOURS DO NOT:  Drive, operate machinery or run household appliances.  Drink beer or alcoholic beverages.   Make important decisions or sign legal documents.    SPECIAL INSTRUCTIONS:   Discharge instructions:     1. DIET: Upon discharge from the hospital you may resume your normal preoperative diet. Depending on how you are feeling and whether you have nausea or not, you may wish to stay with a bland diet for the first few days. However, you can advance this as quickly as you feel ready.     2. ACTIVITIES: After discharge from the hospital, you may resume full routine activities. However, there should be no heavy lifting (greater than 15 pounds) and no strenuous activities until after your follow-up visit. Otherwise, routine activities of daily living are acceptable.     3. DRIVING: You may drive whenever you are off pain medications and are able to perform the activities needed to drive, i.e. turning, bending, twisting, etc.     4. BATHING: You may get the wound wet at any time after leaving the hospital. You may shower, but do not submerge in a bath for at least a week. Dressings may come off after 48 hours.     5. BOWEL FUNCTION: Constipation is common after an operation, especially with pain medications. The combination of pain medication and decreased activity level can cause constipation in otherwise normal patients. If you feel this is occurring, take a laxative (Miralax, Milk of Magnesia, Ex-Lax, Senokot, etc.) until the problem has resolved.     6. PAIN MEDICATION: You will be given a prescription for pain medication at  discharge. Please take these as directed. It is important to remember not to take medications on an empty stomach as this may cause nausea.     7.CALL IF YOU HAVE: (1) Fevers to more than 101.o0 F, (2) Unusual chest or leg pain, (3) Drainage or fluid from incision that may be foul smelling, increased tenderness or soreness at the wound or the wound edges are no longer together, redness or swelling at the incision site. Please do not hesitate to call with any other questions.     8. APPOINTMENT: Contact our office at [phone] for a follow up appointment Monday or Tuesday following your procedure for packing/dressing change.     9. Medications: Hold Plavix for 2 days, ideally hold OTC supplements for 2 days.     If you have any additional questions, please do not hesitate to call the office and speak to either myself or the physician on call.     Office address:   [facility].   [address] [place] [id number] NOTICE    You should CALL YOUR PHYSICIAN if you develop:  Fever greater than 101 degrees F.  Pain not relieved by medication, or persistent nausea or vomiting.  Excessive bleeding (blood soaking through dressing) or unexpected drainage from the wound.  Extreme redness or swelling around the incision site, drainage of pus or foul smelling drainage.  Inability to urinate or empty your bladder within 8 hours.  Problems with breathing or chest pain.    You should call [phone] if you develop problems with breathing or chest pain.  If you are unable to contact your doctor or surgical center, you should go to the nearest emergency room or urgent care center.  Physician's telephone #: [phone]    If any questions arise, call your doctor.  If your doctor is not available, please feel free to call the Surgical Center at [phone].  The Center is open Monday through Friday from 7AM to 7PM.  You can also call the [employer] HOTLINE open 24 hours/day, 7 days/week and speak to a nurse at  [phone], or toll free at [phone].    A registered nurse may call you a few days after your surgery to see how you are doing after your procedure.    MEDICATIONS: Resume taking daily medication.  Take prescribed pain medication with food.  If no medication is prescribed, you may take non-aspirin pain medication if needed.  PAIN MEDICATION CAN BE VERY CONSTIPATING.  Take a stool softener or laxative such as senokot, pericolace, or milk of magnesia if needed.    Prescription given for Norco.  Last pain medication given at 1046.    If your physician has prescribed pain medication that includes Acetaminophen (Tylenol), do not take additional Acetaminophen (Tylenol) while taking the prescribed medication.    Depression / Suicide Risk    As you are discharged from this [facility] facility, it is important to learn how to keep safe from harming yourself.    Recognize the warning signs:  · Abrupt changes in personality, positive or negative- including increase in energy   · Giving away possessions  · Change in eating patterns- significant weight changes-  positive or negative  · Change in sleeping patterns- unable to sleep or sleeping all the time   · Unwillingness or inability to communicate  · Depression  · Unusual sadness, discouragement and loneliness  · Talk of wanting to die  · Neglect of personal appearance   · Rebelliousness- reckless behavior  · Withdrawal from people/activities they love  · Confusion- inability to concentrate     If you or a loved one observes any of these behaviors or has concerns about self-harm, here's what you can do:  · Talk about it- your feelings and reasons for harming yourself  · Remove any means that you might use to hurt yourself (examples: pills, rope, extension cords, firearm)  · Get professional help from the community (Mental Health, Substance Abuse, psychological counseling)  · Do not be alone:Call your Safe Contact- someone whom you trust who will be there for  you.  · Call your local CRISIS HOTLINE 762-6636 or 349-915-2061  · Call your local Children's Mobile Crisis Response Team [place] (499) 129-6486 or www.Sendbloom  · Call the toll free National Suicide Prevention Hotlines   · National Suicide Prevention Lifeline 735-981-YBBO (7820)  · National [employer] Line Network 800-SUICIDE (721-7719)

## 2020-06-05 NOTE — ANESTHESIA POSTPROCEDURE EVALUATION
Patient: LIAN FARNSWORTH Means III    Procedure Summary     Date:  06/05/20 Room / Location:  [place] [address] / SURGERY [facility]    Anesthesia Start:  0920 Anesthesia Stop:  1028    Procedure:  REMOVAL, GASTRIC BAND, LAPAROSCOPIC - ADJUSTABLE BAND AND PORT (Abdomen) Diagnosis:  (DYSPHAGIA)    Surgeon:  Deniz Rios M.D. Responsible Provider:  Joni Melo M.D.    Anesthesia Type:  general ASA Status:  4          Final Anesthesia Type: general  Last vitals  BP   Blood Pressure : 109/67    Temp   37.1 °C (98.7 °F)    Pulse   Pulse: 89   Resp   14    SpO2   92 %      Anesthesia Post Evaluation    Patient location during evaluation: PACU  Patient participation: complete - patient participated  Level of consciousness: awake and alert    Airway patency: patent  Anesthetic complications: no  Cardiovascular status: hemodynamically stable  Respiratory status: acceptable  Hydration status: euvolemic    PONV: none           Nurse Pain Score: 5 (NPRS)

## 2020-06-05 NOTE — ANESTHESIA TIME REPORT
Anesthesia Start and Stop Event Times     Date Time Event    6/5/2020 0920 Anesthesia Start     1028 Anesthesia Stop        Responsible Staff  06/05/20    Name Role Begin End    Joni Melo M.D. Anesth 0920 1028        Preop Diagnosis (Free Text):  Pre-op Diagnosis     DYSPHAGIA        Preop Diagnosis (Codes):    Post op Diagnosis  Dysphagia      Premium Reason  Non-Premium    Comments:

## 2020-06-05 NOTE — ANESTHESIA PREPROCEDURE EVALUATION
Relevant Problems   ANESTHESIA   (+) JAMES (obstructive sleep apnea)      NEURO   (+) S/P placement of cardiac pacemaker      CARDIAC   (+) CHF (congestive heart failure) ([facility])   (+) Coronary artery disease involving native coronary artery of native heart without angina pectoris   (+) Essential hypertension   (+) Hypertension   (+) Stented coronary artery      GI   (+) Gastroesophageal reflux disease without esophagitis         (+) CKD (chronic kidney disease)   (+) Renal hematoma, left, initial encounter      ENDO   (+) Type 2 diabetes mellitus (HCC)   (+) Type 2 diabetes mellitus with hyperglycemia, without long-term current use of insulin ([facility])      Other   (+) Arthritis of neck       Physical Exam    Airway   Mallampati: II  TM distance: >3 FB  Neck ROM: full       Cardiovascular - normal exam  Rhythm: regular  Rate: normal  (-) murmur     Dental - normal exam           Pulmonary - normal exam  Breath sounds clear to auscultation     Abdominal    Neurological - normal exam                 Anesthesia Plan    ASA 4   ASA physical status 4 criteria: severe reduction of ejection fractions    Plan - general       Airway plan will be ETT        Induction: intravenous    Postoperative Plan: Postoperative administration of opioids is intended.    Pertinent diagnostic labs and testing reviewed    Informed Consent:    Anesthetic plan and risks discussed with patient.    Use of blood products discussed with: patient whom consented to blood products.

## 2020-06-05 NOTE — ANESTHESIA PROCEDURE NOTES
Airway    Date/Time: 6/5/2020 9:30 AM  Performed by: Joni Melo M.D.  Authorized by: Joni Melo M.D.     Location:  OR  Urgency:  Elective  Indications for Airway Management:  Anesthesia      Spontaneous Ventilation: absent    Sedation Level:  Deep  Preoxygenated: Yes    Patient Position:  Sniffing  Final Airway Type:  Endotracheal airway  Final Endotracheal Airway:  ETT  Cuffed: Yes    Technique Used for Successful ETT Placement:  Direct laryngoscopy    Insertion Site:  Oral  Blade Type:  Carmella  Laryngoscope Blade/Videolaryngoscope Blade Size:  4  ETT Size (mm):  8.0  Measured from:  Teeth  ETT to Teeth (cm):  22  Placement Verified by: auscultation and capnometry    Cormack-Lehane Classification:  Grade I - full view of glottis  Number of Attempts at Approach:  1

## 2020-06-05 NOTE — OR NURSING
Patient arrived to phase 2. Patient is alert and oriented. Lap stabs X6 noted, small bloody drainage noted. Patient reports 4/10 pain, no nausea.

## 2020-06-08 ENCOUNTER — NON-PROVIDER VISIT (OUTPATIENT)
Dept: CARDIOLOGY | Facility: MEDICAL CENTER | Age: 80
End: 2020-06-08
Payer: MEDICARE

## 2020-06-08 DIAGNOSIS — Z95.3 STATUS POST TRANSCATHETER AORTIC VALVE REPLACEMENT (TAVR) USING BIOPROSTHESIS: ICD-10-CM

## 2020-06-08 LAB — EKG IMPRESSION: NORMAL

## 2020-06-08 PROCEDURE — G0423 INTENS CARDIAC REHAB NO EXER: HCPCS | Mod: 59 | Performed by: INTERNAL MEDICINE

## 2020-06-08 PROCEDURE — G0422 INTENS CARDIAC REHAB W/EXERC: HCPCS | Performed by: INTERNAL MEDICINE

## 2020-06-08 NOTE — PROGRESS NOTES
LIAN More III attended: Exercise Workshop from 8 to 9 am.     The topic was: Biomechanical Limitations.    Patient received handouts regarding the specific exercise information

## 2020-06-10 ENCOUNTER — NON-PROVIDER VISIT (OUTPATIENT)
Dept: CARDIOLOGY | Facility: MEDICAL CENTER | Age: 80
End: 2020-06-10
Payer: MEDICARE

## 2020-06-10 DIAGNOSIS — Z95.3 STATUS POST TRANSCATHETER AORTIC VALVE REPLACEMENT (TAVR) USING BIOPROSTHESIS: ICD-10-CM

## 2020-06-10 LAB — EKG IMPRESSION: NORMAL

## 2020-06-10 PROCEDURE — G0422 INTENS CARDIAC REHAB W/EXERC: HCPCS | Performed by: INTERNAL MEDICINE

## 2020-06-10 PROCEDURE — G0423 INTENS CARDIAC REHAB NO EXER: HCPCS | Mod: 59 | Performed by: INTERNAL MEDICINE

## 2020-06-11 ENCOUNTER — OFFICE VISIT (OUTPATIENT)
Dept: CARDIOLOGY | Facility: MEDICAL CENTER | Age: 80
End: 2020-06-11
Payer: MEDICARE

## 2020-06-11 VITALS
BODY MASS INDEX: 30.53 KG/M2 | WEIGHT: 230.38 LBS | RESPIRATION RATE: 12 BRPM | HEART RATE: 93 BPM | HEIGHT: 73 IN | DIASTOLIC BLOOD PRESSURE: 72 MMHG | SYSTOLIC BLOOD PRESSURE: 100 MMHG | OXYGEN SATURATION: 94 %

## 2020-06-11 DIAGNOSIS — I10 ESSENTIAL HYPERTENSION: ICD-10-CM

## 2020-06-11 DIAGNOSIS — E78.5 DYSLIPIDEMIA: Chronic | ICD-10-CM

## 2020-06-11 DIAGNOSIS — I25.10 CORONARY ARTERY DISEASE INVOLVING NATIVE CORONARY ARTERY OF NATIVE HEART WITHOUT ANGINA PECTORIS: ICD-10-CM

## 2020-06-11 DIAGNOSIS — Z95.810 ICD (IMPLANTABLE CARDIOVERTER-DEFIBRILLATOR) IN PLACE: ICD-10-CM

## 2020-06-11 DIAGNOSIS — I50.22 CHF (CONGESTIVE HEART FAILURE), NYHA CLASS II, CHRONIC, SYSTOLIC (HCC): ICD-10-CM

## 2020-06-11 DIAGNOSIS — Z95.2 S/P TAVR (TRANSCATHETER AORTIC VALVE REPLACEMENT): ICD-10-CM

## 2020-06-11 DIAGNOSIS — N18.4 STAGE 4 CHRONIC KIDNEY DISEASE (HCC): ICD-10-CM

## 2020-06-11 PROBLEM — E87.5 HYPERKALEMIA: Status: RESOLVED | Noted: 2020-04-29 | Resolved: 2020-06-11

## 2020-06-11 PROBLEM — I50.9 CHF (CONGESTIVE HEART FAILURE) (HCC): Status: RESOLVED | Noted: 2020-04-26 | Resolved: 2020-06-11

## 2020-06-11 PROBLEM — R57.9 SHOCK CIRCULATORY (HCC): Status: RESOLVED | Noted: 2020-04-28 | Resolved: 2020-06-11

## 2020-06-11 PROBLEM — M79.89 LEFT LEG SWELLING: Status: RESOLVED | Noted: 2020-04-29 | Resolved: 2020-06-11

## 2020-06-11 PROBLEM — Z95.5 STENTED CORONARY ARTERY: Status: RESOLVED | Noted: 2020-01-15 | Resolved: 2020-06-11

## 2020-06-11 PROCEDURE — 99214 OFFICE O/P EST MOD 30 MIN: CPT | Performed by: INTERNAL MEDICINE

## 2020-06-11 RX ORDER — CLOPIDOGREL BISULFATE 75 MG/1
75 TABLET ORAL DAILY
COMMUNITY
End: 2020-08-10 | Stop reason: SDUPTHER

## 2020-06-11 RX ORDER — HYDROCODONE BITARTRATE AND ACETAMINOPHEN 5; 325 MG/1; MG/1
1 TABLET ORAL EVERY 4 HOURS PRN
COMMUNITY
End: 2021-02-26

## 2020-06-11 RX ORDER — ACETAMINOPHEN 325 MG/1
650 TABLET ORAL EVERY 4 HOURS PRN
COMMUNITY
End: 2022-01-25

## 2020-06-11 ASSESSMENT — FIBROSIS 4 INDEX: FIB4 SCORE: 2.37

## 2020-06-11 NOTE — PROGRESS NOTES
CARDIOLOGY OUTPATIENT FOLLOWUP    PCP: No primary care provider on file.    1. Coronary artery disease involving native coronary artery of native heart without angina pectoris  PCI of RCA December, LM-LAD 1/10/20 with BRANDI. LVEF 35-40%. No angina.   - Continue DAPT with ASA/Plavix  - Statin allergic    2. CHF (congestive heart failure), NYHA class II, chronic, systolic (HCC)  LVEF 35%.  Euvolemic and back to dry weight at the expense of elevated creatinine.  I asked that he liberalize fluid intake to 60-70 oz daily, continue sodium restriction  - Continue entresto/coreg  - Bumetanide changed to as needed    3. S/P TAVR (transcatheter aortic valve replacement)  1/2020    4. ICD (implantable cardioverter-defibrillator) in place  Normal function    5. Dyslipidemia  Well-controlled.  Discussed the merits and limitations of Zetia.  Agreed to proceed with this    6. Essential hypertension  Well-controlled.  No changes.  May need to reduce antihypertensives in the future    7. Stage 4 chronic kidney disease (HCC)  He is following with nephrology.  He will liberalize his fluid intake      Follow up with myself in 3 months    Chief Complaint   Patient presents with   • Coronary Artery Disease       History: LIAN More III is a 79 y.o. male with a past medical history of CKD and GI disturbance presenting for follow up of cardiovascular disease including coronary disease, TAVR, pacemaker/ICD, ischemic cardiomyopathy, hypertension hyperlipidemia.    Since her last evaluation he underwent removal of gastric band.  Since that time he has developed acid reflux.  He is awaiting esophageal surgery planned for July when dual antiplatelet therapy can be interrupted.  He also has returned to his dry weight after increasing diuretics.  This came at the expense of a rising creatinine.  The diuretics have since been pared back but the creatinine remains elevated.  He has been restricting his water to 30 ounces a day and avidly works to  "eliminate salt in his diet.  He also is frustrated by balance disturbance and is hopeful to engage in physical therapy and cardiac rehab.  Scheduling is been challenged due to changes from the coronavirus pandemic and he finds it frustrating that he is unable to do these sessions twice per week.  During the sessions he feels well.    ROS:  All other systems reviewed and negative except as per the HPI    PE:  /72 (BP Location: Right arm, Patient Position: Sitting, BP Cuff Size: Adult)   Pulse 93   Resp 12   Ht 1.854 m (6' 1\")   Wt 104.5 kg (230 lb 6.1 oz)   SpO2 94%   BMI 30.40 kg/m²   Gen: Well appearing  HEENT: Symmetric face. Anicteric sclerae. Moist mucus membranes  NECK: No JVD. No lymphadenopathy  CARDIAC: Normal PMI, regular, normal S1, S2.  1 out of 6 systolic ejection murmur, early peaking  VASCULATURE: Normal carotid amplitude without bruit.   RESP: Clear to auscultation bilaterally  ABD: Soft, non-tender, non-distended  EXT: No edema, no clubbing or cyanosis  SKIN: Warm and dry  NEURO: No gross deficits  PSYCH: Appropriate affect, participates in conversation    Past Medical History:   Diagnosis Date   • Arthritis    • Back pain    • Breath shortness     pt states short of breath 24/7 O2 at night only   • CAD (coronary artery disease)    • CATARACT     removed bilat   • Chest pain    • Chest tightness    • Chickenpox    • Congestive heart failure (HCC)    • Constipation    • Coronary heart disease    • Cough    • Daytime sleepiness    • Diabetes     oral medication   • Difficulty breathing    • Dilated cardiomyopathy (HCC)    • Fatigue    • GERD (gastroesophageal reflux disease)    • Hearing difficulty    • Heart murmur    • Heart valve disease    • Heartburn    • Hiatus hernia syndrome    • Hyperlipidemia    • Hypertension     pt states well controlled on meds   • Kidney disease 05/2020    ruptered left kidney    • Mumps    • Pacemaker     AICD   • Pain 08-29-13    back, hips and bilat legs, " 4/10   • Pain 6/22/2015    left humerus   • Pain 1/7/16    knees, back and shoulder   • Pain     stomach   • Painful joint    • Palpitations    • Pneumonia 2007   • Rhinitis    • Ringing in ears    • Severe aortic stenosis 12/4/2019   • Shortness of breath    • Sleep apnea     CPAp with O2   • Swelling of lower extremity    • Tonsillitis    • Tremor    • Ulcer 2014    Dr. Porter, gastroenterologist   • Unspecified hemorrhagic conditions     on plavix     Allergies   Allergen Reactions   • Statins [Hmg-Coa-R Inhibitors] Rash     Blisters     • Atorvastatin      Outpatient Encounter Medications as of 6/11/2020   Medication Sig Dispense Refill   • aspirin EC (ECOTRIN) 81 MG Tablet Delayed Response Take 81 mg by mouth every day.     • clopidogrel (PLAVIX) 75 MG Tab Take 75 mg by mouth every day.     • HYDROcodone-acetaminophen (NORCO) 5-325 MG Tab per tablet Take 1 Tab by mouth every four hours as needed.     • acetaminophen (TYLENOL) 325 MG Tab Take 650 mg by mouth every four hours as needed.     • clomiPHENE (CLOMID) 50 MG tablet Take 50 mg by mouth 2X A WEEK.     • hydrocortisone 2.5 % Ointment Apply 1 Each to affected area(s) as needed.     • Calcium Citrate (CITRACAL PO) Take 1 Tab by mouth 2 Times a Day.     • bumetanide (BUMEX) 1 MG Tab Take 1 Tab by mouth 2 times a day. 120 Tab 3   • ENTRESTO 24-26 MG Tab tablet Take 1 tablet by mouth twice daily 60 Tab 6   • ezetimibe (ZETIA) 10 MG Tab Take 1 tablet by mouth once daily 90 Tab 0   • fluticasone (FLONASE) 50 MCG/ACT nasal spray Use 2 spray(s) in each nostril once daily (Patient taking differently: 1 time daily as needed.) 48 g 3   • carvedilol (COREG) 3.125 MG Tab Take 1 Tab by mouth 2 times a day, with meals. 90 Tab 3   • Multiple Vitamins-Minerals (ICAPS) Tab Take 1 Tab by mouth 2 Times a Day.     • lactobacillus rhamnosus (CULTURELLE) Cap capsule Take 1 Cap by mouth every day.     • testosterone cypionate (DEPO-TESTOSTERONE) 200 MG/ML Solution injection 50 mg  by Intramuscular route every thursday.     • cyanocobalamin (VITAMIN B-12) 1000 MCG/ML Solution 1,000 mcg by Intramuscular route every thursday.     • coenzyme Q-10 30 MG capsule Take 60 mg by mouth 2 Times a Day.     • Garlic 1000 MG Cap Take 1,000 mg by mouth 2 Times a Day.     • glucosamine Sulfate 500 MG Cap Take 1,500 mg by mouth 2 Times a Day.     • vitamin e (VITAMIN E) 400 UNIT Cap Take 400 Units by mouth 2 times a day.     • omeprazole (PRILOSEC) 40 MG delayed-release capsule Take 40 mg by mouth 2 times a day.     • Non Formulary Request Take 1 Cap by mouth 2 Times a Day. Cholox (OTC)     • Cholecalciferol (VITAMIN D3) 2000 UNIT Cap Take 2,000 Units by mouth 2 Times a Day.     • Non Formulary Request Take 1 Cap by mouth 2 Times a Day. Domenic red (OTC)     • Non Formulary Request Take 1 Cap by mouth every day. Nitric OXIDE (OTC)     • Non Formulary Request Take 1 Cap by mouth every day. anit-oxidant (OTC)     • Zinc 50 MG Tab Take 50 mg by mouth every day.     • anastrozole (ARIMIDEX) 1 MG Tab Take 0.5 mg by mouth See Admin Instructions. On Tue and Sat     • gabapentin (NEURONTIN) 300 MG Cap Take 300 mg by mouth every 6 hours as needed. Indications: Neuropathic Pain     • glimepiride (AMARYL) 1 MG tablet Take 1 mg by mouth every morning.     • B Complex Vitamins (VITAMIN B COMPLEX PO) Take 1 Tab by mouth every day.     • Cranberry 300 MG Tab Take 300 mg by mouth every morning.     • L-Lysine 1000 MG Tab Take 1,000 mg by mouth every day.     • Ascorbic Acid (VITAMIN C) 1000 MG Tab Take 1,000 mg by mouth 2 Times a Day.     • [DISCONTINUED] Home Care Oxygen Inhale 2 L/min by mouth Continuous.   Respiratory route via: Nasal Cannula   Oxygen supplier: [facility]  Indications: Hypoxia, to keep O2 sat over 90%     • [DISCONTINUED] Non Formulary Request Inject 1 Each as instructed See Admin Instructions. HGH 5.8mg/2.5mg/cc,   Monday, Tuesday, Wednesday, Friday, Saturday, sunday       No facility-administered encounter  medications on file as of 6/11/2020.      Social History     Socioeconomic History   • Marital status:      Spouse name: Not on file   • Number of children: Not on file   • Years of education: Not on file   • Highest education level: Not on file   Occupational History   • Not on file   Social Needs   • Financial resource strain: Not on file   • Food insecurity     Worry: Not on file     Inability: Not on file   • Transportation needs     Medical: Not on file     Non-medical: Not on file   Tobacco Use   • Smoking status: Never Smoker   • Smokeless tobacco: Never Used   • Tobacco comment: 0   Substance and Sexual Activity   • Alcohol use: No     Alcohol/week: 0.0 oz     Frequency: Never     Binge frequency: Never   • Drug use: No   • Sexual activity: Yes   Lifestyle   • Physical activity     Days per week: Not on file     Minutes per session: Not on file   • Stress: Not on file   Relationships   • Social connections     Talks on phone: Not on file     Gets together: Not on file     Attends Advent service: Not on file     Active member of club or organization: Not on file     Attends meetings of clubs or organizations: Not on file     Relationship status: Not on file   • Intimate partner violence     Fear of current or ex partner: Not on file     Emotionally abused: Not on file     Physically abused: Not on file     Forced sexual activity: Not on file   Other Topics Concern   • Not on file   Social History Narrative   • Not on file       Studies  Lab Results   Component Value Date/Time    CHOLSTRLTOT 105 12/27/2019 02:20 AM    LDL 56 12/27/2019 02:20 AM    HDL 29 (A) 12/27/2019 02:20 AM    TRIGLYCERIDE 101 12/27/2019 02:20 AM       Lab Results   Component Value Date/Time    SODIUM 139 05/29/2020 01:47 PM    POTASSIUM 4.3 05/29/2020 01:47 PM    CHLORIDE 100 05/29/2020 01:47 PM    CO2 26 05/29/2020 01:47 PM    GLUCOSE 173 (H) 05/29/2020 01:47 PM    BUN 75 (HH) 05/29/2020 01:47 PM    CREATININE 2.58 (H)  05/29/2020 01:47 PM    CREATININE 1.18 02/08/2011 12:00 AM    BUNCREATRAT 16 02/08/2011 12:00 AM    GLOMRATE >59 02/08/2011 12:00 AM     Lab Results   Component Value Date/Time    ALKPHOSPHAT 39 04/28/2020 05:10 AM    ASTSGOT 27 04/28/2020 05:10 AM    ALTSGPT 23 04/28/2020 05:10 AM    TBILIRUBIN 1.0 04/28/2020 05:10 AM        For this encounter I directly reviewed ECG tracings and medical records I agree with the interpretations in the electronic health record

## 2020-06-12 ENCOUNTER — NON-PROVIDER VISIT (OUTPATIENT)
Dept: CARDIOLOGY | Facility: MEDICAL CENTER | Age: 80
End: 2020-06-12
Payer: MEDICARE

## 2020-06-12 DIAGNOSIS — Z95.2 S/P TAVR (TRANSCATHETER AORTIC VALVE REPLACEMENT): ICD-10-CM

## 2020-06-12 DIAGNOSIS — Z95.3 STATUS POST TRANSCATHETER AORTIC VALVE REPLACEMENT (TAVR) USING BIOPROSTHESIS: ICD-10-CM

## 2020-06-12 LAB — EKG IMPRESSION: NORMAL

## 2020-06-12 PROCEDURE — G0423 INTENS CARDIAC REHAB NO EXER: HCPCS | Mod: 59 | Performed by: INTERNAL MEDICINE

## 2020-06-12 PROCEDURE — G0422 INTENS CARDIAC REHAB W/EXERC: HCPCS | Performed by: INTERNAL MEDICINE

## 2020-06-12 NOTE — PROGRESS NOTES
Name: USMAN More  Date: 6/12/20  Time: Biomechanical Assessment: 1:30-1:45.  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  Patient’s Comments/ concerns: “My balance has gotten worse and worse the last 5 yrs. I took a fall a little over a month or so ago and I ruptured my kidney, I have these tremors that I am worried about and I have a Neurology appointment for December.  Exercise: Two days a week at [facility]. Patient did not know what to do while at home.  Dietary: USMAN just got his Lat band off due to pain. With that said USMAN has lost over a 100 lbs. and he feels he has his diet locked in. He knows what to eat and how much to eat. His diet is not of concern at this time. USMAN is not open to talk about his diet.  Stress: USMAN denies having any stress. He does hate going to a doctor appointment everyday but is otherwise happy.  Barefoot Walk: Both feet flay out to the side. Left foot drop, right shoulder depressed.  Marching: right hip depressed, flat feet bow legged. Poor balance  Wall [place]: Right shoulder depressed, right shoulder rotated forward, left shoulder shortened and elevated as well as rotated forward. Limited movement in shoulders. Back rounded. USMAN unable to put his left arm on the wall. USMAN is very tight. This tightness definitely impacts USMAN’s quality of life.     Overhead Squat (FMS Screen):  USMAN has very poor balance and has to drop his head low and push of the chair to stand up. Both JW knees go in when he stands up. JW back is rounded.  [facility] Fall / Balance Screen: Fail, Patient is very unstable  Instep L: Fail  Instep R: Fail  Heel Toe L: Fail  Heel Toe R: Fail  Leg Lift L: Fail  Leg Lift R: Fail         Goal Setting: FAST and SMART goals:  Goals: USMAN has a goal to improve his  quality of life through movement. He is very limited with his body due to tightness in his chest, weakness of his hamstrings/glutes and the roundedness of his back. USMAN would like to work on his biomechanics before it gets worse. USMAN is aware he needs to work on his balance at least 3 x per week due to the Heart Association recommendations. USMAN will buy a resistance band and do his home resistance/balance routine at least one day a week on his own. This combined with ICR will be 3 days a week total of balance training.  Barriers: USMAN doesn’t have a resistance band and will need to go buy one at the store. USMAN is at risk for falls.   Plan: USMAN is going to buy a resistance band. USMAN is a fall risk at this time due to the back and leg weakness/tightness. USMAN will be working his posterior chain while stretching is anterior chain. Three days a week JW will work on balance. Balance exercises are mixed in with strengthening exercises.

## 2020-06-15 ENCOUNTER — NON-PROVIDER VISIT (OUTPATIENT)
Dept: CARDIOLOGY | Facility: MEDICAL CENTER | Age: 80
End: 2020-06-15
Payer: MEDICARE

## 2020-06-15 DIAGNOSIS — Z95.2 S/P TAVR (TRANSCATHETER AORTIC VALVE REPLACEMENT): ICD-10-CM

## 2020-06-15 DIAGNOSIS — Z95.3 STATUS POST TRANSCATHETER AORTIC VALVE REPLACEMENT (TAVR) USING BIOPROSTHESIS: ICD-10-CM

## 2020-06-15 LAB — EKG IMPRESSION: NORMAL

## 2020-06-15 PROCEDURE — G0422 INTENS CARDIAC REHAB W/EXERC: HCPCS | Performed by: INTERNAL MEDICINE

## 2020-06-15 PROCEDURE — G0423 INTENS CARDIAC REHAB NO EXER: HCPCS | Mod: 59 | Performed by: INTERNAL MEDICINE

## 2020-06-15 NOTE — PROGRESS NOTES
LIAN More III attended the following workshop from 8:00am- 9:00 am  Workshop Title: Taking Charge of Stress.      Lecture was attended and patient questions addressed. The patient will continue workshops and nutrition education.

## 2020-06-17 ENCOUNTER — HOSPITAL ENCOUNTER (OUTPATIENT)
Dept: LAB | Facility: MEDICAL CENTER | Age: 80
End: 2020-06-17
Attending: INTERNAL MEDICINE
Payer: MEDICARE

## 2020-06-17 ENCOUNTER — NON-PROVIDER VISIT (OUTPATIENT)
Dept: CARDIOLOGY | Facility: MEDICAL CENTER | Age: 80
End: 2020-06-17
Payer: MEDICARE

## 2020-06-17 DIAGNOSIS — I25.10 CORONARY ARTERY DISEASE INVOLVING NATIVE CORONARY ARTERY OF NATIVE HEART WITHOUT ANGINA PECTORIS: ICD-10-CM

## 2020-06-17 DIAGNOSIS — I50.22 CHF (CONGESTIVE HEART FAILURE), NYHA CLASS II, CHRONIC, SYSTOLIC (HCC): ICD-10-CM

## 2020-06-17 DIAGNOSIS — I10 ESSENTIAL HYPERTENSION: Chronic | ICD-10-CM

## 2020-06-17 DIAGNOSIS — I42.9 CARDIOMYOPATHY, UNSPECIFIED TYPE (HCC): ICD-10-CM

## 2020-06-17 DIAGNOSIS — N18.30 CKD (CHRONIC KIDNEY DISEASE) STAGE 3, GFR 30-59 ML/MIN: ICD-10-CM

## 2020-06-17 DIAGNOSIS — Z95.3 STATUS POST TRANSCATHETER AORTIC VALVE REPLACEMENT (TAVR) USING BIOPROSTHESIS: ICD-10-CM

## 2020-06-17 LAB
ANION GAP SERPL CALC-SCNC: 13 MMOL/L (ref 7–16)
ANION GAP SERPL CALC-SCNC: 13 MMOL/L (ref 7–16)
BASOPHILS # BLD AUTO: 0.3 % (ref 0–1.8)
BASOPHILS # BLD: 0.02 K/UL (ref 0–0.12)
BUN SERPL-MCNC: 47 MG/DL (ref 8–22)
BUN SERPL-MCNC: 47 MG/DL (ref 8–22)
CALCIUM SERPL-MCNC: 9.7 MG/DL (ref 8.4–10.2)
CALCIUM SERPL-MCNC: 9.7 MG/DL (ref 8.4–10.2)
CHLORIDE SERPL-SCNC: 103 MMOL/L (ref 96–112)
CHLORIDE SERPL-SCNC: 104 MMOL/L (ref 96–112)
CO2 SERPL-SCNC: 21 MMOL/L (ref 20–33)
CO2 SERPL-SCNC: 21 MMOL/L (ref 20–33)
CREAT SERPL-MCNC: 2.16 MG/DL (ref 0.5–1.4)
CREAT SERPL-MCNC: 2.17 MG/DL (ref 0.5–1.4)
EKG IMPRESSION: NORMAL
EOSINOPHIL # BLD AUTO: 0.43 K/UL (ref 0–0.51)
EOSINOPHIL NFR BLD: 6.3 % (ref 0–6.9)
ERYTHROCYTE [DISTWIDTH] IN BLOOD BY AUTOMATED COUNT: 53.1 FL (ref 35.9–50)
FASTING STATUS PATIENT QL REPORTED: NORMAL
FASTING STATUS PATIENT QL REPORTED: NORMAL
GLUCOSE SERPL-MCNC: 129 MG/DL (ref 65–99)
GLUCOSE SERPL-MCNC: 132 MG/DL (ref 65–99)
HCT VFR BLD AUTO: 46.7 % (ref 42–52)
HGB BLD-MCNC: 14.8 G/DL (ref 14–18)
IMM GRANULOCYTES # BLD AUTO: 0.04 K/UL (ref 0–0.11)
IMM GRANULOCYTES NFR BLD AUTO: 0.6 % (ref 0–0.9)
LYMPHOCYTES # BLD AUTO: 1.17 K/UL (ref 1–4.8)
LYMPHOCYTES NFR BLD: 17.1 % (ref 22–41)
MCH RBC QN AUTO: 28 PG (ref 27–33)
MCHC RBC AUTO-ENTMCNC: 31.7 G/DL (ref 33.7–35.3)
MCV RBC AUTO: 88.3 FL (ref 81.4–97.8)
MONOCYTES # BLD AUTO: 0.55 K/UL (ref 0–0.85)
MONOCYTES NFR BLD AUTO: 8 % (ref 0–13.4)
NEUTROPHILS # BLD AUTO: 4.64 K/UL (ref 1.82–7.42)
NEUTROPHILS NFR BLD: 67.7 % (ref 44–72)
NRBC # BLD AUTO: 0 K/UL
NRBC BLD-RTO: 0 /100 WBC
NT-PROBNP SERPL IA-MCNC: 1768 PG/ML (ref 0–125)
PLATELET # BLD AUTO: 174 K/UL (ref 164–446)
PMV BLD AUTO: 9.8 FL (ref 9–12.9)
POTASSIUM SERPL-SCNC: 4.9 MMOL/L (ref 3.6–5.5)
POTASSIUM SERPL-SCNC: 5 MMOL/L (ref 3.6–5.5)
RBC # BLD AUTO: 5.29 M/UL (ref 4.7–6.1)
SODIUM SERPL-SCNC: 137 MMOL/L (ref 135–145)
SODIUM SERPL-SCNC: 138 MMOL/L (ref 135–145)
WBC # BLD AUTO: 6.9 K/UL (ref 4.8–10.8)

## 2020-06-17 PROCEDURE — 36415 COLL VENOUS BLD VENIPUNCTURE: CPT

## 2020-06-17 PROCEDURE — G0422 INTENS CARDIAC REHAB W/EXERC: HCPCS | Performed by: INTERNAL MEDICINE

## 2020-06-17 PROCEDURE — 80048 BASIC METABOLIC PNL TOTAL CA: CPT | Mod: 91

## 2020-06-17 PROCEDURE — 83880 ASSAY OF NATRIURETIC PEPTIDE: CPT

## 2020-06-17 PROCEDURE — G0423 INTENS CARDIAC REHAB NO EXER: HCPCS | Mod: 59 | Performed by: INTERNAL MEDICINE

## 2020-06-17 PROCEDURE — 80048 BASIC METABOLIC PNL TOTAL CA: CPT

## 2020-06-17 PROCEDURE — 85025 COMPLETE CBC W/AUTO DIFF WBC: CPT

## 2020-06-17 NOTE — PROGRESS NOTES
Video watched: 06 Label Reading Part 1. Length: 32.25 minutes.  Video  And discussion took place from 8 to 8:50 am.

## 2020-06-18 ENCOUNTER — TELEPHONE (OUTPATIENT)
Dept: CARDIOLOGY | Facility: MEDICAL CENTER | Age: 80
End: 2020-06-18

## 2020-06-18 NOTE — TELEPHONE ENCOUNTER
Result Notes for proBrain Natriuretic Peptide, NT   Notes recorded by Paul Wilkinson M.D. on 6/17/2020 at 4:20 PM PDT   The labs look good.  Continue plan of care      Called pt and notified of stable labs.

## 2020-06-22 ENCOUNTER — NON-PROVIDER VISIT (OUTPATIENT)
Dept: CARDIOLOGY | Facility: MEDICAL CENTER | Age: 80
End: 2020-06-22
Payer: MEDICARE

## 2020-06-22 DIAGNOSIS — Z95.3 STATUS POST TRANSCATHETER AORTIC VALVE REPLACEMENT (TAVR) USING BIOPROSTHESIS: ICD-10-CM

## 2020-06-22 LAB — EKG IMPRESSION: NORMAL

## 2020-06-22 PROCEDURE — G0422 INTENS CARDIAC REHAB W/EXERC: HCPCS | Performed by: INTERNAL MEDICINE

## 2020-06-22 PROCEDURE — G0423 INTENS CARDIAC REHAB NO EXER: HCPCS | Mod: 59 | Performed by: INTERNAL MEDICINE

## 2020-06-22 ASSESSMENT — PATIENT HEALTH QUESTIONNAIRE - PHQ9: SUM OF ALL RESPONSES TO PHQ QUESTIONS 1-9: 10

## 2020-06-22 NOTE — PROGRESS NOTES
Individualized Treatment Plan   Cardiac Rehab Individual Treatment Plan  Initial/Reassessment/Discharge Assessment/ ReAssessment/ Discharge: (P) 90 day 20 Session #     (P) 12   MRN: [id number] Allergies: Statins [hmg-coa-r inhibitors] and Atorvastatin   Patient Name: LIAN More III : 1940 Risk Stratification: (P) High    Diagnoses:   1. 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 Physician: No primary care provider on file.    Date of Event: (P) 20 Specialist: (P) Minh   Risk Factors:  (P) Hypertension, Hyperlipidemia, Diabetes, Obesity, Stress, Sedentary Lifestyle, Age, Male > 45   Exercise Nutrition Other Core Components/ Risk Factors Psychosocial   Stages of change Stages of change Stages of change Stages of change   (P) Preparation (P) Preparation (P) Preparation (P) Preparation   Fitness Test Lipids Learning Barriers Psychosocial Plan   DASI: (P) (na) Available: (P) Yes Learning Barriers: (P) None Psychosocial Plan: (P) Yes   DIST: (P) (No Data) Date: (P) 19 Family Support Goals   Max HR: (P) (No Data) Total: (P) 105 mg/dL (P) Yes Assess presence or absence of depression using a valid screening: (P) Yes   Max BP: Peak Ex BP: (P) (No Data) Trig: (P) 101 mg/dL Lives: (P) Spouse Use Stress Management: (P) Yes   RPE: (P) (No Data) HDL: (P) 29 mg/dL Other Risk Factors Plan Adverse Events: (P) Yes   SPO2: (P) (No Data)       MET: (P) (No Data) LDL: (P) 56 mg/dL Other Risk Factors/Plan: (P) Yes Unexpected Events: (P) Yes   EF= (P) 35(2020) Diabetes Tobacco Use Intervention   Ambulatory Status Diabetes: (P) Yes Social History     Tobacco Use   Smoking Status Never Smoker   Smokeless Tobacco Never Used   Tobacco Comment    0    Behavioral Health Consult: (P) Yes   Fall Risk Assessed: (P) Yes HbA1C: (P) 6.5 % Date: (P) 19 Goals Physician Referral: (P) No   Exercise Ambulatory Status Assist Devices: (P) None Monitors BS at Home: (P)  "Yes(occasionally, a couple times a week. ) Educate / Review and have understanding of cardiac disease prevention: Identifies Stressors: (P) Yes   Exercise Plan Frequency: (P) once a week Random BS: (P) 121(03/10/2020)  Drug Intervention: (P) No     Weight Management  Outcome Survey Tools   Goals Weight: (P) 104.8 kg (231 lb) Educate / Review and have understanding of cardiac disease prevention: (P) Yes FP QOL Overall Score: (P) 19.29   Home Exercise: (P) Yes Height: (P) 185.4 cm (6' 0.99\") Medication Compliance: (P) Yes PHQ-9: (P) 10   Mode: (P) Walk, Bike(recumbant bike) BMI (Calculated): (P) 30.48 Complete Tobacco Cessation: Education   Duration: (P) 20-30 minute walks Goal weight: (P) 220lb Complete Tobacco Cessation: (P) Yes MBSR Lectures/Videos: (P) Yes   Frequency: (P) 7 days active.  Waist: (P) 46.5 inch Intervention Psychosocial Education: (P) Coping Techniques, Positive Support System, S/S Depression, MBSR Lectures   Intervention Social History     Substance and Sexual Activity   Alcohol Use No   • Alcohol/week: 0.0 oz   • Frequency: Never   • Binge frequency: Never    Smoking Cessation Referral: (P) N/A     Intervention: (P) Yes Nutrition Plan Ind. Education / Counseling: (P) N/A    Exercise Prescription Nutrition Plan: (P) Yes Tobacco Adjunct: (P) N/A    Mode: (P) Treadmill, NuStep, UBE, Airdyne Nutrition Related Target Goals Healthy Heart Education: (P) Class Schedule Given, Medications Reviewed, Patient Education Binder Provided, Risk Factors Discussed, Videos Viewed per Pritideborah    Frequency: (P) 3 days/week LDL-C <100 if trig. > 200: (P) N/A Weekly Lectures/ Videos/ Interactive Cooking School: (P) Yes    Duration: (P) 20-30 min LDL-C < 70 for high risk patient:  LDL-C<70 for high risk patients: (P) Yes Education    Intensity: (P) 11-13 RPE  Healthy Heart Education: (P) Class Schedule Given, Medications Reviewed, Patient Education Binder Provided, Risk Factors Discussed, Videos Viewed per Pritideborah  "   METS: (P) 1.0-3.0 Non HDL-C Should be < 130:  Non HDL-C Should be < 130: (P) Yes Hypertension Management   (Active Problem)    Progression: (P) ^ increments of 1-3 to THR/RPE as tolerated      THR: THR: (P) 20-30 BPM ^Resting HR(for the first 3 visits then max of 121 age predicated.) HbA1C < 7%: (P) Yes Resting BP: (P) 122/63    Angina with Exercise?   Angina with Exercise: (P) No   BMI < 25: (P) No(<29) Hypertension Education      Dietary Goal: (P) >=58 rate your plate, low sodium.      Resistance Training? Resistance Training: (P) Yes Rate your Plate: (P) Pre(65) Lectures/ Videos/ Cooking School: (P) Yes    Education Intervention Hypertension Goals    (P) Yes Dietician Consult/Class: (P) Yes Medication Adherence : (P) Yes    (P) Equipment Orientation, Exercise Safety, S/S to Report, RPE Scale, Warm Up / Cool Down, Physically Active Nurse/Patient Discussion: (P) Yes Home Self Monitoring : (P) Yes    Exercise “Target Goals” Diabetes Ed Referral: (P) Yes     Start Individual Exercise Rx (P) Yes Discuss Maintenance /Wt Loss: (P) Yes       Attend Cooking School: (P) Yes     BP < 140/90 or < 130/80 if DM or CKD (P) Yes Education       Nutrition Education: (P) S&S Hypo/Hyper glycemia, Relate Diabetes to CAD, Healthy Plant Based Eating, Sodium Reduction Cooking/ Lectures/ Cooking School/  RD 1:1     Aerobic activity 30 + min / day 5 days / wk (P) Yes          Exercise    Current : USMAN has been working out 2x a week at [facility] for a total of 30 minutes plus a group weight routine that is programed for a push day and a pull day. USMAN met with an exercise physiologist and had a tailor made home routine plan made for him. USMAN went and bought a resistance band. USMAN also rides his home stationary bike for 20 - 30 minutes 2-3 days a week.  Goal:  USMAN has a goal to do his home routine 2 extra days a week.  Progress: USMAN is feeling good with his exercise routine and has began adding in his home weight routine.     Nutrition     Current: USMAN  "has lost 10 lbs since starting at [facility]. DELVIN is already eats generally heart healthy.  He does not add salt to any food, eats out only once a week, has dessert only once a week.  He has been watching his diet due to having diabetes and his wife is an RN and helps with the diet as well.   Goal: DELVIN says he is still following a heart healthy low sodium diet.  Progress:  Willing to make some dietary adjustments as needed for heart health.      Hypertension     Current:  Blood pressure WNL today 110/67.   Goal: Low sodium diet, medication adherence, home monitoring.   Progress: Willing to maintain above goals for good blood pressure control.      Stress     Current : DELVIN is happy [facility] has opened up. He is feeling pretty good with stress management.Delvin is seeing a neurologist in January to help with some \"tremors\" that DELVIN has some concern about.  Goal: DELVIN is seeing a neurologist in January.  Progress:  DELVIN is feeling good right now and is happy that he can exercise.     Other           "

## 2020-06-22 NOTE — PROGRESS NOTES
LIAN More III attended the following workshop from 8 to 9 am.  Workshop Title: Label Reading.      Lecture was attended and patient questions addressed. The patient will continue workshops and nutrition education.

## 2020-06-23 NOTE — PROGRESS NOTES
Intensive cardiac rehabilitation (ICR) individual treatment plan (ITP) reviewed and signed.    Evelin Hawthorne MD, [facility]  Cardiologist  [facility]

## 2020-06-24 ENCOUNTER — NON-PROVIDER VISIT (OUTPATIENT)
Dept: CARDIOLOGY | Facility: MEDICAL CENTER | Age: 80
End: 2020-06-24
Payer: MEDICARE

## 2020-06-24 DIAGNOSIS — Z95.3 STATUS POST TRANSCATHETER AORTIC VALVE REPLACEMENT (TAVR) USING BIOPROSTHESIS: ICD-10-CM

## 2020-06-24 LAB — EKG IMPRESSION: NORMAL

## 2020-06-24 PROCEDURE — G0422 INTENS CARDIAC REHAB W/EXERC: HCPCS | Performed by: INTERNAL MEDICINE

## 2020-06-24 PROCEDURE — G0423 INTENS CARDIAC REHAB NO EXER: HCPCS | Mod: 59 | Performed by: INTERNAL MEDICINE

## 2020-06-24 NOTE — PROGRESS NOTES
Video watched: 09 Diseases of Our Time Part 1. Length: 34.4 minutes.  Following video, group discussion question and answer session completed till 0850.

## 2020-06-29 ENCOUNTER — NON-PROVIDER VISIT (OUTPATIENT)
Dept: CARDIOLOGY | Facility: MEDICAL CENTER | Age: 80
End: 2020-06-29
Payer: MEDICARE

## 2020-06-29 DIAGNOSIS — Z95.3 STATUS POST TRANSCATHETER AORTIC VALVE REPLACEMENT (TAVR) USING BIOPROSTHESIS: ICD-10-CM

## 2020-06-29 LAB — EKG IMPRESSION: NORMAL

## 2020-06-29 PROCEDURE — G0423 INTENS CARDIAC REHAB NO EXER: HCPCS | Mod: 59 | Performed by: INTERNAL MEDICINE

## 2020-06-29 PROCEDURE — G0422 INTENS CARDIAC REHAB W/EXERC: HCPCS | Performed by: INTERNAL MEDICINE

## 2020-06-29 NOTE — PROGRESS NOTES
LIAN More III attended: cooking school from [id number]. Today  prepared Marinara Sauce.    Patient received handouts and nutrition information regarding the specific recipes.

## 2020-06-30 ENCOUNTER — OFFICE VISIT (OUTPATIENT)
Dept: NEPHROLOGY | Facility: MEDICAL CENTER | Age: 80
End: 2020-06-30
Payer: MEDICARE

## 2020-06-30 VITALS
WEIGHT: 229 LBS | SYSTOLIC BLOOD PRESSURE: 106 MMHG | BODY MASS INDEX: 30.35 KG/M2 | HEART RATE: 82 BPM | HEIGHT: 73 IN | DIASTOLIC BLOOD PRESSURE: 64 MMHG | OXYGEN SATURATION: 95 % | TEMPERATURE: 99.1 F | RESPIRATION RATE: 16 BRPM

## 2020-06-30 DIAGNOSIS — I10 ESSENTIAL HYPERTENSION: ICD-10-CM

## 2020-06-30 DIAGNOSIS — I42.9 CARDIOMYOPATHY, UNSPECIFIED TYPE (HCC): ICD-10-CM

## 2020-06-30 DIAGNOSIS — N18.30 CKD (CHRONIC KIDNEY DISEASE) STAGE 3, GFR 30-59 ML/MIN: ICD-10-CM

## 2020-06-30 PROCEDURE — 99214 OFFICE O/P EST MOD 30 MIN: CPT | Performed by: INTERNAL MEDICINE

## 2020-06-30 ASSESSMENT — ENCOUNTER SYMPTOMS
COUGH: 0
NAUSEA: 0
FEVER: 0
VOMITING: 0
WEAKNESS: 1
TREMORS: 1
CHILLS: 0
SHORTNESS OF BREATH: 0
HYPERTENSION: 1

## 2020-06-30 ASSESSMENT — FIBROSIS 4 INDEX: FIB4 SCORE: 2.49

## 2020-06-30 NOTE — PROGRESS NOTES
"Subjective:      LIAN More III is a 79 y.o. male who presents with Chronic Kidney Disease and Hypertension            Patient had cardiac surgery, he is undergoing cardiac rehab.  He feels better overall    Chronic Kidney Disease   This is a chronic problem. The current episode started more than 1 year ago. The problem occurs constantly. The problem has been waxing and waning. Associated symptoms include weakness. Pertinent negatives include no chest pain, chills, coughing, fever, nausea, urinary symptoms or vomiting.   Hypertension   This is a chronic problem. The current episode started more than 1 year ago. The problem is unchanged. The problem is controlled. Pertinent negatives include no chest pain, malaise/fatigue, peripheral edema or shortness of breath. Risk factors for coronary artery disease include male gender and diabetes mellitus. Past treatments include diuretics. The current treatment provides significant improvement. There are no compliance problems.  Hypertensive end-organ damage includes kidney disease. Identifiable causes of hypertension include chronic renal disease.       Review of Systems   Constitutional: Negative for chills, fever and malaise/fatigue.   Respiratory: Negative for cough and shortness of breath.    Cardiovascular: Negative for chest pain and leg swelling.   Gastrointestinal: Negative for nausea and vomiting.   Genitourinary: Negative for dysuria, frequency and urgency.   Neurological: Positive for tremors and weakness.          Objective:     /64 (BP Location: Right arm, Patient Position: Sitting, BP Cuff Size: Adult)   Pulse 82   Temp 37.3 °C (99.1 °F) (Temporal)   Resp 16   Ht 1.854 m (6' 1\")   Wt 103.9 kg (229 lb)   SpO2 95%   BMI 30.21 kg/m²      Physical Exam  Vitals signs and nursing note reviewed.   Constitutional:       General: He is not in acute distress.     Appearance: He is not ill-appearing.   HENT:      Head: Normocephalic and atraumatic.      Right " Ear: External ear normal.      Left Ear: External ear normal.      Nose: Nose normal.   Eyes:      General:         Right eye: No discharge.         Left eye: No discharge.      Conjunctiva/sclera: Conjunctivae normal.   Cardiovascular:      Rate and Rhythm: Normal rate and regular rhythm.      Heart sounds: No murmur.   Pulmonary:      Effort: Pulmonary effort is normal. No respiratory distress.      Breath sounds: Normal breath sounds. No wheezing.   Musculoskeletal:         General: No tenderness or deformity.      Right lower leg: No edema.      Left lower leg: No edema.   Skin:     General: Skin is warm.   Neurological:      General: No focal deficit present.      Mental Status: He is alert and oriented to person, place, and time.   Psychiatric:         Mood and Affect: Mood normal.         Behavior: Behavior normal.       Past Medical History:   Diagnosis Date   • Arthritis    • Back pain    • Breath shortness     pt states short of breath 24/7 O2 at night only   • CAD (coronary artery disease)    • CATARACT     removed bilat   • Chest pain    • Chest tightness    • Chickenpox    • Congestive heart failure (HCC)    • Constipation    • Coronary heart disease    • Cough    • Daytime sleepiness    • Diabetes     oral medication   • Difficulty breathing    • Dilated cardiomyopathy (HCC)    • Fatigue    • GERD (gastroesophageal reflux disease)    • Hearing difficulty    • Heart murmur    • Heart valve disease    • Heartburn    • Hiatus hernia syndrome    • Hyperlipidemia    • Hypertension     pt states well controlled on meds   • Kidney disease 05/2020    ruptered left kidney    • Mumps    • Pacemaker     AICD   • Pain 08-29-13    back, hips and bilat legs, 4/10   • Pain 6/22/2015    left humerus   • Pain 1/7/16    knees, back and shoulder   • Pain     stomach   • Painful joint    • Palpitations    • Pneumonia 2007   • Rhinitis    • Ringing in ears    • Severe aortic stenosis 12/4/2019   • Shortness of breath    •  Sleep apnea     CPAp with O2   • Swelling of lower extremity    • Tonsillitis    • Tremor    • Ulcer 2014    Dr. Porter, gastroenterologist   • Unspecified hemorrhagic conditions     on plavix     Social History     Socioeconomic History   • Marital status:      Spouse name: Not on file   • Number of children: Not on file   • Years of education: Not on file   • Highest education level: Not on file   Occupational History   • Not on file   Social Needs   • Financial resource strain: Not on file   • Food insecurity     Worry: Not on file     Inability: Not on file   • Transportation needs     Medical: Not on file     Non-medical: Not on file   Tobacco Use   • Smoking status: Never Smoker   • Smokeless tobacco: Never Used   • Tobacco comment: 0   Substance and Sexual Activity   • Alcohol use: No     Alcohol/week: 0.0 oz     Frequency: Never     Binge frequency: Never   • Drug use: No   • Sexual activity: Yes   Lifestyle   • Physical activity     Days per week: Not on file     Minutes per session: Not on file   • Stress: Not on file   Relationships   • Social connections     Talks on phone: Not on file     Gets together: Not on file     Attends Denominational service: Not on file     Active member of club or organization: Not on file     Attends meetings of clubs or organizations: Not on file     Relationship status: Not on file   • Intimate partner violence     Fear of current or ex partner: Not on file     Emotionally abused: Not on file     Physically abused: Not on file     Forced sexual activity: Not on file   Other Topics Concern   • Not on file   Social History Narrative   • Not on file     Family History   Problem Relation Age of Onset   • No Known Problems Sister    • No Known Problems Sister    • No Known Problems Sister    • Diabetes Other    • No Known Problems Mother    • No Known Problems Father    • Heart Disease Neg Hx      Recent Labs     09/04/19  1157 09/12/19  1147  12/27/19  0220  04/26/20  1902  04/27/20  0526 04/28/20  0510  05/29/20  1347 06/17/20  1032 06/17/20  1033   ALBUMIN  --  3.94  4.3   < > 3.8   < > 3.9 2.8* 2.8*  --   --   --   --    HDL 35*  --   --  29*  --   --   --   --   --   --   --   --    TRIGLYCERIDE 117  --   --  101  --   --   --   --   --   --   --   --    SODIUM  --  141   < > 137   < > 136 137 136   < > 139 138 137   POTASSIUM  --  4.7   < > 4.0   < > 4.8 5.1 4.7   < > 4.3 5.0 4.9   CHLORIDE  --  105   < > 103   < > 97 103 101   < > 100 104 103   CO2  --  27   < > 27   < > 21 23 24   < > 26 21 21   BUN  --  26*   < > 29*   < > 45* 45* 48*   < > 75* 47* 47*   CREATININE  --  1.43*   < > 1.55*   < > 1.93* 2.34* 2.64*   < > 2.58* 2.16* 2.17*   PHOSPHORUS  --  2.7  --   --   --   --  5.5*  --   --   --   --   --     < > = values in this interval not displayed.                 Assessment/Plan:       1. CKD (chronic kidney disease) stage 3, GFR 30-59 ml/min ([facility])  Creatinine is fluctuating slightly which I expect secondary to cardiorenal syndrome  No uremic symptoms  Renal dose of medication  Avoid nephrotoxins  Continue same medication regimen  Check labs in 6 months    2. Essential hypertension  Controlled  Continue same medication regimen  Continue low-sodium diet\      3. Cardiomyopathy, unspecified type ([facility])  Improving

## 2020-07-01 ENCOUNTER — NON-PROVIDER VISIT (OUTPATIENT)
Dept: CARDIOLOGY | Facility: MEDICAL CENTER | Age: 80
End: 2020-07-01
Payer: MEDICARE

## 2020-07-01 DIAGNOSIS — Z95.3 STATUS POST TRANSCATHETER AORTIC VALVE REPLACEMENT (TAVR) USING BIOPROSTHESIS: ICD-10-CM

## 2020-07-01 LAB — EKG IMPRESSION: NORMAL

## 2020-07-01 PROCEDURE — G0422 INTENS CARDIAC REHAB W/EXERC: HCPCS | Performed by: INTERNAL MEDICINE

## 2020-07-01 PROCEDURE — G0423 INTENS CARDIAC REHAB NO EXER: HCPCS | Mod: 59 | Performed by: INTERNAL MEDICINE

## 2020-07-01 NOTE — PROGRESS NOTES
Video watched: 35 Introduction to Yoga. Length: 33.8 minutes.  Video and discussion took place from 8 to 8:50 am.

## 2020-07-04 NOTE — ASSESSMENT & PLAN NOTE
His last a1c last year was 6.3.  He is due repeat.  Stable on meds   The patient is a 89y Female complaining of

## 2020-07-06 ENCOUNTER — NON-PROVIDER VISIT (OUTPATIENT)
Dept: CARDIOLOGY | Facility: MEDICAL CENTER | Age: 80
End: 2020-07-06
Payer: MEDICARE

## 2020-07-06 DIAGNOSIS — Z95.3 STATUS POST TRANSCATHETER AORTIC VALVE REPLACEMENT (TAVR) USING BIOPROSTHESIS: ICD-10-CM

## 2020-07-06 LAB — EKG IMPRESSION: NORMAL

## 2020-07-06 PROCEDURE — G0422 INTENS CARDIAC REHAB W/EXERC: HCPCS | Performed by: INTERNAL MEDICINE

## 2020-07-06 PROCEDURE — G0423 INTENS CARDIAC REHAB NO EXER: HCPCS | Mod: 59 | Performed by: INTERNAL MEDICINE

## 2020-07-06 NOTE — PROGRESS NOTES
LIAN More III attended the following workshop from 0800 to 0855  Workshop Title: Balance Training Fall Prevention      Lecture was attended and patient questions addressed. The patient will continue workshops and nutrition education.

## 2020-07-08 ENCOUNTER — NON-PROVIDER VISIT (OUTPATIENT)
Dept: CARDIOLOGY | Facility: MEDICAL CENTER | Age: 80
End: 2020-07-08
Payer: MEDICARE

## 2020-07-08 DIAGNOSIS — Z95.3 STATUS POST TRANSCATHETER AORTIC VALVE REPLACEMENT (TAVR) USING BIOPROSTHESIS: ICD-10-CM

## 2020-07-08 LAB — EKG IMPRESSION: NORMAL

## 2020-07-08 PROCEDURE — G0423 INTENS CARDIAC REHAB NO EXER: HCPCS | Mod: 59 | Performed by: INTERNAL MEDICINE

## 2020-07-08 PROCEDURE — G0422 INTENS CARDIAC REHAB W/EXERC: HCPCS | Performed by: INTERNAL MEDICINE

## 2020-07-08 NOTE — PROGRESS NOTES
Video watched: 19 Heart Disease Risk Reduction. Length: 32.2 minutes. with discussion following.  From 0800 To 0850.

## 2020-07-10 ENCOUNTER — OFFICE VISIT (OUTPATIENT)
Dept: NEUROLOGY | Facility: MEDICAL CENTER | Age: 80
End: 2020-07-10
Payer: MEDICARE

## 2020-07-10 VITALS
BODY MASS INDEX: 29.82 KG/M2 | HEART RATE: 80 BPM | SYSTOLIC BLOOD PRESSURE: 118 MMHG | OXYGEN SATURATION: 94 % | RESPIRATION RATE: 18 BRPM | DIASTOLIC BLOOD PRESSURE: 70 MMHG | WEIGHT: 225 LBS | HEIGHT: 73 IN

## 2020-07-10 DIAGNOSIS — G25.0 ESSENTIAL TREMOR: ICD-10-CM

## 2020-07-10 DIAGNOSIS — E11.42 DIABETIC POLYNEUROPATHY ASSOCIATED WITH TYPE 2 DIABETES MELLITUS (HCC): ICD-10-CM

## 2020-07-10 DIAGNOSIS — M54.16 LUMBAR RADICULOPATHY, CHRONIC: ICD-10-CM

## 2020-07-10 DIAGNOSIS — R27.0 ATAXIA: Primary | ICD-10-CM

## 2020-07-10 PROCEDURE — 99205 OFFICE O/P NEW HI 60 MIN: CPT | Performed by: PSYCHIATRY & NEUROLOGY

## 2020-07-10 ASSESSMENT — ENCOUNTER SYMPTOMS
TREMORS: 1
SENSORY CHANGE: 1
MEMORY LOSS: 0
FALLS: 1

## 2020-07-10 ASSESSMENT — FIBROSIS 4 INDEX: FIB4 SCORE: 2.49

## 2020-07-10 NOTE — PROGRESS NOTES
"Subjective:      USMAN MEANS III Derik SEGAL is a 79 y.o. male who presents from the office of Dr. Paul Wilkinson MD, for consultation, with a history of progressive and severe gait ataxia in the setting of tremor and known history of severe diabetes-related polyneuropathy.     HPI    USMAN is a pleasant 79-year-old formally ambidextrous  gentleman who is balance difficulties became much more noticeable starting about 5 years ago.  Prior to that he had felt unsteady but nothing that reached to the level of severity he has now.  The instability he describes as non-directional, he denies that the legs actually feel weak, though he then describes episodes where he \"loses my legs\" while walking.  When he stands to walk, he has difficulty getting going, he looks at the ground to make sure he knows where he is putting his feet.  His station is widened.  In fact he states he cannot stand with his eyes open and his feet together at any time.  He has a chronic history of weakness with the left foot related to sciatica, this has not worsened.  He also describes chronic numbness in both feet.    When he does go to ground, he describes a diffuse tremulousness involving the upper extremities and his entire body.  He does not seem to be able to grab onto something to prevent himself from falling, he describes his legs as \"losing them\".  He is never dizzy, he does not lose consciousness with these episodes.  He is not incontinent.  The episodes are happening on a very regular basis, despite his own efforts to prevent them from happening.    Though he has tingling and numbness in the hands bilaterally, especially in the mornings, he denies a loss of dexterity or actual strength.  Voice volume and quality are maintained.  There have been no problems with difficulty swallowing, drooling, or anosmia.    He has a history of chronic low back pain with sciatica leaving him with weakness of the left foot, he is status post multiple " decompressive and fusion procedures, he thinks the last was about 5 years ago.  During this entire time, his back pain has not increased.    He does describe a fine tremulousness in the hands, present mostly when he is eating or used him for fine motor control activities.  It always improves when he is more relaxed and in repose.  It has been present for quite some time, though has never been disabling.  At its worst such as when he is fatigued or stressed, he even feels an internalized sense of tremulousness.  The head and voice are never involved.  It is this tremor that becomes more severe and generalized when he is about the fall.    Prior to his pacemaker placement, imaging of the entire spinal axis was done between April and May, 2019.  The cervical and thoracic spines revealed multilevel degenerative changes but only moderate central stenosis at the C3/4 level.  Cord signal was normal throughout.  The lumbar spine revealed the postoperative changes including instrumentation and fusion, no evidence of central stenosis at any level, no evidence of arachnoiditis, but significant foraminal narrowing bilaterally, right more than left-sided, most marked at L3/4, L4/5 and L5/S1.  Imaging of the brain is never been done.    He has a history of diabetes with a rather significant neuropathy, dating back over 10 years.  He has a history of macular degeneration bilaterally, no history of a diabetic retinopathy.  He also has a history of hypertension, CAD associated with CHF, PVD, dyslipidemia, chronic renal insufficiency, but no diagnosis of neurodegenerative disease, MS, seizure, migraine, malignancy, autoimmune disease or significant psychiatric disease.  He is status post TAVR and pacemaker placement.    No one in the family has a history of tremor, though he knows nothing of his birth father.  His 3 sisters are alive and well.  He has no children.    He does not smoke or drink.  He is a retired professional bowler  "and .  He is an active golfer, would like to get back to swinging the clubs if at all possible.    He is on Bumex, Entresto, Zetia, Arimidex, testosterone injections, baby aspirin, Coreg, Clomid, vitamin D with calcium, Plavix, gabapentin 300 mg every 6 hours, Amaryl, vitamin D, B complex, zinc, and a long list of other supplements.    Review of Systems   Constitutional: Negative for malaise/fatigue.   Musculoskeletal: Positive for falls.   Neurological: Positive for tremors and sensory change.   Psychiatric/Behavioral: Negative for memory loss.   All other systems reviewed and are negative.       Objective:     /70 (BP Location: Left arm, Patient Position: Sitting, BP Cuff Size: Adult)   Pulse 80   Resp 18   Ht 1.854 m (6' 1\")   Wt 102.1 kg (225 lb)   SpO2 94%   BMI 29.69 kg/m²      Physical Exam    He appears in no acute distress.  His vital signs are stable.  There is no malar rash.  The neck is supple, range of motion is full.  Cardiac evaluation reveals a regular rhythm.  There is significant bilateral lower extremity vascular insufficiency changes seen distal to the shins.  Reddened discoloration with thinning of the skin and poorly healing ulcerations over both anterior shins is remarkable.  Distal pulses are intact.  Straight leg raising is negative bilaterally.  There is nail hypertrophy.    Cognition is intact, fully oriented, there is no aphasia, agnosia,  apraxia, or inattention.    PERRLA/EOMI, visual fields are full to finger counting on confrontation bilaterally.  I could not visualize the fundus on either side.  Facial movements are symmetric, there is no hypophonia or tachyphemia.  The tongue and uvula are midline without bulbar dysfunction.  Sensory exam is intact to temperature and pinprick bilaterally.  Shoulder shrug and head rotation are intact and symmetric.    When the arms are held extended against gravity, a fine tremulousness can be seen in the hands bilaterally.  " This attenuates at rest.  Tone is increased in the lower extremities, there is no rigidity.  There is no asterixis or drift.  Strength is intact in the upper extremities in their entirety.  There was weakness with the left foot especially with plantar flexion as well as lateral movements.  Strength is intact with the right foot.  There is no weakness with the proximal lower extremity musculatures.  Reflexes are diffusely hypoactive the biceps and triceps can be elicited bilaterally.  The knee jerks and ankle jerks are absent bilaterally.  The right toe is upgoing, the left is downgoing.    His walking is severely distorted.  Station is widened, he walks with an apractic nature.  Initiation is difficult, he looks at the ground, stride length is diminished initially though it improved slightly.  He does not shuffle.  His feet are flattened, there is a bit of a foot drop on the left.  Tandem walking is an impossibility.  With some assistance he can get back on his heels, standing on his toes is another thing.  There is no appendicular dystaxia with any of the extremities.  Fine motor control is intact in the upper extremities.  Movements are severely curtailed in the left foot proportionate to the degree of weakness.  They were only slowed with the right foot.    Sensory exam reveals a dense pattern loss of small and large fiber functions in the lower extremities.  Vibration is lost above the knees bilaterally, JPS impaired below the ankles, temperature below the midshin.  Romberg cannot be assessed given how unsteady he is even with his eyes open.    Assessment/Plan:     1. Ataxia  Multifactoral in nature, most of this is related to a sensory ataxia from the neuropathy, but he also has a history of radiculopathy and residual weakness with the left foot.  We need to be sure that there was nothing else that is more active.  In addition he has some upper motor neuron findings as well as some EPS dysfunction with his  gait, and though MRI cannot be done, at least CT imaging of the brain can be done to assess for the presence of extensive CNS atherosclerotic change.  He certainly has risk for the latter.    I do not believe that this is NPH or Parkinson's disease.  He is not myelopathic, MRI of the neck has already ruled out stenosis.  This is not a spinal claudicating issue.    Face-to-face time spent talking with him about all of the above.  Physical therapy for gait stabilization would be critical.  He was told that the balance problems he has will not improve beyond what he can achieve with physical therapy.  We are basically looking to make sure it does not progress any further.    - CT-HEAD W/O; Future  - REFERRAL TO PHYSICAL THERAPY Reason for Therapy: Eval/Treat/Report    2. Lumbar radiculopathy, chronic  Though clearly a chronic problem, imaging has revealed rather severe lateral foraminal narrowing on both sides, though it is not clear whether this is clinically significant.  EMG studies will be critical in this regard.    - REFERRAL TO NEURODIAGNOSTICS (EEG,EP,EMG/NCS/DBS) Modality Requested: EMG/NCS-Comment Extremities  - REFERRAL TO PHYSICAL THERAPY Reason for Therapy: Eval/Treat/Report    3. Diabetic polyneuropathy associated with type 2 diabetes mellitus (HCC)  The bigger issue overall, it is the loss of sensation that his impaired his ability to balance himself.  This would explain his complaints of worsening problems with reduced ambient length, the need to look at the ground, etc.    - REFERRAL TO NEURODIAGNOSTICS (EEG,EP,EMG/NCS/DBS) Modality Requested: EMG/NCS-Comment Extremities  - REFERRAL TO PHYSICAL THERAPY Reason for Therapy: Eval/Treat/Report    4. Essential tremor  A separate issue, clearly this will worsen when he feels unsteady and is about to fall.  At best is minimally symptomatic, it does not need to be treated at this time that we did talk about the natural course of this condition and what he  might expect over the longer interval.    Time: 60-minute spent face-to-face for exam, review, discussion, and education, of this over 50% of the time spent counseling and coordinating care.

## 2020-07-11 ENCOUNTER — HOSPITAL ENCOUNTER (OUTPATIENT)
Dept: RADIOLOGY | Facility: MEDICAL CENTER | Age: 80
End: 2020-07-11
Attending: PSYCHIATRY & NEUROLOGY
Payer: MEDICARE

## 2020-07-11 DIAGNOSIS — R27.0 ATAXIA: ICD-10-CM

## 2020-07-11 PROCEDURE — 70450 CT HEAD/BRAIN W/O DYE: CPT

## 2020-07-13 ENCOUNTER — NON-PROVIDER VISIT (OUTPATIENT)
Dept: CARDIOLOGY | Facility: MEDICAL CENTER | Age: 80
End: 2020-07-13
Payer: MEDICARE

## 2020-07-13 DIAGNOSIS — Z95.3 STATUS POST TRANSCATHETER AORTIC VALVE REPLACEMENT (TAVR) USING BIOPROSTHESIS: ICD-10-CM

## 2020-07-13 LAB — EKG IMPRESSION: NORMAL

## 2020-07-13 PROCEDURE — G0422 INTENS CARDIAC REHAB W/EXERC: HCPCS | Performed by: INTERNAL MEDICINE

## 2020-07-13 PROCEDURE — G0423 INTENS CARDIAC REHAB NO EXER: HCPCS | Mod: 59 | Performed by: INTERNAL MEDICINE

## 2020-07-13 ASSESSMENT — PATIENT HEALTH QUESTIONNAIRE - PHQ9
SUM OF ALL RESPONSES TO PHQ9 QUESTIONS 1 AND 2: 3
7. TROUBLE CONCENTRATING ON THINGS, SUCH AS READING THE NEWSPAPER OR WATCHING TELEVISION: 0
4. FEELING TIRED OR HAVING LITTLE ENERGY: 3
6. FEELING BAD ABOUT YOURSELF - OR THAT YOU ARE A FAILURE OR HAVE LET YOURSELF OR YOUR FAMILY DOWN: 0
8. MOVING OR SPEAKING SO SLOWLY THAT OTHER PEOPLE COULD HAVE NOTICED. OR THE OPPOSITE, BEING SO FIGETY OR RESTLESS THAT YOU HAVE BEEN MOVING AROUND A LOT MORE THAN USUAL: 0
3. TROUBLE FALLING OR STAYING ASLEEP OR SLEEPING TOO MUCH: 3
5. POOR APPETITE OR OVEREATING: 3
2. FEELING DOWN, DEPRESSED, IRRITABLE, OR HOPELESS: 0
SUM OF ALL RESPONSES TO PHQ QUESTIONS 1-9: 12
SUM OF ALL RESPONSES TO PHQ QUESTIONS 1-9: 12
1. LITTLE INTEREST OR PLEASURE IN DOING THINGS: 3
9. THOUGHTS THAT YOU WOULD BE BETTER OFF DEAD, OR OF HURTING YOURSELF: 0

## 2020-07-13 NOTE — PROGRESS NOTES
Individualized Treatment Plan   Cardiac Rehab Individual Treatment Plan  Initial/Reassessment/Discharge Assessment/ ReAssessment/ Discharge: Discharge(Graduation ) 20 Session #     18   MRN: [id number] Allergies: Statins [hmg-coa-r inhibitors] and Atorvastatin   Patient Name: LIAN More III : 1940 Risk Stratification: High    Diagnoses: No diagnosis found. Age: 79 y.o. Physician: Pcp Pt States None    Date of Event: 20 Specialist: Minh   Risk Factors:  Hypertension, Hyperlipidemia, Diabetes, Obesity, Stress, Sedentary Lifestyle, Age, Male > 45   Exercise Nutrition Other Core Components/ Risk Factors Psychosocial   Stages of change Stages of change Stages of change Stages of change   Action Action Action Preparation   Fitness Test Lipids Learning Barriers Plan   DASI: (NA) Available: Yes Learning Barriers: None Psychosocial Plan: Yes   DIST: 1270 Date: 19 Family Support Intervention   Max HR: 109 Total: 105 mg/dL Yes Behavioral Health Consult: Yes   RPE: 14 Tri mg/dL Lives: Spouse Physician Referral: No   SPO2: 95 % HDL: 29 mg/dL Other Risk Factors Plan Identifies Stressors: Yes   MET: 2.76 LDL: 56 mg/dL Other Risk Factors/Plan: Yes Drug Intervention: No   EF= 35(2020) Diabetes Tobacco Use Outcome Survey Tools   Ambulatory Status Diabetes: Yes Social History     Tobacco Use   Smoking Status Never Smoker   Smokeless Tobacco Never Used   Tobacco Comment    0    FP QOL Overall Score: 17.31   Fall Risk Assessed: Yes HbA1C: 6.3 % Date: 20 Smoking Intervention PHQ-9: 12   Exercise Ambulatory Status Assist Devices: None Monitors BS at Home: Yes(occasionally, a couple times a week. ) Smoking Cessation Referral: N/A     Intervention Frequency: once a week Random BS: 129(03/10/2020) Ind. Education / Counseling: N/A Education   Intervention: Yes    MBSR Lectures/Videos: Yes   Exercise Prescription/ Exercise Plan       Mode: Treadmill, NuStep, UBE, Airdyne Weight Management Tobacco  "Adjunct: N/A Psychosocial Education: Coping Techniques, Positive Support System, S/S Depression, MBSR Lectures   Frequency: 3 days/week Weight: 104.3 kg (230 lb) Target Goal Target Goal   Duration: 20-30 min Height: 185.4 cm (6' 0.99\") Complete Tobacco Cessation: Assess presence or absence of depression using a valid screening: Yes   Intensity: 11-13 RPE BMI (Calculated): 30.35 Complete Tobacco Cessation: Yes Use Stress Management: Yes   METS: 3.0-5.0 Goal weight: 220lb Intervention Adverse Events: Yes   Progression: ^ increments of 1-3 to THR/RPE as tolerated Waist: 47 inch Healthy Heart Education: Class Schedule Given, Medications Reviewed, Patient Education Binder Provided, Risk Factors Discussed, Videos Viewed per [employer] Unexpected Events: Yes      Goals     THR: THR: 20-30 BPM ^Resting HR(for the first 3 visits then max of 121 age predicated.) Social History     Substance and Sexual Activity   Alcohol Use No   • Alcohol/week: 0.0 oz   • Frequency: Never   • Binge frequency: Never    Educate / Review and have understanding of cardiac disease prevention:    Angina with Exercise? Angina with Exercise: No Nutrition Plan Educate / Review and have understanding of cardiac disease prevention: Yes      Nutrition Plan: Yes Medication Compliance: Yes    Resistance Training? Resistance Training: Yes Intervention Hypertension Intervention      Dietician Consult/Class: Yes     Goals Nurse/Patient Discussion: Yes Weekly Lectures/ Videos/ Interactive Cooking School: Yes    Home Exercise: Yes Diabetes Ed Referral: Yes Hypertension Management    Mode: Walk, Bike(recumbant bike) Discuss Maintenance /Wt Loss: Yes Resting BP: 122/72    Duration: 20-30 minute walks Attend Cooking School: Yes     Frequency: 7 days active.  Dietary Goal: >=58 rate your plate, low sodium.     Education Education    Yes Nutrition Education: S&S Hypo/Hyper glycemia, Relate Diabetes to CAD, Healthy Plant Based Eating, Sodium Reduction Cooking/ " Lectures/ Cooking School/  RD 1:1     Equipment Orientation, Exercise Safety, S/S to Report, RPE Scale, Warm Up / Cool Down, Physically Active Target Goal    Target Goal LDL-C < 100 if trig. > 200:  N/A    Start Individual Exercise Rx Yes LDL-C < 70 for high risk patient:  Yes    BP < 140/90 or < 130/80 if DM or CKD Yes Non HDL-C Should be < 130:  Yes    Aerobic activity 30 + min / day 5 days / wk Yes HbA1C < 7%: Yes      BMI < 25: No(<29)        Exercise     Current: Current : USMAN has been working out 2x a week at [facility] for a total of 30 minutes plus a group weight routine that is programed for a push day and a pull day. USMAN met with an exercise physiologist and had a tailor made home routine plan made for him. USMAN went and bought a resistance band. USMAN also rides his home stationary bike for 20 - 30 minutes 2-3 days a week. USMAN feels stronger since starting the program. He will miss coming to [facility].  Goal: Goal: USMAN had a goal to start incoorporating a band routine 2x a week on his own at home. He has met this goal and surpassed it by doing three days a week.  Progress: Progress: USMAN is feeling good with his exercise routine and has began adding in his home weight routine. USMAN says he will continue exercising 5 days a week on his own at home. He will walk and ride his stationary bike.     Nutrition     Current: Current: USMAN has lost 14 lbs since starting at [facility]. USMAN is already eats generally heart healthy.  He does not add salt to any food, eats out only once a week, has dessert only once a week.  He has been watching his diet due to having diabetes and his wife is an RN and helps with the diet as well.   Goal: Goal: USMAN says he is still following a heart healthy low sodium diet.  Progress: Progress: USMAN is more aware of sodium in regards to heart health. USMAN says he watches his salt intake.     Hypertension     Current: Current: Blood pressure WNL today 122/72.  Goal: Goal: Low sodium diet, medication adherence, home monitoring.  "  Progress: Progress: Willing to maintain above goals for good blood pressure control.      Stress     Current: Current : DELVIN is happy ICR has opened up. He is feeling pretty good with stress management.Delvin is seeing a neurologist in January to help with some \"tremors\" that DELVIN has some concern about.  Goal: Goal: DELVIN is seeing a neurologist in January.  Progress: Progress: (P) DELVIN scored high on the PHQ9. DELVIN had a one-on-one Health coaching where staff talked DELVIN in debth about his stress. 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 He would like to know what is causing them.      Other                  "

## 2020-07-13 NOTE — PROGRESS NOTES
LIAN More III attended: Healthy Mindset Workshop from 8:00 am  to 8:50 am    The topic was: Stress and Health.  Patient received handouts regarding the specific information

## 2020-07-17 DIAGNOSIS — E78.5 DYSLIPIDEMIA: Chronic | ICD-10-CM

## 2020-07-17 RX ORDER — EZETIMIBE 10 MG/1
TABLET ORAL
Qty: 90 TAB | Refills: 0 | Status: SHIPPED | OUTPATIENT
Start: 2020-07-17 | End: 2020-10-07

## 2020-07-21 ENCOUNTER — PHYSICAL THERAPY (OUTPATIENT)
Dept: PHYSICAL THERAPY | Facility: MEDICAL CENTER | Age: 80
End: 2020-07-21
Attending: PSYCHIATRY & NEUROLOGY
Payer: MEDICARE

## 2020-07-21 DIAGNOSIS — R26.89 BALANCE PROBLEM: ICD-10-CM

## 2020-07-21 PROCEDURE — 97112 NEUROMUSCULAR REEDUCATION: CPT

## 2020-07-21 PROCEDURE — 97161 PT EVAL LOW COMPLEX 20 MIN: CPT

## 2020-07-21 ASSESSMENT — ENCOUNTER SYMPTOMS
PAIN SCALE AT LOWEST: 3
QUALITY: ACHING
PAIN SCALE AT HIGHEST: 7
PAIN LOCATION: LOW BACK
PAIN SCALE: 4

## 2020-07-21 ASSESSMENT — BALANCE ASSESSMENTS
BALANCE - STANDING STATIC: FAIR +
BALANCE - SITTING DYNAMIC: NORMAL
BALANCE - SITTING STATIC: NORMAL
BALANCE - STANDING DYNAMIC: FAIR -

## 2020-07-21 ASSESSMENT — ACTIVITIES OF DAILY LIVING (ADL): POOR_BALANCE: 1

## 2020-07-21 NOTE — OP THERAPY EVALUATION
"  Outpatient Physical Therapy  INITIAL EVALUATION    [facility] Outpatient Physical Therapy  [address]  [place] [address]  Phone:  [phone]  Fax:  [phone]    Date of Evaluation: 07/21/2020    Patient: USMAN VILLA III Means III  YOB: 1940  MRN: [id number]     Referring Provider: Rafael Valderrama M.D.  [address] [place] [address]  [place] [address]   Referring Diagnosis Radiculopathy, lumbar region [M54.16];Type 2 diabetes mellitus with diabetic polyneuropathy [E11.42];Ataxia, unspecified [R27.0]     Time Calculation    Start time: 1030  Stop time: 1130 Time Calculation (min): 60 minutes             Chief Complaint: Loss Of Balance    Visit Diagnoses     ICD-10-CM   1. Balance problem  R26.89         Subjective   History of Present Illness:     Date of onset:  7/21/2018    History of chief complaint:  Patient is a 79 year old male who presents to physical therapy for evaluation and treatment of balance and gait instability with history of falls x 2-3 years. Patient reports progressive balance deficits and states that he sees significant decrease in balance if standing in a dark room, when closing his eyes, and when taking off his shirts over his head. Also reports being seen by neurology for tremors in his UEs.   Patient reports significant history of falls with 1 fall in the last month, and additional 3 falls over the last 6 months. 1 fall resulted in serious injury with ruptured kidney, and another resulting in shoulder fracture.   States some balance/\"dizziness\" when first arising from a chair. Difficulty in stepping/walking backwards, and inability to place feet close together and stay standing.   Patient has significant history of low back pain and back surgeries, states that pain is not a contributing factor to his balance problems and his current desire is to work on balance deficits.   Reports no consistent direction with falls, stating that he falls " "forward, sideways, and backwards frequently.     Patient is former professional golfer, bowler, and .     PMH:Hypertension, Dyslipidemia,Lower extremity edema,S/P lumbar fusion   S/P gastric bypass,Anderson esophagus,Polycythemia,S/P placement of cardiac pacemaker,Renal hematoma, left, initial encounter,Platelet inhibition due to Plavix    CKD (chronic kidney disease)    Coronary artery disease involving native coronary artery of native heart without angina pectoris    CHF (congestive heart failure), NYHA class II, chronic, systolic (HCC)    S/P TAVR (transcatheter aortic valve replacement)    Essential hypertension    Type 2 diabetes mellitus (HCC)    Prolonged Q-T interval on ECG    Urinary retention    Insomnia    Elevated troponin    High risk medication use    JAMES (obstructive sleep apnea)    Lumbar radiculopathy, chronic    Type 2 diabetes mellitus with hyperglycemia, without long-term current use of insulin ([facility])    Obesity    Hypotestosteronism    Macular degeneration    Pancreatic lesion    Arthritis of neck    Risk for falls    Gastroesophageal reflux disease without esophagitis    ICD (implantable cardioverter-defibrillator) in place    Trauma          Prior level of function:  Independent without limitations.     Pain:     Current pain ratin    At best pain rating:  3    At worst pain ratin    Location:  Low back    Quality:  Aching    Aggravating factors:  Patient reports that his low back pain is chronic in nature and he has \"learned to live with it\" and states that it is not really a limiting factor at this time.    Progression:  Unchanged    Activity Tolerance:     Current activity tolerance / Recreational activities:  Patient reports ability to walk approximately 1/4 mile before he has to stop due to balance deficits.     Social Support:     Lives in:  Multiple-level home    Lives with:  Spouse    Hand Dominance:     Hand dominance:  Right    Treatments:     Treatments to date: "  None    Patient Goals:     Patient goals for therapy:  Improve balance, decrease falls, return to playing golf.       Past Medical History:   Diagnosis Date   • Arthritis    • Back pain    • Breath shortness     pt states short of breath 24/7 O2 at night only   • CAD (coronary artery disease)    • CATARACT     removed bilat   • Chest pain    • Chest tightness    • Chickenpox    • Congestive heart failure (HCC)    • Constipation    • Coronary heart disease    • Cough    • Daytime sleepiness    • Diabetes     oral medication   • Difficulty breathing    • Dilated cardiomyopathy (HCC)    • Fatigue    • GERD (gastroesophageal reflux disease)    • Hearing difficulty    • Heart murmur    • Heart valve disease    • Heartburn    • Hiatus hernia syndrome    • Hyperlipidemia    • Hypertension     pt states well controlled on meds   • 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 Porter, gastroenterologist   • Unspecified hemorrhagic conditions     on plavix     Past Surgical History:   Procedure Laterality Date   • PB LAP, REMOVE ADJUST LUIS RESTRICT D*  6/5/2020    Procedure: REMOVAL, GASTRIC BAND, LAPAROSCOPIC - ADJUSTABLE BAND AND PORT;  Surgeon: Deniz Rios M.D.;  Location: [facility];  Service: General   • TRANSCATHETER AORTIC VALVE REPLACEMENT N/A 1/27/2020    Procedure: REPLACEMENT, AORTIC VALVE, TRANSCATHETER-;  Surgeon: Surendra Castro M.D.;  Location: SUR[facility]ERY [facility];  Service: Cardiac   • OLGA  1/27/2020    Procedure: ECHOCARDIOGRAM,  TRANSESOPHAGEAL;  Surgeon: Surendra Castro M.D.;  Location: [facility];  Service: Cardiac   • GASTROSCOPY N/A 1/8/2016    Procedure: GASTROSCOPY;  Surgeon: Deniz Sue M.D.;  Location: [facility];  Service:    • HUMERUS ORIF Left 6/25/2015    Procedure: HUMERUS ORIF/ PROXIMAL;  Surgeon: Cristal Guido M.D.;  Location: [facility];  Service:    • LUMBAR FUSION POSTERIOR  9/5/2013    Performed by Edith Sears M.D. at [facility]   • LUMBAR DECOMPRESSION  9/5/2013    Performed by Edith Sears M.D. at [facility]   • MASS EXCISION NEURO  9/5/2013    Performed by Edith Sears M.D. at [facility]   • RECOVERY  6/26/2012    Performed by [facility], IR-RECOVERY at [facility] SAME DAY [facility]   • DRAINAGE HEMATOMA  5/25/2012    Performed by DENIZ SUE at [facility]   • GASTRIC BAND LAPAROSCOPIC REVISION  5/11/2012    Performed by DENIZ SUE at [facility]   • LUMBAR FUSION POSTERIOR  3/26/2012    Performed by EDITH SEARS at [facility]   • LUMBAR DECOMPRESSION  3/26/2012    Performed by EDITH SEARS at [facility]   • INJ,EPIDURAL/LUMB/SAC SINGLE  3/19/2012    Performed by KRISTIN BALTAZAR at [facility]   • INJ,EPIDURAL/LUMB/SAC SINGLE  3/5/2012    Performed by KRISTIN BALTAZAR at [facility]   • GASTRIC BANDING LAPAROSCOPIC  3/24/2010    Performed by DENIZ SUE at [facility]   • HIATAL HERNIA REPAIR  3/24/2010    Performed by DENIZ SUE at [facility]   • KNEE ARTHROPLASTY TOTAL  2000    bilateral   • ABDOMINOPLASTY  1990   • AORTIC VALVE REPLACEMENT     • ARTHROSCOPY, KNEE     • LAMINOTOMY     • OTHER CARDIAC SURGERY      stents   • PB REMV 2ND CATARACT,CORN-SCLER SECTN     • SINUSCOPE     • SLEEVE,CARRIE VASO THIGH     • TONSILLECTOMY     • ZZZ CARDIAC CATH         Precautions:       Objective   Observation and functional  movement:  Stands with wide LORRIE, arms outstretched for balance  Difficulty rising from chair with 2 seconds of unsteadiness upon initial rising.     Tone is increased in the lower extremities    Mild Left foot drop    Range of motion and strength:    Decreased left PF strength of 3/5, left DF, INV, EV of 4- to 4/5  B hip strength 4/5  B knee strength 4+/5    ROM WFL throughout upper and lower extremities.     weakness with the left foot especially with plantar flexion as well as lateral movements, right foot strength WFL    Sensation and reflexes:     Decreased sensation throughout B LEs, L>R. Decreased proprioception and joint position sensation in B ankles.     Hyporeflexive in B quad and achilles.     Palpation and joint mobility:     No tenderness to palpation noted.    Joint mobility is normal.    Special tests:      TUG 15.6 seconds  5x repeated STS 31 seconds    Balance:     Sitting (static): Normal    Sitting (dynamic): Normal    Standing (static): Fair +    Standing (dynamic): Fair -    DLS with eyes open, 25 seconds, moderate sway and then step  DLS with eyes closed, 6 seconds, severe sway with resulting step  DLS on foam with eyes open, severe sway, maintained for 2 seconds before stepping    Inability to attain tandem stance  Semi tandem stance for 3 seconds before LOB requiring CGA to Jerry to correct      Gait:      Gait with widened LORRIE, decreased step length,stride length and gait speed. Frequent use of UEs in outstretched position for balance/postural control    Turning: slow with small steps and intermittent LOB requiring UE support to correct.     Poor path maintenance during straight line gait on even surfaces   Unsafe to perform gait without assistance on uneven surfaces.     Basic self care and IADL's:     Patient reports difficulty with dressing, stating he has to be seated to put on shirt due to inability to maintain balance.     Cognition and visual perception:     History of macular  degeneration with subsequent deficits            Therapeutic Treatments and Modalities:     1. Neuromuscular Re-education (CPT 63160), see below    Therapeutic Treatment and Modalities Summary: DLS on solid with CGA-SBA, [id number]  Repeated STS 3x5   360 degree turns x5 bilaterally    Time-based treatments/modalities:    Physical Therapy Timed Treatment Charges  Neuromusc re-ed, balance, coor, post minutes (CPT 36309): 10 minutes      Assessment, Response and Plan:   Impairments: abnormal gait, abnormal muscle tone, activity intolerance, impaired functional mobility, impaired balance, impaired physical strength, lacks appropriate home exercise program, limited ADL's and pain with function    Assessment details:  Mr. More is a 79 year old male who presents to physical therapy for evaluation and treatment of gait and balance instability with history of falls and injury, worsening over the last 2 years. Patient presents with significant deficits in both static and dynamic postural control, poor gait pattern/maintenance, and reported history of falls. Shows deficits in all major balance systems, and shows high reliance on visual input when maintaining balance. He would benefit from skilled therapy services to address deficits to allow for improved postural control, decreased fall risk, and improved tolerance to functional activities.   Barriers to therapy:  Comorbidities  Prognosis: fair    Goals:   Short Term Goals:   1. Establish initial HEP  2. Report no falls over 2 week period  3. Demonstrate improved TUG time to less than 13 seconds  Short term goal time span:  2-4 weeks      Long Term Goals:    1. Independent and compliant in HEP  2. Report no falls over 1 month period  3. Improve Guzman balance score to greater than 45/56  4. Improve TUG to less than 11 seconds  Long term goal time span:  6-8 weeks    Plan:   Therapy options:  Physical therapy treatment to continue  Planned therapy interventions:  E Stim  Unattended (CPT 20956), Therapeutic Exercise (CPT 14047), Therapeutic Activities (CPT 06826), Manual Therapy (CPT 51677) and Neuromuscular Re-education (CPT 96582)  Frequency:  3x week  Duration in weeks:  6  Duration in visits:  18  Discussed with:  Patient      Functional Assessment Used        Referring provider co-signature:  I have reviewed this plan of care and my co-signature certifies the need for services.    Certification Period: 07/21/2020 to  09/22/20    Physician Signature: ________________________________ Date: ______________

## 2020-07-23 ENCOUNTER — PHYSICAL THERAPY (OUTPATIENT)
Dept: PHYSICAL THERAPY | Facility: MEDICAL CENTER | Age: 80
End: 2020-07-23
Attending: PSYCHIATRY & NEUROLOGY
Payer: MEDICARE

## 2020-07-23 DIAGNOSIS — R26.89 BALANCE PROBLEM: ICD-10-CM

## 2020-07-23 PROCEDURE — 97112 NEUROMUSCULAR REEDUCATION: CPT

## 2020-07-23 NOTE — OP THERAPY DAILY TREATMENT
Outpatient Physical Therapy  DAILY TREATMENT     [facility] Outpatient Physical Therapy  [address]  [place] [address]  Phone:  [phone]  Fax:  [phone]    Date: 07/23/2020    Patient: USMAN MEANS III Means III  YOB: 1940  MRN: [id number]     Time Calculation    Start time: 1030  Stop time: 1058 Time Calculation (min): 28 minutes         Chief Complaint: Loss Of Balance    Visit #: 2    SUBJECTIVE:  Patient reports no falls since evaluation and no significant changes over the last couple of days.     OBJECTIVE:  Current objective measures:         Therapeutic Treatments and Modalities:     1. Neuromuscular Re-education (CPT 69538), see below    Therapeutic Treatment and Modalities Summary: DLS on solid with CGA-SBA, [id number]  Repeated STS 3x5   360 degree turns x5 bilaterally  DLS on solid with comfortable LORRIE and head turns, [id number]  DLS on solid with comfortable LORRIE and head nods, [id number]  DLS on solid with wide LORRIE, eyes closed, [id number].     Time-based treatments/modalities:    Physical Therapy Timed Treatment Charges  Neuromusc re-ed, balance, coor, post minutes (CPT 75471): 25 minutes      Pain rating (1-10) before treatment:  0  Pain rating (1-10) after treatment:  0    ASSESSMENT:   Response to treatment: Patient with good response to treatment. Requires occasional CGA for LOB correction. Continue to progress as tolerated.     PLAN/RECOMMENDATIONS:   Plan for treatment: therapy treatment to continue next visit.  Planned interventions for next visit: continue with current treatment.

## 2020-07-24 ENCOUNTER — NON-PROVIDER VISIT (OUTPATIENT)
Dept: NEUROLOGY | Facility: MEDICAL CENTER | Age: 80
End: 2020-07-24
Payer: MEDICARE

## 2020-07-24 DIAGNOSIS — E11.42 DIABETIC POLYNEUROPATHY ASSOCIATED WITH TYPE 2 DIABETES MELLITUS (HCC): ICD-10-CM

## 2020-07-24 DIAGNOSIS — M54.16 LUMBAR RADICULOPATHY, CHRONIC: ICD-10-CM

## 2020-07-24 PROCEDURE — 95886 MUSC TEST DONE W/N TEST COMP: CPT | Performed by: PSYCHIATRY & NEUROLOGY

## 2020-07-24 PROCEDURE — 95913 NRV CNDJ TEST 13/> STUDIES: CPT | Performed by: PSYCHIATRY & NEUROLOGY

## 2020-07-24 NOTE — PROCEDURES
"NERVE CONDUCTION STUDIES AND ELECTROMYOGRAPHY REPORT  [facility]  07/24/2020           IMPRESSION:  This is a significantly abnormal electrodiagnostic study due to:  1. 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 This can be seen in multilevel degenerative disc disease of the lumbosacral spine.  A superimposed length dependent large fiber peripheral neuropathy or superimposed sciatic neuropathy cannot be excluded.   2. Moderate left median neuropathy at the wrist (carpal tunnel syndrome).     There is no evidence of left cervical radiculopathy.  An underlying ulnar neuropathy cannot be ruled out.  Recommend clinical correlation of the above.    Shalonda Hernandez MD  Neurology - Neurophysiology  [facility]        REASON FOR REFERRAL:  Mr. LIAN FARNSWORTH Means III 79 y.o. referred by Dr. Rafael Cintron for an evaluation of unsteady gait.  Patient has noticed wavering and unsteady gait for at least over a year.  He has a history of degenerative disc disease of the lumbar spine status post intervention approximately 2014.  At that time he has significant difficulty with walking and sensation.  He has also a history of diabetes that is otherwise well controlled.  He does wake up with numbness and tingling in the bilateral hands.    Height: 6'1\"  Weight: 224 lbs    Symptom focused neurological exam shows significant weakness with plantar flexion more so than dorsiflexion.  Strength in the left upper extremity is good.  He has loss of sensation to light touch in the distal lower extremities.      ELECTRODIAGNOSTIC EXAMINATION:  Nerve conduction studies (NCS) and electromyography (EMG) are utilized to evaluate direct or indirect damage to the peripheral nervous system. NCS are performed to measure the nerve(s) response(s) to electrostimulation across a given nerve segment. EMG evaluates the passive " and active electrical activity of the muscle(s) in question.  Muscles are innervated by specific peripheral nerves and roots. Often times, several nerves the muscle to be examined in order to determine the presence or absence of the disease process. Furthermore, nerves and muscles may need to be tested in a [id number] comparison, as well as in additional extremities, as this may be crucial in characterizing the extent of the disease process, which may be diffuse or isolated and of varying degree of severity. The extent of the neurodiagnostic exam is justified as it may help arrive to a proper diagnosis, which ultimately may contribute to better management of the patient. Therefore, the nerves to muscles examined during the study were medically necessary.    Unless otherwise noted, temperature of the extremity(s) study was monitored before and during the examination and remained between 32 and 36 degrees C for the upper extremities, and between 30 and 36 degrees C for the lower extremities. The patient tolerated testing well, without any complications.       NERVE CONDUCTION STUDY SUMMARY:  Selected nerves of the bilateral lower extremity and left upper extremity are studied.    Abnormal left median sensory response due to prolonged latency.  Abnormal left median motor response due to prolonged distal latency.  Left median F wave is prolonged.  Normal left ulnar sensory response.  Abnormal left ulnar motor response due to decreased amplitude and secondary slowing.  Left ulnar F wave is prolonged.  Normal radial sensory response.  Unobtainable bilateral sural sensory responses.  Unobtainable bilateral common peroneal motor responses at the extensor digitorum brevis.  Abnormal bilateral common peroneal motor responses at the tibialis anterior due to low amplitude.  Unobtainable bilateral tibial motor responses at the abductor hallucis brevis.      NEEDLE EMG SUMMARY:  Concentric needle study of selected bilateral  lower extremity and left upper extremity muscles is performed.     Insertion activity is increased in the bilateral tibialis anterior due to presence of fibrillation potentials and positive waves.  Insertion activity is decreased in the left medial gastrocnemius.  There is no appreciable motor activity in the left medial gastrocnemius.  Large amplitude prolonged duration motor unit potentials are appreciated in the bilateral vastus lateralis/medialis, bilateral tibialis anterior, bilateral gluteus medius, right medial gastrocnemius.  Otherwise, with activation, there are normal morphology (amplitude/duration) motor unit action potentials firing with normal recruitment in remaining muscles tested.       PATIENT DATA TABLES  Nerve Conduction Studies     Stim Site NR Onset (ms) Norm Onset (ms) O-P Amp (µV) Norm O-P Amp Site1 Site2 Delta-P (ms) Dist (cm) Kofi (m/s) Norm Kofi (m/s)   Left Median Anti Sensory (2nd Digit)   Wrist    3.1 <3.8 10.6 >10 Wrist 2nd Digit 4.6 16.0 *35 >50   Left Radial Anti Sensory (Base 1st Digit)   Wrist    1.9 <2.8 12.2 >10 Wrist Base 1st Digit 2.7 10.0 *37 >50   Site 2    1.9  9.4          Left Sural Anti Sensory (Lat Mall)   Calf *NR  <4.6  >3 Calf Lat Mall  14.0  >40   Right Sural Anti Sensory (Lat Mall)   Calf *NR  <4.6  >3 Calf Lat Mall  14.0  >40   Left Ulnar Anti Sensory (5th Digit)   Wrist    2.8 <3.2 7.3 >5 Wrist 5th Digit 3.4 14.0 *41 >50        Stim Site NR Onset (ms) Norm Onset (ms) O-P Amp (mV) Norm O-P Amp Site1 Site2 Delta-0 (ms) Dist (cm) Kofi (m/s) Norm Kofi (m/s)   Left Median Motor (Abd Poll Brev)   Wrist    *5.0 <4 7.5 >5 Elbow Wrist 5.2 25.0 *48 >50   Elbow    10.2  6.6          Left Peroneal EDB Motor (Ext Dig Brev)   Ankle *NR  <6  >2.5 B Fib Ankle  0.0  >40   B Fib *NR     Poplt B Fib  0.0     Poplt *NR             Right Peroneal EDB Motor (Ext Dig Brev)   Ankle *NR  <6  >2.5 B Fib Ankle  0.0  >40   B Fib *NR     Poplt B Fib  0.0     Poplt *NR             Left Peroneal TA  Motor (AntTibialis)   Fib Head    3.8 <4.5 *1.0 >3 Poplit Fib Head 1.4 10.0 71 >40   Poplit    5.2  0.8          Right Peroneal TA Motor (AntTibialis)   Fib Head    3.0 <4.5 *2.5 >3 Poplit Fib Head 2.0 10.0 50 >40   Poplit    5.0  2.4          Left Tibial Motor (Abd Vivas Brev)   Ankle *NR  <6  >4 Knee Ankle  0.0  >40   Knee *NR             Right Tibial Motor (Abd Vivas Brev)   Ankle *NR  <6  >4 Knee Ankle  0.0  >40   Knee *NR             Left Ulnar Motor (Abd Dig Min)   Wrist    3.1 <3.1 *6.0 >7 B Elbow Wrist 4.6 21.5 *47 >50   B Elbow    7.7  5.6  A Elbow B Elbow 1.9 10.0 53    A Elbow    9.6  5.5               Stim Site NR Peak (ms) Norm Peak (ms) P-T Amp (µV) Site1 Site2 Delta-P (ms) Norm Delta (ms)   Left Median/Ulnar Palm Comparison (Wrist - 8cm)   Median Palm    *2.8 <2.3 13.2 Median Palm Ulnar Palm *0.5 <0.3   Ulnar Palm    2.3 <2.3 1.9         F Wave Studies     NR F-Lat (ms) Lat Norm (ms)   Left Median (Abd Poll Brev)      *34.48 <31   Left Ulnar (Abd Dig Min)      *32.68 <32                                             Electromyography     Side Muscle Nerve Root Ins Act Fibs Psw Amp Dur Poly Recrt Int Pat Comment   Left AntTibialis Dp Br Fibular L4-5 *Incr *2+ *2+ *Incr *>12ms 0 Nml *50%    Left Gastroc Tibial S1-2 Decr     0 *NoActivity  No activity   Left VastusLat Femoral L2-4 Nml Nml Nml *Incr *>12ms 0 Nml *75%    Left GluteusMed SupGluteal L5-S1 Nml Nml Nml *Incr *>12ms 0 Nml Nml    Left VastusMed Femoral L2-4 Nml Nml Nml *Incr *>12ms 0 Nml Nml    Right AntTibialis Dp Br Fibular L4-5 *Incr *2+ *2+ *Incr *>12ms 0 Nml *50%    Right Gastroc Tibial S1-2 Nml Nml Nml *Incr *>12ms 0 Nml *25% few MUPs   Right VastusLat Femoral L2-4 Nml Nml Nml *Incr *>12ms 0 Nml Nml    Right GluteusMed SupGluteal L5-S1 Nml Nml Nml *Incr *>12ms 0 Nml Nml    Right VastusMed Femoral L2-4 Nml Nml Nml *Incr *>12ms 0 Nml Nml    Left 1stDorInt Ulnar C8-T1 Nml Nml Nml Nml Nml 0 Nml Nml    Left PronatorTeres Median C6-7 Nml Nml Nml Nml  Nml 0 Nml Nml    Left Biceps Musculocut C5-6 Nml Nml Nml Nml Nml 0 Nml Nml    Left Triceps Radial C6-7-8 Nml Nml Nml Nml Nml 0 Nml Nml    Left Deltoid Axillary C5-6 Nml Nml Nml Nml Nml 0 Nml Nml    Left Abd Poll Brev Median C8-T1 Nml Nml Nml Nml Nml 0 Nml Nml

## 2020-07-28 ENCOUNTER — PHYSICAL THERAPY (OUTPATIENT)
Dept: PHYSICAL THERAPY | Facility: MEDICAL CENTER | Age: 80
End: 2020-07-28
Attending: PSYCHIATRY & NEUROLOGY
Payer: MEDICARE

## 2020-07-28 DIAGNOSIS — R26.89 BALANCE PROBLEM: ICD-10-CM

## 2020-07-28 PROCEDURE — 97112 NEUROMUSCULAR REEDUCATION: CPT

## 2020-07-28 NOTE — OP THERAPY DAILY TREATMENT
Outpatient Physical Therapy  DAILY TREATMENT     [facility] Outpatient Physical Therapy  [address]  [place] [address]  Phone:  [phone]  Fax:  [phone]    Date: 07/28/2020    Patient: USMAN MEANS III Means III  YOB: 1940  MRN: [id number]     Time Calculation    Start time: 1000  Stop time: 1030 Time Calculation (min): 30 minutes         Chief Complaint: Loss Of Balance    Visit #: 3    SUBJECTIVE:  Patient reports not falls since last session. States compliance with HEP    OBJECTIVE:  Current objective measures:         Therapeutic Treatments and Modalities:     1. Neuromuscular Re-education (CPT 06618), see below    Therapeutic Treatment and Modalities Summary: DLS on solid with CGA-SBA, [id number]  Repeated STS 3x5   360 degree turns x5 bilaterally  DLS on solid with comfortable LORRIE and head turns, [id number]  DLS on solid with comfortable LORRIE and head nods, [id number]  DLS on solid with wide LORRIE, eyes closed, [id number].   Diagonal stepping with forward progression 30'x2  Lateral stepping in parallel bars.     Time-based treatments/modalities:    Physical Therapy Timed Treatment Charges  Neuromusc re-ed, balance, coor, post minutes (CPT 73542): 26 minutes      Pain rating (1-10) before treatment:  0  Pain rating (1-10) after treatment:  0    ASSESSMENT:   Response to treatment: Patient showing improved static postural control/stability. Continues to have significant deficits in dynamic stability/control. Progress as tolerated.     PLAN/RECOMMENDATIONS:   Plan for treatment: therapy treatment to continue next visit.  Planned interventions for next visit: continue with current treatment.

## 2020-07-29 ENCOUNTER — TELEPHONE (OUTPATIENT)
Dept: CARDIOLOGY | Facility: MEDICAL CENTER | Age: 80
End: 2020-07-29

## 2020-07-29 NOTE — TELEPHONE ENCOUNTER
Received clearance request from [facility] for pt's EGD on 8/5. They would like to know if low, intermediate, or high risk, and for pt to hold Plavix prior.     To BE

## 2020-07-29 NOTE — TELEPHONE ENCOUNTER
BE    Fabiola Gonzalez from [facility] calling in regards to a clearance she just faxed over and I scanned in patients media. She will like a call back at [phone].      Thank you!

## 2020-07-30 ENCOUNTER — PHYSICAL THERAPY (OUTPATIENT)
Dept: PHYSICAL THERAPY | Facility: MEDICAL CENTER | Age: 80
End: 2020-07-30
Attending: PSYCHIATRY & NEUROLOGY
Payer: MEDICARE

## 2020-07-30 DIAGNOSIS — R26.89 BALANCE PROBLEM: ICD-10-CM

## 2020-07-30 PROCEDURE — 97112 NEUROMUSCULAR REEDUCATION: CPT

## 2020-07-30 NOTE — TELEPHONE ENCOUNTER
Received call from Shalonda at [facility]. She isn't sure if VM was received this morning, as both she and Fabiola are working on this clearance. Reiterated that pt should proceed with EGD without holding Plavix if possible. She thinks this might be possible, though there is the possibility for biopsies. She will check with the team. Faxed below note from BE to [phone]. Receipt confirmed.

## 2020-07-30 NOTE — OP THERAPY DAILY TREATMENT
Outpatient Physical Therapy  DAILY TREATMENT     [facility] Outpatient Physical Therapy  [address]  [place] [address]  Phone:  [phone]  Fax:  [phone]    Date: 07/30/2020    Patient: USMAN MEANS III Means III  YOB: 1940  MRN: [id number]     Time Calculation    Start time: 1030  Stop time: 1100 Time Calculation (min): 30 minutes         Chief Complaint: Loss Of Balance    Visit #: 4    SUBJECTIVE:  Patient reports compliance with HEP and states he has had no falls    OBJECTIVE:  Current objective measures:         Therapeutic Treatments and Modalities:     1. Neuromuscular Re-education (CPT 68328), see below    Therapeutic Treatment and Modalities Summary: DLS on solid with CGA-SBA, [id number]  Repeated STS 3x5   Step on foam, forward, lateral and retro. x15 bilaterally. UE support as needed  DLS on solid with comfortable LORRIE and head turns, [id number]  DLS on solid with comfortable LORRIE and head nods, [id number]  [place]pentine through cones 30'x6  Diagonal stepping with forward progression 30'x2  Lateral stepping in parallel bars.     Time-based treatments/modalities:    Physical Therapy Timed Treatment Charges  Neuromusc re-ed, balance, coor, post minutes (CPT 76977): 27 minutes      Pain rating (1-10) before treatment:  0  Pain rating (1-10) after treatment:  0    ASSESSMENT:   Response to treatment: Patient showing improved ability in stance/control on solid ground and progression to foam. Continue to progress as tolerated.     PLAN/RECOMMENDATIONS:   Plan for treatment: therapy treatment to continue next visit.  Planned interventions for next visit: continue with current treatment.

## 2020-07-30 NOTE — TELEPHONE ENCOUNTER
You  Paul Wilkinson M.D. 16 hours ago (3:46 PM)       Is pt low, intermediate, or high risk for EGD? Has he been on DAPT long enough to hold Plavix prior?       Paul Wilkinson M.D.  You 15 hours ago (5:13 PM)       He is at intermediate risk for a low risk surgery. I suggest continuing plavix and planning on diagnostic EGD- this is preferred given the stent is in the left main. If discontinuation of plavix is felt needed, we could have a more in depth discussion      Left detailed VM with Fabiola at [phone] notifying that since 01/20 stent was in left main, it is preferred that pt stay on Plavix for EGD. If it is absolutely necessary for pt to hold Plavix, then we will need to have a more in depth discussion.

## 2020-08-03 ENCOUNTER — PHYSICAL THERAPY (OUTPATIENT)
Dept: PHYSICAL THERAPY | Facility: MEDICAL CENTER | Age: 80
End: 2020-08-03
Attending: PSYCHIATRY & NEUROLOGY
Payer: MEDICARE

## 2020-08-03 DIAGNOSIS — R26.89 BALANCE PROBLEM: ICD-10-CM

## 2020-08-03 PROCEDURE — 97112 NEUROMUSCULAR REEDUCATION: CPT

## 2020-08-03 PROCEDURE — 97110 THERAPEUTIC EXERCISES: CPT

## 2020-08-03 NOTE — OP THERAPY DAILY TREATMENT
Outpatient Physical Therapy  DAILY TREATMENT     [facility] Outpatient Physical Therapy  [address]  [place] [address]  Phone:  [phone]  Fax:  [phone]    Date: 08/03/2020    Patient: USMAN MEANS III Means III  YOB: 1940  MRN: [id number]     Time Calculation    Start time: 0930  Stop time: 1000 Time Calculation (min): 30 minutes         Chief Complaint: Loss Of Balance    Visit #: 5    SUBJECTIVE:  Patient reports no falls and states no new changes. Reports compliance with HEP.    OBJECTIVE:  Current objective measures:         Therapeutic Exercises (CPT 76283):     1. Bridges, [id number]    2. Clamshells, x15, bilaterally, green band    3. Wall squats with band, x20, green band at knees    Therapeutic Treatments and Modalities:     1. Neuromuscular Re-education (CPT 56160), see below    Therapeutic Treatment and Modalities Summary: DLS on solid with CGA-SBA, [id number]  Step on foam, forward, lateral and retro. x15 bilaterally. UE support as needed  DLS on solid with comfortable LORRIE and head turns, [id number]  DLS on solid with comfortable LORRIE and head nods, [id number]  Serpentine through cones 30'x6  Step up and down on foam  Lateral stepping in parallel bars.     Time-based treatments/modalities:    Physical Therapy Timed Treatment Charges  Neuromusc re-ed, balance, coor, post minutes (CPT 03780): 15 minutes  Therapeutic exercise minutes (CPT 00068): 10 minutes      Pain rating (1-10) before treatment:  0  Pain rating (1-10) after treatment:  0    ASSESSMENT:   Response to treatment: Patient showing improved postural stability/balance in narrow LORRIE standing and dynamic stability during gait. Continue to progress as tolerated.     PLAN/RECOMMENDATIONS:   Plan for treatment: therapy treatment to continue next visit.  Planned interventions for next visit: continue with current treatment.

## 2020-08-05 ENCOUNTER — TELEPHONE (OUTPATIENT)
Dept: CARDIOLOGY | Facility: MEDICAL CENTER | Age: 80
End: 2020-08-05

## 2020-08-05 NOTE — TELEPHONE ENCOUNTER
GI doctor, Dr. Sunshine called again and wanted to wait on hold until BE was available. He then put me on the phone with anesthesiologist, Dr. Schmitt. Read through device interrogation flow sheet from 3/27/20. He has no further questions at this time and no longer needs to s/w BE.

## 2020-08-05 NOTE — TELEPHONE ENCOUNTER
BE/[address] RN in [facility]'s same day surgery requesting a call be placed ASAP to Dr Schmitt, anesthesiologist, to discuss the most recent pacer interrogation.  (Dr Schmitt will be taking pt into surgery momentarily)    Specifically, Yenifer states the conversation will be about the pacer setting and also if there were any problems with the interrogation itself.    Contact information:  Dr Schmitt cell# [phone]  Yenifer ph# [phone]  (if problems reaching Dr Schmitt)

## 2020-08-06 ENCOUNTER — PHYSICAL THERAPY (OUTPATIENT)
Dept: PHYSICAL THERAPY | Facility: MEDICAL CENTER | Age: 80
End: 2020-08-06
Attending: PSYCHIATRY & NEUROLOGY
Payer: MEDICARE

## 2020-08-06 DIAGNOSIS — R26.89 BALANCE PROBLEM: ICD-10-CM

## 2020-08-06 PROCEDURE — 97110 THERAPEUTIC EXERCISES: CPT

## 2020-08-06 NOTE — OP THERAPY DAILY TREATMENT
"  Outpatient Physical Therapy  DAILY TREATMENT     [facility] Outpatient Physical Therapy  [address]  [place] [address]  Phone:  [phone]  Fax:  [phone]    Date: 08/06/2020    Patient: USMAN VILLA III Means III  YOB: 1940  MRN: [id number]     Time Calculation    Start time: 1000  Stop time: 1035 Time Calculation (min): 35 minutes         Chief Complaint: Back Problem, Difficulty Walking, and Loss Of Balance    Visit #: 6    SUBJECTIVE:  Patient reports no falls and states no new changes. Reports compliance with HEP.    OBJECTIVE:  Current objective measures:         Therapeutic Exercises (CPT 55191):     1. Nu step , Seat at 13, R arm 11, L arm 12    2. Gastroc , slant board     3. Hs, 6\"    4. Upright bike , 2 holes showing, 5 mins     Therapeutic Treatments and Modalities:     1. Neuromuscular Re-education (CPT 39347), see below    Therapeutic Treatment and Modalities Summary: TRX rows 2 x 10   Golf rotation pull 25# 2 x10  Pallof press 20#, 2 x 10     Time-based treatments/modalities:    Physical Therapy Timed Treatment Charges  Therapeutic exercise minutes (CPT 83124): 30 minutes      Pain rating (1-10) before treatment:  0  Pain rating (1-10) after treatment:  0    ASSESSMENT:   Response to treatment: Doing really well. Loves golf and has a desire to be active. He gets fatigued and a little wobbly      PLAN/RECOMMENDATIONS:   Plan for treatment: therapy treatment to continue next visit.  Planned interventions for next visit: continue with current treatment.       "

## 2020-08-10 DIAGNOSIS — Z95.2 S/P TAVR (TRANSCATHETER AORTIC VALVE REPLACEMENT): ICD-10-CM

## 2020-08-10 RX ORDER — CLOPIDOGREL BISULFATE 75 MG/1
75 TABLET ORAL DAILY
Qty: 30 TAB | Refills: 1 | Status: SHIPPED | OUTPATIENT
Start: 2020-08-10 | End: 2020-10-07

## 2020-08-11 ENCOUNTER — PHYSICAL THERAPY (OUTPATIENT)
Dept: PHYSICAL THERAPY | Facility: MEDICAL CENTER | Age: 80
End: 2020-08-11
Attending: PSYCHIATRY & NEUROLOGY
Payer: MEDICARE

## 2020-08-11 DIAGNOSIS — R26.89 BALANCE PROBLEM: ICD-10-CM

## 2020-08-11 PROCEDURE — 97110 THERAPEUTIC EXERCISES: CPT

## 2020-08-11 NOTE — OP THERAPY DAILY TREATMENT
Outpatient Physical Therapy  DAILY TREATMENT     [facility] Outpatient Physical Therapy  [address]  [place] [address]  Phone:  [phone]  Fax:  [phone]    Date: 08/11/2020    Patient: USMAN MEANS III Means III  YOB: 1940  MRN: [id number]     Time Calculation    Start time: 0930  Stop time: 1000 Time Calculation (min): 30 minutes         Chief Complaint: Loss Of Balance    Visit #: 7    SUBJECTIVE:  Patient reports no significant changes and states no falls since last session.     OBJECTIVE:  Current objective measures:         Therapeutic Exercises (CPT 98627):     1. Nu step , seat @ 13, no arms, resistance 3-5, x5 min    2. Repeated STS, 2x10 reps    3. Golf swing with Tband, 3x10, bilaterally    4. Mid, low, high Rows, x25, purple    5. Step up on foam, x15 bilaterally    6. Lateral stepping with band, x10 bilaterally    Therapeutic Treatments and Modalities:     1. Neuromuscular Re-education (CPT 31398), see below    Time-based treatments/modalities:    Physical Therapy Timed Treatment Charges  Therapeutic exercise minutes (CPT 51071): 28 minutes      Pain rating (1-10) before treatment:  0  Pain rating (1-10) after treatment:  0    ASSESSMENT:   Response to treatment: Patient showing improved dynamic stability and increased LE strength. Progress as tolerated.     PLAN/RECOMMENDATIONS:   Plan for treatment: therapy treatment to continue next visit.  Planned interventions for next visit: continue with current treatment.

## 2020-08-12 ENCOUNTER — PHYSICAL THERAPY (OUTPATIENT)
Dept: PHYSICAL THERAPY | Facility: MEDICAL CENTER | Age: 80
End: 2020-08-12
Attending: PSYCHIATRY & NEUROLOGY
Payer: MEDICARE

## 2020-08-12 DIAGNOSIS — R26.89 BALANCE PROBLEM: ICD-10-CM

## 2020-08-12 PROCEDURE — 97530 THERAPEUTIC ACTIVITIES: CPT

## 2020-08-12 NOTE — OP THERAPY DAILY TREATMENT
Outpatient Physical Therapy  DAILY TREATMENT     [facility] Outpatient Physical Therapy  [address]  [place] [address]  Phone:  [phone]  Fax:  [phone]    Date: 08/12/2020    Patient: USMAN MEANS III Means III  YOB: 1940  MRN: [id number]     Time Calculation    Start time: 1000  Stop time: 1030 Time Calculation (min): 30 minutes         Chief Complaint: Loss Of Balance and Weakness    Visit #: 8    SUBJECTIVE:  Patient reports no significant changes since session yesterday.    OBJECTIVE:  Current objective measures:   Repeated STS: 16.9 seconds  TUG 12.1 seconds  DLS on solid EO: 30 seconds with minimal sway  DLS on solid EC: 11seconds, with moderate to severe sway  DLS on foam EO: 30 seconds with mild to moderate sway  DLS on foam EC: 3 seconds with severe sway  Semi tandem stance: 30seconds with moderate sway  Tandem stance: 2 seconds with severe sway          Therapeutic Exercises (CPT 82089):     1. Nu step , seat @ 13, no arms, resistance 3-5, x5 min    2. Repeated STS, 2x10 reps, not performed    3. Golf swing with Tband, 3x10, bilaterally, not performed    4. Mid, low, high Rows, x25, purple, not performed    5. Step up on foam, x15 bilaterally, not performed    6. Lateral stepping with band, x10 bilaterally, not performed    Therapeutic Treatments and Modalities:     1. Neuromuscular Re-education (CPT 92166), see below    Therapeutic Treatment and Modalities Summary: Repeated STS: 16.9 seconds  TUG 12.1 seconds  DLS on solid EO: 30 seconds with minimal sway  DLS on solid EC: 11seconds, with moderate to severe sway  DLS on foam EO: 30 seconds with mild to moderate sway  DLS on foam EC: 3 seconds with severe sway  Semi tandem stance: 30seconds with moderate sway  Tandem stance: 2 seconds with severe sway      Time-based treatments/modalities:    Physical Therapy Timed Treatment Charges  Therapeutic activity minutes (CPT 32763): 25 minutes      Pain rating  (1-10) before treatment:  0  Pain rating (1-10) after treatment:  0    ASSESSMENT:   Response to treatment: Patient showing significant improvement in all areas since initial evaluation with improving static dynamic postural control.    PLAN/RECOMMENDATIONS:   Plan for treatment: therapy treatment to continue next visit.

## 2020-08-13 ENCOUNTER — PHYSICAL THERAPY (OUTPATIENT)
Dept: PHYSICAL THERAPY | Facility: MEDICAL CENTER | Age: 80
End: 2020-08-13
Attending: PSYCHIATRY & NEUROLOGY
Payer: MEDICARE

## 2020-08-13 DIAGNOSIS — R26.89 BALANCE PROBLEM: ICD-10-CM

## 2020-08-13 PROCEDURE — 97110 THERAPEUTIC EXERCISES: CPT

## 2020-08-13 NOTE — OP THERAPY DAILY TREATMENT
Outpatient Physical Therapy  DAILY TREATMENT     [facility] Outpatient Physical Therapy  [address]  [place] [address]  Phone:  [phone]  Fax:  [phone]    Date: 08/13/2020    Patient: USMAN MEANS III Means III  YOB: 1940  MRN: [id number]     Time Calculation    Start time: 0900  Stop time: 0927 Time Calculation (min): 27 minutes         Chief Complaint: Loss Of Balance and Weakness    Visit #: 9    SUBJECTIVE:  Reports no changes. States compliance with HEP.    OBJECTIVE:  Current objective measures:         Therapeutic Exercises (CPT 11135):     1. Nu step , seat @ 13, no arms, resistance 3-5, x5 min    2. Repeated STS, 2x10 reps    3. Golf swing with Tband, 3x10, bilaterally    4. Mid, low, high Rows, x25, purple, not performed    5. Step up on foam, x15 bilaterally, not performed    6. Lateral stepping with band, x10 bilaterally    7. Pallof Press, x15 bilaterally, blue band    8. Bridges, [id number]    9. Seated march with TrA, [id number] bilaterally    10. Retro stepping with band, x5 with blue band    Therapeutic Treatments and Modalities:     1. Neuromuscular Re-education (CPT 43399), see below    Time-based treatments/modalities:    Physical Therapy Timed Treatment Charges  Therapeutic exercise minutes (CPT 45766): 25 minutes      Pain rating (1-10) before treatment:  0  Pain rating (1-10) after treatment:  0    ASSESSMENT:   Response to treatment: Good tolerance to treatment with no pain reported and no significant LOB. Patient showing improved dynamic stability. Continue to progress as tolerated.     PLAN/RECOMMENDATIONS:   Plan for treatment: therapy treatment to continue next visit.  Planned interventions for next visit: continue with current treatment.

## 2020-08-14 ENCOUNTER — TELEPHONE (OUTPATIENT)
Dept: NEPHROLOGY | Facility: MEDICAL CENTER | Age: 80
End: 2020-08-14

## 2020-08-14 NOTE — TELEPHONE ENCOUNTER
Patient called asking if you could please place a referral to a urologist. He also wants to know who you would specifically recommend for him to see. Please advise.    Thank you

## 2020-08-17 ENCOUNTER — PHYSICAL THERAPY (OUTPATIENT)
Dept: PHYSICAL THERAPY | Facility: MEDICAL CENTER | Age: 80
End: 2020-08-17
Attending: PSYCHIATRY & NEUROLOGY
Payer: MEDICARE

## 2020-08-17 DIAGNOSIS — R26.89 BALANCE PROBLEM: ICD-10-CM

## 2020-08-17 PROCEDURE — 97110 THERAPEUTIC EXERCISES: CPT

## 2020-08-17 PROCEDURE — 97112 NEUROMUSCULAR REEDUCATION: CPT

## 2020-08-17 NOTE — OP THERAPY DAILY TREATMENT
Outpatient Physical Therapy  DAILY TREATMENT     [facility] Outpatient Physical Therapy  [address]  [place] [address]  Phone:  [phone]  Fax:  [phone]    Date: 08/17/2020    Patient: USMAN MEANS III Means III  YOB: 1940  MRN: [id number]     Time Calculation    Start time: 0800  Stop time: 0830 Time Calculation (min): 30 minutes         Chief Complaint: Loss Of Balance    Visit #: 10    SUBJECTIVE:  Patient reports no changes since last week. Continued compliance with HEP stated.    OBJECTIVE:  Current objective measures:         Therapeutic Exercises (CPT 99235):     1. Nu step , seat @ 13, no arms, resistance 3-5, x5 min    2. Repeated STS, 2x10 reps    3. Golf swing with Tband, 3x10, bilaterally, not performed    4. DLS, narrow LORRIE with perturbations, x45 seconds    5. DLS, narrow LORRIE with UE reach to target, x45 seconds    6. Lateral stepping with band, x15 bilaterally    7. Pallof Press, x20 bilaterally, blue band    8. Bridges, [id number]    9. Seated B leg lift with TrA, [id number]    10. Retro stepping with band, x15 with blue band    11. DLS , narrow LORRIE with trunk turns, x45 seconds    15. UPOC 9/21/2020    Therapeutic Treatments and Modalities:     1. Neuromuscular Re-education (CPT 08251), see below    Time-based treatments/modalities:    Physical Therapy Timed Treatment Charges  Neuromusc re-ed, balance, coor, post minutes (CPT 68519): 16 minutes  Therapeutic exercise minutes (CPT 79455): 10 minutes      Pain rating (1-10) before treatment:  0  Pain rating (1-10) after treatment:  0    ASSESSMENT:   Response to treatment: patient showing improved strength and dynamic stability. Continue to progress as tolerated.     PLAN/RECOMMENDATIONS:   Plan for treatment: therapy treatment to continue next visit.  Planned interventions for next visit: continue with current treatment, neuromuscular re-education (CPT 06198) and therapeutic exercise (CPT  99087).

## 2020-08-17 NOTE — OP THERAPY PROGRESS SUMMARY
Outpatient Physical Therapy  PROGRESS SUMMARY NOTE      [facility]  [address]  [place] [address]  Phone:  [phone]  Fax:  [phone]    Date of Visit: 08/17/2020    Patient: USMAN VILLA III Means III  YOB: 1940  MRN: [id number]     Referring Provider: Rafael Valderrama M.D.  [address] [place] [address]  [place] [address]   Referring Diagnosis Radiculopathy, lumbar region [M54.16];Type 2 diabetes mellitus with diabetic polyneuropathy [E11.42];Ataxia, unspecified [R27.0]     Visit Diagnoses     ICD-10-CM   1. Balance problem  R26.89       Rehab Potential: fair    Progress Report Period: 7/21/2020-8/17/2020    Functional Assessment Used          Objective Findings and Assessment:   Patient progression towards goals: Short Term Goals:   1. Establish initial HEP-goal met  2. Report no falls over 2 week period-goal met  3. Demonstrate improved TUG time to less than 13 seconds-goal met      Long Term Goals:    1. Independent and compliant in HEP-progressing  2. Report no falls over 1 month period-progressing  3. Improve Guzman balance score to greater than 45/56-progressing  4. Improve TUG to less than 11 seconds-progressing    Objective findings and assessment details: Repeated STS: 16.9 seconds  TUG 12.1 seconds  DLS on solid EO: 30 seconds with minimal sway  DLS on solid EC: 11seconds, with moderate to severe sway  DLS on foam EO: 30 seconds with mild to moderate sway  DLS on foam EC: 3 seconds with severe sway  Semi tandem stance: 30seconds with moderate sway  Tandem stance: 2 seconds with severe sway    Patient showing significant improvement in all measured areas and progressing towards all established goals. Continues to have scores in functional testing with Guzman, DGI, TUG, and Modified CTSIB that place patient in high fall risk category. Would benefit from continued treatment to improve postural control/balance, increase strength and  decrease fall risk.     Goals:   Short Term Goals:   1. Continue with current HEP  2. DGI of 15 or greater  Short term goal time span:  1-2 weeks      Long Term Goals:    1. Independent and compliant in HEP  2. Report no falls over 1 month period  3. Improve Guzman balance score to greater than 45/56  4. Improve TUG to less than 11 seconds  Long term goal time span:  4-6 weeks    Plan:   Planned therapy interventions:  Therapeutic Activities (CPT 87992), Therapeutic Exercise (CPT 43379), Manual Therapy (CPT 78610) and Neuromuscular Re-education (CPT 13651)  Frequency:  3x week  Duration in weeks:  4  Duration in visits:  12      Referring provider co-signature:  I have reviewed this plan of care and my co-signature certifies the need for services.     Certification Period: 08/17/2020 to 09/21/20    Physician Signature: ________________________________ Date: ______________

## 2020-08-18 DIAGNOSIS — R33.9 URINARY RETENTION: ICD-10-CM

## 2020-08-18 DIAGNOSIS — N18.30 CKD (CHRONIC KIDNEY DISEASE) STAGE 3, GFR 30-59 ML/MIN: ICD-10-CM

## 2020-08-19 ENCOUNTER — PHYSICAL THERAPY (OUTPATIENT)
Dept: PHYSICAL THERAPY | Facility: MEDICAL CENTER | Age: 80
End: 2020-08-19
Attending: PSYCHIATRY & NEUROLOGY
Payer: MEDICARE

## 2020-08-19 DIAGNOSIS — R26.89 BALANCE PROBLEM: ICD-10-CM

## 2020-08-19 PROCEDURE — 97112 NEUROMUSCULAR REEDUCATION: CPT

## 2020-08-19 NOTE — OP THERAPY DAILY TREATMENT
Outpatient Physical Therapy  DAILY TREATMENT     [facility] Outpatient Physical Therapy  [address]  [place] [address]  Phone:  [phone]  Fax:  [phone]    Date: 08/19/2020    Patient: USMAN MEANS III Means III  YOB: 1940  MRN: [id number]     Time Calculation    Start time: 0830  Stop time: 0900 Time Calculation (min): 30 minutes         Chief Complaint: Loss Of Balance    Visit #: 11    SUBJECTIVE:  Patient reporting continued progression.    OBJECTIVE:  Current objective measures:       Exercises/Treatment  Time-based treatments/modalities:    Physical Therapy Timed Treatment Charges  Neuromusc re-ed, balance, coor, post minutes (CPT 37300): 28 minutes      Pain rating (1-10) before treatment:  0  Pain rating (1-10) after treatment:  0    ASSESSMENT:   Response to treatment: Patient showing improved dynamic stability and postural control     PLAN/RECOMMENDATIONS:   Plan for treatment: therapy treatment to continue next visit.  Planned interventions for next visit: continue with current treatment.

## 2020-08-21 ENCOUNTER — PHYSICAL THERAPY (OUTPATIENT)
Dept: PHYSICAL THERAPY | Facility: MEDICAL CENTER | Age: 80
End: 2020-08-21
Attending: PSYCHIATRY & NEUROLOGY
Payer: MEDICARE

## 2020-08-21 DIAGNOSIS — R26.89 BALANCE PROBLEM: ICD-10-CM

## 2020-08-21 PROCEDURE — 97112 NEUROMUSCULAR REEDUCATION: CPT

## 2020-08-21 NOTE — OP THERAPY DAILY TREATMENT
Outpatient Physical Therapy  DAILY TREATMENT     [facility] Outpatient Physical Therapy  [address]  [place] [address]  Phone:  [phone]  Fax:  [phone]    Date: 08/21/2020    Patient: USMAN MEANS III Means III  YOB: 1940  MRN: [id number]     Time Calculation    Start time: 0900  Stop time: 0930 Time Calculation (min): 30 minutes         Chief Complaint: Loss Of Balance    Visit #: 12    SUBJECTIVE:  Patient states compliance with HEP, reports no falls    OBJECTIVE:  Current objective measures:         Therapeutic Exercises (CPT 46608):     3. Golf swing with Tband, 3x10, bilaterally, not performed    4. DLS, narrow LORRIE with perturbations, x45 seconds    5. DLS, narrow LORRIE with UE reach to target, x45 seconds    6. Lateral stepping with band, x15 bilaterally    7. Pallof Press, x20 bilaterally, blue band    8. Forward step with retro return on foam, x10 bilaterally    9. Heel raises on foam, x30    10. Marching on foam, [id number]    15. UPOC 9/21/2020      Time-based treatments/modalities:    Physical Therapy Timed Treatment Charges  Neuromusc re-ed, balance, coor, post minutes (CPT 17836): 28 minutes      Pain rating (1-10) before treatment:  0  Pain rating (1-10) after treatment:  0    ASSESSMENT:   Response to treatment: Showing continued improvement in all measured areas. Deficits in dynamic postural control with proprioceptive and visual challenges. Continue to progress as tolerated.     PLAN/RECOMMENDATIONS:   Plan for treatment: therapy treatment to continue next visit.  Planned interventions for next visit: continue with current treatment.

## 2020-08-24 ENCOUNTER — PHYSICAL THERAPY (OUTPATIENT)
Dept: PHYSICAL THERAPY | Facility: MEDICAL CENTER | Age: 80
End: 2020-08-24
Attending: PSYCHIATRY & NEUROLOGY
Payer: MEDICARE

## 2020-08-24 DIAGNOSIS — R26.89 BALANCE PROBLEM: ICD-10-CM

## 2020-08-24 PROCEDURE — 97110 THERAPEUTIC EXERCISES: CPT

## 2020-08-24 PROCEDURE — 97112 NEUROMUSCULAR REEDUCATION: CPT

## 2020-08-24 NOTE — OP THERAPY DAILY TREATMENT
Outpatient Physical Therapy  DAILY TREATMENT     [facility]  [address]  [place] [address]  Phone:  [phone]  Fax:  [phone]    Date: 08/24/2020    Patient: USMAN MEANS III Means III  YOB: 1940  MRN: [id number]     Time Calculation    Start time: 1050  Stop time: 1131 Time Calculation (min): 41 minutes         Chief Complaint: Loss Of Balance    Visit #: 13    SUBJECTIVE:  Patient reports overall improvement and states compliance with HEP    OBJECTIVE:  Current objective measures:         Therapeutic Exercises (CPT 30266):     1. HSC on ball in supine, x30    2. LUmbar rotation on ball, x20    3. Double leg lift with TrA, [id number]    4. 30 second repeated STS, x3, 9 reps, 9 reps, 10 reps    5. Retro step with trunk rotation bilaterally, x15 blue    6. Lateral stepping with band with pallot press, x15 bilaterally, blue    15. UPOC 9/21/2020    Therapeutic Treatments and Modalities:     1. Neuromuscular Re-education (CPT 32022), see below    Therapeutic Treatment and Modalities Summary: Step up forward on foam x20 bilatearally  Step up with retro return on foam x15 bilaterally  Standing with 360 degree turns, bilaterally x10  DLS on foam with with arms crossed, eyes closed, x5 reps  DLS with head turns L/R/Up/down [id number]    Time-based treatments/modalities:    Physical Therapy Timed Treatment Charges  Neuromusc re-ed, balance, coor, post minutes (CPT 68575): 18 minutes  Therapeutic exercise minutes (CPT 82180): 21 minutes      Pain rating (1-10) before treatment:  0  Pain rating (1-10) after treatment:  0    ASSESSMENT:   Response to treatment: Patient with good overall response to treatment and showing improved dynamic postural control. Continue to progress as tolerated.     PLAN/RECOMMENDATIONS:   Plan for treatment: therapy treatment to continue next visit.  Planned interventions for next visit: continue with current  treatment.

## 2020-08-25 ENCOUNTER — PHYSICAL THERAPY (OUTPATIENT)
Dept: PHYSICAL THERAPY | Facility: MEDICAL CENTER | Age: 80
End: 2020-08-25
Attending: PSYCHIATRY & NEUROLOGY
Payer: MEDICARE

## 2020-08-25 DIAGNOSIS — R26.89 BALANCE PROBLEM: ICD-10-CM

## 2020-08-25 PROCEDURE — 97112 NEUROMUSCULAR REEDUCATION: CPT

## 2020-08-25 PROCEDURE — 97110 THERAPEUTIC EXERCISES: CPT

## 2020-08-25 NOTE — OP THERAPY DAILY TREATMENT
Outpatient Physical Therapy  DAILY TREATMENT     [facility] Outpatient Physical Therapy  [address]  [place] [address]  Phone:  [phone]  Fax:  [phone]    Date: 08/25/2020    Patient: USMAN MEANS III Means III  YOB: 1940  MRN: [id number]     Time Calculation    Start time: 0900  Stop time: 0930 Time Calculation (min): 30 minutes         Chief Complaint: Loss Of Balance    Visit #: 14    SUBJECTIVE:  Patient reporting overall improvement, no falls since last session    OBJECTIVE:  Current objective measures:         Therapeutic Exercises (CPT 77656):     1. HSC on ball in supine, x30    2. LUmbar rotation on ball, x20    3. Resisted squat with Tband, x30, blue    4. 30 second repeated STS, x3, 9 reps, 9 reps, 10 reps    5. Retro step with trunk rotation bilaterally, x15 blue    6. Lateral stepping with band with pallot press, x15 bilaterally, blue    15. UPOC 9/21/2020    Therapeutic Treatments and Modalities:     1. Neuromuscular Re-education (CPT 72864), see below    Therapeutic Treatment and Modalities Summary: Step up forward on foam x20 bilatearally  Step up with retro return on foam x15 bilaterally  Standing with 360 degree turns, bilaterally x10  DLS on foam with with arms crossed, eyes closed, x5 reps  DLS with head turns L/R/Up/down [id number]    Time-based treatments/modalities:    Physical Therapy Timed Treatment Charges  Neuromusc re-ed, balance, coor, post minutes (CPT 54943): 10 minutes  Therapeutic exercise minutes (CPT 07220): 17 minutes      Pain rating (1-10) before treatment:  0  Pain rating (1-10) after treatment:  0    ASSESSMENT:   Response to treatment: Patient showing overall improvement in postural control in both static and dynamic activities and on variety of surfaces. Continues to demonstrate imbalance and gait difficulties with continued fall risk. Continue to progress as tolerated.     PLAN/RECOMMENDATIONS:   Plan for treatment:  therapy treatment to continue next visit.  Planned interventions for next visit: continue with current treatment, neuromuscular re-education (CPT 36769) and therapeutic exercise (CPT 47666).

## 2020-08-27 DIAGNOSIS — I10 ESSENTIAL HYPERTENSION: Chronic | ICD-10-CM

## 2020-08-27 RX ORDER — CARVEDILOL 3.12 MG/1
TABLET ORAL
Qty: 90 TAB | Refills: 0 | Status: SHIPPED | OUTPATIENT
Start: 2020-08-27 | End: 2020-10-07

## 2020-08-28 ENCOUNTER — PHYSICAL THERAPY (OUTPATIENT)
Dept: PHYSICAL THERAPY | Facility: MEDICAL CENTER | Age: 80
End: 2020-08-28
Attending: PSYCHIATRY & NEUROLOGY
Payer: MEDICARE

## 2020-08-28 DIAGNOSIS — R26.89 BALANCE PROBLEM: ICD-10-CM

## 2020-08-28 PROCEDURE — 97112 NEUROMUSCULAR REEDUCATION: CPT

## 2020-08-28 PROCEDURE — 97110 THERAPEUTIC EXERCISES: CPT

## 2020-08-28 NOTE — OP THERAPY DAILY TREATMENT
Outpatient Physical Therapy  DAILY TREATMENT     [facility] Outpatient Physical Therapy  [address]  [place] [address]  Phone:  [phone]  Fax:  [phone]    Date: 08/28/2020    Patient: USMAN MEANS III Means III  YOB: 1940  MRN: [id number]     Time Calculation    Start time: 1000  Stop time: 1030 Time Calculation (min): 30 minutes         Chief Complaint: Loss Of Balance    Visit #: 15    SUBJECTIVE:  Patient reports compliance with HEP and no significant changes since last session    OBJECTIVE:  Current objective measures:         Therapeutic Exercises (CPT 84750):     1. HSC on ball in supine, x30    2. LUmbar rotation on ball, x20    3. Bridge on swiss ball, 15x5 seconds    5. Retro step with trunk rotation bilaterally, x15 blue    15. UPOC 9/21/2020    Therapeutic Treatments and Modalities:     1. Neuromuscular Re-education (CPT 27055), see below    Therapeutic Treatment and Modalities Summary: Step up forward on perla-disc x20 bilatearally  Golf swing bilaterally with 1 foot on foam x15 with blue band  DLS on tampoline with with arms crossed, therapist perturbations, [id number]  DLS with head turns L/R/Up/down, with therapist [id number]    Time-based treatments/modalities:    Physical Therapy Timed Treatment Charges  Neuromusc re-ed, balance, coor, post minutes (CPT 62525): 15 minutes  Therapeutic exercise minutes (CPT 94981): 13 minutes      Pain rating (1-10) before treatment:  0  Pain rating (1-10) after treatment:  0    ASSESSMENT:   Response to treatment: patient showing improved dynamic postural control on variety of surfaces. Continue to progress.     PLAN/RECOMMENDATIONS:   Plan for treatment: therapy treatment to continue next visit.  Planned interventions for next visit: continue with current treatment.

## 2020-08-31 ENCOUNTER — PHYSICAL THERAPY (OUTPATIENT)
Dept: PHYSICAL THERAPY | Facility: MEDICAL CENTER | Age: 80
End: 2020-08-31
Attending: PSYCHIATRY & NEUROLOGY
Payer: MEDICARE

## 2020-08-31 DIAGNOSIS — R26.89 BALANCE PROBLEM: ICD-10-CM

## 2020-08-31 PROCEDURE — 97110 THERAPEUTIC EXERCISES: CPT

## 2020-08-31 PROCEDURE — 97112 NEUROMUSCULAR REEDUCATION: CPT

## 2020-08-31 NOTE — OP THERAPY DAILY TREATMENT
Outpatient Physical Therapy  DAILY TREATMENT     [facility] Outpatient Physical Therapy  [address]  [place] [address]  Phone:  [phone]  Fax:  [phone]    Date: 08/31/2020    Patient: USMAN MEANS III Means III  YOB: 1940  MRN: [id number]     Time Calculation    Start time: 0830  Stop time: 0900 Time Calculation (min): 30 minutes         Chief Complaint: Loss Of Balance    Visit #: 16    SUBJECTIVE:  Patient reports continued compliance with HEP.     OBJECTIVE:  Current objective measures:         Therapeutic Exercises (CPT 40703):     1. HSC on ball in supine, x30    2. LUmbar rotation on ball, x20    3. Bridge on swiss ball, 15x5 seconds    5. Retro step with trunk rotation bilaterally, x15 blue    15. UPOC 9/21/2020    Therapeutic Treatments and Modalities:     1. Neuromuscular Re-education (CPT 10621), see below    Therapeutic Treatment and Modalities Summary: Step up forward on perla-disc x20 bilatearally  Golf swing bilaterally with 1 foot on foam x15 with blue band  DLS on tampoline with with arms crossed, therapist perturbations, [id number]  DLS with head turns L/R/Up/down, with therapist [id number]  DLS on foam with head turns [id number]    Time-based treatments/modalities:    Physical Therapy Timed Treatment Charges  Neuromusc re-ed, balance, coor, post minutes (CPT 59292): 10 minutes  Therapeutic exercise minutes (CPT 14312): 15 minutes      Pain rating (1-10) before treatment:  0  Pain rating (1-10) after treatment:  0    ASSESSMENT:   Response to treatment: Patient with overall improvement in static and dynamic postural control. Showing improved ability to maintain control during activities involving perturbations. Continue to progress as tolerated.     PLAN/RECOMMENDATIONS:   Plan for treatment: therapy treatment to continue next visit.  Planned interventions for next visit: continue with current treatment.

## 2020-09-02 ENCOUNTER — PHYSICAL THERAPY (OUTPATIENT)
Dept: PHYSICAL THERAPY | Facility: MEDICAL CENTER | Age: 80
End: 2020-09-02
Attending: PSYCHIATRY & NEUROLOGY
Payer: MEDICARE

## 2020-09-02 DIAGNOSIS — R26.89 BALANCE PROBLEM: ICD-10-CM

## 2020-09-02 PROCEDURE — 97112 NEUROMUSCULAR REEDUCATION: CPT

## 2020-09-02 PROCEDURE — 97110 THERAPEUTIC EXERCISES: CPT

## 2020-09-02 NOTE — OP THERAPY DAILY TREATMENT
Outpatient Physical Therapy  DAILY TREATMENT     [facility] Outpatient Physical Therapy  [address]  [place] [address]  Phone:  [phone]  Fax:  [phone]    Date: 09/02/2020    Patient: USMAN MEANS III Means III  YOB: 1940  MRN: [id number]     Time Calculation    Start time: 0830  Stop time: 0900 Time Calculation (min): 30 minutes         Chief Complaint: Loss Of Balance    Visit #: 17    SUBJECTIVE:  Patient reports feeling stronger and more balanced when ascending/descending stairs at home    OBJECTIVE:  Current objective measures:         Therapeutic Exercises (CPT 98791):     1. HSC on ball in supine, x30    2. LUmbar rotation on ball, x20    3. Bridge on swiss ball, 15x5 seconds    4. Repeated STS, varying heights, 20.5, 20, 19.5, 19, 18.75, 5 reps at each height without UE support    5. Retro step with trunk rotation bilaterally, x15 blue    15. UPOC 9/21/2020    Therapeutic Treatments and Modalities:     1. Neuromuscular Re-education (CPT 18477), see below    Therapeutic Treatment and Modalities Summary: Step up forward on perla-disc x20 bilatearally  Golf swing bilaterally with 1 foot on foam x15 with blue band  DLS on tampoline with with arms crossed, therapist perturbations, [id number]  DLS with head turns L/R/Up/down, with therapist [id number]  DLS on foam with head turns [id number]    Time-based treatments/modalities:    Physical Therapy Timed Treatment Charges  Neuromusc re-ed, balance, coor, post minutes (CPT 09547): 14 minutes  Therapeutic exercise minutes (CPT 44163): 14 minutes      Pain rating (1-10) before treatment:  0  Pain rating (1-10) after treatment:  0    ASSESSMENT:   Response to treatment: Patient demonstrated improved balance in dynamic activities. Continue to progress as tolerated.     PLAN/RECOMMENDATIONS:   Plan for treatment: therapy treatment to continue next visit.  Planned interventions for next visit: continue  with current treatment.

## 2020-09-04 ENCOUNTER — SLEEP CENTER VISIT (OUTPATIENT)
Dept: SLEEP MEDICINE | Facility: MEDICAL CENTER | Age: 80
End: 2020-09-04
Payer: MEDICARE

## 2020-09-04 ENCOUNTER — PHYSICAL THERAPY (OUTPATIENT)
Dept: PHYSICAL THERAPY | Facility: MEDICAL CENTER | Age: 80
End: 2020-09-04
Attending: PSYCHIATRY & NEUROLOGY
Payer: MEDICARE

## 2020-09-04 VITALS
OXYGEN SATURATION: 94 % | WEIGHT: 225 LBS | BODY MASS INDEX: 29.82 KG/M2 | HEART RATE: 59 BPM | DIASTOLIC BLOOD PRESSURE: 50 MMHG | RESPIRATION RATE: 16 BRPM | SYSTOLIC BLOOD PRESSURE: 90 MMHG | HEIGHT: 73 IN

## 2020-09-04 DIAGNOSIS — I50.22 CHF (CONGESTIVE HEART FAILURE), NYHA CLASS II, CHRONIC, SYSTOLIC (HCC): ICD-10-CM

## 2020-09-04 DIAGNOSIS — G47.33 OSA (OBSTRUCTIVE SLEEP APNEA): ICD-10-CM

## 2020-09-04 DIAGNOSIS — Z95.2 S/P TAVR (TRANSCATHETER AORTIC VALVE REPLACEMENT): ICD-10-CM

## 2020-09-04 DIAGNOSIS — D75.1 POLYCYTHEMIA: Chronic | ICD-10-CM

## 2020-09-04 DIAGNOSIS — R26.89 BALANCE PROBLEM: ICD-10-CM

## 2020-09-04 DIAGNOSIS — Z95.0 S/P PLACEMENT OF CARDIAC PACEMAKER: ICD-10-CM

## 2020-09-04 PROCEDURE — 97112 NEUROMUSCULAR REEDUCATION: CPT

## 2020-09-04 PROCEDURE — 97110 THERAPEUTIC EXERCISES: CPT

## 2020-09-04 PROCEDURE — 99213 OFFICE O/P EST LOW 20 MIN: CPT | Performed by: INTERNAL MEDICINE

## 2020-09-04 ASSESSMENT — FIBROSIS 4 INDEX: FIB4 SCORE: 2.49

## 2020-09-04 NOTE — PROGRESS NOTES
No chief complaint on file.  CC: JAMES follow up    HPI: This patient is a 79 y.o. male who returns for obstructive sleep apnea and 1st CPAP compliance check. PSG noted severe JAMES with AHI 51 events per hour and he was started on CPAP: 15 cm of water with 2 L oxygen bleed in.  Compliance card shows 100% CPAP usage for over 6 hours nightly.  His average AHI is reduced to 1.2.  He switched to a full facemask and notes periodic mask leakage.  He denies any specific issues with CPAP therapy.  Not seen any significant change in sleep quality.  He has a history of congestive heart failure and polycythemia.    Past Medical History:   Diagnosis Date   • Arthritis    • Back pain    • Breath shortness     pt states short of breath 24/7 O2 at night only   • CAD (coronary artery disease)    • CATARACT     removed bilat   • Chest pain    • Chest tightness    • Chickenpox    • Congestive heart failure (HCC)    • Constipation    • Coronary heart disease    • Cough    • Daytime sleepiness    • Diabetes     oral medication   • Difficulty breathing    • Dilated cardiomyopathy (HCC)    • Fatigue    • GERD (gastroesophageal reflux disease)    • Hearing difficulty    • Heart murmur    • Heart valve disease    • Heartburn    • Hiatus hernia syndrome    • Hyperlipidemia    • Hypertension     pt states well controlled on meds   • 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 Porter, gastroenterologist   • Unspecified hemorrhagic conditions     on plavix       Social History     Socioeconomic  History   • Marital status:      Spouse name: Not on file   • Number of children: Not on file   • Years of education: Not on file   • Highest education level: Not on file   Occupational History   • Not on file   Social Needs   • Financial resource strain: Not on file   • Food insecurity     Worry: Not on file     Inability: Not on file   • Transportation needs     Medical: Not on file     Non-medical: Not on file   Tobacco Use   • Smoking status: Never Smoker   • Smokeless tobacco: Never Used   • Tobacco comment: 0   Substance and Sexual Activity   • Alcohol use: No     Alcohol/week: 0.0 oz     Frequency: Never     Binge frequency: Never   • Drug use: No   • Sexual activity: Yes   Lifestyle   • Physical activity     Days per week: Not on file     Minutes per session: Not on file   • Stress: Not on file   Relationships   • Social connections     Talks on phone: Not on file     Gets together: Not on file     Attends Jainism service: Not on file     Active member of club or organization: Not on file     Attends meetings of clubs or organizations: Not on file     Relationship status: Not on file   • Intimate partner violence     Fear of current or ex partner: Not on file     Emotionally abused: Not on file     Physically abused: Not on file     Forced sexual activity: Not on file   Other Topics Concern   • Not on file   Social History Narrative   • Not on file       Family History   Problem Relation Age of Onset   • No Known Problems Sister    • No Known Problems Sister    • No Known Problems Sister    • Diabetes Other    • No Known Problems Mother    • No Known Problems Father    • Heart Disease Neg Hx        Current Outpatient Medications on File Prior to Visit   Medication Sig Dispense Refill   • carvedilol (COREG) 3.125 MG Tab TAKE 1 TABLET BY MOUTH TWICE DAILY WITH MEALS 90 Tab 0   • clopidogrel (PLAVIX) 75 MG Tab Take 1 Tab by mouth every day. 30 Tab 1   • ezetimibe (ZETIA) 10 MG Tab Take 1 tablet by  mouth once daily 90 Tab 0   • Ferrous Sulfate (IRON PO) Take  by mouth.     • Probiotic Product (PROBIOTIC PO) Take  by mouth.     • aspirin EC (ECOTRIN) 81 MG Tablet Delayed Response Take 81 mg by mouth every day.     • HYDROcodone-acetaminophen (NORCO) 5-325 MG Tab per tablet Take 1 Tab by mouth every four hours as needed.     • acetaminophen (TYLENOL) 325 MG Tab Take 650 mg by mouth every four hours as needed.     • clomiPHENE (CLOMID) 50 MG tablet Take 50 mg by mouth 2X A WEEK.     • hydrocortisone 2.5 % Ointment Apply 1 Each to affected area(s) as needed.     • Calcium Citrate (CITRACAL PO) Take 1 Tab by mouth 2 Times a Day.     • bumetanide (BUMEX) 1 MG Tab Take 1 Tab by mouth 2 times a day. 120 Tab 3   • ENTRESTO 24-26 MG Tab tablet Take 1 tablet by mouth twice daily 60 Tab 6   • fluticasone (FLONASE) 50 MCG/ACT nasal spray Use 2 spray(s) in each nostril once daily (Patient taking differently: 1 time daily as needed.) 48 g 3   • Multiple Vitamins-Minerals (ICAPS) Tab Take 1 Tab by mouth 2 Times a Day.     • lactobacillus rhamnosus (CULTURELLE) Cap capsule Take 1 Cap by mouth every day.     • testosterone cypionate (DEPO-TESTOSTERONE) 200 MG/ML Solution injection 50 mg by Intramuscular route every thursday.     • cyanocobalamin (VITAMIN B-12) 1000 MCG/ML Solution 1,000 mcg by Intramuscular route every thursday.     • coenzyme Q-10 30 MG capsule Take 60 mg by mouth 2 Times a Day.     • Garlic 1000 MG Cap Take 1,000 mg by mouth 2 Times a Day.     • glucosamine Sulfate 500 MG Cap Take 1,500 mg by mouth 2 Times a Day.     • vitamin e (VITAMIN E) 400 UNIT Cap Take 400 Units by mouth 2 times a day.     • omeprazole (PRILOSEC) 40 MG delayed-release capsule Take 40 mg by mouth 2 times a day.     • Non Formulary Request Take 1 Cap by mouth 2 Times a Day. Cholox (OTC)     • Cholecalciferol (VITAMIN D3) 2000 UNIT Cap Take 2,000 Units by mouth 2 Times a Day.     • Non Formulary Request Take 1 Cap by mouth 2 Times a Day.  Domenic red (OTC)     • Non Formulary Request Take 1 Cap by mouth every day. Nitric OXIDE (OTC)     • Non Formulary Request Take 1 Cap by mouth every day. anit-oxidant (OTC)     • Zinc 50 MG Tab Take 50 mg by mouth every day.     • anastrozole (ARIMIDEX) 1 MG Tab Take 0.5 mg by mouth See Admin Instructions. On Tue and Sat     • gabapentin (NEURONTIN) 300 MG Cap Take 300 mg by mouth every 6 hours as needed. Indications: Neuropathic Pain     • glimepiride (AMARYL) 1 MG tablet Take 1 mg by mouth every morning.     • B Complex Vitamins (VITAMIN B COMPLEX PO) Take 1 Tab by mouth every day.     • Cranberry 300 MG Tab Take 300 mg by mouth every morning.     • L-Lysine 1000 MG Tab Take 1,000 mg by mouth every day.     • Ascorbic Acid (VITAMIN C) 1000 MG Tab Take 1,000 mg by mouth 2 Times a Day.     • [DISCONTINUED] Home Care Oxygen Inhale 2 L/min by mouth Continuous.   Respiratory route via: Nasal Cannula   Oxygen supplier: [facility]  Indications: Hypoxia, to keep O2 sat over 90%       No current facility-administered medications on file prior to visit.        Allergies: Statins [hmg-coa-r inhibitors] and Atorvastatin    ROS:   Constitutional: Denies fevers, chills, night sweats, fatigue or weight loss  Eyes: Denies vision loss, pain, drainage, double vision  Ears, Nose, Throat: Denies earache, difficulty hearing, tinnitus, nasal congestion, hoarseness  Cardiovascular: Denies chest pain, tightness, palpitations, orthopnea or edema  Respiratory: Denies shortness of breath, cough, wheezing, hemoptysis  Sleep: Denies daytime sleepiness, snoring, apneas, insomnia, morning headaches  GI: Denies heartburn, dysphagia, nausea, abdominal pain, diarrhea or constipation  : Denies frequent urination, hematuria, discharge or painful urination  Musculoskeletal: Denies back pain, painful joints, sore muscles  Neurological: Denies weakness or headaches  Skin: No rashes    BP (!) 90/50 (BP Location: Left arm, Patient Position: Sitting, BP Cuff  "Size: Adult)   Pulse (!) 59   Resp 16   Ht 1.854 m (6' 1\")   Wt 102.1 kg (225 lb)   SpO2 94%     Physical Exam:  Appearance: Well-nourished, well-developed, in no acute distress  HEENT: Normocephalic, atraumatic, white sclera, PERRLA, masked  Neck: No masses  Respiratory: no intercostal retractions or accessory muscle use  Lungs auscultation: no audible wheezing  Cardiovascular: No LE edema  Abdomen: Nondistended  Gait: Normal  Digits: No clubbing, cyanosis  Motor: No focal deficits  Orientation: Oriented to time, person and place    Diagnosis:  1. JAMES (obstructive sleep apnea)     2. S/P placement of cardiac pacemaker     3. S/P TAVR (transcatheter aortic valve replacement)     4. Polycythemia     5. CHF (congestive heart failure), NYHA class II, chronic, systolic (HCC)         Plan:  The patient has severe JAMES and hypoxia, AHI 51/h, now on CPAP:15 cm H20 with 2 L oxygen bleed in.  He shows excellent compliance with AHI reduced to 1.    He was encouraged to continue CPAP therapy to help treat cardiovascular comorbidities.  Last hematocrit was normal.  Encouraged to discuss mask refit with his DME.  Return in about 6 months (around 3/4/2021).      "

## 2020-09-04 NOTE — OP THERAPY DAILY TREATMENT
Outpatient Physical Therapy  DAILY TREATMENT     [facility] Physical Therapy  [address]  [place] [address]  Phone:  [phone]  Fax:  [phone]    Date: 09/04/2020    Patient: LIAN More III  YOB: 1940  MRN: [id number]     Time Calculation    Start time: 1100  Stop time: 1130 Time Calculation (min): 30 minutes         Chief Complaint: Loss Of Balance    Visit #: 18    SUBJECTIVE:  No significant changes reported. Continued treatment for balance/instability.     OBJECTIVE:  Current objective measures:         Therapeutic Exercises (CPT 13016):     3. Bridge on swiss ball, 15x5 seconds    4. Repeated STS, varying heights,  19, 18.75, 18.5, 18, 5 reps at each height without UE support    5. Retro step with trunk rotation bilaterally, x15 blue    15. UPOC 9/21/2020    Therapeutic Treatments and Modalities:     1. Neuromuscular Re-education (CPT 95458), see below    Therapeutic Treatment and Modalities Summary: Step up forward on perla-disc x20 bilatearally  Golf swing bilaterally with 1 foot on foam x15 with blue band  DLS on tampoline with with arms crossed, therapist perturbations, [id number]  Forward lateral weight shift on BOSU, x20, bilaterally  DLS on foam with head turns [id number]    Time-based treatments/modalities:    Physical Therapy Timed Treatment Charges  Neuromusc re-ed, balance, coor, post minutes (CPT 11360): 18 minutes  Therapeutic exercise minutes (CPT 21761): 10 minutes      Pain rating (1-10) before treatment:  0  Pain rating (1-10) after treatment:  0    ASSESSMENT:   Response to treatment: Patient continue to show improved overall postural control and stability. Continued limitations and medium fall risk. Progress as tolerated.     PLAN/RECOMMENDATIONS:   Plan for treatment: therapy treatment to continue next visit.  Planned interventions for next visit: continue with current treatment.

## 2020-09-08 ENCOUNTER — PHYSICAL THERAPY (OUTPATIENT)
Dept: PHYSICAL THERAPY | Facility: MEDICAL CENTER | Age: 80
End: 2020-09-08
Attending: PSYCHIATRY & NEUROLOGY
Payer: MEDICARE

## 2020-09-08 DIAGNOSIS — R26.89 BALANCE PROBLEM: ICD-10-CM

## 2020-09-08 PROCEDURE — 97530 THERAPEUTIC ACTIVITIES: CPT

## 2020-09-08 NOTE — OP THERAPY DAILY TREATMENT
Outpatient Physical Therapy  DAILY TREATMENT     [facility] Out[facility] Physical [facility]  [address]  [place] [address]  Phone:  [phone]  Fax:  [phone]    Date: 09/08/2020    Patient: LIAN More III  YOB: 1940  MRN: [id number]     Time Calculation    Start time: 1030  Stop time: 1100 Time Calculation (min): 30 minutes         Chief Complaint: Loss Of Balance    Visit #: 19    SUBJECTIVE:  Patient reporting overall improvement but continued limitations in balance and gait.     OBJECTIVE:  Current objective measures:   5x repeated STS 14.1second  TUG 10.7 seconds  DLS on solid with EC  13 seconds (improved from 11 at progress note)  DLS on foam with EC: 6.5 seconds, (improved from 3 at last progress note)  Tandem stance 3 seconds  Guzman Balance 36/56            Therapeutic Treatments and Modalities:     1. Therapeutic Activities (CPT 13739), see below    Therapeutic Treatment and Modalities Summary: TUG  Repeated STS  30 second repeated STS  Modified CTSIB  Guzman balance    Time-based treatments/modalities:    Physical Therapy Timed Treatment Charges  Therapeutic activity minutes (CPT 50540): 25 minutes      Pain rating (1-10) before treatment:  0  Pain rating (1-10) after treatment:  0    ASSESSMENT:   Response to treatment: Patient showing improvement in all measured areas and progression functional test scores.  Continued deficits with scores in moderate fall risk category. Would benefit from continued treatment to address.     PLAN/RECOMMENDATIONS:   Plan for treatment: therapy treatment to continue next visit.  Planned interventions for next visit: continue with current treatment.

## 2020-09-08 NOTE — OP THERAPY PROGRESS SUMMARY
Outpatient Physical Therapy  PROGRESS SUMMARY NOTE      [facility] Outpatient Physical Therapy  [address]  [place] [address]  Phone:  [phone]  Fax:  [phone]    Date of Visit: 09/08/2020    Patient: LIAN More III  YOB: 1940  MRN: [id number]     Referring Provider: Rafael Valderrama M.D.  [address] [place] [address]  [place] [address]   Referring Diagnosis Radiculopathy, lumbar region [M54.16];Type 2 diabetes mellitus with diabetic polyneuropathy [E11.42];Ataxia, unspecified [R27.0]     Visit Diagnoses     ICD-10-CM   1. Balance problem  R26.89       Rehab Potential: fair    Progress Report Period: 8/17/2020-9/8/2020    Functional Assessment Used          Objective Findings and Assessment:   Patient progression towards goals: Short Term Goals:   1. Continue with current HEP-goal met  2. DGI of 15 or greater-goal mte        Long Term Goals:    1. Independent and compliant in HEP-progressing  2. Report no falls over 1 month period-goal met  3. Improve Guzman balance score to greater than 45/56-progressing  4. Improve TUG to less than 11 seconds-goal met    Objective findings and assessment details:  5x repeated STS 14.1second  TUG 10.7 seconds  DLS on solid with EC  13 seconds (improved from 11 at progress note)  DLS on foam with EC: 6.5 seconds, (improved from 3 at last progress note)  Tandem stance 3 seconds  Guzman Balance 36/56    Patient with continued limitations in strength, postural control/balance and tolerance to activity with functional test scores placing at moderate fall risk. Would benefit from continued treatment to address problem areas and decrease fall risk.    Goals:   Short Term Goals:   1. TUG of less than 10 seconds  2. No falls over next 4 weeks  Short term goal time span:  1-2 weeks      Long Term Goals:    1. Guzman Balance of 45/56 or greater  2. DGI of 18/24 or greater    Long term goal time span:  4-6 weeks    Plan:   Planned therapy  interventions:  Therapeutic Exercise (CPT 97322), Therapeutic Activities (CPT 05590) and Neuromuscular Re-education (CPT 10766)  Frequency:  2x week  Duration in weeks:  4  Duration in visits:  8      Referring provider co-signature:  I have reviewed this plan of care and my co-signature certifies the need for services.     Certification Period: 09/08/2020 to 10/13/20    Physician Signature: ________________________________ Date: ______________

## 2020-09-10 ENCOUNTER — OFFICE VISIT (OUTPATIENT)
Dept: CARDIOLOGY | Facility: MEDICAL CENTER | Age: 80
End: 2020-09-10
Payer: MEDICARE

## 2020-09-10 VITALS
BODY MASS INDEX: 30.35 KG/M2 | WEIGHT: 229 LBS | HEIGHT: 73 IN | DIASTOLIC BLOOD PRESSURE: 58 MMHG | HEART RATE: 84 BPM | SYSTOLIC BLOOD PRESSURE: 102 MMHG | OXYGEN SATURATION: 95 % | RESPIRATION RATE: 16 BRPM

## 2020-09-10 DIAGNOSIS — E11.22 TYPE 2 DIABETES MELLITUS WITH CHRONIC KIDNEY DISEASE, WITHOUT LONG-TERM CURRENT USE OF INSULIN, UNSPECIFIED CKD STAGE (HCC): ICD-10-CM

## 2020-09-10 DIAGNOSIS — G47.33 OSA (OBSTRUCTIVE SLEEP APNEA): ICD-10-CM

## 2020-09-10 DIAGNOSIS — Z98.84 S/P GASTRIC BYPASS: Chronic | ICD-10-CM

## 2020-09-10 DIAGNOSIS — I50.22 CHF (CONGESTIVE HEART FAILURE), NYHA CLASS II, CHRONIC, SYSTOLIC (HCC): ICD-10-CM

## 2020-09-10 DIAGNOSIS — E78.5 DYSLIPIDEMIA: Chronic | ICD-10-CM

## 2020-09-10 DIAGNOSIS — Z95.2 S/P TAVR (TRANSCATHETER AORTIC VALVE REPLACEMENT): ICD-10-CM

## 2020-09-10 DIAGNOSIS — I10 ESSENTIAL HYPERTENSION: ICD-10-CM

## 2020-09-10 DIAGNOSIS — I25.10 CORONARY ARTERY DISEASE INVOLVING NATIVE CORONARY ARTERY OF NATIVE HEART WITHOUT ANGINA PECTORIS: ICD-10-CM

## 2020-09-10 DIAGNOSIS — Z95.810 ICD (IMPLANTABLE CARDIOVERTER-DEFIBRILLATOR) IN PLACE: ICD-10-CM

## 2020-09-10 DIAGNOSIS — N18.30 STAGE 3 CHRONIC KIDNEY DISEASE: ICD-10-CM

## 2020-09-10 PROBLEM — R94.31 PROLONGED Q-T INTERVAL ON ECG: Status: RESOLVED | Noted: 2020-04-26 | Resolved: 2020-09-10

## 2020-09-10 PROBLEM — Z79.02 PLATELET INHIBITION DUE TO PLAVIX: Status: RESOLVED | Noted: 2020-04-26 | Resolved: 2020-09-10

## 2020-09-10 PROBLEM — Z95.0 S/P PLACEMENT OF CARDIAC PACEMAKER: Status: RESOLVED | Noted: 2020-02-03 | Resolved: 2020-09-10

## 2020-09-10 PROBLEM — R79.89 ELEVATED TROPONIN: Status: RESOLVED | Noted: 2020-02-25 | Resolved: 2020-09-10

## 2020-09-10 PROCEDURE — 99214 OFFICE O/P EST MOD 30 MIN: CPT | Performed by: INTERNAL MEDICINE

## 2020-09-10 ASSESSMENT — FIBROSIS 4 INDEX: FIB4 SCORE: 2.49

## 2020-09-10 NOTE — PROGRESS NOTES
CARDIOLOGY OUTPATIENT FOLLOWUP    PCP: No primary care provider on file.    1. Coronary artery disease involving native coronary artery of native heart without angina pectoris  PCI of RCA December, LM-LAD 1/10/20 with BRANDI. LVEF 35-40%. No angina.   - Continue DAPT with ASA/Plavix  - Statin allergic  - On coreg    2. CHF (congestive heart failure), NYHA class II, chronic, systolic (HCC)  LVEF 35%.  Euvolemic. Continue sodium restriction.   - Continue entresto/coreg    3. S/P TAVR (transcatheter aortic valve replacement)  2020; normal device function  - Referral placed again to cardiac rehab    4. Stage 3-4 chronic kidney disease (HCC)    5. Dyslipidemia  Statin intolerant. Well controlled with zetia    6. Essential hypertension  Well controlled. No changes    7. ICD (implantable cardioverter-defibrillator) in place  Normal device function.     8. Type 2 diabetes mellitus with chronic kidney disease, without long-term current use of insulin, unspecified CKD stage ([facility])  - with microalbuminuria  - Will check BMP, A1c. If Cr >30, will prescribe Jardiance 10 mg and DC Amaryl.      9. JAMES (obstructive sleep apnea)  Excellent result with CPAP, follows with sleep clinic.     10. S/P gastric bypass      Follow up with Paul Wilkinson M.D. in 3 months    Chief Complaint   Patient presents with   • Coronary Artery Disease       History: LIAN More III is a 79 y.o. male with a past medical history of Chronic kidney disease, hypertension, dyslipidemia, diabetes and JAMES presenting for follow up of chronic heart failure with ischemic cardiomyopathy prior PCI and valvular heart disease with TAVR.  He also has an ICD.    Since her last evaluation he continues to struggle predominantly with gait and balance disturbance.  He is working through physical therapy which is improving both his strength and balance.  This is his main source of exercise is cardiac rehab prescription .  He was frustrated by this as he did not receive  "the full cardiac rehab program due to the coronavirus pandemic he is interested to return.  He does not experience chest pain shortness of breath orthopnea or PND.  He responds very good results with CPAP.    ROS:  All other systems reviewed and negative except as per the HPI    PE:  /58 (BP Location: Left arm, Patient Position: Sitting, BP Cuff Size: Adult)   Pulse 84   Resp 16   Ht 1.854 m (6' 1\")   Wt 103.9 kg (229 lb)   SpO2 95%   BMI 30.21 kg/m²   Gen: Well appearing  HEENT: Symmetric face. Anicteric sclerae. Moist mucus membranes  NECK: No JVD. No lymphadenopathy  CARDIAC: Normal PMI, regular, normal S1, S2. with a systolic murmur  VASCULATURE: Normal carotid amplitude without bruit.   RESP: Clear to auscultation bilaterally  ABD: Soft, non-tender, non-distended  EXT: Ruborous, no clubbing  SKIN: Warm and dry  NEURO: No gross deficits  PSYCH: Appropriate affect, participates in conversation    Past Medical History:   Diagnosis Date   • Arthritis    • Back pain    • Breath shortness     pt states short of breath 24/7 O2 at night only   • CAD (coronary artery disease)    • CATARACT     removed bilat   • Chest pain    • Chest tightness    • Chickenpox    • Congestive heart failure (HCC)    • Constipation    • Coronary heart disease    • Cough    • Daytime sleepiness    • Diabetes     oral medication   • Difficulty breathing    • Dilated cardiomyopathy (HCC)    • Fatigue    • GERD (gastroesophageal reflux disease)    • Hearing difficulty    • Heart murmur    • Heart valve disease    • Heartburn    • Hiatus hernia syndrome    • Hyperlipidemia    • Hypertension     pt states well controlled on meds   • Kidney disease 05/2020    ruptered left kidney    • Mumps    • Pacemaker     AICD   • Pain 08-29-13    back, hips and bilat legs, 4/10   • Pain 6/22/2015    left humerus   • Pain 1/7/16    knees, back and shoulder   • Pain     stomach   • Painful joint    • Palpitations    • Pneumonia 2007   • Rhinitis  "   • Ringing in ears    • Severe aortic stenosis 12/4/2019   • Shortness of breath    • Sleep apnea     CPAp with O2   • Swelling of lower extremity    • Tonsillitis    • Tremor    • Ulcer 2014    Dr. Porter, gastroenterologist   • Unspecified hemorrhagic conditions     on plavix     Allergies   Allergen Reactions   • Statins [Hmg-Coa-R Inhibitors] Rash     Blisters     • Atorvastatin      Outpatient Encounter Medications as of 9/10/2020   Medication Sig Dispense Refill   • carvedilol (COREG) 3.125 MG Tab TAKE 1 TABLET BY MOUTH TWICE DAILY WITH MEALS 90 Tab 0   • clopidogrel (PLAVIX) 75 MG Tab Take 1 Tab by mouth every day. 30 Tab 1   • ezetimibe (ZETIA) 10 MG Tab Take 1 tablet by mouth once daily 90 Tab 0   • Ferrous Sulfate (IRON PO) Take  by mouth.     • Probiotic Product (PROBIOTIC PO) Take  by mouth.     • aspirin EC (ECOTRIN) 81 MG Tablet Delayed Response Take 81 mg by mouth every day.     • HYDROcodone-acetaminophen (NORCO) 5-325 MG Tab per tablet Take 1 Tab by mouth every four hours as needed.     • acetaminophen (TYLENOL) 325 MG Tab Take 650 mg by mouth every four hours as needed.     • clomiPHENE (CLOMID) 50 MG tablet Take 50 mg by mouth 2X A WEEK.     • hydrocortisone 2.5 % Ointment Apply 1 Each to affected area(s) as needed.     • Calcium Citrate (CITRACAL PO) Take 1 Tab by mouth 2 Times a Day.     • bumetanide (BUMEX) 1 MG Tab Take 1 Tab by mouth 2 times a day. 120 Tab 3   • ENTRESTO 24-26 MG Tab tablet Take 1 tablet by mouth twice daily 60 Tab 6   • fluticasone (FLONASE) 50 MCG/ACT nasal spray Use 2 spray(s) in each nostril once daily (Patient taking differently: 1 time daily as needed.) 48 g 3   • Multiple Vitamins-Minerals (ICAPS) Tab Take 1 Tab by mouth 2 Times a Day.     • lactobacillus rhamnosus (CULTURELLE) Cap capsule Take 1 Cap by mouth every day.     • testosterone cypionate (DEPO-TESTOSTERONE) 200 MG/ML Solution injection 50 mg by Intramuscular route every thursday.     • cyanocobalamin  (VITAMIN B-12) 1000 MCG/ML Solution 1,000 mcg by Intramuscular route every thursday.     • coenzyme Q-10 30 MG capsule Take 60 mg by mouth 2 Times a Day.     • Garlic 1000 MG Cap Take 1,000 mg by mouth 2 Times a Day.     • glucosamine Sulfate 500 MG Cap Take 1,500 mg by mouth 2 Times a Day.     • vitamin e (VITAMIN E) 400 UNIT Cap Take 400 Units by mouth 2 times a day.     • omeprazole (PRILOSEC) 40 MG delayed-release capsule Take 40 mg by mouth 2 times a day.     • Non Formulary Request Take 1 Cap by mouth 2 Times a Day. Cholox (OTC)     • Cholecalciferol (VITAMIN D3) 2000 UNIT Cap Take 2,000 Units by mouth 2 Times a Day.     • Non Formulary Request Take 1 Cap by mouth 2 Times a Day. Domenic red (OTC)     • Non Formulary Request Take 1 Cap by mouth every day. Nitric OXIDE (OTC)     • Non Formulary Request Take 1 Cap by mouth every day. anit-oxidant (OTC)     • Zinc 50 MG Tab Take 50 mg by mouth every day.     • anastrozole (ARIMIDEX) 1 MG Tab Take 0.5 mg by mouth See Admin Instructions. On Tue and Sat     • gabapentin (NEURONTIN) 300 MG Cap Take 300 mg by mouth every 6 hours as needed. Indications: Neuropathic Pain     • glimepiride (AMARYL) 1 MG tablet Take 1 mg by mouth every morning.     • B Complex Vitamins (VITAMIN B COMPLEX PO) Take 1 Tab by mouth every day.     • Cranberry 300 MG Tab Take 300 mg by mouth every morning.     • L-Lysine 1000 MG Tab Take 1,000 mg by mouth every day.     • Ascorbic Acid (VITAMIN C) 1000 MG Tab Take 1,000 mg by mouth 2 Times a Day.     • [DISCONTINUED] Home Care Oxygen Inhale 2 L/min by mouth Continuous.   Respiratory route via: Nasal Cannula   Oxygen supplier: [facility]  Indications: Hypoxia, to keep O2 sat over 90%       No facility-administered encounter medications on file as of 9/10/2020.      Social History     Socioeconomic History   • Marital status:      Spouse name: Not on file   • Number of children: Not on file   • Years of education: Not on file   • Highest education  level: Not on file   Occupational History   • Not on file   Social Needs   • Financial resource strain: Not on file   • Food insecurity     Worry: Not on file     Inability: Not on file   • Transportation needs     Medical: Not on file     Non-medical: Not on file   Tobacco Use   • Smoking status: Never Smoker   • Smokeless tobacco: Never Used   • Tobacco comment: 0   Substance and Sexual Activity   • Alcohol use: No     Alcohol/week: 0.0 oz     Frequency: Never     Binge frequency: Never   • Drug use: No   • Sexual activity: Yes   Lifestyle   • Physical activity     Days per week: Not on file     Minutes per session: Not on file   • Stress: Not on file   Relationships   • Social connections     Talks on phone: Not on file     Gets together: Not on file     Attends Orthodox service: Not on file     Active member of club or organization: Not on file     Attends meetings of clubs or organizations: Not on file     Relationship status: Not on file   • Intimate partner violence     Fear of current or ex partner: Not on file     Emotionally abused: Not on file     Physically abused: Not on file     Forced sexual activity: Not on file   Other Topics Concern   • Not on file   Social History Narrative   • Not on file       Studies  Lab Results   Component Value Date/Time    CHOLSTRLTOT 105 12/27/2019 02:20 AM    LDL 56 12/27/2019 02:20 AM    HDL 29 (A) 12/27/2019 02:20 AM    TRIGLYCERIDE 101 12/27/2019 02:20 AM       Lab Results   Component Value Date/Time    SODIUM 137 06/17/2020 10:33 AM    POTASSIUM 4.9 06/17/2020 10:33 AM    CHLORIDE 103 06/17/2020 10:33 AM    CO2 21 06/17/2020 10:33 AM    GLUCOSE 129 (H) 06/17/2020 10:33 AM    BUN 47 (H) 06/17/2020 10:33 AM    CREATININE 2.17 (H) 06/17/2020 10:33 AM    CREATININE 1.18 02/08/2011 12:00 AM    BUNCREATRAT 16 02/08/2011 12:00 AM    GLOMRATE >59 02/08/2011 12:00 AM     Lab Results   Component Value Date/Time    ALKPHOSPHAT 39 04/28/2020 05:10 AM    ASTSGOT 27 04/28/2020  05:10 AM    ALTSGPT 23 04/28/2020 05:10 AM    TBILIRUBIN 1.0 04/28/2020 05:10 AM        For this encounter I directly reviewed ECG tracings and medical records I agree with the interpretations in the electronic health record

## 2020-09-11 ENCOUNTER — OFFICE VISIT (OUTPATIENT)
Dept: NEUROLOGY | Facility: MEDICAL CENTER | Age: 80
End: 2020-09-11
Payer: MEDICARE

## 2020-09-11 VITALS
HEIGHT: 73 IN | OXYGEN SATURATION: 96 % | RESPIRATION RATE: 18 BRPM | SYSTOLIC BLOOD PRESSURE: 128 MMHG | WEIGHT: 229.28 LBS | DIASTOLIC BLOOD PRESSURE: 70 MMHG | BODY MASS INDEX: 30.39 KG/M2 | HEART RATE: 80 BPM

## 2020-09-11 DIAGNOSIS — M54.16 LUMBAR RADICULOPATHY, CHRONIC: ICD-10-CM

## 2020-09-11 DIAGNOSIS — R27.0 ATAXIA: Primary | ICD-10-CM

## 2020-09-11 DIAGNOSIS — I10 ESSENTIAL HYPERTENSION: ICD-10-CM

## 2020-09-11 PROCEDURE — 99214 OFFICE O/P EST MOD 30 MIN: CPT | Performed by: PSYCHIATRY & NEUROLOGY

## 2020-09-11 ASSESSMENT — ENCOUNTER SYMPTOMS
FOCAL WEAKNESS: 1
TREMORS: 0
TINGLING: 1
DOUBLE VISION: 0
HEADACHES: 0
DIZZINESS: 1
FALLS: 0
SENSORY CHANGE: 1
MEMORY LOSS: 0

## 2020-09-11 ASSESSMENT — FIBROSIS 4 INDEX: FIB4 SCORE: 2.49

## 2020-09-11 NOTE — PROGRESS NOTES
Subjective:      LIAN More III is a 79 y.o. male who presents for follow-up, with a history of multifactorial gait ataxia, left lower extremity weakness, with a positive EMG/NCV study.    HPI    The balance difficulties continue unchanged, though with physical therapy and gait stabilization, he actually feels things are better.  He still has the unsteadiness getting worse when he is not looking at the ground, closes eyes, etc.  Sustained neck extension essentially does the same thing but get some them more quickly, also associated with dizziness.  This improved by assuming neutral head position.  There is never been vertigo.  He still has the sensory distortions in the feet from his neuropathy.  The tremulousness in the hands is unchanged, still an action based process, always improved when he is relaxed.  There is no involvement of the voice or head.  The focal weakness of the left foot is unchanged, with difficulty both on dorsiflexion as well as plantar flexion.  Hip and knee movements are unaffected.    EMG/NCV studies from July, 2020, revealed evidence of a severe large fiber, length dependent sensorimotor polyneuropathy, in addition superimposed bilateral lumbosacral radiculopathy, mostly chronic in nature.  The upper extremities he also had evidence of left-sided CTS.  CT scan of the brain revealed minimal bilateral white matter changes consistent with microvascular disease.  There is no evidence of larger ischemic insult.  There is no evidence of NPH, simply generalized atrophy with ventriculomegaly ex vacuo.  Physical therapy for gait stabilization has helped, they are asking for additional time spent.    Medical, surgical and family histories are reviewed, there are no new drug allergies.  He is on Zetia, baby aspirin, Plavix, Neurontin 300 mg 4 times daily as needed, Amaryl, and a very long list of vitamin and mineral supplements along with antioxidants.    Review of Systems   Eyes: Negative for double  "vision.   Musculoskeletal: Negative for falls.   Neurological: Positive for dizziness, tingling, sensory change and focal weakness. Negative for tremors and headaches.   Psychiatric/Behavioral: Negative for memory loss.   All other systems reviewed and are negative.       Objective:     /70 (BP Location: Left arm, Patient Position: Sitting, BP Cuff Size: Adult)   Pulse 80   Resp 18   Ht 1.854 m (6' 1\")   Wt 104 kg (229 lb 4.5 oz)   SpO2 96%   BMI 30.25 kg/m²      Physical Exam    He appears in no acute distress.  Vital signs are stable.  There is no malar rash.  The neck is supple.  Cardiac evaluation is unremarkable.  The chronic vascular insufficiency changes in the distal lower extremities are unchanged.    He is fully oriented, there is no aphasia or inattention.    PERRLA/EOMI, visual fields are full, facial movements are symmetric, there is no sensory loss to temperature bilaterally.  The tongue and uvula are midline.  There is no bulbar dysfunction.  Shoulder shrug is symmetric.    Musculoskeletal exam reveals no tremor on today's exam with sustained posture against gravity.  There is certainly no tremor at rest.  There is no drift.  Strength is intact throughout except with the left lower extremity, where weakness is present both with dorsiflexion as well as plantar flexion and lateral movements.  The atrophy of the calf and anterior tibialis muscle on the left are unchanged.  There are no fasciculations.  Reflexes are absent at the knees and ankles, symmetric but still difficult to elicit in the upper extremities.  This is unchanged.    Repetitive movements are intact throughout except with the left foot when they are nearly absent.  Station is widened, he needs to keep his eyes open, he looks at the ground continuously while walking.  There is no appendicular dystaxia.    Sensory exam again was remarkable for impaired perception of vibration below the knees, temperature below the knees, JPS " below the ankles.     Assessment/Plan:     1. Ataxia  Structurally, though he has some mild likely microvascular ischemic change in both hemispheres, there is nothing else that would explain the symptoms.  My initial impression is still that he has a very severe sensorimotor polyneuropathy, demonstrated on EMG/NCV studies, with a superimposed lumbosacral radiculopathy, left more than right, resulting in persistent weakness in the left foot.  All of this has resulted in significant gait ataxia, he was told that this is his new baseline, he will never be able to walk and do those things he had once been able to do in the past without thinking.  Physical therapy will be continued, he will certainly benefit, but there is no other prescribed treatment available for this type of sensory ataxia.  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    - REFERRAL TO PHYSICAL THERAPY Reason for Therapy: Eval/Treat/Report    2. Lumbar radiculopathy, chronic  Chronic in nature, this has resulted in the left lower extremity symptoms of weakness in addition to the sensory distortions he has from the neuropathy itself.  There is nothing active on EMG suggestive of critical lumbosacral process that might need to be imaged and treated.    - REFERRAL TO PHYSICAL THERAPY Reason for Therapy: Eval/Treat/Report    We talked at length talk about all of the above.  We can follow-up into the future only as needed, there is no need for long-term neurologic follow-up at this time.    Time: 25-minute spent face-to-face for exam, review, discussion, and education, of this over 60% of the time spent counseling and coordinating care.

## 2020-09-15 ENCOUNTER — HOSPITAL ENCOUNTER (OUTPATIENT)
Dept: RADIOLOGY | Facility: MEDICAL CENTER | Age: 80
End: 2020-09-15
Attending: PHYSICIAN ASSISTANT
Payer: MEDICARE

## 2020-09-15 DIAGNOSIS — N50.82 SCROTAL PAIN: ICD-10-CM

## 2020-09-15 PROCEDURE — 76870 US EXAM SCROTUM: CPT

## 2020-09-16 ENCOUNTER — PHYSICAL THERAPY (OUTPATIENT)
Dept: PHYSICAL THERAPY | Facility: MEDICAL CENTER | Age: 80
End: 2020-09-16
Attending: PSYCHIATRY & NEUROLOGY
Payer: MEDICARE

## 2020-09-16 ENCOUNTER — HOSPITAL ENCOUNTER (OUTPATIENT)
Dept: LAB | Facility: MEDICAL CENTER | Age: 80
End: 2020-09-16
Attending: PHYSICIAN ASSISTANT
Payer: MEDICARE

## 2020-09-16 ENCOUNTER — HOSPITAL ENCOUNTER (OUTPATIENT)
Dept: LAB | Facility: MEDICAL CENTER | Age: 80
End: 2020-09-16
Attending: INTERNAL MEDICINE
Payer: MEDICARE

## 2020-09-16 DIAGNOSIS — I25.10 CORONARY ARTERY DISEASE INVOLVING NATIVE CORONARY ARTERY OF NATIVE HEART WITHOUT ANGINA PECTORIS: ICD-10-CM

## 2020-09-16 DIAGNOSIS — R26.89 BALANCE PROBLEM: ICD-10-CM

## 2020-09-16 LAB
ANION GAP SERPL CALC-SCNC: 15 MMOL/L (ref 7–16)
BUN SERPL-MCNC: 34 MG/DL (ref 8–22)
CALCIUM SERPL-MCNC: 9.6 MG/DL (ref 8.4–10.2)
CHLORIDE SERPL-SCNC: 102 MMOL/L (ref 96–112)
CO2 SERPL-SCNC: 24 MMOL/L (ref 20–33)
CREAT SERPL-MCNC: 1.86 MG/DL (ref 0.5–1.4)
EST. AVERAGE GLUCOSE BLD GHB EST-MCNC: 134 MG/DL
GLUCOSE SERPL-MCNC: 70 MG/DL (ref 65–99)
HBA1C MFR BLD: 6.3 % (ref 0–5.6)
POTASSIUM SERPL-SCNC: 4.8 MMOL/L (ref 3.6–5.5)
PSA SERPL-MCNC: 2.25 NG/ML (ref 0–4)
SODIUM SERPL-SCNC: 141 MMOL/L (ref 135–145)

## 2020-09-16 PROCEDURE — 36415 COLL VENOUS BLD VENIPUNCTURE: CPT | Mod: GA

## 2020-09-16 PROCEDURE — 97112 NEUROMUSCULAR REEDUCATION: CPT

## 2020-09-16 PROCEDURE — 80048 BASIC METABOLIC PNL TOTAL CA: CPT

## 2020-09-16 PROCEDURE — 83036 HEMOGLOBIN GLYCOSYLATED A1C: CPT | Mod: GA

## 2020-09-16 PROCEDURE — 84153 ASSAY OF PSA TOTAL: CPT | Mod: GA

## 2020-09-16 PROCEDURE — 97110 THERAPEUTIC EXERCISES: CPT

## 2020-09-16 NOTE — OP THERAPY DAILY TREATMENT
Outpatient Physical Therapy  DAILY TREATMENT     [facility] Outpatient Physical Therapy  [address]  [place] [address]  Phone:  [phone]  Fax:  [phone]    Date: 09/16/2020    Patient: USAMN MEANS III Means III  YOB: 1940  MRN: [id number]     Time Calculation    Start time: 1330  Stop time: 1410 Time Calculation (min): 40 minutes         Chief Complaint: Loss Of Balance    Visit #: 20    SUBJECTIVE:  Patient reports overall improvement. Saw MD yesterday who is pleased with progress    OBJECTIVE:  Current objective measures:         Therapeutic Exercises (CPT 06375):     3. Bridge on swiss ball, 15x5 seconds    4. Repeated STS, varying heights,  19, 18.75, 18.5, 18, 5 reps at each height without UE support    5. Retro step with trunk rotation bilaterally, x15 blue    6. TKE with band for control, x20 bilaterally, blue    15. UPOC 9/21/2020    Therapeutic Treatments and Modalities:     1. Neuromuscular Re-education (CPT 42733), see below    Therapeutic Treatment and Modalities Summary: Step up forward on perla-disc x20 bilatearally  Golf swing bilaterally with 1 foot on foam x15 with blue band  DLS on tampoline with with arms crossed, therapist perturbations, [id number]  Forward lateral weight shift on BOSU, x20, bilaterally  DLS on foam with head turns [id number]    Time-based treatments/modalities:    Physical Therapy Timed Treatment Charges  Neuromusc re-ed, balance, coor, post minutes (CPT 71837): 20 minutes  Therapeutic exercise minutes (CPT 46893): 18 minutes      Pain rating (1-10) before treatment:  0  Pain rating (1-10) after treatment:  0    ASSESSMENT:   Response to treatment: showing improved dynamic control. Decreased eccentric control during sitting, TKE to address control added as part of HEP. Continue to progress as tolerated.     PLAN/RECOMMENDATIONS:   Plan for treatment: therapy treatment to continue next visit.  Planned interventions for next  visit: continue with current treatment.

## 2020-09-18 ENCOUNTER — APPOINTMENT (OUTPATIENT)
Dept: PHYSICAL THERAPY | Facility: MEDICAL CENTER | Age: 80
End: 2020-09-18
Attending: PSYCHIATRY & NEUROLOGY
Payer: MEDICARE

## 2020-09-22 ENCOUNTER — PHYSICAL THERAPY (OUTPATIENT)
Dept: PHYSICAL THERAPY | Facility: MEDICAL CENTER | Age: 80
End: 2020-09-22
Attending: PSYCHIATRY & NEUROLOGY
Payer: MEDICARE

## 2020-09-22 DIAGNOSIS — R26.89 BALANCE PROBLEM: ICD-10-CM

## 2020-09-22 PROCEDURE — 97110 THERAPEUTIC EXERCISES: CPT

## 2020-09-22 PROCEDURE — 97112 NEUROMUSCULAR REEDUCATION: CPT

## 2020-09-22 NOTE — OP THERAPY DAILY TREATMENT
"  Outpatient Physical Therapy  DAILY TREATMENT     [facility] Outpatient Physical Therapy  [address]  [place] [address]  Phone:  [phone]  Fax:  [phone]    Date: 2020    Patient: USMAN MEANS III Means III  YOB: 1940  MRN: [id number]     Time Calculation    Start time: 1000  Stop time: 1032 Time Calculation (min): 32 minutes         Chief Complaint: Loss Of Balance    Visit #: 21    SUBJECTIVE:  Patient reports no new complaints and states he has not had any falls.     OBJECTIVE:  Current objective measures:         Therapeutic Exercises (CPT 96991):     1. Stationary bike, x5 min, Resistance 2-5    2. Repeated STS, 7x5 reps from 18\", no UE support    3. Bridge on ball, [id number]    15. UPOC 2020    Therapeutic Treatments and Modalities:     1. Neuromuscular Re-education (CPT 03816), see below    Therapeutic Treatment and Modalities Summary: Stair taps 3x30, alternating LE, 8\" step  Forward step and hold on foam, x15 bilaterally  Lateral step up and hold on foam, x15 bilaterally  Forward step on perla disc, x15 bilaterally, UE support as needed for control/stability  DLS on solid with EC and head turns, [id number]  Walkin foot on foam, 1 on solid. 8'x10       Time-based treatments/modalities:    Physical Therapy Timed Treatment Charges  Neuromusc re-ed, balance, coor, post minutes (CPT 46327): 20 minutes  Therapeutic exercise minutes (CPT 75137): 10 minutes      Pain rating (1-10) before treatment:  0  Pain rating (1-10) after treatment:  0    ASSESSMENT:   Response to treatment: Patient demonstrating improved stability and control in dynamic activities and when on uneven surfaces.     PLAN/RECOMMENDATIONS:   Plan for treatment: therapy treatment to continue next visit.  Planned interventions for next visit: continue with current treatment.       "

## 2020-09-23 ENCOUNTER — PHYSICAL THERAPY (OUTPATIENT)
Dept: PHYSICAL THERAPY | Facility: MEDICAL CENTER | Age: 80
End: 2020-09-23
Attending: PSYCHIATRY & NEUROLOGY
Payer: MEDICARE

## 2020-09-23 DIAGNOSIS — R26.89 BALANCE PROBLEM: ICD-10-CM

## 2020-09-23 PROCEDURE — 97112 NEUROMUSCULAR REEDUCATION: CPT

## 2020-09-23 NOTE — OP THERAPY DAILY TREATMENT
"  Outpatient Physical Therapy  DAILY TREATMENT     [facility] Outpatient Physical Therapy  [address]  [place] [address]  Phone:  [phone]  Fax:  [phone]    Date: 09/23/2020    Patient: USMAN MEANS III Means III  YOB: 1940  MRN: [id number]     Time Calculation    Start time: 0830  Stop time: 0900 Time Calculation (min): 30 minutes         Chief Complaint: Loss Of Balance    Visit #: 22    SUBJECTIVE:  No new complaints reproted.     OBJECTIVE:  Current objective measures:         Therapeutic Exercises (CPT 30588):     2. Repeated STS, 4x10 reps from 18\", no UE support    15. UPOC 9/21/2020    Therapeutic Treatments and Modalities:     1. Neuromuscular Re-education (CPT 01884), see below    Therapeutic Treatment and Modalities Summary: Stair taps 3x30, alternating LE, 8\" step  Forward/lateral  step on perla disc, x15 bilaterally, UE support as needed for control/stability  DLS on solid with EO and head turns L/R 2x45 seconds  DLS on solid with EO, head nods x45 seconds  DLS on solid with perturbations, [id number]   DLS on solid with EC x40 seconds        Time-based treatments/modalities:    Physical Therapy Timed Treatment Charges  Neuromusc re-ed, balance, coor, post minutes (CPT 54234): 23 minutes  Therapeutic exercise minutes (CPT 19651): 5 minutes      Pain rating (1-10) before treatment:  0  Pain rating (1-10) after treatment:  0    ASSESSMENT:   Response to treatment: Patient with good response to treatment and showing improved dynamic stability.     PLAN/RECOMMENDATIONS:   Plan for treatment: therapy treatment to continue next visit.  Planned interventions for next visit: continue with current treatment.       "

## 2020-09-25 ENCOUNTER — PHYSICAL THERAPY (OUTPATIENT)
Dept: PHYSICAL THERAPY | Facility: MEDICAL CENTER | Age: 80
End: 2020-09-25
Attending: PSYCHIATRY & NEUROLOGY
Payer: MEDICARE

## 2020-09-25 DIAGNOSIS — R26.89 BALANCE PROBLEM: ICD-10-CM

## 2020-09-25 PROCEDURE — 97112 NEUROMUSCULAR REEDUCATION: CPT

## 2020-09-25 PROCEDURE — 97110 THERAPEUTIC EXERCISES: CPT

## 2020-09-25 NOTE — OP THERAPY DAILY TREATMENT
"  Outpatient Physical Therapy  DAILY TREATMENT     [facility] Outpatient Physical Therapy  [address]  [place] [address]  Phone:  [phone]  Fax:  [phone]    Date: 09/25/2020    Patient: USMAN MEANS III Means III  YOB: 1940  MRN: [id number]     Time Calculation                   Chief Complaint: No chief complaint on file.    Visit #: 23    SUBJECTIVE:  Patient reports no new changes    OBJECTIVE:  Current objective measures:         Therapeutic Exercises (CPT 06726):     1. Squats, x15    2. Repeated STS, 4x10 reps from 18\", no UE support    15. UPOC 9/21/2020    Therapeutic Treatments and Modalities:     1. Neuromuscular Re-education (CPT 52272), see below    Therapeutic Treatment and Modalities Summary: Stair taps 3x30, alternating LE, 8\" step  Forward/lateral  step on perla disc, x15 bilaterally, UE support as needed for control/stability  DLS on solid with EO and head turns L/R 2x45 seconds  DLS on solid with EO, head nods x45 seconds  DLS on solid with perturbations, [id number]   DLS on solid with EC x40 seconds        Time-based treatments/modalities:           Pain rating (1-10) before treatment:  0  Pain rating (1-10) after treatment:  0    ASSESSMENT:   Response to treatment: Patient with continued improvement in dynamic stability and postural control. Continue to progress    PLAN/RECOMMENDATIONS:   Plan for treatment: therapy treatment to continue next visit.  Planned interventions for next visit: continue with current treatment.       "

## 2020-09-29 ENCOUNTER — PHYSICAL THERAPY (OUTPATIENT)
Dept: PHYSICAL THERAPY | Facility: MEDICAL CENTER | Age: 80
End: 2020-09-29
Attending: PSYCHIATRY & NEUROLOGY
Payer: MEDICARE

## 2020-09-29 DIAGNOSIS — R26.89 BALANCE PROBLEM: ICD-10-CM

## 2020-09-29 PROCEDURE — 97112 NEUROMUSCULAR REEDUCATION: CPT

## 2020-10-02 ENCOUNTER — PHYSICAL THERAPY (OUTPATIENT)
Dept: PHYSICAL THERAPY | Facility: MEDICAL CENTER | Age: 80
End: 2020-10-02
Attending: PSYCHIATRY & NEUROLOGY
Payer: MEDICARE

## 2020-10-02 DIAGNOSIS — R26.89 BALANCE PROBLEM: ICD-10-CM

## 2020-10-02 PROCEDURE — 97112 NEUROMUSCULAR REEDUCATION: CPT

## 2020-10-02 NOTE — OP THERAPY DAILY TREATMENT
"  Outpatient Physical Therapy  DAILY TREATMENT     [facility] Outpatient Physical Therapy  [address]  [place] [address]  Phone:  [phone]  Fax:  [phone]    Date: 10/02/2020    Patient: USMAN MEANS III Means III  YOB: 1940  MRN: [id number]     Time Calculation    Start time: 0930  Stop time: 1000 Time Calculation (min): 30 minutes         Chief Complaint: Loss Of Balance    Visit #: 25    SUBJECTIVE:  No new complaints, patient reporting continued compliance with HEP.     OBJECTIVE:  Current objective measures:         Therapeutic Exercises (CPT 65361):     1. Squats, x15    2. Repeated STS, 5x10 reps from 18\", no UE support    15. UPOC 9/21/2020    Therapeutic Treatments and Modalities:     1. Neuromuscular Re-education (CPT 29054), see below    Therapeutic Treatment and Modalities Summary: Stair taps x3 min, alternating LE, 8\" step  Forward/lateral  step on perla disc, x15 bilaterally, UE support as needed for control/stability  Walking on foam, 6'x10  Tandem stance on foam, x2 min bilaterally, UE support as needed  DLS on solid with EO and head turns L/R 2x45 seconds  DLS on solid with EO, head nods x45 seconds  DLS on solid with perturbations, [id number]   DLS on solid with EC [id number]         Time-based treatments/modalities:    Physical Therapy Timed Treatment Charges  Neuromusc re-ed, balance, coor, post minutes (CPT 45384): 20 minutes  Therapeutic exercise minutes (CPT 62292): 5 minutes      ASSESSMENT:   Response to treatment: patient showing significant improvement in static and dynamic postural control on variety of surfaces. Continued deficits with narrow LORRIE, low vision, and on uneven surfaces. Progress as tolerated.     PLAN/RECOMMENDATIONS:   Plan for treatment: therapy treatment to continue next visit.  Planned interventions for next visit: continue with current treatment.       "

## 2020-10-05 ENCOUNTER — APPOINTMENT (OUTPATIENT)
Dept: PHYSICAL THERAPY | Facility: MEDICAL CENTER | Age: 80
End: 2020-10-05
Attending: PSYCHIATRY & NEUROLOGY
Payer: MEDICARE

## 2020-10-06 ENCOUNTER — PHYSICAL THERAPY (OUTPATIENT)
Dept: PHYSICAL THERAPY | Facility: MEDICAL CENTER | Age: 80
End: 2020-10-06
Attending: PSYCHIATRY & NEUROLOGY
Payer: MEDICARE

## 2020-10-06 DIAGNOSIS — R26.89 BALANCE PROBLEM: ICD-10-CM

## 2020-10-06 PROCEDURE — 97110 THERAPEUTIC EXERCISES: CPT

## 2020-10-06 PROCEDURE — 97112 NEUROMUSCULAR REEDUCATION: CPT

## 2020-10-06 NOTE — OP THERAPY DAILY TREATMENT
"  Outpatient Physical Therapy  DAILY TREATMENT     [facility] Outpatient Physical Therapy  [address]  [place] [address]  Phone:  [phone]  Fax:  [phone]    Date: 10/06/2020    Patient: USMAN VILLA III Means III  YOB: 1940  MRN: [id number]     Time Calculation    Start time: 0900  Stop time: 0930 Time Calculation (min): 30 minutes         Chief Complaint: Loss Of Balance    Visit #: 26    SUBJECTIVE:  Doing okay today, did 15 minutes on the bike prior to PT treatment. Strained his R quad yesterday doing the sit to stands and it is sore today.     OBJECTIVE:  Current objective measures: unsteadiness noted with initial  sit to stand but improved as he progressed through the sets.           Therapeutic Exercises (CPT 32434):     1. Squats, x15    2. Repeated STS, 3x15 reps from 18\", no UE support    15. UPOC 10/13/2020    Therapeutic Treatments and Modalities:     1. Neuromuscular Re-education (CPT 79163), see below    Therapeutic Treatment and Modalities Summary: Stair taps x3 min, alternating LE, 8\" step  Forward/lateral  step on perla disc, x15 bilaterally, UE support as needed for control/stability  Walking on foam, 6'x10  Tandem stance on foam, x2 min bilaterally, UE support as needed  DLS on solid with EO and head turns L/R 2x45 seconds  DLS on solid with EO, head nods x45 seconds  DLS on solid with perturbations, [id number]   DLS on solid with EC [id number]         Time-based treatments/modalities:    Physical Therapy Timed Treatment Charges  Neuromusc re-ed, balance, coor, post minutes (CPT 30222): 15 minutes  Therapeutic exercise minutes (CPT 67882): 15 minutes        ASSESSMENT:   Response to treatment: the patient responded well today, increased shortness of breath but patient declines feeling fatigued or needing breaks. He demonstrates improved stability with eyes closed.     PLAN/RECOMMENDATIONS:   Plan for treatment: therapy treatment to " continue next visit.  Planned interventions for next visit: continue with current treatment.

## 2020-10-07 DIAGNOSIS — Z95.2 S/P TAVR (TRANSCATHETER AORTIC VALVE REPLACEMENT): ICD-10-CM

## 2020-10-07 DIAGNOSIS — I10 ESSENTIAL HYPERTENSION: Chronic | ICD-10-CM

## 2020-10-07 DIAGNOSIS — E78.5 DYSLIPIDEMIA: Chronic | ICD-10-CM

## 2020-10-07 RX ORDER — CLOPIDOGREL BISULFATE 75 MG/1
TABLET ORAL
Qty: 90 TAB | Refills: 0 | Status: SHIPPED | OUTPATIENT
Start: 2020-10-07 | End: 2021-01-21

## 2020-10-07 RX ORDER — EZETIMIBE 10 MG/1
TABLET ORAL
Qty: 90 TAB | Refills: 0 | Status: SHIPPED | OUTPATIENT
Start: 2020-10-07 | End: 2021-01-09

## 2020-10-07 RX ORDER — CARVEDILOL 3.12 MG/1
TABLET ORAL
Qty: 90 TAB | Refills: 0 | Status: SHIPPED | OUTPATIENT
Start: 2020-10-07 | End: 2020-11-30

## 2020-10-09 ENCOUNTER — PHYSICAL THERAPY (OUTPATIENT)
Dept: PHYSICAL THERAPY | Facility: MEDICAL CENTER | Age: 80
End: 2020-10-09
Attending: PSYCHIATRY & NEUROLOGY
Payer: MEDICARE

## 2020-10-09 DIAGNOSIS — R26.89 BALANCE PROBLEM: ICD-10-CM

## 2020-10-09 PROCEDURE — 97530 THERAPEUTIC ACTIVITIES: CPT | Mod: KX

## 2020-10-09 ASSESSMENT — BALANCE ASSESSMENTS
STANDING ON ONE LEG: 2
STANDING UNSUPPORTED WITH EYES CLOSED: 4
PLACE ALTERNATE FOOT ON STEP OR STOOL WHILE STANDING UNSUPPORTED: 4
SITTING TO STANDING: 4
STANDING UNSUPPORTED ONE FOOT IN FRONT: 3
TRANSFERS: 3
STANDING UNSUPPORTED WITH FEET TOGETHER: 4
REACHING FORWARD WITH OUTSTRETCHED ARM WHILE STANDING: 4
LONG VERSION TOTAL SCORE (MAX 56): 49
TURN 360 DEGREES: 3
STANDING UNSUPPORTED: 4
SITTING UNSUPPORTED: 4
STANDING TO SITTING: 3
LONG VERSION TOTAL SCORE (MAX 56): 49
PICK UP OBJECT FROM THE FLOOR FROM A STANDING POSITION: 4
LOOK OVER LEFT AND RIGHT SHOULDERS WHILE STANDING: 3

## 2020-10-09 ASSESSMENT — GAIT ASSESSMENTS
GAIT WITH HORIZONTAL HEAD TURNS: MILD IMPAIRMENT: PERFORMS HEAD TURNS SMOOTHLY WITH SLIGHT CHANGE IN GAIT VELOCITY, IE, MINOR DISRUPTION TO SMOOTH GAIT PATH OR USES WALKING AID
GAIT AND PIVOT TURN: NORMAL: PIVOT TURNS SAFELY WITHIN 3 SECONDS AND STOPS QUICKLY WITH NO LOSS OF BALANCE
STEP OVER OBSTACLE: NORMAL: IS ABLE TO STEP OVER THE BOX WITHOUT CHANGING GAIT SPEED, NO EVIDENCE OF IMBALANCE
STEPS: MILD IMPAIRMENT: ALTERNATING FEET, MUST USE RAIL
DYNAMIC GAIT INDEX SCORE: 87.5
CHANGE IN GAIT SPEED: MILD IMPAIRMENT: IS ABLE TO CHANGE SPEED BUT DEMONSTRATES MILD GAIT DEVIATIONS, OR NO GAIT DEVIATIONS BUT UNABLE TO ACHIEVE A SIGNIFICANT CHANGE IN VELOCITY, OR USES AN ASSISTIVE DEVICE
STEP AROUND OBSTACLES: NORMAL: IS ABLE TO WALK AROUND CONES SAFELY WITHOUT CHANGING GAIT SPEED, NO EVIDENCE OF IMBALANCE
GAIT WITH VERTICAL HEAD TURNS: NORMAL: PERFORMS HEAD TURNS SMOOTHLY WITH NO CHANGE IN GAIT
GAIT LEVEL SURFACE: NORMAL: WALKS 20', NO ASSISTIVE DEVICES, GOOD SPEED, NO EVIDENCE FOR IMBALANCE, NORMAL GAIT PATTERN

## 2020-10-09 NOTE — OP THERAPY DISCHARGE SUMMARY
Outpatient Physical Therapy  DISCHARGE SUMMARY NOTE      [facility] Outpatient Physical Therapy  [address]  [place] [address]  Phone:  [phone]  Fax:  [phone]    Date of Visit: 10/09/2020    Patient: USMAN MEANS III Means III  YOB: 1940  MRN: [id number]     Referring Provider: Rafael Valderrama M.D.  [address] [place] [address]  [place] [address]   Referring Diagnosis Radiculopathy, lumbar region [M54.16];Type 2 diabetes mellitus with diabetic polyneuropathy [E11.42];Ataxia, unspecified [R27.0]         Functional Assessment Used  Guzman Balance Total Score (0-56): 49  Dynamic Gait Index Total Score: 87.5     Your patient is being discharged from Physical Therapy with the following comments:   · Goals met    Comments:Patient has met the following goals as established at last reassessment.  Short Term Goals:   1. TUG of less than 10 seconds  2. No falls over next 4 weeks      Long Term Goals:    1. Guzman Balance of 45/56 or greater  2. DGI of 18/24 or greater     Limitations Remaining:  Patient with continued deficits in postural control secondary to chronic neuropathy in B LEs, however, functional testing now shows patient to be in low fall risk category.    Recommendations:  Continue with HEP and follow up with MD as needed.     Aleksey Verma, PT, DPT    Date: 10/9/2020

## 2020-10-09 NOTE — OP THERAPY DAILY TREATMENT
Outpatient Physical Therapy  DAILY TREATMENT     [facility] Outpatient Physical Therapy  [address]  [place] [address]  Phone:  [phone]  Fax:  [phone]    Date: 10/09/2020    Patient: USMAN MEANS III Means III  YOB: 1940  MRN: [id number]     Time Calculation    Start time: 0900  Stop time: 0948 Time Calculation (min): 48 minutes         Chief Complaint: Loss Of Balance    Visit #: 27    SUBJECTIVE:  Quads were a little sore from the STS but feeling better today    OBJECTIVE:  Current objective measures: 5 time repeated STS 13.5 seconds  TUG 9 seconds  DLS on solid EO and EC 30+ seconds  DGI 21/24  Guzman 50/56  Guzman Balance Total Score (0-56): 49  Dynamic Gait Index Total Score: 87.5       Therapeutic Treatments and Modalities:     1. Therapeutic Activities (CPT 32230), reassessment and functional testing, see below    Therapeutic Treatment and Modalities Summary: Modified CTSIB  Repeated STS  TUG  DGI  Guzman    Time-based treatments/modalities:    Physical Therapy Timed Treatment Charges  Therapeutic activity minutes (CPT 39456): 45 minutes      Pain rating (1-10) before treatment:  0  Pain rating (1-10) after treatment:  0    ASSESSMENT:   Response to treatment: Patient has progressed in all measured areas and met all established goals. Functional testing now showing patient in low fall risk category. He is independent in HEP and ready for DC from skilled therapy.     PLAN/RECOMMENDATIONS:   Plan for treatment: DC from skilled treatment due to meeting established goals.

## 2020-11-25 DIAGNOSIS — I50.22 CHF (CONGESTIVE HEART FAILURE), NYHA CLASS II, CHRONIC, SYSTOLIC (HCC): ICD-10-CM

## 2020-11-25 DIAGNOSIS — I10 ESSENTIAL HYPERTENSION: Chronic | ICD-10-CM

## 2020-11-30 ENCOUNTER — APPOINTMENT (OUTPATIENT)
Dept: RADIOLOGY | Facility: MEDICAL CENTER | Age: 80
End: 2020-11-30
Attending: EMERGENCY MEDICINE
Payer: MEDICARE

## 2020-11-30 ENCOUNTER — HOSPITAL ENCOUNTER (EMERGENCY)
Facility: MEDICAL CENTER | Age: 80
End: 2020-12-01
Attending: EMERGENCY MEDICINE
Payer: MEDICARE

## 2020-11-30 DIAGNOSIS — S02.2XXA CLOSED FRACTURE OF NASAL BONE, INITIAL ENCOUNTER: ICD-10-CM
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
## 2020-11-30 PROCEDURE — 73030 X-RAY EXAM OF SHOULDER: CPT | Mod: LT

## 2020-11-30 PROCEDURE — 70450 CT HEAD/BRAIN W/O DYE: CPT

## 2020-11-30 PROCEDURE — 70486 CT MAXILLOFACIAL W/O DYE: CPT

## 2020-11-30 PROCEDURE — 304999 HCHG REPAIR-SIMPLE/INTERMED LEVEL 1

## 2020-11-30 PROCEDURE — 90715 TDAP VACCINE 7 YRS/> IM: CPT | Performed by: EMERGENCY MEDICINE

## 2020-11-30 PROCEDURE — 700101 HCHG RX REV CODE 250

## 2020-11-30 PROCEDURE — 90471 IMMUNIZATION ADMIN: CPT

## 2020-11-30 PROCEDURE — 700111 HCHG RX REV CODE 636 W/ 250 OVERRIDE (IP): Performed by: EMERGENCY MEDICINE

## 2020-11-30 PROCEDURE — 303747 HCHG EXTRA SUTURE

## 2020-11-30 PROCEDURE — 99283 EMERGENCY DEPT VISIT LOW MDM: CPT

## 2020-11-30 RX ORDER — CARVEDILOL 3.12 MG/1
TABLET ORAL
Qty: 90 TAB | Refills: 0 | Status: SHIPPED | OUTPATIENT
Start: 2020-11-30 | End: 2021-02-26

## 2020-11-30 RX ORDER — LIDOCAINE HYDROCHLORIDE 10 MG/ML
INJECTION, SOLUTION INFILTRATION; PERINEURAL
Status: COMPLETED
Start: 2020-11-30 | End: 2020-11-30

## 2020-11-30 RX ORDER — SACUBITRIL AND VALSARTAN 24; 26 MG/1; MG/1
TABLET, FILM COATED ORAL
Qty: 60 TAB | Refills: 3 | Status: SHIPPED | OUTPATIENT
Start: 2020-11-30 | End: 2021-02-26

## 2020-11-30 RX ADMIN — LIDOCAINE HYDROCHLORIDE: 10 INJECTION, SOLUTION INFILTRATION; PERINEURAL at 22:30

## 2020-11-30 RX ADMIN — CLOSTRIDIUM TETANI TOXOID ANTIGEN (FORMALDEHYDE INACTIVATED), CORYNEBACTERIUM DIPHTHERIAE TOXOID ANTIGEN (FORMALDEHYDE INACTIVATED), BORDETELLA PERTUSSIS TOXOID ANTIGEN (GLUTARALDEHYDE INACTIVATED), BORDETELLA PERTUSSIS FILAMENTOUS HEMAGGLUTININ ANTIGEN (FORMALDEHYDE INACTIVATED), BORDETELLA PERTUSSIS PERTACTIN ANTIGEN, AND BORDETELLA PERTUSSIS FIMBRIAE 2/3 ANTIGEN 0.5 ML: 5; 2; 2.5; 5; 3; 5 INJECTION, SUSPENSION INTRAMUSCULAR at 23:08

## 2020-11-30 ASSESSMENT — FIBROSIS 4 INDEX: FIB4 SCORE: 2.52

## 2020-12-01 ENCOUNTER — TELEPHONE (OUTPATIENT)
Dept: CARDIOLOGY | Facility: MEDICAL CENTER | Age: 80
End: 2020-12-01

## 2020-12-01 VITALS
TEMPERATURE: 97.7 F | BODY MASS INDEX: 30.09 KG/M2 | HEIGHT: 73 IN | HEART RATE: 99 BPM | RESPIRATION RATE: 14 BRPM | WEIGHT: 227 LBS | DIASTOLIC BLOOD PRESSURE: 73 MMHG | SYSTOLIC BLOOD PRESSURE: 114 MMHG | OXYGEN SATURATION: 92 %

## 2020-12-01 PROCEDURE — 700102 HCHG RX REV CODE 250 W/ 637 OVERRIDE(OP): Performed by: EMERGENCY MEDICINE

## 2020-12-01 PROCEDURE — A9270 NON-COVERED ITEM OR SERVICE: HCPCS | Performed by: EMERGENCY MEDICINE

## 2020-12-01 RX ORDER — ACETAMINOPHEN 325 MG/1
650 TABLET ORAL ONCE
Status: COMPLETED | OUTPATIENT
Start: 2020-12-01 | End: 2020-12-01

## 2020-12-01 RX ADMIN — ACETAMINOPHEN 650 MG: 325 TABLET, FILM COATED ORAL at 00:23

## 2020-12-01 NOTE — ED TRIAGE NOTES
Pt presents by self from home for a mechanical GLF 1 hour ago. Laceration to end of nose noted. actively bleeding. Is controlled with direct pressure. +blood thinner. No LOC. Also reports knee and hand pain. Pt A&Ox4 and in wheelchair.     Patient masked. No respiratory symptoms, no recent travel, denies known COVID exposure.

## 2020-12-01 NOTE — ED PROVIDER NOTES
ED Provider Note    CHIEF COMPLAINT  Fall, nose laceration    HPI  LIAN More III is a 80 y.o. male who presents to the emergency department for evaluation after fall.  The patient states that he was walking when he lost his balance and fell.  He hit the tip of his nose on a trash can and landed on his left shoulder.  He did not have any loss of consciousness.  He has not had any vomiting since this happened.  He came to the emergency room because a laceration on the tip of his nose would not stop bleeding at home.  He is on Plavix.  He is currently complaining of nose pain and left shoulder pain were he impacted the ground.  He denies any numbness or tingling.  He denies any chest pain or shortness of breath on the ordinary for him.  He has not had any recent illnesses including fevers, coughing, wheezing, congestion, runny nose, sore throat, nausea, vomiting, or diarrhea.    REVIEW OF SYSTEMS  See HPI for further details. All other systems are negative.     PAST MEDICAL HISTORY   has a past medical history of Arthritis, Back pain, Breath shortness, CAD (coronary artery disease), CATARACT, Chest pain, Chest tightness, Chickenpox, Congestive heart failure (HCC), Constipation, Coronary heart disease, Cough, Daytime sleepiness, Diabetes, Difficulty breathing, Dilated cardiomyopathy (HCC), Fatigue, GERD (gastroesophageal reflux disease), Hearing difficulty, Heart murmur, Heart valve disease, Heartburn, Hiatus hernia syndrome, Hyperlipidemia, Hypertension, Kidney disease (05/2020), Mumps, Pacemaker, Pain (08-29-13), Pain (6/22/2015), Pain (1/7/16), Pain, Painful joint, Palpitations, Pneumonia (2007), Rhinitis, Ringing in ears, Severe aortic stenosis (12/4/2019), Shortness of breath, Sleep apnea, Swelling of lower extremity, Tonsillitis, Tremor, Ulcer (2014), and Unspecified hemorrhagic conditions.    SOCIAL HISTORY  Social History     Tobacco Use   • Smoking status: Never Smoker   • Smokeless tobacco: Never Used   •  Tobacco comment: 0   Substance and Sexual Activity   • Alcohol use: No     Alcohol/week: 0.0 oz     Frequency: Never     Binge frequency: Never   • Drug use: No   • Sexual activity: Yes       SURGICAL HISTORY   has a past surgical history that includes abdominoplasty (1990); gastric banding laparoscopic (3/24/2010); hiatal hernia repair (3/24/2010); inj,epidural/lumb/sac single (3/5/2012); inj,epidural/lumb/sac single (3/19/2012); lumbar fusion posterior (3/26/2012); lumbar decompression (3/26/2012); gastric band laparoscopic revision (5/11/2012); drainage hematoma (5/25/2012); recovery (6/26/2012); lumbar fusion posterior (9/5/2013); lumbar decompression (9/5/2013); mass excision neuro (9/5/2013); gastroscopy (N/A, 1/8/2016); knee arthroplasty total (2000); humerus orif (Left, 6/25/2015); zzz cardiac cath; laminotomy; Sleeve,Grant Vaso Thigh; remv 2nd cataract,corn-scler sectn; sinuscope; tonsillectomy; arthroscopy, knee; transcatheter aortic valve replacement (N/A, 1/27/2020); stephon (1/27/2020); aortic valve replacement; other cardiac surgery; and lap, remove adjust pasha restrict d* (6/5/2020).    CURRENT MEDICATIONS  Home Medications     Reviewed by Jannet Mcdonald R.N. (Registered Nurse) on 11/30/20 at 2210  Med List Status: Not Addressed   Medication Last Dose Status   acetaminophen (TYLENOL) 325 MG Tab  Active   anastrozole (ARIMIDEX) 1 MG Tab  Active   Ascorbic Acid (VITAMIN C) 1000 MG Tab  Active   aspirin EC (ECOTRIN) 81 MG Tablet Delayed Response  Active   B Complex Vitamins (VITAMIN B COMPLEX PO)  Active   bumetanide (BUMEX) 1 MG Tab  Active   Calcium Citrate (CITRACAL PO)  Active   carvedilol (COREG) 3.125 MG Tab  Active   Cholecalciferol (VITAMIN D3) 2000 UNIT Cap  Active   clomiPHENE (CLOMID) 50 MG tablet  Active   clopidogrel (PLAVIX) 75 MG Tab  Active   coenzyme Q-10 30 MG capsule  Active   Cranberry 300 MG Tab  Active   cyanocobalamin (VITAMIN B-12) 1000 MCG/ML Solution  Active   ENTRESTO 24-26 MG  "Tab tablet  Active   ezetimibe (ZETIA) 10 MG Tab  Active   Ferrous Sulfate (IRON PO)  Active   fluticasone (FLONASE) 50 MCG/ACT nasal spray  Active   gabapentin (NEURONTIN) 300 MG Cap  Active   Garlic 1000 MG Cap  Active   glimepiride (AMARYL) 1 MG tablet  Active   glucosamine Sulfate 500 MG Cap  Active   HYDROcodone-acetaminophen (NORCO) 5-325 MG Tab per tablet  Active   hydrocortisone 2.5 % Ointment  Active   L-Lysine 1000 MG Tab  Active   lactobacillus rhamnosus (CULTURELLE) Cap capsule  Active   Multiple Vitamins-Minerals (ICAPS) Tab  Active   Non Formulary Request  Active   Non Formulary Request  Active   Non Formulary Request  Active   Non Formulary Request  Active   omeprazole (PRILOSEC) 40 MG delayed-release capsule  Active   Probiotic Product (PROBIOTIC PO)  Active   testosterone cypionate (DEPO-TESTOSTERONE) 200 MG/ML Solution injection  Active   vitamin e (VITAMIN E) 400 UNIT Cap  Active   Zinc 50 MG Tab  Active                ALLERGIES  Allergies   Allergen Reactions   • Statins [Hmg-Coa-R Inhibitors] Rash     Blisters     • Atorvastatin        PHYSICAL EXAM  VITAL SIGNS: /73   Pulse (!) 111   Temp 37.3 °C (99.1 °F) (Temporal)   Resp 20   Ht 1.854 m (6' 1\")   Wt 103 kg (227 lb)   SpO2 94%   BMI 29.95 kg/m²    Constitutional: Alert and mild distress.  There is active bleeding from his nose.  HENT: Normocephalic atraumatic. Bilateral external ears normal. Mucous membranes are moist.  The patient has a 2.5 cm curvilinear laceration on the tip of his nose that is actively bleeding.  No obvious nasal septal hematoma is noted.  Eyes: Pupils are equal and reactive. Conjunctiva normal. Non-icteric sclera.   Neck: Normal range of motion without tenderness. Supple. No midline cervical spine tenderness.  Cardiovascular: Tachycardic rate and regular rhythm. No murmurs, gallops or rubs.  Thorax & Lungs: Breath sounds are clear to auscultation bilaterally. No wheezing, rhonchi or rales.  Abdomen: Soft, " nontender and nondistended. No peritoneal signs noted.  Skin: Warm and dry. No rashes are noted.  Back: No bony tenderness, No CVA tenderness.   Extremities: 2+ peripheral pulses. Cap refill is less than 2 seconds. No edema, cyanosis, or clubbing.  Musculoskeletal: There is tenderness palpation over the left shoulder.  There is obvious bruising on the lateral aspect of the left shoulder.  No tenderness over the clavicle, elbow, forearm, or wrist.  2+ radial pulse.  Sensation motor grossly intact.  Neurologic: Alert and oriented ×3. The patient moves all 4 extremities and follows commands.  Psychiatric: Affect is normal. Judgment appears to be intact.    DIAGNOSTIC STUDIES / PROCEDURES    RADIOLOGY  DX-SHOULDER 2+ LEFT   Final Result         1.  No acute traumatic bony injury.         CT-HEAD W/O   Final Result         1.  No acute intracranial abnormality is identified, there are nonspecific white matter changes, commonly associated with small vessel ischemic disease.  Associated mild cerebral atrophy is noted.   2.  Atherosclerosis.      CT-MAXILLOFACIAL W/O PLUS RECONS   Final Result         1.  Suspected nasal spine fracture.   2.  Soft tissue gas in the upper lip, likely related to laceration.   3.  Atherosclerosis        Laceration Repair Procedure    Indication: Laceration    Location/Description: 2.5 cm curvilinear laceration on the tip of the nose    Procedure: The patient was placed in the appropriate position and anesthesia around the laceration was obtained by infiltration using 1% Lidocaine without epinephrine. The area was then unable to be prepped due to the emergent nature of the procedure. The laceration was closed with 5-0 Ethilon using interrupted sutures. There were no additional lacerations requiring repair. The wound area was then dressed with bacitracin.      Total repaired wound length: 2.5 cm.     Other Items: Suture count: 5    The patient tolerated the procedure well.    Complications:  None    COURSE & MEDICAL DECISION MAKING  Pertinent Labs & Imaging studies reviewed. (See chart for details)    This is a 80-year-old male presenting to the emergency department for evaluation after a fall.  On my initial evaluation, he is having active bleeding from a laceration on the end of his nose.  This was sutured as pressure was not obtaining hemostasis.  His tetanus was updated.  After the bleeding was controlled, inspection of bilateral nares did not reveal any obvious nasal septal hematoma.  CTs of the face and head were obtained.  No intracranial hemorrhage or skull fracture was noted.  A suspected nasal spine fracture was noted.  Additionally, soft tissue gas in the upper lip was noted.  This is likely secondary to a small mucosal tear.  I did not note any large mucosal lacerations requiring repair, however.  Additionally, a plain film of the left shoulder was noted as he had some significant bruising and tenderness.  No acute fracture or dislocation was noted.  The patient was treated with Tylenol.  Upon reassessment, his heart rate was improved.  He was able to ambulate without assistance.  I encouraged him to follow-up with ENT for suture removal and further evaluation of his nasal bone fracture in 7 to 8 days.  He understands return to the ED with any worsening signs or symptoms including but not limited to vomiting, worsening pain, or bleeding.    I verified that the patient was wearing a mask and I was wearing appropriate PPE every time I entered the room. The patient's mask was on the patient at all times during my encounter except for a brief view of the oropharynx.    FINAL IMPRESSION  1. Closed fracture of nasal bone, initial encounter    2. Fall, initial encounter      PRESCRIPTIONS  New Prescriptions    No medications on file     FOLLOW UP  Dorian Raman M.D.  [address] [facility]  [place] [address]  [phone]    Call in 1 day  To schedule a follow up appointment    [facility]  [facility], Emergency Dept  [address]  [place] [address]  [phone]  Go to   As needed    -DISCHARGE-    Electronically signed by: Bina Oneill D.O., 11/30/2020 10:45 PM

## 2020-12-02 NOTE — TELEPHONE ENCOUNTER
BE    TO: 130p/Tues/Ofc  NM: USMAN More   PH: [phone]   PT NM: USMAN More   : 1940   REG DR: Dr Wilkinson   RE: Needs to speak to a medical  assistant. By the way, auto attend is  not working, per caller.   DISP HIST: 2020 01:17P RK, pt  dsp  2020 01:20P  checked

## 2020-12-02 NOTE — TELEPHONE ENCOUNTER
Returned call. Pt wants to know if he is due for lab work. Informed pt that he has fairly recent lab results from September, and if additional labs are needed they will be ordered at 12/10 BE appt.

## 2020-12-04 ENCOUNTER — OFFICE VISIT (OUTPATIENT)
Dept: NEUROLOGY | Facility: MEDICAL CENTER | Age: 80
End: 2020-12-04
Payer: MEDICARE

## 2020-12-04 VITALS
TEMPERATURE: 98.2 F | SYSTOLIC BLOOD PRESSURE: 124 MMHG | DIASTOLIC BLOOD PRESSURE: 62 MMHG | HEART RATE: 89 BPM | BODY MASS INDEX: 31.59 KG/M2 | WEIGHT: 238.32 LBS | OXYGEN SATURATION: 96 % | HEIGHT: 73 IN

## 2020-12-04 DIAGNOSIS — E11.42 DIABETIC POLYNEUROPATHY ASSOCIATED WITH TYPE 2 DIABETES MELLITUS (HCC): Primary | ICD-10-CM

## 2020-12-04 DIAGNOSIS — M54.16 LUMBAR RADICULOPATHY, CHRONIC: ICD-10-CM

## 2020-12-04 PROCEDURE — 99213 OFFICE O/P EST LOW 20 MIN: CPT | Performed by: PSYCHIATRY & NEUROLOGY

## 2020-12-04 ASSESSMENT — ENCOUNTER SYMPTOMS
FALLS: 1
TINGLING: 0
SPEECH CHANGE: 0
FOCAL WEAKNESS: 1

## 2020-12-04 ASSESSMENT — FIBROSIS 4 INDEX: FIB4 SCORE: 2.52

## 2020-12-04 NOTE — PROGRESS NOTES
"Subjective:      USMAN More III is a 80 y.o. male who presents for follow-up, with a history of a severe diabetic-related polyneuropathy and bilateral lumbosacral radiculopathies with symptomatic gait ataxia.     HPI    Seen just 6 months ago, the balance difficulties remain.  He has continued with physical therapy which he states helps, though he still has problems that really have not improved much.  The left lower extremity weakness from his radiculopathy is unchanged.  He denies burning dysesthesias or paresthesias in the feet or hands.    EMG/NCV studies from July of this year revealed evidence of this rather severe sensorimotor, large fiber polyneuropathy.  There was no evidence of active denervation of the lumbar spine roots, just chronic changes consistent with radiculopathies bilaterally.  He is on Neurontin 300 mg every 6 hours as needed.    The unsteadiness is there when it is darker, he is fallen on a couple of occasions when he missteps in the circumstances.    Medical, surgical and family histories are reviewed, there are no new drug allergies.  Other than the gabapentin, from my standpoint, he is on no other significant medications, though his medication list overall is quite extensive, reviewed in the electronic health record.    Review of Systems   Musculoskeletal: Positive for falls.   Neurological: Positive for focal weakness. Negative for tingling and speech change.   All other systems reviewed and are negative.       Objective:     /62 (BP Location: Right arm, Patient Position: Sitting, BP Cuff Size: Adult)   Pulse 89   Temp 36.8 °C (98.2 °F) (Temporal)   Ht 1.854 m (6' 1\")   Wt 108.1 kg (238 lb 5.1 oz)   SpO2 96%   BMI 31.44 kg/m²      Physical Exam    He appears in no acute distress.  Vital signs are stable.  There is no malar rash.  The neck is supple, range of motion is full.  Cardiac evaluation is unremarkable.  The chronic vascular insufficiency changes distal to the " midshin are unchanged.  There is some minimal edema.    Fully oriented, there is no aphasia or inattention.  Cranial nerve exam is grossly intact.    Musculoskeletal exam again reveals the atrophy in the distal left lower extremity below the knee.  Tone is diminished throughout. Strength is intact throughout except in the LLE with dorsi- and plantar flexion.  Reflexes are present in the upper extremities though diminished, knee jerks are absent status post bilateral TKA in the past, ankle jerks are absent.  There are no pathologic reflexes.    Repetitive movements with the left foot are slowed proportionate to the degree of weakness.  They are intact in the right lower extremity as well as both upper extremities.  There is no appendicular dystaxia with the upper cavities.  He walks with a foot drop on the left, station is widened.    Sensory exam again reveals the stocking pattern loss of vibration below the knees bilaterally, temperature below the upper shin bilaterally, JPS to the ankles.    Assessment/Plan:     1. Diabetic polyneuropathy associated with type 2 diabetes mellitus (HCC)  No real time and money change at this time, I will continue to provide some physical therapy for gait stabilization though he was told over time even this will make no big difference, we will allow him some additional therapies into early 2021, but afterwards it is simply a waste of time and money for long-term benefit.  He will call the office into the new year so that an order can be written.    2. Lumbar radiculopathy, chronic  Chronic only, it explains any focal weakness in the left lower extremity.  There is nothing to suggest additional compression, EMG/NCV studies revealed no active disease.    Face-to-face time was spent reviewing all the above.  He was told that he will not walk as he once had.  The deficits he has now are probably permanent, frustrating as it may be.  A 6-month follow-up is scheduled.    Time: 20-minute  spent face-to-face for exam, review, discussion, and education, of this over 50% of the time spent counseling and coordinating care.

## 2020-12-08 ENCOUNTER — HOSPITAL ENCOUNTER (EMERGENCY)
Facility: MEDICAL CENTER | Age: 80
End: 2020-12-08
Attending: EMERGENCY MEDICINE
Payer: MEDICARE

## 2020-12-08 VITALS
OXYGEN SATURATION: 94 % | TEMPERATURE: 97.6 F | DIASTOLIC BLOOD PRESSURE: 72 MMHG | BODY MASS INDEX: 31.09 KG/M2 | WEIGHT: 235.67 LBS | RESPIRATION RATE: 18 BRPM | SYSTOLIC BLOOD PRESSURE: 112 MMHG | HEART RATE: 82 BPM

## 2020-12-08 PROCEDURE — 99281 EMR DPT VST MAYX REQ PHY/QHP: CPT

## 2020-12-08 PROCEDURE — 302449 STATCHG TRIAGE ONLY (STATISTIC)

## 2020-12-08 ASSESSMENT — FIBROSIS 4 INDEX: FIB4 SCORE: 2.52

## 2020-12-09 NOTE — ED NOTES
Late Entry    1430: Here for suture removal, pt was here on 11/30 for fall. Sutures placed here at [facility]. Wound crusted, scabbed, thick eschar. One suture visible. Wound poorly visualized. Peroxide soak to facilitate suture removal.       1445: Eschar partially removed, 2nd peroxide soak       1500: Eschar partially removed, 3rd peroxide soak    1515: Visible sutures removed, no signs or symptoms of infection, wound poorly approximated in places. Suture line reinforced with steri strips, secured with benzoin         1530: Discharge instruction reviewed. Wound care reinforces, pt educated to return if signs of infection are noted, Pt educated to keep steri strips in place. Pt ambulated out with steady gait.

## 2020-12-10 ENCOUNTER — OFFICE VISIT (OUTPATIENT)
Dept: CARDIOLOGY | Facility: MEDICAL CENTER | Age: 80
End: 2020-12-10
Payer: MEDICARE

## 2020-12-10 VITALS
DIASTOLIC BLOOD PRESSURE: 70 MMHG | RESPIRATION RATE: 14 BRPM | BODY MASS INDEX: 30.48 KG/M2 | SYSTOLIC BLOOD PRESSURE: 112 MMHG | HEIGHT: 73 IN | HEART RATE: 92 BPM | OXYGEN SATURATION: 95 % | WEIGHT: 230 LBS

## 2020-12-10 DIAGNOSIS — Z95.810 ICD (IMPLANTABLE CARDIOVERTER-DEFIBRILLATOR) IN PLACE: ICD-10-CM

## 2020-12-10 DIAGNOSIS — I10 ESSENTIAL HYPERTENSION: ICD-10-CM

## 2020-12-10 DIAGNOSIS — I50.22 CHF (CONGESTIVE HEART FAILURE), NYHA CLASS II, CHRONIC, SYSTOLIC (HCC): ICD-10-CM

## 2020-12-10 DIAGNOSIS — Z95.2 S/P TAVR (TRANSCATHETER AORTIC VALVE REPLACEMENT): ICD-10-CM

## 2020-12-10 DIAGNOSIS — N18.30 STAGE 3 CHRONIC KIDNEY DISEASE, UNSPECIFIED WHETHER STAGE 3A OR 3B CKD: ICD-10-CM

## 2020-12-10 DIAGNOSIS — E78.5 DYSLIPIDEMIA: Chronic | ICD-10-CM

## 2020-12-10 DIAGNOSIS — I25.10 CORONARY ARTERY DISEASE INVOLVING NATIVE CORONARY ARTERY OF NATIVE HEART WITHOUT ANGINA PECTORIS: ICD-10-CM

## 2020-12-10 PROCEDURE — 99214 OFFICE O/P EST MOD 30 MIN: CPT | Performed by: INTERNAL MEDICINE

## 2020-12-10 ASSESSMENT — FIBROSIS 4 INDEX: FIB4 SCORE: 2.52

## 2020-12-10 NOTE — PATIENT INSTRUCTIONS
On Olayinka 10 2021 Stop aspirin  Continue Clopidogrel    Take a two week break from Carvedilol- if balance doesn't improve, resume    In one month can try to take a two week break from Entresto.

## 2020-12-10 NOTE — PROGRESS NOTES
CARDIOLOGY OUTPATIENT FOLLOWUP    PCP: Pcp Pt States None    1. 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 Coronary artery disease involving native coronary artery of native heart without angina pectoris - PCI LM, RCA Jan 2020  3. ICD (implantable cardioverter-defibrillator) in place  4. Essential hypertension  5. Dyslipidemia  6. CHF (congestive heart failure), NYHA class II, chronic, systolic (HCC)  7. Stage 3 chronic kidney disease, unspecified whether stage 3a or 3b CKD    Appears stable from a cardiovascular standpoint with main issues being gait instability and recent fall.  I recommended a trial off antihypertensive medications-Coreg followed by Entresto to see if this improves stability.  If not he will resume these agents.  Given his tendency towards falls I asked that he discontinue aspirin at the 1 year anniversary of the PCI procedure which is January 10.  I also arrange for device interrogation as he seems to be overdue.  He otherwise continue the chronic cardiovascular medications    Follow up with Paul Wilkinson M.D. in 3 months    Chief Complaint   Patient presents with   • Coronary Artery Disease       History: LIAN More III is a 80 y.o. male with a past medical history of Chronic kidney disease, hypertension, dyslipidemia, diabetes, JAMES and gastric bypass presenting for follow up of chronic ischemic cardiomyopathy and valvular heart disease.  Last winter he underwent PCI of the RCA as well as left main, TAVR as well as ICD implantation.  LVEF is around 35 to 40%.    Since his last evaluation he continues to bryan gait instability and recently tripped off a curb cutting his nose and requiring stitches.  He also reports easy bruising with the dual antiplatelet therapy and recalls clinical stability without these agents while undergoing surgery this past year.  He does not experience orthopnea or angina.  He does feel weak and lightheaded when standing quickly and reports  "chronic blood pressure around 110 systolic.  He is taking a over-the-counter supplement to improve nerve function and he believes this may be working.    ROS:  All other systems reviewed and negative except as per the HPI    PE:  /70 (BP Location: Left arm, Patient Position: Sitting, BP Cuff Size: Adult)   Pulse 92   Resp 14   Ht 1.854 m (6' 1\")   Wt 104.3 kg (230 lb)   SpO2 95%   BMI 30.34 kg/m²   Gen: Well appearing  HEENT: Symmetric face. Anicteric sclerae. Moist mucus membranes  NECK: No JVD. No lymphadenopathy  CARDIAC: Normal PMI, regular, normal S1, S2. with a systolic murmur  VASCULATURE: Normal carotid amplitude without bruit.   RESP: Clear to auscultation bilaterally  ABD: Soft, non-tender, non-distended  EXT: No edema, no clubbing or cyanosis  SKIN: Warm and dry  NEURO: No gross deficits  PSYCH: Appropriate affect, participates in conversation    Past Medical History:   Diagnosis Date   • Arthritis    • Back pain    • Breath shortness     pt states short of breath 24/7 O2 at night only   • CAD (coronary artery disease)    • CATARACT     removed bilat   • Chest pain    • Chest tightness    • Chickenpox    • Congestive heart failure (HCC)    • Constipation    • Coronary heart disease    • Cough    • Daytime sleepiness    • Diabetes     oral medication   • Difficulty breathing    • Dilated cardiomyopathy (HCC)    • Fatigue    • GERD (gastroesophageal reflux disease)    • Hearing difficulty    • Heart murmur    • Heart valve disease    • Heartburn    • Hiatus hernia syndrome    • Hyperlipidemia    • Hypertension     pt states well controlled on meds   • Kidney disease 05/2020    ruptered left kidney    • Mumps    • Pacemaker     AICD   • Pain 08-29-13    back, hips and bilat legs, 4/10   • Pain 6/22/2015    left humerus   • Pain 1/7/16    knees, back and shoulder   • Pain     stomach   • Painful joint    • Palpitations    • Pneumonia 2007   • Rhinitis    • Ringing in ears    • Severe aortic " stenosis 12/4/2019   • Shortness of breath    • Sleep apnea     CPAp with O2   • Swelling of lower extremity    • Tonsillitis    • Tremor    • Ulcer 2014    Dr. Porter, gastroenterologist   • Unspecified hemorrhagic conditions     on plavix     Allergies   Allergen Reactions   • Statins [Hmg-Coa-R Inhibitors] Rash     Blisters     • Atorvastatin      Outpatient Encounter Medications as of 12/10/2020   Medication Sig Dispense Refill   • Silodosin 4 MG Cap Take  by mouth every day.     • carvedilol (COREG) 3.125 MG Tab TAKE 1 TABLET BY MOUTH TWICE DAILY WITH MEALS 90 Tab 0   • ENTRESTO 24-26 MG Tab tablet Take 1 tablet by mouth twice daily 60 Tab 3   • ezetimibe (ZETIA) 10 MG Tab Take 1 tablet by mouth once daily 90 Tab 0   • clopidogrel (PLAVIX) 75 MG Tab Take 1 tablet by mouth once daily 90 Tab 0   • Ferrous Sulfate (IRON PO) Take  by mouth.     • Probiotic Product (PROBIOTIC PO) Take  by mouth.     • aspirin EC (ECOTRIN) 81 MG Tablet Delayed Response Take 81 mg by mouth every day.     • HYDROcodone-acetaminophen (NORCO) 5-325 MG Tab per tablet Take 1 Tab by mouth every four hours as needed.     • acetaminophen (TYLENOL) 325 MG Tab Take 650 mg by mouth every four hours as needed.     • clomiPHENE (CLOMID) 50 MG tablet Take 50 mg by mouth 2X A WEEK.     • hydrocortisone 2.5 % Ointment Apply 1 Each to affected area(s) as needed.     • Calcium Citrate (CITRACAL PO) Take 1 Tab by mouth 2 Times a Day.     • bumetanide (BUMEX) 1 MG Tab Take 1 Tab by mouth 2 times a day. 120 Tab 3   • fluticasone (FLONASE) 50 MCG/ACT nasal spray Use 2 spray(s) in each nostril once daily (Patient taking differently: 1 time daily as needed.) 48 g 3   • Multiple Vitamins-Minerals (ICAPS) Tab Take 1 Tab by mouth 2 Times a Day.     • lactobacillus rhamnosus (CULTURELLE) Cap capsule Take 1 Cap by mouth every day.     • testosterone cypionate (DEPO-TESTOSTERONE) 200 MG/ML Solution injection 50 mg by Intramuscular route every thursday.     •  cyanocobalamin (VITAMIN B-12) 1000 MCG/ML Solution 1,000 mcg by Intramuscular route every thursday.     • coenzyme Q-10 30 MG capsule Take 60 mg by mouth 2 Times a Day.     • Garlic 1000 MG Cap Take 1,000 mg by mouth 2 Times a Day.     • glucosamine Sulfate 500 MG Cap Take 1,500 mg by mouth 2 Times a Day.     • vitamin e (VITAMIN E) 400 UNIT Cap Take 400 Units by mouth 2 times a day.     • omeprazole (PRILOSEC) 40 MG delayed-release capsule Take 40 mg by mouth 2 times a day.     • Non Formulary Request Take 1 Cap by mouth 2 Times a Day. Cholox (OTC)     • Cholecalciferol (VITAMIN D3) 2000 UNIT Cap Take 2,000 Units by mouth 2 Times a Day.     • Non Formulary Request Take 1 Cap by mouth 2 Times a Day. Domenic red (OTC)     • Non Formulary Request Take 1 Cap by mouth every day. Nitric OXIDE (OTC)     • Non Formulary Request Take 1 Cap by mouth every day. anit-oxidant (OTC)     • Zinc 50 MG Tab Take 50 mg by mouth every day.     • anastrozole (ARIMIDEX) 1 MG Tab Take 0.5 mg by mouth See Admin Instructions. On Tue and Sat     • gabapentin (NEURONTIN) 300 MG Cap Take 300 mg by mouth every 6 hours as needed. Indications: Neuropathic Pain     • glimepiride (AMARYL) 1 MG tablet Take 1 mg by mouth every morning.     • B Complex Vitamins (VITAMIN B COMPLEX PO) Take 1 Tab by mouth every day.     • Cranberry 300 MG Tab Take 300 mg by mouth every morning.     • L-Lysine 1000 MG Tab Take 1,000 mg by mouth every day.     • Ascorbic Acid (VITAMIN C) 1000 MG Tab Take 1,000 mg by mouth 2 Times a Day.     • [DISCONTINUED] Home Care Oxygen Inhale 2 L/min by mouth Continuous.   Respiratory route via: Nasal Cannula   Oxygen supplier: [facility]  Indications: Hypoxia, to keep O2 sat over 90%       No facility-administered encounter medications on file as of 12/10/2020.      Social History     Socioeconomic History   • Marital status:      Spouse name: Not on file   • Number of children: Not on file   • Years of education: Not on file   •  Highest education level: Not on file   Occupational History   • Not on file   Social Needs   • Financial resource strain: Not on file   • Food insecurity     Worry: Not on file     Inability: Not on file   • Transportation needs     Medical: Not on file     Non-medical: Not on file   Tobacco Use   • Smoking status: Never Smoker   • Smokeless tobacco: Never Used   • Tobacco comment: 0   Substance and Sexual Activity   • Alcohol use: No     Alcohol/week: 0.0 oz     Frequency: Never     Binge frequency: Never   • Drug use: No   • Sexual activity: Yes   Lifestyle   • Physical activity     Days per week: Not on file     Minutes per session: Not on file   • Stress: Not on file   Relationships   • Social connections     Talks on phone: Not on file     Gets together: Not on file     Attends [employer] service: Not on file     Active member of club or organization: Not on file     Attends meetings of clubs or organizations: Not on file     Relationship status: Not on file   • Intimate partner violence     Fear of current or ex partner: Not on file     Emotionally abused: Not on file     Physically abused: Not on file     Forced sexual activity: Not on file   Other Topics Concern   • Not on file   Social History Narrative   • Not on file       Studies  Lab Results   Component Value Date/Time    CHOLSTRLTOT 105 12/27/2019 02:20 AM    LDL 56 12/27/2019 02:20 AM    HDL 29 (A) 12/27/2019 02:20 AM    TRIGLYCERIDE 101 12/27/2019 02:20 AM       Lab Results   Component Value Date/Time    SODIUM 141 09/16/2020 02:44 PM    POTASSIUM 4.8 09/16/2020 02:44 PM    CHLORIDE 102 09/16/2020 02:44 PM    CO2 24 09/16/2020 02:44 PM    GLUCOSE 70 09/16/2020 02:44 PM    BUN 34 (H) 09/16/2020 02:44 PM    CREATININE 1.86 (H) 09/16/2020 02:44 PM    CREATININE 1.18 02/08/2011 12:00 AM    BUNCREATRAT 16 02/08/2011 12:00 AM    GLOMRATE >59 02/08/2011 12:00 AM     Lab Results   Component Value Date/Time    ALKPHOSPHAT 39 04/28/2020 05:10 AM    ASTSGOT 27  04/28/2020 05:10 AM    ALTSGPT 23 04/28/2020 05:10 AM    TBILIRUBIN 1.0 04/28/2020 05:10 AM        For this encounter I reviewed medical records.     For this encounter I directly reviewed ECG tracings. I agree with the interpretations in the electronic health record

## 2020-12-12 NOTE — ED NOTES
Followed up with pt regarding after care for broken nose and laceration. Had previously spoke with pt regarding his inability to get in to see ENT for follow up. Pt states he was still unable to get a follow up to physician he was referred to at [facility]. States they were reluctant to see him. I offered to call them regarding follow up but pt declines at this point. I advised pt to return for any concerning symptoms or if wound is not healing properly. My phone number was provided for pt to reach out if he has any needs regarding his care.

## 2020-12-13 DIAGNOSIS — I50.23 ACUTE ON CHRONIC SYSTOLIC HEART FAILURE (HCC): ICD-10-CM

## 2020-12-14 RX ORDER — BUMETANIDE 1 MG/1
TABLET ORAL
Qty: 360 TAB | Refills: 1 | Status: SHIPPED | OUTPATIENT
Start: 2020-12-14 | End: 2022-03-09

## 2020-12-15 ENCOUNTER — APPOINTMENT (OUTPATIENT)
Dept: NEPHROLOGY | Facility: MEDICAL CENTER | Age: 80
End: 2020-12-15
Payer: MEDICARE

## 2021-01-05 ENCOUNTER — OFFICE VISIT (OUTPATIENT)
Dept: NEPHROLOGY | Facility: MEDICAL CENTER | Age: 81
End: 2021-01-05
Payer: MEDICARE

## 2021-01-05 VITALS
TEMPERATURE: 98.1 F | OXYGEN SATURATION: 95 % | HEART RATE: 90 BPM | BODY MASS INDEX: 31.03 KG/M2 | WEIGHT: 234.1 LBS | SYSTOLIC BLOOD PRESSURE: 116 MMHG | DIASTOLIC BLOOD PRESSURE: 68 MMHG | HEIGHT: 73 IN

## 2021-01-05 DIAGNOSIS — N18.30 STAGE 3 CHRONIC KIDNEY DISEASE, UNSPECIFIED WHETHER STAGE 3A OR 3B CKD: ICD-10-CM

## 2021-01-05 DIAGNOSIS — I10 ESSENTIAL HYPERTENSION: ICD-10-CM

## 2021-01-05 DIAGNOSIS — E11.22 TYPE 2 DIABETES MELLITUS WITH CHRONIC KIDNEY DISEASE, WITHOUT LONG-TERM CURRENT USE OF INSULIN, UNSPECIFIED CKD STAGE (HCC): ICD-10-CM

## 2021-01-05 PROCEDURE — 99214 OFFICE O/P EST MOD 30 MIN: CPT | Performed by: INTERNAL MEDICINE

## 2021-01-05 ASSESSMENT — FIBROSIS 4 INDEX: FIB4 SCORE: 2.52

## 2021-01-05 ASSESSMENT — ENCOUNTER SYMPTOMS
VOMITING: 0
COUGH: 0
CHILLS: 0
HYPERTENSION: 1
SHORTNESS OF BREATH: 0
FEVER: 0
NAUSEA: 0

## 2021-01-06 NOTE — PROGRESS NOTES
"Subjective:      USMAN More III is a 80 y.o. male who presents with Chronic Kidney Disease and Hypertension            Chronic Kidney Disease  This is a chronic problem. The current episode started more than 1 year ago. The problem occurs constantly. The problem has been waxing and waning. Pertinent negatives include no chest pain, chills, coughing, fever, nausea, urinary symptoms or vomiting.   Hypertension  This is a chronic problem. The current episode started more than 1 year ago. The problem is unchanged. The problem is controlled. Pertinent negatives include no chest pain, malaise/fatigue, peripheral edema or shortness of breath. Risk factors for coronary artery disease include male gender and diabetes mellitus. Past treatments include beta blockers. The current treatment provides significant improvement. There are no compliance problems.  Hypertensive end-organ damage includes kidney disease. Identifiable causes of hypertension include chronic renal disease.       Review of Systems   Constitutional: Negative for chills, fever and malaise/fatigue.   Respiratory: Negative for cough and shortness of breath.    Cardiovascular: Negative for chest pain and leg swelling.   Gastrointestinal: Negative for nausea and vomiting.   Genitourinary: Negative for dysuria, frequency and urgency.          Objective:     /68 (BP Location: Right arm, Patient Position: Sitting, BP Cuff Size: Adult)   Pulse 90   Temp 36.7 °C (98.1 °F) (Temporal)   Ht 1.854 m (6' 1\") Comment: pt reported  Wt 106.2 kg (234 lb 1.6 oz)   SpO2 95%   BMI 30.89 kg/m²      Physical Exam  Vitals signs and nursing note reviewed.   Constitutional:       General: He is not in acute distress.     Appearance: He is not ill-appearing.   HENT:      Head: Normocephalic and atraumatic.      Right Ear: External ear normal.      Left Ear: External ear normal.      Nose: Nose normal.   Eyes:      General:         Right eye: No discharge.         Left eye: No " discharge.      Conjunctiva/sclera: Conjunctivae normal.   Cardiovascular:      Rate and Rhythm: Normal rate and regular rhythm.      Heart sounds: No murmur.   Pulmonary:      Effort: Pulmonary effort is normal. No respiratory distress.      Breath sounds: Normal breath sounds. No wheezing.   Musculoskeletal:         General: No tenderness or deformity.      Right lower leg: No edema.      Left lower leg: No edema.   Skin:     General: Skin is warm.   Neurological:      General: No focal deficit present.      Mental Status: He is alert and oriented to person, place, and time.   Psychiatric:         Mood and Affect: Mood normal.         Behavior: Behavior normal.       Past Medical History:   Diagnosis Date   • Arthritis    • Back pain    • Breath shortness     pt states short of breath 24/7 O2 at night only   • CAD (coronary artery disease)    • CATARACT     removed bilat   • Chest pain    • Chest tightness    • Chickenpox    • Congestive heart failure (HCC)    • Constipation    • Coronary heart disease    • Cough    • Daytime sleepiness    • Diabetes     oral medication   • Difficulty breathing    • Dilated cardiomyopathy (HCC)    • Fatigue    • GERD (gastroesophageal reflux disease)    • Hearing difficulty    • Heart murmur    • Heart valve disease    • Heartburn    • Hiatus hernia syndrome    • Hyperlipidemia    • Hypertension     pt states well controlled on meds   • 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 Porter, gastroenterologist   • Unspecified  hemorrhagic conditions     on plavix     Social History     Socioeconomic History   • Marital status:      Spouse name: Not on file   • Number of children: Not on file   • Years of education: Not on file   • Highest education level: Not on file   Occupational History   • Not on file   Social Needs   • Financial resource strain: Not on file   • Food insecurity     Worry: Not on file     Inability: Not on file   • Transportation needs     Medical: Not on file     Non-medical: Not on file   Tobacco Use   • Smoking status: Never Smoker   • Smokeless tobacco: Never Used   • Tobacco comment: 0   Substance and Sexual Activity   • Alcohol use: No     Alcohol/week: 0.0 oz     Frequency: Never     Binge frequency: Never   • Drug use: No   • Sexual activity: Yes   Lifestyle   • Physical activity     Days per week: Not on file     Minutes per session: Not on file   • Stress: Not on file   Relationships   • Social connections     Talks on phone: Not on file     Gets together: Not on file     Attends [employer] service: Not on file     Active member of club or organization: Not on file     Attends meetings of clubs or organizations: Not on file     Relationship status: Not on file   • Intimate partner violence     Fear of current or ex partner: Not on file     Emotionally abused: Not on file     Physically abused: Not on file     Forced sexual activity: Not on file   Other Topics Concern   • Not on file   Social History Narrative   • Not on file     Family History   Problem Relation Age of Onset   • No Known Problems Sister    • No Known Problems Sister    • No Known Problems Sister    • Diabetes Other    • No Known Problems Mother    • No Known Problems Father    • Heart Disease Neg Hx      Recent Labs     04/26/20  1902 04/27/20  0526 04/28/20  0510 04/28/20  0510 06/17/20  1032 06/17/20  1033 09/16/20  1444   ALBUMIN 3.9 2.8* 2.8*  --   --   --   --    SODIUM 136 137 136   < > 138 137 141   POTASSIUM 4.8 5.1 4.7   < >  5.0 4.9 4.8   CHLORIDE 97 103 101   < > 104 103 102   CO2 21 23 24   < > 21 21 24   BUN 45* 45* 48*   < > 47* 47* 34*   CREATININE 1.93* 2.34* 2.64*   < > 2.16* 2.17* 1.86*   PHOSPHORUS  --  5.5*  --   --   --   --   --     < > = values in this interval not displayed.                 Assessment/Plan:        1. Essential hypertension  Controlled  Continue same medication regimen  Continue low-sodium diet      2. Stage 3 chronic kidney disease, unspecified whether stage 3a or 3b CKD  Stable  No uremic symptoms  Renal dose of medication  Avoid nephrotoxins  Continue same medication regimen      3. Type 2 diabetes mellitus with chronic kidney disease, without long-term current use of insulin, unspecified CKD stage (HCC)  Continue to optimize diabetes control for hemoglobin A1c below 7%

## 2021-01-07 DIAGNOSIS — E78.5 DYSLIPIDEMIA: Chronic | ICD-10-CM

## 2021-01-11 RX ORDER — EZETIMIBE 10 MG/1
TABLET ORAL
Qty: 90 TAB | Refills: 3 | Status: SHIPPED | OUTPATIENT
Start: 2021-01-11 | End: 2021-11-22

## 2021-01-14 DIAGNOSIS — Z23 NEED FOR VACCINATION: ICD-10-CM

## 2021-01-21 DIAGNOSIS — Z95.2 S/P TAVR (TRANSCATHETER AORTIC VALVE REPLACEMENT): ICD-10-CM

## 2021-01-21 RX ORDER — CLOPIDOGREL BISULFATE 75 MG/1
TABLET ORAL
Qty: 90 TAB | Refills: 0 | Status: SHIPPED | OUTPATIENT
Start: 2021-01-21 | End: 2021-04-09

## 2021-02-03 ENCOUNTER — IMMUNIZATION (OUTPATIENT)
Dept: FAMILY PLANNING/WOMEN'S HEALTH CLINIC | Facility: IMMUNIZATION CENTER | Age: 81
End: 2021-02-03
Attending: INTERNAL MEDICINE
Payer: MEDICARE

## 2021-02-03 DIAGNOSIS — Z23 ENCOUNTER FOR VACCINATION: Primary | ICD-10-CM

## 2021-02-03 DIAGNOSIS — Z23 NEED FOR VACCINATION: ICD-10-CM

## 2021-02-03 PROCEDURE — 91300 PFIZER SARS-COV-2 VACCINE: CPT

## 2021-02-03 PROCEDURE — 0001A PFIZER SARS-COV-2 VACCINE: CPT

## 2021-02-24 ENCOUNTER — HOSPITAL ENCOUNTER (OUTPATIENT)
Dept: CARDIOLOGY | Facility: MEDICAL CENTER | Age: 81
End: 2021-02-24
Attending: INTERNAL MEDICINE
Payer: MEDICARE

## 2021-02-24 ENCOUNTER — IMMUNIZATION (OUTPATIENT)
Dept: FAMILY PLANNING/WOMEN'S HEALTH CLINIC | Facility: IMMUNIZATION CENTER | Age: 81
End: 2021-02-24
Attending: INTERNAL MEDICINE
Payer: MEDICARE

## 2021-02-24 DIAGNOSIS — Z95.2 S/P TAVR (TRANSCATHETER AORTIC VALVE REPLACEMENT): ICD-10-CM

## 2021-02-24 DIAGNOSIS — Z23 ENCOUNTER FOR VACCINATION: Primary | ICD-10-CM

## 2021-02-24 LAB
LV EJECT FRACT  99904: 40
LV EJECT FRACT MOD 2C 99903: 46.3
LV EJECT FRACT MOD 4C 99902: 34.07
LV EJECT FRACT MOD BP 99901: 40.8

## 2021-02-24 PROCEDURE — 93306 TTE W/DOPPLER COMPLETE: CPT | Mod: 26 | Performed by: INTERNAL MEDICINE

## 2021-02-24 PROCEDURE — 93306 TTE W/DOPPLER COMPLETE: CPT

## 2021-02-24 PROCEDURE — 0002A PFIZER SARS-COV-2 VACCINE: CPT

## 2021-02-24 PROCEDURE — 91300 PFIZER SARS-COV-2 VACCINE: CPT

## 2021-02-26 ENCOUNTER — OFFICE VISIT (OUTPATIENT)
Dept: CARDIOLOGY | Facility: MEDICAL CENTER | Age: 81
End: 2021-02-26
Payer: MEDICARE

## 2021-02-26 ENCOUNTER — DOCUMENTATION (OUTPATIENT)
Dept: CARDIOLOGY | Facility: MEDICAL CENTER | Age: 81
End: 2021-02-26

## 2021-02-26 VITALS
HEIGHT: 73 IN | HEART RATE: 88 BPM | SYSTOLIC BLOOD PRESSURE: 110 MMHG | DIASTOLIC BLOOD PRESSURE: 60 MMHG | RESPIRATION RATE: 16 BRPM | BODY MASS INDEX: 31.28 KG/M2 | WEIGHT: 236 LBS | OXYGEN SATURATION: 92 %

## 2021-02-26 DIAGNOSIS — I25.10 CORONARY ARTERY DISEASE INVOLVING NATIVE CORONARY ARTERY OF NATIVE HEART WITHOUT ANGINA PECTORIS: ICD-10-CM

## 2021-02-26 DIAGNOSIS — Z95.810 ICD (IMPLANTABLE CARDIOVERTER-DEFIBRILLATOR) IN PLACE: ICD-10-CM

## 2021-02-26 DIAGNOSIS — I50.22 CHF (CONGESTIVE HEART FAILURE), NYHA CLASS II, CHRONIC, SYSTOLIC (HCC): ICD-10-CM

## 2021-02-26 DIAGNOSIS — Z95.2 S/P TAVR (TRANSCATHETER AORTIC VALVE REPLACEMENT): ICD-10-CM

## 2021-02-26 PROCEDURE — 99214 OFFICE O/P EST MOD 30 MIN: CPT | Performed by: INTERNAL MEDICINE

## 2021-02-26 ASSESSMENT — ENCOUNTER SYMPTOMS
PALPITATIONS: 0
DIZZINESS: 0
CONSTITUTIONAL NEGATIVE: 1
CHILLS: 0
CLAUDICATION: 0
NEUROLOGICAL NEGATIVE: 1
NERVOUS/ANXIOUS: 0
FOCAL WEAKNESS: 0
MYALGIAS: 0
GASTROINTESTINAL NEGATIVE: 1
CARDIOVASCULAR NEGATIVE: 1
BLURRED VISION: 0
DOUBLE VISION: 0
DEPRESSION: 0
WEAKNESS: 0
EYES NEGATIVE: 1
HEADACHES: 0
PSYCHIATRIC NEGATIVE: 1
NAUSEA: 0
SHORTNESS OF BREATH: 0
FEVER: 0
ABDOMINAL PAIN: 0
VOMITING: 0
WEIGHT LOSS: 0
COUGH: 0
BRUISES/BLEEDS EASILY: 0
RESPIRATORY NEGATIVE: 1

## 2021-02-26 ASSESSMENT — FIBROSIS 4 INDEX: FIB4 SCORE: 2.52

## 2021-02-26 NOTE — PROGRESS NOTES
Valve Program Functional Assessment: 1 year s/p TAVR    [id number]   1a) Showering/bathing: 3  1b) Walking 1 block on ground: 1  1c) Hurrying or joggin  2) Swellin  3) Fatigue: 3  4) Shortness of breath: 2  5) Sleep sitting up: 5  6) Limited enjoyment of life: 2  7) Spend the rest of your life with HF: 1  8a) Hobbies, recreational activities:1  8b) Working or doing household chores:1  8c) Visiting family or friends: 1    5 meter walk test  1) __3.92____ s/5m  2) __3.44____ s/5m  3) __4.00____ s/5m     Strength   1) _32_____ kg  2) _35_____ kg  3) _34_____ kg    MOYA ADLs  Patient independently preforms...   - Bathing: Yes   - Dressing: Yes   - Toileting: Yes   - Transferring: Yes   - Continence: Yes   - Feeding: Yes     Living Situation  Patient lives:  with spouse    Mobility Aids   Patient uses:  none

## 2021-02-26 NOTE — PROGRESS NOTES
Chief Complaint   Patient presents with   • Aortic Stenosis     F/V DX: S/P TAVR (transcatheter aortic valve replacement)       Subjective:   USMAN More III is a 80 y.o. male who presents today for one year TAVR follow up.    Since the patient's last visit on 03/05/20, he has been doing well clinically. He denies fatigue, shortness of breath, dyspnea on exertion, chest pain, dizziness or syncope. He reinjured his right knee and is undergoing physical therapy.    Past Medical History:   Diagnosis Date   • Arthritis    • Back pain    • Breath shortness     pt states short of breath 24/7 O2 at night only   • CAD (coronary artery disease)    • CATARACT     removed bilat   • Chest pain    • Chest tightness    • Chickenpox    • Congestive heart failure (HCC)    • Constipation    • Coronary heart disease    • Cough    • Daytime sleepiness    • Diabetes     oral medication   • Difficulty breathing    • Dilated cardiomyopathy (HCC)    • Fatigue    • GERD (gastroesophageal reflux disease)    • Hearing difficulty    • Heart murmur    • Heart valve disease    • Heartburn    • Hiatus hernia syndrome    • Hyperlipidemia    • Hypertension     pt states well controlled on meds   • 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 Porter, gastroenterologist   • Unspecified hemorrhagic conditions     on plavix     Past Surgical History:   Procedure Laterality Date   • PB LAP, REMOVE ADJUST LUIS RESTRICT D*  6/5/2020    Procedure: REMOVAL, GASTRIC BAND, LAPAROSCOPIC - ADJUSTABLE BAND AND  PORT;  Surgeon: Deniz Sue M.D.;  Location: [facility];  Service: General   • TRANSCATHETER AORTIC VALVE REPLACEMENT N/A 1/27/2020    Procedure: REPLACEMENT, AORTIC VALVE, TRANSCATHETER-;  Surgeon: Surendra Castro M.D.;  Location: [facility];  Service: Cardiac   • OLGA  1/27/2020    Procedure: ECHOCARDIOGRAM, TRANSESOPHAGEAL;  Surgeon: Surendra Castro M.D.;  Location: [facility];  Service: Cardiac   • GASTROSCOPY N/A 1/8/2016    Procedure: GASTROSCOPY;  Surgeon: Deniz Sue M.D.;  Location: [facility];  Service:    • HUMERUS ORIF Left 6/25/2015    Procedure: HUMERUS ORIF/ PROXIMAL;  Surgeon: Cristal Guido M.D.;  Location: [facility];  Service:    • LUMBAR FUSION POSTERIOR  9/5/2013    Performed by Edith Sears M.D. at [facility]   • LUMBAR DECOMPRESSION  9/5/2013    Performed by Edith Sears M.D. at [facility]   • MASS EXCISION NEURO  9/5/2013    Performed by Edith Sears M.D. at [facility]   • RECOVERY  6/26/2012    Performed by [facility], IR-RECOVERY at [facility] SAME DAY [facility]   • DRAINAGE HEMATOMA  5/25/2012    Performed by DENIZ SUE at [facility]   • GASTRIC BAND LAPAROSCOPIC REVISION  5/11/2012    Performed by DENIZ SUE at [facility]   • LUMBAR FUSION POSTERIOR  3/26/2012    Performed by EDITH SEARS at [facility]   • LUMBAR DECOMPRESSION  3/26/2012    Performed by EDITH SEARS at [facility]   • INJ,EPIDURAL/LUMB/SAC SINGLE  3/19/2012    Performed by KRISTIN BALTAZAR at [facility]   • INJ,EPIDURAL/LUMB/SAC SINGLE  3/5/2012    Performed by KRISTIN BALTAZAR at [facility]   • GASTRIC BANDING LAPAROSCOPIC  3/24/2010    Performed by DENIZ SUE at [facility]   • HIATAL HERNIA REPAIR  3/24/2010    Performed by DENIZ SUE at [facility]   • KNEE ARTHROPLASTY TOTAL  2000     bilateral   • ABDOMINOPLASTY  1990   • AORTIC VALVE REPLACEMENT     • ARTHROSCOPY, KNEE     • LAMINOTOMY     • OTHER CARDIAC SURGERY      stents   • PB REMV 2ND CATARACT,CORN-SCLER SECTN     • SINUSCOPE     • SLEEVE,CARRIE VASO THIGH     • TONSILLECTOMY     • ZZZ CARDIAC CATH       Family History   Problem Relation Age of Onset   • No Known Problems Sister    • No Known Problems Sister    • No Known Problems Sister    • Diabetes Other    • No Known Problems Mother    • No Known Problems Father    • Heart Disease Neg Hx      Social History     Socioeconomic History   • Marital status:      Spouse name: Not on file   • Number of children: Not on file   • Years of education: Not on file   • Highest education level: Not on file   Occupational History   • Not on file   Tobacco Use   • Smoking status: Never Smoker   • Smokeless tobacco: Never Used   • Tobacco comment: 0   Substance and Sexual Activity   • Alcohol use: No     Alcohol/week: 0.0 oz   • Drug use: No   • Sexual activity: Yes   Other Topics Concern   • Not on file   Social History Narrative   • Not on file     Social Determinants of Health     Financial Resource Strain:    • Difficulty of Paying Living Expenses:    Food Insecurity:    • Worried About Running Out of Food in the Last Year:    • Ran Out of Food in the Last Year:    Transportation Needs:    • Lack of Transportation (Medical):    • Lack of Transportation (Non-Medical):    Physical Activity:    • Days of Exercise per Week:    • Minutes of Exercise per Session:    Stress:    • Feeling of Stress :    Social Connections:    • Frequency of Communication with Friends and Family:    • Frequency of Social Gatherings with Friends and Family:    • Attends Taoism Services:    • Active Member of Clubs or Organizations:    • Attends Club or Organization Meetings:    • Marital Status:    Intimate Partner Violence:    • Fear of Current or Ex-Partner:    • Emotionally Abused:    • Physically Abused:    •  Sexually Abused:      Allergies   Allergen Reactions   • Statins [Hmg-Coa-R Inhibitors] Rash     Blisters     • Atorvastatin      Outpatient Encounter Medications as of 2/26/2021   Medication Sig Dispense Refill   • clopidogrel (PLAVIX) 75 MG Tab Take 1 tablet by mouth once daily 90 Tab 0   • ezetimibe (ZETIA) 10 MG Tab Take 1 tablet by mouth once daily 90 Tab 3   • bumetanide (BUMEX) 1 MG Tab Take 2 tablets by mouth twice daily 360 Tab 1   • Silodosin 4 MG Cap Take 8 mg by mouth every day.     • Ferrous Sulfate (IRON PO) Take  by mouth.     • Probiotic Product (PROBIOTIC PO) Take  by mouth.     • acetaminophen (TYLENOL) 325 MG Tab Take 650 mg by mouth every four hours as needed.     • clomiPHENE (CLOMID) 50 MG tablet Take 50 mg by mouth 2X A WEEK.     • hydrocortisone 2.5 % Ointment Apply 1 Each to affected area(s) as needed.     • Calcium Citrate (CITRACAL PO) Take 1 Tab by mouth 2 Times a Day.     • fluticasone (FLONASE) 50 MCG/ACT nasal spray Use 2 spray(s) in each nostril once daily (Patient taking differently: 1 time daily as needed.) 48 g 3   • Multiple Vitamins-Minerals (ICAPS) Tab Take 1 Tab by mouth 2 Times a Day.     • lactobacillus rhamnosus (CULTURELLE) Cap capsule Take 1 Cap by mouth every day.     • testosterone cypionate (DEPO-TESTOSTERONE) 200 MG/ML Solution injection 50 mg by Intramuscular route every thursday.     • cyanocobalamin (VITAMIN B-12) 1000 MCG/ML Solution 1,000 mcg by Intramuscular route every thursday.     • coenzyme Q-10 30 MG capsule Take 60 mg by mouth 2 Times a Day.     • Garlic 1000 MG Cap Take 1,000 mg by mouth 2 Times a Day.     • glucosamine Sulfate 500 MG Cap Take 1,500 mg by mouth 2 Times a Day.     • vitamin e (VITAMIN E) 400 UNIT Cap Take 400 Units by mouth 2 times a day.     • omeprazole (PRILOSEC) 40 MG delayed-release capsule Take 40 mg by mouth 2 times a day.     • Non Formulary Request Take 1 Cap by mouth 2 Times a Day. Cholox (OTC)     • Cholecalciferol (VITAMIN D3)  2000 UNIT Cap Take 2,000 Units by mouth 2 Times a Day.     • Non Formulary Request Take 1 Cap by mouth 2 Times a Day. Domenic red (OTC)     • Non Formulary Request Take 1 Cap by mouth every day. Nitric OXIDE (OTC)     • Non Formulary Request Take 1 Cap by mouth every day. anit-oxidant (OTC)     • Zinc 50 MG Tab Take 50 mg by mouth every day.     • anastrozole (ARIMIDEX) 1 MG Tab Take 0.5 mg by mouth See Admin Instructions. On Tue and Sat     • gabapentin (NEURONTIN) 300 MG Cap Take 300 mg by mouth every 6 hours as needed. Indications: Neuropathic Pain     • glimepiride (AMARYL) 1 MG tablet Take 1 mg by mouth every morning.     • B Complex Vitamins (VITAMIN B COMPLEX PO) Take 1 Tab by mouth every day.     • Cranberry 300 MG Tab Take 300 mg by mouth every morning.     • L-Lysine 1000 MG Tab Take 1,000 mg by mouth every day.     • Ascorbic Acid (VITAMIN C) 1000 MG Tab Take 1,000 mg by mouth 2 Times a Day.     • [DISCONTINUED] carvedilol (COREG) 3.125 MG Tab TAKE 1 TABLET BY MOUTH TWICE DAILY WITH MEALS 90 Tab 0   • [DISCONTINUED] ENTRESTO 24-26 MG Tab tablet Take 1 tablet by mouth twice daily 60 Tab 3   • [DISCONTINUED] aspirin EC (ECOTRIN) 81 MG Tablet Delayed Response Take 81 mg by mouth every day.     • [DISCONTINUED] HYDROcodone-acetaminophen (NORCO) 5-325 MG Tab per tablet Take 1 Tab by mouth every four hours as needed.     • [DISCONTINUED] Home Care Oxygen Inhale 2 L/min by mouth Continuous.   Respiratory route via: Nasal Cannula   Oxygen supplier: [employer]  Indications: Hypoxia, to keep O2 sat over 90%       No facility-administered encounter medications on file as of 2/26/2021.     Review of Systems   Constitutional: Negative.  Negative for chills, fever, malaise/fatigue and weight loss.   HENT: Negative.  Negative for hearing loss.    Eyes: Negative.  Negative for blurred vision and double vision.   Respiratory: Negative.  Negative for cough and shortness of breath.    Cardiovascular: Negative.  Negative for chest  "pain, palpitations, claudication and leg swelling.   Gastrointestinal: Negative.  Negative for abdominal pain, nausea and vomiting.   Genitourinary: Negative.  Negative for dysuria and urgency.   Musculoskeletal: Positive for joint pain. Negative for myalgias.   Skin: Negative.  Negative for itching and rash.   Neurological: Negative.  Negative for dizziness, focal weakness, weakness and headaches.   Endo/Heme/Allergies: Negative.  Does not bruise/bleed easily.   Psychiatric/Behavioral: Negative.  Negative for depression. The patient is not nervous/anxious.         Objective:   /60 (BP Location: Left arm, Patient Position: Sitting, BP Cuff Size: Adult)   Pulse 88   Resp 16   Ht 1.854 m (6' 1\")   Wt 107 kg (236 lb)   SpO2 92%   BMI 31.14 kg/m²     Physical Exam   Constitutional: He is oriented to person, place, and time. He appears well-developed and well-nourished.   HENT:   Head: Normocephalic and atraumatic.   Eyes: EOM are normal.   Neck: No JVD present.   Cardiovascular: Normal rate, regular rhythm and normal heart sounds.   Pulmonary/Chest: Effort normal and breath sounds normal.   Abdominal: Soft. Bowel sounds are normal.   No hepatosplenomegaly.   Musculoskeletal:         General: Normal range of motion.   Lymphadenopathy:     He has no cervical adenopathy.   Neurological: He is alert and oriented to person, place, and time.   Skin: Skin is warm and dry.   Psychiatric: He has a normal mood and affect.     CARDIAC STUDIES/PROCEDURES:      AICD PLACEMENT by Teho Knapp (02/04/20)  1. Successful automatic implantable cardioverter defibrillator implant.  2. Unsuccessful CS lead placement due to perforation of the CS resulting in small but stable effusion     CARDIAC CATHETERIZATION CONCLUSIONS by Eliazar Sanchez (01/10/20)  1. LVEF 30% prior to intervention.  2. 70% distal left main and proximal LAD stenosis, IFR positive from prior procedure.  3. Successful planned PCI proximal LAD and " distal left main trifurcation  stenosis (3.5 x 26 mm Jack BRANDI postdilated to 4.5 mm proximally), IVUS guided.  4. Moderate aortic stenosis, mean gradient 27 to 30mmHg.     CARDIAC CATHETERIZATION CONCLUSIONS by Dougie Polo (12/27/19)  Coronary stent implantation, distal right coronary artery 3.5x28 mm   Synergy drug-eluting stent postdilated 4.0 mm.     CARDIAC CATHETERIZATION CONCLUSIONS by Amauri Amador (12/11/19)  Severe triple-vessel coronary artery disease with calcified 50% left main stenosis (iFR <0.8)      CAROTID ULTRASOUND (01/16/20)  1.  There is mild to moderate amount of atherosclerotic plaque.  Plaque is located in carotid bulbs and proximal internal carotid arteries.  Plaque characterization:  homogeneous, calcific  2.  There is no evidence of carotid occlusion.  3. Vertebral arteries demonstrate antegrade flow.  4. Diameter reduction in the internal carotid arteries: less than 50%. There is no hemodynamically significant stenosis.     CTA OF CHEST AND ABDOMEN (01/16/20)  1.  Aortic annulus measures 660 mm?  2.  Coronary arteries or over a centimeter above the annulus  3.  Iliofemoral runoff is satisfactory with right side less tortuous  4.  Lower lobe dependent bronchial thickening could indicate aspiration. Edema or bronchitis are possible  5.  Numerous incidental chronic findings including left heart enlargement, severe coronary artery disease, colonic diverticulosis, LAP-BAND device, hepatic steatosis and splenomegaly.    ECHOCARDIOGRAM CONCLUSIONS (02/24/21)  Prior Echo - 4/27/20; compared to the images of the prior study done -    there has been no significant change.   Moderately reduced left ventricular systolic function.  Left ventricular ejection fraction is visually estimated to be 40%.  Pacer/ICD wire seen in right ventricle.  Known TAVR aortic valve that is functioning normally with normal transvalvular gradients.  Vmax is 1.78 m/s. Transvalvular gradients are -  Peak: 12.6 mmHg, Mean: 8 mmHg.   (study result reviewed)     ECHOCARDIOGRAM CONCLUSIONS (03/03/20)  Compared to the report of the prior study done on 2/4/2020   there has   been no significant change.   Moderately reduced left ventricular systolic function.  Left ventricular ejection fraction is visually estimated to be 35%.  Pacer/ICD wire seen in right ventricle.  Known TAVR aortic valve that is functioning normally with normal   transvalvular gradients.  Vmax is   m/s. Transvalvular gradients are- peak 21 mmHg and mean   gradient is 11 mmHg.   Minimal paravalvular leak.     ECHOCARDIOGRAM CONCLUSIONS (12/03/19)  Left ventricle is severely dilated.  Severely reduced left ventricular systolic function.  Left ventricular ejection fraction is visually estimated to be 25%.  Global hypokinesis with regional variation.  Moderate aortic stenosis.  Trace mitral regurgitation.  Severely dilated left atrium.  Mild tricuspid regurgitation.  Right ventricular systolic pressure is estimated to be 40  mmHg.  Normal pericardium without effusion.  Compared to the images of the study done on 7/29/2015  there has been   marked change in LV wall motion and function, moderate aortic stenosis   is also now present.     Laboratory results of (09/16/20) were reviewed. Bun of 34 mg/dl, creatinine levels of 1.86 mg/dl noted.  Laboratory results of (03/03/20) Bun of 31 mg/dl, creatinine levels of 1.68 mg/dl noted.     EKG performed on (04/27/20) was reviewed: EKG personally interpreted shows sinus tachycardia with premature ventricular contraction.  EKG performed on (02/24/20) EKG personally interpreted shows sinus rhythm with incomplete right bundle branch block.  EKG performed on (01/28/20) EKG personally interpreted shows sinus rhythm.  EKG performed on (01/10/20) EKG shows sinus rhythm with inferior Q waves.    Assessment:     1. S/P TAVR (transcatheter aortic valve replacement)     2. CHF (congestive heart failure), NYHA class II,  chronic, systolic (HCC)     3. ICD (implantable cardioverter-defibrillator) in place     4. Coronary artery disease involving native coronary artery of native heart without angina pectoris         Medical Decision Making:  Today's Assessment / Status / Plan:     1. Status post transcatheter aortic valve replacement (TAVR) with # 29 Khan Salvador S3 Ultra valve, transfemoral approach, under general anesthesia (01/27/20): He is clinically doing well.   2. Chronic systolic congestive heart failure with cardiomyopathy and AICD: The overall volume status is adequate.  3. Coronary artery disease with stent placement: He is clinically doing well without recurrence of his angina.  We will continue with current medical care including aspirin, clopidogrel, carvedilol.    We will follow up with him in one year from TAVR standpoint and also have him follow up with his primary cardiologist, Paul Kim.    CC Paul Kim and Radha Avila

## 2021-04-02 ENCOUNTER — OFFICE VISIT (OUTPATIENT)
Dept: SLEEP MEDICINE | Facility: MEDICAL CENTER | Age: 81
End: 2021-04-02
Payer: MEDICARE

## 2021-04-02 ENCOUNTER — APPOINTMENT (OUTPATIENT)
Dept: SLEEP MEDICINE | Facility: MEDICAL CENTER | Age: 81
End: 2021-04-02
Payer: MEDICARE

## 2021-04-02 VITALS
SYSTOLIC BLOOD PRESSURE: 110 MMHG | OXYGEN SATURATION: 91 % | HEART RATE: 82 BPM | RESPIRATION RATE: 20 BRPM | WEIGHT: 238.9 LBS | HEIGHT: 73 IN | DIASTOLIC BLOOD PRESSURE: 64 MMHG | BODY MASS INDEX: 31.66 KG/M2

## 2021-04-02 DIAGNOSIS — I10 ESSENTIAL HYPERTENSION: ICD-10-CM

## 2021-04-02 DIAGNOSIS — G47.33 OSA (OBSTRUCTIVE SLEEP APNEA): ICD-10-CM

## 2021-04-02 DIAGNOSIS — Z95.2 S/P TAVR (TRANSCATHETER AORTIC VALVE REPLACEMENT): ICD-10-CM

## 2021-04-02 DIAGNOSIS — D75.1 POLYCYTHEMIA: Chronic | ICD-10-CM

## 2021-04-02 DIAGNOSIS — I25.10 CORONARY ARTERY DISEASE INVOLVING NATIVE CORONARY ARTERY OF NATIVE HEART WITHOUT ANGINA PECTORIS: ICD-10-CM

## 2021-04-02 PROCEDURE — 99213 OFFICE O/P EST LOW 20 MIN: CPT | Performed by: INTERNAL MEDICINE

## 2021-04-02 ASSESSMENT — FIBROSIS 4 INDEX: FIB4 SCORE: 2.52

## 2021-04-02 NOTE — PROGRESS NOTES
Chief Complaint   Patient presents with   • Follow-Up     3/30/21 FV. last seen 9/4/20 by Dr Garcia for JAMES,S/P placement of cardiac pacemaker,S/P TAVR,Polycythemia,CHF (congestive heart failure), NYHA class II, chronic, systolic     HPI: This patient is a 80 y.o. male who returns for obstructive sleep apnea and CPAP compliance check. PSG noted severe JAMES with AHI 51 events per hour and he uses CPAP: 15 cm of water with 2 L oxygen bleed in.  Compliance card shows 100% CPAP usage for 6.5 hours nightly.  His average AHI is reduced to 1.2.  He switched to a full facemask however would like to switch to nasal mask.  He denies any specific issues with CPAP therapy. He has a history of congestive heart failure and polycythemia.   He is frustrated with difficulty in losing weight.      Past Medical History:   Diagnosis Date   • Arthritis    • Back pain    • Breath shortness     pt states short of breath 24/7 O2 at night only   • CAD (coronary artery disease)    • CATARACT     removed bilat   • Chest pain    • Chest tightness    • Chickenpox    • Congestive heart failure (HCC)    • Constipation    • Coronary heart disease    • Cough    • Daytime sleepiness    • Diabetes     oral medication   • Difficulty breathing    • Dilated cardiomyopathy (HCC)    • Fatigue    • GERD (gastroesophageal reflux disease)    • Hearing difficulty    • Heart murmur    • Heart valve disease    • Heartburn    • Hiatus hernia syndrome    • Hyperlipidemia    • Hypertension     pt states well controlled on meds   • Kidney disease 05/2020    ruptered left kidney    • Mumps    • Pacemaker     AICD   • Pain 08-29-13    back, hips and bilat legs, 4/10   • Pain 6/22/2015    left humerus   • Pain 1/7/16    knees, back and shoulder   • Pain     stomach   • Painful joint    • Palpitations    • Pneumonia 2007   • Rhinitis    • Ringing in ears    • Severe aortic stenosis 12/4/2019   • Shortness of breath    • Sleep apnea     CPAp with O2   • Swelling of lower  extremity    • Tonsillitis    • Tremor    • Ulcer 2014    Dr. Porter, gastroenterologist   • Unspecified hemorrhagic conditions     on plavix       Social History     Socioeconomic History   • Marital status:      Spouse name: Not on file   • Number of children: Not on file   • Years of education: Not on file   • Highest education level: Not on file   Occupational History   • Not on file   Tobacco Use   • Smoking status: Never Smoker   • Smokeless tobacco: Never Used   • Tobacco comment: 0   Substance and Sexual Activity   • Alcohol use: No     Alcohol/week: 0.0 oz   • Drug use: No   • Sexual activity: Yes   Other Topics Concern   • Not on file   Social History Narrative   • Not on file     Social Determinants of Health     Financial Resource Strain:    • Difficulty of Paying Living Expenses:    Food Insecurity:    • Worried About Running Out of Food in the Last Year:    • Ran Out of Food in the Last Year:    Transportation Needs:    • Lack of Transportation (Medical):    • Lack of Transportation (Non-Medical):    Physical Activity:    • Days of Exercise per Week:    • Minutes of Exercise per Session:    Stress:    • Feeling of Stress :    Social Connections:    • Frequency of Communication with Friends and Family:    • Frequency of Social Gatherings with Friends and Family:    • Attends [employer] Services:    • Active Member of Clubs or Organizations:    • Attends Club or Organization Meetings:    • Marital Status:    Intimate Partner Violence:    • Fear of Current or Ex-Partner:    • Emotionally Abused:    • Physically Abused:    • Sexually Abused:        Family History   Problem Relation Age of Onset   • No Known Problems Sister    • No Known Problems Sister    • No Known Problems Sister    • Diabetes Other    • No Known Problems Mother    • No Known Problems Father    • Heart Disease Neg Hx        Current Outpatient Medications on File Prior to Visit   Medication Sig Dispense Refill   • clopidogrel  (PLAVIX) 75 MG Tab Take 1 tablet by mouth once daily 90 Tab 0   • ezetimibe (ZETIA) 10 MG Tab Take 1 tablet by mouth once daily 90 Tab 3   • bumetanide (BUMEX) 1 MG Tab Take 2 tablets by mouth twice daily 360 Tab 1   • Silodosin 4 MG Cap Take 8 mg by mouth every day.     • Ferrous Sulfate (IRON PO) Take  by mouth.     • Probiotic Product (PROBIOTIC PO) Take  by mouth.     • acetaminophen (TYLENOL) 325 MG Tab Take 650 mg by mouth every four hours as needed.     • clomiPHENE (CLOMID) 50 MG tablet Take 50 mg by mouth 2X A WEEK.     • hydrocortisone 2.5 % Ointment Apply 1 Each to affected area(s) as needed.     • Calcium Citrate (CITRACAL PO) Take 1 Tab by mouth 2 Times a Day.     • fluticasone (FLONASE) 50 MCG/ACT nasal spray Use 2 spray(s) in each nostril once daily (Patient taking differently: 1 time daily as needed.) 48 g 3   • Multiple Vitamins-Minerals (ICAPS) Tab Take 1 Tab by mouth 2 Times a Day.     • lactobacillus rhamnosus (CULTURELLE) Cap capsule Take 1 Cap by mouth every day.     • testosterone cypionate (DEPO-TESTOSTERONE) 200 MG/ML Solution injection 50 mg by Intramuscular route every thursday.     • cyanocobalamin (VITAMIN B-12) 1000 MCG/ML Solution 1,000 mcg by Intramuscular route every thursday.     • coenzyme Q-10 30 MG capsule Take 60 mg by mouth 2 Times a Day.     • Garlic 1000 MG Cap Take 1,000 mg by mouth 2 Times a Day.     • glucosamine Sulfate 500 MG Cap Take 1,500 mg by mouth 2 Times a Day.     • vitamin e (VITAMIN E) 400 UNIT Cap Take 400 Units by mouth 2 times a day.     • omeprazole (PRILOSEC) 40 MG delayed-release capsule Take 40 mg by mouth 2 times a day.     • Non Formulary Request Take 1 Cap by mouth 2 Times a Day. Cholox (OTC)     • Cholecalciferol (VITAMIN D3) 2000 UNIT Cap Take 2,000 Units by mouth 2 Times a Day.     • Non Formulary Request Take 1 Cap by mouth 2 Times a Day. Domenic red (OTC)     • Non Formulary Request Take 1 Cap by mouth every day. Nitric OXIDE (OTC)     • Non  "Formulary Request Take 1 Cap by mouth every day. anit-oxidant (OTC)     • Zinc 50 MG Tab Take 50 mg by mouth every day.     • anastrozole (ARIMIDEX) 1 MG Tab Take 0.5 mg by mouth See Admin Instructions. On Tue and Sat     • gabapentin (NEURONTIN) 300 MG Cap Take 300 mg by mouth every 6 hours as needed. Indications: Neuropathic Pain     • glimepiride (AMARYL) 1 MG tablet Take 1 mg by mouth every morning.     • B Complex Vitamins (VITAMIN B COMPLEX PO) Take 1 Tab by mouth every day.     • Cranberry 300 MG Tab Take 300 mg by mouth every morning.     • L-Lysine 1000 MG Tab Take 1,000 mg by mouth every day.     • Ascorbic Acid (VITAMIN C) 1000 MG Tab Take 1,000 mg by mouth 2 Times a Day.     • [DISCONTINUED] Home Care Oxygen Inhale 2 L/min by mouth Continuous.   Respiratory route via: Nasal Cannula   Oxygen supplier: [facility]  Indications: Hypoxia, to keep O2 sat over 90%       No current facility-administered medications on file prior to visit.       Allergies: Statins [hmg-coa-r inhibitors] and Atorvastatin    ROS:   Constitutional: Denies fevers, chills, night sweats, fatigue or weight loss  Eyes: Denies vision loss, pain, drainage, double vision  Ears, Nose, Throat: Denies earache, difficulty hearing, tinnitus, nasal congestion, hoarseness  Cardiovascular: Denies chest pain, tightness, palpitations, orthopnea or edema  Respiratory: Denies shortness of breath, cough, wheezing, hemoptysis  Sleep: Denies daytime sleepiness, snoring, apneas, insomnia, morning headaches  GI: Denies heartburn, dysphagia, nausea, abdominal pain, diarrhea or constipation  : Denies frequent urination, hematuria, discharge or painful urination  Musculoskeletal: Denies back pain, painful joints, sore muscles  Neurological: Denies weakness or headaches  Skin: No rashes    /64   Pulse 82   Resp 20   Ht 1.854 m (6' 1\")   Wt 108 kg (238 lb 14.4 oz)   SpO2 91%     Physical Exam:  Appearance: Well-nourished, well-developed, in no acute " distress  HEENT: Normocephalic, atraumatic, white sclera, PERRLA, masked  Neck: No adenopathy or masses  Respiratory: no intercostal retractions or accessory muscle use  Lungs auscultation: Clear to auscultation bilaterally  Cardiovascular: Regular rate rhythm. No murmurs, rubs or gallops.  No LE edema  Abdomen: soft, nondistended  Gait: Normal  Digits: No clubbing, cyanosis  Motor: No focal deficits  Orientation: Oriented to time, person and place    Diagnosis:  1. JAMES (obstructive sleep apnea)     2. Coronary artery disease involving native coronary artery of native heart without angina pectoris     3. Essential hypertension     4. Polycythemia     5. S/P TAVR (transcatheter aortic valve replacement)         Plan:  The patient has severe JAMES and hypoxia, AHI 51/h, now on CPAP:15 cm H20 with 2 L oxygen bleed in, with excellent compliance and response to treatment.  His AHI is normal.  Continue with treatment.    Encouraged mask refit with his DME.  Patient's body mass index is 31.52 kg/m². Exercise and nutrition counseling were performed at this visit.  Return in about 6 months (around 10/2/2021).

## 2021-04-09 DIAGNOSIS — Z95.2 S/P TAVR (TRANSCATHETER AORTIC VALVE REPLACEMENT): ICD-10-CM

## 2021-04-09 RX ORDER — CLOPIDOGREL BISULFATE 75 MG/1
TABLET ORAL
Qty: 90 TABLET | Refills: 2 | Status: SHIPPED | OUTPATIENT
Start: 2021-04-09 | End: 2021-11-22

## 2021-04-12 ENCOUNTER — TELEPHONE (OUTPATIENT)
Dept: SLEEP MEDICINE | Facility: MEDICAL CENTER | Age: 81
End: 2021-04-12

## 2021-04-12 DIAGNOSIS — G47.33 OSA (OBSTRUCTIVE SLEEP APNEA): ICD-10-CM

## 2021-04-12 NOTE — TELEPHONE ENCOUNTER
Patient calling he needs a new order faxed to [facility] for mask and supplies,     Order pended for approval and signature.

## 2021-04-13 NOTE — TELEPHONE ENCOUNTER
Order completed and faxed to [facility]. Confirmation scanned into the account.     Patient notified request completed.

## 2021-04-19 ENCOUNTER — OFFICE VISIT (OUTPATIENT)
Dept: CARDIOLOGY | Facility: MEDICAL CENTER | Age: 81
End: 2021-04-19
Payer: MEDICARE

## 2021-04-19 ENCOUNTER — NON-PROVIDER VISIT (OUTPATIENT)
Dept: CARDIOLOGY | Facility: MEDICAL CENTER | Age: 81
End: 2021-04-19
Payer: MEDICARE

## 2021-04-19 VITALS
WEIGHT: 223 LBS | HEIGHT: 73 IN | DIASTOLIC BLOOD PRESSURE: 80 MMHG | SYSTOLIC BLOOD PRESSURE: 110 MMHG | HEART RATE: 82 BPM | RESPIRATION RATE: 14 BRPM | BODY MASS INDEX: 29.55 KG/M2 | OXYGEN SATURATION: 93 %

## 2021-04-19 DIAGNOSIS — I25.10 CORONARY ARTERY DISEASE DUE TO CALCIFIED CORONARY LESION: ICD-10-CM

## 2021-04-19 DIAGNOSIS — E78.5 DYSLIPIDEMIA: ICD-10-CM

## 2021-04-19 DIAGNOSIS — I50.22 CHF (CONGESTIVE HEART FAILURE), NYHA CLASS II, CHRONIC, SYSTOLIC (HCC): ICD-10-CM

## 2021-04-19 DIAGNOSIS — I25.84 CORONARY ARTERY DISEASE DUE TO CALCIFIED CORONARY LESION: ICD-10-CM

## 2021-04-19 DIAGNOSIS — R26.81 GAIT INSTABILITY: ICD-10-CM

## 2021-04-19 DIAGNOSIS — N18.31 STAGE 3A CHRONIC KIDNEY DISEASE: ICD-10-CM

## 2021-04-19 DIAGNOSIS — Z95.810 ICD (IMPLANTABLE CARDIOVERTER-DEFIBRILLATOR) IN PLACE: ICD-10-CM

## 2021-04-19 DIAGNOSIS — Z95.2 S/P TAVR (TRANSCATHETER AORTIC VALVE REPLACEMENT): ICD-10-CM

## 2021-04-19 DIAGNOSIS — I25.5 ISCHEMIC CARDIOMYOPATHY: ICD-10-CM

## 2021-04-19 DIAGNOSIS — I10 HYPERTENSION, ESSENTIAL: ICD-10-CM

## 2021-04-19 PROCEDURE — 99214 OFFICE O/P EST MOD 30 MIN: CPT | Performed by: INTERNAL MEDICINE

## 2021-04-19 PROCEDURE — 93283 PRGRMG EVAL IMPLANTABLE DFB: CPT | Performed by: INTERNAL MEDICINE

## 2021-04-19 RX ORDER — LISINOPRIL 5 MG/1
5 TABLET ORAL DAILY
Qty: 90 TABLET | Refills: 3 | Status: SHIPPED | OUTPATIENT
Start: 2021-04-19 | End: 2022-02-24 | Stop reason: SINTOL

## 2021-04-19 RX ORDER — CARVEDILOL 3.12 MG/1
3.12 TABLET ORAL 2 TIMES DAILY WITH MEALS
Qty: 180 TABLET | Refills: 3 | Status: SHIPPED | OUTPATIENT
Start: 2021-04-19 | End: 2022-08-08

## 2021-04-19 ASSESSMENT — FIBROSIS 4 INDEX: FIB4 SCORE: 2.52

## 2021-04-19 NOTE — PROGRESS NOTES
CARDIOLOGY OUTPATIENT FOLLOWUP    1. Coronary artery disease due to calcified coronary lesion    2. S/P TAVR (transcatheter aortic valve replacement)    3. Ischemic cardiomyopathy    4. ICD (implantable cardioverter-defibrillator) in place    5. Gait instability    6. Dyslipidemia    7. Hypertension, essential    8. Stage 3a chronic kidney disease        LIAN More III is stable from a cardiovascular standpoint with compensated chronic class II heart failure.  Given the lack of improvement in gait/disequilibrium with discontinuing antihypertensives, I recommend he resume RAAS and beta-blockade and as such provided prescription for lisinopril 5 mg as well as carvedilol 3.125 twice daily.  We will update a basic metabolic panel lipid profile in the near future.    Follow up: in 6 months    Chief Complaint   Patient presents with   • Dyslipidemia   • Coronary Artery Disease     F/V Dx: Coronary artery disease involving native coronary artery of native heart without angina pectoris   • Congestive Heart Failure     F/V Dx: CHF (congestive heart failure), NYHA class II, chronic, systolic (HCC)       History: LIAN More III is a 80 y.o. male with a past medical history of Chronic kidney disease, hypertension, dyslipidemia, diabetes, JAMES and gastric bypass presenting for follow up of chronic ischemic cardiomyopathy and valvular heart disease.  Last winter he underwent PCI of the RCA as well as left main, TAVR as well as ICD implantation.  LVEF is around 40%.    Since her last evaluation USMAN has remained in stable cardiovascular health.  He discontinued Entresto and carvedilol as instructed but found no improvement in orthostasis or gait instability.  He is concerned about medication costs and does find clopidogrel to be costly there we did review the [employer] website which identified several sources of a 90-day supply for $12-$14.    He is in a dispute with [employer] over ongoing coverage for foot orthotics and is  "being compelled to travel to [place] for reexam in the near future, for which he requests a letter stating the necessity of rest during his journey.    ROS:  All other systems reviewed and negative except as per the HPI    PE:  /80 (BP Location: Left arm, Patient Position: Sitting, BP Cuff Size: Adult)   Pulse 82   Resp 14   Ht 1.854 m (6' 1\")   Wt 101 kg (223 lb)   SpO2 93%   BMI 29.42 kg/m²   Gen: Well appearing  HEENT: Symmetric face. Anicteric sclerae. Moist mucus membranes  NECK: No JVD. No lymphadenopathy  CARDIAC: Normal PMI, regular, normal S1, S2. with a systolic murmur  VASCULATURE: Normal carotid amplitude without bruit.   RESP: Clear to auscultation bilaterally  ABD: Soft, non-tender, non-distended  EXT: No edema, no clubbing or cyanosis  SKIN: Warm and dry  NEURO: No gross deficits  PSYCH: Appropriate affect, participates in conversation    Past Medical History:   Diagnosis Date   • Arthritis    • Back pain    • Breath shortness     pt states short of breath 24/7 O2 at night only   • CAD (coronary artery disease)    • CATARACT     removed bilat   • Chest pain    • Chest tightness    • Chickenpox    • Congestive heart failure (HCC)    • Constipation    • Coronary heart disease    • Cough    • Daytime sleepiness    • Diabetes     oral medication   • Difficulty breathing    • Dilated cardiomyopathy (HCC)    • Fatigue    • GERD (gastroesophageal reflux disease)    • Hearing difficulty    • Heart murmur    • Heart valve disease    • Heartburn    • Hiatus hernia syndrome    • Hyperlipidemia    • Hypertension     pt states well controlled on meds   • Kidney disease 05/2020    ruptered left kidney    • Mumps    • Pacemaker     AICD   • Pain 08-29-13    back, hips and bilat legs, 4/10   • Pain 6/22/2015    left humerus   • Pain 1/7/16    knees, back and shoulder   • Pain     stomach   • Painful joint    • Palpitations    • Pneumonia 2007   • Rhinitis    • Ringing in ears    • Severe aortic stenosis " 12/4/2019   • Shortness of breath    • Sleep apnea     CPAp with O2   • Swelling of lower extremity    • Tonsillitis    • Tremor    • Ulcer 2014    Dr. Porter, gastroenterologist   • Unspecified hemorrhagic conditions     on plavix     Allergies   Allergen Reactions   • Statins [Hmg-Coa-R Inhibitors] Rash     Blisters     • Atorvastatin      Outpatient Encounter Medications as of 4/19/2021   Medication Sig Dispense Refill   • lisinopril (PRINIVIL) 5 MG Tab Take 1 tablet by mouth every day. 90 tablet 3   • carvedilol (COREG) 3.125 MG Tab Take 1 tablet by mouth 2 times a day with meals. 180 tablet 3   • clopidogrel (PLAVIX) 75 MG Tab Take 1 tablet by mouth once daily 90 tablet 2   • ezetimibe (ZETIA) 10 MG Tab Take 1 tablet by mouth once daily 90 Tab 3   • bumetanide (BUMEX) 1 MG Tab Take 2 tablets by mouth twice daily 360 Tab 1   • Silodosin 4 MG Cap Take 8 mg by mouth every day.     • Ferrous Sulfate (IRON PO) Take  by mouth.     • Probiotic Product (PROBIOTIC PO) Take  by mouth.     • acetaminophen (TYLENOL) 325 MG Tab Take 650 mg by mouth every four hours as needed.     • clomiPHENE (CLOMID) 50 MG tablet Take 50 mg by mouth 2X A WEEK.     • hydrocortisone 2.5 % Ointment Apply 1 Each to affected area(s) as needed.     • Calcium Citrate (CITRACAL PO) Take 1 Tab by mouth 2 Times a Day.     • fluticasone (FLONASE) 50 MCG/ACT nasal spray Use 2 spray(s) in each nostril once daily (Patient taking differently: 1 time daily as needed.) 48 g 3   • lactobacillus rhamnosus (CULTURELLE) Cap capsule Take 1 Cap by mouth every day.     • testosterone cypionate (DEPO-TESTOSTERONE) 200 MG/ML Solution injection 50 mg by Intramuscular route every thursday.     • cyanocobalamin (VITAMIN B-12) 1000 MCG/ML Solution 1,000 mcg by Intramuscular route every thursday.     • coenzyme Q-10 30 MG capsule Take 60 mg by mouth 2 Times a Day.     • Garlic 1000 MG Cap Take 1,000 mg by mouth 2 Times a Day.     • glucosamine Sulfate 500 MG Cap Take  1,500 mg by mouth 2 Times a Day.     • vitamin e (VITAMIN E) 400 UNIT Cap Take 400 Units by mouth 2 times a day.     • omeprazole (PRILOSEC) 40 MG delayed-release capsule Take 40 mg by mouth 2 times a day.     • Non Formulary Request Take 1 Cap by mouth 2 Times a Day. Cholox (OTC)     • Cholecalciferol (VITAMIN D3) 2000 UNIT Cap Take 2,000 Units by mouth 2 Times a Day.     • Non Formulary Request Take 1 Cap by mouth 2 Times a Day. Domenic red (OTC)     • Non Formulary Request Take 1 Cap by mouth every day. Nitric OXIDE (OTC)     • Non Formulary Request Take 1 Cap by mouth every day. anit-oxidant (OTC)     • Zinc 50 MG Tab Take 50 mg by mouth every day.     • anastrozole (ARIMIDEX) 1 MG Tab Take 0.5 mg by mouth See Admin Instructions. On Tue and Sat     • gabapentin (NEURONTIN) 300 MG Cap Take 300 mg by mouth every 6 hours as needed. Indications: Neuropathic Pain     • glimepiride (AMARYL) 1 MG tablet Take 1 mg by mouth every morning.     • B Complex Vitamins (VITAMIN B COMPLEX PO) Take 1 Tab by mouth every day.     • Cranberry 300 MG Tab Take 300 mg by mouth every morning.     • L-Lysine 1000 MG Tab Take 1,000 mg by mouth every day.     • Ascorbic Acid (VITAMIN C) 1000 MG Tab Take 1,000 mg by mouth 2 Times a Day.     • [DISCONTINUED] Home Care Oxygen Inhale 2 L/min by mouth Continuous.   Respiratory route via: Nasal Cannula   Oxygen supplier: [facility]  Indications: Hypoxia, to keep O2 sat over 90%     • [DISCONTINUED] Multiple Vitamins-Minerals (ICAPS) Tab Take 1 Tab by mouth 2 Times a Day.       No facility-administered encounter medications on file as of 4/19/2021.     Social History     Socioeconomic History   • Marital status:      Spouse name: Not on file   • Number of children: Not on file   • Years of education: Not on file   • Highest education level: Not on file   Occupational History   • Not on file   Tobacco Use   • Smoking status: Never Smoker   • Smokeless tobacco: Never Used   • Tobacco comment: 0    Substance and Sexual Activity   • Alcohol use: No     Alcohol/week: 0.0 oz   • Drug use: No   • Sexual activity: Yes   Other Topics Concern   • Not on file   Social History Narrative   • Not on file     Social Determinants of Health     Financial Resource Strain:    • Difficulty of Paying Living Expenses:    Food Insecurity:    • Worried About Running Out of Food in the Last Year:    • Ran Out of Food in the Last Year:    Transportation Needs:    • Lack of Transportation (Medical):    • Lack of Transportation (Non-Medical):    Physical Activity:    • Days of Exercise per Week:    • Minutes of Exercise per Session:    Stress:    • Feeling of Stress :    Social Connections:    • Frequency of Communication with Friends and Family:    • Frequency of Social Gatherings with Friends and Family:    • Attends [employer] Services:    • Active Member of Clubs or Organizations:    • Attends Club or Organization Meetings:    • Marital Status:    Intimate Partner Violence:    • Fear of Current or Ex-Partner:    • Emotionally Abused:    • Physically Abused:    • Sexually Abused:        Studies  Lab Results   Component Value Date/Time    CHOLSTRLTOT 105 12/27/2019 02:20 AM    LDL 56 12/27/2019 02:20 AM    HDL 29 (A) 12/27/2019 02:20 AM    TRIGLYCERIDE 101 12/27/2019 02:20 AM       Lab Results   Component Value Date/Time    SODIUM 141 09/16/2020 02:44 PM    POTASSIUM 4.8 09/16/2020 02:44 PM    CHLORIDE 102 09/16/2020 02:44 PM    CO2 24 09/16/2020 02:44 PM    GLUCOSE 70 09/16/2020 02:44 PM    BUN 34 (H) 09/16/2020 02:44 PM    CREATININE 1.86 (H) 09/16/2020 02:44 PM    CREATININE 1.18 02/08/2011 12:00 AM    BUNCREATRAT 16 02/08/2011 12:00 AM    GLOMRATE >59 02/08/2011 12:00 AM     Lab Results   Component Value Date/Time    ALKPHOSPHAT 39 04/28/2020 05:10 AM    ASTSGOT 27 04/28/2020 05:10 AM    ALTSGPT 23 04/28/2020 05:10 AM    TBILIRUBIN 1.0 04/28/2020 05:10 AM        For this encounter I reviewed the following medical records PCP  notes, Specialist (Valve clinic) notes, BMP and Lipid profile   For this encounter I directly reviewed device interrogation. I agree with the interpretations in the electronic health record.

## 2021-04-22 ENCOUNTER — TELEPHONE (OUTPATIENT)
Dept: CARDIOLOGY | Facility: MEDICAL CENTER | Age: 81
End: 2021-04-22

## 2021-04-22 NOTE — TELEPHONE ENCOUNTER
Device check 4/19--functioning appropriately.  Patient had VT episode with ATP delivered 1/6 @ 1:10 pm--appropriate and successful.  Patient stated was not aware of any symptoms,

## 2021-05-04 ENCOUNTER — TELEPHONE (OUTPATIENT)
Dept: CARDIOLOGY | Facility: MEDICAL CENTER | Age: 81
End: 2021-05-04

## 2021-05-04 NOTE — LETTER
May 4, 2021        LIAN More III  [address] [id number]  [facility] [address]        To Whom it May Concern:     Our ongoing patient, Mr. USMAN More, has an upcoming trip to [place]. Due to his cardiac conditions, it is recommended that Mr. More rest overnight prior to his return flight to [place]. He requires CPAP machine and oxygen concentrator for overnight rest.     If you have any questions or concerns, please don't hesitate to call.           Sincerely,        Paul Wilkinson MD  Electronically Signed

## 2021-05-04 NOTE — TELEPHONE ENCOUNTER
BE    Pt called stating he needs BE to add to the letter he wrote that Pt needs a C-PAP and oxygenator in order to stay overnight. Pt is supposed to leave for [place] on May 6th and needs the letter ASAP.  Please fax the letter to fax# [phone].  If any questions please call Pt at [phone].    Thank you

## 2021-05-19 ENCOUNTER — HOSPITAL ENCOUNTER (OUTPATIENT)
Dept: LAB | Facility: MEDICAL CENTER | Age: 81
End: 2021-05-19
Attending: INTERNAL MEDICINE
Payer: MEDICARE

## 2021-05-19 DIAGNOSIS — N18.31 STAGE 3A CHRONIC KIDNEY DISEASE: ICD-10-CM

## 2021-05-19 LAB
ANION GAP SERPL CALC-SCNC: 10 MMOL/L (ref 7–16)
BUN SERPL-MCNC: 56 MG/DL (ref 8–22)
CALCIUM SERPL-MCNC: 8.9 MG/DL (ref 8.4–10.2)
CHLORIDE SERPL-SCNC: 107 MMOL/L (ref 96–112)
CHOLEST SERPL-MCNC: 72 MG/DL (ref 100–199)
CO2 SERPL-SCNC: 21 MMOL/L (ref 20–33)
CREAT SERPL-MCNC: 2.04 MG/DL (ref 0.5–1.4)
FASTING STATUS PATIENT QL REPORTED: NORMAL
GLUCOSE SERPL-MCNC: 103 MG/DL (ref 65–99)
HDLC SERPL-MCNC: 24 MG/DL
LDLC SERPL CALC-MCNC: 28 MG/DL
POTASSIUM SERPL-SCNC: 4.9 MMOL/L (ref 3.6–5.5)
SODIUM SERPL-SCNC: 138 MMOL/L (ref 135–145)
TRIGL SERPL-MCNC: 102 MG/DL (ref 0–149)

## 2021-05-19 PROCEDURE — 80061 LIPID PANEL: CPT | Mod: GA

## 2021-05-19 PROCEDURE — 80048 BASIC METABOLIC PNL TOTAL CA: CPT

## 2021-05-19 PROCEDURE — 36415 COLL VENOUS BLD VENIPUNCTURE: CPT | Mod: GA

## 2021-06-04 ENCOUNTER — OFFICE VISIT (OUTPATIENT)
Dept: NEUROLOGY | Facility: MEDICAL CENTER | Age: 81
End: 2021-06-04
Attending: PSYCHIATRY & NEUROLOGY
Payer: MEDICARE

## 2021-06-04 VITALS
TEMPERATURE: 98.6 F | SYSTOLIC BLOOD PRESSURE: 118 MMHG | OXYGEN SATURATION: 92 % | RESPIRATION RATE: 14 BRPM | BODY MASS INDEX: 31.26 KG/M2 | HEART RATE: 93 BPM | WEIGHT: 235.89 LBS | HEIGHT: 73 IN | DIASTOLIC BLOOD PRESSURE: 60 MMHG

## 2021-06-04 DIAGNOSIS — E11.42 DIABETIC POLYNEUROPATHY ASSOCIATED WITH TYPE 2 DIABETES MELLITUS (HCC): ICD-10-CM

## 2021-06-04 DIAGNOSIS — M54.16 LUMBAR RADICULOPATHY, CHRONIC: Primary | ICD-10-CM

## 2021-06-04 PROCEDURE — 99212 OFFICE O/P EST SF 10 MIN: CPT | Performed by: PSYCHIATRY & NEUROLOGY

## 2021-06-04 PROCEDURE — 99213 OFFICE O/P EST LOW 20 MIN: CPT | Performed by: PSYCHIATRY & NEUROLOGY

## 2021-06-04 ASSESSMENT — ENCOUNTER SYMPTOMS
FOCAL WEAKNESS: 1
BACK PAIN: 1
FALLS: 0
SPEECH CHANGE: 1
TINGLING: 0

## 2021-06-04 ASSESSMENT — FIBROSIS 4 INDEX: FIB4 SCORE: 2.52

## 2021-06-04 ASSESSMENT — PATIENT HEALTH QUESTIONNAIRE - PHQ9: CLINICAL INTERPRETATION OF PHQ2 SCORE: 0

## 2021-06-04 NOTE — PROGRESS NOTES
Subjective:      USMAN More III is a 80 y.o. 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  He is now complaining of an internalized tremulousness which is becoming more apparent, at times now involving the hands.    HPI    Clinically, USMAN states that his symptoms are about the same degree of intensity.  He will use Neurontin 300 mg 4 times daily as needed.  He still mostly has the numbness in the feet, he has not been injuring them, but he is always cautious.  The weakness in the feet, left more than right, still is frustrating for him.  He is wondering if things are improving, getting worse, etc.  He continues to deny any symptoms in the hands though there is some weakness related to arthritis.  His back pain is chronic, nothing to suggest a worsening sciatica.  Bowel and bladder functions are maintained, nothing to suggest central stenosis.    The balance difficulties are there, they are no worse than before.  He still has to look at the ground to maintain stability, it is always worse when it is darker or when he walks uneven surfaces.  He tends to catch his toes, he does not shuffle, if he is not careful.    The tremulousness internally is something new, it fluctuates, but it can occur whether or not he is standing or sitting down.  No one is aware of this.  He denies any involvement of the voice or head.  There is some tremulousness of the hands with certain types of positions, such as holding a cup of liquid, this attenuates at rest.  It is of a lower amplitude and high-frequency nature.  It is more of an embarrassment than a disabling problem.    Review of electronic health record includes EMG/NCV studies done last July, 2020, demonstrating multilevel, chronic lumbosacral radiculopathies, the possibility of an underlying large fiber polyneuropathy was mentioned.   "Imaging of the lumbar spine had been done prior revealing stable fusion without central stenosis, but multilevel foraminal narrowing of mild to moderate degree bilaterally from L3-S1.    Medical, surgical and family histories are reviewed, there are no new drug allergies.  He is on Amaryl, Neurontin 300 mg every 6 hours as needed, vitamin B12, Arimidex, Coreg, Clomid, Prinivil, Bumex, Zetia, Plavix, Prilosec, and a long list of vitamin and mineral supplements taken daily.    Review of Systems   Musculoskeletal: Positive for back pain. Negative for falls.   Neurological: Positive for speech change and focal weakness. Negative for tingling.   All other systems reviewed and are negative.       Objective:     /60 (BP Location: Right arm, Patient Position: Sitting, BP Cuff Size: Adult long)   Pulse 93   Temp 37 °C (98.6 °F) (Temporal)   Resp 14   Ht 1.854 m (6' 1\")   Wt 107 kg (235 lb 14.3 oz)   SpO2 92%   BMI 31.12 kg/m²      Physical Exam    He appears in no acute distress.  His vital signs are stable.  There is no malar rash.  His neck is supple.  Cardiac evaluation is unremarkable.  Straight leg raising is negative bilaterally.  The rather significant chronic vascular ischemic changes in the distal lower extremities are unchanged.  The skin is diminished in turgor, quite dry, scaly.  Distal pulses are absent.    Neurological Exam    He is fully oriented, there is no aphasia or inattention.  PERRLA/EOMI, visual fields are full, facial movements are symmetric, sensory exam is intact to temperature and light touch.  There is no bulbar dysfunction.  There is no hypophonia or bradykinesia, tremulousness of the voice or head.    Musculoskeletal exam reveals normal to slightly diminished tone throughout.  There is no tremor or drift at rest or with sustained posture.  Strength in the upper extremities is intact.  In the lower extremities the distal weakness and atrophy, greater on the left side, are unchanged.  " In the feet both inversion, eversion as well as dorsiflexion and plantar flexion are involved, on the right it is mostly a dorsiflexion and plantarflexion issue.    Reflexes are absent at the knees and ankles, unchanged.  They are diminished but present at the elbows bilaterally.  There are no pathologic reflexes.    He is very unsteady as he walks, station remains widened, he looks at the ground.  Repetitive movements are essentially absent with the left foot, profoundly slowed with the right.  In the upper extremities these movements are intact.  There is no appendicular dystaxia with the upper extremities.    Sensory exam again reveals the stocking pattern loss in both lower extremities, vibration is above the knees, temperature below the knees, JPS below the ankles.     Assessment/Plan:     1. Lumbar radiculopathy, chronic  Chronic in nature, this is not likely playing anything new or major in terms of worsening.  Clinically, he has no signs suggestive of central stenosis with claudicating symptoms or changes in bowel or bladder function.  Even his back pain itself has not progressed or changed.  Because his peripheral neurologic examination is so severely distorted, this may also mask subtle changes, so neurophysiologic studies should be done for thoroughness.  In the meantime we will continue the symptomatic relief with gabapentin.    2. Diabetic polyneuropathy associated with type 2 diabetes mellitus (HCC)  EMG/NCV studies will be repeated, and though I doubt we are going to see major changes, especially an improvement, it is always good to make sure.  There are two superimposed processes here, both structural as well as inherent neuropathology, both manifest with similar clinical symptoms.  We will follow-up in 6 months otherwise.  Phone contact in the interim.    Time: 20-minute spent face-to-face for exam, review and discussion, of this over 60% of the time spent counseling and coordinating care.

## 2021-06-24 ENCOUNTER — NON-PROVIDER VISIT (OUTPATIENT)
Dept: NEUROLOGY | Facility: MEDICAL CENTER | Age: 81
End: 2021-06-24
Attending: PSYCHIATRY & NEUROLOGY
Payer: MEDICARE

## 2021-06-24 DIAGNOSIS — M54.16 LUMBAR RADICULOPATHY, CHRONIC: ICD-10-CM

## 2021-06-24 PROCEDURE — 95886 MUSC TEST DONE W/N TEST COMP: CPT | Mod: 26 | Performed by: PSYCHIATRY & NEUROLOGY

## 2021-06-24 PROCEDURE — 95911 NRV CNDJ TEST 9-10 STUDIES: CPT | Performed by: PSYCHIATRY & NEUROLOGY

## 2021-06-24 PROCEDURE — 95911 NRV CNDJ TEST 9-10 STUDIES: CPT | Mod: 26 | Performed by: PSYCHIATRY & NEUROLOGY

## 2021-06-24 PROCEDURE — 95886 MUSC TEST DONE W/N TEST COMP: CPT | Performed by: PSYCHIATRY & NEUROLOGY

## 2021-06-24 NOTE — PROCEDURES
"NERVE CONDUCTION STUDIES AND ELECTROMYOGRAPHY REPORT  [facility]  06/24/21          IMPRESSION:  This is an abnormal electrodiagnostic study due to evidence suggestive of a severe symmetric, length dependent, sensorimotor, axonal polyneuropathy affecting the bilateral lower extremities associated with chronic active denervation changes in the bilateral tibialis anteriors.  These findings can also be seen in a bilateral sciatic neuropathy/S1 radiculopathy given involvement of bilateral biceps femoris short head and medial gastrocnemius.  Mild asymmetry on the left on EMG suggest superimposed etiologies.    Compared to July 2020 electrodiagnostic study, there has been relative stability except for new mild active denervation changes in the right medial gastrocnemius.  Recommend clinical correlation.    Shalonda Hernandez MD  Neurology - Neurophysiology      REASON FOR REFERRAL:  Mr. LIAN More III 80 y.o. referred by Dr. Rafael Valderrama for an evaluation of gait instability.  Patient has associated low back pain and polyneuropathy affecting the bilateral lower extremities in the setting of type 2 diabetes of uncertain duration.    Height: 6'1\"  Weight: 228 lbs    Symptom focused neurological exam shows atrophy in the left gastrocnemius.  There is weakness with bilateral dorsiflexion.  Lower extremity reflexes are unobtainable bilaterally.  He has stocking distribution numbness in the bilateral lower extremities.      ELECTRODIAGNOSTIC EXAMINATION:  Nerve conduction studies (NCS) and electromyography (EMG) are utilized to evaluate direct or indirect damage to the peripheral nervous system. NCS are performed to measure the nerve(s) response(s) to electrostimulation across a given nerve segment. EMG evaluates the passive and active electrical activity of the muscle(s) in question.  Muscles are innervated by specific peripheral nerves and roots. Often times, several nerves the muscle to be examined in " order to determine the presence or absence of the disease process. Furthermore, nerves and muscles may need to be tested in a [id number] comparison, as well as in additional extremities, as this may be crucial in characterizing the extent of the disease process, which may be diffuse or isolated and of varying degree of severity. The extent of the neurodiagnostic exam is justified as it may help arrive to a proper diagnosis, which ultimately may contribute to better management of the patient. Therefore, the nerves to muscles examined during the study were medically necessary.    Unless otherwise noted, temperature of the extremity(s) study was monitored before and during the examination and remained between 32 and 36 degrees C for the upper extremities, and between 30 and 36 degrees C for the lower extremities. The patient tolerated testing well, without any complications.       NERVE CONDUCTION STUDY SUMMARY:  Selected nerves of the bilateral lower extremity and right upper extremity are studied.    Normal right radial sensory response.  Unobtainable bilateral sural sensory responses.  Unobtainable bilateral common peroneal motor responses at the extensor digitorum brevis.  Abnormal bilateral common peroneal motor responses at the tibialis anterior due to low amplitude.  Unobtainable bilateral tibial motor responses at the abductor hallucis brevis.      NEEDLE EMG SUMMARY:  Concentric needle study of selected bilateral lower extremity muscles is performed.     Insertion activity is increased in the bilateral tibialis anterior and right medial gastrocnemius due to presence of fibrillation potentials and positive waves.  Insertional activity is reduced in the left medial gastrocnemius.  There are large amplitude prolonged duration motor unit potentials appreciated in the bilateral tibialis anterior and right medial gastrocnemius as well as bilateral biceps femoris short head.  There is no appreciable motor activity  in the left medial gastrocnemius.  Otherwise with activation, there are normal morphology (amplitude/duration) motor unit action potentials firing with normal recruitment in remaining muscles tested.       PATIENT DATA TABLES  Nerve Conduction Studies     Stim Site NR Onset (ms) Norm Onset (ms) O-P Amp (µV) Norm O-P Amp Site1 Site2 Delta-P (ms) Dist (cm) Kofi (m/s) Norm Kofi (m/s)   Right Radial Anti Sensory (Base 1st Digit)   Wrist    2.3 <2.8 19.2 >10 Wrist Base 1st Digit 3.2 10.0 *31 >50   Left Sural Anti Sensory (Lat Mall)   Calf *NR  <4.6  >3 Calf Lat Mall  14.0  >40   Right Sural Anti Sensory (Lat Mall)   Calf *NR  <4.6  >3 Calf Lat Mall  14.0  >40        Stim Site NR Onset (ms) Norm Onset (ms) O-P Amp (mV) Norm O-P Amp Site1 Site2 Delta-0 (ms) Dist (cm) Kofi (m/s) Norm Kofi (m/s)   Left Peroneal EDB Motor (Ext Dig Brev)   Ankle *NR  <6  >2.5 B Fib Ankle  0.0  >40   B Fib *NR     Poplt B Fib  0.0     Poplt *NR             Right Peroneal EDB Motor (Ext Dig Brev)   Ankle *NR  <6  >2.5 B Fib Ankle  0.0  >40   B Fib *NR     Poplt B Fib  0.0     Poplt *NR             Left Peroneal TA Motor (AntTibialis)   Fib Head    4.1 <4.5 *0.3 >3 Poplit Fib Head 2.2 10.0 45 >40   Poplit    6.3  0.2          Right Peroneal TA Motor (AntTibialis)   Fib Head    4.1 <4.5 *1.6 >3 Poplit Fib Head 1.3 10.0 77 >40   Poplit    5.4  1.2          Left Tibial Motor (Abd Vivas Brev)   Ankle *NR  <6  >4 Knee Ankle  0.0  >40   Knee *NR             Right Tibial Motor (Abd Vivas Brev)   Ankle *NR  <6  >4 Knee Ankle  0.0  >40   Knee *NR                                    Electromyography     Side Muscle Nerve Root Ins Act Fibs Psw Amp Dur Poly Recrt Int Pat Comment   Right AntTibialis Dp Br Fibular L4-5 *Incr *2+ *2+ *Incr *>12ms 0 *Reduced *50%    Right Gastroc Tibial S1-2 *Incr *2+ *2+ *Incr *>12ms 0 *Reduced *75%    Right VastusLat Femoral L2-4 Nml Nml Nml Nml Nml 0 Nml Nml    Right GluteusMed SupGluteal L5-S1 Nml Nml Nml Nml Nml 0 Nml Nml    Left  AntTibialis Dp Br Fibular L4-5 *Incr *2+ *2+ *Incr *>12ms 0 *Reduced *25%    Left Gastroc Tibial S1-2 *Red        No activity   Left VastusLat Femoral L2-4 Nml Nml Nml Nml Nml 0 Nml Nml    Left GluteusMed SupGluteal L5-S1 Nml Nml Nml Nml Nml 0 Nml Nml    Left BicepsFemS Sciatic L5-S1 Nml Nml Nml *Incr *>12ms 0 Nml Nml    Right BicepsFemS Sciatic L5-S1 Nml Nml Nml *Incr *>12ms 0 Nml Nml

## 2021-09-21 ENCOUNTER — TELEPHONE (OUTPATIENT)
Dept: SLEEP MEDICINE | Facility: MEDICAL CENTER | Age: 81
End: 2021-09-21

## 2021-10-04 ENCOUNTER — OFFICE VISIT (OUTPATIENT)
Dept: SLEEP MEDICINE | Facility: MEDICAL CENTER | Age: 81
End: 2021-10-04
Payer: MEDICARE

## 2021-10-04 VITALS
BODY MASS INDEX: 29.29 KG/M2 | WEIGHT: 221 LBS | DIASTOLIC BLOOD PRESSURE: 60 MMHG | RESPIRATION RATE: 16 BRPM | HEART RATE: 84 BPM | SYSTOLIC BLOOD PRESSURE: 136 MMHG | OXYGEN SATURATION: 93 % | HEIGHT: 73 IN

## 2021-10-04 DIAGNOSIS — I50.22 CHF (CONGESTIVE HEART FAILURE), NYHA CLASS II, CHRONIC, SYSTOLIC (HCC): ICD-10-CM

## 2021-10-04 DIAGNOSIS — Z78.9 NONSMOKER: ICD-10-CM

## 2021-10-04 DIAGNOSIS — I10 ESSENTIAL HYPERTENSION: ICD-10-CM

## 2021-10-04 DIAGNOSIS — G47.33 OSA (OBSTRUCTIVE SLEEP APNEA): ICD-10-CM

## 2021-10-04 PROCEDURE — 99213 OFFICE O/P EST LOW 20 MIN: CPT | Performed by: NURSE PRACTITIONER

## 2021-10-04 ASSESSMENT — FIBROSIS 4 INDEX: FIB4 SCORE: 2.52

## 2021-10-04 NOTE — PROGRESS NOTES
Chief Complaint   Patient presents with   • Apnea     last seen 4/2/2021        HPI:  LIAN More III is a 80 y.o. year old male here today for follow-up on JAMES.  Last OV 4/2/21 with Dr. Garcia    [facility]: CHF with EF 40% and unable to calculate RVSP per ECHO 2/24/21, HTN, cardiac pacemaker, status post TAVR, polycythemia.    Currently using CPAP @ 15cm H20 nightly; RESMED; device obtained 2020.  Compliance report 9/4/2021 through 10/3/2021 notes 100% compliance, average nightly use 6 hours 1 minute, moderate to significant mask leak with reduced AHI of 4.4/h.  Reviewed finds with patient. He feels he sleeps well on therapy and getting benefit from it. He denies morning headaches. He notes switching from full facemask to nasal mask due to mouth dryness and this is significantly improved. He does still have some. We reviewed the use of CPAP mouth tape. He denies issues with morning headaches. He notes overall consistent energy during the day but will nap 4 out of 7 days of the week up to 2 hours at a time. We reviewed the need of using his device if having to nap. Discussed sleep hygiene. He denies any major changes in health since last office visit denies cardiac or respiratory symptoms today. He does have a chronic dry cough which she is on an ACE inhibitor and we reviewed this. He also has a history of some sinus drainage and uses fluticasone on a daily basis. Chronic pedal edema well controlled on diuretics.    Sleep hx:  PSG 5/5/2020 noted severe JAMES with AHI 51/h and O2 kenny 67%.  Successful CPAP titration of CPAP 15 cm with reduced AHI of 4/h and supplemental O2 required 2 L/min to maintain greater than 90% saturation.      ROS: As per HPI and otherwise negative if not stated.    Past Medical History:   Diagnosis Date   • Arthritis    • Back pain    • Breath shortness     pt states short of breath 24/7 O2 at night only   • CAD (coronary artery disease)    • CATARACT     removed bilat   • Chest pain    • Chest  tightness    • Chickenpox    • Congestive heart failure (HCC)    • Constipation    • Coronary heart disease    • Cough    • Daytime sleepiness    • Diabetes     oral medication   • Difficulty breathing    • Dilated cardiomyopathy (HCC)    • Fatigue    • GERD (gastroesophageal reflux disease)    • Hearing difficulty    • Heart murmur    • Heart valve disease    • Heartburn    • Hiatus hernia syndrome    • Hyperlipidemia    • Hypertension     pt states well controlled on meds   • 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 Porter, gastroenterologist   • Unspecified hemorrhagic conditions     on plavix       Past Surgical History:   Procedure Laterality Date   • PB LAP, REMOVE ADJUST LUIS RESTRICT D*  6/5/2020    Procedure: REMOVAL, GASTRIC BAND, LAPAROSCOPIC - ADJUSTABLE BAND AND PORT;  Surgeon: Deniz Rios M.D.;  Location: [facility];  Service: General   • TRANSCATHETER AORTIC VALVE REPLACEMENT N/A 1/27/2020    Procedure: REPLACEMENT, AORTIC VALVE, TRANSCATHETER-;  Surgeon: Surendra Castro M.D.;  Location: [facility];  Service: Cardiac   • OLGA  1/27/2020    Procedure: ECHOCARDIOGRAM, TRANSESOPHAGEAL;  Surgeon: Surendra Castro M.D.;  Location: [facility];  Service: Cardiac   • GASTROSCOPY N/A 1/8/2016    Procedure: GASTROSCOPY;  Surgeon: Deniz Rios M.D.;  Location: [facility];  Service:    • HUMERUS ORIF Left 6/25/2015    Procedure: HUMERUS ORIF/ PROXIMAL;  Surgeon: Cristal Guido M.D.;  Location: SURGERY  [facility];  Service:    • LUMBAR FUSION POSTERIOR  9/5/2013    Performed by Edith Whelan M.D. at SURGERY [facility] ORS   • LUMBAR DECOMPRESSION  9/5/2013    Performed by Edith Whelan M.D. at SURGERY [facility]   • MASS EXCISION NEURO  9/5/2013    Performed by Edith Whelan M.D. at SURGERY [facility] ORS   • RECOVERY  6/26/2012    Performed by SURGERY, IR-RECOVERY at SURGERY SAME DAY [facility]   • DRAINAGE HEMATOMA  5/25/2012    Performed by SARINA SUE at [facility]   • GASTRIC BAND LAPAROSCOPIC REVISION  5/11/2012    Performed by SARINA SUE at [facility]   • LUMBAR FUSION POSTERIOR  3/26/2012    Performed by EDITH WHELAN at [facility] ORS   • LUMBAR DECOMPRESSION  3/26/2012    Performed by EDITH WHELAN at [facility] ORS   • INJ,EPIDURAL/LUMB/SAC SINGLE  3/19/2012    Performed by KRISTIN BALTAZAR at [facility]   • INJ,EPIDURAL/LUMB/SAC SINGLE  3/5/2012    Performed by KRISTIN BALTAZAR at [facility] ORS   • GASTRIC BANDING LAPAROSCOPIC  3/24/2010    Performed by SARINA SUE at SURGERY [facility] ORS   • HIATAL HERNIA REPAIR  3/24/2010    Performed by SARINA SUE at [facility]   • KNEE ARTHROPLASTY TOTAL  2000    bilateral   • ABDOMINOPLASTY  1990   • AORTIC VALVE REPLACEMENT     • ARTHROSCOPY, KNEE     • LAMINOTOMY     • OTHER CARDIAC SURGERY      stents   • PB REMV 2ND CATARACT,CORN-SCLER SECTN     • SINUSCOPE     • SLEEVE,CARRIE VASO THIGH     • TONSILLECTOMY     • ZZZ CARDIAC CATH         Family History   Problem Relation Age of Onset   • No Known Problems Sister    • No Known Problems Sister    • No Known Problems Sister    • Diabetes Other    • No Known Problems Mother    • No Known Problems Father    • Heart Disease Neg Hx        Social History     Socioeconomic History   • Marital status:      Spouse name: Not on file   • Number of children: Not on file   • Years of education: Not on  "file   • Highest education level: Not on file   Occupational History   • Not on file   Tobacco Use   • Smoking status: Never Smoker   • Smokeless tobacco: Never Used   • Tobacco comment: 0   Vaping Use   • Vaping Use: Never used   Substance and Sexual Activity   • Alcohol use: No     Alcohol/week: 0.0 oz   • Drug use: No   • Sexual activity: Yes   Other Topics Concern   • Not on file   Social History Narrative   • Not on file     Social Determinants of Health     Financial Resource Strain:    • Difficulty of Paying Living Expenses:    Food Insecurity:    • Worried About Running Out of Food in the Last Year:    • Ran Out of Food in the Last Year:    Transportation Needs:    • Lack of Transportation (Medical):    • Lack of Transportation (Non-Medical):    Physical Activity:    • Days of Exercise per Week:    • Minutes of Exercise per Session:    Stress:    • Feeling of Stress :    Social Connections:    • Frequency of Communication with Friends and Family:    • Frequency of Social Gatherings with Friends and Family:    • Attends Restoration Services:    • Active Member of Clubs or Organizations:    • Attends Club or Organization Meetings:    • Marital Status:    Intimate Partner Violence:    • Fear of Current or Ex-Partner:    • Emotionally Abused:    • Physically Abused:    • Sexually Abused:        Allergies as of 10/04/2021 - Reviewed 10/04/2021   Allergen Reaction Noted   • Statins [hmg-coa-r inhibitors] Rash 03/24/2010   • Atorvastatin  02/26/2020        Vitals:  /60 (BP Location: Left arm, Patient Position: Sitting, BP Cuff Size: Adult)   Pulse 84   Resp 16   Ht 1.854 m (6' 1\")   Wt 100 kg (221 lb)   SpO2 93%     Current medications as of today   Current Outpatient Medications   Medication Sig Dispense Refill   • lisinopril (PRINIVIL) 5 MG Tab Take 1 tablet by mouth every day. 90 tablet 3   • carvedilol (COREG) 3.125 MG Tab Take 1 tablet by mouth 2 times a day with meals. 180 tablet 3   • clopidogrel " (PLAVIX) 75 MG Tab Take 1 tablet by mouth once daily 90 tablet 2   • ezetimibe (ZETIA) 10 MG Tab Take 1 tablet by mouth once daily 90 Tab 3   • bumetanide (BUMEX) 1 MG Tab Take 2 tablets by mouth twice daily 360 Tab 1   • Silodosin 4 MG Cap Take 8 mg by mouth every day.     • Ferrous Sulfate (IRON PO) Take  by mouth.     • Probiotic Product (PROBIOTIC PO) Take  by mouth.     • acetaminophen (TYLENOL) 325 MG Tab Take 650 mg by mouth every four hours as needed.     • clomiPHENE (CLOMID) 50 MG tablet Take 50 mg by mouth 2X A WEEK.     • hydrocortisone 2.5 % Ointment Apply 1 Each to affected area(s) as needed.     • Calcium Citrate (CITRACAL PO) Take 1 Tab by mouth 2 Times a Day.     • fluticasone (FLONASE) 50 MCG/ACT nasal spray Use 2 spray(s) in each nostril once daily (Patient taking differently: 1 time daily as needed.) 48 g 3   • lactobacillus rhamnosus (CULTURELLE) Cap capsule Take 1 Cap by mouth every day.     • testosterone cypionate (DEPO-TESTOSTERONE) 200 MG/ML Solution injection 50 mg by Intramuscular route every thursday.     • cyanocobalamin (VITAMIN B-12) 1000 MCG/ML Solution 1,000 mcg by Intramuscular route every thursday.     • coenzyme Q-10 30 MG capsule Take 60 mg by mouth 2 Times a Day.     • Garlic 1000 MG Cap Take 1,000 mg by mouth 2 Times a Day.     • glucosamine Sulfate 500 MG Cap Take 1,500 mg by mouth 2 Times a Day.     • vitamin e (VITAMIN E) 400 UNIT Cap Take 400 Units by mouth 2 times a day.     • omeprazole (PRILOSEC) 40 MG delayed-release capsule Take 40 mg by mouth 2 times a day.     • Non Formulary Request Take 1 Cap by mouth 2 Times a Day. Cholox (OTC)     • Cholecalciferol (VITAMIN D3) 2000 UNIT Cap Take 2,000 Units by mouth 2 Times a Day.     • Non Formulary Request Take 1 Cap by mouth 2 Times a Day. Domenic red (OTC)     • Non Formulary Request Take 1 Cap by mouth every day. Nitric OXIDE (OTC)     • Non Formulary Request Take 1 Cap by mouth every day. anit-oxidant (OTC)     • Zinc 50 MG  Tab Take 50 mg by mouth every day.     • anastrozole (ARIMIDEX) 1 MG Tab Take 0.5 mg by mouth See Admin Instructions. On Tue and Sat     • gabapentin (NEURONTIN) 300 MG Cap Take 300 mg by mouth every 6 hours as needed. Indications: Neuropathic Pain     • glimepiride (AMARYL) 1 MG tablet Take 1 mg by mouth every morning.     • B Complex Vitamins (VITAMIN B COMPLEX PO) Take 1 Tab by mouth every day.     • Cranberry 300 MG Tab Take 300 mg by mouth every morning.     • L-Lysine 1000 MG Tab Take 1,000 mg by mouth every day.     • Ascorbic Acid (VITAMIN C) 1000 MG Tab Take 1,000 mg by mouth 2 Times a Day.       No current facility-administered medications for this visit.         Physical Exam:   Gen:           Alert and oriented, No apparent distress. Mood and affect appropriate, normal interaction with examiner.  Eyes:          PERRL, EOM intact, sclere white, conjunctive moist.  Ears:          Not examined.   Hearing:     Grossly intact.  Nose:          Normal, no lesions or deformities.  Dentition:    mask  Oropharynx:   mask  Mallampati Classification: mask  Neck:        Supple, trachea midline, no masses.  Respiratory Effort: No intercostal retractions or use of accessory muscles.   Lung Auscultation:      Clear to auscultation bilaterally; no rales, rhonchi or wheezing.  CV:            Regular rate and rhythm. No murmurs, rubs or gallops.  Abd:           Not examined.   Lymphadenopathy: Not examined.  Gait and Station: Normal.  Digits and Nails: No clubbing, cyanosis, petechiae, or nodes.   Cranial Nerves: II-XII grossly intact.  Skin:        No rashes, lesions or ulcers noted.               Ext:           No cyanosis or edema.      Assessment:  1. JAMES (obstructive sleep apnea)     2. CHF (congestive heart failure), NYHA class II, chronic, systolic (HCC)     3. Essential hypertension     4. BMI 29.0-29.9,adult     5. Nonsmoker         Immunizations:    Flu:declines  Pneumovax 23:2013  Prevnar 13:2017  COVID-19:  2/24/21, 2/3/21    Plan:  1. JAMES is well controlled; continue CPAP nightly.  DME mask/supplies with somnifix mouth tape  2. Discussed sleep hygiene and avoidance of long naps or using device if needing to nap  3. Follow-up cardiology for ongoing management of heart failure and hypertension  4. For primary care for the health concerns  5. Follow-up in 1 year's compliance poor, sooner if needed.    Please note that this dictation was created using voice recognition software. I have made every reasonable attempt to correct obvious errors, but it is possible there are errors of grammar and possibly content that I did not discover before finalizing the note.

## 2021-10-19 ENCOUNTER — OFFICE VISIT (OUTPATIENT)
Dept: CARDIOLOGY | Facility: MEDICAL CENTER | Age: 81
End: 2021-10-19
Payer: MEDICARE

## 2021-10-19 VITALS
HEART RATE: 74 BPM | DIASTOLIC BLOOD PRESSURE: 58 MMHG | BODY MASS INDEX: 29.69 KG/M2 | SYSTOLIC BLOOD PRESSURE: 100 MMHG | OXYGEN SATURATION: 95 % | RESPIRATION RATE: 16 BRPM | HEIGHT: 73 IN | WEIGHT: 224 LBS

## 2021-10-19 DIAGNOSIS — I50.22 CHF (CONGESTIVE HEART FAILURE), NYHA CLASS II, CHRONIC, SYSTOLIC (HCC): Chronic | ICD-10-CM

## 2021-10-19 DIAGNOSIS — Z95.2 S/P TAVR (TRANSCATHETER AORTIC VALVE REPLACEMENT): ICD-10-CM

## 2021-10-19 DIAGNOSIS — I10 ESSENTIAL HYPERTENSION: ICD-10-CM

## 2021-10-19 DIAGNOSIS — Z95.810 ICD (IMPLANTABLE CARDIOVERTER-DEFIBRILLATOR) IN PLACE: ICD-10-CM

## 2021-10-19 DIAGNOSIS — E78.5 DYSLIPIDEMIA: Chronic | ICD-10-CM

## 2021-10-19 DIAGNOSIS — I25.10 CORONARY ARTERY DISEASE DUE TO CALCIFIED CORONARY LESION: ICD-10-CM

## 2021-10-19 DIAGNOSIS — I25.84 CORONARY ARTERY DISEASE DUE TO CALCIFIED CORONARY LESION: ICD-10-CM

## 2021-10-19 PROCEDURE — 99214 OFFICE O/P EST MOD 30 MIN: CPT | Performed by: INTERNAL MEDICINE

## 2021-10-19 ASSESSMENT — FIBROSIS 4 INDEX: FIB4 SCORE: 2.52

## 2021-10-19 NOTE — PROGRESS NOTES
"CARDIOLOGY OUTPATIENT FOLLOWUP    PCP: Pcp Pt States None    1. S/P TAVR (transcatheter aortic valve replacement)    2. Coronary artery disease due to calcified coronary lesion    3. CHF (congestive heart failure), NYHA class II, chronic, systolic (HCC)    4. Dyslipidemia    5. Essential hypertension    6. ICD (implantable cardioverter-defibrillator) in place        LIAN More III is stable from a cardiovascular standpoint with well-controlled risk factors.  He does continue to be plagued by fatigue, which continues to be unexplained after multiple cardiovascular interventions as well as trials without various pharmacologic agents.  I advised expectant management, will update the echocardiogram in the late winter.  Should the symptoms become progressive, I would consider additional ischemic evaluation.    Follow up: 6 months    Chief Complaint   Patient presents with   • Coronary Artery Disease   • Hypertension       History: LIAN More III is a 80 y.o. male history of chronic kidney disease, diabetes, sleep apnea, gastric bypass, gait instability presenting for follow-up of hypertension, dyslipidemia, coronary artery disease with PCI of the left main and RCA, TAVR, ICD implantation, LVEF of 40%.    Since our last evaluation USMAN remains in stable health.  Friends have commented that his balance seems better though he is not sure.  He continues to exercise daily doing a combination of squats, leg lifts and push-ups.  He does experience intermittent left precordial tenderness.  He continues to be bothered by fatigue-which was the symptom that initially prompted the extensive cardiac work-up and management.  Unfortunately this did not improve his symptoms.  He has found medications more affordable.      ROS:   All other systems reviewed and negative except as per the HPI    PE:  /58 (BP Location: Left arm, Patient Position: Sitting, BP Cuff Size: Adult)   Pulse 74   Resp 16   Ht 1.854 m (6' 1\")   Wt 102 kg (224 " lb)   SpO2 95%   BMI 29.55 kg/m²   Gen: no acute distress  HEENT: Symmetric face. Anicteric sclerae. Moist mucus membranes  NECK: No JVD. No lymphadenopathy  CARDIAC: Regular, Normal S1, S2, +systolic murmur  VASCULATURE: left sided bruit  RESP: Clear to auscultation bilaterally  ABD: Soft, non-tender, non-distended  EXT: No edema, no clubbing or cyanosis  SKIN: Warm and dry  NEURO: No gross deficits  PSYCH: Appropriate affect, participates in conversation    The ASCVD Risk score (Clayton DESTINEE Jr, et al., 2013) failed to calculate.    Past Medical History:   Diagnosis Date   • Arthritis    • Back pain    • Breath shortness     pt states short of breath 24/7 O2 at night only   • CAD (coronary artery disease)    • CATARACT     removed bilat   • Chest pain    • Chest tightness    • Chickenpox    • Congestive heart failure (HCC)    • Constipation    • Coronary heart disease    • Cough    • Daytime sleepiness    • Diabetes     oral medication   • Difficulty breathing    • Dilated cardiomyopathy (HCC)    • Fatigue    • GERD (gastroesophageal reflux disease)    • Hearing difficulty    • Heart murmur    • Heart valve disease    • Heartburn    • Hiatus hernia syndrome    • Hyperlipidemia    • Hypertension     pt states well controlled on meds   • 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 Porter, gastroenterologist   • Unspecified hemorrhagic conditions     on plavix     Allergies   Allergen Reactions   • Statins [Hmg-Coa-R Inhibitors] Rash     Blisters     • Atorvastatin       Outpatient Encounter Medications as of 10/19/2021   Medication Sig Dispense Refill   • lisinopril (PRINIVIL) 5 MG Tab Take 1 tablet by mouth every day. 90 tablet 3   • carvedilol (COREG) 3.125 MG Tab Take 1 tablet by mouth 2 times a day with meals. 180 tablet 3   • clopidogrel (PLAVIX) 75 MG Tab Take 1 tablet by mouth once daily 90 tablet 2   • ezetimibe (ZETIA) 10 MG Tab Take 1 tablet by mouth once daily 90 Tab 3   • bumetanide (BUMEX) 1 MG Tab Take 2 tablets by mouth twice daily 360 Tab 1   • Silodosin 4 MG Cap Take 8 mg by mouth every day.     • Ferrous Sulfate (IRON PO) Take  by mouth.     • Probiotic Product (PROBIOTIC PO) Take  by mouth.     • acetaminophen (TYLENOL) 325 MG Tab Take 650 mg by mouth every four hours as needed.     • clomiPHENE (CLOMID) 50 MG tablet Take 50 mg by mouth 2X A WEEK.     • hydrocortisone 2.5 % Ointment Apply 1 Each to affected area(s) as needed.     • Calcium Citrate (CITRACAL PO) Take 1 Tab by mouth 2 Times a Day.     • fluticasone (FLONASE) 50 MCG/ACT nasal spray Use 2 spray(s) in each nostril once daily (Patient taking differently: 1 time daily as needed.) 48 g 3   • lactobacillus rhamnosus (CULTURELLE) Cap capsule Take 1 Cap by mouth every day.     • testosterone cypionate (DEPO-TESTOSTERONE) 200 MG/ML Solution injection 50 mg by Intramuscular route every thursday.     • cyanocobalamin (VITAMIN B-12) 1000 MCG/ML Solution 1,000 mcg by Intramuscular route every thursday.     • coenzyme Q-10 30 MG capsule Take 60 mg by mouth 2 Times a Day.     • Garlic 1000 MG Cap Take 1,000 mg by mouth 2 Times a Day.     • glucosamine Sulfate 500 MG Cap Take 1,500 mg by mouth 2 Times a Day.     • vitamin e (VITAMIN E) 400 UNIT Cap Take 400 Units by mouth 2 times a day.     • omeprazole (PRILOSEC) 40 MG delayed-release capsule Take 40 mg by mouth 2 times a day.     • Non Formulary Request Take 1 Cap by mouth 2 Times a Day. Cholox (OTC)     • Cholecalciferol (VITAMIN D3) 2000 UNIT Cap Take 2,000  Units by mouth 2 Times a Day.     • Non Formulary Request Take 1 Cap by mouth 2 Times a Day. Domenic red (OTC)     • Non Formulary Request Take 1 Cap by mouth every day. Nitric OXIDE (OTC)     • Non Formulary Request Take 1 Cap by mouth every day. anit-oxidant (OTC)     • Zinc 50 MG Tab Take 50 mg by mouth every day.     • anastrozole (ARIMIDEX) 1 MG Tab Take 0.5 mg by mouth See Admin Instructions. On Tue and Sat     • gabapentin (NEURONTIN) 300 MG Cap Take 300 mg by mouth every 6 hours as needed. Indications: Neuropathic Pain     • glimepiride (AMARYL) 1 MG tablet Take 1 mg by mouth every morning.     • B Complex Vitamins (VITAMIN B COMPLEX PO) Take 1 Tab by mouth every day.     • Cranberry 300 MG Tab Take 300 mg by mouth every morning.     • L-Lysine 1000 MG Tab Take 1,000 mg by mouth every day.     • Ascorbic Acid (VITAMIN C) 1000 MG Tab Take 1,000 mg by mouth 2 Times a Day.     • [DISCONTINUED] Home Care Oxygen Inhale 2 L/min by mouth Continuous.   Respiratory route via: Nasal Cannula   Oxygen supplier: [facility]  Indications: Hypoxia, to keep O2 sat over 90%       No facility-administered encounter medications on file as of 10/19/2021.     Social History     Socioeconomic History   • Marital status:      Spouse name: Not on file   • Number of children: Not on file   • Years of education: Not on file   • Highest education level: Not on file   Occupational History   • Not on file   Tobacco Use   • Smoking status: Never Smoker   • Smokeless tobacco: Never Used   • Tobacco comment: 0   Vaping Use   • Vaping Use: Never used   Substance and Sexual Activity   • Alcohol use: No     Alcohol/week: 0.0 oz   • Drug use: No   • Sexual activity: Yes   Other Topics Concern   • Not on file   Social History Narrative   • Not on file     Social Determinants of Health     Financial Resource Strain:    • Difficulty of Paying Living Expenses:    Food Insecurity:    • Worried About Running Out of Food in the Last Year:    • Ran Out of  Food in the Last Year:    Transportation Needs:    • Lack of Transportation (Medical):    • Lack of Transportation (Non-Medical):    Physical Activity:    • Days of Exercise per Week:    • Minutes of Exercise per Session:    Stress:    • Feeling of Stress :    Social Connections:    • Frequency of Communication with Friends and Family:    • Frequency of Social Gatherings with Friends and Family:    • Attends Alevism Services:    • Active Member of Clubs or Organizations:    • Attends Club or Organization Meetings:    • Marital Status:    Intimate Partner Violence:    • Fear of Current or Ex-Partner:    • Emotionally Abused:    • Physically Abused:    • Sexually Abused:        Studies  Lab Results   Component Value Date/Time    CHOLSTRLTOT 72 (L) 05/19/2021 08:26 AM    LDL 28 05/19/2021 08:26 AM    HDL 24 (A) 05/19/2021 08:26 AM    TRIGLYCERIDE 102 05/19/2021 08:26 AM       Lab Results   Component Value Date/Time    SODIUM 138 05/19/2021 08:26 AM    POTASSIUM 4.9 05/19/2021 08:26 AM    CHLORIDE 107 05/19/2021 08:26 AM    CO2 21 05/19/2021 08:26 AM    GLUCOSE 103 (H) 05/19/2021 08:26 AM    BUN 56 (H) 05/19/2021 08:26 AM    CREATININE 2.04 (H) 05/19/2021 08:26 AM    CREATININE 1.18 02/08/2011 12:00 AM    BUNCREATRAT 16 02/08/2011 12:00 AM    GLOMRATE >59 02/08/2011 12:00 AM     Lab Results   Component Value Date/Time    ALKPHOSPHAT 39 04/28/2020 05:10 AM    ASTSGOT 27 04/28/2020 05:10 AM    ALTSGPT 23 04/28/2020 05:10 AM    TBILIRUBIN 1.0 04/28/2020 05:10 AM        For this encounter I reviewed the following medical records BMP, Lipid profile, LFT and CBC   For this encounter I directly reviewed device interrogation.  Rare, brief mode switching episodes.  Excellent battery life.

## 2021-11-13 ENCOUNTER — HOSPITAL ENCOUNTER (OUTPATIENT)
Dept: LAB | Facility: MEDICAL CENTER | Age: 81
End: 2021-11-13
Attending: INTERNAL MEDICINE
Payer: MEDICARE

## 2021-11-13 DIAGNOSIS — I10 ESSENTIAL HYPERTENSION: ICD-10-CM

## 2021-11-13 DIAGNOSIS — N18.30 STAGE 3 CHRONIC KIDNEY DISEASE, UNSPECIFIED WHETHER STAGE 3A OR 3B CKD: ICD-10-CM

## 2021-11-13 LAB
ANION GAP SERPL CALC-SCNC: 13 MMOL/L (ref 7–16)
BUN SERPL-MCNC: 46 MG/DL (ref 8–22)
CALCIUM SERPL-MCNC: 8.9 MG/DL (ref 8.5–10.5)
CHLORIDE SERPL-SCNC: 108 MMOL/L (ref 96–112)
CO2 SERPL-SCNC: 19 MMOL/L (ref 20–33)
CREAT SERPL-MCNC: 2.08 MG/DL (ref 0.5–1.4)
CREAT UR-MCNC: 52.15 MG/DL
GLUCOSE SERPL-MCNC: 149 MG/DL (ref 65–99)
MICROALBUMIN UR-MCNC: 2.4 MG/DL
MICROALBUMIN/CREAT UR: 46 MG/G (ref 0–30)
POTASSIUM SERPL-SCNC: 4.4 MMOL/L (ref 3.6–5.5)
SODIUM SERPL-SCNC: 140 MMOL/L (ref 135–145)

## 2021-11-13 PROCEDURE — 80048 BASIC METABOLIC PNL TOTAL CA: CPT

## 2021-11-13 PROCEDURE — 82043 UR ALBUMIN QUANTITATIVE: CPT

## 2021-11-13 PROCEDURE — 82570 ASSAY OF URINE CREATININE: CPT

## 2021-11-13 PROCEDURE — 36415 COLL VENOUS BLD VENIPUNCTURE: CPT

## 2021-11-20 DIAGNOSIS — Z95.2 S/P TAVR (TRANSCATHETER AORTIC VALVE REPLACEMENT): ICD-10-CM

## 2021-11-20 DIAGNOSIS — E78.5 DYSLIPIDEMIA: Chronic | ICD-10-CM

## 2021-11-22 RX ORDER — CLOPIDOGREL BISULFATE 75 MG/1
TABLET ORAL
Qty: 90 TABLET | Refills: 3 | Status: SHIPPED | OUTPATIENT
Start: 2021-11-22 | End: 2022-01-11

## 2021-11-22 RX ORDER — EZETIMIBE 10 MG/1
TABLET ORAL
Qty: 90 TABLET | Refills: 3 | Status: SHIPPED | OUTPATIENT
Start: 2021-11-22 | End: 2022-12-14

## 2021-12-16 ENCOUNTER — OFFICE VISIT (OUTPATIENT)
Dept: NEUROLOGY | Facility: MEDICAL CENTER | Age: 81
End: 2021-12-16
Attending: PSYCHIATRY & NEUROLOGY
Payer: MEDICARE

## 2021-12-16 VITALS
DIASTOLIC BLOOD PRESSURE: 58 MMHG | BODY MASS INDEX: 30.65 KG/M2 | TEMPERATURE: 98 F | WEIGHT: 231.26 LBS | HEART RATE: 71 BPM | SYSTOLIC BLOOD PRESSURE: 128 MMHG | HEIGHT: 73 IN | OXYGEN SATURATION: 95 %

## 2021-12-16 DIAGNOSIS — E11.42 DIABETIC POLYNEUROPATHY ASSOCIATED WITH TYPE 2 DIABETES MELLITUS (HCC): ICD-10-CM

## 2021-12-16 PROCEDURE — 99214 OFFICE O/P EST MOD 30 MIN: CPT | Performed by: PSYCHIATRY & NEUROLOGY

## 2021-12-16 RX ORDER — PANCRELIPASE LIPASE, PANCRELIPASE PROTEASE, PANCRELIPASE AMYLASE 15000; 47000; 63000 [USP'U]/1; [USP'U]/1; [USP'U]/1
CAPSULE, DELAYED RELEASE ORAL
COMMUNITY
End: 2022-01-25

## 2021-12-16 ASSESSMENT — ENCOUNTER SYMPTOMS
SENSORY CHANGE: 1
FOCAL WEAKNESS: 1
TINGLING: 1
FALLS: 0
TREMORS: 0

## 2021-12-16 ASSESSMENT — FIBROSIS 4 INDEX: FIB4 SCORE: 2.56

## 2021-12-16 NOTE — PROGRESS NOTES
Aman More III is a 81 y.o. male who presents for follow-up, with a history of a very severe diabetic polyneuropathy with a superimposed bilateral lumbosacral radiculopathy.  He is now status post repeat EMG/NCV studies.    HPI    JW states that the symptoms in his feet have remained essentially unchanged.  Compared to his evaluations from June of this year when he was last seen, he still has mostly a numbness in the lower extremities, he will use Neurontin as needed, he denies significant burning dysesthesias that have developed in the interim.  He has no sensory distortions in his hands.  The weakness in his feet, left greater than right, has unchanged.  The back pain is chronic, it to has not changed, he has no significant sciatic radiating symptoms from the back on either side.  He has not been injuring his feet, always cautious.    Gait ataxia persists, most frustrating for him though he is incredibly independent despite this.  He still swings a golf club routinely, goes to the gym, exercising as many of his muscles as he can, both upper and lower extremity.    MRI scan from April, 2019 revealed postoperative changes with multilevel fusion and L3-L5 laminectomies, no clear central stenosis, and multilevel mild to severe foraminal narrowing, most marked on the left at L4-5 and L5-S1, on the right at L4-5.  EMG/NCV studies repeated on June 24, 2021, compared to the study from July, 2020, was essentially unchanged and stable.  Both revealed a very severe length dependent sensory and motor axonal polyneuropathy in both lower extremities, the upper extremities were not assessed.  There is also evidence of either bilateral S1 radiculopathy versus sciatic neuropathy with denervation changes seen in medial gastrocnemius and the biceps for more short head musculatures bilaterally.    The internalized tremulousness he had noted with his examination and evaluation  6 months ago has not changed.  He denies  "anything in the hands, head, or voice.    Medical, surgical and family histories are reviewed, there are no new drug allergies.  Still on gabapentin 300 mg every 6 hours as needed, he is certainly on a long list of other medications, including Plavix, Zetia, Coreg, vitamin D with calcium, vitamin B12, Amaryl, pancrelipase, silodosin, Prinivil, and the rest as per record, noncontributory from my standpoint.    Review of Systems   Musculoskeletal: Negative for falls.   Neurological: Positive for tingling, sensory change and focal weakness. Negative for tremors.   All other systems reviewed and are negative.    Objective     /58   Pulse 71   Temp 36.7 °C (98 °F)   Ht 1.854 m (6' 1\")   Wt 105 kg (231 lb 4.2 oz)   SpO2 95%   BMI 30.51 kg/m²      Physical Exam    He appears in no acute distress.  Vital signs are stable.  There is no malar rash.  His neck is supple.  Straight leg raising is negative bilaterally. Cardiac evaluation reveals a regular rhythm.  Bilateral lower extremity vascular insufficiency changes distal to the midshin are unchanged.  The skin is darkening color, skin turgor and thickness diminished bilaterally.     Neurological Exam    Fully oriented, there is no aphasia, apraxia, or inattention.    PERRLA/EOMI, visual fields are full, facial movements are symmetric, sensory exam is intact on both sides of the face to light touch.  Shoulder shrug and head rotation are intact.    Musculoskeletal exam again reveals no evidence of tremor in the upper extremities either at rest or with sustained posture against gravity.  Atrophy is seen distally to the knees including the anterior tibialis and gastrocnemius musculatures bilaterally, left more than right.  There are no fasciculations.  Strength assessment of the upper extremities is intact, grasp diminished because of arthritic changes only.  Hip stabilizers and knee flexors extensors are intact.  Distally, left more than right, there is profound " weakness with dorsiflexion, plantar flexion, inversion and eversion.  EHL and toe flexion is nearly absent on the left, profoundly weakened on the right.    Ankle jerks remain absent, knee jerks severely diminished bilaterally.  Biceps are more easily elicited.    He still has the steppage quality to his walking, station is widened.  Armswing is symmetric.  He looks at the ground consistently as he walks.  He accommodates well with the foot drop.  There is no appendicular dystaxia with the upper extremities, repetitive movements are only minimally slowed but are symmetric.  These movements are absent in the lower extremities.    Sensory exam again reveals the dense pattern of loss of vibration up to the knees, JPS below the ankles, temperature below the knees.  These modalities are intact in the upper extremities.    Assessment & Plan     1. Diabetic polyneuropathy associated with type 2 diabetes mellitus (HCC)  The more severe problem, I doubt the active denervation changes seen on  the most recent study, present on the same study from 2 years ago, warrant reevaluation from a surgical standpoint.  Given that he has a pacemaker placed, MRI imaging is contraindicated, this will require evaluation with CT myelography.  Clinically he is stable, examination findings unchanged over the interval of time, and given that these active changes could be due to a sciatic nerve process, not a lumbosacral localization, it is even less likely a surgeon will want to touch this.  His exam findings would suggest more than just an S1 radiculopathy, regardless.    We spent some time talking at all of the above.  He was told the neuropathy is the bigger issue for him, encouraged him to remain physically active, but the weakness and sensory distortions he has in the lower extremities are static, will not improve, though they could progress slowly over time.  This cannot be predicted.  He can continue the gabapentin use as needed, he and  I can simply follow-up in 1 year.     Time: 30 minutes in total spent on patient care including precharting, record review, discussions with healthcare staff and documentation.  This included face-to-face time with the patient for exam, review, education, as well as counseling and coordinating care.

## 2022-01-03 NOTE — TELEPHONE ENCOUNTER
Returned call. Metolazone Rx was sent today after appt, and pharmacy wanted to make sure we knew pt was also taking Bumex. Confirmed that this should also be part of pt's active medications. They also say that HCTZ used to be on pt's med list, but per pt this was removed from their list. Confirmed that pt should not be taking HCTZ.   poor balance

## 2022-01-11 ENCOUNTER — TELEPHONE (OUTPATIENT)
Dept: CARDIOLOGY | Facility: MEDICAL CENTER | Age: 82
End: 2022-01-11

## 2022-01-11 DIAGNOSIS — I48.92 ATRIAL FLUTTER, UNSPECIFIED TYPE (HCC): ICD-10-CM

## 2022-01-11 NOTE — TELEPHONE ENCOUNTER
Patient called back. He states he feels well and did not notice any symptoms associated with AFL. Advised of transmission and MD's recommendations. Discussed AFL and potential risks as well as new medications and potential risks. Patient does not have any questions for me. Scheduled appt with Dr. Caldwell next Tuesday.

## 2022-01-11 NOTE — TELEPHONE ENCOUNTER
Paul Wilkinson M.D.  Ruchi Holt, Med Ass't 39 minutes ago (1:57 PM)       Cant see device transmission- if confirmed I would switch to eliquis 5 BID, aspirin 81, stop plavix. Wait for me to confirm first.     Thanks   BE      Talked with Ruchi to make sure she saw Dr. Wilkinson's message. She was sending him a message when I walked over. She also states the transmission shows AFL not AF.     Voalte message from Dr. Wilkinson at 1432  He would like to start patient on Eliquis 5mg BID and ASA 81mg, stop Plavix. Refer to EP for possible ablation and can schedule general cardiology appt to review as well.     Called and LVM on patient's phone (which is a golf club phone number) to call back and discuss. Called patient's wife Anni, and she said she will have patient call us to discuss.

## 2022-01-11 NOTE — TELEPHONE ENCOUNTER
FYI--patients device transmitted via home monitor.  Patient in atrial fib at time of transmission 1/10/22 @ 9:30 pm--lasting > 30 minutes.    Not on AC

## 2022-01-12 NOTE — TELEPHONE ENCOUNTER
Called patient back, he wanted to know more specifics about his device transmission (including HR) because he thought his home monitor wasn't working. He troubleshot it with [employer], and they ended up suggested sending him a new one.    He also states the cost of Eliquis is very high. We discussed contacting his RX insurance [employer] to see if they prefer Xarelto instead and which pharmacy they prefer for maximum benefit coverage. He will call us back to update.

## 2022-01-25 ENCOUNTER — OFFICE VISIT (OUTPATIENT)
Dept: CARDIOLOGY | Facility: MEDICAL CENTER | Age: 82
End: 2022-01-25
Attending: INTERNAL MEDICINE
Payer: MEDICARE

## 2022-01-25 ENCOUNTER — TELEPHONE (OUTPATIENT)
Dept: CARDIOLOGY | Facility: MEDICAL CENTER | Age: 82
End: 2022-01-25

## 2022-01-25 VITALS
BODY MASS INDEX: 28.89 KG/M2 | DIASTOLIC BLOOD PRESSURE: 60 MMHG | HEIGHT: 73 IN | HEART RATE: 63 BPM | RESPIRATION RATE: 14 BRPM | WEIGHT: 218 LBS | OXYGEN SATURATION: 97 % | SYSTOLIC BLOOD PRESSURE: 108 MMHG

## 2022-01-25 DIAGNOSIS — I49.5 SSS (SICK SINUS SYNDROME) (HCC): ICD-10-CM

## 2022-01-25 DIAGNOSIS — I48.19 PERSISTENT ATRIAL FIBRILLATION (HCC): ICD-10-CM

## 2022-01-25 DIAGNOSIS — I10 ESSENTIAL HYPERTENSION: ICD-10-CM

## 2022-01-25 DIAGNOSIS — Z95.810 ICD (IMPLANTABLE CARDIOVERTER-DEFIBRILLATOR) IN PLACE: ICD-10-CM

## 2022-01-25 DIAGNOSIS — Z95.2 S/P TAVR (TRANSCATHETER AORTIC VALVE REPLACEMENT): ICD-10-CM

## 2022-01-25 PROCEDURE — 99214 OFFICE O/P EST MOD 30 MIN: CPT | Mod: 25 | Performed by: INTERNAL MEDICINE

## 2022-01-25 PROCEDURE — 93283 PRGRMG EVAL IMPLANTABLE DFB: CPT | Performed by: INTERNAL MEDICINE

## 2022-01-25 ASSESSMENT — FIBROSIS 4 INDEX: FIB4 SCORE: 2.56

## 2022-01-25 NOTE — PROGRESS NOTES
Arrhythmia Clinic Note (Established patient)    DOS: 1/25/2022    Chief complaint/Reason for consult: F/u AF    Interval History:  Pt is a 80 yo M. 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 Starting about 16 days ago he went into persistent AF. Complains of worsening fatigue though he says this has been going for many years. Sees Dr. Wilkinson who started him on OAC and he says he is taking Eliquis BID along with low dose ASA.    ROS (+ highlighted in red):  General--Negative for fatigue, weight loss or weight gain  Cardiovascular--Negative for CP, orthopnea, PND    Past Medical History:   Diagnosis Date   • Arthritis    • Back pain    • Breath shortness     pt states short of breath 24/7 O2 at night only   • CAD (coronary artery disease)    • CATARACT     removed bilat   • Chest pain    • Chest tightness    • Chickenpox    • Congestive heart failure (HCC)    • Constipation    • Coronary heart disease    • Cough    • Daytime sleepiness    • Diabetes     oral medication   • Difficulty breathing    • Dilated cardiomyopathy (HCC)    • Fatigue    • GERD (gastroesophageal reflux disease)    • Hearing difficulty    • Heart murmur    • Heart valve disease    • Heartburn    • Hiatus hernia syndrome    • Hyperlipidemia    • Hypertension     pt states well controlled on meds   • 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 Porter, gastroenterologist   • Unspecified hemorrhagic conditions     on plavix       Past Surgical History:    Procedure Laterality Date   • LAP, REMOVE ADJUST LUIS RESTRICT D*  6/5/2020    Procedure: REMOVAL, GASTRIC BAND, LAPAROSCOPIC - ADJUSTABLE BAND AND PORT;  Surgeon: Deniz Sue M.D.;  Location: [facility];  Service: General   • TRANSCATHETER AORTIC VALVE REPLACEMENT N/A 1/27/2020    Procedure: REPLACEMENT, AORTIC VALVE, TRANSCATHETER-;  Surgeon: Surendra Castro M.D.;  Location: [facility];  Service: Cardiac   • OLGA  1/27/2020    Procedure: ECHOCARDIOGRAM, TRANSESOPHAGEAL;  Surgeon: Surendra Castro M.D.;  Location: SURGERY [facility];  Service: Cardiac   • GASTROSCOPY N/A 1/8/2016    Procedure: GASTROSCOPY;  Surgeon: Deniz Sue M.D.;  Location: [facility];  Service:    • ORIF, FRACTURE, HUMERUS Left 6/25/2015    Procedure: HUMERUS ORIF/ PROXIMAL;  Surgeon: Cristal Guido M.D.;  Location: [facility];  Service:    • FUSION, SPINE, LUMBAR, PLIF  9/5/2013    Performed by Edith Sears M.D. at [facility]   • LUMBAR DECOMPRESSION  9/5/2013    Performed by Edith Sears M.D. at [facility]   • MASS EXCISION NEURO  9/5/2013    Performed by Edith Sears M.D. at [facility]   • RECOVERY  6/26/2012    Performed by [facility], IR-RECOVERY at [facility] SAME DAY [facility]   • DRAINAGE HEMATOMA  5/25/2012    Performed by DENIZ SUE at [facility]   • GASTRIC BAND LAPAROSCOPIC REVISION  5/11/2012    Performed by DENIZ SUE at [facility]   • FUSION, SPINE, LUMBAR, PLIF  3/26/2012    Performed by EDITH SEARS at [facility]   • LUMBAR DECOMPRESSION  3/26/2012    Performed by EDITH SEARS at [facility]   • INJ,EPIDURAL/LUMB/SAC SINGLE  3/19/2012    Performed by KRISTIN BALTAZAR at [facility]   • INJ,EPIDURAL/LUMB/SAC SINGLE  3/5/2012    Performed by KRISTIN BALTAZAR at [facility]   • GASTRIC BANDING LAPAROSCOPIC  3/24/2010    Performed by  SARINA SUE at SURGERY [facility]   • HIATAL HERNIA REPAIR  3/24/2010    Performed by SARINA SUE at SURGERY [facility]   • KNEE ARTHROPLASTY TOTAL  2000    bilateral   • ABDOMINOPLASTY  1990   • AORTIC VALVE REPLACEMENT     • ARTHROSCOPY, KNEE     • LAMINOTOMY     • OTHER CARDIAC SURGERY      stents   • MA REMV 2ND CATARACT,CORN-SCLER SECTN     • SINUSCOPE     • SLEEVE,CARRIE VASO THIGH     • TONSILLECTOMY     • ZZZ CARDIAC CATH         Social History     Socioeconomic History   • Marital status:      Spouse name: Not on file   • Number of children: Not on file   • Years of education: Not on file   • Highest education level: Not on file   Occupational History   • Not on file   Tobacco Use   • Smoking status: Never Smoker   • Smokeless tobacco: Never Used   • Tobacco comment: 0   Vaping Use   • Vaping Use: Never used   Substance and Sexual Activity   • Alcohol use: No     Alcohol/week: 0.0 oz   • Drug use: No   • Sexual activity: Yes   Other Topics Concern   • Not on file   Social History Narrative   • Not on file     Social Determinants of Health     Financial Resource Strain:    • Difficulty of Paying Living Expenses: Not on file   Food Insecurity:    • Worried About Running Out of Food in the Last Year: Not on file   • Ran Out of Food in the Last Year: Not on file   Transportation Needs:    • Lack of Transportation (Medical): Not on file   • Lack of Transportation (Non-Medical): Not on file   Physical Activity:    • Days of Exercise per Week: Not on file   • Minutes of Exercise per Session: Not on file   Stress:    • Feeling of Stress : Not on file   Social Connections:    • Frequency of Communication with Friends and Family: Not on file   • Frequency of Social Gatherings with Friends and Family: Not on file   • Attends Jainism Services: Not on file   • Active Member of Clubs or Organizations: Not on file   • Attends Club or Organization Meetings: Not on file   • Marital Status: Not on file    Intimate Partner Violence:    • Fear of Current or Ex-Partner: Not on file   • Emotionally Abused: Not on file   • Physically Abused: Not on file   • Sexually Abused: Not on file   Housing Stability:    • Unable to Pay for Housing in the Last Year: Not on file   • Number of Places Lived in the Last Year: Not on file   • Unstable Housing in the Last Year: Not on file       Family History   Problem Relation Age of Onset   • No Known Problems Sister    • No Known Problems Sister    • No Known Problems Sister    • Diabetes Other    • No Known Problems Mother    • No Known Problems Father    • Heart Disease Neg Hx        Allergies   Allergen Reactions   • Statins [Hmg-Coa-R Inhibitors] Rash     Blisters     • Atorvastatin        Current Outpatient Medications   Medication Sig Dispense Refill   • apixaban (ELIQUIS) 5mg Tab Take 1 Tablet by mouth 2 times a day. 180 Tablet 3   • aspirin EC (ECOTRIN) 81 MG Tablet Delayed Response Take 1 Tablet by mouth every day. 90 Tablet 3   • ezetimibe (ZETIA) 10 MG Tab Take 1 tablet by mouth once daily 90 Tablet 3   • lisinopril (PRINIVIL) 5 MG Tab Take 1 tablet by mouth every day. 90 tablet 3   • carvedilol (COREG) 3.125 MG Tab Take 1 tablet by mouth 2 times a day with meals. 180 tablet 3   • bumetanide (BUMEX) 1 MG Tab Take 2 tablets by mouth twice daily 360 Tab 1   • Silodosin 4 MG Cap Take 8 mg by mouth every day.     • Probiotic Product (PROBIOTIC PO) Take  by mouth.     • clomiPHENE (CLOMID) 50 MG tablet Take 50 mg by mouth 2X A WEEK.     • hydrocortisone 2.5 % Ointment Apply 1 Each to affected area(s) as needed.     • Calcium Citrate (CITRACAL PO) Take 1 Tab by mouth 2 Times a Day.     • fluticasone (FLONASE) 50 MCG/ACT nasal spray Use 2 spray(s) in each nostril once daily (Patient taking differently: 1 time daily as needed.) 48 g 3   • lactobacillus rhamnosus (CULTURELLE) Cap capsule Take 1 Cap by mouth every day.     • testosterone cypionate (DEPO-TESTOSTERONE) 200 MG/ML  Solution injection 50 mg by Intramuscular route every thursday.     • cyanocobalamin (VITAMIN B-12) 1000 MCG/ML Solution 1,000 mcg by Intramuscular route every thursday.     • coenzyme Q-10 30 MG capsule Take 60 mg by mouth 2 Times a Day.     • Garlic 1000 MG Cap Take 1,000 mg by mouth 2 Times a Day.     • glucosamine Sulfate 500 MG Cap Take 1,500 mg by mouth 2 Times a Day.     • vitamin e (VITAMIN E) 400 UNIT Cap Take 400 Units by mouth 2 times a day.     • omeprazole (PRILOSEC) 40 MG delayed-release capsule Take 40 mg by mouth 2 times a day.     • Non Formulary Request Take 1 Cap by mouth 2 Times a Day. Cholox (OTC)     • Cholecalciferol (VITAMIN D3) 2000 UNIT Cap Take 2,000 Units by mouth 2 Times a Day.     • Non Formulary Request Take 1 Cap by mouth 2 Times a Day. Domenic red (OTC)     • Non Formulary Request Take 1 Cap by mouth every day. Nitric OXIDE (OTC)     • Non Formulary Request Take 1 Cap by mouth every day. anit-oxidant (OTC)     • Zinc 50 MG Tab Take 50 mg by mouth 2 times a day.     • anastrozole (ARIMIDEX) 1 MG Tab Take 0.5 mg by mouth See Admin Instructions. On Tue and Sat     • gabapentin (NEURONTIN) 300 MG Cap Take 300 mg by mouth every 6 hours as needed. Indications: Neuropathic Pain     • glimepiride (AMARYL) 1 MG tablet Take 1 mg by mouth every morning.     • B Complex Vitamins (VITAMIN B COMPLEX PO) Take 1 Tab by mouth every day.     • Cranberry 300 MG Tab Take 300 mg by mouth every morning.     • L-Lysine 1000 MG Tab Take 1,000 mg by mouth every day.     • Ascorbic Acid (VITAMIN C) 1000 MG Tab Take 1,000 mg by mouth 2 Times a Day.     • Pancrelipase, Lip-Prot-Amyl, (ZENPEP) 48831-77628 units Cap DR Particles Zenpep 15,000 unit-47,000 unit-63,000 unit capsule,delayed release (Patient not taking: Reported on 1/25/2022)     • Ferrous Sulfate (IRON PO) Take  by mouth. (Patient not taking: Reported on 1/25/2022)     • acetaminophen (TYLENOL) 325 MG Tab Take 650 mg by mouth every four hours as needed.  "(Patient not taking: Reported on 1/25/2022)       No current facility-administered medications for this visit.       Physical Exam:  Vitals:    01/25/22 1035   BP: 108/60   BP Location: Left arm   Patient Position: Sitting   BP Cuff Size: Adult   Pulse: 63   Resp: 14   SpO2: 97%   Weight: 98.9 kg (218 lb)   Height: 1.854 m (6' 1\")     General appearance: NAD, conversant  HEENT: PERRL, neck is supple with FROM  Lungs: Clear to auscultation, normal respiratory effort  CV: irregular, no murmurs/rubs/gallops, no JVD  Abdomen: Soft, non-tender with normal bowel sounds  Extremities: No peripheral edema, no clubbing or cyanosis  Skin: No rash, lesions, or ulcers  Psych: Alert and oriented to person, place and time    Data:  Labs reviewed    Prior echo/stress reviewed:  LVEF 40%    EKG interpreted by me:  AF    Device interrogation showing persistent AF, <5% V pacing burden    Impression/Plan:  1. Persistent atrial fibrillation (HCC)     2. S/P TAVR (transcatheter aortic valve replacement)  EKG   3. Essential hypertension       -Early persistent fib  -We will schedule OLGA/DCCV next available    Denilson Steiner MD    "

## 2022-01-25 NOTE — TELEPHONE ENCOUNTER
Denilson Steiner M.D.  Kalie Pinzon, Med Ass't  Let's schedule OLGA/DCCV next available. No meds to hold.     Denilson

## 2022-01-25 NOTE — TELEPHONE ENCOUNTER
Patient scheduled for OLGA/CV w/anesthesia on 2-3-22 with Dr. French. Patient has been instructed to check in at 8:00 for 10:00 case time. Hold Amaryl am of. Message sent to josé miguel Sifuentes with [employer] will call me back for case information.

## 2022-02-01 ENCOUNTER — PRE-ADMISSION TESTING (OUTPATIENT)
Dept: ADMISSIONS | Facility: MEDICAL CENTER | Age: 82
End: 2022-02-01
Attending: INTERNAL MEDICINE
Payer: MEDICARE

## 2022-02-01 DIAGNOSIS — Z01.812 PRE-OPERATIVE LABORATORY EXAMINATION: ICD-10-CM

## 2022-02-01 LAB
ANION GAP SERPL CALC-SCNC: 11 MMOL/L (ref 7–16)
BUN SERPL-MCNC: 39 MG/DL (ref 8–22)
CALCIUM SERPL-MCNC: 9 MG/DL (ref 8.5–10.5)
CHLORIDE SERPL-SCNC: 104 MMOL/L (ref 96–112)
CO2 SERPL-SCNC: 23 MMOL/L (ref 20–33)
CREAT SERPL-MCNC: 1.93 MG/DL (ref 0.5–1.4)
ERYTHROCYTE [DISTWIDTH] IN BLOOD BY AUTOMATED COUNT: 53.9 FL (ref 35.9–50)
GLUCOSE SERPL-MCNC: 172 MG/DL (ref 65–99)
HCT VFR BLD AUTO: 52.4 % (ref 42–52)
HGB BLD-MCNC: 17.1 G/DL (ref 14–18)
INR PPP: 1.34 (ref 0.87–1.13)
MCH RBC QN AUTO: 30.7 PG (ref 27–33)
MCHC RBC AUTO-ENTMCNC: 32.6 G/DL (ref 33.7–35.3)
MCV RBC AUTO: 94.1 FL (ref 81.4–97.8)
PLATELET # BLD AUTO: 136 K/UL (ref 164–446)
PMV BLD AUTO: 10.2 FL (ref 9–12.9)
POTASSIUM SERPL-SCNC: 4.8 MMOL/L (ref 3.6–5.5)
PROTHROMBIN TIME: 16.1 SEC (ref 12–14.6)
RBC # BLD AUTO: 5.57 M/UL (ref 4.7–6.1)
SARS-COV+SARS-COV-2 AG RESP QL IA.RAPID: NOTDETECTED
SODIUM SERPL-SCNC: 138 MMOL/L (ref 135–145)
SPECIMEN SOURCE: NORMAL
WBC # BLD AUTO: 5.5 K/UL (ref 4.8–10.8)

## 2022-02-01 PROCEDURE — 80048 BASIC METABOLIC PNL TOTAL CA: CPT

## 2022-02-01 PROCEDURE — 85027 COMPLETE CBC AUTOMATED: CPT

## 2022-02-01 PROCEDURE — 36415 COLL VENOUS BLD VENIPUNCTURE: CPT

## 2022-02-01 PROCEDURE — 85610 PROTHROMBIN TIME: CPT

## 2022-02-01 PROCEDURE — 87426 SARSCOV CORONAVIRUS AG IA: CPT

## 2022-02-01 ASSESSMENT — FIBROSIS 4 INDEX: FIB4 SCORE: 2.56

## 2022-02-03 ENCOUNTER — HOSPITAL ENCOUNTER (OUTPATIENT)
Facility: MEDICAL CENTER | Age: 82
End: 2022-02-03
Attending: INTERNAL MEDICINE | Admitting: INTERNAL MEDICINE
Payer: MEDICARE

## 2022-02-03 ENCOUNTER — ANESTHESIA EVENT (OUTPATIENT)
Dept: CARDIOLOGY | Facility: MEDICAL CENTER | Age: 82
End: 2022-02-03
Payer: MEDICARE

## 2022-02-03 ENCOUNTER — APPOINTMENT (OUTPATIENT)
Dept: CARDIOLOGY | Facility: MEDICAL CENTER | Age: 82
End: 2022-02-03
Attending: INTERNAL MEDICINE
Payer: MEDICARE

## 2022-02-03 ENCOUNTER — ANESTHESIA (OUTPATIENT)
Dept: CARDIOLOGY | Facility: MEDICAL CENTER | Age: 82
End: 2022-02-03
Payer: MEDICARE

## 2022-02-03 VITALS
WEIGHT: 242.29 LBS | OXYGEN SATURATION: 96 % | SYSTOLIC BLOOD PRESSURE: 120 MMHG | HEIGHT: 73 IN | DIASTOLIC BLOOD PRESSURE: 65 MMHG | HEART RATE: 80 BPM | BODY MASS INDEX: 32.11 KG/M2 | TEMPERATURE: 96.8 F | RESPIRATION RATE: 16 BRPM

## 2022-02-03 DIAGNOSIS — I48.19 PERSISTENT ATRIAL FIBRILLATION (HCC): ICD-10-CM

## 2022-02-03 LAB
EKG IMPRESSION: NORMAL
EKG IMPRESSION: NORMAL
GLUCOSE BLD-MCNC: 150 MG/DL (ref 65–99)
INR PPP: 1.29 (ref 0.87–1.13)
LV EJECT FRACT  99904: 40
PROTHROMBIN TIME: 15.8 SEC (ref 12–14.6)

## 2022-02-03 PROCEDURE — 700105 HCHG RX REV CODE 258: Performed by: INTERNAL MEDICINE

## 2022-02-03 PROCEDURE — 700101 HCHG RX REV CODE 250: Performed by: ANESTHESIOLOGY

## 2022-02-03 PROCEDURE — 85610 PROTHROMBIN TIME: CPT

## 2022-02-03 PROCEDURE — 93312 ECHO TRANSESOPHAGEAL: CPT

## 2022-02-03 PROCEDURE — 93010 ELECTROCARDIOGRAM REPORT: CPT | Mod: 76,59 | Performed by: INTERNAL MEDICINE

## 2022-02-03 PROCEDURE — 92960 CARDIOVERSION ELECTRIC EXT: CPT

## 2022-02-03 PROCEDURE — 160002 HCHG RECOVERY MINUTES (STAT)

## 2022-02-03 PROCEDURE — 160035 HCHG PACU - 1ST 60 MINS PHASE I

## 2022-02-03 PROCEDURE — 700111 HCHG RX REV CODE 636 W/ 250 OVERRIDE (IP): Performed by: ANESTHESIOLOGY

## 2022-02-03 PROCEDURE — 93005 ELECTROCARDIOGRAM TRACING: CPT | Performed by: INTERNAL MEDICINE

## 2022-02-03 PROCEDURE — 93312 ECHO TRANSESOPHAGEAL: CPT | Performed by: INTERNAL MEDICINE

## 2022-02-03 PROCEDURE — 92960 CARDIOVERSION ELECTRIC EXT: CPT | Performed by: INTERNAL MEDICINE

## 2022-02-03 PROCEDURE — 160036 HCHG PACU - EA ADDL 30 MINS PHASE I

## 2022-02-03 PROCEDURE — 93320 DOPPLER ECHO COMPLETE: CPT | Performed by: INTERNAL MEDICINE

## 2022-02-03 PROCEDURE — 76376 3D RENDER W/INTRP POSTPROCES: CPT | Performed by: INTERNAL MEDICINE

## 2022-02-03 PROCEDURE — 82962 GLUCOSE BLOOD TEST: CPT

## 2022-02-03 PROCEDURE — 160046 HCHG PACU - 1ST 60 MINS PHASE II

## 2022-02-03 RX ORDER — ONDANSETRON 2 MG/ML
4 INJECTION INTRAMUSCULAR; INTRAVENOUS
Status: DISCONTINUED | OUTPATIENT
Start: 2022-02-03 | End: 2022-02-03 | Stop reason: HOSPADM

## 2022-02-03 RX ORDER — METOPROLOL TARTRATE 1 MG/ML
1 INJECTION, SOLUTION INTRAVENOUS
Status: DISCONTINUED | OUTPATIENT
Start: 2022-02-03 | End: 2022-02-03 | Stop reason: HOSPADM

## 2022-02-03 RX ORDER — HYDROMORPHONE HYDROCHLORIDE 1 MG/ML
0.1 INJECTION, SOLUTION INTRAMUSCULAR; INTRAVENOUS; SUBCUTANEOUS
Status: DISCONTINUED | OUTPATIENT
Start: 2022-02-03 | End: 2022-02-03 | Stop reason: HOSPADM

## 2022-02-03 RX ORDER — HALOPERIDOL 5 MG/ML
1 INJECTION INTRAMUSCULAR
Status: DISCONTINUED | OUTPATIENT
Start: 2022-02-03 | End: 2022-02-03 | Stop reason: HOSPADM

## 2022-02-03 RX ORDER — SODIUM CHLORIDE, SODIUM LACTATE, POTASSIUM CHLORIDE, CALCIUM CHLORIDE 600; 310; 30; 20 MG/100ML; MG/100ML; MG/100ML; MG/100ML
INJECTION, SOLUTION INTRAVENOUS CONTINUOUS
Status: DISCONTINUED | OUTPATIENT
Start: 2022-02-03 | End: 2022-02-03 | Stop reason: HOSPADM

## 2022-02-03 RX ORDER — DIPHENHYDRAMINE HYDROCHLORIDE 50 MG/ML
12.5 INJECTION INTRAMUSCULAR; INTRAVENOUS
Status: DISCONTINUED | OUTPATIENT
Start: 2022-02-03 | End: 2022-02-03 | Stop reason: HOSPADM

## 2022-02-03 RX ORDER — OXYCODONE HCL 5 MG/5 ML
5 SOLUTION, ORAL ORAL
Status: DISCONTINUED | OUTPATIENT
Start: 2022-02-03 | End: 2022-02-03 | Stop reason: HOSPADM

## 2022-02-03 RX ORDER — KETAMINE HYDROCHLORIDE 50 MG/ML
INJECTION, SOLUTION INTRAMUSCULAR; INTRAVENOUS PRN
Status: DISCONTINUED | OUTPATIENT
Start: 2022-02-03 | End: 2022-02-03 | Stop reason: SURG

## 2022-02-03 RX ORDER — HYDROMORPHONE HYDROCHLORIDE 1 MG/ML
0.2 INJECTION, SOLUTION INTRAMUSCULAR; INTRAVENOUS; SUBCUTANEOUS
Status: DISCONTINUED | OUTPATIENT
Start: 2022-02-03 | End: 2022-02-03 | Stop reason: HOSPADM

## 2022-02-03 RX ORDER — LIDOCAINE HYDROCHLORIDE 20 MG/ML
INJECTION, SOLUTION EPIDURAL; INFILTRATION; INTRACAUDAL; PERINEURAL PRN
Status: DISCONTINUED | OUTPATIENT
Start: 2022-02-03 | End: 2022-02-03 | Stop reason: SURG

## 2022-02-03 RX ORDER — HYDRALAZINE HYDROCHLORIDE 20 MG/ML
5 INJECTION INTRAMUSCULAR; INTRAVENOUS
Status: DISCONTINUED | OUTPATIENT
Start: 2022-02-03 | End: 2022-02-03 | Stop reason: HOSPADM

## 2022-02-03 RX ORDER — HYDROMORPHONE HYDROCHLORIDE 1 MG/ML
0.4 INJECTION, SOLUTION INTRAMUSCULAR; INTRAVENOUS; SUBCUTANEOUS
Status: DISCONTINUED | OUTPATIENT
Start: 2022-02-03 | End: 2022-02-03 | Stop reason: HOSPADM

## 2022-02-03 RX ORDER — OXYCODONE HCL 5 MG/5 ML
10 SOLUTION, ORAL ORAL
Status: DISCONTINUED | OUTPATIENT
Start: 2022-02-03 | End: 2022-02-03 | Stop reason: HOSPADM

## 2022-02-03 RX ORDER — LABETALOL HYDROCHLORIDE 5 MG/ML
5 INJECTION, SOLUTION INTRAVENOUS
Status: DISCONTINUED | OUTPATIENT
Start: 2022-02-03 | End: 2022-02-03 | Stop reason: HOSPADM

## 2022-02-03 RX ADMIN — PROPOFOL 50 MG: 10 INJECTION, EMULSION INTRAVENOUS at 10:31

## 2022-02-03 RX ADMIN — PROPOFOL 25 MG: 10 INJECTION, EMULSION INTRAVENOUS at 10:34

## 2022-02-03 RX ADMIN — KETAMINE HYDROCHLORIDE 25 MG: 50 INJECTION INTRAMUSCULAR; INTRAVENOUS at 10:32

## 2022-02-03 RX ADMIN — SODIUM CHLORIDE, POTASSIUM CHLORIDE, SODIUM LACTATE AND CALCIUM CHLORIDE: 600; 310; 30; 20 INJECTION, SOLUTION INTRAVENOUS at 10:22

## 2022-02-03 RX ADMIN — LIDOCAINE HYDROCHLORIDE 80 MG: 20 INJECTION, SOLUTION EPIDURAL; INFILTRATION; INTRACAUDAL at 10:32

## 2022-02-03 ASSESSMENT — PAIN DESCRIPTION - PAIN TYPE
TYPE: ACUTE PAIN
TYPE: SURGICAL PAIN
TYPE: ACUTE PAIN
TYPE: CHRONIC PAIN

## 2022-02-03 ASSESSMENT — PAIN SCALES - GENERAL: PAIN_LEVEL: 0

## 2022-02-03 ASSESSMENT — FIBROSIS 4 INDEX: FIB4 SCORE: 3.27

## 2022-02-03 NOTE — ANESTHESIA TIME REPORT
Anesthesia Start and Stop Event Times     Date Time Event    2/3/2022 1022 Anesthesia Start     1053 Anesthesia Stop        Responsible Staff  02/03/22    Name Role Begin End    Dougie Harris M.D. Anesth 1022 1053        Preop Diagnosis (Free Text):  Pre-op Diagnosis             Preop Diagnosis (Codes):    Premium Reason  Non-Premium    Comments:

## 2022-02-03 NOTE — DISCHARGE INSTRUCTIONS
ACTIVITY: Rest and take it easy for the first 24 hours.  A responsible adult is recommended to remain with you during that time.  It is normal to feel sleepy.  We encourage you to not do anything that requires balance, judgment or coordination.    MILD FLU-LIKE SYMPTOMS ARE NORMAL. YOU MAY EXPERIENCE GENERALIZED MUSCLE ACHES, THROAT IRRITATION, HEADACHE AND/OR SOME NAUSEA.    FOR 24 HOURS DO NOT:  Drive, operate machinery or run household appliances.  Drink beer or alcoholic beverages.   Make important decisions or sign legal documents.    SPECIAL INSTRUCTIONS: ENDOSCOPY HOME CARE INSTRUCTIONS    OLGA - TRANSESOPHAGEAL ECHOCARDIOGRAM  1. Don't drive or drink alcohol for 24 hours. The medication you received will make you too drowsy.  2. If you begin to vomit bloody material, or develop black or bloody stools, call your doctor as soon as possible.  3. If you have any neck, chest, abdominal pain or temp of 100 degrees, call your doctor.  4. See your doctor Gillian     You should call 911 if you develop problems with breathing or chest pain.  If any questions arise, call your doctor. If your doctor is not available, please feel free to call [phone]. You can also call the [employer] HOTLINE open 24 hours/day, 7 days/week and speak to a nurse at [phone], or toll free [phone].    I acknowledge receipt and understanding of these Home Care Instructions.    Electrical Cardioversion, Care After  This sheet gives you information about how to care for yourself after your procedure. Your health care provider may also give you more specific instructions. If you have problems or questions, contact your health care provider.  What can I expect after the procedure?  After the procedure, it is common to have:  · Some redness on the skin where the shocks were given.  Follow these instructions at home:    · Do not drive for 24 hours if you were given a medicine to help you relax (sedative).  · Take over-the-counter  and prescription medicines only as told by your health care provider.  · Ask your health care provider how to check your pulse. Check it often.  · Rest for 48 hours after the procedure or as told by your health care provider.  · Avoid or limit your caffeine use as told by your health care provider.  Contact a health care provider if:  · You feel like your heart is beating too quickly or your pulse is not regular.  · You have a serious muscle cramp that does not go away.  Get help right away if:    · You have discomfort in your chest.  · You are dizzy or you feel faint.  · You have trouble breathing or you are short of breath.  · Your speech is slurred.  · You have trouble moving an arm or leg on one side of your body.  · Your fingers or toes turn cold or blue.  This information is not intended to replace advice given to you by your health care provider. Make sure you discuss any questions you have with your health care provider.  Document Released: 10/08/2014 Document Revised: 11/30/2018 Document Reviewed: 06/23/2017  [employer] Patient Education © 2020 [employer]    DIET: To avoid nausea, slowly advance diet as tolerated, avoiding spicy or greasy foods for the first day.  Add more substantial food to your diet according to your physician's instructions.  Babies can be fed formula or breast milk as soon as they are hungry.  INCREASE FLUIDS AND FIBER TO AVOID CONSTIPATION.    SURGICAL DRESSING/BATHING: You may return to your usual bathing routine.    FOLLOW-UP APPOINTMENT:  A follow-up appointment should be arranged with your doctor in 1-2 Weeks; call to schedule.    You should CALL YOUR PHYSICIAN if you develop:  Fever greater than 101 degrees F.  Pain not relieved by medication, or persistent nausea or vomiting.  Excessive bleeding (blood soaking through dressing) or unexpected drainage from the wound.  Extreme redness or swelling around the incision site, drainage of pus or foul smelling drainage.  Inability to  urinate or empty your bladder within 8 hours.  Problems with breathing or chest pain.    You should call 911 if you develop problems with breathing or chest pain.  If you are unable to contact your doctor or surgical center, you should go to the nearest emergency room or urgent care center.      Physician's telephone #: [phone] Dr. French    If any questions arise, call your doctor.  If your doctor is not available, please feel free to call the Surgical Center at [phone].     A registered nurse may call you a few days after your surgery to see how you are doing after your procedure.    MEDICATIONS: Resume taking daily medication.  Take prescribed pain medication with food.  If no medication is prescribed, you may take non-aspirin pain medication if needed.  PAIN MEDICATION CAN BE VERY CONSTIPATING.  Take a stool softener or laxative such as senokot, pericolace, or milk of magnesia if needed.        If your physician has prescribed pain medication that includes Acetaminophen (Tylenol), do not take additional Acetaminophen (Tylenol) while taking the prescribed medication.    Depression / Suicide Risk    As you are discharged from this [facility] facility, it is important to learn how to keep safe from harming yourself.    Recognize the warning signs:  · Abrupt changes in personality, positive or negative- including increase in energy   · Giving away possessions  · Change in eating patterns- significant weight changes-  positive or negative  · Change in sleeping patterns- unable to sleep or sleeping all the time   · Unwillingness or inability to communicate  · Depression  · Unusual sadness, discouragement and loneliness  · Talk of wanting to die  · Neglect of personal appearance   · Rebelliousness- reckless behavior  · Withdrawal from people/activities they love  · Confusion- inability to concentrate     If you or a loved one observes any of these behaviors or has concerns about self-harm, here's  what you can do:  · Talk about it- your feelings and reasons for harming yourself  · Remove any means that you might use to hurt yourself (examples: pills, rope, extension cords, firearm)  · Get professional help from the community (Mental Health, Substance Abuse, psychological counseling)  · Do not be alone:Call your Safe Contact- someone whom you trust who will be there for you.  · Call your local CRISIS HOTLINE 835-2785 or 488-331-6152  · Call your local Children's Mobile Crisis Response Team [place] (049) 766-1936 or www.[employer] [facility]  · Call the toll free National Suicide Prevention Hotlines   · National Suicide Prevention Lifeline 235-377-MKUE (6132)  · National Hope Line Network 800-SUICIDE (453-5210)

## 2022-02-03 NOTE — OR NURSING
1048 Pt arrived from Cath Lab via gurney. Report given by anesthesia and RN. Awake, oriented x4. 6L mask, even non labored breathing, lungs clear, airway patent. 100% v paced. Skin pink warm dry. PIV patent. DENIES pain, nausea, sob, chest pain, numbness tingling. States L side weakness, baseline.     1100 EKG at bedside.     1121 resting comfortably, even non labored breathing, face relaxed, skin pink warm dry.     1200 no changes. Awake, alert, DENIES pain, nausea, sob, chest pain.     1230 eating otter pop    1300 report given to Paulino HERNANDEZ, phase 2, rm 31. VSS pt ready for transfer to phase 2. Pt has personal belongings.

## 2022-02-03 NOTE — ANESTHESIA POSTPROCEDURE EVALUATION
Patient: LIAN More III    Procedure Summary     Date: 02/03/22 Room / Location: [facility] ECHOCARDIOLOGY [facility]    Anesthesia Start: 1022 Anesthesia Stop: 1053    Procedures:       CL-CARDIOVERSION      EC-OLGA W/O CONT Diagnosis:       Persistent atrial fibrillation (HCC)      Other persistent atrial fibrillation      (See Associated Dx)    Scheduled Providers: Jamel French M.D.; Sae Shultz M.D. Responsible Provider: Dougie Harris M.D.    Anesthesia Type: MAC ASA Status: 3          Final Anesthesia Type: MAC  Last vitals  BP   Blood Pressure : 117/68    Temp   36.8 °C (98.2 °F)    Pulse   67   Resp   20    SpO2   95 %      Anesthesia Post Evaluation    Patient location during evaluation: PACU  Patient participation: complete - patient participated  Level of consciousness: awake and alert  Pain score: 0    Airway patency: patent  Anesthetic complications: no  Cardiovascular status: hemodynamically stable  Respiratory status: acceptable  Hydration status: euvolemic    PONV: none          No complications documented.     Nurse Pain Score: 3 (NPRS)

## 2022-02-03 NOTE — ANESTHESIA PREPROCEDURE EVALUATION
Date/Time: 02/03/22 1000    Scheduled providers: Jamel French M.D.; Sae Shultz M.D.    Procedures:       CL-CARDIOVERSION      EC-OLGA W/ CONT    Diagnosis:       Persistent atrial fibrillation (HCC) [I48.19]      Other persistent atrial fibrillation [I48.19]    Indications: See Associated Dx    Location: [facility] - ECHOCARDIOLOGY [facility]      80 yo male with multiple medical problems    P Med Hx:  Ulcer  Cataract  Hiatus hernia syndrome  Hypertension  Heart murmur  Heart valve disease  Pneumonia  Hyperlipidemia  Congestive heart failure (HCC)  CAD (coronary artery disease)  Coronary heart disease  Fatigue  Hearing difficulty  Ringing in ears  Rhinitis  Chest tightness  Palpitations  Swelling of lower extremity  Difficulty breathing  Cough  Daytime sleepiness  Constipation  Chickenpox  Mumps  Tonsillitis  Severe aortic stenosis  Dilated cardiomyopathy (HCC)  Diabetes  Pacemaker  GERD (gastroesophageal reflux disease)  Sleep apnea  Tremor  Kidney disease  Persistent atrial fibrillation (HCC)  Arthritis  Urinary incontinence  History of BPH  Bowel habit changes  History of chronic cough  Breath shortness  Oxygen dependent      Relevant Problems   ANESTHESIA   (positive) JAMES (obstructive sleep apnea)      CARDIAC   (positive) Coronary artery disease due to calcified coronary lesion   (positive) Essential hypertension      GI   (positive) Gastroesophageal reflux disease without esophagitis         (positive) CKD (chronic kidney disease)   (positive) Renal hematoma, left, initial encounter      ENDO   (positive) Type 2 diabetes mellitus (HCC)   (positive) Type 2 diabetes mellitus with hyperglycemia, without long-term current use of insulin (HCC)      Other   (positive) Arthritis of neck       Physical Exam    Airway   Mallampati: III  TM distance: >3 FB  Neck ROM: full       Cardiovascular - normal exam  Rhythm: regular  Rate: normal  (-) murmur     Dental - normal exam            Pulmonary - normal exam  Breath sounds clear to auscultation     Abdominal    Neurological - normal exam                 Anesthesia Plan    ASA 3       Plan - MAC               Induction: intravenous      Pertinent diagnostic labs and testing reviewed    Informed Consent:    Anesthetic plan and risks discussed with patient.    Use of blood products discussed with: patient whom consented to blood products.

## 2022-02-04 NOTE — PROCEDURES
Procedure performed: External DC Cardioversion    Assistant: None    Anesthesia: Per anesthesia services    Indication: Atrial fibrillation    Preprocedural Diagnosis:   Atrial Fibrillation  Postprocedural Diagnosis: Sinus Rhythm    Description of procedure:    Patient was brought to the pre/post procedure area of the cath lab. Informed consent was obtained. Defibrillator pads were placed in the anterior and posterior position.  Adequate sedation was obtained with assistance of anesthesia. A OLGA performed confirmed that there was NO left atrial thrombus. Patient was successfully cardioverted with 200 J synchronized biphasic energy into sinus rhythm. he was monitored in the recovery area until criteria met and will be discharged home.    Conclusion: Successful DC cardioversion    EBL: None    Complications: None      Electronically signed:   Jamel French  Interventional Cardiologist [facility]

## 2022-02-08 LAB — EKG IMPRESSION: NORMAL

## 2022-02-08 PROCEDURE — 93000 ELECTROCARDIOGRAM COMPLETE: CPT | Performed by: INTERNAL MEDICINE

## 2022-02-10 ENCOUNTER — TELEPHONE (OUTPATIENT)
Dept: CARDIOLOGY | Facility: MEDICAL CENTER | Age: 82
End: 2022-02-10

## 2022-02-10 NOTE — TELEPHONE ENCOUNTER
SS    Pt would like a call back to discuss a difibulator implant.      - [phone]    Thank you,   Nhung LOPEZ

## 2022-02-11 NOTE — TELEPHONE ENCOUNTER
S/w pt who states he read about the watchman device and wondered if this would be something that could be done to treat his a-fib. Educated pt on the indication for a watchman and the function of this device. Pt states he was previously having problems with nose bleeds on eliquis and ASA but this currently has not been an issue. Discussed possible next steps to treat his a-fib. Pt encouraged to discuss further with Dr. Steiner next office visit. Pt appreciated.

## 2022-02-14 ENCOUNTER — TELEPHONE (OUTPATIENT)
Dept: CARDIOLOGY | Facility: MEDICAL CENTER | Age: 82
End: 2022-02-14

## 2022-02-14 ENCOUNTER — APPOINTMENT (OUTPATIENT)
Dept: RADIOLOGY | Facility: MEDICAL CENTER | Age: 82
DRG: 291 | End: 2022-02-14
Attending: EMERGENCY MEDICINE
Payer: MEDICARE

## 2022-02-14 ENCOUNTER — HOSPITAL ENCOUNTER (INPATIENT)
Facility: MEDICAL CENTER | Age: 82
LOS: 1 days | DRG: 291 | End: 2022-02-15
Attending: EMERGENCY MEDICINE | Admitting: HOSPITALIST
Payer: MEDICARE

## 2022-02-14 DIAGNOSIS — I50.21 ACUTE SYSTOLIC (CONGESTIVE) HEART FAILURE (HCC): ICD-10-CM

## 2022-02-14 DIAGNOSIS — I50.9 ACUTE ON CHRONIC CONGESTIVE HEART FAILURE, UNSPECIFIED HEART FAILURE TYPE (HCC): ICD-10-CM

## 2022-02-14 PROBLEM — I48.11 LONGSTANDING PERSISTENT ATRIAL FIBRILLATION (HCC): Status: ACTIVE | Noted: 2022-02-14

## 2022-02-14 LAB
ALBUMIN SERPL BCP-MCNC: 4.1 G/DL (ref 3.2–4.9)
ALBUMIN/GLOB SERPL: 1.6 G/DL
ALP SERPL-CCNC: 57 U/L (ref 30–99)
ALT SERPL-CCNC: 27 U/L (ref 2–50)
ANION GAP SERPL CALC-SCNC: 10 MMOL/L (ref 7–16)
AST SERPL-CCNC: 35 U/L (ref 12–45)
BASOPHILS # BLD AUTO: 0.4 % (ref 0–1.8)
BASOPHILS # BLD: 0.03 K/UL (ref 0–0.12)
BILIRUB SERPL-MCNC: 0.5 MG/DL (ref 0.1–1.5)
BUN SERPL-MCNC: 56 MG/DL (ref 8–22)
CALCIUM SERPL-MCNC: 9 MG/DL (ref 8.4–10.2)
CHLORIDE SERPL-SCNC: 105 MMOL/L (ref 96–112)
CO2 SERPL-SCNC: 23 MMOL/L (ref 20–33)
CREAT SERPL-MCNC: 1.97 MG/DL (ref 0.5–1.4)
EKG IMPRESSION: NORMAL
EOSINOPHIL # BLD AUTO: 0.23 K/UL (ref 0–0.51)
EOSINOPHIL NFR BLD: 3 % (ref 0–6.9)
ERYTHROCYTE [DISTWIDTH] IN BLOOD BY AUTOMATED COUNT: 52.5 FL (ref 35.9–50)
GLOBULIN SER CALC-MCNC: 2.5 G/DL (ref 1.9–3.5)
GLUCOSE BLD-MCNC: 105 MG/DL (ref 65–99)
GLUCOSE SERPL-MCNC: 154 MG/DL (ref 65–99)
HCT VFR BLD AUTO: 54.2 % (ref 42–52)
HGB BLD-MCNC: 17.6 G/DL (ref 14–18)
IMM GRANULOCYTES # BLD AUTO: 0.05 K/UL (ref 0–0.11)
IMM GRANULOCYTES NFR BLD AUTO: 0.6 % (ref 0–0.9)
LYMPHOCYTES # BLD AUTO: 1.6 K/UL (ref 1–4.8)
LYMPHOCYTES NFR BLD: 20.6 % (ref 22–41)
MCH RBC QN AUTO: 30.3 PG (ref 27–33)
MCHC RBC AUTO-ENTMCNC: 32.5 G/DL (ref 33.7–35.3)
MCV RBC AUTO: 93.4 FL (ref 81.4–97.8)
MONOCYTES # BLD AUTO: 0.77 K/UL (ref 0–0.85)
MONOCYTES NFR BLD AUTO: 9.9 % (ref 0–13.4)
NEUTROPHILS # BLD AUTO: 5.07 K/UL (ref 1.82–7.42)
NEUTROPHILS NFR BLD: 65.5 % (ref 44–72)
NRBC # BLD AUTO: 0 K/UL
NRBC BLD-RTO: 0 /100 WBC
NT-PROBNP SERPL IA-MCNC: 1182 PG/ML (ref 0–125)
PLATELET # BLD AUTO: 148 K/UL (ref 164–446)
PMV BLD AUTO: 10.3 FL (ref 9–12.9)
POTASSIUM SERPL-SCNC: 4.4 MMOL/L (ref 3.6–5.5)
PROT SERPL-MCNC: 6.6 G/DL (ref 6–8.2)
RBC # BLD AUTO: 5.8 M/UL (ref 4.7–6.1)
SODIUM SERPL-SCNC: 138 MMOL/L (ref 135–145)
TROPONIN T SERPL-MCNC: 45 NG/L (ref 6–19)
WBC # BLD AUTO: 7.8 K/UL (ref 4.8–10.8)

## 2022-02-14 PROCEDURE — 71045 X-RAY EXAM CHEST 1 VIEW: CPT

## 2022-02-14 PROCEDURE — 84484 ASSAY OF TROPONIN QUANT: CPT

## 2022-02-14 PROCEDURE — 770020 HCHG ROOM/CARE - TELE (206)

## 2022-02-14 PROCEDURE — 85025 COMPLETE CBC W/AUTO DIFF WBC: CPT

## 2022-02-14 PROCEDURE — 36415 COLL VENOUS BLD VENIPUNCTURE: CPT

## 2022-02-14 PROCEDURE — 80053 COMPREHEN METABOLIC PANEL: CPT

## 2022-02-14 PROCEDURE — A9270 NON-COVERED ITEM OR SERVICE: HCPCS | Performed by: HOSPITALIST

## 2022-02-14 PROCEDURE — 700111 HCHG RX REV CODE 636 W/ 250 OVERRIDE (IP): Performed by: EMERGENCY MEDICINE

## 2022-02-14 PROCEDURE — 83880 ASSAY OF NATRIURETIC PEPTIDE: CPT

## 2022-02-14 PROCEDURE — 96374 THER/PROPH/DIAG INJ IV PUSH: CPT

## 2022-02-14 PROCEDURE — 99285 EMERGENCY DEPT VISIT HI MDM: CPT

## 2022-02-14 PROCEDURE — 99223 1ST HOSP IP/OBS HIGH 75: CPT | Mod: AI | Performed by: HOSPITALIST

## 2022-02-14 PROCEDURE — 700102 HCHG RX REV CODE 250 W/ 637 OVERRIDE(OP): Performed by: HOSPITALIST

## 2022-02-14 PROCEDURE — 93005 ELECTROCARDIOGRAM TRACING: CPT | Performed by: EMERGENCY MEDICINE

## 2022-02-14 PROCEDURE — 82962 GLUCOSE BLOOD TEST: CPT

## 2022-02-14 RX ORDER — DEXTROSE MONOHYDRATE 25 G/50ML
50 INJECTION, SOLUTION INTRAVENOUS
Status: DISCONTINUED | OUTPATIENT
Start: 2022-02-14 | End: 2022-02-15 | Stop reason: HOSPADM

## 2022-02-14 RX ORDER — CARVEDILOL 3.12 MG/1
3.12 TABLET ORAL 2 TIMES DAILY WITH MEALS
Status: DISCONTINUED | OUTPATIENT
Start: 2022-02-14 | End: 2022-02-15 | Stop reason: HOSPADM

## 2022-02-14 RX ORDER — EZETIMIBE 10 MG/1
10 TABLET ORAL DAILY
Status: DISCONTINUED | OUTPATIENT
Start: 2022-02-15 | End: 2022-02-15 | Stop reason: HOSPADM

## 2022-02-14 RX ORDER — ISOSORBIDE DINITRATE 10 MG/1
10 TABLET ORAL EVERY 8 HOURS
Status: DISCONTINUED | OUTPATIENT
Start: 2022-02-14 | End: 2022-02-15 | Stop reason: HOSPADM

## 2022-02-14 RX ORDER — ASCORBIC ACID 500 MG
1000 TABLET ORAL 2 TIMES DAILY
Status: DISCONTINUED | OUTPATIENT
Start: 2022-02-14 | End: 2022-02-15 | Stop reason: HOSPADM

## 2022-02-14 RX ORDER — BISACODYL 10 MG
10 SUPPOSITORY, RECTAL RECTAL
Status: DISCONTINUED | OUTPATIENT
Start: 2022-02-14 | End: 2022-02-15 | Stop reason: HOSPADM

## 2022-02-14 RX ORDER — ONDANSETRON 2 MG/ML
4 INJECTION INTRAMUSCULAR; INTRAVENOUS EVERY 4 HOURS PRN
Status: DISCONTINUED | OUTPATIENT
Start: 2022-02-14 | End: 2022-02-15 | Stop reason: HOSPADM

## 2022-02-14 RX ORDER — LISINOPRIL 5 MG/1
5 TABLET ORAL EVERY EVENING
Status: DISCONTINUED | OUTPATIENT
Start: 2022-02-14 | End: 2022-02-15 | Stop reason: HOSPADM

## 2022-02-14 RX ORDER — FLUTICASONE PROPIONATE 50 MCG
2 SPRAY, SUSPENSION (ML) NASAL
Status: DISCONTINUED | OUTPATIENT
Start: 2022-02-14 | End: 2022-02-15 | Stop reason: HOSPADM

## 2022-02-14 RX ORDER — OMEPRAZOLE 20 MG/1
40 CAPSULE, DELAYED RELEASE ORAL 2 TIMES DAILY
Status: DISCONTINUED | OUTPATIENT
Start: 2022-02-14 | End: 2022-02-15 | Stop reason: HOSPADM

## 2022-02-14 RX ORDER — AMOXICILLIN 250 MG
2 CAPSULE ORAL 2 TIMES DAILY
Status: DISCONTINUED | OUTPATIENT
Start: 2022-02-15 | End: 2022-02-15 | Stop reason: HOSPADM

## 2022-02-14 RX ORDER — HYDRALAZINE HYDROCHLORIDE 25 MG/1
50 TABLET, FILM COATED ORAL EVERY 6 HOURS
Status: DISCONTINUED | OUTPATIENT
Start: 2022-02-14 | End: 2022-02-15

## 2022-02-14 RX ORDER — ANASTROZOLE 1 MG/1
0.5 TABLET ORAL
Status: DISCONTINUED | OUTPATIENT
Start: 2022-02-15 | End: 2022-02-15 | Stop reason: HOSPADM

## 2022-02-14 RX ORDER — FUROSEMIDE 10 MG/ML
40 INJECTION INTRAMUSCULAR; INTRAVENOUS ONCE
Status: COMPLETED | OUTPATIENT
Start: 2022-02-14 | End: 2022-02-14

## 2022-02-14 RX ORDER — POTASSIUM CHLORIDE 20 MEQ/1
20 TABLET, EXTENDED RELEASE ORAL 2 TIMES DAILY
Status: DISCONTINUED | OUTPATIENT
Start: 2022-02-14 | End: 2022-02-15 | Stop reason: HOSPADM

## 2022-02-14 RX ORDER — POLYETHYLENE GLYCOL 3350 17 G/17G
1 POWDER, FOR SOLUTION ORAL
Status: DISCONTINUED | OUTPATIENT
Start: 2022-02-14 | End: 2022-02-15 | Stop reason: HOSPADM

## 2022-02-14 RX ORDER — FUROSEMIDE 10 MG/ML
80 INJECTION INTRAMUSCULAR; INTRAVENOUS
Status: DISCONTINUED | OUTPATIENT
Start: 2022-02-14 | End: 2022-02-14

## 2022-02-14 RX ORDER — ONDANSETRON 4 MG/1
4 TABLET, ORALLY DISINTEGRATING ORAL EVERY 4 HOURS PRN
Status: DISCONTINUED | OUTPATIENT
Start: 2022-02-14 | End: 2022-02-15 | Stop reason: HOSPADM

## 2022-02-14 RX ORDER — FUROSEMIDE 10 MG/ML
80 INJECTION INTRAMUSCULAR; INTRAVENOUS
Status: DISCONTINUED | OUTPATIENT
Start: 2022-02-15 | End: 2022-02-15

## 2022-02-14 RX ORDER — GLIMEPIRIDE 2 MG/1
1 TABLET ORAL
Status: DISCONTINUED | OUTPATIENT
Start: 2022-02-14 | End: 2022-02-15 | Stop reason: HOSPADM

## 2022-02-14 RX ADMIN — FUROSEMIDE 40 MG: 10 INJECTION, SOLUTION INTRAVENOUS at 18:41

## 2022-02-14 RX ADMIN — ISOSORBIDE DINITRATE 10 MG: 10 TABLET ORAL at 20:32

## 2022-02-14 RX ADMIN — Medication 60 MG: at 22:02

## 2022-02-14 RX ADMIN — GLIMEPIRIDE 1 MG: 2 TABLET ORAL at 22:01

## 2022-02-14 RX ADMIN — HYDRALAZINE HYDROCHLORIDE 50 MG: 25 TABLET, FILM COATED ORAL at 20:32

## 2022-02-14 RX ADMIN — POTASSIUM CHLORIDE 20 MEQ: 1500 TABLET, EXTENDED RELEASE ORAL at 20:32

## 2022-02-14 RX ADMIN — OMEPRAZOLE 40 MG: 20 CAPSULE, DELAYED RELEASE ORAL at 22:01

## 2022-02-14 RX ADMIN — OXYCODONE HYDROCHLORIDE AND ACETAMINOPHEN 1000 MG: 500 TABLET ORAL at 22:00

## 2022-02-14 RX ADMIN — CARVEDILOL 3.12 MG: 6.25 TABLET, FILM COATED ORAL at 20:31

## 2022-02-14 RX ADMIN — APIXABAN 5 MG: 5 TABLET, FILM COATED ORAL at 22:01

## 2022-02-14 RX ADMIN — LISINOPRIL 5 MG: 5 TABLET ORAL at 22:01

## 2022-02-14 ASSESSMENT — ENCOUNTER SYMPTOMS
PND: 0
DEPRESSION: 0
BACK PAIN: 0
EYE DISCHARGE: 0
BLOOD IN STOOL: 0
SENSORY CHANGE: 0
FLANK PAIN: 0
EYE REDNESS: 0
COUGH: 0
FEVER: 0
PHOTOPHOBIA: 0
DOUBLE VISION: 0
MUSCULOSKELETAL NEGATIVE: 1
SHORTNESS OF BREATH: 1
FOCAL WEAKNESS: 0
NEUROLOGICAL NEGATIVE: 1
BRUISES/BLEEDS EASILY: 0
PSYCHIATRIC NEGATIVE: 1
DIZZINESS: 0
POLYDIPSIA: 0
SEIZURES: 0
STRIDOR: 0
TINGLING: 0
HEARTBURN: 0
GASTROINTESTINAL NEGATIVE: 1
SINUS PAIN: 0
MYALGIAS: 0
ABDOMINAL PAIN: 0
DIAPHORESIS: 0
WHEEZING: 0
EYES NEGATIVE: 1
ORTHOPNEA: 0
INSOMNIA: 0
FALLS: 0
CHILLS: 0
SPUTUM PRODUCTION: 0

## 2022-02-14 ASSESSMENT — COGNITIVE AND FUNCTIONAL STATUS - GENERAL
DAILY ACTIVITIY SCORE: 24
MOBILITY SCORE: 24
SUGGESTED CMS G CODE MODIFIER MOBILITY: CH
SUGGESTED CMS G CODE MODIFIER DAILY ACTIVITY: CH

## 2022-02-14 ASSESSMENT — CHA2DS2 SCORE
SEX: MALE
VASCULAR DISEASE: YES
DIABETES: YES
CHF OR LEFT VENTRICULAR DYSFUNCTION: YES
AGE 65 TO 74: NO
CHA2DS2 VASC SCORE: 6
AGE 75 OR GREATER: YES
HYPERTENSION: YES
PRIOR STROKE OR TIA OR THROMBOEMBOLISM: NO

## 2022-02-14 ASSESSMENT — FIBROSIS 4 INDEX: FIB4 SCORE: 3.27

## 2022-02-14 ASSESSMENT — LIFESTYLE VARIABLES
DO YOU DRINK ALCOHOL: NO
SUBSTANCE_ABUSE: 0

## 2022-02-14 NOTE — TELEPHONE ENCOUNTER
"JARED Parsons,    This patient called to state he left leg is extremely swollen and looks like an \"elephant\" leg. He is concerned about it possibly being a blood clot and if you can call him back at [phone].      Thank you,    CLARA  "

## 2022-02-15 ENCOUNTER — PATIENT OUTREACH (OUTPATIENT)
Dept: HEALTH INFORMATION MANAGEMENT | Facility: OTHER | Age: 82
End: 2022-02-15

## 2022-02-15 ENCOUNTER — APPOINTMENT (OUTPATIENT)
Dept: CARDIOLOGY | Facility: MEDICAL CENTER | Age: 82
DRG: 291 | End: 2022-02-15
Attending: HOSPITALIST
Payer: MEDICARE

## 2022-02-15 VITALS
BODY MASS INDEX: 31.12 KG/M2 | WEIGHT: 234.79 LBS | SYSTOLIC BLOOD PRESSURE: 114 MMHG | HEIGHT: 73 IN | HEART RATE: 87 BPM | OXYGEN SATURATION: 90 % | RESPIRATION RATE: 19 BRPM | DIASTOLIC BLOOD PRESSURE: 71 MMHG | TEMPERATURE: 97.8 F

## 2022-02-15 LAB
ANION GAP SERPL CALC-SCNC: 12 MMOL/L (ref 7–16)
BASOPHILS # BLD AUTO: 0.4 % (ref 0–1.8)
BASOPHILS # BLD: 0.02 K/UL (ref 0–0.12)
BUN SERPL-MCNC: 57 MG/DL (ref 8–22)
CALCIUM SERPL-MCNC: 8.8 MG/DL (ref 8.4–10.2)
CHLORIDE SERPL-SCNC: 106 MMOL/L (ref 96–112)
CO2 SERPL-SCNC: 23 MMOL/L (ref 20–33)
CREAT SERPL-MCNC: 2.14 MG/DL (ref 0.5–1.4)
EOSINOPHIL # BLD AUTO: 0.29 K/UL (ref 0–0.51)
EOSINOPHIL NFR BLD: 5.6 % (ref 0–6.9)
ERYTHROCYTE [DISTWIDTH] IN BLOOD BY AUTOMATED COUNT: 53.8 FL (ref 35.9–50)
GLUCOSE BLD-MCNC: 113 MG/DL (ref 65–99)
GLUCOSE BLD-MCNC: 138 MG/DL (ref 65–99)
GLUCOSE SERPL-MCNC: 129 MG/DL (ref 65–99)
HCT VFR BLD AUTO: 52.1 % (ref 42–52)
HGB BLD-MCNC: 16.8 G/DL (ref 14–18)
IMM GRANULOCYTES # BLD AUTO: 0.05 K/UL (ref 0–0.11)
IMM GRANULOCYTES NFR BLD AUTO: 1 % (ref 0–0.9)
LV EJECT FRACT  99904: 55
LV EJECT FRACT MOD 2C 99903: 44.22
LV EJECT FRACT MOD 4C 99902: 60.89
LV EJECT FRACT MOD BP 99901: 52.51
LYMPHOCYTES # BLD AUTO: 1.34 K/UL (ref 1–4.8)
LYMPHOCYTES NFR BLD: 25.9 % (ref 22–41)
MCH RBC QN AUTO: 30.3 PG (ref 27–33)
MCHC RBC AUTO-ENTMCNC: 32.2 G/DL (ref 33.7–35.3)
MCV RBC AUTO: 94 FL (ref 81.4–97.8)
MONOCYTES # BLD AUTO: 0.73 K/UL (ref 0–0.85)
MONOCYTES NFR BLD AUTO: 14.1 % (ref 0–13.4)
NEUTROPHILS # BLD AUTO: 2.74 K/UL (ref 1.82–7.42)
NEUTROPHILS NFR BLD: 53 % (ref 44–72)
NRBC # BLD AUTO: 0 K/UL
NRBC BLD-RTO: 0 /100 WBC
NT-PROBNP SERPL IA-MCNC: 1027 PG/ML (ref 0–125)
PLATELET # BLD AUTO: 141 K/UL (ref 164–446)
PMV BLD AUTO: 10.6 FL (ref 9–12.9)
POTASSIUM SERPL-SCNC: 4.1 MMOL/L (ref 3.6–5.5)
RBC # BLD AUTO: 5.54 M/UL (ref 4.7–6.1)
SODIUM SERPL-SCNC: 141 MMOL/L (ref 135–145)
WBC # BLD AUTO: 5.2 K/UL (ref 4.8–10.8)

## 2022-02-15 PROCEDURE — 99239 HOSP IP/OBS DSCHRG MGMT >30: CPT | Performed by: INTERNAL MEDICINE

## 2022-02-15 PROCEDURE — A9270 NON-COVERED ITEM OR SERVICE: HCPCS | Performed by: HOSPITALIST

## 2022-02-15 PROCEDURE — 83880 ASSAY OF NATRIURETIC PEPTIDE: CPT

## 2022-02-15 PROCEDURE — 93306 TTE W/DOPPLER COMPLETE: CPT | Mod: 26 | Performed by: INTERNAL MEDICINE

## 2022-02-15 PROCEDURE — 82962 GLUCOSE BLOOD TEST: CPT

## 2022-02-15 PROCEDURE — 80048 BASIC METABOLIC PNL TOTAL CA: CPT

## 2022-02-15 PROCEDURE — 700102 HCHG RX REV CODE 250 W/ 637 OVERRIDE(OP): Performed by: HOSPITALIST

## 2022-02-15 PROCEDURE — 93306 TTE W/DOPPLER COMPLETE: CPT

## 2022-02-15 PROCEDURE — 700111 HCHG RX REV CODE 636 W/ 250 OVERRIDE (IP): Performed by: HOSPITALIST

## 2022-02-15 PROCEDURE — 99223 1ST HOSP IP/OBS HIGH 75: CPT | Performed by: INTERNAL MEDICINE

## 2022-02-15 PROCEDURE — 85025 COMPLETE CBC W/AUTO DIFF WBC: CPT

## 2022-02-15 RX ADMIN — POTASSIUM CHLORIDE 20 MEQ: 1500 TABLET, EXTENDED RELEASE ORAL at 05:57

## 2022-02-15 RX ADMIN — ISOSORBIDE DINITRATE 10 MG: 10 TABLET ORAL at 06:06

## 2022-02-15 RX ADMIN — EZETIMIBE 10 MG: 10 TABLET ORAL at 05:57

## 2022-02-15 RX ADMIN — OXYCODONE HYDROCHLORIDE AND ACETAMINOPHEN 1000 MG: 500 TABLET ORAL at 05:57

## 2022-02-15 RX ADMIN — APIXABAN 5 MG: 5 TABLET, FILM COATED ORAL at 05:57

## 2022-02-15 RX ADMIN — SENNOSIDES AND DOCUSATE SODIUM 2 TABLET: 50; 8.6 TABLET ORAL at 05:57

## 2022-02-15 RX ADMIN — ANASTROZOLE 0.5 MG: 1 TABLET, COATED ORAL at 05:58

## 2022-02-15 RX ADMIN — FUROSEMIDE 80 MG: 10 INJECTION, SOLUTION INTRAVENOUS at 05:50

## 2022-02-15 RX ADMIN — Medication 60 MG: at 05:59

## 2022-02-15 RX ADMIN — OMEPRAZOLE 40 MG: 20 CAPSULE, DELAYED RELEASE ORAL at 05:58

## 2022-02-15 RX ADMIN — ASPIRIN 81 MG: 81 TABLET, COATED ORAL at 05:57

## 2022-02-15 ASSESSMENT — FIBROSIS 4 INDEX: FIB4 SCORE: 3.77

## 2022-02-15 NOTE — PROGRESS NOTES
Report received from [facility]. Pt arrived to unit via gurney.  Tele monitor applied, vitals taken. Pt assessed. A&Ox4. Admit profile and med rec completed. Discussed POC with pt, including monitoring, trending lab levels and pertinent tests. Welcome folder provided and discussed. Communication board filled out. Questions and concerns addressed - patient verbalized understanding. Fall precautions in place. Pt demonstrates ability to use call light appropriately. Pt left in lowest position. Bed locked and low. Bed alarm on.     COVID-19 surge in effect. Tier 2 crisis charting.

## 2022-02-15 NOTE — PROGRESS NOTES
Patient admitted with 15 lb weight gain, presumed CHF. Will have [place] cardiology see in the AM. Agree with IV diuresis. Continue chronic cardiac meds/

## 2022-02-15 NOTE — ED NOTES
ERP at bedside. Pt agrees with plan of care discussed by ERP. AIDET acknowledged with patient. IV established. Blood sent to lab. Medicinal interventions carried out per ERP orders. Bennettrvinita in low position, side rail up for pt safety. Call light within reach. Will continue to monitor.   ,DirectAddress_Unknown

## 2022-02-15 NOTE — ED NOTES
Medication history updated by interviewing patient.  Patient reports Clomiphene is now MWF and that he took all morning medications prior to arrival.    No new OTC medications or recent antibiotics reported.    Vel Gomez, PharmD, BCPS

## 2022-02-15 NOTE — PROGRESS NOTES
Rechecked bp this am, lower than previous reading.  Attempted to get daily weight, but echo tech came to bedside.

## 2022-02-15 NOTE — ASSESSMENT & PLAN NOTE
Patient recently had cardioversion.  Continue with rate control and anticoagulation currently in sinus rhythm

## 2022-02-15 NOTE — DIETARY
Nutrition Services: Pt with consult for DM diet education. Most recent hemoglobin A1c on 9/16/20 was 6.3 indicating glucose with good control at that time. No recent A1c. Order placed for hemoglobin A1c lab tomorrow. Current glucose accu-cks indicate overall good control. Pt currently with order for 2 gram sodium diet. PO noted of % of his breakfast this morning. Based on results of cardiology consult and hemoglobin A1c lab, RD will determine appropriate diet education. RD will follow.

## 2022-02-15 NOTE — PROGRESS NOTES
2/15/22- CHW attempted to meet pt bedside. Pt was sleeping and could not be awaken. Will try later.     2/16/22- CHW Gamaliel contacted pt post d/c via phone call to offer CCM services. Pt declined all CCM services/resources at this time. Pt would like to continue to manage their care.

## 2022-02-15 NOTE — ASSESSMENT & PLAN NOTE
Secondary to diabetic condition that was uncontrolled  Continue with outpatient follow-up with ophthalmology

## 2022-02-15 NOTE — H&P
Hospital Medicine History & Physical Note    Date of Service  2/14/2022    Primary Care Physician  Pcp Pt States None    Consultants  None    Specialist Names: None    Code Status  Full Code    Chief Complaint  Chief Complaint   Patient presents with   • Peripheral Edema     BLE since Thurs Lt > RT Pt reports onset after cardioversion for A.Fib Thurs Chronic leg pain due to neuropathy Currently on elequist   • Shortness of Breath     x 2 wks Denies CP Hx CHF AICD/PPM and cardiac stents Vaccinated for Covid minus booster       History of Presenting Illness  LIAN More III is a 81 y.o. male who presented 2/14/2022 with shortness of breath and weight gain.  Patient has gained about 15 pounds of weight in the past several days.  He recently had cardioversion for atrial fibrillation.  Ever since then he says his weight gain has gotten worse.  Patient will be admitted to the telemetric unit under telemetric monitoring we will monitor his cardiac enzymes.  Patient will be diuresed and optimized with fluid management.  Continue diabetic management.  Patient will need continued electrolyte management..    I discussed the plan of care with patient, bedside RN and Emergency room physician.    Review of Systems  Review of Systems   Constitutional: Positive for malaise/fatigue. Negative for chills, diaphoresis and fever.   HENT: Negative.  Negative for nosebleeds and sinus pain.    Eyes: Negative.  Negative for double vision, photophobia, discharge and redness.   Respiratory: Positive for shortness of breath. Negative for cough, sputum production, wheezing and stridor.    Cardiovascular: Positive for leg swelling. Negative for chest pain, orthopnea and PND.   Gastrointestinal: Negative.  Negative for abdominal pain, blood in stool and heartburn.   Genitourinary: Negative.  Negative for dysuria, flank pain and frequency.   Musculoskeletal: Negative.  Negative for back pain, falls and myalgias.   Skin: Negative.  Negative for  itching.   Neurological: Negative.  Negative for dizziness, tingling, sensory change, focal weakness and seizures.   Endo/Heme/Allergies: Negative.  Negative for polydipsia. Does not bruise/bleed easily.   Psychiatric/Behavioral: Negative.  Negative for depression, substance abuse and suicidal ideas. The patient does not have insomnia.    All other systems reviewed and are negative.      Past Medical History   has a past medical history of Arthritis, Bowel habit changes, Breath shortness, CAD (coronary artery disease), CATARACT, Chest pain, Chest tightness, Chickenpox, Congestive heart failure (HCC), Constipation, Coronary heart disease, Cough, Daytime sleepiness, Diabetes, Difficulty breathing, Dilated cardiomyopathy (HCC), Fatigue, GERD (gastroesophageal reflux disease), Hearing difficulty, Heart murmur, Heart valve disease, Hiatus hernia syndrome, History of BPH, History of chronic cough, Hyperlipidemia, Hypertension, Kidney disease (05/2020), Mumps, Oxygen dependent, Pacemaker, Pain (02/01/2022), Palpitations, Peripheral neuropathy, Persistent atrial fibrillation (HCC), Pneumonia (2007), Rhinitis, Ringing in ears, Severe aortic stenosis (12/4/2019), Sleep apnea, Swelling of lower extremity, Tonsillitis, Tremor, Ulcer (2014), and Urinary incontinence.    Surgical History   has a past surgical history that includes abdominoplasty (1990); gastric banding laparoscopic (3/24/2010); hiatal hernia repair (3/24/2010); inj,epidural/lumb/sac single (3/5/2012); inj,epidural/lumb/sac single (3/19/2012); fusion, spine, lumbar, plif (3/26/2012); lumbar decompression (3/26/2012); gastric band laparoscopic revision (5/11/2012); drainage hematoma (5/25/2012); recovery (6/26/2012); fusion, spine, lumbar, plif (9/5/2013); lumbar decompression (9/5/2013); mass excision neuro (9/5/2013); gastroscopy (N/A, 1/8/2016); zzz cardiac cath; laminotomy; Sleeve,Grant Vaso Thigh; pr remv 2nd cataract,corn-scler sectn; sinuscope; tonsillectomy;  arthroscopy, knee; transcatheter aortic valve replacement (N/A, 1/27/2020); stephon (1/27/2020); aortic valve replacement; other cardiac surgery; lap, remove adjust pasha restrict d* (6/5/2020); knee arthroplasty total (2000); and orif, fracture, humerus (Left, 6/25/2015).     Family History  family history includes Diabetes in an other family member; No Known Problems in his father, mother, sister, sister, and sister.   Family history reviewed with patient. There is no family history that is pertinent to the chief complaint.     Social History   reports that he has never smoked. He has never used smokeless tobacco. He reports previous alcohol use. He reports previous drug use.    Allergies  Allergies   Allergen Reactions   • Statins [Hmg-Coa-R Inhibitors] Rash     Blisters         Medications  Prior to Admission Medications   Prescriptions Last Dose Informant Patient Reported? Taking?   Ascorbic Acid (VITAMIN C) 1000 MG Tab  Patient Yes No   Sig: Take 1,000 mg by mouth 2 Times a Day.   B Complex Vitamins (VITAMIN B COMPLEX PO)  Patient Yes No   Sig: Take 1 Tab by mouth every day.   Calcium Citrate (CITRACAL PO)  Patient Yes No   Sig: Take 1 Tab by mouth 2 Times a Day.   Cholecalciferol (VITAMIN D3) 2000 UNIT Cap  Patient Yes No   Sig: Take 2,000 Units by mouth 2 Times a Day.   Cranberry 300 MG Tab  Patient Yes No   Sig: Take 300 mg by mouth every morning.   Garlic 1000 MG Cap  Patient Yes No   Sig: Take 1,000 mg by mouth 2 Times a Day.   L-Lysine 1000 MG Tab  Patient Yes No   Sig: Take 1,000 mg by mouth every day.   Non Formulary Request  Patient Yes No   Sig: Take 1 Cap by mouth 2 Times a Day. Cholox (OTC)   Non Formulary Request  Patient Yes No   Sig: Take 1 Cap by mouth 2 Times a Day. Domenic red (OTC)   Non Formulary Request  Patient Yes No   Sig: Take 1 Cap by mouth every day. Nitric OXIDE (OTC)   Non Formulary Request  Patient Yes No   Sig: Take 1 Cap by mouth every day. anit-oxidant (OTC)   Probiotic Product  (PROBIOTIC PO)  Patient Yes No   Sig: Take 1 Tablet by mouth every day.   SILODOSIN PO  Patient Yes No   Sig: Take 1 Tablet by mouth every evening. Indications: Benign Enlargement of Prostate   Zinc 50 MG Tab  Patient Yes No   Sig: Take 50 mg by mouth 2 times a day.   anastrozole (ARIMIDEX) 1 MG Tab  Patient Yes No   Sig: Take 0.5 mg by mouth See Admin Instructions. On Tue and Sat   apixaban (ELIQUIS) 5mg Tab  Patient No No   Sig: Take 1 Tablet by mouth 2 times a day.   aspirin EC (ECOTRIN) 81 MG Tablet Delayed Response  Patient No No   Sig: Take 1 Tablet by mouth every day.   bumetanide (BUMEX) 1 MG Tab  Patient No No   Sig: Take 2 tablets by mouth twice daily   carvedilol (COREG) 3.125 MG Tab  Patient No No   Sig: Take 1 tablet by mouth 2 times a day with meals.   clomiPHENE (CLOMID) 50 MG tablet  Patient Yes No   Sig: Take 50 mg by mouth 2X A WEEK.   coenzyme Q-10 30 MG capsule  Patient Yes No   Sig: Take 60 mg by mouth 2 Times a Day.   cyanocobalamin (VITAMIN B-12) 1000 MCG/ML Solution  Patient Yes No   Si,000 mcg by Intramuscular route every thursday.   ezetimibe (ZETIA) 10 MG Tab  Patient No No   Sig: Take 1 tablet by mouth once daily   fluticasone (FLONASE) 50 MCG/ACT nasal spray  Patient No No   Sig: Use 2 spray(s) in each nostril once daily   Patient taking differently: Administer 2 Sprays into affected nostril(S) 1 time a day as needed.   glimepiride (AMARYL) 1 MG tablet  Patient Yes No   Sig: Take 1 mg by mouth at bedtime.   glucosamine Sulfate 500 MG Cap  Patient Yes No   Sig: Take 1,500 mg by mouth 2 Times a Day.   hydrocortisone 2.5 % Ointment  Patient Yes No   Sig: Apply 1 Each to affected area(s) as needed.   lactobacillus rhamnosus (CULTURELLE) Cap capsule  Patient Yes No   Sig: Take 1 Cap by mouth every day.   lisinopril (PRINIVIL) 5 MG Tab  Patient No No   Sig: Take 1 tablet by mouth every day.   Patient taking differently: Take 5 mg by mouth every evening.   omeprazole (PRILOSEC) 40 MG  delayed-release capsule  Patient Yes No   Sig: Take 40 mg by mouth 2 times a day.   testosterone cypionate (DEPO-TESTOSTERONE) 200 MG/ML Solution injection  Patient Yes No   Si mg by Intramuscular route every thursday.   vitamin e (VITAMIN E) 400 UNIT Cap  Patient Yes No   Sig: Take 400 Units by mouth 2 times a day.      Facility-Administered Medications: None       Physical Exam  Temp:  [36.6 °C (97.9 °F)] 36.6 °C (97.9 °F)  Pulse:  [95] 95  Resp:  [16] 16  BP: (124)/(74) 124/74  SpO2:  [94 %] 94 %  Blood Pressure : 124/74   Temperature: 36.6 °C (97.9 °F)   Pulse: 95   Respiration: 16   Pulse Oximetry: 94 %       Physical Exam  Vitals and nursing note reviewed.   Constitutional:       General: He is not in acute distress.     Appearance: Normal appearance. He is obese. He is ill-appearing. He is not toxic-appearing.   HENT:      Head: Normocephalic and atraumatic.      Right Ear: External ear normal.      Left Ear: External ear normal.      Nose: Nose normal.      Mouth/Throat:      Mouth: Mucous membranes are moist.      Pharynx: Oropharynx is clear.   Eyes:      Extraocular Movements: Extraocular movements intact.      Conjunctiva/sclera: Conjunctivae normal.      Pupils: Pupils are equal, round, and reactive to light.   Neck:      Thyroid: No thyromegaly.      Vascular: No JVD.   Cardiovascular:      Rate and Rhythm: Normal rate and regular rhythm.      Pulses: Normal pulses.      Heart sounds: Normal heart sounds. No murmur heard.      Pulmonary:      Effort: Prolonged expiration present.      Breath sounds: Decreased air movement present. Examination of the right-middle field reveals decreased breath sounds. Examination of the left-middle field reveals decreased breath sounds. Examination of the right-lower field reveals decreased breath sounds. Examination of the left-lower field reveals decreased breath sounds. Decreased breath sounds present. No wheezing or rales.   Chest:      Chest wall: No  tenderness.   Abdominal:      General: Abdomen is flat. Bowel sounds are normal. There is no distension.      Palpations: Abdomen is soft. There is no mass.      Tenderness: There is no abdominal tenderness. There is no guarding or rebound.   Musculoskeletal:         General: No tenderness. Normal range of motion.      Cervical back: Normal range of motion and neck supple.      Right lower leg: Edema present.      Left lower leg: Edema present.   Lymphadenopathy:      Cervical: No cervical adenopathy.   Skin:     General: Skin is warm and dry.      Capillary Refill: Capillary refill takes less than 2 seconds.      Findings: No erythema or rash.   Neurological:      General: No focal deficit present.      Mental Status: He is alert and oriented to person, place, and time. Mental status is at baseline.      GCS: GCS eye subscore is 4. GCS verbal subscore is 5. GCS motor subscore is 6.      Cranial Nerves: No cranial nerve deficit.      Deep Tendon Reflexes: Reflexes are normal and symmetric.   Psychiatric:         Mood and Affect: Mood normal.         Behavior: Behavior normal.         Thought Content: Thought content normal.         Judgment: Judgment normal.         Laboratory:  Recent Labs     02/14/22  1736   WBC 7.8   RBC 5.80   HEMOGLOBIN 17.6   HEMATOCRIT 54.2*   MCV 93.4   MCH 30.3   MCHC 32.5*   RDW 52.5*   PLATELETCT 148*   MPV 10.3     Recent Labs     02/14/22  1736   SODIUM 138   POTASSIUM 4.4   CHLORIDE 105   CO2 23   GLUCOSE 154*   BUN 56*   CREATININE 1.97*   CALCIUM 9.0     Recent Labs     02/14/22  1736   ALTSGPT 27   ASTSGOT 35   ALKPHOSPHAT 57   TBILIRUBIN 0.5   GLUCOSE 154*         Recent Labs     02/14/22  1736   NTPROBNP 1182*         Recent Labs     02/14/22  1736   TROPONINT 45*       Imaging:  DX-CHEST-PORTABLE (1 VIEW)   Final Result      1.  Cardiomegaly and ICD.   2.  Elevated left hemidiaphragm with gaseous filled stomach.      EC-ECHOCARDIOGRAM COMPLETE W/O CONT    (Results Pending)        X-Ray:  I have personally reviewed the images and compared with prior images.  EKG:  I have personally reviewed the images and compared with prior images.    Assessment/Plan:  I anticipate this patient will require at least two midnights for appropriate medical management, necessitating inpatient admission.    * Acute systolic (congestive) heart failure (HCC)- (present on admission)  Assessment & Plan  Patient has gained 15 pounds in the last few days  Patient has developed worsening shortness of breath  Patient has worsening lower extremity edema  Patient recently had a cardioversion  Patient needs diuresis with Lasix 80 mg IV twice daily  Patient will have strict CRUZ's  Continue with weight monitoring daily  Medication optimization    Longstanding persistent atrial fibrillation (HCC)  Assessment & Plan  Patient recently had cardioversion.  Continue with rate control and anticoagulation currently in sinus rhythm    Type 2 diabetes mellitus (HCC)- (present on admission)  Assessment & Plan  -accus with sliding scale coverage  -diabetic diet  -diabetic education  -follow glycohemoglobin levels long term, most recent hemoglobin A1c 6.3  -continue with oral antihyperglycemics  -monitor for hypoglycemic episodes and adjust control if he should get low    Essential hypertension- (present on admission)  Assessment & Plan  Optimize blood pressure management keep systolic blood pressure less than 140 diastolic under 90    Coronary artery disease due to calcified coronary lesion- (present on admission)  Assessment & Plan  Optimize blood pressure management  Chest pain management currently chest pain-free  Continue with medication management using aspirin beta-blocker statin    JAMES (obstructive sleep apnea)- (present on admission)  Assessment & Plan  Continue nocturnal oxygen support    ICD (implantable cardioverter-defibrillator) in place- (present on admission)  Assessment & Plan  Stable  Continue follow-ups with  outpatient cardiology    S/P TAVR (transcatheter aortic valve replacement)- (present on admission)  Assessment & Plan  History of TAVR currently stable follow-up with outpatient cardiology    Gastroesophageal reflux disease without esophagitis- (present on admission)  Assessment & Plan  Continue with omeprazole 40 mg twice daily    CKD (chronic kidney disease)- (present on admission)  Assessment & Plan  Monitor renal functions avoid nephrotoxic medications.    Macular degeneration- (present on admission)  Assessment & Plan  Secondary to diabetic condition that was uncontrolled  Continue with outpatient follow-up with ophthalmology    Hypotestosteronism- (present on admission)  Assessment & Plan  Patient receives weekly injections continue    Obesity- (present on admission)  Assessment & Plan  Body mass index is 32.05 kg/m².  Outpatient weight loss management program highly recommended    Dyslipidemia- (present on admission)  Assessment & Plan  Low-fat low-cholesterol diet  Statin  Fasting lipid panel        VTE prophylaxis: therapeutic anticoagulation with Apixaban

## 2022-02-15 NOTE — TELEPHONE ENCOUNTER
S/w pt, started on the 12th, always have numbness or tingling has peripheral neuropathy. Pt denies any wounds. Most swelling in L leg. Pt started taking eliquis on the 10th. Pt has gained 12 lbs since the 10th. Somewhat inc in shortness of breath. Confirms taking bumex no other diuretics    Legs is warm now where it was always cool before. Denies color changes or pain in the leg.     Advised due to drastic weight gain, unilateral swelling with in the last 4 days needs to be evaluated at the ER.  ER charge notified pt is on his way over

## 2022-02-15 NOTE — ASSESSMENT & PLAN NOTE
Optimize blood pressure management  Chest pain management currently chest pain-free  Continue with medication management using aspirin beta-blocker statin

## 2022-02-15 NOTE — DISCHARGE SUMMARY
"Discharge Summary    CHIEF COMPLAINT ON ADMISSION  Chief Complaint   Patient presents with   • Peripheral Edema     BLE since Thurs Lt > RT Pt reports onset after cardioversion for A.Fib Thurs Chronic leg pain due to neuropathy Currently on elequist   • Shortness of Breath     x 2 wks Denies CP Hx CHF AICD/PPM and cardiac stents Vaccinated for Covid minus booster       Reason for Admission  Sent by MD     Admission Date  2/14/2022    CODE STATUS  Full Code    HPI & HOSPITAL COURSE  Per notes, \"81 y.o. male who presented 2/14/2022 with shortness of breath and weight gain.  Patient has gained about 15 pounds of weight in the past several days.  He recently had cardioversion for atrial fibrillation.  Ever since then he says his weight gain has gotten worse.  Patient will be admitted to the telemetric unit under telemetric monitoring we will monitor his cardiac enzymes.  Patient will be diuresed and optimized with fluid management.  Continue diabetic management.  Patient will need continued electrolyte management.\"    Patient was admitted for volume overload and started on IV Lasix.  He had good urine output and resolution of all symptoms by next pulmonary morning.  He did undergo an echocardiogram on 2/15 was actually showed significant improvement in cardiac function when compared to previous study, normal systolic and diastolic function, EF of 55%.  I discussed case with Dr. Dillon, cardiologist on-call, given patient's improvement we both in agreement that he could be discharged and follow-up in the outpatient setting.  She recommended cardiology follow-up within 7 to 10 days.  I discussed all findings and treatments in detail with the patient who is in agreement with this plan.    Therefore, he is discharged in good and stable condition to home with close outpatient follow-up.    The patient recovered much more quickly than anticipated on admission.    Discharge Date  02/15/2022    FOLLOW UP ITEMS POST DISCHARGE  FU " with PCP  FU with cardiology in 7-10 days    DISCHARGE DIAGNOSES  Principal Problem:    Acute systolic (congestive) heart failure (HCC) POA: Yes  Active Problems:    Dyslipidemia (Chronic) POA: Yes      Overview: 3/08 chol 159, trig 75, hdl 44, ldl 100      12/09 chol 162,chol 105, hdl 46,ldl 95      2/11 chol 135,trig 71,hdl 43,ldl 78      12/11 chol 141,trig 32,hdl 49,ldl 86      9/4/13 chol 147,trig 32,hdl 62,ldl 79      1/9/15 chol 152,trig 64,hdl 41,ldl 98       10/2/15 chol 140,trig 54,hdl 43,ldl 86,crp 1.3      3/11/17 chol 134,trig 118,hdl 33,ldl 77      7/3/18 chol 136,trig 123,hdl 32,ldl 79     Obesity POA: Yes      Overview: 3/3/16 BMI 33.6      3/10/17 BMI 33.3 sees  bariatric       10/8/18 BMI 32.6    Hypotestosteronism POA: Yes      Overview: 10/2/15 testosterone 356, free testosterone 50      3/3/16 total testosterone 528,free testosterone 135, on testosterone       replacement per        10/9/18 hemoglobin 20, hematocrit 63, testosterone 769 on replacement per        recommend discontinue testosterone repeat labs 1 month, he       will need to follow-up with his  who prescribes       testosterone      3/15/19 hgb 17,hct 54    Macular degeneration POA: Yes      Overview: 10/8/18 poc retina no diabetic neuropathy, macular degeneration grade 3       recommend ophthalmology evaluation    CKD (chronic kidney disease) POA: Yes    Coronary artery disease due to calcified coronary lesion POA: Yes    Gastroesophageal reflux disease without esophagitis POA: Yes    S/P TAVR (transcatheter aortic valve replacement) POA: Yes    ICD (implantable cardioverter-defibrillator) in place POA: Yes    Essential hypertension POA: Yes    Type 2 diabetes mellitus (HCC) POA: Yes    JAMES (obstructive sleep apnea) (Chronic) POA: Yes    Longstanding persistent atrial fibrillation (HCC) POA: Unknown  Resolved Problems:    * No resolved hospital problems. *      FOLLOW UP  Future  Appointments   Date Time Provider Department Center   2/24/2022  2:00 PM NEAL Hanna [facility] None   3/15/2022 11:00 AM DANIELLE Whitaker   5/10/2022  8:20 AM Paul Wilkinson M.D. [facility] None   12/6/2022  9:40 AM Rafael Valderrama M.D. [facility] None     [facility]iology    Schedule an appointment as soon as possible for a visit in 1 week        MEDICATIONS ON DISCHARGE     Medication List      CHANGE how you take these medications      Instructions   fluticasone 50 MCG/ACT nasal spray  What changed:   · how to take this  · when to take this  · reasons to take this  Commonly known as: FLONASE   Use 2 spray(s) in each nostril once daily     lisinopril 5 MG Tabs  What changed: when to take this  Commonly known as: PRINIVIL   Take 1 tablet by mouth every day.  Dose: 5 mg        CONTINUE taking these medications      Instructions   anastrozole 1 MG Tabs  Commonly known as: ARIMIDEX   Take 0.5 mg by mouth see administration instructions. On Tues & Sat  Dose: 0.5 mg     apixaban 5mg Tabs  Commonly known as: ELIQUIS   Take 1 Tablet by mouth 2 times a day.  Dose: 5 mg     aspirin EC 81 MG Tbec  Commonly known as: ECOTRIN   Take 1 Tablet by mouth every day.  Dose: 81 mg     bumetanide 1 MG Tabs  Commonly known as: BUMEX   Take 2 tablets by mouth twice daily     carvedilol 3.125 MG Tabs  Commonly known as: COREG   Take 1 tablet by mouth 2 times a day with meals.  Dose: 3.125 mg     CITRACAL PO   Take 1 Tab by mouth 2 Times a Day.  Dose: 1 Tablet     clomiPHENE 50 MG tablet  Commonly known as: CLOMID   Take 50 mg by mouth see administration instructions. Mon Wed Fri  Dose: 50 mg     coenzyme Q-10 30 MG capsule   Take 60 mg by mouth 2 Times a Day.  Dose: 60 mg     Cranberry 300 MG Tabs   Take 300 mg by mouth every morning.  Dose: 300 mg     cyanocobalamin 1000 MCG/ML Soln  Commonly known as: VITAMIN B-12   1,000 mcg by Intramuscular route every thursday.  Dose: 1,000 mcg     ezetimibe 10 MG  Tabs  Commonly known as: ZETIA   Take 1 tablet by mouth once daily     Garlic 1000 MG Caps   Take 1,000 mg by mouth 2 Times a Day.  Dose: 1,000 mg     glimepiride 1 MG tablet  Commonly known as: AMARYL   Take 1 mg by mouth at bedtime.  Dose: 1 mg     glucosamine Sulfate 500 MG Caps   Take 1,500 mg by mouth 2 Times a Day.  Dose: 1,500 mg     hydrocortisone 2.5 % Oint   Apply 1 Each to affected area(s) as needed.  Dose: 1 Each     L-Lysine 1000 MG Tabs   Take 1,000 mg by mouth every day.  Dose: 1,000 mg     lactobacillus rhamnosus Caps capsule   Take 1 Cap by mouth every day.  Dose: 1 Capsule     Non Formulary Request   Take 1 Cap by mouth every day. anit-oxidant (OTC)  Dose: 1 Capsule     omeprazole 40 MG delayed-release capsule  Commonly known as: PRILOSEC   Take 40 mg by mouth 2 times a day.  Dose: 40 mg     SILODOSIN PO   Take 1 Tablet by mouth every evening. Indications: Benign Enlargement of Prostate  Dose: 1 Tablet     testosterone cypionate 200 MG/ML Soln injection  Commonly known as: DEPO-TESTOSTERONE   50 mg by Intramuscular route every thursday.  Dose: 50 mg     VITAMIN B COMPLEX PO   Take 1 Tab by mouth every day.  Dose: 1 Tablet     Vitamin C 1000 MG Tabs   Take 1,000 mg by mouth 2 Times a Day.  Dose: 1,000 mg     Vitamin D3 2000 UNIT Caps   Take 2,000 Units by mouth 2 Times a Day.  Dose: 2,000 Units     vitamin e 400 UNIT Caps  Commonly known as: VITAMIN E   Take 400 Units by mouth 2 times a day.  Dose: 400 Units     Zinc 50 MG Tabs   Take 50 mg by mouth 2 times a day.  Dose: 50 mg        STOP taking these medications    Non Formulary Request     Non Formulary Request     Non Formulary Request            Allergies  Allergies   Allergen Reactions   • Statins [Hmg-Coa-R Inhibitors] Rash     Blisters         DIET  Orders Placed This Encounter   Procedures   • Diet Order Diet: 2 Gram Sodium     Standing Status:   Standing     Number of Occurrences:   1     Order Specific Question:   Diet:     Answer:   2  Gram Sodium [7]       ACTIVITY  As tolerated.  Weight bearing as tolerated    CONSULTATIONS  Cardiology    PROCEDURES  None    LABORATORY  Lab Results   Component Value Date    SODIUM 141 02/15/2022    POTASSIUM 4.1 02/15/2022    CHLORIDE 106 02/15/2022    CO2 23 02/15/2022    GLUCOSE 129 (H) 02/15/2022    BUN 57 (H) 02/15/2022    CREATININE 2.14 (H) 02/15/2022    CREATININE 1.18 02/08/2011    GLOMRATE >59 02/08/2011        Lab Results   Component Value Date    WBC 5.2 02/15/2022    WBC 4.5 02/08/2011    HEMOGLOBIN 16.8 02/15/2022    HEMATOCRIT 52.1 (H) 02/15/2022    PLATELETCT 141 (L) 02/15/2022        Total time of the discharge process exceeds 45 minutes.

## 2022-02-15 NOTE — CONSULTS
Cardiology Initial Consult Note    Date of note:    2/15/2022      Consulting Physician: Aime Garcia M.D.    Name:   LIAN More     YOB: 1940  Age:   81 y.o.  male   MRN:   [id number]      Reason for Consultation: Acute on chronic systolic heart failure    HPI:  LIAN More III is a 81 y.o. male with a history of multivessel CAD, systolic heart failure with prior EF 40%, severe AS s/p TAVR and paroxysmal AV block s/p dual-chamber ICD who presented to the hospital on 2/14/2022 with 15 pound weight gain.  Patient states that in January 2022 he went into persistent atrial fibrillation and underwent successful OLGA/cardioversion on 2/3/2022.  Has noted significant weight gain since then.  Has been noticing bilateral leg swelling along with shortness of breath and ongoing fatigue.    On presentation to the ED, patient was in sinus rhythm with a heart rate 93 bpm.  Labs were significant for elevated NT proBNP 1182 (prior 1768), creatinine 2.14 (is at baseline), troponin T 45    Received IV Lasix 40 mg in the emergency department and IV Lasix 80 mg this morning with resulting low BP.  Morning antihypertensive medications were held due to low BP of 90/56.    Patient reports feeling well this morning without any shortness of breath or orthopnea.      Review of Systems   Constitutional: Positive for malaise/fatigue. Negative for decreased appetite, diaphoresis, fever and night sweats.   HENT: Negative for congestion and sore throat.    Eyes: Negative for blurred vision and double vision.   Cardiovascular: Positive for leg swelling. Negative for chest pain, cyanosis, dyspnea on exertion, irregular heartbeat, near-syncope, orthopnea, palpitations, paroxysmal nocturnal dyspnea and syncope.   Respiratory: Negative for cough, sleep disturbances due to breathing and wheezing.    Endocrine: Negative for cold intolerance and heat intolerance.   Musculoskeletal: Negative for back pain, falls and myalgias.  "  Gastrointestinal: Negative for bloating, constipation, diarrhea, nausea and vomiting.   Genitourinary: Negative for dysuria and flank pain.   Neurological: Negative for excessive daytime sleepiness, dizziness, headaches, light-headedness, loss of balance, numbness, paresthesias and weakness.       Past Medical History:   Diagnosis Date   • Arthritis     \"everywhere\"   • Bowel habit changes     constipation   • Breath shortness     pt states short of breath 24/7 O2 at night only   • CAD (coronary artery disease)    • CATARACT     removed bilat   • Chest pain    • Chest tightness    • Chickenpox    • Congestive heart failure (HCC)    • Constipation    • Coronary heart disease    • Cough    • Daytime sleepiness    • Diabetes     oral medication   • Difficulty breathing    • Dilated cardiomyopathy (HCC)    • Fatigue    • GERD (gastroesophageal reflux disease)    • Hearing difficulty    • Heart murmur    • Heart valve disease    • Hiatus hernia syndrome    • History of BPH    • History of chronic cough    • Hyperlipidemia    • Hypertension     pt states well controlled on meds   • 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 Porter, gastroenterologist   • Urinary incontinence        Past Surgical History:   Procedure Laterality Date   • LAP, REMOVE ADJUST LUIS RESTRICT D*  6/5/2020    Procedure: REMOVAL, GASTRIC BAND, LAPAROSCOPIC - ADJUSTABLE BAND AND PORT;  Surgeon: Deniz Rios M.D.;  Location: SURGERY [facility];  Service: General   • TRANSCATHETER AORTIC VALVE REPLACEMENT N/A 1/27/2020    Procedure: " REPLACEMENT, AORTIC VALVE, TRANSCATHETER-;  Surgeon: Surendra Castro M.D.;  Location: [facility];  Service: Cardiac   • OLGA  1/27/2020    Procedure: ECHOCARDIOGRAM, TRANSESOPHAGEAL;  Surgeon: Surendra Castro M.D.;  Location: [facility];  Service: Cardiac   • GASTROSCOPY N/A 1/8/2016    Procedure: GASTROSCOPY;  Surgeon: Deniz Sue M.D.;  Location: [facility];  Service:    • ORIF, FRACTURE, HUMERUS Left 6/25/2015    Procedure: HUMERUS ORIF/ PROXIMAL;  Surgeon: Cristal Guido M.D.;  Location: [facility];  Service:    • FUSION, SPINE, LUMBAR, PLIF  9/5/2013    Performed by Edith Sears M.D. at [facility]   • LUMBAR DECOMPRESSION  9/5/2013    Performed by Edith Sears M.D. at [facility]   • MASS EXCISION NEURO  9/5/2013    Performed by Edith Sears M.D. at [facility]   • RECOVERY  6/26/2012    Performed by SURGERY, IR-RECOVERY at [facility] SAME DAY [facility]   • DRAINAGE HEMATOMA  5/25/2012    Performed by DENIZ SUE at [facility]   • GASTRIC BAND LAPAROSCOPIC REVISION  5/11/2012    Performed by DENIZ SUE at [facility]   • FUSION, SPINE, LUMBAR, PLIF  3/26/2012    Performed by EDITH SEARS at [facility]   • LUMBAR DECOMPRESSION  3/26/2012    Performed by EDITH SEARS at [facility]   • INJ,EPIDURAL/LUMB/SAC SINGLE  3/19/2012    Performed by KRISTIN BALTAZAR at [facility]   • INJ,EPIDURAL/LUMB/SAC SINGLE  3/5/2012    Performed by KRISTIN BALTAZAR at [facility]   • GASTRIC BANDING LAPAROSCOPIC  3/24/2010    Performed by DENIZ SUE at [facility]   • HIATAL HERNIA REPAIR  3/24/2010    Performed by DENIZ SUE at [facility]   • KNEE ARTHROPLASTY TOTAL  2000    bilateral   • ABDOMINOPLASTY  1990   • AORTIC VALVE REPLACEMENT     • ARTHROSCOPY, KNEE     • LAMINOTOMY     • OTHER CARDIAC SURGERY      stents    • OR REMV 2ND CATARACT,CORN-SCLER SECTN     • SINUSCOPE     • SLEEVE,CARRIE VASO THIGH     • TONSILLECTOMY     • ZZZ CARDIAC CATH           Medications Prior to Admission   Medication Sig Dispense Refill Last Dose   • apixaban (ELIQUIS) 5mg Tab Take 1 Tablet by mouth 2 times a day. 180 Tablet 3 2/14/2022 at AM   • aspirin EC (ECOTRIN) 81 MG Tablet Delayed Response Take 1 Tablet by mouth every day. 90 Tablet 3 2/14/2022 at AM   • ezetimibe (ZETIA) 10 MG Tab Take 1 tablet by mouth once daily 90 Tablet 3 2/14/2022 at am   • lisinopril (PRINIVIL) 5 MG Tab Take 1 tablet by mouth every day. (Patient taking differently: Take 5 mg by mouth every evening.) 90 tablet 3 2/13/2022 at pm   • carvedilol (COREG) 3.125 MG Tab Take 1 tablet by mouth 2 times a day with meals. 180 tablet 3 2/14/2022 at AM   • bumetanide (BUMEX) 1 MG Tab Take 2 tablets by mouth twice daily 360 Tab 1 2/14/2022 at AM   • SILODOSIN PO Take 1 Tablet by mouth every evening. Indications: Benign Enlargement of Prostate   2/13/2022 at pm   • clomiPHENE (CLOMID) 50 MG tablet Take 50 mg by mouth see administration instructions. Mon Wed Fri 2/14/2022 at am   • hydrocortisone 2.5 % Ointment Apply 1 Each to affected area(s) as needed.   prn   • Calcium Citrate (CITRACAL PO) Take 1 Tab by mouth 2 Times a Day.   2/14/2022 at AM   • fluticasone (FLONASE) 50 MCG/ACT nasal spray Use 2 spray(s) in each nostril once daily (Patient taking differently: Administer 2 Sprays into affected nostril(S) 1 time a day as needed.) 48 g 3 prn   • lactobacillus rhamnosus (CULTURELLE) Cap capsule Take 1 Cap by mouth every day.   2/14/2022 at am   • testosterone cypionate (DEPO-TESTOSTERONE) 200 MG/ML Solution injection 50 mg by Intramuscular route every thursday.   2/10/2022   • cyanocobalamin (VITAMIN B-12) 1000 MCG/ML Solution 1,000 mcg by Intramuscular route every thursday.   2/10/2022   • coenzyme Q-10 30 MG capsule Take 60 mg by mouth 2 Times a Day.   2/14/2022 at am   • Garlic  1000 MG Cap Take 1,000 mg by mouth 2 Times a Day.   2/14/2022 at am   • glucosamine Sulfate 500 MG Cap Take 1,500 mg by mouth 2 Times a Day.   2/14/2022 at am   • vitamin e (VITAMIN E) 400 UNIT Cap Take 400 Units by mouth 2 times a day.   2/14/2022 at am   • omeprazole (PRILOSEC) 40 MG delayed-release capsule Take 40 mg by mouth 2 times a day.   2/14/2022 at am   • Non Formulary Request Take 1 Cap by mouth 2 Times a Day. Cholox (OTC)   2/14/2022 at am   • Cholecalciferol (VITAMIN D3) 2000 UNIT Cap Take 2,000 Units by mouth 2 Times a Day.   2/14/2022 at AM   • Non Formulary Request Take 1 Cap by mouth 2 Times a Day. Domenic red (OTC)   2/14/2022 at am   • Non Formulary Request Take 1 Cap by mouth every day. Nitric OXIDE (OTC)   2/14/2022 at am   • Non Formulary Request Take 1 Cap by mouth every day. anit-oxidant (OTC)   2/14/2022 at am   • Zinc 50 MG Tab Take 50 mg by mouth 2 times a day.   2/14/2022 at am   • anastrozole (ARIMIDEX) 1 MG Tab Take 0.5 mg by mouth see administration instructions. On Tues & Sat   2/12/2022 at AM   • glimepiride (AMARYL) 1 MG tablet Take 1 mg by mouth at bedtime.   2/13/2022 at pm   • B Complex Vitamins (VITAMIN B COMPLEX PO) Take 1 Tab by mouth every day.   2/14/2022 at AM   • Cranberry 300 MG Tab Take 300 mg by mouth every morning.   2/14/2022 at am   • L-Lysine 1000 MG Tab Take 1,000 mg by mouth every day.   2/14/2022 at am   • Ascorbic Acid (VITAMIN C) 1000 MG Tab Take 1,000 mg by mouth 2 Times a Day.   2/14/2022 at AM     Current Facility-Administered Medications   Medication Dose Route Frequency Provider Last Rate Last Admin   • anastrozole (ARIMIDEX) tablet 0.5 mg  0.5 mg Oral Once per day on Tue Sat Bebo Acuna M.D.   0.5 mg at 02/15/22 0558   • apixaban (ELIQUIS) tablet 5 mg  5 mg Oral BID Bebo Acuna M.D.   5 mg at 02/15/22 0557   • ascorbic acid (Vitamin C) tablet 1,000 mg  1,000 mg Oral BID LUX Marino.D.   1,000 mg at 02/15/22 0557   • aspirin EC (ECOTRIN) tablet 81  mg  81 mg Oral DAILY REGINO MarinoDElizabet   81 mg at 02/15/22 0557   • carvedilol (COREG) tablet 3.125 mg  3.125 mg Oral BID WITH MEALS REGINO MarinoDElizabet   3.125 mg at 02/14/22 2031   • coenzyme Q-10 capsule 60 mg  60 mg Oral BID REGINO MarinoD.   60 mg at 02/15/22 0559   • ezetimibe (ZETIA) tablet 10 mg  10 mg Oral DAILY REGINO MarinoD.   10 mg at 02/15/22 0557   • fluticasone (FLONASE) nasal spray 100 mcg  2 Spray Nasal QDAY PRN Bebo Acuna M.D.       • glimepiride (AMARYL) tablet 1 mg  1 mg Oral QHS REGINO MarinoD.   1 mg at 02/14/22 2201   • lisinopril (PRINIVIL) tablet 5 mg  5 mg Oral Q EVENING REGINO MarinoDElizabet   5 mg at 02/14/22 2201   • omeprazole (PRILOSEC) capsule 40 mg  40 mg Oral BID REGINO MarinoDElizabet   40 mg at 02/15/22 0558   • senna-docusate (PERICOLACE or SENOKOT S) 8.6-50 MG per tablet 2 Tablet  2 Tablet Oral BID Bebo Acuna M.D.   2 Tablet at 02/15/22 0557    And   • polyethylene glycol/lytes (MIRALAX) PACKET 1 Packet  1 Packet Oral QDAY PRN Bebo Acuna M.D.        And   • magnesium hydroxide (MILK OF MAGNESIA) suspension 30 mL  30 mL Oral QDAY PRN Bebo Acuna M.D.        And   • bisacodyl (DULCOLAX) suppository 10 mg  10 mg Rectal QDAY PRN Bebo Acuna M.D.       • potassium chloride SA (Kdur) tablet 20 mEq  20 mEq Oral BID Bebo Acuna M.D.   20 mEq at 02/15/22 0557   • hydrALAZINE (APRESOLINE) tablet 50 mg  50 mg Oral Q6HRS REGINO MarinoDElizabet   50 mg at 02/14/22 2032   • isosorbide dinitrate (ISORDIL) tablet 10 mg  10 mg Oral Q8HR Bebo Acuna M.D.   10 mg at 02/15/22 0606   • ondansetron (ZOFRAN) syringe/vial injection 4 mg  4 mg Intravenous Q4HRS PRN Bebo Acuna M.D.       • ondansetron (ZOFRAN ODT) dispertab 4 mg  4 mg Oral Q4HRS PRN Bebo Acuna M.D.       • insulin regular (HumuLIN R,NovoLIN R) injection  3-14 Units Subcutaneous 4X/DAY EVANGELIST Acuna M.D.        And   • dextrose 50% (D50W) injection 50 mL  50 mL Intravenous Q15 MIN PRN Bebo  DANIELLE Acuna       • furosemide (LASIX) injection 80 mg  80 mg Intravenous BID DIURETIC Bebo Acuna M.D.   80 mg at 02/15/22 0550         Allergies   Allergen Reactions   • Statins [Hmg-Coa-R Inhibitors] Rash     Blisters           Family History   Problem Relation Age of Onset   • No Known Problems Sister    • No Known Problems Sister    • No Known Problems Sister    • Diabetes Other    • No Known Problems Mother    • No Known Problems Father    • Heart Disease Neg Hx          Social History     Socioeconomic History   • Marital status:      Spouse name: Not on file   • Number of children: Not on file   • Years of education: Not on file   • Highest education level: Not on file   Occupational History   • Not on file   Tobacco Use   • Smoking status: Never Smoker   • Smokeless tobacco: Never Used   • Tobacco comment: 0   Vaping Use   • Vaping Use: Never used   Substance and Sexual Activity   • Alcohol use: Not Currently     Alcohol/week: 0.0 oz   • Drug use: Not Currently   • Sexual activity: Yes   Other Topics Concern   • Not on file   Social History Narrative   • Not on file     Social Determinants of Health     Financial Resource Strain:    • Difficulty of Paying Living Expenses: Not on file   Food Insecurity:    • Worried About Running Out of Food in the Last Year: Not on file   • Ran Out of Food in the Last Year: Not on file   Transportation Needs:    • Lack of Transportation (Medical): Not on file   • Lack of Transportation (Non-Medical): Not on file   Physical Activity:    • Days of Exercise per Week: Not on file   • Minutes of Exercise per Session: Not on file   Stress:    • Feeling of Stress : Not on file   Social Connections:    • Frequency of Communication with Friends and Family: Not on file   • Frequency of Social Gatherings with Friends and Family: Not on file   • Attends Scientologist Services: Not on file   • Active Member of Clubs or Organizations: Not on file   • Attends Club or Organization  "Meetings: Not on file   • Marital Status: Not on file   Intimate Partner Violence:    • Fear of Current or Ex-Partner: Not on file   • Emotionally Abused: Not on file   • Physically Abused: Not on file   • Sexually Abused: Not on file   Housing Stability:    • Unable to Pay for Housing in the Last Year: Not on file   • Number of Places Lived in the Last Year: Not on file   • Unstable Housing in the Last Year: Not on file           Intake/Output Summary (Last 24 hours) at 2/15/2022 1005  Last data filed at 2/15/2022 0900  Gross per 24 hour   Intake 240 ml   Output 1150 ml   Net -910 ml        Physical Exam  Body mass index is 30.98 kg/m².  BP (!) 90/56   Pulse 88   Temp 36.2 °C (97.2 °F) (Oral)   Resp 17   Ht 1.854 m (6' 1\")   Wt 107 kg (234 lb 12.6 oz)   SpO2 94%   Vitals:    02/15/22 0606 02/15/22 0700 02/15/22 0740 02/15/22 0800   BP: 114/63 (!) 96/48 (!) 90/56    Pulse:  82 88    Resp:  17     Temp:       TempSrc:       SpO2:  94%     Weight:    107 kg (234 lb 12.6 oz)   Height:         Oxygen Therapy:  Pulse Oximetry: 94 %, O2 (LPM): 0, O2 Delivery Device: None - Room Air    General: Well appearing and in no apparent distress  Eyes: nl conjunctiva  ENT: OP clear  Neck: JVP not elevated,  no carotid bruits  Lungs: normal respiratory effort, CTAB  Heart: RRR, no murmurs, no rubs or gallops, minimal edema bilateral lower extremities. No LV/RV heave on cardiac palpatation. 2+ bilateral radial pulses.   Abdomen: soft, non tender, non distended, no masses, normal bowel sounds.  No HSM.  Extremities/MSK: no clubbing, no cyanosis  Neurological: No focal sensory deficits  Psychiatric: Appropriate affect, A/O x 3  Skin: Warm extremities      Labs (personally reviewed and notable for):   Lab Results   Component Value Date/Time    SODIUM 141 02/15/2022 04:18 AM    POTASSIUM 4.1 02/15/2022 04:18 AM    CHLORIDE 106 02/15/2022 04:18 AM    CO2 23 02/15/2022 04:18 AM    GLUCOSE 129 (H) 02/15/2022 04:18 AM    BUN 57 (H) " 02/15/2022 04:18 AM    CREATININE 2.14 (H) 02/15/2022 04:18 AM    CREATININE 1.18 02/08/2011 12:00 AM    BUNCREATRAT 16 02/08/2011 12:00 AM    GLOMRATE >59 02/08/2011 12:00 AM      Lab Results   Component Value Date/Time    WBC 5.2 02/15/2022 04:18 AM    WBC 4.5 02/08/2011 12:00 AM    RBC 5.54 02/15/2022 04:18 AM    RBC 5.36 02/08/2011 12:00 AM    HEMOGLOBIN 16.8 02/15/2022 04:18 AM    HEMATOCRIT 52.1 (H) 02/15/2022 04:18 AM    MCV 94.0 02/15/2022 04:18 AM    MCV 93 02/08/2011 12:00 AM    MCH 30.3 02/15/2022 04:18 AM    MCH 31.0 02/08/2011 12:00 AM    MCHC 32.2 (L) 02/15/2022 04:18 AM    MPV 10.6 02/15/2022 04:18 AM    NEUTSPOLYS 53.00 02/15/2022 04:18 AM    LYMPHOCYTES 25.90 02/15/2022 04:18 AM    MONOCYTES 14.10 (H) 02/15/2022 04:18 AM    EOSINOPHILS 5.60 02/15/2022 04:18 AM    BASOPHILS 0.40 02/15/2022 04:18 AM    HYPOCHROMIA 2+ 02/26/2020 03:49 AM    ANISOCYTOSIS 2+ 02/26/2020 03:49 AM      Lab Results   Component Value Date/Time    CHOLSTRLTOT 72 (L) 05/19/2021 08:26 AM    LDL 28 05/19/2021 08:26 AM    HDL 24 (A) 05/19/2021 08:26 AM    TRIGLYCERIDE 102 05/19/2021 08:26 AM       Lab Results   Component Value Date/Time    TROPONINT 45 (H) 02/14/2022 1736     Lab Results   Component Value Date/Time    NTPROBNP 1027 (H) 02/15/2022 0418         Cardiac Imaging and Procedures Review:    EKG dated 2/14/2022: My personal interpretation is sinus rhythm    Echo dated 2/15/2022: My personal interpretation is low normal left ventricular systolic function with EF 50 to 55%.    AICD PLACEMENT by Theo Knapp (02/04/20)  1. Successful automatic implantable cardioverter defibrillator implant.  2. Unsuccessful CS lead placement due to perforation of the CS resulting in small but stable effusion    CARDIAC CATHETERIZATION (01/10/20):  1. LVEF 30% prior to intervention.  2. 70% distal left main and proximal LAD stenosis, IFR positive from prior procedure.  3. Successful planned PCI proximal LAD and distal left main  trifurcation  stenosis (3.5 x 26 mm Jack BRANDI postdilated to 4.5 mm proximally), IVUS guided.  4. Moderate aortic stenosis, mean gradient 27 to 30mmHg.    CARDIAC CATHETERIZATION (12/27/2019):   PROCEDURE:  Cardiac catheterization and percutaneous coronary intervention.  A.  Selective coronary angiography.  B.  Coronary stent implantation, distal right coronary artery 3.5x28 mm   Synergy drug-eluting stent postdilated 4.0 mm.  C.  Distal abdominal aortogram with bilateral iliofemoral angiography.  D. Right femoral artery approach.  E.  Conscious sedation supervision.  F.  Perclose deployment.     PREPROCEDURE DIAGNOSES:  1.  Acute decompensated systolic heart failure.  2.  Ischemic cardiomyopathy, left ventricular ejection fraction 25%.  3.  Multivessel coronary artery disease.  4.  Diabetes mellitus.  5.  Aortic stenosis, moderate, possible low flow, low gradient, severe aortic stenosis.  6.  Staged multivessel coronary intervention.  7.  CKD, GFR 40.    Radiology test Review:  EC-ECHOCARDIOGRAM COMPLETE W/O CONT   Final Result      DX-CHEST-PORTABLE (1 VIEW)   Final Result      1.  Cardiomegaly and ICD.   2.  Elevated left hemidiaphragm with gaseous filled stomach.          Problem list:  Principal Problem:    Acute systolic (congestive) heart failure (HCC) POA: Yes  Active Problems:    Dyslipidemia (Chronic) POA: Yes      Overview: 3/08 chol 159, trig 75, hdl 44, ldl 100      12/09 chol 162,chol 105, hdl 46,ldl 95      2/11 chol 135,trig 71,hdl 43,ldl 78      12/11 chol 141,trig 32,hdl 49,ldl 86      9/4/13 chol 147,trig 32,hdl 62,ldl 79      1/9/15 chol 152,trig 64,hdl 41,ldl 98       10/2/15 chol 140,trig 54,hdl 43,ldl 86,crp 1.3      3/11/17 chol 134,trig 118,hdl 33,ldl 77      7/3/18 chol 136,trig 123,hdl 32,ldl 79     CKD (chronic kidney disease) POA: Yes    Coronary artery disease due to calcified coronary lesion POA: Yes    S/P TAVR (transcatheter aortic valve replacement) POA: Yes    Essential hypertension  POA: Yes    Type 2 diabetes mellitus (HCC) POA: Yes    JAMES (obstructive sleep apnea) (Chronic) POA: Yes    Longstanding persistent atrial fibrillation (HCC) POA: Unknown    Obesity POA: Yes      Overview: 3/3/16 BMI 33.6      3/10/17 BMI 33.3 sees  bariatric       10/8/18 BMI 32.6    Hypotestosteronism POA: Yes      Overview: 10/2/15 testosterone 356, free testosterone 50      3/3/16 total testosterone 528,free testosterone 135, on testosterone       replacement per        10/9/18 hemoglobin 20, hematocrit 63, testosterone 769 on replacement per        recommend discontinue testosterone repeat labs 1 month, he       will need to follow-up with his  who prescribes       testosterone      3/15/19 hgb 17,hct 54    Macular degeneration POA: Yes      Overview: 10/8/18 poc retina no diabetic neuropathy, macular degeneration grade 3       recommend ophthalmology evaluation    Gastroesophageal reflux disease without esophagitis POA: Yes    ICD (implantable cardioverter-defibrillator) in place POA: Yes  Resolved Problems:    * No resolved hospital problems. *      Impression and Medical Decision Making:  #Acute on chronic systolic heart failure with now recovered EF  #Hypotension  #Acute on CKD stage IV  #Multivessel CAD  #S/p TAVR  #Essential hypertension  #Type 2 diabetes mellitus  #Dyslipidemia  #S/p ICD implantation    Recommendations:  --Discussed echocardiogram results with the patient which shows improvement in ejection fraction with now EF 50 to 55%.  He is feeling significantly improved after diuresis.  Can transition IV Lasix to p.o.  --BP low this morning likely due to aggressive diuresis overnight.  Okay to hold heart failure medications this morning.  However, would continue carvedilol, lisinopril with holding parameters.  If BP continues to remain low, would recommend infectious work-up.  --Kidney function worse this morning which could be from hypotension  and/or diuresis.  Monitor and avoid nephrotoxic agents.  Might have to hold lisinopril if renal function continues to worsen.  --For multivessel CAD, continue aspirin 81 mg and Zetia 10 mg daily.  --Continue oral anticoagulation with Eliquis 5 mg twice daily.      Recommendations discussed with Dr. Garcia.    Thank you for allowing me to participate in the care of this patient.  Please contact me with any questions.      Elliott Dillon M.D.  Cardiologist, [facility] [place]   [phone]    This note was generated using voice recognition software which has a small chance of producing errors of grammar and possibly content. I have made every reasonable attempt to find and correct any obvious errors, but expect that some may not be found prior to finalization of this note.

## 2022-02-15 NOTE — ASSESSMENT & PLAN NOTE
Patient has gained 15 pounds in the last few days  Patient has developed worsening shortness of breath  Patient has worsening lower extremity edema  Patient recently had a cardioversion  Patient needs diuresis with Lasix 80 mg IV twice daily  Patient will have strict CRUZ's  Continue with weight monitoring daily  Medication optimization

## 2022-02-15 NOTE — PROGRESS NOTES
Telemetry Shift Summary     Rhythm SR V-Paced on Demand with 1st degree AVB  HR Range 70s  Ectopy O-PVC;R-Bigem/couplet  Measurements 0.24/0.10/0.34           Normal Values  Rhythm SR  HR Range    Measurements 0.12-0.20 / 0.06-0.10  / 0.30-0.52

## 2022-02-15 NOTE — ASSESSMENT & PLAN NOTE
-accus with sliding scale coverage  -diabetic diet  -diabetic education  -follow glycohemoglobin levels long term, most recent hemoglobin A1c 6.3  -continue with oral antihyperglycemics  -monitor for hypoglycemic episodes and adjust control if he should get low

## 2022-02-15 NOTE — DISCHARGE INSTRUCTIONS
Discharge Instructions    Discharged to home by car with self. Discharged via wheelchair, hospital escort: Yes.  Special equipment needed: Not Applicable    Be sure to schedule a follow-up appointment with your primary care doctor or any specialists as instructed.     Discharge Plan:   Influenza Vaccine Indication: Patient Refuses    I understand that a diet low in cholesterol, fat, and sodium is recommended for good health. Unless I have been given specific instructions below for another diet, I accept this instruction as my diet prescription.   Other diet: low-Fat    Special Instructions:     HF Patient Discharge Instructions  · Monitor your weight daily, and maintain a weight chart, to track your weight changes.   · Activity as tolerated, unless your Doctor has ordered otherwise. Other activity order: NA.  · Follow a low fat, low cholesterol, low salt diet unless instructed otherwise by your Doctor. Read the labels on the back of food products and track your intake of fat, cholesterol and salt.   · Fluid Restriction No. If a Fluid Restriction has been ordered by your Doctor, measure fluids with a measuring cup to ensure that you are not exceeding the restriction.   · No smoking.  · Oxygen No. If your Doctor has ordered that you wear Oxygen at home, it is important to wear it as ordered.  · Did you receive an explanation from staff on the importance of taking each of your medications and why it is necessary to stay on the medications the physician/care provider has ordered? Yes  · Do you have any questions concerning how to manage your heart failure and what to do should you have any increased signs and symptoms after you go home? No  · Do you feel like your heart failure care team involved you in the care treatment plan and allowed you to make decisions regarding your care while in the hospital and addressed any discharge needs you might have? Yes    See the educational handout provided at discharge for more  information on monitoring your daily weight, activity and diet. This also explains more about Heart Failure, symptoms of a flare-up and some of the tests that you have undergone.     Warning Signs of a Flare-Up include:  · Swelling in the ankles or lower legs.  · Shortness of breath, while at rest, or while doing normal activities.   · Shortness of breath at night when in bed, or coughing in bed.   · Requiring more pillows to sleep at night, or needing to sit up at night to sleep.  · Feeling weak, dizzy or fatigued.     When to call your Doctor:  · Call [facility] about questions regarding the discharge instructions you were given [phone].  (Discharge Unit Med-Tele)  · Call your Primary Care Physician or Cardiologist if:   1. You experience any pain radiating to your jaw or neck.  2. You have any difficulty breathing.  3. You experience weight gain of 2 lbs in a day or 5 lbs in a week.   4. You feel any palpitations or irregular heartbeats.  5. You become dizzy or lose consciousness.   If you have had an angiogram or had a pacemaker or AICD placed, and experience:  1. Bleeding, drainage or swelling at the surgical / puncture site.  2. Fever greater than 100.0 F  3. Shock from internal defibrillator.  4. Cool and / or numb extremities.      · Is patient discharged on Warfarin / Coumadin?   No     Depression / Suicide Risk    As you are discharged from this [facility], it is important to learn how to keep safe from harming yourself.    Recognize the warning signs:  · Abrupt changes in personality, positive or negative- including increase in energy   · Giving away possessions  · Change in eating patterns- significant weight changes-  positive or negative  · Change in sleeping patterns- unable to sleep or sleeping all the time   · Unwillingness or inability to communicate  · Depression  · Unusual sadness, discouragement and loneliness  · Talk of wanting to die  · Neglect of  personal appearance   · Rebelliousness- reckless behavior  · Withdrawal from people/activities they love  · Confusion- inability to concentrate     If you or a loved one observes any of these behaviors or has concerns about self-harm, here's what you can do:  · Talk about it- your feelings and reasons for harming yourself  · Remove any means that you might use to hurt yourself (examples: pills, rope, extension cords, firearm)  · Get professional help from the community (Mental Health, Substance Abuse, psychological counseling)  · Do not be alone:Call your Safe Contact- someone whom you trust who will be there for you.  · Call your local CRISIS HOTLINE 885-8427 or 759-851-7769  · Call your local Children's Mobile Crisis Response Team [place] (869) 373-1549 or www.[employer]  · Call the toll free National Suicide Prevention Hotlines   · National Suicide Prevention Lifeline 535-611-NWQL (6066)  · National Hope Line Network 800-SUICIDE (337-6356)

## 2022-02-15 NOTE — ED PROVIDER NOTES
ER Provider Note     Scribed for Augustus Esposito M.D. by Christopher Kelsey. 2/14/2022, 6:09 PM.    Primary Care Provider: Pcp Pt States None  Means of Arrival: Walk in   History obtained from: Patient  History limited by: None     CHIEF COMPLAINT  Chief Complaint   Patient presents with    Peripheral Edema     BLE since Thurs Lt > RT Pt reports onset after cardioversion for A.Fib Thurs Chronic leg pain due to neuropathy Currently on elequist    Shortness of Breath     x 2 wks Denies CP Hx CHF AICD/PPM and cardiac stents Vaccinated for Covid minus booster       HPI  LIAN More III is a 81 y.o. male who presents to the Emergency Department for evaluation of bilateral leg swelling onset 4 days ago. The patient states that following his regular treatment for A-fib his legs began to progressively swell. The patient also notes that they have experienced significant weight gain in the last 4 days, he was initially 225 pounds and is now approximately 238 pounds. Associated symptoms include shortness of breath. The patient is compliant with medication inlcuding Eliquist. The patient is followed by Dr. Wilkinson (Cardiology). No alleviating or exacerbating factors noted    REVIEW OF SYSTEMS  See HPI for further details. All other systems are negative.     PAST MEDICAL HISTORY   has a past medical history of Arthritis, Bowel habit changes, Breath shortness, CAD (coronary artery disease), CATARACT, Chest pain, Chest tightness, Chickenpox, Congestive heart failure (HCC), Constipation, Coronary heart disease, Cough, Daytime sleepiness, Diabetes, Difficulty breathing, Dilated cardiomyopathy (HCC), Fatigue, GERD (gastroesophageal reflux disease), Hearing difficulty, Heart murmur, Heart valve disease, Hiatus hernia syndrome, History of BPH, History of chronic cough, Hyperlipidemia, Hypertension, Kidney disease (05/2020), Mumps, Oxygen dependent, Pacemaker, Pain (02/01/2022), Palpitations, Peripheral neuropathy, Persistent atrial  fibrillation (HCC), Pneumonia (2007), Rhinitis, Ringing in ears, Severe aortic stenosis (12/4/2019), Sleep apnea, Swelling of lower extremity, Tonsillitis, Tremor, Ulcer (2014), and Urinary incontinence.    SURGICAL HISTORY   has a past surgical history that includes abdominoplasty (1990); gastric banding laparoscopic (3/24/2010); hiatal hernia repair (3/24/2010); inj,epidural/lumb/sac single (3/5/2012); inj,epidural/lumb/sac single (3/19/2012); fusion, spine, lumbar, plif (3/26/2012); lumbar decompression (3/26/2012); gastric band laparoscopic revision (5/11/2012); drainage hematoma (5/25/2012); recovery (6/26/2012); fusion, spine, lumbar, plif (9/5/2013); lumbar decompression (9/5/2013); mass excision neuro (9/5/2013); gastroscopy (N/A, 1/8/2016); zzz cardiac cath; laminotomy; Sleeve,Grant Vaso Thigh; remv 2nd cataract,corn-scler sectn; sinuscope; tonsillectomy; arthroscopy, knee; transcatheter aortic valve replacement (N/A, 1/27/2020); stephon (1/27/2020); aortic valve replacement; other cardiac surgery; lap, remove adjust pasha restrict d* (6/5/2020); knee arthroplasty total (2000); and orif, fracture, humerus (Left, 6/25/2015).    SOCIAL HISTORY  Social History     Tobacco Use    Smoking status: Never Smoker    Smokeless tobacco: Never Used    Tobacco comment: 0   Vaping Use    Vaping Use: Never used   Substance Use Topics    Alcohol use: Not Currently     Alcohol/week: 0.0 oz    Drug use: Not Currently      Social History     Substance and Sexual Activity   Drug Use Not Currently       FAMILY HISTORY  Family History   Problem Relation Age of Onset    No Known Problems Sister     No Known Problems Sister     No Known Problems Sister     Diabetes Other     No Known Problems Mother     No Known Problems Father     Heart Disease Neg Hx        CURRENT MEDICATIONS  Home Medications       Reviewed by David Gomez, PharmD (Pharmacist) on 02/14/22 at 2042  Med List Status: Complete     Medication Last Dose Status  "  anastrozole (ARIMIDEX) 1 MG Tab 2/12/2022 Active   apixaban (ELIQUIS) 5mg Tab 2/14/2022 Active   Ascorbic Acid (VITAMIN C) 1000 MG Tab 2/14/2022 Active   aspirin EC (ECOTRIN) 81 MG Tablet Delayed Response 2/14/2022 Active   B Complex Vitamins (VITAMIN B COMPLEX PO) 2/14/2022 Active   bumetanide (BUMEX) 1 MG Tab 2/14/2022 Active   Calcium Citrate (CITRACAL PO) 2/14/2022 Active   carvedilol (COREG) 3.125 MG Tab 2/14/2022 Active   Cholecalciferol (VITAMIN D3) 2000 UNIT Cap 2/14/2022 Active   clomiPHENE (CLOMID) 50 MG tablet 2/14/2022 Active   coenzyme Q-10 30 MG capsule 2/14/2022 Active   Cranberry 300 MG Tab 2/14/2022 Active   cyanocobalamin (VITAMIN B-12) 1000 MCG/ML Solution 2/10/2022 Active   ezetimibe (ZETIA) 10 MG Tab 2/14/2022 Active   fluticasone (FLONASE) 50 MCG/ACT nasal spray prn Active   Garlic 1000 MG Cap 2/14/2022 Active   glimepiride (AMARYL) 1 MG tablet 2/13/2022 Active   glucosamine Sulfate 500 MG Cap 2/14/2022 Active   hydrocortisone 2.5 % Ointment prn Active   L-Lysine 1000 MG Tab 2/14/2022 Active   lactobacillus rhamnosus (CULTURELLE) Cap capsule 2/14/2022 Active   lisinopril (PRINIVIL) 5 MG Tab 2/13/2022 Active   Non Formulary Request 2/14/2022 Active   Non Formulary Request 2/14/2022 Active   Non Formulary Request 2/14/2022 Active   Non Formulary Request 2/14/2022 Active   omeprazole (PRILOSEC) 40 MG delayed-release capsule 2/14/2022 Active   SILODOSIN PO 2/13/2022 Active   testosterone cypionate (DEPO-TESTOSTERONE) 200 MG/ML Solution injection 2/10/2022 Active   vitamin e (VITAMIN E) 400 UNIT Cap 2/14/2022 Active   Zinc 50 MG Tab 2/14/2022 Active                    ALLERGIES  Allergies   Allergen Reactions    Statins [Hmg-Coa-R Inhibitors] Rash     Blisters         PHYSICAL EXAM  VITAL SIGNS: /74   Pulse 95   Temp 36.6 °C (97.9 °F) (Temporal)   Resp 16   Ht 1.854 m (6' 1\")   Wt 110 kg (242 lb 15.2 oz)   SpO2 94%   BMI 32.05 kg/m²      Constitutional: Alert in no apparent " distress.  HENT: No signs of trauma, Bilateral external ears normal, Nose normal.   Eyes: Pupils are equal and reactive, Conjunctiva normal, Non-icteric.   Neck: Normal range of motion, No tenderness, Supple, No stridor.   Lymphatic: No lymphadenopathy noted.   Cardiovascular: Regular rate and rhythm, no palpable thrill  Thorax & Lungs: Slightly dimished breath sounds in lung bases. No chest tenderness.    Abdomen: Bowel sounds normal, Soft, No tenderness, No masses, No pulsatile masses. No peritoneal signs.  Skin: Warm, Dry, No erythema, No rash.   Back: No bony tenderness, No CVA tenderness.   Extremities: Intact distal pulses, Significant edema extending to the knee bilaterally. No tenderness, No cyanosis.  Musculoskeletal: Good range of motion in all major joints. No tenderness to palpation or major deformities noted.   Neurologic: Alert , Normal motor function, Normal sensory function, No focal deficits noted.   Psychiatric: Affect normal, Judgment normal, Mood normal.     DIAGNOSTIC STUDIES / PROCEDURES    EKG Interpretation:  Interpreted by me    12 Lead EKG interpreted by me to show:  Normal sinus rhythm  Rate 93   Inferior infarct appears old  Axis: Normal  Intervals: Normal  Normal T waves  Normal ST segments, no significant ST elevation or depression  My impression of this EKG: Does not indicate ischemia or arrhythmia at this time.     LABS  Labs Reviewed   CBC WITH DIFFERENTIAL - Abnormal; Notable for the following components:       Result Value    Hematocrit 54.2 (*)     MCHC 32.5 (*)     RDW 52.5 (*)     Platelet Count 148 (*)     Lymphocytes 20.60 (*)     All other components within normal limits   COMP METABOLIC PANEL - Abnormal; Notable for the following components:    Glucose 154 (*)     Bun 56 (*)     Creatinine 1.97 (*)     All other components within normal limits   TROPONIN - Abnormal; Notable for the following components:    Troponin T 45 (*)     All other components within normal limits    PROBRAIN NATRIURETIC PEPTIDE, NT - Abnormal; Notable for the following components:    NT-proBNP 1182 (*)     All other components within normal limits   ESTIMATED GFR - Abnormal; Notable for the following components:    GFR If  40 (*)     GFR If Non  33 (*)     All other components within normal limits   POCT GLUCOSE DEVICE RESULTS - Abnormal; Notable for the following components:    Glucose - Accu-Ck 105 (*)     All other components within normal limits   CBC WITH DIFFERENTIAL   BASIC METABOLIC PANEL   PROBRAIN NATRIURETIC PEPTIDE, NT     All labs reviewed by me.    RADIOLOGY  DX-CHEST-PORTABLE (1 VIEW)   Final Result      1.  Cardiomegaly and ICD.   2.  Elevated left hemidiaphragm with gaseous filled stomach.      EC-ECHOCARDIOGRAM COMPLETE W/O CONT    (Results Pending)      The radiologist's interpretation of all radiological studies have been reviewed by me.    COURSE & MEDICAL DECISION MAKING  Pertinent Labs & Imaging studies reviewed. (See chart for details)    This is a 81 y.o. male that presents with what appears to be a CHF exacerbation.  At this time I will evaluate the patient with a proBNP as well as troponin and get a screening EKG.  I believe the patient likely will need an echo.  I will reevaluate after labs have returned..     6:09 PM - Patient seen and examined at bedside. Discussed contacting the patient's Cardiologist for further evaluation. Ordered DX-Chest, Estimated GFR, CBC with diff, CMP, Troponin, pBNP and EKG.  Patient will be medicated with Lasix 40 mg for his symptoms.     6:22 PM - Paged hospitalist and cardiologist    6:32 PM - I discussed the patient's case and the above findings with Dr. Wilkinson (Cardiology) who agreed to evaluate the patient and recommended hospitalization    6:49 PM - I discussed the patient's case and the above findings with Dr. Acuna (Hospitalist) who will assess the patient for hospitalization.     The patient does have  cardiomegaly.  The patient's proBNP is elevated.  The patient does have an elevated creatinine however this is baseline and troponin is around baseline.  The patient is 15 L of fluid up.  We will start to give the patient IV Lasix and admit the patient to the hospitalist.  I did consult with the cardiologist.    The total critical care time on this patient is 30 minutes, resuscitating patient, speaking with admitting physician, and deciphering test results. This  is exclusive of separately billable procedures.        DISPOSITION:  Patient will be hospitalized by Dr. Acuna in guarded condition.    FINAL IMPRESSION  1. Acute on chronic congestive heart failure, unspecified heart failure type (HCC)    2. Acute systolic (congestive) heart failure (HCC)          Christopher ANDERSON (Scribe), am scribing for, and in the presence of, Augustus Esposito M.D..    Electronically signed by: Christopher Kelsey (Scribe), 2/14/2022 Augustus CHEN M.D. personally performed the services described in this documentation, as scribed by Christopher Kelsey in my presence, and it is both accurate and complete.     The note accurately reflects work and decisions made by me.  Augustus Esposito M.D.  2/15/2022  12:34 AM

## 2022-02-16 NOTE — PROGRESS NOTES
Discharging Patient home per physician order.  Discharged with himself to home at 1525.  Demonstrated understanding of discharge instructions, follow up appointments, home medications, prescriptions sent to NA, and nursing care instructions for HF.  Ambulating without assistance, voiding without difficulty, pain well controlled, tolerating oral medications, oxygen saturation greater than 90% , tolerating diet.   Educational handouts given and discussed.  Verbalized understanding of discharge instructions and educational handouts.  All questions answered.  Belongings with patient at time of discharge. Pt was sent home with mask in place.

## 2022-02-17 ASSESSMENT — ENCOUNTER SYMPTOMS
VOMITING: 0
IRREGULAR HEARTBEAT: 0
SYNCOPE: 0
DYSPNEA ON EXERTION: 0
WEAKNESS: 0
EXCESSIVE DAYTIME SLEEPINESS: 0
LIGHT-HEADEDNESS: 0
FALLS: 0
DIZZINESS: 0
DIAPHORESIS: 0
PALPITATIONS: 0
NUMBNESS: 0
SORE THROAT: 0
BLOATING: 0
NAUSEA: 0
COUGH: 0
CONSTIPATION: 0
SLEEP DISTURBANCES DUE TO BREATHING: 0
WHEEZING: 0
MYALGIAS: 0
DECREASED APPETITE: 0
BACK PAIN: 0
PARESTHESIAS: 0
DOUBLE VISION: 0
LOSS OF BALANCE: 0
FLANK PAIN: 0
NIGHT SWEATS: 0
PND: 0
ORTHOPNEA: 0
BLURRED VISION: 0
HEADACHES: 0
FEVER: 0
NEAR-SYNCOPE: 0
DIARRHEA: 0

## 2022-02-24 ENCOUNTER — OFFICE VISIT (OUTPATIENT)
Dept: CARDIOLOGY | Facility: MEDICAL CENTER | Age: 82
End: 2022-02-24
Payer: MEDICARE

## 2022-02-24 VITALS
WEIGHT: 244 LBS | HEIGHT: 73 IN | BODY MASS INDEX: 32.34 KG/M2 | RESPIRATION RATE: 16 BRPM | SYSTOLIC BLOOD PRESSURE: 124 MMHG | OXYGEN SATURATION: 99 % | DIASTOLIC BLOOD PRESSURE: 74 MMHG | HEART RATE: 77 BPM

## 2022-02-24 DIAGNOSIS — N18.4 STAGE 4 CHRONIC KIDNEY DISEASE (HCC): ICD-10-CM

## 2022-02-24 DIAGNOSIS — I25.5 ISCHEMIC CARDIOMYOPATHY: ICD-10-CM

## 2022-02-24 DIAGNOSIS — I50.9 HEART FAILURE, NYHA CLASS 2 (HCC): ICD-10-CM

## 2022-02-24 DIAGNOSIS — I25.10 CORONARY ARTERY DISEASE DUE TO CALCIFIED CORONARY LESION: ICD-10-CM

## 2022-02-24 DIAGNOSIS — Z95.810 ICD (IMPLANTABLE CARDIOVERTER-DEFIBRILLATOR) IN PLACE: ICD-10-CM

## 2022-02-24 DIAGNOSIS — I50.20 ACC/AHA STAGE C SYSTOLIC HEART FAILURE (HCC): ICD-10-CM

## 2022-02-24 DIAGNOSIS — I49.5 SSS (SICK SINUS SYNDROME) (HCC): ICD-10-CM

## 2022-02-24 DIAGNOSIS — Z95.2 S/P TAVR (TRANSCATHETER AORTIC VALVE REPLACEMENT): ICD-10-CM

## 2022-02-24 DIAGNOSIS — I25.84 CORONARY ARTERY DISEASE DUE TO CALCIFIED CORONARY LESION: ICD-10-CM

## 2022-02-24 DIAGNOSIS — N18.31 STAGE 3A CHRONIC KIDNEY DISEASE: ICD-10-CM

## 2022-02-24 DIAGNOSIS — Z79.899 HIGH RISK MEDICATION USE: ICD-10-CM

## 2022-02-24 PROCEDURE — 99214 OFFICE O/P EST MOD 30 MIN: CPT | Performed by: NURSE PRACTITIONER

## 2022-02-24 RX ORDER — LOSARTAN POTASSIUM 25 MG/1
25 TABLET ORAL DAILY
Qty: 30 TABLET | Refills: 3 | Status: ON HOLD | OUTPATIENT
Start: 2022-02-24 | End: 2022-03-31

## 2022-02-24 RX ORDER — CHOLECALCIFEROL (VITAMIN D3) 125 MCG
CAPSULE ORAL
COMMUNITY
Start: 2002-01-01 | End: 2022-03-18

## 2022-02-24 ASSESSMENT — MINNESOTA LIVING WITH HEART FAILURE QUESTIONNAIRE (MLHF)
DIFFICULTY GOING AWAY FROM HOME: 3
MAKING YOU STAY IN A HOSPITAL: 0
DIFFICULTY WITH RECREATIONAL PASTIMES, SPORTS, HOBBIES: 3
TIRED, FATIGUED OR LOW ON ENERGY: 5
WORKING AROUND THE HOUSE OR YARD DIFFICULT: 4
GIVING YOU SIDE EFFECTS FROM TREATMENTS: 3
MAKING YOU WORRY: 3
DIFFICULTY WORKING TO EARN A LIVING: 3
FEELING LIKE A BURDEN TO FAMILY AND FRIENDS: 1
DIFFICULTY WITH SEXUAL ACTIVITIES: 3
DIFFICULTY SLEEPING WELL AT NIGHT: 3
EATING LESS FOODS YOU LIKE: 1
MAKING YOU FEEL DEPRESSED: 1
TOTAL_SCORE: 62
HAVING TO SIT OR LIE DOWN DURING THE DAY: 4
WALKING ABOUT OR CLIMBING STAIRS DIFFICULT: 3
SWELLING IN ANKLES OR LEGS: 5
DIFFICULTY TO CONCENTRATE OR REMEMBERING THINGS: 3
COSTING YOU MONEY FOR MEDICAL CARE: 4
LOSS OF SELF CONTROL IN YOUR LIFE: 3
MAKING YOU SHORT OF BREATH: 4
DIFFICULTY SOCIALIZING WITH FAMILY OR FRIENDS: 3

## 2022-02-24 ASSESSMENT — ENCOUNTER SYMPTOMS
FEVER: 0
SHORTNESS OF BREATH: 1
ABDOMINAL PAIN: 0
PND: 0
ORTHOPNEA: 0
PALPITATIONS: 0
MYALGIAS: 0
COUGH: 1
DIZZINESS: 1
CLAUDICATION: 0

## 2022-02-24 ASSESSMENT — FIBROSIS 4 INDEX: FIB4 SCORE: 3.77

## 2022-02-24 NOTE — PATIENT INSTRUCTIONS
Please Verify the dose of Bumex     If taking Bumex 1 mg twice a day then increase to 2 mg twice a day.     Get labs done in 1-2 weeks

## 2022-02-24 NOTE — PROGRESS NOTES
Chief Complaint   Patient presents with   • Coronary Artery Disease     F/V Dx: Coronary artery disease due to calcified coronary lesion   • Congestive Heart Failure     F/V Dx: CHF (congestive heart failure), NYHA class II, chronic, systolic (HCC)       Aman More III is a 81 y.o. male who presents today for follow up after his recent hospitalization.    Patient of Dr. Wilkinson, Dr. Castro for his Valve, Dr. Hendrix for Heart Failure and Dr. Steiner for his ICD.  He was last seen in clinic with Dr. Steiner on 1/25/2022. He was following up on A fib. He was recommended for a OLGA/DCCV. He had a successful DCCV on 2/3/2022.     He presented to the hospital on 2/14/2022 through 2/15/2022 for Edema and SOB. He reported a 15 lb weight gain since his cardioversion. Pt was diuresed. He did obtain an echo which showed LVEF at 55% which had improved from prior echo. He was recommended to follow up with cardiology.    Pt reports his weights were around 225 lb at discharge. His dry weights typically range between 221-225 lbs.  He currently is up to 235 lbs.     He does have LE edema, dizziness with standing and chronic dry cough. He denies chest pain, palpitations, Orthopnea, PND.    He believes he is taking Bumex 1 mg twice a day not 2 mg Twice a day as recommended.    He denies any further problems with his afib.    He denies shocks.     Additonally, patient has the following medical problems:     -CAD, had triple vessel disease, was not recommended for CABG, Had BRANDI to distal RCA on 12/27/2019 and subsequent staged PCI to Proximal LAD and distal Left Main on 1/10/2020     -Aortic Stenosis, not a SAVR candidate and had TAVR on 1/27/2020 with Dr. Castro     -Had Complete Heart Block from event monitor after TAVR and had a dual chamber ICD placed on 2/4/2020. Could not place CS lead secondary to perforation of the CS causing small Stable effusion     -HTN     -HLD     -Diabetes     -CKD    -Diabetic Neuropathy    -JAMES, uses  "CPAP    Past Medical History:   Diagnosis Date   • Arthritis     \"everywhere\"   • Bowel habit changes     constipation   • Breath shortness     pt states short of breath 24/7 O2 at night only   • CAD (coronary artery disease)    • CATARACT     removed bilat   • Chest pain    • Chest tightness    • Chickenpox    • Congestive heart failure (HCC)    • Constipation    • Coronary heart disease    • Cough    • Daytime sleepiness    • Diabetes     oral medication   • Difficulty breathing    • Dilated cardiomyopathy (HCC)    • Fatigue    • GERD (gastroesophageal reflux disease)    • Hearing difficulty    • Heart murmur    • Heart valve disease    • Hiatus hernia syndrome    • History of BPH    • History of chronic cough    • Hyperlipidemia    • Hypertension     pt states well controlled on meds   • 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 Porter, gastroenterologist   • Urinary incontinence      Past Surgical History:   Procedure Laterality Date   • LAP, REMOVE ADJUST LUIS RESTRICT D*  6/5/2020    Procedure: REMOVAL, GASTRIC BAND, LAPAROSCOPIC - ADJUSTABLE BAND AND PORT;  Surgeon: Deniz Rios M.D.;  Location: SURGERY [facility];  Service: General   • TRANSCATHETER AORTIC VALVE REPLACEMENT N/A 1/27/2020    Procedure: REPLACEMENT, AORTIC VALVE, TRANSCATHETER-;  Surgeon: Surendra Castro M.D.;  Location: SURGERY [facility];  Service: Cardiac   • OLGA  1/27/2020    Procedure: ECHOCARDIOGRAM, TRANSESOPHAGEAL;  Surgeon: Surendra Castro M.D.;  Location: SURGERY [facility];  Service: Cardiac   • GASTROSCOPY N/A " 1/8/2016    Procedure: GASTROSCOPY;  Surgeon: Deniz Sue M.D.;  Location: [facility];  Service:    • ORIF, FRACTURE, HUMERUS Left 6/25/2015    Procedure: HUMERUS ORIF/ PROXIMAL;  Surgeon: Cristal Guido M.D.;  Location: [facility];  Service:    • FUSION, SPINE, LUMBAR, PLIF  9/5/2013    Performed by Edith Sears M.D. at [facility]   • LUMBAR DECOMPRESSION  9/5/2013    Performed by Edtih Sears M.D. at [facility]   • MASS EXCISION NEURO  9/5/2013    Performed by Edith Sears M.D. at [facility]   • RECOVERY  6/26/2012    Performed by SURGERY, IR-RECOVERY at [facility] SAME DAY [facility]   • DRAINAGE HEMATOMA  5/25/2012    Performed by DENIZ SUE at [facility]   • GASTRIC BAND LAPAROSCOPIC REVISION  5/11/2012    Performed by DENIZ SUE at [facility]   • FUSION, SPINE, LUMBAR, PLIF  3/26/2012    Performed by EDITH SEARS at [facility]   • LUMBAR DECOMPRESSION  3/26/2012    Performed by EDITH SEARS at [facility]   • INJ,EPIDURAL/LUMB/SAC SINGLE  3/19/2012    Performed by KRISTIN BALTAZAR at [facility]   • INJ,EPIDURAL/LUMB/SAC SINGLE  3/5/2012    Performed by KRISTIN BALTAZAR at [facility]   • GASTRIC BANDING LAPAROSCOPIC  3/24/2010    Performed by DENIZ SUE at [facility]   • HIATAL HERNIA REPAIR  3/24/2010    Performed by DENIZ SUE at [facility]   • KNEE ARTHROPLASTY TOTAL  2000    bilateral   • ABDOMINOPLASTY  1990   • AORTIC VALVE REPLACEMENT     • ARTHROSCOPY, KNEE     • LAMINOTOMY     • OTHER CARDIAC SURGERY      stents   • IA REMV 2ND CATARACT,CORN-SCLER SECTN     • SINUSCOPE     • SLEEVE,CARRIE VASO THIGH     • TONSILLECTOMY     • ZZZ CARDIAC CATH       Family History   Problem Relation Age of Onset   • No Known Problems Sister    • No Known Problems Sister    • No Known Problems Sister    • Diabetes Other    •  No Known Problems Mother    • No Known Problems Father    • Heart Disease Neg Hx      Social History     Socioeconomic History   • Marital status:      Spouse name: Not on file   • Number of children: Not on file   • Years of education: Not on file   • Highest education level: Not on file   Occupational History   • Not on file   Tobacco Use   • Smoking status: Never Smoker   • Smokeless tobacco: Never Used   • Tobacco comment: 0   Vaping Use   • Vaping Use: Never used   Substance and Sexual Activity   • Alcohol use: Not Currently     Alcohol/week: 0.0 oz   • Drug use: Not Currently   • Sexual activity: Yes   Other Topics Concern   • Not on file   Social History Narrative   • Not on file     Social Determinants of Health     Financial Resource Strain: Not on file   Food Insecurity: Not on file   Transportation Needs: Not on file   Physical Activity: Not on file   Stress: Not on file   Social Connections: Not on file   Intimate Partner Violence: Not on file   Housing Stability: Not on file     Allergies   Allergen Reactions   • Statins [Hmg-Coa-R Inhibitors] Rash     Blisters       Outpatient Encounter Medications as of 2/24/2022   Medication Sig Dispense Refill   • Cranberry-Vitamin C (CRANBERRY CONCENTRATE/VITAMINC) 87386-025 MG Cap      • losartan (COZAAR) 25 MG Tab Take 1 Tablet by mouth every day. 30 Tablet 3   • apixaban (ELIQUIS) 5mg Tab Take 1 Tablet by mouth 2 times a day. 180 Tablet 3   • aspirin EC (ECOTRIN) 81 MG Tablet Delayed Response Take 1 Tablet by mouth every day. 90 Tablet 3   • ezetimibe (ZETIA) 10 MG Tab Take 1 tablet by mouth once daily 90 Tablet 3   • carvedilol (COREG) 3.125 MG Tab Take 1 tablet by mouth 2 times a day with meals. 180 tablet 3   • bumetanide (BUMEX) 1 MG Tab Take 2 tablets by mouth twice daily 360 Tab 1   • SILODOSIN PO Take 1 Tablet by mouth every evening. Indications: Benign Enlargement of Prostate     • clomiPHENE (CLOMID) 50 MG tablet Take 50 mg by mouth see  administration instructions. Mon Wed Fri     • hydrocortisone 2.5 % Ointment Apply 1 Each to affected area(s) as needed.     • Calcium Citrate (CITRACAL PO) Take 1 Tab by mouth 2 Times a Day.     • fluticasone (FLONASE) 50 MCG/ACT nasal spray Use 2 spray(s) in each nostril once daily (Patient taking differently: Administer 2 Sprays into affected nostril(S) 1 time a day as needed.) 48 g 3   • lactobacillus rhamnosus (CULTURELLE) Cap capsule Take 1 Cap by mouth every day.     • testosterone cypionate (DEPO-TESTOSTERONE) 200 MG/ML Solution injection 50 mg by Intramuscular route every thursday.     • cyanocobalamin (VITAMIN B-12) 1000 MCG/ML Solution 1,000 mcg by Intramuscular route every thursday.     • coenzyme Q-10 30 MG capsule Take 60 mg by mouth 2 Times a Day.     • Garlic 1000 MG Cap Take 1,000 mg by mouth 2 Times a Day.     • glucosamine Sulfate 500 MG Cap Take 1,500 mg by mouth 2 Times a Day.     • vitamin e (VITAMIN E) 400 UNIT Cap Take 400 Units by mouth 2 times a day.     • omeprazole (PRILOSEC) 40 MG delayed-release capsule Take 40 mg by mouth 2 times a day.     • Cholecalciferol (VITAMIN D3) 2000 UNIT Cap Take 2,000 Units by mouth 2 Times a Day.     • Non Formulary Request Take 1 Capsule by mouth every day. anit-oxidant (OTC)     • Zinc 50 MG Tab Take 50 mg by mouth 2 times a day.     • anastrozole (ARIMIDEX) 1 MG Tab Take 0.5 mg by mouth see administration instructions. On Tues & Sat     • glimepiride (AMARYL) 1 MG tablet Take 1 mg by mouth at bedtime.     • B Complex Vitamins (VITAMIN B COMPLEX PO) Take 1 Tab by mouth every day.     • L-Lysine 1000 MG Tab Take 1,000 mg by mouth every day.     • Ascorbic Acid (VITAMIN C) 1000 MG Tab Take 1,000 mg by mouth 2 Times a Day.     • [DISCONTINUED] lisinopril (PRINIVIL) 5 MG Tab Take 1 tablet by mouth every day. (Patient taking differently: Take 5 mg by mouth every evening.) 90 tablet 3   • [DISCONTINUED] Home Care Oxygen Inhale 2 L/min by mouth Continuous.  "  Respiratory route via: Nasal Cannula   Oxygen supplier: [employer]  Indications: Hypoxia, to keep O2 sat over 90%     • [DISCONTINUED] Cranberry 300 MG Tab Take 300 mg by mouth every morning.       No facility-administered encounter medications on file as of 2/24/2022.     Review of Systems   Constitutional: Negative for fever and malaise/fatigue.   Respiratory: Positive for cough (dry) and shortness of breath.    Cardiovascular: Positive for leg swelling. Negative for chest pain, palpitations, orthopnea, claudication and PND.   Gastrointestinal: Negative for abdominal pain.   Musculoskeletal: Negative for myalgias.   Neurological: Positive for dizziness (with standing ).   All other systems reviewed and are negative.             Objective     /74 (BP Location: Left arm, Patient Position: Sitting, BP Cuff Size: Adult)   Pulse 77   Resp 16   Ht 1.854 m (6' 1\")   Wt 111 kg (244 lb)   SpO2 99%   BMI 32.19 kg/m²     Physical Exam       Lab Results   Component Value Date/Time    CHOLSTRLTOT 72 (L) 05/19/2021 08:26 AM    LDL 28 05/19/2021 08:26 AM    HDL 24 (A) 05/19/2021 08:26 AM    TRIGLYCERIDE 102 05/19/2021 08:26 AM       Lab Results   Component Value Date/Time    SODIUM 141 02/15/2022 04:18 AM    POTASSIUM 4.1 02/15/2022 04:18 AM    CHLORIDE 106 02/15/2022 04:18 AM    CO2 23 02/15/2022 04:18 AM    GLUCOSE 129 (H) 02/15/2022 04:18 AM    BUN 57 (H) 02/15/2022 04:18 AM    CREATININE 2.14 (H) 02/15/2022 04:18 AM    CREATININE 1.18 02/08/2011 12:00 AM    BUNCREATRAT 16 02/08/2011 12:00 AM    GLOMRATE >59 02/08/2011 12:00 AM     Lab Results   Component Value Date/Time    ALKPHOSPHAT 57 02/14/2022 05:36 PM    ASTSGOT 35 02/14/2022 05:36 PM    ALTSGPT 27 02/14/2022 05:36 PM    TBILIRUBIN 0.5 02/14/2022 05:36 PM      Transthoracic Echo Report 7/29/2015  Mild concentric left ventricular hypertrophy.   Mildly dilated left atrium. Left atrial volume index is 32 ml/sq m.  Left ventricular ejection fraction is 60% to " 65%.     Transthoracic Echo Report 12/3/2019  Left ventricle is severely dilated.  Severely reduced left ventricular systolic function.  Left ventricular ejection fraction is visually estimated to be 25%.  Global hypokinesis with regional variation.  Moderate aortic stenosis.  Trace mitral regurgitation.  Severely dilated left atrium.  Mild tricuspid regurgitation.  Right ventricular systolic pressure is estimated to be 40  mmHg.  Normal pericardium without effusion.  Compared to the images of the study done on 7/29/2015  there has been marked change in LV wall motion and function, moderate aortic stenosis is also now present.      Heart Cath 12/11/2019  Findings:     Triple-vessel coronary artery disease with left main disease     This is right dominant system.     Left main is large in caliber.   It trifurcated into left anterior descending, medium-sized ramus intermediate artery and left circumflex artery.   It has calcified eccentric 50% stenosis at the distal portion around the trifurcation.  As mentioned above, the IFR was consistent with flow-limiting disease.     Left anterior descending artery (LAD) is large caliber vessel and extends beyond the apex.  It gives rises to 2 relatively long medium size diagonal branches proximally.  Moderate to heavy calcification was noted in proximal LAD along with stenosis up to at least 60-70%.   As mentioned above the IFR of the LAD was in the 0.7 range.  There are also 90% stenoses at the ostia of both first and second diagonal branches.  The antegrade flow is normal.     The ramus intermediate artery is about 1 and half millimeter in diameter. It has 80-90% ostial stenosis with normal antegrade flow     Left circumflex artery is medium in caliber.   This is occluded in the midportion after giveing rise to one very small and short obtuse marginal branch.     Right coronary artery (RCA) is large caliber. It gives rise to several small acute marginal branches, posterior  descending artery and posterolateral branch.  There is 99% stenosis in the distal of the right coronary artery.  The antegrade flow is slow at JEN II.        Plan:   Limit right wrist movement for 24 hours.  Risk factor modification.  Consult valve team/cardiac surgeon for further management.        Heart Cath 12/27/2019   FINDINGS:  HEMODYNAMICS:  Central aortic pressure systolic 131, diastolic 59, mean of 59   MmHg.     PERIPHERAL ANGIOGRAPHY: The distal abdominal aorta and both iliac and femoral arteries are patent with at least moderate tortuosity of the right iliac artery.     CORONARY ARTERIOGRAPHY:  Right coronary artery:  The right coronary artery is a large-caliber vessel with a significant walsh's crook and midvessel acute tortuosity with a distal diseased segment containing a 99% stenosis with JEN-2 antegrade flow.      POST-SENT IMPLANTATION, distal right coronary artery demonstrates a residual 10% focal eccentric stenosis in the proximal portion of the stent; otherwise, the remainder of the stent is well expanded with no dissection or thrombus and JEN-3 antegrade flow.     PLAN:  1.  Postprocedure Angiomax x4 hours.  2.  Dual antiplatelet therapy with aspirin and Plavix.  3.  IV fluids.  4.  Follow up basic metabolic panel.  5.  Future staged coronary intervention of the left main artery with   subsequent evaluation of need for TAVR procedure.     PROCEDURE:  Cardiac catheterization and percutaneous coronary intervention.  A.  Selective coronary angiography.  B.  Coronary stent implantation, distal right coronary artery 3.5x28 mm Synergy drug-eluting stent postdilated 4.0 mm.  C.  Distal abdominal aortogram with bilateral iliofemoral angiography.  D. Right femoral artery approach.  E.  Conscious sedation supervision.  F.  Perclose deployment.     Coronary angiogram 1/10/2020  Conclusions    1. LVEF 30% prior to intervention.    2. 70% distal left main and proximal LAD stenosis, IFR positive from  prior procedure.    3. Successful planned PCI proximal LAD and distal left main trifurcation stenosis (3.5 x 26 mm Goldsmith BRANDI postdilated to 4.5 mm proximally), IVUS guided.    4. Moderate aortic stenosis, mean gradient 27 to 30mmHg.     Recommendations    * Long-term dual antiplatelet therapy.    * Bivalirudin for 4 hours post procedure.    * Continue outpatient cardiac medications.    * Follow-up as scheduled after observation admission overnight.    * Continue medical therapy for diabetes.        Transesophageal Echo Report 1/27/2020  Pre-Intervention:  LV:  Severely reduced left ventricular systolic function. Severe -    ejection fraction <30 %. Global hypokinesis with regional variation.   RV:  Mildly dilated right ventricle. Reduced right ventricular systolic function (mild reduction).   AV:  Calcified aortic valve leaflets.  Aortic valve annulus measured at \2.7 cm.  Sinus of valsalva measured at 3.3 cm.  Sinotubular junction   measured at 2.9 cm.  Aortic valve area measured at 6.59 cm2.  Moderate aortic stenosis with a mean pressure gradient of 22 mmHg.  However, gradient may be under-estimated in the setting of low EF.       Post-Intervention:  AV:  Nicely seated bioprosthetic aortic valve.  All three leaflets seen opening in cross sectional view.  No evidence of trans-valvular regurgitation.  Trivial para-valvular regurgitations seen at the left coronary cusp and at either the right or non-coronary cusp.  No evidence of aortic valve stenosis with mean pressure gradient of 4 mmHg.       Please see body of report for further findings.     Transthoracic Echo Report 1/28/2020  Compared to the images of the study done 12- there has been an interval placement of a TAVR valve.  The EF appears mildly improved.  Moderately reduced left ventricular systolic function.  Left ventricular ejection fraction is visually estimated to be 35%.     Transthoracic Echo Report 2/4/2020  Prior 01/28/2020, No significant  change.  Left ventricular ejection fraction is visually estimated to be 35-40%.  Known TAVR aortic valve that is functioning normally with normal   transvalvular gradients.  Vmax is   2.8m/s.Transvalvular gradients are - Peak: 14 mmHg,  Mean: 30 mmHg.     Transthoracic Echo Report 2/4/2020  Trivial pericardial effusion.     Transthoracic Echo Report 2/4/2020  Prior echocardiogram 2/4/2020.  No pericardial effusion seen. Fat pad present.     Transthoracic Echo Report 3/3/2020  Compared to the report of the prior study done on 2/4/2020   there has been no significant change.   Moderately reduced left ventricular systolic function.  Left ventricular ejection fraction is visually estimated to be 35%.  Pacer/ICD wire seen in right ventricle.  Known TAVR aortic valve that is functioning normally with normal transvalvular gradients.  Vmax is   m/s. Transvalvular gradients are- peak 21 mmHg and mean gradient is 11 mmHg.   Minimal paravalvular leak.    Transthoracic Echo Report 4/14/2020  Prior study 03/03/20; compared to the report of the study done - there has been no significant change.   Moderately reduced left ventricular systolic function.  Left ventricular ejection fraction is visually estimated to be 35%.  Pacer/ICD wire seen in right ventricle.  Known TAVR aortic valve that is functioning normally with normal transvalvular gradients.  Vmax is 1.9 m/s. Transvalvular gradients are - Peak: 15 mmHg, Mean: 9 mmHg.   No evidence of paravalvular leak is noted.     Transthoracic Echo Report 4/27/2020  Compared to the images of the prior study done 04/14/2020 - estimated ejection fraction is improved, previously 35%. The patient is now tachycardic with smaller ventricular size suggesting hypovolemia.     Moderately reduced left ventricular systolic function.  Left ventricular ejection fraction is visually estimated to be 40%.  Global hypokinesis.  Diastolic function is abnormal, but grade cannot be determined.  Normal right  ventricular systolic function.  Known TAVR aortic valve that is functioning normally with normal transvalvular gradients.    Transthoracic Echo Report 2/24/2021  Prior Echo - 4/27/20; compared to the images of the prior study done -  there has been no significant change.   Moderately reduced left ventricular systolic function.  Left ventricular ejection fraction is visually estimated to be 40%.  Pacer/ICD wire seen in right ventricle.  Known TAVR aortic valve that is functioning normally with normal transvalvular gradients.   Vmax is 1.78 m/s. Transvalvular gradients are - Peak: 12.6 mmHg, Mean: 8 mmHg.       Transthoracic Echo Report 2/15/2022  Compared to the prior study 02/24/2021 the LVEF is now normal.  Normal left ventricular chamber size.  The left ventricular ejection fraction is visually estimated to be 55%.  Normal diastolic function.  The right ventricle is normal in size and systolic function.  Normal inferior vena cava size and inspiratory collapse.  Known TAVR aortic valve that is functioning normally with normal transvalvular gradients.    Assessment & Plan     1. Stage 3a chronic kidney disease ([facility])  Basic Metabolic Panel   2. High risk medication use  Basic Metabolic Panel   3. ACC/AHA stage C systolic heart failure ([facility])     4. Heart failure, NYHA class 2 ([facility])     5. Ischemic cardiomyopathy     6. ICD (implantable cardioverter-defibrillator) in place     7. SSS (sick sinus syndrome) ([facility])     8. S/P TAVR (transcatheter aortic valve replacement)     9. Coronary artery disease due to calcified coronary lesion     10. Stage 4 chronic kidney disease ([facility])         Medical Decision Making: Today's Assessment/Status/Plan:        1. HFrEF, Stage C, Class 2, LVEF 55% prev 35%: pt does exhibit slight volume overload  -Heart failure due to Ischemic Cardiomyopathy  -pt to verify bumex dose, Increase Bumex to 2 mg BID, he wi-Monitor fluid intake to about 64 ounces per day  -Continue carvedilol 3.125 mg twice  a dayll contact the office if bumex is different.   -Stop Lisinopril (possible cause of cough) Start Losartan 25 mg daily  -Aldactone contraindicated for kidney function  -BMP in 1-2 weeks  -Has ICD, last device check 1/25/2022  -Reinforced s/sx of worsening heart failure with patient and weight monitoring. Pt verbalizes understanding. Pt to call office or RTC if present.      2.  CAD, PCI of the pLAD/distal left main and the distal RCA: Last LDL was on 5/19/2021  -Continue aspirin 81 mg daily  -Continue clopidogrel 75 mg daily  -Continue ezetimibe 10 mg daily  -Patient has intolerance to statins  -May need to consider PCSK-9 inhibitor in the future.      3.  Aortic stenosis, s/p TAVR pm 1/27/2020:  -Valve functioning normally per recent echo  -Patient will repeat echo in 1 year for Dr. Castro at the [facility]     4. CKD, Stage 4:  -Labs in 1-2 weeks     Follow-up: Return in about 3 weeks. Sooner if needed.    Patient verbalizes understanding and agrees with the plan of care.     PLEASE NOTE: This Note was created using voice recognition Software. I have made every reasonable attempt to correct obvious errors, but I expect that there are errors of grammar and possibly content that I did not discover before finalizing the note

## 2022-03-01 ENCOUNTER — HOSPITAL ENCOUNTER (OUTPATIENT)
Dept: LAB | Facility: MEDICAL CENTER | Age: 82
End: 2022-03-01
Attending: NURSE PRACTITIONER
Payer: MEDICARE

## 2022-03-01 DIAGNOSIS — Z79.899 HIGH RISK MEDICATION USE: ICD-10-CM

## 2022-03-01 DIAGNOSIS — N18.31 STAGE 3A CHRONIC KIDNEY DISEASE: ICD-10-CM

## 2022-03-01 LAB
ANION GAP SERPL CALC-SCNC: 12 MMOL/L (ref 7–16)
BUN SERPL-MCNC: 70 MG/DL (ref 8–22)
CALCIUM SERPL-MCNC: 9 MG/DL (ref 8.4–10.2)
CHLORIDE SERPL-SCNC: 100 MMOL/L (ref 96–112)
CO2 SERPL-SCNC: 21 MMOL/L (ref 20–33)
CREAT SERPL-MCNC: 2.38 MG/DL (ref 0.5–1.4)
GLUCOSE SERPL-MCNC: 174 MG/DL (ref 65–99)
POTASSIUM SERPL-SCNC: 4.5 MMOL/L (ref 3.6–5.5)
SODIUM SERPL-SCNC: 133 MMOL/L (ref 135–145)

## 2022-03-01 PROCEDURE — 36415 COLL VENOUS BLD VENIPUNCTURE: CPT

## 2022-03-01 PROCEDURE — 80048 BASIC METABOLIC PNL TOTAL CA: CPT

## 2022-03-09 DIAGNOSIS — I50.23 ACUTE ON CHRONIC SYSTOLIC HEART FAILURE (HCC): ICD-10-CM

## 2022-03-09 RX ORDER — BUMETANIDE 1 MG/1
TABLET ORAL
Qty: 120 TABLET | Refills: 0 | Status: SHIPPED | OUTPATIENT
Start: 2022-03-09 | End: 2022-03-17

## 2022-03-12 SDOH — HEALTH STABILITY: PHYSICAL HEALTH: ON AVERAGE, HOW MANY DAYS PER WEEK DO YOU ENGAGE IN MODERATE TO STRENUOUS EXERCISE (LIKE A BRISK WALK)?: 3 DAYS

## 2022-03-12 SDOH — ECONOMIC STABILITY: HOUSING INSECURITY
IN THE LAST 12 MONTHS, WAS THERE A TIME WHEN YOU DID NOT HAVE A STEADY PLACE TO SLEEP OR SLEPT IN A SHELTER (INCLUDING NOW)?: NO

## 2022-03-12 SDOH — ECONOMIC STABILITY: TRANSPORTATION INSECURITY
IN THE PAST 12 MONTHS, HAS LACK OF TRANSPORTATION KEPT YOU FROM MEETINGS, WORK, OR FROM GETTING THINGS NEEDED FOR DAILY LIVING?: NO

## 2022-03-12 SDOH — ECONOMIC STABILITY: TRANSPORTATION INSECURITY
IN THE PAST 12 MONTHS, HAS THE LACK OF TRANSPORTATION KEPT YOU FROM MEDICAL APPOINTMENTS OR FROM GETTING MEDICATIONS?: NO

## 2022-03-12 SDOH — ECONOMIC STABILITY: HOUSING INSECURITY: IN THE LAST 12 MONTHS, HOW MANY PLACES HAVE YOU LIVED?: 1

## 2022-03-12 SDOH — ECONOMIC STABILITY: FOOD INSECURITY: WITHIN THE PAST 12 MONTHS, YOU WORRIED THAT YOUR FOOD WOULD RUN OUT BEFORE YOU GOT MONEY TO BUY MORE.: NEVER TRUE

## 2022-03-12 SDOH — HEALTH STABILITY: PHYSICAL HEALTH: ON AVERAGE, HOW MANY MINUTES DO YOU ENGAGE IN EXERCISE AT THIS LEVEL?: 150+ MIN

## 2022-03-12 SDOH — ECONOMIC STABILITY: TRANSPORTATION INSECURITY
IN THE PAST 12 MONTHS, HAS LACK OF RELIABLE TRANSPORTATION KEPT YOU FROM MEDICAL APPOINTMENTS, MEETINGS, WORK OR FROM GETTING THINGS NEEDED FOR DAILY LIVING?: NO

## 2022-03-12 SDOH — ECONOMIC STABILITY: INCOME INSECURITY: HOW HARD IS IT FOR YOU TO PAY FOR THE VERY BASICS LIKE FOOD, HOUSING, MEDICAL CARE, AND HEATING?: NOT VERY HARD

## 2022-03-12 SDOH — ECONOMIC STABILITY: INCOME INSECURITY: IN THE LAST 12 MONTHS, WAS THERE A TIME WHEN YOU WERE NOT ABLE TO PAY THE MORTGAGE OR RENT ON TIME?: NO

## 2022-03-12 SDOH — ECONOMIC STABILITY: FOOD INSECURITY: WITHIN THE PAST 12 MONTHS, THE FOOD YOU BOUGHT JUST DIDN'T LAST AND YOU DIDN'T HAVE MONEY TO GET MORE.: NEVER TRUE

## 2022-03-12 SDOH — HEALTH STABILITY: MENTAL HEALTH
STRESS IS WHEN SOMEONE FEELS TENSE, NERVOUS, ANXIOUS, OR CAN'T SLEEP AT NIGHT BECAUSE THEIR MIND IS TROUBLED. HOW STRESSED ARE YOU?: TO SOME EXTENT

## 2022-03-12 ASSESSMENT — SOCIAL DETERMINANTS OF HEALTH (SDOH)
DO YOU BELONG TO ANY CLUBS OR ORGANIZATIONS SUCH AS CHURCH GROUPS UNIONS, FRATERNAL OR ATHLETIC GROUPS, OR SCHOOL GROUPS?: NO
IN A TYPICAL WEEK, HOW MANY TIMES DO YOU TALK ON THE PHONE WITH FAMILY, FRIENDS, OR NEIGHBORS?: ONCE A WEEK
IN A TYPICAL WEEK, HOW MANY TIMES DO YOU TALK ON THE PHONE WITH FAMILY, FRIENDS, OR NEIGHBORS?: ONCE A WEEK
HOW OFTEN DO YOU ATTENT MEETINGS OF THE CLUB OR ORGANIZATION YOU BELONG TO?: NEVER
HOW OFTEN DO YOU GET TOGETHER WITH FRIENDS OR RELATIVES?: NEVER
HOW HARD IS IT FOR YOU TO PAY FOR THE VERY BASICS LIKE FOOD, HOUSING, MEDICAL CARE, AND HEATING?: NOT VERY HARD
HOW OFTEN DO YOU HAVE SIX OR MORE DRINKS ON ONE OCCASION: NEVER
HOW OFTEN DO YOU HAVE A DRINK CONTAINING ALCOHOL: NEVER
HOW OFTEN DO YOU ATTEND CHURCH OR RELIGIOUS SERVICES?: NEVER
DO YOU BELONG TO ANY CLUBS OR ORGANIZATIONS SUCH AS CHURCH GROUPS UNIONS, FRATERNAL OR ATHLETIC GROUPS, OR SCHOOL GROUPS?: NO
HOW OFTEN DO YOU ATTEND CHURCH OR RELIGIOUS SERVICES?: NEVER
WITHIN THE PAST 12 MONTHS, YOU WORRIED THAT YOUR FOOD WOULD RUN OUT BEFORE YOU GOT THE MONEY TO BUY MORE: NEVER TRUE
HOW OFTEN DO YOU GET TOGETHER WITH FRIENDS OR RELATIVES?: NEVER
HOW OFTEN DO YOU ATTENT MEETINGS OF THE CLUB OR ORGANIZATION YOU BELONG TO?: NEVER

## 2022-03-12 ASSESSMENT — LIFESTYLE VARIABLES
HOW OFTEN DO YOU HAVE A DRINK CONTAINING ALCOHOL: NEVER
HOW OFTEN DO YOU HAVE SIX OR MORE DRINKS ON ONE OCCASION: NEVER

## 2022-03-14 NOTE — RESULT ENCOUNTER NOTE
Can you contact patient and see if he can repeat another BMP before his appointment with me on Thursday to see how his kidney function is progressing.

## 2022-03-15 ENCOUNTER — HOSPITAL ENCOUNTER (OUTPATIENT)
Dept: LAB | Facility: MEDICAL CENTER | Age: 82
End: 2022-03-15
Attending: INTERNAL MEDICINE
Payer: MEDICARE

## 2022-03-15 ENCOUNTER — TELEPHONE (OUTPATIENT)
Dept: CARDIOLOGY | Facility: MEDICAL CENTER | Age: 82
End: 2022-03-15

## 2022-03-15 ENCOUNTER — OFFICE VISIT (OUTPATIENT)
Dept: MEDICAL GROUP | Facility: MEDICAL CENTER | Age: 82
End: 2022-03-15
Payer: MEDICARE

## 2022-03-15 VITALS
BODY MASS INDEX: 30.25 KG/M2 | RESPIRATION RATE: 18 BRPM | SYSTOLIC BLOOD PRESSURE: 102 MMHG | OXYGEN SATURATION: 94 % | HEIGHT: 71 IN | TEMPERATURE: 98.9 F | WEIGHT: 216.05 LBS | DIASTOLIC BLOOD PRESSURE: 64 MMHG | HEART RATE: 79 BPM

## 2022-03-15 DIAGNOSIS — I50.22 CHF (CONGESTIVE HEART FAILURE), NYHA CLASS II, CHRONIC, SYSTOLIC (HCC): Chronic | ICD-10-CM

## 2022-03-15 DIAGNOSIS — L98.9 SKIN LESION: ICD-10-CM

## 2022-03-15 DIAGNOSIS — I48.11 LONGSTANDING PERSISTENT ATRIAL FIBRILLATION (HCC): Chronic | ICD-10-CM

## 2022-03-15 DIAGNOSIS — F32.A MILD DEPRESSION: ICD-10-CM

## 2022-03-15 DIAGNOSIS — E11.65 TYPE 2 DIABETES MELLITUS WITH HYPERGLYCEMIA, WITHOUT LONG-TERM CURRENT USE OF INSULIN (HCC): ICD-10-CM

## 2022-03-15 DIAGNOSIS — N18.32 STAGE 3B CHRONIC KIDNEY DISEASE: ICD-10-CM

## 2022-03-15 DIAGNOSIS — K21.9 GASTROESOPHAGEAL REFLUX DISEASE WITHOUT ESOPHAGITIS: ICD-10-CM

## 2022-03-15 DIAGNOSIS — E78.5 DYSLIPIDEMIA: Chronic | ICD-10-CM

## 2022-03-15 DIAGNOSIS — G47.33 OSA (OBSTRUCTIVE SLEEP APNEA): Chronic | ICD-10-CM

## 2022-03-15 LAB
ALBUMIN SERPL BCP-MCNC: 4.2 G/DL (ref 3.2–4.9)
ALBUMIN/GLOB SERPL: 2.2 G/DL
ALP SERPL-CCNC: 55 U/L (ref 30–99)
ALT SERPL-CCNC: 32 U/L (ref 2–50)
ANION GAP SERPL CALC-SCNC: 11 MMOL/L (ref 7–16)
AST SERPL-CCNC: 38 U/L (ref 12–45)
BASOPHILS # BLD AUTO: 0.2 % (ref 0–1.8)
BASOPHILS # BLD: 0.01 K/UL (ref 0–0.12)
BILIRUB SERPL-MCNC: 0.7 MG/DL (ref 0.1–1.5)
BUN SERPL-MCNC: 55 MG/DL (ref 8–22)
CALCIUM SERPL-MCNC: 9 MG/DL (ref 8.4–10.2)
CHLORIDE SERPL-SCNC: 103 MMOL/L (ref 96–112)
CO2 SERPL-SCNC: 25 MMOL/L (ref 20–33)
CREAT SERPL-MCNC: 2.06 MG/DL (ref 0.5–1.4)
CREAT UR-MCNC: 136.85 MG/DL
EOSINOPHIL # BLD AUTO: 0.22 K/UL (ref 0–0.51)
EOSINOPHIL NFR BLD: 3.5 % (ref 0–6.9)
ERYTHROCYTE [DISTWIDTH] IN BLOOD BY AUTOMATED COUNT: 48.1 FL (ref 35.9–50)
EST. AVERAGE GLUCOSE BLD GHB EST-MCNC: 137 MG/DL
GFR SERPLBLD CREATININE-BSD FMLA CKD-EPI: 32 ML/MIN/1.73 M 2
GLOBULIN SER CALC-MCNC: 1.9 G/DL (ref 1.9–3.5)
GLUCOSE SERPL-MCNC: 143 MG/DL (ref 65–99)
HBA1C MFR BLD: 6.4 % (ref 4–5.6)
HCT VFR BLD AUTO: 53.9 % (ref 42–52)
HGB BLD-MCNC: 17.6 G/DL (ref 14–18)
IMM GRANULOCYTES # BLD AUTO: 0.03 K/UL (ref 0–0.11)
IMM GRANULOCYTES NFR BLD AUTO: 0.5 % (ref 0–0.9)
LYMPHOCYTES # BLD AUTO: 1.33 K/UL (ref 1–4.8)
LYMPHOCYTES NFR BLD: 21.3 % (ref 22–41)
MCH RBC QN AUTO: 30.2 PG (ref 27–33)
MCHC RBC AUTO-ENTMCNC: 32.7 G/DL (ref 33.7–35.3)
MCV RBC AUTO: 92.5 FL (ref 81.4–97.8)
MICROALBUMIN UR-MCNC: 3.5 MG/DL
MICROALBUMIN/CREAT UR: 26 MG/G (ref 0–30)
MONOCYTES # BLD AUTO: 0.72 K/UL (ref 0–0.85)
MONOCYTES NFR BLD AUTO: 11.5 % (ref 0–13.4)
NEUTROPHILS # BLD AUTO: 3.93 K/UL (ref 1.82–7.42)
NEUTROPHILS NFR BLD: 63 % (ref 44–72)
NRBC # BLD AUTO: 0 K/UL
NRBC BLD-RTO: 0 /100 WBC
PLATELET # BLD AUTO: 134 K/UL (ref 164–446)
PMV BLD AUTO: 10.4 FL (ref 9–12.9)
POTASSIUM SERPL-SCNC: 4.3 MMOL/L (ref 3.6–5.5)
PROT SERPL-MCNC: 6.1 G/DL (ref 6–8.2)
RBC # BLD AUTO: 5.83 M/UL (ref 4.7–6.1)
SODIUM SERPL-SCNC: 139 MMOL/L (ref 135–145)
WBC # BLD AUTO: 6.2 K/UL (ref 4.8–10.8)

## 2022-03-15 PROCEDURE — 80053 COMPREHEN METABOLIC PANEL: CPT

## 2022-03-15 PROCEDURE — 36415 COLL VENOUS BLD VENIPUNCTURE: CPT

## 2022-03-15 PROCEDURE — 85025 COMPLETE CBC W/AUTO DIFF WBC: CPT

## 2022-03-15 PROCEDURE — 83036 HEMOGLOBIN GLYCOSYLATED A1C: CPT

## 2022-03-15 PROCEDURE — 99214 OFFICE O/P EST MOD 30 MIN: CPT | Performed by: INTERNAL MEDICINE

## 2022-03-15 PROCEDURE — 82043 UR ALBUMIN QUANTITATIVE: CPT

## 2022-03-15 PROCEDURE — 82570 ASSAY OF URINE CREATININE: CPT

## 2022-03-15 ASSESSMENT — FIBROSIS 4 INDEX: FIB4 SCORE: 3.77

## 2022-03-15 ASSESSMENT — LIFESTYLE VARIABLES: SUBSTANCE_ABUSE: 0

## 2022-03-15 ASSESSMENT — ENCOUNTER SYMPTOMS
COUGH: 0
CHILLS: 0
SORE THROAT: 0
BLURRED VISION: 0
FEVER: 0
SENSORY CHANGE: 1
NAUSEA: 0
DEPRESSION: 1
HEARTBURN: 0
VOMITING: 0

## 2022-03-15 ASSESSMENT — PATIENT HEALTH QUESTIONNAIRE - PHQ9
CLINICAL INTERPRETATION OF PHQ2 SCORE: 3
5. POOR APPETITE OR OVEREATING: 0 - NOT AT ALL
SUM OF ALL RESPONSES TO PHQ QUESTIONS 1-9: 8

## 2022-03-15 NOTE — ASSESSMENT & PLAN NOTE
This is a new diagnosis, mild based on PHQ-9 screening.  Patient is currently on not interested in medical treatment.  He denies suicidal thoughts, ideations.  He has no history of suicide attempts or substance abuse.

## 2022-03-15 NOTE — ASSESSMENT & PLAN NOTE
This is a chronic condition, controlled per patient.  Most recent hemoglobin A1c was 6.7%.  Continue glimepiride, repeat hemoglobin A1c, urine microalbumin creatinine ratio.  Patient is not on statin due to intolerance.  He is on aspirin 81 mg tablet daily.  Advised on low-carb diet and exercise.

## 2022-03-15 NOTE — ASSESSMENT & PLAN NOTE
eGFR is slowly trending down.  Patient was advised to make appointment with nephrology to discuss worsening kidney function.  Patient appears euvolemic on physical exam.

## 2022-03-15 NOTE — PROGRESS NOTES
"Subjective:     Chief Complaint   Patient presents with   • Hospital Follow-up        Diagnoses of Stage 3b chronic kidney disease (HCC), Dyslipidemia, Longstanding persistent atrial fibrillation (HCC), Type 2 diabetes mellitus with hyperglycemia, without long-term current use of insulin (HCC), Skin lesion, CHF (congestive heart failure), NYHA class II, chronic, systolic (HCC), Gastroesophageal reflux disease without esophagitis, JAMES (obstructive sleep apnea), and Mild depression (HCC) were pertinent to this visit.    HISTORY OF THE PRESENT ILLNESS: Patient is a 81 y.o. male. This patient is here today to establish care and for post discharge follow-up.  He does not have a PCP for the last few years, as he states he has fired him.  He states that his previous physician was poor in his diagnostic skills and he was not satisfied with the care. \"He kept giving me the statins even though my cholesterol was normal\".      Patient has a  \"wellness physician\" Dr. Miner, who is managing his hormone replacement therapy with growth hormone, testosterone, anastrozole and clomiphene.     Patient has multiple chronic conditions and I did my best to address some of them in the limited time of the appointment.    Problem   Mild Depression (Hcc)    PHQ-9 Screening 3/15/2022 6/4/2021 7/13/2020 5/29/2020 5/2/2020   Little interest or pleasure in doing things - - 3 - -   Feeling down, depressed, or hopeless - - 0 - -   Trouble falling or staying asleep, or sleeping too much - - 3 - -   Feeling tired or having little energy - - 3 - -   Poor appetite or overeating - - 3 - -   Feeling bad about yourself - or that you are a failure or have let yourself or your family down - - 0 - -   Trouble concentrating on things, such as reading the newspaper or watching television - - 0 - -   Moving or speaking so slowly that other people could have noticed. Or the opposite - being so fidgety or restless that you have been moving around a lot more " than usual - - 0 - -   Thoughts that you would be better off dead, or of hurting yourself in some way - - 0 - -   PHQ-2 Total Score - - 3 - -   PHQ-9 Total Score - - 12 - -   Little interest or pleasure in doing things - - - - -   Little interest or pleasure in doing things 2 - more than half the days 0 - not at all - 0 - not at all 3 - nearly every day   Feeling down, depressed, or hopeless - - - - -   Feeling down, depressed, or hopeless 1 - several days 0 - not at all - 0 - not at all 0 - not at all   Trouble falling or staying asleep, or sleeping too much 1 - several days - - - 3 - nearly every day   Feeling tired or having little energy 2 - more than half the days - - - 3 - nearly every day   Poor appetite or overeating 0 - not at all - - - 2 - more than half the days   Feeling bad about yourself - or that you are a failure or have let yourself or your family down 0 - not at all - - - 0 - not at all   Trouble concentrating on things, such as reading the newspaper or watching television 2 - more than half the days - - - 1 - several days   Moving or speaking so slowly that other people could have noticed. Or the opposite - being so fidgety or restless that you have been moving around a lot more than usual 0 - not at all - - - 0 - not at all   Thoughts that you would be better off dead, or of hurting yourself in some way 0 - not at all - - - 0 - not at all   PHQ-2 Total Score - - - - -   PHQ-2 Total Score 3 0 - 0 3   PHQ-9 Total Score 8 - - - 12     Interpretation of PHQ-9 Total Score   Score Severity   1-4 No Depression   5-9 Mild Depression   10-14 Moderate Depression   15-19 Moderately Severe Depression   20-27 Severe Depression  Diagnosed on routine PHQ-9 screening.  Patient says that is expected to be depressed at his age currently not interested in medical management.  Patient has no suicidal thoughts or ideations, no suicide attempts substance abuse.     Skin Lesion    Multiple light pink lesions of the  left forearm of various sizes 5 to 7mm, suspicious for malignancy.  Patient is not using sunscreen, referred to dermatology for evaluation and potential skin biopsy.     Longstanding Persistent Atrial Fibrillation (Hcc)    This is a chronic condition, managed by cardiology.  Currently anticoagulated with Eliquis 5 mg twice daily, on carvedilol twice daily.  Status post cardioversion on February 3, 2022.      Mitchel (Obstructive Sleep Apnea)    Patient is on CPAP at home     Gastroesophageal Reflux Disease Without Esophagitis    This is a chronic condition, managed by gastroenterology.  Patient is taking omeprazole 40 mg twice daily.     Chf (Congestive Heart Failure), Nyha Class II, Chronic, Systolic ([facility])    managed by cardiology Dr. Wilkinson: He appears euvolemic on physical exam.  He is on losartan, carvedilol, not on spironolactone due to low kidney function.  He was admitted to the hospital for CHF exacerbation, when he suddenly gained 15 pounds.  He was diuresed with IV Lasix at that time, had echocardiogram that showed significant improvement of his cardiac function, when compared to previous study.  Ejection fraction 55%.     Stage 3b Chronic Kidney Disease (Hcc)    Lab Results   Component Value Date/Time    SODIUM 133 (L) 03/01/2022 01:47 PM    POTASSIUM 4.5 03/01/2022 01:47 PM    CHLORIDE 100 03/01/2022 01:47 PM    CO2 21 03/01/2022 01:47 PM    ANION 12.0 03/01/2022 01:47 PM    GLUCOSE 174 (H) 03/01/2022 01:47 PM    BUN 70 (H) 03/01/2022 01:47 PM    CREATININE 2.38 (H) 03/01/2022 01:47 PM    CREATININE 1.18 02/08/2011 12:00 AM    CALCIUM 9.0 03/01/2022 01:47 PM    ASTSGOT 35 02/14/2022 05:36 PM    ALTSGPT 27 02/14/2022 05:36 PM    TBILIRUBIN 0.5 02/14/2022 05:36 PM    ALBUMIN 4.1 02/14/2022 05:36 PM    ALBUMIN 3.94 09/12/2019 11:47 AM    TOTPROTEIN 6.6 02/14/2022 05:36 PM    TOTPROTEIN 6.4 09/12/2019 11:47 AM    GLOBULIN 2.5 02/14/2022 05:36 PM    AGRATIO 1.6 02/14/2022 05:36 PM   Most recent eGFR is 26, patient  "has seen his nephrology more than a year ago.  Strongly advised patient to make appointment with his nephrologist to discuss further plan.       Type 2 Diabetes Mellitus With Hyperglycemia, Without Long-Term Current Use of Insulin (Hcc)    Is a chronic condition, patient states that he is managed by his wellness doctor Dr. Miner.    He says that his most recent hemoglobin A1c was checked around a year ago and was 6.7%  Patient is taking Amaryl 1 mg tablet once daily.  His last foot exam was today 03/15/2022 by his podiatrist.  Patient has not had retinal screening for a few years now-provided referral, patient to schedule.         Dyslipidemia    Established diagnosis. Currently taking Zetia, intolerant to statins  Lab Results   Component Value Date/Time    CHOLSTRLTOT 72 (L) 05/19/2021 08:26 AM    TRIGLYCERIDE 102 05/19/2021 08:26 AM    HDL 24 (A) 05/19/2021 08:26 AM    LDL 28 05/19/2021 08:26 AM               Past Medical History:   Diagnosis Date   • Arthritis     \"everywhere\"   • Bowel habit changes     constipation   • Breath shortness     pt states short of breath 24/7 O2 at night only   • CAD (coronary artery disease)    • CATARACT     removed bilat   • Chest pain    • Chest tightness    • Chickenpox    • Congestive heart failure (HCC)    • Constipation    • Coronary heart disease    • Cough    • Daytime sleepiness    • Diabetes     oral medication   • Difficulty breathing    • Dilated cardiomyopathy (HCC)    • Fatigue    • GERD (gastroesophageal reflux disease)    • Hearing difficulty    • Heart murmur    • Heart valve disease    • Hiatus hernia syndrome    • History of BPH    • History of chronic cough    • Hyperlipidemia    • Hypertension     pt states well controlled on meds   • Kidney disease 05/2020    ruptered left kidney    • Mumps    • Oxygen dependent     @HS, 2 liters   • Pacemaker     AICD   • Pain 02/01/2022    \"everywhere\", 4/10   • Palpitations    • Peripheral neuropathy    • Persistent " atrial fibrillation (HCC)    • Pneumonia 2007   • Rhinitis    • Ringing in ears    • Severe aortic stenosis 12/4/2019   • Sleep apnea     CPAp with O2   • Swelling of lower extremity    • Tonsillitis    • Tremor    • Ulcer 2014    Dr. Porter, gastroenterologist   • Urinary incontinence      Past Surgical History:   Procedure Laterality Date   • LAP, REMOVE ADJUST LUIS RESTRICT D*  6/5/2020    Procedure: REMOVAL, GASTRIC BAND, LAPAROSCOPIC - ADJUSTABLE BAND AND PORT;  Surgeon: Deniz Sue M.D.;  Location: SURGERY [facility];  Service: General   • TRANSCATHETER AORTIC VALVE REPLACEMENT N/A 1/27/2020    Procedure: REPLACEMENT, AORTIC VALVE, TRANSCATHETER-;  Surgeon: Surendra Castro M.D.;  Location: SURGERY [facility];  Service: Cardiac   • OLGA  1/27/2020    Procedure: ECHOCARDIOGRAM, TRANSESOPHAGEAL;  Surgeon: Surendra Castro M.D.;  Location: SURGERY [facility];  Service: Cardiac   • GASTROSCOPY N/A 1/8/2016    Procedure: GASTROSCOPY;  Surgeon: Deniz Sue M.D.;  Location: [facility];  Service:    • ORIF, FRACTURE, HUMERUS Left 6/25/2015    Procedure: HUMERUS ORIF/ PROXIMAL;  Surgeon: Cristal Guido M.D.;  Location: [facility];  Service:    • FUSION, SPINE, LUMBAR, PLIF  9/5/2013    Performed by Edith Sears M.D. at [facility]   • LUMBAR DECOMPRESSION  9/5/2013    Performed by Edith Sears M.D. at [facility]   • MASS EXCISION NEURO  9/5/2013    Performed by Edith Sears M.D. at [facility]   • RECOVERY  6/26/2012    Performed by SURGERY, IR-RECOVERY at [facility] SAME DAY [facility]   • DRAINAGE HEMATOMA  5/25/2012    Performed by DENIZ SUE at [facility]   • GASTRIC BAND LAPAROSCOPIC REVISION  5/11/2012    Performed by DENIZ SUE at [facility]   • FUSION, SPINE, LUMBAR, PLIF  3/26/2012    Performed by EDITH SEARS at [facility]   • LUMBAR DECOMPRESSION  3/26/2012    Performed by  EDITH WHELAN at SURGERY [facility] ORS   • INJ,EPIDURAL/LUMB/SAC SINGLE  3/19/2012    Performed by KRISTIN BALTAZAR at SURGERY [facility] ORS   • INJ,EPIDURAL/LUMB/SAC SINGLE  3/5/2012    Performed by KRISTIN BALTAZAR at [facility] ORS   • GASTRIC BANDING LAPAROSCOPIC  3/24/2010    Performed by SARINA SUE at SURGERY [facility] ORS   • HIATAL HERNIA REPAIR  3/24/2010    Performed by SARINA SUE at SURGERY [facility] ORS   • KNEE ARTHROPLASTY TOTAL  2000    bilateral   • ABDOMINOPLASTY  1990   • AORTIC VALVE REPLACEMENT     • ARTHROSCOPY, KNEE     • LAMINOTOMY     • OTHER CARDIAC SURGERY      stents   • MA REMV 2ND CATARACT,CORN-SCLER SECTN     • SINUSCOPE     • SLEEVE,CARRIE VASO THIGH     • TONSILLECTOMY     • ZZZ CARDIAC CATH       Family History   Problem Relation Age of Onset   • No Known Problems Sister    • No Known Problems Sister    • No Known Problems Sister    • Diabetes Other    • No Known Problems Mother    • No Known Problems Father    • Heart Disease Neg Hx      Social History     Tobacco Use   • Smoking status: Never Smoker   • Smokeless tobacco: Never Used   • Tobacco comment: 0   Vaping Use   • Vaping Use: Never used   Substance Use Topics   • Alcohol use: Not Currently     Alcohol/week: 0.0 oz   • Drug use: Not Currently     Current Outpatient Medications Ordered in Epic   Medication Sig Dispense Refill   • bumetanide (BUMEX) 1 MG Tab Take 2 tablets by mouth twice daily 120 Tablet 0   • Cranberry-Vitamin C (CRANBERRY CONCENTRATE/VITAMINC) 92197-936 MG Cap      • losartan (COZAAR) 25 MG Tab Take 1 Tablet by mouth every day. 30 Tablet 3   • apixaban (ELIQUIS) 5mg Tab Take 1 Tablet by mouth 2 times a day. 180 Tablet 3   • aspirin EC (ECOTRIN) 81 MG Tablet Delayed Response Take 1 Tablet by mouth every day. 90 Tablet 3   • ezetimibe (ZETIA) 10 MG Tab Take 1 tablet by mouth once daily 90 Tablet 3   • carvedilol (COREG) 3.125 MG Tab Take 1 tablet by mouth 2 times a day with meals. 180  tablet 3   • SILODOSIN PO Take 1 Tablet by mouth every evening. Indications: Benign Enlargement of Prostate     • clomiPHENE (CLOMID) 50 MG tablet Take 50 mg by mouth see administration instructions. Mon Wed Fri     • hydrocortisone 2.5 % Ointment Apply 1 Each to affected area(s) as needed.     • Calcium Citrate (CITRACAL PO) Take 1 Tab by mouth 2 Times a Day.     • fluticasone (FLONASE) 50 MCG/ACT nasal spray Use 2 spray(s) in each nostril once daily (Patient taking differently: Administer 2 Sprays into affected nostril(S) 1 time a day as needed.) 48 g 3   • lactobacillus rhamnosus (CULTURELLE) Cap capsule Take 1 Cap by mouth every day.     • testosterone cypionate (DEPO-TESTOSTERONE) 200 MG/ML Solution injection 50 mg by Intramuscular route every thursday.     • cyanocobalamin (VITAMIN B-12) 1000 MCG/ML Solution 1,000 mcg by Intramuscular route every thursday.     • coenzyme Q-10 30 MG capsule Take 60 mg by mouth 2 Times a Day.     • Garlic 1000 MG Cap Take 1,000 mg by mouth 2 Times a Day.     • glucosamine Sulfate 500 MG Cap Take 1,500 mg by mouth 2 Times a Day.     • vitamin e (VITAMIN E) 400 UNIT Cap Take 400 Units by mouth 2 times a day.     • omeprazole (PRILOSEC) 40 MG delayed-release capsule Take 40 mg by mouth 2 times a day.     • Cholecalciferol (VITAMIN D3) 2000 UNIT Cap Take 2,000 Units by mouth 2 Times a Day.     • Non Formulary Request Take 1 Capsule by mouth every day. anit-oxidant (OTC)     • Zinc 50 MG Tab Take 50 mg by mouth 2 times a day.     • anastrozole (ARIMIDEX) 1 MG Tab Take 0.5 mg by mouth see administration instructions. On Tues & Sat     • glimepiride (AMARYL) 1 MG tablet Take 1 mg by mouth at bedtime.     • B Complex Vitamins (VITAMIN B COMPLEX PO) Take 1 Tab by mouth every day.     • L-Lysine 1000 MG Tab Take 1,000 mg by mouth every day.     • Ascorbic Acid (VITAMIN C) 1000 MG Tab Take 1,000 mg by mouth 2 Times a Day.       No current [facility]-ordered facility-administered medications on  "file.       Review of Systems   Constitutional: Negative for chills and fever.   HENT: Negative for sore throat.    Eyes: Negative for blurred vision.   Respiratory: Negative for cough.    Cardiovascular: Negative for chest pain.   Gastrointestinal: Negative for heartburn, nausea and vomiting.   Neurological: Positive for sensory change (bilateral LE neuropathy).   Psychiatric/Behavioral: Positive for depression. Negative for substance abuse and suicidal ideas.     Objective:     Exam: /64 (BP Location: Left arm, Patient Position: Sitting, BP Cuff Size: Adult)   Pulse 79   Temp 37.2 °C (98.9 °F) (Temporal)   Resp 18   Ht 1.803 m (5' 11\")   Wt 98 kg (216 lb 0.8 oz)   SpO2 94%  Body mass index is 30.13 kg/m².    Physical Exam  Constitutional:       General: He is not in acute distress.     Appearance: Normal appearance. He is not toxic-appearing.   HENT:      Head: Normocephalic and atraumatic.      Nose: Nose normal.   Cardiovascular:      Rate and Rhythm: Normal rate and regular rhythm.      Pulses: Normal pulses.      Heart sounds: Normal heart sounds. No murmur heard.  Pulmonary:      Effort: Pulmonary effort is normal. No respiratory distress.      Breath sounds: Normal breath sounds. No wheezing.   Musculoskeletal:      Right lower leg: No edema.      Left lower leg: No edema.   Skin:     General: Skin is warm and dry.             Comments: Multiple light pink lesions of the left forearm varying sizes from 5 to 7 mm, mild scaling.   Neurological:      Mental Status: He is alert.       Labs: Reviewed results of BMP from March 1, 2022, CBC, proBNP from February 14, 2022.    Assessment & Plan:   81 y.o. male with the following -    Problem List Items Addressed This Visit     CHF (congestive heart failure), NYHA class II, chronic, systolic (HCC) (Chronic)     This is a chronic problem, managed by cardiology.  Continue current with carvedilol, regimen with carvedilol, losartan, not on spironolactone due " to low kidney function, Bumex 1 mg tablet 1-2 times daily based on the weight.  Continue regular follow-ups with cardiology.         Dyslipidemia (Chronic)     Well-controlled on Zetia continue without changes.         Gastroesophageal reflux disease without esophagitis     Continue omeprazole.         Longstanding persistent atrial fibrillation (HCC) (Chronic)     Recent cardioversion, continue anticoagulation with Eliquis and Coreg.         Mild depression (HCC)     This is a new diagnosis, mild based on PHQ-9 screening.  Patient is currently on not interested in medical treatment.  He denies suicidal thoughts, ideations.  He has no history of suicide attempts or substance abuse.         JAMES (obstructive sleep apnea) (Chronic)     Continue CPAP         Skin lesion     Referred to dermatology for evaluation and possible skin biopsy.         Relevant Orders    Referral to Dermatology    Stage 3b chronic kidney disease (HCC)     eGFR is slowly trending down.  Patient was advised to make appointment with nephrology to discuss worsening kidney function.  Patient appears euvolemic on physical exam.           Relevant Orders    Comp Metabolic Panel    Type 2 diabetes mellitus with hyperglycemia, without long-term current use of insulin (HCC) (Chronic)     This is a chronic condition, controlled per patient.  Most recent hemoglobin A1c was 6.7%.  Continue glimepiride, repeat hemoglobin A1c, urine microalbumin creatinine ratio.  Patient is not on statin due to intolerance.  He is on aspirin 81 mg tablet daily.  Advised on low-carb diet and exercise.         Relevant Orders    Referral for Retinal Screening Exam    HEMOGLOBIN A1C    MICROALBUMIN CREAT RATIO URINE    CBC WITH DIFFERENTIAL (Completed)    Diabetic Monofilament LE Exam (Completed)        Return in about 3 months (around 6/15/2022).    Please note that this dictation was created using voice recognition software. I have made every reasonable attempt to correct  obvious errors, but I expect that there are errors of grammar and possibly content that I did not discover before finalizing the note.

## 2022-03-15 NOTE — LETTER
[facility]  Faye Moeller M.D.  [address]  [place] [address]  Fax: [phone]   Authorization for Release/Disclosure of   Protected Health Information   Name: LIAN MORE III : 1940 SSN: [id number]   Address: [address] [place] [address]  [place] [address] Phone:    [phone] (home)    I authorize the entity listed below to release/disclose the PHI below to:   [facility]/Faye Moeller M.D. and Faye Moeller M.D.   Provider or Entity Name:  [facility]   Address   City, State, Zip   Phone:      Fax:     Reason for request: continuity of care   Information to be released:    [ x] LAST COLONOSCOPY,  including any PATH REPORT and follow-up  [  ] LAST FIT/COLOGUARD RESULT [  ] LAST DEXA  [  ] LAST MAMMOGRAM  [  ] LAST PAP  [  ] LAST LABS [  ] RETINA EXAM REPORT  [  ] IMMUNIZATION RECORDS  [  ] Release all info      [  ] Check here and initial the line next to each item to release ALL health information INCLUDING  _____ Care and treatment for drug and / or alcohol abuse  _____ HIV testing, infection status, or AIDS  _____ Genetic Testing    DATES OF SERVICE OR TIME PERIOD TO BE DISCLOSED: _____________  I understand and acknowledge that:  * This Authorization may be revoked at any time by you in writing, except if your health information has already been used or disclosed.  * Your health information that will be used or disclosed as a result of you signing this authorization could be re-disclosed by the recipient. If this occurs, your re-disclosed health information may no longer be protected by State or Federal laws.  * You may refuse to sign this Authorization. Your refusal will not affect your ability to obtain treatment.  * This Authorization becomes effective upon signing and will  on (date) __________.      If no date is indicated, this Authorization will  one (1) year from the signature date.    Name: LIAN More III    Signature:   Date:      3/15/2022       PLEASE FAX REQUESTED RECORDS BACK TO: [phone]

## 2022-03-15 NOTE — ASSESSMENT & PLAN NOTE
This is a chronic problem, managed by cardiology.  Continue current with carvedilol, regimen with carvedilol, losartan, not on spironolactone due to low kidney function, Bumex 1 mg tablet 1-2 times daily based on the weight.  Continue regular follow-ups with cardiology.

## 2022-03-16 NOTE — TELEPHONE ENCOUNTER
SHO Hanna.  Charles Phan R.N.  Can you contact patient and see if he can repeat another BMP before his appointment with me on Thursday to see how his kidney function is progressing.

## 2022-03-17 ENCOUNTER — OFFICE VISIT (OUTPATIENT)
Dept: CARDIOLOGY | Facility: MEDICAL CENTER | Age: 82
End: 2022-03-17
Payer: MEDICARE

## 2022-03-17 VITALS
BODY MASS INDEX: 30.52 KG/M2 | HEIGHT: 71 IN | RESPIRATION RATE: 16 BRPM | SYSTOLIC BLOOD PRESSURE: 110 MMHG | HEART RATE: 78 BPM | WEIGHT: 218 LBS | OXYGEN SATURATION: 95 % | DIASTOLIC BLOOD PRESSURE: 70 MMHG

## 2022-03-17 DIAGNOSIS — I50.9 HEART FAILURE, NYHA CLASS 2 (HCC): ICD-10-CM

## 2022-03-17 DIAGNOSIS — I50.20 ACC/AHA STAGE C SYSTOLIC HEART FAILURE (HCC): ICD-10-CM

## 2022-03-17 DIAGNOSIS — Z79.899 HIGH RISK MEDICATION USE: ICD-10-CM

## 2022-03-17 DIAGNOSIS — Z79.01 CHRONIC ANTICOAGULATION: ICD-10-CM

## 2022-03-17 DIAGNOSIS — I25.84 CORONARY ARTERY DISEASE DUE TO CALCIFIED CORONARY LESION: ICD-10-CM

## 2022-03-17 DIAGNOSIS — N18.31 STAGE 3A CHRONIC KIDNEY DISEASE: ICD-10-CM

## 2022-03-17 DIAGNOSIS — Z95.2 S/P TAVR (TRANSCATHETER AORTIC VALVE REPLACEMENT): ICD-10-CM

## 2022-03-17 DIAGNOSIS — Z95.810 ICD (IMPLANTABLE CARDIOVERTER-DEFIBRILLATOR) IN PLACE: ICD-10-CM

## 2022-03-17 DIAGNOSIS — I25.10 CORONARY ARTERY DISEASE DUE TO CALCIFIED CORONARY LESION: ICD-10-CM

## 2022-03-17 DIAGNOSIS — I49.5 SSS (SICK SINUS SYNDROME) (HCC): ICD-10-CM

## 2022-03-17 DIAGNOSIS — I48.19 PERSISTENT ATRIAL FIBRILLATION (HCC): ICD-10-CM

## 2022-03-17 DIAGNOSIS — N18.4 STAGE 4 CHRONIC KIDNEY DISEASE (HCC): ICD-10-CM

## 2022-03-17 DIAGNOSIS — I25.5 ISCHEMIC CARDIOMYOPATHY: ICD-10-CM

## 2022-03-17 PROCEDURE — 99214 OFFICE O/P EST MOD 30 MIN: CPT | Performed by: NURSE PRACTITIONER

## 2022-03-17 RX ORDER — TORSEMIDE 20 MG/1
40 TABLET ORAL DAILY
Qty: 60 TABLET | Refills: 11 | Status: SHIPPED | OUTPATIENT
Start: 2022-03-17 | End: 2022-03-18

## 2022-03-17 RX ORDER — SACUBITRIL AND VALSARTAN 24; 26 MG/1; MG/1
1 TABLET, FILM COATED ORAL 2 TIMES DAILY
COMMUNITY
End: 2022-04-01

## 2022-03-17 ASSESSMENT — ENCOUNTER SYMPTOMS
COUGH: 0
PND: 0
ABDOMINAL PAIN: 0
SHORTNESS OF BREATH: 1
CLAUDICATION: 0
ORTHOPNEA: 0
PALPITATIONS: 0
FEVER: 0
DIZZINESS: 1
MYALGIAS: 0

## 2022-03-17 ASSESSMENT — FIBROSIS 4 INDEX: FIB4 SCORE: 3.96

## 2022-03-17 NOTE — PROGRESS NOTES
"Chief Complaint   Patient presents with   • Atrial Fibrillation   • Chronic Kidney Disease   • Congestive Heart Failure       Aman More III is a 81 y.o. male who presents today for follow up after his recent hospitalization.    Patient of Dr. Wilkinson, Dr. Castro for his Valve, Dr. Hendrix for Heart Failure and Dr. Steiner for his ICD.    He was last seen in clinic on 2/24/2022.  During that visit, patient lisinopril was discontinued for cough and started on losartan.  Patient was also recommended to clarify his Bumex dose.    He reports his cough has improved with switching to losartan.  He reports he is taking Bumex 2 mg twice a day and states his weights have gone down to about 232 pounds.  He reports his weight typically ranges between 221-225 pounds.    For his symptoms, he continues to have lower extremity edema, shortness of breath, feeling unbalanced.  He denies chest pain, palpitations, orthopnea, PND.    He denies any further problems with his afib.    He denies shocks.     Additonally, patient has the following medical problems:     -CAD, had triple vessel disease, was not recommended for CABG, Had BRANDI to distal RCA on 12/27/2019 and subsequent staged PCI to Proximal LAD and distal Left Main on 1/10/2020     -Aortic Stenosis, not a SAVR candidate and had TAVR on 1/27/2020 with Dr. Castro     -Had Complete Heart Block from event monitor after TAVR and had a dual chamber ICD placed on 2/4/2020. Could not place CS lead secondary to perforation of the CS causing small Stable effusion    -Atrial fibrillation: OLGA/DCCV on 2/3/2022     -HTN     -HLD     -Diabetes     -CKD    -Diabetic Neuropathy    -JAMES, uses CPAP    Past Medical History:   Diagnosis Date   • Arthritis     \"everywhere\"   • Bowel habit changes     constipation   • Breath shortness     pt states short of breath 24/7 O2 at night only   • CAD (coronary artery disease)    • CATARACT     removed bilat   • Chest pain    • Chest tightness    • " "Chickenpox    • Congestive heart failure (HCC)    • Constipation    • Coronary heart disease    • Cough    • Daytime sleepiness    • Diabetes     oral medication   • Difficulty breathing    • Dilated cardiomyopathy (HCC)    • Fatigue    • GERD (gastroesophageal reflux disease)    • Hearing difficulty    • Heart murmur    • Heart valve disease    • Hiatus hernia syndrome    • History of BPH    • History of chronic cough    • Hyperlipidemia    • Hypertension     pt states well controlled on meds   • 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 Porter, gastroenterologist   • Urinary incontinence      Past Surgical History:   Procedure Laterality Date   • LAP, REMOVE ADJUST LUIS RESTRICT D*  6/5/2020    Procedure: REMOVAL, GASTRIC BAND, LAPAROSCOPIC - ADJUSTABLE BAND AND PORT;  Surgeon: Deniz Rios M.D.;  Location: [facility];  Service: General   • TRANSCATHETER AORTIC VALVE REPLACEMENT N/A 1/27/2020    Procedure: REPLACEMENT, AORTIC VALVE, TRANSCATHETER-;  Surgeon: Surendra Castro M.D.;  Location: [facility];  Service: Cardiac   • OLGA  1/27/2020    Procedure: ECHOCARDIOGRAM, TRANSESOPHAGEAL;  Surgeon: Surendra Castro M.D.;  Location: [facility];  Service: Cardiac   • GASTROSCOPY N/A 1/8/2016    Procedure: GASTROSCOPY;  Surgeon: Denzi Rios M.D.;  Location: [facility];  Service:    • ORIF, FRACTURE, HUMERUS Left 6/25/2015    Procedure: HUMERUS ORIF/ PROXIMAL;  Surgeon: Cristal Guido M.D.;  Location: [facility];  Service:    • FUSION, SPINE, " LUMBAR, PLIF  9/5/2013    Performed by Edith Whelan M.D. at SURGERY [facility] ORS   • LUMBAR DECOMPRESSION  9/5/2013    Performed by Edith Whelan M.D. at SURGERY [facility] ORS   • MASS EXCISION NEURO  9/5/2013    Performed by Edith Whelan M.D. at SURGERY [facility] ORS   • RECOVERY  6/26/2012    Performed by SURGERY, IR-RECOVERY at SURGERY SAME DAY [facility]   • DRAINAGE HEMATOMA  5/25/2012    Performed by SARINA SUE at [facility]   • GASTRIC BAND LAPAROSCOPIC REVISION  5/11/2012    Performed by SARINA SUE at [facility]   • FUSION, SPINE, LUMBAR, PLIF  3/26/2012    Performed by EDITH WHELAN at SURGERY [facility] ORS   • LUMBAR DECOMPRESSION  3/26/2012    Performed by EDITH WHELAN at [facility]   • INJ,EPIDURAL/LUMB/SAC SINGLE  3/19/2012    Performed by KRISTIN BALTAZAR at [facility]   • INJ,EPIDURAL/LUMB/SAC SINGLE  3/5/2012    Performed by KRISTIN BALTAZAR at [facility] ORS   • GASTRIC BANDING LAPAROSCOPIC  3/24/2010    Performed by SARINA SUE at SURGERY [facility]   • HIATAL HERNIA REPAIR  3/24/2010    Performed by SARINA SUE at SURGERY [facility]   • KNEE ARTHROPLASTY TOTAL  2000    bilateral   • ABDOMINOPLASTY  1990   • AORTIC VALVE REPLACEMENT     • ARTHROSCOPY, KNEE     • LAMINOTOMY     • OTHER CARDIAC SURGERY      stents   • OH REMV 2ND CATARACT,CORN-SCLER SECTN     • SINUSCOPE     • SLEEVE,CARRIE VASO THIGH     • TONSILLECTOMY     • ZZZ CARDIAC CATH       Family History   Problem Relation Age of Onset   • No Known Problems Sister    • No Known Problems Sister    • No Known Problems Sister    • Diabetes Other    • No Known Problems Mother    • No Known Problems Father    • Heart Disease Neg Hx      Social History     Socioeconomic History   • Marital status:      Spouse name: Not on file   • Number of children: Not on file   • Years of education: Not on file   • Highest education level: Bachelor's  degree (e.g., BA, AB, BS)   Occupational History   • Not on file   Tobacco Use   • Smoking status: Never Smoker   • Smokeless tobacco: Never Used   • Tobacco comment: 0   Vaping Use   • Vaping Use: Never used   Substance and Sexual Activity   • Alcohol use: Not Currently     Alcohol/week: 0.0 oz   • Drug use: Not Currently   • Sexual activity: Yes   Other Topics Concern   • Not on file   Social History Narrative   • Not on file     Social Determinants of Health     Financial Resource Strain: Low Risk    • Difficulty of Paying Living Expenses: Not very hard   Food Insecurity: No Food Insecurity   • Worried About Running Out of Food in the Last Year: Never true   • Ran Out of Food in the Last Year: Never true   Transportation Needs: No Transportation Needs   • Lack of Transportation (Medical): No   • Lack of Transportation (Non-Medical): No   Physical Activity: Sufficiently Active   • Days of Exercise per Week: 3 days   • Minutes of Exercise per Session: 150+ min   Stress: Stress Concern Present   • Feeling of Stress : To some extent   Social Connections: Socially Isolated   • Frequency of Communication with Friends and Family: Once a week   • Frequency of Social Gatherings with Friends and Family: Never   • Attends Confucianism Services: Never   • Active Member of Clubs or Organizations: No   • Attends Club or Organization Meetings: Never   • Marital Status:    Intimate Partner Violence: Not on file   Housing Stability: Low Risk    • Unable to Pay for Housing in the Last Year: No   • Number of Places Lived in the Last Year: 1   • Unstable Housing in the Last Year: No     Allergies   Allergen Reactions   • Statins [Hmg-Coa-R Inhibitors] Rash     Blisters       Outpatient Encounter Medications as of 3/17/2022   Medication Sig Dispense Refill   • sacubitril-valsartan (ENTRESTO) 24-26 MG Tab tablet Take 1 Tablet by mouth 2 times a day.     • torsemide (DEMADEX) 20 MG Tab Take 2 Tablets by mouth every day. 60 Tablet  11   • Cranberry-Vitamin C (CRANBERRY CONCENTRATE/VITAMINC) 53399-444 MG Cap      • losartan (COZAAR) 25 MG Tab Take 1 Tablet by mouth every day. 30 Tablet 3   • apixaban (ELIQUIS) 5mg Tab Take 1 Tablet by mouth 2 times a day. 180 Tablet 3   • aspirin EC (ECOTRIN) 81 MG Tablet Delayed Response Take 1 Tablet by mouth every day. 90 Tablet 3   • ezetimibe (ZETIA) 10 MG Tab Take 1 tablet by mouth once daily 90 Tablet 3   • carvedilol (COREG) 3.125 MG Tab Take 1 tablet by mouth 2 times a day with meals. 180 tablet 3   • SILODOSIN PO Take 1 Tablet by mouth every evening. Indications: Benign Enlargement of Prostate     • clomiPHENE (CLOMID) 50 MG tablet Take 50 mg by mouth see administration instructions. Mon Wed Fri     • hydrocortisone 2.5 % Ointment Apply 1 Each to affected area(s) as needed.     • Calcium Citrate (CITRACAL PO) Take 1 Tab by mouth 2 Times a Day.     • fluticasone (FLONASE) 50 MCG/ACT nasal spray Use 2 spray(s) in each nostril once daily (Patient taking differently: Administer 2 Sprays into affected nostril(S) 1 time a day as needed.) 48 g 3   • lactobacillus rhamnosus (CULTURELLE) Cap capsule Take 1 Cap by mouth every day.     • testosterone cypionate (DEPO-TESTOSTERONE) 200 MG/ML Solution injection 50 mg by Intramuscular route every thursday.     • cyanocobalamin (VITAMIN B-12) 1000 MCG/ML Solution 1,000 mcg by Intramuscular route every thursday.     • coenzyme Q-10 30 MG capsule Take 60 mg by mouth 2 Times a Day.     • Garlic 1000 MG Cap Take 1,000 mg by mouth 2 Times a Day.     • glucosamine Sulfate 500 MG Cap Take 1,500 mg by mouth 2 Times a Day.     • vitamin e (VITAMIN E) 400 UNIT Cap Take 400 Units by mouth 2 times a day.     • omeprazole (PRILOSEC) 40 MG delayed-release capsule Take 40 mg by mouth 2 times a day.     • Cholecalciferol (VITAMIN D3) 2000 UNIT Cap Take 2,000 Units by mouth 2 Times a Day.     • Non Formulary Request Take 1 Capsule by mouth every day. anit-oxidant (OTC)     • Zinc 50  "MG Tab Take 50 mg by mouth 2 times a day.     • anastrozole (ARIMIDEX) 1 MG Tab Take 0.5 mg by mouth see administration instructions. On Tues & Sat     • glimepiride (AMARYL) 1 MG tablet Take 1 mg by mouth at bedtime.     • B Complex Vitamins (VITAMIN B COMPLEX PO) Take 1 Tab by mouth every day.     • L-Lysine 1000 MG Tab Take 1,000 mg by mouth every day.     • Ascorbic Acid (VITAMIN C) 1000 MG Tab Take 1,000 mg by mouth 2 Times a Day.     • [DISCONTINUED] bumetanide (BUMEX) 1 MG Tab Take 2 tablets by mouth twice daily 120 Tablet 0   • [DISCONTINUED] Home Care Oxygen Inhale 2 L/min by mouth Continuous.   Respiratory route via: Nasal Cannula   Oxygen supplier: [facility]  Indications: Hypoxia, to keep O2 sat over 90%       No facility-administered encounter medications on file as of 3/17/2022.     Review of Systems   Constitutional: Negative for fever and malaise/fatigue.   Respiratory: Positive for shortness of breath. Negative for cough.    Cardiovascular: Positive for leg swelling. Negative for chest pain, palpitations, orthopnea, claudication and PND.   Gastrointestinal: Negative for abdominal pain.   Musculoskeletal: Negative for myalgias.   Neurological: Positive for dizziness (with standing ).   All other systems reviewed and are negative.             Objective     /70 (BP Location: Left arm, Patient Position: Sitting, BP Cuff Size: Adult)   Pulse 78   Resp 16   Ht 1.803 m (5' 11\")   Wt 98.9 kg (218 lb)   SpO2 95%   BMI 30.40 kg/m²     Physical Exam  Vitals reviewed.   Constitutional:       Appearance: He is well-developed.   HENT:      Head: Normocephalic and atraumatic.   Eyes:      Pupils: Pupils are equal, round, and reactive to light.   Neck:      Vascular: No JVD.   Cardiovascular:      Rate and Rhythm: Normal rate and regular rhythm.      Heart sounds: Normal heart sounds.   Pulmonary:      Effort: Pulmonary effort is normal. No respiratory distress.      Breath sounds: Normal breath sounds. No " wheezing or rales.   Abdominal:      General: Bowel sounds are normal.      Palpations: Abdomen is soft.   Musculoskeletal:         General: Normal range of motion.      Cervical back: Normal range of motion and neck supple.      Right lower le+ Pitting Edema present.      Left lower le+ Pitting Edema present.   Skin:     General: Skin is warm and dry.   Neurological:      General: No focal deficit present.      Mental Status: He is alert and oriented to person, place, and time.   Psychiatric:         Behavior: Behavior normal.            Lab Results   Component Value Date/Time    CHOLSTRLTOT 72 (L) 2021 08:26 AM    LDL 28 2021 08:26 AM    HDL 24 (A) 2021 08:26 AM    TRIGLYCERIDE 102 2021 08:26 AM       Lab Results   Component Value Date/Time    SODIUM 139 03/15/2022 12:12 PM    POTASSIUM 4.3 03/15/2022 12:12 PM    CHLORIDE 103 03/15/2022 12:12 PM    CO2 25 03/15/2022 12:12 PM    GLUCOSE 143 (H) 03/15/2022 12:12 PM    BUN 55 (H) 03/15/2022 12:12 PM    CREATININE 2.06 (H) 03/15/2022 12:12 PM    CREATININE 1.18 2011 12:00 AM    BUNCREATRAT 16 2011 12:00 AM    GLOMRATE >59 2011 12:00 AM     Lab Results   Component Value Date/Time    ALKPHOSPHAT 55 03/15/2022 12:12 PM    ASTSGOT 38 03/15/2022 12:12 PM    ALTSGPT 32 03/15/2022 12:12 PM    TBILIRUBIN 0.7 03/15/2022 12:12 PM      Transthoracic Echo Report 2015  Mild concentric left ventricular hypertrophy.   Mildly dilated left atrium. Left atrial volume index is 32 ml/sq m.  Left ventricular ejection fraction is 60% to 65%.     Transthoracic Echo Report 12/3/2019  Left ventricle is severely dilated.  Severely reduced left ventricular systolic function.  Left ventricular ejection fraction is visually estimated to be 25%.  Global hypokinesis with regional variation.  Moderate aortic stenosis.  Trace mitral regurgitation.  Severely dilated left atrium.  Mild tricuspid regurgitation.  Right ventricular systolic pressure  is estimated to be 40  mmHg.  Normal pericardium without effusion.  Compared to the images of the study done on 7/29/2015  there has been marked change in LV wall motion and function, moderate aortic stenosis is also now present.      Heart Cath 12/11/2019  Findings:     Triple-vessel coronary artery disease with left main disease     This is right dominant system.     Left main is large in caliber.   It trifurcated into left anterior descending, medium-sized ramus intermediate artery and left circumflex artery.   It has calcified eccentric 50% stenosis at the distal portion around the trifurcation.  As mentioned above, the IFR was consistent with flow-limiting disease.     Left anterior descending artery (LAD) is large caliber vessel and extends beyond the apex.  It gives rises to 2 relatively long medium size diagonal branches proximally.  Moderate to heavy calcification was noted in proximal LAD along with stenosis up to at least 60-70%.   As mentioned above the IFR of the LAD was in the 0.7 range.  There are also 90% stenoses at the ostia of both first and second diagonal branches.  The antegrade flow is normal.     The ramus intermediate artery is about 1 and half millimeter in diameter. It has 80-90% ostial stenosis with normal antegrade flow     Left circumflex artery is medium in caliber.   This is occluded in the midportion after giveing rise to one very small and short obtuse marginal branch.     Right coronary artery (RCA) is large caliber. It gives rise to several small acute marginal branches, posterior descending artery and posterolateral branch.  There is 99% stenosis in the distal of the right coronary artery.  The antegrade flow is slow at JEN II.        Plan:   Limit right wrist movement for 24 hours.  Risk factor modification.  Consult valve team/cardiac surgeon for further management.        Heart Cath 12/27/2019   FINDINGS:  HEMODYNAMICS:  Central aortic pressure systolic 131, diastolic 59,  mean of 59   MmHg.     PERIPHERAL ANGIOGRAPHY: The distal abdominal aorta and both iliac and femoral arteries are patent with at least moderate tortuosity of the right iliac artery.     CORONARY ARTERIOGRAPHY:  Right coronary artery:  The right coronary artery is a large-caliber vessel with a significant walsh's crook and midvessel acute tortuosity with a distal diseased segment containing a 99% stenosis with JEN-2 antegrade flow.      POST-SENT IMPLANTATION, distal right coronary artery demonstrates a residual 10% focal eccentric stenosis in the proximal portion of the stent; otherwise, the remainder of the stent is well expanded with no dissection or thrombus and JEN-3 antegrade flow.     PLAN:  1.  Postprocedure Angiomax x4 hours.  2.  Dual antiplatelet therapy with aspirin and Plavix.  3.  IV fluids.  4.  Follow up basic metabolic panel.  5.  Future staged coronary intervention of the left main artery with   subsequent evaluation of need for TAVR procedure.     PROCEDURE:  Cardiac catheterization and percutaneous coronary intervention.  A.  Selective coronary angiography.  B.  Coronary stent implantation, distal right coronary artery 3.5x28 mm Synergy drug-eluting stent postdilated 4.0 mm.  C.  Distal abdominal aortogram with bilateral iliofemoral angiography.  D. Right femoral artery approach.  E.  Conscious sedation supervision.  F.  Perclose deployment.     Coronary angiogram 1/10/2020  Conclusions    1. LVEF 30% prior to intervention.    2. 70% distal left main and proximal LAD stenosis, IFR positive from prior procedure.    3. Successful planned PCI proximal LAD and distal left main trifurcation stenosis (3.5 x 26 mm Jack BRANDI postdilated to 4.5 mm proximally), IVUS guided.    4. Moderate aortic stenosis, mean gradient 27 to 30mmHg.     Recommendations    * Long-term dual antiplatelet therapy.    * Bivalirudin for 4 hours post procedure.    * Continue outpatient cardiac medications.    * Follow-up as  scheduled after observation admission overnight.    * Continue medical therapy for diabetes.        Transesophageal Echo Report 1/27/2020  Pre-Intervention:  LV:  Severely reduced left ventricular systolic function. Severe -    ejection fraction <30 %. Global hypokinesis with regional variation.   RV:  Mildly dilated right ventricle. Reduced right ventricular systolic function (mild reduction).   AV:  Calcified aortic valve leaflets.  Aortic valve annulus measured at \2.7 cm.  Sinus of valsalva measured at 3.3 cm.  Sinotubular junction   measured at 2.9 cm.  Aortic valve area measured at 6.59 cm2.  Moderate aortic stenosis with a mean pressure gradient of 22 mmHg.  However, gradient may be under-estimated in the setting of low EF.       Post-Intervention:  AV:  Nicely seated bioprosthetic aortic valve.  All three leaflets seen opening in cross sectional view.  No evidence of trans-valvular regurgitation.  Trivial para-valvular regurgitations seen at the left coronary cusp and at either the right or non-coronary cusp.  No evidence of aortic valve stenosis with mean pressure gradient of 4 mmHg.       Please see body of report for further findings.     Transthoracic Echo Report 1/28/2020  Compared to the images of the study done 12- there has been an interval placement of a TAVR valve.  The EF appears mildly improved.  Moderately reduced left ventricular systolic function.  Left ventricular ejection fraction is visually estimated to be 35%.     Transthoracic Echo Report 2/4/2020  Prior 01/28/2020, No significant change.  Left ventricular ejection fraction is visually estimated to be 35-40%.  Known TAVR aortic valve that is functioning normally with normal   transvalvular gradients.  Vmax is   2.8m/s.Transvalvular gradients are - Peak: 14 mmHg,  Mean: 30 mmHg.     Transthoracic Echo Report 2/4/2020  Trivial pericardial effusion.     Transthoracic Echo Report 2/4/2020  Prior echocardiogram 2/4/2020.  No  pericardial effusion seen. Fat pad present.     Transthoracic Echo Report 3/3/2020  Compared to the report of the prior study done on 2/4/2020   there has been no significant change.   Moderately reduced left ventricular systolic function.  Left ventricular ejection fraction is visually estimated to be 35%.  Pacer/ICD wire seen in right ventricle.  Known TAVR aortic valve that is functioning normally with normal transvalvular gradients.  Vmax is   m/s. Transvalvular gradients are- peak 21 mmHg and mean gradient is 11 mmHg.   Minimal paravalvular leak.    Transthoracic Echo Report 4/14/2020  Prior study 03/03/20; compared to the report of the study done - there has been no significant change.   Moderately reduced left ventricular systolic function.  Left ventricular ejection fraction is visually estimated to be 35%.  Pacer/ICD wire seen in right ventricle.  Known TAVR aortic valve that is functioning normally with normal transvalvular gradients.  Vmax is 1.9 m/s. Transvalvular gradients are - Peak: 15 mmHg, Mean: 9 mmHg.   No evidence of paravalvular leak is noted.     Transthoracic Echo Report 4/27/2020  Compared to the images of the prior study done 04/14/2020 - estimated ejection fraction is improved, previously 35%. The patient is now tachycardic with smaller ventricular size suggesting hypovolemia.     Moderately reduced left ventricular systolic function.  Left ventricular ejection fraction is visually estimated to be 40%.  Global hypokinesis.  Diastolic function is abnormal, but grade cannot be determined.  Normal right ventricular systolic function.  Known TAVR aortic valve that is functioning normally with normal transvalvular gradients.    Transthoracic Echo Report 2/24/2021  Prior Echo - 4/27/20; compared to the images of the prior study done -  there has been no significant change.   Moderately reduced left ventricular systolic function.  Left ventricular ejection fraction is visually estimated to be  40%.  Pacer/ICD wire seen in right ventricle.  Known TAVR aortic valve that is functioning normally with normal transvalvular gradients.   Vmax is 1.78 m/s. Transvalvular gradients are - Peak: 12.6 mmHg, Mean: 8 mmHg.       Transthoracic Echo Report 2/15/2022  Compared to the prior study 02/24/2021 the LVEF is now normal.  Normal left ventricular chamber size.  The left ventricular ejection fraction is visually estimated to be 55%.  Normal diastolic function.  The right ventricle is normal in size and systolic function.  Normal inferior vena cava size and inspiratory collapse.  Known TAVR aortic valve that is functioning normally with normal transvalvular gradients.    Assessment & Plan     1. Stage 3a chronic kidney disease ([facility])  Basic Metabolic Panel   2. ACC/AHA stage C systolic heart failure (HCC)  Basic Metabolic Panel   3. Heart failure, NYHA class 2 ([facility])  Basic Metabolic Panel   4. High risk medication use  Basic Metabolic Panel   5. Ischemic cardiomyopathy  Basic Metabolic Panel   6. ICD (implantable cardioverter-defibrillator) in place     7. SSS (sick sinus syndrome) ([facility])     8. S/P TAVR (transcatheter aortic valve replacement)     9. Coronary artery disease due to calcified coronary lesion     10. Stage 4 chronic kidney disease ([facility])     11. Persistent atrial fibrillation ([facility])     12. Chronic anticoagulation         Medical Decision Making: Today's Assessment/Status/Plan:        1. HFrEF, Stage C, Class 2, LVEF 55% prev 35%: Patient continued to exhibit volume overload  -Heart failure due to Ischemic Cardiomyopathy  -Stop Bumex, start torsemide 40 mg twice a day, patient to call our clinic if his weights are not decreasing  -Monitor fluid intake to about 64 ounces per day  -Continue carvedilol 3.125 mg twice a day  -Continue Losartan 25 mg daily  -Aldactone contraindicated for kidney function  -BMP in 1-2 weeks  -Has ICD, last device check 1/25/2022  -Reinforced s/sx of worsening heart failure with  patient and weight monitoring. Pt verbalizes understanding. Pt to call office or RTC if present.      2.  CAD, PCI of the pLAD/distal left main and the distal RCA: Last LDL was on 5/19/2021  -Continue aspirin 81 mg daily  -Continue clopidogrel 75 mg daily  -Continue ezetimibe 10 mg daily  -Patient has intolerance to statins  -May need to consider [facility] inhibitor in the future.      3.  Aortic stenosis, s/p TAVR pm 1/27/2020:  -Valve functioning normally per recent echo  -Patient will repeat echo in 1 year for Dr. Castro [facility]     4.  Atrial fibrillation:  -DCCV on 2/3/2022  -Continue Eliquis 5 mg twice a day    5.  CKD, Stage 4:  -Labs in 1-2 weeks     Follow-up: Return in about 4 weeks. Sooner if needed.    Patient verbalizes understanding and agrees with the plan of care.     PLEASE NOTE: This Note was created using voice recognition Software. I have made every reasonable attempt to correct obvious errors, but I expect that there are errors of grammar and possibly content that I did not discover before finalizing the note

## 2022-03-18 ENCOUNTER — HOSPITAL ENCOUNTER (INPATIENT)
Facility: MEDICAL CENTER | Age: 82
LOS: 13 days | DRG: 956 | End: 2022-03-31
Attending: EMERGENCY MEDICINE | Admitting: SURGERY
Payer: MEDICARE

## 2022-03-18 ENCOUNTER — APPOINTMENT (OUTPATIENT)
Dept: RADIOLOGY | Facility: MEDICAL CENTER | Age: 82
End: 2022-03-18
Attending: EMERGENCY MEDICINE
Payer: MEDICARE

## 2022-03-18 ENCOUNTER — HOSPITAL ENCOUNTER (EMERGENCY)
Facility: MEDICAL CENTER | Age: 82
End: 2022-03-18
Attending: EMERGENCY MEDICINE
Payer: MEDICARE

## 2022-03-18 ENCOUNTER — APPOINTMENT (OUTPATIENT)
Dept: RADIOLOGY | Facility: MEDICAL CENTER | Age: 82
DRG: 956 | End: 2022-03-18
Attending: EMERGENCY MEDICINE
Payer: MEDICARE

## 2022-03-18 VITALS
SYSTOLIC BLOOD PRESSURE: 134 MMHG | DIASTOLIC BLOOD PRESSURE: 68 MMHG | BODY MASS INDEX: 30.52 KG/M2 | WEIGHT: 218 LBS | OXYGEN SATURATION: 98 % | HEART RATE: 84 BPM | RESPIRATION RATE: 18 BRPM | HEIGHT: 71 IN | TEMPERATURE: 97.4 F

## 2022-03-18 DIAGNOSIS — S22.42XA CLOSED FRACTURE OF MULTIPLE RIBS OF LEFT SIDE, INITIAL ENCOUNTER: ICD-10-CM

## 2022-03-18 DIAGNOSIS — S72.142A DISPLACED INTERTROCHANTERIC FRACTURE OF LEFT FEMUR, INITIAL ENCOUNTER FOR CLOSED FRACTURE (HCC): ICD-10-CM

## 2022-03-18 DIAGNOSIS — Z79.01 ANTICOAGULATED BY ANTICOAGULATION TREATMENT: ICD-10-CM

## 2022-03-18 DIAGNOSIS — T14.90XA TRAUMA: ICD-10-CM

## 2022-03-18 DIAGNOSIS — E11.65 TYPE 2 DIABETES MELLITUS WITH HYPERGLYCEMIA, WITHOUT LONG-TERM CURRENT USE OF INSULIN (HCC): ICD-10-CM

## 2022-03-18 DIAGNOSIS — S37.012A HEMATOMA OF LEFT KIDNEY, INITIAL ENCOUNTER: ICD-10-CM

## 2022-03-18 DIAGNOSIS — I50.22 CHF (CONGESTIVE HEART FAILURE), NYHA CLASS II, CHRONIC, SYSTOLIC (HCC): Chronic | ICD-10-CM
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
## 2022-03-18 DIAGNOSIS — N40.0 BENIGN PROSTATIC HYPERPLASIA WITHOUT LOWER URINARY TRACT SYMPTOMS: ICD-10-CM

## 2022-03-18 DIAGNOSIS — S72.112A CLOSED DISPLACED FRACTURE OF GREATER TROCHANTER OF LEFT FEMUR, INITIAL ENCOUNTER (HCC): ICD-10-CM

## 2022-03-18 PROBLEM — S70.02XA HIP HEMATOMA, LEFT, INITIAL ENCOUNTER: Status: ACTIVE | Noted: 2022-03-18

## 2022-03-18 PROBLEM — N18.32 STAGE 3B CHRONIC KIDNEY DISEASE: Chronic | Status: ACTIVE | Noted: 2019-12-04

## 2022-03-18 PROBLEM — I48.91 ATRIAL FIBRILLATION (HCC): Status: ACTIVE | Noted: 2022-03-18

## 2022-03-18 PROBLEM — I25.84 CORONARY ARTERY DISEASE DUE TO CALCIFIED CORONARY LESION: Chronic | Status: ACTIVE | Noted: 2019-12-04

## 2022-03-18 PROBLEM — Z79.02 ANTIPLATELET OR ANTITHROMBOTIC LONG-TERM USE: Status: ACTIVE | Noted: 2022-03-18

## 2022-03-18 PROBLEM — I25.10 CORONARY ARTERY DISEASE DUE TO CALCIFIED CORONARY LESION: Chronic | Status: ACTIVE | Noted: 2019-12-04

## 2022-03-18 PROBLEM — Z11.52 ENCOUNTER FOR SCREENING FOR COVID-19: Status: ACTIVE | Noted: 2020-04-27

## 2022-03-18 LAB
ALBUMIN SERPL BCP-MCNC: 4 G/DL (ref 3.2–4.9)
ALBUMIN/GLOB SERPL: 1.7 G/DL
ALP SERPL-CCNC: 50 U/L (ref 30–99)
ALT SERPL-CCNC: 32 U/L (ref 2–50)
ANION GAP SERPL CALC-SCNC: 12 MMOL/L (ref 7–16)
APTT PPP: 38.3 SEC (ref 24.7–36)
AST SERPL-CCNC: 37 U/L (ref 12–45)
BASOPHILS # BLD AUTO: 0.3 % (ref 0–1.8)
BASOPHILS # BLD: 0.02 K/UL (ref 0–0.12)
BILIRUB SERPL-MCNC: 0.5 MG/DL (ref 0.1–1.5)
BUN SERPL-MCNC: 61 MG/DL (ref 8–22)
CALCIUM SERPL-MCNC: 8.8 MG/DL (ref 8.4–10.2)
CHLORIDE SERPL-SCNC: 102 MMOL/L (ref 96–112)
CO2 SERPL-SCNC: 26 MMOL/L (ref 20–33)
CREAT SERPL-MCNC: 2.29 MG/DL (ref 0.5–1.4)
EOSINOPHIL # BLD AUTO: 0.17 K/UL (ref 0–0.51)
EOSINOPHIL NFR BLD: 2.7 % (ref 0–6.9)
ERYTHROCYTE [DISTWIDTH] IN BLOOD BY AUTOMATED COUNT: 48.8 FL (ref 35.9–50)
GFR SERPLBLD CREATININE-BSD FMLA CKD-EPI: 28 ML/MIN/1.73 M 2
GLOBULIN SER CALC-MCNC: 2.3 G/DL (ref 1.9–3.5)
GLUCOSE SERPL-MCNC: 129 MG/DL (ref 65–99)
HCT VFR BLD AUTO: 52.8 % (ref 42–52)
HGB BLD-MCNC: 17.1 G/DL (ref 14–18)
IMM GRANULOCYTES # BLD AUTO: 0.04 K/UL (ref 0–0.11)
IMM GRANULOCYTES NFR BLD AUTO: 0.6 % (ref 0–0.9)
INR PPP: 1.41 (ref 0.87–1.13)
LYMPHOCYTES # BLD AUTO: 1.09 K/UL (ref 1–4.8)
LYMPHOCYTES NFR BLD: 17.4 % (ref 22–41)
MCH RBC QN AUTO: 30.4 PG (ref 27–33)
MCHC RBC AUTO-ENTMCNC: 32.4 G/DL (ref 33.7–35.3)
MCV RBC AUTO: 93.8 FL (ref 81.4–97.8)
MONOCYTES # BLD AUTO: 0.57 K/UL (ref 0–0.85)
MONOCYTES NFR BLD AUTO: 9.1 % (ref 0–13.4)
NEUTROPHILS # BLD AUTO: 4.39 K/UL (ref 1.82–7.42)
NEUTROPHILS NFR BLD: 69.9 % (ref 44–72)
NRBC # BLD AUTO: 0 K/UL
NRBC BLD-RTO: 0 /100 WBC
PLATELET # BLD AUTO: 126 K/UL (ref 164–446)
PMV BLD AUTO: 10 FL (ref 9–12.9)
POTASSIUM SERPL-SCNC: 4 MMOL/L (ref 3.6–5.5)
PROT SERPL-MCNC: 6.3 G/DL (ref 6–8.2)
PROTHROMBIN TIME: 16.2 SEC (ref 12–14.6)
RBC # BLD AUTO: 5.63 M/UL (ref 4.7–6.1)
SODIUM SERPL-SCNC: 140 MMOL/L (ref 135–145)
WBC # BLD AUTO: 6.3 K/UL (ref 4.8–10.8)

## 2022-03-18 PROCEDURE — 80053 COMPREHEN METABOLIC PANEL: CPT

## 2022-03-18 PROCEDURE — 770022 HCHG ROOM/CARE - ICU (200)

## 2022-03-18 PROCEDURE — 82962 GLUCOSE BLOOD TEST: CPT

## 2022-03-18 PROCEDURE — 74176 CT ABD & PELVIS W/O CONTRAST: CPT | Mod: MG

## 2022-03-18 PROCEDURE — 99285 EMERGENCY DEPT VISIT HI MDM: CPT

## 2022-03-18 PROCEDURE — 85730 THROMBOPLASTIN TIME PARTIAL: CPT

## 2022-03-18 PROCEDURE — 700102 HCHG RX REV CODE 250 W/ 637 OVERRIDE(OP): Performed by: SURGERY

## 2022-03-18 PROCEDURE — 71045 X-RAY EXAM CHEST 1 VIEW: CPT

## 2022-03-18 PROCEDURE — 99291 CRITICAL CARE FIRST HOUR: CPT

## 2022-03-18 PROCEDURE — 36415 COLL VENOUS BLD VENIPUNCTURE: CPT

## 2022-03-18 PROCEDURE — 72170 X-RAY EXAM OF PELVIS: CPT

## 2022-03-18 PROCEDURE — 96374 THER/PROPH/DIAG INJ IV PUSH: CPT

## 2022-03-18 PROCEDURE — 85025 COMPLETE CBC W/AUTO DIFF WBC: CPT

## 2022-03-18 PROCEDURE — 70450 CT HEAD/BRAIN W/O DYE: CPT | Mod: ME

## 2022-03-18 PROCEDURE — 700101 HCHG RX REV CODE 250: Performed by: SURGERY

## 2022-03-18 PROCEDURE — 73700 CT LOWER EXTREMITY W/O DYE: CPT | Mod: LT,MG

## 2022-03-18 PROCEDURE — G0390 TRAUMA RESPONS W/HOSP CRITI: HCPCS

## 2022-03-18 PROCEDURE — 700111 HCHG RX REV CODE 636 W/ 250 OVERRIDE (IP): Performed by: EMERGENCY MEDICINE

## 2022-03-18 PROCEDURE — 99291 CRITICAL CARE FIRST HOUR: CPT | Performed by: SURGERY

## 2022-03-18 PROCEDURE — C9803 HOPD COVID-19 SPEC COLLECT: HCPCS | Performed by: SPECIALIST

## 2022-03-18 PROCEDURE — 700105 HCHG RX REV CODE 258: Performed by: SURGERY

## 2022-03-18 PROCEDURE — 85610 PROTHROMBIN TIME: CPT

## 2022-03-18 PROCEDURE — 72125 CT NECK SPINE W/O DYE: CPT | Mod: ME

## 2022-03-18 PROCEDURE — A9270 NON-COVERED ITEM OR SERVICE: HCPCS | Performed by: SURGERY

## 2022-03-18 PROCEDURE — 73552 X-RAY EXAM OF FEMUR 2/>: CPT | Mod: LT

## 2022-03-18 PROCEDURE — 96375 TX/PRO/DX INJ NEW DRUG ADDON: CPT

## 2022-03-18 RX ORDER — SODIUM CHLORIDE, SODIUM LACTATE, POTASSIUM CHLORIDE, CALCIUM CHLORIDE 600; 310; 30; 20 MG/100ML; MG/100ML; MG/100ML; MG/100ML
INJECTION, SOLUTION INTRAVENOUS CONTINUOUS
Status: DISCONTINUED | OUTPATIENT
Start: 2022-03-18 | End: 2022-03-20

## 2022-03-18 RX ORDER — OXYCODONE HYDROCHLORIDE 10 MG/1
10 TABLET ORAL
Status: DISCONTINUED | OUTPATIENT
Start: 2022-03-18 | End: 2022-03-24

## 2022-03-18 RX ORDER — ONDANSETRON 2 MG/ML
INJECTION INTRAMUSCULAR; INTRAVENOUS
Status: COMPLETED | OUTPATIENT
Start: 2022-03-18 | End: 2022-03-18

## 2022-03-18 RX ORDER — LIDOCAINE 50 MG/G
1 PATCH TOPICAL EVERY 24 HOURS
Status: DISCONTINUED | OUTPATIENT
Start: 2022-03-18 | End: 2022-03-20

## 2022-03-18 RX ORDER — ONDANSETRON 4 MG/1
4 TABLET, ORALLY DISINTEGRATING ORAL EVERY 4 HOURS PRN
Status: DISCONTINUED | OUTPATIENT
Start: 2022-03-18 | End: 2022-03-31 | Stop reason: HOSPADM

## 2022-03-18 RX ORDER — AMOXICILLIN 250 MG
1 CAPSULE ORAL
Status: DISCONTINUED | OUTPATIENT
Start: 2022-03-18 | End: 2022-03-24

## 2022-03-18 RX ORDER — LOSARTAN POTASSIUM 50 MG/1
25 TABLET ORAL DAILY
Status: DISCONTINUED | OUTPATIENT
Start: 2022-03-19 | End: 2022-03-20

## 2022-03-18 RX ORDER — DOCUSATE SODIUM 100 MG/1
100 CAPSULE, LIQUID FILLED ORAL 2 TIMES DAILY
Status: DISCONTINUED | OUTPATIENT
Start: 2022-03-18 | End: 2022-03-24

## 2022-03-18 RX ORDER — ACETAMINOPHEN 325 MG/1
650 TABLET ORAL EVERY 6 HOURS PRN
Status: DISCONTINUED | OUTPATIENT
Start: 2022-03-24 | End: 2022-03-20

## 2022-03-18 RX ORDER — OXYCODONE HYDROCHLORIDE 5 MG/1
5 TABLET ORAL
Status: DISCONTINUED | OUTPATIENT
Start: 2022-03-18 | End: 2022-03-24

## 2022-03-18 RX ORDER — CARVEDILOL 6.25 MG/1
3.12 TABLET ORAL 2 TIMES DAILY WITH MEALS
Status: DISCONTINUED | OUTPATIENT
Start: 2022-03-19 | End: 2022-03-31 | Stop reason: HOSPADM

## 2022-03-18 RX ORDER — BISACODYL 10 MG
10 SUPPOSITORY, RECTAL RECTAL
Status: DISCONTINUED | OUTPATIENT
Start: 2022-03-18 | End: 2022-03-24

## 2022-03-18 RX ORDER — FAMOTIDINE 20 MG/1
20 TABLET, FILM COATED ORAL DAILY
Status: DISCONTINUED | OUTPATIENT
Start: 2022-03-19 | End: 2022-03-18

## 2022-03-18 RX ORDER — TORSEMIDE 20 MG/1
20 TABLET ORAL 2 TIMES DAILY
Status: ON HOLD | COMMUNITY
End: 2022-03-31

## 2022-03-18 RX ORDER — ENEMA 19; 7 G/133ML; G/133ML
1 ENEMA RECTAL
Status: DISCONTINUED | OUTPATIENT
Start: 2022-03-18 | End: 2022-03-24

## 2022-03-18 RX ORDER — POLYETHYLENE GLYCOL 3350 17 G/17G
1 POWDER, FOR SOLUTION ORAL 2 TIMES DAILY
Status: DISCONTINUED | OUTPATIENT
Start: 2022-03-18 | End: 2022-03-24

## 2022-03-18 RX ORDER — ACETAMINOPHEN 325 MG/1
650 TABLET ORAL EVERY 6 HOURS
Status: DISCONTINUED | OUTPATIENT
Start: 2022-03-19 | End: 2022-03-20

## 2022-03-18 RX ORDER — AMOXICILLIN 250 MG
1 CAPSULE ORAL NIGHTLY
Status: DISCONTINUED | OUTPATIENT
Start: 2022-03-18 | End: 2022-03-24

## 2022-03-18 RX ORDER — MORPHINE SULFATE 4 MG/ML
INJECTION INTRAVENOUS
Status: COMPLETED | OUTPATIENT
Start: 2022-03-18 | End: 2022-03-18

## 2022-03-18 RX ORDER — OMEPRAZOLE 20 MG/1
40 CAPSULE, DELAYED RELEASE ORAL 2 TIMES DAILY
Status: DISCONTINUED | OUTPATIENT
Start: 2022-03-18 | End: 2022-03-31 | Stop reason: HOSPADM

## 2022-03-18 RX ORDER — FLUTICASONE PROPIONATE 50 MCG
2 SPRAY, SUSPENSION (ML) NASAL DAILY
Status: DISCONTINUED | OUTPATIENT
Start: 2022-03-19 | End: 2022-03-31 | Stop reason: HOSPADM

## 2022-03-18 RX ORDER — EZETIMIBE 10 MG/1
10 TABLET ORAL DAILY
Status: DISCONTINUED | OUTPATIENT
Start: 2022-03-19 | End: 2022-03-31 | Stop reason: HOSPADM

## 2022-03-18 RX ORDER — ONDANSETRON 2 MG/ML
4 INJECTION INTRAMUSCULAR; INTRAVENOUS EVERY 4 HOURS PRN
Status: DISCONTINUED | OUTPATIENT
Start: 2022-03-18 | End: 2022-03-24

## 2022-03-18 RX ADMIN — POLYETHYLENE GLYCOL 3350 1 PACKET: 17 POWDER, FOR SOLUTION ORAL at 23:50

## 2022-03-18 RX ADMIN — LIDOCAINE 1 PATCH: 50 PATCH TOPICAL at 22:50

## 2022-03-18 RX ADMIN — ONDANSETRON 4 MG: 2 INJECTION INTRAMUSCULAR; INTRAVENOUS at 22:16

## 2022-03-18 RX ADMIN — SENNOSIDES AND DOCUSATE SODIUM 1 TABLET: 50; 8.6 TABLET ORAL at 23:50

## 2022-03-18 RX ADMIN — SODIUM CHLORIDE, POTASSIUM CHLORIDE, SODIUM LACTATE AND CALCIUM CHLORIDE: 600; 310; 30; 20 INJECTION, SOLUTION INTRAVENOUS at 23:51

## 2022-03-18 RX ADMIN — ACETAMINOPHEN 650 MG: 325 TABLET, FILM COATED ORAL at 23:50

## 2022-03-18 RX ADMIN — OXYCODONE HYDROCHLORIDE 10 MG: 10 TABLET ORAL at 22:51

## 2022-03-18 RX ADMIN — MORPHINE SULFATE 4 MG: 4 INJECTION INTRAVENOUS at 22:16

## 2022-03-18 RX ADMIN — FENTANYL CITRATE 50 MCG: 50 INJECTION, SOLUTION INTRAMUSCULAR; INTRAVENOUS at 17:55

## 2022-03-18 RX ADMIN — OMEPRAZOLE 40 MG: 20 CAPSULE, DELAYED RELEASE ORAL at 23:50

## 2022-03-18 RX ADMIN — DOCUSATE SODIUM 100 MG: 100 CAPSULE, LIQUID FILLED ORAL at 22:51

## 2022-03-18 ASSESSMENT — FIBROSIS 4 INDEX
FIB4 SCORE: 4.1
FIB4 SCORE: 3.96

## 2022-03-18 ASSESSMENT — PAIN DESCRIPTION - PAIN TYPE: TYPE: ACUTE PAIN

## 2022-03-18 NOTE — ED NOTES
Patient presents with a large amount of cash and cards, placed in safe at  with patients permission. Security and charge nurse aware

## 2022-03-18 NOTE — ED TRIAGE NOTES
"Chief Complaint   Patient presents with   • GLF     Fell golfing and has left hip pain.    • Hip Pain     left     /63   Resp (!) 25   Ht 1.803 m (5' 11\")   Wt 98.9 kg (218 lb)   BMI 30.40 kg/m²       BIB [facility], pt had a GLF while golfing, c/o left hip pain, obvious shortening on scene, while transferring to [facility] responders heard a \"pop\".    "

## 2022-03-18 NOTE — ED PROVIDER NOTES
"ED Provider Note    Scribed for Dr. Alexandre Huerta M.D. by Judei Richard. 3/18/2022  3:29 PM    Primary care provider: Faye Moeller M.D.  Means of arrival: EMS  History obtained from: patient  History limited by: none    CHIEF COMPLAINT  Chief Complaint   Patient presents with   • GLF     Fell golfing and has left hip pain.    • Hip Pain     left       HPI  LIAN More III is a 81 y.o. male who presents to the Emergency Department following a ground level fall onset today. At the time, patient was golfing and fell on his left side. Upon fall, he states he heard a snap. He now has left hip and left leg pain, but states that it is \"okay\" when at rest. Denies any other pain or injuries. No fever, cough, trouble breathing. History of artificial knees, peripheral neuropathy, diabetes, and seven heart operations in the last two years.    REVIEW OF SYSTEMS  Pertinent positives include fall, hip pain, left side pain  Pertinent negatives include no fever, cough, trouble breathing.   As above, all other systems reviewed and are negative.   See HPI for further details.     PAST MEDICAL HISTORY   has a past medical history of Arthritis, Bowel habit changes, Breath shortness, CAD (coronary artery disease), CATARACT, Chest pain, Chest tightness, Chickenpox, Congestive heart failure (HCC), Constipation, Coronary heart disease, Cough, Daytime sleepiness, Diabetes, Difficulty breathing, Dilated cardiomyopathy (HCC), Fatigue, GERD (gastroesophageal reflux disease), Hearing difficulty, Heart murmur, Heart valve disease, Hiatus hernia syndrome, History of BPH, History of chronic cough, Hyperlipidemia, Hypertension, Kidney disease (05/2020), Mumps, Oxygen dependent, Pacemaker, Pain (02/01/2022), Palpitations, Peripheral neuropathy, Persistent atrial fibrillation (HCC), Pneumonia (2007), Rhinitis, Ringing in ears, Severe aortic stenosis (12/4/2019), Sleep apnea, Swelling of lower extremity, Tonsillitis, Tremor, Ulcer " "(2014), and Urinary incontinence.    SURGICAL HISTORY   has a past surgical history that includes abdominoplasty (1990); gastric banding laparoscopic (3/24/2010); hiatal hernia repair (3/24/2010); inj,epidural/lumb/sac single (3/5/2012); inj,epidural/lumb/sac single (3/19/2012); fusion, spine, lumbar, plif (3/26/2012); lumbar decompression (3/26/2012); gastric band laparoscopic revision (5/11/2012); drainage hematoma (5/25/2012); recovery (6/26/2012); fusion, spine, lumbar, plif (9/5/2013); lumbar decompression (9/5/2013); mass excision neuro (9/5/2013); gastroscopy (N/A, 1/8/2016); zzz cardiac cath; laminotomy; Sleeve,Grant Vaso Thigh; remv 2nd cataract,corn-scler sectn; sinuscope; tonsillectomy; arthroscopy, knee; transcatheter aortic valve replacement (N/A, 1/27/2020); stephon (1/27/2020); aortic valve replacement; other cardiac surgery; lap, remove adjust pasha restrict d* (6/5/2020); knee arthroplasty total (2000); and orif, fracture, humerus (Left, 6/25/2015).    SOCIAL HISTORY  Social History     Tobacco Use   • Smoking status: Never Smoker   • Smokeless tobacco: Never Used   • Tobacco comment: 0   Vaping Use   • Vaping Use: Never used   Substance Use Topics   • Alcohol use: Not Currently     Alcohol/week: 0.0 oz   • Drug use: Not Currently      Social History     Substance and Sexual Activity   Drug Use Not Currently       FAMILY HISTORY  Family History   Problem Relation Age of Onset   • No Known Problems Sister    • No Known Problems Sister    • No Known Problems Sister    • Diabetes Other    • No Known Problems Mother    • No Known Problems Father    • Heart Disease Neg Hx        CURRENT MEDICATIONS  Insulin    ALLERGIES  Allergies   Allergen Reactions   • Statins [Hmg-Coa-R Inhibitors] Rash     Blisters         PHYSICAL EXAM  VITAL SIGNS: /63   Resp (!) 25   Ht 1.803 m (5' 11\")   Wt 98.9 kg (218 lb)   BMI 30.40 kg/m²     Constitutional: Well developed, Well nourished, no distress, Non-toxic " appearance.   HENT: Normocephalic, Atraumatic, Bilateral external ears normal, Oropharynx moist, No oral exudates.   Eyes: PERRLA, EOMI, Conjunctiva normal, No discharge.   Neck: No tenderness, Supple, No stridor.   Lymphatic: No lymphadenopathy noted.   Cardiovascular: Normal heart rate, Normal rhythm.   Thorax & Lungs: Clear to auscultation bilaterally, No respiratory distress, No wheezing, No crackles.   Abdomen: Soft, No tenderness, No masses, No pulsatile masses.   Skin: Warm, Dry, No erythema, No rash.   Extremities:, No edema No cyanosis.   Musculoskeletal: Left hip pain with rotation, left leg not shortened. No major deformities noted.  Intact distal pulses  Neurologic: Awake, alert. Moves all extremities spontaneously.  Psychiatric: Affect normal, Judgment normal, Mood normal.     DIAGNOSTIC STUDIES/PROCEDURES  LABS  Results for orders placed or performed during the hospital encounter of 03/18/22   CBC w/ Differential   Result Value Ref Range    WBC 6.3 4.8 - 10.8 K/uL    RBC 5.63 4.70 - 6.10 M/uL    Hemoglobin 17.1 14.0 - 18.0 g/dL    Hematocrit 52.8 (H) 42.0 - 52.0 %    MCV 93.8 81.4 - 97.8 fL    MCH 30.4 27.0 - 33.0 pg    MCHC 32.4 (L) 33.7 - 35.3 g/dL    RDW 48.8 35.9 - 50.0 fL    Platelet Count 126 (L) 164 - 446 K/uL    MPV 10.0 9.0 - 12.9 fL    Neutrophils-Polys 69.90 44.00 - 72.00 %    Lymphocytes 17.40 (L) 22.00 - 41.00 %    Monocytes 9.10 0.00 - 13.40 %    Eosinophils 2.70 0.00 - 6.90 %    Basophils 0.30 0.00 - 1.80 %    Immature Granulocytes 0.60 0.00 - 0.90 %    Nucleated RBC 0.00 /100 WBC    Neutrophils (Absolute) 4.39 1.82 - 7.42 K/uL    Lymphs (Absolute) 1.09 1.00 - 4.80 K/uL    Monos (Absolute) 0.57 0.00 - 0.85 K/uL    Eos (Absolute) 0.17 0.00 - 0.51 K/uL    Baso (Absolute) 0.02 0.00 - 0.12 K/uL    Immature Granulocytes (abs) 0.04 0.00 - 0.11 K/uL    NRBC (Absolute) 0.00 K/uL   Complete Metabolic Panel (CMP)   Result Value Ref Range    Sodium 140 135 - 145 mmol/L    Potassium 4.0 3.6 - 5.5  mmol/L    Chloride 102 96 - 112 mmol/L    Co2 26 20 - 33 mmol/L    Anion Gap 12.0 7.0 - 16.0    Glucose 129 (H) 65 - 99 mg/dL    Bun 61 (H) 8 - 22 mg/dL    Creatinine 2.29 (H) 0.50 - 1.40 mg/dL    Calcium 8.8 8.4 - 10.2 mg/dL    AST(SGOT) 37 12 - 45 U/L    ALT(SGPT) 32 2 - 50 U/L    Alkaline Phosphatase 50 30 - 99 U/L    Total Bilirubin 0.5 0.1 - 1.5 mg/dL    Albumin 4.0 3.2 - 4.9 g/dL    Total Protein 6.3 6.0 - 8.2 g/dL    Globulin 2.3 1.9 - 3.5 g/dL    A-G Ratio 1.7 g/dL   ESTIMATED GFR   Result Value Ref Range    GFR (CKD-EPI) 28 (A) >60 mL/min/1.73 m 2   All labs reviewed by me.    RADIOLOGY  CT-CHEST,ABDOMEN,PELVIS W/O   Final Result      1.  There are nondisplaced fractures of the of the left sixth, seventh and eighth ribs.   2.  There is a displaced left greater tuberosity fracture with a hematoma in the left hip soft tissue and muscle.   3.  Questionable thickening of the rectal wall, recommend correlation with physical exam and colonoscopy to exclude rectal cancer.   4.  There is left renal hematoma measuring 9.0 x 5.7 cm, this is likely residual from large perinephric hematoma seen 4/26/2020 however without contrast an acute bleed cannot be entirely excluded.      CT-HIP W/O PLUS RECONS LEFT   Final Result      1.  Displaced fracture of the left greater trochanter.   2.  Small left gluteal intramuscular hematoma.   3.  Stranding in the left retroperitoneum worrisome for a partially imaged left retroperitoneal hematoma. Further evaluation with CT of the abdomen is recommended.   4.  Colonic diverticulosis.   5.  Atherosclerotic plaque.   6.  Mild degenerative changes of the hips bilaterally.   7.  Degenerative and postsurgical changes in the lower lumbar spine. Lucencies about the pedicle screws at L5 can be seen in the setting of loosening.      DX-PELVIS-1 OR 2 VIEWS   Final Result      There is fracture of the left greater tuberosity that is minimally displaced. No dislocation.      DX-FEMUR-2+ LEFT    Final Result      1.  Mildly displaced fracture of the greater trochanter of the left femur.   2.  Postsurgical changes at the knee with mild to moderate degenerative changes of the lateral tibiofemoral compartment and moderate degenerative changes of the patellofemoral compartment.   3.  Chondrocalcinosis.   4.  Atherosclerotic plaque.      DX-CHEST-LIMITED (1 VIEW)   Final Result      1.  Stable elevation of the left hemidiaphragm with overlying atelectasis.   2.  Stable cardiomegaly.      The radiologist's interpretation of all radiological studies have been reviewed by me.    COURSE & MEDICAL DECISION MAKING  Pertinent Labs & Imaging studies reviewed. (See chart for details)    Differential Diagnosis includes but not limited to: fracture     3:29 PM - Patient seen and examined at bedside. Ordered femur XR, hip XR, chest XR, cbc with differential, comp metabolic panel, and other labs to evaluate his symptoms.     5:25 PM Patient reevaluated at bedside. Patient feeling improved, is resting comfortably, and is in no acute distress. Discussed resulted studies and that he has a fracture.    5:36 PM Ordered for hip CT    7:08 PM Patient reevaluated at bedside. Patient resting, has some pain on left upper quadrant. Discussed resulted studies.    8:05 PM Patient reevaluated at bedside. Patient resting. Discussed resulted studies. Patient denies any trouble breathing. Explained that he may need to be transferred to the main ED.    8:16 PM Consulted with Dr. Samuel, ER, about the patient's case. He will take the transfer.    Decision Making:  The patient had a ground-level fall who suffered a fracture of the greater trochanter of his left hip, as well as fracture of 3 ribs on the left side and hematoma.  I think the patient will need observation, he is unable to ambulate or bear weight on the left hip.  It is felt that he would best be managed were trauma services available therefore will be transferred to [facility]  regional    DISPOSITION:  Patient will be transferred to [facility] ED in guarded condition.    FOLLOW UP:  No follow-up provider specified.    OUTPATIENT MEDICATIONS:  New Prescriptions    No medications on file     FINAL IMPRESSION  1. Fall, initial encounter    2. Closed fracture of multiple ribs of left side, initial encounter    3. Closed displaced fracture of greater trochanter of left femur, initial encounter ([facility])         Judie ANDERSON (Scribe), am scribing for, and in the presence of, Alexandre Huerta M.D.    Electronically signed by: Judie Richard (Scribe), 3/18/2022    Alexandre ANDERSON M.D. personally performed the services described in this documentation, as scribed by Judie Richard in my presence, and it is both accurate and complete.    The note accurately reflects work and decisions made by me.  Alexandre Huerta M.D.  3/18/2022  8:26 PM     C

## 2022-03-19 ENCOUNTER — APPOINTMENT (OUTPATIENT)
Dept: RADIOLOGY | Facility: MEDICAL CENTER | Age: 82
DRG: 956 | End: 2022-03-19
Attending: SURGERY
Payer: MEDICARE

## 2022-03-19 PROBLEM — J30.2 SEASONAL ALLERGIES: Chronic | Status: ACTIVE | Noted: 2022-03-19

## 2022-03-19 LAB
ALBUMIN SERPL BCP-MCNC: 3.7 G/DL (ref 3.2–4.9)
ALBUMIN/GLOB SERPL: 1.8 G/DL
ALP SERPL-CCNC: 45 U/L (ref 30–99)
ALT SERPL-CCNC: 27 U/L (ref 2–50)
ANION GAP SERPL CALC-SCNC: 10 MMOL/L (ref 7–16)
AST SERPL-CCNC: 29 U/L (ref 12–45)
BASOPHILS # BLD AUTO: 0.2 % (ref 0–1.8)
BASOPHILS # BLD: 0.01 K/UL (ref 0–0.12)
BILIRUB SERPL-MCNC: 0.7 MG/DL (ref 0.1–1.5)
BUN SERPL-MCNC: 52 MG/DL (ref 8–22)
CALCIUM SERPL-MCNC: 8.3 MG/DL (ref 8.5–10.5)
CHLORIDE SERPL-SCNC: 106 MMOL/L (ref 96–112)
CO2 SERPL-SCNC: 24 MMOL/L (ref 20–33)
CREAT SERPL-MCNC: 1.9 MG/DL (ref 0.5–1.4)
EKG IMPRESSION: NORMAL
EOSINOPHIL # BLD AUTO: 0.22 K/UL (ref 0–0.51)
EOSINOPHIL NFR BLD: 3.7 % (ref 0–6.9)
ERYTHROCYTE [DISTWIDTH] IN BLOOD BY AUTOMATED COUNT: 50.8 FL (ref 35.9–50)
FLUAV RNA SPEC QL NAA+PROBE: NEGATIVE
FLUBV RNA SPEC QL NAA+PROBE: NEGATIVE
GFR SERPLBLD CREATININE-BSD FMLA CKD-EPI: 35 ML/MIN/1.73 M 2
GLOBULIN SER CALC-MCNC: 2.1 G/DL (ref 1.9–3.5)
GLUCOSE BLD STRIP.AUTO-MCNC: 118 MG/DL (ref 65–99)
GLUCOSE BLD STRIP.AUTO-MCNC: 76 MG/DL (ref 65–99)
GLUCOSE BLD STRIP.AUTO-MCNC: 84 MG/DL (ref 65–99)
GLUCOSE BLD STRIP.AUTO-MCNC: 85 MG/DL (ref 65–99)
GLUCOSE BLD STRIP.AUTO-MCNC: 96 MG/DL (ref 65–99)
GLUCOSE BLD STRIP.AUTO-MCNC: 99 MG/DL (ref 65–99)
GLUCOSE SERPL-MCNC: 90 MG/DL (ref 65–99)
HCT VFR BLD AUTO: 50.9 % (ref 42–52)
HGB BLD-MCNC: 16.1 G/DL (ref 14–18)
IMM GRANULOCYTES # BLD AUTO: 0.02 K/UL (ref 0–0.11)
IMM GRANULOCYTES NFR BLD AUTO: 0.3 % (ref 0–0.9)
LYMPHOCYTES # BLD AUTO: 1.19 K/UL (ref 1–4.8)
LYMPHOCYTES NFR BLD: 20.1 % (ref 22–41)
MCH RBC QN AUTO: 30.1 PG (ref 27–33)
MCHC RBC AUTO-ENTMCNC: 31.6 G/DL (ref 33.7–35.3)
MCV RBC AUTO: 95.3 FL (ref 81.4–97.8)
MONOCYTES # BLD AUTO: 0.69 K/UL (ref 0–0.85)
MONOCYTES NFR BLD AUTO: 11.7 % (ref 0–13.4)
NEUTROPHILS # BLD AUTO: 3.79 K/UL (ref 1.82–7.42)
NEUTROPHILS NFR BLD: 64 % (ref 44–72)
NRBC # BLD AUTO: 0 K/UL
NRBC BLD-RTO: 0 /100 WBC
NT-PROBNP SERPL IA-MCNC: 909 PG/ML (ref 0–125)
PLATELET # BLD AUTO: 120 K/UL (ref 164–446)
PMV BLD AUTO: 10 FL (ref 9–12.9)
POTASSIUM SERPL-SCNC: 3.9 MMOL/L (ref 3.6–5.5)
PROT SERPL-MCNC: 5.8 G/DL (ref 6–8.2)
RBC # BLD AUTO: 5.34 M/UL (ref 4.7–6.1)
RSV RNA SPEC QL NAA+PROBE: NEGATIVE
SARS-COV-2 RNA RESP QL NAA+PROBE: NOTDETECTED
SODIUM SERPL-SCNC: 140 MMOL/L (ref 135–145)
SPECIMEN SOURCE: NORMAL
WBC # BLD AUTO: 5.9 K/UL (ref 4.8–10.8)

## 2022-03-19 PROCEDURE — 94669 MECHANICAL CHEST WALL OSCILL: CPT

## 2022-03-19 PROCEDURE — 700101 HCHG RX REV CODE 250: Performed by: SURGERY

## 2022-03-19 PROCEDURE — 0241U HCHG SARS-COV-2 COVID-19 NFCT DS RESP RNA 4 TRGT MIC: CPT

## 2022-03-19 PROCEDURE — 99222 1ST HOSP IP/OBS MODERATE 55: CPT | Mod: 57 | Performed by: ORTHOPAEDIC SURGERY

## 2022-03-19 PROCEDURE — 770022 HCHG ROOM/CARE - ICU (200)

## 2022-03-19 PROCEDURE — 93010 ELECTROCARDIOGRAM REPORT: CPT | Performed by: INTERNAL MEDICINE

## 2022-03-19 PROCEDURE — 82962 GLUCOSE BLOOD TEST: CPT | Mod: 91

## 2022-03-19 PROCEDURE — 93005 ELECTROCARDIOGRAM TRACING: CPT | Performed by: SURGERY

## 2022-03-19 PROCEDURE — 700105 HCHG RX REV CODE 258: Performed by: SURGERY

## 2022-03-19 PROCEDURE — A9270 NON-COVERED ITEM OR SERVICE: HCPCS | Performed by: SURGERY

## 2022-03-19 PROCEDURE — 99291 CRITICAL CARE FIRST HOUR: CPT | Performed by: SURGERY

## 2022-03-19 PROCEDURE — 85025 COMPLETE CBC W/AUTO DIFF WBC: CPT

## 2022-03-19 PROCEDURE — 71045 X-RAY EXAM CHEST 1 VIEW: CPT

## 2022-03-19 PROCEDURE — 83880 ASSAY OF NATRIURETIC PEPTIDE: CPT

## 2022-03-19 PROCEDURE — 700102 HCHG RX REV CODE 250 W/ 637 OVERRIDE(OP): Performed by: SURGERY

## 2022-03-19 PROCEDURE — 80053 COMPREHEN METABOLIC PANEL: CPT

## 2022-03-19 RX ORDER — HEPARIN SODIUM 5000 [USP'U]/ML
5000 INJECTION, SOLUTION INTRAVENOUS; SUBCUTANEOUS EVERY 8 HOURS
Status: DISCONTINUED | OUTPATIENT
Start: 2022-03-19 | End: 2022-03-19

## 2022-03-19 RX ADMIN — POLYETHYLENE GLYCOL 3350 1 PACKET: 17 POWDER, FOR SOLUTION ORAL at 17:18

## 2022-03-19 RX ADMIN — OXYCODONE HYDROCHLORIDE 10 MG: 10 TABLET ORAL at 04:44

## 2022-03-19 RX ADMIN — ACETAMINOPHEN 650 MG: 325 TABLET, FILM COATED ORAL at 06:00

## 2022-03-19 RX ADMIN — SENNOSIDES AND DOCUSATE SODIUM 1 TABLET: 50; 8.6 TABLET ORAL at 19:52

## 2022-03-19 RX ADMIN — OMEPRAZOLE 40 MG: 20 CAPSULE, DELAYED RELEASE ORAL at 17:18

## 2022-03-19 RX ADMIN — LIDOCAINE 1 PATCH: 50 PATCH TOPICAL at 22:16

## 2022-03-19 RX ADMIN — CARVEDILOL 3.12 MG: 6.25 TABLET, FILM COATED ORAL at 08:03

## 2022-03-19 RX ADMIN — SODIUM CHLORIDE, POTASSIUM CHLORIDE, SODIUM LACTATE AND CALCIUM CHLORIDE: 600; 310; 30; 20 INJECTION, SOLUTION INTRAVENOUS at 13:07

## 2022-03-19 RX ADMIN — ACETAMINOPHEN 650 MG: 325 TABLET, FILM COATED ORAL at 17:17

## 2022-03-19 RX ADMIN — LOSARTAN POTASSIUM 25 MG: 50 TABLET, FILM COATED ORAL at 06:01

## 2022-03-19 RX ADMIN — DOCUSATE SODIUM 100 MG: 100 CAPSULE, LIQUID FILLED ORAL at 06:00

## 2022-03-19 RX ADMIN — ACETAMINOPHEN 650 MG: 325 TABLET, FILM COATED ORAL at 23:53

## 2022-03-19 RX ADMIN — OXYCODONE 5 MG: 5 TABLET ORAL at 22:13

## 2022-03-19 RX ADMIN — POLYETHYLENE GLYCOL 3350 1 PACKET: 17 POWDER, FOR SOLUTION ORAL at 06:01

## 2022-03-19 RX ADMIN — INSULIN HUMAN 1 UNITS: 100 INJECTION, SOLUTION PARENTERAL at 23:54

## 2022-03-19 RX ADMIN — ACETAMINOPHEN 650 MG: 325 TABLET, FILM COATED ORAL at 12:06

## 2022-03-19 RX ADMIN — OMEPRAZOLE 40 MG: 20 CAPSULE, DELAYED RELEASE ORAL at 06:00

## 2022-03-19 RX ADMIN — EZETIMIBE 10 MG: 10 TABLET ORAL at 06:02

## 2022-03-19 RX ADMIN — DOCUSATE SODIUM 100 MG: 100 CAPSULE, LIQUID FILLED ORAL at 17:18

## 2022-03-19 RX ADMIN — CARVEDILOL 3.12 MG: 6.25 TABLET, FILM COATED ORAL at 17:18

## 2022-03-19 ASSESSMENT — ENCOUNTER SYMPTOMS
NUMBNESS: 0
PSYCHIATRIC NEGATIVE: 1
BACK PAIN: 0
COUGH: 0
NEUROLOGICAL NEGATIVE: 1
WHEEZING: 0
TREMORS: 0
NAUSEA: 0
PALPITATIONS: 0
DOUBLE VISION: 0
WEAKNESS: 0
DIZZINESS: 0
BLURRED VISION: 0
FOCAL WEAKNESS: 0
ABDOMINAL PAIN: 0
RESPIRATORY NEGATIVE: 1
ARTHRALGIAS: 1
CARDIOVASCULAR NEGATIVE: 1
MYALGIAS: 1
CONSTIPATION: 0
HEADACHES: 0
SHORTNESS OF BREATH: 0
NECK PAIN: 0
EYES NEGATIVE: 1
CONSTITUTIONAL NEGATIVE: 1
GASTROINTESTINAL NEGATIVE: 1
STRIDOR: 0

## 2022-03-19 ASSESSMENT — PATIENT HEALTH QUESTIONNAIRE - PHQ9
1. LITTLE INTEREST OR PLEASURE IN DOING THINGS: NOT AT ALL
SUM OF ALL RESPONSES TO PHQ9 QUESTIONS 1 AND 2: 0
2. FEELING DOWN, DEPRESSED, IRRITABLE, OR HOPELESS: NOT AT ALL

## 2022-03-19 ASSESSMENT — FIBROSIS 4 INDEX: FIB4 SCORE: 4.1

## 2022-03-19 ASSESSMENT — LIFESTYLE VARIABLES: EVER_SMOKED: NEVER

## 2022-03-19 ASSESSMENT — COPD QUESTIONNAIRES
DO YOU EVER COUGH UP ANY MUCUS OR PHLEGM?: NO/ONLY WITH OCCASIONAL COLDS OR INFECTIONS
DURING THE PAST 4 WEEKS HOW MUCH DID YOU FEEL SHORT OF BREATH: NONE/LITTLE OF THE TIME
HAVE YOU SMOKED AT LEAST 100 CIGARETTES IN YOUR ENTIRE LIFE: NO/DON'T KNOW
COPD SCREENING SCORE: 2

## 2022-03-19 ASSESSMENT — PAIN DESCRIPTION - PAIN TYPE
TYPE: ACUTE PAIN

## 2022-03-19 NOTE — CARE PLAN
The patient is Watcher - Medium risk of patient condition declining or worsening    Shift Goals  Clinical Goals: [place] to ICU, CT scans, pain control, pulmonary hygiene  Patient Goals: rest comfortably  Family Goals: JUMANA    Progress made toward(s) clinical / shift goals:  yes      Problem: Skin Integrity  Goal: Skin integrity is maintained or improved  Outcome: Progressing     Problem: Pain - Standard  Goal: Alleviation of pain or a reduction in pain to the patient’s comfort goal  Outcome: Progressing

## 2022-03-19 NOTE — PROGRESS NOTES
Patient had $4000 at bedside; sent with patient belongings; copy of receipt in chart and copy given to patient.

## 2022-03-19 NOTE — ASSESSMENT & PLAN NOTE
Left renal hematoma measuring 9.0 x 5.7 cm, this is likely residual from large perinephric hematoma seen 4/26/2020.  Serial labs and serial abdominal examinations.

## 2022-03-19 NOTE — PROGRESS NOTES
Pt arrival to S106 at 2225     Vitals:   · BP: 140/92  · Temp 98.9 f   · HR: 81  · RR: 24  · SaO2: 95  · Wt: 108 kg     _____________________________________________________________     2 RN skin check completed with MARY Verma     Areas of concern/Skin observations:  · left lateral leg skin tear   · bilateral elbows red blanching  · Bilateral discolorations of lower extremeities  · right knee scar healed  · Bilateral heels red blanching     Devices in use, assessed under and interventions (as appropriate) for skin protection:  · EKG electrodes, SCDs, SaO2 probe, NIBP    Interventions in place such as:   · q2 hour turns  · Keeping skin clean and dry  · Bed Type: styker  · Mobility: TBD  · Rotating ET tube q2h (in coordination with RTs): N/A     ______________________________________________________________     Personal belongings:   · Personal belongings: $4060 cash plus credit card (sent to safe keeping), personal phone in patient possession, 1 shoe, pants, shirt

## 2022-03-19 NOTE — ASSESSMENT & PLAN NOTE
Nondisplaced fractures of the of the left sixth, seventh and eighth ribs.  Aggressive pulmonary hygiene and multimodal pain management.

## 2022-03-19 NOTE — ASSESSMENT & PLAN NOTE
Chronic condition treated with aspirin and clopidogrel.  Systemic anticoagulation held on admission.

## 2022-03-19 NOTE — ED NOTES
Patient [facility]. Patient is a transfer from [facility]. Patient presents as a GLF and sustained multiple rib fractures, left hip trochanter fracture and left kidney hematoma. GCS 15.

## 2022-03-19 NOTE — ASSESSMENT & PLAN NOTE
Admission creatinine 2.29.  3/25 Creatinine increasing, 2.54.   - Stop Lovenox. Initiated Heparin.  - Stop NSAIDS.

## 2022-03-19 NOTE — PROGRESS NOTES
"       Briefly, this is a 81 y.o. male with a left femur fracture, rib fractures, renal hematoma (which is likely from a previous injury), and hip hematoma from GLF. He is on eliquis, dyslipidemia, Garfield esophagus, chronic kidney disease, CHF, JAMES, seasonal allergies, a fib, and type 2 diabetes.    /59   Pulse 70   Temp 37.2 °C (98.9 °F) (Temporal)   Resp 17   Ht 1.854 m (6' 1\")   Wt 104 kg (230 lb)   SpO2 94%   BMI 30.34 kg/m²       Intake/Output Summary (Last 24 hours) at 3/19/2022 0350  Last data filed at 3/19/2022 0200  Gross per 24 hour   Intake 161.25 ml   Output 0 ml   Net 161.25 ml       Lab Results   Component Value Date/Time    WBC 6.3 03/18/2022 04:00 PM    WBC 4.5 02/08/2011 12:00 AM    RBC 5.63 03/18/2022 04:00 PM    RBC 5.36 02/08/2011 12:00 AM    HEMOGLOBIN 17.1 03/18/2022 04:00 PM    HEMATOCRIT 52.8 (H) 03/18/2022 04:00 PM    MCV 93.8 03/18/2022 04:00 PM    MCV 93 02/08/2011 12:00 AM    MCH 30.4 03/18/2022 04:00 PM    MCH 31.0 02/08/2011 12:00 AM    MCHC 32.4 (L) 03/18/2022 04:00 PM    MPV 10.0 03/18/2022 04:00 PM    NEUTSPOLYS 69.90 03/18/2022 04:00 PM    LYMPHOCYTES 17.40 (L) 03/18/2022 04:00 PM    MONOCYTES 9.10 03/18/2022 04:00 PM    EOSINOPHILS 2.70 03/18/2022 04:00 PM    BASOPHILS 0.30 03/18/2022 04:00 PM    HYPOCHROMIA 2+ 02/26/2020 03:49 AM    ANISOCYTOSIS 2+ 02/26/2020 03:49 AM        Lab Results   Component Value Date/Time    SODIUM 140 03/18/2022 04:00 PM    POTASSIUM 4.0 03/18/2022 04:00 PM    CHLORIDE 102 03/18/2022 04:00 PM    CO2 26 03/18/2022 04:00 PM    GLUCOSE 129 (H) 03/18/2022 04:00 PM    BUN 61 (H) 03/18/2022 04:00 PM    CREATININE 2.29 (H) 03/18/2022 04:00 PM    CREATININE 1.18 02/08/2011 12:00 AM    BUNCREATRAT 16 02/08/2011 12:00 AM    GLOMRATE >59 02/08/2011 12:00 AM        Lab Results   Component Value Date/Time    PROTHROMBTM 16.2 (H) 03/18/2022 08:38 PM    INR 1.41 (H) 03/18/2022 08:38 PM              Assessment:  Vital signs stable  No urine output at this " time  Resting at bedside  No acute events overnight    Additional Plans:  Tertiary survey  Pending labs  Chest x-ray pending  PT/OT consult  Await formal written ortho consult

## 2022-03-19 NOTE — PROGRESS NOTES
"      Mental status adequate for full examination?: Yes    Spine cleared (radiologically and/or clinically): Yes    REVIEW OF SYSTEMS:  Review of Systems   Constitutional: Negative.    Eyes: Negative.  Negative for blurred vision and double vision.   Respiratory: Negative.    Cardiovascular: Negative.    Gastrointestinal: Negative.    Genitourinary: Negative.    Musculoskeletal: Positive for joint pain.        Left chest wall pain   Neurological: Negative.  Negative for dizziness and focal weakness.   Psychiatric/Behavioral: Negative.    All other systems reviewed and are negative.      PHYSICAL EXAMINATION:  /61   Pulse 73   Temp 36.8 °C (98.2 °F) (Temporal)   Resp (!) 22   Ht 1.854 m (6' 1\")   Wt 108 kg (238 lb 8.6 oz)   SpO2 93%   BMI 31.47 kg/m²   Physical Exam  Vitals and nursing note reviewed.   Constitutional:       General: He is awake.      Appearance: Normal appearance. He is overweight.      Interventions: Nasal cannula in place.   HENT:      Head: Normocephalic and atraumatic.      Nose: Nose normal.      Mouth/Throat:      Mouth: Mucous membranes are moist.      Pharynx: Oropharynx is clear.   Eyes:      Pupils: Pupils are equal, round, and reactive to light.   Cardiovascular:      Rate and Rhythm: Normal rate.      Pulses:           Dorsalis pedis pulses are 1+ on the right side and 1+ on the left side.      Heart sounds: Normal heart sounds.   Pulmonary:      Effort: Pulmonary effort is normal. No respiratory distress.      Breath sounds: Examination of the left-middle field reveals decreased breath sounds. Examination of the left-lower field reveals decreased breath sounds. Decreased breath sounds present.   Chest:      Chest wall: Tenderness present. No crepitus.   Abdominal:      General: Abdomen is flat. Bowel sounds are normal.      Palpations: Abdomen is soft.   Musculoskeletal:      Right lower le+ Edema present.      Left lower leg: Tenderness and bony tenderness present. 1+ " Edema present.   Skin:     General: Skin is warm and dry.   Neurological:      General: No focal deficit present.      Mental Status: He is alert and oriented to person, place, and time.      GCS: GCS eye subscore is 4. GCS verbal subscore is 5. GCS motor subscore is 6.      Cranial Nerves: Cranial nerves are intact.      Sensory: Sensation is intact.      Motor: Motor function is intact.   Psychiatric:         Attention and Perception: Attention normal.         Mood and Affect: Mood normal.         Behavior: Behavior is cooperative.         LABORATORY VALUES:  Recent Labs     03/18/22  1600 03/19/22 0420   WBC 6.3 5.9   RBC 5.63 5.34   HEMOGLOBIN 17.1 16.1   HEMATOCRIT 52.8* 50.9   MCV 93.8 95.3   MCH 30.4 30.1   MCHC 32.4* 31.6*   RDW 48.8 50.8*   PLATELETCT 126* 120*   MPV 10.0 10.0     Recent Labs     03/18/22 1600 03/19/22  0420   SODIUM 140 140   POTASSIUM 4.0 3.9   CHLORIDE 102 106   CO2 26 24   GLUCOSE 129* 90   BUN 61* 52*   CREATININE 2.29* 1.90*   CALCIUM 8.8 8.3*     Recent Labs     03/18/22  1600 03/18/22 2038 03/19/22  0420   ASTSGOT 37  --  29   ALTSGPT 32  --  27   TBILIRUBIN 0.5  --  0.7   ALKPHOSPHAT 50  --  45   GLOBULIN 2.3  --  2.1   INR  --  1.41*  --      Recent Labs     03/18/22 2038   APTT 38.3*   INR 1.41*       IMAGING:  DX-CHEST-PORTABLE (1 VIEW)   Final Result         1. No significant interval change.      CT-HEAD W/O   Final Result         1. No acute intracranial abnormality. No evidence of acute intracranial hemorrhage or mass lesion.                     CT-CSPINE WITHOUT PLUS RECONS   Final Result         1. No acute fracture from C1 through T1 is visualized.         US-ABORTED US PROCEDURE    (Results Pending)   MR-HIP-W/O LEFT    (Results Pending)       All current laboratory studies/radiology exams reviewed: Yes  MRI pending, checking AICD for compatibility    Completed Consultations:  Orthopedic surgery    Pending Consultations:      Newly Identified Diagnoses and  Injuries:  None    RAP Score Total: 8      Assesment ETOH: admission BA not completed, Cage incomplete.        Discussed patient condition with RN, Patient and trauma surgery Dr. Ruiz.

## 2022-03-19 NOTE — PROGRESS NOTES
Assumed care from Luci; discussed patient current status; completed a bedside neuro check together - neuro status intact. Discussed hypoglycemia - currently 85 after orange juice. Discussed patient belongings at bedside. Will discuss with patient access to . Discussed need for diet advancement.

## 2022-03-19 NOTE — ASSESSMENT & PLAN NOTE
Chronic condition treated with carvedilol and losartan.  Resumed maintenance medication on admission.

## 2022-03-19 NOTE — PROGRESS NOTES
Discussed MRI with Aleena Johnson and MRI team as patient has AICD in place. Sent cards to MRI; as of now plan is to schedule MRI with [facility] for Monday if no contraindications found. MRI team to update if further details arise.     Aleena LACY Updated via text.

## 2022-03-19 NOTE — DISCHARGE PLANNING
Trauma Response    Referral: Trauma Yellow Response    Intervention: SW responded to trauma yellow.  Pt was BIB [facility] after GLF.  Pt was alert upon arrival.  Pts name is USMAN Means III (: 1940).  SW obtained the following pt information: SW spoke to the pt at bedside and he stated he does not want anyone called at this time. The pt did not want to provided an emergency contact at this time. The pt stated he has his phone with him and would call NOK if needed.     Plan: SW will remain available for pt support.

## 2022-03-19 NOTE — PROGRESS NOTES
Med rec completed per [place] med rec tech     Med Rec completed per patient   Allergies reviewed  No ORAL antibiotics in last 30 days

## 2022-03-19 NOTE — CARE PLAN
The patient is Watcher - Medium risk of patient condition declining or worsening    Shift Goals  Clinical Goals: Pain control; diet  Patient Goals: Rest, watch the games.  Family Goals: JUMANA    Progress made toward(s) clinical / shift goals:  Progressing    Patient is progressing towards the following goals:      Problem: Pain - Standard  Goal: Alleviation of pain or a reduction in pain to the patient’s comfort goal  Outcome: Progressing     Problem: Skin Integrity  Goal: Skin integrity is maintained or improved  Outcome: Progressing     Problem: Fall Risk  Goal: Patient will remain free from falls  Outcome: Progressing     Patient has been able to remain free from falls and injury while in the hospital thus far. The goal for care is to obtain an MRI of Left hip on Monday when [employer] staff is able to be present (by 3/21/22 at 1800) due to patient having an AICD. Pain has been managed with tylenol and ice this shift and the patient feels it is at a tolerable level.

## 2022-03-19 NOTE — CONSULTS
"[facility] ORTHO TRAUMA    Ortho Trauma Consult Note    Time Called: 1200  Time Arrived: 1300    The patient was seen at the request of trauma    Assessment & Plan:  1) 81 y.o. male s/p ground-level fall who sustained left incomplete intertrochanteric femur fracture.      Operative Plan: Pending MRI results of the left hip; clinical findings consistent with an incomplete left intertrochanteric femur fracture.  WB Status: Toe-touch weightbearing left lower extremity  DVT PPx: Lovenox SCDs or per primary team.  Pain control per primary team.    Patient is in agreement with the above-mentioned plan; all questions were answered to their apparent satisfaction.    Subjective     Chief Complaint: Left hip pain    History of Present Illness:  LIAN oMre III was injured as a result of ground-level fall.  Patient reported severe left hip pain was unable to ambulate.  Initial imaging demonstrated a left greater trochanteric femur fracture with no history of previous implants.  We were consulted for definitive management. Denies numbness/tingling in the extremity.  Has no other questions or concerns.      Past Medical History:   Diagnosis Date   • Arthritis     \"everywhere\"   • Bowel habit changes     constipation   • Breath shortness     pt states short of breath 24/7 O2 at night only   • CAD (coronary artery disease)    • CATARACT     removed bilat   • Chest pain    • Chest tightness    • CHF (congestive heart failure), NYHA class II, chronic, systolic (HCC) 12/26/2019    managed by cardiology Dr. Wilkinson: He appears euvolemic on physical exam.  He is on losartan, carvedilol, not on spironolactone due to low kidney function.  He was admitted to the hospital for CHF exacerbation, when he suddenly gained 15 pounds.  He was diuresed with IV Lasix at that time, had echocardiogram that showed significant improvement of his cardiac function, when compared to previous study   • Chickenpox    • Congestive heart failure (HCC)    • Constipation  " "  • Coronary heart disease    • Cough    • Daytime sleepiness    • Diabetes     oral medication   • Difficulty breathing    • Dilated cardiomyopathy (HCC)    • Fatigue    • GERD (gastroesophageal reflux disease)    • Hearing difficulty    • Heart murmur    • Heart valve disease    • Hiatus hernia syndrome    • History of BPH    • History of chronic cough    • Hyperlipidemia    • Hypertension     pt states well controlled on meds   • 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 Porter, gastroenterologist   • Urinary incontinence        Past Surgical History:   Procedure Laterality Date   • LAP, REMOVE ADJUST LUIS RESTRICT D*  6/5/2020    Procedure: REMOVAL, GASTRIC BAND, LAPAROSCOPIC - ADJUSTABLE BAND AND PORT;  Surgeon: Deniz Rios M.D.;  Location: [facility];  Service: General   • TRANSCATHETER AORTIC VALVE REPLACEMENT N/A 1/27/2020    Procedure: REPLACEMENT, AORTIC VALVE, TRANSCATHETER-;  Surgeon: Surendra Castro M.D.;  Location: [facility];  Service: Cardiac   • OLGA  1/27/2020    Procedure: ECHOCARDIOGRAM, TRANSESOPHAGEAL;  Surgeon: Surendra Castro M.D.;  Location: [facility];  Service: Cardiac   • GASTROSCOPY N/A 1/8/2016    Procedure: GASTROSCOPY;  Surgeon: Deniz Rios M.D.;  Location: [facility];  Service:    • ORIF, FRACTURE, HUMERUS Left 6/25/2015    Procedure: HUMERUS ORIF/ PROXIMAL;  Surgeon: Cristal Guido M.D.;  Location: [facility];  Service:    • FUSION, SPINE, LUMBAR, PLIF  9/5/2013    Performed by Jayro Sears M.D. at " SURGERY [facility] ORS   • LUMBAR DECOMPRESSION  9/5/2013    Performed by Edith Whelan M.D. at SURGERY [facility] ORS   • MASS EXCISION NEURO  9/5/2013    Performed by Edith Whelan M.D. at SURGERY [facility]   • RECOVERY  6/26/2012    Performed by SURGERY, IR-RECOVERY at SURGERY SAME DAY [facility] ORS   • DRAINAGE HEMATOMA  5/25/2012    Performed by SARINA SUE at [facility] ORS   • GASTRIC BAND LAPAROSCOPIC REVISION  5/11/2012    Performed by SARINA SUE at [facility] ORS   • FUSION, SPINE, LUMBAR, PLIF  3/26/2012    Performed by EDITH WHELAN at [facility] ORS   • LUMBAR DECOMPRESSION  3/26/2012    Performed by EDITH WHELAN at [facility] ORS   • INJ,EPIDURAL/LUMB/SAC SINGLE  3/19/2012    Performed by KRISTIN BALTAZAR at [facility]   • INJ,EPIDURAL/LUMB/SAC SINGLE  3/5/2012    Performed by KRISTIN BALTAZAR at [facility] ORS   • GASTRIC BANDING LAPAROSCOPIC  3/24/2010    Performed by SARINA SUE at SURGERY [facility] ORS   • HIATAL HERNIA REPAIR  3/24/2010    Performed by SARINA SUE at [facility] ORS   • KNEE ARTHROPLASTY TOTAL  2000    bilateral   • ABDOMINOPLASTY  1990   • AORTIC VALVE REPLACEMENT     • ARTHROSCOPY, KNEE     • LAMINOTOMY     • OTHER CARDIAC SURGERY      stents   • SD REMV 2ND CATARACT,CORN-SCLER SECTN     • SINUSCOPE     • SLEEVE,CARRIE VASO THIGH     • TONSILLECTOMY     • ZZZ CARDIAC CATH         Medications  No current facility-administered medications on file prior to encounter.     Current Outpatient Medications on File Prior to Encounter   Medication Sig Dispense Refill   • CRANBERRY PO Take 1 Tablet by mouth every day.     • torsemide (DEMADEX) 20 MG Tab Take 20 mg by mouth 2 times a day.     • Non Formulary Request Inject 10 Units under the skin see administration instructions. Take 6 days a week HOLD on Thursday   HGH injection provided by      • sacubitril-valsartan (ENTRESTO) 24-26 MG  Tab tablet Take 1 Tablet by mouth 2 times a day.     • losartan (COZAAR) 25 MG Tab Take 1 Tablet by mouth every day. 30 Tablet 3   • apixaban (ELIQUIS) 5mg Tab Take 1 Tablet by mouth 2 times a day. 180 Tablet 3   • aspirin EC (ECOTRIN) 81 MG Tablet Delayed Response Take 1 Tablet by mouth every day. 90 Tablet 3   • ezetimibe (ZETIA) 10 MG Tab Take 1 tablet by mouth once daily 90 Tablet 3   • carvedilol (COREG) 3.125 MG Tab Take 1 tablet by mouth 2 times a day with meals. 180 tablet 3   • SILODOSIN PO Take 1 Tablet by mouth every evening. Indications: Benign Enlargement of Prostate     • clomiPHENE (CLOMID) 50 MG tablet Take 50 mg by mouth see administration instructions. Mon Wed Fri     • hydrocortisone 2.5 % Ointment Apply 1 Each to affected area(s) as needed.     • Calcium Citrate (CITRACAL PO) Take 1 Tab by mouth 2 Times a Day.     • [DISCONTINUED] Home Care Oxygen Inhale 2 L/min by mouth Continuous.   Respiratory route via: Nasal Cannula   Oxygen supplier: [employer]  Indications: Hypoxia, to keep O2 sat over 90%     • fluticasone (FLONASE) 50 MCG/ACT nasal spray Use 2 spray(s) in each nostril once daily (Patient taking differently: Administer 2 Sprays into affected nostril(S) 1 time a day as needed.) 48 g 3   • lactobacillus rhamnosus (CULTURELLE) Cap capsule Take 1 Cap by mouth every day.     • testosterone cypionate (DEPO-TESTOSTERONE) 200 MG/ML Solution injection Inject 160 mg into the shoulder, thigh, or buttocks every thursday.     • cyanocobalamin (VITAMIN B-12) 1000 MCG/ML Solution 1,000 mcg by Intramuscular route every thursday.     • coenzyme Q-10 30 MG capsule Take 60 mg by mouth 2 Times a Day.     • Garlic 1000 MG Cap Take 1,000 mg by mouth 2 Times a Day.     • glucosamine Sulfate 500 MG Cap Take 1,500 mg by mouth 2 Times a Day.     • vitamin e (VITAMIN E) 400 UNIT Cap Take 400 Units by mouth 2 times a day.     • omeprazole (PRILOSEC) 40 MG delayed-release capsule Take 40 mg by mouth 2 times a day.     •  Cholecalciferol (VITAMIN D3) 2000 UNIT Cap Take 2,000 Units by mouth 2 Times a Day.     • Non Formulary Request Take 1 Capsule by mouth every day. anit-oxidant (OTC)     • Zinc 50 MG Tab Take 50 mg by mouth 2 times a day.     • anastrozole (ARIMIDEX) 1 MG Tab Take 0.5 mg by mouth see administration instructions. On Tues & Sat     • glimepiride (AMARYL) 1 MG tablet Take 1 mg by mouth at bedtime.     • B Complex Vitamins (VITAMIN B COMPLEX PO) Take 1 Tab by mouth every day.     • L-Lysine 1000 MG Tab Take 1,000 mg by mouth every day.     • Ascorbic Acid (VITAMIN C) 1000 MG Tab Take 1,000 mg by mouth 2 Times a Day.         Allergies  Statins [hmg-coa-r inhibitors]    ROS  Negative otherwise than mentioned in HPI.    Family History   Problem Relation Age of Onset   • No Known Problems Sister    • No Known Problems Sister    • No Known Problems Sister    • Diabetes Other    • No Known Problems Mother    • No Known Problems Father    • Heart Disease Neg Hx        Social History     Socioeconomic History   • Marital status:    • Highest education level: Bachelor's degree (e.g., BA, AB, BS)   Tobacco Use   • Smoking status: Never Smoker   • Smokeless tobacco: Never Used   • Tobacco comment: 0   Vaping Use   • Vaping Use: Never used   Substance and Sexual Activity   • Alcohol use: Not Currently     Alcohol/week: 0.0 oz   • Drug use: Not Currently   • Sexual activity: Yes     Social Determinants of Health     Financial Resource Strain: Low Risk    • Difficulty of Paying Living Expenses: Not very hard   Food Insecurity: No Food Insecurity   • Worried About Running Out of Food in the Last Year: Never true   • Ran Out of Food in the Last Year: Never true   Transportation Needs: No Transportation Needs   • Lack of Transportation (Medical): No   • Lack of Transportation (Non-Medical): No   Physical Activity: Sufficiently Active   • Days of Exercise per Week: 3 days   • Minutes of Exercise per Session: 150+ min   Stress:  "Stress Concern Present   • Feeling of Stress : To some extent   Social Connections: Socially Isolated   • Frequency of Communication with Friends and Family: Once a week   • Frequency of Social Gatherings with Friends and Family: Never   • Attends Yazidism Services: Never   • Active Member of Clubs or Organizations: No   • Attends Club or Organization Meetings: Never   • Marital Status:    Housing Stability: Low Risk    • Unable to Pay for Housing in the Last Year: No   • Number of Places Lived in the Last Year: 1   • Unstable Housing in the Last Year: No       Objective     Current Vital Signs:  /60   Pulse 77   Temp 36.8 °C (98.2 °F) (Temporal)   Resp 19   Ht 1.854 m (6' 1\")   Wt 108 kg (238 lb 8.6 oz)   SpO2 97%   BMI 31.47 kg/m²     Physical Exam:  General: Awake, appropriate, cooperative, and in no acute distress  HEENT: Normocephalic, atraumatic  CV: Equal peripheral pulses  Resp: Equal chest rise bilaterally  GI: Abdomen soft, nontender, no rebound, no guarding  Neuro: Cranial nerves II-XII grossly intact  Psych: Alert and oriented x3  Extremities:   Left lower extremity: Positive logroll, positive Stinchfield, intact sensation motor distally +2 DP pulse.    Data Review:   Recent Labs     03/18/22  1600 03/19/22  0420   WBC 6.3 5.9   RBC 5.63 5.34   HEMOGLOBIN 17.1 16.1   HEMATOCRIT 52.8* 50.9   MCV 93.8 95.3   MCH 30.4 30.1   MCHC 32.4* 31.6*   RDW 48.8 50.8*   PLATELETCT 126* 120*   MPV 10.0 10.0     Recent Labs     03/18/22  1600 03/19/22  0420   SODIUM 140 140   POTASSIUM 4.0 3.9   CHLORIDE 102 106   CO2 26 24   GLUCOSE 129* 90   BUN 61* 52*     Recent Labs     03/18/22 2038   APTT 38.3*   INR 1.41*       Imaging: Radiographs and computed tomography of the left hip demonstrate fracture line extending through the greater trochanter.  This does appear to have traveled oblique fashion towards the lesser trochanter.  This most likely signifies a incomplete intertrochanteric femur " fracture.    Diego Lerma M.D.  Date: 3/19/2022  Time: 9:41 AM

## 2022-03-19 NOTE — H&P
CHIEF COMPLAINT: Mechanical ground-level fall.  Left-sided rib fractures in the left hip fracture.     HISTORY OF PRESENT ILLNESS: The patient is a 81 year-old White elderly man who was injured in a fall. The patient suffered a mechanical ground-level fall. The patient had no loss of consciousness. The patient denies any chronic anticoagulation or antiplatelet medications. The patient is chronically anticoagulated with apixaban (Eliquis). His last dose of medication was taken this morning.  He complains of pain in the left ribs and left hip.    TRIAGE CATEGORY: The patient was triaged as a Trauma Yellow Transfer activation. The patient was initially evaluated at [facility] in [place], [place] where CT imaging demonstrated Left-sided rib fractures and a left hip fracture. The patient was transported to [facility] in [place], [place] for a definitive trauma evaluation. An expeditious primary and secondary survey with required adjuncts was conducted. See Trauma Narrator for full details.    PAST MEDICAL HISTORY:  has a past medical history of Arthritis, Bowel habit changes, Breath shortness, CAD (coronary artery disease), CATARACT, Chest pain, Chest tightness, CHF (congestive heart failure), NYHA class II, chronic, systolic (HCC) (12/26/2019), Chickenpox, Congestive heart failure ([facility]), Constipation, Coronary heart disease, Cough, Daytime sleepiness, Diabetes, Difficulty breathing, Dilated cardiomyopathy ([facility]), Fatigue, GERD (gastroesophageal reflux disease), Hearing difficulty, Heart murmur, Heart valve disease, Hiatus hernia syndrome, History of BPH, History of chronic cough, Hyperlipidemia, Hypertension, Kidney disease (05/2020), Mumps, Oxygen dependent, Pacemaker, Pain (02/01/2022), Palpitations, Peripheral neuropathy, Persistent atrial fibrillation ([facility]), Pneumonia (2007), Rhinitis, Ringing in ears, Severe aortic stenosis (12/4/2019), Sleep apnea, Swelling of lower extremity,  "Tonsillitis, Tremor, Ulcer (2014), and Urinary incontinence.    PAST SURGICAL HISTORY:  has a past surgical history that includes abdominoplasty (1990); gastric banding laparoscopic (3/24/2010); hiatal hernia repair (3/24/2010); inj,epidural/lumb/sac single (3/5/2012); inj,epidural/lumb/sac single (3/19/2012); fusion, spine, lumbar, plif (3/26/2012); lumbar decompression (3/26/2012); gastric band laparoscopic revision (5/11/2012); drainage hematoma (5/25/2012); recovery (6/26/2012); fusion, spine, lumbar, plif (9/5/2013); lumbar decompression (9/5/2013); mass excision neuro (9/5/2013); gastroscopy (N/A, 1/8/2016); zzz cardiac cath; laminotomy; Sleeve,Grant Vaso Thigh; pr remv 2nd cataract,corn-scler sectn; sinuscope; tonsillectomy; arthroscopy, knee; transcatheter aortic valve replacement (N/A, 1/27/2020); stephon (1/27/2020); aortic valve replacement; other cardiac surgery; lap, remove adjust pasha restrict d* (6/5/2020); knee arthroplasty total (2000); and orif, fracture, humerus (Left, 6/25/2015).    ALLERGIES:   Allergies   Allergen Reactions   • Statins [Hmg-Coa-R Inhibitors] Rash     Blisters       CURRENT MEDICATIONS:   Home Medications    **Home medications have not yet been reviewed for this encounter**       FAMILY HISTORY: family history includes Diabetes in an other family member; No Known Problems in his father, mother, sister, sister, and sister.    SOCIAL HISTORY:  reports that he has never smoked. He has never used smokeless tobacco. He reports previous alcohol use. He reports previous drug use.    REVIEW OF SYSTEMS: Comprehensive review of systems is negative with the exception of the aforementioned HPI, PMH, and PSH bullets in accordance with CMS guidelines.    PHYSICAL EXAMINATION:      Vital Signs: /70   Pulse 82   Temp 37.2 °C (98.9 °F)   Resp 16   Ht 1.854 m (6' 1\")   Wt 104 kg (230 lb)   SpO2 98%   Physical Exam  Vitals and nursing note reviewed.   Constitutional:       General: He is " not in acute distress.     Appearance: Normal appearance. He is obese.      Interventions: Nasal cannula in place.   HENT:      Head: Normocephalic and atraumatic.      Nose: Nose normal.      Mouth/Throat:      Mouth: Mucous membranes are moist.      Pharynx: Oropharynx is clear.   Eyes:      Extraocular Movements: Extraocular movements intact.      Conjunctiva/sclera: Conjunctivae normal.      Pupils: Pupils are equal, round, and reactive to light.   Cardiovascular:      Rate and Rhythm: Normal rate. Rhythm irregular.      Pulses: Normal pulses.   Pulmonary:      Effort: Pulmonary effort is normal. No respiratory distress.   Chest:      Chest wall: Tenderness (Left-sided chest wall tenderness) present.   Abdominal:      General: Abdomen is protuberant. There is no distension.      Palpations: Abdomen is soft.      Tenderness: There is no abdominal tenderness. There is no guarding.   Musculoskeletal:         General: Swelling, tenderness (Left hip) and deformity present.      Cervical back: Normal range of motion and neck supple. No tenderness.   Skin:     General: Skin is warm and dry.      Capillary Refill: Capillary refill takes less than 2 seconds.   Neurological:      General: No focal deficit present.      Mental Status: He is alert and oriented to person, place, and time.      GCS: GCS eye subscore is 4. GCS verbal subscore is 5. GCS motor subscore is 6.      Cranial Nerves: Cranial nerves are intact. No cranial nerve deficit.      Sensory: No sensory deficit.      Motor: Motor function is intact. No weakness.      Coordination: Coordination is intact. Coordination normal.   Psychiatric:         Mood and Affect: Mood normal.         Behavior: Behavior normal. Behavior is cooperative.         Thought Content: Thought content normal.         Judgment: Judgment normal.     LABORATORY VALUES:   Recent Labs     03/18/22  1600   WBC 6.3   RBC 5.63   HEMOGLOBIN 17.1   HEMATOCRIT 52.8*   MCV 93.8   MCH 30.4   MCHC  32.4*   RDW 48.8   PLATELETCT 126*   MPV 10.0     Recent Labs     03/18/22  1600   SODIUM 140   POTASSIUM 4.0   CHLORIDE 102   CO2 26   GLUCOSE 129*   BUN 61*   CREATININE 2.29*   CALCIUM 8.8     Recent Labs     03/18/22  1600 03/18/22 2038   ASTSGOT 37  --    ALTSGPT 32  --    TBILIRUBIN 0.5  --    ALKPHOSPHAT 50  --    GLOBULIN 2.3  --    INR  --  1.41*     Recent Labs     03/18/22 2038   APTT 38.3*   INR 1.41*     IMAGING:     The patient's transfer radiographs are reviewed.  CT imaging of the chest abdomen and pelvis demonstrated fractures of the left sixth seventh and eighth ribs.  No significant pneumothorax or hemothorax.  There is a displaced left greater tuberosity fracture with a small hematoma in the left hip and soft tissues.    CT imaging of the head demonstrated no intracranial hemorrhage shift or mass-effect.    ASSESSMENT AND PLAN:     Mechanical ground-level fall with resulting left rib fractures and left hip fracture.  Multiple medical comorbidities.    DISPOSITION: Trauma ICU.  Trauma tertiary survey.    CRITICAL CARE TIME: 34 minutes excluding procedures.       ____________________________________     Mayur Gan M.D.    DD: 3/18/2022  10:05 PM

## 2022-03-19 NOTE — PROGRESS NOTES
A/P: The patient is a 81 year-old White elderly man who was injured in a fall. He sustained a left greater trochanteric femur fracture. It is more than likely this represents an incomplete left intertrochanteric femur fracture. Recommend MRI left hip to confirm. May attempt to mobilize with PT in meantime. Formal consult note to follow.

## 2022-03-19 NOTE — ASSESSMENT & PLAN NOTE
Prophylactic anticoagulation for thrombotic prevention initially contraindicated secondary to elevated bleeding risk.  3/20 Trauma surveillance venous duplex scanning ordered.

## 2022-03-19 NOTE — PROGRESS NOTES
Rounded with Dr. Ruiz; discussed patient status; currently on LR at 75, FSBG in AM was 85; plan to follow hypoglycemia protocol for now; discussed plan for L hip - patient has AICD in place so unable to go for MRI which was recommended by ortho - will continue to follow up; plan for internal med consult; plan to mobilize with PT when able.

## 2022-03-19 NOTE — ED NOTES
Report from bárbara RN. Pt resting comfortably in [facility]. Discussed POC. Pt agreeable at this time.

## 2022-03-19 NOTE — ED PROVIDER NOTES
ED Provider Note    Scribed for Abhinav Samuel M.D. by Brenden Babb. 3/18/2022  10:21 PM    Primary care provider: Faye Moeller M.D.  Means of arrival: EMS   History obtained from: Patient  History limited by: None    CHIEF COMPLAINT  Trauma Yellow Transfer.    AMBER More III is a 81 y.o. male who presents to the Emergency Department as a Trauma yellow transfer from [facility]. Patient reports he fell down a hill while golfing when he then rolled down the hill. Due to this fall the patient was prompted to present to [facility] ED. After this, the patient was transferred to [facility]. He reports associated left hip pain, and left leg pain. There are no alleviating or exacerbating factors reported at this time. He denies associated fever, cough, or difficulty breathing.     REVIEW OF SYSTEMS  Pertinent positives include: left hip pain, and left leg pain. Pertinent negatives include: fever, cough, or difficulty breathing. See history of present illness. All other systems are negative.     PAST MEDICAL HISTORY   has a past medical history of Arthritis, Bowel habit changes, Breath shortness, CAD (coronary artery disease), CATARACT, Chest pain, Chest tightness, CHF (congestive heart failure), NYHA class II, chronic, systolic (HCC) (12/26/2019), Chickenpox, Congestive heart failure (HCC), Constipation, Coronary heart disease, Cough, Daytime sleepiness, Diabetes, Difficulty breathing, Dilated cardiomyopathy ([facility]), Fatigue, GERD (gastroesophageal reflux disease), Hearing difficulty, Heart murmur, Heart valve disease, Hiatus hernia syndrome, History of BPH, History of chronic cough, Hyperlipidemia, Hypertension, Kidney disease (05/2020), Mumps, Oxygen dependent, Pacemaker, Pain (02/01/2022), Palpitations, Peripheral neuropathy, Persistent atrial fibrillation ([facility]), Pneumonia (2007), Rhinitis, Ringing in ears, Severe aortic stenosis (12/4/2019), Sleep apnea, Swelling of  lower extremity, Tonsillitis, Tremor, Ulcer (2014), and Urinary incontinence.    SURGICAL HISTORY   has a past surgical history that includes abdominoplasty (1990); gastric banding laparoscopic (3/24/2010); hiatal hernia repair (3/24/2010); inj,epidural/lumb/sac single (3/5/2012); inj,epidural/lumb/sac single (3/19/2012); fusion, spine, lumbar, plif (3/26/2012); lumbar decompression (3/26/2012); gastric band laparoscopic revision (5/11/2012); drainage hematoma (5/25/2012); recovery (6/26/2012); fusion, spine, lumbar, plif (9/5/2013); lumbar decompression (9/5/2013); mass excision neuro (9/5/2013); gastroscopy (N/A, 1/8/2016); zzz cardiac cath; laminotomy; Sleeve,Grant Vaso Thigh; remv 2nd cataract,corn-scler sectn; sinuscope; tonsillectomy; arthroscopy, knee; transcatheter aortic valve replacement (N/A, 1/27/2020); stephon (1/27/2020); aortic valve replacement; other cardiac surgery; lap, remove adjust pasha restrict d* (6/5/2020); knee arthroplasty total (2000); and orif, fracture, humerus (Left, 6/25/2015).    SOCIAL HISTORY  Social History     Tobacco Use   • Smoking status: Never Smoker   • Smokeless tobacco: Never Used   • Tobacco comment: 0   Vaping Use   • Vaping Use: Never used   Substance Use Topics   • Alcohol use: Not Currently     Alcohol/week: 0.0 oz   • Drug use: Not Currently      Social History     Substance and Sexual Activity   Drug Use Not Currently       FAMILY HISTORY  Family History   Problem Relation Age of Onset   • No Known Problems Sister    • No Known Problems Sister    • No Known Problems Sister    • Diabetes Other    • No Known Problems Mother    • No Known Problems Father    • Heart Disease Neg Hx        CURRENT MEDICATIONS  Current Outpatient Medications   Medication Instructions   • anastrozole (ARIMIDEX) 0.5 mg, Oral, SEE ADMIN INSTRUCTIONS, On Tues & Sat   • apixaban (ELIQUIS) 5 mg, Oral, 2 TIMES DAILY   • aspirin EC (ECOTRIN) 81 mg, Oral, DAILY   • B Complex Vitamins (VITAMIN B COMPLEX  "PO) 1 Tablet, Oral, DAILY   • Calcium Citrate (CITRACAL PO) 1 Tablet, Oral, 2 TIMES DAILY   • carvedilol (COREG) 3.125 mg, Oral, 2 TIMES DAILY WITH MEALS   • clomiPHENE (CLOMID) 50 mg, Oral, SEE ADMIN INSTRUCTIONS, Mon Wed Fri   • coenzyme Q-10 60 mg, Oral, 2 TIMES DAILY   • CRANBERRY PO 1 Tablet, Oral, DAILY   • cyanocobalamin (VITAMIN B-12) 1,000 mcg, Intramuscular, EVERY THURSDAY   • ezetimibe (ZETIA) 10 MG Tab Take 1 tablet by mouth once daily   • fluticasone (FLONASE) 50 MCG/ACT nasal spray Use 2 spray(s) in each nostril once daily   • Garlic 1,000 mg, Oral, 2 TIMES DAILY   • glimepiride (AMARYL) 1 mg, Oral, EVERY BEDTIME   • glucosamine Sulfate 1,500 mg, Oral, 2 TIMES DAILY   • hydrocortisone 2.5 % Ointment 1 Each, Topical, PRN   • L-Lysine 1,000 mg, Oral, DAILY   • lactobacillus rhamnosus (CULTURELLE) Cap capsule 1 Capsule, Oral, DAILY   • losartan (COZAAR) 25 mg, Oral, DAILY   • Non Formulary Request 1 Capsule, Oral, DAILY, anit-oxidant (OTC)    • Non Formulary Request 10 Units, Subcutaneous, SEE ADMIN INSTRUCTIONS, Take 6 days a week HOLD on Thursday <BR>HGH injection provided by    • omeprazole (PRILOSEC) 40 mg, Oral, 2 TIMES DAILY   • sacubitril-valsartan (ENTRESTO) 24-26 MG Tab tablet 1 Tablet, Oral, 2 TIMES DAILY   • SILODOSIN PO 1 Tablet, Oral, EVERY EVENING   • testosterone cypionate (DEPO-TESTOSTERONE) 160 mg, Intramuscular, EVERY THURSDAY   • torsemide (DEMADEX) 20 mg, Oral, 2 TIMES DAILY   • Vitamin C 1,000 mg, Oral, 2 TIMES DAILY   • Vitamin D3 2,000 Units, Oral, 2 TIMES DAILY   • vitamin e (VITAMIN E) 400 Units, Oral, 2 TIMES DAILY   • Zinc 50 mg, Oral, 2 TIMES DAILY          ALLERGIES  Allergies   Allergen Reactions   • Statins [Hmg-Coa-R Inhibitors] Rash     Blisters         PHYSICAL EXAM  VITAL SIGNS: /70   Pulse 82   Temp 37.2 °C (98.9 °F)   Resp 16   Ht 1.854 m (6' 1\")   Wt 104 kg (230 lb)   SpO2 98% Comment: 2L  BMI 30.34 kg/m²     Constitutional: Well appearing " well-nourished, mild distress, no evidence of shock, no evidence of pain  HENT:  Normocephalic, atraumatic, no Zamora sign, raccoon eyes or evidence of CSF drainage, mouth is intact with normal dentition  Eyes: PERRLA, EOMI,   Neck: No midline tenderness or step-offs  Cardiovascular: Regular rate and rhythm, No murmurs, No rubs, No gallops.   Thorax & Lungs: Normal chest excursions no paradoxical motion, no subcutaneous emphysema, the breath sounds are clear and equal bilaterally, no wheezes, rhonchi, or rales. Left chest tenderness  Abdomen: Abdomen no distention, ecchymosis. The abdomen is normal in appearance normal bowel sounds. There is no rigidity, no rebound. Left abdomen is tender to palpation.   Skin: No lesions, ecchymosis, or gross deformity noted  Back: No tenderness to palpation along the thoracic or lumbar spine at midline, no deformities noted,  :   No CVA tenderness.   Extremities: Good range of motion without tenderness, deformity, pulses 2+ in all 4 extremities  Pelvis: No laxity or tenderness with palpation or compression  Neurologic: Patient is alert and oriented to person place and time. Cranial nerves III through XII are intact. Sensory and motor functions are intact. Strength is 5 out of 5 for flexion and extension in all 4 extremities. No evidence of incontinence.  Psychiatric: Affect normal, Judgment normal, Mood normal.   Musculoskeletal: Left hip tenderness.    DIAGNOSTIC STUDIES / PROCEDURES    LABS  Labs Reviewed   COV-2, FLU A/B, AND RSV BY PCR (CEPHEID)    Narrative:     Collected By: [id number] EHSAN CALDERON   CBC WITH DIFFERENTIAL   COMP METABOLIC PANEL   POCT GLUCOSE   POCT GLUCOSE   POCT GLUCOSE   POCT GLUCOSE   POCT GLUCOSE DEVICE RESULTS      All labs reviewed by me.    RADIOLOGY  CT-HEAD W/O   Final Result         1. No acute intracranial abnormality. No evidence of acute intracranial hemorrhage or mass lesion.                     CT-CSPINE WITHOUT PLUS RECONS   Final Result          1. No acute fracture from C1 through T1 is visualized.         US-ABORTED US PROCEDURE    (Results Pending)   DX-CHEST-PORTABLE (1 VIEW)    (Results Pending)     The radiologist's interpretation of all radiological studies have been reviewed by me.    COURSE & MEDICAL DECISION MAKING  Nursing notes, Phyllis PIÑA reviewed in chart.    81 y.o. male p/w chief complaint of fall down hill.    10:21 PM Patient seen and examined at bedside.      I verified that the patient was wearing a mask and I was wearing appropriate PPE every time I entered the room. The patient's mask was on the patient at all times during my encounter except for a brief view of the oropharynx.     The differential diagnoses include but are not limited to:     Trauma activation called upon arrival  General surgery at bedside to assist w/ pt care and medical decision making  Given mechanism and presentation broad differential including ICH, skull fx, ptx, intraabd trauma  Pt placed on monitor  Given pt hemodynamic stability plan will be to go to CT prior to admission  Reviewed imaging from outside facility  Patient treated with morphine 4 mg/mL 4 mg     Given fall down the hill and distracting injury and given concern for anticoagulated patient with significant distracting trauma plan for CT head and CT C-spine  Patient transported to ICU with trauma surgery  Dr. Gan at bedside to assist with medical decision-making    Patient with 3 rib fractures and hypoxic and given age patient admitted to ICU for concerns for pulmonary toilet/worsening hypoxia and pain management    Plan to monitor to ensure no worsening internal bleeding and consideration for serial CBCs    10:25 PM - Paged Ortho   I discussed this case with orthopedic surgeon Dr. Lerma who agrees to see patient in a.m. for potential surgery    DISPOSITION:  Patient will be hospitalized by Dr. Gan in guarded condition.    FINAL IMPRESSION  1. Closed fracture of multiple ribs of left side,  initial encounter    2. Closed displaced fracture of greater trochanter of left femur, initial encounter ([facility])    3. Hematoma of left kidney, initial encounter    4. Anticoagulated by anticoagulation treatment          IBrenden (Scribe), am scribing for, and in the presence of, Abhinav Samuel M.D..    Electronically signed by: Brenden Babb (Ann), 3/18/2022    Abhinav ANDERSON M.D. personally performed the services described in this documentation, as scribed by Brenden Babb in my presence, and it is both accurate and complete.    The note accurately reflects work and decisions made by me.  Abhinav Samuel M.D.  3/19/2022  2:39 AM

## 2022-03-19 NOTE — PROGRESS NOTES
NSR with first degree heart block (also noted in February EKG)  and borderline bundle branch block, pt with recent cardioversion for A-fib in February, EKG obtained and uploaded, pause noted on EKG (pt asymptomatic)

## 2022-03-19 NOTE — ASSESSMENT & PLAN NOTE
Displaced fracture of the left greater trochanter.  3/24 Plan to go to OR.  3/23 MRI complete with Tbricks. MRI with acute mildly displaced fracture of the left greater trochanter with extension to the proximal femur. High-grade tear and strain of the left gluteus medius tendon and muscle with intramuscular hematoma. Mild strain of the adductor muscles.  3/24 OR for surgical treatment of left intertrochanteric femur fracture with intramedullary device.  Weight bearing status - Weightbearing as tolerated LLE. May attempt to mobilize with PT, per Ortho bella Lerma MD. Orthopedic Surgeon. [facility].    Transition to Aspirin 325 mg PO BID as an outpatient.

## 2022-03-20 ENCOUNTER — APPOINTMENT (OUTPATIENT)
Dept: RADIOLOGY | Facility: MEDICAL CENTER | Age: 82
DRG: 956 | End: 2022-03-20
Attending: SURGERY
Payer: MEDICARE

## 2022-03-20 PROBLEM — N40.0 BPH (BENIGN PROSTATIC HYPERPLASIA): Status: ACTIVE | Noted: 2022-03-20

## 2022-03-20 PROBLEM — Z01.89 ENCOUNTER FOR GERIATRIC ASSESSMENT: Status: ACTIVE | Noted: 2022-03-20

## 2022-03-20 PROBLEM — R93.3 ABNORMAL CT SCAN, COLON: Status: ACTIVE | Noted: 2022-03-20

## 2022-03-20 PROBLEM — I48.19 PERSISTENT ATRIAL FIBRILLATION (HCC): Status: ACTIVE | Noted: 2022-03-18

## 2022-03-20 PROBLEM — R29.6 FREQUENT FALLS: Status: ACTIVE | Noted: 2019-09-04

## 2022-03-20 PROBLEM — Z79.890 HORMONE REPLACEMENT THERAPY: Status: ACTIVE | Noted: 2022-03-20

## 2022-03-20 PROBLEM — J96.11 CHRONIC RESPIRATORY FAILURE WITH HYPOXIA (HCC): Status: ACTIVE | Noted: 2022-03-20

## 2022-03-20 LAB
ALBUMIN SERPL BCP-MCNC: 3.5 G/DL (ref 3.2–4.9)
ALBUMIN/GLOB SERPL: 1.6 G/DL
ALP SERPL-CCNC: 46 U/L (ref 30–99)
ALT SERPL-CCNC: 19 U/L (ref 2–50)
ANION GAP SERPL CALC-SCNC: 10 MMOL/L (ref 7–16)
AST SERPL-CCNC: 22 U/L (ref 12–45)
BASOPHILS # BLD AUTO: 0.3 % (ref 0–1.8)
BASOPHILS # BLD: 0.02 K/UL (ref 0–0.12)
BILIRUB SERPL-MCNC: 0.6 MG/DL (ref 0.1–1.5)
BUN SERPL-MCNC: 42 MG/DL (ref 8–22)
CALCIUM SERPL-MCNC: 8.4 MG/DL (ref 8.5–10.5)
CHLORIDE SERPL-SCNC: 103 MMOL/L (ref 96–112)
CO2 SERPL-SCNC: 27 MMOL/L (ref 20–33)
CREAT SERPL-MCNC: 1.84 MG/DL (ref 0.5–1.4)
EOSINOPHIL # BLD AUTO: 0.21 K/UL (ref 0–0.51)
EOSINOPHIL NFR BLD: 3.2 % (ref 0–6.9)
ERYTHROCYTE [DISTWIDTH] IN BLOOD BY AUTOMATED COUNT: 51.4 FL (ref 35.9–50)
GFR SERPLBLD CREATININE-BSD FMLA CKD-EPI: 37 ML/MIN/1.73 M 2
GLOBULIN SER CALC-MCNC: 2.2 G/DL (ref 1.9–3.5)
GLUCOSE BLD STRIP.AUTO-MCNC: 105 MG/DL (ref 65–99)
GLUCOSE BLD STRIP.AUTO-MCNC: 153 MG/DL (ref 65–99)
GLUCOSE BLD STRIP.AUTO-MCNC: 154 MG/DL (ref 65–99)
GLUCOSE BLD STRIP.AUTO-MCNC: 198 MG/DL (ref 65–99)
GLUCOSE SERPL-MCNC: 112 MG/DL (ref 65–99)
HCT VFR BLD AUTO: 51.2 % (ref 42–52)
HGB BLD-MCNC: 16.3 G/DL (ref 14–18)
IMM GRANULOCYTES # BLD AUTO: 0.02 K/UL (ref 0–0.11)
IMM GRANULOCYTES NFR BLD AUTO: 0.3 % (ref 0–0.9)
LYMPHOCYTES # BLD AUTO: 1.23 K/UL (ref 1–4.8)
LYMPHOCYTES NFR BLD: 18.9 % (ref 22–41)
MCH RBC QN AUTO: 30.6 PG (ref 27–33)
MCHC RBC AUTO-ENTMCNC: 31.8 G/DL (ref 33.7–35.3)
MCV RBC AUTO: 96.2 FL (ref 81.4–97.8)
MONOCYTES # BLD AUTO: 0.73 K/UL (ref 0–0.85)
MONOCYTES NFR BLD AUTO: 11.2 % (ref 0–13.4)
NEUTROPHILS # BLD AUTO: 4.29 K/UL (ref 1.82–7.42)
NEUTROPHILS NFR BLD: 66.1 % (ref 44–72)
NRBC # BLD AUTO: 0 K/UL
NRBC BLD-RTO: 0 /100 WBC
NT-PROBNP SERPL IA-MCNC: 783 PG/ML (ref 0–125)
PLATELET # BLD AUTO: 165 K/UL (ref 164–446)
PMV BLD AUTO: 11.7 FL (ref 9–12.9)
POTASSIUM SERPL-SCNC: 4.9 MMOL/L (ref 3.6–5.5)
PROT SERPL-MCNC: 5.7 G/DL (ref 6–8.2)
RBC # BLD AUTO: 5.32 M/UL (ref 4.7–6.1)
SODIUM SERPL-SCNC: 140 MMOL/L (ref 135–145)
WBC # BLD AUTO: 6.5 K/UL (ref 4.8–10.8)

## 2022-03-20 PROCEDURE — A9270 NON-COVERED ITEM OR SERVICE: HCPCS | Performed by: SURGERY

## 2022-03-20 PROCEDURE — 71045 X-RAY EXAM CHEST 1 VIEW: CPT

## 2022-03-20 PROCEDURE — 80053 COMPREHEN METABOLIC PANEL: CPT

## 2022-03-20 PROCEDURE — 82962 GLUCOSE BLOOD TEST: CPT

## 2022-03-20 PROCEDURE — 94669 MECHANICAL CHEST WALL OSCILL: CPT

## 2022-03-20 PROCEDURE — 700102 HCHG RX REV CODE 250 W/ 637 OVERRIDE(OP): Performed by: SURGERY

## 2022-03-20 PROCEDURE — 700101 HCHG RX REV CODE 250: Performed by: FAMILY MEDICINE

## 2022-03-20 PROCEDURE — 99233 SBSQ HOSP IP/OBS HIGH 50: CPT | Performed by: REGISTERED NURSE

## 2022-03-20 PROCEDURE — 83880 ASSAY OF NATRIURETIC PEPTIDE: CPT

## 2022-03-20 PROCEDURE — 700111 HCHG RX REV CODE 636 W/ 250 OVERRIDE (IP): Performed by: SURGERY

## 2022-03-20 PROCEDURE — 93970 EXTREMITY STUDY: CPT

## 2022-03-20 PROCEDURE — 85025 COMPLETE CBC W/AUTO DIFF WBC: CPT

## 2022-03-20 PROCEDURE — 700102 HCHG RX REV CODE 250 W/ 637 OVERRIDE(OP): Performed by: FAMILY MEDICINE

## 2022-03-20 PROCEDURE — A9270 NON-COVERED ITEM OR SERVICE: HCPCS | Performed by: FAMILY MEDICINE

## 2022-03-20 PROCEDURE — 93970 EXTREMITY STUDY: CPT | Mod: 26 | Performed by: INTERNAL MEDICINE

## 2022-03-20 PROCEDURE — 770001 HCHG ROOM/CARE - MED/SURG/GYN PRIV*

## 2022-03-20 PROCEDURE — 99222 1ST HOSP IP/OBS MODERATE 55: CPT | Performed by: FAMILY MEDICINE

## 2022-03-20 RX ORDER — TORSEMIDE 20 MG/1
20 TABLET ORAL 2 TIMES DAILY
Status: DISCONTINUED | OUTPATIENT
Start: 2022-03-20 | End: 2022-03-29

## 2022-03-20 RX ORDER — LIDOCAINE 50 MG/G
2 PATCH TOPICAL EVERY 24 HOURS
Status: DISCONTINUED | OUTPATIENT
Start: 2022-03-20 | End: 2022-03-31 | Stop reason: HOSPADM

## 2022-03-20 RX ORDER — TAMSULOSIN HYDROCHLORIDE 0.4 MG/1
0.4 CAPSULE ORAL
Status: DISCONTINUED | OUTPATIENT
Start: 2022-03-20 | End: 2022-03-31 | Stop reason: HOSPADM

## 2022-03-20 RX ORDER — SILODOSIN 4 MG/1
1 CAPSULE ORAL EVERY EVENING
Status: DISCONTINUED | OUTPATIENT
Start: 2022-03-20 | End: 2022-03-20

## 2022-03-20 RX ORDER — ACETAMINOPHEN 500 MG
1000 TABLET ORAL EVERY 6 HOURS
Status: DISCONTINUED | OUTPATIENT
Start: 2022-03-20 | End: 2022-03-24

## 2022-03-20 RX ORDER — LOSARTAN POTASSIUM 50 MG/1
25 TABLET ORAL DAILY
Status: DISCONTINUED | OUTPATIENT
Start: 2022-03-21 | End: 2022-03-23

## 2022-03-20 RX ADMIN — OMEPRAZOLE 40 MG: 20 CAPSULE, DELAYED RELEASE ORAL at 05:34

## 2022-03-20 RX ADMIN — INSULIN HUMAN 1 UNITS: 100 INJECTION, SOLUTION PARENTERAL at 11:54

## 2022-03-20 RX ADMIN — ACETAMINOPHEN 1000 MG: 500 TABLET ORAL at 12:54

## 2022-03-20 RX ADMIN — LOSARTAN POTASSIUM 25 MG: 50 TABLET, FILM COATED ORAL at 05:36

## 2022-03-20 RX ADMIN — ACETAMINOPHEN 1000 MG: 500 TABLET ORAL at 23:53

## 2022-03-20 RX ADMIN — EZETIMIBE 10 MG: 10 TABLET ORAL at 05:35

## 2022-03-20 RX ADMIN — ENOXAPARIN SODIUM 30 MG: 30 INJECTION SUBCUTANEOUS at 17:17

## 2022-03-20 RX ADMIN — OXYCODONE HYDROCHLORIDE 10 MG: 10 TABLET ORAL at 12:02

## 2022-03-20 RX ADMIN — FLUTICASONE PROPIONATE 100 MCG: 50 SPRAY, METERED NASAL at 05:35

## 2022-03-20 RX ADMIN — OMEPRAZOLE 40 MG: 20 CAPSULE, DELAYED RELEASE ORAL at 17:17

## 2022-03-20 RX ADMIN — DOCUSATE SODIUM 100 MG: 100 CAPSULE, LIQUID FILLED ORAL at 17:17

## 2022-03-20 RX ADMIN — ENOXAPARIN SODIUM 30 MG: 30 INJECTION SUBCUTANEOUS at 11:11

## 2022-03-20 RX ADMIN — TORSEMIDE 20 MG: 20 TABLET ORAL at 17:18

## 2022-03-20 RX ADMIN — INSULIN HUMAN 1 UNITS: 100 INJECTION, SOLUTION PARENTERAL at 17:24

## 2022-03-20 RX ADMIN — DOCUSATE SODIUM 100 MG: 100 CAPSULE, LIQUID FILLED ORAL at 05:35

## 2022-03-20 RX ADMIN — POLYETHYLENE GLYCOL 3350 1 PACKET: 17 POWDER, FOR SOLUTION ORAL at 05:34

## 2022-03-20 RX ADMIN — MAGNESIUM HYDROXIDE 30 ML: 400 SUSPENSION ORAL at 05:34

## 2022-03-20 RX ADMIN — POLYETHYLENE GLYCOL 3350 1 PACKET: 17 POWDER, FOR SOLUTION ORAL at 17:17

## 2022-03-20 RX ADMIN — ACETAMINOPHEN 650 MG: 325 TABLET, FILM COATED ORAL at 05:34

## 2022-03-20 RX ADMIN — TORSEMIDE 20 MG: 20 TABLET ORAL at 11:10

## 2022-03-20 RX ADMIN — LIDOCAINE 2 PATCH: 50 PATCH TOPICAL at 23:53

## 2022-03-20 RX ADMIN — CARVEDILOL 3.12 MG: 6.25 TABLET, FILM COATED ORAL at 17:17

## 2022-03-20 RX ADMIN — CARVEDILOL 3.12 MG: 6.25 TABLET, FILM COATED ORAL at 07:52

## 2022-03-20 RX ADMIN — ACETAMINOPHEN 1000 MG: 500 TABLET ORAL at 18:02

## 2022-03-20 RX ADMIN — OXYCODONE 5 MG: 5 TABLET ORAL at 20:22

## 2022-03-20 RX ADMIN — TAMSULOSIN HYDROCHLORIDE 0.4 MG: 0.4 CAPSULE ORAL at 12:54

## 2022-03-20 ASSESSMENT — ENCOUNTER SYMPTOMS
SORE THROAT: 0
CHILLS: 0
SPEECH CHANGE: 0
FOCAL WEAKNESS: 0
ALLERGIC/IMMUNOLOGIC NEGATIVE: 1
HEADACHES: 0
BACK PAIN: 0
HEMATOLOGIC/LYMPHATIC NEGATIVE: 1
NERVOUS/ANXIOUS: 0
COUGH: 0
DIZZINESS: 0
WHEEZING: 0
JOINT SWELLING: 1
ABDOMINAL PAIN: 0
BLURRED VISION: 0
SEIZURES: 0
WEAKNESS: 1
PSYCHIATRIC NEGATIVE: 1
NECK PAIN: 0
WEAKNESS: 0
MYALGIAS: 0
PALPITATIONS: 0
VOMITING: 0
SENSORY CHANGE: 0
FALLS: 1
FEVER: 0
FLANK PAIN: 0
DIARRHEA: 0
NEUROLOGICAL NEGATIVE: 1
NAUSEA: 0
HEARTBURN: 0
DIAPHORESIS: 0
SHORTNESS OF BREATH: 0

## 2022-03-20 ASSESSMENT — FIBROSIS 4 INDEX: FIB4 SCORE: 2.67

## 2022-03-20 ASSESSMENT — PAIN DESCRIPTION - PAIN TYPE
TYPE: ACUTE PAIN

## 2022-03-20 NOTE — CARE PLAN
The patient is Stable - Low risk of patient condition declining or worsening    Shift Goals  Clinical Goals: Increase mobility  Patient Goals: Rest, MRI  Family Goals: JUMANA    Progress made toward(s) clinical / shift goals:  Progressing    Patient is progressing towards the following goals:      Problem: Knowledge Deficit - Standard  Goal: Patient and family/care givers will demonstrate understanding of plan of care, disease process/condition, diagnostic tests and medications  3/20/2022 1132 by Alisha Cota R.N.  Outcome: Progressing  3/20/2022 1132 by Alisha Cota R.N.  Outcome: Progressing     Problem: Pain - Standard  Goal: Alleviation of pain or a reduction in pain to the patient’s comfort goal  Outcome: Progressing     Problem: Skin Integrity  Goal: Skin integrity is maintained or improved  Outcome: Progressing     Problem: Fall Risk  Goal: Patient will remain free from falls  Outcome: Progressing     Patient has had an increase in mobility. He remains to have pain that is exacerbated with movement, however it seems to be well controlled with ice and repositioning. Patient declines oral medication besides scheduled tylenol. His skin has remained intact without open areas. He has remained free from falls. Goal is for patient to transfer for a downgrade of care today 3/20/22 by the end of the shift.

## 2022-03-20 NOTE — CARE PLAN
The patient is Watcher - Medium risk of patient condition declining or worsening    Shift Goals  Clinical Goals: pain control  Patient Goals: rest, reposition  Family Goals: JUMANA    Progress made toward(s) clinical / shift goals:  Updated on POC, medicated per MAR for pain, ice applied and elevated.   Problem: Knowledge Deficit - Standard  Goal: Patient and family/care givers will demonstrate understanding of plan of care, disease process/condition, diagnostic tests and medications  Outcome: Progressing     Problem: Pain - Standard  Goal: Alleviation of pain or a reduction in pain to the patient’s comfort goal  Outcome: Progressing     Problem: Fall Risk  Goal: Patient will remain free from falls  Outcome: Progressing

## 2022-03-20 NOTE — ASSESSMENT & PLAN NOTE
Questionable thickening of the rectal wall, recommend correlation with physical exam and colonoscopy to exclude rectal cancer  Follow up outpatient

## 2022-03-20 NOTE — ASSESSMENT & PLAN NOTE
History PCI of the LAD/distal left main and the distal RCA  Coreg, Zetia  Intolerant to statins  Resume ASA when cleared by Trauma Surgery

## 2022-03-20 NOTE — ASSESSMENT & PLAN NOTE
Surgical treatment of left intertrochanteric femur fracture with intramedullary device   3/29/2022  Pain control  WBAT LLE

## 2022-03-20 NOTE — PROGRESS NOTES
Trauma / Surgical Daily Progress Note    Date of Service  3/19/2022    Chief Complaint  81 y.o. male admitted 3/18/2022 with Trauma    Interval Events  Multiple rib fractures: Not significant pain associated but reasonable inspiratory effort and patient is on multimodal analgesic regimen and aggressive pulmonary hygiene.  Trending x-rays.  No sign of significant contusion or pneumothorax.    R left greater trochanter fracture: Suspect occult intertrochanteric fracture.  MRI requested by orthopedics and because of AICD will need to wait for [employer] rep to assess prior to study being done on Monday.    Difficult cardiac history with AICD in place as well as documented heart failure with reduced ejection fraction.   today and Demadex has been held some will need to watch closely.  Try to minimize IV fluid, which will be tricky in the setting of his chronic renal insufficiency.  We will touch base with geriatric consultants in the morning for assessment, if needed.    Labs reviewed, electrolytes addressed, renal function assessed: Place close attention to electrolytes and creatinine since we are holding patient's diuresis.  Daily BMPs ordered.  May need to restart Demadex if it is increasing.    Reviewed nutrition strategies, recent indices: Patient not going to surgery at least until Tuesday so okay to start carbohydrate controlled cardiac diet    Addressed GI prophylaxis and bowel frequency: Bowel protocol ordered  Assessed/discussed/titrated analgesics and need for sedatives: Analgesia seems appropriate at this time try to minimize to avoid oversedation  Addressed DVT prophylaxis: Last dose of Eliquis was yesterday morning so, we will start heparin or Lovenox in a.m. if creatinine clearance is appropriate  Addressed line days, lopez catheter days, access needs: Not applicable  Addressed family and discharge concerns    Review of Systems  Review of Systems   Respiratory: Negative for cough, shortness of  "breath, wheezing and stridor.    Cardiovascular: Negative for chest pain, palpitations and leg swelling.   Gastrointestinal: Negative for abdominal pain, constipation and nausea.   Genitourinary: Negative for difficulty urinating, dysuria and hematuria.   Musculoskeletal: Positive for arthralgias and myalgias. Negative for back pain and neck pain.        Significant left hip pain with turns and mobilization  Difficulty moving left lower extremity because of this pain   Neurological: Negative for dizziness, tremors, weakness, numbness and headaches.        Vital Signs for last 24 hours  Temp:  [36.8 °C (98.2 °F)-37.2 °C (98.9 °F)] 36.8 °C (98.3 °F)  Pulse:  [60-84] 73  Resp:  [16-92] 22  BP: (107-152)/(55-92) 107/55  SpO2:  [91 %-99 %] 93 %    Hemodynamic parameters for last 24 hours    /55   Pulse 73   Temp 36.8 °C (98.3 °F) (Temporal)   Resp (!) 22   Ht 1.854 m (6' 1\")   Wt 108 kg (238 lb 8.6 oz)   SpO2 93%   BMI 31.47 kg/m²       Respiratory Data     Respiration: (!) 22, Pulse Oximetry: 93 %     Work Of Breathing / Effort: Mild  RUL Breath Sounds: Clear, RML Breath Sounds: Diminished, RLL Breath Sounds: Diminished, EDWIGE Breath Sounds: Diminished, LLL Breath Sounds: Diminished    Physical Exam  Physical Exam  Vitals and nursing note reviewed.   Constitutional:       Appearance: He is obese.      Interventions: Nasal cannula in place.   HENT:      Head: Normocephalic and atraumatic.      Nose: Nose normal.      Mouth/Throat:      Mouth: Mucous membranes are moist.      Pharynx: Oropharynx is clear.   Eyes:      Extraocular Movements: Extraocular movements intact.      Conjunctiva/sclera: Conjunctivae normal.      Pupils: Pupils are equal, round, and reactive to light.   Cardiovascular:      Rate and Rhythm: Regular rhythm. Bradycardia present.   Pulmonary:      Effort: No respiratory distress.      Breath sounds: Examination of the right-lower field reveals decreased breath sounds. Examination of the " left-lower field reveals decreased breath sounds. Decreased breath sounds present. No wheezing.      Comments: Pacemaker/AICD palpable  Chest:      Chest wall: Tenderness present.   Abdominal:      General: There is no distension.      Palpations: Abdomen is soft.      Tenderness: There is no abdominal tenderness.   Musculoskeletal:      Cervical back: Normal range of motion and neck supple. No tenderness.      Right lower leg: No edema.      Left lower leg: No edema.      Comments: Left hip area tenderness and pain on passive range of motion  Normal range of motion otherwise   Skin:     General: Skin is warm and dry.      Coloration: Skin is not pale.   Neurological:      General: No focal deficit present.      Mental Status: He is alert and oriented to person, place, and time.      Sensory: No sensory deficit.      Motor: No weakness.         Laboratory  Recent Results (from the past 24 hour(s))   PT/INR    Collection Time: 03/18/22  8:38 PM   Result Value Ref Range    PT 16.2 (H) 12.0 - 14.6 sec    INR 1.41 (H) 0.87 - 1.13   PTT    Collection Time: 03/18/22  8:38 PM   Result Value Ref Range    APTT 38.3 (H) 24.7 - 36.0 sec   POCT glucose device results    Collection Time: 03/18/22 11:46 PM   Result Value Ref Range    POC Glucose, Blood 96 65 - 99 mg/dL   COV-2, FLU A/B, AND RSV BY PCR (2-4 HOURS CEPHEID): Collect NP swab in [facility]    Collection Time: 03/19/22 12:10 AM    Specimen: Nasopharyngeal; Respirate   Result Value Ref Range    Influenza virus A RNA Negative Negative    Influenza virus B, PCR Negative Negative    RSV, PCR Negative Negative    SARS-CoV-2 by PCR NotDetected     SARS-CoV-2 Source NP Swab    CBC with Differential: Tomorrow AM    Collection Time: 03/19/22  4:20 AM   Result Value Ref Range    WBC 5.9 4.8 - 10.8 K/uL    RBC 5.34 4.70 - 6.10 M/uL    Hemoglobin 16.1 14.0 - 18.0 g/dL    Hematocrit 50.9 42.0 - 52.0 %    MCV 95.3 81.4 - 97.8 fL    MCH 30.1 27.0 - 33.0 pg    MCHC 31.6 (L) 33.7 - 35.3 g/dL     RDW 50.8 (H) 35.9 - 50.0 fL    Platelet Count 120 (L) 164 - 446 K/uL    MPV 10.0 9.0 - 12.9 fL    Neutrophils-Polys 64.00 44.00 - 72.00 %    Lymphocytes 20.10 (L) 22.00 - 41.00 %    Monocytes 11.70 0.00 - 13.40 %    Eosinophils 3.70 0.00 - 6.90 %    Basophils 0.20 0.00 - 1.80 %    Immature Granulocytes 0.30 0.00 - 0.90 %    Nucleated RBC 0.00 /100 WBC    Neutrophils (Absolute) 3.79 1.82 - 7.42 K/uL    Lymphs (Absolute) 1.19 1.00 - 4.80 K/uL    Monos (Absolute) 0.69 0.00 - 0.85 K/uL    Eos (Absolute) 0.22 0.00 - 0.51 K/uL    Baso (Absolute) 0.01 0.00 - 0.12 K/uL    Immature Granulocytes (abs) 0.02 0.00 - 0.11 K/uL    NRBC (Absolute) 0.00 K/uL   Comp Metabolic Panel (CMP): Tomorrow AM    Collection Time: 22  4:20 AM   Result Value Ref Range    Sodium 140 135 - 145 mmol/L    Potassium 3.9 3.6 - 5.5 mmol/L    Chloride 106 96 - 112 mmol/L    Co2 24 20 - 33 mmol/L    Anion Gap 10.0 7.0 - 16.0    Glucose 90 65 - 99 mg/dL    Bun 52 (H) 8 - 22 mg/dL    Creatinine 1.90 (H) 0.50 - 1.40 mg/dL    Calcium 8.3 (L) 8.5 - 10.5 mg/dL    AST(SGOT) 29 12 - 45 U/L    ALT(SGPT) 27 2 - 50 U/L    Alkaline Phosphatase 45 30 - 99 U/L    Total Bilirubin 0.7 0.1 - 1.5 mg/dL    Albumin 3.7 3.2 - 4.9 g/dL    Total Protein 5.8 (L) 6.0 - 8.2 g/dL    Globulin 2.1 1.9 - 3.5 g/dL    A-G Ratio 1.8 g/dL   ESTIMATED GFR    Collection Time: 22  4:20 AM   Result Value Ref Range    GFR (CKD-EPI) 35 (A) >60 mL/min/1.73 m 2   proBrain Natriuretic Peptide, NT    Collection Time: 22  4:20 AM   Result Value Ref Range    NT-proBNP 909 (H) 0 - 125 pg/mL   EKG    Collection Time: 22  4:28 AM   Result Value Ref Range    Report       [facility]    Test Date:  2022  Pt Name:    LIAN FARNSWORTH DAISY                    Department: 19  MRN:        [id number]                      Room:       [facility]  Gender:     Male                         Technician: FRANKO  :        1940                   Requested By:SAI BLUM  Order #:    [id number]                     Reading MD: Denilson Steiner MD    Measurements  Intervals                                Axis  Rate:       67                           P:          52  NM:         260                          QRS:        -17  QRSD:       108                          T:          -79  QT:         400  QTc:        423    Interpretive Statements  SINUS RHYTHM  SINUS PAUSE/ARREST  FIRST DEGREE AV BLOCK  INFERIOR INFARCT, AGE INDETERMINATE  Compared to ECG 02/14/2022 17:27:20  Sinus pause or arrest now present  First degree AV block now present  Electronically Signed On 3- 10:33:19 PDT by Denilson Steiner MD     POCT glucose device results    Collection Time: 03/19/22  5:55 AM   Result Value Ref Range    POC Glucose, Blood 84 65 - 99 mg/dL   POCT glucose device results    Collection Time: 03/19/22  7:04 AM   Result Value Ref Range    POC Glucose, Blood 85 65 - 99 mg/dL   POCT glucose device results    Collection Time: 03/19/22 12:05 PM   Result Value Ref Range    POC Glucose, Blood 99 65 - 99 mg/dL       Fluids    Intake/Output Summary (Last 24 hours) at 3/19/2022 1727  Last data filed at 3/19/2022 1445  Gross per 24 hour   Intake 1061.25 ml   Output 1250 ml   Net -188.75 ml       Core Measures & Quality Metrics  Core Measures & Quality Metrics  SHERIN Score  ETOH Screening    Assessment/Plan  Fracture of ribs, three, closed, left, initial encounter- (present on admission)  Assessment & Plan  Nondisplaced fractures of the of the left sixth, seventh and eighth ribs.  Aggressive pulmonary hygiene and multimodal pain management.    Renal hematoma, left, initial encounter- (present on admission)  Assessment & Plan  Left renal hematoma measuring 9.0 x 5.7 cm, this is likely residual from large perinephric hematoma seen 4/26/2020.  Serial labs and serial abdominal examinations.    Disp fx of greater trochanter of left femur, init ([facility])- (present on admission)  Assessment & Plan  Displaced fracture of the left greater  trochanter.  Definitive plan pending. MRI pending per ortho  Patient has AICD, MRI scheduled for Monday with [employer] present.  Weight bearing status - Weightbearing as tolerated LLE.  Diego Lerma MD. Orthopedic Surgeon. [facility].      Seasonal allergies- (present on admission)  Assessment & Plan  Chronic condition treated with flonase.  Resumed maintenance medication on admission.    Atrial fibrillation (HCC)- (present on admission)  Assessment & Plan  Chronic condition treated with apixaban.  Systemic anticoagulation held on admission.    Antiplatelet or antithrombotic long-term use- (present on admission)  Assessment & Plan  Chronic condition treated with Eliquis.  Systemic anticoagulation held on admission.    Hip hematoma, left, initial encounter- (present on admission)  Assessment & Plan  Small left gluteal intramuscular hematoma.  Serial labs.    JAMES (obstructive sleep apnea)- (present on admission)  Assessment & Plan  Chronic condition treated with CPAP.   Resumed therapy at time of admission.    CHF (congestive heart failure), NYHA class II, chronic, systolic (HCC)  Assessment & Plan  Chronic condition treated with carvedilol and losartan.  Resumed maintenance medication on admission.    Coronary artery disease due to calcified coronary lesion- (present on admission)  Assessment & Plan  Chronic condition treated with aspirin and clopidogrel.  Systemic anticoagulation held on admission.    Stage 3b chronic kidney disease (HCC)- (present on admission)  Assessment & Plan  Admission creatinine 2.29.    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  Resumed maintenance medication on admission.    Contraindication to deep vein thrombosis (DVT) prophylaxis- (present on admission)  Assessment & Plan  Prophylactic anticoagulation for thrombotic prevention initially contraindicated secondary to elevated bleeding risk.  3/20 Trauma  surveillance venous duplex scanning ordered.    Dyslipidemia- (present on admission)  Assessment & Plan  Chronic condition treated with ezetimibe (Zetia).  Resumed maintenance medication on admission.    Trauma- (present on admission)  Assessment & Plan  GLF.  Trauma Yellow Transfer Activation from [facility].  Mayur Gan MD. Trauma Surgery.    Encounter for screening for COVID-19  Assessment & Plan  Admission SARS-CoV-2 testing negative. Repeat SARS-CoV-2 testing not indicated. Isolation precautions de-escalated.    Type 2 diabetes mellitus with hyperglycemia, without long-term current use of insulin (HCC)- (present on admission)  Assessment & Plan  Chronic condition treated with glimepiride (AMARYL).  Insulin sliding scale intiated.        Discussed patient condition with RN, RT, Pharmacy, Dietary and Patient.  CRITICAL CARE TIME EXCLUDING PROCEDURES: 38    minutes

## 2022-03-20 NOTE — CARE PLAN
Problem: Hyperinflation  Goal: Prevent or improve atelectasis  Description: Target End Date:  3 to 4 days    1. Instruct incentive spirometry usage  2.  Perform hyperinflation therapy as indicated  Outcome: Not Met   PEP QID

## 2022-03-20 NOTE — CONSULTS
Geriatric Medicine Consultation  Date of Service 3/20/2022    Referring Physician  Mayur Gan M.D.    Consulting Physician  Harmeet Adrian M.D.    Reason for Consultation  Medication review    History of Presenting Illness  81 y.o. male who presented 3/18/2022 with Fall, with fractured left ribs, left greater trochanter fracture, left renal hematoma, left hip hematoma.  Trauma surgery admitted the patient, orthopedic surgery was also consulted on the case.  He requires further evaluation of the left greater trochanter fracture with an MRI of the hip, however he has an ICD in place and will require the [employer] technician. Patient states he fell down while playing golf as he was walking up a 30 degree incline. Patient admits to 4 episodes of fall over the past year.  2 or 3 years ago he injured his left shoulder and he had a left renal hematoma when he fell down.  He denies using any assistive device, he used to need them but with therapy he was able to stop using them.  Admits to some dizziness or lightheadedness when he gets up too quickly, this is also the reason why he takes most of his medications at night.  He has known history of CHF, on in reviewing his medication he is on Coreg, Losartan, and Entresto.  On his recent visit with cardiology, his diuretics were changed to Demadex due to increased fluid retention and weight gain.  He was also given a fluid restriction of 1.8 L/day.  He has known history of persistent atrial fibrillation, he is anticoagulation with Eliquis.  Also known history of CAD with history of PCI, he is on aspirin for antiplatelet treatment.  For his diabetes he takes glimepiride and again he takes this at night.  Most recent hemoglobin A1c was only 6.4.  He denies any memory impairment, or vision or hearing impairments.  He has known history of BPH, but denies any urinary incontinence.    Review of Systems  Review of Systems   Constitutional: Negative for chills, diaphoresis,  fever and malaise/fatigue.   HENT: Negative for congestion, hearing loss and sore throat.    Eyes: Negative for blurred vision.   Respiratory: Negative for cough, shortness of breath and wheezing.    Cardiovascular: Positive for leg swelling. Negative for chest pain and palpitations.   Gastrointestinal: Negative for abdominal pain, diarrhea, heartburn, nausea and vomiting.   Genitourinary: Negative for dysuria, flank pain and hematuria.   Musculoskeletal: Positive for falls and joint pain. Negative for back pain, myalgias and neck pain.   Skin: Negative for rash.   Neurological: Positive for weakness. Negative for dizziness, sensory change, speech change, focal weakness and headaches.   Psychiatric/Behavioral: The patient is not nervous/anxious.        Past Medical History   has a past medical history of Arthritis, Bowel habit changes, Breath shortness, CAD (coronary artery disease), CATARACT, Chest pain, Chest tightness, CHF (congestive heart failure), NYHA class II, chronic, systolic (HCC) (12/26/2019), Chickenpox, Congestive heart failure (HCC), Constipation, Coronary heart disease, Cough, Daytime sleepiness, Diabetes, Difficulty breathing, Dilated cardiomyopathy (HCC), Fatigue, GERD (gastroesophageal reflux disease), Hearing difficulty, Heart murmur, Heart valve disease, Hiatus hernia syndrome, History of BPH, History of chronic cough, Hyperlipidemia, Hypertension, Kidney disease (05/2020), Mumps, Oxygen dependent, Pacemaker, Pain (02/01/2022), Palpitations, Peripheral neuropathy, Persistent atrial fibrillation (HCC), Pneumonia (2007), Rhinitis, Ringing in ears, Severe aortic stenosis (12/4/2019), Sleep apnea, Swelling of lower extremity, Tonsillitis, Tremor, Ulcer (2014), and Urinary incontinence.    Surgical History   has a past surgical history that includes abdominoplasty (1990); gastric banding laparoscopic (3/24/2010); hiatal hernia repair (3/24/2010); inj,epidural/lumb/sac single (3/5/2012);  inj,epidural/lumb/sac single (3/19/2012); fusion, spine, lumbar, plif (3/26/2012); lumbar decompression (3/26/2012); gastric band laparoscopic revision (5/11/2012); drainage hematoma (5/25/2012); recovery (6/26/2012); fusion, spine, lumbar, plif (9/5/2013); lumbar decompression (9/5/2013); mass excision neuro (9/5/2013); gastroscopy (N/A, 1/8/2016); zzz cardiac cath; laminotomy; Sleeve,Grant Vaso Thigh; pr remv 2nd cataract,corn-scler sectn; sinuscope; tonsillectomy; arthroscopy, knee; transcatheter aortic valve replacement (N/A, 1/27/2020); stephon (1/27/2020); aortic valve replacement; other cardiac surgery; lap, remove adjust pasha restrict d* (6/5/2020); knee arthroplasty total (2000); and orif, fracture, humerus (Left, 6/25/2015).    Family History  family history includes Diabetes in an other family member; No Known Problems in his father, mother, sister, sister, and sister.    Social History   reports that he has never smoked. He has never used smokeless tobacco. He reports previous alcohol use. He reports previous drug use.    Marital status -   Primary caregiver - Self  POLST                   No   Health care POA   No    Financial POA       No      Medications  Prior to Admission Medications   Prescriptions Last Dose Informant Patient Reported? Taking?   Ascorbic Acid (VITAMIN C) 1000 MG Tab 3/18/2022 at AM Patient Yes No   Sig: Take 1,000 mg by mouth 2 Times a Day.   B Complex Vitamins (VITAMIN B COMPLEX PO) 3/18/2022 at AM Patient Yes No   Sig: Take 1 Tab by mouth every day.   CRANBERRY PO 3/18/2022 at AM Patient Yes No   Sig: Take 1 Tablet by mouth every day.   Calcium Citrate (CITRACAL PO) 3/18/2022 at AM Patient Yes No   Sig: Take 1 Tab by mouth 2 Times a Day.   Cholecalciferol (VITAMIN D3) 2000 UNIT Cap 3/18/2022 at AM Patient Yes No   Sig: Take 2,000 Units by mouth 2 Times a Day.   Garlic 1000 MG Cap 3/18/2022 at AM Patient Yes No   Sig: Take 1,000 mg by mouth 2 Times a Day.   L-Lysine 1000 MG Tab  3/18/2022 at AM Patient Yes No   Sig: Take 1,000 mg by mouth every day.   Non Formulary Request 3/18/2022 at AM Patient Yes No   Sig: Take 1 Capsule by mouth every day. anit-oxidant (OTC)   Non Formulary Request 3/18/2022 at AM Patient Yes No   Sig: Inject 10 Units under the skin see administration instructions. Take 6 days a week HOLD on Thursday   HGH injection provided by    SILODOSIN PO 3/17/2022 at PM Patient Yes No   Sig: Take 1 Tablet by mouth every evening. Indications: Benign Enlargement of Prostate   Zinc 50 MG Tab 3/18/2022 at AM Patient Yes No   Sig: Take 50 mg by mouth 2 times a day.   anastrozole (ARIMIDEX) 1 MG Tab 3/15/2022 at K Patient Yes No   Sig: Take 0.5 mg by mouth see administration instructions. On Tues & Sat   apixaban (ELIQUIS) 5mg Tab 3/18/2022 at AM Patient No No   Sig: Take 1 Tablet by mouth 2 times a day.   aspirin EC (ECOTRIN) 81 MG Tablet Delayed Response 3/18/2022 at AM Patient No No   Sig: Take 1 Tablet by mouth every day.   carvedilol (COREG) 3.125 MG Tab 3/18/2022 at AM Patient No No   Sig: Take 1 tablet by mouth 2 times a day with meals.   clomiPHENE (CLOMID) 50 MG tablet 3/18/2022 at AM Patient Yes No   Sig: Take 50 mg by mouth see administration instructions.    coenzyme Q-10 30 MG capsule 3/18/2022 at AM Patient Yes No   Sig: Take 60 mg by mouth 2 Times a Day.   cyanocobalamin (VITAMIN B-12) 1000 MCG/ML Solution 3/17/2022 at K Patient Yes No   Si,000 mcg by Intramuscular route every thursday.   ezetimibe (ZETIA) 10 MG Tab 3/18/2022 at AM Patient No No   Sig: Take 1 tablet by mouth once daily   fluticasone (FLONASE) 50 MCG/ACT nasal spray 3/18/2022 at AM Patient No No   Sig: Use 2 spray(s) in each nostril once daily   Patient taking differently: Administer 2 Sprays into affected nostril(S) 1 time a day as needed.   glimepiride (AMARYL) 1 MG tablet 3/17/2022 at PM Patient Yes No   Sig: Take 1 mg by mouth at bedtime.   glucosamine Sulfate 500 MG Cap  3/18/2022 at AM Patient Yes No   Sig: Take 1,500 mg by mouth 2 Times a Day.   hydrocortisone 2.5 % Ointment ABOUT 1 WEEK AGO at [facility] Patient Yes No   Sig: Apply 1 Each to affected area(s) as needed.   lactobacillus rhamnosus (CULTURELLE) Cap capsule 3/18/2022 at AM Patient Yes No   Sig: Take 1 Cap by mouth every day.   losartan (COZAAR) 25 MG Tab 3/17/2022 at PM Patient No No   Sig: Take 1 Tablet by mouth every day.   omeprazole (PRILOSEC) 40 MG delayed-release capsule 3/18/2022 at AM Patient Yes No   Sig: Take 40 mg by mouth 2 times a day.   sacubitril-valsartan (ENTRESTO) 24-26 MG Tab tablet 3/18/2022 at AM Patient Yes No   Sig: Take 1 Tablet by mouth 2 times a day.   testosterone cypionate (DEPO-TESTOSTERONE) 200 MG/ML Solution injection 3/17/2022 at [facility] Patient Yes No   Sig: Inject 160 mg into the shoulder, thigh, or buttocks every thursday.   torsemide (DEMADEX) 20 MG Tab 3/18/2022 at AM Patient Yes No   Sig: Take 20 mg by mouth 2 times a day.   vitamin e (VITAMIN E) 400 UNIT Cap 3/18/2022 at AM Patient Yes No   Sig: Take 400 Units by mouth 2 times a day.      Facility-Administered Medications: None       Allergies  Allergies   Allergen Reactions   • Statins [Hmg-Coa-R Inhibitors] Rash     Blisters         Geriatric Screening    ADL assistance              No   IADL assistance             No   Cognitive Impairment    No   Mood disorder                No   Polypharmacy                Yes  Vision impairment         No   Hearing impairment      No   Fall                                  Yes  Assistive device            No   Urinary incontinence    No   Nutrition issues            No   Pressure ulcer              No     Physical Exam  Temp:  [36.3 °C (97.4 °F)-37.3 °C (99.2 °F)] 37 °C (98.6 °F)  Pulse:  [66-91] 71  Resp:  [14-29] 16  BP: (107-149)/(55-72) 117/57  SpO2:  [93 %-99 %] 97 %  Physical Exam  Vitals and nursing note reviewed.   Constitutional:       Appearance: He is obese.   HENT:      Head: Normocephalic  and atraumatic.      Nose: No congestion.      Mouth/Throat:      Mouth: Mucous membranes are moist.   Eyes:      Extraocular Movements: Extraocular movements intact.      Conjunctiva/sclera: Conjunctivae normal.   Cardiovascular:      Rate and Rhythm: Normal rate and regular rhythm.   Pulmonary:      Effort: Pulmonary effort is normal.      Breath sounds: Decreased air movement present.   Chest:      Chest wall: Tenderness present.   Abdominal:      General: There is no distension.      Tenderness: There is no abdominal tenderness. There is no guarding or rebound.   Musculoskeletal:      Cervical back: No tenderness.      Right lower leg: Edema present.      Left lower leg: Edema present.   Skin:     Comments: Stasis changes both legs   Neurological:      General: No focal deficit present.      Mental Status: He is alert and oriented to person, place, and time.      Cranial Nerves: No cranial nerve deficit.   Psychiatric:         Attention and Perception: Attention normal.         Speech: Speech normal.         Cognition and Memory: Cognition is not impaired. Memory is not impaired. He does not exhibit impaired recent memory.           CAM  • Acute onset and fluctuating course   No   • Inattention                                         No   • Disorganized thinking                        No   • Altered level of consciousness          No         Fluids  Date 03/20/22 0700 - 03/21/22 0659   Shift 9422-3863 6437-4517 4436-7755 24 Hour Total   INTAKE   P.O. 600   600   I.V. 351.3   351.3   Shift Total 951.3   951.3   OUTPUT   Urine 300   300   Shift Total 300   300   Weight (kg) 113.2 113.2 113.2 113.2       Laboratory  Recent Labs     03/18/22  1600 03/19/22  0420 03/20/22  0440   WBC 6.3 5.9 6.5   RBC 5.63 5.34 5.32   HEMOGLOBIN 17.1 16.1 16.3   HEMATOCRIT 52.8* 50.9 51.2   MCV 93.8 95.3 96.2   MCH 30.4 30.1 30.6   MCHC 32.4* 31.6* 31.8*   RDW 48.8 50.8* 51.4*   PLATELETCT 126* 120* 165   MPV 10.0 10.0 11.7      Recent Labs     03/18/22  1600 03/19/22  0420 03/20/22  0544   SODIUM 140 140 140   POTASSIUM 4.0 3.9 4.9   CHLORIDE 102 106 103   CO2 26 24 27   GLUCOSE 129* 90 112*   BUN 61* 52* 42*   CREATININE 2.29* 1.90* 1.84*   CALCIUM 8.8 8.3* 8.4*     Recent Labs     03/18/22  2038   APTT 38.3*   INR 1.41*               Imaging  DX-CHEST-PORTABLE (1 VIEW)   Final Result      No significant interval change.      DX-CHEST-PORTABLE (1 VIEW)   Final Result         1. No significant interval change.      CT-HEAD W/O   Final Result         1. No acute intracranial abnormality. No evidence of acute intracranial hemorrhage or mass lesion.                     CT-CSPINE WITHOUT PLUS RECONS   Final Result         1. No acute fracture from C1 through T1 is visualized.         US-ABORTED US PROCEDURE    (Results Pending)   MR-HIP-W/O LEFT    (Results Pending)   US-TRAUMA VEIN SCREEN LOWER BILAT EXTREMITY    (Results Pending)       Assessment/Plan  * Fracture of ribs, three, closed, left, initial encounter- (present on admission)  Assessment & Plan  Pain control -increase scheduled Tylenol dose, increase lidocaine to 2 patches  Encourage I-S    Frequent falls- (present on admission)  Assessment & Plan  PT and OT evaluations  Check Orthostatics  Check Vit b12 and vit d levels    Disp fx of greater trochanter of left femur, init (HCC)- (present on admission)  Assessment & Plan  Pain control  For MRI of the hip  Check vitamin D level    BPH (benign prostatic hyperplasia)- (present on admission)  Assessment & Plan  Resume Silodosin  Monitor for urinary retention    Hormone replacement therapy- (present on admission)  Assessment & Plan  Hold Testosterone, Clomiphene, Anastrazole    Chronic respiratory failure with hypoxia (HCC)- (present on admission)  Assessment & Plan  Requires O2 supplementation with CPAP  RT protocol    Persistent atrial fibrillation (HCC)- (present on admission)  Assessment & Plan  Coreg  Resume Eliquis when cleared  by Trauma surgery    Hip hematoma, left, initial encounter- (present on admission)  Assessment & Plan  pain control  Follow hgb    JAMES (obstructive sleep apnea)- (present on admission)  Assessment & Plan  CPAP, RT protocol    Essential hypertension- (present on admission)  Assessment & Plan  Coreg, Losartan    Renal hematoma, left, initial encounter- (present on admission)  Assessment & Plan  Follow hemoglobin    ICD (implantable cardioverter-defibrillator) in place- (present on admission)  Assessment & Plan  [employer] tech for MRI     S/P TAVR (transcatheter aortic valve replacement)- (present on admission)  Assessment & Plan  monitor fluid status  Echocardiogram - known TAVR aortic valve that is functioning normally with normal transvalvular gradients   2/15/2022    CHF (congestive heart failure), NYHA class II, chronic, systolic (HCC)- (present on admission)  Assessment & Plan  Coreg, Losartan, Demadex  Resume Entresto tomorrow if creatinine and blood pressure stable  Fluid restriction 1.8 L/day  Monitor fluid status closely  Echocardiogram - EF 55%   2/15/2022    Coronary artery disease due to calcified coronary lesion- (present on admission)  Assessment & Plan  History PCI of the LAD/distal left main and the distal RCA  Coreg, Zetia  Intolerant to statins  Resume ASA when cleared by Trauma Surgery    Stage 3b chronic kidney disease (HCC)- (present on admission)  Assessment & Plan  Follow bmp closely  Monitor I/O    Type 2 diabetes mellitus with hyperglycemia, without long-term current use of insulin ([facility])- (present on admission)  Assessment & Plan  SSI for now  Hold Glimepiride  hgba1c - 6.4   3/15/2022    Dyslipidemia- (present on admission)  Assessment & Plan  Zetia  Intolerant to statins

## 2022-03-20 NOTE — PROGRESS NOTES
Trauma / Surgical Daily Progress Note    Date of Service  3/20/2022    Chief Complaint  81 y.o. male admitted 3/18/2022 with left hip injury and left sided rib fractures after a mechanical ground level fall.    Interval Events  No acute events overnight  Discussed with patient the importance of mobilizing and pain control with oral analgesia.    -Transfer to [facility]  -PT/OT evaluations pending  -Geriatric consultation called, pending  -aggressive pulmonary hygiene, wean oxygen as able  -MRI left hip planned for 3/21 with [employer] tech d/t AICD    Review of Systems  Review of Systems   Constitutional: Negative for chills and diaphoresis.   HENT: Negative for congestion and ear discharge.    Eyes: Negative for visual disturbance.   Cardiovascular: Negative for chest pain.   Gastrointestinal: Negative for abdominal pain.   Genitourinary: Negative.    Musculoskeletal: Positive for joint swelling.        Chest wall pain   Allergic/Immunologic: Negative.    Neurological: Negative.  Negative for dizziness, seizures, weakness and headaches.   Hematological: Negative.    Psychiatric/Behavioral: Negative.         Vital Signs for last 24 hours  Temp:  [36.3 °C (97.4 °F)-37.3 °C (99.2 °F)] 37.1 °C (98.8 °F)  Pulse:  [66-91] 72  Resp:  [14-27] 16  BP: (107-149)/(55-72) 119/58  SpO2:  [93 %-99 %] 98 %    Hemodynamic parameters for last 24 hours       Respiratory Data     Respiration: 16, Pulse Oximetry: 98 %     Work Of Breathing / Effort: Mild  RUL Breath Sounds: Diminished, RML Breath Sounds: Diminished, RLL Breath Sounds: Diminished, EDWIGE Breath Sounds: Diminished, LLL Breath Sounds: Diminished    Physical Exam  Physical Exam  Vitals and nursing note reviewed.   Constitutional:       General: He is not in acute distress.     Appearance: Normal appearance. He is overweight.      Interventions: Cervical collar in place.   HENT:      Head: Normocephalic and atraumatic.      Nose: Nose normal.      Mouth/Throat:      Mouth:  Mucous membranes are moist.      Pharynx: Oropharynx is clear.   Eyes:      Extraocular Movements: Extraocular movements intact.      Conjunctiva/sclera: Conjunctivae normal.      Pupils: Pupils are equal, round, and reactive to light.   Cardiovascular:      Rate and Rhythm: Normal rate and regular rhythm.      Pulses: Normal pulses.      Heart sounds: No murmur heard.  Pulmonary:      Effort: Pulmonary effort is normal. No tachypnea or respiratory distress.      Breath sounds: Examination of the left-middle field reveals decreased breath sounds. Examination of the left-lower field reveals decreased breath sounds. Decreased breath sounds present.   Chest:      Chest wall: Tenderness present.   Abdominal:      General: Abdomen is flat. Bowel sounds are normal.      Palpations: Abdomen is soft.   Musculoskeletal:         General: Normal range of motion.      Cervical back: Normal range of motion.      Right lower le+ Edema present.      Left lower leg: Bony tenderness present. 1+ Edema present.      Comments: Left hip tenderness to palpation and with ROM   Skin:     General: Skin is warm and dry.      Capillary Refill: Capillary refill takes less than 2 seconds.   Neurological:      General: No focal deficit present.      Mental Status: He is alert and oriented to person, place, and time. Mental status is at baseline.      GCS: GCS eye subscore is 4. GCS verbal subscore is 5. GCS motor subscore is 6.      Cranial Nerves: Cranial nerves are intact.      Sensory: Sensation is intact.      Motor: Motor function is intact.   Psychiatric:         Attention and Perception: Attention normal.         Mood and Affect: Mood normal.         Laboratory  Recent Results (from the past 24 hour(s))   POCT glucose device results    Collection Time: 22 12:05 PM   Result Value Ref Range    POC Glucose, Blood 99 65 - 99 mg/dL   POCT glucose device results    Collection Time: 22  5:27 PM   Result Value Ref Range    POC  Glucose, Blood 76 65 - 99 mg/dL   POCT glucose device results    Collection Time: 03/19/22  6:13 PM   Result Value Ref Range    POC Glucose, Blood 118 (H) 65 - 99 mg/dL   POCT glucose device results    Collection Time: 03/19/22 11:51 PM   Result Value Ref Range    POC Glucose, Blood 198 (H) 65 - 99 mg/dL   CBC with Differential: Tomorrow AM    Collection Time: 03/20/22  4:40 AM   Result Value Ref Range    WBC 6.5 4.8 - 10.8 K/uL    RBC 5.32 4.70 - 6.10 M/uL    Hemoglobin 16.3 14.0 - 18.0 g/dL    Hematocrit 51.2 42.0 - 52.0 %    MCV 96.2 81.4 - 97.8 fL    MCH 30.6 27.0 - 33.0 pg    MCHC 31.8 (L) 33.7 - 35.3 g/dL    RDW 51.4 (H) 35.9 - 50.0 fL    Platelet Count 165 164 - 446 K/uL    MPV 11.7 9.0 - 12.9 fL    Neutrophils-Polys 66.10 44.00 - 72.00 %    Lymphocytes 18.90 (L) 22.00 - 41.00 %    Monocytes 11.20 0.00 - 13.40 %    Eosinophils 3.20 0.00 - 6.90 %    Basophils 0.30 0.00 - 1.80 %    Immature Granulocytes 0.30 0.00 - 0.90 %    Nucleated RBC 0.00 /100 WBC    Neutrophils (Absolute) 4.29 1.82 - 7.42 K/uL    Lymphs (Absolute) 1.23 1.00 - 4.80 K/uL    Monos (Absolute) 0.73 0.00 - 0.85 K/uL    Eos (Absolute) 0.21 0.00 - 0.51 K/uL    Baso (Absolute) 0.02 0.00 - 0.12 K/uL    Immature Granulocytes (abs) 0.02 0.00 - 0.11 K/uL    NRBC (Absolute) 0.00 K/uL   Comp Metabolic Panel    Collection Time: 03/20/22  5:44 AM   Result Value Ref Range    Sodium 140 135 - 145 mmol/L    Potassium 4.9 3.6 - 5.5 mmol/L    Chloride 103 96 - 112 mmol/L    Co2 27 20 - 33 mmol/L    Anion Gap 10.0 7.0 - 16.0    Glucose 112 (H) 65 - 99 mg/dL    Bun 42 (H) 8 - 22 mg/dL    Creatinine 1.84 (H) 0.50 - 1.40 mg/dL    Calcium 8.4 (L) 8.5 - 10.5 mg/dL    AST(SGOT) 22 12 - 45 U/L    ALT(SGPT) 19 2 - 50 U/L    Alkaline Phosphatase 46 30 - 99 U/L    Total Bilirubin 0.6 0.1 - 1.5 mg/dL    Albumin 3.5 3.2 - 4.9 g/dL    Total Protein 5.7 (L) 6.0 - 8.2 g/dL    Globulin 2.2 1.9 - 3.5 g/dL    A-G Ratio 1.6 g/dL   proBrain Natriuretic Peptide, NT    Collection  Time: 03/20/22  5:44 AM   Result Value Ref Range    NT-proBNP 783 (H) 0 - 125 pg/mL   POCT glucose device results    Collection Time: 03/20/22  5:44 AM   Result Value Ref Range    POC Glucose, Blood 105 (H) 65 - 99 mg/dL   ESTIMATED GFR    Collection Time: 03/20/22  5:44 AM   Result Value Ref Range    GFR (CKD-EPI) 37 (A) >60 mL/min/1.73 m 2       Fluids    Intake/Output Summary (Last 24 hours) at 3/20/2022 1201  Last data filed at 3/20/2022 1000  Gross per 24 hour   Intake 3360 ml   Output 1325 ml   Net 2035 ml       Core Measures & Quality Metrics    Gutierres catheter: No Gutierres      DVT Prophylaxis: Enoxaparin (Lovenox)        Assessed for rehab: Patient was assess for and/or received rehabilitation services during this hospitalization    RAP Score Total: 8    Assesment ETOH: admission BA not completed, Cage incomplete.        Assessment/Plan  Fracture of ribs, three, closed, left, initial encounter- (present on admission)  Assessment & Plan  Nondisplaced fractures of the of the left sixth, seventh and eighth ribs.  Aggressive pulmonary hygiene and multimodal pain management.    Renal hematoma, left, initial encounter- (present on admission)  Assessment & Plan  Left renal hematoma measuring 9.0 x 5.7 cm, this is likely residual from large perinephric hematoma seen 4/26/2020.  Serial labs and serial abdominal examinations.    Disp fx of greater trochanter of left femur, init (HCC)- (present on admission)  Assessment & Plan  Displaced fracture of the left greater trochanter.  Definitive plan pending. MRI pending per ortho  Patient has AICD, MRI scheduled for Monday with HOSTING present.  Weight bearing status - Weightbearing as tolerated LLE.  Diego Lerma MD. Orthopedic Surgeon. [facility].      Encounter for geriatric assessment  Assessment & Plan  3/20 Geriatric Medicine consult       Seasonal allergies- (present on admission)  Assessment & Plan  Chronic condition treated with  flonase.  Resumed maintenance medication on admission.    Atrial fibrillation (HCC)- (present on admission)  Assessment & Plan  Chronic condition treated with apixaban.  Systemic anticoagulation held on admission.    Antiplatelet or antithrombotic long-term use- (present on admission)  Assessment & Plan  Chronic condition treated with Eliquis.  Systemic anticoagulation held on admission.    JAMES (obstructive sleep apnea)- (present on admission)  Assessment & Plan  Chronic condition treated with CPAP.   Resumed therapy at time of admission.    CHF (congestive heart failure), NYHA class II, chronic, systolic (HCC)  Assessment & Plan  Chronic condition treated with carvedilol and losartan.  Resumed maintenance medication on admission.    Coronary artery disease due to calcified coronary lesion- (present on admission)  Assessment & Plan  Chronic condition treated with aspirin and clopidogrel.  Systemic anticoagulation held on admission.    Stage 3b chronic kidney disease (HCC)- (present on admission)  Assessment & Plan  Admission creatinine 2.29.    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  Resumed maintenance medication on admission.    Contraindication to deep vein thrombosis (DVT) prophylaxis- (present on admission)  Assessment & Plan  Prophylactic anticoagulation for thrombotic prevention initially contraindicated secondary to elevated bleeding risk.  3/20 Trauma surveillance venous duplex scanning ordered.    Dyslipidemia- (present on admission)  Assessment & Plan  Chronic condition treated with ezetimibe (Zetia).  Resumed maintenance medication on admission.    Abnormal CT scan, colon- (present on admission)  Assessment & Plan  Questionable thickening of the rectal wall, recommend correlation with physical exam and colonoscopy to exclude rectal cancer  Follow up outpatient    Hip hematoma, left, initial encounter- (present on admission)  Assessment &  Plan  Small left gluteal intramuscular hematoma.  Serial labs.    Trauma- (present on admission)  Assessment & Plan  GLF.  Trauma Yellow Transfer Activation from [facility].  Mayur Gan MD. Trauma Surgery.    Encounter for screening for COVID-19  Assessment & Plan  Admission SARS-CoV-2 testing negative. Repeat SARS-CoV-2 testing not indicated. Isolation precautions de-escalated.    Type 2 diabetes mellitus with hyperglycemia, without long-term current use of insulin (HCC)- (present on admission)  Assessment & Plan  Chronic condition treated with glimepiride (AMARYL).  Insulin sliding scale intiated.      Discussed patient condition with Hospitalist, RN, Patient and trauma surgery Dr. Ruiz.

## 2022-03-20 NOTE — ASSESSMENT & PLAN NOTE
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

## 2022-03-20 NOTE — ASSESSMENT & PLAN NOTE
SSI for now  Hold Glimepiride  hgba1c - 6.4   3/15/2022  May consider trial of holding glimepiride on discharge

## 2022-03-21 ENCOUNTER — APPOINTMENT (OUTPATIENT)
Dept: RADIOLOGY | Facility: MEDICAL CENTER | Age: 82
DRG: 956 | End: 2022-03-21
Attending: FAMILY MEDICINE
Payer: MEDICARE

## 2022-03-21 ENCOUNTER — TELEPHONE (OUTPATIENT)
Dept: CARDIOLOGY | Facility: MEDICAL CENTER | Age: 82
End: 2022-03-21
Payer: MEDICARE

## 2022-03-21 ENCOUNTER — APPOINTMENT (OUTPATIENT)
Dept: RADIOLOGY | Facility: MEDICAL CENTER | Age: 82
DRG: 956 | End: 2022-03-21
Attending: SURGERY
Payer: MEDICARE

## 2022-03-21 PROBLEM — M79.642 LEFT HAND PAIN: Status: ACTIVE | Noted: 2022-03-21

## 2022-03-21 PROBLEM — S60.222A CONTUSION OF LEFT HAND: Status: ACTIVE | Noted: 2022-03-21

## 2022-03-21 LAB
25(OH)D3 SERPL-MCNC: 76 NG/ML (ref 30–100)
ALBUMIN SERPL BCP-MCNC: 3.4 G/DL (ref 3.2–4.9)
ALBUMIN/GLOB SERPL: 1.3 G/DL
ALP SERPL-CCNC: 42 U/L (ref 30–99)
ALT SERPL-CCNC: 22 U/L (ref 2–50)
ANION GAP SERPL CALC-SCNC: 8 MMOL/L (ref 7–16)
AST SERPL-CCNC: 23 U/L (ref 12–45)
BASOPHILS # BLD AUTO: 0.2 % (ref 0–1.8)
BASOPHILS # BLD: 0.01 K/UL (ref 0–0.12)
BILIRUB SERPL-MCNC: 0.6 MG/DL (ref 0.1–1.5)
BUN SERPL-MCNC: 42 MG/DL (ref 8–22)
CALCIUM SERPL-MCNC: 8.6 MG/DL (ref 8.5–10.5)
CHLORIDE SERPL-SCNC: 102 MMOL/L (ref 96–112)
CO2 SERPL-SCNC: 28 MMOL/L (ref 20–33)
CREAT SERPL-MCNC: 1.98 MG/DL (ref 0.5–1.4)
EOSINOPHIL # BLD AUTO: 0.2 K/UL (ref 0–0.51)
EOSINOPHIL NFR BLD: 4 % (ref 0–6.9)
ERYTHROCYTE [DISTWIDTH] IN BLOOD BY AUTOMATED COUNT: 50.4 FL (ref 35.9–50)
GFR SERPLBLD CREATININE-BSD FMLA CKD-EPI: 34 ML/MIN/1.73 M 2
GLOBULIN SER CALC-MCNC: 2.7 G/DL (ref 1.9–3.5)
GLUCOSE BLD STRIP.AUTO-MCNC: 126 MG/DL (ref 65–99)
GLUCOSE BLD STRIP.AUTO-MCNC: 129 MG/DL (ref 65–99)
GLUCOSE BLD STRIP.AUTO-MCNC: 138 MG/DL (ref 65–99)
GLUCOSE BLD STRIP.AUTO-MCNC: 80 MG/DL (ref 65–99)
GLUCOSE SERPL-MCNC: 150 MG/DL (ref 65–99)
HCT VFR BLD AUTO: 52 % (ref 42–52)
HGB BLD-MCNC: 16.4 G/DL (ref 14–18)
IMM GRANULOCYTES # BLD AUTO: 0.03 K/UL (ref 0–0.11)
IMM GRANULOCYTES NFR BLD AUTO: 0.6 % (ref 0–0.9)
LYMPHOCYTES # BLD AUTO: 0.98 K/UL (ref 1–4.8)
LYMPHOCYTES NFR BLD: 19.5 % (ref 22–41)
MAGNESIUM SERPL-MCNC: 2.5 MG/DL (ref 1.5–2.5)
MCH RBC QN AUTO: 30.4 PG (ref 27–33)
MCHC RBC AUTO-ENTMCNC: 31.5 G/DL (ref 33.7–35.3)
MCV RBC AUTO: 96.3 FL (ref 81.4–97.8)
MONOCYTES # BLD AUTO: 0.59 K/UL (ref 0–0.85)
MONOCYTES NFR BLD AUTO: 11.7 % (ref 0–13.4)
NEUTROPHILS # BLD AUTO: 3.22 K/UL (ref 1.82–7.42)
NEUTROPHILS NFR BLD: 64 % (ref 44–72)
NRBC # BLD AUTO: 0 K/UL
NRBC BLD-RTO: 0 /100 WBC
NT-PROBNP SERPL IA-MCNC: 1061 PG/ML (ref 0–125)
PHOSPHATE SERPL-MCNC: 3 MG/DL (ref 2.5–4.5)
PLATELET # BLD AUTO: 108 K/UL (ref 164–446)
PMV BLD AUTO: 10.2 FL (ref 9–12.9)
POTASSIUM SERPL-SCNC: 4.6 MMOL/L (ref 3.6–5.5)
PROT SERPL-MCNC: 6.1 G/DL (ref 6–8.2)
RBC # BLD AUTO: 5.4 M/UL (ref 4.7–6.1)
SODIUM SERPL-SCNC: 138 MMOL/L (ref 135–145)
TSH SERPL DL<=0.005 MIU/L-ACNC: 1.99 UIU/ML (ref 0.38–5.33)
VIT B12 SERPL-MCNC: 2897 PG/ML (ref 211–911)
WBC # BLD AUTO: 5 K/UL (ref 4.8–10.8)

## 2022-03-21 PROCEDURE — 71045 X-RAY EXAM CHEST 1 VIEW: CPT

## 2022-03-21 PROCEDURE — 700111 HCHG RX REV CODE 636 W/ 250 OVERRIDE (IP): Performed by: SURGERY

## 2022-03-21 PROCEDURE — 84100 ASSAY OF PHOSPHORUS: CPT

## 2022-03-21 PROCEDURE — 36415 COLL VENOUS BLD VENIPUNCTURE: CPT

## 2022-03-21 PROCEDURE — 83735 ASSAY OF MAGNESIUM: CPT

## 2022-03-21 PROCEDURE — A9270 NON-COVERED ITEM OR SERVICE: HCPCS

## 2022-03-21 PROCEDURE — 700102 HCHG RX REV CODE 250 W/ 637 OVERRIDE(OP)

## 2022-03-21 PROCEDURE — 99233 SBSQ HOSP IP/OBS HIGH 50: CPT | Performed by: FAMILY MEDICINE

## 2022-03-21 PROCEDURE — 700102 HCHG RX REV CODE 250 W/ 637 OVERRIDE(OP): Performed by: FAMILY MEDICINE

## 2022-03-21 PROCEDURE — 94669 MECHANICAL CHEST WALL OSCILL: CPT

## 2022-03-21 PROCEDURE — A9270 NON-COVERED ITEM OR SERVICE: HCPCS | Performed by: SURGERY

## 2022-03-21 PROCEDURE — 99232 SBSQ HOSP IP/OBS MODERATE 35: CPT | Performed by: SURGERY

## 2022-03-21 PROCEDURE — 700102 HCHG RX REV CODE 250 W/ 637 OVERRIDE(OP): Performed by: SURGERY

## 2022-03-21 PROCEDURE — 97535 SELF CARE MNGMENT TRAINING: CPT

## 2022-03-21 PROCEDURE — 97166 OT EVAL MOD COMPLEX 45 MIN: CPT

## 2022-03-21 PROCEDURE — A9270 NON-COVERED ITEM OR SERVICE: HCPCS | Performed by: FAMILY MEDICINE

## 2022-03-21 PROCEDURE — 84443 ASSAY THYROID STIM HORMONE: CPT

## 2022-03-21 PROCEDURE — 83880 ASSAY OF NATRIURETIC PEPTIDE: CPT

## 2022-03-21 PROCEDURE — 73110 X-RAY EXAM OF WRIST: CPT | Mod: LT

## 2022-03-21 PROCEDURE — 770001 HCHG ROOM/CARE - MED/SURG/GYN PRIV*

## 2022-03-21 PROCEDURE — 97161 PT EVAL LOW COMPLEX 20 MIN: CPT

## 2022-03-21 PROCEDURE — 80053 COMPREHEN METABOLIC PANEL: CPT

## 2022-03-21 PROCEDURE — 82962 GLUCOSE BLOOD TEST: CPT | Mod: 91

## 2022-03-21 PROCEDURE — 82607 VITAMIN B-12: CPT

## 2022-03-21 PROCEDURE — 82306 VITAMIN D 25 HYDROXY: CPT

## 2022-03-21 PROCEDURE — 73130 X-RAY EXAM OF HAND: CPT | Mod: LT

## 2022-03-21 PROCEDURE — 85025 COMPLETE CBC W/AUTO DIFF WBC: CPT

## 2022-03-21 RX ORDER — METAXALONE 800 MG/1
800 TABLET ORAL 3 TIMES DAILY
Status: DISCONTINUED | OUTPATIENT
Start: 2022-03-21 | End: 2022-03-31 | Stop reason: HOSPADM

## 2022-03-21 RX ADMIN — TORSEMIDE 20 MG: 20 TABLET ORAL at 05:36

## 2022-03-21 RX ADMIN — ENOXAPARIN SODIUM 30 MG: 30 INJECTION SUBCUTANEOUS at 05:34

## 2022-03-21 RX ADMIN — METAXALONE 800 MG: 800 TABLET ORAL at 17:38

## 2022-03-21 RX ADMIN — LOSARTAN POTASSIUM 25 MG: 50 TABLET, FILM COATED ORAL at 05:35

## 2022-03-21 RX ADMIN — ACETAMINOPHEN 1000 MG: 500 TABLET ORAL at 05:39

## 2022-03-21 RX ADMIN — DOCUSATE SODIUM 100 MG: 100 CAPSULE, LIQUID FILLED ORAL at 05:35

## 2022-03-21 RX ADMIN — OXYCODONE 5 MG: 5 TABLET ORAL at 09:24

## 2022-03-21 RX ADMIN — OMEPRAZOLE 40 MG: 20 CAPSULE, DELAYED RELEASE ORAL at 17:38

## 2022-03-21 RX ADMIN — ENOXAPARIN SODIUM 30 MG: 30 INJECTION SUBCUTANEOUS at 17:38

## 2022-03-21 RX ADMIN — FLUTICASONE PROPIONATE 100 MCG: 50 SPRAY, METERED NASAL at 09:23

## 2022-03-21 RX ADMIN — SACUBITRIL AND VALSARTAN 1 TABLET: 24; 26 TABLET, FILM COATED ORAL at 09:24

## 2022-03-21 RX ADMIN — TAMSULOSIN HYDROCHLORIDE 0.4 MG: 0.4 CAPSULE ORAL at 09:24

## 2022-03-21 RX ADMIN — OMEPRAZOLE 40 MG: 20 CAPSULE, DELAYED RELEASE ORAL at 05:35

## 2022-03-21 RX ADMIN — POLYETHYLENE GLYCOL 3350 1 PACKET: 17 POWDER, FOR SOLUTION ORAL at 17:38

## 2022-03-21 RX ADMIN — OXYCODONE 5 MG: 5 TABLET ORAL at 05:35

## 2022-03-21 RX ADMIN — DOCUSATE SODIUM 100 MG: 100 CAPSULE, LIQUID FILLED ORAL at 17:38

## 2022-03-21 RX ADMIN — CARVEDILOL 3.12 MG: 6.25 TABLET, FILM COATED ORAL at 09:24

## 2022-03-21 RX ADMIN — EZETIMIBE 10 MG: 10 TABLET ORAL at 05:35

## 2022-03-21 RX ADMIN — ACETAMINOPHEN 1000 MG: 500 TABLET ORAL at 17:38

## 2022-03-21 RX ADMIN — ACETAMINOPHEN 1000 MG: 500 TABLET ORAL at 12:17

## 2022-03-21 ASSESSMENT — LIFESTYLE VARIABLES
TOTAL SCORE: 0
CONSUMPTION TOTAL: NEGATIVE
EVER HAD A DRINK FIRST THING IN THE MORNING TO STEADY YOUR NERVES TO GET RID OF A HANGOVER: NO
HOW MANY TIMES IN THE PAST YEAR HAVE YOU HAD 5 OR MORE DRINKS IN A DAY: 0
TOTAL SCORE: 0
ALCOHOL_USE: NO
HAVE PEOPLE ANNOYED YOU BY CRITICIZING YOUR DRINKING: NO
EVER FELT BAD OR GUILTY ABOUT YOUR DRINKING: NO
AVERAGE NUMBER OF DAYS PER WEEK YOU HAVE A DRINK CONTAINING ALCOHOL: 0
HAVE YOU EVER FELT YOU SHOULD CUT DOWN ON YOUR DRINKING: NO
ON A TYPICAL DAY WHEN YOU DRINK ALCOHOL HOW MANY DRINKS DO YOU HAVE: 0
TOTAL SCORE: 0
DOES PATIENT WANT TO STOP DRINKING: NO

## 2022-03-21 ASSESSMENT — COGNITIVE AND FUNCTIONAL STATUS - GENERAL
HELP NEEDED FOR BATHING: A LOT
DRESSING REGULAR UPPER BODY CLOTHING: A LITTLE
WALKING IN HOSPITAL ROOM: TOTAL
DRESSING REGULAR LOWER BODY CLOTHING: A LOT
SUGGESTED CMS G CODE MODIFIER MOBILITY: CM
MOVING TO AND FROM BED TO CHAIR: A LOT
MOVING FROM LYING ON BACK TO SITTING ON SIDE OF FLAT BED: UNABLE
MOBILITY SCORE: 8
CLIMB 3 TO 5 STEPS WITH RAILING: TOTAL
SUGGESTED CMS G CODE MODIFIER DAILY ACTIVITY: CK
TURNING FROM BACK TO SIDE WHILE IN FLAT BAD: A LOT
STANDING UP FROM CHAIR USING ARMS: TOTAL
DAILY ACTIVITIY SCORE: 17
TOILETING: A LOT

## 2022-03-21 ASSESSMENT — ENCOUNTER SYMPTOMS
SENSORY CHANGE: 0
ABDOMINAL PAIN: 0
FLANK PAIN: 0
WEAKNESS: 0
BACK PAIN: 0
COUGH: 0
FEVER: 0
NAUSEA: 0
EYES NEGATIVE: 1
MYALGIAS: 0
VOMITING: 0
WHEEZING: 0
SHORTNESS OF BREATH: 0
NERVOUS/ANXIOUS: 0
HEADACHES: 0
SPEECH CHANGE: 0
PSYCHIATRIC NEGATIVE: 1
DIARRHEA: 0
CHILLS: 0
DIZZINESS: 0
BLURRED VISION: 0
HEARTBURN: 0
SORE THROAT: 0
DIAPHORESIS: 0
NECK PAIN: 0
FOCAL WEAKNESS: 0
PALPITATIONS: 0

## 2022-03-21 ASSESSMENT — PAIN DESCRIPTION - PAIN TYPE
TYPE: ACUTE PAIN
TYPE: ACUTE PAIN

## 2022-03-21 ASSESSMENT — ACTIVITIES OF DAILY LIVING (ADL): TOILETING: INDEPENDENT

## 2022-03-21 ASSESSMENT — GAIT ASSESSMENTS: GAIT LEVEL OF ASSIST: UNABLE TO PARTICIPATE

## 2022-03-21 NOTE — CARE PLAN
Problem: Knowledge Deficit - Standard  Goal: Patient and family/care givers will demonstrate understanding of plan of care, disease process/condition, diagnostic tests and medications  Outcome: Progressing     Problem: Pain - Standard  Goal: Alleviation of pain or a reduction in pain to the patient’s comfort goal  Outcome: Progressing     Problem: Fall Risk  Goal: Patient will remain free from falls  Outcome: Progressing       The patient is Stable - Low risk of patient condition declining or worsening    Shift Goals  Clinical Goals: increase mobility, pain control  Patient Goals: Rest, MRI  Family Goals: JUMANA    Progress made toward(s) clinical / shift goals: POC discussed with pt. Pt pain managed with prn medication per MAR. Pt bed locked and in lowest position. Pt calls appropriately and has call light within reach.

## 2022-03-21 NOTE — PROGRESS NOTES
Bedside report received.  Assessment complete.  A&O x 4. Patient calls appropriately.  Patient has 6/10 pain. Medicated per MAR.   See skin note.  Tolerating cardiac diet. Denies N/V.  + void, + BM. Last BM 3/20.  Reviewed plan of care with patient. Call light and personal belongings within reach. Hourly rounding in place. All needs met at this time.

## 2022-03-21 NOTE — THERAPY
Physical Therapy   Initial Evaluation     Patient Name: LIAN More III  Age:  81 y.o., Sex:  male  Medical Record #: [id number]  Today's Date: 3/21/2022     Precautions  Precautions: Toe Touch Weight Bearing Left Lower Extremity  Comments: (P) L rib fx's, pain    Assessment  Patient is 81 y.o. male s/p GLF sustained L rib fx's L greater trochanter fx, L renal hematoma, L hip hematoma, Definitive plans pending for L hip. MRI planned 3/21. Ortho did want pt to mobilize prior to MRI. TTWB LLE.    Pt limited by severe pain with mvt today. C/o of intense immobilizing pain ribs and hip during simple bed mobility. Pt decline trying to stand with FWW with TTWB LLE . Will continue PT efforts. Pt will need post acute PT services as he lives alone and will not be able to care for self. Will follow during ace care.   Plan    Recommend Physical Therapy 4 times per week until therapy goals are met for the following treatments:  Bed Mobility, Gait Training, Neuro Re-Education / Balance, Self Care/Home Evaluation, Stair Training, Therapeutic Activities, and Therapeutic Exercises    DC Equipment Recommendations: (P) Unable to determine at this time  Discharge Recommendations: (P) Recommend post-acute placement for additional physical therapy services prior to discharge home          03/21/22 1046   Total Time Spent   Total Time Spent (Mins) 35   Charge Group   PT Evaluation PT Evaluation Low   PT Self Care / Home Evaluation    (8 mins breathing ther ex)   Initial Contact Note    Initial Contact Note Order Received and Verified, Physical Therapy Evaluation in Progress with Full Report to Follow.   Precautions   Precautions Toe Touch Weight Bearing Left Lower Extremity   Comments L rib fx's, pain   Vitals   O2 (LPM) 2   O2 Delivery Device Silicone Nasal Cannula   Pain   Non Verbal Scale  Grimacing;Moaning   Pain 0 - 10 Group   Location Hip;Rib Cage   Comfort Goal Comfort with Movement;Sleep Comfortably   Therapist Pain Assessment Nurse  Notified;During Activity  (pt yellling out in pain with mvt during tx)   Prior Living Situation   Prior Services Home-Independent   Housing / Facility 2 Story House   Steps In Home   (flight)   Lives with - Patient's Self Care Capacity Alone and Able to Care For Self   Prior Level of Functional Mobility   Bed Mobility Independent   Transfer Status Independent   Ambulation Independent   Distance Ambulation (Feet)   (household and community)   Comments inconsisitant with caregiver at home, pt managing indep   History of Falls   History of Falls Yes   Date of Last Fall   (reason for admittance)   Cognition    Cognition / Consciousness WDL   Level of Consciousness Alert   Comments pt gets agitated when in pain. Resistant o mvt due to pain   Passive ROM Lower Body   Passive ROM Lower Body X   Comments LLE lilmited due to pain, funcitonally intact   Active ROM Lower Body    Active ROM Lower Body  X   Comments relucant to mvt due to pain   Strength Lower Body   Lower Body Strength  X   Comments NT due to pain, pt requires max assist with LE's off and on bed.   Sensation Lower Body   Lower Extremity Sensation   X   Comments c/o of neuropathy B feet   Lower Body Muscle Tone   Lower Body Muscle Tone  WDL   Neurological Concerns   Neurological Concerns No   Coordination Lower Body    Coordination Lower Body  WDL   Balance Assessment   Sitting Balance (Static) Fair   Sitting Balance (Dynamic) Fair -   Weight Shift Sitting Poor   Gait Analysis   Gait Level Of Assist Unable to Participate   Comments decline standing due to pain and wants to know plan for LLE   Bed Mobility    Supine to Sit Maximal Assist   Sit to Supine Total Assist   Scooting Maximal Assist   Comments slow to move, very guarded due to pain.   Functional Mobility   Sit to Stand Unable to Participate   Bed, Chair, Wheelchair Transfer Unable to Participate   How much difficulty does the patient currently have...   Turning over in bed (including adjusting  bedclothes, sheets and blankets)? 2   Sitting down on and standing up from a chair with arms (e.g., wheelchair, bedside commode, etc.) 1   Moving from lying on back to sitting on the side of the bed? 2   How much help from another person does the patient currently need...   Moving to and from a bed to a chair (including a wheelchair)? 1   Need to walk in a hospital room? 1   Climbing 3-5 steps with a railing? 1   6 clicks Mobility Score 8   Activity Tolerance   Sitting Edge of Bed 10 mins   Short Term Goals    Short Term Goal # 1 in 6 sessions pt will perform bed mob with flat bed and no rail with mod indep   Short Term Goal # 2 in 6 sessions pt will transfer to various surfaces with mod indep using fWW inorder to return home indep.   Short Term Goal # 3 in 6 sessions pt will amb using FWW x 50 feet with fWW including turns inorder to return home indep   Short Term Goal # 4 in 6 session pt will climb up/down 1 flight of stairs indep inorder to enter home.   Education Group   Education Provided Role of Physical Therapist   Role of Physical Therapist Patient Response Patient;Acceptance;Explanation;Reinforcement Needed   Problem List    Problems Impaired Bed Mobility;Impaired Transfers;Impaired Ambulation;Functional Strength Deficit;Impaired Balance;Decreased Activity Tolerance   Anticipated Discharge Equipment and Recommendations   DC Equipment Recommendations Unable to determine at this time   Discharge Recommendations Recommend post-acute placement for additional physical therapy services prior to discharge home   Interdisciplinary Plan of Care Collaboration   IDT Collaboration with  Nursing;Occupational Therapist   Patient Position at End of Therapy Seated;Edge of Bed;Phone within Reach;Tray Table within Reach;Call Light within Reach;Other (Comments)  (OT in room)   Collaboration Comments re: erica   Session Information   Date / Session Number  3/21/22- 1 ( 1/4, 3/27)

## 2022-03-21 NOTE — TELEPHONE ENCOUNTER
MARIAMA    Patient called and is requesting a c/b, regarding the ED wanting to preform an MRI. Patient states that because of his heart condition he is unable to get this done. Would like further instruction. Please advise.    Thank you,  Ethan JOSEPH

## 2022-03-21 NOTE — PROGRESS NOTES
Report received from MARY Brito. Pt transferred to room T422 in ICU bed.  Assessment complete.  A&O x 4. Patient calls appropriately.  Patient has 6/10 pain. Declined intervention at this time but says would like something for pain later.  Skin per flow sheet.  Tolerating cardiac diet. Denies N/V.  + void, + flatus, + BM. Last BM 3/20.  Reviewed plan of care with patient. Call light and personal belongings within reach. Hourly rounding in place. All needs met at this time.

## 2022-03-21 NOTE — PROGRESS NOTES
Bedside report received.  Assessment complete.  A&O x 4. Patient calls appropriately.  Patient to EOB with x2 assist Bed alarm on.   Patient has 6/10 pain. medicated  Denies N&V. Tolerating diet.  Skin per flowsheets.  + void  Patient denies SOB.  MRI pending    Review plan with of care with patient. Call light and personal belongings with in reach. Hourly rounding in place. All needs met at this time.

## 2022-03-21 NOTE — PROGRESS NOTES
Trauma / Surgical Daily Progress Note    Date of Service  3/21/2022    Chief Complaint  81 y.o. male admitted 3/18/2022 with left hip injury and left sided rib fractures after a mechanical ground level fall.    Interval Events  Transferred from the ICU.  Some pain relief.  CXR stable.  Supplemental oxygen, 2 L NC.   Appreciate Geriatric Medicine consulting.    - Skelaxin initiated.  - MRI rescheduled for 3/22.  - Aggressive pulmonary hygiene.   - Wean oxygen.  - Edge of bed for meals if unable to sit in chair.    Review of Systems  Review of Systems   Constitutional: Negative for chills, diaphoresis, fever and malaise/fatigue.   HENT: Negative.    Eyes: Negative.  Negative for blurred vision.   Respiratory: Negative for cough, shortness of breath and wheezing.    Cardiovascular: Positive for chest pain (related to rib fx) and leg swelling. Negative for palpitations.   Gastrointestinal: Negative for abdominal pain, diarrhea, heartburn, nausea and vomiting.   Genitourinary: Negative for dysuria, flank pain and hematuria.   Musculoskeletal: Positive for joint pain. Negative for back pain, myalgias and neck pain.   Skin: Negative.    Neurological: Negative for dizziness, sensory change, speech change, focal weakness, weakness and headaches.   Psychiatric/Behavioral: Negative.         Vital Signs  Temp:  [36.1 °C (97 °F)-36.7 °C (98 °F)] 36.1 °C (97 °F)  Pulse:  [64-78] 75  Resp:  [16-20] 17  BP: ()/(52-68) 106/56  SpO2:  [93 %-99 %] 93 %    Physical Exam  Physical Exam  Vitals and nursing note reviewed.   Constitutional:       General: He is awake. He is not in acute distress.     Appearance: Normal appearance. He is obese. He is not ill-appearing or toxic-appearing.   HENT:      Head: Normocephalic.      Nose: Nose normal. No congestion.      Mouth/Throat:      Mouth: Mucous membranes are moist.      Pharynx: Oropharynx is clear.   Eyes:      Pupils: Pupils are equal, round, and reactive to light.    Cardiovascular:      Rate and Rhythm: Normal rate and regular rhythm.      Pulses: Normal pulses.      Heart sounds: No murmur heard.  Pulmonary:      Effort: Pulmonary effort is normal. No tachypnea or respiratory distress.      Breath sounds: Decreased air movement present. Examination of the left-middle field reveals decreased breath sounds. Examination of the left-lower field reveals decreased breath sounds. Decreased breath sounds present.      Comments: 2 l NC  Chest:      Chest wall: Tenderness (rib fx) present.   Abdominal:      General: Abdomen is flat. Bowel sounds are normal. There is no distension.      Palpations: Abdomen is soft.      Tenderness: There is no abdominal tenderness. There is no guarding or rebound.   Musculoskeletal:         General: Swelling (left hand) and tenderness (Left hip tenderness to palpation and with ROM) present. Normal range of motion.      Cervical back: Normal range of motion. No tenderness.      Right lower le+ Edema present.      Left lower leg: Bony tenderness present. 1+ Edema present.   Skin:     General: Skin is warm and dry.      Capillary Refill: Capillary refill takes less than 2 seconds.      Comments: Stasis changes both legs   Neurological:      General: No focal deficit present.      Mental Status: He is alert and oriented to person, place, and time. Mental status is at baseline.      GCS: GCS eye subscore is 4. GCS verbal subscore is 5. GCS motor subscore is 6.      Cranial Nerves: Cranial nerves are intact. No cranial nerve deficit.      Sensory: Sensation is intact.      Motor: Motor function is intact.   Psychiatric:         Attention and Perception: Attention normal.         Mood and Affect: Mood normal.         Behavior: Behavior is cooperative.         Laboratory  Recent Results (from the past 24 hour(s))   POCT glucose device results    Collection Time: 22  5:23 PM   Result Value Ref Range    POC Glucose, Blood 154 (H) 65 - 99 mg/dL   POCT  glucose device results    Collection Time: 03/20/22 11:58 PM   Result Value Ref Range    POC Glucose, Blood 80 65 - 99 mg/dL   CBC with Differential: Tomorrow AM    Collection Time: 03/21/22  5:24 AM   Result Value Ref Range    WBC 5.0 4.8 - 10.8 K/uL    RBC 5.40 4.70 - 6.10 M/uL    Hemoglobin 16.4 14.0 - 18.0 g/dL    Hematocrit 52.0 42.0 - 52.0 %    MCV 96.3 81.4 - 97.8 fL    MCH 30.4 27.0 - 33.0 pg    MCHC 31.5 (L) 33.7 - 35.3 g/dL    RDW 50.4 (H) 35.9 - 50.0 fL    Platelet Count 108 (L) 164 - 446 K/uL    MPV 10.2 9.0 - 12.9 fL    Neutrophils-Polys 64.00 44.00 - 72.00 %    Lymphocytes 19.50 (L) 22.00 - 41.00 %    Monocytes 11.70 0.00 - 13.40 %    Eosinophils 4.00 0.00 - 6.90 %    Basophils 0.20 0.00 - 1.80 %    Immature Granulocytes 0.60 0.00 - 0.90 %    Nucleated RBC 0.00 /100 WBC    Neutrophils (Absolute) 3.22 1.82 - 7.42 K/uL    Lymphs (Absolute) 0.98 (L) 1.00 - 4.80 K/uL    Monos (Absolute) 0.59 0.00 - 0.85 K/uL    Eos (Absolute) 0.20 0.00 - 0.51 K/uL    Baso (Absolute) 0.01 0.00 - 0.12 K/uL    Immature Granulocytes (abs) 0.03 0.00 - 0.11 K/uL    NRBC (Absolute) 0.00 K/uL   Comp Metabolic Panel (CMP): Tomorrow AM    Collection Time: 03/21/22  5:24 AM   Result Value Ref Range    Sodium 138 135 - 145 mmol/L    Potassium 4.6 3.6 - 5.5 mmol/L    Chloride 102 96 - 112 mmol/L    Co2 28 20 - 33 mmol/L    Anion Gap 8.0 7.0 - 16.0    Glucose 150 (H) 65 - 99 mg/dL    Bun 42 (H) 8 - 22 mg/dL    Creatinine 1.98 (H) 0.50 - 1.40 mg/dL    Calcium 8.6 8.5 - 10.5 mg/dL    AST(SGOT) 23 12 - 45 U/L    ALT(SGPT) 22 2 - 50 U/L    Alkaline Phosphatase 42 30 - 99 U/L    Total Bilirubin 0.6 0.1 - 1.5 mg/dL    Albumin 3.4 3.2 - 4.9 g/dL    Total Protein 6.1 6.0 - 8.2 g/dL    Globulin 2.7 1.9 - 3.5 g/dL    A-G Ratio 1.3 g/dL   Magnesium: Every Monday and Thursday AM    Collection Time: 03/21/22  5:24 AM   Result Value Ref Range    Magnesium 2.5 1.5 - 2.5 mg/dL   Phosphorus: Every Monday and Thursday AM    Collection Time: 03/21/22   5:24 AM   Result Value Ref Range    Phosphorus 3.0 2.5 - 4.5 mg/dL   TSH    Collection Time: 03/21/22  5:24 AM   Result Value Ref Range    TSH 1.990 0.380 - 5.330 uIU/mL   VITAMIN B12    Collection Time: 03/21/22  5:24 AM   Result Value Ref Range    Vitamin B12 -True Cobalamin 2897 (H) 211 - 911 pg/mL   VITAMIN D,25 HYDROXY    Collection Time: 03/21/22  5:24 AM   Result Value Ref Range    25-Hydroxy   Vitamin D 25 76 30 - 100 ng/mL   proBrain Natriuretic Peptide, NT    Collection Time: 03/21/22  5:24 AM   Result Value Ref Range    NT-proBNP 1061 (H) 0 - 125 pg/mL   ESTIMATED GFR    Collection Time: 03/21/22  5:24 AM   Result Value Ref Range    GFR (CKD-EPI) 34 (A) >60 mL/min/1.73 m 2   POCT glucose device results    Collection Time: 03/21/22  5:45 AM   Result Value Ref Range    POC Glucose, Blood 126 (H) 65 - 99 mg/dL   POCT glucose device results    Collection Time: 03/21/22 12:16 PM   Result Value Ref Range    POC Glucose, Blood 138 (H) 65 - 99 mg/dL       Fluids    Intake/Output Summary (Last 24 hours) at 3/21/2022 1608  Last data filed at 3/21/2022 0840  Gross per 24 hour   Intake 240 ml   Output 1600 ml   Net -1360 ml       Core Measures & Quality Metrics  Radiology images reviewed, Labs reviewed and Medications reviewed  Gutierres catheter: No Gutierres      DVT Prophylaxis: Enoxaparin (Lovenox)  DVT prophylaxis - mechanical: SCDs      Assessed for rehab: Patient was assess for and/or received rehabilitation services during this hospitalization    RAP Score Total: 8    Assesment ETOH: admission BA not completed, Cage incomplete.        Assessment/Plan  * Fracture of ribs, three, closed, left, initial encounter- (present on admission)  Assessment & Plan  Nondisplaced fractures of the of the left sixth, seventh and eighth ribs.  Aggressive pulmonary hygiene and multimodal pain management.    Renal hematoma, left, initial encounter- (present on admission)  Assessment & Plan  Left renal hematoma measuring 9.0 x 5.7 cm,  this is likely residual from large perinephric hematoma seen 4/26/2020.  Serial labs and serial abdominal examinations.    Disp fx of greater trochanter of left femur, init (HCC)- (present on admission)  Assessment & Plan  Displaced fracture of the left greater trochanter.  Definitive plan pending. MRI pending per ortho  Patient has AICD, MRI scheduled for Monday with [employer] techs present.  Weight bearing status - Weightbearing as tolerated LLE. May attempt to mobilize with PT, per Ortho bella Lerma MD. Orthopedic Surgeon. [facility].    Encounter for geriatric assessment  Assessment & Plan  3/20 Geriatric Medicine consult       Seasonal allergies- (present on admission)  Assessment & Plan  Chronic condition treated with flonase.  Resumed maintenance medication on admission.    Persistent atrial fibrillation (HCC)- (present on admission)  Assessment & Plan  Chronic condition treated with apixaban.  Systemic anticoagulation held on admission.    Antiplatelet or antithrombotic long-term use- (present on admission)  Assessment & Plan  Chronic condition treated with Eliquis.  Systemic anticoagulation held on admission.    JAMES (obstructive sleep apnea)- (present on admission)  Assessment & Plan  Chronic condition treated with CPAP.   Resumed therapy at time of admission.    CHF (congestive heart failure), NYHA class II, chronic, systolic (HCC)- (present on admission)  Assessment & Plan  Chronic condition treated with carvedilol and losartan.  Resumed maintenance medication on admission.    Coronary artery disease due to calcified coronary lesion- (present on admission)  Assessment & Plan  Chronic condition treated with aspirin and clopidogrel.  Systemic anticoagulation held on admission.    Stage 3b chronic kidney disease (HCC)- (present on admission)  Assessment & Plan  Admission creatinine 2.29.    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  Resumed maintenance medication on admission.    Contraindication to deep vein thrombosis (DVT) prophylaxis- (present on admission)  Assessment & Plan  Prophylactic anticoagulation for thrombotic prevention initially contraindicated secondary to elevated bleeding risk.  3/20 Trauma surveillance venous duplex scanning ordered.    Dyslipidemia- (present on admission)  Assessment & Plan  Chronic condition treated with ezetimibe (Zetia).  Resumed maintenance medication on admission.    Abnormal CT scan, colon- (present on admission)  Assessment & Plan  Questionable thickening of the rectal wall, recommend correlation with physical exam and colonoscopy to exclude rectal cancer  Follow up outpatient    Contusion of left hand- (present on admission)  Assessment & Plan  Pain and swelling.  Xray hand and wrist negative.  Analgesia.    Hip hematoma, left, initial encounter- (present on admission)  Assessment & Plan  Small left gluteal intramuscular hematoma.  Serial labs.    Trauma- (present on admission)  Assessment & Plan  GLF.  Trauma Yellow Transfer Activation from [facility].  Mayur Gan MD. Trauma Surgery.    Encounter for screening for COVID-19  Assessment & Plan  Admission SARS-CoV-2 testing negative. Repeat SARS-CoV-2 testing not indicated. Isolation precautions de-escalated.    Type 2 diabetes mellitus with hyperglycemia, without long-term current use of insulin (HCC)- (present on admission)  Assessment & Plan  Chronic condition treated with glimepiride (AMARYL).  Insulin sliding scale intiated.      Discussed patient condition with RN, Patient and trauma surgery, Dr. Gan.

## 2022-03-21 NOTE — PROGRESS NOTES
Geriatric Medicine Daily Progress Note      Date of Service  3/21/2022    Chief Complaint  81 y.o. male admitted 3/18/2022 with trauma from a ground-level fall.    Hospital Course  Admitted with fall, with fractured left ribs, left greater trochanter fracture, left renal hematoma, left hip hematoma.  Trauma surgery admitted the patient, Orthopedic surgery was also consulted on the case.  He requires further evaluation of the left greater trochanter fracture with an MRI of the hip, however he has an ICD in place and will require the [employer] technician. Patient states he fell down while playing golf as he was walking up a 30 degree incline. Patient admits to 4 episodes of fall over the past year.  2 or 3 years ago he injured his left shoulder and he had a left renal hematoma when he fell down.  He denies using any assistive device, he used to need them but with therapy he was able to stop using them.  Admits to some dizziness or lightheadedness when he gets up too quickly, this is also the reason why he takes most of his medications at night.  He has known history of CHF, on in reviewing his medication he is on Coreg, Losartan, and Entresto.  On his recent visit with Cardiology, his diuretics were changed to Demadex due to increased fluid retention and weight gain.  He was also given a fluid restriction of 1.8 L/day.  He has known history of persistent atrial fibrillation, he is anticoagulation with Eliquis.  Also known history of CAD with history of PCI, he is on aspirin for antiplatelet treatment.  For his diabetes he takes glimepiride and again he takes this at night.  Most recent hemoglobin A1c was only 6.4.  He denies any memory impairment, or vision or hearing impairments.  He has known history of BPH, but denies any urinary incontinence.    Interval Problem Update  Left hip fracture - plan for MRI  Rib fracture - pain controlled  HTN - sbp   CHF - appears euvolemic  CKD - crea stable at 1.9  Left Hand  pain and swelling -states he fell on his left hand to observe the fall, x-rays ordered, hand x-ray appears to be negative, wrist x-ray pending    Consultants/Specialty  Trauma surgery  Orthopedic surgery    Code Status  Full code    Disposition  Probable home health    Review of Systems  Review of Systems   Constitutional: Negative for chills, diaphoresis, fever and malaise/fatigue.   HENT: Negative for congestion, hearing loss and sore throat.    Eyes: Negative for blurred vision.   Respiratory: Negative for cough, shortness of breath and wheezing.    Cardiovascular: Positive for chest pain and leg swelling. Negative for palpitations.   Gastrointestinal: Negative for abdominal pain, diarrhea, heartburn, nausea and vomiting.   Genitourinary: Negative for dysuria, flank pain and hematuria.   Musculoskeletal: Positive for joint pain. Negative for back pain, myalgias and neck pain.   Skin: Negative for rash.   Neurological: Negative for dizziness, sensory change, speech change, focal weakness, weakness and headaches.   Psychiatric/Behavioral: The patient is not nervous/anxious.         Physical Exam  Temp:  [36.1 °C (97 °F)-37 °C (98.6 °F)] 36.1 °C (97 °F)  Pulse:  [64-78] 74  Resp:  [16-39] 18  BP: ()/(51-68) 125/68  SpO2:  [93 %-99 %] 94 %    Physical Exam  Vitals and nursing note reviewed.   Constitutional:       Appearance: He is obese.   HENT:      Head: Normocephalic and atraumatic.      Nose: No congestion.      Mouth/Throat:      Mouth: Mucous membranes are moist.   Eyes:      Extraocular Movements: Extraocular movements intact.      Conjunctiva/sclera: Conjunctivae normal.   Cardiovascular:      Rate and Rhythm: Normal rate and regular rhythm.   Pulmonary:      Effort: Pulmonary effort is normal.      Breath sounds: Decreased air movement present.   Chest:      Chest wall: Tenderness present.   Abdominal:      General: There is no distension.      Tenderness: There is no abdominal tenderness. There is no  guarding or rebound.   Musculoskeletal:         General: Swelling (left hand) present.      Cervical back: No tenderness.      Right lower leg: Edema present.      Left lower leg: Edema present.   Skin:     Comments: Stasis changes both legs   Neurological:      General: No focal deficit present.      Mental Status: He is alert and oriented to person, place, and time.      Cranial Nerves: No cranial nerve deficit.         Fluids    Intake/Output Summary (Last 24 hours) at 3/21/2022 1125  Last data filed at 3/21/2022 0840  Gross per 24 hour   Intake 771.25 ml   Output 2800 ml   Net -2028.75 ml       Laboratory  Recent Labs     03/19/22  0420 03/20/22  0440 03/21/22  0524   WBC 5.9 6.5 5.0   RBC 5.34 5.32 5.40   HEMOGLOBIN 16.1 16.3 16.4   HEMATOCRIT 50.9 51.2 52.0   MCV 95.3 96.2 96.3   MCH 30.1 30.6 30.4   MCHC 31.6* 31.8* 31.5*   RDW 50.8* 51.4* 50.4*   PLATELETCT 120* 165 108*   MPV 10.0 11.7 10.2     Recent Labs     03/19/22  0420 03/20/22  0544 03/21/22  0524   SODIUM 140 140 138   POTASSIUM 3.9 4.9 4.6   CHLORIDE 106 103 102   CO2 24 27 28   GLUCOSE 90 112* 150*   BUN 52* 42* 42*   CREATININE 1.90* 1.84* 1.98*   CALCIUM 8.3* 8.4* 8.6     Recent Labs     03/18/22 2038   APTT 38.3*   INR 1.41*               Imaging  DX-HAND 3+ LEFT   Final Result         1.  No radiographic evidence of acute injury.      2. Mild osteoarthritic changes are noted at the first carpometacarpal joint.      US-TRAUMA VEIN SCREEN LOWER BILAT EXTREMITY   Final Result      DX-CHEST-PORTABLE (1 VIEW)   Final Result      No significant interval change.      DX-CHEST-PORTABLE (1 VIEW)   Final Result         1. No significant interval change.      CT-HEAD W/O   Final Result         1. No acute intracranial abnormality. No evidence of acute intracranial hemorrhage or mass lesion.                     CT-CSPINE WITHOUT PLUS RECONS   Final Result         1. No acute fracture from C1 through T1 is visualized.         US-ABORTED US PROCEDURE     (Results Pending)   MR-HIP-W/O LEFT    (Results Pending)   DX-CHEST-PORTABLE (1 VIEW)    (Results Pending)   DX-WRIST-COMPLETE 3+ LEFT    (Results Pending)        Assessment/Plan  * Fracture of ribs, three, closed, left, initial encounter- (present on admission)  Assessment & Plan  Pain control   Encourage I-S    Frequent falls- (present on admission)  Assessment & Plan  PT and OT evaluations  Check Orthostatics    Disp fx of greater trochanter of left femur, init (HCC)- (present on admission)  Assessment & Plan  Pain control  For MRI of the hip  Orthopedic surgery following    Left hand pain- (present on admission)  Assessment & Plan  Sprain?  Hand xray negative  Wrist xray pending    BPH (benign prostatic hyperplasia)- (present on admission)  Assessment & Plan  Flomax  Monitor for urinary retention    Hormone replacement therapy- (present on admission)  Assessment & Plan  Hold Testosterone, Clomiphene, Anastrazole    Chronic respiratory failure with hypoxia (HCC)- (present on admission)  Assessment & Plan  Requires O2 supplementation with CPAP  RT protocol    Persistent atrial fibrillation (HCC)- (present on admission)  Assessment & Plan  Coreg  Resume Eliquis when cleared by Trauma surgery    Hip hematoma, left, initial encounter- (present on admission)  Assessment & Plan  pain control  Follow hgb    JAMES (obstructive sleep apnea)- (present on admission)  Assessment & Plan  CPAP, RT protocol    Essential hypertension- (present on admission)  Assessment & Plan  Coreg, Losartan  Resume Entresto    Renal hematoma, left, initial encounter- (present on admission)  Assessment & Plan  Follow hemoglobin    ICD (implantable cardioverter-defibrillator) in place- (present on admission)  Assessment & Plan  [employer] tech for MRI     S/P TAVR (transcatheter aortic valve replacement)- (present on admission)  Assessment & Plan  monitor fluid status  Echocardiogram - known TAVR aortic valve that is functioning normally with  normal transvalvular gradients   2/15/2022    CHF (congestive heart failure), NYHA class II, chronic, systolic (HCC)- (present on admission)  Assessment & Plan  Coreg, Losartan, Demadex  Resume Entresto   Fluid restriction 1.8 L/day  Monitor fluid status closely  Echocardiogram - EF 55%   2/15/2022    Coronary artery disease due to calcified coronary lesion- (present on admission)  Assessment & Plan  History PCI of the LAD/distal left main and the distal RCA  Coreg, Zetia  Intolerant to statins  Resume ASA when cleared by Trauma Surgery    Stage 3b chronic kidney disease (HCC)- (present on admission)  Assessment & Plan  Follow bmp closely  Monitor I/O    Type 2 diabetes mellitus with hyperglycemia, without long-term current use of insulin ([facility])- (present on admission)  Assessment & Plan  SSI for now  Hold Glimepiride  hgba1c - 6.4   3/15/2022  May consider trial of holding glimepiride on discharge     Dyslipidemia- (present on admission)  Assessment & Plan  Zetia  Intolerant to statins       VTE prophylaxis: Lovenox

## 2022-03-21 NOTE — THERAPY
"Occupational Therapy   Initial Evaluation     Patient Name: LIAN More III  Age:  81 y.o., Sex:  male  Medical Record #: [id number]  Today's Date: 3/21/2022       Precautions: Toe Touch Weight Bearing Left Lower Extremity    Assessment    Pt is a 81 y.o. male admitted for a fall. Pt presents with L rib fxs, L greater trochanter femur fx, L renal hematoma, and L hip hematoma. Pt awaiting MRI to determine if further intervention is necessary for L femur fx. Pt's pmhx includes: arthritis, SOB (O2 at night), CAD, CHF, GERD, hyperlipidemia, HTN, pacemaker, and peripheral neuropathy. Pt seen for OT eval and tx. Attempted to train on WB status; further training required. Total A for sock managment. Pt required Max/total A to complete bed mobility; required assist with BLE and pivoting trunk. Pt reported a significant increase in pain with mobility; further mobilization was deferred. Pt completed EOB grooming tasks. Pt is limited by: pain, impaired balance, decreased functional mobility, and decreased activity tolerance. Pt will benefit from ongoing acute OT.     Plan    Recommend Occupational Therapy 3 times per week until therapy goals are met for the following treatments:  Adaptive Equipment, Neuro Re-Education / Balance, Self Care/Activities of Daily Living, Therapeutic Activities, and Therapeutic Exercises.    DC Equipment Recommendations: Unable to determine at this time  Discharge Recommendations: Recommend post-acute placement for additional occupational therapy services prior to discharge home     Subjective    \"I am not going to be doing any standing, I was told I cannot put weight on this leg.\"     Objective       03/21/22 1152   Prior Living Situation   Prior Services Home-Independent   Housing / Facility 2 Story House   Steps Into Home 2   Steps In Home   (Full flight)   Rail Both Rail (Steps in Home)   Elevator No   Bathroom Set up Walk In Shower;Grab Bars;Shower Chair   Equipment Owned Tub / Shower Seat;Grab " Bar(s) In Tub / Shower;Grab Bar(s) By Toilet   Lives with - Patient's Self Care Capacity Alone and Able to Care For Self   Comments Pt lives a 2 story home and is unable to stay on the ground level. Pt reports up until December 2021 pt hired a caregiver to assist with IADLs. Pt is actively searching to fill position, but has been able to complete IADLs in the meantime.   Prior Level of ADL Function   Comments Pt was IND with BADLs PTA   Prior Level of IADL Function   Comments Pt was IND with IADLs and functional mobility PTA. Pt reports that he is actively seeking a  to provide assistance with IADLs.   Cognition    Cognition / Consciousness WDL   Level of Consciousness Alert   Comments Pleasant and cooperative, but perseverates on pain.   Active ROM Upper Body   Gross Active ROM Gross Active Range of Motion Impaired, but Appears Adequate for Functional Activities.  (~90% grasp limited by swelling)   Comments Pt reports having a L shoulder replacement and had full return of ROM. Pt also had a R rotator cuff injury that was treated with stem cells and reported a full return of ROM.   Strength Upper Body   Upper Body Strength  WDL   Sensation Upper Body   Comments RUE WDL; due to fall pt reports numbness and tingling in digits of L hand   Coordination Upper Body   Coordination WDL   Balance Assessment   Sitting Balance (Static) Fair   Sitting Balance (Dynamic) Fair -   Weight Shift Sitting Poor   Comments Unable to come to standing due to a significant increase in pain   Bed Mobility    Supine to Sit Maximal Assist   Sit to Supine Total Assist   Scooting Maximal Assist   Comments Due to rib fxs, pt reports a significant increase in pain with twisting movements. Instructed on maintaining neutral spine. Requires assist with BLE and pivoting trunk to sit EOB   ADL Assessment   Grooming Supervision;Seated  (Oral care, hair brushing, and face washing)   Lower Body Dressing Total Assist  (Don/doff B socks)    Toileting   (NT; declined need)   Functional Mobility   Sit to Stand Unable to Participate   Bed, Chair, Wheelchair Transfer Unable to Participate   Toilet Transfers Unable to Participate   Mobility Supine >< EOB   Comments Unable to complete standing or txfs due to a significant increase in pain   Edema / Skin Assessment   Comments Pt has mild swelling in L hand   Activity Tolerance   Sitting Edge of Bed 10 min   Comments Pt unable to tolerate further mobilization due to pain   Patient / Family Goals   Patient / Family Goal #1 Have less pain   Short Term Goals   Short Term Goal # 1 Pt will complete LB dressing with Min A using AE PRN   Short Term Goal # 2 Pt will complete ADL txfs with Min A   Short Term Goal # 3 Pt will complete toileting with Min A

## 2022-03-22 ENCOUNTER — APPOINTMENT (OUTPATIENT)
Dept: RADIOLOGY | Facility: MEDICAL CENTER | Age: 82
DRG: 956 | End: 2022-03-22
Attending: SURGERY
Payer: MEDICARE

## 2022-03-22 ENCOUNTER — APPOINTMENT (OUTPATIENT)
Dept: RADIOLOGY | Facility: MEDICAL CENTER | Age: 82
DRG: 956 | End: 2022-03-22
Attending: REGISTERED NURSE
Payer: MEDICARE

## 2022-03-22 LAB
ALBUMIN SERPL BCP-MCNC: 3.3 G/DL (ref 3.2–4.9)
ALBUMIN/GLOB SERPL: 1.4 G/DL
ALP SERPL-CCNC: 42 U/L (ref 30–99)
ALT SERPL-CCNC: 24 U/L (ref 2–50)
ANION GAP SERPL CALC-SCNC: 10 MMOL/L (ref 7–16)
AST SERPL-CCNC: 30 U/L (ref 12–45)
BASOPHILS # BLD AUTO: 0.2 % (ref 0–1.8)
BASOPHILS # BLD: 0.01 K/UL (ref 0–0.12)
BILIRUB SERPL-MCNC: 1.1 MG/DL (ref 0.1–1.5)
BUN SERPL-MCNC: 47 MG/DL (ref 8–22)
CALCIUM SERPL-MCNC: 8.1 MG/DL (ref 8.5–10.5)
CHLORIDE SERPL-SCNC: 102 MMOL/L (ref 96–112)
CO2 SERPL-SCNC: 26 MMOL/L (ref 20–33)
CREAT SERPL-MCNC: 2.16 MG/DL (ref 0.5–1.4)
EOSINOPHIL # BLD AUTO: 0.28 K/UL (ref 0–0.51)
EOSINOPHIL NFR BLD: 5.7 % (ref 0–6.9)
ERYTHROCYTE [DISTWIDTH] IN BLOOD BY AUTOMATED COUNT: 49.8 FL (ref 35.9–50)
GFR SERPLBLD CREATININE-BSD FMLA CKD-EPI: 30 ML/MIN/1.73 M 2
GLOBULIN SER CALC-MCNC: 2.3 G/DL (ref 1.9–3.5)
GLUCOSE BLD STRIP.AUTO-MCNC: 119 MG/DL (ref 65–99)
GLUCOSE BLD STRIP.AUTO-MCNC: 124 MG/DL (ref 65–99)
GLUCOSE BLD STRIP.AUTO-MCNC: 140 MG/DL (ref 65–99)
GLUCOSE BLD STRIP.AUTO-MCNC: 163 MG/DL (ref 65–99)
GLUCOSE SERPL-MCNC: 118 MG/DL (ref 65–99)
HCT VFR BLD AUTO: 50.7 % (ref 42–52)
HGB BLD-MCNC: 16.1 G/DL (ref 14–18)
IMM GRANULOCYTES # BLD AUTO: 0.04 K/UL (ref 0–0.11)
IMM GRANULOCYTES NFR BLD AUTO: 0.8 % (ref 0–0.9)
LYMPHOCYTES # BLD AUTO: 0.94 K/UL (ref 1–4.8)
LYMPHOCYTES NFR BLD: 19.2 % (ref 22–41)
MCH RBC QN AUTO: 30.3 PG (ref 27–33)
MCHC RBC AUTO-ENTMCNC: 31.8 G/DL (ref 33.7–35.3)
MCV RBC AUTO: 95.3 FL (ref 81.4–97.8)
MONOCYTES # BLD AUTO: 0.6 K/UL (ref 0–0.85)
MONOCYTES NFR BLD AUTO: 12.3 % (ref 0–13.4)
NEUTROPHILS # BLD AUTO: 3.02 K/UL (ref 1.82–7.42)
NEUTROPHILS NFR BLD: 61.8 % (ref 44–72)
NRBC # BLD AUTO: 0 K/UL
NRBC BLD-RTO: 0 /100 WBC
PLATELET # BLD AUTO: 123 K/UL (ref 164–446)
PMV BLD AUTO: 10.3 FL (ref 9–12.9)
POTASSIUM SERPL-SCNC: 4.4 MMOL/L (ref 3.6–5.5)
PROT SERPL-MCNC: 5.6 G/DL (ref 6–8.2)
RBC # BLD AUTO: 5.32 M/UL (ref 4.7–6.1)
SODIUM SERPL-SCNC: 138 MMOL/L (ref 135–145)
WBC # BLD AUTO: 4.9 K/UL (ref 4.8–10.8)

## 2022-03-22 PROCEDURE — 700102 HCHG RX REV CODE 250 W/ 637 OVERRIDE(OP): Performed by: SURGERY

## 2022-03-22 PROCEDURE — 94760 N-INVAS EAR/PLS OXIMETRY 1: CPT

## 2022-03-22 PROCEDURE — 700102 HCHG RX REV CODE 250 W/ 637 OVERRIDE(OP): Performed by: NURSE PRACTITIONER

## 2022-03-22 PROCEDURE — 700111 HCHG RX REV CODE 636 W/ 250 OVERRIDE (IP): Performed by: SURGERY

## 2022-03-22 PROCEDURE — 85025 COMPLETE CBC W/AUTO DIFF WBC: CPT

## 2022-03-22 PROCEDURE — A9270 NON-COVERED ITEM OR SERVICE: HCPCS | Performed by: SURGERY

## 2022-03-22 PROCEDURE — 770001 HCHG ROOM/CARE - MED/SURG/GYN PRIV*

## 2022-03-22 PROCEDURE — 99232 SBSQ HOSP IP/OBS MODERATE 35: CPT | Performed by: SURGERY

## 2022-03-22 PROCEDURE — A9270 NON-COVERED ITEM OR SERVICE: HCPCS | Performed by: FAMILY MEDICINE

## 2022-03-22 PROCEDURE — 94669 MECHANICAL CHEST WALL OSCILL: CPT

## 2022-03-22 PROCEDURE — 80053 COMPREHEN METABOLIC PANEL: CPT

## 2022-03-22 PROCEDURE — 700102 HCHG RX REV CODE 250 W/ 637 OVERRIDE(OP)

## 2022-03-22 PROCEDURE — 71045 X-RAY EXAM CHEST 1 VIEW: CPT

## 2022-03-22 PROCEDURE — 73721 MRI JNT OF LWR EXTRE W/O DYE: CPT | Mod: LT,MG

## 2022-03-22 PROCEDURE — 99232 SBSQ HOSP IP/OBS MODERATE 35: CPT | Performed by: STUDENT IN AN ORGANIZED HEALTH CARE EDUCATION/TRAINING PROGRAM

## 2022-03-22 PROCEDURE — 700101 HCHG RX REV CODE 250: Performed by: FAMILY MEDICINE

## 2022-03-22 PROCEDURE — 36415 COLL VENOUS BLD VENIPUNCTURE: CPT

## 2022-03-22 PROCEDURE — 82962 GLUCOSE BLOOD TEST: CPT | Mod: 91

## 2022-03-22 PROCEDURE — A9270 NON-COVERED ITEM OR SERVICE: HCPCS

## 2022-03-22 PROCEDURE — 700102 HCHG RX REV CODE 250 W/ 637 OVERRIDE(OP): Performed by: FAMILY MEDICINE

## 2022-03-22 RX ADMIN — METAXALONE 800 MG: 800 TABLET ORAL at 17:47

## 2022-03-22 RX ADMIN — METAXALONE 800 MG: 800 TABLET ORAL at 12:24

## 2022-03-22 RX ADMIN — SACUBITRIL AND VALSARTAN 1 TABLET: 24; 26 TABLET, FILM COATED ORAL at 06:32

## 2022-03-22 RX ADMIN — FLUTICASONE PROPIONATE 100 MCG: 50 SPRAY, METERED NASAL at 06:29

## 2022-03-22 RX ADMIN — ENOXAPARIN SODIUM 30 MG: 30 INJECTION SUBCUTANEOUS at 06:29

## 2022-03-22 RX ADMIN — TORSEMIDE 20 MG: 20 TABLET ORAL at 06:31

## 2022-03-22 RX ADMIN — OMEPRAZOLE 40 MG: 20 CAPSULE, DELAYED RELEASE ORAL at 06:32

## 2022-03-22 RX ADMIN — LOSARTAN POTASSIUM 25 MG: 50 TABLET, FILM COATED ORAL at 06:32

## 2022-03-22 RX ADMIN — DOCUSATE SODIUM 100 MG: 100 CAPSULE, LIQUID FILLED ORAL at 17:48

## 2022-03-22 RX ADMIN — SACUBITRIL AND VALSARTAN 1 TABLET: 24; 26 TABLET, FILM COATED ORAL at 17:51

## 2022-03-22 RX ADMIN — CARVEDILOL 3.12 MG: 6.25 TABLET, FILM COATED ORAL at 09:19

## 2022-03-22 RX ADMIN — OMEPRAZOLE 40 MG: 20 CAPSULE, DELAYED RELEASE ORAL at 17:47

## 2022-03-22 RX ADMIN — ACETAMINOPHEN 1000 MG: 500 TABLET ORAL at 17:46

## 2022-03-22 RX ADMIN — METAXALONE 800 MG: 800 TABLET ORAL at 06:31

## 2022-03-22 RX ADMIN — OXYCODONE HYDROCHLORIDE 10 MG: 10 TABLET ORAL at 17:47

## 2022-03-22 RX ADMIN — LIDOCAINE 2 PATCH: 50 PATCH TOPICAL at 00:09

## 2022-03-22 RX ADMIN — TAMSULOSIN HYDROCHLORIDE 0.4 MG: 0.4 CAPSULE ORAL at 09:19

## 2022-03-22 RX ADMIN — EZETIMIBE 10 MG: 10 TABLET ORAL at 06:31

## 2022-03-22 RX ADMIN — INSULIN HUMAN 1 UNITS: 100 INJECTION, SOLUTION PARENTERAL at 17:53

## 2022-03-22 RX ADMIN — CARVEDILOL 3.12 MG: 6.25 TABLET, FILM COATED ORAL at 17:47

## 2022-03-22 RX ADMIN — ACETAMINOPHEN 1000 MG: 500 TABLET ORAL at 09:18

## 2022-03-22 RX ADMIN — OXYCODONE HYDROCHLORIDE 10 MG: 10 TABLET ORAL at 09:32

## 2022-03-22 RX ADMIN — DOCUSATE SODIUM 100 MG: 100 CAPSULE, LIQUID FILLED ORAL at 06:32

## 2022-03-22 RX ADMIN — ENOXAPARIN SODIUM 30 MG: 30 INJECTION SUBCUTANEOUS at 17:47

## 2022-03-22 RX ADMIN — POLYETHYLENE GLYCOL 3350 1 PACKET: 17 POWDER, FOR SOLUTION ORAL at 17:48

## 2022-03-22 RX ADMIN — ACETAMINOPHEN 1000 MG: 500 TABLET ORAL at 00:09

## 2022-03-22 RX ADMIN — ACETAMINOPHEN 1000 MG: 500 TABLET ORAL at 12:25

## 2022-03-22 ASSESSMENT — FIBROSIS 4 INDEX: FIB4 SCORE: 3.59

## 2022-03-22 ASSESSMENT — ENCOUNTER SYMPTOMS
HEARTBURN: 0
FOCAL WEAKNESS: 0
PSYCHIATRIC NEGATIVE: 1
BLURRED VISION: 0
CHILLS: 0
PALPITATIONS: 0
EYES NEGATIVE: 1
SORE THROAT: 0
DIARRHEA: 0
VOMITING: 0
COUGH: 0
MYALGIAS: 0
SPEECH CHANGE: 0
NECK PAIN: 0
HEADACHES: 0
WEAKNESS: 0
ABDOMINAL PAIN: 0
WHEEZING: 0
BACK PAIN: 0
FEVER: 0
NERVOUS/ANXIOUS: 0
NAUSEA: 0
DIAPHORESIS: 0
SENSORY CHANGE: 0
SHORTNESS OF BREATH: 0
DIZZINESS: 0
FLANK PAIN: 0

## 2022-03-22 ASSESSMENT — PAIN DESCRIPTION - PAIN TYPE
TYPE: ACUTE PAIN

## 2022-03-22 ASSESSMENT — PATIENT HEALTH QUESTIONNAIRE - PHQ9
SUM OF ALL RESPONSES TO PHQ9 QUESTIONS 1 AND 2: 0
1. LITTLE INTEREST OR PLEASURE IN DOING THINGS: NOT AT ALL

## 2022-03-22 NOTE — PROGRESS NOTES
Assumed care of patient at 0645. Bedside report received. Assessment complete.  AA&Ox4. Denies CP/SOB.  Reporting 7/10 pain. Medicated per MAR.   Educated patient regarding pharmacologic and non pharmacologic modalities for pain management.  Skin per flowsheets  Tolerating regular diet. Denies N/V.   + void. + BM. Last BM 3/21  Pt ambulates x2 assist, LLE WB as tolerated   All needs met at this time. Call light within reach. Pt calls appropriately. Bed low and locked, non skid socks in place. Hourly rounding in place.

## 2022-03-22 NOTE — PROGRESS NOTES
Pt requested to speak to the charge RN.  Pt was upset that MRI was taking so long to take place. Contacted MRI regarding imaging. Because pt has pacemaker, they are only able to preform the test during the day. Informed pt of timeframe, pt verbalized understanding.

## 2022-03-22 NOTE — PROGRESS NOTES
Trauma / Surgical Daily Progress Note    Date of Service  3/22/2022    Chief Complaint  81 y.o. male admitted 3/18/2022 with left hip injury and left sided rib fractures after a mechanical ground level fall.    Interval Events  Awaiting MRI. Must be done with appweevr staff.  Inadequate pain control.  CXR stable.  Supplemental oxygen, 2 L NC. IS 1500.    - Wean oxygen.  - Aggressive pulmonary hygiene.  - Edge of bed for meals.  - Physiatry consult.  - Counseled.    Review of Systems  Review of Systems   Constitutional: Negative for chills, diaphoresis, fever and malaise/fatigue.   HENT: Negative.    Eyes: Negative.  Negative for blurred vision.   Respiratory: Negative for cough, shortness of breath and wheezing.    Cardiovascular: Positive for chest pain (related to rib fx) and leg swelling. Negative for palpitations.   Gastrointestinal: Negative for abdominal pain, diarrhea, heartburn, nausea and vomiting.   Genitourinary: Negative for dysuria, flank pain and hematuria.   Musculoskeletal: Positive for joint pain. Negative for back pain, myalgias and neck pain.   Skin: Negative.    Neurological: Negative for dizziness, sensory change, speech change, focal weakness, weakness and headaches.   Psychiatric/Behavioral: Negative.         Vital Signs  Temp:  [36.1 °C (97 °F)-36.7 °C (98 °F)] 36.4 °C (97.5 °F)  Pulse:  [73-82] 81  Resp:  [18] 18  BP: ()/(51-64) 115/64  SpO2:  [94 %-96 %] 95 %    Physical Exam  Physical Exam  Vitals and nursing note reviewed.   Constitutional:       General: He is awake. He is not in acute distress.     Appearance: Normal appearance. He is obese. He is not ill-appearing or toxic-appearing.      Interventions: Nasal cannula in place.   HENT:      Head: Normocephalic.      Nose: Nose normal. No congestion.      Mouth/Throat:      Mouth: Mucous membranes are moist.      Pharynx: Oropharynx is clear.   Eyes:      Pupils: Pupils are equal, round, and reactive to light.   Cardiovascular:       Rate and Rhythm: Normal rate and regular rhythm.      Pulses: Normal pulses.      Heart sounds: No murmur heard.  Pulmonary:      Effort: Pulmonary effort is normal. No tachypnea or respiratory distress.      Breath sounds: Decreased air movement present. Examination of the left-middle field reveals decreased breath sounds. Examination of the left-lower field reveals decreased breath sounds. Decreased breath sounds present.      Comments: 2 L NC, IS 1500  Chest:      Chest wall: Tenderness (rib fx) present.   Abdominal:      General: Abdomen is flat. Bowel sounds are normal. There is no distension.      Palpations: Abdomen is soft.      Tenderness: There is no abdominal tenderness. There is no guarding or rebound.   Musculoskeletal:         General: Swelling (left hand) and tenderness (Left hip tenderness to palpation and with ROM) present. Normal range of motion.      Cervical back: Normal range of motion. No tenderness.      Right lower le+ Edema present.      Left lower leg: Bony tenderness present. 1+ Edema present.   Skin:     General: Skin is warm and dry.      Capillary Refill: Capillary refill takes less than 2 seconds.      Comments: Stasis changes both legs   Neurological:      General: No focal deficit present.      Mental Status: He is alert and oriented to person, place, and time. Mental status is at baseline.      GCS: GCS eye subscore is 4. GCS verbal subscore is 5. GCS motor subscore is 6.      Cranial Nerves: Cranial nerves are intact. No cranial nerve deficit.      Sensory: Sensation is intact.      Motor: Motor function is intact.   Psychiatric:         Attention and Perception: Attention normal.         Mood and Affect: Mood normal.         Behavior: Behavior is cooperative.         Laboratory  Recent Results (from the past 24 hour(s))   POCT glucose device results    Collection Time: 22  5:37 PM   Result Value Ref Range    POC Glucose, Blood 129 (H) 65 - 99 mg/dL   POCT glucose  device results    Collection Time: 03/22/22 12:16 AM   Result Value Ref Range    POC Glucose, Blood 119 (H) 65 - 99 mg/dL   CBC with Differential: Tomorrow AM    Collection Time: 03/22/22  4:26 AM   Result Value Ref Range    WBC 4.9 4.8 - 10.8 K/uL    RBC 5.32 4.70 - 6.10 M/uL    Hemoglobin 16.1 14.0 - 18.0 g/dL    Hematocrit 50.7 42.0 - 52.0 %    MCV 95.3 81.4 - 97.8 fL    MCH 30.3 27.0 - 33.0 pg    MCHC 31.8 (L) 33.7 - 35.3 g/dL    RDW 49.8 35.9 - 50.0 fL    Platelet Count 123 (L) 164 - 446 K/uL    MPV 10.3 9.0 - 12.9 fL    Neutrophils-Polys 61.80 44.00 - 72.00 %    Lymphocytes 19.20 (L) 22.00 - 41.00 %    Monocytes 12.30 0.00 - 13.40 %    Eosinophils 5.70 0.00 - 6.90 %    Basophils 0.20 0.00 - 1.80 %    Immature Granulocytes 0.80 0.00 - 0.90 %    Nucleated RBC 0.00 /100 WBC    Neutrophils (Absolute) 3.02 1.82 - 7.42 K/uL    Lymphs (Absolute) 0.94 (L) 1.00 - 4.80 K/uL    Monos (Absolute) 0.60 0.00 - 0.85 K/uL    Eos (Absolute) 0.28 0.00 - 0.51 K/uL    Baso (Absolute) 0.01 0.00 - 0.12 K/uL    Immature Granulocytes (abs) 0.04 0.00 - 0.11 K/uL    NRBC (Absolute) 0.00 K/uL   Comp Metabolic Panel (CMP): Tomorrow AM    Collection Time: 03/22/22  4:26 AM   Result Value Ref Range    Sodium 138 135 - 145 mmol/L    Potassium 4.4 3.6 - 5.5 mmol/L    Chloride 102 96 - 112 mmol/L    Co2 26 20 - 33 mmol/L    Anion Gap 10.0 7.0 - 16.0    Glucose 118 (H) 65 - 99 mg/dL    Bun 47 (H) 8 - 22 mg/dL    Creatinine 2.16 (H) 0.50 - 1.40 mg/dL    Calcium 8.1 (L) 8.5 - 10.5 mg/dL    AST(SGOT) 30 12 - 45 U/L    ALT(SGPT) 24 2 - 50 U/L    Alkaline Phosphatase 42 30 - 99 U/L    Total Bilirubin 1.1 0.1 - 1.5 mg/dL    Albumin 3.3 3.2 - 4.9 g/dL    Total Protein 5.6 (L) 6.0 - 8.2 g/dL    Globulin 2.3 1.9 - 3.5 g/dL    A-G Ratio 1.4 g/dL   ESTIMATED GFR    Collection Time: 03/22/22  4:26 AM   Result Value Ref Range    GFR (CKD-EPI) 30 (A) >60 mL/min/1.73 m 2   POCT glucose device results    Collection Time: 03/22/22  6:37 AM   Result Value Ref  Range    POC Glucose, Blood 124 (H) 65 - 99 mg/dL       Fluids    Intake/Output Summary (Last 24 hours) at 3/22/2022 1304  Last data filed at 3/22/2022 0755  Gross per 24 hour   Intake 120 ml   Output 1650 ml   Net -1530 ml       Core Measures & Quality Metrics  Radiology images reviewed, Labs reviewed and Medications reviewed  Gutierres catheter: No Gutierres      DVT Prophylaxis: Enoxaparin (Lovenox)  DVT prophylaxis - mechanical: SCDs      Assessed for rehab: Patient was assess for and/or received rehabilitation services during this hospitalization    RAP Score Total: 8    ETOH Screening  CAGE Score: 0  Assessment complete date: 3/22/2022 (admission BA not completed)        Assessment/Plan  * Fracture of ribs, three, closed, left, initial encounter- (present on admission)  Assessment & Plan  Nondisplaced fractures of the of the left sixth, seventh and eighth ribs.  Aggressive pulmonary hygiene and multimodal pain management.    Renal hematoma, left, initial encounter- (present on admission)  Assessment & Plan  Left renal hematoma measuring 9.0 x 5.7 cm, this is likely residual from large perinephric hematoma seen 4/26/2020.  Serial labs and serial abdominal examinations.    Disp fx of greater trochanter of left femur, init (HCC)- (present on admission)  Assessment & Plan  Displaced fracture of the left greater trochanter.  Definitive plan pending. MRI pending per ortho  Patient has AICD, MRI scheduled for Monday with [facility] present.  Weight bearing status - Weightbearing as tolerated LLE. May attempt to mobilize with PT, per Ortho bella Lerma MD. Orthopedic Surgeon. [facility].    Encounter for geriatric assessment  Assessment & Plan  3/20 Geriatric Medicine consult       Seasonal allergies- (present on admission)  Assessment & Plan  Chronic condition treated with flonase.  Resumed maintenance medication on admission.    Persistent atrial fibrillation (HCC)- (present on  admission)  Assessment & Plan  Chronic condition treated with apixaban.  Systemic anticoagulation held on admission.    Antiplatelet or antithrombotic long-term use- (present on admission)  Assessment & Plan  Chronic condition treated with Eliquis.  Systemic anticoagulation held on admission.    JAMES (obstructive sleep apnea)- (present on admission)  Assessment & Plan  Chronic condition treated with CPAP.   Resumed therapy at time of admission.    CHF (congestive heart failure), NYHA class II, chronic, systolic (HCC)- (present on admission)  Assessment & Plan  Chronic condition treated with carvedilol and losartan.  Resumed maintenance medication on admission.    Coronary artery disease due to calcified coronary lesion- (present on admission)  Assessment & Plan  Chronic condition treated with aspirin and clopidogrel.  Systemic anticoagulation held on admission.    Stage 3b chronic kidney disease (HCC)- (present on admission)  Assessment & Plan  Admission creatinine 2.29.    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  Resumed maintenance medication on admission.    Contraindication to deep vein thrombosis (DVT) prophylaxis- (present on admission)  Assessment & Plan  Prophylactic anticoagulation for thrombotic prevention initially contraindicated secondary to elevated bleeding risk.  3/20 Trauma surveillance venous duplex scanning ordered.    Dyslipidemia- (present on admission)  Assessment & Plan  Chronic condition treated with ezetimibe (Zetia).  Resumed maintenance medication on admission.    Abnormal CT scan, colon- (present on admission)  Assessment & Plan  Questionable thickening of the rectal wall, recommend correlation with physical exam and colonoscopy to exclude rectal cancer  Follow up outpatient    Contusion of left hand- (present on admission)  Assessment & Plan  Pain and swelling.  Xray hand and wrist negative.  Analgesia.    Hip hematoma,  left, initial encounter- (present on admission)  Assessment & Plan  Small left gluteal intramuscular hematoma.  Serial labs.    Trauma- (present on admission)  Assessment & Plan  GLF.  Trauma Yellow Transfer Activation from [facility].  Mayur Gan MD. Trauma Surgery.    Encounter for screening for COVID-19  Assessment & Plan  Admission SARS-CoV-2 testing negative. Repeat SARS-CoV-2 testing not indicated. Isolation precautions de-escalated.    Type 2 diabetes mellitus with hyperglycemia, without long-term current use of insulin (HCC)- (present on admission)  Assessment & Plan  Chronic condition treated with glimepiride (AMARYL).  Insulin sliding scale intiated.      Discussed patient condition with RN, Patient and trauma surgery, Dr. Gan.

## 2022-03-22 NOTE — TELEPHONE ENCOUNTER
Pt would like a call back to discuss this MRI needed, He has some issues and questions.      - [phone]    Thank you,   Nhung LOPEZ

## 2022-03-22 NOTE — PROGRESS NOTES
Geriatric Medicine Daily Progress Note      Date of Service  3/22/2022    Chief Complaint  81 y.o. male admitted 3/18/2022 with trauma from a ground-level fall.    Hospital Course  Admitted with fall, with fractured left ribs, left greater trochanter fracture, left renal hematoma, left hip hematoma.  Trauma surgery admitted the patient, Orthopedic surgery was also consulted on the case.  He requires further evaluation of the left greater trochanter fracture with an MRI of the hip, however he has an ICD in place and will require the [employer] technician. Patient states he fell down while playing golf as he was walking up a 30 degree incline. Patient admits to 4 episodes of fall over the past year.  2 or 3 years ago he injured his left shoulder and he had a left renal hematoma when he fell down.  He denies using any assistive device, he used to need them but with therapy he was able to stop using them.  Admits to some dizziness or lightheadedness when he gets up too quickly, this is also the reason why he takes most of his medications at night.  He has known history of CHF, on in reviewing his medication he is on Coreg, Losartan, and Entresto.  On his recent visit with Cardiology, his diuretics were changed to Demadex due to increased fluid retention and weight gain.  He was also given a fluid restriction of 1.8 L/day.  He has known history of persistent atrial fibrillation, he is anticoagulation with Eliquis.  Also known history of CAD with history of PCI, he is on aspirin for antiplatelet treatment.  For his diabetes he takes glimepiride and again he takes this at night.  Most recent hemoglobin A1c was only 6.4.  He denies any memory impairment, or vision or hearing impairments.  He has known history of BPH, but denies any urinary incontinence.    Geriatric medicine was consulted for fall and medication management.    Interval Problem Update  3/22/2022  Vitals reviewed; afebrile.  The rest of the vitals within  normal parameters except BP a bit soft. Still on 2-3L O2.   Pain scale reported - 5-7 in chart but at bedside, not very happy with left rib and hip pain that is interfering with his activities   Blood glucose in last 24hrs - around mid 100s   I/O - decent UO, tolerating IP diet per chart but report poor at bedside, last BM 3/21, voiding urine.     At bedside, frustrated with delayed in getting MRI.     Left hip fracture - getting MRI today   Rib fracture - pain controlled but not very happy with left rib and hip pain that is interfering with his activities   HTN - sbp soft   CHF - appears euvolemic; discuss the need to have an adequate oral intake discussed while on diuretics   CKD - noted a slight jump in Cr but appeared to be around the same number as prior to admission  Left Hand pain and swelling -states he fell on his left hand to observe the fall, hand x-ray appears to be negative, wrist x-ray done was without acute trauma     Consultants/Specialty  Trauma surgery  Orthopedic surgery    Code Status  Full code    Disposition  Probable home health    Review of Systems  Review of Systems   Constitutional: Negative for chills, diaphoresis, fever and malaise/fatigue.   HENT: Negative for congestion, hearing loss and sore throat.    Eyes: Negative for blurred vision.   Respiratory: Negative for cough, shortness of breath and wheezing.    Cardiovascular: Positive for chest pain and leg swelling. Negative for palpitations.   Gastrointestinal: Negative for abdominal pain, diarrhea, heartburn, nausea and vomiting.   Genitourinary: Negative for dysuria, flank pain and hematuria.   Musculoskeletal: Positive for joint pain. Negative for back pain, myalgias and neck pain.   Skin: Negative for rash.   Neurological: Negative for dizziness, sensory change, speech change, focal weakness, weakness and headaches.   Psychiatric/Behavioral: The patient is not nervous/anxious.         Physical Exam  Temp:  [36.1 °C (97 °F)-36.4 °C  (97.6 °F)] 36.2 °C (97.2 °F)  Pulse:  [73-96] 83  Resp:  [18] 18  BP: ()/(55-64) 93/60  SpO2:  [89 %-95 %] 93 %    Physical Exam  Vitals and nursing note reviewed.   Constitutional:       Appearance: He is obese.   HENT:      Head: Normocephalic and atraumatic.      Nose: No congestion.      Mouth/Throat:      Mouth: Mucous membranes are moist.   Eyes:      Extraocular Movements: Extraocular movements intact.      Conjunctiva/sclera: Conjunctivae normal.   Cardiovascular:      Rate and Rhythm: Normal rate and regular rhythm.   Pulmonary:      Effort: Pulmonary effort is normal.      Breath sounds: Decreased air movement present.   Chest:      Chest wall: Tenderness present.   Abdominal:      General: There is no distension.      Tenderness: There is no abdominal tenderness. There is no guarding or rebound.   Musculoskeletal:         General: Swelling (left hand) present.      Cervical back: No tenderness.      Right lower leg: Edema present.      Left lower leg: Edema present.   Skin:     Comments: Stasis changes both legs   Neurological:      General: No focal deficit present.      Mental Status: He is alert and oriented to person, place, and time.      Cranial Nerves: No cranial nerve deficit.         Fluids    Intake/Output Summary (Last 24 hours) at 3/22/2022 2037  Last data filed at 3/22/2022 1600  Gross per 24 hour   Intake 360 ml   Output 1800 ml   Net -1440 ml       Laboratory  Recent Labs     03/20/22  0440 03/21/22  0524 03/22/22  0426   WBC 6.5 5.0 4.9   RBC 5.32 5.40 5.32   HEMOGLOBIN 16.3 16.4 16.1   HEMATOCRIT 51.2 52.0 50.7   MCV 96.2 96.3 95.3   MCH 30.6 30.4 30.3   MCHC 31.8* 31.5* 31.8*   RDW 51.4* 50.4* 49.8   PLATELETCT 165 108* 123*   MPV 11.7 10.2 10.3     Recent Labs     03/20/22  0544 03/21/22  0524 03/22/22  0426   SODIUM 140 138 138   POTASSIUM 4.9 4.6 4.4   CHLORIDE 103 102 102   CO2 27 28 26   GLUCOSE 112* 150* 118*   BUN 42* 42* 47*   CREATININE 1.84* 1.98* 2.16*   CALCIUM 8.4* 8.6  8.1*                   Imaging  DX-CHEST-PORTABLE (1 VIEW)   Final Result      Stable chest with cardiac silhouette enlargement and interstitial edema      DX-WRIST-COMPLETE 3+ LEFT   Final Result      1.  No radiographic evidence of acute traumatic injury.   2.  Degenerative changes of the basal joints of the thumb.   3.  Osteopenia.      DX-HAND 3+ LEFT   Final Result         1.  No radiographic evidence of acute injury.      2. Mild osteoarthritic changes are noted at the first carpometacarpal joint.      DX-CHEST-PORTABLE (1 VIEW)   Final Result      Stable examination.      US-TRAUMA VEIN SCREEN LOWER BILAT EXTREMITY   Final Result      DX-CHEST-PORTABLE (1 VIEW)   Final Result      No significant interval change.      DX-CHEST-PORTABLE (1 VIEW)   Final Result         1. No significant interval change.      CT-HEAD W/O   Final Result         1. No acute intracranial abnormality. No evidence of acute intracranial hemorrhage or mass lesion.                     CT-CSPINE WITHOUT PLUS RECONS   Final Result         1. No acute fracture from C1 through T1 is visualized.         US-ABORTED US PROCEDURE    (Results Pending)   MR-HIP-W/O LEFT    (Results Pending)   DX-CHEST-PORTABLE (1 VIEW)    (Results Pending)        Assessment/Plan  * Fracture of ribs, three, closed, left, initial encounter- (present on admission)  Assessment & Plan  Pain control   Encourage I-S    Left hand pain- (present on admission)  Assessment & Plan  Sprain?  Hand xray negative  Wrist xray negative  Supportive care    BPH (benign prostatic hyperplasia)- (present on admission)  Assessment & Plan  Flomax  Monitor for urinary retention    Hormone replacement therapy- (present on admission)  Assessment & Plan  Hold Testosterone, Clomiphene, Anastrazole    Chronic respiratory failure with hypoxia (HCC)- (present on admission)  Assessment & Plan  Requires O2 supplementation with CPAP  RT protocol    Persistent atrial fibrillation (HCC)- (present on  admission)  Assessment & Plan  Coreg  Resume Eliquis when cleared by Trauma surgery    Hip hematoma, left, initial encounter- (present on admission)  Assessment & Plan  pain control  Follow hgb    JAMES (obstructive sleep apnea)- (present on admission)  Assessment & Plan  CPAP, RT protocol    Essential hypertension- (present on admission)  Assessment & Plan  Coreg, Losartan  Resume Entresto    Renal hematoma, left, initial encounter- (present on admission)  Assessment & Plan  Follow hemoglobin    ICD (implantable cardioverter-defibrillator) in place- (present on admission)  Assessment & Plan  Medtronic tech for MRI     S/P TAVR (transcatheter aortic valve replacement)- (present on admission)  Assessment & Plan  monitor fluid status  Echocardiogram - known TAVR aortic valve that is functioning normally with normal transvalvular gradients   2/15/2022    CHF (congestive heart failure), NYHA class II, chronic, systolic ([facility])- (present on admission)  Assessment & Plan  Coreg, Losartan, Demadex  Resume Entresto   Fluid restriction 1.8 L/day  Monitor fluid status closely  Echocardiogram - EF 55%   2/15/2022    Coronary artery disease due to calcified coronary lesion- (present on admission)  Assessment & Plan  History PCI of the LAD/distal left main and the distal RCA  Coreg, Zetia  Intolerant to statins  Resume ASA when cleared by Trauma Surgery    Stage 3b chronic kidney disease ([facility])- (present on admission)  Assessment & Plan  Follow bmp closely  Monitor I/O    Frequent falls- (present on admission)  Assessment & Plan  PT and OT evaluations  Check Orthostatics    Disp fx of greater trochanter of left femur, init ([facility])- (present on admission)  Assessment & Plan  Pain control  For MRI of the hip  Orthopedic surgery following    Dyslipidemia- (present on admission)  Assessment & Plan  Zetia  Intolerant to statins    Type 2 diabetes mellitus with hyperglycemia, without long-term current use of insulin ([facility])- (present on  admission)  Assessment & Plan  SSI for now  Hold Glimepiride  hgba1c - 6.4   3/15/2022  May consider trial of holding glimepiride on discharge        VTE prophylaxis: Lovenox

## 2022-03-22 NOTE — PROGRESS NOTES
MRI NURSING NOTE:      Caren Arguelles setting changes performed remotely.  ROSARIO Santiago confirms leads and can are MRI conditional . Pt denies abandoned device(s) &/or lead(s).  Pt educated when to alert MRI tech/Imaging RN. Pt vu.  Pt tolerated MRI left hip s contrast well s issue(s). MT AICD set to DOO 95 per ILEANA Arguelles.  HR, EKG, BP, pulse ox monitored during scan. Roles assigned in case of code: Papito MRI tech to bring Pt out and start chest compressions.  Pamela PONCE tech to call the 6s.    Pre-mri settings restored p MRI scan per ILEANA Arguelles.   Bedside RN, Rodriguez, petr. VU.      No printed report received from ILEANA Arguelles.

## 2022-03-22 NOTE — CARE PLAN
Problem: Knowledge Deficit - Standard  Goal: Patient and family/care givers will demonstrate understanding of plan of care, disease process/condition, diagnostic tests and medications  Outcome: Progressing     Problem: Pain - Standard  Goal: Alleviation of pain or a reduction in pain to the patient’s comfort goal  Outcome: Progressing   The patient is Stable - Low risk of patient condition declining or worsening    Shift Goals  Clinical Goals: Rest, pain control, Bowel management  Patient Goals: Rest, Bowel management  Family Goals: JUMANA    Progress made toward(s) clinical / shift goals:  Pt resting    Patient is not progressing towards the following goals:

## 2022-03-22 NOTE — DISCHARGE PLANNING
[facility] Transitional Care Coordination    Referral from:  Dr Abdul  Insurance Provider on Facesheet:  Medicare  Potential Rehab Diagnosis: Ortho    Chart review indicates patient may need on going medical management and may have therapy needs to possibly meet inpatient rehab facility criteria with the goal of returning to community.    D/C support: Lives alone      Physiatry consultation forwarded per protocol.     Last Covid test:  3/19 negative    Hip fx - pending MRI w/ [employer] technician d/t ICD. Patient is not a candidate for [facility] d/t lack of support/assistance at home. Patient lives in a 2SH and cannot stay on he ground floor, pt is currently max/totalA for bed mobility. Patient has been looking for a new caregiver since Dec 2021. Physiatry to consult for medical management. Anticipate SNF level of care, TCC will no longer follow.     Thank you for the referral.

## 2022-03-22 NOTE — CARE PLAN
Problem: Knowledge Deficit - Standard  Goal: Patient and family/care givers will demonstrate understanding of plan of care, disease process/condition, diagnostic tests and medications  Outcome: Progressing   Pt educated on plan of care this shift   Problem: Pain - Standard  Goal: Alleviation of pain or a reduction in pain to the patient’s comfort goal  Outcome: Progressing   Pain medicated per MAR   Problem: Skin Integrity  Goal: Skin integrity is maintained or improved  Outcome: Progressing   Pt turns self in bed   The patient is Stable - Low risk of patient condition declining or worsening    Shift Goals  Clinical Goals: Pain Control/ MRI  Patient Goals: Rest/ MRI  Family Goals: N/A    Progress made toward(s) clinical / shift goals:  Pain medicated per MAR/ MRI pending     Patient is not progressing towards the following goals:

## 2022-03-23 ENCOUNTER — APPOINTMENT (OUTPATIENT)
Dept: RADIOLOGY | Facility: MEDICAL CENTER | Age: 82
DRG: 956 | End: 2022-03-23
Attending: SURGERY
Payer: MEDICARE

## 2022-03-23 LAB
ALBUMIN SERPL BCP-MCNC: 3.2 G/DL (ref 3.2–4.9)
ALBUMIN/GLOB SERPL: 1.3 G/DL
ALP SERPL-CCNC: 48 U/L (ref 30–99)
ALT SERPL-CCNC: 43 U/L (ref 2–50)
ANION GAP SERPL CALC-SCNC: 11 MMOL/L (ref 7–16)
AST SERPL-CCNC: 49 U/L (ref 12–45)
BASOPHILS # BLD AUTO: 0.4 % (ref 0–1.8)
BASOPHILS # BLD: 0.02 K/UL (ref 0–0.12)
BILIRUB SERPL-MCNC: 0.9 MG/DL (ref 0.1–1.5)
BUN SERPL-MCNC: 60 MG/DL (ref 8–22)
CALCIUM SERPL-MCNC: 8.3 MG/DL (ref 8.5–10.5)
CHLORIDE SERPL-SCNC: 100 MMOL/L (ref 96–112)
CO2 SERPL-SCNC: 26 MMOL/L (ref 20–33)
CREAT SERPL-MCNC: 2.62 MG/DL (ref 0.5–1.4)
EOSINOPHIL # BLD AUTO: 0.28 K/UL (ref 0–0.51)
EOSINOPHIL NFR BLD: 5.6 % (ref 0–6.9)
ERYTHROCYTE [DISTWIDTH] IN BLOOD BY AUTOMATED COUNT: 49.3 FL (ref 35.9–50)
GFR SERPLBLD CREATININE-BSD FMLA CKD-EPI: 24 ML/MIN/1.73 M 2
GLOBULIN SER CALC-MCNC: 2.5 G/DL (ref 1.9–3.5)
GLUCOSE BLD STRIP.AUTO-MCNC: 123 MG/DL (ref 65–99)
GLUCOSE BLD STRIP.AUTO-MCNC: 140 MG/DL (ref 65–99)
GLUCOSE BLD STRIP.AUTO-MCNC: 144 MG/DL (ref 65–99)
GLUCOSE BLD STRIP.AUTO-MCNC: 157 MG/DL (ref 65–99)
GLUCOSE SERPL-MCNC: 122 MG/DL (ref 65–99)
HCT VFR BLD AUTO: 51.4 % (ref 42–52)
HGB BLD-MCNC: 16.5 G/DL (ref 14–18)
IMM GRANULOCYTES # BLD AUTO: 0.04 K/UL (ref 0–0.11)
IMM GRANULOCYTES NFR BLD AUTO: 0.8 % (ref 0–0.9)
LYMPHOCYTES # BLD AUTO: 1.02 K/UL (ref 1–4.8)
LYMPHOCYTES NFR BLD: 20.4 % (ref 22–41)
MCH RBC QN AUTO: 30.2 PG (ref 27–33)
MCHC RBC AUTO-ENTMCNC: 32.1 G/DL (ref 33.7–35.3)
MCV RBC AUTO: 94.1 FL (ref 81.4–97.8)
MONOCYTES # BLD AUTO: 0.62 K/UL (ref 0–0.85)
MONOCYTES NFR BLD AUTO: 12.4 % (ref 0–13.4)
NEUTROPHILS # BLD AUTO: 3.03 K/UL (ref 1.82–7.42)
NEUTROPHILS NFR BLD: 60.4 % (ref 44–72)
NRBC # BLD AUTO: 0 K/UL
NRBC BLD-RTO: 0 /100 WBC
PLATELET # BLD AUTO: 124 K/UL (ref 164–446)
PMV BLD AUTO: 10.4 FL (ref 9–12.9)
POTASSIUM SERPL-SCNC: 4.8 MMOL/L (ref 3.6–5.5)
PROT SERPL-MCNC: 5.7 G/DL (ref 6–8.2)
RBC # BLD AUTO: 5.46 M/UL (ref 4.7–6.1)
SODIUM SERPL-SCNC: 137 MMOL/L (ref 135–145)
WBC # BLD AUTO: 5 K/UL (ref 4.8–10.8)

## 2022-03-23 PROCEDURE — A9270 NON-COVERED ITEM OR SERVICE: HCPCS

## 2022-03-23 PROCEDURE — 36415 COLL VENOUS BLD VENIPUNCTURE: CPT

## 2022-03-23 PROCEDURE — 700101 HCHG RX REV CODE 250: Performed by: FAMILY MEDICINE

## 2022-03-23 PROCEDURE — 97530 THERAPEUTIC ACTIVITIES: CPT | Mod: CQ

## 2022-03-23 PROCEDURE — 82962 GLUCOSE BLOOD TEST: CPT

## 2022-03-23 PROCEDURE — 770001 HCHG ROOM/CARE - MED/SURG/GYN PRIV*

## 2022-03-23 PROCEDURE — 700111 HCHG RX REV CODE 636 W/ 250 OVERRIDE (IP): Performed by: SURGERY

## 2022-03-23 PROCEDURE — 700102 HCHG RX REV CODE 250 W/ 637 OVERRIDE(OP): Performed by: SURGERY

## 2022-03-23 PROCEDURE — 80053 COMPREHEN METABOLIC PANEL: CPT

## 2022-03-23 PROCEDURE — A9270 NON-COVERED ITEM OR SERVICE: HCPCS | Performed by: SURGERY

## 2022-03-23 PROCEDURE — 85025 COMPLETE CBC W/AUTO DIFF WBC: CPT

## 2022-03-23 PROCEDURE — 700102 HCHG RX REV CODE 250 W/ 637 OVERRIDE(OP)

## 2022-03-23 PROCEDURE — 99232 SBSQ HOSP IP/OBS MODERATE 35: CPT | Performed by: STUDENT IN AN ORGANIZED HEALTH CARE EDUCATION/TRAINING PROGRAM

## 2022-03-23 PROCEDURE — 302196 LINEN, HYPOALLERGENIC: Performed by: ORTHOPAEDIC SURGERY

## 2022-03-23 PROCEDURE — 700102 HCHG RX REV CODE 250 W/ 637 OVERRIDE(OP): Performed by: FAMILY MEDICINE

## 2022-03-23 PROCEDURE — 99232 SBSQ HOSP IP/OBS MODERATE 35: CPT

## 2022-03-23 PROCEDURE — 97535 SELF CARE MNGMENT TRAINING: CPT

## 2022-03-23 PROCEDURE — 71045 X-RAY EXAM CHEST 1 VIEW: CPT

## 2022-03-23 PROCEDURE — A9270 NON-COVERED ITEM OR SERVICE: HCPCS | Performed by: FAMILY MEDICINE

## 2022-03-23 RX ORDER — DIPHENHYDRAMINE HCL 25 MG
25 TABLET ORAL EVERY 6 HOURS PRN
Status: DISCONTINUED | OUTPATIENT
Start: 2022-03-23 | End: 2022-03-24

## 2022-03-23 RX ADMIN — OMEPRAZOLE 40 MG: 20 CAPSULE, DELAYED RELEASE ORAL at 05:12

## 2022-03-23 RX ADMIN — CARVEDILOL 3.12 MG: 6.25 TABLET, FILM COATED ORAL at 08:06

## 2022-03-23 RX ADMIN — SACUBITRIL AND VALSARTAN 1 TABLET: 24; 26 TABLET, FILM COATED ORAL at 05:18

## 2022-03-23 RX ADMIN — METAXALONE 800 MG: 800 TABLET ORAL at 17:54

## 2022-03-23 RX ADMIN — CARVEDILOL 3.12 MG: 6.25 TABLET, FILM COATED ORAL at 17:55

## 2022-03-23 RX ADMIN — METAXALONE 800 MG: 800 TABLET ORAL at 12:46

## 2022-03-23 RX ADMIN — TORSEMIDE 20 MG: 20 TABLET ORAL at 05:18

## 2022-03-23 RX ADMIN — ACETAMINOPHEN 1000 MG: 500 TABLET ORAL at 12:46

## 2022-03-23 RX ADMIN — OMEPRAZOLE 40 MG: 20 CAPSULE, DELAYED RELEASE ORAL at 17:55

## 2022-03-23 RX ADMIN — LIDOCAINE 2 PATCH: 50 PATCH TOPICAL at 00:30

## 2022-03-23 RX ADMIN — OXYCODONE HYDROCHLORIDE 10 MG: 10 TABLET ORAL at 00:31

## 2022-03-23 RX ADMIN — LOSARTAN POTASSIUM 25 MG: 50 TABLET, FILM COATED ORAL at 05:13

## 2022-03-23 RX ADMIN — ACETAMINOPHEN 1000 MG: 500 TABLET ORAL at 00:29

## 2022-03-23 RX ADMIN — OXYCODONE HYDROCHLORIDE 10 MG: 10 TABLET ORAL at 12:46

## 2022-03-23 RX ADMIN — ENOXAPARIN SODIUM 30 MG: 30 INJECTION SUBCUTANEOUS at 17:54

## 2022-03-23 RX ADMIN — METAXALONE 800 MG: 800 TABLET ORAL at 05:19

## 2022-03-23 RX ADMIN — FLUTICASONE PROPIONATE 100 MCG: 50 SPRAY, METERED NASAL at 05:18

## 2022-03-23 RX ADMIN — INSULIN HUMAN 1 UNITS: 100 INJECTION, SOLUTION PARENTERAL at 00:42

## 2022-03-23 RX ADMIN — TAMSULOSIN HYDROCHLORIDE 0.4 MG: 0.4 CAPSULE ORAL at 08:01

## 2022-03-23 RX ADMIN — POLYETHYLENE GLYCOL 3350 1 PACKET: 17 POWDER, FOR SOLUTION ORAL at 05:12

## 2022-03-23 RX ADMIN — DOCUSATE SODIUM 100 MG: 100 CAPSULE, LIQUID FILLED ORAL at 05:12

## 2022-03-23 RX ADMIN — EZETIMIBE 10 MG: 10 TABLET ORAL at 05:12

## 2022-03-23 RX ADMIN — OXYCODONE HYDROCHLORIDE 10 MG: 10 TABLET ORAL at 08:01

## 2022-03-23 RX ADMIN — ENOXAPARIN SODIUM 30 MG: 30 INJECTION SUBCUTANEOUS at 05:12

## 2022-03-23 ASSESSMENT — ENCOUNTER SYMPTOMS
DIARRHEA: 0
NERVOUS/ANXIOUS: 0
BLURRED VISION: 0
DIZZINESS: 0
WHEEZING: 0
NECK PAIN: 0
SPEECH CHANGE: 0
CHILLS: 0
WEAKNESS: 0
HEARTBURN: 0
ABDOMINAL PAIN: 0
VOMITING: 0
PSYCHIATRIC NEGATIVE: 1
HEADACHES: 0
FLANK PAIN: 0
PALPITATIONS: 0
FOCAL WEAKNESS: 0
SORE THROAT: 0
EYES NEGATIVE: 1
DIAPHORESIS: 0
SHORTNESS OF BREATH: 0
SENSORY CHANGE: 0
FEVER: 0
BACK PAIN: 0
NAUSEA: 0
MYALGIAS: 0
COUGH: 0

## 2022-03-23 ASSESSMENT — PAIN DESCRIPTION - PAIN TYPE
TYPE: ACUTE PAIN

## 2022-03-23 ASSESSMENT — COGNITIVE AND FUNCTIONAL STATUS - GENERAL
DRESSING REGULAR UPPER BODY CLOTHING: A LITTLE
SUGGESTED CMS G CODE MODIFIER DAILY ACTIVITY: CK
TOILETING: A LOT
MOVING TO AND FROM BED TO CHAIR: A LOT
TURNING FROM BACK TO SIDE WHILE IN FLAT BAD: A LOT
SUGGESTED CMS G CODE MODIFIER MOBILITY: CM
MOVING FROM LYING ON BACK TO SITTING ON SIDE OF FLAT BED: UNABLE
PERSONAL GROOMING: A LITTLE
STANDING UP FROM CHAIR USING ARMS: TOTAL
EATING MEALS: A LITTLE
DAILY ACTIVITIY SCORE: 15
DRESSING REGULAR LOWER BODY CLOTHING: A LOT
MOBILITY SCORE: 8
WALKING IN HOSPITAL ROOM: TOTAL
CLIMB 3 TO 5 STEPS WITH RAILING: TOTAL
HELP NEEDED FOR BATHING: A LOT

## 2022-03-23 ASSESSMENT — GAIT ASSESSMENTS: GAIT LEVEL OF ASSIST: UNABLE TO PARTICIPATE

## 2022-03-23 NOTE — CARE PLAN
Problem: Skin Integrity  Goal: Skin integrity is maintained or improved  Outcome: Not Progressing     Problem: Knowledge Deficit - Standard  Goal: Patient and family/care givers will demonstrate understanding of plan of care, disease process/condition, diagnostic tests and medications  Outcome: Progressing     Problem: Pain - Standard  Goal: Alleviation of pain or a reduction in pain to the patient’s comfort goal  Outcome: Progressing   The patient is Stable - Low risk of patient condition declining or worsening    Shift Goals  Clinical Goals: Pain control, BM, sleep  Patient Goals: BM, sleep  Family Goals: N/A    Progress made toward(s) clinical / shift goals:  Pt resting.     Patient is not progressing towards the following goals:      Problem: Skin Integrity  Goal: Skin integrity is maintained or improved  Outcome: Not Progressing

## 2022-03-23 NOTE — CARE PLAN
Problem: Knowledge Deficit - Standard  Goal: Patient and family/care givers will demonstrate understanding of plan of care, disease process/condition, diagnostic tests and medications  Outcome: Progressing   Pt educated on medications this shift   Problem: Pain - Standard  Goal: Alleviation of pain or a reduction in pain to the patient’s comfort goal  Outcome: Progressing   Pain medicated per MAR  Problem: Skin Integrity  Goal: Skin integrity is maintained or improved  Outcome: Progressing   Sacrum red/ blanching   The patient is Stable - Low risk of patient condition declining or worsening    Shift Goals  Clinical Goals: Pain control, BM, sleep  Patient Goals: BM, sleep  Family Goals: N/A    Progress made toward(s) clinical / shift goals:  Pain Medicated per MAR/ x1 BM this shift     Patient is not progressing towards the following goals:

## 2022-03-23 NOTE — TELEPHONE ENCOUNTER
S/W pt, he states he is currently admitted in hospital- took 5 days of sitting in the hospital -> suffering from a broken hip and 3 ribs waiting for [employer] to come in and assist. Pt is very upset that [employer] took so long to allow him to get his MRI and then on top of it, [employer] was able to do every thing they needed over the phone without a rep present.     Pt is addement that our device team be aware and is open to anyone wanting to call him and discuss this further

## 2022-03-23 NOTE — PROGRESS NOTES
Bedside report received from day shift nurse.  Pt A&Ox4  Tolerating CHO diet, denies n/v. Normoactive bowel sounds, passing flatus, LBM 3/21/22. IV access through 18G RAC that is SL.  Bruising to L flank, bilateral LE edema present.   Saturating >90% on 2L NC.   Pt ambulates MAX. WBAT to LLE. Pt encouraged to sit at EOB.   Pain is controlled through medication orders. Updated on plan of care. Safety education provided. Bed locked in low. Call light within reach. Rounding in place.

## 2022-03-23 NOTE — THERAPY
"Occupational Therapy  Daily Treatment     Patient Name: LIAN More III  Age:  81 y.o., Sex:  male  Medical Record #: [id number]  Today's Date: 3/23/2022     Precautions  Precautions: Fall Risk,Weight Bearing As Tolerated Right Lower Extremity (formal orders indicate R LE WBAT, therapy notes report TTWB LLE, needs clarification)  Comments: L rib fractures, pain    Assessment    Patient seen for OT treat necessitating assist with basic mobility to EOB and likely benefit from two person assist to progress beyond seated EOB.     Plan    Continue current treatment plan.    DC Equipment Recommendations: Unable to determine at this time  Discharge Recommendations: Recommend post-acute placement for additional occupational therapy services prior to discharge home     Objective       03/23/22 1055   Cognition    Comments agreeable within limits as motivated to eat breakfast however not motivated to eat EOB without max encouragement. Would likely benefit from two person assist for comfort and nervous anticipation of pain   Balance   Comments motivated to return to bed and finish breakfast, would benefit from clarified WB orders   Bed Mobility    Sit to Supine Total Assist   Activities of Daily Living   Eating Supervision   Grooming Supervision;Seated   Upper Body Dressing Supervision   Comments patient S seated with certain ADLs however declined to progress to more challenging ADLs reporting \"I wanna eat and that's it\". Session curtailed.   Activity Tolerance   Comments limited by pain and/or perception of pain/ anticipatory pain   Patient / Family Goals   Patient / Family Goal #1 Have less pain   Goal #1 Outcome Goal not met   Short Term Goals   Short Term Goal # 1 Pt will complete LB dressing with Min A using AE PRN   Goal Outcome # 1 Goal not met   Short Term Goal # 2 Pt will complete ADL txfs with Min A   Goal Outcome # 2 Goal not met   Short Term Goal # 3 Pt will complete toileting with Min A   Goal Outcome # 3 Goal not met "   Session Information   Priority 2

## 2022-03-23 NOTE — PROGRESS NOTES
Assumed care of patient at 0645. Bedside report received. Assessment complete.  AA&Ox4. Denies CP/SOB.  Reporting 8/10 pain. Medicated per MAR.  Educated patient regarding pharmacologic and non pharmacologic modalities for pain management.  Skin per flowsheets  Tolerating regular diet. Denies N/V.  + void. + BM. Last BM 3/23  Pt ambulates x2 Max assist, LLE NonWB   All needs met at this time. Call light within reach. Pt calls appropriately. Bed low and locked, non skid socks in place. Hourly rounding in place.

## 2022-03-23 NOTE — PROGRESS NOTES
4 Eyes Skin Assessment Completed by Rodriguez RN and MARY Chahal.    Head WDL  Ears Redness and Blanching  Nose WDL  Mouth WDL  Neck WDL  Breast/Chest WDL  Shoulder Blades Redness and Blanching  Spine Redness and Blanching  (R) Arm/Elbow/Hand WDL  (L) Arm/Elbow/Hand WDL  Abdomen WDL  Groin WDL  Scrotum/Coccyx/Buttocks Redness and Blanching  (R) Leg Edema  (L) Leg Edema  (R) Heel/Foot/Toe WDL  (L) Heel/Foot/Toe WDL          Devices In Places Pulse Ox and Nasal Cannula      Interventions In Place Gray Ear Foams, Sacral Mepilex, Pillows, Heels Loaded W/Pillows and Pressure Redistribution Mattress    Possible Skin Injury No    Pictures Uploaded Into Epic N/A  Wound Consult Placed N/A  RN Wound Prevention Protocol Ordered No

## 2022-03-23 NOTE — THERAPY
Physical Therapy   Daily Treatment     Patient Name: LIAN More III  Age:  81 y.o., Sex:  male  Medical Record #: [id number]  Today's Date: 3/23/2022         Assessment    Pt willing to participate w/PT, he is scheduled for sx tomorrow on his Lft hip. Pt requiring max assist w/bed mobility, no sit<->stands or transfers today. Overall mobility is limited by Lft rib pain. Pt tolerated 10 mins on the EOB w/supervision only while working on his IS.   Per Ortho PA, the pt will have no WB restrictions following sx. PT will resume following sx.     Plan    Continue current treatment plan.    DC Equipment Recommendations: Unable to determine at this time  Discharge Recommendations: Recommend post-acute placement for additional physical therapy services prior to discharge home     Objective     03/23/22 1325   Other Treatments   Other Treatments Provided Per Ortho PA, pt is to be TTWB. He is scheduled for sx tomorrow and will have WBAT orders following. EOB x 10 mins w/supervision, using IS. Nrsg notified of rash around trunk, legs, and groin.   Balance   Sitting Balance (Static) Fair +   Sitting Balance (Dynamic) Fair   Weight Shift Sitting Poor   Gait Analysis   Gait Level Of Assist Unable to Participate   Bed Mobility    Supine to Sit Maximal Assist  (HOB elevated)   Sit to Supine Maximal Assist  (HOB flat and no railing)   Scooting Contact Guard Assist  (seated)   Functional Mobility   Sit to Stand Unable to Participate   Bed, Chair, Wheelchair Transfer Unable to Participate   Comments Did not work on sit<->stands today, due for sx tomorrow. Pt's overall mobility limited by Lft rib pain.   How much difficulty does the patient currently have...   Turning over in bed (including adjusting bedclothes, sheets and blankets)? 2   Sitting down on and standing up from a chair with arms (e.g., wheelchair, bedside commode, etc.) 1   Moving from lying on back to sitting on the side of the bed? 2   How much help from another person does  the patient currently need...   Moving to and from a bed to a chair (including a wheelchair)? 1   Need to walk in a hospital room? 1   Climbing 3-5 steps with a railing? 1   6 clicks Mobility Score 8   Short Term Goals    Short Term Goal # 1 in 6 sessions pt will perform bed mob with flat bed and no rail with mod indep   Goal Outcome # 1 goal not met   Short Term Goal # 2 in 6 sessions pt will transfer to various surfaces with mod indep using fWW inorder to return home indep.   Goal Outcome # 2 Goal not met   Short Term Goal # 3 in 6 sessions pt will amb using FWW x 50 feet with fWW including turns inorder to return home indep   Goal Outcome # 3 Goal not met   Short Term Goal # 4 in 6 session pt will climb up/down 1 flight of stairs indep inorder to enter home.   Goal Outcome # 4 Goal not met

## 2022-03-24 ENCOUNTER — APPOINTMENT (OUTPATIENT)
Dept: RADIOLOGY | Facility: MEDICAL CENTER | Age: 82
DRG: 956 | End: 2022-03-24
Attending: SURGERY
Payer: MEDICARE

## 2022-03-24 ENCOUNTER — APPOINTMENT (OUTPATIENT)
Dept: RADIOLOGY | Facility: MEDICAL CENTER | Age: 82
DRG: 956 | End: 2022-03-24
Attending: ORTHOPAEDIC SURGERY
Payer: MEDICARE

## 2022-03-24 ENCOUNTER — ANESTHESIA (OUTPATIENT)
Dept: SURGERY | Facility: MEDICAL CENTER | Age: 82
DRG: 956 | End: 2022-03-24
Payer: MEDICARE

## 2022-03-24 ENCOUNTER — ANESTHESIA EVENT (OUTPATIENT)
Dept: SURGERY | Facility: MEDICAL CENTER | Age: 82
DRG: 956 | End: 2022-03-24
Payer: MEDICARE

## 2022-03-24 LAB
ALBUMIN SERPL BCP-MCNC: 3.6 G/DL (ref 3.2–4.9)
ALBUMIN/GLOB SERPL: 1.4 G/DL
ALP SERPL-CCNC: 53 U/L (ref 30–99)
ALT SERPL-CCNC: 91 U/L (ref 2–50)
ANION GAP SERPL CALC-SCNC: 12 MMOL/L (ref 7–16)
AST SERPL-CCNC: 96 U/L (ref 12–45)
BASOPHILS # BLD AUTO: 0.2 % (ref 0–1.8)
BASOPHILS # BLD: 0.01 K/UL (ref 0–0.12)
BILIRUB SERPL-MCNC: 0.9 MG/DL (ref 0.1–1.5)
BUN SERPL-MCNC: 65 MG/DL (ref 8–22)
CALCIUM SERPL-MCNC: 8.3 MG/DL (ref 8.5–10.5)
CHLORIDE SERPL-SCNC: 98 MMOL/L (ref 96–112)
CO2 SERPL-SCNC: 27 MMOL/L (ref 20–33)
CREAT SERPL-MCNC: 2.76 MG/DL (ref 0.5–1.4)
EOSINOPHIL # BLD AUTO: 0.26 K/UL (ref 0–0.51)
EOSINOPHIL NFR BLD: 4.9 % (ref 0–6.9)
ERYTHROCYTE [DISTWIDTH] IN BLOOD BY AUTOMATED COUNT: 49.7 FL (ref 35.9–50)
GFR SERPLBLD CREATININE-BSD FMLA CKD-EPI: 23 ML/MIN/1.73 M 2
GLOBULIN SER CALC-MCNC: 2.6 G/DL (ref 1.9–3.5)
GLUCOSE BLD STRIP.AUTO-MCNC: 113 MG/DL (ref 65–99)
GLUCOSE BLD STRIP.AUTO-MCNC: 119 MG/DL (ref 65–99)
GLUCOSE BLD STRIP.AUTO-MCNC: 138 MG/DL (ref 65–99)
GLUCOSE BLD STRIP.AUTO-MCNC: 179 MG/DL (ref 65–99)
GLUCOSE BLD STRIP.AUTO-MCNC: 194 MG/DL (ref 65–99)
GLUCOSE SERPL-MCNC: 131 MG/DL (ref 65–99)
HCT VFR BLD AUTO: 51.8 % (ref 42–52)
HGB BLD-MCNC: 16.7 G/DL (ref 14–18)
IMM GRANULOCYTES # BLD AUTO: 0.02 K/UL (ref 0–0.11)
IMM GRANULOCYTES NFR BLD AUTO: 0.4 % (ref 0–0.9)
LYMPHOCYTES # BLD AUTO: 1.02 K/UL (ref 1–4.8)
LYMPHOCYTES NFR BLD: 19.1 % (ref 22–41)
MCH RBC QN AUTO: 30.4 PG (ref 27–33)
MCHC RBC AUTO-ENTMCNC: 32.2 G/DL (ref 33.7–35.3)
MCV RBC AUTO: 94.4 FL (ref 81.4–97.8)
MONOCYTES # BLD AUTO: 0.69 K/UL (ref 0–0.85)
MONOCYTES NFR BLD AUTO: 12.9 % (ref 0–13.4)
NEUTROPHILS # BLD AUTO: 3.33 K/UL (ref 1.82–7.42)
NEUTROPHILS NFR BLD: 62.5 % (ref 44–72)
NRBC # BLD AUTO: 0 K/UL
NRBC BLD-RTO: 0 /100 WBC
PLATELET # BLD AUTO: 137 K/UL (ref 164–446)
PMV BLD AUTO: 10.2 FL (ref 9–12.9)
POTASSIUM SERPL-SCNC: 5 MMOL/L (ref 3.6–5.5)
PROT SERPL-MCNC: 6.2 G/DL (ref 6–8.2)
RBC # BLD AUTO: 5.49 M/UL (ref 4.7–6.1)
SODIUM SERPL-SCNC: 137 MMOL/L (ref 135–145)
WBC # BLD AUTO: 5.3 K/UL (ref 4.8–10.8)

## 2022-03-24 PROCEDURE — 80053 COMPREHEN METABOLIC PANEL: CPT

## 2022-03-24 PROCEDURE — A9270 NON-COVERED ITEM OR SERVICE: HCPCS | Performed by: FAMILY MEDICINE

## 2022-03-24 PROCEDURE — A9270 NON-COVERED ITEM OR SERVICE: HCPCS | Performed by: ORTHOPAEDIC SURGERY

## 2022-03-24 PROCEDURE — 64447 NJX AA&/STRD FEMORAL NRV IMG: CPT | Performed by: ORTHOPAEDIC SURGERY

## 2022-03-24 PROCEDURE — 501838 HCHG SUTURE GENERAL: Performed by: ORTHOPAEDIC SURGERY

## 2022-03-24 PROCEDURE — 302196 LINEN, HYPOALLERGENIC: Performed by: SURGERY

## 2022-03-24 PROCEDURE — C1713 ANCHOR/SCREW BN/BN,TIS/BN: HCPCS | Performed by: ORTHOPAEDIC SURGERY

## 2022-03-24 PROCEDURE — 700111 HCHG RX REV CODE 636 W/ 250 OVERRIDE (IP): Performed by: ANESTHESIOLOGY

## 2022-03-24 PROCEDURE — 700102 HCHG RX REV CODE 250 W/ 637 OVERRIDE(OP): Performed by: FAMILY MEDICINE

## 2022-03-24 PROCEDURE — 770001 HCHG ROOM/CARE - MED/SURG/GYN PRIV*

## 2022-03-24 PROCEDURE — 502000 HCHG MISC OR IMPLANTS RC 0278: Performed by: ORTHOPAEDIC SURGERY

## 2022-03-24 PROCEDURE — 700102 HCHG RX REV CODE 250 W/ 637 OVERRIDE(OP): Performed by: SURGERY

## 2022-03-24 PROCEDURE — A9270 NON-COVERED ITEM OR SERVICE: HCPCS

## 2022-03-24 PROCEDURE — 700105 HCHG RX REV CODE 258: Performed by: ANESTHESIOLOGY

## 2022-03-24 PROCEDURE — 3E0T3BZ INTRODUCTION OF ANESTHETIC AGENT INTO PERIPHERAL NERVES AND PLEXI, PERCUTANEOUS APPROACH: ICD-10-PCS | Performed by: ANESTHESIOLOGY

## 2022-03-24 PROCEDURE — 160009 HCHG ANES TIME/MIN: Performed by: ORTHOPAEDIC SURGERY

## 2022-03-24 PROCEDURE — 700102 HCHG RX REV CODE 250 W/ 637 OVERRIDE(OP): Performed by: ORTHOPAEDIC SURGERY

## 2022-03-24 PROCEDURE — 99232 SBSQ HOSP IP/OBS MODERATE 35: CPT

## 2022-03-24 PROCEDURE — 700111 HCHG RX REV CODE 636 W/ 250 OVERRIDE (IP): Performed by: ORTHOPAEDIC SURGERY

## 2022-03-24 PROCEDURE — 160002 HCHG RECOVERY MINUTES (STAT): Performed by: ORTHOPAEDIC SURGERY

## 2022-03-24 PROCEDURE — 27245 TREAT THIGH FRACTURE: CPT | Mod: LT | Performed by: ORTHOPAEDIC SURGERY

## 2022-03-24 PROCEDURE — 71045 X-RAY EXAM CHEST 1 VIEW: CPT

## 2022-03-24 PROCEDURE — 73552 X-RAY EXAM OF FEMUR 2/>: CPT | Mod: LT

## 2022-03-24 PROCEDURE — 700101 HCHG RX REV CODE 250: Performed by: FAMILY MEDICINE

## 2022-03-24 PROCEDURE — 160035 HCHG PACU - 1ST 60 MINS PHASE I: Performed by: ORTHOPAEDIC SURGERY

## 2022-03-24 PROCEDURE — 500891 HCHG PACK, ORTHO MAJOR: Performed by: ORTHOPAEDIC SURGERY

## 2022-03-24 PROCEDURE — 0QS736Z REPOSITION LEFT UPPER FEMUR WITH INTRAMEDULLARY INTERNAL FIXATION DEVICE, PERCUTANEOUS APPROACH: ICD-10-PCS | Performed by: ORTHOPAEDIC SURGERY

## 2022-03-24 PROCEDURE — 36415 COLL VENOUS BLD VENIPUNCTURE: CPT

## 2022-03-24 PROCEDURE — 700102 HCHG RX REV CODE 250 W/ 637 OVERRIDE(OP): Performed by: ANESTHESIOLOGY

## 2022-03-24 PROCEDURE — 160036 HCHG PACU - EA ADDL 30 MINS PHASE I: Performed by: ORTHOPAEDIC SURGERY

## 2022-03-24 PROCEDURE — 700102 HCHG RX REV CODE 250 W/ 637 OVERRIDE(OP)

## 2022-03-24 PROCEDURE — 700101 HCHG RX REV CODE 250: Performed by: ANESTHESIOLOGY

## 2022-03-24 PROCEDURE — 85025 COMPLETE CBC W/AUTO DIFF WBC: CPT

## 2022-03-24 PROCEDURE — 700101 HCHG RX REV CODE 250: Performed by: ORTHOPAEDIC SURGERY

## 2022-03-24 PROCEDURE — 160048 HCHG OR STATISTICAL LEVEL 1-5: Performed by: ORTHOPAEDIC SURGERY

## 2022-03-24 PROCEDURE — 160029 HCHG SURGERY MINUTES - 1ST 30 MINS LEVEL 4: Performed by: ORTHOPAEDIC SURGERY

## 2022-03-24 PROCEDURE — A9270 NON-COVERED ITEM OR SERVICE: HCPCS | Performed by: ANESTHESIOLOGY

## 2022-03-24 PROCEDURE — A9270 NON-COVERED ITEM OR SERVICE: HCPCS | Performed by: SURGERY

## 2022-03-24 PROCEDURE — 700111 HCHG RX REV CODE 636 W/ 250 OVERRIDE (IP): Performed by: SURGERY

## 2022-03-24 PROCEDURE — 82962 GLUCOSE BLOOD TEST: CPT

## 2022-03-24 PROCEDURE — 160041 HCHG SURGERY MINUTES - EA ADDL 1 MIN LEVEL 4: Performed by: ORTHOPAEDIC SURGERY

## 2022-03-24 DEVICE — SCREW LAG 10.5MM X 110MM (4TX1=4): Type: IMPLANTABLE DEVICE | Site: HIP | Status: FUNCTIONAL

## 2022-03-24 DEVICE — NAIL HIP 127.5 DEGREE 10MM X 180MM (4TX2=8): Type: IMPLANTABLE DEVICE | Site: HIP | Status: FUNCTIONAL

## 2022-03-24 DEVICE — SCREW CROSS LOCK 5MM X 47.5MM (4TX5=20): Type: IMPLANTABLE DEVICE | Site: HIP | Status: FUNCTIONAL

## 2022-03-24 RX ORDER — HYDROMORPHONE HYDROCHLORIDE 1 MG/ML
0.5 INJECTION, SOLUTION INTRAMUSCULAR; INTRAVENOUS; SUBCUTANEOUS
Status: DISCONTINUED | OUTPATIENT
Start: 2022-03-24 | End: 2022-03-25

## 2022-03-24 RX ORDER — BISACODYL 10 MG
10 SUPPOSITORY, RECTAL RECTAL
Status: DISCONTINUED | OUTPATIENT
Start: 2022-03-24 | End: 2022-03-31 | Stop reason: HOSPADM

## 2022-03-24 RX ORDER — PHENYLEPHRINE HCL IN 0.9% NACL 0.5 MG/5ML
SYRINGE (ML) INTRAVENOUS PRN
Status: DISCONTINUED | OUTPATIENT
Start: 2022-03-24 | End: 2022-03-24 | Stop reason: SURG

## 2022-03-24 RX ORDER — LABETALOL HYDROCHLORIDE 5 MG/ML
5 INJECTION, SOLUTION INTRAVENOUS
Status: DISCONTINUED | OUTPATIENT
Start: 2022-03-24 | End: 2022-03-24 | Stop reason: HOSPADM

## 2022-03-24 RX ORDER — VASOPRESSIN 20 U/ML
INJECTION PARENTERAL PRN
Status: DISCONTINUED | OUTPATIENT
Start: 2022-03-24 | End: 2022-03-24 | Stop reason: SURG

## 2022-03-24 RX ORDER — ONDANSETRON 2 MG/ML
4 INJECTION INTRAMUSCULAR; INTRAVENOUS EVERY 4 HOURS PRN
Status: DISCONTINUED | OUTPATIENT
Start: 2022-03-24 | End: 2022-03-31 | Stop reason: HOSPADM

## 2022-03-24 RX ORDER — SODIUM CHLORIDE, SODIUM LACTATE, POTASSIUM CHLORIDE, CALCIUM CHLORIDE 600; 310; 30; 20 MG/100ML; MG/100ML; MG/100ML; MG/100ML
INJECTION, SOLUTION INTRAVENOUS CONTINUOUS
Status: DISCONTINUED | OUTPATIENT
Start: 2022-03-24 | End: 2022-03-24 | Stop reason: HOSPADM

## 2022-03-24 RX ORDER — ENEMA 19; 7 G/133ML; G/133ML
1 ENEMA RECTAL
Status: DISCONTINUED | OUTPATIENT
Start: 2022-03-24 | End: 2022-03-31 | Stop reason: HOSPADM

## 2022-03-24 RX ORDER — METOPROLOL TARTRATE 1 MG/ML
1 INJECTION, SOLUTION INTRAVENOUS
Status: DISCONTINUED | OUTPATIENT
Start: 2022-03-24 | End: 2022-03-24 | Stop reason: HOSPADM

## 2022-03-24 RX ORDER — ONDANSETRON 2 MG/ML
INJECTION INTRAMUSCULAR; INTRAVENOUS PRN
Status: DISCONTINUED | OUTPATIENT
Start: 2022-03-24 | End: 2022-03-24 | Stop reason: SURG

## 2022-03-24 RX ORDER — DIPHENHYDRAMINE HYDROCHLORIDE 50 MG/ML
25 INJECTION INTRAMUSCULAR; INTRAVENOUS EVERY 6 HOURS PRN
Status: DISCONTINUED | OUTPATIENT
Start: 2022-03-24 | End: 2022-03-25

## 2022-03-24 RX ORDER — GLYCOPYRROLATE 0.2 MG/ML
INJECTION INTRAMUSCULAR; INTRAVENOUS PRN
Status: DISCONTINUED | OUTPATIENT
Start: 2022-03-24 | End: 2022-03-24 | Stop reason: SURG

## 2022-03-24 RX ORDER — SODIUM CHLORIDE, SODIUM LACTATE, POTASSIUM CHLORIDE, CALCIUM CHLORIDE 600; 310; 30; 20 MG/100ML; MG/100ML; MG/100ML; MG/100ML
INJECTION, SOLUTION INTRAVENOUS
Status: DISCONTINUED | OUTPATIENT
Start: 2022-03-24 | End: 2022-03-24 | Stop reason: SURG

## 2022-03-24 RX ORDER — OXYCODONE HCL 5 MG/5 ML
5 SOLUTION, ORAL ORAL
Status: COMPLETED | OUTPATIENT
Start: 2022-03-24 | End: 2022-03-24

## 2022-03-24 RX ORDER — HYDROMORPHONE HYDROCHLORIDE 1 MG/ML
0.2 INJECTION, SOLUTION INTRAMUSCULAR; INTRAVENOUS; SUBCUTANEOUS
Status: DISCONTINUED | OUTPATIENT
Start: 2022-03-24 | End: 2022-03-24 | Stop reason: HOSPADM

## 2022-03-24 RX ORDER — CEFAZOLIN SODIUM 1 G/3ML
INJECTION, POWDER, FOR SOLUTION INTRAMUSCULAR; INTRAVENOUS PRN
Status: DISCONTINUED | OUTPATIENT
Start: 2022-03-24 | End: 2022-03-24 | Stop reason: SURG

## 2022-03-24 RX ORDER — DEXAMETHASONE SODIUM PHOSPHATE 4 MG/ML
INJECTION, SOLUTION INTRA-ARTICULAR; INTRALESIONAL; INTRAMUSCULAR; INTRAVENOUS; SOFT TISSUE
Status: COMPLETED | OUTPATIENT
Start: 2022-03-24 | End: 2022-03-24

## 2022-03-24 RX ORDER — OXYCODONE HCL 5 MG/5 ML
10 SOLUTION, ORAL ORAL
Status: COMPLETED | OUTPATIENT
Start: 2022-03-24 | End: 2022-03-24

## 2022-03-24 RX ORDER — ACETAMINOPHEN 500 MG
1000 TABLET ORAL ONCE
Status: DISCONTINUED | OUTPATIENT
Start: 2022-03-24 | End: 2022-03-24 | Stop reason: HOSPADM

## 2022-03-24 RX ORDER — HYDRALAZINE HYDROCHLORIDE 20 MG/ML
5 INJECTION INTRAMUSCULAR; INTRAVENOUS
Status: DISCONTINUED | OUTPATIENT
Start: 2022-03-24 | End: 2022-03-24 | Stop reason: HOSPADM

## 2022-03-24 RX ORDER — ACETAMINOPHEN 325 MG/1
650 TABLET ORAL EVERY 6 HOURS PRN
Status: DISCONTINUED | OUTPATIENT
Start: 2022-03-29 | End: 2022-03-31 | Stop reason: HOSPADM

## 2022-03-24 RX ORDER — DIPHENHYDRAMINE HYDROCHLORIDE 50 MG/ML
12.5 INJECTION INTRAMUSCULAR; INTRAVENOUS
Status: DISCONTINUED | OUTPATIENT
Start: 2022-03-24 | End: 2022-03-24 | Stop reason: HOSPADM

## 2022-03-24 RX ORDER — HALOPERIDOL 5 MG/ML
1 INJECTION INTRAMUSCULAR
Status: DISCONTINUED | OUTPATIENT
Start: 2022-03-24 | End: 2022-03-24 | Stop reason: HOSPADM

## 2022-03-24 RX ORDER — ONDANSETRON 2 MG/ML
4 INJECTION INTRAMUSCULAR; INTRAVENOUS
Status: DISCONTINUED | OUTPATIENT
Start: 2022-03-24 | End: 2022-03-24 | Stop reason: HOSPADM

## 2022-03-24 RX ORDER — ROCURONIUM BROMIDE 10 MG/ML
INJECTION, SOLUTION INTRAVENOUS PRN
Status: DISCONTINUED | OUTPATIENT
Start: 2022-03-24 | End: 2022-03-24 | Stop reason: SURG

## 2022-03-24 RX ORDER — MIDAZOLAM HYDROCHLORIDE 1 MG/ML
INJECTION INTRAMUSCULAR; INTRAVENOUS PRN
Status: DISCONTINUED | OUTPATIENT
Start: 2022-03-24 | End: 2022-03-24 | Stop reason: SURG

## 2022-03-24 RX ORDER — DEXAMETHASONE SODIUM PHOSPHATE 4 MG/ML
4 INJECTION, SOLUTION INTRA-ARTICULAR; INTRALESIONAL; INTRAMUSCULAR; INTRAVENOUS; SOFT TISSUE
Status: DISCONTINUED | OUTPATIENT
Start: 2022-03-24 | End: 2022-03-25

## 2022-03-24 RX ORDER — KETOROLAC TROMETHAMINE 30 MG/ML
15 INJECTION, SOLUTION INTRAMUSCULAR; INTRAVENOUS EVERY 6 HOURS
Status: DISCONTINUED | OUTPATIENT
Start: 2022-03-24 | End: 2022-03-25

## 2022-03-24 RX ORDER — OXYCODONE HYDROCHLORIDE 5 MG/1
5 TABLET ORAL
Status: DISCONTINUED | OUTPATIENT
Start: 2022-03-24 | End: 2022-03-31 | Stop reason: HOSPADM

## 2022-03-24 RX ORDER — AMOXICILLIN 250 MG
1 CAPSULE ORAL NIGHTLY
Status: DISCONTINUED | OUTPATIENT
Start: 2022-03-24 | End: 2022-03-31 | Stop reason: HOSPADM

## 2022-03-24 RX ORDER — DOCUSATE SODIUM 100 MG/1
100 CAPSULE, LIQUID FILLED ORAL 2 TIMES DAILY
Status: DISCONTINUED | OUTPATIENT
Start: 2022-03-24 | End: 2022-03-31 | Stop reason: HOSPADM

## 2022-03-24 RX ORDER — OXYCODONE HYDROCHLORIDE 5 MG/1
10 TABLET ORAL
Status: DISCONTINUED | OUTPATIENT
Start: 2022-03-24 | End: 2022-03-31 | Stop reason: HOSPADM

## 2022-03-24 RX ORDER — SCOLOPAMINE TRANSDERMAL SYSTEM 1 MG/1
1 PATCH, EXTENDED RELEASE TRANSDERMAL
Status: DISCONTINUED | OUTPATIENT
Start: 2022-03-24 | End: 2022-03-31 | Stop reason: HOSPADM

## 2022-03-24 RX ORDER — HYDROMORPHONE HYDROCHLORIDE 1 MG/ML
0.4 INJECTION, SOLUTION INTRAMUSCULAR; INTRAVENOUS; SUBCUTANEOUS
Status: DISCONTINUED | OUTPATIENT
Start: 2022-03-24 | End: 2022-03-24 | Stop reason: HOSPADM

## 2022-03-24 RX ORDER — ACETAMINOPHEN 325 MG/1
650 TABLET ORAL EVERY 6 HOURS
Status: DISPENSED | OUTPATIENT
Start: 2022-03-24 | End: 2022-03-29

## 2022-03-24 RX ORDER — HYDROMORPHONE HYDROCHLORIDE 1 MG/ML
0.1 INJECTION, SOLUTION INTRAMUSCULAR; INTRAVENOUS; SUBCUTANEOUS
Status: DISCONTINUED | OUTPATIENT
Start: 2022-03-24 | End: 2022-03-24 | Stop reason: HOSPADM

## 2022-03-24 RX ORDER — PROPOFOL 10 MG/ML
INJECTION, EMULSION INTRAVENOUS PRN
Status: DISCONTINUED | OUTPATIENT
Start: 2022-03-24 | End: 2022-03-24 | Stop reason: SURG

## 2022-03-24 RX ORDER — POLYETHYLENE GLYCOL 3350 17 G/17G
1 POWDER, FOR SOLUTION ORAL 2 TIMES DAILY PRN
Status: DISCONTINUED | OUTPATIENT
Start: 2022-03-24 | End: 2022-03-31 | Stop reason: HOSPADM

## 2022-03-24 RX ORDER — ROPIVACAINE HYDROCHLORIDE 5 MG/ML
INJECTION, SOLUTION EPIDURAL; INFILTRATION; PERINEURAL
Status: COMPLETED | OUTPATIENT
Start: 2022-03-24 | End: 2022-03-24

## 2022-03-24 RX ORDER — HYDROMORPHONE HYDROCHLORIDE 2 MG/ML
INJECTION, SOLUTION INTRAMUSCULAR; INTRAVENOUS; SUBCUTANEOUS PRN
Status: DISCONTINUED | OUTPATIENT
Start: 2022-03-24 | End: 2022-03-24 | Stop reason: SURG

## 2022-03-24 RX ORDER — DEXAMETHASONE SODIUM PHOSPHATE 4 MG/ML
INJECTION, SOLUTION INTRA-ARTICULAR; INTRALESIONAL; INTRAMUSCULAR; INTRAVENOUS; SOFT TISSUE PRN
Status: DISCONTINUED | OUTPATIENT
Start: 2022-03-24 | End: 2022-03-24 | Stop reason: SURG

## 2022-03-24 RX ORDER — HALOPERIDOL 5 MG/ML
1 INJECTION INTRAMUSCULAR EVERY 6 HOURS PRN
Status: DISCONTINUED | OUTPATIENT
Start: 2022-03-24 | End: 2022-03-25

## 2022-03-24 RX ORDER — IBUPROFEN 200 MG
800 TABLET ORAL 3 TIMES DAILY PRN
Status: DISCONTINUED | OUTPATIENT
Start: 2022-03-27 | End: 2022-03-25

## 2022-03-24 RX ORDER — AMOXICILLIN 250 MG
1 CAPSULE ORAL
Status: DISCONTINUED | OUTPATIENT
Start: 2022-03-24 | End: 2022-03-31 | Stop reason: HOSPADM

## 2022-03-24 RX ADMIN — GLYCOPYRROLATE 0.3 MG: 0.2 INJECTION INTRAMUSCULAR; INTRAVENOUS at 11:49

## 2022-03-24 RX ADMIN — FENTANYL CITRATE 25 MCG: 50 INJECTION, SOLUTION INTRAMUSCULAR; INTRAVENOUS at 13:27

## 2022-03-24 RX ADMIN — OXYCODONE HYDROCHLORIDE 5 MG: 5 SOLUTION ORAL at 12:55

## 2022-03-24 RX ADMIN — Medication 100 MCG: at 11:49

## 2022-03-24 RX ADMIN — FENTANYL CITRATE 50 MCG: 50 INJECTION, SOLUTION INTRAMUSCULAR; INTRAVENOUS at 12:04

## 2022-03-24 RX ADMIN — HYDROMORPHONE HYDROCHLORIDE 0.1 MG: 1 INJECTION, SOLUTION INTRAMUSCULAR; INTRAVENOUS; SUBCUTANEOUS at 13:52

## 2022-03-24 RX ADMIN — CEFAZOLIN 2 G: 330 INJECTION, POWDER, FOR SOLUTION INTRAMUSCULAR; INTRAVENOUS at 12:02

## 2022-03-24 RX ADMIN — ACETAMINOPHEN 1000 MG: 500 TABLET ORAL at 00:35

## 2022-03-24 RX ADMIN — ACETAMINOPHEN 650 MG: 325 TABLET, FILM COATED ORAL at 18:25

## 2022-03-24 RX ADMIN — ROPIVACAINE HYDROCHLORIDE 15 ML: 5 INJECTION, SOLUTION EPIDURAL; INFILTRATION; PERINEURAL at 11:28

## 2022-03-24 RX ADMIN — CARVEDILOL 3.12 MG: 6.25 TABLET, FILM COATED ORAL at 09:07

## 2022-03-24 RX ADMIN — SODIUM CHLORIDE, POTASSIUM CHLORIDE, SODIUM LACTATE AND CALCIUM CHLORIDE: 600; 310; 30; 20 INJECTION, SOLUTION INTRAVENOUS at 11:43

## 2022-03-24 RX ADMIN — FENTANYL CITRATE 50 MCG: 50 INJECTION, SOLUTION INTRAMUSCULAR; INTRAVENOUS at 11:27

## 2022-03-24 RX ADMIN — EZETIMIBE 10 MG: 10 TABLET ORAL at 05:37

## 2022-03-24 RX ADMIN — HYDROMORPHONE HYDROCHLORIDE 0.4 MG: 2 INJECTION INTRAMUSCULAR; INTRAVENOUS; SUBCUTANEOUS at 12:07

## 2022-03-24 RX ADMIN — FENTANYL CITRATE 25 MCG: 50 INJECTION, SOLUTION INTRAMUSCULAR; INTRAVENOUS at 12:56

## 2022-03-24 RX ADMIN — LIDOCAINE 2 PATCH: 50 PATCH TOPICAL at 00:36

## 2022-03-24 RX ADMIN — DOCUSATE SODIUM 100 MG: 100 CAPSULE, LIQUID FILLED ORAL at 18:25

## 2022-03-24 RX ADMIN — WATER 2 G: 100 INJECTION, SOLUTION INTRAVENOUS at 19:57

## 2022-03-24 RX ADMIN — FLUTICASONE PROPIONATE 100 MCG: 50 SPRAY, METERED NASAL at 05:34

## 2022-03-24 RX ADMIN — DEXAMETHASONE SODIUM PHOSPHATE 2 MG: 4 INJECTION, SOLUTION INTRA-ARTICULAR; INTRALESIONAL; INTRAMUSCULAR; INTRAVENOUS; SOFT TISSUE at 11:28

## 2022-03-24 RX ADMIN — TAMSULOSIN HYDROCHLORIDE 0.4 MG: 0.4 CAPSULE ORAL at 09:07

## 2022-03-24 RX ADMIN — ROCURONIUM BROMIDE 50 MG: 10 INJECTION, SOLUTION INTRAVENOUS at 11:45

## 2022-03-24 RX ADMIN — CARVEDILOL 3.12 MG: 6.25 TABLET, FILM COATED ORAL at 18:26

## 2022-03-24 RX ADMIN — OMEPRAZOLE 40 MG: 20 CAPSULE, DELAYED RELEASE ORAL at 05:34

## 2022-03-24 RX ADMIN — ONDANSETRON 4 MG: 2 INJECTION INTRAMUSCULAR; INTRAVENOUS at 11:49

## 2022-03-24 RX ADMIN — OMEPRAZOLE 40 MG: 20 CAPSULE, DELAYED RELEASE ORAL at 18:29

## 2022-03-24 RX ADMIN — PROPOFOL 150 MG: 10 INJECTION, EMULSION INTRAVENOUS at 11:45

## 2022-03-24 RX ADMIN — METAXALONE 800 MG: 800 TABLET ORAL at 05:34

## 2022-03-24 RX ADMIN — ONDANSETRON 4 MG: 2 INJECTION INTRAMUSCULAR; INTRAVENOUS at 12:24

## 2022-03-24 RX ADMIN — METAXALONE 800 MG: 800 TABLET ORAL at 18:26

## 2022-03-24 RX ADMIN — EPHEDRINE SULFATE 10 MG: 50 INJECTION, SOLUTION INTRAVENOUS at 11:48

## 2022-03-24 RX ADMIN — VASOPRESSIN 1 UNITS: 20 INJECTION PARENTERAL at 11:52

## 2022-03-24 RX ADMIN — MIDAZOLAM HYDROCHLORIDE 1 MG: 1 INJECTION, SOLUTION INTRAMUSCULAR; INTRAVENOUS at 11:27

## 2022-03-24 RX ADMIN — INSULIN HUMAN 1 UNITS: 100 INJECTION, SOLUTION PARENTERAL at 18:20

## 2022-03-24 RX ADMIN — DEXAMETHASONE SODIUM PHOSPHATE 4 MG: 4 INJECTION, SOLUTION INTRA-ARTICULAR; INTRALESIONAL; INTRAMUSCULAR; INTRAVENOUS; SOFT TISSUE at 12:14

## 2022-03-24 RX ADMIN — ACETAMINOPHEN 1000 MG: 500 TABLET ORAL at 09:07

## 2022-03-24 RX ADMIN — ENOXAPARIN SODIUM 30 MG: 30 INJECTION SUBCUTANEOUS at 05:34

## 2022-03-24 RX ADMIN — SUGAMMADEX 200 MG: 100 INJECTION, SOLUTION INTRAVENOUS at 12:24

## 2022-03-24 RX ADMIN — SACUBITRIL AND VALSARTAN 1 TABLET: 24; 26 TABLET, FILM COATED ORAL at 05:35

## 2022-03-24 RX ADMIN — FENTANYL CITRATE 25 MCG: 50 INJECTION, SOLUTION INTRAMUSCULAR; INTRAVENOUS at 13:25

## 2022-03-24 ASSESSMENT — PAIN DESCRIPTION - PAIN TYPE
TYPE: SURGICAL PAIN
TYPE: ACUTE PAIN
TYPE: SURGICAL PAIN

## 2022-03-24 ASSESSMENT — ENCOUNTER SYMPTOMS
FOCAL WEAKNESS: 0
DIARRHEA: 0
VOMITING: 0
WEAKNESS: 0
PSYCHIATRIC NEGATIVE: 1
PALPITATIONS: 0
HEADACHES: 0
COUGH: 0
ABDOMINAL PAIN: 0
ROS GI COMMENTS: LAST BM 3/24
BLURRED VISION: 0
FEVER: 0
CHILLS: 0
SHORTNESS OF BREATH: 0
SORE THROAT: 0
WHEEZING: 0
MYALGIAS: 0
DIZZINESS: 0
SENSORY CHANGE: 0
NAUSEA: 0
NECK PAIN: 0
SPEECH CHANGE: 0
BACK PAIN: 0
HEARTBURN: 0

## 2022-03-24 ASSESSMENT — PAIN SCALES - GENERAL: PAIN_LEVEL: 2

## 2022-03-24 NOTE — PROGRESS NOTES
Bedside report received from day shift nurse.  Pt A&Ox4  Tolerating CHO diet, NPO @ 0000, denies n/v. Hypoactive bowel sounds, passing flatus, LBM 3/23/22. IV access through 18G RAC that is SL.  Entire backside red and rashy. Bilateral LE edema, trace.   Saturating >90% on 2L NC.  Pt ambulates MAX.  Pain is controlled through medication orders. Updated on plan of care. Safety education provided. Bed locked in low. Call light within reach. Rounding in place.

## 2022-03-24 NOTE — PROGRESS NOTES
Geriatric Medicine Daily Progress Note      Date of Service  3/23/2022    Chief Complaint  81 y.o. male admitted 3/18/2022 with trauma from a ground-level fall.    Hospital Course  Admitted with fall, with fractured left ribs, left greater trochanter fracture, left renal hematoma, left hip hematoma.  Trauma surgery admitted the patient, Orthopedic surgery was also consulted on the case.  He requires further evaluation of the left greater trochanter fracture with an MRI of the hip, however he has an ICD in place and will require the [employer] technician. Patient states he fell down while playing golf as he was walking up a 30 degree incline. Patient admits to 4 episodes of fall over the past year.  2 or 3 years ago he injured his left shoulder and he had a left renal hematoma when he fell down.  He denies using any assistive device, he used to need them but with therapy he was able to stop using them.  Admits to some dizziness or lightheadedness when he gets up too quickly, this is also the reason why he takes most of his medications at night.  He has known history of CHF, on in reviewing his medication he is on Coreg, Losartan, and Entresto.  On his recent visit with Cardiology, his diuretics were changed to Demadex due to increased fluid retention and weight gain.  He was also given a fluid restriction of 1.8 L/day.  He has known history of persistent atrial fibrillation, he is anticoagulation with Eliquis.  Also known history of CAD with history of PCI, he is on aspirin for antiplatelet treatment.  For his diabetes he takes glimepiride and again he takes this at night.  Most recent hemoglobin A1c was only 6.4.  He denies any memory impairment, or vision or hearing impairments.  He has known history of BPH, but denies any urinary incontinence.    Geriatric medicine was consulted for fall and medication management.    Interval Problem Update  3/23/2022  Vitals reviewed; afebrile.  The rest of the vitals within  normal parameters except BP a bit soft. Still on 2-3L O2.   Pain scale reported - 0-3 in chart but at bedside; overall appeared better control  Blood glucose in last 24hrs - around mid 100s   I/O - decent UO, tolerating IP diet per chart but report poor at bedside, last BM 3/23    At bedside, frustrated with delayed in getting MRI.     Left hip fracture - MRI of left hip done; definitely ortho intervention planned for 3/24  Rib fracture - overall better pain controlled; continue with IS   HTN - sbp soft   CHF - appears euvolemic; continue to encourage oral intake; DC fluid restriction   CKD - noted a continued jump in Cr; concern about over-diuresis. Hold diuretics and monitor  Left Hand pain and swelling -states he fell on his left hand to observe the fall, hand x-ray appears to be negative, wrist x-ray done was without acute trauma     Consultants/Specialty  Trauma surgery  Orthopedic surgery    Code Status  Full code    Disposition  Probable home health    Review of Systems  Review of Systems   Constitutional: Negative for chills, diaphoresis, fever and malaise/fatigue.   HENT: Negative for congestion, hearing loss and sore throat.    Eyes: Negative for blurred vision.   Respiratory: Negative for cough, shortness of breath and wheezing.    Cardiovascular: Positive for chest pain and leg swelling. Negative for palpitations.   Gastrointestinal: Negative for abdominal pain, diarrhea, heartburn, nausea and vomiting.   Genitourinary: Negative for dysuria, flank pain and hematuria.   Musculoskeletal: Positive for joint pain. Negative for back pain, myalgias and neck pain.   Skin: Negative for rash.   Neurological: Negative for dizziness, sensory change, speech change, focal weakness, weakness and headaches.   Psychiatric/Behavioral: The patient is not nervous/anxious.         Physical Exam  Temp:  [36.2 °C (97.2 °F)-37 °C (98.6 °F)] 37 °C (98.6 °F)  Pulse:  [64-83] 73  Resp:  [18-20] 20  BP: ()/(47-61)  100/60  SpO2:  [92 %-94 %] 92 %    Physical Exam  Vitals and nursing note reviewed.   Constitutional:       Appearance: He is obese.   HENT:      Head: Normocephalic and atraumatic.      Nose: No congestion.      Mouth/Throat:      Mouth: Mucous membranes are moist.   Eyes:      Extraocular Movements: Extraocular movements intact.      Conjunctiva/sclera: Conjunctivae normal.   Cardiovascular:      Rate and Rhythm: Normal rate and regular rhythm.   Pulmonary:      Effort: Pulmonary effort is normal.      Breath sounds: Decreased air movement present.   Chest:      Chest wall: Tenderness present.   Abdominal:      General: There is no distension.      Tenderness: There is no abdominal tenderness. There is no guarding or rebound.   Musculoskeletal:         General: Swelling (left hand) present.      Cervical back: No tenderness.      Right lower leg: Edema present.      Left lower leg: Edema present.   Skin:     Comments: Stasis changes both legs   Neurological:      General: No focal deficit present.      Mental Status: He is alert and oriented to person, place, and time.      Cranial Nerves: No cranial nerve deficit.         Fluids    Intake/Output Summary (Last 24 hours) at 3/23/2022 1926  Last data filed at 3/23/2022 1300  Gross per 24 hour   Intake 220 ml   Output 570 ml   Net -350 ml       Laboratory  Recent Labs     03/21/22  0524 03/22/22  0426 03/23/22  0321   WBC 5.0 4.9 5.0   RBC 5.40 5.32 5.46   HEMOGLOBIN 16.4 16.1 16.5   HEMATOCRIT 52.0 50.7 51.4   MCV 96.3 95.3 94.1   MCH 30.4 30.3 30.2   MCHC 31.5* 31.8* 32.1*   RDW 50.4* 49.8 49.3   PLATELETCT 108* 123* 124*   MPV 10.2 10.3 10.4     Recent Labs     03/21/22  0524 03/22/22  0426 03/23/22  0321   SODIUM 138 138 137   POTASSIUM 4.6 4.4 4.8   CHLORIDE 102 102 100   CO2 28 26 26   GLUCOSE 150* 118* 122*   BUN 42* 47* 60*   CREATININE 1.98* 2.16* 2.62*   CALCIUM 8.6 8.1* 8.3*                   Imaging  DX-CHEST-PORTABLE (1 VIEW)   Final Result      No  significant interval change.      MR-HIP-W/O LEFT   Final Result         1. Redemonstration of acute mildly displaced fracture of the left greater trochanter with extension to the proximal femur. No involvement of the lesser trochanter. No fracture seen in the acetabulum.      2. High-grade tear and strain of the left gluteus medius tendon and muscle with intramuscular hematoma.      3. Mild strain of the adductor muscles.      DX-CHEST-PORTABLE (1 VIEW)   Final Result      Stable chest with cardiac silhouette enlargement and interstitial edema      DX-WRIST-COMPLETE 3+ LEFT   Final Result      1.  No radiographic evidence of acute traumatic injury.   2.  Degenerative changes of the basal joints of the thumb.   3.  Osteopenia.      DX-HAND 3+ LEFT   Final Result         1.  No radiographic evidence of acute injury.      2. Mild osteoarthritic changes are noted at the first carpometacarpal joint.      DX-CHEST-PORTABLE (1 VIEW)   Final Result      Stable examination.      US-TRAUMA VEIN SCREEN LOWER BILAT EXTREMITY   Final Result      DX-CHEST-PORTABLE (1 VIEW)   Final Result      No significant interval change.      DX-CHEST-PORTABLE (1 VIEW)   Final Result         1. No significant interval change.      CT-HEAD W/O   Final Result         1. No acute intracranial abnormality. No evidence of acute intracranial hemorrhage or mass lesion.                     CT-CSPINE WITHOUT PLUS RECONS   Final Result         1. No acute fracture from C1 through T1 is visualized.         US-ABORTED US PROCEDURE    (Results Pending)        Assessment/Plan  * Fracture of ribs, three, closed, left, initial encounter- (present on admission)  Assessment & Plan  Pain control   Encourage I-S    Left hand pain- (present on admission)  Assessment & Plan  Sprain?  Hand xray negative  Wrist xray negative  Supportive care    BPH (benign prostatic hyperplasia)- (present on admission)  Assessment & Plan  Flomax  Monitor for urinary  retention    Hormone replacement therapy- (present on admission)  Assessment & Plan  Hold Testosterone, Clomiphene, Anastrazole    Chronic respiratory failure with hypoxia (HCC)- (present on admission)  Assessment & Plan  Requires O2 supplementation with CPAP  RT protocol    Persistent atrial fibrillation (HCC)- (present on admission)  Assessment & Plan  Coreg  Resume Eliquis when cleared by Trauma surgery    Hip hematoma, left, initial encounter- (present on admission)  Assessment & Plan  pain control  Follow hgb    JAMES (obstructive sleep apnea)- (present on admission)  Assessment & Plan  CPAP, RT protocol    Essential hypertension- (present on admission)  Assessment & Plan  Coreg  Resume Entresto    Renal hematoma, left, initial encounter- (present on admission)  Assessment & Plan  Follow hemoglobin    ICD (implantable cardioverter-defibrillator) in place- (present on admission)  Assessment & Plan  [employer] tech for MRI     S/P TAVR (transcatheter aortic valve replacement)- (present on admission)  Assessment & Plan  monitor fluid status  Echocardiogram - known TAVR aortic valve that is functioning normally with normal transvalvular gradients   2/15/2022    CHF (congestive heart failure), NYHA class II, chronic, systolic (HCC)- (present on admission)  Assessment & Plan  Coreg, Losartan, Demadex  Resume Entresto   Fluid restriction 1.8 L/day  Monitor fluid status closely  Echocardiogram - EF 55%   2/15/2022    Coronary artery disease due to calcified coronary lesion- (present on admission)  Assessment & Plan  History PCI of the LAD/distal left main and the distal RCA  Coreg, Zetia  Intolerant to statins  Resume ASA when cleared by Trauma Surgery    Stage 3b chronic kidney disease (HCC)- (present on admission)  Assessment & Plan  Follow bmp closely  Monitor I/O    3/23: noted continued rise in Cr; concerns about possible over-diuresis. Holding torsemide    Frequent falls- (present on admission)  Assessment &  Plan  PT and OT evaluations  Check Orthostatics    Disp fx of greater trochanter of left femur, init ([facility])- (present on admission)  Assessment & Plan  Pain control  For MRI of the hip  Orthopedic surgery following    Dyslipidemia- (present on admission)  Assessment & Plan  Zetia  Intolerant to statins    Type 2 diabetes mellitus with hyperglycemia, without long-term current use of insulin ([facility])- (present on admission)  Assessment & Plan  SSI for now  Hold Glimepiride  hgba1c - 6.4   3/15/2022  May consider trial of holding glimepiride on discharge        VTE prophylaxis: Lovenox

## 2022-03-24 NOTE — PROGRESS NOTES
4 Eyes Skin Assessment Completed by Rodriguez RN and MARY Vergara.    Head WDL  Ears WDL  Nose WDL  Mouth WDL  Neck WDL  Breast/Chest Redness and Blanching  Shoulder Blades Redness and Blanching  Spine Redness and Blanching  (R) Arm/Elbow/Hand WDL  (L) Arm/Elbow/Hand WDL  Abdomen WDL  Groin Redness and Blanching  Scrotum/Coccyx/Buttocks Redness and Blanching  (R) Leg Scab/ Dry Flaky, Dusky, Red W Blanching   (L) Leg Incision Skin Tear Noted to Lateral Left Knee  (R) Heel/Foot/Toe WDL  (L) Heel/Foot/Toe WDL          Devices In Places Blood Pressure Cuff, Pulse Ox, SCD's and Nasal Cannula      Interventions In Place Low Air Loss Mattress/Grey Ear Foam     Possible Skin Injury No    Pictures Uploaded Into Epic N/A  Wound Consult Placed N/A  RN Wound Prevention Protocol Ordered No

## 2022-03-24 NOTE — ANESTHESIA POSTPROCEDURE EVALUATION
Patient: LIAN More III    Procedure Summary     Date: 03/24/22 Room / Location: [place] [address] / SURGERY [facility]    Anesthesia Start: 1141 Anesthesia Stop: 1237    Procedure: INSERTION, INTRAMEDULLARY LILLI, FEMUR, PROXIMAL - SHORT, FEMORAL (Left Leg Upper) Diagnosis: (Left intertrochanteric femur fracture)    Surgeons: Zen Srivastava M.D. Responsible Provider: Dougie Harris M.D.    Anesthesia Type: general, peripheral nerve block ASA Status: 3          Final Anesthesia Type: general, peripheral nerve block  Last vitals  BP   Blood Pressure : (!) 94/51, NIBP: 138/89    Temp   36.1 °C (97 °F)    Pulse   75   Resp   18    SpO2   94 %      Anesthesia Post Evaluation    Patient location during evaluation: PACU  Patient participation: complete - patient participated  Level of consciousness: awake and alert  Pain score: 2    Airway patency: patent  Anesthetic complications: no  Cardiovascular status: hemodynamically stable  Respiratory status: acceptable  Hydration status: euvolemic    PONV: none          No complications documented.     Nurse Pain Score: 0 (NPRS)

## 2022-03-24 NOTE — PROGRESS NOTES
This RN informed by physical therapy that patient has a generalized rash to body. Per patient, rash is itchy. Pharmacist contacted by this RN to review medications for potential cause of rash. Pharmacist recommends adding benadryl and changing oxycodone to Norco.     Dr. Grimm updated regarding rash and recommendations by pharmacist. Per Dr. Grimm, he will speak to pharmacist.

## 2022-03-24 NOTE — PROGRESS NOTES
Assumed care of patient at 0645. Bedside report received. Assessment complete.  AA&Ox4. Denies CP/SOB.  Reporting 7/10 pain. Medicated per MAR.   Educated patient regarding pharmacologic and non pharmacologic modalities for pain management.  Skin per flowsheets  NPO at this time pending procedure. Denies N/V.  + void. + BM. Last BM 3/24  Pt ambulates LLE WB as Tolerated   All needs met at this time. Call light within reach. Pt calls appropriately. Bed low and locked, non skid socks in place. Hourly rounding in place.

## 2022-03-24 NOTE — ANESTHESIA TIME REPORT
Anesthesia Start and Stop Event Times     Date Time Event    3/24/2022 1137 Ready for Procedure     1141 Anesthesia Start     1237 Anesthesia Stop        Responsible Staff  03/24/22    Name Role Begin End    Dougie Harris M.D. Anesth 1141 1237        Preop Diagnosis (Free Text):  Pre-op Diagnosis     Left intertrochanteric femur fracture        Preop Diagnosis (Codes):    Premium Reason  Non-Premium    Comments:

## 2022-03-24 NOTE — OP REPORT
DATE OF OPERATION: 3/24/2022     PREOPERATIVE DIAGNOSIS: Left intertrochanteric femur fracture    POSTOPERATIVE DIAGNOSIS: Same    PROCEDURE PERFORMED: Surgical treatment of left intertrochanteric femur fracture with intramedullary device    SURGEON: Zen Srivastava M.D.     ASSISTANT: Vasiliy Moncada PA-C    ANESTHESIOLOGIST: MD Caroline    ANESTHESIA: General    ESTIMATED BLOOD LOSS: 10 mL    INDICATIONS: The patient is a 81 y.o. male with a left intertrochanteric femur fracture resulting from a ground level fall.  The patient denies antecedent pain, and was found to have a normal neurovascular exam and skin envelope.  Radiographs reviewed by myself demonstrated the intertrochanteric femur fracture.  Given these findings, surgical treatment of the intertrochanteric fracture with an intramedullary device was indicated.  I discussed the risks and benefits of the procedure, including the risks of infection, wound healing complication, neurovascular injury, persistent hip pain, malunion, non-union, malrotation, and the medical risks of anesthesia including DVT, PE, MI, stroke, and death.  Benefits include early mobilization, improved chance of union, and reduction in the medical risks of hip fractures.  Alternatives to surgery were also discussed, including non-operative management.  The patient signed the informed consent and the operative extremity was marked.      PROCEDURE:  The patient underwent anesthesia, and was positioned supine on the fracture table and all bony prominences were well padded.  Preoperative antibiotics were administered. Sequential compression devices were employed.  Preoperative imaging was obtained, demonstrated reduction of the fracture using the table. The correct operative site was prepped and draped into a sterile field. A procedural pause was conducted to verify correct patient, correct extremity, presence of the surgeons initials on the operative extremity.    The guide pin for the  OIC hip nail was placed into the tip of the greater trochanter and advanced under fluoroscopic guidance to the appropriate position. An incision was made around the pin, and the entry reamer was then used to gain access to the femoral canal.  The guide pin was then removed.    A long ball-tip guide wire was advanced down the femur to the knee, its position confirmed on fluoroscopy.  We then measured for, and selected a 180 x 11 x 127.5 OIC hip nail.  We then sequentially reamed to a size 12.5 mm reamer, and advanced the nail over the guide pin to an appropriate depth, confirmed by fluoroscopy.    The soft tissue sleeve for the lag screw was then advanced down to bone through a lateral thigh incision.  The guide pin was advanced to a central position within the femoral neck/head, confirmed by fluoroscopy.  We measured our lag screw and reamed for our lag screw.  We then placed the lag screw by hand.  The fracture was compressed, and the set screw was locked proximally.  All instruments were removed from the proximal femur, and final fluoroscopic images obtained.    We then obtained perfect [place] imaging distally, and placed one statically interlocked distal screw, obtaining good bicortical purchase.    All wounds were irrigated  with normal saline, and closed in layers, with 2-0 Vicryl in the subcutaneous tissue, and staples in the skin.  Sterile dressings were applied.       The patient tolerated the procedure well. There were no apparent complications. All sponge, needle, and instrument counts were correct on two separate occasions. He was awakened, extubated, and transferred to the recovery room in satisfactory condition.     The use of Vasiliy Moncada as a surgical assistant was necessary for assistance with exposure, retraction, fracture reduction, instrumentation, and closure.    Post-Operative Plan:    1.  The patient should bear weight as tolerated on their operative extremity.  Gait aids (crutch or crutches,  cane, walker) may be used as needed, and may be discontinued when no longer required.  2.  IV antibiotics - may be continued for 24 hours, but are not required.  3.  DVT prophylaxis - SCD's and Lovenox 40 mg SQ daily while inpatient.  The patient may transition to Aspirin 325 mg PO BID as an outpatient  4.  Discharge planning  ____________________________________   Zen Srivastava M.D.   DD: 3/24/2022  12:17 PM

## 2022-03-24 NOTE — PROGRESS NOTES
4 Eyes Skin Assessment Completed by Rodriguez RN and MARY Chahal.     Head WDL  Ears Redness and Blanching  Nose WDL  Mouth WDL  Neck WDL  Breast/Chest WDL  Shoulder Blades Redness and Blanching  Spine Redness and Blanching  (R) Arm/Elbow/Hand WDL  (L) Arm/Elbow/Hand WDL  Abdomen WDL  Groin WDL  Scrotum/Coccyx/Buttocks Redness and Blanching  (R) Leg Edema  (L) Leg Edema  (R) Heel/Foot/Toe Redness and Blanching   (L) Heel/Foot/Toe Redness and Blanching               Devices In Places Pulse Ox and Nasal Cannula        Interventions In Place Gray Ear Foams, Sacral Mepilex, Pillows, Heels Loaded W/Pillows and Low Air Loss Mattress      Possible Skin Injury No     Pictures Uploaded Into Epic N/A  Wound Consult Placed N/A  RN Wound Prevention Protocol Ordered No

## 2022-03-24 NOTE — CARE PLAN
Problem: Knowledge Deficit - Standard  Goal: Patient and family/care givers will demonstrate understanding of plan of care, disease process/condition, diagnostic tests and medications  Outcome: Progressing   Pt educated on plan of care this shift   The patient is Stable - Low risk of patient condition declining or worsening    Shift Goals  Clinical Goals: Surgery, Improvement in Ambulation  Patient Goals: Pain Control  Family Goals: N/A    Progress made toward(s) clinical / shift goals:  Surgery performed today, Pt educated on diet/ ambulation importance this shift     Patient is not progressing towards the following goals:

## 2022-03-24 NOTE — ANESTHESIA PROCEDURE NOTES
Peripheral Block    Date/Time: 3/24/2022 11:28 AM  Performed by: Dougie aHrris M.D.  Authorized by: Dougie Harris M.D.     Patient Location:  Pre-op  Start Time:  3/24/2022 11:28 AM  End Time:  3/24/2022 12:30 PM  Reason for Block: at surgeon's request and post-op pain management ONLY    patient identified, IV checked, site marked, risks and benefits discussed, surgical consent, monitors and equipment checked, pre-op evaluation and timeout performed    Patient Position:  Supine  Prep: ChloraPrep    Monitoring:  Heart rate, continuous pulse ox and cardiac monitor  Block Region:  Lower Extremity  Lower Extremity - Block Type:  FEMORAL nerve block, Infra-Inguinal approach    Laterality:  Left  Procedures: ultrasound guided  Image captured, interpreted and electronically stored.  Local Infiltration:  Lidocaine  Strength:  1 %  Dose:  3 ml  Block Type:  Single-shot  Needle Length:  100mm  Needle Gauge:  21 G  Needle Localization:  Ultrasound guidance  Injection Assessment:  Negative aspiration for heme, no paresthesia on injection, incremental injection and local visualized surrounding nerve on ultrasound  Evidence of intravascular injection: No

## 2022-03-24 NOTE — ANESTHESIA PROCEDURE NOTES
Airway    Date/Time: 3/24/2022 11:45 AM  Performed by: Dougie Harris M.D.  Authorized by: Dougie Harris M.D.     Location:  OR  Urgency:  Elective  Indications for Airway Management:  Anesthesia      Spontaneous Ventilation: absent    Sedation Level:  Deep  Preoxygenated: Yes    Patient Position:  Sniffing  Mask Difficulty Assessment:  1 - vent by mask  Final Airway Type:  Endotracheal airway  Final Endotracheal Airway:  ETT  Cuffed: Yes    Technique Used for Successful ETT Placement:  Direct laryngoscopy    Insertion Site:  Oral  Blade Type:  Glide  Laryngoscope Blade/Videolaryngoscope Blade Size:  4  ETT Size (mm):  7.5  Measured from:  Lips  ETT to Lips (cm):  23  Placement Verified by: auscultation and capnometry    Cormack-Lehane Classification:  Grade I - full view of glottis  Number of Attempts at Approach:  1

## 2022-03-24 NOTE — ANESTHESIA PREPROCEDURE EVALUATION
Case: [id number] Date/Time: 03/24/22 1045    Procedure: INSERTION, INTRAMEDULLARY LILLI, FEMUR, PROXIMAL - SHORT, FEMORAL (Left )    [facility]    Surgeons: Zen Srivastava M.D.      80 yo w h/o CHF, CKD,DM, TAVR  Multiple medical problems  LEFT FEMUR FX  LEFT RIB FRACTURES    P Med Hx:  Ulcer  Cataract  Hiatus hernia syndrome  Hypertension  Heart murmur  Heart valve disease  Pneumonia  Hyperlipidemia  Congestive heart failure (HCC)  CAD (coronary artery disease)  Coronary heart disease  Fatigue  Hearing difficulty  Ringing in ears  Rhinitis  Chest tightness  Palpitations  Swelling of lower extremity  Difficulty breathing  Cough  Daytime sleepiness  Constipation  Chickenpox  Mumps  Tonsillitis  Severe aortic stenosis  Dilated cardiomyopathy (HCC)  Diabetes  Pacemaker  GERD (gastroesophageal reflux disease)  Sleep apnea  Tremor  Kidney disease  Persistent atrial fibrillation (HCC)  Arthritis  Urinary incontinence  History of BPH  Bowel habit changes  History of chronic cough  Breath shortness  Oxygen dependent  Pain  Chest pain  Peripheral neuropathy  CHF (congestive heart failure), NYHA class II, chronic, systolic ([facility])      Relevant Problems   ANESTHESIA   (positive) JAMES (obstructive sleep apnea)      CARDIAC   (positive) Coronary artery disease due to calcified coronary lesion   (positive) Essential hypertension   (positive) Longstanding persistent atrial fibrillation (HCC)   (positive) Persistent atrial fibrillation (HCC)      GI   (positive) Gastroesophageal reflux disease without esophagitis         (positive) Renal hematoma, left, initial encounter   (positive) Stage 3b chronic kidney disease (HCC)      ENDO   (positive) Type 2 diabetes mellitus with hyperglycemia, without long-term current use of insulin (HCC)      Other   (positive) Arthritis of neck       Physical Exam    Airway   Mallampati: II  TM distance: >3 FB  Neck ROM: full       Cardiovascular - normal exam  Rhythm:  regular  Rate: normal  (-) murmur     Dental - normal exam           Pulmonary - normal exam  Breath sounds clear to auscultation     Abdominal    Neurological - normal exam                 Anesthesia Plan    ASA 3       Plan - general and peripheral nerve block     Peripheral nerve block will be post-op pain control  Airway plan will be ETT          Induction: intravenous    Postoperative Plan: Postoperative administration of opioids is intended.    Pertinent diagnostic labs and testing reviewed    Informed Consent:    Anesthetic plan and risks discussed with patient.    Use of blood products discussed with: patient whom consented to blood products.

## 2022-03-25 ENCOUNTER — APPOINTMENT (OUTPATIENT)
Dept: RADIOLOGY | Facility: MEDICAL CENTER | Age: 82
DRG: 956 | End: 2022-03-25
Attending: SURGERY
Payer: MEDICARE

## 2022-03-25 LAB
ALBUMIN SERPL BCP-MCNC: 3.2 G/DL (ref 3.2–4.9)
ALBUMIN SERPL BCP-MCNC: 3.3 G/DL (ref 3.2–4.9)
ALBUMIN/GLOB SERPL: 1.3 G/DL
ALBUMIN/GLOB SERPL: 1.3 G/DL
ALP SERPL-CCNC: 51 U/L (ref 30–99)
ALP SERPL-CCNC: 54 U/L (ref 30–99)
ALT SERPL-CCNC: 59 U/L (ref 2–50)
ALT SERPL-CCNC: 72 U/L (ref 2–50)
ANION GAP SERPL CALC-SCNC: 10 MMOL/L (ref 7–16)
ANION GAP SERPL CALC-SCNC: 11 MMOL/L (ref 7–16)
AST SERPL-CCNC: 55 U/L (ref 12–45)
AST SERPL-CCNC: 69 U/L (ref 12–45)
BASOPHILS # BLD AUTO: 0.3 % (ref 0–1.8)
BASOPHILS # BLD: 0.02 K/UL (ref 0–0.12)
BILIRUB SERPL-MCNC: 0.7 MG/DL (ref 0.1–1.5)
BILIRUB SERPL-MCNC: 0.7 MG/DL (ref 0.1–1.5)
BUN SERPL-MCNC: 72 MG/DL (ref 8–22)
BUN SERPL-MCNC: 75 MG/DL (ref 8–22)
CALCIUM SERPL-MCNC: 8.3 MG/DL (ref 8.5–10.5)
CALCIUM SERPL-MCNC: 8.4 MG/DL (ref 8.5–10.5)
CHLORIDE SERPL-SCNC: 101 MMOL/L (ref 96–112)
CHLORIDE SERPL-SCNC: 99 MMOL/L (ref 96–112)
CO2 SERPL-SCNC: 24 MMOL/L (ref 20–33)
CO2 SERPL-SCNC: 25 MMOL/L (ref 20–33)
CREAT SERPL-MCNC: 2.38 MG/DL (ref 0.5–1.4)
CREAT SERPL-MCNC: 2.54 MG/DL (ref 0.5–1.4)
EKG IMPRESSION: NORMAL
EOSINOPHIL # BLD AUTO: 0.02 K/UL (ref 0–0.51)
EOSINOPHIL NFR BLD: 0.3 % (ref 0–6.9)
ERYTHROCYTE [DISTWIDTH] IN BLOOD BY AUTOMATED COUNT: 48.3 FL (ref 35.9–50)
GFR SERPLBLD CREATININE-BSD FMLA CKD-EPI: 25 ML/MIN/1.73 M 2
GFR SERPLBLD CREATININE-BSD FMLA CKD-EPI: 27 ML/MIN/1.73 M 2
GLOBULIN SER CALC-MCNC: 2.5 G/DL (ref 1.9–3.5)
GLOBULIN SER CALC-MCNC: 2.6 G/DL (ref 1.9–3.5)
GLUCOSE BLD STRIP.AUTO-MCNC: 147 MG/DL (ref 65–99)
GLUCOSE BLD STRIP.AUTO-MCNC: 148 MG/DL (ref 65–99)
GLUCOSE BLD STRIP.AUTO-MCNC: 153 MG/DL (ref 65–99)
GLUCOSE BLD STRIP.AUTO-MCNC: 162 MG/DL (ref 65–99)
GLUCOSE BLD STRIP.AUTO-MCNC: 164 MG/DL (ref 65–99)
GLUCOSE BLD STRIP.AUTO-MCNC: 166 MG/DL (ref 65–99)
GLUCOSE BLD STRIP.AUTO-MCNC: 201 MG/DL (ref 65–99)
GLUCOSE BLD STRIP.AUTO-MCNC: 246 MG/DL (ref 65–99)
GLUCOSE SERPL-MCNC: 172 MG/DL (ref 65–99)
GLUCOSE SERPL-MCNC: 190 MG/DL (ref 65–99)
HCT VFR BLD AUTO: 49.3 % (ref 42–52)
HGB BLD-MCNC: 15.8 G/DL (ref 14–18)
IMM GRANULOCYTES # BLD AUTO: 0.06 K/UL (ref 0–0.11)
IMM GRANULOCYTES NFR BLD AUTO: 0.8 % (ref 0–0.9)
LYMPHOCYTES # BLD AUTO: 0.81 K/UL (ref 1–4.8)
LYMPHOCYTES NFR BLD: 10.8 % (ref 22–41)
MAGNESIUM SERPL-MCNC: 2.9 MG/DL (ref 1.5–2.5)
MCH RBC QN AUTO: 30.4 PG (ref 27–33)
MCHC RBC AUTO-ENTMCNC: 32 G/DL (ref 33.7–35.3)
MCV RBC AUTO: 95 FL (ref 81.4–97.8)
MONOCYTES # BLD AUTO: 0.65 K/UL (ref 0–0.85)
MONOCYTES NFR BLD AUTO: 8.7 % (ref 0–13.4)
NEUTROPHILS # BLD AUTO: 5.93 K/UL (ref 1.82–7.42)
NEUTROPHILS NFR BLD: 79.1 % (ref 44–72)
NRBC # BLD AUTO: 0 K/UL
NRBC BLD-RTO: 0 /100 WBC
PLATELET # BLD AUTO: 147 K/UL (ref 164–446)
PMV BLD AUTO: 10.5 FL (ref 9–12.9)
POTASSIUM SERPL-SCNC: 5.1 MMOL/L (ref 3.6–5.5)
POTASSIUM SERPL-SCNC: 5.8 MMOL/L (ref 3.6–5.5)
PROT SERPL-MCNC: 5.7 G/DL (ref 6–8.2)
PROT SERPL-MCNC: 5.9 G/DL (ref 6–8.2)
RBC # BLD AUTO: 5.19 M/UL (ref 4.7–6.1)
SODIUM SERPL-SCNC: 134 MMOL/L (ref 135–145)
SODIUM SERPL-SCNC: 136 MMOL/L (ref 135–145)
WBC # BLD AUTO: 7.5 K/UL (ref 4.8–10.8)

## 2022-03-25 PROCEDURE — A9270 NON-COVERED ITEM OR SERVICE: HCPCS

## 2022-03-25 PROCEDURE — 700102 HCHG RX REV CODE 250 W/ 637 OVERRIDE(OP)

## 2022-03-25 PROCEDURE — 302196 LINEN, HYPOALLERGENIC: Performed by: SURGERY

## 2022-03-25 PROCEDURE — 97530 THERAPEUTIC ACTIVITIES: CPT

## 2022-03-25 PROCEDURE — 82962 GLUCOSE BLOOD TEST: CPT | Mod: 91

## 2022-03-25 PROCEDURE — 700102 HCHG RX REV CODE 250 W/ 637 OVERRIDE(OP): Performed by: STUDENT IN AN ORGANIZED HEALTH CARE EDUCATION/TRAINING PROGRAM

## 2022-03-25 PROCEDURE — 700102 HCHG RX REV CODE 250 W/ 637 OVERRIDE(OP): Performed by: FAMILY MEDICINE

## 2022-03-25 PROCEDURE — 93005 ELECTROCARDIOGRAM TRACING: CPT | Performed by: STUDENT IN AN ORGANIZED HEALTH CARE EDUCATION/TRAINING PROGRAM

## 2022-03-25 PROCEDURE — 700111 HCHG RX REV CODE 636 W/ 250 OVERRIDE (IP): Performed by: ORTHOPAEDIC SURGERY

## 2022-03-25 PROCEDURE — 700111 HCHG RX REV CODE 636 W/ 250 OVERRIDE (IP)

## 2022-03-25 PROCEDURE — 27245 TREAT THIGH FRACTURE: CPT | Mod: ASROC,LT | Performed by: PHYSICIAN ASSISTANT

## 2022-03-25 PROCEDURE — 700102 HCHG RX REV CODE 250 W/ 637 OVERRIDE(OP): Performed by: ORTHOPAEDIC SURGERY

## 2022-03-25 PROCEDURE — 85025 COMPLETE CBC W/AUTO DIFF WBC: CPT

## 2022-03-25 PROCEDURE — 700102 HCHG RX REV CODE 250 W/ 637 OVERRIDE(OP): Performed by: SURGERY

## 2022-03-25 PROCEDURE — 80053 COMPREHEN METABOLIC PANEL: CPT | Mod: 91

## 2022-03-25 PROCEDURE — A9270 NON-COVERED ITEM OR SERVICE: HCPCS | Performed by: SURGERY

## 2022-03-25 PROCEDURE — 71045 X-RAY EXAM CHEST 1 VIEW: CPT

## 2022-03-25 PROCEDURE — 99232 SBSQ HOSP IP/OBS MODERATE 35: CPT

## 2022-03-25 PROCEDURE — 99024 POSTOP FOLLOW-UP VISIT: CPT | Performed by: ORTHOPAEDIC SURGERY

## 2022-03-25 PROCEDURE — 83735 ASSAY OF MAGNESIUM: CPT

## 2022-03-25 PROCEDURE — 770001 HCHG ROOM/CARE - MED/SURG/GYN PRIV*

## 2022-03-25 PROCEDURE — A9270 NON-COVERED ITEM OR SERVICE: HCPCS | Performed by: FAMILY MEDICINE

## 2022-03-25 PROCEDURE — 36415 COLL VENOUS BLD VENIPUNCTURE: CPT

## 2022-03-25 PROCEDURE — 700105 HCHG RX REV CODE 258: Performed by: STUDENT IN AN ORGANIZED HEALTH CARE EDUCATION/TRAINING PROGRAM

## 2022-03-25 PROCEDURE — 99232 SBSQ HOSP IP/OBS MODERATE 35: CPT | Performed by: STUDENT IN AN ORGANIZED HEALTH CARE EDUCATION/TRAINING PROGRAM

## 2022-03-25 PROCEDURE — A9270 NON-COVERED ITEM OR SERVICE: HCPCS | Performed by: STUDENT IN AN ORGANIZED HEALTH CARE EDUCATION/TRAINING PROGRAM

## 2022-03-25 PROCEDURE — A9270 NON-COVERED ITEM OR SERVICE: HCPCS | Performed by: ORTHOPAEDIC SURGERY

## 2022-03-25 PROCEDURE — 700101 HCHG RX REV CODE 250: Performed by: FAMILY MEDICINE

## 2022-03-25 PROCEDURE — 93010 ELECTROCARDIOGRAM REPORT: CPT | Performed by: INTERNAL MEDICINE

## 2022-03-25 RX ORDER — LORATADINE 10 MG/1
10 TABLET ORAL ONCE
Status: COMPLETED | OUTPATIENT
Start: 2022-03-25 | End: 2022-03-25

## 2022-03-25 RX ORDER — HEPARIN SODIUM 5000 [USP'U]/ML
5000 INJECTION, SOLUTION INTRAVENOUS; SUBCUTANEOUS EVERY 8 HOURS
Status: DISCONTINUED | OUTPATIENT
Start: 2022-03-25 | End: 2022-03-27

## 2022-03-25 RX ADMIN — OMEPRAZOLE 40 MG: 20 CAPSULE, DELAYED RELEASE ORAL at 17:14

## 2022-03-25 RX ADMIN — ACETAMINOPHEN 650 MG: 325 TABLET, FILM COATED ORAL at 00:47

## 2022-03-25 RX ADMIN — CARVEDILOL 3.12 MG: 6.25 TABLET, FILM COATED ORAL at 08:43

## 2022-03-25 RX ADMIN — ACETAMINOPHEN 650 MG: 325 TABLET, FILM COATED ORAL at 17:14

## 2022-03-25 RX ADMIN — LIDOCAINE 2 PATCH: 50 PATCH TOPICAL at 23:25

## 2022-03-25 RX ADMIN — BENZOCAINE AND MENTHOL 1 LOZENGE: 15; 3.6 LOZENGE ORAL at 00:49

## 2022-03-25 RX ADMIN — SENNOSIDES AND DOCUSATE SODIUM 1 TABLET: 50; 8.6 TABLET ORAL at 20:47

## 2022-03-25 RX ADMIN — INSULIN HUMAN 1 UNITS: 100 INJECTION, SOLUTION PARENTERAL at 06:37

## 2022-03-25 RX ADMIN — FLUTICASONE PROPIONATE 100 MCG: 50 SPRAY, METERED NASAL at 06:37

## 2022-03-25 RX ADMIN — METAXALONE 800 MG: 800 TABLET ORAL at 06:38

## 2022-03-25 RX ADMIN — OMEPRAZOLE 40 MG: 20 CAPSULE, DELAYED RELEASE ORAL at 06:38

## 2022-03-25 RX ADMIN — HEPARIN SODIUM 5000 UNITS: 5000 INJECTION, SOLUTION INTRAVENOUS; SUBCUTANEOUS at 23:24

## 2022-03-25 RX ADMIN — DEXTROSE MONOHYDRATE 25 G: 100 INJECTION, SOLUTION INTRAVENOUS at 08:15

## 2022-03-25 RX ADMIN — EZETIMIBE 10 MG: 10 TABLET ORAL at 06:38

## 2022-03-25 RX ADMIN — DOCUSATE SODIUM 100 MG: 100 CAPSULE, LIQUID FILLED ORAL at 17:14

## 2022-03-25 RX ADMIN — METAXALONE 800 MG: 800 TABLET ORAL at 11:44

## 2022-03-25 RX ADMIN — INSULIN HUMAN 2 UNITS: 100 INJECTION, SOLUTION PARENTERAL at 00:54

## 2022-03-25 RX ADMIN — LORATADINE 10 MG: 10 TABLET ORAL at 20:48

## 2022-03-25 RX ADMIN — OXYCODONE HYDROCHLORIDE 5 MG: 5 TABLET ORAL at 11:44

## 2022-03-25 RX ADMIN — TAMSULOSIN HYDROCHLORIDE 0.4 MG: 0.4 CAPSULE ORAL at 08:31

## 2022-03-25 RX ADMIN — METAXALONE 800 MG: 800 TABLET ORAL at 17:13

## 2022-03-25 RX ADMIN — ENOXAPARIN SODIUM 30 MG: 30 INJECTION SUBCUTANEOUS at 06:37

## 2022-03-25 RX ADMIN — ACETAMINOPHEN 650 MG: 325 TABLET, FILM COATED ORAL at 11:44

## 2022-03-25 RX ADMIN — CARVEDILOL 3.12 MG: 6.25 TABLET, FILM COATED ORAL at 17:14

## 2022-03-25 RX ADMIN — OXYCODONE HYDROCHLORIDE 5 MG: 5 TABLET ORAL at 08:43

## 2022-03-25 RX ADMIN — INSULIN HUMAN 10 UNITS: 100 INJECTION, SOLUTION PARENTERAL at 08:16

## 2022-03-25 RX ADMIN — OXYCODONE HYDROCHLORIDE 10 MG: 5 TABLET ORAL at 17:13

## 2022-03-25 RX ADMIN — ACETAMINOPHEN 650 MG: 325 TABLET, FILM COATED ORAL at 23:23

## 2022-03-25 ASSESSMENT — PAIN DESCRIPTION - PAIN TYPE
TYPE: ACUTE PAIN;SURGICAL PAIN

## 2022-03-25 ASSESSMENT — ENCOUNTER SYMPTOMS
PALPITATIONS: 0
VOMITING: 0
BACK PAIN: 0
NERVOUS/ANXIOUS: 0
WHEEZING: 0
SENSORY CHANGE: 0
CHILLS: 0
SORE THROAT: 0
PSYCHIATRIC NEGATIVE: 1
BLURRED VISION: 0
DIAPHORESIS: 0
FEVER: 0
COUGH: 0
DIARRHEA: 0
NECK PAIN: 0
ABDOMINAL PAIN: 0
SPEECH CHANGE: 0
HEARTBURN: 0
HEADACHES: 0
WEAKNESS: 0
FOCAL WEAKNESS: 0
NAUSEA: 0
ROS GI COMMENTS: LAST BM 3/24
DIZZINESS: 0
SHORTNESS OF BREATH: 0
MYALGIAS: 0
FLANK PAIN: 0

## 2022-03-25 ASSESSMENT — COGNITIVE AND FUNCTIONAL STATUS - GENERAL
STANDING UP FROM CHAIR USING ARMS: A LOT
MOVING TO AND FROM BED TO CHAIR: A LOT
TURNING FROM BACK TO SIDE WHILE IN FLAT BAD: A LOT
CLIMB 3 TO 5 STEPS WITH RAILING: A LOT
MOBILITY SCORE: 12
SUGGESTED CMS G CODE MODIFIER MOBILITY: CL
MOVING FROM LYING ON BACK TO SITTING ON SIDE OF FLAT BED: A LOT
WALKING IN HOSPITAL ROOM: A LOT

## 2022-03-25 ASSESSMENT — GAIT ASSESSMENTS
ASSISTIVE DEVICE: FRONT WHEEL WALKER
DISTANCE (FEET): 5
GAIT LEVEL OF ASSIST: MINIMAL ASSIST
DEVIATION: ANTALGIC

## 2022-03-25 NOTE — PROGRESS NOTES
Pt is A&O 4  3L NC   Pain + pt declines intervention at this time   - nausea  Tolerating a regular diet   Incision + to L leg DIP, CDI  - Drains  + Voids  + flatus  + BM  Up MAX assist   SCD's off  Bed alarm on , pt moderate fall risk per gulshan anderson  Reviewed plan of care with patient, bed in lowest position and locked, pt resting comfortably now, call light within reach, all needs met at this time. Interventions will be executed per plan of care

## 2022-03-25 NOTE — CARE PLAN
The patient is Watcher - Medium risk of patient condition declining or worsening    Shift Goals  Clinical Goals: Pain control, ambulate  Patient Goals: Pain control  Family Goals: N/A    Progress made toward(s) clinical / shift goals:      Problem: Pain - Standard  Goal: Alleviation of pain or a reduction in pain to the patient’s comfort goal  Outcome: Progressing   Pain medications administered PRN, ice packs applied.     Problem: Fall Risk  Goal: Patient will remain free from falls  Outcome: Progressing   Patient has bed alarm on, call light and belongings within reach. Patient free from falls.

## 2022-03-25 NOTE — CONSULTS
Physical Medicine and Rehabilitation Consultation  Brief chart Review           Date of initial consultation: 3/25/2022  Requesting provider: Kristie Lopez DNP   Consulting provider: Melody Leyva D.O.  Reason for consultation: assess for acute inpatient rehab appropriateness  LOS: 7 Day(s)    Chief complaint: L  Femur fracture after ground level fall     HPI: The patient is a 81 y.o. M with PMHx of CAD, DM, A fib on eliquis, and CHF who presented to [facility] on 3/18/22 as a transfer from [facility] after a ground level fall without LOC. Upon evaluation in the ED, patient was found to have L sided hip fractures and L rib fractures, patient was transferred to [facility] for trauma team activation. CT of the hip showed a displaced fracture of the left greater trochanter. Orthopedic surgery was consulted, patient was taken to the OR on 3/24 for surgical treament of the left intertrochanteri femur fracture with IM device; performed by Dr. Cole. Patient is WBAT on LLE. Patient does not have updated therapy notes since having surgery for L femur fracture.    - patient lives alone in a 2SH and cannot stay on the ground floor.   - At this point in time, patient is not a candidate for IRF level therapy due to the fact that he does not demonstrate functional ability to return home after a 10-14 day IRF therapy course.  -  If patient's updated therapy notes show significant improvement in mobility, please reach out to [facility] for repeat consult.   - PM&R to sign off at this time.        Melody Leyva D.O.   Physical Medicine and Rehabilitation     Please note that this dictation was created using voice recognition software. I have made every reasonable attempt to correct obvious errors, but there may be errors of grammar and possibly content that I did not discover before finalizing the note.

## 2022-03-25 NOTE — PROGRESS NOTES
"Patient seen and examined    /51   Pulse 76   Temp 36.3 °C (97.4 °F) (Temporal)   Resp 16   Ht 1.854 m (6' 1\")   Wt 102 kg (224 lb 6.9 oz)   SpO2 93%     Recent Labs     03/23/22  0321 03/24/22  0145 03/25/22  0307   WBC 5.0 5.3 7.5   RBC 5.46 5.49 5.19   HEMOGLOBIN 16.5 16.7 15.8   HEMATOCRIT 51.4 51.8 49.3   MCV 94.1 94.4 95.0   MCH 30.2 30.4 30.4   MCHC 32.1* 32.2* 32.0*   RDW 49.3 49.7 48.3   PLATELETCT 124* 137* 147*   MPV 10.4 10.2 10.5       No acute distress  Dressing clean dry and intact  Neurovascularly intact    POD#1  S/P IMN hip    Plan:  DVT Prophylaxis- TEDS/SCDs, lovenox while in hospital, ASA 81 mg PO BID as outpatient  Weight Bearing Status-WBAT  PT/OT  Antibiotics: 24 hrs post op  Case Coordination          "

## 2022-03-25 NOTE — PROGRESS NOTES
"   Orthopaedic Progress Note    Interval changes:  Patient doing well post op  Dressings CDI  Cleared for DC to SNF by ortho pending medicine clearance    ROS - Patient denies any new issues.  Pain well controlled.    /53   Pulse 63   Temp 36.2 °C (97.1 °F) (Temporal)   Resp 16   Ht 1.854 m (6' 1\")   Wt 102 kg (224 lb 6.9 oz)   SpO2 95%       Patient seen and examined  No acute distress  Breathing non labored  RRR  LLE surgical dressings are clean, dry, and intact. Patient clearly fires tibialis anterior, EHL, and gastrocnemius/soleus. Sensation is intact to light touch throughout superficial peroneal, deep peroneal, tibial, saphenous, and sural nerve distributions. Strong and palpable 2+ dorsalis pedis and posterior tibial pulses with capillary refill less than 2 seconds. No lower leg tenderness or discomfort.       Recent Labs     03/23/22  0321 03/24/22  0145 03/25/22  0307   WBC 5.0 5.3 7.5   RBC 5.46 5.49 5.19   HEMOGLOBIN 16.5 16.7 15.8   HEMATOCRIT 51.4 51.8 49.3   MCV 94.1 94.4 95.0   MCH 30.2 30.4 30.4   MCHC 32.1* 32.2* 32.0*   RDW 49.3 49.7 48.3   PLATELETCT 124* 137* 147*   MPV 10.4 10.2 10.5       Active Hospital Problems    Diagnosis    • Fracture of ribs, three, closed, left, initial encounter [S22.42XA]      Priority: High   • Renal hematoma, left, initial encounter [S37.012A]      Priority: High   • Disp fx of greater trochanter of left femur, init (HCC) [S72.112A]      Priority: High   • Encounter for geriatric assessment [Z01.89]      Priority: Medium   • Seasonal allergies [J30.2]      Priority: Medium   • Antiplatelet or antithrombotic long-term use [Z79.02]      Priority: Medium   • Persistent atrial fibrillation (HCC) [I48.19]      Priority: Medium   • JAMES (obstructive sleep apnea) [G47.33]      Priority: Medium     Patient is on CPAP at home     • Hyperkalemia [E87.5]      Priority: Medium   • Essential hypertension [I10]      Priority: Medium   • S/P TAVR (transcatheter aortic " valve replacement) [Z95.2]      Priority: Medium   • CHF (congestive heart failure), NYHA class II, chronic, systolic ([facility]) [I50.22]      Priority: Medium     managed by cardiology Dr. Wilkinson: He appears euvolemic on physical exam.  He is on losartan, carvedilol, not on spironolactone due to low kidney function.  He was admitted to the hospital for CHF exacerbation, when he suddenly gained 15 pounds.  He was diuresed with IV Lasix at that time, had echocardiogram that showed significant improvement of his cardiac function, when compared to previous study.  Ejection fraction 55%.     • Coronary artery disease due to calcified coronary lesion [I25.10, I25.84]      Priority: Medium   • Stage 3b chronic kidney disease (HCC) [N18.32]      Priority: Medium     Lab Results   Component Value Date/Time    SODIUM 133 (L) 03/01/2022 01:47 PM    POTASSIUM 4.5 03/01/2022 01:47 PM    CHLORIDE 100 03/01/2022 01:47 PM    CO2 21 03/01/2022 01:47 PM    ANION 12.0 03/01/2022 01:47 PM    GLUCOSE 174 (H) 03/01/2022 01:47 PM    BUN 70 (H) 03/01/2022 01:47 PM    CREATININE 2.38 (H) 03/01/2022 01:47 PM    CREATININE 1.18 02/08/2011 12:00 AM    CALCIUM 9.0 03/01/2022 01:47 PM    ASTSGOT 35 02/14/2022 05:36 PM    ALTSGPT 27 02/14/2022 05:36 PM    TBILIRUBIN 0.5 02/14/2022 05:36 PM    ALBUMIN 4.1 02/14/2022 05:36 PM    ALBUMIN 3.94 09/12/2019 11:47 AM    TOTPROTEIN 6.6 02/14/2022 05:36 PM    TOTPROTEIN 6.4 09/12/2019 11:47 AM    GLOBULIN 2.5 02/14/2022 05:36 PM    AGRATIO 1.6 02/14/2022 05:36 PM   Most recent eGFR is 26, patient has seen his nephrology more than a year ago.  Strongly advised patient to make appointment with his nephrologist to discuss further plan.       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 pylori, haji's mucosa indefinite for dysplasia, no evidence of celiac disease  4/22/15 upper GI series postoperatively appears laparoscopic band within normal limits, small sliding hiatal hernia  5/14/15 EGD per [facility] esophageal mucosal changes large amount of food residue in stomach, 5 mucosa low grade dysplasia, intestinal metaplasia, gastric emptying study pending  5/22/15 nuclear medicine gastric emptying study per [facility] evidence of reflux, gastric emptying half-time 118 minutes  1/8/16 EGD per  surgery normal  1/4/17 [facility] note history of reflux, treatment of haji's esophagus low-grade dysplasia, discussed radiofrequency ablation and endoscopic ultrasound  7/24/17 EGD per [facility] biopsies performed, follow up haji's  8/16/18 EGD per [facility] salmon colored mucosa suspicious for short segment haji's, biopsies pathology reactive gastropathy with mucosal iron deposition, negative h.pylori, haji's mucosa, active esophagitis consistent with reflux, no dysplasia or malignancy seen  8/26/19 EGD per [facility] esophageal mucosal changes consistent with short segment haji's, biopsied           • Contraindication to deep vein thrombosis (DVT) prophylaxis [Z53.09]      Priority: Medium     ICD-10 transition     • Dyslipidemia [E78.5]      Priority: Medium     Established diagnosis. Currently taking Zetia, intolerant to statins  Lab Results   Component Value Date/Time    CHOLSTRLTOT 72 (L) 05/19/2021 08:26 AM    TRIGLYCERIDE 102 05/19/2021 08:26 AM    HDL 24 (A) 05/19/2021 08:26 AM    LDL 28 05/19/2021 08:26 AM             • Contusion of left hand [S60.222A]      Priority: Low   • Abnormal CT scan, colon [R93.3]      Priority: Low   • Trauma [T14.90XA]      Priority: Low   • Hip hematoma, left, initial encounter [S70.02XA]      Priority: Low   • Encounter for screening for COVID-19 [Z11.52]      Priority: Low   • ICD (implantable cardioverter-defibrillator) in place [Z95.810]      Priority: Low   • Frequent  falls [R29.6]      Priority: Low   • Obesity [E66.9]      Priority: Low     3/3/16 BMI 33.6  3/10/17 BMI 33.3 sees  bariatric   10/8/18 BMI 32.6     • Type 2 diabetes mellitus with hyperglycemia, without long-term current use of insulin (HCC) [E11.65]      Priority: Low     Is a chronic condition, patient states that he is managed by his wellness doctor Dr. Miner.    He says that his most recent hemoglobin A1c was checked around a year ago and was 6.7%  Patient is taking Amaryl 1 mg tablet once daily.  His last foot exam was today 03/15/2022 by his podiatrist.  Patient has not had retinal screening for a few years now-provided referral, patient to schedule.         • Left hand pain [M79.642]    • Chronic respiratory failure with hypoxia (HCC) [J96.11]    • Hormone replacement therapy [Z79.890]    • BPH (benign prostatic hyperplasia) [N40.0]        Assessment/Plan:  Patient doing well post op  Dressings CDI  Cleared for DC to SNF by ortho pending medicine clearance  POD#1 S/P Surgical treatment of left intertrochanteric femur fracture with intramedullary device  Wt bearing status - WBAT  Wound care/Drains - Dressings to be changed every other day by nursing  Future Procedures - none planned   Sutures/Staples out- 14 days post operatively  PT/OT-initiated  Antibiotics: Perioperative completed  DVT Prophylaxis- TEDS/SCDs/Foot pumps/heparin  Gutierres-none  Case Coordination for Discharge Planning - Disposition SNF

## 2022-03-25 NOTE — PROGRESS NOTES
Trauma / Surgical Daily Progress Note    Date of Service  3/25/2022    Chief Complaint  81 y.o. male admitted 3/18/2022 with left hip injury and left sided rib fractures after a mechanical ground level fall.    Interval Events  No critical events over night.  Rash is improving.  CXR with no significant changes.  Supplemental oxygen, 3 L NC.   Creatinine increasing.    - Physiatry consult pending.  - Stopped Lovenox and NSAIDs. Initiated heparin.   - K+ 5.8. D10% and insulin ordered by Geriatrics.   - Aggressive pulmonary hygiene.   - Out of bed and into a chair for most of the day.  - Wean oxygen.  - Counseled.  - Signing off to Medicine team (3/26/2022).    Review of Systems  Review of Systems   Constitutional: Negative for chills, fever and malaise/fatigue.   HENT: Negative for congestion, hearing loss and sore throat.    Eyes: Negative for blurred vision.   Respiratory: Negative for cough, shortness of breath and wheezing.    Cardiovascular: Positive for chest pain (related to rib fxs) and leg swelling (1+ pitting). Negative for palpitations.   Gastrointestinal: Negative for abdominal pain, diarrhea, heartburn, nausea and vomiting.        Last BM 3/24   Genitourinary: Negative for dysuria.   Musculoskeletal: Positive for joint pain. Negative for back pain, myalgias and neck pain.   Skin: Positive for rash.   Neurological: Negative for dizziness, sensory change, speech change, focal weakness, weakness and headaches.   Psychiatric/Behavioral: Negative.         Vital Signs  Temp:  [36.2 °C (97.1 °F)-37.1 °C (98.7 °F)] 36.2 °C (97.1 °F)  Pulse:  [60-96] 63  Resp:  [16-25] 16  BP: ()/(51-64) 105/53  SpO2:  [93 %-95 %] 95 %    Physical Exam  Physical Exam  Vitals and nursing note reviewed.   Constitutional:       General: He is awake. He is not in acute distress.     Appearance: He is obese. He is not ill-appearing or toxic-appearing.      Interventions: Nasal cannula in place.   HENT:      Head: Normocephalic.       Nose: Nose normal. No congestion.      Mouth/Throat:      Pharynx: Oropharynx is clear.   Eyes:      Pupils: Pupils are equal, round, and reactive to light.   Cardiovascular:      Rate and Rhythm: Normal rate and regular rhythm.      Pulses: Normal pulses.   Pulmonary:      Effort: Pulmonary effort is normal. No tachypnea or respiratory distress.      Breath sounds: Decreased air movement present. Examination of the left-middle field reveals decreased breath sounds. Examination of the left-lower field reveals decreased breath sounds. Decreased breath sounds present.      Comments: 2-3 L NC  Chest:      Chest wall: Tenderness (rib fx) present.   Abdominal:      General: Abdomen is flat. Bowel sounds are normal. There is no distension.      Palpations: Abdomen is soft.      Tenderness: There is no abdominal tenderness. There is no guarding or rebound.   Musculoskeletal:         General: Swelling (left hand improving) and tenderness (Left hip tenderness to palpation and with ROM) present. Normal range of motion.      Cervical back: Normal range of motion. No tenderness.      Right lower le+ Edema present.      Left lower leg: Bony tenderness present. 1+ Edema present.      Comments: Surgical dressing in place left hip   Skin:     General: Skin is warm and dry.      Capillary Refill: Capillary refill takes less than 2 seconds.      Findings: Rash present.      Comments: Stasis changes both legs   Neurological:      General: No focal deficit present.      Mental Status: He is alert and oriented to person, place, and time. Mental status is at baseline.      GCS: GCS eye subscore is 4. GCS verbal subscore is 5. GCS motor subscore is 6.      Sensory: Sensation is intact.      Motor: Motor function is intact.   Psychiatric:         Attention and Perception: Attention normal.         Mood and Affect: Mood normal.         Behavior: Behavior is cooperative.         Laboratory  Recent Results (from the past 24 hour(s))    POCT glucose device results    Collection Time: 03/24/22  1:07 PM   Result Value Ref Range    POC Glucose, Blood 179 (H) 65 - 99 mg/dL   POCT glucose device results    Collection Time: 03/24/22  5:53 PM   Result Value Ref Range    POC Glucose, Blood 194 (H) 65 - 99 mg/dL   POCT glucose device results    Collection Time: 03/25/22 12:54 AM   Result Value Ref Range    POC Glucose, Blood 246 (H) 65 - 99 mg/dL   CBC with Differential: Tomorrow AM    Collection Time: 03/25/22  3:07 AM   Result Value Ref Range    WBC 7.5 4.8 - 10.8 K/uL    RBC 5.19 4.70 - 6.10 M/uL    Hemoglobin 15.8 14.0 - 18.0 g/dL    Hematocrit 49.3 42.0 - 52.0 %    MCV 95.0 81.4 - 97.8 fL    MCH 30.4 27.0 - 33.0 pg    MCHC 32.0 (L) 33.7 - 35.3 g/dL    RDW 48.3 35.9 - 50.0 fL    Platelet Count 147 (L) 164 - 446 K/uL    MPV 10.5 9.0 - 12.9 fL    Neutrophils-Polys 79.10 (H) 44.00 - 72.00 %    Lymphocytes 10.80 (L) 22.00 - 41.00 %    Monocytes 8.70 0.00 - 13.40 %    Eosinophils 0.30 0.00 - 6.90 %    Basophils 0.30 0.00 - 1.80 %    Immature Granulocytes 0.80 0.00 - 0.90 %    Nucleated RBC 0.00 /100 WBC    Neutrophils (Absolute) 5.93 1.82 - 7.42 K/uL    Lymphs (Absolute) 0.81 (L) 1.00 - 4.80 K/uL    Monos (Absolute) 0.65 0.00 - 0.85 K/uL    Eos (Absolute) 0.02 0.00 - 0.51 K/uL    Baso (Absolute) 0.02 0.00 - 0.12 K/uL    Immature Granulocytes (abs) 0.06 0.00 - 0.11 K/uL    NRBC (Absolute) 0.00 K/uL   Comp Metabolic Panel (CMP): Tomorrow AM    Collection Time: 03/25/22  3:07 AM   Result Value Ref Range    Sodium 134 (L) 135 - 145 mmol/L    Potassium 5.8 (H) 3.6 - 5.5 mmol/L    Chloride 99 96 - 112 mmol/L    Co2 25 20 - 33 mmol/L    Anion Gap 10.0 7.0 - 16.0    Glucose 190 (H) 65 - 99 mg/dL    Bun 72 (HH) 8 - 22 mg/dL    Creatinine 2.54 (H) 0.50 - 1.40 mg/dL    Calcium 8.3 (L) 8.5 - 10.5 mg/dL    AST(SGOT) 69 (H) 12 - 45 U/L    ALT(SGPT) 72 (H) 2 - 50 U/L    Alkaline Phosphatase 54 30 - 99 U/L    Total Bilirubin 0.7 0.1 - 1.5 mg/dL    Albumin 3.2 3.2 - 4.9  g/dL    Total Protein 5.7 (L) 6.0 - 8.2 g/dL    Globulin 2.5 1.9 - 3.5 g/dL    A-G Ratio 1.3 g/dL   ESTIMATED GFR    Collection Time: 22  3:07 AM   Result Value Ref Range    GFR (CKD-EPI) 25 (A) >60 mL/min/1.73 m 2   POCT glucose device results    Collection Time: 22  6:36 AM   Result Value Ref Range    POC Glucose, Blood 162 (H) 65 - 99 mg/dL   POCT glucose device results    Collection Time: 22  8:22 AM   Result Value Ref Range    POC Glucose, Blood 147 (H) 65 - 99 mg/dL   EKG    Collection Time: 22  9:05 AM   Result Value Ref Range    Report       [facility]    Test Date:  2022  Pt Name:    LIAN VILLA                    Department: 141  MRN:        [id number]                      Room:       [facility]  Gender:     Male                         Technician: [facility]  :        [id number]                   Requested By:EKLLY GONZALES  Order #:    [id number]                    Reading MD: Demian Mo MD    Measurements  Intervals                                Axis  Rate:       72                           P:          37  ME:         264                          QRS:        -4  QRSD:       110                          T:          222  QT:         380  QTc:        416    Interpretive Statements  SINUS RHYTHM  FIRST DEGREE AV BLOCK  NONSPECIFIC INTRAVENTRICULAR CONDUCTION DELAY  BASELINE WANDER IN LEAD(S) V1  Compared to ECG 2022 04:28:44  Intraventricular conduction delay now present  Sinus pause or arrest no longer present  Myocardial infarct finding no longer present  Electronically Signed On 3- 11:55:13 PDT by Demian christianson MD     POCT glucose device results    Collection Time: 22  9:35 AM   Result Value Ref Range    POC Glucose, Blood 166 (H) 65 - 99 mg/dL   POCT glucose device results    Collection Time: 22 10:40 AM   Result Value Ref Range    POC Glucose, Blood 201 (H) 65 - 99 mg/dL       Fluids    Intake/Output Summary (Last 24 hours) at 3/25/2022 1236  Last  data filed at 3/25/2022 0700  Gross per 24 hour   Intake --   Output 1165 ml   Net -1165 ml       Core Measures & Quality Metrics  Radiology images reviewed, Labs reviewed and Medications reviewed  Gutierres catheter: No Gutierres      DVT Prophylaxis: Enoxaparin (Lovenox)  DVT prophylaxis - mechanical: SCDs      Assessed for rehab: Patient was assess for and/or received rehabilitation services during this hospitalization    RAP Score Total: 8    ETOH Screening  CAGE Score: 0  Assessment complete date: 3/22/2022 (admission BA not completed)        Assessment/Plan  * Fracture of ribs, three, closed, left, initial encounter- (present on admission)  Assessment & Plan  Nondisplaced fractures of the of the left sixth, seventh and eighth ribs.  Aggressive pulmonary hygiene and multimodal pain management.    Renal hematoma, left, initial encounter- (present on admission)  Assessment & Plan  Left renal hematoma measuring 9.0 x 5.7 cm, this is likely residual from large perinephric hematoma seen 4/26/2020.  Serial labs and serial abdominal examinations.    Disp fx of greater trochanter of left femur, init (HCC)- (present on admission)  Assessment & Plan  Displaced fracture of the left greater trochanter.  3/24 Plan to go to OR.  3/23 MRI complete with [place]. MRI with acute mildly displaced fracture of the left greater trochanter with extension to the proximal femur. High-grade tear and strain of the left gluteus medius tendon and muscle with intramuscular hematoma. Mild strain of the adductor muscles.  3/24 OR for surgical treatment of left intertrochanteric femur fracture with intramedullary device.  Weight bearing status - Weightbearing as tolerated LLE. May attempt to mobilize with PT, per Ortho bella Lerma MD. Orthopedic Surgeon. [facility].    Transition to Aspirin 325 mg PO BID as an outpatient.    Encounter for geriatric assessment  Assessment & Plan  3/20 Geriatric Medicine  consult     3/22 Consult complete.    Seasonal allergies- (present on admission)  Assessment & Plan  Chronic condition treated with flonase.  Resumed maintenance medication on admission.    Persistent atrial fibrillation (HCC)- (present on admission)  Assessment & Plan  Chronic condition treated with apixaban.  Systemic anticoagulation held on admission.    Antiplatelet or antithrombotic long-term use- (present on admission)  Assessment & Plan  Chronic condition treated with Eliquis.  Systemic anticoagulation held on admission.    JAMES (obstructive sleep apnea)- (present on admission)  Assessment & Plan  Chronic condition treated with CPAP.   Resumed therapy at time of admission.    Hyperkalemia- (present on admission)  Assessment & Plan  3/25 Potassium, 5.8.  - D10 and Insulin 10 units ordered by Dr. Grimm.    CHF (congestive heart failure), NYHA class II, chronic, systolic (HCC)- (present on admission)  Assessment & Plan  Chronic condition treated with carvedilol and losartan.  Resumed maintenance medication on admission.    Coronary artery disease due to calcified coronary lesion- (present on admission)  Assessment & Plan  Chronic condition treated with aspirin and clopidogrel.  Systemic anticoagulation held on admission.    Stage 3b chronic kidney disease (HCC)- (present on admission)  Assessment & Plan  Admission creatinine 2.29.  3/25 Creatinine increasing, 2.54.   - Stop Lovenox. Initiated Heparin.  - Stop NSAIDS.    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  Resumed maintenance medication on admission.    Contraindication to deep vein thrombosis (DVT) prophylaxis- (present on admission)  Assessment & Plan  Prophylactic anticoagulation for thrombotic prevention initially contraindicated secondary to elevated bleeding risk.  3/20 Trauma surveillance venous duplex scanning ordered.    Dyslipidemia- (present on admission)  Assessment &  Plan  Chronic condition treated with ezetimibe (Zetia).  Resumed maintenance medication on admission.    Contusion of left hand- (present on admission)  Assessment & Plan  Pain and swelling.  Xray hand and wrist negative.  Analgesia.    Abnormal CT scan, colon- (present on admission)  Assessment & Plan  Questionable thickening of the rectal wall, recommend correlation with physical exam and colonoscopy to exclude rectal cancer  Follow up outpatient    Hip hematoma, left, initial encounter- (present on admission)  Assessment & Plan  Small left gluteal intramuscular hematoma.  Serial labs.    Trauma- (present on admission)  Assessment & Plan  GLF.  Trauma Yellow Transfer Activation from [facility].  Mayur Gan MD. Trauma Surgery.    Encounter for screening for COVID-19- (present on admission)  Assessment & Plan  Admission SARS-CoV-2 testing negative. Repeat SARS-CoV-2 testing not indicated. Isolation precautions de-escalated.    Type 2 diabetes mellitus with hyperglycemia, without long-term current use of insulin (HCC)- (present on admission)  Assessment & Plan  Chronic condition treated with glimepiride (AMARYL).  Insulin sliding scale intiated.      Discussed patient condition with RN, Patient and trauma surgery, Dr. Gan.

## 2022-03-25 NOTE — PROGRESS NOTES
Trauma / Surgical Daily Progress Note    Date of Service  3/24/2022    Chief Complaint  81 y.o. male admitted 3/18/2022 with left hip injury and left sided rib fractures after a mechanical ground level fall.    Interval Events  OR today with Ortho surgery.  Nursing reported a body rash overnight. Received benadryl.   Adequate pain control.  CXR with small right effusion.  Supplemental oxygen, 2-3 L NC.     - Physiatry consult placed.  - Aggressive pulmonary hygiene.  - Out of bed for meals and most of the day.  - Wean oxygen.  - Counseled.    Review of Systems  Review of Systems   Constitutional: Negative for chills, fever and malaise/fatigue.   HENT: Negative for congestion, hearing loss and sore throat.    Eyes: Negative for blurred vision.   Respiratory: Negative for cough, shortness of breath and wheezing.    Cardiovascular: Positive for chest pain (related to rib fxs) and leg swelling. Negative for palpitations.   Gastrointestinal: Negative for abdominal pain, diarrhea, heartburn, nausea and vomiting.        Last BM 3/24   Genitourinary: Negative for dysuria.   Musculoskeletal: Positive for joint pain. Negative for back pain, myalgias and neck pain.   Skin: Positive for rash.   Neurological: Negative for dizziness, sensory change, speech change, focal weakness, weakness and headaches.   Psychiatric/Behavioral: Negative.         Vital Signs  Temp:  [36.1 °C (96.9 °F)-37 °C (98.6 °F)] 36.1 °C (96.9 °F)  Pulse:  [70-96] 94  Resp:  [16-25] 20  BP: ()/(47-77) 114/59  SpO2:  [92 %-95 %] 94 %    Physical Exam  Physical Exam  Vitals and nursing note reviewed.   Constitutional:       General: He is awake. He is not in acute distress.     Appearance: He is obese. He is not ill-appearing or toxic-appearing.      Interventions: Nasal cannula in place.   HENT:      Head: Normocephalic.      Nose: Nose normal. No congestion.      Mouth/Throat:      Pharynx: Oropharynx is clear.   Eyes:      Pupils: Pupils are equal,  round, and reactive to light.   Cardiovascular:      Rate and Rhythm: Normal rate and regular rhythm.      Pulses: Normal pulses.   Pulmonary:      Effort: Pulmonary effort is normal. No tachypnea or respiratory distress.      Breath sounds: Decreased air movement present. Examination of the left-middle field reveals decreased breath sounds. Examination of the left-lower field reveals decreased breath sounds. Decreased breath sounds present.      Comments: 2-3 L NC  Chest:      Chest wall: Tenderness (rib fx) present.   Abdominal:      General: Abdomen is flat. Bowel sounds are normal. There is no distension.      Palpations: Abdomen is soft.      Tenderness: There is no abdominal tenderness. There is no guarding or rebound.   Musculoskeletal:         General: Swelling (left hand improving) and tenderness (Left hip tenderness to palpation and with ROM) present. Normal range of motion.      Cervical back: Normal range of motion. No tenderness.      Right lower le+ Edema present.      Left lower leg: Bony tenderness present. 1+ Edema present.      Comments: Surgical dressing in place left hip   Skin:     General: Skin is warm and dry.      Capillary Refill: Capillary refill takes less than 2 seconds.      Findings: Rash present.      Comments: Stasis changes both legs   Neurological:      General: No focal deficit present.      Mental Status: He is alert and oriented to person, place, and time. Mental status is at baseline.      GCS: GCS eye subscore is 4. GCS verbal subscore is 5. GCS motor subscore is 6.      Sensory: Sensation is intact.      Motor: Motor function is intact.   Psychiatric:         Attention and Perception: Attention normal.         Mood and Affect: Mood normal.         Behavior: Behavior is cooperative.         Laboratory  Recent Results (from the past 24 hour(s))   POCT glucose device results    Collection Time: 22  6:00 PM   Result Value Ref Range    POC Glucose, Blood 144 (H) 65 - 99  mg/dL   POCT glucose device results    Collection Time: 03/24/22 12:42 AM   Result Value Ref Range    POC Glucose, Blood 138 (H) 65 - 99 mg/dL   CBC with Differential: Tomorrow AM    Collection Time: 03/24/22  1:45 AM   Result Value Ref Range    WBC 5.3 4.8 - 10.8 K/uL    RBC 5.49 4.70 - 6.10 M/uL    Hemoglobin 16.7 14.0 - 18.0 g/dL    Hematocrit 51.8 42.0 - 52.0 %    MCV 94.4 81.4 - 97.8 fL    MCH 30.4 27.0 - 33.0 pg    MCHC 32.2 (L) 33.7 - 35.3 g/dL    RDW 49.7 35.9 - 50.0 fL    Platelet Count 137 (L) 164 - 446 K/uL    MPV 10.2 9.0 - 12.9 fL    Neutrophils-Polys 62.50 44.00 - 72.00 %    Lymphocytes 19.10 (L) 22.00 - 41.00 %    Monocytes 12.90 0.00 - 13.40 %    Eosinophils 4.90 0.00 - 6.90 %    Basophils 0.20 0.00 - 1.80 %    Immature Granulocytes 0.40 0.00 - 0.90 %    Nucleated RBC 0.00 /100 WBC    Neutrophils (Absolute) 3.33 1.82 - 7.42 K/uL    Lymphs (Absolute) 1.02 1.00 - 4.80 K/uL    Monos (Absolute) 0.69 0.00 - 0.85 K/uL    Eos (Absolute) 0.26 0.00 - 0.51 K/uL    Baso (Absolute) 0.01 0.00 - 0.12 K/uL    Immature Granulocytes (abs) 0.02 0.00 - 0.11 K/uL    NRBC (Absolute) 0.00 K/uL   Comp Metabolic Panel (CMP): Tomorrow AM    Collection Time: 03/24/22  1:45 AM   Result Value Ref Range    Sodium 137 135 - 145 mmol/L    Potassium 5.0 3.6 - 5.5 mmol/L    Chloride 98 96 - 112 mmol/L    Co2 27 20 - 33 mmol/L    Anion Gap 12.0 7.0 - 16.0    Glucose 131 (H) 65 - 99 mg/dL    Bun 65 (H) 8 - 22 mg/dL    Creatinine 2.76 (H) 0.50 - 1.40 mg/dL    Calcium 8.3 (L) 8.5 - 10.5 mg/dL    AST(SGOT) 96 (H) 12 - 45 U/L    ALT(SGPT) 91 (H) 2 - 50 U/L    Alkaline Phosphatase 53 30 - 99 U/L    Total Bilirubin 0.9 0.1 - 1.5 mg/dL    Albumin 3.6 3.2 - 4.9 g/dL    Total Protein 6.2 6.0 - 8.2 g/dL    Globulin 2.6 1.9 - 3.5 g/dL    A-G Ratio 1.4 g/dL   ESTIMATED GFR    Collection Time: 03/24/22  1:45 AM   Result Value Ref Range    GFR (CKD-EPI) 23 (A) >60 mL/min/1.73 m 2   POCT glucose device results    Collection Time: 03/24/22  5:43 AM    Result Value Ref Range    POC Glucose, Blood 113 (H) 65 - 99 mg/dL   POCT glucose device results    Collection Time: 03/24/22  9:13 AM   Result Value Ref Range    POC Glucose, Blood 119 (H) 65 - 99 mg/dL   POCT glucose device results    Collection Time: 03/24/22  1:07 PM   Result Value Ref Range    POC Glucose, Blood 179 (H) 65 - 99 mg/dL       Fluids    Intake/Output Summary (Last 24 hours) at 3/24/2022 1718  Last data filed at 3/24/2022 1600  Gross per 24 hour   Intake 400 ml   Output 565 ml   Net -165 ml       Core Measures & Quality Metrics  Radiology images reviewed, Labs reviewed and Medications reviewed  Gutierres catheter: No Gutierres      DVT Prophylaxis: Enoxaparin (Lovenox)  DVT prophylaxis - mechanical: SCDs      Assessed for rehab: Patient was assess for and/or received rehabilitation services during this hospitalization    RAP Score Total: 8    ETOH Screening  CAGE Score: 0  Assessment complete date: 3/22/2022 (admission BA not completed)        Assessment/Plan  * Fracture of ribs, three, closed, left, initial encounter- (present on admission)  Assessment & Plan  Nondisplaced fractures of the of the left sixth, seventh and eighth ribs.  Aggressive pulmonary hygiene and multimodal pain management.    Renal hematoma, left, initial encounter- (present on admission)  Assessment & Plan  Left renal hematoma measuring 9.0 x 5.7 cm, this is likely residual from large perinephric hematoma seen 4/26/2020.  Serial labs and serial abdominal examinations.    Disp fx of greater trochanter of left femur, init (HCC)- (present on admission)  Assessment & Plan  Displaced fracture of the left greater trochanter.  3/24 Plan to go to OR.  3/23 MRI complete with [employer]. MRI with acute mildly displaced fracture of the left greater trochanter with extension to the proximal femur. High-grade tear and strain of the left gluteus medius tendon and muscle with intramuscular hematoma. Mild strain of the adductor muscles.  3/24  OR for surgical treatment of left intertrochanteric femur fracture with intramedullary device.  Weight bearing status - Weightbearing as tolerated LLE. May attempt to mobilize with PT, per Ortho bella Lerma MD. Orthopedic Surgeon. [facility].    Transition to Aspirin 325 mg PO BID as an outpatient.    Encounter for geriatric assessment  Assessment & Plan  3/20 Geriatric Medicine consult     3/22 Consult complete.    Seasonal allergies- (present on admission)  Assessment & Plan  Chronic condition treated with flonase.  Resumed maintenance medication on admission.    Persistent atrial fibrillation (HCC)- (present on admission)  Assessment & Plan  Chronic condition treated with apixaban.  Systemic anticoagulation held on admission.    Antiplatelet or antithrombotic long-term use- (present on admission)  Assessment & Plan  Chronic condition treated with Eliquis.  Systemic anticoagulation held on admission.    JAMES (obstructive sleep apnea)- (present on admission)  Assessment & Plan  Chronic condition treated with CPAP.   Resumed therapy at time of admission.    CHF (congestive heart failure), NYHA class II, chronic, systolic (HCC)- (present on admission)  Assessment & Plan  Chronic condition treated with carvedilol and losartan.  Resumed maintenance medication on admission.    Coronary artery disease due to calcified coronary lesion- (present on admission)  Assessment & Plan  Chronic condition treated with aspirin and clopidogrel.  Systemic anticoagulation held on admission.    Stage 3b chronic kidney disease (HCC)- (present on admission)  Assessment & Plan  Admission creatinine 2.29.    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  Resumed maintenance medication on admission.    Contraindication to deep vein thrombosis (DVT) prophylaxis- (present on admission)  Assessment & Plan  Prophylactic anticoagulation for thrombotic  prevention initially contraindicated secondary to elevated bleeding risk.  3/20 Trauma surveillance venous duplex scanning ordered.    Dyslipidemia- (present on admission)  Assessment & Plan  Chronic condition treated with ezetimibe (Zetia).  Resumed maintenance medication on admission.    Abnormal CT scan, colon- (present on admission)  Assessment & Plan  Questionable thickening of the rectal wall, recommend correlation with physical exam and colonoscopy to exclude rectal cancer  Follow up outpatient    Contusion of left hand- (present on admission)  Assessment & Plan  Pain and swelling.  Xray hand and wrist negative.  Analgesia.    Hip hematoma, left, initial encounter- (present on admission)  Assessment & Plan  Small left gluteal intramuscular hematoma.  Serial labs.    Trauma- (present on admission)  Assessment & Plan  GLF.  Trauma Yellow Transfer Activation from [facility].  Mayur Gan MD. Trauma Surgery.    Encounter for screening for COVID-19- (present on admission)  Assessment & Plan  Admission SARS-CoV-2 testing negative. Repeat SARS-CoV-2 testing not indicated. Isolation precautions de-escalated.    Type 2 diabetes mellitus with hyperglycemia, without long-term current use of insulin (HCC)- (present on admission)  Assessment & Plan  Chronic condition treated with glimepiride (AMARYL).  Insulin sliding scale intiated.      Discussed patient condition with RN, Patient and trauma surgery, Dr. Gan.

## 2022-03-25 NOTE — THERAPY
Physical Therapy   Daily Treatment     Patient Name: LIAN More III  Age:  81 y.o., Sex:  male  Medical Record #: [id number]  Today's Date: 3/25/2022     Precautions  Precautions: Fall Risk;Weight Bearing As Tolerated Right Lower Extremity  Comments: now s/p L femur IM nail.    Assessment    Pt is now s/p repair of  left intertrochanteric femur fracture with intramedullary device, with ongoing c/o L rib pain being worst despite premedication. Pt needed mod A OOB with HOB up and mod assist with transfers, min A ambulation using FWW x 5 feet. Pt remains fragile, PT to follow.     Plan    Continue current treatment plan.    DC Equipment Recommendations: Unable to determine at this time  Discharge Recommendations: Recommend post-acute placement for additional physical therapy services prior to discharge home           Objective       03/25/22 1028   Total Time Spent   Total Time Spent (Mins) 35   Charge Group   Charges  Yes   PT Therapeutic Activities 2   Precautions   Precautions Fall Risk;Weight Bearing As Tolerated Right Lower Extremity   Comments now s/p L femur IM nail.   Vitals   O2 (LPM) 3   O2 Delivery Device Nasal Cannula   Pain 0 - 10 Group   Therapist Pain Assessment During Activity;Nurse Notified  (L ribs, L hip not rated.)   Cognition    Level of Consciousness Alert   Active ROM Lower Body    Active ROM Lower Body  WDL   Comments able to perform heel slide L LE in bed, functional ROM   Strength Lower Body   Lower Body Strength  X   Comments L LE limited by pain, needs assist OOB   Sensation Lower Body   Lower Extremity Sensation   X   Comments c/o neuropathy B feet.   Lower Body Muscle Tone   Lower Body Muscle Tone  WDL   Supine Lower Body Exercise   Supine Lower Body Exercises Yes   Heel Slide 1 set of 10;Left   Balance   Sitting Balance (Static) Fair   Sitting Balance (Dynamic) Fair   Standing Balance (Static) Fair   Standing Balance (Dynamic) Fair   Weight Shift Sitting Poor   Weight Shift Standing Good    Skilled Intervention Verbal Cuing   Gait Analysis   Gait Level Of Assist Minimal Assist   Assistive Device Front Wheel Walker   Distance (Feet) 5   # of Times Distance was Traveled 1   Deviation Antalgic   # of Stairs Climbed 0   Weight Bearing Status WBAT L LE   Skilled Intervention Verbal Cuing;Sequencing   Bed Mobility    Supine to Sit Moderate Assist   Sit to Supine Moderate Assist   Scooting Minimal Assist   Skilled Intervention Verbal Cuing   Comments pivoted with HOB up   Functional Mobility   Sit to Stand Moderate Assist  (bed height raised)   Transfer Method Stand Step   Skilled Intervention Verbal Cuing   How much difficulty does the patient currently have...   Turning over in bed (including adjusting bedclothes, sheets and blankets)? 2   Sitting down on and standing up from a chair with arms (e.g., wheelchair, bedside commode, etc.) 2   Moving from lying on back to sitting on the side of the bed? 2   How much help from another person does the patient currently need...   Moving to and from a bed to a chair (including a wheelchair)? 2   Need to walk in a hospital room? 2   Climbing 3-5 steps with a railing? 2   6 clicks Mobility Score 12   Activity Tolerance   Standing 5+ min   Short Term Goals    Short Term Goal # 1 in 6 sessions pt will perform bed mob with flat bed and no rail with mod indep   Goal Outcome # 1 goal not met   Short Term Goal # 2 in 6 sessions pt will transfer to various surfaces with mod indep using fWW inorder to return home indep.   Goal Outcome # 2 Goal not met   Short Term Goal # 3 in 6 sessions pt will amb using FWW x 50 feet with fWW including turns inorder to return home indep   Goal Outcome # 3 Goal not met   Short Term Goal # 4 in 6 session pt will climb up/down 1 flight of stairs indep inorder to enter home.   Goal Outcome # 4 Goal not met   Education Group   Role of Physical Therapist Patient Response Patient;Acceptance;Explanation;Verbal Demonstration   Anticipated Discharge  Equipment and Recommendations   DC Equipment Recommendations Unable to determine at this time   Discharge Recommendations Recommend post-acute placement for additional physical therapy services prior to discharge home   Interdisciplinary Plan of Care Collaboration   IDT Collaboration with  Nursing   Patient Position at End of Therapy In Bed;Call Light within Reach;Tray Table within Reach;Phone within Reach;Bed Alarm On   Collaboration Comments nsg updated.   Session Information   Date / Session Number  3/25-3 (3/4, 3/27)

## 2022-03-25 NOTE — DISCHARGE PLANNING
[facility] Transitional [facility] Coordination    Aware of repeat PMR referral from Dr. Srivastava.  Forwarding for Physiatry consult.  We apologize for the delay.      Thank you for the referral.

## 2022-03-25 NOTE — PROGRESS NOTES
Report received from NOC RN, assumed care at 0700.  Assessment complete.  A&O x 4. Patient calls appropriately.  Patient ambulates with x2 assist with FWW. Bed alarm on.   Patient has 6/10 pain. Pain managed with prescribed medications.  Denies N&V. Tolerating regular diet.  Surgical incision x2 on left hip, gauze and tegaderm, CD&I.  + void, + flatus, + BM.  Patient denies SOB. On 2L NC.  Patient calm and cooperative.  Review plan with of care with patient. Call light and personal belongings within reach. Hourly rounding in place. All needs met at this time.

## 2022-03-26 ENCOUNTER — APPOINTMENT (OUTPATIENT)
Dept: RADIOLOGY | Facility: MEDICAL CENTER | Age: 82
DRG: 956 | End: 2022-03-26
Attending: SURGERY
Payer: MEDICARE

## 2022-03-26 LAB
ALBUMIN SERPL BCP-MCNC: 3.2 G/DL (ref 3.2–4.9)
ALBUMIN/GLOB SERPL: 1.2 G/DL
ALP SERPL-CCNC: 50 U/L (ref 30–99)
ALT SERPL-CCNC: 45 U/L (ref 2–50)
ANION GAP SERPL CALC-SCNC: 11 MMOL/L (ref 7–16)
AST SERPL-CCNC: 45 U/L (ref 12–45)
BASOPHILS # BLD AUTO: 0.4 % (ref 0–1.8)
BASOPHILS # BLD: 0.02 K/UL (ref 0–0.12)
BILIRUB SERPL-MCNC: 0.6 MG/DL (ref 0.1–1.5)
BUN SERPL-MCNC: 78 MG/DL (ref 8–22)
CALCIUM SERPL-MCNC: 8.4 MG/DL (ref 8.5–10.5)
CHLORIDE SERPL-SCNC: 103 MMOL/L (ref 96–112)
CO2 SERPL-SCNC: 22 MMOL/L (ref 20–33)
CREAT SERPL-MCNC: 2.15 MG/DL (ref 0.5–1.4)
EOSINOPHIL # BLD AUTO: 0.22 K/UL (ref 0–0.51)
EOSINOPHIL NFR BLD: 4.3 % (ref 0–6.9)
ERYTHROCYTE [DISTWIDTH] IN BLOOD BY AUTOMATED COUNT: 50 FL (ref 35.9–50)
GFR SERPLBLD CREATININE-BSD FMLA CKD-EPI: 30 ML/MIN/1.73 M 2
GLOBULIN SER CALC-MCNC: 2.7 G/DL (ref 1.9–3.5)
GLUCOSE BLD STRIP.AUTO-MCNC: 158 MG/DL (ref 65–99)
GLUCOSE BLD STRIP.AUTO-MCNC: 171 MG/DL (ref 65–99)
GLUCOSE BLD STRIP.AUTO-MCNC: 186 MG/DL (ref 65–99)
GLUCOSE BLD STRIP.AUTO-MCNC: 217 MG/DL (ref 65–99)
GLUCOSE SERPL-MCNC: 168 MG/DL (ref 65–99)
HCT VFR BLD AUTO: 46.1 % (ref 42–52)
HGB BLD-MCNC: 14.9 G/DL (ref 14–18)
IMM GRANULOCYTES # BLD AUTO: 0.05 K/UL (ref 0–0.11)
IMM GRANULOCYTES NFR BLD AUTO: 1 % (ref 0–0.9)
LYMPHOCYTES # BLD AUTO: 0.79 K/UL (ref 1–4.8)
LYMPHOCYTES NFR BLD: 15.5 % (ref 22–41)
MCH RBC QN AUTO: 30.4 PG (ref 27–33)
MCHC RBC AUTO-ENTMCNC: 32.3 G/DL (ref 33.7–35.3)
MCV RBC AUTO: 94.1 FL (ref 81.4–97.8)
MONOCYTES # BLD AUTO: 0.59 K/UL (ref 0–0.85)
MONOCYTES NFR BLD AUTO: 11.6 % (ref 0–13.4)
NEUTROPHILS # BLD AUTO: 3.42 K/UL (ref 1.82–7.42)
NEUTROPHILS NFR BLD: 67.2 % (ref 44–72)
NRBC # BLD AUTO: 0 K/UL
NRBC BLD-RTO: 0 /100 WBC
PLATELET # BLD AUTO: 121 K/UL (ref 164–446)
PMV BLD AUTO: 10.4 FL (ref 9–12.9)
POTASSIUM SERPL-SCNC: 5 MMOL/L (ref 3.6–5.5)
PROT SERPL-MCNC: 5.9 G/DL (ref 6–8.2)
RBC # BLD AUTO: 4.9 M/UL (ref 4.7–6.1)
SODIUM SERPL-SCNC: 136 MMOL/L (ref 135–145)
WBC # BLD AUTO: 5.1 K/UL (ref 4.8–10.8)

## 2022-03-26 PROCEDURE — A9270 NON-COVERED ITEM OR SERVICE: HCPCS

## 2022-03-26 PROCEDURE — 302196 LINEN, HYPOALLERGENIC: Performed by: NURSE PRACTITIONER

## 2022-03-26 PROCEDURE — A9270 NON-COVERED ITEM OR SERVICE: HCPCS | Performed by: FAMILY MEDICINE

## 2022-03-26 PROCEDURE — 71045 X-RAY EXAM CHEST 1 VIEW: CPT

## 2022-03-26 PROCEDURE — A9270 NON-COVERED ITEM OR SERVICE: HCPCS | Performed by: ORTHOPAEDIC SURGERY

## 2022-03-26 PROCEDURE — 36415 COLL VENOUS BLD VENIPUNCTURE: CPT

## 2022-03-26 PROCEDURE — 80053 COMPREHEN METABOLIC PANEL: CPT

## 2022-03-26 PROCEDURE — 85025 COMPLETE CBC W/AUTO DIFF WBC: CPT

## 2022-03-26 PROCEDURE — 700102 HCHG RX REV CODE 250 W/ 637 OVERRIDE(OP): Performed by: FAMILY MEDICINE

## 2022-03-26 PROCEDURE — 700102 HCHG RX REV CODE 250 W/ 637 OVERRIDE(OP)

## 2022-03-26 PROCEDURE — A9270 NON-COVERED ITEM OR SERVICE: HCPCS | Performed by: SURGERY

## 2022-03-26 PROCEDURE — 700101 HCHG RX REV CODE 250: Performed by: FAMILY MEDICINE

## 2022-03-26 PROCEDURE — 700102 HCHG RX REV CODE 250 W/ 637 OVERRIDE(OP): Performed by: SURGERY

## 2022-03-26 PROCEDURE — 99024 POSTOP FOLLOW-UP VISIT: CPT | Performed by: SURGERY

## 2022-03-26 PROCEDURE — 82962 GLUCOSE BLOOD TEST: CPT | Mod: 91

## 2022-03-26 PROCEDURE — 700102 HCHG RX REV CODE 250 W/ 637 OVERRIDE(OP): Performed by: ORTHOPAEDIC SURGERY

## 2022-03-26 PROCEDURE — 770001 HCHG ROOM/CARE - MED/SURG/GYN PRIV*

## 2022-03-26 PROCEDURE — 700111 HCHG RX REV CODE 636 W/ 250 OVERRIDE (IP)

## 2022-03-26 PROCEDURE — 99233 SBSQ HOSP IP/OBS HIGH 50: CPT | Performed by: NURSE PRACTITIONER

## 2022-03-26 PROCEDURE — 94660 CPAP INITIATION&MGMT: CPT

## 2022-03-26 RX ADMIN — OMEPRAZOLE 40 MG: 20 CAPSULE, DELAYED RELEASE ORAL at 17:35

## 2022-03-26 RX ADMIN — FLUTICASONE PROPIONATE 100 MCG: 50 SPRAY, METERED NASAL at 06:35

## 2022-03-26 RX ADMIN — OMEPRAZOLE 40 MG: 20 CAPSULE, DELAYED RELEASE ORAL at 06:34

## 2022-03-26 RX ADMIN — HEPARIN SODIUM 5000 UNITS: 5000 INJECTION, SOLUTION INTRAVENOUS; SUBCUTANEOUS at 06:34

## 2022-03-26 RX ADMIN — OXYCODONE HYDROCHLORIDE 10 MG: 5 TABLET ORAL at 09:59

## 2022-03-26 RX ADMIN — CARVEDILOL 3.12 MG: 6.25 TABLET, FILM COATED ORAL at 17:35

## 2022-03-26 RX ADMIN — METAXALONE 800 MG: 800 TABLET ORAL at 13:55

## 2022-03-26 RX ADMIN — ACETAMINOPHEN 650 MG: 325 TABLET, FILM COATED ORAL at 13:55

## 2022-03-26 RX ADMIN — ACETAMINOPHEN 650 MG: 325 TABLET, FILM COATED ORAL at 06:35

## 2022-03-26 RX ADMIN — ACETAMINOPHEN 650 MG: 325 TABLET, FILM COATED ORAL at 17:34

## 2022-03-26 RX ADMIN — DOCUSATE SODIUM 100 MG: 100 CAPSULE, LIQUID FILLED ORAL at 17:34

## 2022-03-26 RX ADMIN — EZETIMIBE 10 MG: 10 TABLET ORAL at 06:34

## 2022-03-26 RX ADMIN — CARVEDILOL 3.12 MG: 6.25 TABLET, FILM COATED ORAL at 08:50

## 2022-03-26 RX ADMIN — METAXALONE 800 MG: 800 TABLET ORAL at 17:35

## 2022-03-26 RX ADMIN — SENNOSIDES AND DOCUSATE SODIUM 1 TABLET: 50; 8.6 TABLET ORAL at 21:14

## 2022-03-26 RX ADMIN — HEPARIN SODIUM 5000 UNITS: 5000 INJECTION, SOLUTION INTRAVENOUS; SUBCUTANEOUS at 13:55

## 2022-03-26 RX ADMIN — HEPARIN SODIUM 5000 UNITS: 5000 INJECTION, SOLUTION INTRAVENOUS; SUBCUTANEOUS at 21:15

## 2022-03-26 RX ADMIN — DOCUSATE SODIUM 100 MG: 100 CAPSULE, LIQUID FILLED ORAL at 06:34

## 2022-03-26 RX ADMIN — TAMSULOSIN HYDROCHLORIDE 0.4 MG: 0.4 CAPSULE ORAL at 08:50

## 2022-03-26 RX ADMIN — METAXALONE 800 MG: 800 TABLET ORAL at 06:34

## 2022-03-26 RX ADMIN — ACETAMINOPHEN 650 MG: 325 TABLET, FILM COATED ORAL at 23:18

## 2022-03-26 ASSESSMENT — ENCOUNTER SYMPTOMS
NAUSEA: 0
ABDOMINAL PAIN: 0
VOMITING: 0
PALPITATIONS: 0
FEVER: 0

## 2022-03-26 NOTE — PROGRESS NOTES
Pt is A&O 4  3L NC   Pain + pt declines intervention at this time   - nausea  Tolerating a regular diet   Incision + to L leg DIP, bloody drainage present, dressing changed   - Drains  + Voids  + flatus  - BM  Up MAX assist   SCD's off  Bed alarm on , pt moderate fall risk per gulshan anderson  Reviewed plan of care with patient, bed in lowest position and locked, pt resting comfortably now, call light within reach, all needs met at this time. Interventions will be executed per plan of care

## 2022-03-26 NOTE — DISCHARGE PLANNING
"Anticipated Discharge Disposition: SNF     Action: RN CM reviewed patient chart.  Referral for SNF in place.  RN CM met with patient at bedside to discuss assessment and discharge planning.  Verified information on facesheet.  Patient reports his wife is \"institutionalized.\" Patient reports he lives alone and has had prior caregivers at home to assist with cooking, cleaning, and laundry.  Patient reports he no longer has anyone to assist with those, offered caregiver resources, patient refused and states he cannot afford to go through any agency and will find another caregiver.  Patient reports he was ambulatory and drove self prior to admission, uses can for long distance ambulation.  Patient reports he uses home 02/CPAP and gets services through [facility].  Patient reports he has 15 stairs to get inside home.   Discussed SNF placement and options.  Patient would like to review SNF choice and HCM to check back for choice.  Choice form left with patient at bedside.       Barriers to Discharge: medical clearance; SNF choice, acceptance, and bed availability     Plan: [facility] to continue to assist with discharge planning needs and follow up for patient choice.    Care Transition Team Assessment  Patient reports no emergency contact     Information Source: patient   Orientation Level: Oriented X4  Information Given By: Patient  Informant's Name: J W Means  Who is responsible for making decisions for patient? : Patient    Readmission Evaluation  Is this a readmission?: Yes - unplanned readmission  Why do you think you were readmitted?: fall with fracture  Did you understand your discharge instructions?: Yes  Did you have enough support after your last discharge?: No  Please explain: lives alone, no family or friends    Elopement Risk  Legal Hold: No  Ambulatory or Self Mobile in Wheelchair: No-Not an Elopement Risk  Disoriented: No  Psychiatric Symptoms: None  History of Wandering: No  Elopement this Admit: No  Vocalizing " Wanting to Leave: No  Displays Behaviors, Body Language Wanting to Leave: No-Not at Risk for Elopement  Elopement Risk: Not at Risk for Elopement    Interdisciplinary Discharge Planning  Lives with - Patient's Self Care Capacity: Alone and Able to Care For Self  Patient or legal guardian wants to designate a caregiver: No  Housing / Facility: [address]  Prior Services: Home-Independent    Discharge Preparedness  What is your plan after discharge?: Skilled nursing facility  Prior Functional Level: Ambulatory,Drives Self,Independent with Activities of Daily Living,Uses Cane,Other (Comments) (has cane to use for long distance ambulation)  Difficulity with ADLs: None  Difficulity with IADLs: None    Functional Assesment  Prior Functional Level: Ambulatory,Drives Self,Independent with Activities of Daily Living,Uses Cane,Other (Comments) (has cane to use for long distance ambulation)    Finances  Financial Barriers to Discharge: No  Prescription Coverage: Yes    Vision / Hearing Impairment  Right Eye Vision: Impaired,Wears Glasses  Left Eye Vision: Impaired,Wears Glasses    Advance Directive  Advance Directive?: None  Advance Directive offered?: AD Booklet refused    Domestic Abuse  Have you ever been the victim of abuse or violence?: No  Physical Abuse or Sexual Abuse: No  Verbal Abuse or Emotional Abuse: No  Possible Abuse/Neglect Reported to:: Not Applicable    Psychological Assessment  History of Substance Abuse: None  History of Psychiatric Problems: No  Non-compliant with Treatment: No  Newly Diagnosed Illness: Yes    Discharge Risks or Barriers  Discharge risks or barriers?: Post-acute placement / services,Lives alone, no community support  Patient risk factors: Readmission,Vulnerable adult    Anticipated Discharge Information  Discharge Disposition: D/T to SNF with Medicare cert in anticipation of skilled care (03)

## 2022-03-26 NOTE — PROGRESS NOTES
Geriatric Medicine Daily Progress Note      Date of Service  3/25/2022    Chief Complaint  81 y.o. male admitted 3/18/2022 with trauma from a ground-level fall.    Hospital Course  Admitted with fall, with fractured left ribs, left greater trochanter fracture, left renal hematoma, left hip hematoma.  Trauma surgery admitted the patient, Orthopedic surgery was also consulted on the case.  He requires further evaluation of the left greater trochanter fracture with an MRI of the hip, however he has an ICD in place and will require the [employer] technician. Patient states he fell down while playing golf as he was walking up a 30 degree incline. Patient admits to 4 episodes of fall over the past year.  2 or 3 years ago he injured his left shoulder and he had a left renal hematoma when he fell down.  He denies using any assistive device, he used to need them but with therapy he was able to stop using them.  Admits to some dizziness or lightheadedness when he gets up too quickly, this is also the reason why he takes most of his medications at night.  He has known history of CHF, on in reviewing his medication he is on Coreg, Losartan, and Entresto.  On his recent visit with Cardiology, his diuretics were changed to Demadex due to increased fluid retention and weight gain.  He was also given a fluid restriction of 1.8 L/day.  He has known history of persistent atrial fibrillation, he is anticoagulation with Eliquis.  Also known history of CAD with history of PCI, he is on aspirin for antiplatelet treatment.  For his diabetes he takes glimepiride and again he takes this at night.  Most recent hemoglobin A1c was only 6.4.  He denies any memory impairment, or vision or hearing impairments.  He has known history of BPH, but denies any urinary incontinence.    Geriatric medicine was consulted for fall and medication management.    Interval Problem Update  3/25/2022  Vitals reviewed; afebrile.  The rest of the vitals within  normal parameters except BP a bit soft. Still on 3L O2.   Pain scale reported - 6-7 in left leg  Blood glucose in last 24hrs - around mid 100s   I/O - decent UO (net neg about 2.8L since admission), tolerating IP diet, last BM 3/25    At bedside, clinically look way better than prior to surgery. Now, looking forward to how he can be back on his feet.     Left hip fracture - POD#1 of IMN left intertrochanteric fracture; dressing C/D/I  Rib fracture - overall better pain controlled but his primary concern now; continue with IS   HTN - SBP soft   CHF - appears euvolemic; continue to encourage oral intake  CKD - MAKEDA on CKD; concern about over-diuresis especially with poor oral intake and hailee-op. Hold diuretics and entresto. Would restart entresto if Cr improves. Avoid nephrotoxins  Left Hand pain and swelling - improved pain but some swelling   Hyperkalemia - likely from MAKEDA; 1 time insulin + D50; EKG no tall T waves.   Papular rashes - ?Allergy; one time loratidine ordered     Consultants/Specialty  Trauma surgery  Orthopedic surgery    Code Status  Full code    Disposition  Likely post acute care placement     Review of Systems  Review of Systems   Constitutional: Negative for chills, diaphoresis, fever and malaise/fatigue.   HENT: Negative for congestion, hearing loss and sore throat.    Eyes: Negative for blurred vision.   Respiratory: Negative for cough, shortness of breath and wheezing.    Cardiovascular: Positive for chest pain and leg swelling. Negative for palpitations.   Gastrointestinal: Negative for abdominal pain, diarrhea, heartburn, nausea and vomiting.   Genitourinary: Negative for dysuria, flank pain and hematuria.   Musculoskeletal: Positive for joint pain. Negative for back pain, myalgias and neck pain.   Skin: Negative for rash.   Neurological: Negative for dizziness, sensory change, speech change, focal weakness, weakness and headaches.   Psychiatric/Behavioral: The patient is not nervous/anxious.          Physical Exam  Temp:  [36.2 °C (97.1 °F)-37.1 °C (98.7 °F)] 36.3 °C (97.3 °F)  Pulse:  [60-92] 76  Resp:  [16-18] 16  BP: ()/(49-57) 107/50  SpO2:  [93 %-95 %] 93 %    Physical Exam  Vitals and nursing note reviewed.   Constitutional:       Appearance: He is obese.   HENT:      Head: Normocephalic and atraumatic.      Nose: No congestion.      Mouth/Throat:      Mouth: Mucous membranes are moist.   Eyes:      Extraocular Movements: Extraocular movements intact.      Conjunctiva/sclera: Conjunctivae normal.   Cardiovascular:      Rate and Rhythm: Normal rate and regular rhythm.   Pulmonary:      Effort: Pulmonary effort is normal.      Breath sounds: Decreased air movement present.   Chest:      Chest wall: Tenderness present.   Abdominal:      General: There is no distension.      Tenderness: There is no abdominal tenderness. There is no guarding or rebound.   Musculoskeletal:         General: Swelling (left hand and proximal LLE ) present.      Cervical back: No tenderness.      Right lower leg: No edema.      Left lower leg: No edema.   Skin:     Comments: Stasis changes both legs  Surgical dressing C/D/I   Neurological:      General: No focal deficit present.      Mental Status: He is alert and oriented to person, place, and time.      Cranial Nerves: No cranial nerve deficit.         Fluids    Intake/Output Summary (Last 24 hours) at 3/25/2022 1930  Last data filed at 3/25/2022 1241  Gross per 24 hour   Intake 480 ml   Output 700 ml   Net -220 ml       Laboratory  Recent Labs     03/23/22  0321 03/24/22  0145 03/25/22  0307   WBC 5.0 5.3 7.5   RBC 5.46 5.49 5.19   HEMOGLOBIN 16.5 16.7 15.8   HEMATOCRIT 51.4 51.8 49.3   MCV 94.1 94.4 95.0   MCH 30.2 30.4 30.4   MCHC 32.1* 32.2* 32.0*   RDW 49.3 49.7 48.3   PLATELETCT 124* 137* 147*   MPV 10.4 10.2 10.5     Recent Labs     03/24/22  0145 03/25/22  0307 03/25/22  1415   SODIUM 137 134* 136   POTASSIUM 5.0 5.8* 5.1   CHLORIDE 98 99 101   CO2 27 25 24    GLUCOSE 131* 190* 172*   BUN 65* 72* 75*   CREATININE 2.76* 2.54* 2.38*   CALCIUM 8.3* 8.3* 8.4*                   Imaging  DX-CHEST-PORTABLE (1 VIEW)   Final Result         1. No significant interval change.      DX-PORTABLE FLUOROSCOPY < 1 HOUR   Final Result      Portable fluoroscopy utilized for 39 seconds.         INTERPRETING LOCATION: [address] [place] [address], [place], [id number]      DX-FEMUR-2+ LEFT   Final Result      Intraoperative image as above described.      DX-CHEST-PORTABLE (1 VIEW)   Final Result      No significant interval change.      DX-CHEST-PORTABLE (1 VIEW)   Final Result      No significant interval change.      MR-HIP-W/O LEFT   Final Result         1. Redemonstration of acute mildly displaced fracture of the left greater trochanter with extension to the proximal femur. No involvement of the lesser trochanter. No fracture seen in the acetabulum.      2. High-grade tear and strain of the left gluteus medius tendon and muscle with intramuscular hematoma.      3. Mild strain of the adductor muscles.      DX-CHEST-PORTABLE (1 VIEW)   Final Result      Stable chest with cardiac silhouette enlargement and interstitial edema      DX-WRIST-COMPLETE 3+ LEFT   Final Result      1.  No radiographic evidence of acute traumatic injury.   2.  Degenerative changes of the basal joints of the thumb.   3.  Osteopenia.      DX-HAND 3+ LEFT   Final Result         1.  No radiographic evidence of acute injury.      2. Mild osteoarthritic changes are noted at the first carpometacarpal joint.      DX-CHEST-PORTABLE (1 VIEW)   Final Result      Stable examination.      US-TRAUMA VEIN SCREEN LOWER BILAT EXTREMITY   Final Result      DX-CHEST-PORTABLE (1 VIEW)   Final Result      No significant interval change.      DX-CHEST-PORTABLE (1 VIEW)   Final Result         1. No significant interval change.      CT-HEAD W/O   Final Result         1. No acute intracranial abnormality. No evidence of acute intracranial hemorrhage or mass  lesion.                     CT-CSPINE WITHOUT PLUS RECONS   Final Result         1. No acute fracture from C1 through T1 is visualized.         US-ABORTED US PROCEDURE    (Results Pending)        Assessment/Plan  * Fracture of ribs, three, closed, left, initial encounter- (present on admission)  Assessment & Plan  Pain control   Encourage I-S    Left hand pain- (present on admission)  Assessment & Plan  Sprain?  Hand xray negative  Wrist xray negative  Supportive care    BPH (benign prostatic hyperplasia)- (present on admission)  Assessment & Plan  Flomax  Monitor for urinary retention    Hormone replacement therapy- (present on admission)  Assessment & Plan  Hold Testosterone, Clomiphene, Anastrazole    Chronic respiratory failure with hypoxia (HCC)- (present on admission)  Assessment & Plan  Requires O2 supplementation with CPAP  RT protocol    Persistent atrial fibrillation (HCC)- (present on admission)  Assessment & Plan  Coreg  Resume Eliquis when cleared by Trauma surgery    Hip hematoma, left, initial encounter- (present on admission)  Assessment & Plan  pain control  Follow hgb    JAMES (obstructive sleep apnea)- (present on admission)  Assessment & Plan  CPAP, RT protocol    Essential hypertension- (present on admission)  Assessment & Plan  Coreg  Resume Entresto when Cr back to near baseline    Renal hematoma, left, initial encounter- (present on admission)  Assessment & Plan  Follow hemoglobin    ICD (implantable cardioverter-defibrillator) in place- (present on admission)  Assessment & Plan  [employer] tech for MRI     S/P TAVR (transcatheter aortic valve replacement)- (present on admission)  Assessment & Plan  monitor fluid status  Echocardiogram - known TAVR aortic valve that is functioning normally with normal transvalvular gradients   2/15/2022    CHF (congestive heart failure), NYHA class II, chronic, systolic (HCC)- (present on admission)  Assessment & Plan  Coreg,  Monitor fluid status  closely  Echocardiogram - EF 55%   2/15/2022    Clinically appeared euvolemic     OK to restart entresto when Cr improves  Monitor fluid status before resuming Torsemide    Coronary artery disease due to calcified coronary lesion- (present on admission)  Assessment & Plan  History PCI of the LAD/distal left main and the distal RCA  Coreg, Zetia  Intolerant to statins  Resume ASA when cleared by Trauma Surgery    Stage 3b chronic kidney disease (HCC)- (present on admission)  Assessment & Plan  Follow bmp closely  Monitor I/O    With MAKEDA,   3/23: noted continued rise in Cr; concerns about possible over-diuresis. Holding torsemide  3/24: hold Entreston  3/25: Treatment for hyperkalemia; DC NSAIDs started post op and avoid nephrotoxins     Frequent falls- (present on admission)  Assessment & Plan  PT and OT evaluations  Check Orthostatics    Disp fx of greater trochanter of left femur, init ([facility])- (present on admission)  Assessment & Plan  Pain control  POD#1 of IMN left intertrochanteric fracture; dressing C/D/I      Orthopedic surgery following    Dyslipidemia- (present on admission)  Assessment & Plan  Zetia  Intolerant to statins    Type 2 diabetes mellitus with hyperglycemia, without long-term current use of insulin ([facility])- (present on admission)  Assessment & Plan  SSI for now  Hold Glimepiride  hgba1c - 6.4   3/15/2022  May consider trial of holding glimepiride on discharge        VTE prophylaxis: Lovenox

## 2022-03-26 NOTE — PROGRESS NOTES
Bedside report received, assessment completed    A&O x  4, pt calls appropriately  Mobility: Up with x2 assist, FWW  Fall Risk Assessment: High, bed alarm on  Pain Assessment: 8/10, medication provided per MAR  Diet: Diabetic/ Cardiac  LDA:   IV Access: Regular, CDI/ flushed/SL  Urinal  void, + flatus, + 3/25 BM  DVT Prophylaxis: +, SCD on while in bed     Procedures:    - 3/24 Insertion intramedullary bianca femur   D/C Plan: pending medical clearance, ortho sign off     Reviewed plan of care with patient, bed in lowest position and locked, pt resting comfortably now, call light within reach, all needs met at this time. Interventions will be executed per plan of care

## 2022-03-26 NOTE — PROGRESS NOTES
"   Orthopaedic Progress Note    Interval changes:  Patient doing well    Dressings CDI  Cleared for DC to SNF by ortho pending medicine clearance    ROS - Patient denies any new issues.  Pain well controlled.    /60   Pulse 80   Temp 37.1 °C (98.7 °F) (Temporal)   Resp 19   Ht 1.854 m (6' 1\")   Wt 102 kg (224 lb 6.9 oz)   SpO2 93%       Patient seen and examined  No acute distress  Breathing non labored  RRR  LLE surgical dressings are clean, dry, and intact. Patient clearly fires tibialis anterior, EHL, and gastrocnemius/soleus. Sensation is intact to light touch throughout superficial peroneal, deep peroneal, tibial, saphenous, and sural nerve distributions. Strong and palpable 2+ dorsalis pedis and posterior tibial pulses with capillary refill less than 2 seconds. No lower leg tenderness or discomfort.       Recent Labs     03/24/22  0145 03/25/22  0307 03/26/22  0708   WBC 5.3 7.5 5.1   RBC 5.49 5.19 4.90   HEMOGLOBIN 16.7 15.8 14.9   HEMATOCRIT 51.8 49.3 46.1   MCV 94.4 95.0 94.1   MCH 30.4 30.4 30.4   MCHC 32.2* 32.0* 32.3*   RDW 49.7 48.3 50.0   PLATELETCT 137* 147* 121*   MPV 10.2 10.5 10.4       Active Hospital Problems    Diagnosis    • Fracture of ribs, three, closed, left, initial encounter [S22.42XA]      Priority: High   • Renal hematoma, left, initial encounter [S37.012A]      Priority: High   • Disp fx of greater trochanter of left femur, init (HCC) [S72.112A]      Priority: High   • Encounter for geriatric assessment [Z01.89]      Priority: Medium   • Seasonal allergies [J30.2]      Priority: Medium   • Antiplatelet or antithrombotic long-term use [Z79.02]      Priority: Medium   • Persistent atrial fibrillation (HCC) [I48.19]      Priority: Medium   • JAMES (obstructive sleep apnea) [G47.33]      Priority: Medium     Patient is on CPAP at home     • Hyperkalemia [E87.5]      Priority: Medium   • Essential hypertension [I10]      Priority: Medium   • S/P TAVR (transcatheter aortic valve " replacement) [Z95.2]      Priority: Medium   • CHF (congestive heart failure), NYHA class II, chronic, systolic ([facility]) [I50.22]      Priority: Medium     managed by cardiology Dr. Wilkinson: He appears euvolemic on physical exam.  He is on losartan, carvedilol, not on spironolactone due to low kidney function.  He was admitted to the hospital for CHF exacerbation, when he suddenly gained 15 pounds.  He was diuresed with IV Lasix at that time, had echocardiogram that showed significant improvement of his cardiac function, when compared to previous study.  Ejection fraction 55%.     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 pylori, haji's mucosa indefinite for dysplasia, no evidence of celiac disease  4/22/15 upper GI series postoperatively appears laparoscopic band within normal limits, small sliding hiatal hernia  5/14/15 EGD per [facility] esophageal mucosal changes large amount of food residue in stomach, 5 mucosa low grade dysplasia, intestinal metaplasia, gastric emptying study pending  5/22/15 nuclear medicine gastric emptying study per [facility] evidence of reflux, gastric emptying half-time 118 minutes  1/8/16 EGD per  surgery normal  1/4/17 [facility] note history of reflux, treatment of haji's esophagus low-grade dysplasia, discussed radiofrequency ablation and endoscopic ultrasound  7/24/17 EGD per [facility] biopsies performed, follow up haji's  8/16/18 EGD per [facility] salmon colored mucosa suspicious for short segment haji's, biopsies pathology reactive gastropathy with mucosal iron deposition,  negative h.pylori, haji's mucosa, active esophagitis consistent with reflux, no dysplasia or malignancy seen  8/26/19 EGD per [facility] esophageal mucosal changes consistent with short segment haji's, biopsied           • Contraindication to deep vein thrombosis (DVT) prophylaxis [Z53.09]      Priority: Medium     ICD-10 transition     • Dyslipidemia [E78.5]      Priority: Medium     Established diagnosis. Currently taking Zetia, intolerant to statins  Lab Results   Component Value Date/Time    CHOLSTRLTOT 72 (L) 05/19/2021 08:26 AM    TRIGLYCERIDE 102 05/19/2021 08:26 AM    HDL 24 (A) 05/19/2021 08:26 AM    LDL 28 05/19/2021 08:26 AM             • Contusion of left hand [S60.222A]      Priority: Low   • Abnormal CT scan, colon [R93.3]      Priority: Low   • Trauma [T14.90XA]      Priority: Low   • Hip hematoma, left, initial encounter [S70.02XA]      Priority: Low   • Encounter for screening for COVID-19 [Z11.52]      Priority: Low   • ICD (implantable cardioverter-defibrillator) in place [Z95.810]      Priority: Low   • Frequent falls [R29.6]      Priority: Low   • Obesity [E66.9]      Priority: Low     3/3/16 BMI 33.6  3/10/17 BMI 33.3 sees  bariatric   10/8/18 BMI 32.6     • Type 2 diabetes mellitus with hyperglycemia, without long-term current use of insulin (HCC) [E11.65]      Priority: Low     Is a chronic condition, patient states that he is managed by his wellness doctor Dr. Miner.    He says that his most recent hemoglobin A1c was checked around a year ago and was 6.7%  Patient is taking Amaryl 1 mg tablet once daily.  His last foot exam was today 03/15/2022 by his podiatrist.  Patient has not had retinal screening for a few years now-provided referral, patient to schedule.         • Left hand pain [M79.642]    • Chronic respiratory failure with hypoxia (HCC) [J96.11]    • Hormone replacement therapy [Z79.890]    • BPH (benign prostatic hyperplasia) [N40.0]        Assessment/Plan:  Patient  doing well    Dressings CDI  Cleared for DC to SNF by ortho pending medicine clearance  POD#2 S/P Surgical treatment of left intertrochanteric femur fracture with intramedullary device  Wt bearing status - WBAT  Wound care/Drains - Dressings to be changed every other day by nursing  Future Procedures - none planned   Sutures/Staples out- 14 days post operatively  PT/OT-initiated  Antibiotics: Perioperative completed  DVT Prophylaxis- TEDS/SCDs/Foot pumps/heparin  Gutierres-none  Case Coordination for Discharge Planning - Disposition SNF

## 2022-03-26 NOTE — PROGRESS NOTES
[facility] Medicine Daily Progress Note    Date of Service  3/26/2022    Chief Complaint  LIAN More III is a 81 y.o. male admitted 3/18/2022 with rib and femur fracture following a fall    Hospital Course  Admitted with fall, with fractured left ribs, left greater trochanter fracture, left renal hematoma, left hip hematoma.  Trauma surgery admitted the patient, Orthopedic surgery was also consulted on the case.  He requires further evaluation of the left greater trochanter fracture with an MRI of the hip, however he has an ICD in place and will require the [employer] technician. Patient states he fell down while playing golf as he was walking up a 30 degree incline. Patient admits to 4 episodes of fall over the past year.  2 or 3 years ago he injured his left shoulder and he had a left renal hematoma when he fell down.  He denies using any assistive device, he used to need them but with therapy he was able to stop using them.  Admits to some dizziness or lightheadedness when he gets up too quickly, this is also the reason why he takes most of his medications at night.  He has known history of CHF, on in reviewing his medication he is on Coreg, Losartan, and Entresto.  On his recent visit with Cardiology, his diuretics were changed to Demadex due to increased fluid retention and weight gain.  He was also given a fluid restriction of 1.8 L/day.  He has known history of persistent atrial fibrillation, he is anticoagulation with Eliquis.  Also known history of CAD with history of PCI, he is on aspirin for antiplatelet treatment.  For his diabetes he takes glimepiride and again he takes this at night.  Most recent hemoglobin A1c was only 6.4.  He denies any memory impairment, or vision or hearing impairments.  He has known history of BPH, but denies any urinary incontinence.    MRI revealed mildly displaced fracture of the left greater trochanter with extension to the proximal femur.  Orthopedics eventually took the patient  to the OR for IM nail/device.  He has been WBAT since.    He developed mild MAKEDA due to overdiuresis.  Diuretics were held and his renal function improved.  Due to poor participation with therapy he was not deemed a likely candidate for inpatient rehab.  Therapy is recommended skilled.      Interval Problem Update  Renal function cont to improve with diuretic/CHF med cessation    3L via NC, 93%  SCr 2.15 with GFR 30  Heparin SQ -Eliquis/ASA still held for AF  Hgb normal    I have personally seen and examined the patient at bedside. I discussed the plan of care with patient and bedside RN.    Consultants/Specialty  orthopedics    Code Status  Full Code    Disposition  Patient is not medically cleared for discharge.   Anticipate discharge to to skilled nursing facility.  I have placed the appropriate orders for post-discharge needs.    Review of Systems  Review of Systems   Constitutional: Negative for fever.   Cardiovascular: Positive for chest pain (unrelated to exertion). Negative for palpitations and leg swelling.   Gastrointestinal: Negative for abdominal pain, nausea and vomiting.   Genitourinary: Negative for dysuria.   Musculoskeletal: Positive for joint pain.   All other systems reviewed and are negative.       Physical Exam  Temp:  [36 °C (96.8 °F)-37.1 °C (98.7 °F)] 37.1 °C (98.7 °F)  Pulse:  [61-80] 80  Resp:  [16-20] 19  BP: ()/(49-70) 122/60  SpO2:  [93 %-95 %] 93 %    Physical Exam  Vitals and nursing note reviewed.   Constitutional:       Appearance: He is overweight.      Interventions: Nasal cannula in place.   HENT:      Head: Normocephalic and atraumatic.      Nose: Nose normal.   Eyes:      Conjunctiva/sclera: Conjunctivae normal.      Pupils: Pupils are equal, round, and reactive to light.   Cardiovascular:      Rate and Rhythm: Normal rate and regular rhythm.      Heart sounds: No murmur heard.    No friction rub.   Pulmonary:      Effort: No respiratory distress.   Chest:       Comments: Left chest ICD  Abdominal:      General: Bowel sounds are normal. There is no distension.      Palpations: Abdomen is soft.   Musculoskeletal:      Cervical back: Neck supple.      Right lower leg: No edema.      Left lower leg: No edema.      Comments: L hip CDI; erythema/edema noted c/w hematoma   Skin:     General: Skin is warm and dry.   Neurological:      Mental Status: He is alert.         Fluids    Intake/Output Summary (Last 24 hours) at 3/26/2022 1238  Last data filed at 3/26/2022 0600  Gross per 24 hour   Intake 320 ml   Output 1200 ml   Net -880 ml       Laboratory  Recent Labs     03/24/22  0145 03/25/22  0307 03/26/22  0708   WBC 5.3 7.5 5.1   RBC 5.49 5.19 4.90   HEMOGLOBIN 16.7 15.8 14.9   HEMATOCRIT 51.8 49.3 46.1   MCV 94.4 95.0 94.1   MCH 30.4 30.4 30.4   MCHC 32.2* 32.0* 32.3*   RDW 49.7 48.3 50.0   PLATELETCT 137* 147* 121*   MPV 10.2 10.5 10.4     Recent Labs     03/25/22  0307 03/25/22  1415 03/26/22  0708   SODIUM 134* 136 136   POTASSIUM 5.8* 5.1 5.0   CHLORIDE 99 101 103   CO2 25 24 22   GLUCOSE 190* 172* 168*   BUN 72* 75* 78*   CREATININE 2.54* 2.38* 2.15*   CALCIUM 8.3* 8.4* 8.4*                   Imaging  DX-CHEST-PORTABLE (1 VIEW)   Final Result         1. No significant interval change.      DX-CHEST-PORTABLE (1 VIEW)   Final Result         1. No significant interval change.      DX-PORTABLE FLUOROSCOPY < 1 HOUR   Final Result      Portable fluoroscopy utilized for 39 seconds.         INTERPRETING LOCATION: [address] [place] [address], [id number]      DX-FEMUR-2+ LEFT   Final Result      Intraoperative image as above described.      DX-CHEST-PORTABLE (1 VIEW)   Final Result      No significant interval change.      DX-CHEST-PORTABLE (1 VIEW)   Final Result      No significant interval change.      MR-HIP-W/O LEFT   Final Result         1. Redemonstration of acute mildly displaced fracture of the left greater trochanter with extension to the proximal femur. No involvement of the lesser  trochanter. No fracture seen in the acetabulum.      2. High-grade tear and strain of the left gluteus medius tendon and muscle with intramuscular hematoma.      3. Mild strain of the adductor muscles.      DX-CHEST-PORTABLE (1 VIEW)   Final Result      Stable chest with cardiac silhouette enlargement and interstitial edema      DX-WRIST-COMPLETE 3+ LEFT   Final Result      1.  No radiographic evidence of acute traumatic injury.   2.  Degenerative changes of the basal joints of the thumb.   3.  Osteopenia.      DX-HAND 3+ LEFT   Final Result         1.  No radiographic evidence of acute injury.      2. Mild osteoarthritic changes are noted at the first carpometacarpal joint.      DX-CHEST-PORTABLE (1 VIEW)   Final Result      Stable examination.      US-TRAUMA VEIN SCREEN LOWER BILAT EXTREMITY   Final Result      DX-CHEST-PORTABLE (1 VIEW)   Final Result      No significant interval change.      DX-CHEST-PORTABLE (1 VIEW)   Final Result         1. No significant interval change.      CT-HEAD W/O   Final Result         1. No acute intracranial abnormality. No evidence of acute intracranial hemorrhage or mass lesion.                     CT-CSPINE WITHOUT PLUS RECONS   Final Result         1. No acute fracture from C1 through T1 is visualized.         US-ABORTED US PROCEDURE    (Results Pending)        Assessment/Plan  * Fracture of ribs, three, closed, left, initial encounter- (present on admission)  Assessment & Plan  Pain control   Encourage I-S    Acute on chronic Stage 3b chronic kidney disease (HCC)- (present on admission)  Assessment & Plan  Follow bmp closely  Monitor I/O    With MAKEDA,   3/23: noted continued rise in Cr; concerns about possible over-diuresis. Holding torsemide  3/24: hold Entreston  3/25: Treatment for hyperkalemia; DC NSAIDs started post op and avoid nephrotoxins     Frequent falls- (present on admission)  Assessment & Plan  PT and OT evaluations  Check Orthostatics    Disp fx of greater  trochanter of left femur, init (HCC)- (present on admission)  Assessment & Plan  Pain control  S/P IMN left intertrochanteric fracture  WBAT  Orthopedic surgery following  Sutures/staples out 14 days post op    Hip hematoma, left, initial encounter- (present on admission)  Assessment & Plan  pain control  Follow hgb    Chronic respiratory failure with hypoxia (HCC)- (present on admission)  Assessment & Plan  Requires O2 supplementation with CPAP  RT protocol    CHF (congestive heart failure), NYHA class II, chronic, systolic (HCC)- (present on admission)  Assessment & Plan  Coreg,  Monitor fluid status closely  Echocardiogram - EF 55%   2/15/2022    Clinically appeared euvolemic     OK to restart entresto when Cr improves  Monitor fluid status before resuming Torsemide  3/26 Periodic NT BNP/volume assessment    Coronary artery disease due to calcified coronary lesion- (present on admission)  Assessment & Plan  History PCI of the LAD/distal left main and the distal RCA  Coreg, Zetia  Intolerant to statins  Resume ASA when cleared by Trauma Surgery    Dyslipidemia- (present on admission)  Assessment & Plan  Zetia  Intolerant to statins    Left hand pain- (present on admission)  Assessment & Plan  Sprain?  Hand xray negative  Wrist xray negative  Supportive care    BPH (benign prostatic hyperplasia)- (present on admission)  Assessment & Plan  Flomax  Monitor for urinary retention    Hormone replacement therapy- (present on admission)  Assessment & Plan  Hold Testosterone, Clomiphene, Anastrazole    Persistent atrial fibrillation (HCC)- (present on admission)  Assessment & Plan  Coreg  Resume Eliquis when cleared by Trauma surgery    JAMES (obstructive sleep apnea)- (present on admission)  Assessment & Plan  CPAP, RT protocol    Essential hypertension- (present on admission)  Assessment & Plan  Coreg  Resume Entresto when Cr back to near baseline    Renal hematoma, left, initial encounter- (present on  admission)  Assessment & Plan  Follow hemoglobin    ICD (implantable cardioverter-defibrillator) in place- (present on admission)  Assessment & Plan  [employer] tech for MRI     S/P TAVR (transcatheter aortic valve replacement)- (present on admission)  Assessment & Plan  monitor fluid status  Echocardiogram - known TAVR aortic valve that is functioning normally with normal transvalvular gradients   2/15/2022    Type 2 diabetes mellitus with hyperglycemia, without long-term current use of insulin (HCC)- (present on admission)  Assessment & Plan  SSI for now  Hold Glimepiride  hgba1c - 6.4   3/15/2022  May consider trial of holding glimepiride on discharge        VTE prophylaxis: heparin ppx    I have performed a physical exam and reviewed and updated ROS and Plan today (3/26/2022). In review of yesterday's note (3/25/2022), there are no changes except as documented above.    Complex medical decision making with high risk morbidity mortality given management of anticoagulants, hematoma, MAKEDA,

## 2022-03-26 NOTE — CARE PLAN
The patient is Stable - Low risk of patient condition declining or worsening    Shift Goals  Clinical Goals: Up to chair for all meals  Patient Goals: rest/ pain mgt  Family Goals: N/A    Progress made toward(s) clinical / shift goals:  Pt educated on need to ambulate, verbalized understanding. Call light and personal items within reach. No additional questions at this time.     Problem: Knowledge Deficit - Standard  Goal: Patient and family/care givers will demonstrate understanding of plan of care, disease process/condition, diagnostic tests and medications  Outcome: Progressing     Problem: Pain - Standard  Goal: Alleviation of pain or a reduction in pain to the patient’s comfort goal  Outcome: Progressing     Problem: Skin Integrity  Goal: Skin integrity is maintained or improved  Outcome: Progressing

## 2022-03-26 NOTE — HOSPITAL COURSE
Admitted with fall, with fractured left ribs, left greater trochanter fracture, left renal hematoma, left hip hematoma.  Trauma surgery admitted the patient, Orthopedic surgery was also consulted on the case.  He requires further evaluation of the left greater trochanter fracture with an MRI of the hip, however he has an ICD in place and will require the [employer] technician. Patient states he fell down while playing golf as he was walking up a 30 degree incline. Patient admits to 4 episodes of fall over the past year.  2 or 3 years ago he injured his left shoulder and he had a left renal hematoma when he fell down.  He denies using any assistive device, he used to need them but with therapy he was able to stop using them.  Admits to some dizziness or lightheadedness when he gets up too quickly, this is also the reason why he takes most of his medications at night.  He has known history of CHF, on in reviewing his medication he is on Coreg, Losartan, and Entresto.  On his recent visit with Cardiology, his diuretics were changed to Demadex due to increased fluid retention and weight gain.  He was also given a fluid restriction of 1.8 L/day.  He has known history of persistent atrial fibrillation, he is anticoagulation with Eliquis.  Also known history of CAD with history of PCI, he is on aspirin for antiplatelet treatment.  For his diabetes he takes glimepiride and again he takes this at night.  Most recent hemoglobin A1c was only 6.4.  He denies any memory impairment, or vision or hearing impairments.  He has known history of BPH, but denies any urinary incontinence.    MRI revealed mildly displaced fracture of the left greater trochanter with extension to the proximal femur.  Orthopedics eventually took the patient to the OR for IM nail/device.  He has been WBAT since.    He developed mild MAEKDA due to overdiuresis.  Diuretics were held and his renal function improved.  Due to poor participation with therapy he was  not deemed a likely candidate for inpatient rehab.  Therapy is recommended skilled.

## 2022-03-27 ENCOUNTER — APPOINTMENT (OUTPATIENT)
Dept: RADIOLOGY | Facility: MEDICAL CENTER | Age: 82
DRG: 956 | End: 2022-03-27
Attending: SURGERY
Payer: MEDICARE

## 2022-03-27 LAB
ALBUMIN SERPL BCP-MCNC: 3.3 G/DL (ref 3.2–4.9)
BUN SERPL-MCNC: 81 MG/DL (ref 8–22)
CALCIUM SERPL-MCNC: 8.3 MG/DL (ref 8.5–10.5)
CHLORIDE SERPL-SCNC: 101 MMOL/L (ref 96–112)
CO2 SERPL-SCNC: 23 MMOL/L (ref 20–33)
CREAT SERPL-MCNC: 1.98 MG/DL (ref 0.5–1.4)
GFR SERPLBLD CREATININE-BSD FMLA CKD-EPI: 34 ML/MIN/1.73 M 2
GLUCOSE BLD STRIP.AUTO-MCNC: 153 MG/DL (ref 65–99)
GLUCOSE BLD STRIP.AUTO-MCNC: 165 MG/DL (ref 65–99)
GLUCOSE BLD STRIP.AUTO-MCNC: 195 MG/DL (ref 65–99)
GLUCOSE BLD STRIP.AUTO-MCNC: 228 MG/DL (ref 65–99)
GLUCOSE SERPL-MCNC: 169 MG/DL (ref 65–99)
MAGNESIUM SERPL-MCNC: 2.8 MG/DL (ref 1.5–2.5)
NT-PROBNP SERPL IA-MCNC: 859 PG/ML (ref 0–125)
PHOSPHATE SERPL-MCNC: 3.4 MG/DL (ref 2.5–4.5)
POTASSIUM SERPL-SCNC: 4.9 MMOL/L (ref 3.6–5.5)
SODIUM SERPL-SCNC: 136 MMOL/L (ref 135–145)

## 2022-03-27 PROCEDURE — 94660 CPAP INITIATION&MGMT: CPT

## 2022-03-27 PROCEDURE — A9270 NON-COVERED ITEM OR SERVICE: HCPCS | Performed by: SURGERY

## 2022-03-27 PROCEDURE — 700102 HCHG RX REV CODE 250 W/ 637 OVERRIDE(OP): Performed by: ORTHOPAEDIC SURGERY

## 2022-03-27 PROCEDURE — A9270 NON-COVERED ITEM OR SERVICE: HCPCS | Performed by: NURSE PRACTITIONER

## 2022-03-27 PROCEDURE — 700102 HCHG RX REV CODE 250 W/ 637 OVERRIDE(OP): Performed by: FAMILY MEDICINE

## 2022-03-27 PROCEDURE — 83880 ASSAY OF NATRIURETIC PEPTIDE: CPT

## 2022-03-27 PROCEDURE — 770001 HCHG ROOM/CARE - MED/SURG/GYN PRIV*

## 2022-03-27 PROCEDURE — A9270 NON-COVERED ITEM OR SERVICE: HCPCS

## 2022-03-27 PROCEDURE — 80069 RENAL FUNCTION PANEL: CPT

## 2022-03-27 PROCEDURE — 700111 HCHG RX REV CODE 636 W/ 250 OVERRIDE (IP)

## 2022-03-27 PROCEDURE — 99232 SBSQ HOSP IP/OBS MODERATE 35: CPT | Performed by: NURSE PRACTITIONER

## 2022-03-27 PROCEDURE — 71045 X-RAY EXAM CHEST 1 VIEW: CPT

## 2022-03-27 PROCEDURE — 82962 GLUCOSE BLOOD TEST: CPT

## 2022-03-27 PROCEDURE — A9270 NON-COVERED ITEM OR SERVICE: HCPCS | Performed by: ORTHOPAEDIC SURGERY

## 2022-03-27 PROCEDURE — 700102 HCHG RX REV CODE 250 W/ 637 OVERRIDE(OP): Performed by: NURSE PRACTITIONER

## 2022-03-27 PROCEDURE — 36415 COLL VENOUS BLD VENIPUNCTURE: CPT

## 2022-03-27 PROCEDURE — 700101 HCHG RX REV CODE 250: Performed by: FAMILY MEDICINE

## 2022-03-27 PROCEDURE — A9270 NON-COVERED ITEM OR SERVICE: HCPCS | Performed by: FAMILY MEDICINE

## 2022-03-27 PROCEDURE — 83735 ASSAY OF MAGNESIUM: CPT

## 2022-03-27 PROCEDURE — 700102 HCHG RX REV CODE 250 W/ 637 OVERRIDE(OP)

## 2022-03-27 PROCEDURE — 302196 LINEN, HYPOALLERGENIC: Performed by: NURSE PRACTITIONER

## 2022-03-27 PROCEDURE — 99024 POSTOP FOLLOW-UP VISIT: CPT | Performed by: SURGERY

## 2022-03-27 PROCEDURE — 700102 HCHG RX REV CODE 250 W/ 637 OVERRIDE(OP): Performed by: SURGERY

## 2022-03-27 RX ADMIN — HEPARIN SODIUM 5000 UNITS: 5000 INJECTION, SOLUTION INTRAVENOUS; SUBCUTANEOUS at 05:30

## 2022-03-27 RX ADMIN — ACETAMINOPHEN 650 MG: 325 TABLET, FILM COATED ORAL at 17:59

## 2022-03-27 RX ADMIN — APIXABAN 5 MG: 5 TABLET, FILM COATED ORAL at 18:04

## 2022-03-27 RX ADMIN — ACETAMINOPHEN 650 MG: 325 TABLET, FILM COATED ORAL at 11:36

## 2022-03-27 RX ADMIN — OXYCODONE HYDROCHLORIDE 10 MG: 5 TABLET ORAL at 17:59

## 2022-03-27 RX ADMIN — OMEPRAZOLE 40 MG: 20 CAPSULE, DELAYED RELEASE ORAL at 05:30

## 2022-03-27 RX ADMIN — METAXALONE 800 MG: 800 TABLET ORAL at 11:36

## 2022-03-27 RX ADMIN — OXYCODONE HYDROCHLORIDE 10 MG: 5 TABLET ORAL at 23:28

## 2022-03-27 RX ADMIN — TAMSULOSIN HYDROCHLORIDE 0.4 MG: 0.4 CAPSULE ORAL at 08:45

## 2022-03-27 RX ADMIN — CARVEDILOL 3.12 MG: 6.25 TABLET, FILM COATED ORAL at 08:45

## 2022-03-27 RX ADMIN — METAXALONE 800 MG: 800 TABLET ORAL at 05:30

## 2022-03-27 RX ADMIN — EZETIMIBE 10 MG: 10 TABLET ORAL at 05:30

## 2022-03-27 RX ADMIN — OXYCODONE HYDROCHLORIDE 10 MG: 5 TABLET ORAL at 08:49

## 2022-03-27 RX ADMIN — ACETAMINOPHEN 650 MG: 325 TABLET, FILM COATED ORAL at 05:30

## 2022-03-27 RX ADMIN — APIXABAN 5 MG: 5 TABLET, FILM COATED ORAL at 11:37

## 2022-03-27 RX ADMIN — LIDOCAINE 2 PATCH: 50 PATCH TOPICAL at 23:28

## 2022-03-27 RX ADMIN — METAXALONE 800 MG: 800 TABLET ORAL at 17:59

## 2022-03-27 RX ADMIN — OMEPRAZOLE 40 MG: 20 CAPSULE, DELAYED RELEASE ORAL at 17:59

## 2022-03-27 RX ADMIN — CARVEDILOL 3.12 MG: 6.25 TABLET, FILM COATED ORAL at 17:59

## 2022-03-27 RX ADMIN — SACUBITRIL AND VALSARTAN 1 TABLET: 24; 26 TABLET, FILM COATED ORAL at 18:04

## 2022-03-27 RX ADMIN — ACETAMINOPHEN 650 MG: 325 TABLET, FILM COATED ORAL at 23:28

## 2022-03-27 ASSESSMENT — PAIN DESCRIPTION - PAIN TYPE
TYPE: ACUTE PAIN;SURGICAL PAIN
TYPE: ACUTE PAIN
TYPE: ACUTE PAIN;SURGICAL PAIN
TYPE: ACUTE PAIN;SURGICAL PAIN
TYPE: ACUTE PAIN
TYPE: ACUTE PAIN

## 2022-03-27 ASSESSMENT — ENCOUNTER SYMPTOMS
ABDOMINAL PAIN: 0
VOMITING: 0
NAUSEA: 0
PALPITATIONS: 0
FEVER: 0

## 2022-03-27 ASSESSMENT — CHA2DS2 SCORE
AGE 65 TO 74: NO
DIABETES: YES
CHF OR LEFT VENTRICULAR DYSFUNCTION: YES
HYPERTENSION: YES
CHA2DS2 VASC SCORE: 6
VASCULAR DISEASE: YES
SEX: MALE
PRIOR STROKE OR TIA OR THROMBOEMBOLISM: NO
AGE 75 OR GREATER: YES

## 2022-03-27 ASSESSMENT — FIBROSIS 4 INDEX: FIB4 SCORE: 4.38

## 2022-03-27 NOTE — PROGRESS NOTES
Pt is A&O 4  3L NC   Pain + pt declines intervention at this time   - nausea  Tolerating a regular diet   Incision + to L leg DIP CDI  - Drains  + Voids  + flatus  - BM  Up MAX assist   SCD's off  Bed alarm on , pt moderate fall risk per gulshan anderson  Reviewed plan of care with patient, bed in lowest position and locked, pt resting comfortably now, call light within reach, all needs met at this time. Interventions will be executed per plan of care

## 2022-03-27 NOTE — DISCHARGE PLANNING
Anticipated Discharge Disposition:SNF    Action: LSW met with PT at bedside to follow up regarding SNF choice. PT stated he is still undecided and is waiting for call backs from the placements he is interested in before making a final decision. PT requested LSW follow up tomorrow for choice.     Barriers to Discharge: Medical Clearance, SNF choice, acceptance and bed availability.     Plan: LSW to continue to assist with discharge planning needs and follow up for patient choice.   [Negative] : Genitourinary

## 2022-03-27 NOTE — PROGRESS NOTES
[facility] Medicine Daily Progress Note    Date of Service  3/27/2022    Chief Complaint  LIAN More III is a 81 y.o. male admitted 3/18/2022 with rib and femur fracture following a fall    Hospital Course  Admitted with fall, with fractured left ribs, left greater trochanter fracture, left renal hematoma, left hip hematoma.  Trauma surgery admitted the patient, Orthopedic surgery was also consulted on the case.  He requires further evaluation of the left greater trochanter fracture with an MRI of the hip, however he has an ICD in place and will require the [employer] technician. Patient states he fell down while playing golf as he was walking up a 30 degree incline. Patient admits to 4 episodes of fall over the past year.  2 or 3 years ago he injured his left shoulder and he had a left renal hematoma when he fell down.  He denies using any assistive device, he used to need them but with therapy he was able to stop using them.  Admits to some dizziness or lightheadedness when he gets up too quickly, this is also the reason why he takes most of his medications at night.  He has known history of CHF, on in reviewing his medication he is on Coreg, Losartan, and Entresto.  On his recent visit with Cardiology, his diuretics were changed to Demadex due to increased fluid retention and weight gain.  He was also given a fluid restriction of 1.8 L/day.  He has known history of persistent atrial fibrillation, he is anticoagulation with Eliquis.  Also known history of CAD with history of PCI, he is on aspirin for antiplatelet treatment.  For his diabetes he takes glimepiride and again he takes this at night.  Most recent hemoglobin A1c was only 6.4.  He denies any memory impairment, or vision or hearing impairments.  He has known history of BPH, but denies any urinary incontinence.    MRI revealed mildly displaced fracture of the left greater trochanter with extension to the proximal femur.  Orthopedics eventually took the patient  to the OR for IM nail/device.  He has been WBAT since.    He developed mild MAKEDA due to overdiuresis.  Diuretics were held and his renal function improved.  Due to poor participation with therapy he was not deemed a likely candidate for inpatient rehab.  Therapy is recommended skilled.      Interval Problem Update  3/27 labs ok.  Creatinine near baseline. 2-3L At night. Not using CPAP this a.m. Will trial start of Eliquis and Entresto. Discussed w Ortho. Awaits SNF.     3/26 Renal function cont to improve with diuretic/CHF med cessation    3L via NC, 93%  SCr 2.15 with GFR 30  Heparin SQ -Eliquis/ASA still held for AF  Hgb normal    I have personally seen and examined the patient at bedside. I discussed the plan of care with patient and bedside RN.    Consultants/Specialty  orthopedics    Code Status  Full Code    Disposition  Patient is not medically cleared for discharge.   Anticipate discharge to to skilled nursing facility.  I have placed the appropriate orders for post-discharge needs.    Review of Systems  Review of Systems   Constitutional: Negative for fever.   Cardiovascular: Positive for chest pain (unrelated to exertion). Negative for palpitations and leg swelling.   Gastrointestinal: Negative for abdominal pain, nausea and vomiting.   Genitourinary: Negative for dysuria.   Musculoskeletal: Positive for joint pain.   All other systems reviewed and are negative.       Physical Exam  Temp:  [36.6 °C (97.8 °F)-37.1 °C (98.7 °F)] 36.7 °C (98.1 °F)  Pulse:  [70-84] 73  Resp:  [18] 18  BP: (107-150)/(64-79) 150/79  SpO2:  [92 %-97 %] 92 %    Physical Exam  Vitals and nursing note reviewed.   Constitutional:       Appearance: He is overweight.      Interventions: Nasal cannula in place.   HENT:      Head: Normocephalic and atraumatic.      Nose: Nose normal.   Eyes:      Conjunctiva/sclera: Conjunctivae normal.      Pupils: Pupils are equal, round, and reactive to light.   Cardiovascular:      Rate and  Rhythm: Normal rate and regular rhythm.      Heart sounds: No murmur heard.    No friction rub.   Pulmonary:      Effort: No respiratory distress.   Chest:      Comments: Left chest ICD  Abdominal:      General: Bowel sounds are normal. There is no distension.      Palpations: Abdomen is soft.   Musculoskeletal:      Cervical back: Neck supple.      Right lower leg: No edema.      Left lower leg: No edema.      Comments: L hip CDI; inflammation present   Skin:     General: Skin is warm and dry.   Neurological:      Mental Status: He is alert.         Fluids    Intake/Output Summary (Last 24 hours) at 3/27/2022 1012  Last data filed at 3/27/2022 0720  Gross per 24 hour   Intake no documentation   Output 1250 ml   Net -1250 ml       Laboratory  Recent Labs     03/25/22  0307 03/26/22  0708   WBC 7.5 5.1   RBC 5.19 4.90   HEMOGLOBIN 15.8 14.9   HEMATOCRIT 49.3 46.1   MCV 95.0 94.1   MCH 30.4 30.4   MCHC 32.0* 32.3*   RDW 48.3 50.0   PLATELETCT 147* 121*   MPV 10.5 10.4     Recent Labs     03/25/22  1415 03/26/22  0708 03/27/22  0437   SODIUM 136 136 136   POTASSIUM 5.1 5.0 4.9   CHLORIDE 101 103 101   CO2 24 22 23   GLUCOSE 172* 168* 169*   BUN 75* 78* 81*   CREATININE 2.38* 2.15* 1.98*   CALCIUM 8.4* 8.4* 8.3*                   Imaging  DX-CHEST-PORTABLE (1 VIEW)   Final Result      No significant change from prior exam.      DX-CHEST-PORTABLE (1 VIEW)   Final Result         1. No significant interval change.      DX-CHEST-PORTABLE (1 VIEW)   Final Result         1. No significant interval change.      DX-PORTABLE FLUOROSCOPY < 1 HOUR   Final Result      Portable fluoroscopy utilized for 39 seconds.         INTERPRETING LOCATION: [address] [place] [address], [id number]      DX-FEMUR-2+ LEFT   Final Result      Intraoperative image as above described.      DX-CHEST-PORTABLE (1 VIEW)   Final Result      No significant interval change.      DX-CHEST-PORTABLE (1 VIEW)   Final Result      No significant interval change.       MR-HIP-W/O LEFT   Final Result         1. Redemonstration of acute mildly displaced fracture of the left greater trochanter with extension to the proximal femur. No involvement of the lesser trochanter. No fracture seen in the acetabulum.      2. High-grade tear and strain of the left gluteus medius tendon and muscle with intramuscular hematoma.      3. Mild strain of the adductor muscles.      DX-CHEST-PORTABLE (1 VIEW)   Final Result      Stable chest with cardiac silhouette enlargement and interstitial edema      DX-WRIST-COMPLETE 3+ LEFT   Final Result      1.  No radiographic evidence of acute traumatic injury.   2.  Degenerative changes of the basal joints of the thumb.   3.  Osteopenia.      DX-HAND 3+ LEFT   Final Result         1.  No radiographic evidence of acute injury.      2. Mild osteoarthritic changes are noted at the first carpometacarpal joint.      DX-CHEST-PORTABLE (1 VIEW)   Final Result      Stable examination.      US-TRAUMA VEIN SCREEN LOWER BILAT EXTREMITY   Final Result      DX-CHEST-PORTABLE (1 VIEW)   Final Result      No significant interval change.      DX-CHEST-PORTABLE (1 VIEW)   Final Result         1. No significant interval change.      CT-HEAD W/O   Final Result         1. No acute intracranial abnormality. No evidence of acute intracranial hemorrhage or mass lesion.                     CT-CSPINE WITHOUT PLUS RECONS   Final Result         1. No acute fracture from C1 through T1 is visualized.         US-ABORTED US PROCEDURE    (Results Pending)        Assessment/Plan  * Fracture of ribs, three, closed, left, initial encounter- (present on admission)  Assessment & Plan  Pain control   Encourage I-S    Acute on chronic Stage 3b chronic kidney disease (HCC)- (present on admission)  Assessment & Plan  Follow bmp closely  Monitor I/O    With MAKEDA,   3/23: noted continued rise in Cr; concerns about possible over-diuresis. Holding torsemide  3/24: hold Entreston  3/25: Treatment for  hyperkalemia; DC NSAIDs started post op and avoid nephrotoxins     Frequent falls- (present on admission)  Assessment & Plan  PT and OT evaluations  Check Orthostatics    Disp fx of greater trochanter of left femur, init (HCC)- (present on admission)  Assessment & Plan  Pain control  S/P IMN left intertrochanteric fracture  WBAT  Orthopedic surgery following  Sutures/staples out 14 days post op    Hip hematoma, left, initial encounter- (present on admission)  Assessment & Plan  pain control  Follow hgb    Chronic respiratory failure with hypoxia (HCC)- (present on admission)  Assessment & Plan  Requires O2 supplementation with CPAP  RT protocol    CHF (congestive heart failure), NYHA class II, chronic, systolic ([facility])- (present on admission)  Assessment & Plan  Coreg,  Monitor fluid status closely  Echocardiogram - EF 55%   2/15/2022  Clinically appeared euvolemic   Monitor fluid status before resuming Torsemide  3/26 Periodic NT BNP/volume assessment  3/27 Entresto restarted    Coronary artery disease due to calcified coronary lesion- (present on admission)  Assessment & Plan  History PCI of the LAD/distal left main and the distal RCA  Coreg, Zetia  Intolerant to statins  Resume ASA when cleared by Trauma Surgery    Dyslipidemia- (present on admission)  Assessment & Plan  Zetia  Intolerant to statins    Left hand pain- (present on admission)  Assessment & Plan  Sprain?  Hand xray negative  Wrist xray negative  Supportive care    BPH (benign prostatic hyperplasia)- (present on admission)  Assessment & Plan  Flomax  Monitor for urinary retention    Hormone replacement therapy- (present on admission)  Assessment & Plan  Hold Testosterone, Clomiphene, Anastrazole    Persistent atrial fibrillation (HCC)- (present on admission)  Assessment & Plan  Coreg  3/27 restart Eliquis today for AF    JAMES (obstructive sleep apnea)- (present on admission)  Assessment & Plan  CPAP, RT protocol    Essential hypertension- (present on  admission)  Assessment & Plan  Coreg  3/27 resumed  Entresto    Renal hematoma, left, initial encounter- (present on admission)  Assessment & Plan  Follow hemoglobin    ICD (implantable cardioverter-defibrillator) in place- (present on admission)  Assessment & Plan  [employer] tech for MRI     S/P TAVR (transcatheter aortic valve replacement)- (present on admission)  Assessment & Plan  monitor fluid status  Echocardiogram - known TAVR aortic valve that is functioning normally with normal transvalvular gradients   2/15/2022    Type 2 diabetes mellitus with hyperglycemia, without long-term current use of insulin (HCC)- (present on admission)  Assessment & Plan  SSI for now  Hold Glimepiride  hgba1c - 6.4   3/15/2022  May consider trial of holding glimepiride on discharge        VTE prophylaxis: therapeutic anticoagulation with eliquis    I have performed a physical exam and reviewed and updated ROS and Plan today (3/27/2022). In review of yesterday's note (3/26/2022), there are no changes except as documented above.

## 2022-03-27 NOTE — PROGRESS NOTES
Received report from previous shift RN  Assessment complete.  A&O x 4. Patient calls appropriately.  Patient up with x2 assist/pivot. LLE WBAT. Bed alarm on.   Patient has 7/10 pain. Pain managed with prescribed medications.  Denies N&V. Tolerating diabetic diet.  L incisions to hip/thigh, dressing in place,CDI.   + void,+flatus,+ BM.  Patient denies SOB.  Review plan of care with patient. Call light and personal belongings with in reach. Hourly rounding in place. All needs met at this time.

## 2022-03-27 NOTE — CARE PLAN
The patient is Stable - Low risk of patient condition declining or worsening    Shift Goals  Clinical Goals: pain control, oob activity  Patient Goals: rest/ pain mgt  Family Goals: N/A    Progress made toward(s) clinical / shift goals:  assess pain Q2-4H, administer pain medication as indicated, encourage patient to voice pain to staff     Patient is not progressing towards the following goals: patient declines mobilization       Problem: Skin Integrity  Goal: Skin integrity is maintained or improved  Outcome: Progressing     Problem: Fall Risk  Goal: Patient will remain free from falls  Outcome: Progressing

## 2022-03-28 LAB
ALBUMIN SERPL BCP-MCNC: 3.3 G/DL (ref 3.2–4.9)
BUN SERPL-MCNC: 67 MG/DL (ref 8–22)
CALCIUM SERPL-MCNC: 8.6 MG/DL (ref 8.5–10.5)
CHLORIDE SERPL-SCNC: 104 MMOL/L (ref 96–112)
CO2 SERPL-SCNC: 24 MMOL/L (ref 20–33)
CREAT SERPL-MCNC: 1.65 MG/DL (ref 0.5–1.4)
GFR SERPLBLD CREATININE-BSD FMLA CKD-EPI: 42 ML/MIN/1.73 M 2
GLUCOSE BLD STRIP.AUTO-MCNC: 140 MG/DL (ref 65–99)
GLUCOSE BLD STRIP.AUTO-MCNC: 144 MG/DL (ref 65–99)
GLUCOSE BLD STRIP.AUTO-MCNC: 151 MG/DL (ref 65–99)
GLUCOSE BLD STRIP.AUTO-MCNC: 216 MG/DL (ref 65–99)
GLUCOSE SERPL-MCNC: 139 MG/DL (ref 65–99)
MAGNESIUM SERPL-MCNC: 2.7 MG/DL (ref 1.5–2.5)
PHOSPHATE SERPL-MCNC: 3.4 MG/DL (ref 2.5–4.5)
POTASSIUM SERPL-SCNC: 5 MMOL/L (ref 3.6–5.5)
SODIUM SERPL-SCNC: 137 MMOL/L (ref 135–145)

## 2022-03-28 PROCEDURE — 770001 HCHG ROOM/CARE - MED/SURG/GYN PRIV*

## 2022-03-28 PROCEDURE — 36415 COLL VENOUS BLD VENIPUNCTURE: CPT

## 2022-03-28 PROCEDURE — 80069 RENAL FUNCTION PANEL: CPT

## 2022-03-28 PROCEDURE — A9270 NON-COVERED ITEM OR SERVICE: HCPCS | Performed by: FAMILY MEDICINE

## 2022-03-28 PROCEDURE — A9270 NON-COVERED ITEM OR SERVICE: HCPCS | Performed by: NURSE PRACTITIONER

## 2022-03-28 PROCEDURE — A9270 NON-COVERED ITEM OR SERVICE: HCPCS | Performed by: SURGERY

## 2022-03-28 PROCEDURE — 700102 HCHG RX REV CODE 250 W/ 637 OVERRIDE(OP)

## 2022-03-28 PROCEDURE — A9270 NON-COVERED ITEM OR SERVICE: HCPCS | Performed by: ORTHOPAEDIC SURGERY

## 2022-03-28 PROCEDURE — A9270 NON-COVERED ITEM OR SERVICE: HCPCS

## 2022-03-28 PROCEDURE — 700101 HCHG RX REV CODE 250: Performed by: FAMILY MEDICINE

## 2022-03-28 PROCEDURE — 94660 CPAP INITIATION&MGMT: CPT

## 2022-03-28 PROCEDURE — 83735 ASSAY OF MAGNESIUM: CPT

## 2022-03-28 PROCEDURE — 99232 SBSQ HOSP IP/OBS MODERATE 35: CPT | Performed by: NURSE PRACTITIONER

## 2022-03-28 PROCEDURE — 700102 HCHG RX REV CODE 250 W/ 637 OVERRIDE(OP): Performed by: FAMILY MEDICINE

## 2022-03-28 PROCEDURE — 700102 HCHG RX REV CODE 250 W/ 637 OVERRIDE(OP): Performed by: ORTHOPAEDIC SURGERY

## 2022-03-28 PROCEDURE — 700102 HCHG RX REV CODE 250 W/ 637 OVERRIDE(OP): Performed by: SURGERY

## 2022-03-28 PROCEDURE — 82962 GLUCOSE BLOOD TEST: CPT

## 2022-03-28 PROCEDURE — 700102 HCHG RX REV CODE 250 W/ 637 OVERRIDE(OP): Performed by: NURSE PRACTITIONER

## 2022-03-28 RX ADMIN — OMEPRAZOLE 40 MG: 20 CAPSULE, DELAYED RELEASE ORAL at 04:59

## 2022-03-28 RX ADMIN — METAXALONE 800 MG: 800 TABLET ORAL at 04:59

## 2022-03-28 RX ADMIN — APIXABAN 5 MG: 5 TABLET, FILM COATED ORAL at 18:20

## 2022-03-28 RX ADMIN — ACETAMINOPHEN 650 MG: 325 TABLET, FILM COATED ORAL at 04:59

## 2022-03-28 RX ADMIN — ACETAMINOPHEN 650 MG: 325 TABLET, FILM COATED ORAL at 13:48

## 2022-03-28 RX ADMIN — CARVEDILOL 3.12 MG: 6.25 TABLET, FILM COATED ORAL at 18:18

## 2022-03-28 RX ADMIN — LIDOCAINE 2 PATCH: 50 PATCH TOPICAL at 23:29

## 2022-03-28 RX ADMIN — METAXALONE 800 MG: 800 TABLET ORAL at 13:48

## 2022-03-28 RX ADMIN — ACETAMINOPHEN 650 MG: 325 TABLET, FILM COATED ORAL at 18:20

## 2022-03-28 RX ADMIN — SACUBITRIL AND VALSARTAN 1 TABLET: 24; 26 TABLET, FILM COATED ORAL at 18:18

## 2022-03-28 RX ADMIN — OXYCODONE HYDROCHLORIDE 10 MG: 5 TABLET ORAL at 20:35

## 2022-03-28 RX ADMIN — METAXALONE 800 MG: 800 TABLET ORAL at 18:20

## 2022-03-28 RX ADMIN — EZETIMIBE 10 MG: 10 TABLET ORAL at 04:59

## 2022-03-28 RX ADMIN — APIXABAN 5 MG: 5 TABLET, FILM COATED ORAL at 05:00

## 2022-03-28 RX ADMIN — OXYCODONE HYDROCHLORIDE 10 MG: 5 TABLET ORAL at 08:45

## 2022-03-28 RX ADMIN — CARVEDILOL 3.12 MG: 6.25 TABLET, FILM COATED ORAL at 08:45

## 2022-03-28 RX ADMIN — ACETAMINOPHEN 650 MG: 325 TABLET, FILM COATED ORAL at 23:29

## 2022-03-28 RX ADMIN — FLUTICASONE PROPIONATE 100 MCG: 50 SPRAY, METERED NASAL at 04:58

## 2022-03-28 RX ADMIN — OMEPRAZOLE 40 MG: 20 CAPSULE, DELAYED RELEASE ORAL at 18:18

## 2022-03-28 RX ADMIN — TAMSULOSIN HYDROCHLORIDE 0.4 MG: 0.4 CAPSULE ORAL at 08:46

## 2022-03-28 RX ADMIN — SACUBITRIL AND VALSARTAN 1 TABLET: 24; 26 TABLET, FILM COATED ORAL at 04:58

## 2022-03-28 RX ADMIN — OXYCODONE HYDROCHLORIDE 10 MG: 5 TABLET ORAL at 04:59

## 2022-03-28 ASSESSMENT — FIBROSIS 4 INDEX: FIB4 SCORE: 4.38

## 2022-03-28 ASSESSMENT — PAIN DESCRIPTION - PAIN TYPE
TYPE: ACUTE PAIN

## 2022-03-28 ASSESSMENT — ENCOUNTER SYMPTOMS
FEVER: 0
VOMITING: 0
NAUSEA: 0
ABDOMINAL PAIN: 0
PALPITATIONS: 0

## 2022-03-28 NOTE — PROGRESS NOTES
"   Orthopaedic Progress Note    Interval changes:  Patient doing well    Dressings CDI  Cleared for DC to SNF by ortho pending medicine clearance    ROS - Patient denies any new issues.  Pain well controlled.    /69   Pulse 72   Temp 36.7 °C (98.1 °F) (Temporal)   Resp 18   Ht 1.854 m (6' 1\")   Wt 107 kg (235 lb 14.3 oz)   SpO2 93%       Patient seen and examined  No acute distress  Breathing non labored  RRR  LLE surgical dressings are clean, dry, and intact. Patient clearly fires tibialis anterior, EHL, and gastrocnemius/soleus. Sensation is intact to light touch throughout superficial peroneal, deep peroneal, tibial, saphenous, and sural nerve distributions. Strong and palpable 2+ dorsalis pedis and posterior tibial pulses with capillary refill less than 2 seconds. No lower leg tenderness or discomfort.       Recent Labs     03/26/22  0708   WBC 5.1   RBC 4.90   HEMOGLOBIN 14.9   HEMATOCRIT 46.1   MCV 94.1   MCH 30.4   MCHC 32.3*   RDW 50.0   PLATELETCT 121*   MPV 10.4       Active Hospital Problems    Diagnosis    • Fracture of ribs, three, closed, left, initial encounter [S22.42XA]      Priority: High   • Renal hematoma, left, initial encounter [S37.012A]      Priority: High   • Disp fx of greater trochanter of left femur, init ([facility]) [S72.112A]      Priority: High   • Encounter for geriatric assessment [Z01.89]      Priority: Medium   • Seasonal allergies [J30.2]      Priority: Medium   • Antiplatelet or antithrombotic long-term use [Z79.02]      Priority: Medium   • Persistent atrial fibrillation ([facility]) [I48.19]      Priority: Medium   • JAMES (obstructive sleep apnea) [G47.33]      Priority: Medium     Patient is on CPAP at home     • Hyperkalemia [E87.5]      Priority: Medium   • Essential hypertension [I10]      Priority: Medium   • S/P TAVR (transcatheter aortic valve replacement) [Z95.2]      Priority: Medium   • CHF (congestive heart failure), NYHA class II, chronic, systolic ([facility]) [I50.22]      " Priority: Medium     managed by cardiology Dr. Wilkinson: He appears euvolemic on physical exam.  He is on losartan, carvedilol, not on spironolactone due to low kidney function.  He was admitted to the hospital for CHF exacerbation, when he suddenly gained 15 pounds.  He was diuresed with IV Lasix at that time, had echocardiogram that showed significant improvement of his cardiac function, when compared to previous study.  Ejection fraction 55%.     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 pylori, haji's mucosa indefinite for dysplasia, no evidence of celiac disease  4/22/15 upper GI series postoperatively appears laparoscopic band within normal limits, small sliding hiatal hernia  5/14/15 EGD per [facility] esophageal mucosal changes large amount of food residue in stomach, 5 mucosa low grade dysplasia, intestinal metaplasia, gastric emptying study pending  5/22/15 nuclear medicine gastric emptying study per [facility] evidence of reflux, gastric emptying half-time 118 minutes  1/8/16 EGD per  surgery normal  1/4/17 [facility] note history of reflux, treatment of haji's esophagus low-grade dysplasia, discussed radiofrequency ablation and endoscopic ultrasound  7/24/17 EGD per [facility] biopsies performed, follow up haji's  8/16/18 EGD per [facility] salmon colored mucosa suspicious for short segment haji's, biopsies pathology reactive gastropathy with mucosal iron deposition, negative h.pylori, haji's mucosa, active esophagitis consistent with reflux, no dysplasia or malignancy seen  8/26/19 EGD per [facility]  esophageal mucosal changes consistent with short segment haji's, biopsied           • Contraindication to deep vein thrombosis (DVT) prophylaxis [Z53.09]      Priority: Medium     ICD-10 transition     • Dyslipidemia [E78.5]      Priority: Medium     Established diagnosis. Currently taking Zetia, intolerant to statins  Lab Results   Component Value Date/Time    CHOLSTRLTOT 72 (L) 05/19/2021 08:26 AM    TRIGLYCERIDE 102 05/19/2021 08:26 AM    HDL 24 (A) 05/19/2021 08:26 AM    LDL 28 05/19/2021 08:26 AM             • Contusion of left hand [S60.222A]      Priority: Low   • Abnormal CT scan, colon [R93.3]      Priority: Low   • Trauma [T14.90XA]      Priority: Low   • Hip hematoma, left, initial encounter [S70.02XA]      Priority: Low   • Encounter for screening for COVID-19 [Z11.52]      Priority: Low   • ICD (implantable cardioverter-defibrillator) in place [Z95.810]      Priority: Low   • Frequent falls [R29.6]      Priority: Low   • Obesity [E66.9]      Priority: Low     3/3/16 BMI 33.6  3/10/17 BMI 33.3 sees  bariatric   10/8/18 BMI 32.6     • Type 2 diabetes mellitus with hyperglycemia, without long-term current use of insulin (HCC) [E11.65]      Priority: Low     Is a chronic condition, patient states that he is managed by his wellness doctor Dr. Miner.    He says that his most recent hemoglobin A1c was checked around a year ago and was 6.7%  Patient is taking Amaryl 1 mg tablet once daily.  His last foot exam was today 03/15/2022 by his podiatrist.  Patient has not had retinal screening for a few years now-provided referral, patient to schedule.         • Left hand pain [M79.642]    • Chronic respiratory failure with hypoxia (HCC) [J96.11]    • Hormone replacement therapy [Z79.890]    • BPH (benign prostatic hyperplasia) [N40.0]        Assessment/Plan:  Patient doing well    Dressings CDI  Cleared for DC to SNF by ortho pending medicine clearance  POD#4 S/P Surgical treatment of left  intertrochanteric femur fracture with intramedullary device  Wt bearing status - WBAT  Wound care/Drains - Dressings to be changed every other day by nursing  Future Procedures - none planned   Sutures/Staples out- 14 days post operatively  PT/OT-initiated  Antibiotics: Perioperative completed  DVT Prophylaxis- TEDS/SCDs/Foot pumps/heparin  Gutierres-none  Case Coordination for Discharge Planning - Disposition SNF     I have performed a physical exam and reviewed and updated ROS and Plan today (3/28). In review of yesterday's note (3/27), there are no changes except as documented above.

## 2022-03-28 NOTE — PROGRESS NOTES
4 Eyes Skin Assessment Completed by MARY Lopes and MARY Pino.    Head WDL  Ears WDL  Nose WDL  Mouth WDL  Neck WDL  Breast/Chest WDL  Shoulder Blades WDL  Spine WDL  (R) Arm/Elbow/Hand WDL  (L) Arm/Elbow/Hand WDL  Abdomen WDL  Groin WDL  Scrotum/Coccyx/Buttocks red, but blanching  (R) Leg WDL  (L) Leg Incision gauze, tagaderm CDI  (R) Heel/Foot/Toe Blanching and Discoloration dusky, flaky  (L) Heel/Foot/Toe Blanching and Discoloration dusky, flaky          Devices In Places Pulse Ox, SCD's and Nasal Cannula      Interventions In Place Heel Mepilex, Waffle Overlay, Pillows and Barrier Cream    Possible Skin Injury No    Pictures Uploaded Into Epic N/A  Wound Consult Placed N/A  RN Wound Prevention Protocol Ordered No

## 2022-03-28 NOTE — PROGRESS NOTES
Pt is A&O x4, calls appropriately  Pain 6/10   - nausea  Tolerating a diabetic diet   L hip dressing, L thigh dressing CDI  + Voids  + flatus  Last BM 3/27  Up x2  Bed alarm on, pt high fall risk per gulshan anderson  Reviewed plan of care with patient, bed in lowest position and locked, pt resting comfortably now, call light within reach, all needs met at this time. Interventions will be executed per plan of care

## 2022-03-28 NOTE — CARE PLAN
The patient is Stable - Low risk of patient condition declining or worsening    Shift Goals  Clinical Goals: pain control  Patient Goals: rest      Progress made toward(s) clinical / shift goals:  pt medicated per MAR, pt resting in bed    Patient is not progressing towards the following goals:          Problem: Knowledge Deficit - Standard  Goal: Patient and family/care givers will demonstrate understanding of plan of care, disease process/condition, diagnostic tests and medications  Outcome: Progressing     Problem: Pain - Standard  Goal: Alleviation of pain or a reduction in pain to the patient’s comfort goal  Outcome: Progressing  Flowsheets  Taken 3/27/2022 2340  OB Pain Intervention: Medication - See MAR  Taken 3/27/2022 2328  Pain Rating Scale (NPRS): 7     Problem: Skin Integrity  Goal: Skin integrity is maintained or improved  Outcome: Progressing     Problem: Fall Risk  Goal: Patient will remain free from falls  Outcome: Progressing

## 2022-03-28 NOTE — CARE PLAN
The patient is Stable - Low risk of patient condition declining or worsening    Shift Goals  Clinical Goals: pain control, maintain skin integrity  Patient Goals: rest  Family Goals: N/A    Progress made toward(s) clinical / shift goals: assess pain Q2-4H, administer pain medication as indicated, encourage patient to voice pain to staff; no new skin breakdown noted     Patient is not progressing towards the following goals:      Problem: Pain - Standard  Goal: Alleviation of pain or a reduction in pain to the patient’s comfort goal  Outcome: Progressing     Problem: Skin Integrity  Goal: Skin integrity is maintained or improved  Outcome: Progressing

## 2022-03-28 NOTE — DISCHARGE PLANNING
Anticipated Discharge Disposition: SNF    Action: This case was discussed today during IDT Rounds. LSW reported, patient has declined signing the SNF choice form in multiple occasions.    Sarah RN Manager provided assistance encouraging patient to sign the choice form to facilitate discharge. Per Sarah, patient chose [facility], choice form faxed to [facility].    LSW requested [facility] informs [facility] of patient's request to speak to their Liaison or Admissions Department before he consents for transfer.    Barriers to Discharge: Accepting Facility. Patient's consent to transfer.    Plan: SNF, pending accepting facility and transfer arrangements.

## 2022-03-28 NOTE — PROGRESS NOTES
4 Eyes Skin Assessment Completed.    Head WDL  Ears Redness and Blanching  Nose WDL  Mouth WDL  Neck WDL  Breast/Chest WDL  Shoulder Blades Redness and Blanching  Spine Redness and Blanching  (R) Arm/Elbow/Hand WDL  (L) Arm/Elbow/Hand WDL  Abdomen WDL  Groin WDL  Scrotum/Coccyx/Buttocks Redness and Blanching  (R) Leg WDL  (L) Leg incision to left hip with gauze and tegaderm, CDI. Skin tear to left lateral knee, ALEJANDRO, scabbed  (R) Heel/Foot/Toe Redness and Blanching, dry, flaky, dusky  (L) Heel/Foot/Toe Redness and Blanching, dry, flaky, dusky          Devices In Places Pulse Ox, SCD's and Nasal Cannula      Interventions In Place Pillows, Heels Loaded W/Pillows and Pressure Redistribution Mattress    Possible Skin Injury No    Pictures Uploaded Into Epic N/A  Wound Consult Placed N/A  RN Wound Prevention Protocol Ordered No

## 2022-03-28 NOTE — PROGRESS NOTES
Bedside report received.  Assessment complete.  A&O x 4. Patient calls appropriately.  Patient ambulates with max assist. Bed alarm on.   Patient has 7/10 pain. Pain managed with prescribed medications.  Denies N&V. Tolerating diabetic diet.  Dressing to left hip x 2, CDI  + void, + flatus, - BM.  Patient denies SOB.  SCD's on.    Review plan with of care with patient. Call light and personal belongings within reach. Hourly rounding in place. All needs met at this time.

## 2022-03-28 NOTE — PROGRESS NOTES
[facility] Medicine Daily Progress Note    Date of Service  3/28/2022    Chief Complaint  LIAN More III is a 81 y.o. male admitted 3/18/2022 with rib and femur fracture following a fall    Hospital Course  Admitted with fall, with fractured left ribs, left greater trochanter fracture, left renal hematoma, left hip hematoma.  Trauma surgery admitted the patient, Orthopedic surgery was also consulted on the case.  He requires further evaluation of the left greater trochanter fracture with an MRI of the hip, however he has an ICD in place and will require the [employer] technician. Patient states he fell down while playing golf as he was walking up a 30 degree incline. Patient admits to 4 episodes of fall over the past year.  2 or 3 years ago he injured his left shoulder and he had a left renal hematoma when he fell down.  He denies using any assistive device, he used to need them but with therapy he was able to stop using them.  Admits to some dizziness or lightheadedness when he gets up too quickly, this is also the reason why he takes most of his medications at night.  He has known history of CHF, on in reviewing his medication he is on Coreg, Losartan, and Entresto.  On his recent visit with Cardiology, his diuretics were changed to Demadex due to increased fluid retention and weight gain.  He was also given a fluid restriction of 1.8 L/day.  He has known history of persistent atrial fibrillation, he is anticoagulation with Eliquis.  Also known history of CAD with history of PCI, he is on aspirin for antiplatelet treatment.  For his diabetes he takes glimepiride and again he takes this at night.  Most recent hemoglobin A1c was only 6.4.  He denies any memory impairment, or vision or hearing impairments.  He has known history of BPH, but denies any urinary incontinence.    MRI revealed mildly displaced fracture of the left greater trochanter with extension to the proximal femur.  Orthopedics eventually took the patient  to the OR for IM nail/device.  He has been WBAT since.    He developed mild MAKEDA due to overdiuresis.  Diuretics were held and his renal function improved.  Due to poor participation with therapy he was not deemed a likely candidate for inpatient rehab.  Therapy is recommended skilled.      Interval Problem Update  3/28 Renal fx improving. Back on Eliquis/Entresto w/o e/o bleeding. . AM glu 139. CPAP. Awaits SNF - choice complete. C/O itchy eyes/hx of environ allergies.    3/27 labs ok.  Creatinine near baseline. 2-3L At night. Not using CPAP this a.m. Will trial start of Eliquis and Entresto. Discussed w Ortho. Awaits SNF.     3/26 Renal function cont to improve with diuretic/CHF med cessation    3L via NC, 93%  SCr 2.15 with GFR 30  Heparin SQ -Eliquis/ASA still held for AF  Hgb normal    I have personally seen and examined the patient at bedside. I discussed the plan of care with patient and bedside RN.    Consultants/Specialty  orthopedics    Code Status  Full Code    Disposition  Patient is not medically cleared for discharge.   Anticipate discharge to to skilled nursing facility.  I have placed the appropriate orders for post-discharge needs.    Review of Systems  Review of Systems   Constitutional: Negative for fever.   Cardiovascular: Positive for chest pain (unrelated to exertion). Negative for palpitations and leg swelling.   Gastrointestinal: Negative for abdominal pain, nausea and vomiting.   Genitourinary: Negative for dysuria.   Musculoskeletal: Positive for joint pain.   All other systems reviewed and are negative.       Physical Exam  Temp:  [36.1 °C (97 °F)-37.3 °C (99.2 °F)] 36.1 °C (97 °F)  Pulse:  [63-78] 63  Resp:  [18] 18  BP: (113-134)/(63-65) 113/65  SpO2:  [93 %-95 %] 93 %    Physical Exam  Vitals and nursing note reviewed.   Constitutional:       Appearance: He is overweight.      Interventions: Nasal cannula in place.   HENT:      Head: Normocephalic and atraumatic.      Nose:  Nose normal.   Eyes:      Conjunctiva/sclera: Conjunctivae normal.      Pupils: Pupils are equal, round, and reactive to light.   Cardiovascular:      Rate and Rhythm: Normal rate and regular rhythm.      Heart sounds: No murmur heard.    No friction rub.   Pulmonary:      Effort: No respiratory distress.   Chest:      Comments: Left chest ICD  Abdominal:      General: Bowel sounds are normal. There is no distension.      Palpations: Abdomen is soft.   Musculoskeletal:      Cervical back: Neck supple.      Right lower leg: No edema.      Left lower leg: No edema.      Comments: L hip CDI; inflammation present   Skin:     General: Skin is warm and dry.   Neurological:      Mental Status: He is alert.         Fluids    Intake/Output Summary (Last 24 hours) at 3/28/2022 1214  Last data filed at 3/28/2022 0900  Gross per 24 hour   Intake 600 ml   Output 1700 ml   Net -1100 ml       Laboratory  Recent Labs     03/26/22  0708   WBC 5.1   RBC 4.90   HEMOGLOBIN 14.9   HEMATOCRIT 46.1   MCV 94.1   MCH 30.4   MCHC 32.3*   RDW 50.0   PLATELETCT 121*   MPV 10.4     Recent Labs     03/26/22  0708 03/27/22  0437 03/28/22  0346   SODIUM 136 136 137   POTASSIUM 5.0 4.9 5.0   CHLORIDE 103 101 104   CO2 22 23 24   GLUCOSE 168* 169* 139*   BUN 78* 81* 67*   CREATININE 2.15* 1.98* 1.65*   CALCIUM 8.4* 8.3* 8.6                   Imaging  DX-CHEST-PORTABLE (1 VIEW)   Final Result      No significant change from prior exam.      DX-CHEST-PORTABLE (1 VIEW)   Final Result         1. No significant interval change.      DX-CHEST-PORTABLE (1 VIEW)   Final Result         1. No significant interval change.      DX-PORTABLE FLUOROSCOPY < 1 HOUR   Final Result      Portable fluoroscopy utilized for 39 seconds.         INTERPRETING LOCATION: [address] [place] [address], [id number]      DX-FEMUR-2+ LEFT   Final Result      Intraoperative image as above described.      DX-CHEST-PORTABLE (1 VIEW)   Final Result      No significant interval change.       DX-CHEST-PORTABLE (1 VIEW)   Final Result      No significant interval change.      MR-HIP-W/O LEFT   Final Result         1. Redemonstration of acute mildly displaced fracture of the left greater trochanter with extension to the proximal femur. No involvement of the lesser trochanter. No fracture seen in the acetabulum.      2. High-grade tear and strain of the left gluteus medius tendon and muscle with intramuscular hematoma.      3. Mild strain of the adductor muscles.      DX-CHEST-PORTABLE (1 VIEW)   Final Result      Stable chest with cardiac silhouette enlargement and interstitial edema      DX-WRIST-COMPLETE 3+ LEFT   Final Result      1.  No radiographic evidence of acute traumatic injury.   2.  Degenerative changes of the basal joints of the thumb.   3.  Osteopenia.      DX-HAND 3+ LEFT   Final Result         1.  No radiographic evidence of acute injury.      2. Mild osteoarthritic changes are noted at the first carpometacarpal joint.      DX-CHEST-PORTABLE (1 VIEW)   Final Result      Stable examination.      US-TRAUMA VEIN SCREEN LOWER BILAT EXTREMITY   Final Result      DX-CHEST-PORTABLE (1 VIEW)   Final Result      No significant interval change.      DX-CHEST-PORTABLE (1 VIEW)   Final Result         1. No significant interval change.      CT-HEAD W/O   Final Result         1. No acute intracranial abnormality. No evidence of acute intracranial hemorrhage or mass lesion.                     CT-CSPINE WITHOUT PLUS RECONS   Final Result         1. No acute fracture from C1 through T1 is visualized.         US-ABORTED US PROCEDURE    (Results Pending)        Assessment/Plan  * Fracture of ribs, three, closed, left, initial encounter- (present on admission)  Assessment & Plan  Pain control   Encourage I-S    Acute on chronic Stage 3b chronic kidney disease (HCC)- (present on admission)  Assessment & Plan  Follow bmp closely  Monitor I/O    With MAKEDA,   3/23: noted continued rise in Cr; concerns about  possible over-diuresis. Holding torsemide  3/24: hold Entreston  3/25: Treatment for hyperkalemia; DC NSAIDs started post op and avoid nephrotoxins     Frequent falls- (present on admission)  Assessment & Plan  PT and OT evaluations  Check Orthostatics    Disp fx of greater trochanter of left femur, init (HCC)- (present on admission)  Assessment & Plan  Pain control  S/P IMN left intertrochanteric fracture  WBAT  Orthopedic surgery following  Sutures/staples out 14 days post op    Hip hematoma, left, initial encounter- (present on admission)  Assessment & Plan  pain control  Follow hgb    Chronic respiratory failure with hypoxia (HCC)- (present on admission)  Assessment & Plan  Requires O2 supplementation with CPAP  RT protocol    CHF (congestive heart failure), NYHA class II, chronic, systolic (HCC)- (present on admission)  Assessment & Plan  Coreg,  Monitor fluid status closely  Echocardiogram - EF 55%   2/15/2022  Clinically appeared euvolemic   Monitor fluid status before resuming Torsemide  3/26 Periodic NT BNP/volume assessment  3/27 Entresto restarted    Coronary artery disease due to calcified coronary lesion- (present on admission)  Assessment & Plan  History PCI of the LAD/distal left main and the distal RCA  Coreg, Zetia  Intolerant to statins  Resume ASA when cleared by Trauma Surgery    Dyslipidemia- (present on admission)  Assessment & Plan  Zetia  Intolerant to statins    Left hand pain- (present on admission)  Assessment & Plan  Sprain?  Hand xray negative  Wrist xray negative  Supportive care    BPH (benign prostatic hyperplasia)- (present on admission)  Assessment & Plan  Flomax  Monitor for urinary retention    Hormone replacement therapy- (present on admission)  Assessment & Plan  Hold Testosterone, Clomiphene, Anastrazole    Persistent atrial fibrillation (HCC)- (present on admission)  Assessment & Plan  Coreg  3/27 restart Eliquis today for AF    JAMES (obstructive sleep apnea)- (present on  admission)  Assessment & Plan  CPAP, RT protocol    Essential hypertension- (present on admission)  Assessment & Plan  Coreg  3/27 resumed  Entresto    Renal hematoma, left, initial encounter- (present on admission)  Assessment & Plan  Follow hemoglobin    ICD (implantable cardioverter-defibrillator) in place- (present on admission)  Assessment & Plan  [employer] tech for MRI     S/P TAVR (transcatheter aortic valve replacement)- (present on admission)  Assessment & Plan  monitor fluid status  Echocardiogram - known TAVR aortic valve that is functioning normally with normal transvalvular gradients   2/15/2022    Type 2 diabetes mellitus with hyperglycemia, without long-term current use of insulin (HCC)- (present on admission)  Assessment & Plan  SSI for now  Hold Glimepiride  hgba1c - 6.4   3/15/2022  May consider trial of holding glimepiride on discharge        VTE prophylaxis: therapeutic anticoagulation with eliquis    I have performed a physical exam and reviewed and updated ROS and Plan today (3/28/2022). In review of yesterday's note (3/27/2022), there are no changes except as documented above.

## 2022-03-28 NOTE — PROGRESS NOTES
"   Orthopaedic Progress Note    Interval changes:  Patient doing well    Dressings CDI  Cleared for DC to SNF by ortho pending medicine clearance    ROS - Patient denies any new issues.  Pain well controlled.    /64   Pulse 75   Temp 37.3 °C (99.2 °F) (Temporal)   Resp 18   Ht 1.854 m (6' 1\")   Wt 102 kg (224 lb 6.9 oz)   SpO2 94%       Patient seen and examined  No acute distress  Breathing non labored  RRR  LLE surgical dressings are clean, dry, and intact. Patient clearly fires tibialis anterior, EHL, and gastrocnemius/soleus. Sensation is intact to light touch throughout superficial peroneal, deep peroneal, tibial, saphenous, and sural nerve distributions. Strong and palpable 2+ dorsalis pedis and posterior tibial pulses with capillary refill less than 2 seconds. No lower leg tenderness or discomfort.       Recent Labs     03/25/22  0307 03/26/22  0708   WBC 7.5 5.1   RBC 5.19 4.90   HEMOGLOBIN 15.8 14.9   HEMATOCRIT 49.3 46.1   MCV 95.0 94.1   MCH 30.4 30.4   MCHC 32.0* 32.3*   RDW 48.3 50.0   PLATELETCT 147* 121*   MPV 10.5 10.4       Active Hospital Problems    Diagnosis    • Fracture of ribs, three, closed, left, initial encounter [S22.42XA]      Priority: High   • Renal hematoma, left, initial encounter [S37.012A]      Priority: High   • Disp fx of greater trochanter of left femur, init ([facility]) [S72.112A]      Priority: High   • Encounter for geriatric assessment [Z01.89]      Priority: Medium   • Seasonal allergies [J30.2]      Priority: Medium   • Antiplatelet or antithrombotic long-term use [Z79.02]      Priority: Medium   • Persistent atrial fibrillation (HCC) [I48.19]      Priority: Medium   • JAMES (obstructive sleep apnea) [G47.33]      Priority: Medium     Patient is on CPAP at home     • Hyperkalemia [E87.5]      Priority: Medium   • Essential hypertension [I10]      Priority: Medium   • S/P TAVR (transcatheter aortic valve replacement) [Z95.2]      Priority: Medium   • CHF (congestive " heart failure), NYHA class II, chronic, systolic ([facility]) [I50.22]      Priority: Medium     managed by cardiology Dr. Wilkinson: He appears euvolemic on physical exam.  He is on losartan, carvedilol, not on spironolactone due to low kidney function.  He was admitted to the hospital for CHF exacerbation, when he suddenly gained 15 pounds.  He was diuresed with IV Lasix at that time, had echocardiogram that showed significant improvement of his cardiac function, when compared to previous study.  Ejection fraction 55%.     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 pylori, haji's mucosa indefinite for dysplasia, no evidence of celiac disease  4/22/15 upper GI series postoperatively appears laparoscopic band within normal limits, small sliding hiatal hernia  5/14/15 EGD per [facility] esophageal mucosal changes large amount of food residue in stomach, 5 mucosa low grade dysplasia, intestinal metaplasia, gastric emptying study pending  5/22/15 nuclear medicine gastric emptying study per [facility] evidence of reflux, gastric emptying half-time 118 minutes  1/8/16 EGD per  surgery normal  1/4/17 [facility] note history of reflux, treatment of haji's esophagus low-grade dysplasia, discussed radiofrequency ablation and endoscopic ultrasound  7/24/17 EGD per [facility] biopsies performed, follow up haji's  8/16/18 EGD per [facility] salmon colored mucosa suspicious for short segment haji's, biopsies pathology reactive gastropathy with mucosal iron deposition, negative h.pylori, haji's mucosa, active esophagitis  consistent with reflux, no dysplasia or malignancy seen  8/26/19 EGD per [facility] esophageal mucosal changes consistent with short segment haji's, biopsied           • Contraindication to deep vein thrombosis (DVT) prophylaxis [Z53.09]      Priority: Medium     ICD-10 transition     • Dyslipidemia [E78.5]      Priority: Medium     Established diagnosis. Currently taking Zetia, intolerant to statins  Lab Results   Component Value Date/Time    CHOLSTRLTOT 72 (L) 05/19/2021 08:26 AM    TRIGLYCERIDE 102 05/19/2021 08:26 AM    HDL 24 (A) 05/19/2021 08:26 AM    LDL 28 05/19/2021 08:26 AM             • Contusion of left hand [S60.222A]      Priority: Low   • Abnormal CT scan, colon [R93.3]      Priority: Low   • Trauma [T14.90XA]      Priority: Low   • Hip hematoma, left, initial encounter [S70.02XA]      Priority: Low   • Encounter for screening for COVID-19 [Z11.52]      Priority: Low   • ICD (implantable cardioverter-defibrillator) in place [Z95.810]      Priority: Low   • Frequent falls [R29.6]      Priority: Low   • Obesity [E66.9]      Priority: Low     3/3/16 BMI 33.6  3/10/17 BMI 33.3 sees  bariatric   10/8/18 BMI 32.6     • Type 2 diabetes mellitus with hyperglycemia, without long-term current use of insulin (HCC) [E11.65]      Priority: Low     Is a chronic condition, patient states that he is managed by his wellness doctor Dr. Miner.    He says that his most recent hemoglobin A1c was checked around a year ago and was 6.7%  Patient is taking Amaryl 1 mg tablet once daily.  His last foot exam was today 03/15/2022 by his podiatrist.  Patient has not had retinal screening for a few years now-provided referral, patient to schedule.         • Left hand pain [M79.642]    • Chronic respiratory failure with hypoxia (HCC) [J96.11]    • Hormone replacement therapy [Z79.890]    • BPH (benign prostatic hyperplasia) [N40.0]        Assessment/Plan:  Patient doing well    Dressings CDI  Cleared for DC to SNF by ortho  pending medicine clearance  POD#3 S/P Surgical treatment of left intertrochanteric femur fracture with intramedullary device  Wt bearing status - WBAT  Wound care/Drains - Dressings to be changed every other day by nursing  Future Procedures - none planned   Sutures/Staples out- 14 days post operatively  PT/OT-initiated  Antibiotics: Perioperative completed  DVT Prophylaxis- TEDS/SCDs/Foot pumps/heparin  Gutierres-none  Case Coordination for Discharge Planning - Disposition SNF     I have performed a physical exam and reviewed and updated ROS and Plan today (3/27). In review of yesterday's note (3/26), there are no changes except as documented above.

## 2022-03-29 LAB
ERYTHROCYTE [DISTWIDTH] IN BLOOD BY AUTOMATED COUNT: 49.1 FL (ref 35.9–50)
GLUCOSE BLD STRIP.AUTO-MCNC: 141 MG/DL (ref 65–99)
GLUCOSE BLD STRIP.AUTO-MCNC: 162 MG/DL (ref 65–99)
GLUCOSE BLD STRIP.AUTO-MCNC: 190 MG/DL (ref 65–99)
GLUCOSE BLD STRIP.AUTO-MCNC: 195 MG/DL (ref 65–99)
HCT VFR BLD AUTO: 47 % (ref 42–52)
HGB BLD-MCNC: 14.9 G/DL (ref 14–18)
MCH RBC QN AUTO: 30.2 PG (ref 27–33)
MCHC RBC AUTO-ENTMCNC: 31.7 G/DL (ref 33.7–35.3)
MCV RBC AUTO: 95.3 FL (ref 81.4–97.8)
PLATELET # BLD AUTO: 150 K/UL (ref 164–446)
PMV BLD AUTO: 10.3 FL (ref 9–12.9)
RBC # BLD AUTO: 4.93 M/UL (ref 4.7–6.1)
WBC # BLD AUTO: 5.3 K/UL (ref 4.8–10.8)

## 2022-03-29 PROCEDURE — 700102 HCHG RX REV CODE 250 W/ 637 OVERRIDE(OP): Performed by: ORTHOPAEDIC SURGERY

## 2022-03-29 PROCEDURE — A9270 NON-COVERED ITEM OR SERVICE: HCPCS | Performed by: SURGERY

## 2022-03-29 PROCEDURE — 700102 HCHG RX REV CODE 250 W/ 637 OVERRIDE(OP): Performed by: SURGERY

## 2022-03-29 PROCEDURE — 85027 COMPLETE CBC AUTOMATED: CPT

## 2022-03-29 PROCEDURE — 97116 GAIT TRAINING THERAPY: CPT | Mod: CQ

## 2022-03-29 PROCEDURE — 97530 THERAPEUTIC ACTIVITIES: CPT | Mod: CQ

## 2022-03-29 PROCEDURE — 700102 HCHG RX REV CODE 250 W/ 637 OVERRIDE(OP): Performed by: FAMILY MEDICINE

## 2022-03-29 PROCEDURE — 700102 HCHG RX REV CODE 250 W/ 637 OVERRIDE(OP): Performed by: NURSE PRACTITIONER

## 2022-03-29 PROCEDURE — 700102 HCHG RX REV CODE 250 W/ 637 OVERRIDE(OP)

## 2022-03-29 PROCEDURE — A9270 NON-COVERED ITEM OR SERVICE: HCPCS | Performed by: FAMILY MEDICINE

## 2022-03-29 PROCEDURE — A9270 NON-COVERED ITEM OR SERVICE: HCPCS | Performed by: ORTHOPAEDIC SURGERY

## 2022-03-29 PROCEDURE — 94660 CPAP INITIATION&MGMT: CPT

## 2022-03-29 PROCEDURE — 99233 SBSQ HOSP IP/OBS HIGH 50: CPT | Performed by: FAMILY MEDICINE

## 2022-03-29 PROCEDURE — A9270 NON-COVERED ITEM OR SERVICE: HCPCS

## 2022-03-29 PROCEDURE — 82962 GLUCOSE BLOOD TEST: CPT | Mod: 91

## 2022-03-29 PROCEDURE — 36415 COLL VENOUS BLD VENIPUNCTURE: CPT

## 2022-03-29 PROCEDURE — A9270 NON-COVERED ITEM OR SERVICE: HCPCS | Performed by: NURSE PRACTITIONER

## 2022-03-29 PROCEDURE — 700101 HCHG RX REV CODE 250: Performed by: FAMILY MEDICINE

## 2022-03-29 PROCEDURE — 302196 LINEN, HYPOALLERGENIC: Performed by: FAMILY MEDICINE

## 2022-03-29 PROCEDURE — 770001 HCHG ROOM/CARE - MED/SURG/GYN PRIV*

## 2022-03-29 RX ORDER — LOSARTAN POTASSIUM 50 MG/1
25 TABLET ORAL
Status: DISCONTINUED | OUTPATIENT
Start: 2022-03-29 | End: 2022-03-30

## 2022-03-29 RX ORDER — TORSEMIDE 20 MG/1
20 TABLET ORAL
Status: DISCONTINUED | OUTPATIENT
Start: 2022-03-30 | End: 2022-03-31 | Stop reason: HOSPADM

## 2022-03-29 RX ADMIN — TAMSULOSIN HYDROCHLORIDE 0.4 MG: 0.4 CAPSULE ORAL at 09:06

## 2022-03-29 RX ADMIN — CARVEDILOL 3.12 MG: 6.25 TABLET, FILM COATED ORAL at 18:06

## 2022-03-29 RX ADMIN — ACETAMINOPHEN 650 MG: 325 TABLET, FILM COATED ORAL at 05:24

## 2022-03-29 RX ADMIN — CARVEDILOL 3.12 MG: 6.25 TABLET, FILM COATED ORAL at 08:43

## 2022-03-29 RX ADMIN — OMEPRAZOLE 40 MG: 20 CAPSULE, DELAYED RELEASE ORAL at 05:24

## 2022-03-29 RX ADMIN — METAXALONE 800 MG: 800 TABLET ORAL at 12:22

## 2022-03-29 RX ADMIN — SACUBITRIL AND VALSARTAN 1 TABLET: 24; 26 TABLET, FILM COATED ORAL at 05:24

## 2022-03-29 RX ADMIN — FLUTICASONE PROPIONATE 100 MCG: 50 SPRAY, METERED NASAL at 05:24

## 2022-03-29 RX ADMIN — APIXABAN 5 MG: 5 TABLET, FILM COATED ORAL at 05:25

## 2022-03-29 RX ADMIN — SACUBITRIL AND VALSARTAN 1 TABLET: 24; 26 TABLET, FILM COATED ORAL at 18:07

## 2022-03-29 RX ADMIN — METAXALONE 800 MG: 800 TABLET ORAL at 18:07

## 2022-03-29 RX ADMIN — OXYCODONE HYDROCHLORIDE 10 MG: 5 TABLET ORAL at 05:25

## 2022-03-29 RX ADMIN — ACETAMINOPHEN 650 MG: 325 TABLET, FILM COATED ORAL at 12:22

## 2022-03-29 RX ADMIN — APIXABAN 5 MG: 5 TABLET, FILM COATED ORAL at 18:06

## 2022-03-29 RX ADMIN — OMEPRAZOLE 40 MG: 20 CAPSULE, DELAYED RELEASE ORAL at 18:06

## 2022-03-29 RX ADMIN — OXYCODONE HYDROCHLORIDE 10 MG: 5 TABLET ORAL at 18:13

## 2022-03-29 RX ADMIN — METAXALONE 800 MG: 800 TABLET ORAL at 05:24

## 2022-03-29 RX ADMIN — LIDOCAINE 2 PATCH: 50 PATCH TOPICAL at 23:03

## 2022-03-29 RX ADMIN — OXYCODONE HYDROCHLORIDE 10 MG: 5 TABLET ORAL at 08:43

## 2022-03-29 RX ADMIN — EZETIMIBE 10 MG: 10 TABLET ORAL at 05:24

## 2022-03-29 RX ADMIN — OXYCODONE HYDROCHLORIDE 10 MG: 5 TABLET ORAL at 13:59

## 2022-03-29 ASSESSMENT — PAIN DESCRIPTION - PAIN TYPE
TYPE: ACUTE PAIN;SURGICAL PAIN
TYPE: ACUTE PAIN;SURGICAL PAIN
TYPE: ACUTE PAIN
TYPE: ACUTE PAIN;SURGICAL PAIN
TYPE: ACUTE PAIN
TYPE: ACUTE PAIN;SURGICAL PAIN

## 2022-03-29 ASSESSMENT — GAIT ASSESSMENTS
DEVIATION: ANTALGIC
DISTANCE (FEET): 85
ASSISTIVE DEVICE: FRONT WHEEL WALKER
GAIT LEVEL OF ASSIST: MINIMAL ASSIST

## 2022-03-29 ASSESSMENT — ENCOUNTER SYMPTOMS
SENSORY CHANGE: 0
WEAKNESS: 1
FOCAL WEAKNESS: 0
NECK PAIN: 0
DIZZINESS: 0
DIARRHEA: 0
SORE THROAT: 0
FEVER: 0
SPEECH CHANGE: 0
VOMITING: 0
MYALGIAS: 0
BLURRED VISION: 0
NERVOUS/ANXIOUS: 0
DIAPHORESIS: 0
BACK PAIN: 0
NAUSEA: 0
HEARTBURN: 0
PALPITATIONS: 0
ABDOMINAL PAIN: 0
SHORTNESS OF BREATH: 1
CHILLS: 0
WHEEZING: 0
FLANK PAIN: 0
HEADACHES: 0
COUGH: 0

## 2022-03-29 ASSESSMENT — COGNITIVE AND FUNCTIONAL STATUS - GENERAL
MOBILITY SCORE: 14
MOVING TO AND FROM BED TO CHAIR: A LOT
MOVING FROM LYING ON BACK TO SITTING ON SIDE OF FLAT BED: A LOT
WALKING IN HOSPITAL ROOM: A LITTLE
CLIMB 3 TO 5 STEPS WITH RAILING: A LOT
SUGGESTED CMS G CODE MODIFIER MOBILITY: CL
TURNING FROM BACK TO SIDE WHILE IN FLAT BAD: A LOT
STANDING UP FROM CHAIR USING ARMS: A LITTLE

## 2022-03-29 ASSESSMENT — FIBROSIS 4 INDEX: FIB4 SCORE: 4.38

## 2022-03-29 NOTE — THERAPY
"Occupational Therapy  Daily Treatment     Patient Name: LIAN More III  Age:  81 y.o., Sex:  male  Medical Record #: [id number]  Today's Date: 3/29/2022     Precautions  Precautions: Fall Risk,Weight Bearing As Tolerated Right Lower Extremity  Comments: now s/p L femur IM nail.    Assessment     Attempted to see pt for OT treatment. Pt educated in Occupational Therapy tx. Pt stated \"I'm not ready to do any of those things' \" Pt refused all self care tasks presented. Pt agitated and slightly aggressive pulling OT's arm toward him. Pt was told to let go of OT's arm and CNA present and witnessed pt's behavior. RN informed.     Plan     Continue current treatment plan. As pt will participate.     DC Equipment Recommendations: (P) Unable to determine at this time  Discharge Recommendations: (P) Recommend post-acute placement for additional occupational therapy services prior to discharge home    Subjective    \"Come here\", \"I said come here\";  grabbing onto OT's arm and pulled toward pt. Pt informed to let go of OT's arm. CNA present and witnessed.      Objective       03/29/22 1517   Cognition    Cognition / Consciousness WDL   Level of Consciousness Alert   Comments Pt agitated and at times slightly aggressive pulling on OT's arm stating. \"Come here\". CNA witnessed.   Other Treatments   Other Treatments Provided Pt refused all selfcare tasks presented stating \"I'm not ready for this\" . When asked,  Who will help him at home?\"  Pt stated, \"No one I live alone. \" .   Activities of Daily Living   Comments Pt refused all self care tasks presented.   Education Group   Additional Comments Pt refused all LB dressing training statng \"I'm not ready for this yet\". Informed pt that he could use AE to assist with doffing clothing but pt stated, I said I'm not ready for this yet\"   Anticipated Discharge Equipment and Recommendations   DC Equipment Recommendations Unable to determine at this time   Discharge Recommendations Recommend " post-acute placement for additional occupational therapy services prior to discharge home   Interdisciplinary Plan of Care Collaboration   IDT Collaboration with  Nursing   Collaboration Comments RN updated on OT attempt and pt's refusal. RN stated that pt has been inappropriate with some staff members particularily women.   Session Information   Date / Session Number  3/23 #1 (2/3, 3/27) Attempted 3/29. Pt refused.

## 2022-03-29 NOTE — PROGRESS NOTES
"   Orthopaedic Progress Note    Interval changes:  Patient doing well    Dressings CDI  Cleared for DC to SNF by ortho pending medicine clearance    ROS - Patient denies any new issues.  Pain well controlled.    /61   Pulse 65   Temp 36.6 °C (97.9 °F) (Temporal)   Resp 16   Ht 1.854 m (6' 1\")   Wt 109 kg (239 lb 13.8 oz)   SpO2 93%       Patient seen and examined  No acute distress  Breathing non labored  RRR  LLE surgical dressings are clean, dry, and intact. Patient clearly fires tibialis anterior, EHL, and gastrocnemius/soleus. Sensation is intact to light touch throughout superficial peroneal, deep peroneal, tibial, saphenous, and sural nerve distributions. Strong and palpable 2+ dorsalis pedis and posterior tibial pulses with capillary refill less than 2 seconds. No lower leg tenderness or discomfort.       Recent Labs     03/29/22  0540   WBC 5.3   RBC 4.93   HEMOGLOBIN 14.9   HEMATOCRIT 47.0   MCV 95.3   MCH 30.2   MCHC 31.7*   RDW 49.1   PLATELETCT 150*   MPV 10.3       Active Hospital Problems    Diagnosis    • Fracture of ribs, three, closed, left, initial encounter [S22.42XA]      Priority: High   • Renal hematoma, left, initial encounter [S37.012A]      Priority: High   • Disp fx of greater trochanter of left femur, init ([facility]) [S72.112A]      Priority: High   • Encounter for geriatric assessment [Z01.89]      Priority: Medium   • Seasonal allergies [J30.2]      Priority: Medium   • Antiplatelet or antithrombotic long-term use [Z79.02]      Priority: Medium   • Persistent atrial fibrillation ([facility]) [I48.19]      Priority: Medium   • JAMES (obstructive sleep apnea) [G47.33]      Priority: Medium     Patient is on CPAP at home     • Hyperkalemia [E87.5]      Priority: Medium   • Essential hypertension [I10]      Priority: Medium   • S/P TAVR (transcatheter aortic valve replacement) [Z95.2]      Priority: Medium   • CHF (congestive heart failure), NYHA class II, chronic, systolic ([facility]) [I50.22]      " Priority: Medium     managed by cardiology Dr. Wilkinson: He appears euvolemic on physical exam.  He is on losartan, carvedilol, not on spironolactone due to low kidney function.  He was admitted to the hospital for CHF exacerbation, when he suddenly gained 15 pounds.  He was diuresed with IV Lasix at that time, had echocardiogram that showed significant improvement of his cardiac function, when compared to previous study.  Ejection fraction 55%.     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 pylori, haji's mucosa indefinite for dysplasia, no evidence of celiac disease  4/22/15 upper GI series postoperatively appears laparoscopic band within normal limits, small sliding hiatal hernia  5/14/15 EGD per [facility] esophageal mucosal changes large amount of food residue in stomach, 5 mucosa low grade dysplasia, intestinal metaplasia, gastric emptying study pending  5/22/15 nuclear medicine gastric emptying study per [facility] evidence of reflux, gastric emptying half-time 118 minutes  1/8/16 EGD per  surgery normal  1/4/17 [facility] note history of reflux, treatment of haji's esophagus low-grade dysplasia, discussed radiofrequency ablation and endoscopic ultrasound  7/24/17 EGD per [facility] biopsies performed, follow up haji's  8/16/18 EGD per [facility] salmon colored mucosa suspicious for short segment haji's, biopsies pathology reactive gastropathy with mucosal iron deposition, negative h.pylori, haji's mucosa, active esophagitis consistent with reflux, no dysplasia or malignancy seen  8/26/19 EGD per [facility]  esophageal mucosal changes consistent with short segment haji's, biopsied           • Contraindication to deep vein thrombosis (DVT) prophylaxis [Z53.09]      Priority: Medium     ICD-10 transition     • Dyslipidemia [E78.5]      Priority: Medium     Established diagnosis. Currently taking Zetia, intolerant to statins  Lab Results   Component Value Date/Time    CHOLSTRLTOT 72 (L) 05/19/2021 08:26 AM    TRIGLYCERIDE 102 05/19/2021 08:26 AM    HDL 24 (A) 05/19/2021 08:26 AM    LDL 28 05/19/2021 08:26 AM             • Contusion of left hand [S60.222A]      Priority: Low   • Abnormal CT scan, colon [R93.3]      Priority: Low   • Trauma [T14.90XA]      Priority: Low   • Hip hematoma, left, initial encounter [S70.02XA]      Priority: Low   • Encounter for screening for COVID-19 [Z11.52]      Priority: Low   • ICD (implantable cardioverter-defibrillator) in place [Z95.810]      Priority: Low   • Frequent falls [R29.6]      Priority: Low   • Obesity [E66.9]      Priority: Low     3/3/16 BMI 33.6  3/10/17 BMI 33.3 sees  bariatric   10/8/18 BMI 32.6     • Type 2 diabetes mellitus with hyperglycemia, without long-term current use of insulin (HCC) [E11.65]      Priority: Low     Is a chronic condition, patient states that he is managed by his wellness doctor Dr. Miner.    He says that his most recent hemoglobin A1c was checked around a year ago and was 6.7%  Patient is taking Amaryl 1 mg tablet once daily.  His last foot exam was today 03/15/2022 by his podiatrist.  Patient has not had retinal screening for a few years now-provided referral, patient to schedule.         • Left hand pain [M79.642]    • Chronic respiratory failure with hypoxia (HCC) [J96.11]    • Hormone replacement therapy [Z79.890]    • BPH (benign prostatic hyperplasia) [N40.0]        Assessment/Plan:  Patient doing well    Dressings CDI  Cleared for DC to SNF by ortho pending medicine clearance  POD#5 S/P Surgical treatment of left  intertrochanteric femur fracture with intramedullary device  Wt bearing status - WBAT  Wound care/Drains - Dressings to be changed every other day by nursing  Future Procedures - none planned   Sutures/Staples out- 14 days post operatively  PT/OT-initiated  Antibiotics: Perioperative completed  DVT Prophylaxis- TEDS/SCDs/Foot pumps/heparin  Gutierres-none  Case Coordination for Discharge Planning - Disposition SNF     I have performed a physical exam and reviewed and updated ROS and Plan today (3/29). In review of yesterday's note (3/28), there are no changes except as documented above.

## 2022-03-29 NOTE — PROGRESS NOTES
Assumed care of patient at 0645. Bedside report received. Assessment complete.  AA&Ox4. Denies CP/SOB.  Reporting 7/10 pain. Medicated per MAR.   Educated patient regarding pharmacologic and non pharmacologic modalities for pain management.  Skin per flowsheets  Tolerating regular diet. Denies N/V.  + void. + BM. Last BM 3/29  Pt ambulates x2 assist w FWW, LLE WB as tolerating   All needs met at this time. Call light within reach. Pt calls appropriately. Bed low and locked, non skid socks in place. Hourly rounding in place.

## 2022-03-29 NOTE — CARE PLAN
The patient is Stable - Low risk of patient condition declining or worsening    Shift Goals  Clinical Goals: pain control  Patient Goals: rest    Progress made toward(s) clinical / shift goals:  pt medicated per MAR, pt resting in bed    Patient is not progressing towards the following goals:          Problem: Knowledge Deficit - Standard  Goal: Patient and family/care givers will demonstrate understanding of plan of care, disease process/condition, diagnostic tests and medications  Outcome: Progressing     Problem: Pain - Standard  Goal: Alleviation of pain or a reduction in pain to the patient’s comfort goal  Outcome: Progressing  Flowsheets  Taken 3/28/2022 2035  Pain Rating Scale (NPRS): 7  Taken 3/27/2022 2340  OB Pain Intervention: Medication - See MAR     Problem: Skin Integrity  Goal: Skin integrity is maintained or improved  Outcome: Progressing     Problem: Fall Risk  Goal: Patient will remain free from falls  Outcome: Progressing

## 2022-03-29 NOTE — THERAPY
Physical Therapy   Daily Treatment     Patient Name: LIAN More III  Age:  81 y.o., Sex:  male  Medical Record #: [id number]  Today's Date: 3/29/2022     Precautions  Precautions: Fall Risk;Weight Bearing As Tolerated Right Lower Extremity  Comments: now s/p L femur IM nail.    Assessment    Pt willing to participate w/PT. Pt is moving better, less c/o rib pain. Pt conts to need assist w/sup<->sit 2* rib/hip pain. Once seated, min assist w/his transfers and amb efforts w/FWW. Pt managed 85' today w/FWW, does have Lft foot drop. Pt struggled w/bilat LE neuropathy and Lft foot drop PTA, multiple falls. Pt is very motivated in his recovery, wants to go to [facility] before home.     Plan    Continue current treatment plan.    DC Equipment Recommendations: Unable to determine at this time  Discharge Recommendations: Recommend post-acute placement for additional physical therapy services prior to discharge home     Objective     03/29/22 1444   Other Treatments   Other Treatments Provided Pericare provided following toileting.   Balance   Sitting Balance (Static) Fair   Sitting Balance (Dynamic) Fair   Standing Balance (Static) Fair -   Standing Balance (Dynamic) Fair -   Weight Shift Sitting Fair   Weight Shift Standing Fair   Comments standing w/FWW   Gait Analysis   Gait Level Of Assist Minimal Assist   Assistive Device Front Wheel Walker   Distance (Feet) 85   # of Times Distance was Traveled 1   Deviation Antalgic   Weight Bearing Status WBAT Lft LE   Skilled Intervention Verbal Cuing   Comments Improvement w/pts amb efforts from previous PT session. Pt tolerated 85' w/good step through gait. Pt does have foot drop on his Lft that he compensates well for.   Bed Mobility    Supine to Sit Minimal Assist  (HOB partially elevated and use of railing)   Sit to Supine   (pt left seated in the chair)   Scooting Standby Assist  (seated)   Rolling Moderate Assist to Rt.;Moderate Assist to Lt.  (w/railing)   Skilled Intervention Verbal  Cuing   Comments Improvement w/the ability to come to sit w/HOB partially elevated. Pt conts to need trunk assist to finish the task.   Functional Mobility   Sit to Stand Minimal Assist  (from both EOB and toilet height->FWW)   Bed, Chair, Wheelchair Transfer Minimal Assist  (w/FWW)   Toilet Transfers Minimal Assist  (w/FWW)   Skilled Intervention Verbal Cuing   Comments Improvement w/pts functional mobility tasks w/FWW. Pt moving w/min assistance, still needing commode over toilet and raised bed height. The pt struggled w/sit<->stands PTA from lower surfaces.   How much difficulty does the patient currently have...   Turning over in bed (including adjusting bedclothes, sheets and blankets)? 2   Sitting down on and standing up from a chair with arms (e.g., wheelchair, bedside commode, etc.) 2   Moving from lying on back to sitting on the side of the bed? 2   How much help from another person does the patient currently need...   Moving to and from a bed to a chair (including a wheelchair)? 3   Need to walk in a hospital room? 3   Climbing 3-5 steps with a railing? 2   6 clicks Mobility Score 14   Short Term Goals    Short Term Goal # 1 in 6 sessions pt will perform bed mob with flat bed and no rail with mod indep   Goal Outcome # 1 goal not met   Short Term Goal # 2 in 6 sessions pt will transfer to various surfaces with mod indep using fWW inorder to return home indep.   Goal Outcome # 2 Goal not met   Short Term Goal # 3 in 6 sessions pt will amb using FWW x 50 feet with fWW including turns inorder to return home indep   Goal Outcome # 3 Goal met   Short Term Goal # 4 in 6 session pt will climb up/down 1 flight of stairs indep inorder to enter home.   Goal Outcome # 4 Goal not met

## 2022-03-29 NOTE — PROGRESS NOTES
"I witnessed LIAN west a staff member OT by the arm and say \"come here.\" OT stated that he was hurting her and set clear boundaries.   "

## 2022-03-29 NOTE — PROGRESS NOTES
Pt is A&O x4, calls appropriately  Pain 7/10, medicated per MAR   - nausea  Tolerating a diabetic diet   L hip dressing, L thigh dressing CDI  + Voids  + flatus  Last BM 3/28, incontinent  Up x2 FWW  Bed alarm on, pt high fall risk per gulshan anderson  Reviewed plan of care with patient, bed in lowest position and locked, pt resting comfortably now, call light within reach, all needs met at this time. Interventions will be executed per plan of care

## 2022-03-29 NOTE — PROGRESS NOTES
Hospital Medicine Daily Progress Note    Date of Service  3/29/2022    Chief Complaint  LIAN More III is a 81 y.o. male admitted 3/18/2022 with trauma from a ground-level fall    Hospital Course  Admitted with trauma from a ground-level fall with fractured left ribs, left greater trochanter fracture, left renal hematoma, left hip hematoma.  Trauma surgery admitted the patient, Orthopedic surgery was also consulted on the case.  He requires further evaluation of the left greater trochanter fracture with an MRI of the hip, however he has an ICD in place and will require the [employer] technician.  Geriatric medicine was consulted on the case due to his multiple medical problems and geriatric syndromes.  Patient underwent Surgical treatment of left intertrochanteric femur fracture with intramedullary device on 3/29/2022.    Interval Problem Update  Hip fracture - pain controlled  Rib fractures - pain controlled  Respiratory failure - O2 2 lpm  Hypertension - sbp 110-140  Diabetes - -210  CHF -appears euvolemic  MAKEDA on CKD - bmp pending    I have personally seen and examined the patient at bedside. I discussed the plan of care with patient, bedside RN, charge RN and .    Consultants/Specialty  general surgery, orthopedics and Geriatric medicine    Code Status  Full Code    Disposition  Patient is not medically cleared for discharge.   Anticipate discharge to to skilled nursing facility.  I have placed the appropriate orders for post-discharge needs.    Review of Systems  Review of Systems   Constitutional: Positive for malaise/fatigue. Negative for chills, diaphoresis and fever.   HENT: Negative for congestion, hearing loss and sore throat.    Eyes: Negative for blurred vision.   Respiratory: Positive for shortness of breath. Negative for cough and wheezing.    Cardiovascular: Positive for chest pain and leg swelling. Negative for palpitations.   Gastrointestinal: Negative for abdominal pain, diarrhea,  heartburn, nausea and vomiting.   Genitourinary: Negative for dysuria, flank pain and hematuria.   Musculoskeletal: Positive for joint pain. Negative for back pain, myalgias and neck pain.   Skin: Negative for rash.   Neurological: Positive for weakness. Negative for dizziness, sensory change, speech change, focal weakness and headaches.   Psychiatric/Behavioral: The patient is not nervous/anxious.         Physical Exam  Temp:  [36.3 °C (97.4 °F)-36.7 °C (98 °F)] 36.6 °C (97.8 °F)  Pulse:  [65-88] 88  Resp:  [16-19] 16  BP: (115-149)/(60-78) 149/78  SpO2:  [93 %-97 %] 93 %    Physical Exam  Vitals and nursing note reviewed.   Constitutional:       Appearance: He is obese.   HENT:      Head: Normocephalic and atraumatic.      Nose: No congestion.      Mouth/Throat:      Mouth: Mucous membranes are moist.   Eyes:      Extraocular Movements: Extraocular movements intact.      Conjunctiva/sclera: Conjunctivae normal.   Cardiovascular:      Rate and Rhythm: Normal rate and regular rhythm.   Pulmonary:      Effort: Pulmonary effort is normal.      Breath sounds: Decreased air movement present.   Abdominal:      General: There is no distension.      Tenderness: There is no abdominal tenderness. There is no guarding or rebound.   Musculoskeletal:         General: Swelling (left hip) present.      Cervical back: No tenderness.      Right lower leg: No edema.      Left lower leg: No edema.   Skin:     Comments: Stasis changes both legs   Neurological:      General: No focal deficit present.      Mental Status: He is alert and oriented to person, place, and time.      Cranial Nerves: No cranial nerve deficit.         Fluids    Intake/Output Summary (Last 24 hours) at 3/29/2022 1723  Last data filed at 3/29/2022 0730  Gross per 24 hour   Intake 360 ml   Output 1400 ml   Net -1040 ml       Laboratory  Recent Labs     03/29/22  0540   WBC 5.3   RBC 4.93   HEMOGLOBIN 14.9   HEMATOCRIT 47.0   MCV 95.3   MCH 30.2   MCHC 31.7*   RDW  49.1   PLATELETCT 150*   MPV 10.3     Recent Labs     03/27/22  0437 03/28/22  0346   SODIUM 136 137   POTASSIUM 4.9 5.0   CHLORIDE 101 104   CO2 23 24   GLUCOSE 169* 139*   BUN 81* 67*   CREATININE 1.98* 1.65*   CALCIUM 8.3* 8.6                   Imaging  DX-CHEST-PORTABLE (1 VIEW)   Final Result      No significant change from prior exam.      DX-CHEST-PORTABLE (1 VIEW)   Final Result         1. No significant interval change.      DX-CHEST-PORTABLE (1 VIEW)   Final Result         1. No significant interval change.      DX-PORTABLE FLUOROSCOPY < 1 HOUR   Final Result      Portable fluoroscopy utilized for 39 seconds.         INTERPRETING LOCATION: [address] [place] [address], [facility], [address]      DX-FEMUR-2+ LEFT   Final Result      Intraoperative image as above described.      DX-CHEST-PORTABLE (1 VIEW)   Final Result      No significant interval change.      DX-CHEST-PORTABLE (1 VIEW)   Final Result      No significant interval change.      MR-HIP-W/O LEFT   Final Result         1. Redemonstration of acute mildly displaced fracture of the left greater trochanter with extension to the proximal femur. No involvement of the lesser trochanter. No fracture seen in the acetabulum.      2. High-grade tear and strain of the left gluteus medius tendon and muscle with intramuscular hematoma.      3. Mild strain of the adductor muscles.      DX-CHEST-PORTABLE (1 VIEW)   Final Result      Stable chest with cardiac silhouette enlargement and interstitial edema      DX-WRIST-COMPLETE 3+ LEFT   Final Result      1.  No radiographic evidence of acute traumatic injury.   2.  Degenerative changes of the basal joints of the thumb.   3.  Osteopenia.      DX-HAND 3+ LEFT   Final Result         1.  No radiographic evidence of acute injury.      2. Mild osteoarthritic changes are noted at the first carpometacarpal joint.      DX-CHEST-PORTABLE (1 VIEW)   Final Result      Stable examination.      US-TRAUMA VEIN SCREEN LOWER BILAT EXTREMITY   Final  Result      DX-CHEST-PORTABLE (1 VIEW)   Final Result      No significant interval change.      DX-CHEST-PORTABLE (1 VIEW)   Final Result         1. No significant interval change.      CT-HEAD W/O   Final Result         1. No acute intracranial abnormality. No evidence of acute intracranial hemorrhage or mass lesion.                     CT-CSPINE WITHOUT PLUS RECONS   Final Result         1. No acute fracture from C1 through T1 is visualized.         US-ABORTED US PROCEDURE    (Results Pending)        Assessment/Plan  * Disp fx of greater trochanter of left femur, init (HCC)- (present on admission)  Assessment & Plan  Surgical treatment of left intertrochanteric femur fracture with intramedullary device   3/29/2022  Pain control  WBAT LLE      Fracture of ribs, three, closed, left, initial encounter- (present on admission)  Assessment & Plan  Pain control   Encourage I-S    Frequent falls- (present on admission)  Assessment & Plan  PT and OT     Left hand pain- (present on admission)  Assessment & Plan  Sprain?    BPH (benign prostatic hyperplasia)- (present on admission)  Assessment & Plan  Flomax  Monitor for urinary retention    Hormone replacement therapy- (present on admission)  Assessment & Plan  Hold Testosterone, Clomiphene, Anastrazole    Chronic respiratory failure with hypoxia (HCC)- (present on admission)  Assessment & Plan  Requires O2 supplementation with CPAP  RT protocol    Persistent atrial fibrillation (HCC)- (present on admission)  Assessment & Plan  Coreg, Eliquis    Hip hematoma, left, initial encounter- (present on admission)  Assessment & Plan  pain control  Follow hgb    JAMES (obstructive sleep apnea)- (present on admission)  Assessment & Plan  CPAP, RT protocol    Hyperkalemia- (present on admission)  Assessment & Plan  Follow bmp    Essential hypertension- (present on admission)  Assessment & Plan  Coreg, Entresto    Renal hematoma, left, initial encounter- (present on  admission)  Assessment & Plan  Follow hemoglobin    ICD (implantable cardioverter-defibrillator) in place- (present on admission)  Assessment & Plan  monitor    S/P TAVR (transcatheter aortic valve replacement)- (present on admission)  Assessment & Plan  monitor fluid status  Echocardiogram - known TAVR aortic valve that is functioning normally with normal transvalvular gradients   2/15/2022    CHF (congestive heart failure), NYHA class II, chronic, systolic (HCC)- (present on admission)  Assessment & Plan  Coreg, Entresto    Coronary artery disease due to calcified coronary lesion- (present on admission)  Assessment & Plan  History PCI of the LAD/distal left main and the distal RCA  Coreg, Zetia  Intolerant to statins  Resume ASA when cleared by Trauma Surgery    Acute on chronic Stage 3b chronic kidney disease (HCC)- (present on admission)  Assessment & Plan  Follow bmp     Type 2 diabetes mellitus with hyperglycemia, without long-term current use of insulin ([facility])- (present on admission)  Assessment & Plan  SSI for now  Hold Glimepiride  hgba1c - 6.4   3/15/2022  May consider trial of holding glimepiride on discharge     Dyslipidemia- (present on admission)  Assessment & Plan  Zetia  Intolerant to statins       VTE prophylaxis: therapeutic anticoagulation with Eliquis    I have performed a physical exam and reviewed and updated ROS and Plan today (3/29/2022). In review of yesterday's note (3/28/2022), there are no changes except as documented above.

## 2022-03-29 NOTE — CARE PLAN
Problem: Knowledge Deficit - Standard  Goal: Patient and family/care givers will demonstrate understanding of plan of care, disease process/condition, diagnostic tests and medications  3/29/2022 1520 by Rodriguez Calles R.N.  Outcome: Progressing  3/29/2022 1520 by Rodriguez Calles R.N.  Outcome: Progressing   Pt educated on medications this shift  Problem: Pain - Standard  Goal: Alleviation of pain or a reduction in pain to the patient’s comfort goal  Outcome: Progressing   Pain medicated per MAR   Problem: Skin Integrity  Goal: Skin integrity is maintained or improved  Outcome: Progressing   Pt turns self in bed   The patient is Stable - Low risk of patient condition declining or worsening    Shift Goals  Clinical Goals: Ambulation  Patient Goals: Ambulation  Family Goals: N/A    Progress made toward(s) clinical / shift goals:  Pt ambulated x1 to and from chair w Pt this shift     Patient is not progressing towards the following goals:

## 2022-03-29 NOTE — PROGRESS NOTES
4 Eyes Skin Assessment Completed by MARY Lopes and Gabriela RN.     Head WDL  Ears WDL  Nose WDL  Mouth WDL  Neck WDL  Breast/Chest WDL  Shoulder Blades WDL  Spine WDL  (R) Arm/Elbow/Hand WDL  (L) Arm/Elbow/Hand WDL  Abdomen WDL  Groin WDL  Scrotum/Coccyx/Buttocks red, but blanching  (R) Leg WDL  (L) Leg Incision gauze, tagaderm CDI  (R) Heel/Foot/Toe Blanching and Discoloration dusky, flaky  (L) Heel/Foot/Toe Blanching and Discoloration dusky, flaky              Devices In Places Pulse Ox, SCD's and Nasal Cannula        Interventions In Place Heel Mepilex, Waffle Overlay, Pillows and Barrier Cream     Possible Skin Injury No     Pictures Uploaded Into Epic N/A  Wound Consult Placed N/A  RN Wound Prevention Protocol Ordered No

## 2022-03-30 LAB
ANION GAP SERPL CALC-SCNC: 11 MMOL/L (ref 7–16)
BUN SERPL-MCNC: 57 MG/DL (ref 8–22)
CALCIUM SERPL-MCNC: 9 MG/DL (ref 8.5–10.5)
CHLORIDE SERPL-SCNC: 104 MMOL/L (ref 96–112)
CO2 SERPL-SCNC: 22 MMOL/L (ref 20–33)
CREAT SERPL-MCNC: 1.64 MG/DL (ref 0.5–1.4)
ERYTHROCYTE [DISTWIDTH] IN BLOOD BY AUTOMATED COUNT: 49.9 FL (ref 35.9–50)
GFR SERPLBLD CREATININE-BSD FMLA CKD-EPI: 42 ML/MIN/1.73 M 2
GLUCOSE BLD STRIP.AUTO-MCNC: 153 MG/DL (ref 65–99)
GLUCOSE BLD STRIP.AUTO-MCNC: 158 MG/DL (ref 65–99)
GLUCOSE BLD STRIP.AUTO-MCNC: 162 MG/DL (ref 65–99)
GLUCOSE BLD STRIP.AUTO-MCNC: 192 MG/DL (ref 65–99)
GLUCOSE SERPL-MCNC: 182 MG/DL (ref 65–99)
HCT VFR BLD AUTO: 49.4 % (ref 42–52)
HGB BLD-MCNC: 15.4 G/DL (ref 14–18)
MCH RBC QN AUTO: 30.3 PG (ref 27–33)
MCHC RBC AUTO-ENTMCNC: 31.2 G/DL (ref 33.7–35.3)
MCV RBC AUTO: 97.1 FL (ref 81.4–97.8)
PLATELET # BLD AUTO: 173 K/UL (ref 164–446)
PMV BLD AUTO: 9.8 FL (ref 9–12.9)
POTASSIUM SERPL-SCNC: 5.2 MMOL/L (ref 3.6–5.5)
RBC # BLD AUTO: 5.09 M/UL (ref 4.7–6.1)
SODIUM SERPL-SCNC: 137 MMOL/L (ref 135–145)
WBC # BLD AUTO: 5.8 K/UL (ref 4.8–10.8)

## 2022-03-30 PROCEDURE — A9270 NON-COVERED ITEM OR SERVICE: HCPCS | Performed by: NURSE PRACTITIONER

## 2022-03-30 PROCEDURE — 80048 BASIC METABOLIC PNL TOTAL CA: CPT

## 2022-03-30 PROCEDURE — A9270 NON-COVERED ITEM OR SERVICE: HCPCS | Performed by: ORTHOPAEDIC SURGERY

## 2022-03-30 PROCEDURE — 700102 HCHG RX REV CODE 250 W/ 637 OVERRIDE(OP)

## 2022-03-30 PROCEDURE — 85027 COMPLETE CBC AUTOMATED: CPT

## 2022-03-30 PROCEDURE — 36415 COLL VENOUS BLD VENIPUNCTURE: CPT

## 2022-03-30 PROCEDURE — 94660 CPAP INITIATION&MGMT: CPT

## 2022-03-30 PROCEDURE — 99232 SBSQ HOSP IP/OBS MODERATE 35: CPT | Performed by: FAMILY MEDICINE

## 2022-03-30 PROCEDURE — 700102 HCHG RX REV CODE 250 W/ 637 OVERRIDE(OP): Performed by: NURSE PRACTITIONER

## 2022-03-30 PROCEDURE — 700101 HCHG RX REV CODE 250: Performed by: FAMILY MEDICINE

## 2022-03-30 PROCEDURE — 770001 HCHG ROOM/CARE - MED/SURG/GYN PRIV*

## 2022-03-30 PROCEDURE — 700102 HCHG RX REV CODE 250 W/ 637 OVERRIDE(OP): Performed by: SURGERY

## 2022-03-30 PROCEDURE — 82962 GLUCOSE BLOOD TEST: CPT | Mod: 91

## 2022-03-30 PROCEDURE — 97535 SELF CARE MNGMENT TRAINING: CPT

## 2022-03-30 PROCEDURE — A9270 NON-COVERED ITEM OR SERVICE: HCPCS | Performed by: FAMILY MEDICINE

## 2022-03-30 PROCEDURE — A9270 NON-COVERED ITEM OR SERVICE: HCPCS | Performed by: SURGERY

## 2022-03-30 PROCEDURE — 700102 HCHG RX REV CODE 250 W/ 637 OVERRIDE(OP): Performed by: ORTHOPAEDIC SURGERY

## 2022-03-30 PROCEDURE — A9270 NON-COVERED ITEM OR SERVICE: HCPCS

## 2022-03-30 PROCEDURE — 700102 HCHG RX REV CODE 250 W/ 637 OVERRIDE(OP): Performed by: FAMILY MEDICINE

## 2022-03-30 RX ADMIN — EZETIMIBE 10 MG: 10 TABLET ORAL at 05:29

## 2022-03-30 RX ADMIN — LIDOCAINE 2 PATCH: 50 PATCH TOPICAL at 20:53

## 2022-03-30 RX ADMIN — TAMSULOSIN HYDROCHLORIDE 0.4 MG: 0.4 CAPSULE ORAL at 08:30

## 2022-03-30 RX ADMIN — SENNOSIDES AND DOCUSATE SODIUM 1 TABLET: 50; 8.6 TABLET ORAL at 20:53

## 2022-03-30 RX ADMIN — OXYCODONE HYDROCHLORIDE 5 MG: 5 TABLET ORAL at 20:52

## 2022-03-30 RX ADMIN — OMEPRAZOLE 40 MG: 20 CAPSULE, DELAYED RELEASE ORAL at 05:29

## 2022-03-30 RX ADMIN — SACUBITRIL AND VALSARTAN 1 TABLET: 24; 26 TABLET, FILM COATED ORAL at 05:29

## 2022-03-30 RX ADMIN — OXYCODONE HYDROCHLORIDE 10 MG: 5 TABLET ORAL at 05:30

## 2022-03-30 RX ADMIN — SACUBITRIL AND VALSARTAN 1 TABLET: 24; 26 TABLET, FILM COATED ORAL at 17:48

## 2022-03-30 RX ADMIN — Medication 1 DROP: at 23:56

## 2022-03-30 RX ADMIN — Medication 1 DROP: at 15:57

## 2022-03-30 RX ADMIN — OXYCODONE HYDROCHLORIDE 10 MG: 5 TABLET ORAL at 11:44

## 2022-03-30 RX ADMIN — METAXALONE 800 MG: 800 TABLET ORAL at 05:29

## 2022-03-30 RX ADMIN — OMEPRAZOLE 40 MG: 20 CAPSULE, DELAYED RELEASE ORAL at 17:47

## 2022-03-30 RX ADMIN — METAXALONE 800 MG: 800 TABLET ORAL at 17:47

## 2022-03-30 RX ADMIN — APIXABAN 5 MG: 5 TABLET, FILM COATED ORAL at 05:29

## 2022-03-30 RX ADMIN — OXYCODONE HYDROCHLORIDE 10 MG: 5 TABLET ORAL at 08:30

## 2022-03-30 RX ADMIN — FLUTICASONE PROPIONATE 100 MCG: 50 SPRAY, METERED NASAL at 05:29

## 2022-03-30 RX ADMIN — Medication 1 DROP: at 05:29

## 2022-03-30 RX ADMIN — APIXABAN 5 MG: 5 TABLET, FILM COATED ORAL at 17:45

## 2022-03-30 RX ADMIN — CARVEDILOL 3.12 MG: 6.25 TABLET, FILM COATED ORAL at 08:30

## 2022-03-30 RX ADMIN — METAXALONE 800 MG: 800 TABLET ORAL at 11:44

## 2022-03-30 RX ADMIN — CARVEDILOL 3.12 MG: 6.25 TABLET, FILM COATED ORAL at 17:45

## 2022-03-30 ASSESSMENT — ENCOUNTER SYMPTOMS
BACK PAIN: 0
MYALGIAS: 0
SPEECH CHANGE: 0
WEAKNESS: 1
WHEEZING: 0
NERVOUS/ANXIOUS: 0
NAUSEA: 0
HEARTBURN: 0
FLANK PAIN: 0
SHORTNESS OF BREATH: 0
VOMITING: 0
FOCAL WEAKNESS: 0
HEADACHES: 0
DIAPHORESIS: 0
COUGH: 0
SORE THROAT: 0
ABDOMINAL PAIN: 0
CHILLS: 0
NECK PAIN: 0
SENSORY CHANGE: 0
BLURRED VISION: 0
DIZZINESS: 0
PALPITATIONS: 0
DIARRHEA: 0
FEVER: 0

## 2022-03-30 ASSESSMENT — COGNITIVE AND FUNCTIONAL STATUS - GENERAL
SUGGESTED CMS G CODE MODIFIER DAILY ACTIVITY: CK
DRESSING REGULAR LOWER BODY CLOTHING: A LOT
DAILY ACTIVITIY SCORE: 16
TOILETING: A LOT
PERSONAL GROOMING: A LITTLE
DRESSING REGULAR UPPER BODY CLOTHING: A LITTLE
HELP NEEDED FOR BATHING: A LOT

## 2022-03-30 ASSESSMENT — PAIN DESCRIPTION - PAIN TYPE
TYPE: ACUTE PAIN;SURGICAL PAIN
TYPE: ACUTE PAIN
TYPE: ACUTE PAIN;SURGICAL PAIN
TYPE: ACUTE PAIN
TYPE: ACUTE PAIN;CHRONIC PAIN

## 2022-03-30 ASSESSMENT — FIBROSIS 4 INDEX: FIB4 SCORE: 3.06

## 2022-03-30 NOTE — PROGRESS NOTES
Pt is A&O x4, calls appropriately  Pain 5/10   - nausea  Tolerating a diabetic diet   L hip dressing, L thigh dressing CDI  + Voids  + flatus  Last BM 3/29, incontinent  Up x2 FWW  Bed alarm on, pt high fall risk per gulshan anderson  Reviewed plan of care with patient, bed in lowest position and locked, pt resting comfortably now, call light within reach, all needs met at this time. Interventions will be executed per plan of care

## 2022-03-30 NOTE — PROGRESS NOTES
Hospital Medicine Daily Progress Note    Date of Service  3/30/2022    Chief Complaint  LIAN More III is a 81 y.o. male admitted 3/18/2022 with trauma from a ground-level fall    Hospital Course  Admitted with trauma from a ground-level fall with fractured left ribs, left greater trochanter fracture, left renal hematoma, left hip hematoma.  Trauma surgery admitted the patient, Orthopedic surgery was also consulted on the case.  He requires further evaluation of the left greater trochanter fracture with an MRI of the hip, however he has an ICD in place and will require the [employer] technician.  Geriatric medicine was consulted on the case due to his multiple medical problems and geriatric syndromes.  Patient underwent Surgical treatment of left intertrochanteric femur fracture with intramedullary device on 3/29/2022.    Interval Problem Update  Hip fracture - pain controlled  Rib fractures - pain controlled  Respiratory failure - O2 2 lpm  Hypertension - sbp 120-140  Diabetes - -190  CHF -appears euvolemic  MAKEDA on CKD - crea stable at 1.6    I have personally seen and examined the patient at bedside. I discussed the plan of care with patient, bedside RN, charge RN and .    Consultants/Specialty  general surgery, orthopedics and Geriatric medicine    Code Status  Full Code    Disposition  Patient is medically cleared for discharge.   Anticipate discharge to to skilled nursing facility.  I have placed the appropriate orders for post-discharge needs.    Review of Systems  Review of Systems   Constitutional: Positive for malaise/fatigue. Negative for chills, diaphoresis and fever.   HENT: Negative for congestion, hearing loss and sore throat.    Eyes: Negative for blurred vision.   Respiratory: Negative for cough, shortness of breath and wheezing.    Cardiovascular: Positive for chest pain and leg swelling. Negative for palpitations.   Gastrointestinal: Negative for abdominal pain, diarrhea, heartburn,  nausea and vomiting.   Genitourinary: Negative for dysuria, flank pain and hematuria.   Musculoskeletal: Positive for joint pain. Negative for back pain, myalgias and neck pain.   Skin: Negative for rash.   Neurological: Positive for weakness. Negative for dizziness, sensory change, speech change, focal weakness and headaches.   Psychiatric/Behavioral: The patient is not nervous/anxious.         Physical Exam  Temp:  [36.2 °C (97.1 °F)-37.1 °C (98.7 °F)] 36.3 °C (97.4 °F)  Pulse:  [68-84] 74  Resp:  [17-18] 18  BP: (120-141)/(60-77) 124/60  SpO2:  [90 %-96 %] 96 %    Physical Exam  Vitals and nursing note reviewed.   Constitutional:       Appearance: He is obese.   HENT:      Head: Normocephalic and atraumatic.      Nose: No congestion.      Mouth/Throat:      Mouth: Mucous membranes are moist.   Eyes:      Extraocular Movements: Extraocular movements intact.      Conjunctiva/sclera: Conjunctivae normal.   Cardiovascular:      Rate and Rhythm: Normal rate and regular rhythm.   Pulmonary:      Effort: Pulmonary effort is normal.      Breath sounds: Decreased air movement present.   Abdominal:      General: There is no distension.      Tenderness: There is no abdominal tenderness. There is no guarding or rebound.   Musculoskeletal:         General: Swelling (left hip) present.      Cervical back: No tenderness.      Right lower leg: No edema.      Left lower leg: No edema.   Skin:     Comments: Stasis changes both legs   Neurological:      General: No focal deficit present.      Mental Status: He is alert and oriented to person, place, and time.      Cranial Nerves: No cranial nerve deficit.         Fluids    Intake/Output Summary (Last 24 hours) at 3/30/2022 1611  Last data filed at 3/30/2022 1244  Gross per 24 hour   Intake 720 ml   Output 735 ml   Net -15 ml       Laboratory  Recent Labs     03/29/22  0540 03/30/22  0252   WBC 5.3 5.8   RBC 4.93 5.09   HEMOGLOBIN 14.9 15.4   HEMATOCRIT 47.0 49.4   MCV 95.3 97.1    MCH 30.2 30.3   MCHC 31.7* 31.2*   RDW 49.1 49.9   PLATELETCT 150* 173   MPV 10.3 9.8     Recent Labs     03/28/22  0346 03/30/22  0252   SODIUM 137 137   POTASSIUM 5.0 5.2   CHLORIDE 104 104   CO2 24 22   GLUCOSE 139* 182*   BUN 67* 57*   CREATININE 1.65* 1.64*   CALCIUM 8.6 9.0                   Imaging  DX-CHEST-PORTABLE (1 VIEW)   Final Result      No significant change from prior exam.      DX-CHEST-PORTABLE (1 VIEW)   Final Result         1. No significant interval change.      DX-CHEST-PORTABLE (1 VIEW)   Final Result         1. No significant interval change.      DX-PORTABLE FLUOROSCOPY < 1 HOUR   Final Result      Portable fluoroscopy utilized for 39 seconds.         INTERPRETING LOCATION: [address] [place] [address], [id number]      DX-FEMUR-2+ LEFT   Final Result      Intraoperative image as above described.      DX-CHEST-PORTABLE (1 VIEW)   Final Result      No significant interval change.      DX-CHEST-PORTABLE (1 VIEW)   Final Result      No significant interval change.      MR-HIP-W/O LEFT   Final Result         1. Redemonstration of acute mildly displaced fracture of the left greater trochanter with extension to the proximal femur. No involvement of the lesser trochanter. No fracture seen in the acetabulum.      2. High-grade tear and strain of the left gluteus medius tendon and muscle with intramuscular hematoma.      3. Mild strain of the adductor muscles.      DX-CHEST-PORTABLE (1 VIEW)   Final Result      Stable chest with cardiac silhouette enlargement and interstitial edema      DX-WRIST-COMPLETE 3+ LEFT   Final Result      1.  No radiographic evidence of acute traumatic injury.   2.  Degenerative changes of the basal joints of the thumb.   3.  Osteopenia.      DX-HAND 3+ LEFT   Final Result         1.  No radiographic evidence of acute injury.      2. Mild osteoarthritic changes are noted at the first carpometacarpal joint.      DX-CHEST-PORTABLE (1 VIEW)   Final Result      Stable examination.       US-TRAUMA VEIN SCREEN LOWER BILAT EXTREMITY   Final Result      DX-CHEST-PORTABLE (1 VIEW)   Final Result      No significant interval change.      DX-CHEST-PORTABLE (1 VIEW)   Final Result         1. No significant interval change.      CT-HEAD W/O   Final Result         1. No acute intracranial abnormality. No evidence of acute intracranial hemorrhage or mass lesion.                     CT-CSPINE WITHOUT PLUS RECONS   Final Result         1. No acute fracture from C1 through T1 is visualized.         US-ABORTED US PROCEDURE    (Results Pending)        Assessment/Plan  * Disp fx of greater trochanter of left femur, init (HCC)- (present on admission)  Assessment & Plan  Surgical treatment of left intertrochanteric femur fracture with intramedullary device   3/29/2022  Pain control  WBAT LLE      Fracture of ribs, three, closed, left, initial encounter- (present on admission)  Assessment & Plan  Pain control   Encourage I-S    Frequent falls- (present on admission)  Assessment & Plan  PT and OT     Left hand pain- (present on admission)  Assessment & Plan  Sprain?    BPH (benign prostatic hyperplasia)- (present on admission)  Assessment & Plan  Flomax  Monitor for urinary retention    Hormone replacement therapy- (present on admission)  Assessment & Plan  Hold Testosterone, Clomiphene, Anastrazole    Chronic respiratory failure with hypoxia (HCC)- (present on admission)  Assessment & Plan  Requires O2 supplementation with CPAP  RT protocol    Persistent atrial fibrillation (HCC)- (present on admission)  Assessment & Plan  Coreg, Eliquis    Hip hematoma, left, initial encounter- (present on admission)  Assessment & Plan  pain control  Follow hgb    JAMES (obstructive sleep apnea)- (present on admission)  Assessment & Plan  CPAP, RT protocol    Hyperkalemia- (present on admission)  Assessment & Plan  Follow bmp    Essential hypertension- (present on admission)  Assessment & Plan  Coreg, Entresto    Renal hematoma,  left, initial encounter- (present on admission)  Assessment & Plan  Follow hemoglobin    ICD (implantable cardioverter-defibrillator) in place- (present on admission)  Assessment & Plan  monitor    S/P TAVR (transcatheter aortic valve replacement)- (present on admission)  Assessment & Plan  monitor fluid status  Echocardiogram - known TAVR aortic valve that is functioning normally with normal transvalvular gradients   2/15/2022    CHF (congestive heart failure), NYHA class II, chronic, systolic (HCC)- (present on admission)  Assessment & Plan  Coreg, Entresto  Resume Torsemide    Coronary artery disease due to calcified coronary lesion- (present on admission)  Assessment & Plan  History PCI of the LAD/distal left main and the distal RCA  Coreg, Zetia  Intolerant to statins  Resume ASA when cleared by Trauma Surgery    Acute on chronic Stage 3b chronic kidney disease (HCC)- (present on admission)  Assessment & Plan  Follow bmp     Type 2 diabetes mellitus with hyperglycemia, without long-term current use of insulin (HCC)- (present on admission)  Assessment & Plan  SSI for now  Hold Glimepiride  hgba1c - 6.4   3/15/2022  May consider trial of holding glimepiride on discharge     Dyslipidemia- (present on admission)  Assessment & Plan  Zetia  Intolerant to statins       VTE prophylaxis: therapeutic anticoagulation with Eliquis    I have performed a physical exam and reviewed and updated ROS and Plan today (3/30/2022). In review of yesterday's note (3/29/2022), there are no changes except as documented above.

## 2022-03-30 NOTE — CARE PLAN
Problem: Knowledge Deficit - Standard  Goal: Patient and family/care givers will demonstrate understanding of plan of care, disease process/condition, diagnostic tests and medications  Outcome: Progressing   Pt educated on discharge plan by treatment team   Problem: Pain - Standard  Goal: Alleviation of pain or a reduction in pain to the patient’s comfort goal  Outcome: Progressing   Pain managed per MAR  Problem: Skin Integrity  Goal: Skin integrity is maintained or improved  Outcome: Progressing   Pt turns self in bed   The patient is Stable - Low risk of patient condition declining or worsening    Shift Goals  Clinical Goals: Ambulation  Patient Goals: Pain Control  Family Goals: N/A    Progress made toward(s) clinical / shift goals:  Pt ambulated x3 times this shift w Staff     Patient is not progressing towards the following goals:

## 2022-03-30 NOTE — DISCHARGE PLANNING
Agency/Facility Name: [facility]  Spoke To: Gene  Outcome: Per LSW, pt claims the worker's comp. Case is still active and will remain that way for the foreseeable future. Per Gene, they do not take worker's comp. And will have to decline the pt.  LSW notifed     Received Choice form at 1217  Agency/Facility Name: [facility]  Referral sent per Choice form @ 1222     @1427  Agency/Facility Name: [facility]  Spoke To: Zayda  Outcome: Per Zayda, pt will remain pending until the facility can get a hold of the pt's spouse to discuss the pt's D/C plan.    Agency/Facility Name: [facility]  Spoke To: Natalia  Outcome: Per Natalia pt is declined due to needing rehab and not memory care.    Agency/Facility Name: [facility]  Outcome: Left a voicemail, waiting for a call back.    Agency/Facility Name: [facility]  Spoke To: Flory  Outcome: Per Flory, pt can be taken tomorrow and [facility] can set up transport. Per Flory will follow up with DPA.  LSW notified.    @1510  Agency/Facility Name: [facility]  Spoke To: Marta  Outcome: Per LSW, the pt has questions regarding the facility he's going to. DPA contacted [facility] and asked Marta to contact pt and answer any questions he may have. Per Marta, she'll contact the pt today.  LSW notified.    Agency/Facility Name: [facility]  Spoke To: Flory  Outcome: Per Flory, pt can be picked up tomorrow 3/31 at 1230  RN CM notified

## 2022-03-30 NOTE — CARE PLAN
The patient is Stable - Low risk of patient condition declining or worsening    Shift Goals  Clinical Goals: pain control  Patient Goals: rest    Progress made toward(s) clinical / shift goals:  pt resting in bed    Patient is not progressing towards the following goals:          Problem: Knowledge Deficit - Standard  Goal: Patient and family/care givers will demonstrate understanding of plan of care, disease process/condition, diagnostic tests and medications  Outcome: Progressing     Problem: Pain - Standard  Goal: Alleviation of pain or a reduction in pain to the patient’s comfort goal  Outcome: Progressing  Flowsheets  Taken 3/29/2022 2026  Pain Rating Scale (NPRS): 5  Taken 3/27/2022 2340  OB Pain Intervention: Medication - See MAR     Problem: Skin Integrity  Goal: Skin integrity is maintained or improved  Outcome: Progressing     Problem: Fall Risk  Goal: Patient will remain free from falls  Outcome: Progressing

## 2022-03-30 NOTE — PROGRESS NOTES
Assumed care of patient at 0645. Bedside report received. Assessment complete.  AA&Ox4. Denies CP/SOB.  Reporting 7/10 pain. Medicated per MAR.  Educated patient regarding pharmacologic and non pharmacologic modalities for pain management.  Skin per flowsheets  Tolerating regular diet. Denies N/V.  + void. + BM. Last BM 3/30  Pt ambulates x1 assist w FWW  All needs met at this time. Call light within reach. Pt calls appropriately. Bed low and locked, non skid socks in place. Hourly rounding in place.

## 2022-03-30 NOTE — THERAPY
"Occupational Therapy  Daily Treatment     Patient Name: LIAN More III  Age:  81 y.o., Sex:  male  Medical Record #: [id number]  Today's Date: 3/30/2022     Precautions  Precautions: Fall Risk,Weight Bearing As Tolerated Right Lower Extremity  Comments: now s/p L femur IM nail.    Assessment    Pt was seen for OT tx, pt is motivated for OOB ADL's. Pt did have 1 significant LOB while in bathroom, requiring assist to prevent fall primarily related to uneven surface in bathroom. Pt has significant limitations w/ LB ADL's and sit>stand txfs. OT will continue to follow. Pt would like to hire in home care giving.     Plan  Continue current treatment plan.    DC Equipment Recommendations: Unable to determine at this time  Discharge Recommendations: Recommend post-acute placement for additional occupational therapy services prior to discharge home    Subjective  \" I just want a trust worthy person to  help me at home\"      Objective       03/30/22 1456   Total Time Spent   Total Time Spent (Mins) 31   Treatment Charges   Charges Yes   OT Self Care / ADL 2   Precautions   Precautions Fall Risk;Weight Bearing As Tolerated Right Lower Extremity   Pain 0 - 10 Group   Location Hip;Ankle   Location Orientation Left   Therapist Pain Assessment During Activity;Nurse Notified;6   Cognition    Cognition / Consciousness WDL   Level of Consciousness Alert   Comments cooperative w/some limited insight into is declining functional status   Passive ROM Upper Body   Passive ROM Upper Body WDL   Active ROM Upper Body   Active ROM Upper Body  WDL   Strength Upper Body   Upper Body Strength  WDL   Other Treatments   Other Treatments Provided Focused on adatpive strategies for LB dressing as well as safety w/toilet txf pt had a very near fall during ambulation to bathroom   Balance   Sitting Balance (Static) Fair   Sitting Balance (Dynamic) Fair   Standing Balance (Static) Fair -   Standing Balance (Dynamic) Fair -   Weight Shift Sitting Fair "   Weight Shift Standing Poor   Skilled Intervention Verbal Cuing;Tactile Cuing   Comments hx of drop foot, neuropathy and using fww high fall risk   Bed Mobility    Supine to Sit Minimal Assist   Sit to Supine Minimal Assist   Skilled Intervention Verbal Cuing;Tactile Cuing;Facilitation   Comments HOB elevated   Activities of Daily Living   Grooming Minimal Assist;Standing   Upper Body Dressing Supervision   Lower Body Dressing Maximal Assist   Toileting Minimal Assist   Skilled Intervention Verbal Cuing;Tactile Cuing   Comments stood at toilet, unable to reach LB (feet) to don/doff socks   How much help from another person does the patient currently need...   6 Clicks Daily Activity Score 16   Functional Mobility   Sit to Stand Minimal Assist   Bed, Chair, Wheelchair Transfer Moderate Assist   Toilet Transfers Minimal Assist   Mobility walking in room w/fww   Activity Tolerance   Comments c/o fatigue   Patient / Family Goals   Patient / Family Goal #1 Have less pain   Goal #1 Outcome Progressing as expected   Short Term Goals   Short Term Goal # 1 Pt will complete LB dressing with Min A using AE PRN   Goal Outcome # 1 Goal not met   Short Term Goal # 2 Pt will complete ADL txfs with Min A   Goal Outcome # 2 Progressing as expected   Short Term Goal # 3 Pt will complete toileting with Min A   Goal Outcome # 3 Progressing as expected   Education Group   Education Provided Transfers;Role of Occupational Therapist;Activities of Daily Living   Role of Occupational Therapist Patient Response Patient;Acceptance;Explanation;Verbal Demonstration   Transfers Patient Response Patient;Acceptance;Explanation   ADL Patient Response Patient;Acceptance;Explanation   Weight Bearing Precautions Patient Response Patient;Acceptance;Explanation

## 2022-03-30 NOTE — DISCHARGE PLANNING
Anticipated Discharge Disposition: SNF    Action: This case was discussed during IDT Rounds. Per MD, patient is medically clear for transfer to SNF. SW met with patient at bedside. LSW explained the skilled facilities are requesting he closes the Workers Comp Case MSP.    The patient stated CMP is an Active Case; therefor, he can not close it, DPA aware.     Per DPA, [facility] declined. LSW got patient's authorization to expand the skilled referral, updated SNF choice form faxed to [facility]. LSW escalated this case to the  Leadership Team for further instructions.     Barriers to Discharge: Active Workers Comp Case. Patient lives alone. Patient is willing to hire personal care attendants; however, due to staff shortage, patient is not able to find a provider.    Plan: SNF, pending further advise from the  Leadership Team.     3:15pm - Per DPA, [facility] agreed to accept this patient. LSW met with patient and provided the information regarding the accepting facilities.    Patient stated he does not want to transfer to [facility]. Patient stated he will consider [facility]; however, patient would like to speak with the Admissions Team at  before he authorizes the transfer, DPA aware.    [facility] File # [id number]    PLAN: SNF, possibly tomorrow, pending patient's authorization for transfer and transportation arrangements.

## 2022-03-30 NOTE — PROGRESS NOTES
4 Eyes Skin Assessment Completed by MARY Frias and MARY Hurst.    Head WDL  Ears Redness and Blanching  Nose Redness and Blanching  Mouth WDL  Neck WDL  Breast/Chest WDL  Shoulder Blades WDL  Spine WDL  (R) Arm/Elbow/Hand WDL  (L) Arm/Elbow/Hand WDL  Abdomen WDL  Groin WDL  Scrotum/Coccyx/Buttocks Redness and Blanching  (R) Leg WDL  (L) Leg Incision to Hip w Gauze/ Tegaderm   (R) Heel/Foot/Toe Redness and Blanching dry and flaky  (L) Heel/Foot/Toe Redness and Blanching dry and flaky           Devices In Places Pulse Ox, SCD's and Nasal Cannula      Interventions In Place Pillows, Heels Loaded W/Pillows and Pressure Redistribution Mattress    Possible Skin Injury No    Pictures Uploaded Into Epic N/A  Wound Consult Placed N/A  RN Wound Prevention Protocol Ordered No

## 2022-03-30 NOTE — PROGRESS NOTES
"   Orthopaedic Progress Note    Interval changes:  Patient doing well    Dressings CDI  Cleared for DC to SNF by ortho pending medicine clearance    ROS - Patient denies any new issues.  Pain well controlled.    /60   Pulse 74   Temp 36.3 °C (97.4 °F) (Temporal)   Resp 18   Ht 1.854 m (6' 1\")   Wt 106 kg (234 lb 9.1 oz)   SpO2 96%       Patient seen and examined  No acute distress  Breathing non labored  RRR  LLE dressings are clean, dry, and intact. Patient clearly fires tibialis anterior, EHL, and gastrocnemius/soleus. Sensation is intact to light touch throughout superficial peroneal, deep peroneal, tibial, saphenous, and sural nerve distributions. Strong and palpable 2+ dorsalis pedis and posterior tibial pulses with capillary refill less than 2 seconds. No lower leg tenderness or discomfort.       Recent Labs     03/29/22  0540 03/30/22  0252   WBC 5.3 5.8   RBC 4.93 5.09   HEMOGLOBIN 14.9 15.4   HEMATOCRIT 47.0 49.4   MCV 95.3 97.1   MCH 30.2 30.3   MCHC 31.7* 31.2*   RDW 49.1 49.9   PLATELETCT 150* 173   MPV 10.3 9.8       Active Hospital Problems    Diagnosis    • Fracture of ribs, three, closed, left, initial encounter [S22.42XA]      Priority: High   • Renal hematoma, left, initial encounter [S37.012A]      Priority: High   • Disp fx of greater trochanter of left femur, init ([facility]) [S72.112A]      Priority: High   • Encounter for geriatric assessment [Z01.89]      Priority: Medium   • Seasonal allergies [J30.2]      Priority: Medium   • Antiplatelet or antithrombotic long-term use [Z79.02]      Priority: Medium   • Persistent atrial fibrillation (HCC) [I48.19]      Priority: Medium   • JAMES (obstructive sleep apnea) [G47.33]      Priority: Medium     Patient is on CPAP at home     • Hyperkalemia [E87.5]      Priority: Medium   • Essential hypertension [I10]      Priority: Medium   • S/P TAVR (transcatheter aortic valve replacement) [Z95.2]      Priority: Medium   • CHF (congestive heart failure), " NYHA class II, chronic, systolic ([facility]) [I50.22]      Priority: Medium     managed by cardiology Dr. Wilkinson: He appears euvolemic on physical exam.  He is on losartan, carvedilol, not on spironolactone due to low kidney function.  He was admitted to the hospital for CHF exacerbation, when he suddenly gained 15 pounds.  He was diuresed with IV Lasix at that time, had echocardiogram that showed significant improvement of his cardiac function, when compared to previous study.  Ejection fraction 55%.     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 pylori, haji's mucosa indefinite for dysplasia, no evidence of celiac disease  4/22/15 upper GI series postoperatively appears laparoscopic band within normal limits, small sliding hiatal hernia  5/14/15 EGD per [facility] esophageal mucosal changes large amount of food residue in stomach, 5 mucosa low grade dysplasia, intestinal metaplasia, gastric emptying study pending  5/22/15 nuclear medicine gastric emptying study per [facility] evidence of reflux, gastric emptying half-time 118 minutes  1/8/16 EGD per  surgery normal  1/4/17 [facility] note history of reflux, treatment of haji's esophagus low-grade dysplasia, discussed radiofrequency ablation and endoscopic ultrasound  7/24/17 EGD per [facility] biopsies performed, follow up haji's  8/16/18 EGD per [facility] salmon colored mucosa suspicious for short segment haji's, biopsies pathology reactive gastropathy with mucosal iron deposition, negative h.pylori, haji's mucosa, active esophagitis consistent with reflux,  no dysplasia or malignancy seen  8/26/19 EGD per [facility] esophageal mucosal changes consistent with short segment haji's, biopsied           • Contraindication to deep vein thrombosis (DVT) prophylaxis [Z53.09]      Priority: Medium     ICD-10 transition     • Dyslipidemia [E78.5]      Priority: Medium     Established diagnosis. Currently taking Zetia, intolerant to statins  Lab Results   Component Value Date/Time    CHOLSTRLTOT 72 (L) 05/19/2021 08:26 AM    TRIGLYCERIDE 102 05/19/2021 08:26 AM    HDL 24 (A) 05/19/2021 08:26 AM    LDL 28 05/19/2021 08:26 AM             • Contusion of left hand [S60.222A]      Priority: Low   • Abnormal CT scan, colon [R93.3]      Priority: Low   • Trauma [T14.90XA]      Priority: Low   • Hip hematoma, left, initial encounter [S70.02XA]      Priority: Low   • Encounter for screening for COVID-19 [Z11.52]      Priority: Low   • ICD (implantable cardioverter-defibrillator) in place [Z95.810]      Priority: Low   • Frequent falls [R29.6]      Priority: Low   • Obesity [E66.9]      Priority: Low     3/3/16 BMI 33.6  3/10/17 BMI 33.3 sees  bariatric   10/8/18 BMI 32.6     • Type 2 diabetes mellitus with hyperglycemia, without long-term current use of insulin (HCC) [E11.65]      Priority: Low     Is a chronic condition, patient states that he is managed by his wellness doctor Dr. Miner.    He says that his most recent hemoglobin A1c was checked around a year ago and was 6.7%  Patient is taking Amaryl 1 mg tablet once daily.  His last foot exam was today 03/15/2022 by his podiatrist.  Patient has not had retinal screening for a few years now-provided referral, patient to schedule.         • Left hand pain [M79.642]    • Chronic respiratory failure with hypoxia (HCC) [J96.11]    • Hormone replacement therapy [Z79.890]    • BPH (benign prostatic hyperplasia) [N40.0]        Assessment/Plan:  Patient doing well    Dressings CDI  Cleared for DC to SNF by ortho pending medicine  clearance  POD#6 S/P Surgical treatment of left intertrochanteric femur fracture with intramedullary device  Wt bearing status - WBAT  Wound care/Drains - Dressings to be changed every other day by nursing  Future Procedures - none planned   Sutures/Staples out- 14 days post operatively  PT/OT-initiated  Antibiotics: Perioperative completed  DVT Prophylaxis- TEDS/SCDs/Foot pumps/heparin  Gutierres-none  Case Coordination for Discharge Planning - Disposition SNF     I have performed a psical exam and reviewed and updated ROS and Plan today (3/30). In review of yesterday's note (3/29), there are no changes except as documented above.

## 2022-03-31 ENCOUNTER — PATIENT OUTREACH (OUTPATIENT)
Dept: HEALTH INFORMATION MANAGEMENT | Facility: OTHER | Age: 82
End: 2022-03-31
Payer: MEDICARE

## 2022-03-31 VITALS
HEIGHT: 73 IN | TEMPERATURE: 97.5 F | RESPIRATION RATE: 18 BRPM | OXYGEN SATURATION: 95 % | DIASTOLIC BLOOD PRESSURE: 72 MMHG | SYSTOLIC BLOOD PRESSURE: 144 MMHG | BODY MASS INDEX: 31.09 KG/M2 | HEART RATE: 76 BPM | WEIGHT: 234.57 LBS

## 2022-03-31 LAB
ANION GAP SERPL CALC-SCNC: 10 MMOL/L (ref 7–16)
BUN SERPL-MCNC: 51 MG/DL (ref 8–22)
CALCIUM SERPL-MCNC: 9 MG/DL (ref 8.5–10.5)
CHLORIDE SERPL-SCNC: 105 MMOL/L (ref 96–112)
CO2 SERPL-SCNC: 23 MMOL/L (ref 20–33)
CREAT SERPL-MCNC: 1.57 MG/DL (ref 0.5–1.4)
ERYTHROCYTE [DISTWIDTH] IN BLOOD BY AUTOMATED COUNT: 49.4 FL (ref 35.9–50)
GFR SERPLBLD CREATININE-BSD FMLA CKD-EPI: 44 ML/MIN/1.73 M 2
GLUCOSE BLD STRIP.AUTO-MCNC: 157 MG/DL (ref 65–99)
GLUCOSE BLD STRIP.AUTO-MCNC: 180 MG/DL (ref 65–99)
GLUCOSE SERPL-MCNC: 149 MG/DL (ref 65–99)
HCT VFR BLD AUTO: 46.6 % (ref 42–52)
HGB BLD-MCNC: 14.8 G/DL (ref 14–18)
MCH RBC QN AUTO: 30.5 PG (ref 27–33)
MCHC RBC AUTO-ENTMCNC: 31.8 G/DL (ref 33.7–35.3)
MCV RBC AUTO: 95.9 FL (ref 81.4–97.8)
PLATELET # BLD AUTO: 154 K/UL (ref 164–446)
PMV BLD AUTO: 10.2 FL (ref 9–12.9)
POTASSIUM SERPL-SCNC: 5.1 MMOL/L (ref 3.6–5.5)
RBC # BLD AUTO: 4.86 M/UL (ref 4.7–6.1)
SODIUM SERPL-SCNC: 138 MMOL/L (ref 135–145)
WBC # BLD AUTO: 6.2 K/UL (ref 4.8–10.8)

## 2022-03-31 PROCEDURE — 94660 CPAP INITIATION&MGMT: CPT

## 2022-03-31 PROCEDURE — A9270 NON-COVERED ITEM OR SERVICE: HCPCS

## 2022-03-31 PROCEDURE — 80048 BASIC METABOLIC PNL TOTAL CA: CPT

## 2022-03-31 PROCEDURE — A9270 NON-COVERED ITEM OR SERVICE: HCPCS | Performed by: NURSE PRACTITIONER

## 2022-03-31 PROCEDURE — 700102 HCHG RX REV CODE 250 W/ 637 OVERRIDE(OP): Performed by: SURGERY

## 2022-03-31 PROCEDURE — 85027 COMPLETE CBC AUTOMATED: CPT

## 2022-03-31 PROCEDURE — 700102 HCHG RX REV CODE 250 W/ 637 OVERRIDE(OP): Performed by: FAMILY MEDICINE

## 2022-03-31 PROCEDURE — 700102 HCHG RX REV CODE 250 W/ 637 OVERRIDE(OP)

## 2022-03-31 PROCEDURE — 82962 GLUCOSE BLOOD TEST: CPT

## 2022-03-31 PROCEDURE — A9270 NON-COVERED ITEM OR SERVICE: HCPCS | Performed by: FAMILY MEDICINE

## 2022-03-31 PROCEDURE — A9270 NON-COVERED ITEM OR SERVICE: HCPCS | Performed by: SURGERY

## 2022-03-31 PROCEDURE — 99239 HOSP IP/OBS DSCHRG MGMT >30: CPT | Performed by: FAMILY MEDICINE

## 2022-03-31 PROCEDURE — A9270 NON-COVERED ITEM OR SERVICE: HCPCS | Performed by: ORTHOPAEDIC SURGERY

## 2022-03-31 PROCEDURE — 36415 COLL VENOUS BLD VENIPUNCTURE: CPT

## 2022-03-31 PROCEDURE — 700102 HCHG RX REV CODE 250 W/ 637 OVERRIDE(OP): Performed by: ORTHOPAEDIC SURGERY

## 2022-03-31 PROCEDURE — 700102 HCHG RX REV CODE 250 W/ 637 OVERRIDE(OP): Performed by: NURSE PRACTITIONER

## 2022-03-31 RX ORDER — METAXALONE 800 MG/1
800 TABLET ORAL 3 TIMES DAILY
Qty: 90 TABLET | Status: SHIPPED
Start: 2022-03-31 | End: 2022-12-06

## 2022-03-31 RX ORDER — TAMSULOSIN HYDROCHLORIDE 0.4 MG/1
0.4 CAPSULE ORAL
Qty: 30 CAPSULE | Status: SHIPPED
Start: 2022-04-01 | End: 2022-12-06

## 2022-03-31 RX ORDER — LIDOCAINE 50 MG/G
2 PATCH TOPICAL EVERY 24 HOURS
Qty: 10 PATCH | Status: SHIPPED
Start: 2022-03-31 | End: 2022-05-10

## 2022-03-31 RX ORDER — OXYCODONE HYDROCHLORIDE 10 MG/1
10 TABLET ORAL EVERY 4 HOURS PRN
Qty: 18 TABLET | Refills: 0 | Status: SHIPPED | OUTPATIENT
Start: 2022-03-31 | End: 2022-04-03

## 2022-03-31 RX ORDER — TORSEMIDE 20 MG/1
20 TABLET ORAL DAILY
Qty: 30 TABLET | Refills: 3 | Status: SHIPPED
Start: 2022-04-01 | End: 2022-06-14

## 2022-03-31 RX ADMIN — METAXALONE 800 MG: 800 TABLET ORAL at 11:48

## 2022-03-31 RX ADMIN — FLUTICASONE PROPIONATE 100 MCG: 50 SPRAY, METERED NASAL at 06:02

## 2022-03-31 RX ADMIN — SACUBITRIL AND VALSARTAN 1 TABLET: 24; 26 TABLET, FILM COATED ORAL at 06:01

## 2022-03-31 RX ADMIN — TAMSULOSIN HYDROCHLORIDE 0.4 MG: 0.4 CAPSULE ORAL at 08:03

## 2022-03-31 RX ADMIN — OXYCODONE HYDROCHLORIDE 10 MG: 5 TABLET ORAL at 08:06

## 2022-03-31 RX ADMIN — OMEPRAZOLE 40 MG: 20 CAPSULE, DELAYED RELEASE ORAL at 06:01

## 2022-03-31 RX ADMIN — Medication 1 DROP: at 08:03

## 2022-03-31 RX ADMIN — OXYCODONE HYDROCHLORIDE 10 MG: 5 TABLET ORAL at 11:48

## 2022-03-31 RX ADMIN — METAXALONE 800 MG: 800 TABLET ORAL at 06:02

## 2022-03-31 RX ADMIN — EZETIMIBE 10 MG: 10 TABLET ORAL at 06:02

## 2022-03-31 RX ADMIN — TORSEMIDE 20 MG: 20 TABLET ORAL at 06:02

## 2022-03-31 RX ADMIN — CARVEDILOL 3.12 MG: 6.25 TABLET, FILM COATED ORAL at 08:03

## 2022-03-31 RX ADMIN — DOCUSATE SODIUM 100 MG: 100 CAPSULE, LIQUID FILLED ORAL at 06:02

## 2022-03-31 RX ADMIN — APIXABAN 5 MG: 5 TABLET, FILM COATED ORAL at 06:02

## 2022-03-31 ASSESSMENT — PAIN DESCRIPTION - PAIN TYPE: TYPE: ACUTE PAIN

## 2022-03-31 NOTE — CARE PLAN
Problem: Knowledge Deficit - Standard  Goal: Patient and family/care givers will demonstrate understanding of plan of care, disease process/condition, diagnostic tests and medications  Outcome: Progressing     Problem: Pain - Standard  Goal: Alleviation of pain or a reduction in pain to the patient’s comfort goal  Outcome: Progressing     Problem: Skin Integrity  Goal: Skin integrity is maintained or improved  Outcome: Progressing     Problem: Fall Risk  Goal: Patient will remain free from falls  Outcome: Progressing   The patient is Stable - Low risk of patient condition declining or worsening    Shift Goals  Clinical Goals:   Patient Goals: d/c tomorrow  Family Goals:     Progress made toward(s) clinical / shift goals:  Report received from day shift RN. Assumed patient care @ 1915. Patient resting comfortably. A+O, VSS on 2L NC, call light and belongings within reach, bed locked and in lowest position. Pt reporting 7/10 L hip/rib pain, medicated per MAR. Pt calls appropriately, able to make needs known, turns self. Ambulate in hallway x2. Fall precautions and hourly rounding in place.

## 2022-03-31 NOTE — DISCHARGE PLANNING
Anticipated Discharge Disposition: SNF    Action: Per DPA, [facility] is the only facility able to accept. LSW met with the patient multiple times for extended period of time to review the SNF choice form and explained the denials / pending responses.     The patient appeared hesitant to transferring to [facility] or any other facility. The patient stated he wants confirmation of a Private Room at [facility]. This LSW informed the patient, per Judie,  at , patient will have a private room.    Patient requested a update on the response from [facility]. LSW explained [facility] is aware his wife is institutionalized and patient does not have any support at home, LSW requested DPA reaches out to [facility] to clarify their final decision, per patient's request.    Patient enquired on the outcome from the evaluation for acute inpatient rehab. LSW explained, per MR, [facility]'s recommendation 3/18/22 is for SNF.    The patient requested to speak with his doctor regarding the recommendation for SNF before he makes a decision regarding MD EDITA notified via Volte.    Barriers to Discharge: Patient's Authorization for transfer.    Plan: SNF, pending patient's authorization.

## 2022-03-31 NOTE — PROGRESS NOTES
Discharge instructions reviewed with patient. Patient's money ($4,[phone]), returned to patient by security in non tampered bag. Pt escorted by [facility] transport via WC and on 2L NC. PIV to RFA.

## 2022-03-31 NOTE — DISCHARGE PLANNING
Agency/Facility Name: [facility]  Spoke To: Flory  Outcome: Per LSW, pt is requesting he have a private room at [facility]. Per Flory, they can provide the pt with a private room.  LSW notified    Agency/Facility Name: [facility]  Outcome: Left a voicemail, waiting for a call back.    @[phone]  Agency/Facility Name: [facility]  Spoke To: Zayda  Outcome: Per Zayda, pt is accepted and can be taken today at anytime. Per Zayda, they are able to provide pt with a private room as requested.  LSW notified    Agency/Facility Name: [facility]  Outcome: Per LSW, pt wants to go to [facility]. DPA left a voicemail notifying the admissions team at [facility] of this decision.     Agency/Facility Name: [facility]  Spoke To: Flory  Outcome: Per Flory, she is requesting a D/C summary. DPA to fax over D/C summary once made available.  LSW notified    @[phone]  Agency/Facility Name: [facility]  Outcome: Left a voicemail, regarding the D/C summary that's available for admissions. DPA to manually fax over D/C summary.

## 2022-03-31 NOTE — PROGRESS NOTES
Bedside report received.  Assessment complete.  A&O x 4. Patient calls appropriately.  Patient UP with X1 assist. Bed alarm ON.   Patient has 7/10 pain. Medicated  Denies N&V. Tolerating  diet.  Surgical site to left hip CDI.  Patient denies SOB.  Review plan with of care with patient. Call light and personal belongings with in reach. Hourly rounding in place. All needs met at this time.

## 2022-03-31 NOTE — DISCHARGE INSTRUCTIONS
Hip Fracture    A hip fracture is a break in the upper part of the thigh bone (femur). This is usually the result of an injury, commonly a fall.  What are the causes?  This condition may be caused by:  · A direct hit or injury (trauma) to the side of the hip, such as from a fall or a car accident.  What increases the risk?  You are more likely to develop this condition if:  · You have poor balance or an unsteady walking pattern (gait). Certain conditions contribute to poor balance, including Parkinson disease and dementia.  · You have thinning or weakening of your bones, such as from osteopenia or osteoporosis.  · You have cancer that spreads to the leg bones.  · You have certain conditions that can weaken your bones, such as thyroid disorders, intestine disorders, or a lack (deficiency) of certain nutrients.  · You smoke.  · You take certain medicines, such as steroids.  · You have a history of broken bones.  What are the signs or symptoms?  Symptoms of this condition include:  · Pain over the injured hip. This is commonly felt on the side of the hip or in the front groin area.  · Stiffness, bruising, and swelling over the hip.  · Pain with movement of the leg, especially lifting it up. Pain often gets better with rest.  · Difficulty or inability to stand, walk, or use the leg to support body weight (put weight on the leg).  · The leg rolling outward when lying down.  · The affected leg being shorter than the other leg.  How is this diagnosed?  This condition may be diagnosed based on:  · Your symptoms.  · A physical exam.  · X-rays. These may be done:  ? To confirm the diagnosis.  ? To determine the type and location of the fracture.  ? To check for other injuries.  · MRI or CT scans. These may be done if the fracture is not visible on an X-ray.  How is this treated?  Treatment for this condition depends on the severity and location of your fracture. In most cases, surgery is necessary. Surgery may  involve:  · Repairing the fracture with a screw, nail, or bianca to hold the bone in place (open reduction and internal fixation, ORIF).  · Replacing the damaged parts of the femur with metal implants (hemiarthroplasty or arthroplasty).  If your fracture is less severe, or if you are not eligible for surgery, you may have non-surgical treatment. Non-surgical treatment may involve:  · Using crutches, a walker, or a wheelchair until your health care provider says that you can support (bear) weight on your hip.  · Medicines to help reduce pain and swelling.  · Having regular X-rays to monitor your fracture and make sure that it is healing.  · Physical therapy. You may need physical therapy after surgery, too.  Follow these instructions at home:  Activity  · Do not use your injured leg to support your body weight until your health care provider says that you can.  ? Follow standing and walking restrictions as told by your health care provider.  ? Use crutches, a walker, or a wheelchair as directed.  · Avoid any activities that cause pain or irritation in your hip. Ask your health care provider what activities are safe for you.  · Do not drive or use heavy machinery until your health care provider approves.  · If physical therapy was prescribed, do exercises as told by your health care provider.  General instructions  · Take over-the-counter and prescription medicines only as told by your health care provider.  · If directed, put ice on the injured area:  ? Put ice in a plastic bag.  ? Place a towel between your skin and the bag.  ? Leave the ice on for 20 minutes, 2-3 times a day.  · Do not use any products that contain nicotine or tobacco, such as cigarettes and e-cigarettes. These can delay bone healing. If you need help quitting, ask your health care provider.  · Keep all follow-up visits as told by your health care provider. This is important.  How is this prevented?  · To prevent falls at home:  ? Use a cane, walker,  or wheelchair as directed.  ? Make sure your rooms and hallways are free of clutter, obstacles, and cords.  ? Install grab bars in your bedroom and bathrooms.  ? Always use handrails when going up and down stairs.  ? Use nightlights around the house.  · Exercise regularly. Ask what forms of exercise are safe for you, such as walking and strength and balance exercises.  · Visit an eye doctor regularly to have your eyesight checked. This can help prevent falls.  · Make sure you get enough calcium and vitamin D.  · Do not use any products that contain nicotine or tobacco, such as cigarettes and e-cigarettes. If you need help quitting, ask your health care provider.  · Limit alcohol use.  · If you have an underlying condition that caused your hip fracture, work with your health care provider to manage your condition.  Contact a health care provider if:  · Your pain gets worse or it does not get better with rest or medicine.  · You develop any of the following in your leg or foot:  ? Numbness.  ? Tingling.  ? A change in skin color (discoloration).  ? Skin feeling cold to the touch.  Get help right away if:  · Your pain suddenly gets worse.  · You cannot move your hip.  Summary  · A hip fracture is a break in the upper part of the thigh bone (femur).  · Treatment typically require surgical management to restore stability and function to the hip.  · Pain medicine and icing of the affected leg can help manage pain and swelling. Follow directions as told by your health care provider.  This information is not intended to replace advice given to you by your health care provider. Make sure you discuss any questions you have with your health care provider.  Document Released: 12/18/2006 Document Revised: 09/07/2019 Document Reviewed: 01/20/2018  [employer] Patient Education © 2020 [employer]    Discharge Instructions    [facility] with self.  Special equipment needed: Not Applicable    Be  sure to schedule a follow-up appointment with your primary care doctor or any specialists as instructed.     Discharge Plan:   Influenza Vaccine Indication: Patient Refuses    I understand that a diet low in cholesterol, fat, and sodium is recommended for good health. Unless I have been given specific instructions below for another diet, I accept this instruction as my diet prescription.   Other diet:     Special Instructions: None    · Is patient discharged on Warfarin / Coumadin?   No     Depression / Suicide Risk    As you are discharged from this [facility] facility, it is important to learn how to keep safe from harming yourself.    Recognize the warning signs:  · Abrupt changes in personality, positive or negative- including increase in energy   · Giving away possessions  · Change in eating patterns- significant weight changes-  positive or negative  · Change in sleeping patterns- unable to sleep or sleeping all the time   · Unwillingness or inability to communicate  · Depression  · Unusual sadness, discouragement and loneliness  · Talk of wanting to die  · Neglect of personal appearance   · Rebelliousness- reckless behavior  · Withdrawal from people/activities they love  · Confusion- inability to concentrate     If you or a loved one observes any of these behaviors or has concerns about self-harm, here's what you can do:  · Talk about it- your feelings and reasons for harming yourself  · Remove any means that you might use to hurt yourself (examples: pills, rope, extension cords, firearm)  · Get professional help from the community (Mental Health, Substance Abuse, psychological counseling)  · Do not be alone:Call your Safe Contact- someone whom you trust who will be there for you.  · Call your local CRISIS HOTLINE 775-1895 or 318-784-1355  · Call your local Children's Mobile Crisis Response Team [place] (173) 349-2587 or www.FeedVisor  · Call the toll free National Suicide Prevention Hotlines    · National Suicide Prevention Lifeline 213-841-JCLV (6176)  · National Hope Line Network 800-SUICIDE (818-7571)

## 2022-03-31 NOTE — DISCHARGE PLANNING
Anticipated Discharge Disposition: SNF    Action: Per DPA, [facility] is the only facility able to accept. LSW met with the patient multiple times for extended period of time to review the SNF choice form and explained the denials / pending responses.     The patient appeared hesitant to transferring to [facility] or any other facility. The patient stated he wants confirmation of a Private Room at [facility]. This LSW informed the patient, per Judie,  at , patient will have a private room.    Patient requested a update on the response from [facility]. LSW explained [facility] is aware his wife is institutionalized and patient does not have any support at home, LSW requested DPA reaches out to [facility] to clarify their final decision, per patient's request.    Patient enquired on the outcome from the evaluation for acute inpatient rehab. LSW explained, per MR, [facility]'s recommendation 3/18/22 is for SNF.    The patient requested to speak with his doctor regarding the recommendation for SNF before he makes a decision regarding , MD notified via Volte.    Barriers to Discharge: Patient's Authorization for transfer.    Plan: SNF, pending patient's authorization.    10:40am - MD met with patient and addressed all his questions. LSW met with patient and explained both, [facility] and [facility] are able to accept today and have confirmed the private room. The patient stated his choice is [facility]. Patient signed the [employer] Form and stated he does not have any additional concerns that need to addressed prior to transfer today at 12:30pm, MD aware. LSW completed the Transfer Packet.    PLAN: SNF

## 2022-03-31 NOTE — DISCHARGE SUMMARY
Discharge Summary    CHIEF COMPLAINT ON ADMISSION  No chief complaint on file.      Reason for Admission  Trauma yellow transfer, 10 mins, 7*     CODE STATUS  Full Code    HPI & HOSPITAL COURSE  This is a 81 y.o. male here with trauma from a ground-level fall with fractured left ribs, left greater trochanter fracture, left renal hematoma, left hip hematoma.  Trauma surgery admitted the patient, Orthopedic surgery was also consulted on the case.  He required further evaluation of the left greater trochanter fracture with an MRI of the hip, however he has an ICD in place and required the [employer] technician.  Geriatric medicine was consulted on the case due to his multiple medical problems and geriatric syndromes.  Patient underwent Surgical treatment of left intertrochanteric femur fracture with intramedullary device on 3/29/2022.  He did have some mild acute kidney injury when his heart failure medications were restarted, the doses were adjusted and his creatinine is back to his baseline.  Orthopedic surgery cleared for his antiplatelet and anticoagulant to be restarted.  Physical therapy and outpatient therapy came to evaluate him, and will need continued rehabitation a skilled nursing facility.  Orthopedic surgery has cleared him for discharge.    Therefore, he is discharged in good and stable condition to skilled nursing facility.    The patient met 2-midnight criteria for an inpatient stay at the time of discharge.      FOLLOW UP ITEMS POST DISCHARGE  Follow-up with orthopedic surgery    DISCHARGE DIAGNOSES  Principal Problem:    Disp fx of greater trochanter of left femur, init (HCC) POA: Yes  Active Problems:    Frequent falls POA: Yes    Fracture of ribs, three, closed, left, initial encounter POA: Yes    Type 2 diabetes mellitus with hyperglycemia, without long-term current use of insulin ([facility]) POA: Yes      Overview: Is a chronic condition, patient states that he is managed by his wellness       doctor   Kristofer.        He says that his most recent hemoglobin A1c was checked around a year ago       and was 6.7%      Patient is taking Amaryl 1 mg tablet once daily.      His last foot exam was today 03/15/2022 by his podiatrist.      Patient has not had retinal screening for a few years now-provided       referral, patient to schedule.                Acute on chronic Stage 3b chronic kidney disease (HCC) (Chronic) POA: Yes    Coronary artery disease due to calcified coronary lesion (Chronic) POA: Yes    CHF (congestive heart failure), NYHA class II, chronic, systolic (HCC) (Chronic) POA: Yes      Overview: managed by cardiology Dr. Wilkinson: He appears euvolemic on physical exam.        He is on losartan, carvedilol, not on spironolactone due to low kidney       function.  He was admitted to the hospital for CHF exacerbation, when he       suddenly gained 15 pounds.  He was diuresed with IV Lasix at that time,       had echocardiogram that showed significant improvement of his cardiac       function, when compared to previous study.  Ejection fraction 55%.    S/P TAVR (transcatheter aortic valve replacement) POA: Yes    ICD (implantable cardioverter-defibrillator) in place POA: Yes    Renal hematoma, left, initial encounter POA: Yes    Essential hypertension POA: Yes    Hyperkalemia POA: Yes    JAMES (obstructive sleep apnea) (Chronic) POA: Yes      Overview: Patient is on CPAP at home    Hip hematoma, left, initial encounter POA: Yes    Persistent atrial fibrillation (HCC) POA: Yes    Chronic respiratory failure with hypoxia (HCC) POA: Yes    Hormone replacement therapy POA: Yes    BPH (benign prostatic hyperplasia) POA: Yes    Left hand pain POA: Yes    Contraindication to deep vein thrombosis (DVT) prophylaxis POA: Yes      Overview: ICD-10 transition    Anderson esophagus (Chronic) POA: Yes      Overview: 2/4/15 upper GI small bowel follow-through mild to moderate thoracic       esophageal dysmotility, small sliding  hiatal hernia, laparoscopic gastric       band appears appropriately located      2/18/15 EGD per [facility] reactive gastropathy, and h. pylori, haji's mucosa       indefinite for dysplasia, no evidence of celiac disease      4/22/15 upper GI series postoperatively appears laparoscopic band within       normal limits, small sliding hiatal hernia      5/14/15 EGD per [facility] esophageal mucosal changes large amount of food       residue in stomach, 5 mucosa low grade dysplasia, intestinal metaplasia,       gastric emptying study pending      5/22/15 nuclear medicine gastric emptying study per [facility] evidence of       reflux, gastric emptying half-time 118 minutes      1/8/16 EGD per  surgery normal      1/4/17 [facility] note history of reflux, treatment of haji's esophagus       low-grade dysplasia, discussed radiofrequency ablation and endoscopic       ultrasound      7/24/17 EGD per [facility] biopsies performed, follow up haji's      8/16/18 EGD per [facility] salmon colored mucosa suspicious for short segment       haji's, biopsies pathology reactive gastropathy with mucosal iron       deposition, negative h.pylori, haji's mucosa, active esophagitis       consistent with reflux, no dysplasia or malignancy seen      8/26/19 EGD per [facility] esophageal mucosal changes consistent with short       segment haji's, biopsied                      Obesity POA: Yes      Overview: 3/3/16 BMI 33.6      3/10/17 BMI 33.3 sees  bariatric       10/8/18 BMI 32.6    Encounter for screening for COVID-19 POA: Yes    Trauma POA: Yes    Antiplatelet or antithrombotic long-term use POA: Yes    Seasonal allergies (Chronic) POA: Yes    Encounter for geriatric assessment POA: No    Abnormal CT scan, colon POA: Yes    Contusion of left hand POA: Yes    Dyslipidemia (Chronic) POA: Yes      Overview: Established diagnosis. Currently taking Zetia, intolerant to statins      Lab Results       Component Value Date/Time        CHOLSTRLTOT 72 (L)  05/19/2021 08:26 AM        TRIGLYCERIDE 102 05/19/2021 08:26 AM        HDL 24 (A) 05/19/2021 08:26 AM        LDL 28 05/19/2021 08:26 AM                      Resolved Problems:    * No resolved hospital problems. *      FOLLOW UP  Future Appointments   Date Time Provider Department Center   4/14/2022  1:15 PM NEAL Hanna [facility] None   5/10/2022  8:20 AM Paul Wilkinson M.D. [facility] None   6/14/2022  8:00 AM DANIELLE Whitaker   12/6/2022  9:40 AM Rafael Valderrama M.D. [facility] None     [facility]  [address]  [address]  [facility] [address]  [phone]        [address] [address]  [phone]        Faye Moeller M.D.  [address]  [address]  [facility] [address] [phone]    Call  Please call your primary care provider to schedule a hospital follow up. Thank you.       MEDICATIONS ON DISCHARGE     Medication List      START taking these medications      Instructions   insulin regular 100 Unit/mL Soln  Commonly known as: HumuLIN R   Inject 1-6 Units under the skin 4 Times a Day,Before Meals and at Bedtime.  Dose: 1-6 Units     lidocaine 5 % Ptch  Commonly known as: LIDODERM   Place 2 Patches on the skin every 24 hours.  Dose: 2 Patch     metaxalone 800 MG Tabs  Commonly known as: Skelaxin   Take 1 Tablet by mouth 3 times a day.  Dose: 800 mg     oxyCODONE immediate release 10 MG immediate release tablet  Commonly known as: ROXICODONE   Take 1 Tablet by mouth every four hours as needed for up to 3 days.  Dose: 10 mg     tamsulosin 0.4 MG capsule  Start taking on: April 1, 2022  Commonly known as: FLOMAX   Take 1 Capsule by mouth 1/2 hour after breakfast.  Dose: 0.4 mg        CHANGE how you take these medications      Instructions   fluticasone 50 MCG/ACT nasal spray  What changed:   · how to take this  · when to take this  · reasons to take this  Commonly known as: FLONASE   Use 2 spray(s) in each nostril  once daily     torsemide 20 MG Tabs  Start taking on: April 1, 2022  What changed: when to take this  Commonly known as: DEMADEX   Take 1 Tablet by mouth every day.  Dose: 20 mg        CONTINUE taking these medications      Instructions   apixaban 5mg Tabs  Commonly known as: ELIQUIS   Take 1 Tablet by mouth 2 times a day.  Dose: 5 mg     aspirin EC 81 MG Tbec  Commonly known as: ECOTRIN   Take 1 Tablet by mouth every day.  Dose: 81 mg     carvedilol 3.125 MG Tabs  Commonly known as: COREG   Take 1 tablet by mouth 2 times a day with meals.  Dose: 3.125 mg     CITRACAL PO   Take 1 Tab by mouth 2 Times a Day.  Dose: 1 Tablet     coenzyme Q-10 30 MG capsule   Take 60 mg by mouth 2 Times a Day.  Dose: 60 mg     Entresto 24-26 MG Tabs tablet  Generic drug: sacubitril-valsartan   Take 1 Tablet by mouth 2 times a day.  Dose: 1 Tablet     ezetimibe 10 MG Tabs  Commonly known as: ZETIA   Take 1 tablet by mouth once daily     omeprazole 40 MG delayed-release capsule  Commonly known as: PRILOSEC   Take 40 mg by mouth 2 times a day.  Dose: 40 mg     VITAMIN B COMPLEX PO   Take 1 Tab by mouth every day.  Dose: 1 Tablet     Vitamin C 1000 MG Tabs   Take 1,000 mg by mouth 2 Times a Day.  Dose: 1,000 mg     Vitamin D3 2000 UNIT Caps   Take 2,000 Units by mouth 2 Times a Day.  Dose: 2,000 Units        STOP taking these medications    anastrozole 1 MG Tabs  Commonly known as: ARIMIDEX     clomiPHENE 50 MG tablet  Commonly known as: CLOMID     CRANBERRY PO     cyanocobalamin 1000 MCG/ML Soln  Commonly known as: VITAMIN B-12     Garlic 1000 MG Caps     glimepiride 1 MG tablet  Commonly known as: AMARYL     glucosamine Sulfate 500 MG Caps     hydrocortisone 2.5 % Oint     L-Lysine 1000 MG Tabs     lactobacillus rhamnosus Caps capsule     losartan 25 MG Tabs  Commonly known as: COZAAR     Non Formulary Request     Non Formulary Request     SILODOSIN PO     testosterone cypionate 200 MG/ML Soln injection  Commonly known as:  DEPO-TESTOSTERONE     vitamin e 400 UNIT Caps  Commonly known as: VITAMIN E     Zinc 50 MG Tabs            Allergies  Allergies   Allergen Reactions   • Statins [Hmg-Coa-R Inhibitors] Rash     Blisters         DIET  Orders Placed This Encounter   Procedures   • Diet Order Diet: Consistent CHO (Diabetic)     Standing Status:   Standing     Number of Occurrences:   1     Order Specific Question:   Diet:     Answer:   Consistent CHO (Diabetic) [4]       ACTIVITY  As tolerated and directed by skilled nursing.  Weight bearing as tolerated    LINES, DRAINS, AND WOUNDS  This is an automated list. Peripheral IVs will be removed prior to discharge.  Peripheral IV 03/22/22 20 G Anterior;Right Forearm (Active)   Site Assessment Clean;Dry;Intact 03/31/22 0806   Dressing Type Transparent 03/31/22 0806   Line Status Scrubbed the hub prior to access;Flushed;Saline locked 03/31/22 0806   Dressing Status Clean;Dry;Intact 03/31/22 0806   Dressing Intervention N/A 03/31/22 0806   Dressing Change Due 04/05/22 03/31/22 0806   Date Primary Tubing Changed 03/25/22 03/25/22 0843   NEXT Primary Tubing Change  03/26/22 03/25/22 0843   Infiltration Grading ([facility] 0 03/31/22 0806   Phlebitis Scale ([facility] Only) 0 03/31/22 0806       Wound 06/05/20 Incision Abdomen A cell powder and Iodoform packing in incision in left lower quadrant 4x4 gauze and tegaderm over it. Benzoin steris bandaids over lap sites. (Active)       Wound 03/24/22 Hip Left (Active)   Site Assessment JUMANA 03/31/22 0806   Periwound Assessment JUMANA 03/31/22 0806   Margins JUMANA 03/31/22 0806   Closure JUMANA 03/31/22 0806   Drainage Amount None 03/31/22 0806   Drainage Description JUMANA 03/30/22 0830   Treatments Ice applied 03/30/22 0830   Dressing Options Dry Gauze;Transparent Film 03/31/22 0806   Dressing Changed Observed 03/31/22 0806   Dressing Status Clean;Dry;Intact 03/31/22 0806       Peripheral IV 03/22/22 20 G Anterior;Right Forearm (Active)   Site Assessment  Clean;Dry;Intact 03/31/22 0806   Dressing Type Transparent 03/31/22 0806   Line Status Scrubbed the hub prior to access;Flushed;Saline locked 03/31/22 0806   Dressing Status Clean;Dry;Intact 03/31/22 0806   Dressing Intervention N/A 03/31/22 0806   Dressing Change Due 04/05/22 03/31/22 0806   Date Primary Tubing Changed 03/25/22 03/25/22 0843   NEXT Primary Tubing Change  03/26/22 03/25/22 0843   Infiltration Grading ([facility], [facility] 0 03/31/22 0806   Phlebitis Scale ([facility] Only) 0 03/31/22 0806               MENTAL STATUS ON TRANSFER  Alert oriented x4          CONSULTATIONS  Orthopedic surgery - [facility]  Geriatric medicine - [facility]  Trauma Surgery - [facility]    PROCEDURES  Surgical treatment of left intertrochanteric femur fracture with intramedullary device   3/29/2022    LABORATORY  Lab Results   Component Value Date    SODIUM 138 03/31/2022    POTASSIUM 5.1 03/31/2022    CHLORIDE 105 03/31/2022    CO2 23 03/31/2022    GLUCOSE 149 (H) 03/31/2022    BUN 51 (H) 03/31/2022    CREATININE 1.57 (H) 03/31/2022    CREATININE 1.18 02/08/2011    GLOMRATE >59 02/08/2011        Lab Results   Component Value Date    WBC 6.2 03/31/2022    WBC 4.5 02/08/2011    HEMOGLOBIN 14.8 03/31/2022    HEMATOCRIT 46.6 03/31/2022    PLATELETCT 154 (L) 03/31/2022        Total time of the discharge process exceeds 40 minutes.

## 2022-04-01 ENCOUNTER — TELEPHONE (OUTPATIENT)
Dept: CARDIOLOGY | Facility: MEDICAL CENTER | Age: 82
End: 2022-04-01
Payer: MEDICARE

## 2022-04-01 RX ORDER — LOSARTAN POTASSIUM 25 MG/1
25 TABLET ORAL DAILY
Qty: 90 TABLET | Refills: 3 | Status: SHIPPED | OUTPATIENT
Start: 2022-04-01 | End: 2023-06-16

## 2022-04-01 NOTE — TELEPHONE ENCOUNTER
S/w pt who was recently hospitalized for GLF and underwent orthopedic surgeries who explains that he reviewed his discharge summary and entresto is on his medication list but doesn't remember taking this medication recently. He states that he thinks that Dr. Wilkinson put him on an alternative to entresto a while back.     Per chart, pt has not been on Entresto since 12/2020 and reviewed discharge summary from yesterday and there is no explanation for why entresto was reordered.     Even though he saw MARIAMA most recently, he specifically asks that Dr. Wilkinson be notified and advise if he needs entresto.     To Dr. Wilkinson

## 2022-04-01 NOTE — TELEPHONE ENCOUNTER
MARIAMA    Pt is in the hospital and very concerned about the medication he is given. He is not sure about the Eloquis and the Entresto  Concerned he is getting a duplicate dose.  PT would like a call back to discuss his concerns and to make sure he is to be taking all of this medication.    PH:  [phone]    Thank You  Yuridia BEAL

## 2022-04-02 NOTE — TELEPHONE ENCOUNTER
Message  Received: Today  Paul Wilkinson M.D.  Sybil Velasquez R.N.  Caller: Unspecified (Today, 10:33 AM)  Can go on losartan 25 instead of entresto.  Looks like losartan was what was on the last when he saw a Princess last.  Either way, I believe losartan is sufficient.     Thanks   BE   ----------------------------------------------------    S/w pt and notified him of the above. He states understanding and agrees with plan.     Med list updated.

## 2022-04-08 ENCOUNTER — TELEPHONE (OUTPATIENT)
Dept: CARDIOLOGY | Facility: MEDICAL CENTER | Age: 82
End: 2022-04-08
Payer: MEDICARE

## 2022-04-08 NOTE — TELEPHONE ENCOUNTER
MARIAMA Quevedo with [facility] is needing clarification on which medication this pt is taking. The Eliquis or Plavix?    Please call PH: [phone]    Thank you,    Yuridia BEAL

## 2022-04-08 NOTE — TELEPHONE ENCOUNTER
Paul Wilkinson M.D.  Ruchi Holt, Med Ass't 39 minutes ago (1:57 PM)        Cant see device transmission- if confirmed I would switch to eliquis 5 BID, aspirin 81, stop plavix. Wait for me to confirm first.     Thanks   BE       Talked with Ruchi to make sure she saw Dr. Wilkinson's message. She was sending him a message when I walked over. She also states the transmission shows AFL not AF.      Voalte message from Dr. Wilkinson at 1432  He would like to start patient on Eliquis 5mg BID and ASA 81mg, stop Plavix. Refer to EP for possible ablation and can schedule general cardiology appt to review as well.      Called and LVM on patient's phone (which is a golf [employer] phone number) to call back and discuss. Called patient's wife Anni, and she said she will have patient call us to discuss.         S/W pharmacy- made aware plavix is DC'd- pt to take ASA and eliquis.

## 2022-04-14 ENCOUNTER — OFFICE VISIT (OUTPATIENT)
Dept: CARDIOLOGY | Facility: MEDICAL CENTER | Age: 82
End: 2022-04-14
Payer: MEDICARE

## 2022-04-14 VITALS
OXYGEN SATURATION: 94 % | SYSTOLIC BLOOD PRESSURE: 100 MMHG | HEIGHT: 73 IN | HEART RATE: 89 BPM | BODY MASS INDEX: 31.01 KG/M2 | DIASTOLIC BLOOD PRESSURE: 60 MMHG | WEIGHT: 234 LBS | RESPIRATION RATE: 14 BRPM

## 2022-04-14 DIAGNOSIS — Z79.899 HIGH RISK MEDICATION USE: ICD-10-CM

## 2022-04-14 DIAGNOSIS — I49.5 SSS (SICK SINUS SYNDROME) (HCC): ICD-10-CM

## 2022-04-14 DIAGNOSIS — I50.20 ACC/AHA STAGE C SYSTOLIC HEART FAILURE (HCC): ICD-10-CM

## 2022-04-14 DIAGNOSIS — Z79.01 CHRONIC ANTICOAGULATION: ICD-10-CM

## 2022-04-14 DIAGNOSIS — I25.84 CORONARY ARTERY DISEASE DUE TO CALCIFIED CORONARY LESION: ICD-10-CM

## 2022-04-14 DIAGNOSIS — Z95.810 ICD (IMPLANTABLE CARDIOVERTER-DEFIBRILLATOR) IN PLACE: ICD-10-CM

## 2022-04-14 DIAGNOSIS — I25.5 ISCHEMIC CARDIOMYOPATHY: ICD-10-CM

## 2022-04-14 DIAGNOSIS — I48.19 PERSISTENT ATRIAL FIBRILLATION (HCC): ICD-10-CM

## 2022-04-14 DIAGNOSIS — Z95.2 S/P TAVR (TRANSCATHETER AORTIC VALVE REPLACEMENT): ICD-10-CM

## 2022-04-14 DIAGNOSIS — I25.10 CORONARY ARTERY DISEASE DUE TO CALCIFIED CORONARY LESION: ICD-10-CM

## 2022-04-14 DIAGNOSIS — I50.9 HEART FAILURE, NYHA CLASS 2 (HCC): ICD-10-CM

## 2022-04-14 DIAGNOSIS — N18.4 STAGE 4 CHRONIC KIDNEY DISEASE (HCC): ICD-10-CM

## 2022-04-14 PROBLEM — M72.2 PLANTAR FASCIITIS OF RIGHT FOOT: Status: ACTIVE | Noted: 2021-07-19

## 2022-04-14 PROCEDURE — 99214 OFFICE O/P EST MOD 30 MIN: CPT | Performed by: NURSE PRACTITIONER

## 2022-04-14 ASSESSMENT — ENCOUNTER SYMPTOMS
PND: 0
MYALGIAS: 0
CLAUDICATION: 0
FEVER: 0
ORTHOPNEA: 0
COUGH: 0
DIZZINESS: 1
ABDOMINAL PAIN: 0
SHORTNESS OF BREATH: 0
PALPITATIONS: 0

## 2022-04-14 ASSESSMENT — FIBROSIS 4 INDEX: FIB4 SCORE: 3.44

## 2022-04-14 NOTE — PROGRESS NOTES
"Chief Complaint   Patient presents with   • Coronary Artery Disease     F/V DX: M.D. Coronary artery disease due to calcified coronary lesion        Aman More III is a 81 y.o. male who presents today for follow up on his heart failure.    Patient of Dr. Wilkinson, Dr. Castro for his Valve, Dr. Hendrix for Heart Failure and Dr. Steiner for his ICD.    He was last seen in clinic on 3/17/2022.  During that visit, patient was switched from Bumex to torsemide.    Since his last visit, patient did suffer a fall due to plantar fasciitis pain which resulted in a fracture of his greater trochanter of his left femur.  Patient had surgery on 3/24/2022.  Patient is currently residing at rehab.    Patient reports doing well from his heart failure standpoint.  He reports his lower extremity edema has improved.  He denies chest pain, palpitations, orthopnea, PND, edema or shortness of breath.    He does not have a current list of his medications.    Patient reports he has lost about 16 pounds.    He denies any further problems with his afib.    He denies shocks.     Additonally, patient has the following medical problems:     -CAD, had triple vessel disease, was not recommended for CABG, Had BRANDI to distal RCA on 12/27/2019 and subsequent staged PCI to Proximal LAD and distal Left Main on 1/10/2020     -Aortic Stenosis, not a SAVR candidate and had TAVR on 1/27/2020 with Dr. Castro     -Had Complete Heart Block from event monitor after TAVR and had a dual chamber ICD placed on 2/4/2020. Could not place CS lead secondary to perforation of the CS causing small Stable effusion    -Atrial fibrillation: OLGA/DCCV on 2/3/2022     -HTN     -HLD     -Diabetes     -CKD    -Diabetic Neuropathy    -JAMES, uses CPAP    Past Medical History:   Diagnosis Date   • Arthritis     \"everywhere\"   • Bowel habit changes     constipation   • Breath shortness     pt states short of breath 24/7 O2 at night only   • CAD (coronary artery disease)    • " "CATARACT     removed bilat   • Chest pain    • Chest tightness    • CHF (congestive heart failure), NYHA class II, chronic, systolic (HCC) 12/26/2019    managed by cardiology Dr. Wilkinson: He appears euvolemic on physical exam.  He is on losartan, carvedilol, not on spironolactone due to low kidney function.  He was admitted to the hospital for CHF exacerbation, when he suddenly gained 15 pounds.  He was diuresed with IV Lasix at that time, had echocardiogram that showed significant improvement of his cardiac function, when compared to previous study   • Chickenpox    • Congestive heart failure (HCC)    • Constipation    • Coronary heart disease    • Cough    • Daytime sleepiness    • Diabetes     oral medication   • Difficulty breathing    • Dilated cardiomyopathy (HCC)    • Fatigue    • GERD (gastroesophageal reflux disease)    • Hearing difficulty    • Heart murmur    • Heart valve disease    • Hiatus hernia syndrome    • History of BPH    • History of chronic cough    • Hyperlipidemia    • Hypertension     pt states well controlled on meds   • 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 Porter, gastroenterologist   • Urinary incontinence      Past Surgical History:   Procedure Laterality Date   • PB OPEN FIX INTER/SUBTROCH FX,IMPLNT Left 3/24/2022    Procedure: INSERTION, INTRAMEDULLARY LILLI, FEMUR, PROXIMAL - SHORT, FEMORAL;  Surgeon: Zen Srivastava M.D.;  Location: SURGERY [facility];  Service: Orthopedics   • LAP, REMOVE ADJUST LUIS RESTRICT D*  6/5/2020    Procedure: REMOVAL, GASTRIC BAND, LAPAROSCOPIC " - ADJUSTABLE BAND AND PORT;  Surgeon: Deniz Sue M.D.;  Location: [facility];  Service: General   • TRANSCATHETER AORTIC VALVE REPLACEMENT N/A 1/27/2020    Procedure: REPLACEMENT, AORTIC VALVE, TRANSCATHETER-;  Surgeon: Surendra Castro M.D.;  Location: [facility];  Service: Cardiac   • OLGA  1/27/2020    Procedure: ECHOCARDIOGRAM, TRANSESOPHAGEAL;  Surgeon: Surendra Castro M.D.;  Location: SURGERY [facility];  Service: Cardiac   • GASTROSCOPY N/A 1/8/2016    Procedure: GASTROSCOPY;  Surgeon: Deniz Sue M.D.;  Location: [facility];  Service:    • ORIF, FRACTURE, HUMERUS Left 6/25/2015    Procedure: HUMERUS ORIF/ PROXIMAL;  Surgeon: Cristal Guido M.D.;  Location: [facility];  Service:    • FUSION, SPINE, LUMBAR, PLIF  9/5/2013    Performed by Edith Sears M.D. at [facility]   • LUMBAR DECOMPRESSION  9/5/2013    Performed by Edith Sears M.D. at [facility]   • MASS EXCISION NEURO  9/5/2013    Performed by Edith Sears M.D. at [facility]   • RECOVERY  6/26/2012    Performed by SURGERY, IR-RECOVERY at [facility] SAME DAY [facility]   • DRAINAGE HEMATOMA  5/25/2012    Performed by DENIZ SUE at [facility]   • GASTRIC BAND LAPAROSCOPIC REVISION  5/11/2012    Performed by DENIZ SUE at [facility]   • FUSION, SPINE, LUMBAR, PLIF  3/26/2012    Performed by EDITH SEARS at [facility]   • LUMBAR DECOMPRESSION  3/26/2012    Performed by EDITH SEARS at [facility] ORS   • INJ,EPIDURAL/LUMB/SAC SINGLE  3/19/2012    Performed by KRISTIN BALTAZAR at [facility]   • INJ,EPIDURAL/LUMB/SAC SINGLE  3/5/2012    Performed by KRISTIN BALTAZAR at [facility] ORS   • GASTRIC BANDING LAPAROSCOPIC  3/24/2010    Performed by DENIZ SUE at [facility]   • HIATAL HERNIA REPAIR  3/24/2010    Performed by DENIZ SUE at SURGERY [facility]  ORS   • KNEE ARTHROPLASTY TOTAL  2000    bilateral   • ABDOMINOPLASTY  1990   • AORTIC VALVE REPLACEMENT     • ARTHROSCOPY, KNEE     • LAMINOTOMY     • OTHER CARDIAC SURGERY      stents   • RI REMV 2ND CATARACT,CORN-SCLER SECTN     • SINUSCOPE     • SLEEVE,CARRIE VASO THIGH     • TONSILLECTOMY     • ZZZ CARDIAC CATH       Family History   Problem Relation Age of Onset   • No Known Problems Sister    • No Known Problems Sister    • No Known Problems Sister    • Diabetes Other    • No Known Problems Mother    • No Known Problems Father    • Heart Disease Neg Hx      Social History     Socioeconomic History   • Marital status:      Spouse name: Not on file   • Number of children: Not on file   • Years of education: Not on file   • Highest education level: Bachelor's degree (e.g., BA, AB, BS)   Occupational History   • Not on file   Tobacco Use   • Smoking status: Never Smoker   • Smokeless tobacco: Never Used   • Tobacco comment: 0   Vaping Use   • Vaping Use: Never used   Substance and Sexual Activity   • Alcohol use: Not Currently     Alcohol/week: 0.0 oz   • Drug use: Not Currently   • Sexual activity: Yes   Other Topics Concern   • Not on file   Social History Narrative   • Not on file     Social Determinants of Health     Financial Resource Strain: Low Risk    • Difficulty of Paying Living Expenses: Not very hard   Food Insecurity: No Food Insecurity   • Worried About Running Out of Food in the Last Year: Never true   • Ran Out of Food in the Last Year: Never true   Transportation Needs: No Transportation Needs   • Lack of Transportation (Medical): No   • Lack of Transportation (Non-Medical): No   Physical Activity: Sufficiently Active   • Days of Exercise per Week: 3 days   • Minutes of Exercise per Session: 150+ min   Stress: Stress Concern Present   • Feeling of Stress : To some extent   Social Connections: Socially Isolated   • Frequency of Communication with Friends and Family: Once a week   • Frequency  of Social Gatherings with Friends and Family: Never   • Attends [employer]ism Services: Never   • Active Member of Clubs or Organizations: No   • Attends Club or Organization Meetings: Never   • Marital Status:    Intimate Partner Violence: Not on file   Housing Stability: Low Risk    • Unable to Pay for Housing in the Last Year: No   • Number of Places Lived in the Last Year: 1   • Unstable Housing in the Last Year: No     Allergies   Allergen Reactions   • Statins [Hmg-Coa-R Inhibitors] Rash     Blisters       Outpatient Encounter Medications as of 4/14/2022   Medication Sig Dispense Refill   • losartan (COZAAR) 25 MG Tab Take 1 Tablet by mouth every day. 90 Tablet 3   • torsemide (DEMADEX) 20 MG Tab Take 1 Tablet by mouth every day. 30 Tablet 3   • insulin regular (HUMULIN R) 100 Unit/mL Solution Inject 1-6 Units under the skin 4 Times a Day,Before Meals and at Bedtime. 10 mL    • lidocaine (LIDODERM) 5 % Patch Place 2 Patches on the skin every 24 hours. 10 Patch    • metaxalone (SKELAXIN) 800 MG Tab Take 1 Tablet by mouth 3 times a day. 90 Tablet    • tamsulosin (FLOMAX) 0.4 MG capsule Take 1 Capsule by mouth 1/2 hour after breakfast. 30 Capsule    • apixaban (ELIQUIS) 5mg Tab Take 1 Tablet by mouth 2 times a day. 180 Tablet 3   • aspirin EC (ECOTRIN) 81 MG Tablet Delayed Response Take 1 Tablet by mouth every day. 90 Tablet 3   • ezetimibe (ZETIA) 10 MG Tab Take 1 tablet by mouth once daily 90 Tablet 3   • carvedilol (COREG) 3.125 MG Tab Take 1 tablet by mouth 2 times a day with meals. 180 tablet 3   • Calcium Citrate (CITRACAL PO) Take 1 Tab by mouth 2 Times a Day.     • [DISCONTINUED] Home Care Oxygen Inhale 2 L/min by mouth Continuous.   Respiratory route via: Nasal Cannula   Oxygen supplier: [employer]  Indications: Hypoxia, to keep O2 sat over 90%     • fluticasone (FLONASE) 50 MCG/ACT nasal spray Use 2 spray(s) in each nostril once daily (Patient taking differently: Administer 2 Sprays into affected  "nostril(S) 1 time a day as needed.) 48 g 3   • coenzyme Q-10 30 MG capsule Take 60 mg by mouth 2 Times a Day.     • omeprazole (PRILOSEC) 40 MG delayed-release capsule Take 40 mg by mouth 2 times a day.     • Cholecalciferol (VITAMIN D3) 2000 UNIT Cap Take 2,000 Units by mouth 2 Times a Day.     • B Complex Vitamins (VITAMIN B COMPLEX PO) Take 1 Tab by mouth every day.     • Ascorbic Acid (VITAMIN C) 1000 MG Tab Take 1,000 mg by mouth 2 Times a Day.       No facility-administered encounter medications on file as of 4/14/2022.     Review of Systems   Constitutional: Negative for fever and malaise/fatigue.   Respiratory: Negative for cough and shortness of breath.    Cardiovascular: Negative for chest pain, palpitations, orthopnea, claudication, leg swelling and PND.   Gastrointestinal: Negative for abdominal pain.   Musculoskeletal: Positive for joint pain (hip). Negative for myalgias.   Neurological: Positive for dizziness (with standing ).   All other systems reviewed and are negative.             Objective     /60 (BP Location: Left arm, Patient Position: Sitting, BP Cuff Size: Adult)   Pulse 89   Resp 14   Ht 1.854 m (6' 1\")   Wt 106 kg (234 lb)   SpO2 94%   BMI 30.87 kg/m²     Physical Exam  Vitals reviewed.   Constitutional:       Appearance: He is well-developed.   HENT:      Head: Normocephalic and atraumatic.   Eyes:      Pupils: Pupils are equal, round, and reactive to light.   Neck:      Vascular: No JVD.   Cardiovascular:      Rate and Rhythm: Normal rate and regular rhythm.      Heart sounds: Normal heart sounds.      Comments: Left chest ICD-no erosion, drainage or erythema  Pulmonary:      Effort: Pulmonary effort is normal. No respiratory distress.      Breath sounds: Normal breath sounds. No wheezing or rales.   Abdominal:      General: Bowel sounds are normal.      Palpations: Abdomen is soft.   Musculoskeletal:         General: Normal range of motion.      Cervical back: Normal range of " motion and neck supple.      Right lower leg: No edema.      Left lower leg: No edema.   Skin:     General: Skin is warm and dry.      Comments: Discoloration of lower extremity   Neurological:      General: No focal deficit present.      Mental Status: He is alert and oriented to person, place, and time.   Psychiatric:         Behavior: Behavior normal.            Lab Results   Component Value Date/Time    CHOLSTRLTOT 72 (L) 05/19/2021 08:26 AM    LDL 28 05/19/2021 08:26 AM    HDL 24 (A) 05/19/2021 08:26 AM    TRIGLYCERIDE 102 05/19/2021 08:26 AM       Lab Results   Component Value Date/Time    SODIUM 138 03/31/2022 03:34 AM    POTASSIUM 5.1 03/31/2022 03:34 AM    CHLORIDE 105 03/31/2022 03:34 AM    CO2 23 03/31/2022 03:34 AM    GLUCOSE 149 (H) 03/31/2022 03:34 AM    BUN 51 (H) 03/31/2022 03:34 AM    CREATININE 1.57 (H) 03/31/2022 03:34 AM    CREATININE 1.18 02/08/2011 12:00 AM    BUNCREATRAT 16 02/08/2011 12:00 AM    GLOMRATE >59 02/08/2011 12:00 AM     Lab Results   Component Value Date/Time    ALKPHOSPHAT 50 03/26/2022 07:08 AM    ASTSGOT 45 03/26/2022 07:08 AM    ALTSGPT 45 03/26/2022 07:08 AM    TBILIRUBIN 0.6 03/26/2022 07:08 AM      Transthoracic Echo Report 7/29/2015  Mild concentric left ventricular hypertrophy.   Mildly dilated left atrium. Left atrial volume index is 32 ml/sq m.  Left ventricular ejection fraction is 60% to 65%.     Transthoracic Echo Report 12/3/2019  Left ventricle is severely dilated.  Severely reduced left ventricular systolic function.  Left ventricular ejection fraction is visually estimated to be 25%.  Global hypokinesis with regional variation.  Moderate aortic stenosis.  Trace mitral regurgitation.  Severely dilated left atrium.  Mild tricuspid regurgitation.  Right ventricular systolic pressure is estimated to be 40  mmHg.  Normal pericardium without effusion.  Compared to the images of the study done on 7/29/2015  there has been marked change in LV wall motion and function,  moderate aortic stenosis is also now present.      Heart Cath 12/11/2019  Findings:     Triple-vessel coronary artery disease with left main disease     This is right dominant system.     Left main is large in caliber.   It trifurcated into left anterior descending, medium-sized ramus intermediate artery and left circumflex artery.   It has calcified eccentric 50% stenosis at the distal portion around the trifurcation.  As mentioned above, the IFR was consistent with flow-limiting disease.     Left anterior descending artery (LAD) is large caliber vessel and extends beyond the apex.  It gives rises to 2 relatively long medium size diagonal branches proximally.  Moderate to heavy calcification was noted in proximal LAD along with stenosis up to at least 60-70%.   As mentioned above the IFR of the LAD was in the 0.7 range.  There are also 90% stenoses at the ostia of both first and second diagonal branches.  The antegrade flow is normal.     The ramus intermediate artery is about 1 and half millimeter in diameter. It has 80-90% ostial stenosis with normal antegrade flow     Left circumflex artery is medium in caliber.   This is occluded in the midportion after giveing rise to one very small and short obtuse marginal branch.     Right coronary artery (RCA) is large caliber. It gives rise to several small acute marginal branches, posterior descending artery and posterolateral branch.  There is 99% stenosis in the distal of the right coronary artery.  The antegrade flow is slow at JEN II.        Plan:   Limit right wrist movement for 24 hours.  Risk factor modification.  Consult valve team/cardiac surgeon for further management.        Heart Cath 12/27/2019   FINDINGS:  HEMODYNAMICS:  Central aortic pressure systolic 131, diastolic 59, mean of 59   MmHg.     PERIPHERAL ANGIOGRAPHY: The distal abdominal aorta and both iliac and femoral arteries are patent with at least moderate tortuosity of the right iliac  artery.     CORONARY ARTERIOGRAPHY:  Right coronary artery:  The right coronary artery is a large-caliber vessel with a significant walsh's crook and midvessel acute tortuosity with a distal diseased segment containing a 99% stenosis with JEN-2 antegrade flow.      POST-SENT IMPLANTATION, distal right coronary artery demonstrates a residual 10% focal eccentric stenosis in the proximal portion of the stent; otherwise, the remainder of the stent is well expanded with no dissection or thrombus and JEN-3 antegrade flow.     PLAN:  1.  Postprocedure Angiomax x4 hours.  2.  Dual antiplatelet therapy with aspirin and Plavix.  3.  IV fluids.  4.  Follow up basic metabolic panel.  5.  Future staged coronary intervention of the left main artery with   subsequent evaluation of need for TAVR procedure.     PROCEDURE:  Cardiac catheterization and percutaneous coronary intervention.  A.  Selective coronary angiography.  B.  Coronary stent implantation, distal right coronary artery 3.5x28 mm Synergy drug-eluting stent postdilated 4.0 mm.  C.  Distal abdominal aortogram with bilateral iliofemoral angiography.  D. Right femoral artery approach.  E.  Conscious sedation supervision.  F.  Perclose deployment.     Coronary angiogram 1/10/2020  Conclusions    1. LVEF 30% prior to intervention.    2. 70% distal left main and proximal LAD stenosis, IFR positive from prior procedure.    3. Successful planned PCI proximal LAD and distal left main trifurcation stenosis (3.5 x 26 mm Jack BRANDI postdilated to 4.5 mm proximally), IVUS guided.    4. Moderate aortic stenosis, mean gradient 27 to 30mmHg.     Recommendations    * Long-term dual antiplatelet therapy.    * Bivalirudin for 4 hours post procedure.    * Continue outpatient cardiac medications.    * Follow-up as scheduled after observation admission overnight.    * Continue medical therapy for diabetes.        Transesophageal Echo Report 1/27/2020  Pre-Intervention:  LV:  Severely  reduced left ventricular systolic function. Severe -    ejection fraction <30 %. Global hypokinesis with regional variation.   RV:  Mildly dilated right ventricle. Reduced right ventricular systolic function (mild reduction).   AV:  Calcified aortic valve leaflets.  Aortic valve annulus measured at \2.7 cm.  Sinus of valsalva measured at 3.3 cm.  Sinotubular junction   measured at 2.9 cm.  Aortic valve area measured at 6.59 cm2.  Moderate aortic stenosis with a mean pressure gradient of 22 mmHg.  However, gradient may be under-estimated in the setting of low EF.       Post-Intervention:  AV:  Nicely seated bioprosthetic aortic valve.  All three leaflets seen opening in cross sectional view.  No evidence of trans-valvular regurgitation.  Trivial para-valvular regurgitations seen at the left coronary cusp and at either the right or non-coronary cusp.  No evidence of aortic valve stenosis with mean pressure gradient of 4 mmHg.       Please see body of report for further findings.     Transthoracic Echo Report 1/28/2020  Compared to the images of the study done 12- there has been an interval placement of a TAVR valve.  The EF appears mildly improved.  Moderately reduced left ventricular systolic function.  Left ventricular ejection fraction is visually estimated to be 35%.     Transthoracic Echo Report 2/4/2020  Prior 01/28/2020, No significant change.  Left ventricular ejection fraction is visually estimated to be 35-40%.  Known TAVR aortic valve that is functioning normally with normal   transvalvular gradients.  Vmax is   2.8m/s.Transvalvular gradients are - Peak: 14 mmHg,  Mean: 30 mmHg.     Transthoracic Echo Report 2/4/2020  Trivial pericardial effusion.     Transthoracic Echo Report 2/4/2020  Prior echocardiogram 2/4/2020.  No pericardial effusion seen. Fat pad present.     Transthoracic Echo Report 3/3/2020  Compared to the report of the prior study done on 2/4/2020   there has been no significant change.    Moderately reduced left ventricular systolic function.  Left ventricular ejection fraction is visually estimated to be 35%.  Pacer/ICD wire seen in right ventricle.  Known TAVR aortic valve that is functioning normally with normal transvalvular gradients.  Vmax is   m/s. Transvalvular gradients are- peak 21 mmHg and mean gradient is 11 mmHg.   Minimal paravalvular leak.    Transthoracic Echo Report 4/14/2020  Prior study 03/03/20; compared to the report of the study done - there has been no significant change.   Moderately reduced left ventricular systolic function.  Left ventricular ejection fraction is visually estimated to be 35%.  Pacer/ICD wire seen in right ventricle.  Known TAVR aortic valve that is functioning normally with normal transvalvular gradients.  Vmax is 1.9 m/s. Transvalvular gradients are - Peak: 15 mmHg, Mean: 9 mmHg.   No evidence of paravalvular leak is noted.     Transthoracic Echo Report 4/27/2020  Compared to the images of the prior study done 04/14/2020 - estimated ejection fraction is improved, previously 35%. The patient is now tachycardic with smaller ventricular size suggesting hypovolemia.     Moderately reduced left ventricular systolic function.  Left ventricular ejection fraction is visually estimated to be 40%.  Global hypokinesis.  Diastolic function is abnormal, but grade cannot be determined.  Normal right ventricular systolic function.  Known TAVR aortic valve that is functioning normally with normal transvalvular gradients.    Transthoracic Echo Report 2/24/2021  Prior Echo - 4/27/20; compared to the images of the prior study done -  there has been no significant change.   Moderately reduced left ventricular systolic function.  Left ventricular ejection fraction is visually estimated to be 40%.  Pacer/ICD wire seen in right ventricle.  Known TAVR aortic valve that is functioning normally with normal transvalvular gradients.   Vmax is 1.78 m/s. Transvalvular gradients are -  Peak: 12.6 mmHg, Mean: 8 mmHg.       Transthoracic Echo Report 2/15/2022  Compared to the prior study 02/24/2021 the LVEF is now normal.  Normal left ventricular chamber size.  The left ventricular ejection fraction is visually estimated to be 55%.  Normal diastolic function.  The right ventricle is normal in size and systolic function.  Normal inferior vena cava size and inspiratory collapse.  Known TAVR aortic valve that is functioning normally with normal transvalvular gradients.    Assessment & Plan     1. ACC/AHA stage C systolic heart failure (HCC)     2. Heart failure, NYHA class 2 (HCC)     3. ICD (implantable cardioverter-defibrillator) in place     4. Coronary artery disease due to calcified coronary lesion     5. S/P TAVR (transcatheter aortic valve replacement)     6. Ischemic cardiomyopathy     7. SSS (sick sinus syndrome) (HCC)     8. Persistent atrial fibrillation (HCC)     9. Stage 4 chronic kidney disease (HCC)     10. High risk medication use     11. Chronic anticoagulation         Medical Decision Making: Today's Assessment/Status/Plan:        1. HFrEF, Stage C, Class 2, LVEF 55% prev 35%: Based on physical examination findings, patient is euvolemic. No JVD, lungs are clear to auscultation, no pitting edema in bilateral lower extremities, no ascites.  -Heart failure due to Ischemic Cardiomyopathy  -Unsure of exact patient medications if up-to-date with medical record patient should be taking the following  -Torsemide 20 mg daily  -Monitor fluid intake to about 64 ounces per day  -Continue carvedilol 3.125 mg twice a day  -Continue Losartan 25 mg daily  -Aldactone contraindicated for kidney function  -Has ICD, last device check 1/25/2022, patient has not been able to send any readings due to being at rehab  -Reinforced s/sx of worsening heart failure with patient and weight monitoring. Pt verbalizes understanding. Pt to call office or RTC if present.      2.  CAD, PCI of the pLAD/distal left main  and the distal RCA:   -Last LDL was on 5/19/2021  -Assuming no changes after his hospitalization, patient should be taking the following medications, patient to bring in medications with him next visit  -Continue aspirin 81 mg daily  -Continue clopidogrel 75 mg daily  -Continue ezetimibe 10 mg daily  -Patient has intolerance to statins  -May need to consider PCSK-9 inhibitor in the future.      3.  Aortic stenosis, s/p TAVR pm 1/27/2020:  -Valve functioning normally per recent echo  -Patient will repeat echo in 1 year for Dr. [facility]     4.  Atrial fibrillation:  -DCCV on 2/3/2022  -Continue Eliquis 5 mg twice a day    5.  CKD, Stage 4:  -Will follow     Follow-up: Return in about 4 weeks with Dr. Wilkinson. Sooner if needed.    Patient verbalizes understanding and agrees with the plan of care.     PLEASE NOTE: This Note was created using voice recognition Software. I have made every reasonable attempt to correct obvious errors, but I expect that there are errors of grammar and possibly content that I did not discover before finalizing the note

## 2022-04-20 ENCOUNTER — TELEPHONE (OUTPATIENT)
Dept: CARDIOLOGY | Facility: MEDICAL CENTER | Age: 82
End: 2022-04-20
Payer: MEDICARE

## 2022-04-20 NOTE — TELEPHONE ENCOUNTER
Pt is at [facility] on [facility]- per pt, his nurse states he is not taking a diuretic, per DC instructions from 3/31/22- pt       Per OV note from 3/17/22- day before 3/18/22    Medical Decision Making: Today's Assessment/Status/Plan:      1. HFrEF, Stage C, Class 2, LVEF 55% prev 35%: Patient continued to exhibit volume overload  -Heart failure due to Ischemic Cardiomyopathy  -Stop Bumex, start torsemide 40 mg twice a day, patient to call our clinic if his weights are not decreasing  -Monitor fluid intake to about 64 ounces per day  -Continue carvedilol 3.125 mg twice a day  -Continue Losartan 25 mg daily  -Aldactone contraindicated for kidney function  -BMP in 1-2 weeks  -Has ICD, last device check 1/25/2022  -Reinforced s/sx of worsening heart failure with patient and weight monitoring. Pt verbalizes understanding. Pt to call office or RTC if present.     Pt will confirm with nurse

## 2022-05-10 ENCOUNTER — OFFICE VISIT (OUTPATIENT)
Dept: CARDIOLOGY | Facility: MEDICAL CENTER | Age: 82
End: 2022-05-10
Payer: MEDICARE

## 2022-05-10 VITALS
HEART RATE: 68 BPM | RESPIRATION RATE: 14 BRPM | BODY MASS INDEX: 29.82 KG/M2 | OXYGEN SATURATION: 95 % | WEIGHT: 225 LBS | DIASTOLIC BLOOD PRESSURE: 60 MMHG | SYSTOLIC BLOOD PRESSURE: 100 MMHG | HEIGHT: 73 IN

## 2022-05-10 DIAGNOSIS — I25.10 CORONARY ARTERY DISEASE DUE TO CALCIFIED CORONARY LESION: ICD-10-CM

## 2022-05-10 DIAGNOSIS — I50.22 CHF (CONGESTIVE HEART FAILURE), NYHA CLASS II, CHRONIC, SYSTOLIC (HCC): Chronic | ICD-10-CM

## 2022-05-10 DIAGNOSIS — I48.0 PAROXYSMAL ATRIAL FIBRILLATION (HCC): ICD-10-CM

## 2022-05-10 DIAGNOSIS — Z95.810 ICD (IMPLANTABLE CARDIOVERTER-DEFIBRILLATOR) IN PLACE: ICD-10-CM

## 2022-05-10 DIAGNOSIS — I87.2 VENOUS INSUFFICIENCY OF BOTH LOWER EXTREMITIES: ICD-10-CM

## 2022-05-10 DIAGNOSIS — N18.32 STAGE 3B CHRONIC KIDNEY DISEASE: Chronic | ICD-10-CM

## 2022-05-10 DIAGNOSIS — Z95.2 S/P TAVR (TRANSCATHETER AORTIC VALVE REPLACEMENT): ICD-10-CM

## 2022-05-10 DIAGNOSIS — I25.84 CORONARY ARTERY DISEASE DUE TO CALCIFIED CORONARY LESION: ICD-10-CM

## 2022-05-10 PROBLEM — I48.19 PERSISTENT ATRIAL FIBRILLATION (HCC): Status: RESOLVED | Noted: 2022-03-18 | Resolved: 2022-05-10

## 2022-05-10 PROCEDURE — 99214 OFFICE O/P EST MOD 30 MIN: CPT | Performed by: INTERNAL MEDICINE

## 2022-05-10 ASSESSMENT — FIBROSIS 4 INDEX: FIB4 SCORE: 3.44

## 2022-05-10 NOTE — PROGRESS NOTES
CARDIOLOGY OUTPATIENT FOLLOWUP    PCP: Faye Moeller M.D.    1. HFimpEF - NYHA 2. LVEF kenny 35%, now normalized    2. Coronary artery disease- Hx of PCI of the LM and RCA - Jan 2020    3. 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 ICD (implantable cardioverter-defibrillator) in place    5. Paroxysmal atrial fibrillation (HCC)    6. Acute on chronic Stage 3b chronic kidney disease (HCC)    7. Venous insufficiency of both lower extremities        LIAN More III is stable from a cardiovascular standpoint with episodic lower extremity edema attributable to venous insufficiency.  His cardiac condition is stable and advised continuing the present medication regimen.  I recommend he continue to focus on rehabbing from the recent femur fracture.    Follow up: 6 months    Chief Complaint   Patient presents with   • Other     F/V Dx: S/P TAVR (transcatheter aortic valve replacement)   • Coronary Artery Disease     F/V Dx: Coronary artery disease due to calcified coronary lesion       History: LIAN More III is a 81 y.o. male with history of systolic heart failure with recovered ejection fraction, left main and RCA PCI, TAVR, paroxysmal atrial fibrillation, dual-chamber ICD presenting for follow-up.  He also has a history of diabetes and stage IIIb chronic kidney disease.    Since his last evaluation he has been free of cardiovascular symptoms other than episodic lower extremity edema which he believes may be related to missing diuretic doses at the subacute rehab facility is residing in.  Fortunately, the fluid mobilized quickly with reinitiation of diuretics.  An echocardiogram and a BNP showed stable cardiac state.    He is recuperating following a hip fracture in February.  This was treated surgically and he is currently in a subacute rehab facility.  He is hopeful that in another month or so he will be able to return to home but is needing in-home help to do additional limitations  "imposed by his back.    ROS:   10 point review systems is otherwise negative except as per the HPI    PE:  /60 (BP Location: Right arm, Patient Position: Sitting, BP Cuff Size: Adult)   Pulse 68   Resp 14   Ht 1.854 m (6' 1\")   Wt 102 kg (225 lb)   SpO2 95%   BMI 29.69 kg/m²   Gen: no acute distress  HEENT: Symmetric face. Anicteric sclerae. Moist mucus membranes  NECK: No JVD. No lymphadenopathy  CARDIAC: Regular, Normal S1, S2, No murmur  VASCULATURE: carotids are normal bilaterally without bruit  RESP: Clear to auscultation bilaterally  ABD: Soft, non-tender, non-distended  EXT: No edema, no clubbing or cyanosis  SKIN: Warm and dry  NEURO: No gross deficits  PSYCH: Appropriate affect, participates in conversation    The ASCVD Risk score ([place] DESTINEE Jr, et al., 2013) failed to calculate.    Past Medical History:   Diagnosis Date   • Arthritis     \"everywhere\"   • Bowel habit changes     constipation   • Breath shortness     pt states short of breath 24/7 O2 at night only   • CAD (coronary artery disease)    • CATARACT     removed bilat   • Chest pain    • Chest tightness    • CHF (congestive heart failure), NYHA class II, chronic, systolic (HCC) 12/26/2019    managed by cardiology Dr. Wilkinson: He appears euvolemic on physical exam.  He is on losartan, carvedilol, not on spironolactone due to low kidney function.  He was admitted to the hospital for CHF exacerbation, when he suddenly gained 15 pounds.  He was diuresed with IV Lasix at that time, had echocardiogram that showed significant improvement of his cardiac function, when compared to previous study   • Chickenpox    • Congestive heart failure (HCC)    • Constipation    • Coronary heart disease    • Cough    • Daytime sleepiness    • Diabetes     oral medication   • Difficulty breathing    • Dilated cardiomyopathy (HCC)    • Fatigue    • GERD (gastroesophageal reflux disease)    • Hearing difficulty    • Heart murmur    • Heart valve disease    • " "Hiatus hernia syndrome    • History of BPH    • History of chronic cough    • Hyperlipidemia    • Hypertension     pt states well controlled on meds   • 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 Porter, gastroenterologist   • Urinary incontinence      Allergies   Allergen Reactions   • Statins [Hmg-Coa-R Inhibitors] Rash     Blisters     • Atorvastatin      Selected as representative agent for class- please do not delete     Outpatient Encounter Medications as of 5/10/2022   Medication Sig Dispense Refill   • losartan (COZAAR) 25 MG Tab Take 1 Tablet by mouth every day. 90 Tablet 3   • torsemide (DEMADEX) 20 MG Tab Take 1 Tablet by mouth every day. 30 Tablet 3   • insulin regular (HUMULIN R) 100 Unit/mL Solution Inject 1-6 Units under the skin 4 Times a Day,Before Meals and at Bedtime. 10 mL    • metaxalone (SKELAXIN) 800 MG Tab Take 1 Tablet by mouth 3 times a day. 90 Tablet    • tamsulosin (FLOMAX) 0.4 MG capsule Take 1 Capsule by mouth 1/2 hour after breakfast. 30 Capsule    • apixaban (ELIQUIS) 5mg Tab Take 1 Tablet by mouth 2 times a day. 180 Tablet 3   • aspirin EC (ECOTRIN) 81 MG Tablet Delayed Response Take 1 Tablet by mouth every day. 90 Tablet 3   • ezetimibe (ZETIA) 10 MG Tab Take 1 tablet by mouth once daily 90 Tablet 3   • carvedilol (COREG) 3.125 MG Tab Take 1 tablet by mouth 2 times a day with meals. 180 tablet 3   • Calcium Citrate (CITRACAL PO) Take 1 Tab by mouth 2 Times a Day.     • coenzyme Q-10 30 MG capsule Take 60 mg by mouth 2 Times a Day.     • Cholecalciferol (VITAMIN D3) 2000 UNIT " Cap Take 2,000 Units by mouth 2 Times a Day.     • B Complex Vitamins (VITAMIN B COMPLEX PO) Take 1 Tab by mouth every day.     • Ascorbic Acid (VITAMIN C) 1000 MG Tab Take 1,000 mg by mouth 2 Times a Day.     • [DISCONTINUED] lidocaine (LIDODERM) 5 % Patch Place 2 Patches on the skin every 24 hours. 10 Patch    • [DISCONTINUED] Home Care Oxygen Inhale 2 L/min by mouth Continuous.   Respiratory route via: Nasal Cannula   Oxygen supplier: [facility]  Indications: Hypoxia, to keep O2 sat over 90%     • [DISCONTINUED] fluticasone (FLONASE) 50 MCG/ACT nasal spray Use 2 spray(s) in each nostril once daily (Patient taking differently: Administer 2 Sprays into affected nostril(S) 1 time a day as needed.) 48 g 3   • omeprazole (PRILOSEC) 40 MG delayed-release capsule Take 40 mg by mouth 2 times a day.       No facility-administered encounter medications on file as of 5/10/2022.     Social History     Socioeconomic History   • Marital status:      Spouse name: Not on file   • Number of children: Not on file   • Years of education: Not on file   • Highest education level: Bachelor's degree (e.g., BA, AB, BS)   Occupational History   • Not on file   Tobacco Use   • Smoking status: Never Smoker   • Smokeless tobacco: Never Used   • Tobacco comment: 0   Vaping Use   • Vaping Use: Never used   Substance and Sexual Activity   • Alcohol use: Not Currently     Alcohol/week: 0.0 oz   • Drug use: Not Currently   • Sexual activity: Yes   Other Topics Concern   • Not on file   Social History Narrative   • Not on file     Social Determinants of Health     Financial Resource Strain: Low Risk    • Difficulty of Paying Living Expenses: Not very hard   Food Insecurity: No Food Insecurity   • Worried About Running Out of Food in the Last Year: Never true   • Ran Out of Food in the Last Year: Never true   Transportation Needs: No Transportation Needs   • Lack of Transportation (Medical): No   • Lack of Transportation (Non-Medical): No    Physical Activity: Sufficiently Active   • Days of Exercise per Week: 3 days   • Minutes of Exercise per Session: 150+ min   Stress: Stress Concern Present   • Feeling of Stress : To some extent   Social Connections: Socially Isolated   • Frequency of Communication with Friends and Family: Once a week   • Frequency of Social Gatherings with Friends and Family: Never   • Attends Sikhism Services: Never   • Active Member of Clubs or Organizations: No   • Attends Club or Organization Meetings: Never   • Marital Status:    Intimate Partner Violence: Not on file   Housing Stability: Low Risk    • Unable to Pay for Housing in the Last Year: No   • Number of Places Lived in the Last Year: 1   • Unstable Housing in the Last Year: No       Studies  Lab Results   Component Value Date/Time    CHOLSTRLTOT 72 (L) 05/19/2021 08:26 AM    LDL 28 05/19/2021 08:26 AM    HDL 24 (A) 05/19/2021 08:26 AM    TRIGLYCERIDE 102 05/19/2021 08:26 AM       Lab Results   Component Value Date/Time    SODIUM 138 03/31/2022 03:34 AM    POTASSIUM 5.1 03/31/2022 03:34 AM    CHLORIDE 105 03/31/2022 03:34 AM    CO2 23 03/31/2022 03:34 AM    GLUCOSE 149 (H) 03/31/2022 03:34 AM    BUN 51 (H) 03/31/2022 03:34 AM    CREATININE 1.57 (H) 03/31/2022 03:34 AM    CREATININE 1.18 02/08/2011 12:00 AM    BUNCREATRAT 16 02/08/2011 12:00 AM    GLOMRATE >59 02/08/2011 12:00 AM     Lab Results   Component Value Date/Time    ALKPHOSPHAT 50 03/26/2022 07:08 AM    ASTSGOT 45 03/26/2022 07:08 AM    ALTSGPT 45 03/26/2022 07:08 AM    TBILIRUBIN 0.6 03/26/2022 07:08 AM        For this encounter I reviewed the following medical records PCP notes, BMP, Lipid profile and CBC   For this encounter I directly reviewed Echo images. I agree with the interpretations in the electronic health record.

## 2022-05-25 ENCOUNTER — PHYSICAL THERAPY (OUTPATIENT)
Dept: PHYSICAL THERAPY | Facility: MEDICAL CENTER | Age: 82
End: 2022-05-25
Attending: INTERNAL MEDICINE
Payer: MEDICARE

## 2022-05-25 DIAGNOSIS — R26.89 OTHER ABNORMALITIES OF GAIT AND MOBILITY: ICD-10-CM

## 2022-05-25 DIAGNOSIS — M25.552 LEFT HIP PAIN: ICD-10-CM

## 2022-05-25 PROCEDURE — 97162 PT EVAL MOD COMPLEX 30 MIN: CPT

## 2022-05-25 ASSESSMENT — ENCOUNTER SYMPTOMS
PAIN SCALE: 6
PAIN SCALE AT HIGHEST: 6

## 2022-05-25 ASSESSMENT — ACTIVITIES OF DAILY LIVING (ADL): AMBULATION_WITH_ASSISTIVE_DEVICE: INDEPENDENT

## 2022-05-25 NOTE — OP THERAPY EVALUATION
Outpatient Physical Therapy  INITIAL EVALUATION    [facility] Outpatient Physical Therapy  [address]  [place] [address]  Phone:  [phone]  Fax:  [phone]    Date of Evaluation: 2022    Patient: LIAN More III  YOB: 1940  MRN: [id number]     Referring Provider: Augustus Vaca M.D.  [address] [place] [address]  [place] [address]   Referring Diagnosis Displaced intertrochanteric fracture of left femur, subsequent encounter for closed fracture with routine healing [S72.142D];Repeated falls [R29.6];Multiple fractures of ribs, left side, subsequent encounter for fracture with routine healing [S22.42XD];Type 2 diabetes mellitus with other specified complication [E11.69];Type 2 diabetes mellitus with hyperglycemia [E11.65]     Time Calculation  Start time: 1400  Stop time: 1445 Time Calculation (min): 45 minutes         Chief Complaint: Difficulty Walking and Hip Problem    Visit Diagnoses     ICD-10-CM   1. Left hip pain  M25.552   2. Other abnormalities of gait and mobility  R26.89       Date of onset of impairment: 3/18/2022    Subjective:   History of Present Illness:     Mechanism of injury:  Patient states he fell while golfing on 2022. He fell onto L side and fall was precipitated by pain in R foot (states he has plantar fasciitis) which caused him to lose balance. He fractured L femur resulting in ORIF. He was discharged from rehab facility last week and presents to physical therapy with goals to increase balance and be able to walk without an assistive device and play golf. States his balance is limited due to peripheral neuropathy   Sleep disturbance:  Interrupted sleep  Pain:     Current pain ratin    At worst pain ratin    Location:  L anterior/ posterior hip/ low back   Social Support:     Lives in:  Multiple-level home    Lives with:  Alone  Diagnostic Tests:     X-ray: abnormal      Diagnostic Tests Comments:  Plain film x-ray  "taken today independent review myself include 2 views   left hip status post intramedullary nailing intertrochanteric femur   fracture with hardware in good position intact.  Normal intact bony line   throughout.  Activities of Daily Living:     Patient reported ADL status: - golfing 3x/wk  - walking total of ~1/2 mile most days (states he cannot walk long distance d/t R plantar fasciitis)  - had  who helped with cleaning, cooking, laundry, heavy household tasks  Patient Goals:     Patient goals for therapy:  Improved balance and decreased pain    Other patient goals:  Be able to walk without assistive device and play golf 3x/wk       Past Medical History:   Diagnosis Date   • Arthritis     \"everywhere\"   • Bowel habit changes     constipation   • Breath shortness     pt states short of breath 24/7 O2 at night only   • CAD (coronary artery disease)    • CATARACT     removed bilat   • Chest pain    • Chest tightness    • CHF (congestive heart failure), NYHA class II, chronic, systolic (HCC) 12/26/2019    managed by cardiology Dr. Wilkinson: He appears euvolemic on physical exam.  He is on losartan, carvedilol, not on spironolactone due to low kidney function.  He was admitted to the hospital for CHF exacerbation, when he suddenly gained 15 pounds.  He was diuresed with IV Lasix at that time, had echocardiogram that showed significant improvement of his cardiac function, when compared to previous study   • Chickenpox    • Congestive heart failure (HCC)    • Constipation    • Coronary heart disease    • Cough    • Daytime sleepiness    • Diabetes     oral medication   • Difficulty breathing    • Dilated cardiomyopathy (HCC)    • Fatigue    • GERD (gastroesophageal reflux disease)    • Hearing difficulty    • Heart murmur    • Heart valve disease    • Hiatus hernia syndrome    • History of BPH    • History of chronic cough    • Hyperlipidemia    • Hypertension     pt states well controlled on meds   • " "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 Porter, gastroenterologist   • Urinary incontinence      Past Surgical History:   Procedure Laterality Date   • PB OPEN FIX INTER/SUBTROCH FX,IMPLNT Left 3/24/2022    Procedure: INSERTION, INTRAMEDULLARY LILLI, FEMUR, PROXIMAL - SHORT, FEMORAL;  Surgeon: Zen Srivastava M.D.;  Location: [facility];  Service: Orthopedics   • LAP, REMOVE ADJUST LUIS RESTRICT D*  6/5/2020    Procedure: REMOVAL, GASTRIC BAND, LAPAROSCOPIC - ADJUSTABLE BAND AND PORT;  Surgeon: Deniz Rios M.D.;  Location: [facility];  Service: General   • TRANSCATHETER AORTIC VALVE REPLACEMENT N/A 1/27/2020    Procedure: REPLACEMENT, AORTIC VALVE, TRANSCATHETER-;  Surgeon: Surendra Castro M.D.;  Location: [facility];  Service: Cardiac   • OLGA  1/27/2020    Procedure: ECHOCARDIOGRAM, TRANSESOPHAGEAL;  Surgeon: Surendra Castro M.D.;  Location: [facility];  Service: Cardiac   • GASTROSCOPY N/A 1/8/2016    Procedure: GASTROSCOPY;  Surgeon: Deniz Rios M.D.;  Location: [facility];  Service:    • ORIF, FRACTURE, HUMERUS Left 6/25/2015    Procedure: HUMERUS ORIF/ PROXIMAL;  Surgeon: Cristal Guido M.D.;  Location: [facility];  Service:    • FUSION, SPINE, LUMBAR, PLIF  9/5/2013    Performed by Jayro Sears M.D. at [facility]   • LUMBAR DECOMPRESSION  9/5/2013    Performed by Jayro Sears M.D. at [facility]   • MASS EXCISION NEURO  9/5/2013    Performed by Jayro Sears M.D. at [facility] "   • RECOVERY  6/26/2012    Performed by SURGERY, IR-RECOVERY at SURGERY SAME DAY [facility] ORS   • DRAINAGE HEMATOMA  5/25/2012    Performed by SARINA SUE at SURGERY [facility] ORS   • GASTRIC BAND LAPAROSCOPIC REVISION  5/11/2012    Performed by SARINA SUE at SURGERY [facility] ORS   • FUSION, SPINE, LUMBAR, PLIF  3/26/2012    Performed by EDITH WHELAN at SURGERY [facility] ORS   • LUMBAR DECOMPRESSION  3/26/2012    Performed by EDITH WHELAN at SURGERY [facility] ORS   • INJ,EPIDURAL/LUMB/SAC SINGLE  3/19/2012    Performed by KRISTIN BALTAZAR at [facility] ORS   • INJ,EPIDURAL/LUMB/SAC SINGLE  3/5/2012    Performed by KRISTIN BALTAZAR at [facility] ORS   • GASTRIC BANDING LAPAROSCOPIC  3/24/2010    Performed by SARINA SUE at SURGERY [facility] ORS   • HIATAL HERNIA REPAIR  3/24/2010    Performed by SARINA SUE at SURGERY [facility] ORS   • KNEE ARTHROPLASTY TOTAL  2000    bilateral   • ABDOMINOPLASTY  1990   • AORTIC VALVE REPLACEMENT     • ARTHROSCOPY, KNEE     • LAMINOTOMY     • OTHER CARDIAC SURGERY      stents   • WI REMV 2ND CATARACT,CORN-SCLER SECTN     • SINUSCOPE     • SLEEVE,CARRIE VASO THIGH     • TONSILLECTOMY     • ZZZ CARDIAC CATH       Social History     Tobacco Use   • Smoking status: Never Smoker   • Smokeless tobacco: Never Used   • Tobacco comment: 0   Substance Use Topics   • Alcohol use: Not Currently     Alcohol/week: 0.0 oz     Family and Occupational History     Socioeconomic History   • Marital status:      Spouse name: Not on file   • Number of children: Not on file   • Years of education: Not on file   • Highest education level: Bachelor's degree (e.g., BA, AB, BS)   Occupational History   • Not on file       Objective     Observations     Additional Observation Details  Bilat lower skin discoloration, dry skin, L gastroc/ quad atrophy     Active Range of Motion   Left Hip   Flexion: 100 degrees with pain  Abduction: WFL  External rotation  (90/90): WFL and with pain  Internal rotation (90/90): WFL    Right Hip   Flexion: 120 degrees   Abduction: WFL  External rotation (90/90): WFL  Internal rotation (90/90): WFL    Strength:      Left Hip   Planes of Motion   Flexion: 4  Abduction: 3+  External rotation: 4  Internal rotation: 4    Right Hip   Normal muscle strength    Additional Strength Details  bilat knee extension/ flexion, R ankle inversion/ eversion= 5/5  R ankle dorsiflexion 2+/5  L ankle dorsiflexion, plantarflexion, inversion, eversion < 2/5    Ambulation     Ambulation: Level Surfaces   Ambulation with assistive device: independent    Observational Gait   Gait: asymmetric   Decreased walking speed and stride length.   Left foot contact pattern: foot flat  Base of support: increased  Left foot contact pattern comments: foot drop    Quality of Movement During Gait   Trunk  Forward lean.     Pelvis    Pelvis (Left): Positive Trendelenburg.     Functional Assessment     Single Leg Stance   Left: 0 seconds  Right: 2 seconds    Comments  5 Timed Sit to Stand Test = 22 sec w/ arms in high guard position and wide LORRIE (24 in seat height)    Rhomberg eyes open = 30 sec, mild body sway  Rhomberg eyes closed = 10 sec, moderate body sway, LOB to R w/ reaching to prevent fall    Semitandem = 5 seconds       Exercises/Treatment  Time-based treatments/modalities:           Assessment, Response and Plan:   Impairments: abnormal gait, abnormal muscle tone, abnormal or restricted ROM, activity intolerance, impaired physical strength and pain with function    Assessment details:  Patient is 81 year old male who presents to physical therapy s/p L hip ORIF due to GLF. He demonstrates impaired gait, balance, L hip ROM and strength secondary to presenting condition. He would benefit from skilled therapy to address above impairments to meet goals of 1) walking household and community distances without assistive device, 2) returning to [employer] > 3x/week.   Barriers to  therapy:  None  Prognosis: good    Goals:   Short Term Goals:   1. Pt will be independent with written HEP.  2. Pt will be able to safely walk household distances without external support 75% of time  Short term goal time span:  2-4 weeks      Long Term Goals:      1. Pt will demonstrate low fall risk per Guzman Balance test  2. Pt will be able to golf 3x/wk with < 2/10 L hip pain.   3. Pt will be able to perform 5 Timed Sit to Stand test from 19 in seat in < 13 seconds  Long term goal time span:  6-8 weeks    Plan:   Therapy options:  Physical therapy treatment to continue  Planned therapy interventions:  Manual Therapy (CPT 13739), Neuromuscular Re-education (CPT 18073), Therapeutic Activities (CPT 30586) and Therapeutic Exercise (CPT 78631)  Frequency:  3x week  Duration in weeks:  6  Duration in visits:  18  Discussed with:  Patient  Plan details:  Has Pacemaker and defibrillator      Functional Assessment Used        Referring provider co-signature:  I have reviewed this plan of care and my co-signature certifies the need for services.    Certification Period: 05/25/2022 to  07/24/22    Physician Signature: ________________________________ Date: ______________

## 2022-05-27 ENCOUNTER — PHYSICAL THERAPY (OUTPATIENT)
Dept: PHYSICAL THERAPY | Facility: MEDICAL CENTER | Age: 82
End: 2022-05-27
Attending: INTERNAL MEDICINE
Payer: MEDICARE

## 2022-05-27 DIAGNOSIS — M25.552 LEFT HIP PAIN: ICD-10-CM

## 2022-05-27 DIAGNOSIS — R26.89 OTHER ABNORMALITIES OF GAIT AND MOBILITY: ICD-10-CM

## 2022-05-27 PROCEDURE — 97140 MANUAL THERAPY 1/> REGIONS: CPT

## 2022-05-27 PROCEDURE — 97110 THERAPEUTIC EXERCISES: CPT

## 2022-05-27 NOTE — OP THERAPY DAILY TREATMENT
Outpatient Physical Therapy  DAILY TREATMENT     [facility] Outpatient Physical Therapy  [address]  [place] [address]  Phone:  [phone]  Fax:  [phone]    Date: 05/27/2022    Patient: LIAN More III  YOB: 1940  MRN: [id number]     Time Calculation    Start time: 0827  Stop time: 0915 Time Calculation (min): 48 minutes         Chief Complaint: Hip Injury    Visit #: 2    SUBJECTIVE: Moderate L hip soreness today    OBJECTIVE:  Current objective measures: na          Therapeutic Exercises (CPT 40921):     1. NuStep , 10. L7-8     2. True hip flexor stretch, 1 min ea    3. Inclined gastroc stretch, 1 min     4. Hip abd strengthening    5. Bridges, 2 x 10 w/ hip add dinah    6. Shuttle SL squat, 3 x 10 L only, 3c      Therapeutic Exercise Summary: HEP - bridges w/ hip adduction, clams, wall squats, gastroc stretching     Therapeutic Treatments and Modalities:     1. Manual Therapy (CPT 78571), STW L IT band/ TFL, scar mobs    Time-based treatments/modalities:    Physical Therapy Timed Treatment Charges  Manual therapy minutes (CPT 60203): 15 minutes  Therapeutic exercise minutes (CPT 62259): 32 minutes      ASSESSMENT:   Response to treatment: IT band tightness present which contributes to lateral thigh/knee soreness. Impaired L hip and quad strength, L quad atrophy present. Continue to address L LE strengthening, gait, and balance to meet patient's goal of playing golf 3x/wk.     PLAN/RECOMMENDATIONS:   Plan for treatment: therapy treatment to continue next visit.  Planned interventions for next visit: continue with current treatment.

## 2022-05-30 ENCOUNTER — NON-PROVIDER VISIT (OUTPATIENT)
Dept: CARDIOLOGY | Facility: MEDICAL CENTER | Age: 82
End: 2022-05-30
Payer: MEDICARE

## 2022-06-01 ENCOUNTER — PHYSICAL THERAPY (OUTPATIENT)
Dept: PHYSICAL THERAPY | Facility: MEDICAL CENTER | Age: 82
End: 2022-06-01
Attending: INTERNAL MEDICINE
Payer: MEDICARE

## 2022-06-01 DIAGNOSIS — M25.552 LEFT HIP PAIN: ICD-10-CM

## 2022-06-01 DIAGNOSIS — R26.89 OTHER ABNORMALITIES OF GAIT AND MOBILITY: ICD-10-CM

## 2022-06-01 PROCEDURE — 97140 MANUAL THERAPY 1/> REGIONS: CPT

## 2022-06-01 PROCEDURE — 97110 THERAPEUTIC EXERCISES: CPT

## 2022-06-01 NOTE — OP THERAPY DAILY TREATMENT
Outpatient Physical Therapy  DAILY TREATMENT     [facility]  [address]  [place] [address]  Phone:  [phone]  Fax:  [phone]    Date: 06/01/2022    Patient: LIAN More III  YOB: 1940  MRN: [id number]     Time Calculation    Start time: 0845  Stop time: 0930 Time Calculation (min): 45 minutes         Chief Complaint: Hip Problem and Difficulty Walking    Visit #: 3    SUBJECTIVE: His knees were sore after last visit. Better after he used cold pack at home    OBJECTIVE:  Current objective measures: na          Therapeutic Exercises (CPT 28843):     1. NuStep , 15 min, L7, 1257 steps (07. miles)    2. True hip flexor stretch, 1 min ea    3. Inclined gastroc stretch, 2 min     4. TRX squats , 30x , v/c to enhance hip extension     5. Bridges, 2 x 10 w/ hip add dinah-nt    6. Shuttle SL squat, 5 x 10 L only, 3c, inc resistance next visit    7. Standing hip flexion , 3 x 10 (pink)    8. Standing hip abduction , 3 x 10 (pink over feet), w/ slider     9. Lateral step up , next visit    10. Balance board AP balance, 2 min     11. Piriformis stretch , next visit       Therapeutic Exercise Summary: HEP - bridges w/ hip adduction, clams, wall squats, gastroc stretching     Therapeutic Treatments and Modalities:     1. Manual Therapy (CPT 13225), STW L TFL, piriformis, scar mobs    Time-based treatments/modalities:    Physical Therapy Timed Treatment Charges  Manual therapy minutes (CPT 86399): 12 minutes  Therapeutic exercise minutes (CPT 40862): 33 minutes      ASSESSMENT:   Response to treatment: Progressed hip strengthening and balance. Palpable nodule of scar tissue L TFL, decreased with manual. Encouraged patient to use heat and self mobilization at home to TFL. Increase resistance with SL shuttle and add lateral step up next visit for hip abductor strengthening.     PLAN/RECOMMENDATIONS:   Plan for treatment: therapy treatment to  continue next visit.  Planned interventions for next visit: continue with current treatment.

## 2022-06-02 ENCOUNTER — PHYSICAL THERAPY (OUTPATIENT)
Dept: PHYSICAL THERAPY | Facility: MEDICAL CENTER | Age: 82
End: 2022-06-02
Attending: INTERNAL MEDICINE
Payer: MEDICARE

## 2022-06-02 DIAGNOSIS — M25.552 LEFT HIP PAIN: ICD-10-CM

## 2022-06-02 DIAGNOSIS — R26.89 BALANCE PROBLEM: ICD-10-CM

## 2022-06-02 DIAGNOSIS — R26.89 OTHER ABNORMALITIES OF GAIT AND MOBILITY: ICD-10-CM

## 2022-06-02 PROCEDURE — 97140 MANUAL THERAPY 1/> REGIONS: CPT

## 2022-06-02 PROCEDURE — 97110 THERAPEUTIC EXERCISES: CPT

## 2022-06-02 NOTE — OP THERAPY DAILY TREATMENT
Outpatient Physical Therapy  DAILY TREATMENT     [facility] Physical Therapy  [address]  [place] [address]  Phone:  [phone]  Fax:  [phone]    Date: 06/02/2022    Patient: LIAN More III  YOB: 1940  MRN: [id number]     Time Calculation    Start time: 1045  Stop time: 1145 Time Calculation (min): 60 minutes         Chief Complaint: Difficulty Walking and Hip Injury    Visit #: 4    SUBJECTIVE: Nothing new to report    OBJECTIVE:  Current objective measures: na          Therapeutic Exercises (CPT 34384):     1. NuStep , 15 min, L7, 1257 steps (07. miles)    2. True hip flexor stretch, 1 min ea    3. Inclined gastroc stretch, 2 min     4. TRX squats , 30x -NT    5. Bridges w/ ball , 2 x 10     6. Shuttle SL squat, 3 x 10 L only, 4c    7. Standing hip flexion , 3 x 10 (pink), L only     8. Standing hip abduction , 30x  (pink over feet), w/ slider     9. Lateral step up , next visit    10. Balance board AP balance, 2 min     11. Piriformis stretch (hooklying fig 4), 2 x 1 min L only      Therapeutic Exercise Summary: HEP - bridges w/ hip adduction, clams, wall squats, gastroc stretching, hooklying fig 4 stretch     Therapeutic Treatments and Modalities:     1. Manual Therapy (CPT 51504), see below    Therapeutic Treatment and Modalities Summary: Manual  IASTM L IT band/ lateral quad  STW L lateral quad  L hip caudal glides (Gr II) 40 reps  L hip lateral glides w/ belt (Gr II-III) 40 reps     Post-tx ice bilat knees w/ bolster behind knees - 15 min     Time-based treatments/modalities:    Physical Therapy Timed Treatment Charges  Manual therapy minutes (CPT 50662): 25 minutes  Therapeutic exercise minutes (CPT 52071): 20 minutes      ASSESSMENT:   Response to treatment: Focused on hip mobility/ stretching. Decreased adhesions to L IT and tone in lateral quad band with manual. Will work on L TFL at next visit. Not addressed today due to time  constraints.     PLAN/RECOMMENDATIONS:   Plan for treatment: therapy treatment to continue next visit.  Planned interventions for next visit: continue with current treatment.

## 2022-06-03 ENCOUNTER — PHYSICAL THERAPY (OUTPATIENT)
Dept: PHYSICAL THERAPY | Facility: MEDICAL CENTER | Age: 82
End: 2022-06-03
Attending: INTERNAL MEDICINE
Payer: MEDICARE

## 2022-06-03 DIAGNOSIS — R26.89 BALANCE PROBLEM: ICD-10-CM

## 2022-06-03 DIAGNOSIS — R26.89 OTHER ABNORMALITIES OF GAIT AND MOBILITY: ICD-10-CM

## 2022-06-03 DIAGNOSIS — M25.552 LEFT HIP PAIN: ICD-10-CM

## 2022-06-03 PROCEDURE — 97110 THERAPEUTIC EXERCISES: CPT

## 2022-06-03 PROCEDURE — 97140 MANUAL THERAPY 1/> REGIONS: CPT

## 2022-06-03 NOTE — OP THERAPY DAILY TREATMENT
Outpatient Physical Therapy  DAILY TREATMENT     [facility] Outpatient Physical Therapy  [address]  [place] [address]  Phone:  [phone]  Fax:  [phone]    Date: 06/03/2022    Patient: LIAN More III  YOB: 1940  MRN: [id number]     Time Calculation    Start time: 0820  Stop time: 0915 Time Calculation (min): 55 minutes         Chief Complaint: Hip Problem and Difficulty Walking    Visit #: 5    SUBJECTIVE: L hip stiffness today.     OBJECTIVE:  Current objective measures:           Therapeutic Exercises (CPT 86247):     1. NuStep , 15 min, L7, 1257 steps (07. miles)    2. True hip flexor stretch, 1 min ea-NT    3. Inclined gastroc stretch, 2 min -NT    4. TRX squats , 30x -NT    5. Bridges w/ ball , 2 x 10 -NT    6. Shuttle SL squat, 3 x 10 L only, 4c-NT    7. Standing hip flexion , 3 x 10 (pink), L only -NT    8. Standing hip abduction , 30x  (pink over feet)-NT, w/ slider     9. Lateral step up , next visit    10. Balance board AP balance, 2 min     11. Piriformis stretch (hooklying fig 4), 2 x 1 min L only    12. Squats on inclined surface (ankle plantarflexion), 15x (inside parallel bars w/ bilat UEs)    13. Side step , 4 laps (10 steps), decreased step length with L LE    14. Retrogait, 3 laps (10 steps), decreased gait speed and step length d/t L foot drop       Therapeutic Exercise Summary: HEP - bridges w/ hip adduction, clams, wall squats, gastroc stretching, hooklying fig 4 stretch     Therapeutic Treatments and Modalities:     1. Manual Therapy (CPT 23573), see below    Therapeutic Treatment and Modalities Summary: Manual  IASTM L IT band/ lateral quad  STW L lateral quad  L hip caudal glides (Gr II) 40 reps  L hip lateral glides w/ belt (Gr II-III) 40 reps     Post-tx ice bilat knees w/ bolster behind knees - 15 min     Time-based treatments/modalities:    Physical Therapy Timed Treatment Charges  Manual therapy minutes (CPT 60549): 30  minutes  Therapeutic exercise minutes (CPT 47539): 25 minutes      ASSESSMENT:   Response to treatment: Increased duration to cool down after cardio warm up today, suggest monitoring response next visit d/t history of multiple cardiac events. Pitting edema and moderate scar tissue at incision over L TFL. This decreased with manual. Patient has difficulty with bridges at home d/t bed softness, added retrogait for gluteus eva strengthening. L quad fatigue with squats with sensation of giving way which decreased with decreasing depth of squat. Continue to work on L hip ROM, L LE strength, and balance to meet patient's goal of returning to golfing 3x/wk.     PLAN/RECOMMENDATIONS:   Plan for treatment: therapy treatment to continue next visit.  Planned interventions for next visit: continue with current treatment.

## 2022-06-06 ENCOUNTER — PHYSICAL THERAPY (OUTPATIENT)
Dept: PHYSICAL THERAPY | Facility: MEDICAL CENTER | Age: 82
End: 2022-06-06
Attending: INTERNAL MEDICINE
Payer: MEDICARE

## 2022-06-06 DIAGNOSIS — R26.89 OTHER ABNORMALITIES OF GAIT AND MOBILITY: ICD-10-CM

## 2022-06-06 DIAGNOSIS — R26.89 BALANCE PROBLEM: ICD-10-CM

## 2022-06-06 PROCEDURE — 97112 NEUROMUSCULAR REEDUCATION: CPT

## 2022-06-06 NOTE — OP THERAPY DAILY TREATMENT
Outpatient Physical Therapy  DAILY TREATMENT     [facility] Physical Therapy  [address]  [place] [address]  Phone:  [phone]  Fax:  [phone]    Date: 06/06/2022    Patient: LIAN More III  YOB: 1940  MRN: [id number]     Time Calculation    Start time: 1445  Stop time: 1530 Time Calculation (min): 45 minutes         Chief Complaint: Difficulty Walking    Visit #: 6    SUBJECTIVE:  Follow up on tolerance to last treatment session, updated HEP     OBJECTIVE:  Current objective measures:           Therapeutic Exercises (CPT 70595):     1. NuStep , 10 min, No charge, performed prior to onset of therapy.       Therapeutic Exercise Summary: HEP - bridges w/ hip adduction, clams, wall squats, gastroc stretching, hooklying fig 4 stretch     Therapeutic Treatments and Modalities:     1. Neuromuscular Re-education (CPT 78720), See below for details.     Therapeutic Treatment and Modalities Summary: NMR:  -Lateral stepping in // bars: 4 laps without UE support; good weight shifting and step length  -Backward walking in // bars: 4 laps without UE support., cues to increase stride length to increase glute engagement but noted decreased balance  -Staggered stance balance -> with perturbations. Significant difficulty with L foot forward.   -Romberg stance with EO -> trunk rotations -> bilateral shoulder flexion (both performed with gaze following hands); performed with CGA due to intermittent LOB   -Sit to stand without UE assist: progressed from chair with 2 airex pads to from chair without cushioning.  -Small golf swing with right foot step forward. Able to perform with CGA and self-correct balance.     Post-tx ice bilat knees w/ bolster behind knees - 15 min (no charge)     Time-based treatments/modalities:    Physical Therapy Timed Treatment Charges  Neuromusc re-ed, balance, coor, post minutes (CPT 23169): 45 minutes        ASSESSMENT:   Response to  treatment: Patient demonstrates fair hip and stepping reactions to compensate for bilateral peripheral neuropathy. Due to improvements in strength and balance, able to challenge golf swing today with good tolerance.     PLAN/RECOMMENDATIONS:   Plan for treatment: therapy treatment to continue next visit.  Planned interventions for next visit: continue with current treatment.

## 2022-06-07 ENCOUNTER — APPOINTMENT (OUTPATIENT)
Dept: PHYSICAL THERAPY | Facility: MEDICAL CENTER | Age: 82
End: 2022-06-07
Attending: INTERNAL MEDICINE
Payer: MEDICARE

## 2022-06-08 ENCOUNTER — PHYSICAL THERAPY (OUTPATIENT)
Dept: PHYSICAL THERAPY | Facility: MEDICAL CENTER | Age: 82
End: 2022-06-08
Attending: INTERNAL MEDICINE
Payer: MEDICARE

## 2022-06-08 DIAGNOSIS — M25.552 LEFT HIP PAIN: ICD-10-CM

## 2022-06-08 DIAGNOSIS — R26.89 OTHER ABNORMALITIES OF GAIT AND MOBILITY: ICD-10-CM

## 2022-06-08 PROCEDURE — 97140 MANUAL THERAPY 1/> REGIONS: CPT

## 2022-06-08 PROCEDURE — 97110 THERAPEUTIC EXERCISES: CPT

## 2022-06-08 NOTE — OP THERAPY DAILY TREATMENT
Outpatient Physical Therapy  DAILY TREATMENT     [facility]patient Physical Therapy  [address]  [place] [address]  Phone:  [phone]  Fax:  [phone]    Date: 06/08/2022    Patient: LIAN More III  YOB: 1940  MRN: [id number]     Time Calculation    Start time: 1415  Stop time: 1505 Time Calculation (min): 50 minutes         Chief Complaint: Hip Problem and Difficulty Walking    Visit #: 7    SUBJECTIVE: Patient reports L hip pinching pain with sleeping on his back last night    OBJECTIVE:  Current objective measures: na          Therapeutic Exercises (CPT 48265):     1. NuStep , 10 min (unsupervised) @ L8, 5 min (supervised) L6, 0.70 miles, decreased resistance d/t to workload/ inability to hold conversation     2. Lat step down , 2 x 10 (6 in) , w/ bilat UEs in parallel bars, cues to enhance hip extension    3. Shuttle SL squats , 3 x 10 (4c) L only    4. Supine AROM hip ER/IR, 2 x 10 (w/ knee extension) L LE only       Therapeutic Exercise Summary: HEP - bridges w/ hip adduction, clams, wall squats, gastroc stretching, hooklying fig 4 stretch     Therapeutic Treatments and Modalities:     1. Manual Therapy (CPT 27890), IASTM L TFL, lateral quad, IT band, STW L lateral quad/ IT band     Therapeutic Treatment and Modalities Summary: NMR:-NT  -Lateral stepping in // bars: 4 laps without UE support; good weight shifting and step length  -Backward walking in // bars: 4 laps without UE support., cues to increase stride length to increase glute engagement but noted decreased balance  -Staggered stance balance -> with perturbations. Significant difficulty with L foot forward.   -Romberg stance with EO -> trunk rotations -> bilateral shoulder flexion (both performed with gaze following hands); performed with CGA due to intermittent LOB   -Sit to stand without UE assist: progressed from chair with 2 airex pads to from chair without cushioning.  -Small golf  swing with right foot step forward. Able to perform with CGA and self-correct balance.     Post-tx ice bilat knees w/ bolster behind knees - 15 min (no charge)     Time-based treatments/modalities:    Physical Therapy Timed Treatment Charges  Manual therapy minutes (CPT 35164): 25 minutes  Therapeutic exercise minutes (CPT 81609): 20 minutes      ASSESSMENT:   Response to treatment: Decreased hip strength with lateral step up with patient needing bilat UEs to complete. L hip ROM progressing well, added hip ER/IR ROM ex to HEP. Decreased adhesions L IT band/quad/WFL with manual.     PLAN/RECOMMENDATIONS:   Plan for treatment: therapy treatment to continue next visit.  Planned interventions for next visit: continue with current treatment.

## 2022-06-09 ENCOUNTER — APPOINTMENT (OUTPATIENT)
Dept: PHYSICAL THERAPY | Facility: MEDICAL CENTER | Age: 82
End: 2022-06-09
Attending: INTERNAL MEDICINE
Payer: MEDICARE

## 2022-06-09 NOTE — CARDIAC REMOTE MONITOR - SCAN
Device transmission reviewed. Device demonstrated appropriate function.       Electronically Signed by: Augustus Arreguin M.D.    6/25/2022  1:04 PM

## 2022-06-10 ENCOUNTER — PHYSICAL THERAPY (OUTPATIENT)
Dept: PHYSICAL THERAPY | Facility: MEDICAL CENTER | Age: 82
End: 2022-06-10
Attending: INTERNAL MEDICINE
Payer: MEDICARE

## 2022-06-10 DIAGNOSIS — R26.89 OTHER ABNORMALITIES OF GAIT AND MOBILITY: ICD-10-CM

## 2022-06-10 DIAGNOSIS — R26.89 BALANCE PROBLEM: ICD-10-CM

## 2022-06-10 DIAGNOSIS — M25.552 LEFT HIP PAIN: ICD-10-CM

## 2022-06-10 PROCEDURE — 97110 THERAPEUTIC EXERCISES: CPT

## 2022-06-10 PROCEDURE — 97112 NEUROMUSCULAR REEDUCATION: CPT

## 2022-06-10 NOTE — OP THERAPY DAILY TREATMENT
"  Outpatient Physical Therapy  DAILY TREATMENT     [facility] Outpatient Physical Therapy  [address]  [place] [address]  Phone:  [phone]  Fax:  [phone]    Date: 06/10/2022    Patient: LIAN More III  YOB: 1940  MRN: [id number]     Time Calculation    Start time: 0800  Stop time: 0901 Time Calculation (min): 61 minutes         Chief Complaint: Difficulty Walking, Loss Of Balance, and Back Problem    Visit #: 8    SUBJECTIVE:  Pt reports he is feeling stiff and tired today; notes his L quad feels very tight and uncomfortable today. Overall feels increased weakness in his L leg today, reports frustration with tightness over L quad.      OBJECTIVE:    Current objective measures:           Therapeutic Exercises (CPT 53436):     1. NuStep , 15 min, No charge, performed prior to onset of therapy.     3. SL shuttle , 4c 3x10, Pt struggles with eccentric control on L leg; verbal cues required to remind pt to slowly decent     4. Lateral step downs , 2x10 6\" box    5. Sit to stand with 2 airex pads, 3x10, progressed from chair with 2 airex pads to from chair without cushioning; progressed second set to one pad       Therapeutic Exercise Summary: HEP - bridges w/ hip adduction, clams, wall squats, gastroc stretching, hooklying fig 4 stretch     Therapeutic Treatments and Modalities:     1. Neuromuscular Re-education (CPT 13197), See below for details. , x15 mins    Therapeutic Treatment and Modalities Summary: NMR:  -Lateral stepping in // bars: 4 laps without UE support; good weight shifting and step length  -Backward walking in // bars: 4 laps without UE support., cues to increase stride length to increase glute engagement but noted decreased balance  -Staggered stance balance -> with perturbations. Significant difficulty with L foot forward.   -Romberg stance with EO -> trunk rotations -> bilateral shoulder flexion (both performed with gaze following hands); " "performed with CGA due to intermittent LOB         Post-tx ice bilat knees w/ bolster behind knees - 15 min (no charge)     Time-based treatments/modalities:    Physical Therapy Timed Treatment Charges  Neuromusc re-ed, balance, coor, post minutes (CPT 96113): 15 minutes  Therapeutic exercise minutes (CPT 31010): 31 minutes    ASSESSMENT:   Response to treatment: Pt demonstrated significant fatigue and SOB today during session; breaks were required frequently. Increased LOB with L leg forward during pertubation's and vertical head turns noted today. Pt showed frustration with LOB today; mentioned he felt \"very weak\" with exercise today. Continue to progress strength and balance as tolerated.      PLAN/RECOMMENDATIONS:   Plan for treatment: therapy treatment to continue next visit.  Planned interventions for next visit: continue with current treatment. Tandem balance with rotational head turns       [x] As the licensed therapist supervising this student, I was present during the entire treatment session directing the care and reviewing the assessment plan.  I reviewed all documentation prior to signing.         "

## 2022-06-14 ENCOUNTER — PHYSICAL THERAPY (OUTPATIENT)
Dept: PHYSICAL THERAPY | Facility: MEDICAL CENTER | Age: 82
End: 2022-06-14
Attending: INTERNAL MEDICINE
Payer: MEDICARE

## 2022-06-14 ENCOUNTER — TELEPHONE (OUTPATIENT)
Dept: MEDICAL GROUP | Facility: MEDICAL CENTER | Age: 82
End: 2022-06-14

## 2022-06-14 ENCOUNTER — TELEPHONE (OUTPATIENT)
Dept: CARDIOLOGY | Facility: MEDICAL CENTER | Age: 82
End: 2022-06-14

## 2022-06-14 DIAGNOSIS — I50.23 ACUTE ON CHRONIC SYSTOLIC HEART FAILURE (HCC): ICD-10-CM

## 2022-06-14 DIAGNOSIS — R26.89 OTHER ABNORMALITIES OF GAIT AND MOBILITY: ICD-10-CM

## 2022-06-14 DIAGNOSIS — I50.22 CHF (CONGESTIVE HEART FAILURE), NYHA CLASS II, CHRONIC, SYSTOLIC (HCC): ICD-10-CM

## 2022-06-14 DIAGNOSIS — M25.552 LEFT HIP PAIN: ICD-10-CM

## 2022-06-14 PROCEDURE — 97112 NEUROMUSCULAR REEDUCATION: CPT

## 2022-06-14 PROCEDURE — 97110 THERAPEUTIC EXERCISES: CPT

## 2022-06-14 RX ORDER — BUMETANIDE 1 MG/1
2 TABLET ORAL 2 TIMES DAILY
Qty: 360 TABLET | Refills: 1 | Status: SHIPPED | OUTPATIENT
Start: 2022-06-14 | End: 2023-01-10

## 2022-06-14 NOTE — TELEPHONE ENCOUNTER
Message  Received: Today  DANIELLE Mullen R.N.  Caller: Unspecified (Today,  1:32 PM)  Lets have him switch back to his prior bumex dose. BMP, BNP in 2 weeks with follow up afterwards.     Thanks   BE   __________________________________________________________________________    Lab orders placed and put in mail for patient. RX sent to preferred pharmacy on file. Left detailed message for patient with update. Advised to return call with any additional questions or concerns.

## 2022-06-14 NOTE — TELEPHONE ENCOUNTER
Phone Number Called: [phone] (home)       Call outcome: Left detailed message for patient. Informed to call back with any additional questions.    Message: LVM for Pt to call back and reschedule TS

## 2022-06-14 NOTE — TELEPHONE ENCOUNTER
BE    Caller:Beatriz FARNSWORTH  Topic/issue: Beatriz FARNSWORTH would like a call back to discuss script for:   torsemide (DEMADEX) 20 MG Tab, before he orders again.   Callback Number: [phone]  Thank you,   Nhung LOPEZ

## 2022-06-14 NOTE — OP THERAPY DAILY TREATMENT
"  Outpatient Physical Therapy  DAILY TREATMENT     [facility] Outpatient Physical Therapy  [address]  [place] [address]  Phone:  [phone]  Fax:  [phone]    Date: 06/14/2022    Patient: LIAN More III  YOB: 1940  MRN: [id number]     Time Calculation    Start time: 1045  Stop time: 1132 Time Calculation (min): 47 minutes         Chief Complaint: Difficulty Walking and Hip Problem    Visit #: 9    SUBJECTIVE: Patient reports L leg continues to feel weak and his progress is slow.     OBJECTIVE:  Current objective measures: See Progress Summary           Therapeutic Exercises (CPT 03723):     1. NuStep , 10 min, L8 (5 min supervised)    3. SL shuttle , 4c 3x10-NT, Pt struggles with eccentric control on L leg; verbal cues required to remind pt to slowly decent     4. Lateral step downs , 2x10 6\" box-NT    5. Sit to stand with 2 airex pads, 3x10-NT, progressed from chair with 2 airex pads to from chair without cushioning; progressed second set to one pad     6. 5 Timed Sit to Stand test , See Progress Summary    7. Sit to stands , 10x (24 in seat height)       Therapeutic Exercise Summary: HEP - bridges w/ hip adduction, clams, wall squats, gastroc stretching, hooklying fig 4 stretch, fwd/retro gait     Therapeutic Treatments and Modalities:     1. Neuromuscular Re-education (CPT 14743), See below for details. , x15 mins    Therapeutic Treatment and Modalities Summary: NMR   Tinetti Balance test (see Progress Summary)  Fwd/multidirectional stepping (using velcro circles as target) - 8 min   Static balance on balance board, 1/2FR - 3 x 10 sec ea // parallel bars     Time-based treatments/modalities:    Physical Therapy Timed Treatment Charges  Neuromusc re-ed, balance, coor, post minutes (CPT 95284): 30 minutes  Therapeutic exercise minutes (CPT 40200): 15 minutes      ASSESSMENT:   Response to treatment: L lateral hip pain with balance interventions today. " Impaired static balance and postural control, however, he is able to correct using appropriate stepping strategy. Has tendency to lower COG when he feels unstable, able to correct with verbal cues. See Progress Summary for plan for continuation of skilled therapy.     PLAN/RECOMMENDATIONS:   Plan for treatment: therapy treatment to continue next visit.  Planned interventions for next visit: continue with current treatment.

## 2022-06-14 NOTE — OP THERAPY PROGRESS SUMMARY
Outpatient Physical Therapy  PROGRESS SUMMARY NOTE      [facility] Outpatient Physical Therapy  [address]  [place] [address]  Phone:  [phone]  Fax:  [phone]    Date of Visit: 06/14/2022    Patient: LIAN More III  YOB: 1940  MRN: [id number]     Referring Provider: Augustus Vaca M.D.  [address] [place] [address]  [place] [address]   Referring Diagnosis Displaced intertrochanteric fracture of left femur, subsequent encounter for closed fracture with routine healing [S72.142D];Repeated falls [R29.6];Multiple fractures of ribs, left side, subsequent encounter for fracture with routine healing [S22.42XD];Type 2 diabetes mellitus with other specified complication [E11.69];Type 2 diabetes mellitus with hyperglycemia [E11.65]     Visit Diagnoses     ICD-10-CM   1. Other abnormalities of gait and mobility  R26.89   2. Left hip pain  M25.552       Rehab Potential: excellent    Progress Report Period: 05/25/2022-06/14/2022    Functional Assessment Used      Tinetti Total Score = 17/28    Objective Findings and Assessment:   Patient progression towards goals: Patient is making progress with therapy. He is able to ambulate short distances safely without SPC and negotiate stairs with reciprocal step pattern. He reports L hip pain with weight bearing persists and his L leg continues to feel weaker compared to R. In addition, he demonstrates regression in 5 Timed Sit to Stands and impaired balance (Tinetti 17/28). Continued skilled therapy recommended to address L LE strength and balance to meet patient's goal of returning to golfing 3x/wk.     Objective findings and assessment details: 5 Timed Sit to Stand Test = 30 sec w/ arms in high guard position and wide LORRIE (24 in seat height), 2x LOB w/ stepping strategy to correct      Rhomberg eyes open = 30 sec, mild body sway  Rhomberg eyes closed = 5 sec, moderate body sway, LOB to R w/ reaching to prevent fall     Tinetti:  Balance =7/16; Gait = 10/12, Total score 17/28     Goals:   Short Term Goals:     1. Pt will be independent with written HEP. (Goal Met)  2. Pt will be able to safely walk household distances without external support 75% of time (Goal Met)  3. Patient will demonstrate 4 point (MDC) increase in Tinetti score         Short term goal time span:  2-4 weeks      Long Term Goals:         1. Pt will be able to golf 3x/wk with < 2/10 L hip pain.   2. Pt will be able to perform 5 Timed Sit to Stand test from 19 in seat in < 13 seconds  Long term goal time span:  4-6 weeks    Plan:   Planned therapy interventions:  Neuromuscular Re-education (CPT 00137), Therapeutic Exercise (CPT 74281), Therapeutic Activities (CPT 36642) and Manual Therapy (CPT 26718)  Frequency:  2x week  Duration in weeks:  5  Duration in visits:  10      Referring provider co-signature:  I have reviewed this plan of care and my co-signature certifies the need for services.     Certification Period: 06/14/2022 to 08/02/22    Physician Signature: ________________________________ Date: ______________

## 2022-06-14 NOTE — TELEPHONE ENCOUNTER
"Spoke to patient over phone. Patient states since he was started on Torsemide in March he is \"not releasing fluid.\" Per patient, he is not experiencing any edema currently, but he states he is consistently gaining at least 5 lbs every week. He states when he gains extra weight he takes several doses of his left over Bumex and is immediately able to \"release fluid.\" BP has been stable with average readings of 120/70-80. Patient denies all other symptoms including SOB. He would like to be switched back to Bumex.             To Be: Please advise    "

## 2022-06-15 ENCOUNTER — APPOINTMENT (OUTPATIENT)
Dept: PHYSICAL THERAPY | Facility: MEDICAL CENTER | Age: 82
End: 2022-06-15
Attending: INTERNAL MEDICINE
Payer: MEDICARE

## 2022-06-16 ENCOUNTER — PHYSICAL THERAPY (OUTPATIENT)
Dept: PHYSICAL THERAPY | Facility: MEDICAL CENTER | Age: 82
End: 2022-06-16
Attending: INTERNAL MEDICINE
Payer: MEDICARE

## 2022-06-16 DIAGNOSIS — M25.552 LEFT HIP PAIN: ICD-10-CM

## 2022-06-16 DIAGNOSIS — R26.89 OTHER ABNORMALITIES OF GAIT AND MOBILITY: ICD-10-CM

## 2022-06-16 DIAGNOSIS — R26.89 BALANCE PROBLEM: ICD-10-CM

## 2022-06-16 PROCEDURE — 97110 THERAPEUTIC EXERCISES: CPT

## 2022-06-16 PROCEDURE — 97112 NEUROMUSCULAR REEDUCATION: CPT

## 2022-06-16 PROCEDURE — 97014 ELECTRIC STIMULATION THERAPY: CPT

## 2022-06-16 NOTE — OP THERAPY DAILY TREATMENT
"  Outpatient Physical Therapy  DAILY TREATMENT     [facility] Outpatient Physical Therapy  [address]  [place] [address]  Phone:  [phone]  Fax:  [phone]    Date: 06/16/2022    Patient: LIAN More III  YOB: 1940  MRN: [id number]     Time Calculation    Start time: 0935  Stop time: 1041 Time Calculation (min): 66 minutes         Chief Complaint: Difficulty Walking and Loss Of Balance    Visit #: 10    SUBJECTIVE:  Pt reports today feeling fatigued; feeling weak in his L LE>R LE today. Pt notes balance and L LE weakness is still currently main complaint.     OBJECTIVE:  Current objective measures:   Vitals (taken after warm up): 121/62 mmHg, HR: 99 bpm, 97% O2      Objective findings and assessment details: 5 Timed Sit to Stand Test = 30 sec w/ arms in high guard position and wide LORRIE (24 in seat height), 2x LOB w/ stepping strategy to correct      Rhomberg eyes open = 30 sec, mild body sway  Rhomberg eyes closed = 5 sec, moderate body sway, LOB to R w/ reaching to prevent fall     Tinetti: Balance =7/16; Gait = 10/12, Total score 17/28     Therapeutic Exercises (CPT 87562):     1. NuStep , 15 min, L8 (5 min supervised)    3. SL shuttle , 4c 3x10, Pt struggles with eccentric control on L leg; verbal cues required to remind pt to slowly decent     4. Lateral step overs, 2x10, 6\" box    5. Sit to stand with 2 airex pads, 3x10, progressed from chair with 2 airex pads to from chair without cushioning; progressed second set to one pad     6. Step ups, 2x10, 6\" box    7. Sit to stands , 10x (24 in seat height)       Therapeutic Exercise Summary: HEP - bridges w/ hip adduction, clams, wall squats, gastroc stretching, hooklying fig 4 stretch, fwd/retro gait     Therapeutic Treatments and Modalities:     1. Neuromuscular Re-education (CPT 25662), See below for details. , x15 mins    Therapeutic Treatment and Modalities Summary: NMR   Static balance in // bars in " tandem with vertical head movements  Fwd/multidirectional stepping (using velcro circles as target) - 8 min   Static balance on balance board, 1/2FR - 3 x 10 sec ea // parallel bars -NT    Time-based treatments/modalities:    Physical Therapy Timed Treatment Charges  Neuromusc re-ed, balance, coor, post minutes (CPT 22934): 26 minutes  Therapeutic exercise minutes (CPT 92638): 25 minutes     ASSESSMENT:   Response to treatment: Balance continues to be challenging; focused on increasing step length today with  multidirectional stepping. Pt was able to increase step length on the R LE > L LE; pt states he is lacking confidence stepping forward and lateral with L LE due to imbalance and strength. Continue to progress hip strengthening as tolerated; focus on eccentric control with step ups/step downs as pt descends quickly.     PLAN/RECOMMENDATIONS:   Plan for treatment: therapy treatment to continue next visit.  Planned interventions for next visit: continue with current treatment. Eccentric focus with step up/downs      [x] As the licensed therapist supervising this student, I was present during the entire treatment session directing the care and reviewing the assessment plan.  I reviewed all documentation prior to signing.

## 2022-06-20 ENCOUNTER — PHYSICAL THERAPY (OUTPATIENT)
Dept: PHYSICAL THERAPY | Facility: MEDICAL CENTER | Age: 82
End: 2022-06-20
Attending: INTERNAL MEDICINE
Payer: MEDICARE

## 2022-06-20 DIAGNOSIS — R26.89 OTHER ABNORMALITIES OF GAIT AND MOBILITY: ICD-10-CM

## 2022-06-20 DIAGNOSIS — M25.552 LEFT HIP PAIN: ICD-10-CM

## 2022-06-20 PROCEDURE — 97140 MANUAL THERAPY 1/> REGIONS: CPT

## 2022-06-20 PROCEDURE — 97110 THERAPEUTIC EXERCISES: CPT

## 2022-06-20 PROCEDURE — 97112 NEUROMUSCULAR REEDUCATION: CPT

## 2022-06-20 NOTE — OP THERAPY DAILY TREATMENT
Outpatient Physical Therapy  DAILY TREATMENT     [facility] Outpatient Physical Therapy  [address]  [place] [address]  Phone:  [phone]  Fax:  [phone]    Date: 06/20/2022    Patient: LIAN More III  YOB: 1940  MRN: [id number]     Time Calculation    Start time: 0820  Stop time: 0920 Time Calculation (min): 60 minutes         Chief Complaint: Hip Problem    Visit #: 11    SUBJECTIVE: Patient reports his L hip feels weak compared to his baseline.    OBJECTIVE:  Current objective measures: na          Therapeutic Exercises (CPT 18819):     1. NuStep , 15 min, L8, prior to session, no charge     3. SL shuttle , 4c 6x10 B, inc resistance next visit    4. Lateral lunge w/ pink slider , 3 x 15 (L LE), 3 x 10 (R LE) (orange band at ankles)    5. Sit to stand with 1 airex pad, 15x       Therapeutic Exercise Summary: HEP - bridges w/ hip adduction, clams, wall squats, gastroc stretching, hooklying fig 4 stretch, fwd/retro gait     Therapeutic Treatments and Modalities:     1. Neuromuscular Re-education (CPT 46216), See below for details. , 10 min    Therapeutic Treatment and Modalities Summary: NMR   Ankle strategy with toes on inclined board - 5 min   LOS w/ ball toss requiring multidirectional reaching  5 min (w/ gait belt)    Manual (10 min)  STW L IT band/ lateral quad/ peroneals     Post session ice bilat knees 15 min (supine, LEs on wedge)    Time-based treatments/modalities:    Physical Therapy Timed Treatment Charges  Manual therapy minutes (CPT 12951): 10 minutes  Neuromusc re-ed, balance, coor, post minutes (CPT 56377): 10 minutes  Therapeutic exercise minutes (CPT 12453): 22 minutes        ASSESSMENT:   Response to treatment: Decreased seat height with sit to stands. Impaired quad strength and anticipatory balance led to unsteadiness with task. Impaired LOS with tendency to step when reaching outside LORRIE. Continue to address balance and strength  impairments to meet patient's goal of returning to golfing.     PLAN/RECOMMENDATIONS:   Plan for treatment: therapy treatment to continue next visit.  Planned interventions for next visit: continue with current treatment.        Diarrhea, unspecified type Acute renal failure superimposed on chronic kidney disease, unspecified CKD stage, unspecified acute renal failure type

## 2022-06-21 ENCOUNTER — APPOINTMENT (OUTPATIENT)
Dept: PHYSICAL THERAPY | Facility: MEDICAL CENTER | Age: 82
End: 2022-06-21
Attending: INTERNAL MEDICINE
Payer: MEDICARE

## 2022-06-22 ENCOUNTER — PHYSICAL THERAPY (OUTPATIENT)
Dept: PHYSICAL THERAPY | Facility: MEDICAL CENTER | Age: 82
End: 2022-06-22
Attending: INTERNAL MEDICINE
Payer: MEDICARE

## 2022-06-22 DIAGNOSIS — R26.89 OTHER ABNORMALITIES OF GAIT AND MOBILITY: ICD-10-CM

## 2022-06-22 DIAGNOSIS — M25.552 LEFT HIP PAIN: ICD-10-CM

## 2022-06-22 PROCEDURE — 97110 THERAPEUTIC EXERCISES: CPT

## 2022-06-22 PROCEDURE — 97112 NEUROMUSCULAR REEDUCATION: CPT

## 2022-06-22 NOTE — OP THERAPY DAILY TREATMENT
Outpatient Physical Therapy  DAILY TREATMENT     [facility] Outpatient Physical Therapy  [address]  [place] [address]  Phone:  [phone]  Fax:  [phone]    Date: 06/22/2022    Patient: LIAN More III  YOB: 1940  MRN: [id number]     Time Calculation                   Chief Complaint: Hip Problem    Visit #: 12    SUBJECTIVE: Patient reports his quads and hips were muscle sore yesterday     OBJECTIVE:  Current objective measures: NA          Therapeutic Exercises (CPT 45380):     1. NuStep , 10 min, L8, prior to session, no charge     3. SL shuttle , 4c 6x10 B, inc resistance next visit    4. Lateral lunge w/ pink slider , 3 x 15 (L LE), 3 x 10 (R LE) (orange band at ankles)    5. Sit to stand with 1 airex pad, 15x     6. Squats on incline, 15x    7. Lateral lunge , 2 x 1 min (pink band above knees)     8. L SL squat , 12x (R LE on 6 in step)     9. Lateral step up (4in), 15x (parallel bars), bilat UEs     10. Fwd step up , 5x 6in, 5x 4 in, 20x 2in (L only), able to perform with appropriate eccentric control with lower step height      Therapeutic Exercise Summary: HEP - bridges w/ hip adduction, clams, wall squats, gastroc stretching, hooklying fig 4 stretch, fwd/retro gait       Therapeutic Treatments and Modalities:     1. Neuromuscular Re-education (CPT 92232), See below for details. , 10 min    Therapeutic Treatment and Modalities Summary: NMR   Ankle strategy with toes on inclined board - 2 x 1 min (no UE support) (gym)  LOS w/ ball toss requiring multidirectional reaching  5 min (w/ gait belt) (gym)  LOS with rowing motion using 1lb Norah bar - 1 min (w/ gait belt) (gym)  retrogait 20 feet x 4     Post session ice bilat knees 15 min (supine, LEs on wedge)    Time-based treatments/modalities:         ASSESSMENT:   Response to treatment: Progressed L LE strengthening with patient demonstrating increased quad control (less knee buckling). Verbal cues  needed with balance training to enhance ankle strategy (pt has tendency to using stepping or hip strategy). Overall he is progressing well with PT.     PLAN/RECOMMENDATIONS:   Plan for treatment: therapy treatment to continue next visit.  Planned interventions for next visit: continue with current treatment.

## 2022-06-29 ENCOUNTER — HOSPITAL ENCOUNTER (OUTPATIENT)
Dept: LAB | Facility: MEDICAL CENTER | Age: 82
End: 2022-06-29
Attending: INTERNAL MEDICINE
Payer: MEDICARE

## 2022-06-29 ENCOUNTER — HOSPITAL ENCOUNTER (OUTPATIENT)
Dept: LAB | Facility: MEDICAL CENTER | Age: 82
End: 2022-06-29
Attending: EMERGENCY MEDICINE
Payer: MEDICARE

## 2022-06-29 ENCOUNTER — PHYSICAL THERAPY (OUTPATIENT)
Dept: PHYSICAL THERAPY | Facility: MEDICAL CENTER | Age: 82
End: 2022-06-29
Attending: INTERNAL MEDICINE
Payer: MEDICARE

## 2022-06-29 DIAGNOSIS — R26.89 BALANCE PROBLEM: ICD-10-CM

## 2022-06-29 DIAGNOSIS — R26.89 OTHER ABNORMALITIES OF GAIT AND MOBILITY: ICD-10-CM

## 2022-06-29 DIAGNOSIS — M25.552 LEFT HIP PAIN: ICD-10-CM

## 2022-06-29 DIAGNOSIS — I50.22 CHF (CONGESTIVE HEART FAILURE), NYHA CLASS II, CHRONIC, SYSTOLIC (HCC): ICD-10-CM

## 2022-06-29 LAB
ALBUMIN SERPL BCP-MCNC: 4.2 G/DL (ref 3.2–4.9)
ALBUMIN/GLOB SERPL: 1.7 G/DL
ALP SERPL-CCNC: 60 U/L (ref 30–99)
ALT SERPL-CCNC: 26 U/L (ref 2–50)
ANION GAP SERPL CALC-SCNC: 12 MMOL/L (ref 7–16)
ANION GAP SERPL CALC-SCNC: 14 MMOL/L (ref 7–16)
AST SERPL-CCNC: 23 U/L (ref 12–45)
BASOPHILS # BLD AUTO: 0.4 % (ref 0–1.8)
BASOPHILS # BLD: 0.02 K/UL (ref 0–0.12)
BILIRUB SERPL-MCNC: 0.6 MG/DL (ref 0.1–1.5)
BUN SERPL-MCNC: 53 MG/DL (ref 8–22)
BUN SERPL-MCNC: 53 MG/DL (ref 8–22)
CALCIUM SERPL-MCNC: 8.9 MG/DL (ref 8.4–10.2)
CALCIUM SERPL-MCNC: 9.2 MG/DL (ref 8.4–10.2)
CHLORIDE SERPL-SCNC: 101 MMOL/L (ref 96–112)
CHLORIDE SERPL-SCNC: 101 MMOL/L (ref 96–112)
CHOLEST SERPL-MCNC: 107 MG/DL (ref 100–199)
CO2 SERPL-SCNC: 24 MMOL/L (ref 20–33)
CO2 SERPL-SCNC: 24 MMOL/L (ref 20–33)
CREAT SERPL-MCNC: 2.02 MG/DL (ref 0.5–1.4)
CREAT SERPL-MCNC: 2.13 MG/DL (ref 0.5–1.4)
EOSINOPHIL # BLD AUTO: 0.16 K/UL (ref 0–0.51)
EOSINOPHIL NFR BLD: 2.8 % (ref 0–6.9)
ERYTHROCYTE [DISTWIDTH] IN BLOOD BY AUTOMATED COUNT: 54.9 FL (ref 35.9–50)
FASTING STATUS PATIENT QL REPORTED: NORMAL
FASTING STATUS PATIENT QL REPORTED: NORMAL
GFR SERPLBLD CREATININE-BSD FMLA CKD-EPI: 31 ML/MIN/1.73 M 2
GFR SERPLBLD CREATININE-BSD FMLA CKD-EPI: 33 ML/MIN/1.73 M 2
GLOBULIN SER CALC-MCNC: 2.5 G/DL (ref 1.9–3.5)
GLUCOSE SERPL-MCNC: 171 MG/DL (ref 65–99)
GLUCOSE SERPL-MCNC: 177 MG/DL (ref 65–99)
HCT VFR BLD AUTO: 51.2 % (ref 42–52)
HDLC SERPL-MCNC: 28 MG/DL
HGB BLD-MCNC: 16.7 G/DL (ref 14–18)
IMM GRANULOCYTES # BLD AUTO: 0.02 K/UL (ref 0–0.11)
IMM GRANULOCYTES NFR BLD AUTO: 0.4 % (ref 0–0.9)
LDLC SERPL CALC-MCNC: 59 MG/DL
LYMPHOCYTES # BLD AUTO: 1.3 K/UL (ref 1–4.8)
LYMPHOCYTES NFR BLD: 22.8 % (ref 22–41)
MCH RBC QN AUTO: 30.8 PG (ref 27–33)
MCHC RBC AUTO-ENTMCNC: 32.6 G/DL (ref 33.7–35.3)
MCV RBC AUTO: 94.3 FL (ref 81.4–97.8)
MONOCYTES # BLD AUTO: 0.59 K/UL (ref 0–0.85)
MONOCYTES NFR BLD AUTO: 10.3 % (ref 0–13.4)
NEUTROPHILS # BLD AUTO: 3.62 K/UL (ref 1.82–7.42)
NEUTROPHILS NFR BLD: 63.3 % (ref 44–72)
NRBC # BLD AUTO: 0 K/UL
NRBC BLD-RTO: 0 /100 WBC
NT-PROBNP SERPL IA-MCNC: 854 PG/ML (ref 0–125)
PLATELET # BLD AUTO: 139 K/UL (ref 164–446)
PMV BLD AUTO: 10.3 FL (ref 9–12.9)
POTASSIUM SERPL-SCNC: 3.7 MMOL/L (ref 3.6–5.5)
POTASSIUM SERPL-SCNC: 3.8 MMOL/L (ref 3.6–5.5)
PROT SERPL-MCNC: 6.7 G/DL (ref 6–8.2)
PSA SERPL-MCNC: 2.9 NG/ML (ref 0–4)
RBC # BLD AUTO: 5.43 M/UL (ref 4.7–6.1)
SODIUM SERPL-SCNC: 137 MMOL/L (ref 135–145)
SODIUM SERPL-SCNC: 139 MMOL/L (ref 135–145)
TRIGL SERPL-MCNC: 98 MG/DL (ref 0–149)
WBC # BLD AUTO: 5.7 K/UL (ref 4.8–10.8)

## 2022-06-29 PROCEDURE — 97112 NEUROMUSCULAR REEDUCATION: CPT

## 2022-06-29 PROCEDURE — 97110 THERAPEUTIC EXERCISES: CPT

## 2022-06-29 PROCEDURE — 85025 COMPLETE CBC W/AUTO DIFF WBC: CPT

## 2022-06-29 PROCEDURE — 80053 COMPREHEN METABOLIC PANEL: CPT

## 2022-06-29 PROCEDURE — 83880 ASSAY OF NATRIURETIC PEPTIDE: CPT

## 2022-06-29 PROCEDURE — 84403 ASSAY OF TOTAL TESTOSTERONE: CPT

## 2022-06-29 PROCEDURE — 84305 ASSAY OF SOMATOMEDIN: CPT

## 2022-06-29 PROCEDURE — 80048 BASIC METABOLIC PNL TOTAL CA: CPT

## 2022-06-29 PROCEDURE — 84270 ASSAY OF SEX HORMONE GLOBUL: CPT

## 2022-06-29 PROCEDURE — 80061 LIPID PANEL: CPT

## 2022-06-29 PROCEDURE — 82670 ASSAY OF TOTAL ESTRADIOL: CPT

## 2022-06-29 PROCEDURE — 36415 COLL VENOUS BLD VENIPUNCTURE: CPT

## 2022-06-29 PROCEDURE — 84402 ASSAY OF FREE TESTOSTERONE: CPT

## 2022-06-29 PROCEDURE — 84153 ASSAY OF PSA TOTAL: CPT

## 2022-06-29 NOTE — OP THERAPY DAILY TREATMENT
Outpatient Physical Therapy  DAILY TREATMENT     [facility] Outpatient Physical Therapy  [address]  [place] [address]  Phone:  [phone]  Fax:  [phone]    Date: 06/29/2022    Patient: LIAN More III  YOB: 1940  MRN: [id number]     Time Calculation    Start time: 0845  Stop time: 0958 Time Calculation (min): 73 minutes         Chief Complaint: Difficulty Walking and Loss Of Balance    Visit #: 13    SUBJECTIVE:  Pt reports that he is feeling stiff today; notes he wants his L leg to get stronger. Pt reports compliance with HEP.    OBJECTIVE:  Current objective measures:       O2 during session: 91 after warm up (15 minute bike); increased to 94 after 2 minutes rest            93 after balance training; increased to 95 after 2 minutes rest    Therapeutic Exercises (CPT 34484):     1. NuStep , 15 min, L8, prior to session, no charge     3. SL shuttle , 5c 6x10 B, inc resistance next visit    4. Lateral lunge w/ pink slider , 3 x 15 (L LE), 3 x 10 (R LE) (orange band at ankles), NT    5. Sit to stand with 1 airex pad, 15x , attempted no pad; pt able to complete 2 reps without use of hands    6. Squats on incline, 15x, NT    7. Lateral lunge , 2 x 1 min (pink band above knees) , NT    8. L SL squat , 12x (R LE on 6 in step)     9. Lateral step up (4in), 15x (parallel bars), bilat UEs     10. Fwd step up , 5x 6in, 5x 4 in, 20x 2in (L only), able to perform with appropriate eccentric control with lower step height      Therapeutic Exercise Summary: HEP - bridges w/ hip adduction, clams, wall squats, gastroc stretching, hooklying fig 4 stretch, fwd/retro gait       Therapeutic Treatments and Modalities:     1. Neuromuscular Re-education (CPT 29786), See below for details. , 15 min    Therapeutic Treatment and Modalities Summary: NMR   Ankle strategy with toes on inclined board - 2 x 1 min (no UE support) (gym)  LOS w/ ball toss requiring multidirectional  reaching  5 min (w/ gait belt) (gym)  LOS with rowing motion using 1lb Norah bar - 1 min (w/ gait belt) (gym)  retrogait 20 feet x 4     Post session ice bilat knees 15 min (supine, LEs on wedge)    Time-based treatments/modalities:    Physical Therapy Timed Treatment Charges  Neuromusc re-ed, balance, coor, post minutes (CPT 65872): 15 minutes  Therapeutic exercise minutes (CPT 94620): 28 minutes      ASSESSMENT:   Response to treatment: Pt balance continues to be challenging when forced outside of his LORRIE; stepping strategy has continued to be his place of comfort to recover when experiencing LOB. Pt required increased rest breaks today due to increased fatigue. Pt will benefit from continued progression of strengthening for L LE; add in TRX squats and lunges next session.    PLAN/RECOMMENDATIONS:   Plan for treatment: therapy treatment to continue next visit.  Planned interventions for next visit: continue with current treatment.  Pt is nearing medicare cap; will monitor upcoming visits    [x] As the licensed therapist supervising this student, I was present during the entire treatment session directing the care and reviewing the assessment plan.  I reviewed all documentation prior to signing.

## 2022-06-30 ENCOUNTER — TELEPHONE (OUTPATIENT)
Dept: CARDIOLOGY | Facility: MEDICAL CENTER | Age: 82
End: 2022-06-30
Payer: MEDICARE

## 2022-06-30 LAB — ESTRADIOL SERPL-MCNC: 13.5 PG/ML

## 2022-06-30 NOTE — TELEPHONE ENCOUNTER
Pt states he doesn't feel much better, Not a lot of energy  SOB with activity   Denies leg swelling, bumex helping with this.    Last 10 day been good on weight per pt.     Pt does not have upcoming appt with neph or pcp, advised pt call and follow up for monitoring. Pt agrees to this.     Pt will call back with any worsening symptoms

## 2022-06-30 NOTE — TELEPHONE ENCOUNTER
----- Message from NEAL Villegas sent at 6/29/2022  3:10 PM PDT -----  In May's absence:    Follow up labs post bumex re-start. Labs stable but Cr and CKD remain on diuretic therapy. Continue to follow up with PCP or nephrology on this. Continue bumex until next apt, how are his HF symptoms? BNP remains stable but elevated.    Thanks,    Darya

## 2022-07-01 ENCOUNTER — PHYSICAL THERAPY (OUTPATIENT)
Dept: PHYSICAL THERAPY | Facility: MEDICAL CENTER | Age: 82
End: 2022-07-01
Attending: INTERNAL MEDICINE
Payer: MEDICARE

## 2022-07-01 DIAGNOSIS — R26.89 BALANCE PROBLEM: ICD-10-CM

## 2022-07-01 DIAGNOSIS — M25.552 LEFT HIP PAIN: ICD-10-CM

## 2022-07-01 LAB
IGF-I SERPL-MCNC: 152 NG/ML (ref 19–189)
IGF-I Z-SCORE SERPL: 1
SHBG SERPL-SCNC: 25 NMOL/L (ref 19–76)
TESTOST FREE MFR SERPL: 2.2 % (ref 1.6–2.9)
TESTOST FREE SERPL-MCNC: 122 PG/ML (ref 47–244)
TESTOST SERPL-MCNC: 553 NG/DL (ref 300–720)

## 2022-07-01 PROCEDURE — 97110 THERAPEUTIC EXERCISES: CPT

## 2022-07-01 PROCEDURE — 97140 MANUAL THERAPY 1/> REGIONS: CPT

## 2022-07-01 PROCEDURE — 97112 NEUROMUSCULAR REEDUCATION: CPT

## 2022-07-01 NOTE — OP THERAPY DAILY TREATMENT
Outpatient Physical Therapy  DAILY TREATMENT     [facility] Outpatient Physical Therapy  [address]  [place] [address]  Phone:  [phone]  Fax:  [phone]    Date: 07/01/2022    Patient: LIAN More III  YOB: 1940  MRN: [id number]     Time Calculation    Start time: 1030  Stop time: 1145 Time Calculation (min): 75 minutes         Chief Complaint: Hip Problem and Difficulty Walking    Visit #: 14    SUBJECTIVE: Patient reports his hip is about the same.     OBJECTIVE:  Current objective measures: NA          Therapeutic Exercises (CPT 44067):     1. NuStep , 15 min, L8, prior to session, no charge     3. SL shuttle , 5c 6x10 B-NT    4. Lateral lunge w/ pink slider , 3 x 15 (L LE), 3 x 10 (R LE) (orange band at ankles)-NT    5. Sit to stand with 1 airex pad, 15x -NT, attempted no pad; pt able to complete 2 reps without use of hands    6. Squats on incline, 2 x 10     7. Lateral lunge , 2 x 1 min (pink band above knees) -NT    8. L SL squat , 2 x 10 (R LE on 6 in step)     10. Fwd step up , 2 x 10 6in , w/ bilat UEs      Therapeutic Exercise Summary: HEP - bridges w/ hip adduction, clams, wall squats, gastroc stretching, hooklying fig 4 stretch, fwd/retro gait       Therapeutic Treatments and Modalities:     1. Neuromuscular Re-education (CPT 95228), See below for details. , 15 min    2. Manual Therapy (CPT 09476), IASTM L TFL/ scar tissue w/ lacrosse ball    Therapeutic Treatment and Modalities Summary: NMR   Ankle strategy with toes on inclined board - 2 x 1 min w/ multidirectional reaching & ball toss // dec stability with reaching OH  Static balance feet together on ground 3 x 20 sec //occasional crouched standing  LOS w/ diagonal reaching and trunk rotation 10x B 4# ball // cues needed to increase trunk rotation    Post session ice bilat knees 15 min (supine, LEs on wedge)    Time-based treatments/modalities:    Physical Therapy Timed Treatment  Charges  Manual therapy minutes (CPT 58939): 10 minutes  Neuromusc re-ed, balance, coor, post minutes (CPT 65753): 15 minutes  Therapeutic exercise minutes (CPT 96535): 20 minutes      ASSESSMENT:   Response to treatment: Decreased balance with reaching overhead. Impaired LOS with use of stepping strategy to recover. Manual decreased adhesions in incisional scar. Continued therapy recommended to address L hip strength and balance.     PLAN/RECOMMENDATIONS:   Plan for treatment: therapy treatment to continue next visit.  Planned interventions for next visit: continue with current treatment.

## 2022-07-05 ENCOUNTER — PHYSICAL THERAPY (OUTPATIENT)
Dept: PHYSICAL THERAPY | Facility: MEDICAL CENTER | Age: 82
End: 2022-07-05
Attending: INTERNAL MEDICINE
Payer: MEDICARE

## 2022-07-05 DIAGNOSIS — R26.89 BALANCE PROBLEM: ICD-10-CM

## 2022-07-05 DIAGNOSIS — M25.552 LEFT HIP PAIN: ICD-10-CM

## 2022-07-05 PROCEDURE — 97110 THERAPEUTIC EXERCISES: CPT

## 2022-07-05 PROCEDURE — 97140 MANUAL THERAPY 1/> REGIONS: CPT

## 2022-07-05 NOTE — OP THERAPY DAILY TREATMENT
Outpatient Physical Therapy  DAILY TREATMENT     [facility] Outpatient Physical Therapy  [address]  [place] [address]  Phone:  [phone]  Fax:  [phone]    Date: 07/05/2022    Patient: LIAN More III  YOB: 1940  MRN: [id number]     Time Calculation    Start time: 0815  Stop time: 0930 Time Calculation (min): 75 minutes         Chief Complaint: Difficulty Walking and Hip Problem    Visit #: 15    SUBJECTIVE: Patient reports his L hip continues to be sore and he feels weakness L distal quad and hip. Also reports L sciatica is flared up right now.     OBJECTIVE:  Current objective measures:       /74 mmHg  HR = 95-99 bpm  O2 - 95%    TTP L piriformis     Therapeutic Exercises (CPT 23294):     1. NuStep , 15 min, L8, prior to session, no charge     4. Hooklying fig 4 piriformis stretch (hip IR, then hip ER), 3 x 20 sec ea (L)    5. Sit to stand with 1 airex pad, 15x -NT, attempted no pad; pt able to complete 2 reps without use of hands    6. Rockerboard weight shift (M-L), 2 min     7. Lateral lunge , 2 x 1 min (pink band above knees) -NT    8. L SL squat , 2 x 10 (R LE on 6 in step) -NT    10. Fwd step up , 2 x 10 6in -NT    11. Hooklying pelvic hip hike, 10x    12. Supine pelvic hip hike, 3x B, numbness to low back and post legs, d/c'ed      Therapeutic Exercise Summary: HEP - bridges w/ hip adduction, clams, wall squats, gastroc stretching, hooklying fig 4 stretch + hip flex + IR stretch, fwd/retro gait, sit to stands, L SL squats at counter, seated heel raise w/ resistance    Therapeutic Treatments and Modalities: , See below for details. , 15 min    2. Manual Therapy (CPT 73505), IASTM L TFL/ scar tissue w/ tool , STW L piriformis, PROM hip flexion     Therapeutic Treatment and Modalities Summary: NMR -NT  Ankle strategy with toes on inclined board - 2 x 1 min w/ multidirectional reaching & ball toss // dec stability with reaching OH  Static  balance feet together on ground 3 x 20 sec //occasional crouched standing  LOS w/ diagonal reaching and trunk rotation 10x B 4# ball // cues needed to increase trunk rotation    Post session ice bilat knees 15 min (supine, LEs on wedge)    Time-based treatments/modalities:    Physical Therapy Timed Treatment Charges  Manual therapy minutes (CPT 69479): 25 minutes  Therapeutic exercise minutes (CPT 06342): 20 minutes      ASSESSMENT:   Response to treatment: Short duration numbness to low back and post LEs with supine pelvic hip hike. Worked on hip stretching, pelvic mobility, and L gastroc strengthening. L hip extension and abduction strength has increased evidenced by ability to perform repeated reps of bridge, side lying hip abduction. The soreness is unchanged. Discussed carry over of exercise/ cardio to maximize benefits of therapy.      PLAN/RECOMMENDATIONS:   Plan for treatment: therapy treatment to continue next visit.  Planned interventions for next visit: continue with current treatment.

## 2022-07-08 ENCOUNTER — PHYSICAL THERAPY (OUTPATIENT)
Dept: PHYSICAL THERAPY | Facility: MEDICAL CENTER | Age: 82
End: 2022-07-08
Attending: INTERNAL MEDICINE
Payer: MEDICARE

## 2022-07-08 DIAGNOSIS — R26.89 BALANCE PROBLEM: ICD-10-CM

## 2022-07-08 DIAGNOSIS — R26.89 OTHER ABNORMALITIES OF GAIT AND MOBILITY: ICD-10-CM

## 2022-07-08 DIAGNOSIS — M25.552 LEFT HIP PAIN: ICD-10-CM

## 2022-07-08 PROCEDURE — 97110 THERAPEUTIC EXERCISES: CPT

## 2022-07-08 NOTE — OP THERAPY DAILY TREATMENT
Outpatient Physical Therapy  DAILY TREATMENT     [facility] Outpatient Physical Therapy  [address]  [place] [address]  Phone:  [phone]  Fax:  [phone]    Date: 07/08/2022    Patient: LIAN More III  YOB: 1940  MRN: [id number]     Time Calculation    Start time: 0900  Stop time: 1015 Time Calculation (min): 75 minutes         Chief Complaint: Hip Problem and Difficulty Walking    Visit #: 16    SUBJECTIVE: Patient reports proximal quad and IT band sore and weakness continues.     OBJECTIVE:  Current objective measures:        Vitals post Shuttle leg press:  /60 mmHg  HR = 100 bpm      Therapeutic Exercises (CPT 63017):     1. NuStep , 15 min, L8, prior to session, no charge     8. L SL squat w/ shuttle , 2 x 6 w/ 4c,  3x w/ 6c    9. Fwd step up , 2 x 10 4in, 4# ankle weight bilat    10. Fwd step up , 2 x 10 6in , 4# ankle weight bilat    11. Lat step up , 6x 6in, 4# ankle weight bilat    12. Inclined squats, 2 x 10     13. Fwd step/lunge, 3 x 10 , 4# ankle weight bilat & tactile cues to enhance quad activation     14. Retrogait , 6x 6feet, 4# ankle weight bilat    15. Gait in hallway ( no AD), 4# ankle weight bilat    16. Lat step , 8 x 10 feet, 4# ankle weight bilat      Therapeutic Exercise Summary: HEP - bridges w/ hip adduction, clams, wall squats, gastroc stretching, hooklying fig 4 stretch + hip flex + IR stretch, fwd/retro gait, sit to stands, L SL squats at counter, seated heel raise w/ resistance    Therapeutic Treatments and Modalities:     Therapeutic Treatment and Modalities Summary: NMR -NT  Ankle strategy with toes on inclined board - 2 x 1 min w/ multidirectional reaching & ball toss // dec stability with reaching OH  Static balance feet together on ground 3 x 20 sec //occasional crouched standing  LOS w/ diagonal reaching and trunk rotation 10x B 4# ball // cues needed to increase trunk rotation    Post session ice bilat knees and  L hip 15 min (supine, LEs on wedge)    Time-based treatments/modalities:    Physical Therapy Timed Treatment Charges  Therapeutic exercise minutes (CPT 58569): 45 minutes      ASSESSMENT:   Response to treatment: Increased eccentric control with step ups and stability with gait (w/out AD) with ankle weights. Decreased quad strength observed with fwd step/ lunge (enhanced with tactile cues). Will continue to work on proprioception and strength to decrease risk of falls and allow patient to return to PLOF.     PLAN/RECOMMENDATIONS:   Plan for treatment: therapy treatment to continue next visit.  Planned interventions for next visit: continue with current treatment.

## 2022-07-11 ENCOUNTER — PHYSICAL THERAPY (OUTPATIENT)
Dept: PHYSICAL THERAPY | Facility: MEDICAL CENTER | Age: 82
End: 2022-07-11
Attending: INTERNAL MEDICINE
Payer: MEDICARE

## 2022-07-11 DIAGNOSIS — M25.552 LEFT HIP PAIN: ICD-10-CM

## 2022-07-11 DIAGNOSIS — R26.89 OTHER ABNORMALITIES OF GAIT AND MOBILITY: ICD-10-CM

## 2022-07-11 DIAGNOSIS — R26.89 BALANCE PROBLEM: ICD-10-CM

## 2022-07-11 PROCEDURE — 97110 THERAPEUTIC EXERCISES: CPT

## 2022-07-11 PROCEDURE — 97140 MANUAL THERAPY 1/> REGIONS: CPT

## 2022-07-11 PROCEDURE — 97112 NEUROMUSCULAR REEDUCATION: CPT

## 2022-07-11 NOTE — OP THERAPY DAILY TREATMENT
Outpatient Physical Therapy  DAILY TREATMENT     [facility] Outpatient Physical Therapy  [address]  [place] [address]  Phone:  [phone]  Fax:  [phone]    Date: 07/11/2022    Patient: LIAN More III  YOB: 1940  MRN: [id number]     Time Calculation    Start time: 1030  Stop time: 1145 Time Calculation (min): 75 minutes         Chief Complaint: Difficulty Walking and Hip Problem    Visit #: 17    SUBJECTIVE: Patient reports proximal quad and IT band sore and weakness continues.     OBJECTIVE:  Current objective measures:            Therapeutic Exercises (CPT 88279):     1. NuStep , 15 min, L8, prior to session, no charge     8. L SL squat w/ shuttle , 2 x 6 w/ 4c,  3x w/ 6c    9. Fwd step up , 2 x 10 4 in, NT    10. Fwd step up *, 2 x 10 6in , // bars    11. Fwd lunge to 6 in step *, 15x , // bars    12. L SL parital squats *, 2 x 10 , // bars    13. Bridge w/ gym ball , 3 x 10       Therapeutic Exercise Summary: HEP - bridges w/ hip adduction, clams, wall squats, gastroc stretching, hooklying fig 4 stretch + hip flex + IR stretch, fwd/retro gait, sit to stands, L SL squats at counter, seated heel raise w/ resistance    * with 3# ankle weights bilaterally    Therapeutic Treatments and Modalities:     1. Neuromuscular Re-education (CPT 46018)    2. Manual Therapy (CPT 42996), STW to decrease tone in L hip rotators    Therapeutic Treatment and Modalities Summary: NMR   Gait in clinic with changes in speed and direction (bilat 3# ankle weight)  SL cone taps w/ 10 sec hold // parallel bars, bilat UE support  SL RDL // parallel bars, unilat UE support   Anticipatory balance working on weight shifting/ trunk rotation w/ golf swing 6x     Post session ice bilat knees and L hip 15 min (supine, LEs on wedge)    Time-based treatments/modalities:    Physical Therapy Timed Treatment Charges  Manual therapy minutes (CPT 68530): 10 minutes  Neuromusc re-ed, balance,  coor, post minutes (CPT 40041): 15 minutes  Therapeutic exercise minutes (CPT 33183): 20 minutes      ASSESSMENT:   Response to treatment: Worked on posterolateral hip strengthening and SL stability. Patient is more stable with ankle weights. Performed golf type exercises with patient demonstrating mild instability, using stepping strategy to recover balance. Extended rest break at times due to appearance of facial redness likely secondary to increased demand on circulatory system. Will assess vitals throughout session next visit if it persists.     PLAN/RECOMMENDATIONS:   Plan for treatment: therapy treatment to continue next visit.  Planned interventions for next visit: continue with current treatment.

## 2022-07-13 ENCOUNTER — PHYSICAL THERAPY (OUTPATIENT)
Dept: PHYSICAL THERAPY | Facility: MEDICAL CENTER | Age: 82
End: 2022-07-13
Attending: INTERNAL MEDICINE
Payer: MEDICARE

## 2022-07-13 DIAGNOSIS — R26.89 BALANCE PROBLEM: ICD-10-CM

## 2022-07-13 PROCEDURE — 97140 MANUAL THERAPY 1/> REGIONS: CPT

## 2022-07-13 PROCEDURE — 97112 NEUROMUSCULAR REEDUCATION: CPT

## 2022-07-13 PROCEDURE — 97110 THERAPEUTIC EXERCISES: CPT

## 2022-07-13 NOTE — OP THERAPY DAILY TREATMENT
Outpatient Physical Therapy  DAILY TREATMENT     [facility] Outpatient Physical Therapy  [address]  [place] [address]  Phone:  [phone]  Fax:  [phone]    Date: 07/13/2022    Patient: LIAN More III  YOB: 1940  MRN: [id number]     Time Calculation    Start time: 0900  Stop time: 1015 Time Calculation (min): 75 minutes         Chief Complaint: Difficulty Walking and Hip Problem    Visit #: 18    SUBJECTIVE: Patient reports L hip soreness affects his gait     OBJECTIVE:  Current objective measures: See Progress Summary           Therapeutic Exercises (CPT 85292):     1. NuStep , 15 min, L8, prior to session, no charge     8. L SL squat w/ shuttle , 3 x 10 5c (L only), lateral hip pain with full hip extension     9. Fwd step up , 2 x 10 4 in, NT    10. Fwd step up *, 2 x 10 6in , NT    11. Fwd lunge to 6 in step *, 15x , NT    12. L SL parital squats , 2 x 10 , // bars    13. Bridge w/ gym ball , 3 x 10     14. SL balance, 3 x 15 sec w/ bilat UE support     15. Side lying hip abduction , 2 x 10 (with hip IR to enhance glut med activation), moderate fatigue      Therapeutic Exercise Summary: HEP - bridges w/ hip adduction, clams, wall squats, gastroc stretching, hooklying fig 4 stretch + hip flex + IR stretch, fwd/retro gait, sit to stands, L SL squats at counter, seated heel raise w/ resistance      Therapeutic Treatments and Modalities:     1. Neuromuscular Re-education (CPT 18043)    2. Manual Therapy (CPT 26459), STW to decrease tone in L hip rotators    Therapeutic Treatment and Modalities Summary: NMR   Gait in clinic with changes in speed and direction (bilat 3# ankle weight) -NT  SL cone taps w/ 10 sec hold // parallel bars, bilat UE support -NT  SL RDL // parallel bars, unilat UE support -NT  Anticipatory balance working on weight shifting/ trunk rotation w/ golf swing 6x -NT    Post session ice bilat knees and L hip 15 min (supine, LEs on  wedge)    Time-based treatments/modalities:    Physical Therapy Timed Treatment Charges  Manual therapy minutes (CPT 78568): 10 minutes  Neuromusc re-ed, balance, coor, post minutes (CPT 68052): 10 minutes  Therapeutic exercise minutes (CPT 38439): 20 minutes      ASSESSMENT:   Response to treatment: L knee/ hip pain increased with 5 Timed Sit to Stand test and shuttle squats. Less hip tightness with manual.     PLAN/RECOMMENDATIONS:   Plan for treatment: therapy treatment to continue next visit.  Planned interventions for next visit: continue with current treatment.

## 2022-07-13 NOTE — OP THERAPY PROGRESS SUMMARY
Outpatient Physical Therapy  PROGRESS SUMMARY NOTE      [facility] Outpatient Physical Therapy  [address]  [place] [address]  Phone:  [phone]  Fax:  [phone]    Date of Visit: 07/13/2022    Patient: LIAN More III  YOB: 1940  MRN: [id number]     Referring Provider: Augustus Vaca M.D.  [address] [place] [address]  [place] [address]   Referring Diagnosis Displaced intertrochanteric fracture of left femur, subsequent encounter for closed fracture with routine healing [S72.142D];Repeated falls [R29.6];Multiple fractures of ribs, left side, subsequent encounter for fracture with routine healing [S22.42XD];Type 2 diabetes mellitus with other specified complication [E11.69];Type 2 diabetes mellitus with hyperglycemia [E11.65]     Visit Diagnoses     ICD-10-CM   1. Balance problem  R26.89       Rehab Potential: good    Progress Report Period: 06/14/2022-07/13/2022    Functional Assessment Used      5 Timed Sit to Stand test (24 in seat) = 30 sec (multiple attempts needed to stand and single instance of LOB with staggered stepping to recover)  Tinetti = 22/28    Objective Findings and Assessment:   Patient progression towards goals: Patient demonstrates increased balance and gait (Tinetti score inc 5 points) and L hip strength. He has impaired L hip/ quad strength  And is still a moderate fall risk which interferes with gait and returning to PLOF including golfing. He would benefit from continued skilled therapy to address above impairments to optimize return to PLOF.     Objective findings and assessment details: See Functional Assessment used    Goals:   Short Term Goals:     1. Pt will be independent with written HEP. (Goal Met)  2. Pt will be able to safely walk household distances without external support 75% of time (Goal Met)  3. Patient will demonstrate 4 point (MDC) increase in Tinetti score         Short term goal time span:  2-4 weeks      Long Term Goals:     1. Pt will be able to golf 3x/wk with < 2/10 L hip pain.   2. Patient will be able to walk community distances safely without assistive device.   2. Pt will be able to perform 5 Timed Sit to Stand test from 19 in seat in < 13 seconds  Long term goal time span:  4-6 weeks    Plan:   Planned therapy interventions:  Gait Training (CPT 92515), Manual Therapy (CPT 66912), Neuromuscular Re-education (CPT 24682), Therapeutic Activities (CPT 57253) and Therapeutic Exercise (CPT 86567)  Frequency:  2x week  Duration in weeks:  5  Duration in visits:  10      Referring provider co-signature:  I have reviewed this plan of care and my co-signature certifies the need for services.     Certification Period: 07/13/2022 to 08/24/22    Physician Signature: ________________________________ Date: ______________

## 2022-07-15 ENCOUNTER — APPOINTMENT (OUTPATIENT)
Dept: PHYSICAL THERAPY | Facility: MEDICAL CENTER | Age: 82
End: 2022-07-15
Attending: INTERNAL MEDICINE
Payer: MEDICARE

## 2022-07-18 ENCOUNTER — PHYSICAL THERAPY (OUTPATIENT)
Dept: PHYSICAL THERAPY | Facility: MEDICAL CENTER | Age: 82
End: 2022-07-18
Attending: INTERNAL MEDICINE
Payer: MEDICARE

## 2022-07-18 DIAGNOSIS — R26.89 BALANCE PROBLEM: ICD-10-CM

## 2022-07-18 DIAGNOSIS — M25.552 LEFT HIP PAIN: ICD-10-CM

## 2022-07-18 PROCEDURE — 97112 NEUROMUSCULAR REEDUCATION: CPT

## 2022-07-18 PROCEDURE — 97110 THERAPEUTIC EXERCISES: CPT

## 2022-07-18 PROCEDURE — 97140 MANUAL THERAPY 1/> REGIONS: CPT

## 2022-07-18 NOTE — OP THERAPY DAILY TREATMENT
Outpatient Physical Therapy  DAILY TREATMENT     [facility] Outpatient Physical Therapy  [address]  [place] [address]  Phone:  [phone]  Fax:  [phone]    Date: 07/18/2022    Patient: LIAN More III  YOB: 1940  MRN: [id number]     Time Calculation    Start time: 0815  Stop time: 0925 Time Calculation (min): 70 minutes         Chief Complaint: Back Problem, Loss Of Balance, and Difficulty Walking    Visit #: 19    SUBJECTIVE: Patient reports he's had L hamstring soreness for a couple days    OBJECTIVE:  Current objective measures: NA           Therapeutic Exercises (CPT 77318):     1. NuStep , 15 min, L8, prior to session, no charge     8. Shuttle DL squat, 5x 4c, 6x 5c, 3 x 5 reps 6c, increased depth of squats     9. Fwd step up , 10x 6in, 10x 6in + foam, 2 x 10 8in, w/ biltat UEs    10. Step down , 10x 6 in      Therapeutic Exercise Summary: HEP - bridges w/ hip adduction, clams, wall squats, gastroc stretching, hooklying fig 4 stretch + hip flex + IR stretch, fwd/retro gait, sit to stands, L SL squats at counter, seated heel raise w/ resistance      Therapeutic Treatments and Modalities:     1. Neuromuscular Re-education (CPT 46026)    2. Manual Therapy (CPT 99101), STW to decrease tone in L hip rotators using lacrosse ball    Therapeutic Treatment and Modalities Summary: NMR  (w/ gait belt)  Cone weaving, walking on mat w/ uneven surfaces, cone taps (wout AD)  Trunk rotation standing on mat/ foam pad     Post session ice bilat knees and L hip 15 min (supine, LEs on wedge)    Time-based treatments/modalities:    Physical Therapy Timed Treatment Charges  Manual therapy minutes (CPT 69217): 10 minutes  Neuromusc re-ed, balance, coor, post minutes (CPT 19943): 20 minutes  Therapeutic exercise minutes (CPT 33693): 15 minutes      ASSESSMENT:   Response to treatment: Decreased stability with SL stance and dynamic balance on unstable surfaces. He has  tendency to adopt crouched posture when his balance is perturbed. Did well with direction changes on level ground.     PLAN/RECOMMENDATIONS:   Plan for treatment: therapy treatment to continue next visit.  Planned interventions for next visit: continue with current treatment.

## 2022-07-20 ENCOUNTER — APPOINTMENT (OUTPATIENT)
Dept: PHYSICAL THERAPY | Facility: MEDICAL CENTER | Age: 82
End: 2022-07-20
Attending: INTERNAL MEDICINE
Payer: MEDICARE

## 2022-07-22 ENCOUNTER — PHYSICAL THERAPY (OUTPATIENT)
Dept: PHYSICAL THERAPY | Facility: MEDICAL CENTER | Age: 82
End: 2022-07-22
Attending: INTERNAL MEDICINE
Payer: MEDICARE

## 2022-07-22 DIAGNOSIS — R26.89 BALANCE PROBLEM: ICD-10-CM

## 2022-07-22 DIAGNOSIS — R26.89 OTHER ABNORMALITIES OF GAIT AND MOBILITY: ICD-10-CM

## 2022-07-22 DIAGNOSIS — M25.552 LEFT HIP PAIN: ICD-10-CM

## 2022-07-22 PROCEDURE — 97530 THERAPEUTIC ACTIVITIES: CPT

## 2022-07-22 PROCEDURE — 97110 THERAPEUTIC EXERCISES: CPT

## 2022-07-22 PROCEDURE — 97112 NEUROMUSCULAR REEDUCATION: CPT

## 2022-07-22 NOTE — OP THERAPY DAILY TREATMENT
Outpatient Physical Therapy  DAILY TREATMENT     [facility] Outpatient Physical Therapy  [address]  [place] [address]  Phone:  [phone]  Fax:  [phone]    Date: 07/22/2022    Patient: LIAN More III  YOB: 1940  MRN: [id number]     Time Calculation    Start time: 0910  Stop time: 1010 Time Calculation (min): 60 minutes         Chief Complaint: Hip Problem, Difficulty Walking, and Loss Of Balance    Visit #: 20    SUBJECTIVE:  Nothing new to report  OBJECTIVE:  Current objective measures: NA          Therapeutic Exercises (CPT 43135):     1. NuStep , 10 min, L9-L7, decreased due to increased SOB    8. Shuttle SL squats, 4 x 10 4c, increased depth of squats     9. Fwd step up , 2 min (8 in step), w/ biltat UEs    10. Lateral stepping w/ bungee, 10x B, w/ SPC for stability      Therapeutic Exercise Summary: HEP - bridges w/ hip adduction, clams, wall squats, gastroc stretching, hooklying fig 4 stretch + hip flex + IR stretch, fwd/retro gait, sit to stands, L SL squats at counter, seated heel raise w/ resistance      Therapeutic Treatments and Modalities:     1. Neuromuscular Re-education (CPT 52452)    Therapeutic Treatment and Modalities Summary: NMR  (w/ gait belt)  Cone weaving, walking on mat w/ uneven surfaces, cone taps (wout AD)  Golf practice standing on compliant surface    Post session ice bilat knees and L hip 15 min (supine, LEs on wedge)    Time-based treatments/modalities:    Physical Therapy Timed Treatment Charges  Neuromusc re-ed, balance, coor, post minutes (CPT 91712): 15 minutes  Therapeutic exercise minutes (CPT 55703): 30 minutes      ASSESSMENT:   Response to treatment: Decreased resistance with cardio warm up due to increased evidence of distress (SOB and facial redness). Increased stability with balance tasks, using stepping strategy when balance is perturbed. Decreased stability with more challenging reactive balance tasks, used  SPC to stabilize. Patient's abdomen possibly more distended this visit. He reports he hasn't had unusual weight gain recently. Will monitor due to cardiac history    PLAN/RECOMMENDATIONS:   Plan for treatment: therapy treatment to continue next visit.  Planned interventions for next visit: continue with current treatment.

## 2022-07-25 ENCOUNTER — PHYSICAL THERAPY (OUTPATIENT)
Dept: PHYSICAL THERAPY | Facility: MEDICAL CENTER | Age: 82
End: 2022-07-25
Attending: INTERNAL MEDICINE
Payer: MEDICARE

## 2022-07-25 DIAGNOSIS — M25.552 LEFT HIP PAIN: ICD-10-CM

## 2022-07-25 DIAGNOSIS — R26.89 OTHER ABNORMALITIES OF GAIT AND MOBILITY: ICD-10-CM

## 2022-07-25 DIAGNOSIS — R26.89 BALANCE PROBLEM: ICD-10-CM

## 2022-07-25 PROCEDURE — 97110 THERAPEUTIC EXERCISES: CPT

## 2022-07-25 PROCEDURE — 97112 NEUROMUSCULAR REEDUCATION: CPT

## 2022-07-25 NOTE — OP THERAPY DAILY TREATMENT
Outpatient Physical Therapy  DAILY TREATMENT     [facility] Outpatient Physical Therapy  [address]  [place] [address]  Phone:  [phone]  Fax:  [phone]    Date: 07/25/2022    Patient: LIAN Moer III  YOB: 1940  MRN: [id number]     Time Calculation    Start time: 0815  Stop time: 0930 Time Calculation (min): 75 minutes         Chief Complaint: Hip Problem, Difficulty Walking, and Loss Of Balance    Visit #: 21    SUBJECTIVE: Patient reports he tried walking out in grass at home.     OBJECTIVE:  Current objective measures: NA          Therapeutic Exercises (CPT 04691):     1. NuStep , 15 min, L9-L7, no charge, performed before session     7. Step up , 15x bilat (3# ankle weight), using stairs, skipping a step (w/ bilat UEs)    8. Shuttle SL squats, 4 x 10 4c, increased depth of squats     9. Fwd step up , 3 x 10 (6 in), w/ bungee     10. Lateral stepping w/ bungee, 10x B, w/ SPC for stability      Therapeutic Exercise Summary: HEP - bridges w/ hip adduction, clams, wall squats, gastroc stretching, hooklying fig 4 stretch + hip flex + IR stretch, fwd/retro gait, sit to stands, L SL squats at counter, seated heel raise w/ resistance      Pt Education - encouragement to increase cardio outside therapy, practice golf swing at home on level ground, increase walking in anticipation of transition to home program once current POC ends.     Therapeutic Treatments and Modalities:     1. Neuromuscular Re-education (CPT 01359)    Therapeutic Treatment and Modalities Summary: NMR  (w/ gait belt)  Cone weaving, walking on mat w/ uneven surfaces, cone taps (w/ PVC stick)  Golf practice standing on compliant surface    Post session ice bilat knees and L hip 15 min (supine, LEs on wedge)    Time-based treatments/modalities:    Physical Therapy Timed Treatment Charges  Neuromusc re-ed, balance, coor, post minutes (CPT 21732): 20 minutes  Therapeutic exercise minutes (CPT  16762): 25 minutes      ASSESSMENT:   Response to treatment: Increased stability with gait on level ground. Increased difficulty with step ups w/ patient stable using bilat external support. Discussed importance of carry over outside physical therapy to prepare patient for transition to independent home program.     PLAN/RECOMMENDATIONS:   Plan for treatment: therapy treatment to continue next visit.  Planned interventions for next visit: continue with current treatment.

## 2022-07-27 ENCOUNTER — PHYSICAL THERAPY (OUTPATIENT)
Dept: PHYSICAL THERAPY | Facility: MEDICAL CENTER | Age: 82
End: 2022-07-27
Attending: INTERNAL MEDICINE
Payer: MEDICARE

## 2022-07-27 DIAGNOSIS — R26.89 OTHER ABNORMALITIES OF GAIT AND MOBILITY: ICD-10-CM

## 2022-07-27 DIAGNOSIS — R26.89 BALANCE PROBLEM: ICD-10-CM

## 2022-07-27 DIAGNOSIS — M25.552 LEFT HIP PAIN: ICD-10-CM

## 2022-07-27 PROCEDURE — 97110 THERAPEUTIC EXERCISES: CPT

## 2022-07-27 PROCEDURE — 97112 NEUROMUSCULAR REEDUCATION: CPT

## 2022-07-27 NOTE — OP THERAPY DAILY TREATMENT
Outpatient Physical Therapy  DAILY TREATMENT     [facility] Outpatient Physical Therapy  [address]  [place] [address]  Phone:  [phone]  Fax:  [phone]    Date: 07/27/2022    Patient: LIAN More III  YOB: 1940  MRN: [id number]     Time Calculation    Start time: 0900  Stop time: 1015 Time Calculation (min): 75 minutes         Chief Complaint: Hip Problem and Difficulty Walking    Visit #: 22    SUBJECTIVE: Patient reports he works on balance exs on days between therapy.     OBJECTIVE:  Current objective measures: NA          Therapeutic Exercises (CPT 67730):     1. NuStep , 15 min, L9-L7, no charge, performed before session     7. Step up , 15x bilat (3# ankle weight), using stairs, skipping a step (w/ bilat UEs)    8. Shuttle SL squats, 4 x 10 4c, increased depth of squats     9. Fwd step up w/ bungee, 3 x 10 (6 in)    10. Lateral lunge w/ bunge, 10x B 15# KB    13. Seated hip flexion , 3 x 15 15#KB    14. Seated ankle DF, 3 x 15 15#      Therapeutic Exercise Summary: HEP - bridges w/ hip adduction, clams, wall squats, gastroc stretching, hooklying fig 4 stretch + hip flex + IR stretch, fwd/retro gait, sit to stands, L SL squats at counter, seated heel raise w/ resistance      Pt Education - encouragement to increase cardio outside therapy, practice golf swing at home on level ground, increase walking in anticipation of transition to home program once current POC ends.     Therapeutic Treatments and Modalities:     1. Neuromuscular Re-education (CPT 05514)    Therapeutic Treatment and Modalities Summary: NMR  (w/ gait belt)  Golf practice against lateral resistance w/ bungee   Fwd/back gait w/ bungee to add perturbations     Post session ice bilat knees and L hip 15 min (supine, LEs on wedge)    Time-based treatments/modalities:    Physical Therapy Timed Treatment Charges  Neuromusc re-ed, balance, coor, post minutes (CPT 89131): 15  minutes  Therapeutic exercise minutes (CPT 59047): 30 minutes      ASSESSMENT:   Response to treatment: Increased stability and L hip strength evidenced by decreased external support with balance exs and increased load with hip strengthening. Overall, he is progressing well with PT.     PLAN/RECOMMENDATIONS:   Plan for treatment: therapy treatment to continue next visit.  Planned interventions for next visit: continue with current treatment.

## 2022-07-29 ENCOUNTER — APPOINTMENT (OUTPATIENT)
Dept: PHYSICAL THERAPY | Facility: MEDICAL CENTER | Age: 82
End: 2022-07-29
Attending: INTERNAL MEDICINE
Payer: MEDICARE

## 2022-08-01 ENCOUNTER — PHYSICAL THERAPY (OUTPATIENT)
Dept: PHYSICAL THERAPY | Facility: MEDICAL CENTER | Age: 82
End: 2022-08-01
Attending: INTERNAL MEDICINE
Payer: MEDICARE

## 2022-08-01 DIAGNOSIS — R26.89 OTHER ABNORMALITIES OF GAIT AND MOBILITY: ICD-10-CM

## 2022-08-01 DIAGNOSIS — M25.552 LEFT HIP PAIN: ICD-10-CM

## 2022-08-01 DIAGNOSIS — R26.89 BALANCE PROBLEM: ICD-10-CM

## 2022-08-01 PROCEDURE — 97110 THERAPEUTIC EXERCISES: CPT

## 2022-08-01 PROCEDURE — 97112 NEUROMUSCULAR REEDUCATION: CPT

## 2022-08-01 NOTE — OP THERAPY DAILY TREATMENT
Outpatient Physical Therapy  DAILY TREATMENT     [facility] Outpatient Physical Therapy  [address]  [place] [address]  Phone:  [phone]  Fax:  [phone]    Date: 08/01/2022    Patient: LIAN More III  YOB: 1940  MRN: [id number]     Time Calculation    Start time: 1335  Stop time: 1420 Time Calculation (min): 45 minutes         Chief Complaint: Difficulty Walking, Loss Of Balance, and Hip Problem    Visit #: 23    SUBJECTIVE: Patient reports he does 45-1 hour of exercises between PT sessions.     OBJECTIVE:  Current objective measures: NA          Therapeutic Exercises (CPT 80845):     1. NuStep , 15 min, L9-L7, no charge, performed before session     7. Step up (4 in + foam), 15x bilat w/ bungee , bilat PVC sticks     8. Shuttle SL squats, 4 x 10 4c, increased depth of squats     9. Fwd step up w/ bungee, 3 x 10 (6 in), bilat PVC sticks    10. Lateral lunge w/ bunge, 10x B 15# KB, NT    11. Lateral step w/ anti rotation , 10x 17#    13. Seated hip flexion , 3 x 15 15#KB    14. Seated ankle DF, 3 x 15 15#    15. Fwd step with bilat UE flexion , 15x bilat , 5# bar reaching OH      Therapeutic Exercise Summary: HEP - bridges w/ hip adduction, clams, wall squats, gastroc stretching, hooklying fig 4 stretch + hip flex + IR stretch, fwd/retro gait, sit to stands, L SL squats at counter, seated heel raise w/ resistance      Pt Education - encouragement to increase cardio outside therapy, practice golf swing at home on level ground, increase walking in anticipation of transition to home program once current POC ends.     Therapeutic Treatments and Modalities:     1. Neuromuscular Re-education (CPT 89462)    Therapeutic Treatment and Modalities Summary: NMR  (w/ gait belt)  Golf practice against lateral resistance w/ bungee     Post session ice bilat knees and L hip 15 min (supine, LEs on wedge)    Time-based treatments/modalities:    Physical Therapy Timed  Treatment Charges  Neuromusc re-ed, balance, coor, post minutes (CPT 24941): 15 minutes  Therapeutic exercise minutes (CPT 37735): 30 minutes      ASSESSMENT:   Response to treatment: Patient did well with step fwd, lat stepping and golf swing with external resistance, minimal LOB. More difficult with step ups, using external support to stabilize. Taught him step ups w/ SLB and contralat UE flexion to enhance glut valerio activation and heel raises using objects at home for external resistance (ie: gallon jug of water).     PLAN/RECOMMENDATIONS:   Plan for treatment: therapy treatment to continue next visit.  Planned interventions for next visit: continue with current treatment.

## 2022-08-04 ENCOUNTER — TELEPHONE (OUTPATIENT)
Dept: CARDIOLOGY | Facility: MEDICAL CENTER | Age: 82
End: 2022-08-04
Payer: MEDICARE

## 2022-08-04 NOTE — TELEPHONE ENCOUNTER
Received clearance request from [facility] [place]. Discussed with SC who indicates patient may proceed and hold Eliquis 48 hour before and after procedure. Clearance response faxed to [phone], receipt confirmed. Left detailed message for patient with update. Advised he return call with any additional questions or concerns.

## 2022-08-05 ENCOUNTER — PHYSICAL THERAPY (OUTPATIENT)
Dept: PHYSICAL THERAPY | Facility: MEDICAL CENTER | Age: 82
End: 2022-08-05
Attending: INTERNAL MEDICINE
Payer: MEDICARE

## 2022-08-05 DIAGNOSIS — M25.552 LEFT HIP PAIN: ICD-10-CM

## 2022-08-05 DIAGNOSIS — R26.89 OTHER ABNORMALITIES OF GAIT AND MOBILITY: ICD-10-CM

## 2022-08-05 DIAGNOSIS — R26.89 BALANCE PROBLEM: ICD-10-CM

## 2022-08-05 PROCEDURE — 97110 THERAPEUTIC EXERCISES: CPT

## 2022-08-05 NOTE — OP THERAPY DAILY TREATMENT
Outpatient Physical Therapy  DAILY TREATMENT     [facility] Outpatient Physical Therapy  [address]  [place] [address]  Phone:  [phone]  Fax:  [phone]    Date: 08/05/2022    Patient: LIAN More III  YOB: 1940  MRN: [id number]     Time Calculation    Start time: 0915  Stop time: 1015 Time Calculation (min): 60 minutes         Chief Complaint: Difficulty Walking, Loss Of Balance, and Hip Problem    Visit #: 24    SUBJECTIVE: Patient reports he had significant L hip/ IT band/ and lateral lower leg pain for two days after last visit.     OBJECTIVE:  Current objective measures: NA          Therapeutic Exercises (CPT 22462):     1. NuStep , 15 min, L9-L7, no charge, performed before session     3. Rec bike , 5 min (L3)    4. Upright bike (S9), 10 min - no resistance    5. TM walking, 10 min 0% incline, 1.3-1.5 mph     7. Step up (4 in + foam), 15x bilat w/ bungee , NT    8. Shuttle SL squats, 4 x 10 4c, NT    9. Fwd step up w/ bungee, 3 x 10 (6 in), NT    10. Lateral lunge w/ bunge, 10x B 15# KB, NT    11. Lateral step w/ anti rotation , 10x 17#, NT    13. Seated hip flexion , 3 x 15 15#KB, NT    14. Seated ankle DF, 3 x 15 15#, NT    15. Fwd step with bilat UE flexion , 15x bilat , NT      Therapeutic Exercise Summary: HEP - bridges w/ hip adduction, clams, wall squats, gastroc stretching, hooklying fig 4 stretch + hip flex + IR stretch, fwd/retro gait, sit to stands, L SL squats at counter, seated heel raise w/ resistance      Pt Education - encouragement to increase cardio outside therapy, practice golf swing at home on level ground, increase walking in anticipation of transition to home program once current POC ends.     Therapeutic Treatments and Modalities:     Therapeutic Treatment and Modalities Summary: NMR  (w/ gait belt) _NT  Golf practice against lateral resistance w/ bungee     Post session ice bilat knees and L hip 15 min (supine, LEs on  wedge)    Time-based treatments/modalities:    Physical Therapy Timed Treatment Charges  Therapeutic exercise minutes (CPT 60670): 45 minutes    ASSESSMENT:   Response to treatment: Focused on trial of different cardio equipment in preparation for patient to discharge to gym program. Patient became short of breath at times with cardio, recovered with 5 minute rest break.     PLAN/RECOMMENDATIONS:   Plan for treatment: therapy treatment to continue next visit.  Planned interventions for next visit: continue with current treatment.

## 2022-08-08 NOTE — TELEPHONE ENCOUNTER
Is the patient due for a refill? Yes    Was the patient seen the past year? Yes    Date of last office visit: 5/10/22    Does the patient have an upcoming appointment?  Yes   If yes, When? 1/15/22    Provider to refill:BE    Does the patients insurance require a 100 day supply?  No

## 2022-08-09 ENCOUNTER — PHYSICAL THERAPY (OUTPATIENT)
Dept: PHYSICAL THERAPY | Facility: MEDICAL CENTER | Age: 82
End: 2022-08-09
Attending: INTERNAL MEDICINE
Payer: MEDICARE

## 2022-08-09 DIAGNOSIS — M25.552 LEFT HIP PAIN: ICD-10-CM

## 2022-08-09 DIAGNOSIS — R26.89 BALANCE PROBLEM: ICD-10-CM

## 2022-08-09 DIAGNOSIS — R26.89 OTHER ABNORMALITIES OF GAIT AND MOBILITY: ICD-10-CM

## 2022-08-09 PROCEDURE — 97110 THERAPEUTIC EXERCISES: CPT

## 2022-08-09 PROCEDURE — 97140 MANUAL THERAPY 1/> REGIONS: CPT

## 2022-08-09 RX ORDER — CARVEDILOL 3.12 MG/1
TABLET ORAL
Qty: 180 TABLET | Refills: 2 | Status: SHIPPED | OUTPATIENT
Start: 2022-08-09 | End: 2023-05-17

## 2022-08-09 NOTE — OP THERAPY DAILY TREATMENT
Outpatient Physical Therapy  DAILY TREATMENT     [facility] Outpatient Physical Therapy  [address]  [place] [address]  Phone:  [phone]  Fax:  [phone]    Date: 08/09/2022    Patient: LIAN More III  YOB: 1940  MRN: [id number]     Time Calculation    Start time: 0905  Stop time: 1003 Time Calculation (min): 58 minutes         Chief Complaint: Hip Problem, Weakness, and Difficulty Walking    Visit #: 25    SUBJECTIVE: Intermittent L lateral leg pain from IT band to foot and occasional l knee buckling in response.     OBJECTIVE:  Current objective measures:     TTP L peroneals           Therapeutic Exercises (CPT 86051):     5. TM walking, 15 min 0% incline, 1.2 mph, able to maintain conversation     6. Foot log , 3 min (L)    7. Hip flexor stretch off table, 1 min (L)    8. Ankle eversion stretch , 3 x 30 sec (L)    9. Fwd step up w/ bungee, 3 x 10 (6 in), NT    10. Lateral lunge w/ bunge, 10x B 15# KB, NT    11. Lateral step w/ anti rotation , 10x 17#, NT    12. Hip fig 4 stretch , 1 min (L), referred pain L lateral thigh to knee      Therapeutic Exercise Summary: HEP - bridges w/ hip adduction, clams, wall squats, gastroc stretching, hooklying fig 4 stretch + hip flex + IR stretch, fwd/retro gait, sit to stands, L SL squats at counter, seated heel raise w/ resistance, fwd step up (skipping step), ankle evertor stretch, self mob of foot intrinsics w/ frozen water bottle      Pt Education - encouragement to increase cardio outside therapy, practice golf swing at home on level ground, increase walking in anticipation of transition to home program once current POC ends.     Therapeutic Treatments and Modalities:     1. Manual Therapy (CPT 49867), STW L peroneals, L ankle evertors stretch, STW L IT band     Therapeutic Treatment and Modalities Summary:     Ice bilat knees, L hip 15 min post - NT    Time-based treatments/modalities:    Physical Therapy  Timed Treatment Charges  Manual therapy minutes (CPT 26149): 12 minutes  Therapeutic exercise minutes (CPT 75833): 45 minutes    ASSESSMENT:   Response to treatment: Appropriate cardio response this visit with decreasing intensity (no facial redness or SOB) compared to previous visit. Discussed appearance of increasing lower leg edema and patient states he will monitor weight for fluid overload. Following manual, his foot turned red immediately with dependent positioning. Recommend consult with vascular specialist.     PLAN/RECOMMENDATIONS:   Plan for treatment: therapy treatment to continue next visit.  Planned interventions for next visit: continue with current treatment.

## 2022-08-10 NOTE — PROGRESS NOTES
4 Eyes Skin Assessment Completed by Dianna VARGAS RN and Gabriela RN.    Head WDL  Ears WDL  Nose WDL  Mouth WDL  Neck WDL  Breast/Chest WDL  Shoulder Blades WDL  Spine WDL  (R) Arm/Elbow/Hand Generalied Abrasions, calloused  (L) Arm/Elbow/Hand Generalized Abrasions, calloused  Abdomen WDL  Groin WDL  Scrotum/Coccyx/Buttocks - mepilex in place  (R) Leg Shiny, flaky, discoloration, scars  (L) Leg Shiny, flaky, discoloration, scars + skin tear on L lateral calf (ALEJANDRO, no drainage)  (R) Heel/Foot/Toe WDL  (L) Heel/Foot/Toe WDL          Devices In Places Pulse Ox and Nasal Cannula      Interventions In Place Pillows and Low Air Loss Mattress, Mepilex    Possible Skin Injury No    Pictures Uploaded Into Epic N/A  Wound Consult Placed N/A  RN Wound Prevention Protocol Ordered No      Discharge Instructions:  Colectomy    Dr. Lilly Dooley    Call for appointment for follow up in 2 weeks [phone]    Activity:    Walk regularly. No lifting more than 5 to 10 pounds for 6 weeks. Light aerobic activity is okay when you feel up to it. You may resume driving in five days unless still requiring narcotics for pain. Work:    You may return to work in 2 or 3 weeks to light activity. No lifting more than 5 pounds for four weeks and no more than 10 pounds for an additional 2 weeks. Diet:    Low residue diet for four weeks. Wound Care: You have a special dressing called Dermabond. It is okay to shower and let the water run over the incisions but do not scrub the area or soak in a tub. If you have a small amount of drainage you may place a dry bandage over the wound and change it daily. If you experience a lot of drainage, develop redness around the wound, or a fever over 101 F occurs please call the office. May use ice over incision for comfort. Medications:    Resume home medications as indicated on the Medical Reconciliation form. Aspirin and Coumadin can be restarted immediately if you were taking them preoperatively. If taking Plavix do not restart it until post operative day 2. Pain medications:  Non steroidal antiinflammatories seem to work best for post surgical pain. Try these first as prescribed. A narcotic prescription will also be given for breakthrough pain. Do not hesitate to call with questions or concerns.

## 2022-08-12 ENCOUNTER — TELEPHONE (OUTPATIENT)
Dept: HEALTH INFORMATION MANAGEMENT | Facility: OTHER | Age: 82
End: 2022-08-12

## 2022-08-12 ENCOUNTER — PHYSICAL THERAPY (OUTPATIENT)
Dept: PHYSICAL THERAPY | Facility: MEDICAL CENTER | Age: 82
End: 2022-08-12
Attending: INTERNAL MEDICINE
Payer: MEDICARE

## 2022-08-12 DIAGNOSIS — R26.89 OTHER ABNORMALITIES OF GAIT AND MOBILITY: ICD-10-CM

## 2022-08-12 DIAGNOSIS — M25.552 LEFT HIP PAIN: ICD-10-CM

## 2022-08-12 DIAGNOSIS — R26.89 BALANCE PROBLEM: ICD-10-CM

## 2022-08-12 PROCEDURE — 97110 THERAPEUTIC EXERCISES: CPT

## 2022-08-12 PROCEDURE — 97140 MANUAL THERAPY 1/> REGIONS: CPT

## 2022-08-12 NOTE — OP THERAPY PROGRESS SUMMARY
Outpatient Physical Therapy  PROGRESS SUMMARY NOTE      [facility] Outpatient Physical Therapy  [address]  [place] [address]  Phone:  [phone]  Fax:  [phone]    Date of Visit: 08/12/2022    Patient: LIAN More III  YOB: 1940  MRN: [id number]     Referring Provider: Augustus Vaca M.D.  [address] [place] [address]  [place] [address]   Referring Diagnosis Displaced intertrochanteric fracture of left femur, subsequent encounter for closed fracture with routine healing [S72.142D];Multiple fractures of ribs, left side, subsequent encounter for fracture with routine healing [S22.42XD];Repeated falls [R29.6];Type 2 diabetes mellitus with other specified complication [E11.69];Type 2 diabetes mellitus with hyperglycemia [E11.65]     Visit Diagnoses     ICD-10-CM   1. Left hip pain  M25.552   2. Balance problem  R26.89   3. Other abnormalities of gait and mobility  R26.89       Rehab Potential: good    Progress Report Period: 07/13/2022-08/12/2022    Functional Assessment Used          Objective Findings and Assessment:   Patient progression towards goals: Patient demonstrates increased functional strength (see Objective) and he reports walking more frequently without SPC. He would benefit from continued skilled therapy for two additional visits to formulate comprehensive home program for ongoing recovery of function.     Objective findings and assessment details: 5 Timed Sit to Stands = 26.6 sec (19 in seat height) (3 sec decrease and 5 in seat height decrease)     Goals:   Short Term Goals:     1. Pt will be independent with written HEP. (Goal Met)  2. Pt will be able to safely walk household distances without external support 75% of time (Goal Met)  3. Patient will demonstrate 4 point (MDC) increase in Tinetti score (not assessed this visit)        Short term goal time span:  2-4 weeks      Long Term Goals:    1. Pt will be able to golf 3x/wk with < 2/10 L hip pain.  (Not Met)  2. Patient will be able to walk community distances safely without assistive device. (Not Met)  2. Pt will be able to perform 5 Timed Sit to Stand test from 19 in seat in < 13 seconds (Partially Met)  Long term goal time span:  4-6 weeks    Plan:   Planned therapy interventions:  Gait Training (CPT 54559), Manual Therapy (CPT 90857), Neuromuscular Re-education (CPT 23957), Therapeutic Activities (CPT 78090) and Therapeutic Exercise (CPT 34507)  Frequency:  2x week  Duration in weeks:  2  Duration in visits:  2    Referring provider co-signature:  I have reviewed this plan of care and my co-signature certifies the need for services.     Certification Period: 08/12/2022 to 09/16/22    Physician Signature: ________________________________ Date: ______________

## 2022-08-12 NOTE — OP THERAPY DAILY TREATMENT
Outpatient Physical Therapy  DAILY TREATMENT     [facility] Outpatient Physical Therapy  [address]  [place] [address]  Phone:  [phone]  Fax:  [phone]    Date: 08/12/2022    Patient: LIAN More III  YOB: 1940  MRN: [id number]     Time Calculation    Start time: 1045  Stop time: 1130 Time Calculation (min): 45 minutes         Chief Complaint: Hip Problem, Difficulty Walking, Loss Of Balance, and Weakness    Visit #: 26    SUBJECTIVE:  Patient walked with caregiver around mall for 30-45 minutes this week. He stopped several times to rest.     OBJECTIVE:  Current objective measures:       5 Timed Sit to Stands = 26.6 sec (19 in seat)    Therapeutic Exercises (CPT 03773):     5. TM walking, 15 min 0% incline, 1.2 mph, NT    6. foot log , 3 min (L), NT    7. hip flexor stretch off table, 1 min (L)    8. ankle eversion stretch , 3 x 30 sec (L), NT    9. fwd step up w/ bungee, 3 x 10 (6 in), NT    10. lateral lunge w/ bunge, 10x B 15# KB, NT    11. lateral step w/ anti rotation , 10x 17#, NT    12. hip fig 4 stretch , 1 min (L)    13. squats w/ TKE, 3 x 10 (black (heavy) band)    14. standing hip adduction, 2 x 10 (medium band, green)    15. L hip adductor stretch, 1 min 2 towels under thigh)    16. bridge w/ hip adduction, 5x, R hamstring cramping    18. side lying hip adduction, 2 x 10 (L), dec range d/t weakness      Therapeutic Exercise Summary: HEP - bridges w/ hip adduction, clams, wall squats, gastroc stretching, hooklying fig 4 stretch + hip flex + IR stretch, fwd/retro gait, sit to stands, L SL squats at counter, seated heel raise w/ resistance, fwd step up (skipping step), ankle evertor stretch, self mob of foot intrinsics w/ frozen water bottle, standing hip adduction       Pt Education - encouragement to increase cardio outside therapy, practice golf swing at home on level ground, increase walking in anticipation of transition to home program  once current POC ends.     Therapeutic Treatments and Modalities:     1. Manual Therapy (CPT 70901), L psoas release    Therapeutic Treatment and Modalities Summary:     Ice bilat knees, L hip 15 min post   Time-based treatments/modalities:    Physical Therapy Timed Treatment Charges  Manual therapy minutes (CPT 33192): 10 minutes  Therapeutic exercise minutes (CPT 20397): 35 minutes      ASSESSMENT:   Response to treatment: Hip adductor tightness and weakness addressed this visit. Taught patient strengthening exercise to do at home.     PLAN/RECOMMENDATIONS:   Plan for treatment: therapy treatment to continue next visit.  Planned interventions for next visit: continue with current treatment.

## 2022-08-15 ENCOUNTER — PHYSICAL THERAPY (OUTPATIENT)
Dept: PHYSICAL THERAPY | Facility: MEDICAL CENTER | Age: 82
End: 2022-08-15
Attending: INTERNAL MEDICINE
Payer: MEDICARE

## 2022-08-15 ENCOUNTER — PATIENT MESSAGE (OUTPATIENT)
Dept: MEDICAL GROUP | Facility: MEDICAL CENTER | Age: 82
End: 2022-08-15
Payer: MEDICARE

## 2022-08-15 DIAGNOSIS — R26.89 OTHER ABNORMALITIES OF GAIT AND MOBILITY: ICD-10-CM

## 2022-08-15 DIAGNOSIS — M25.552 LEFT HIP PAIN: ICD-10-CM

## 2022-08-15 DIAGNOSIS — R26.89 BALANCE PROBLEM: ICD-10-CM

## 2022-08-15 PROCEDURE — 97110 THERAPEUTIC EXERCISES: CPT

## 2022-08-15 NOTE — OP THERAPY DAILY TREATMENT
"  Outpatient Physical Therapy  DAILY TREATMENT     [facility]  [address]  [place] [address]  Phone:  [phone]  Fax:  [phone]    Date: 08/15/2022    Patient: LIAN More III  YOB: 1940  MRN: [id number]     Time Calculation      0849 4928  45 minutes         Chief Complaint: Hip Problem and Difficulty Walking    Visit #: 27    SUBJECTIVE: Patient reports ongoing L posterolateral knee pain leading to knee buckling when he steps \"the wrong way\"    OBJECTIVE:  Current objective measures: NA          Therapeutic Exercises (CPT 09649):     1. NuStep, 17 min, L9-10, prior to session, uncharged    4. standing IT band stretch, 1 min    5. step up 6 in + bungee, 10x, inc L knee pain (posterolat)    6. step up 4in, 2 x 10, w/out UEs, increased postural control    13. squats w/ TKE, 3 x 10 (black (heavy) band), NT    14. standing hip adduction, 2 x 10 (medium band, green)    16. bridge w/ hip adduction, 5x, NT    18. side lying hip adduction, 2 x 10 (L), NT      Therapeutic Exercise Summary: HEP - bridges w/ hip adduction, clams, wall squats, gastroc stretching, hooklying fig 4 stretch + hip flex + IR stretch, fwd/retro gait, sit to stands, L SL squats at counter, seated heel raise w/ resistance, fwd step up (skipping step), ankle evertor stretch, self mob of foot intrinsics w/ frozen water bottle, standing hip adduction       Gait in clinic with turning = patient demonstrates good postural control and no change in gait speed with turns (no AD)  Golf swing on mat - 10x // single instance of L knee pain/ buckling.     Therapeutic Treatments and Modalities:     Therapeutic Treatment and Modalities Summary:     Time-based treatments/modalities:         Therapeutic Exercise (90524) = 45 minutes    ASSESSMENT:   Response to treatment: L knee pain with difficulty weight bearing following balance on mat. Suspect patient is rotating at knee due " to reproduction of symptoms with IT band stretch w/ him rotating at knee. Corrected this and encouraged him to try using knee brace as temporary support to enhance stability as he is gaining L LE strength. Also performed back stretches to enhance back flexibility.     PLAN/RECOMMENDATIONS:   Plan for treatment: therapy treatment to continue next visit.  Planned interventions for next visit: continue with current treatment.

## 2022-08-16 ENCOUNTER — TELEPHONE (OUTPATIENT)
Dept: CARDIOLOGY | Facility: MEDICAL CENTER | Age: 82
End: 2022-08-16
Payer: MEDICARE

## 2022-08-16 NOTE — PATIENT COMMUNICATION
Spoke to pt informed he would need to be seen for an appointment as this medication was managed by wellness doctor Dr. Miner. Pt stated he was not being supplied with medication by dr miner's office and had been out for a week. Told pt that he would be able to have a virtual appointment to have Dr nguyen take over the medication if he would like. Pt refused stating he did not wish to pay for an appointment and would seek other ways to get medication. Pt expressed dissatisfaction   asked pt if there was anything I could do to assist   Pt stated there was nothing that was done and he ended the call.

## 2022-08-16 NOTE — TELEPHONE ENCOUNTER
Spoke to patient over phone. Advised to reach out to PCP or go to Urgent care for refills. Patient states he does not want to make appointment with PCP to have medication refilled. He states Urgent Care refused to refill medications yesterday, but he did not see provider. Recommendations again reviewed with patient.

## 2022-08-16 NOTE — TELEPHONE ENCOUNTER
"BE    Caller: USMAN    Topic/issue: Pt called and was trying to get a refill on his script for \"Glimepride\" and advised him that this is a script that we've never prescribed to him before. Advised him to reach out to his PCP for which he stated he did in his [employer] messages.    Callback Number: [phone]    "

## 2022-08-17 ENCOUNTER — PHYSICAL THERAPY (OUTPATIENT)
Dept: PHYSICAL THERAPY | Facility: MEDICAL CENTER | Age: 82
End: 2022-08-17
Attending: INTERNAL MEDICINE
Payer: MEDICARE

## 2022-08-17 DIAGNOSIS — M25.552 LEFT HIP PAIN: ICD-10-CM

## 2022-08-17 DIAGNOSIS — R26.89 OTHER ABNORMALITIES OF GAIT AND MOBILITY: ICD-10-CM

## 2022-08-17 DIAGNOSIS — R26.89 BALANCE PROBLEM: ICD-10-CM

## 2022-08-17 PROCEDURE — 97110 THERAPEUTIC EXERCISES: CPT

## 2022-08-17 PROCEDURE — 97140 MANUAL THERAPY 1/> REGIONS: CPT

## 2022-08-17 NOTE — OP THERAPY DAILY TREATMENT
Outpatient Physical Therapy  DAILY TREATMENT     [facility]  [address]  [place] [address]  Phone:  [phone]  Fax:  [phone]    Date: 08/17/2022    Patient: LIAN More III  YOB: 1940  MRN: [id number]     Time Calculation    Start time: 0900  Stop time: 1015 Time Calculation (min): 75 minutes         Chief Complaint: Loss Of Balance, Hip Problem, and Difficulty Walking    Visit #: 28    SUBJECTIVE: Patient reports intermittent L lateral lower leg pain persists with sit to stands and walking.     OBJECTIVE:  Current objective measures: NA          Therapeutic Exercises (CPT 24680):     1. NuStep, 17 min, L9-10, prior to session, uncharged    4. standing IT band stretch, 1 min    5. seated L peroneal stretch, 1 min    6. standing hip 4 way, 15x ea (medium resistance), weight bearing R LE, with unilat UE support    8. 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     Therapeutic Treatments and Modalities:     1. Manual Therapy (CPT 10923), STW L peroneals and IT band , L prox tib/fib AP glides 3 x 10    Therapeutic Treatment and Modalities Summary:   Post session ice bilat knees, L hip 15 min  Time-based treatments/modalities:      ASSESSMENT:   Response to treatment: Less lateral lower leg pain with weight bearing following manual and peroneal stretching. Reviewed IT band stretch and standing hip strengthening. Patient verbalized understanding of  exercises and HEP.     PLAN/RECOMMENDATIONS:   Plan for treatment: therapy treatment to continue next visit.  Planned interventions for next visit: continue with current treatment.

## 2022-08-17 NOTE — OP THERAPY DISCHARGE SUMMARY
Outpatient Physical Therapy  DISCHARGE SUMMARY NOTE      [facility]  [address]  [place] [address]  Phone:  [phone]  Fax:  [phone]    Date of Visit: 2022    Patient: LIAN More III  YOB: 1940  MRN: [id number]     Referring Provider: Augustus Vaca M.D.  [address] [place] [address]  [place] [address]   Referring Diagnosis Displaced intertrochanteric fracture of left femur, subsequent encounter for closed fracture with routine healing [S72.142D];Multiple fractures of ribs, left side, subsequent encounter for fracture with routine healing [S22.42XD];Repeated falls [R29.6];Type 2 diabetes mellitus with other specified complication [E11.69];Type 2 diabetes mellitus with hyperglycemia [E11.65]         Functional Assessment Used        Your patient is being discharged from Physical Therapy with the following comments:   Progress plateau    Comments:  Patient has made good progress with L hip ROM, strength, balance and gait compared to initiation of therapy. He also demonstrates excellent motivation during therapy session. He has reached a plateau in progress at this time. Recommend transition to home/ gym program for 1-2 months and discussed potential to return to therapy at later time if balance/ function not satisfactory.     Limitations Remainin) Uses SPC with community mobility (no AD at baseline)  2) Impaired balance for golfing  3) L hip, quad weakness  4) intermittent L lower lateral leg pain     Recommendations:  Discharge to [facility] due to plateau in progress with therapy.     Sherice Horton, PT    Date: 2022

## 2022-08-18 ENCOUNTER — HOSPITAL ENCOUNTER (OUTPATIENT)
Facility: MEDICAL CENTER | Age: 82
End: 2022-08-18
Attending: UROLOGY
Payer: MEDICARE

## 2022-08-18 PROCEDURE — 87086 URINE CULTURE/COLONY COUNT: CPT

## 2022-08-20 LAB
BACTERIA UR CULT: NORMAL
SIGNIFICANT IND 70042: NORMAL
SITE SITE: NORMAL
SOURCE SOURCE: NORMAL

## 2022-08-22 ENCOUNTER — APPOINTMENT (OUTPATIENT)
Dept: PHYSICAL THERAPY | Facility: MEDICAL CENTER | Age: 82
End: 2022-08-22
Attending: INTERNAL MEDICINE
Payer: MEDICARE

## 2022-08-24 ENCOUNTER — APPOINTMENT (OUTPATIENT)
Dept: PHYSICAL THERAPY | Facility: MEDICAL CENTER | Age: 82
End: 2022-08-24
Attending: INTERNAL MEDICINE
Payer: MEDICARE

## 2022-08-30 ENCOUNTER — APPOINTMENT (OUTPATIENT)
Dept: PHYSICAL THERAPY | Facility: MEDICAL CENTER | Age: 82
End: 2022-08-30
Attending: INTERNAL MEDICINE
Payer: MEDICARE

## 2022-08-31 ENCOUNTER — NON-PROVIDER VISIT (OUTPATIENT)
Dept: CARDIOLOGY | Facility: MEDICAL CENTER | Age: 82
End: 2022-08-31
Payer: MEDICARE

## 2022-08-31 PROCEDURE — 93295 DEV INTERROG REMOTE 1/2/MLT: CPT | Performed by: INTERNAL MEDICINE

## 2022-08-31 NOTE — CARDIAC REMOTE MONITOR - SCAN
Device transmission reviewed. Device demonstrated appropriate function.       Electronically Signed by: Denilson Steiner M.D.    9/2/2022  3:37 PM     Never

## 2022-09-01 ENCOUNTER — APPOINTMENT (OUTPATIENT)
Dept: PHYSICAL THERAPY | Facility: MEDICAL CENTER | Age: 82
End: 2022-09-01
Payer: MEDICARE

## 2022-09-27 ENCOUNTER — HOSPITAL ENCOUNTER (OUTPATIENT)
Facility: MEDICAL CENTER | Age: 82
End: 2022-09-27
Attending: STUDENT IN AN ORGANIZED HEALTH CARE EDUCATION/TRAINING PROGRAM
Payer: MEDICARE

## 2022-09-27 PROCEDURE — 87086 URINE CULTURE/COLONY COUNT: CPT

## 2022-10-01 LAB
BACTERIA UR CULT: NORMAL
SIGNIFICANT IND 70042: NORMAL
SITE SITE: NORMAL
SOURCE SOURCE: NORMAL

## 2022-11-08 ENCOUNTER — PATIENT MESSAGE (OUTPATIENT)
Dept: HEALTH INFORMATION MANAGEMENT | Facility: OTHER | Age: 82
End: 2022-11-08

## 2022-11-10 NOTE — OP THERAPY DAILY TREATMENT
"  Outpatient Physical Therapy  DAILY TREATMENT     [facility] Outpatient Physical Therapy  [address]  [place] [address]  Phone:  [phone]  Fax:  [phone]    Date: 09/29/2020    Patient: USMAN MEANS III Means III  YOB: 1940  MRN: [id number]     Time Calculation    Start time: 0931  Stop time: 1002 Time Calculation (min): 31 minutes         Chief Complaint: Loss Of Balance    Visit #: 24    SUBJECTIVE:  No new complaints. States compliance with HEP    OBJECTIVE:  Current objective measures:         Therapeutic Exercises (CPT 85983):     1. Squats, x15    2. Repeated STS, 4x10 reps from 18\", no UE support    15. UPOC 9/21/2020    Therapeutic Treatments and Modalities:     1. Neuromuscular Re-education (CPT 14169), see below    Therapeutic Treatment and Modalities Summary: Stair taps 3x30, alternating LE, 8\" step  Forward/lateral  step on perla disc, x15 bilaterally, UE support as needed for control/stability  Walking on foam, 6'x10  DLS on solid with EO and head turns L/R 2x45 seconds  DLS on solid with EO, head nods x45 seconds  DLS on solid with perturbations, [id number]   DLS on solid with EC x40 seconds        Time-based treatments/modalities:    Physical Therapy Timed Treatment Charges  Neuromusc re-ed, balance, coor, post minutes (CPT 21144): 25 minutes  Therapeutic exercise minutes (CPT 49400): 5 minutes      Pain rating (1-10) before treatment:  0  Pain rating (1-10) after treatment:  0    ASSESSMENT:   Response to treatment: Patient showing continued improvement particularly in activities with reduced vision, allowing for improved stability when walking to bathroom at night. Continue to progress as tolerated     PLAN/RECOMMENDATIONS:   Plan for treatment: therapy treatment to continue next visit.  Planned interventions for next visit: continue with current treatment.       "
MD Garibay:  patient seen and evaluated personally.   I agree with the History & Physical,  Impression & Plan other than what was detailed in my note.  MD Garibay  55 y/o m hx of diverticulosis w/ bleeding, presenting w/ brb per rectum started four hours ago, some general weakness, also has some llq pain that started nearly at the same time, no sob, n/v, near syncope, afebrile vitals stable  non toxic well appearing, NC/AT,  conjunctiva non conjected, sclera anicteric, moist mucous membranes, neck supple, heart sounds, normal, no mrg, lungs cta b/l no wrr, abd soft non distended w/ no tenderness, rectal exam w/ brb, several episodes of brb in toilet since being in ed, no visual deformities of extremities, axox3, normal mood and affect, not on blood thinners. plan for cbc, cmp, type screen re evaluate, CTA

## 2022-11-15 ENCOUNTER — OFFICE VISIT (OUTPATIENT)
Dept: CARDIOLOGY | Facility: MEDICAL CENTER | Age: 82
End: 2022-11-15
Payer: MEDICARE

## 2022-11-15 VITALS
HEART RATE: 80 BPM | OXYGEN SATURATION: 93 % | WEIGHT: 247 LBS | RESPIRATION RATE: 14 BRPM | SYSTOLIC BLOOD PRESSURE: 100 MMHG | BODY MASS INDEX: 32.74 KG/M2 | DIASTOLIC BLOOD PRESSURE: 62 MMHG | HEIGHT: 73 IN

## 2022-11-15 DIAGNOSIS — I48.0 PAROXYSMAL ATRIAL FIBRILLATION (HCC): ICD-10-CM

## 2022-11-15 DIAGNOSIS — Z95.810 ICD (IMPLANTABLE CARDIOVERTER-DEFIBRILLATOR) IN PLACE: ICD-10-CM

## 2022-11-15 DIAGNOSIS — I25.84 CORONARY ARTERY DISEASE DUE TO CALCIFIED CORONARY LESION: Chronic | ICD-10-CM

## 2022-11-15 DIAGNOSIS — Z95.2 S/P TAVR (TRANSCATHETER AORTIC VALVE REPLACEMENT): ICD-10-CM

## 2022-11-15 DIAGNOSIS — I50.22 CHF (CONGESTIVE HEART FAILURE), NYHA CLASS II, CHRONIC, SYSTOLIC (HCC): Chronic | ICD-10-CM

## 2022-11-15 DIAGNOSIS — I25.10 CORONARY ARTERY DISEASE DUE TO CALCIFIED CORONARY LESION: Chronic | ICD-10-CM

## 2022-11-15 DIAGNOSIS — N18.32 STAGE 3B CHRONIC KIDNEY DISEASE: ICD-10-CM

## 2022-11-15 PROCEDURE — 99214 OFFICE O/P EST MOD 30 MIN: CPT | Performed by: INTERNAL MEDICINE

## 2022-11-15 ASSESSMENT — FIBROSIS 4 INDEX: FIB4 SCORE: 2.6

## 2022-11-15 NOTE — PROGRESS NOTES
"CARDIOLOGY OUTPATIENT FOLLOWUP    PCP: Faye Moeller M.D.    1. CHF (congestive heart failure), NYHA class II, chronic, systolic (HCC)    2. S/P TAVR (transcatheter aortic valve replacement)    3. Paroxysmal atrial fibrillation (HCC)    4. Coronary artery disease due to calcified coronary lesion    5. ICD (implantable cardioverter-defibrillator) in place    6. Stage 3b chronic kidney disease (HCC)        LIAN More III is stable from a cardiovascular standpoint well-controlled risk factors.  I will make no changes to the medication regimen but did offer to change to Xarelto 10 to 15 mg daily if he finds this to be more affordable through his insurance carrier.    Follow up: 6 months    Chief Complaint   Patient presents with    Follow-Up     Dyslipidemia       History: LIAN More III is a 82 y.o. male with history of systolic heart failure with recovered ejection fraction, left main and RCA PCI, TAVR, paroxysmal atrial fibrillation and dual-chamber pacer presenting for follow-up.  He also has diabetes and 3B CKD.    He has been free of cardiovascular symptoms.  Continues to struggle with pain in the lower extremity with a foot injury in the latter phases of rehab for the fractured hip.  He is troubled by the recent increase in cost of Eliquis from after his insurance changed    ROS:   10 point review systems is otherwise negative except as per the HPI    PE:  /62 (BP Location: Left arm, Patient Position: Sitting, BP Cuff Size: Adult)   Pulse 80   Resp 14   Ht 1.854 m (6' 1\")   Wt 112 kg (247 lb)   SpO2 93%   BMI 32.59 kg/m²   Gen: no acute distress  HEENT: Symmetric face. Anicteric sclerae. Moist mucus membranes  NECK: No JVD. No lymphadenopathy  CARDIAC: Regular, Normal S1, S2, No murmur  VASCULATURE: carotids are normal bilaterally without bruit  RESP: Clear to auscultation bilaterally  ABD: Soft, non-tender, non-distended  EXT: 1+ lower extremity edema Stasis dermatitis bilaterally, no " "clubbing or cyanosis  SKIN: Warm and dry  NEURO: No gross deficits  PSYCH: Appropriate affect, participates in conversation    The ASCVD Risk score (Martin DK, et al., 2019) failed to calculate.    Past Medical History:   Diagnosis Date    Arthritis     \"everywhere\"    Bowel habit changes     constipation    Breath shortness     pt states short of breath 24/7 O2 at night only    CAD (coronary artery disease)     CATARACT     removed bilat    Chest pain     Chest tightness     CHF (congestive heart failure), NYHA class II, chronic, systolic (HCC) 12/26/2019    managed by cardiology Dr. Wilkinson: He appears euvolemic on physical exam.  He is on losartan, carvedilol, not on spironolactone due to low kidney function.  He was admitted to the hospital for CHF exacerbation, when he suddenly gained 15 pounds.  He was diuresed with IV Lasix at that time, had echocardiogram that showed significant improvement of his cardiac function, when compared to previous study    Chickenpox     Congestive heart failure (HCC)     Constipation     Coronary heart disease     Cough     Daytime sleepiness     Diabetes     oral medication    Difficulty breathing     Dilated cardiomyopathy (HCC)     Fatigue     GERD (gastroesophageal reflux disease)     Hearing difficulty     Heart murmur     Heart valve disease     Hiatus hernia syndrome     History of BPH     History of chronic cough     Hyperlipidemia     Hypertension     pt states well controlled on meds    Kidney disease 05/2020    ruptered left kidney     Mumps     Oxygen dependent     @HS, 2 liters    Pacemaker     AICD    Pain 02/01/2022    \"everywhere\", 4/10    Palpitations     Peripheral neuropathy     Persistent atrial fibrillation (HCC)     Pneumonia 2007    Rhinitis     Ringing in ears     Severe aortic stenosis 12/4/2019    Sleep apnea     CPAp with O2    Swelling of lower extremity     Tonsillitis     Tremor     Ulcer 2014    Dr. Porter, gastroenterologist    Urinary incontinence  "     Allergies   Allergen Reactions    Statins [Hmg-Coa-R Inhibitors] Rash     Blisters      Atorvastatin      Selected as representative agent for class- please do not delete     Outpatient Encounter Medications as of 11/15/2022   Medication Sig Dispense Refill    carvedilol (COREG) 3.125 MG Tab TAKE 1 TABLET BY MOUTH TWICE DAILY WITH MEALS 180 Tablet 2    bumetanide (BUMEX) 1 MG Tab Take 2 Tablets by mouth 2 times a day. 360 Tablet 1    losartan (COZAAR) 25 MG Tab Take 1 Tablet by mouth every day. 90 Tablet 3    tamsulosin (FLOMAX) 0.4 MG capsule Take 1 Capsule by mouth 1/2 hour after breakfast. 30 Capsule     apixaban (ELIQUIS) 5mg Tab Take 1 Tablet by mouth 2 times a day. 180 Tablet 3    aspirin EC (ECOTRIN) 81 MG Tablet Delayed Response Take 1 Tablet by mouth every day. 90 Tablet 3    ezetimibe (ZETIA) 10 MG Tab Take 1 tablet by mouth once daily 90 Tablet 3    Calcium Citrate (CITRACAL PO) Take 1 Tab by mouth 2 Times a Day.      coenzyme Q-10 30 MG capsule Take 2 Capsules by mouth 2 times a day.      omeprazole (PRILOSEC) 40 MG delayed-release capsule Take 1 Capsule by mouth 2 times a day.      Cholecalciferol (VITAMIN D3) 2000 UNIT Cap Take 1 Capsule by mouth 2 times a day.      B Complex Vitamins (VITAMIN B COMPLEX PO) Take 1 Tab by mouth every day.      Ascorbic Acid (VITAMIN C) 1000 MG Tab Take 1 Tablet by mouth 2 times a day.      insulin regular (HUMULIN R) 100 Unit/mL Solution Inject 1-6 Units under the skin 4 Times a Day,Before Meals and at Bedtime. 10 mL     metaxalone (SKELAXIN) 800 MG Tab Take 1 Tablet by mouth 3 times a day. 90 Tablet     [DISCONTINUED] Home Care Oxygen Inhale 2 L/min by mouth Continuous.   Respiratory route via: Nasal Cannula   Oxygen supplier: [facility]  Indications: Hypoxia, to keep O2 sat over 90%       No facility-administered encounter medications on file as of 11/15/2022.     Social History     Socioeconomic History    Marital status:      Spouse name: Not on file    Number of  children: Not on file    Years of education: Not on file    Highest education level: Bachelor's degree (e.g., BA, AB, BS)   Occupational History    Not on file   Tobacco Use    Smoking status: Never    Smokeless tobacco: Never    Tobacco comments:     0   Vaping Use    Vaping Use: Never used   Substance and Sexual Activity    Alcohol use: Not Currently     Alcohol/week: 0.0 oz    Drug use: Not Currently    Sexual activity: Yes   Other Topics Concern    Not on file   Social History Narrative    Not on file     Social Determinants of Health     Financial Resource Strain: Low Risk     Difficulty of Paying Living Expenses: Not very hard   Food Insecurity: No Food Insecurity    Worried About Running Out of Food in the Last Year: Never true    Ran Out of Food in the Last Year: Never true   Transportation Needs: No Transportation Needs    Lack of Transportation (Medical): No    Lack of Transportation (Non-Medical): No   Physical Activity: Sufficiently Active    Days of Exercise per Week: 3 days    Minutes of Exercise per Session: 150+ min   Stress: Stress Concern Present    Feeling of Stress : To some extent   Social Connections: Socially Isolated    Frequency of Communication with Friends and Family: Once a week    Frequency of Social Gatherings with Friends and Family: Never    Attends Shinto Services: Never    Active Member of Clubs or Organizations: No    Attends Club or Organization Meetings: Never    Marital Status:    Intimate Partner Violence: Not on file   Housing Stability: Low Risk     Unable to Pay for Housing in the Last Year: No    Number of Places Lived in the Last Year: 1    Unstable Housing in the Last Year: No       Studies  Lab Results   Component Value Date/Time    CHOLSTRLTOT 107 06/29/2022 11:05 AM    LDL 59 06/29/2022 11:05 AM    HDL 28 (A) 06/29/2022 11:05 AM    TRIGLYCERIDE 98 06/29/2022 11:05 AM       Lab Results   Component Value Date/Time    SODIUM 137 06/29/2022 11:08 AM     POTASSIUM 3.8 06/29/2022 11:08 AM    CHLORIDE 101 06/29/2022 11:08 AM    CO2 24 06/29/2022 11:08 AM    GLUCOSE 171 (H) 06/29/2022 11:08 AM    BUN 53 (H) 06/29/2022 11:08 AM    CREATININE 2.02 (H) 06/29/2022 11:08 AM    CREATININE 1.18 02/08/2011 12:00 AM    BUNCREATRAT 16 02/08/2011 12:00 AM    GLOMRATE >59 02/08/2011 12:00 AM     Lab Results   Component Value Date/Time    ALKPHOSPHAT 60 06/29/2022 11:05 AM    ASTSGOT 23 06/29/2022 11:05 AM    ALTSGPT 26 06/29/2022 11:05 AM    TBILIRUBIN 0.6 06/29/2022 11:05 AM            For this encounter I directly reviewed Echo images and ICD interrogation .  Normal function of the aortic valve prosthesis and no evidence of arrhythmias on the device. I otherwise agree with the interpretation in the EHR.

## 2022-12-02 ENCOUNTER — NON-PROVIDER VISIT (OUTPATIENT)
Dept: CARDIOLOGY | Facility: MEDICAL CENTER | Age: 82
End: 2022-12-02
Payer: MEDICARE

## 2022-12-02 PROCEDURE — 93295 DEV INTERROG REMOTE 1/2/MLT: CPT | Performed by: INTERNAL MEDICINE

## 2022-12-02 NOTE — CARDIAC REMOTE MONITOR - SCAN
Device transmission reviewed. Device demonstrated appropriate function.       Electronically Signed by: Augustus Arreguin M.D.    12/2/2022  12:33 PM

## 2022-12-06 ENCOUNTER — OFFICE VISIT (OUTPATIENT)
Dept: NEUROLOGY | Facility: MEDICAL CENTER | Age: 82
End: 2022-12-06
Attending: PSYCHIATRY & NEUROLOGY
Payer: MEDICARE

## 2022-12-06 VITALS
TEMPERATURE: 98.5 F | HEIGHT: 73 IN | DIASTOLIC BLOOD PRESSURE: 70 MMHG | SYSTOLIC BLOOD PRESSURE: 110 MMHG | WEIGHT: 243.17 LBS | RESPIRATION RATE: 15 BRPM | OXYGEN SATURATION: 92 % | HEART RATE: 82 BPM | BODY MASS INDEX: 32.23 KG/M2

## 2022-12-06 DIAGNOSIS — E11.42 DIABETIC POLYNEUROPATHY ASSOCIATED WITH TYPE 2 DIABETES MELLITUS (HCC): Primary | ICD-10-CM

## 2022-12-06 PROCEDURE — 99214 OFFICE O/P EST MOD 30 MIN: CPT | Performed by: PSYCHIATRY & NEUROLOGY

## 2022-12-06 PROCEDURE — 99212 OFFICE O/P EST SF 10 MIN: CPT | Performed by: PSYCHIATRY & NEUROLOGY

## 2022-12-06 ASSESSMENT — ENCOUNTER SYMPTOMS
TINGLING: 1
TREMORS: 0
WEIGHT LOSS: 0
SENSORY CHANGE: 1
FALLS: 0

## 2022-12-06 ASSESSMENT — FIBROSIS 4 INDEX: FIB4 SCORE: 2.6

## 2022-12-06 NOTE — PROGRESS NOTES
"Aman More III is a 82 y.o. 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     HPI    JW states that the neuropathy symptoms are at about the same level of intensity.  He is still frustrated with the symptomatic balance difficulties that has resulted.  He denies any new motor or sensory distortions in the hands, everything still contained, involving the feet.  They have not ascended, sensory distortions at or below the ankles.  There is still the numbness as well.    Gabapentin was helping but made her feel \"drunk\", and he was already unsteady.  He now uses a CBD gummy every day and this has provided very good benefit without the dizziness and unsteadiness.  He still walks with a cane consistently.  He has not been falling.    He did start physical therapy for his gait difficulties, and evidently there was an injury to the left ankle during therapy itself, there was significant pain as well as swelling, he is looking for an orthopedic referral.    The internalized tremulousness he is always been complaining of, something I have never been able to document objectively on examination, has also attenuated with his CBD product. He is always denied EPS types of symptoms, head, neck, voice and upper extremities have never been involved.    Medical, surgical and family histories are reviewed, there are no new drug allergies.  He is still on Bumex, Zetia, Eliquis, baby aspirin, Prilosec, Cozaar, Coreg and vitamin D.    Review of Systems   Constitutional:  Positive for malaise/fatigue. Negative for weight loss.   Cardiovascular:  Positive for leg swelling.   Musculoskeletal:  Negative for falls.   Neurological:  Positive for tingling and sensory change. Negative for tremors.   All other systems reviewed and are negative.    Objective     /70 (BP Location: Right arm, Patient Position: Sitting, BP Cuff Size: Adult)   Pulse 82   " "Temp 36.9 °C (98.5 °F) (Temporal)   Resp 15   Ht 1.854 m (6' 1\")   Wt 110 kg (243 lb 2.7 oz)   SpO2 92%   BMI 32.08 kg/m²      Physical Exam    He appears in no acute distress.  Vital signs are stable.  There is no malar rash, jaw or temporal tenderness, or jaw claudication.  The neck is supple, range of motion is full.   Cardiac evaluation is unremarkable.  There is still the significant vascular insufficiency changes in the lower extremities below the midshin.  The edema is unchanged.     Neurological Exam    He is fully oriented, there is no aphasia, apraxia, or inattention.    PERRLA/EOMI, visual fields are full, facial movements are symmetric, there is no bulbar dysfunction.  Sensory exam is intact to temperature.  Shoulder shrug and head rotation are normal.    Musculoskeletal exam again reveals the atrophy of the distal lower extremities in both anterior compartments as well as gastrocnemius and soleus, left greater than right.  Tone is diminished.  There is slight atrophy of the intrinsic musculatures of the left hand only.  There is no tremor or drift.  Tone is normal in the upper extremities as well.  Strength is intact in the upper extremities, may be slight weakness with grasp on the left.  In the lower extremities there is still the marked weakness distally, left greater than right sided, between dorsiflexion and plantar flexion, arranger 4+-5/5.    Reflexes are absent at the knees and ankles.  Biceps and brachioradialis are intact in the upper extremities.    Station is widened, he walks very cautiously, looking at the ground continuously.  He can walk with a cane.  Repetitive movements in the feet are slowed proportionate to the degree of weakness.  There is no appendicular dystaxia or fine motor control distortion with the upper extremities.    Sensory exam is intact to temperature, pinprick, vibration and JPS in the upper extremities.  In the lower extremities vibration is lost below the knees, " temperature is well, pinprick below the midshin.  Romberg cannot be assessed given that he is unsteady with his feet placed at a widened stance, with his eyes open.    Assessment & Plan     1. Diabetic polyneuropathy associated with type 2 diabetes mellitus (HCC)  Clinically things are stable.  I do not need to add anything to his treatment regimen.  I encouraged him to stick with CBD product for his neuropathic symptoms, there is certainly no reason to reinitiate gabapentin at this time.  A referral to Dr. Rodriguez Salvador MD, for an orthopedic evaluation looking at the left ankle is made.  He and I can follow-up in 1 year.

## 2022-12-12 DIAGNOSIS — E78.5 DYSLIPIDEMIA: Chronic | ICD-10-CM

## 2022-12-14 RX ORDER — EZETIMIBE 10 MG/1
TABLET ORAL
Qty: 90 TABLET | Refills: 3 | Status: SHIPPED | OUTPATIENT
Start: 2022-12-14 | End: 2023-07-18 | Stop reason: SDUPTHER

## 2022-12-14 NOTE — TELEPHONE ENCOUNTER
Is the patient due for a refill? Yes    Was the patient seen the past year? Yes    Date of last office visit: 11/15/22    Does the patient have an upcoming appointment?  Yes   If yes, When? 5/23/22    Provider to refill:BE    Does the patients insurance require a 100 day supply?  No

## 2022-12-22 ENCOUNTER — HOSPITAL ENCOUNTER (OUTPATIENT)
Dept: RADIOLOGY | Facility: MEDICAL CENTER | Age: 82
End: 2022-12-22
Attending: ORTHOPAEDIC SURGERY
Payer: MEDICARE

## 2022-12-22 DIAGNOSIS — M77.42 METATARSALGIA OF LEFT FOOT: ICD-10-CM

## 2022-12-22 PROCEDURE — 93922 UPR/L XTREMITY ART 2 LEVELS: CPT | Mod: 26 | Performed by: INTERNAL MEDICINE

## 2022-12-22 PROCEDURE — 93922 UPR/L XTREMITY ART 2 LEVELS: CPT

## 2023-01-01 NOTE — ASSESSMENT & PLAN NOTE
Stenting of the left main and right coronary arteries as well as TAVR in May 2019  Patient is on ASA and Plavix - admit platelet mapping with 100% AA/ADP inhibition.  4/29 ASA and Plavix resumed  Dr. Lee, Cardiology consulted   unknown

## 2023-01-04 ENCOUNTER — TELEPHONE (OUTPATIENT)
Dept: CARDIOLOGY | Facility: MEDICAL CENTER | Age: 83
End: 2023-01-04

## 2023-01-05 NOTE — TELEPHONE ENCOUNTER
SHO Ellis.  You 2 minutes ago (7:58 AM)     EH  Patient has no significant contraindication to starting sildenafil. However it is now well studied in patients with heart failure and he should keep close eye on his BP after starting and should discontinue use if he develops hypotension.   Thank you,   LH

## 2023-01-05 NOTE — TELEPHONE ENCOUNTER
BE     Caller: Hazel From ([facility])    Topic/issue: Hazel called in regards, of a medication that the P.HAMZAH Escobar, Wants to put the pt on a Low dosage of Sildenafil and would like a call back to discuss     Callback Number: [phone]    Thank you,    Lewis GARCIA

## 2023-01-05 NOTE — TELEPHONE ENCOUNTER
"Phone Number Called: [phone]    Call outcome:  Spoke to [facility]    Message: Called to inform [facility] of  recommendations. \"Patient has no significant contraindication to starting sildenafil. However it is now well studied in patients with heart failure and he should keep close eye on his BP after starting and should discontinue use if he develops hypotension. \"        "

## 2023-01-07 DIAGNOSIS — I50.23 ACUTE ON CHRONIC SYSTOLIC HEART FAILURE (HCC): ICD-10-CM

## 2023-01-10 RX ORDER — BUMETANIDE 1 MG/1
TABLET ORAL
Qty: 360 TABLET | Refills: 3 | Status: SHIPPED | OUTPATIENT
Start: 2023-01-10 | End: 2024-01-29

## 2023-01-10 NOTE — TELEPHONE ENCOUNTER
Is the patient due for a refill? Yes    Was the patient seen the past year? Yes    Date of last office visit: 11/15/22    Does the patient have an upcoming appointment?  Yes   If yes, When? 5/23/23    Provider to refill:BE    Does the patients insurance require a 100 day supply?  No

## 2023-02-02 NOTE — PROGRESS NOTES
2 RN Skin Check    2 RN skin check complete. With MARY Vega.  Devices in place: Nasal Cannula.  Skin assessed under devices: yes.  Confirmed pressure ulcers found on: NA.  New potential pressure ulcers noted on NA. Wound consult placed N/A.  The following interventions in place Pillows.    BLE dusky and flaky  Heels dry but blanching   Sacrum pink and blanching    No other skin breakdown noted at this time     D/C: Order noted for d/c. Pt confirmed d/c paperwork  have correct name. Discharge and education instructions reviewed with patient. Teach-back successful. Pt verbalized understanding and signed d/c papers. Pt denied questions at this time. No acute distress noted. Patient instructed to follow-up as noted - return to emergency department if symptoms worsen. Patient verbalized understanding. Discharged per EDMD with discharge instructions. Pt discharged  to private vehicle. Patient stable upon departure. Thanked patient for choosing [facility]) for care.                 Arpan Espino RN  02/01/23 [id number]

## 2023-03-05 ENCOUNTER — NON-PROVIDER VISIT (OUTPATIENT)
Dept: CARDIOLOGY | Facility: MEDICAL CENTER | Age: 83
End: 2023-03-05
Payer: MEDICARE

## 2023-03-05 PROCEDURE — 93295 DEV INTERROG REMOTE 1/2/MLT: CPT | Performed by: INTERNAL MEDICINE

## 2023-03-06 NOTE — CARDIAC REMOTE MONITOR - SCAN
Device transmission reviewed. Device demonstrated appropriate function.       Electronically Signed by: Augustus Arreguin M.D.    3/26/2023  10:04 AM

## 2023-03-08 NOTE — DISCHARGE INSTRUCTIONS
Discharge Instructions    Discharged to home by car with relative. Discharged via walking, hospital escort: Yes.  Special equipment needed: Not Applicable    Be sure to schedule a follow-up appointment with your primary care doctor or any specialists as instructed.     Discharge Plan:   Diet Plan: Discussed  Activity Level: Discussed  Confirmed Follow up Appointment: Appointment Scheduled  Confirmed Symptoms Management: Discussed  Medication Reconciliation Updated: Yes  Influenza Vaccine Indication: Patient Refuses    I understand that a diet low in cholesterol, fat, and sodium is recommended for good health. Unless I have been given specific instructions below for another diet, I accept this instruction as my diet prescription.   Other diet: cardiac/diabetic    Special Instructions: None    · Is patient discharged on Warfarin / Coumadin?   No     Depression / Suicide Risk    As you are discharged from this [facility] facility, it is important to learn how to keep safe from harming yourself.    Recognize the warning signs:  · Abrupt changes in personality, positive or negative- including increase in energy   · Giving away possessions  · Change in eating patterns- significant weight changes-  positive or negative  · Change in sleeping patterns- unable to sleep or sleeping all the time   · Unwillingness or inability to communicate  · Depression  · Unusual sadness, discouragement and loneliness  · Talk of wanting to die  · Neglect of personal appearance   · Rebelliousness- reckless behavior  · Withdrawal from people/activities they love  · Confusion- inability to concentrate     If you or a loved one observes any of these behaviors or has concerns about self-harm, here's what you can do:  · Talk about it- your feelings and reasons for harming yourself  · Remove any means that you might use to hurt yourself (examples: pills, rope, extension cords, firearm)  · Get professional help from the community (Mental Health,  Substance Abuse, psychological counseling)  · Do not be alone:Call your Safe Contact- someone whom you trust who will be there for you.  · Call your local CRISIS HOTLINE 129-8125 or 375-684-0926  · Call your local Children's Mobile Crisis Response Team [place] (433) 699-4903 or www.[employer]  · Call the toll free National Suicide Prevention Hotlines   · National Suicide Prevention Lifeline 306-890-WETA (0354)  · National Hope Line Network 800-SUICIDE (638-7056)      Cardioverter Defibrillator Implantation, Care After  Refer to this sheet in the next few weeks. These instructions provide you with information on caring for yourself after your procedure. Your health care provider may also give you more specific instructions. Your treatment has been planned according to current medical practices, but problems sometimes occur. Call your health care provider if you have any problems or questions after your procedure.   WHAT TO EXPECT AFTER THE PROCEDURE  · You may feel pain. Some pain is normal. It may last a few days.  · A slight bump may be seen over the skin where the device was placed. Sometimes, it is possible to feel the device under the skin. This is normal.  · In the months and years afterward, your health care provider will check the device, the leads, and the battery every few months. Eventually, when the battery is low, the device will be replaced.  HOME CARE INSTRUCTIONS   Medicines  · Take medicines only as directed by your health care provider.  · If you were prescribed an antibiotic medicine, finish it all even if you start to feel better.    · Do not take any other medicines without asking your health care provider first. Some medicines, including certain painkillers, can cause bleeding after surgery.    Wound Care   · Do not remove the bandage on your chest until directed to do so by your health care provider.  · Once your bandage is removed, you may see pieces of tape called skin adhesive  strips over the area where the cut was made (incision site). Let them fall off on their own.    · Check the incision site every day to make sure it is not infected, bleeding, or starting to pull apart.  · Do not use lotions or ointments near the incision site unless directed to do so.    · Keep the incision area clean and dry for 2-3 days after the procedure or as directed by your health care provider. It takes several weeks for the incision site to completely heal.    · Do not take baths, swim, or use a hot tub until your health care provider approves.  Activities   · Try to walk a little every day. Exercising is important after this procedure. It is also important to use your shoulder on the side of the pacemaker in daily tasks that do not require exaggerated motion.  · Avoid sudden jerking, pulling, or chopping movements that pull your upper arm far away from your body for at least 6 weeks.  · Do not lift your upper arm above your shoulders for at least 6 weeks. This means no tennis, golf, or swimming for this period of time. If you sleep with the arm above your head, use a restraint to prevent this from happening as you sleep.  · You may go back to work when your health care provider says it is okay. Check with your health care provider before you start to drive or play sports.    Other Instructions   · Follow diet instructions if they were provided. You should be able to eat what you usually do right away, but you may need to limit your salt intake.    · Weigh yourself every day. If you suddenly gain weight, fluid may be building up in your body.    · Always carry your pacemaker identification card with you. The card should list the implant date, device model, and . Consider wearing a medical alert bracelet or necklace.  · Tell all health care providers that you have a pacemaker. This may prevent them from giving you a magnetic resonance imaging (MRI) scan because of the strong magnets used during  that test.  · If you must pass through a metal detector, quickly walk through it. Do not stop under the detector or stand near it.  · Avoid places or objects with a strong electric or magnetic field, including:    ¨ Airport security rosales. When at the airport, let officials know you have a pacemaker. Your ID card will let you be checked in a way that is safe for you and that will not damage your pacemaker. Also, do not let a security person wave a magnetic wand near your pacemaker. That can make it stop working.  ¨ Power plants.    ¨ Large electrical generators.    ¨ Radiofrequency transmission towers, such as cell phone and radio towers.    · Do not use amateur (ham) radio equipment or electric (arc) welding torches. Some devices are safe to use if held at least 1 foot from your pacemaker. These include power tools, lawn mowers, and speakers. If you are unsure of whether something is safe to use, ask your health care provider.    · You may safely use electric blankets, heating pads, computers, and microwave ovens.    · When using your cell phone, hold it to the ear opposite the pacemaker. Do not leave your cell phone in a pocket over the pacemaker.    · Keep all follow-up visits as directed by your health care provider. This is how your health care provider makes sure your chest is healing the way it should. Ask your health care provider when you should come back to have your stitches or staples taken out.    · Have your pacemaker checked every 3-6 months or as directed by your health care provider. Most pacemakers last for 4-8 years before a new one is needed.  SEEK MEDICAL CARE IF:   · You feel one shock in your chest.  · You gain weight suddenly.    · Your legs or feet swell more than they have before.    · It feels like your heart is fluttering or skipping beats (heart palpitations).  · You have a fever.  SEEK IMMEDIATE MEDICAL CARE IF:   · You have chest pain.   · You feel more than one shock.  · You feel more  short of breath than you have felt before.   · You feel more light-headed than you have felt before.   · You have problems with your incision site, such as swelling or bleeding, or it starts to open up.    · You notice signs of infection around your incision site. Watch for:    ¨ Warmth.    ¨ Redness.    ¨ Worsening pain.    ¨ Swelling.    ¨ Fluid leaking from the incision site.       This information is not intended to replace advice given to you by your health care provider. Make sure you discuss any questions you have with your health care provider.     Document Released: 07/07/2006 Document Revised: 01/08/2016 Document Reviewed: 01/08/2014  [employer] Patient Education ©2016 [employer]    Transcatheter Aortic Valve Replacement (TAVR)    General Instructions:  1. Take Medication as directed.  You will likely need to take aspirin and another blood thinner (antiplatelet medication) for a period.  You'll need to take the aspirin for the rest of your life.  Be sure your doctor knows about all other medicines you take, including over-the-counter medicines and dietary supplements.    Incision Care Instructions:  2. Look and feel the site(s) of your TAVR TODAY so you can recognize changes that should be called to your doctor (see below).  3. It's normal for your incision to be bruised, itchy, or sore while it's healing.  Your incision may take a week or more to heal.  4. Bruising may occur.  Some of the discoloration may travel down the leg.  As it resolves, the color should change from blue to green to yellow-brown.  5. A small, round lump under the TAVR site may remain for up to 6 weeks.  6. Wash your incision site every day with warm water and soap.  Gently pat it dry.  Don't put powder, lotion, or ointment on the incision until it's healed.    Activity:  2. No driving for one week  3. If you must take a long car ride (not as a ), stop every hour and walk around the car.  4. No lifting or pulling  objects over 5 pounds for 4-5 days.  5. Warm showers or baths are permitted after the bandage is removed.  6. Walk regularly.  One of the best ways to get stronger is to walk.  Walk a little more each day.  Take someone with you until you feel OK to walk alone.    When to call your health care provider:  1. You develop a fever.  2. Redness, swelling, bleeding, warmth, or fluid draining at the incision site.  3. Bruising appears to be new or does not look like it is getting better.  4. The small, round lump in the groin in the area of the TAVR site increases in size.    Seek immediate medical help if you have any of the following symptoms:  1. You experience any leg numbness, aching, or discomfort  2. Chest pain or trouble breathing (call 911)  3. Sudden numbness or weakness in your face, arms, or legs (call 911)  4. Bowel movement that is bright red  5. Dizziness or fainting  6. Weight gain of more than 2 pounds in 24 hours or more than 5 pounds in 1 week  7. Swelling in your hands, feet, or ankles  8. Shortness of breath that doesn't get better when you rest  9. Pain that gets worse or doesn't go away  10. Fast or irregular pulse      Banner Transposition Flap Text: The defect edges were debeveled with a #15 scalpel blade.  Given the location of the defect and the proximity to free margins a Banner transposition flap was deemed most appropriate.  Using a sterile surgical marker, an appropriate flap drawn around the defect. The area thus outlined was incised deep to adipose tissue with a #15 scalpel blade.  The skin margins were undermined to an appropriate distance in all directions utilizing iris scissors.

## 2023-03-20 DIAGNOSIS — I48.19 PERSISTENT ATRIAL FIBRILLATION (HCC): ICD-10-CM

## 2023-03-20 DIAGNOSIS — E78.5 DYSLIPIDEMIA: ICD-10-CM

## 2023-03-20 DIAGNOSIS — I50.22 CHF (CONGESTIVE HEART FAILURE), NYHA CLASS II, CHRONIC, SYSTOLIC (HCC): ICD-10-CM

## 2023-03-20 RX ORDER — APIXABAN 5 MG/1
TABLET, FILM COATED ORAL
Qty: 180 TABLET | Refills: 0 | Status: SHIPPED | OUTPATIENT
Start: 2023-03-20 | End: 2023-06-28

## 2023-05-08 NOTE — PROGRESS NOTES
Operative fixation left hip today   Island Pedicle Flap-Requiring Vessel Identification Text: The defect edges were debeveled with a #15 scalpel blade.  Given the location of the defect, shape of the defect and the proximity to free margins an island pedicle advancement flap was deemed most appropriate.  Using a sterile surgical marker, an appropriate advancement flap was drawn, based on the axial vessel mentioned above, incorporating the defect, outlining the appropriate donor tissue and placing the expected incisions within the relaxed skin tension lines where possible.    The area thus outlined was incised deep to adipose tissue with a #15 scalpel blade.  The skin margins were undermined to an appropriate distance in all directions around the primary defect and laterally outward around the island pedicle utilizing iris scissors.  There was minimal undermining beneath the pedicle flap.

## 2023-05-15 ENCOUNTER — TELEPHONE (OUTPATIENT)
Dept: CARDIOLOGY | Facility: MEDICAL CENTER | Age: 83
End: 2023-05-15
Payer: MEDICARE

## 2023-05-17 RX ORDER — CARVEDILOL 3.12 MG/1
TABLET ORAL
Qty: 180 TABLET | Refills: 1 | Status: SHIPPED | OUTPATIENT
Start: 2023-05-17 | End: 2023-07-18 | Stop reason: SDUPTHER

## 2023-05-17 NOTE — TELEPHONE ENCOUNTER
Is the patient due for a refill? Yes    Was the patient seen the past year? Yes    Date of last office visit: 11/15/2023    Does the patient have an upcoming appointment?  Yes   If yes, When? 5/23/2023    Provider to refill:BE    Does the patients insurance require a 100 day supply?  No

## 2023-05-23 ENCOUNTER — OFFICE VISIT (OUTPATIENT)
Dept: CARDIOLOGY | Facility: MEDICAL CENTER | Age: 83
End: 2023-05-23
Attending: INTERNAL MEDICINE
Payer: MEDICARE

## 2023-05-23 VITALS
DIASTOLIC BLOOD PRESSURE: 64 MMHG | BODY MASS INDEX: 34.33 KG/M2 | SYSTOLIC BLOOD PRESSURE: 106 MMHG | RESPIRATION RATE: 12 BRPM | HEIGHT: 73 IN | HEART RATE: 78 BPM | OXYGEN SATURATION: 93 % | WEIGHT: 259 LBS

## 2023-05-23 DIAGNOSIS — I48.0 PAROXYSMAL ATRIAL FIBRILLATION (HCC): ICD-10-CM

## 2023-05-23 DIAGNOSIS — N18.32 STAGE 3B CHRONIC KIDNEY DISEASE: Chronic | ICD-10-CM

## 2023-05-23 DIAGNOSIS — Z95.2 S/P TAVR (TRANSCATHETER AORTIC VALVE REPLACEMENT): ICD-10-CM

## 2023-05-23 DIAGNOSIS — E11.65 TYPE 2 DIABETES MELLITUS WITH HYPERGLYCEMIA, WITHOUT LONG-TERM CURRENT USE OF INSULIN (HCC): ICD-10-CM

## 2023-05-23 DIAGNOSIS — I50.22 CHF (CONGESTIVE HEART FAILURE), NYHA CLASS II, CHRONIC, SYSTOLIC (HCC): Chronic | ICD-10-CM

## 2023-05-23 DIAGNOSIS — I25.84 CORONARY ARTERY DISEASE DUE TO CALCIFIED CORONARY LESION: Chronic | ICD-10-CM

## 2023-05-23 DIAGNOSIS — R07.2 PRECORDIAL PAIN: ICD-10-CM

## 2023-05-23 DIAGNOSIS — Z95.810 ICD (IMPLANTABLE CARDIOVERTER-DEFIBRILLATOR) IN PLACE: ICD-10-CM

## 2023-05-23 DIAGNOSIS — I25.10 CORONARY ARTERY DISEASE DUE TO CALCIFIED CORONARY LESION: Chronic | ICD-10-CM

## 2023-05-23 PROCEDURE — 99214 OFFICE O/P EST MOD 30 MIN: CPT | Performed by: INTERNAL MEDICINE

## 2023-05-23 PROCEDURE — 3078F DIAST BP <80 MM HG: CPT | Performed by: INTERNAL MEDICINE

## 2023-05-23 PROCEDURE — 99213 OFFICE O/P EST LOW 20 MIN: CPT | Performed by: INTERNAL MEDICINE

## 2023-05-23 PROCEDURE — 3074F SYST BP LT 130 MM HG: CPT | Performed by: INTERNAL MEDICINE

## 2023-05-23 ASSESSMENT — FIBROSIS 4 INDEX: FIB4 SCORE: 2.6

## 2023-05-23 NOTE — PROGRESS NOTES
"CARDIOLOGY OUTPATIENT FOLLOWUP    PCP: Pcp Pt States None    1. Precordial pain    2. Coronary artery disease due to calcified coronary lesion    3. CHF (congestive heart failure), NYHA class II, chronic, systolic ([facility])    4. S/P TAVR (transcatheter aortic valve replacement)    5. Paroxysmal atrial fibrillation (HCC)    6. ICD (implantable cardioverter-defibrillator) in place    7. Type 2 diabetes mellitus with hyperglycemia, without long-term current use of insulin ([facility])    8. Acute on chronic Stage 3b chronic kidney disease (HCC)        LIAN More III is experiencing new dyspnea on exertion which may be related to ischemic heart disease-in particular restenosis of the left main coronary artery.  With his body habitus, I advised PET MPI which will also be better able to assess flow reserve and balanced ischemia.  Reassuringly, there is no change in the Clinical exam of the aortic valve or increasing A-fib burden on the ICD.  I will also update a laboratory profile.    Follow up: 6 months    History: LIAN More III is a 82 y.o. male with history of systolic heart failure, recovered ejection fraction, left main and RCA PCI, TAVR, PAF, dual-chamber pacemaker/ICD, diabetes and 3B CKD presenting for follow-up.    Reports ongoing disability following injury to the left hip and leg.  He also feels substantial dyspnea on exertion.  Seems both of the symptoms limit his daily activities.      Physical Exam:  /64 (BP Location: Left arm, Patient Position: Sitting, BP Cuff Size: Adult)   Pulse 78   Resp 12   Ht 1.854 m (6' 1\")   Wt 117 kg (259 lb)   SpO2 93%   BMI 34.17 kg/m²   GEN: NAD  RESP: CTAB  CVS: RRR, faint systolic murmur, normal S1 and S2  ABD: Soft, NT/ND  EXT: WWP, 1+ edema, venous stasis    The ASCVD Risk score (Martin DK, et al., 2019) failed to calculate.                Studies  Lab Results   Component Value Date/Time    CHOLSTRLTOT 107 06/29/2022 11:05 AM    LDL 59 06/29/2022 11:05 AM    HDL 28 (A) " "06/29/2022 11:05 AM    TRIGLYCERIDE 98 06/29/2022 11:05 AM       Lab Results   Component Value Date/Time    SODIUM 137 06/29/2022 11:08 AM    POTASSIUM 3.8 06/29/2022 11:08 AM    CHLORIDE 101 06/29/2022 11:08 AM    CO2 24 06/29/2022 11:08 AM    GLUCOSE 171 (H) 06/29/2022 11:08 AM    BUN 53 (H) 06/29/2022 11:08 AM    CREATININE 2.02 (H) 06/29/2022 11:08 AM    CREATININE 1.18 02/08/2011 12:00 AM    BUNCREATRAT 16 02/08/2011 12:00 AM    GLOMRATE >59 02/08/2011 12:00 AM     Lab Results   Component Value Date/Time    ALKPHOSPHAT 60 06/29/2022 11:05 AM    ASTSGOT 23 06/29/2022 11:05 AM    ALTSGPT 26 06/29/2022 11:05 AM    TBILIRUBIN 0.6 06/29/2022 11:05 AM        Past Medical History:   Diagnosis Date    Arthritis     \"everywhere\"    Bowel habit changes     constipation    Breath shortness     pt states short of breath 24/7 O2 at night only    CAD (coronary artery disease)     CATARACT     removed bilat    Chest pain     Chest tightness     CHF (congestive heart failure), NYHA class II, chronic, systolic (HCC) 12/26/2019    managed by cardiology Dr. Wilkinson: He appears euvolemic on physical exam.  He is on losartan, carvedilol, not on spironolactone due to low kidney function.  He was admitted to the hospital for CHF exacerbation, when he suddenly gained 15 pounds.  He was diuresed with IV Lasix at that time, had echocardiogram that showed significant improvement of his cardiac function, when compared to previous study    Chickenpox     Congestive heart failure (HCC)     Constipation     Coronary heart disease     Cough     Daytime sleepiness     Diabetes     oral medication    Difficulty breathing     Dilated cardiomyopathy (HCC)     Fatigue     GERD (gastroesophageal reflux disease)     Hearing difficulty     Heart murmur     Heart valve disease     Hiatus hernia syndrome     History of BPH     History of chronic cough     Hyperlipidemia     Hypertension     pt states well controlled on meds    Kidney disease 05/2020    " "ruptered left kidney     Mumps     Oxygen dependent     @HS, 2 liters    Pacemaker     AICD    Pain 02/01/2022    \"everywhere\", 4/10    Palpitations     Peripheral neuropathy     Persistent atrial fibrillation (HCC)     Pneumonia 2007    Rhinitis     Ringing in ears     Severe aortic stenosis 12/4/2019    Sleep apnea     CPAp with O2    Swelling of lower extremity     Tonsillitis     Tremor     Ulcer 2014    Dr. Porter, gastroenterologist    Urinary incontinence      Allergies   Allergen Reactions    Statins [Hmg-Coa-R Inhibitors] Rash     Blisters      Atorvastatin      Selected as representative agent for class- please do not delete     Outpatient Encounter Medications as of 5/23/2023   Medication Sig Dispense Refill    carvedilol (COREG) 3.125 MG Tab TAKE 1 TABLET BY MOUTH TWICE DAILY WITH MEALS 180 Tablet 1    ELIQUIS 5 MG Tab Take 1 tablet by mouth twice daily 180 Tablet 0    bumetanide (BUMEX) 1 MG Tab Take 2 tablets by mouth twice daily 360 Tablet 3    ezetimibe (ZETIA) 10 MG Tab Take 1 tablet by mouth once daily 90 Tablet 3    losartan (COZAAR) 25 MG Tab Take 1 Tablet by mouth every day. 90 Tablet 3    aspirin EC (ECOTRIN) 81 MG Tablet Delayed Response Take 1 Tablet by mouth every day. 90 Tablet 3    Calcium Citrate (CITRACAL PO) Take 1 Tab by mouth 2 Times a Day.      coenzyme Q-10 30 MG capsule Take 2 Capsules by mouth 2 times a day.      omeprazole (PRILOSEC) 40 MG delayed-release capsule Take 1 Capsule by mouth 2 times a day.      Cholecalciferol (VITAMIN D3) 2000 UNIT Cap Take 1 Capsule by mouth 2 times a day.      B Complex Vitamins (VITAMIN B COMPLEX PO) Take 1 Tab by mouth every day.      Ascorbic Acid (VITAMIN C) 1000 MG Tab Take 1 Tablet by mouth 2 times a day.      [DISCONTINUED] Home Care Oxygen Inhale 2 L/min by mouth Continuous.   Respiratory route via: Nasal Cannula   Oxygen supplier: [facility]  Indications: Hypoxia, to keep O2 sat over 90%       No facility-administered encounter medications on file " as of 5/23/2023.     Social History     Socioeconomic History    Marital status:      Spouse name: Not on file    Number of children: Not on file    Years of education: Not on file    Highest education level: Bachelor's degree (e.g., BA, AB, BS)   Occupational History    Not on file   Tobacco Use    Smoking status: Never    Smokeless tobacco: Never    Tobacco comments:     0   Vaping Use    Vaping Use: Never used   Substance and Sexual Activity    Alcohol use: Not Currently     Alcohol/week: 0.0 oz    Drug use: Not Currently     Types: Marijuana, Oral     Comment: 30 mg gummie    Sexual activity: Yes   Other Topics Concern    Not on file   Social History Narrative    Not on file     Social Determinants of Health     Financial Resource Strain: Low Risk  (3/12/2022)    Overall Financial Resource Strain (CARDIA)     Difficulty of Paying Living Expenses: Not very hard   Food Insecurity: No Food Insecurity (3/12/2022)    Hunger Vital Sign     Worried About Running Out of Food in the Last Year: Never true     Ran Out of Food in the Last Year: Never true   Transportation Needs: No Transportation Needs (3/12/2022)    PRAPARE - Transportation     Lack of Transportation (Medical): No     Lack of Transportation (Non-Medical): No   Physical Activity: Sufficiently Active (3/12/2022)    Exercise Vital Sign     Days of Exercise per Week: 3 days     Minutes of Exercise per Session: 150+ min   Stress: Stress Concern Present (3/12/2022)    Citizen of [place] [employer] Occup[employer] - Occupational Stress Questionnaire     Feeling of Stress : To some extent   Social Connections: Socially Isolated (3/12/2022)    Social Connection and Isolation Panel [NHANES]     Frequency of Communication with Friends and Family: Once a week     Frequency of Social Gatherings with Friends and Family: Never     Attends Congregational Services: Never     Active Member of Clubs or Organizations: No     Attends Club or Organization Meetings: Never      Marital Status:    Intimate Partner Violence: Not on file   Housing Stability: Low Risk  (3/12/2022)    Housing Stability Vital Sign     Unable to Pay for Housing in the Last Year: No     Number of Places Lived in the Last Year: 1     Unstable Housing in the Last Year: No         ROS:   10 point review systems is otherwise negative except as per the HPI    Chief Complaint   Patient presents with    CHF (Systolic)     FV  Dx: CHF (congestive heart failure), NYHA class II, chronic, systolic (HCC)    Follow-Up     FV Dx:S/P TAVR

## 2023-06-01 ENCOUNTER — TELEPHONE (OUTPATIENT)
Dept: CARDIOLOGY | Facility: MEDICAL CENTER | Age: 83
End: 2023-06-01
Payer: MEDICARE

## 2023-06-01 NOTE — TELEPHONE ENCOUNTER
BE    Caller: Lakeisha from [facility] Authorization    Topic/issue: Patient has a cardiac pet tomorrow but they are getting ABN pop up for the DX code. Lakeisha asks that we call the PET center with the new DX code for tomorrow.    Callback Number: See in routing comments.     Thank you,  -Alena SZYMANSKI

## 2023-06-02 ENCOUNTER — HOSPITAL ENCOUNTER (OUTPATIENT)
Dept: RADIOLOGY | Facility: MEDICAL CENTER | Age: 83
End: 2023-06-02
Attending: INTERNAL MEDICINE
Payer: MEDICARE

## 2023-06-02 DIAGNOSIS — I25.84 CORONARY ATHEROSCLEROSIS DUE TO CALCIFIED CORONARY LESION: ICD-10-CM

## 2023-06-02 DIAGNOSIS — I25.10 CORONARY ATHEROSCLEROSIS DUE TO CALCIFIED CORONARY LESION: ICD-10-CM

## 2023-06-02 PROCEDURE — 93018 CV STRESS TEST I&R ONLY: CPT | Performed by: INTERNAL MEDICINE

## 2023-06-02 PROCEDURE — 78431 MYOCRD IMG PET RST&STRS CT: CPT

## 2023-06-02 PROCEDURE — 78431 MYOCRD IMG PET RST&STRS CT: CPT | Mod: 26 | Performed by: INTERNAL MEDICINE

## 2023-06-04 ENCOUNTER — NON-PROVIDER VISIT (OUTPATIENT)
Dept: CARDIOLOGY | Facility: MEDICAL CENTER | Age: 83
End: 2023-06-04
Payer: MEDICARE

## 2023-06-04 PROCEDURE — 93295 DEV INTERROG REMOTE 1/2/MLT: CPT | Performed by: INTERNAL MEDICINE

## 2023-06-05 NOTE — CARDIAC REMOTE MONITOR - SCAN
Device transmission reviewed. Device demonstrated appropriate function.       Electronically Signed by: Augustus Arreguin M.D.    6/7/2023  12:40 PM

## 2023-06-09 ENCOUNTER — TELEPHONE (OUTPATIENT)
Dept: CARDIOLOGY | Facility: MEDICAL CENTER | Age: 83
End: 2023-06-09
Payer: MEDICARE

## 2023-06-09 DIAGNOSIS — R06.02 SOB (SHORTNESS OF BREATH): ICD-10-CM

## 2023-06-09 NOTE — TELEPHONE ENCOUNTER
Dr. Hortensia sharma served to notify that blood gases have been drawn. Left central line appears to be in venous. Patient is scheduled on 7-11-23 for a C w/poss with . Patient was told to hold eliquis for 2 days prior and to check in at 6:00 for a 7:30 procedure. Updated H&P to be done on admit by NP. Pre admit to call patient.

## 2023-06-09 NOTE — TELEPHONE ENCOUNTER
Phone Number Called: [phone]    Call outcome: Spoke to patient regarding message below.    Message: Called to inform patient of echo results and BE recommendations below. Patient would like to go ahead and have heart cath. Heart cath ordered.       ----------------------------------------------------------------------------  ----- Message from Paul Wilkinson M.D. sent at 6/2/2023  6:14 PM PDT -----  The stress test is suggesting that there are recurrent blockages in the arteries of the heart and a weakening of the heart muscle.  I suspect this is a big explanation for the shortness of breath.  I recommend we perform a heart catheterization.  Please let me know if you would like to make another visit to discuss further or simply schedule the test

## 2023-06-09 NOTE — TELEPHONE ENCOUNTER
----- Message from Zeynep Felix R.N. sent at 6/9/2023  9:45 AM PDT -----  Heart cath ordered. Thank you!  ----- Message -----  From: Paul Wilkinson M.D.  Sent: 6/2/2023   6:14 PM PDT  To: Zeynep Felix R.N.    The stress test is suggesting that there are recurrent blockages in the arteries of the heart and a weakening of the heart muscle.  I suspect this is a big explanation for the shortness of breath.  I recommend we perform a heart catheterization.  Please let me know if you would like to make another visit to discuss further or simply schedule the test

## 2023-06-12 ENCOUNTER — APPOINTMENT (OUTPATIENT)
Dept: ADMISSIONS | Facility: MEDICAL CENTER | Age: 83
End: 2023-06-12
Attending: INTERNAL MEDICINE
Payer: MEDICARE

## 2023-06-14 ENCOUNTER — TELEPHONE (OUTPATIENT)
Dept: CARDIOLOGY | Facility: MEDICAL CENTER | Age: 83
End: 2023-06-14
Payer: MEDICARE

## 2023-06-14 NOTE — TELEPHONE ENCOUNTER
Called patient and unable to leave VM due to phone not being in service. Labs are ordered within patients chart and ready for patient to have them drawn.

## 2023-06-14 NOTE — TELEPHONE ENCOUNTER
BE    Caller: J W Means III    Topic/issue: Patient states he spoke with [facility] and their department does not have lab orders from the cardiology office. Patient would like a call back. Also advised patient to contact the lab department again to verify the orders are not in their system. Please advise.     Callback Number: [phone] (home)     Thank you,   Merlyn WASSERMAN

## 2023-06-15 ENCOUNTER — HOSPITAL ENCOUNTER (OUTPATIENT)
Dept: LAB | Facility: MEDICAL CENTER | Age: 83
End: 2023-06-15
Attending: EMERGENCY MEDICINE
Payer: MEDICARE

## 2023-06-15 ENCOUNTER — PRE-ADMISSION TESTING (OUTPATIENT)
Dept: ADMISSIONS | Facility: MEDICAL CENTER | Age: 83
End: 2023-06-15
Attending: INTERNAL MEDICINE
Payer: MEDICARE

## 2023-06-15 VITALS — WEIGHT: 250 LBS | HEIGHT: 73 IN | BODY MASS INDEX: 33.13 KG/M2

## 2023-06-15 DIAGNOSIS — I48.19 PERSISTENT ATRIAL FIBRILLATION (HCC): ICD-10-CM

## 2023-06-15 DIAGNOSIS — R07.2 PRECORDIAL PAIN: ICD-10-CM

## 2023-06-15 DIAGNOSIS — E78.5 DYSLIPIDEMIA: ICD-10-CM

## 2023-06-15 DIAGNOSIS — I50.22 CHF (CONGESTIVE HEART FAILURE), NYHA CLASS II, CHRONIC, SYSTOLIC (HCC): ICD-10-CM

## 2023-06-15 DIAGNOSIS — I10 ESSENTIAL HYPERTENSION: ICD-10-CM

## 2023-06-15 LAB
ALBUMIN SERPL BCP-MCNC: 4.2 G/DL (ref 3.2–4.9)
ALBUMIN SERPL BCP-MCNC: 4.3 G/DL (ref 3.2–4.9)
ALBUMIN/GLOB SERPL: 1.7 G/DL
ALBUMIN/GLOB SERPL: 1.8 G/DL
ALP SERPL-CCNC: 60 U/L (ref 30–99)
ALP SERPL-CCNC: 61 U/L (ref 30–99)
ALT SERPL-CCNC: 23 U/L (ref 2–50)
ALT SERPL-CCNC: 23 U/L (ref 2–50)
ANION GAP SERPL CALC-SCNC: 12 MMOL/L (ref 7–16)
ANION GAP SERPL CALC-SCNC: 12 MMOL/L (ref 7–16)
ANISOCYTOSIS BLD QL SMEAR: ABNORMAL
AST SERPL-CCNC: 22 U/L (ref 12–45)
AST SERPL-CCNC: 23 U/L (ref 12–45)
BASOPHILS # BLD AUTO: 0.4 % (ref 0–1.8)
BASOPHILS # BLD: 0.02 K/UL (ref 0–0.12)
BILIRUB SERPL-MCNC: 0.5 MG/DL (ref 0.1–1.5)
BILIRUB SERPL-MCNC: 0.5 MG/DL (ref 0.1–1.5)
BUN SERPL-MCNC: 59 MG/DL (ref 8–22)
BUN SERPL-MCNC: 60 MG/DL (ref 8–22)
CALCIUM ALBUM COR SERPL-MCNC: 9.3 MG/DL (ref 8.5–10.5)
CALCIUM ALBUM COR SERPL-MCNC: 9.3 MG/DL (ref 8.5–10.5)
CALCIUM SERPL-MCNC: 9.5 MG/DL (ref 8.4–10.2)
CALCIUM SERPL-MCNC: 9.5 MG/DL (ref 8.4–10.2)
CHLORIDE SERPL-SCNC: 101 MMOL/L (ref 96–112)
CHLORIDE SERPL-SCNC: 101 MMOL/L (ref 96–112)
CHOLEST SERPL-MCNC: 97 MG/DL (ref 100–199)
CHOLEST SERPL-MCNC: 98 MG/DL (ref 100–199)
CO2 SERPL-SCNC: 28 MMOL/L (ref 20–33)
CO2 SERPL-SCNC: 28 MMOL/L (ref 20–33)
CREAT SERPL-MCNC: 2.33 MG/DL (ref 0.5–1.4)
CREAT SERPL-MCNC: 2.36 MG/DL (ref 0.5–1.4)
EOSINOPHIL # BLD AUTO: 0.14 K/UL (ref 0–0.51)
EOSINOPHIL NFR BLD: 2.7 % (ref 0–6.9)
ERYTHROCYTE [DISTWIDTH] IN BLOOD BY AUTOMATED COUNT: 49.8 FL (ref 35.9–50)
ERYTHROCYTE [DISTWIDTH] IN BLOOD BY AUTOMATED COUNT: 50.8 FL (ref 35.9–50)
EST. AVERAGE GLUCOSE BLD GHB EST-MCNC: 206 MG/DL
FASTING STATUS PATIENT QL REPORTED: NORMAL
FASTING STATUS PATIENT QL REPORTED: NORMAL
GFR SERPLBLD CREATININE-BSD FMLA CKD-EPI: 27 ML/MIN/1.73 M 2
GFR SERPLBLD CREATININE-BSD FMLA CKD-EPI: 27 ML/MIN/1.73 M 2
GLOBULIN SER CALC-MCNC: 2.4 G/DL (ref 1.9–3.5)
GLOBULIN SER CALC-MCNC: 2.5 G/DL (ref 1.9–3.5)
GLUCOSE SERPL-MCNC: 237 MG/DL (ref 65–99)
GLUCOSE SERPL-MCNC: 238 MG/DL (ref 65–99)
HBA1C MFR BLD: 8.8 % (ref 4–5.6)
HCT VFR BLD AUTO: 51.6 % (ref 42–52)
HCT VFR BLD AUTO: 51.8 % (ref 42–52)
HDLC SERPL-MCNC: 37 MG/DL
HDLC SERPL-MCNC: 37 MG/DL
HGB BLD-MCNC: 15.2 G/DL (ref 14–18)
HGB BLD-MCNC: 15.4 G/DL (ref 14–18)
IMM GRANULOCYTES # BLD AUTO: 0.03 K/UL (ref 0–0.11)
IMM GRANULOCYTES NFR BLD AUTO: 0.6 % (ref 0–0.9)
LDLC SERPL CALC-MCNC: 46 MG/DL
LDLC SERPL CALC-MCNC: 47 MG/DL
LG PLATELETS BLD QL SMEAR: NORMAL
LYMPHOCYTES # BLD AUTO: 1.14 K/UL (ref 1–4.8)
LYMPHOCYTES NFR BLD: 21.8 % (ref 22–41)
MCH RBC QN AUTO: 22.9 PG (ref 27–33)
MCH RBC QN AUTO: 23.1 PG (ref 27–33)
MCHC RBC AUTO-ENTMCNC: 29.5 G/DL (ref 32.3–36.5)
MCHC RBC AUTO-ENTMCNC: 29.7 G/DL (ref 32.3–36.5)
MCV RBC AUTO: 77.5 FL (ref 81.4–97.8)
MCV RBC AUTO: 77.8 FL (ref 81.4–97.8)
MICROCYTES BLD QL SMEAR: ABNORMAL
MONOCYTES # BLD AUTO: 0.62 K/UL (ref 0–0.85)
MONOCYTES NFR BLD AUTO: 11.9 % (ref 0–13.4)
NEUTROPHILS # BLD AUTO: 3.27 K/UL (ref 1.82–7.42)
NEUTROPHILS NFR BLD: 62.6 % (ref 44–72)
NRBC # BLD AUTO: 0 K/UL
NRBC BLD-RTO: 0 /100 WBC (ref 0–0.2)
PLATELET # BLD AUTO: 156 K/UL (ref 164–446)
PLATELET # BLD AUTO: 158 K/UL (ref 164–446)
PLATELET BLD QL SMEAR: NORMAL
PMV BLD AUTO: 10.1 FL (ref 9–12.9)
PMV BLD AUTO: 9.9 FL (ref 9–12.9)
POLYCHROMASIA BLD QL SMEAR: NORMAL
POTASSIUM SERPL-SCNC: 4.5 MMOL/L (ref 3.6–5.5)
POTASSIUM SERPL-SCNC: 4.6 MMOL/L (ref 3.6–5.5)
PROT SERPL-MCNC: 6.7 G/DL (ref 6–8.2)
PROT SERPL-MCNC: 6.7 G/DL (ref 6–8.2)
RBC # BLD AUTO: 6.63 M/UL (ref 4.7–6.1)
RBC # BLD AUTO: 6.68 M/UL (ref 4.7–6.1)
RBC BLD AUTO: PRESENT
SODIUM SERPL-SCNC: 141 MMOL/L (ref 135–145)
SODIUM SERPL-SCNC: 141 MMOL/L (ref 135–145)
TRIGL SERPL-MCNC: 71 MG/DL (ref 0–149)
TRIGL SERPL-MCNC: 71 MG/DL (ref 0–149)
TSH SERPL DL<=0.005 MIU/L-ACNC: 1.35 UIU/ML (ref 0.38–5.33)
VARIANT LYMPHS BLD QL SMEAR: NORMAL
WBC # BLD AUTO: 5.2 K/UL (ref 4.8–10.8)
WBC # BLD AUTO: 5.2 K/UL (ref 4.8–10.8)

## 2023-06-15 PROCEDURE — 84305 ASSAY OF SOMATOMEDIN: CPT

## 2023-06-15 PROCEDURE — 83036 HEMOGLOBIN GLYCOSYLATED A1C: CPT | Mod: GA

## 2023-06-15 PROCEDURE — 80053 COMPREHEN METABOLIC PANEL: CPT | Mod: 91

## 2023-06-15 PROCEDURE — 84402 ASSAY OF FREE TESTOSTERONE: CPT

## 2023-06-15 PROCEDURE — 80053 COMPREHEN METABOLIC PANEL: CPT

## 2023-06-15 PROCEDURE — 80061 LIPID PANEL: CPT

## 2023-06-15 PROCEDURE — 85027 COMPLETE CBC AUTOMATED: CPT | Mod: XU

## 2023-06-15 PROCEDURE — 84153 ASSAY OF PSA TOTAL: CPT

## 2023-06-15 PROCEDURE — 36415 COLL VENOUS BLD VENIPUNCTURE: CPT

## 2023-06-15 PROCEDURE — 80061 LIPID PANEL: CPT | Mod: 91

## 2023-06-15 PROCEDURE — 84270 ASSAY OF SEX HORMONE GLOBUL: CPT

## 2023-06-15 PROCEDURE — 84443 ASSAY THYROID STIM HORMONE: CPT

## 2023-06-15 PROCEDURE — 85025 COMPLETE CBC W/AUTO DIFF WBC: CPT

## 2023-06-15 PROCEDURE — 84403 ASSAY OF TOTAL TESTOSTERONE: CPT

## 2023-06-15 PROCEDURE — 82670 ASSAY OF TOTAL ESTRADIOL: CPT

## 2023-06-15 ASSESSMENT — FIBROSIS 4 INDEX: FIB4 SCORE: 2.6

## 2023-06-15 NOTE — OR NURSING
Pt states he has no one to bring him in DOS or take him home, and no one at home with him after the procedure.  Teams sent to [facility] regarding this.  Received Teams from [facility], Dr. Minh gallagher with pt going home via taxi. He is unable to bring in his CPAP DOS.

## 2023-06-15 NOTE — TELEPHONE ENCOUNTER
Is the patient due for a refill? Yes    Was the patient seen the past year? Yes    Date of last office visit: 5/23/2023    Does the patient have an upcoming appointment?  Yes   If yes, When? 12/8/2023    Provider to refill:BE    Does the patients insurance require a 100 day supply?  No

## 2023-06-16 LAB
ESTRADIOL SERPL-MCNC: 5.3 PG/ML
PSA SERPL-MCNC: 0.91 NG/ML (ref 0–4)
SHBG SERPL-SCNC: 27 NMOL/L (ref 19–76)
TESTOST FREE MFR SERPL: 1.9 % (ref 1.6–2.9)
TESTOST FREE SERPL-MCNC: 38 PG/ML (ref 47–244)
TESTOST SERPL-MCNC: 197 NG/DL (ref 300–720)

## 2023-06-16 RX ORDER — LOSARTAN POTASSIUM 25 MG/1
TABLET ORAL
Qty: 90 TABLET | Refills: 3 | Status: SHIPPED | OUTPATIENT
Start: 2023-06-16 | End: 2023-07-18 | Stop reason: SDUPTHER

## 2023-06-17 LAB
IGF-I SERPL-MCNC: 108 NG/ML (ref 18–184)
IGF-I Z-SCORE SERPL: 0.4

## 2023-07-10 ENCOUNTER — PRE-ADMISSION TESTING (OUTPATIENT)
Dept: ADMISSIONS | Facility: MEDICAL CENTER | Age: 83
End: 2023-07-10
Attending: INTERNAL MEDICINE
Payer: MEDICARE

## 2023-07-10 DIAGNOSIS — Z01.810 PRE-OPERATIVE CARDIOVASCULAR EXAMINATION: ICD-10-CM

## 2023-07-10 DIAGNOSIS — Z01.812 PRE-OPERATIVE LABORATORY EXAMINATION: ICD-10-CM

## 2023-07-10 LAB
ALBUMIN SERPL BCP-MCNC: 4.1 G/DL (ref 3.2–4.9)
ALBUMIN/GLOB SERPL: 1.5 G/DL
ALP SERPL-CCNC: 59 U/L (ref 30–99)
ALT SERPL-CCNC: 28 U/L (ref 2–50)
ANION GAP SERPL CALC-SCNC: 14 MMOL/L (ref 7–16)
APTT PPP: 36.1 SEC (ref 24.7–36)
AST SERPL-CCNC: 19 U/L (ref 12–45)
BILIRUB SERPL-MCNC: 0.5 MG/DL (ref 0.1–1.5)
BUN SERPL-MCNC: 48 MG/DL (ref 8–22)
CALCIUM ALBUM COR SERPL-MCNC: 9.3 MG/DL (ref 8.5–10.5)
CALCIUM SERPL-MCNC: 9.4 MG/DL (ref 8.5–10.5)
CHLORIDE SERPL-SCNC: 95 MMOL/L (ref 96–112)
CO2 SERPL-SCNC: 29 MMOL/L (ref 20–33)
CREAT SERPL-MCNC: 2.79 MG/DL (ref 0.5–1.4)
EKG IMPRESSION: NORMAL
ERYTHROCYTE [DISTWIDTH] IN BLOOD BY AUTOMATED COUNT: 56.1 FL (ref 35.9–50)
GFR SERPLBLD CREATININE-BSD FMLA CKD-EPI: 22 ML/MIN/1.73 M 2
GLOBULIN SER CALC-MCNC: 2.7 G/DL (ref 1.9–3.5)
GLUCOSE SERPL-MCNC: 245 MG/DL (ref 65–99)
HCT VFR BLD AUTO: 51.4 % (ref 42–52)
HGB BLD-MCNC: 15.6 G/DL (ref 14–18)
INR PPP: 1.22 (ref 0.87–1.13)
MCH RBC QN AUTO: 23.5 PG (ref 27–33)
MCHC RBC AUTO-ENTMCNC: 30.4 G/DL (ref 32.3–36.5)
MCV RBC AUTO: 77.5 FL (ref 81.4–97.8)
PLATELET # BLD AUTO: 180 K/UL (ref 164–446)
PMV BLD AUTO: 9.7 FL (ref 9–12.9)
POTASSIUM SERPL-SCNC: 4.6 MMOL/L (ref 3.6–5.5)
PROT SERPL-MCNC: 6.8 G/DL (ref 6–8.2)
PROTHROMBIN TIME: 15.2 SEC (ref 12–14.6)
RBC # BLD AUTO: 6.63 M/UL (ref 4.7–6.1)
SODIUM SERPL-SCNC: 138 MMOL/L (ref 135–145)
WBC # BLD AUTO: 6.6 K/UL (ref 4.8–10.8)

## 2023-07-10 PROCEDURE — 36415 COLL VENOUS BLD VENIPUNCTURE: CPT

## 2023-07-10 PROCEDURE — 85610 PROTHROMBIN TIME: CPT

## 2023-07-10 PROCEDURE — 85730 THROMBOPLASTIN TIME PARTIAL: CPT

## 2023-07-10 PROCEDURE — 85027 COMPLETE CBC AUTOMATED: CPT

## 2023-07-10 PROCEDURE — 93005 ELECTROCARDIOGRAM TRACING: CPT

## 2023-07-10 PROCEDURE — 93010 ELECTROCARDIOGRAM REPORT: CPT | Performed by: INTERNAL MEDICINE

## 2023-07-10 PROCEDURE — 80053 COMPREHEN METABOLIC PANEL: CPT

## 2023-07-11 ENCOUNTER — APPOINTMENT (OUTPATIENT)
Dept: CARDIOLOGY | Facility: MEDICAL CENTER | Age: 83
End: 2023-07-11
Attending: INTERNAL MEDICINE
Payer: MEDICARE

## 2023-07-11 ENCOUNTER — HOSPITAL ENCOUNTER (OUTPATIENT)
Facility: MEDICAL CENTER | Age: 83
End: 2023-07-11
Attending: INTERNAL MEDICINE | Admitting: INTERNAL MEDICINE
Payer: MEDICARE

## 2023-07-11 VITALS
WEIGHT: 260.36 LBS | OXYGEN SATURATION: 92 % | RESPIRATION RATE: 20 BRPM | HEART RATE: 77 BPM | TEMPERATURE: 97.7 F | SYSTOLIC BLOOD PRESSURE: 120 MMHG | DIASTOLIC BLOOD PRESSURE: 60 MMHG | HEIGHT: 73 IN | BODY MASS INDEX: 34.51 KG/M2

## 2023-07-11 DIAGNOSIS — R06.02 SOB (SHORTNESS OF BREATH): ICD-10-CM

## 2023-07-11 DIAGNOSIS — Z95.2 S/P TAVR (TRANSCATHETER AORTIC VALVE REPLACEMENT): ICD-10-CM

## 2023-07-11 LAB
EKG IMPRESSION: NORMAL
GLUCOSE BLD STRIP.AUTO-MCNC: 203 MG/DL (ref 65–99)

## 2023-07-11 PROCEDURE — 93458 L HRT ARTERY/VENTRICLE ANGIO: CPT | Mod: 26,59 | Performed by: INTERNAL MEDICINE

## 2023-07-11 PROCEDURE — 700117 HCHG RX CONTRAST REV CODE 255: Performed by: INTERNAL MEDICINE

## 2023-07-11 PROCEDURE — A9270 NON-COVERED ITEM OR SERVICE: HCPCS

## 2023-07-11 PROCEDURE — 160046 HCHG PACU - 1ST 60 MINS PHASE II

## 2023-07-11 PROCEDURE — 92928 PRQ TCAT PLMT NTRAC ST 1 LES: CPT | Mod: RC | Performed by: INTERNAL MEDICINE

## 2023-07-11 PROCEDURE — 99152 MOD SED SAME PHYS/QHP 5/>YRS: CPT | Performed by: INTERNAL MEDICINE

## 2023-07-11 PROCEDURE — 160002 HCHG RECOVERY MINUTES (STAT)

## 2023-07-11 PROCEDURE — 160035 HCHG PACU - 1ST 60 MINS PHASE I

## 2023-07-11 PROCEDURE — 700111 HCHG RX REV CODE 636 W/ 250 OVERRIDE (IP): Performed by: INTERNAL MEDICINE

## 2023-07-11 PROCEDURE — 93571 IV DOP VEL&/PRESS C FLO 1ST: CPT | Mod: 26,52,RC | Performed by: INTERNAL MEDICINE

## 2023-07-11 PROCEDURE — 700111 HCHG RX REV CODE 636 W/ 250 OVERRIDE (IP)

## 2023-07-11 PROCEDURE — A9270 NON-COVERED ITEM OR SERVICE: HCPCS | Performed by: INTERNAL MEDICINE

## 2023-07-11 PROCEDURE — 93458 L HRT ARTERY/VENTRICLE ANGIO: CPT | Mod: XU

## 2023-07-11 PROCEDURE — 700105 HCHG RX REV CODE 258: Performed by: INTERNAL MEDICINE

## 2023-07-11 PROCEDURE — 93010 ELECTROCARDIOGRAM REPORT: CPT | Mod: 59 | Performed by: INTERNAL MEDICINE

## 2023-07-11 PROCEDURE — 92978 ENDOLUMINL IVUS OCT C 1ST: CPT | Mod: 26,RC | Performed by: INTERNAL MEDICINE

## 2023-07-11 PROCEDURE — 700102 HCHG RX REV CODE 250 W/ 637 OVERRIDE(OP)

## 2023-07-11 PROCEDURE — 700101 HCHG RX REV CODE 250

## 2023-07-11 PROCEDURE — 93005 ELECTROCARDIOGRAM TRACING: CPT | Performed by: INTERNAL MEDICINE

## 2023-07-11 PROCEDURE — 700102 HCHG RX REV CODE 250 W/ 637 OVERRIDE(OP): Performed by: INTERNAL MEDICINE

## 2023-07-11 PROCEDURE — 99153 MOD SED SAME PHYS/QHP EA: CPT

## 2023-07-11 PROCEDURE — 82962 GLUCOSE BLOOD TEST: CPT

## 2023-07-11 RX ORDER — LIDOCAINE HYDROCHLORIDE 20 MG/ML
INJECTION, SOLUTION INFILTRATION; PERINEURAL
Status: COMPLETED
Start: 2023-07-11 | End: 2023-07-11

## 2023-07-11 RX ORDER — HEPARIN SODIUM 1000 [USP'U]/ML
INJECTION, SOLUTION INTRAVENOUS; SUBCUTANEOUS
Status: COMPLETED
Start: 2023-07-11 | End: 2023-07-11

## 2023-07-11 RX ORDER — CLOPIDOGREL BISULFATE 75 MG/1
75 TABLET ORAL DAILY
Qty: 100 TABLET | Refills: 3 | Status: SHIPPED | OUTPATIENT
Start: 2023-07-11

## 2023-07-11 RX ORDER — BIVALIRUDIN 250 MG/5ML
INJECTION, POWDER, LYOPHILIZED, FOR SOLUTION INTRAVENOUS
Status: COMPLETED
Start: 2023-07-11 | End: 2023-07-11

## 2023-07-11 RX ORDER — SODIUM CHLORIDE 9 MG/ML
1000 INJECTION, SOLUTION INTRAVENOUS CONTINUOUS
Status: DISCONTINUED | OUTPATIENT
Start: 2023-07-11 | End: 2023-07-11

## 2023-07-11 RX ORDER — CLOPIDOGREL BISULFATE 75 MG/1
75 TABLET ORAL DAILY
Status: DISCONTINUED | OUTPATIENT
Start: 2023-07-12 | End: 2023-07-11 | Stop reason: HOSPADM

## 2023-07-11 RX ORDER — CLOPIDOGREL 300 MG/1
TABLET, FILM COATED ORAL
Status: COMPLETED
Start: 2023-07-11 | End: 2023-07-11

## 2023-07-11 RX ORDER — CLOPIDOGREL 300 MG/1
600 TABLET, FILM COATED ORAL ONCE
Status: DISCONTINUED | OUTPATIENT
Start: 2023-07-11 | End: 2023-07-11

## 2023-07-11 RX ORDER — VERAPAMIL HYDROCHLORIDE 2.5 MG/ML
INJECTION, SOLUTION INTRAVENOUS
Status: COMPLETED
Start: 2023-07-11 | End: 2023-07-11

## 2023-07-11 RX ORDER — MIDAZOLAM HYDROCHLORIDE 1 MG/ML
INJECTION INTRAMUSCULAR; INTRAVENOUS
Status: COMPLETED
Start: 2023-07-11 | End: 2023-07-11

## 2023-07-11 RX ORDER — SODIUM CHLORIDE 9 MG/ML
INJECTION, SOLUTION INTRAVENOUS CONTINUOUS
Status: ACTIVE | OUTPATIENT
Start: 2023-07-11 | End: 2023-07-11

## 2023-07-11 RX ORDER — ASPIRIN 81 MG/1
81 TABLET ORAL DAILY
Status: DISCONTINUED | OUTPATIENT
Start: 2023-07-11 | End: 2023-07-11 | Stop reason: HOSPADM

## 2023-07-11 RX ORDER — HEPARIN SODIUM 200 [USP'U]/100ML
INJECTION, SOLUTION INTRAVENOUS
Status: COMPLETED
Start: 2023-07-11 | End: 2023-07-11

## 2023-07-11 RX ADMIN — LIDOCAINE HYDROCHLORIDE: 20 INJECTION, SOLUTION INFILTRATION; PERINEURAL at 08:04

## 2023-07-11 RX ADMIN — ASPIRIN 81 MG: 81 TABLET, COATED ORAL at 09:51

## 2023-07-11 RX ADMIN — BIVALIRUDIN 1 MG/KG/HR: 250 INJECTION, POWDER, LYOPHILIZED, FOR SOLUTION INTRAVENOUS at 08:11

## 2023-07-11 RX ADMIN — HEPARIN SODIUM 2000 UNITS: 200 INJECTION, SOLUTION INTRAVENOUS at 08:05

## 2023-07-11 RX ADMIN — FENTANYL CITRATE 75 MCG: 50 INJECTION, SOLUTION INTRAMUSCULAR; INTRAVENOUS at 08:46

## 2023-07-11 RX ADMIN — MIDAZOLAM 1.5 MG: 1 INJECTION, SOLUTION INTRAMUSCULAR; INTRAVENOUS at 08:46

## 2023-07-11 RX ADMIN — HEPARIN SODIUM: 1000 INJECTION, SOLUTION INTRAVENOUS; SUBCUTANEOUS at 08:04

## 2023-07-11 RX ADMIN — CLOPIDOGREL BISULFATE 600 MG: 300 TABLET, FILM COATED ORAL at 08:33

## 2023-07-11 RX ADMIN — VERAPAMIL HYDROCHLORIDE 2.5 MG: 2.5 INJECTION, SOLUTION INTRAVENOUS at 08:04

## 2023-07-11 RX ADMIN — SODIUM CHLORIDE 1000 ML: 9 INJECTION, SOLUTION INTRAVENOUS at 07:16

## 2023-07-11 RX ADMIN — BIVALIRUDIN 0.2 MG/KG/HR: 250 INJECTION, POWDER, LYOPHILIZED, FOR SOLUTION INTRAVENOUS at 12:29

## 2023-07-11 RX ADMIN — IOHEXOL 35 ML: 350 INJECTION, SOLUTION INTRAVENOUS at 08:46

## 2023-07-11 RX ADMIN — NITROGLYCERIN 10 ML: 20 INJECTION INTRAVENOUS at 08:04

## 2023-07-11 ASSESSMENT — FIBROSIS 4 INDEX: FIB4 SCORE: 1.6

## 2023-07-11 NOTE — DISCHARGE INSTRUCTIONS
HOME CARE INSTRUCTIONS    ACTIVITY: Rest and take it easy for the first 24 hours.  A responsible adult is recommended to remain with you during that time.  It is normal to feel sleepy.  We encourage you to not do anything that requires balance, judgment or coordination.    FOR 24 HOURS DO NOT:  Drive, operate machinery or run household appliances.  Drink beer or alcoholic beverages.  Make important decisions or sign legal documents.    SPECIAL INSTRUCTIONS:     Limit wrist movement for 48 hours    Plavix started today.  prescription and take next dose Wednesday  Stop aspirin and restart apixaban (Eliquis) tomorrow (Wednesday).     POST ANGIOGRAM  General Care Instructions  Maintain a bandage over the incision site for 24 hours.  It's normal to find a small bruise or dime-sized lump at the insertion site. This should disappear within a few weeks.  Do not apply lotions or powders to the site.  Do not immerse the catheter insertion site in water (bathtub/swimming) for five days. It is ok to shower 24 hours after the procedure.  You may resume your normal diet immediately; on the day of your procedure, drink 6-10 glasses of water to help flush the contrast liquid out of your system.  If the doctor inserted the catheter in through your groin:  Walking short distances on a flat surface is OK. Limit going up/down stairs for the first 2 days.  DO NOT do yard work, drive, squat, lift heavy objects, or play sports for 2 days; or until your health care provider tells you it is OK.  If the doctor inserted the catheter in your arm:  For 24 hours, DO NOT lift anything heavier than 10 pounds (approximately a gallon of milk). DO NOT do any heavy pushing, pulling, or twisting.    Medications  If your current medications need to be changed, you will be provided with an updated list of your medications prior to discharge.  If you take warfarin (Coumadin), resume taking your usual dose the evening after the procedure.  DO NOT  STOP taking prescribed blood thinning (anti-platelet) medications unless instructed by your cardiologist.  These medications include:  Aspirin, Clopidogrel (Plavix), Ticagrelor (Brilinta), or Prasugrel (Effient)   If you take one of the following anticoagulants, RESUME 24 HOURS after your procedure:  Apixiban (Eliquis), Rivaroxaban (Xarelto), Dabigatran (Pradaxa), Edoxaban (Savaysa)  If you take metformin (Glucophage), RESUME 48 HOURS after your procedure.    When to call your healthcare provider  Call your cardiologist right away at [phone] if you have any of the following:   Problems/Concerns taking any of your prescribed heart medicines.   The insertion site has increasing pain, swelling, redness, bleeding, or drainage.   Your arm or leg below where the insertion site changes color, is cool, or is numb.   You have chest pain or shortness of breath that does not go away with rest.   Your pulse feels irregular -- very slow (less than 60 beats/minute) or very fast (over 100 beats/minute).   You have dizziness, fainting, or you are very tired.   You are coughing up blood or yellow or green mucus.   You have chills or a fever over 101°F (38.3°C).    If there is bleeding at the catheter insertion site, apply pressure for 10 minutes.  If bleeding persists, call 911, and continue to hold pressure until advanced medical support arrives.      DIET: To avoid nausea, slowly advance diet as tolerated, avoiding spicy or greasy foods for the first day.  Add more substantial food to your diet according to your physician's instructions.  INCREASE FLUIDS AND FIBER TO AVOID CONSTIPATION.    SURGICAL DRESSING/BATHING: maintain dressing for 24 hours. Okay to remove and shower after that. Do not submerge in water for 1 week (hot tubs, baths, pools).    MEDICATIONS: Resume taking daily medication.  Take prescribed pain medication with food.  If no medication is prescribed, you may take non-aspirin pain medication if needed.  PAIN  MEDICATION CAN BE VERY CONSTIPATING.  Take a stool softener or laxative such as senokot, pericolace, or milk of magnesia if needed.    Prescription given for plavix (clopidogrel).     A follow-up appointment should be arranged with your doctor in 1-2 weeks; call to schedule.    You should CALL YOUR PHYSICIAN if you develop:  Fever greater than 101 degrees F.  Pain not relieved by medication, or persistent nausea or vomiting.  Excessive bleeding (blood soaking through dressing) or unexpected drainage from the wound.  Extreme redness or swelling around the incision site, drainage of pus or foul smelling drainage.  Inability to urinate or empty your bladder within 8 hours.  Problems with breathing or chest pain.    You should call 911 if you develop problems with breathing or chest pain.  If you are unable to contact your doctor or surgical center, you should go to the nearest emergency room or urgent care center.  Physician's telephone #: Dr. Wilkinson [phone].    MILD FLU-LIKE SYMPTOMS ARE NORMAL.  YOU MAY EXPERIENCE GENERALIZED MUSCLE ACHES, THROAT IRRITATION, HEADACHE AND/OR SOME NAUSEA.    If any questions arise, call your doctor.  If your doctor is not available, please feel free to call the Surgical Center at [phone].  The Center is open Monday through Friday from 7AM to 7PM.      A registered nurse may call you a few days after your surgery to see how you are doing after your procedure.    You may also receive a survey in the mail within the next two weeks and we ask that you take a few moments to complete the survey and return it to us.  Our goal is to provide you with very good care and we value your comments.

## 2023-07-11 NOTE — PROCEDURES
"CARDIAC CATHETERIZATION REPORT    Referring Provider: Paul Wilkinson M.D.    PROCEDURE PHYSICIAN: Paul Wilkinson MD, [facility], [facility]  ASSISTANT: None    IMPRESSIONS:  1.  Angina with obstructive one-vessel coronary disease  2.  Successful PCI of the distal circumflex using 1 drug-eluting stent, IVUS guidance, intravascular lithotripsy  3.  Mild elevation LVEDP at 22 mmHg    Recommendations:  Usual post PCI care  Plavix to start, aspirin to stop, resume Eliquis tomorrow morning    Pre-procedure diagnosis:  Refractory angina, high risk stress test  Post-procedure diagnosis: Same    Procedure performed  Selective coronary angiography  Left heart catheterization  Percutaneous coronary intervention - Stent Placement (RCA)  Intravascular Ultrasound (RCA)  Instantaneous wave free ratio (iFR) (RCA)  Intravascular lithotripsy    Conscious sedation was supervised by myself and administered by trained personnel using fentanyl and versed between 756 and 846. The patient tolerated sedation without complication.     Procedure Description  1. Access: 5/6 Hungarian right radial artery Micropuncture technique was utilized following local anesthesia with lidocaine.  A radial cocktail containing verapamil and saline was administered in the radial artery sheath    2. Diagnostic description: The catheter was passed to the central circulation with the aide of J tipped 0.35\" wire. A 5F TIG 4.0, 6F Pigtail, and 6F Multipurpose were used to inject the coronary circulation  and enter the left ventricle during invasive hemodynamic monitoring.     3. Description of Intervention: After confirming therapeutic anticoagulation intervention proceeded. A 6F Multipurpose guiding catheter was used. The lesion in the RCA was crossed with a 0.014\" Mena Omni wire.  The IFR read to 0.88 and identified the distal lesion as the culprit site.  I then performed angioplasty with the assistance of a guide extension catheter using a 3.5 NC balloon.  This however " ruptured at 20 fransico and had persistent waist.  I then used a 3.5 intravascular lithotripsy balloon which resulted in full expansion after 2 cycles.  I then delivered a 3.5 x 20 mm Synergy XD BRANDI at 16 fransico.  IVUS was performed which demonstrated excellent sizing distally but proximal undersizing.  I then returned with a 4.0 NC balloon and performed post dilation at 16 fransico.  Angiography showed excellent result    4. Closing: At completion of the procedure the relevant equipment was removed from the body and hemostasis achieved by Radial band    Findings   Hemodynamics:   Aorta: 142/71 mmHg  LVEDP: 22 mmHg    Coronary Anatomy   Left Main:  Patent stent from left main into LAD.     LAD:  Patent stent extending from the proximal left mainTortuous vessel but no significant disease.  The 2 diagonals are normal.   LCx:  Minimal luminal irregularities.  Small caliber vessel   RCA: Dominant, highly tortuous vessel.  50% stenosis within the walsh's crook proximally.  Distally there is a 70% stenosis.  The PDA and PLB are not well visualized but showed no obstructive disease.     Results of intervention to the RCA:  Pre: 70% stenosis and JEN III flow  Post: 0% residual stenosis and JEN III flow. No dissection or distal embolization.    Technical Factors  1. Complications: None  2. Estimated Blood Loss: <50 cc  3. Specimens: None  4. Contrast Volume: 35 ml  5. Medications: Radial cocktail (Verapamil 2.5 mg, Nitroglycerin 100 mcg) Heparin 5000 Units Bivalirudin Clopidogrel 600 mg  6. Radiation (Air Kerma): 823 mGy

## 2023-07-11 NOTE — OR NURSING
Assume care for pt in pre-op. Patient allergies and NPO status verified. Belongings secured.Patient verbalizes understanding of pain scale, expected course of stay and plan of care. Surgical procedure verified with patient. IV access established. FBS = 203. Dr. Wilkinson notified. Call light within reach. No further needs at this time. Hourly rounding in place.

## 2023-07-11 NOTE — OR NURSING
0902 Patient arrived to PACU from cath lab.  Report from anesthesia and RN.  Patient is awake and alert, denies pain.  TR band to right wrist is clean, dry and soft, patient denies numbness or tingling to hand.  Educated on wrist precautions.  Patient updated on plan of care.  0930 Belongings returned to patient.  Patient called ride and updated her.  1000 Access site clean, dry and soft.  Patient tolerating oral intake.   1015 2 ml air removed from TR band, no bleeding to site.   1030 Old blood noted to site, patient had moved wrist, no active bleeding seen.  1058 2 ml air removed from TR band, no new bleeding to site.   1116 2 ml air removed from TR band, no bleeding to site.   1132 3 ml air removed from TR band, no bleeding to site.   1148 3 ml air removed from TR band, no bleeding to site.   1204 3 ml air removed from TR band, no bleeding to site.   1220 3 ml air removed from TR band, no bleeding to site.   1239 3 ml air removed from TR band, no bleeding to site.   1252 Final 1 ml air removed from TR band, no bleeding to site.   1325 TR band removed, dressing placed to site.  1402 Report called to Luis RN.    1410 Patient transferred to Phase 2.

## 2023-07-12 ENCOUNTER — TELEPHONE (OUTPATIENT)
Dept: CARDIOLOGY | Facility: MEDICAL CENTER | Age: 83
End: 2023-07-12
Payer: MEDICARE

## 2023-07-12 NOTE — TELEPHONE ENCOUNTER
Returned patient's phone call. He states he has been taking a baby aspirin daily for awhile now, but his pharmacist told him to clarify now that he is on Plavix. Aspirin not listed in his current med list.     To LH: BE pt but off post call - Please advise if patient should stop taking the aspirin now that he is on plavix. He had a left heart cath yesterday. Thank you!

## 2023-07-12 NOTE — TELEPHONE ENCOUNTER
Pal Calderon R.N.  Patient had procedure done yesterday and has some questions regarding that procedure. Please call patient.     Patient called, [phone], and voicemail reached. Message left with number provided to return call.

## 2023-07-12 NOTE — TELEPHONE ENCOUNTER
Caller: LIAN More III    Topic/issue: RETURNING RN CALL    Callback Number: [phone] (home)

## 2023-07-12 NOTE — TELEPHONE ENCOUNTER
Patient called and updated on no longer needing to be on Aspirin. He asked if he needs to be seen sooner than his annual appointment in December, because he has some paperwork stating to have a [facility] within the next week or two post cath lab.    To LH: Patient had cath yesterday, but no notes in chart on when he needs to have follow up after procedure. Patient would like to know how how far out he should schedule that, as his discharge paper states to reach out within a week to schedule. Please advise, thank you!!

## 2023-07-18 ENCOUNTER — OFFICE VISIT (OUTPATIENT)
Dept: CARDIOLOGY | Facility: MEDICAL CENTER | Age: 83
End: 2023-07-18
Payer: MEDICARE

## 2023-07-18 VITALS
SYSTOLIC BLOOD PRESSURE: 118 MMHG | WEIGHT: 262 LBS | HEART RATE: 80 BPM | RESPIRATION RATE: 20 BRPM | BODY MASS INDEX: 34.72 KG/M2 | OXYGEN SATURATION: 93 % | HEIGHT: 73 IN | DIASTOLIC BLOOD PRESSURE: 62 MMHG

## 2023-07-18 DIAGNOSIS — I48.0 PAROXYSMAL ATRIAL FIBRILLATION (HCC): ICD-10-CM

## 2023-07-18 DIAGNOSIS — I10 ESSENTIAL HYPERTENSION: ICD-10-CM

## 2023-07-18 DIAGNOSIS — I25.84 CORONARY ARTERY DISEASE DUE TO CALCIFIED CORONARY LESION: Chronic | ICD-10-CM

## 2023-07-18 DIAGNOSIS — I50.22 CHF (CONGESTIVE HEART FAILURE), NYHA CLASS II, CHRONIC, SYSTOLIC (HCC): Chronic | ICD-10-CM

## 2023-07-18 DIAGNOSIS — I25.10 CORONARY ARTERY DISEASE DUE TO CALCIFIED CORONARY LESION: Chronic | ICD-10-CM

## 2023-07-18 DIAGNOSIS — E78.5 DYSLIPIDEMIA: Chronic | ICD-10-CM

## 2023-07-18 DIAGNOSIS — Z95.5 STENTED CORONARY ARTERY: ICD-10-CM

## 2023-07-18 DIAGNOSIS — Z95.2 S/P TAVR (TRANSCATHETER AORTIC VALVE REPLACEMENT): ICD-10-CM

## 2023-07-18 PROCEDURE — 3074F SYST BP LT 130 MM HG: CPT

## 2023-07-18 PROCEDURE — 3078F DIAST BP <80 MM HG: CPT

## 2023-07-18 PROCEDURE — 99214 OFFICE O/P EST MOD 30 MIN: CPT

## 2023-07-18 PROCEDURE — 99213 OFFICE O/P EST LOW 20 MIN: CPT

## 2023-07-18 PROCEDURE — 99212 OFFICE O/P EST SF 10 MIN: CPT

## 2023-07-18 RX ORDER — THIAMINE HYDROCHLORIDE 100 MG/ML
INJECTION, SOLUTION INTRAMUSCULAR; INTRAVENOUS
COMMUNITY

## 2023-07-18 RX ORDER — CYANOCOBALAMIN 1000 UG/ML
INJECTION, SOLUTION INTRAMUSCULAR; SUBCUTANEOUS
COMMUNITY

## 2023-07-18 RX ORDER — TESTOSTERONE CYPIONATE 200 MG/ML
INJECTION, SOLUTION INTRAMUSCULAR
COMMUNITY

## 2023-07-18 RX ORDER — LOSARTAN POTASSIUM 25 MG/1
25 TABLET ORAL DAILY
Qty: 100 TABLET | Refills: 3 | Status: SHIPPED | OUTPATIENT
Start: 2023-07-18 | End: 2024-03-05

## 2023-07-18 RX ORDER — CARVEDILOL 3.12 MG/1
3.12 TABLET ORAL 2 TIMES DAILY WITH MEALS
Qty: 200 TABLET | Refills: 3 | Status: SHIPPED | OUTPATIENT
Start: 2023-07-18

## 2023-07-18 RX ORDER — EZETIMIBE 10 MG/1
10 TABLET ORAL DAILY
Qty: 100 TABLET | Refills: 3 | Status: SHIPPED | OUTPATIENT
Start: 2023-07-18

## 2023-07-18 RX ORDER — VITAMIN E 268 MG
CAPSULE ORAL
COMMUNITY

## 2023-07-18 ASSESSMENT — ENCOUNTER SYMPTOMS
NEUROLOGICAL NEGATIVE: 1
MUSCULOSKELETAL NEGATIVE: 1
ORTHOPNEA: 0
GASTROINTESTINAL NEGATIVE: 1
DEPRESSION: 0
PND: 0
EYES NEGATIVE: 1
CONSTITUTIONAL NEGATIVE: 1
PALPITATIONS: 0
NERVOUS/ANXIOUS: 0
SHORTNESS OF BREATH: 1

## 2023-07-18 ASSESSMENT — FIBROSIS 4 INDEX: FIB4 SCORE: 1.6

## 2023-07-18 NOTE — PROGRESS NOTES
"Chief Complaint   Patient presents with    Chest Pain     F/V Dx: Precordial pain    Congestive Heart Failure     F/V Dx: CHF (congestive heart failure), NYHA class II, chronic, systolic ([facility])       Aman More III is a 82 y.o. male who presents today for follow up of his angiogram and stent placement. They have a history of systolic heart failure, recovered ejection fraction, left main and RCA PCI, TAVR, PAF, dual-chamber pacemaker/ICD, diabetes and 3B CKD    They are a patient of Dr. Wilkinson, last seen on 5/23/2023.     He underwent angiogram on 7/11/2023 and had BRANDI x1 to RCA    Since their last visit they have been feeling similar to before the procedure. He is limited by his neuropathy, uses a cane to ambulate.  No chest pain, he does have some lower extremity edema which is stable for him      Past Medical History:   Diagnosis Date    Arthritis 06/15/2023    \"everywhere\"    Breath shortness 06/15/2023    pt states short of breath 24/7 O2 at night only    CAD (coronary artery disease)     CATARACT 06/15/2023    removed bilat    Chest pain     Chest tightness     CHF (congestive heart failure), NYHA class II, chronic, systolic ([facility]) 12/26/2019    managed by cardiology Dr. Wilkinson: He appears euvolemic on physical exam.  He is on losartan, carvedilol, not on spironolactone due to low kidney function.  He was admitted to the hospital for CHF exacerbation, when he suddenly gained 15 pounds.  He was diuresed with IV Lasix at that time, had echocardiogram that showed significant improvement of his cardiac function, when compared to previous study    Chickenpox     Congestive heart failure (HCC)     Constipation     Coronary heart disease     Cough     Daytime sleepiness     Diabetes 06/15/2023    oral medication    Difficulty breathing     Dilated cardiomyopathy (HCC)     Fatigue     GERD (gastroesophageal reflux disease)     Hearing difficulty     Heart burn 06/15/2023    medicated    Heart murmur     Heart " valve disease 06/15/2023    replaced    Hemorrhagic disorder (HCC) 06/15/2023    eliquis    Hiatus hernia syndrome     High cholesterol 06/15/2023    medicated    History of BPH     History of chronic cough     Hyperlipidemia     Hypertension 06/15/2023    pt states well controlled on meds    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 Porter, gastroenterologist    Urinary incontinence 06/15/2023    at times     Past Surgical History:   Procedure Laterality Date    PB OPEN FIX INTER/SUBTROCH FX,IMPLNT Left 3/24/2022    Procedure: INSERTION, INTRAMEDULLARY LILLI, FEMUR, PROXIMAL - SHORT, FEMORAL;  Surgeon: Zen Srivastava M.D.;  Location: [facility];  Service: Orthopedics    LAP, REMOVE ADJUST LUIS RESTRICT D*  6/5/2020    Procedure: REMOVAL, GASTRIC BAND, LAPAROSCOPIC - ADJUSTABLE BAND AND PORT;  Surgeon: Deniz Rios M.D.;  Location: [facility];  Service: General    TRANSCATHETER AORTIC VALVE REPLACEMENT N/A 1/27/2020    Procedure: REPLACEMENT, AORTIC VALVE, TRANSCATHETER-;  Surgeon: Surendra Castro M.D.;  Location: [facility];  Service: Cardiac    OLGA  1/27/2020    Procedure: ECHOCARDIOGRAM, TRANSESOPHAGEAL;  Surgeon: Surendra Castro M.D.;  Location: [facility];  Service: Cardiac    GASTROSCOPY N/A 1/8/2016    Procedure: GASTROSCOPY;  Surgeon: Deniz Rios M.D.;  Location: [facility];  Service:     ORIF, FRACTURE, HUMERUS Left 6/25/2015    Procedure: HUMERUS ORIF/ PROXIMAL;  Surgeon: Cristal Guido M.D.;   Location: [facility];  Service:     FUSION, SPINE, LUMBAR, PLIF  9/5/2013    Performed by Edith Whelan M.D. at SURGERY [facility]    LUMBAR DECOMPRESSION  9/5/2013    Performed by Edith Whelan M.D. at SURGERY [facility]    MASS EXCISION NEURO  9/5/2013    Performed by Edith Whelan M.D. at SURGERY [facility]    RECOVERY  6/26/2012    Performed by SURGERY, IR-RECOVERY at SURGERY SAME DAY [facility]    DRAINAGE HEMATOMA  5/25/2012    Performed by SARINA SUE at [facility]    GASTRIC BAND LAPAROSCOPIC REVISION  5/11/2012    Performed by SARINA SUE at [facility]    FUSION, SPINE, LUMBAR, PLIF  3/26/2012    Performed by EDITH WHELAN at SURGERY [facility]    LUMBAR DECOMPRESSION  3/26/2012    Performed by EDITH WHELAN at SURGERY [facility]    INJ,EPIDURAL/LUMB/SAC SINGLE  3/19/2012    Performed by KRISTIN BALTAZAR at [facility]    INJ,EPIDURAL/LUMB/SAC SINGLE  3/5/2012    Performed by KRISTIN BALTAZAR at [facility]    GASTRIC BANDING LAPAROSCOPIC  3/24/2010    Performed by SARINA SUE at SURGERY [facility]    HIATAL HERNIA REPAIR  3/24/2010    Performed by SARINA SUE at SURGERY [facility]    KNEE ARTHROPLASTY TOTAL  2000    bilateral    ABDOMINOPLASTY  1990    AORTIC VALVE REPLACEMENT      ARTHROSCOPY, KNEE      LAMINOTOMY      OTHER CARDIAC SURGERY      stents    NC REMV 2ND CATARACT,CORN-SCLER SECTN      SINUSCOPE      SLEEVE,CARRIE VASO THIGH      TONSILLECTOMY      ZZZ CARDIAC CATH       Family History   Problem Relation Age of Onset    No Known Problems Sister     No Known Problems Sister     No Known Problems Sister     Diabetes Other     No Known Problems Mother     No Known Problems Father     Heart Disease Neg Hx      Social History     Socioeconomic History    Marital status:      Spouse name: Not on file    Number of children: Not on file    Years of education: Not on file     Highest education level: Bachelor's degree (e.g., BA, AB, BS)   Occupational History    Not on file   Tobacco Use    Smoking status: Never    Smokeless tobacco: Never    Tobacco comments:     0   Vaping Use    Vaping Use: Never used   Substance and Sexual Activity    Alcohol use: Not Currently     Alcohol/week: 0.0 oz    Drug use: Yes     Types: Marijuana, Oral     Comment: CBD 30 mg gummie daily    Sexual activity: Yes   Other Topics Concern    Not on file   Social History Narrative    Not on file     Social Determinants of Health     Financial Resource Strain: Low Risk  (3/12/2022)    Overall Financial Resource Strain (CARDIA)     Difficulty of Paying Living Expenses: Not very hard   Food Insecurity: No Food Insecurity (3/12/2022)    Hunger Vital Sign     Worried About Running Out of Food in the Last Year: Never true     Ran Out of Food in the Last Year: Never true   Transportation Needs: No Transportation Needs (3/12/2022)    PRAPARE - Transportation     Lack of Transportation (Medical): No     Lack of Transportation (Non-Medical): No   Physical Activity: Sufficiently Active (3/12/2022)    Exercise Vital Sign     Days of Exercise per Week: 3 days     Minutes of Exercise per Session: 150+ min   Stress: Stress Concern Present (3/12/2022)    [employer] [place] [employer] - Occupational Stress Questionnaire     Feeling of Stress : To some extent   Social Connections: Socially Isolated (3/12/2022)    Social Connection and Isolation Panel [NHANES]     Frequency of Communication with Friends and Family: Once a week     Frequency of Social Gatherings with Friends and Family: Never     Attends Gnosticist Services: Never     Active Member of Clubs or Organizations: No     Attends Club or Organization Meetings: Never     Marital Status:    Intimate Partner Violence: Not on file   Housing Stability: Low Risk  (3/12/2022)    Housing Stability Vital Sign     Unable to Pay for Housing in the Last Year: No      Number of Places Lived in the Last Year: 1     Unstable Housing in the Last Year: No     Allergies   Allergen Reactions    Statins [Hmg-Coa-R Inhibitors] Rash     Blisters      Atorvastatin Hives     Selected as representative agent for class- please do not delete     Outpatient Encounter Medications as of 7/18/2023   Medication Sig Dispense Refill    testosterone cypionate (DEPO-TESTOSTERONE) 200 MG/ML Solution injection INJECT 0.8 ML INTRAMUSCULARLY ONCE WEEKLY      hydrocortisone 2.5 % Cream topical cream APPLY 1 APPLICATION TO AFFECTED AREA ONCE DAILY      cyanocobalamin (VITAMIN B-12) 1000 MCG/ML Solution INJECT 1 ML INTRAMUSCULARLY ONCE A WEEK      vitamin E 180 MG (400 UNIT) Cap Take by oral route.      thiamine (B-1) 100 MG/ML Solution Take by injection route.      L-Lysine 1000 MG Tab Take by oral route.      clopidogrel (PLAVIX) 75 MG Tab Take 1 Tablet by mouth every day. 100 Tablet 3    ELIQUIS 5 MG Tab Take 1 tablet by mouth twice daily 180 Tablet 3    losartan (COZAAR) 25 MG Tab Take 1 tablet by mouth once daily 90 Tablet 3    GLIPIZIDE PO Take 25 mg by mouth every day.      carvedilol (COREG) 3.125 MG Tab TAKE 1 TABLET BY MOUTH TWICE DAILY WITH MEALS 180 Tablet 1    bumetanide (BUMEX) 1 MG Tab Take 2 tablets by mouth twice daily (Patient taking differently: Take 2 mg by mouth 2 times a day. 2 mg = 2 tab) 360 Tablet 3    ezetimibe (ZETIA) 10 MG Tab Take 1 tablet by mouth once daily 90 Tablet 3    Calcium Citrate (CITRACAL PO) Take 1 Tab by mouth 2 Times a Day.      coenzyme Q-10 30 MG capsule Take 30 mg by mouth 2 times a day.      omeprazole (PRILOSEC) 40 MG delayed-release capsule Take 1 Capsule by mouth 2 times a day.      Cholecalciferol (VITAMIN D3) 2000 UNIT Cap Take 1 Capsule by mouth 2 times a day.      B Complex Vitamins (VITAMIN B COMPLEX PO) Take 1 Tab by mouth every day.      Ascorbic Acid (VITAMIN C) 1000 MG Tab Take 1 Tablet by mouth 2 times a day.      [DISCONTINUED] Home Care Oxygen  "Inhale 2 L/min by mouth Continuous.   Respiratory route via: Nasal Cannula   Oxygen supplier: [facility]  Indications: Hypoxia, to keep O2 sat over 90%       No facility-administered encounter medications on file as of 2023.     Review of Systems   Constitutional: Negative.    HENT: Negative.     Eyes: Negative.    Respiratory:  Positive for shortness of breath.    Cardiovascular:  Positive for leg swelling. Negative for chest pain, palpitations, orthopnea and PND.   Gastrointestinal: Negative.    Genitourinary: Negative.    Musculoskeletal: Negative.    Skin: Negative.    Neurological: Negative.    Endo/Heme/Allergies: Negative.    Psychiatric/Behavioral:  Negative for depression. The patient is not nervous/anxious.               Objective     /62 (BP Location: Left arm, Patient Position: Sitting, BP Cuff Size: Adult)   Pulse 80   Resp 20   Ht 1.854 m (6' 1\")   Wt 119 kg (262 lb)   SpO2 93%   BMI 34.57 kg/m²     Physical Exam  Constitutional:       Appearance: Normal appearance. He is obese.   HENT:      Head: Normocephalic.   Neck:      Vascular: No JVD.   Cardiovascular:      Rate and Rhythm: Normal rate and regular rhythm.      Pulses: Normal pulses.      Heart sounds: Normal heart sounds. No murmur heard.     No friction rub.   Pulmonary:      Effort: Pulmonary effort is normal.      Breath sounds: Normal breath sounds.   Abdominal:      Palpations: Abdomen is soft.   Musculoskeletal:         General: Normal range of motion.      Right lower le+ Edema present.      Left lower le+ Edema present.   Skin:     General: Skin is warm and dry.   Neurological:      General: No focal deficit present.      Mental Status: He is alert and oriented to person, place, and time.   Psychiatric:         Mood and Affect: Mood normal.         Behavior: Behavior normal.            Lab Results   Component Value Date/Time    CHOLSTRLTOT 97 (L) 06/15/2023 11:28 AM    LDL 46 06/15/2023 11:28 AM    HDL 37 (A) 06/15/2023 " 11:28 AM    TRIGLYCERIDE 71 06/15/2023 11:28 AM       Lab Results   Component Value Date/Time    SODIUM 138 07/10/2023 11:08 AM    POTASSIUM 4.6 07/10/2023 11:08 AM    CHLORIDE 95 (L) 07/10/2023 11:08 AM    CO2 29 07/10/2023 11:08 AM    GLUCOSE 245 (H) 07/10/2023 11:08 AM    BUN 48 (H) 07/10/2023 11:08 AM    CREATININE 2.79 (H) 07/10/2023 11:08 AM    CREATININE 1.18 02/08/2011 12:00 AM    BUNCREATRAT 16 02/08/2011 12:00 AM    GLOMRATE >59 02/08/2011 12:00 AM     Lab Results   Component Value Date/Time    ALKPHOSPHAT 59 07/10/2023 11:08 AM    ASTSGOT 19 07/10/2023 11:08 AM    ALTSGPT 28 07/10/2023 11:08 AM    TBILIRUBIN 0.5 07/10/2023 11:08 AM      Echocardiogram 2/15/2022  CONCLUSIONS  Compared to the prior study 02/24/2021 the LVEF is now normal.  Normal left ventricular chamber size.  The left ventricular ejection fraction is visually estimated to be 55%.  Normal diastolic function.  The right ventricle is normal in size and systolic function.  Normal inferior vena cava size and inspiratory collapse.  Known TAVR aortic valve that is functioning normally with normal   transvalvular gradients.    Angiogram 7/11/2023  IMPRESSIONS:  1.  Angina with obstructive one-vessel coronary disease  2.  Successful PCI of the distal circumflex using 1 drug-eluting stent, IVUS guidance, intravascular lithotripsy  3.  Mild elevation LVEDP at 22 mmHg     Recommendations:  Usual post PCI care  Plavix to start, aspirin to stop, resume Eliquis tomorrow morning     Pre-procedure diagnosis:  Refractory angina, high risk stress test  Post-procedure diagnosis: Same    Hemodynamics:   Aorta: 142/71 mmHg  LVEDP: 22 mmHg     Coronary Anatomy              Left Main:  Patent stent from left main into LAD.                LAD:  Patent stent extending from the proximal left mainTortuous vessel but no significant disease.  The 2 diagonals are normal.              LCx:  Minimal luminal irregularities.  Small caliber vessel              RCA:  Dominant, highly tortuous vessel.  50% stenosis within the walsh's crook proximally.  Distally there is a 70% stenosis.  The PDA and PLB are not well visualized but showed no obstructive disease.    EKG 7/11/2023  Sinus rhythm   Prolonged ND interval   Premature ventricular complex   Incomplete left bundle branch block   Nonspecific ST-T wave changes  Rate 68, 0.254/0.116/0.453      Assessment & Plan     1. Stented coronary artery        2. Paroxysmal atrial fibrillation (HCC)        3. Dyslipidemia        4. Coronary artery disease due to calcified coronary lesion        5. CHF (congestive heart failure), NYHA class II, chronic, systolic ([facility])        6. S/P TAVR (transcatheter aortic valve replacement)        7. Essential hypertension            Medical Decision Making: Today's Assessment/Status/Plan:        CAD status post BRANDI to LAD, status post BRANDI to RCA on 7/11/2023  -Plavix and Eliquis  -Zetia  -BB: Carvedilol 3.125 mg twice daily  -EF 55 at last check  -Losartan 25 mg daily  -Cardiac rehab referral placed  - Education provided  -Discussed returning to ER if chest pain returns and/or call cardiology office at [phone] with any additional new or worsening symptoms  -Discussed CV risk factor modification including cholesterol management, blood pressure management, smoking cessation, diet and lifestyle changes.    PAF  -continue Eliquis and carvedilol    Dyslipidemia  -Well-controlled on Zetia    Chronic systolic heart failure with recovered EF  -Continue carvedilol, losartan, Bumex  -Close to euvolemic on exam, some mild bilateral lower extremity edema  -Echocardiogram ordered    Status post TAVR  -Plavix and Eliquis  -SBE, understands  -Echo from 2022 showed normal valve function  -Repeat echocardiogram ordered    Hypertension  -Controlled on above medications    Follow-up with Dr. Wlikinson in December scheduled    This note was dictated using Dragon speech recognition software.

## 2023-09-01 ENCOUNTER — TELEPHONE (OUTPATIENT)
Dept: CARDIOLOGY | Facility: MEDICAL CENTER | Age: 83
End: 2023-09-01
Payer: MEDICARE

## 2023-09-01 DIAGNOSIS — E11.65 TYPE 2 DIABETES MELLITUS WITH HYPERGLYCEMIA, WITHOUT LONG-TERM CURRENT USE OF INSULIN (HCC): ICD-10-CM

## 2023-09-01 NOTE — TELEPHONE ENCOUNTER
Caller: LIAN More III      Topic/issue: Patient was call about a referral for another doctor and they havent received it and he was asking for a call back      Callback Number: [phone]      Thank you    -Raphael SZYMANSKI

## 2023-09-06 NOTE — TELEPHONE ENCOUNTER
Returned patient call. Patient states he needs referral to see endocrinologist, Dr. Isabell Patrick.   Patient does not have a PCP and has asked our office to send this in for him as a courtesy.     LH: Okay to place referral? Thank you.

## 2023-09-08 ENCOUNTER — NON-PROVIDER VISIT (OUTPATIENT)
Dept: CARDIOLOGY | Facility: MEDICAL CENTER | Age: 83
End: 2023-09-08
Payer: MEDICARE

## 2023-09-08 PROCEDURE — 93295 DEV INTERROG REMOTE 1/2/MLT: CPT | Performed by: INTERNAL MEDICINE

## 2023-09-11 NOTE — CARDIAC REMOTE MONITOR - SCAN
Device transmission reviewed. Device demonstrated appropriate function.       Electronically Signed by: Augustus Arreguin M.D.    9/12/2023  9:31 AM

## 2023-09-19 NOTE — TELEPHONE ENCOUNTER
Caller: LIAN FARNSWORTH Means III    Topic/issue: MEDICAL ADVICE    LEON would like a call back to discuss the referral that was placed. Please advise.    Thank you,  Ethan JOSEPH    Callback Number: [phone] (home)

## 2023-09-19 NOTE — TELEPHONE ENCOUNTER
Phone Number Called: [phone]    Call outcome: Spoke to patient regarding message below.    Message: Patient unable to schedule an appointment with endocrinology as office says they did not receive a referral. This RN [facility] message with [facility] to see about status of referral. Unable to get in contact [facility], left message to call this RN back. Resent referral under different provider at this time. This RN able to get in contact with representative from [facility] and informed that did not need to send extra paperwork at this time.

## 2023-09-25 ENCOUNTER — NON-PROVIDER VISIT (OUTPATIENT)
Dept: CARDIOLOGY | Facility: MEDICAL CENTER | Age: 83
End: 2023-09-25
Payer: MEDICARE

## 2023-09-25 DIAGNOSIS — Z95.5 S/P CORONARY ARTERY STENT PLACEMENT: Primary | ICD-10-CM

## 2023-09-25 PROCEDURE — G0422 INTENS CARDIAC REHAB W/EXERC: HCPCS | Mod: KX | Performed by: INTERNAL MEDICINE

## 2023-09-25 PROCEDURE — G0423 INTENS CARDIAC REHAB NO EXER: HCPCS | Mod: 59,KX | Performed by: INTERNAL MEDICINE

## 2023-09-25 NOTE — PROGRESS NOTES
Patient arrived for initial 1:1 Intensive Cardiac Rehabilitation Consultation and Education session with the Registered Nurse.  A total of 60 minutes was spent with the patient during which time a focused cardiovascular assessment was completed and musculoskeletal limitations were addressed in preparation to safely start the exercise portion of the program.  Education regarding the program was explained including exercise goals, precautions, and target heart rate during exercise.  Nutrition goals were reviewed and patient was introduced to the [facility] Program.  Stress management goals were dicussed and patient concerns and questions were answered at this time. Patient arrived for education at 1400 and visit was concluded at 1500.  Exercise time was from 1500 to 1600.

## 2023-09-26 ENCOUNTER — TELEPHONE (OUTPATIENT)
Dept: CARDIOLOGY | Facility: MEDICAL CENTER | Age: 83
End: 2023-09-26

## 2023-09-26 ENCOUNTER — NON-PROVIDER VISIT (OUTPATIENT)
Dept: CARDIOLOGY | Facility: MEDICAL CENTER | Age: 83
End: 2023-09-26
Payer: MEDICARE

## 2023-09-26 DIAGNOSIS — E11.65 TYPE 2 DIABETES MELLITUS WITH HYPERGLYCEMIA, WITHOUT LONG-TERM CURRENT USE OF INSULIN (HCC): ICD-10-CM

## 2023-09-26 DIAGNOSIS — Z95.5 S/P CORONARY ARTERY STENT PLACEMENT: ICD-10-CM

## 2023-09-26 PROCEDURE — G0423 INTENS CARDIAC REHAB NO EXER: HCPCS | Mod: 59,KX | Performed by: INTERNAL MEDICINE

## 2023-09-26 PROCEDURE — G0422 INTENS CARDIAC REHAB W/EXERC: HCPCS | Mod: KX | Performed by: INTERNAL MEDICINE

## 2023-09-26 NOTE — TELEPHONE ENCOUNTER
Caller: LIAN More III      Topic/issue: Patient was returning our call about a referral and was asking for a call back      Callback Number: [phone]        Thank you    -Raphael SZYMANSKI

## 2023-09-26 NOTE — TELEPHONE ENCOUNTER
Phone Number Called: [phone]    Call outcome: Spoke to patient regarding message below.    Message: Referral to endocrinology did not go through to [facility]. New referral placed at this time as patient needs follow up for diabetes management.

## 2023-09-26 NOTE — PROGRESS NOTES
LIAN More III attended Intensive Cardiac Rehab today from 0800 to 1000. During his time he exercised and attended class.   His education today was a video titled: Dining Out. Patient received handouts and class discussion pertaining to the topic.

## 2023-09-27 ENCOUNTER — NON-PROVIDER VISIT (OUTPATIENT)
Dept: CARDIOLOGY | Facility: MEDICAL CENTER | Age: 83
End: 2023-09-27
Payer: MEDICARE

## 2023-09-27 DIAGNOSIS — Z95.5 S/P CORONARY ARTERY STENT PLACEMENT: ICD-10-CM

## 2023-09-27 PROCEDURE — G0423 INTENS CARDIAC REHAB NO EXER: HCPCS | Mod: 59,KX | Performed by: INTERNAL MEDICINE

## 2023-09-27 PROCEDURE — G0422 INTENS CARDIAC REHAB W/EXERC: HCPCS | Mod: KX | Performed by: INTERNAL MEDICINE

## 2023-09-27 NOTE — PROGRESS NOTES
LIAN More III attended Intensive Cardiac Rehab today from 0800 to 1000. During his time he exercised and attended class.   His education today was a nutrition class titled: Comforting Breakfasts. Patient received handouts and class discussion pertaining to the topic.

## 2023-10-02 NOTE — TELEPHONE ENCOUNTER
Caller: LIAN FARNSWORTH Means III    Topic/issue: MEDICAL ADVICE    LIAN FARNSWORTH states that RN called today to discuss a referral that was placed, however looking at pts chart no encounter was placed for today. Please advise.    Thank you,  Ethan JOSEPH    Callback Number: [phone] (home)

## 2023-10-02 NOTE — TELEPHONE ENCOUNTER
Phone Number Called: [phone]    Call outcome: Spoke to patient regarding message below.    Message: Spoke to patient about endocrinology referral. Updated patient that referral has been authorized and to call [facility] to schedule.

## 2023-10-03 ENCOUNTER — NON-PROVIDER VISIT (OUTPATIENT)
Dept: CARDIOLOGY | Facility: MEDICAL CENTER | Age: 83
End: 2023-10-03
Payer: MEDICARE

## 2023-10-03 ENCOUNTER — TELEPHONE (OUTPATIENT)
Dept: CARDIOLOGY | Facility: MEDICAL CENTER | Age: 83
End: 2023-10-03

## 2023-10-03 DIAGNOSIS — Z95.5 S/P CORONARY ARTERY STENT PLACEMENT: ICD-10-CM

## 2023-10-03 PROCEDURE — G0423 INTENS CARDIAC REHAB NO EXER: HCPCS | Mod: 59,KX | Performed by: INTERNAL MEDICINE

## 2023-10-03 PROCEDURE — G0422 INTENS CARDIAC REHAB W/EXERC: HCPCS | Mod: KX | Performed by: INTERNAL MEDICINE

## 2023-10-03 NOTE — PROGRESS NOTES
ED TO INPATIENT SBAR HANDOFF    Patient Name: Kelvin Bronson   :  1980  38 y.o. MRN:  [id number]  Preferred Name  [facility], [facility]  ED Room #:  ED26/ED-26  Family/Caregiver Present no   Restraints no   Sitter no   Sepsis Risk Score Sepsis Risk Score: 1.83    Situation  Code Status: Prior No additional code details. Allergies: Paxil [paroxetine hcl]  Weight: Patient Vitals for the past 96 hrs (Last 3 readings):   Weight   22 1121 260 lb (117.9 kg)     Arrived from: home  Chief Complaint:   Chief Complaint   Patient presents with    Abdominal Pain     Pt states concerned for Liver issues due to feeling fatigued, weak, and feels like more yellowing. No hx of liver issues. Hospital Problem/Diagnosis:  Principal Problem:    Alcohol withdrawal syndrome, with unspecified complication ([facility] [address]  Resolved Problems:    * No resolved hospital problems. *    Imaging:   CT ABDOMEN PELVIS W IV CONTRAST Additional Contrast? None   Final Result   Cirrhosis with portal hypertension. Hepatosplenomegaly. 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 Mild edema seen at the root of the mesentery which is likely related to the   portal hypertension. Otherwise, no acute abnormality detected. Uncomplicated cholelithiasis. XR CHEST (2 VW)   Final Result   No active pulmonary or pleural disease. Cardiomegaly. Findings consistent with old granulomatous disease.            Abnormal labs:   Abnormal Labs Reviewed   CBC WITH AUTO DIFFERENTIAL - Abnormal; Notable for the following components:       Result Value    WBC 14.7 (*)     RBC 3.81 (*)     Hemoglobin 11.8 (*)     Hematocrit 34.5 (*)     RDW 21.8 (*)     Platelets 604 (*)     Segs Relative 68.5 (*)     Lymphocytes % 13.3 (*)     Monocytes % 12.3 (*)     Eosinophils % 4.1 (*)     Immature Neutrophil % 0.9 (*)     All other components within normal limits   COMPREHENSIVE LIAN More III attended Intensive Cardiac Rehab today from 0800 to 1000. During his time he exercised and attended class.   His education today was a VIDEO titled: HEALTHY MINDS BODIES AND HEARTS. Patient received handouts and class discussion pertaining to the topic.      METABOLIC PANEL - Abnormal; Notable for the following components:    Sodium 125 (*)     Potassium 3.3 (*)     Chloride 86 (*)     Creatinine 0.6 (*)     Glucose 114 (*)     Total Protein 9.5 (*)     Total Bilirubin 11.3 (*)      (*)     Alkaline Phosphatase 203 (*)     All other components within normal limits   ACETAMINOPHEN LEVEL - Abnormal; Notable for the following components:    Acetaminophen Level <5.0 (*)     All other components within normal limits   SALICYLATE LEVEL - Abnormal; Notable for the following components:    Salicylate Lvl 2.0 (*)     All other components within normal limits     Critical values: no     Abnormal Assessment Findings: Jaundice, bilateral leg edema, right broken foot from previous fall.      Background  History:   Past Medical History:   Diagnosis Date    Hypercholesterolemia 2/18/2019    Hypertension     Local infection of skin and subcutaneous tissue 5/13/2022    Type 2 diabetes, controlled, with neuropathy ([facility] [address] 6/6/2018    WD-Diabetic ulcer of toe of left foot associated with type 2 diabetes mellitus, with fat layer exposed ([facility] [address] 5/13/2022    WD-Lisfranc dislocation, left, initial encounter 9/2/2022       Assessment    Vitals/MEWS: MEWS Score: 3  Level of Consciousness: Alert (0)   Vitals:    11/16/22 1217 11/16/22 1258 11/16/22 1522 11/16/22 1526   BP: (!) 154/99 (!) 183/92 (!) 155/94 (!) 155/94   Pulse: (!) 117 (!) 101 (!) 101 (!) 115   Resp:    18   Temp:    99 °F (37.2 °C)   TempSrc:       SpO2:    99%   Weight:       Height:         FiO2 (%):   O2 Flow Rate: O2 Device: None (Room air)    Cardiac Rhythm:    Pain Assessment: 8 [x] Verbal [] [place] Scale  Pain Scale: Pain Assessment  Pain Level: 8  Last documented pain score (0-10 scale) Pain Level: 8  Last documented pain medication administered: 1509  Mental Status: oriented, alert, coherent, logical, thought processes intact and able to concentrate and follow conversation  NIH Score:    C-SSRS: Risk of Suicide: No Risk  Bedside swallow:    Andrea Coma Scale (GCS): Andrea Coma Scale  Eye Opening: Spontaneous  Best Verbal Response: Oriented  Best Motor Response: Obeys commands  [place] Coma Scale Score: 15  Active LDA's:    PO Status: Regular  Pertinent or High Risk Medications/Drips: no   o If Yes, please provide details: n/a  Pending Blood Product Administration: no     You may also review the ED PT Care Timeline found under the Summary Nursing Index tab. Recommendation    Pending orders   Plan for Discharge (if known): Additional Comments: Pt's last drink of etoh was 1 week ago. Pt's CIWA was a 9.  Medicated with ativan    If any further questions, please call Sending RN at [phone]    Electronically signed by: Electronically signed by Phillip Hackett RN on 11/16/2022 at 3:28 PM     Phillip Hackett RN  11/16/22 8290

## 2023-10-04 ENCOUNTER — NON-PROVIDER VISIT (OUTPATIENT)
Dept: CARDIOLOGY | Facility: MEDICAL CENTER | Age: 83
End: 2023-10-04
Payer: MEDICARE

## 2023-10-04 DIAGNOSIS — Z95.5 S/P CORONARY ARTERY STENT PLACEMENT: ICD-10-CM

## 2023-10-04 PROCEDURE — G0422 INTENS CARDIAC REHAB W/EXERC: HCPCS | Mod: KX | Performed by: INTERNAL MEDICINE

## 2023-10-04 PROCEDURE — G0423 INTENS CARDIAC REHAB NO EXER: HCPCS | Mod: 59,KX | Performed by: INTERNAL MEDICINE

## 2023-10-04 NOTE — PROGRESS NOTES
LIAN More III attended Intensive Cardiac Rehab today from 0800 to 1000. During his time he exercised and attended class.   His education today was a nutrition and cooking class titled: Meals In A Snap. Patient received handouts and class discussion pertaining to the topic.

## 2023-10-05 ENCOUNTER — NON-PROVIDER VISIT (OUTPATIENT)
Dept: CARDIOLOGY | Facility: MEDICAL CENTER | Age: 83
End: 2023-10-05
Payer: MEDICARE

## 2023-10-05 DIAGNOSIS — Z95.5 S/P CORONARY ARTERY STENT PLACEMENT: ICD-10-CM

## 2023-10-05 PROCEDURE — G0422 INTENS CARDIAC REHAB W/EXERC: HCPCS | Mod: KX | Performed by: INTERNAL MEDICINE

## 2023-10-05 PROCEDURE — G0423 INTENS CARDIAC REHAB NO EXER: HCPCS | Mod: 59,KX | Performed by: INTERNAL MEDICINE

## 2023-10-05 NOTE — PROGRESS NOTES
LIAN More III attended Intensive Cardiac Rehab today from 0800 to 1000. During his time he exercised and attended class.   His education today was a workshop titled: new thoughts and behaviors. Patient received handouts and class discussion pertaining to the topic.

## 2023-10-10 ENCOUNTER — NON-PROVIDER VISIT (OUTPATIENT)
Dept: CARDIOLOGY | Facility: MEDICAL CENTER | Age: 83
End: 2023-10-10
Payer: MEDICARE

## 2023-10-10 DIAGNOSIS — Z95.5 S/P CORONARY ARTERY STENT PLACEMENT: ICD-10-CM

## 2023-10-10 PROCEDURE — G0422 INTENS CARDIAC REHAB W/EXERC: HCPCS | Mod: KX | Performed by: INTERNAL MEDICINE

## 2023-10-10 PROCEDURE — G0423 INTENS CARDIAC REHAB NO EXER: HCPCS | Mod: 59,KX | Performed by: INTERNAL MEDICINE

## 2023-10-10 NOTE — PROGRESS NOTES
LIAN More III attended Intensive Cardiac Rehab today from 0800 to 1000. During his time he exercised and attended class.   His education today was a video titled: Decoding Lab Results. Patient received handouts and class discussion pertaining to the topic.

## 2023-10-11 ENCOUNTER — NON-PROVIDER VISIT (OUTPATIENT)
Dept: CARDIOLOGY | Facility: MEDICAL CENTER | Age: 83
End: 2023-10-11
Payer: MEDICARE

## 2023-10-11 DIAGNOSIS — Z95.5 S/P CORONARY ARTERY STENT PLACEMENT: ICD-10-CM

## 2023-10-11 PROCEDURE — G0423 INTENS CARDIAC REHAB NO EXER: HCPCS | Mod: 59,KX | Performed by: INTERNAL MEDICINE

## 2023-10-11 PROCEDURE — G0422 INTENS CARDIAC REHAB W/EXERC: HCPCS | Mod: KX | Performed by: INTERNAL MEDICINE

## 2023-10-11 NOTE — PROGRESS NOTES
LIAN More III attended Intensive Cardiac Rehab today from 0800 to 1000. During his time he exercised and attended class.   His education today was a nutrition and cooking class titled: One Pot Wonders. Patient received handouts and class discussion pertaining to the topic.

## 2023-10-12 ENCOUNTER — NON-PROVIDER VISIT (OUTPATIENT)
Dept: CARDIOLOGY | Facility: MEDICAL CENTER | Age: 83
End: 2023-10-12
Payer: MEDICARE

## 2023-10-12 DIAGNOSIS — Z95.5 S/P CORONARY ARTERY STENT PLACEMENT: ICD-10-CM

## 2023-10-12 PROCEDURE — G0422 INTENS CARDIAC REHAB W/EXERC: HCPCS | Mod: KX | Performed by: INTERNAL MEDICINE

## 2023-10-12 PROCEDURE — G0423 INTENS CARDIAC REHAB NO EXER: HCPCS | Mod: 59,KX | Performed by: INTERNAL MEDICINE

## 2023-10-12 NOTE — PROGRESS NOTES
LIAN More III attended Intensive Cardiac Rehab today from 0800 to 1000. During his time he exercised and attended class.   His education today was a workshop titled: Mindful Eating. Patient received handouts and class discussion pertaining to the topic.

## 2023-10-17 ENCOUNTER — NON-PROVIDER VISIT (OUTPATIENT)
Dept: CARDIOLOGY | Facility: MEDICAL CENTER | Age: 83
End: 2023-10-17
Payer: MEDICARE

## 2023-10-17 DIAGNOSIS — Z95.5 S/P CORONARY ARTERY STENT PLACEMENT: ICD-10-CM

## 2023-10-17 PROCEDURE — G0423 INTENS CARDIAC REHAB NO EXER: HCPCS | Mod: 59,KX | Performed by: INTERNAL MEDICINE

## 2023-10-17 PROCEDURE — G0422 INTENS CARDIAC REHAB W/EXERC: HCPCS | Mod: KX | Performed by: INTERNAL MEDICINE

## 2023-10-17 NOTE — PROGRESS NOTES
LIAN More III attended Intensive Cardiac Rehab today from 0800 to 1000. During his time he exercised and attended class.   His education today was a video titled: Metabolic Syndrom and Belly Fat. Patient received handouts and class discussion pertaining to the topic.

## 2023-10-18 ENCOUNTER — NON-PROVIDER VISIT (OUTPATIENT)
Dept: CARDIOLOGY | Facility: MEDICAL CENTER | Age: 83
End: 2023-10-18
Payer: MEDICARE

## 2023-10-18 ENCOUNTER — TELEPHONE (OUTPATIENT)
Dept: CARDIOLOGY | Facility: MEDICAL CENTER | Age: 83
End: 2023-10-18

## 2023-10-18 DIAGNOSIS — Z95.5 S/P CORONARY ARTERY STENT PLACEMENT: ICD-10-CM

## 2023-10-18 PROCEDURE — G0422 INTENS CARDIAC REHAB W/EXERC: HCPCS | Mod: KX | Performed by: INTERNAL MEDICINE

## 2023-10-18 PROCEDURE — G0423 INTENS CARDIAC REHAB NO EXER: HCPCS | Mod: 59,KX | Performed by: INTERNAL MEDICINE

## 2023-10-18 NOTE — PROGRESS NOTES
LIAN More III attended Intensive Cardiac Rehab today from 0800 to 1000. During his time he exercised and attended class.   His education today was a nutrition and cooking class titled: Adding Flavor Sodium Free. Patient received handouts and class discussion pertaining to the topic.

## 2023-10-19 ENCOUNTER — NON-PROVIDER VISIT (OUTPATIENT)
Dept: CARDIOLOGY | Facility: MEDICAL CENTER | Age: 83
End: 2023-10-19
Payer: MEDICARE

## 2023-10-19 DIAGNOSIS — Z95.5 S/P CORONARY ARTERY STENT PLACEMENT: ICD-10-CM

## 2023-10-19 PROCEDURE — G0423 INTENS CARDIAC REHAB NO EXER: HCPCS | Mod: 59,KX | Performed by: INTERNAL MEDICINE

## 2023-10-19 PROCEDURE — G0422 INTENS CARDIAC REHAB W/EXERC: HCPCS | Mod: KX | Performed by: INTERNAL MEDICINE

## 2023-10-19 NOTE — PROGRESS NOTES
LIAN More III attended Intensive Cardiac Rehab today from 0800 to 1000. During his time he exercised and attended class.   His education today was a lecture titled: Building Your Action Plan. Patient received handouts and class discussion pertaining to the topic.

## 2023-10-24 ENCOUNTER — NON-PROVIDER VISIT (OUTPATIENT)
Dept: CARDIOLOGY | Facility: MEDICAL CENTER | Age: 83
End: 2023-10-24
Payer: MEDICARE

## 2023-10-24 DIAGNOSIS — Z95.5 S/P CORONARY ARTERY STENT PLACEMENT: ICD-10-CM

## 2023-10-24 PROCEDURE — G0422 INTENS CARDIAC REHAB W/EXERC: HCPCS | Mod: KX | Performed by: INTERNAL MEDICINE

## 2023-10-24 PROCEDURE — G0423 INTENS CARDIAC REHAB NO EXER: HCPCS | Mod: 59,KX | Performed by: INTERNAL MEDICINE

## 2023-10-24 NOTE — PROGRESS NOTES
LIAN More III attended Intensive Cardiac Rehab today from 0900 to 1100. During his time he exercised and attended class.   His education today was a video titled: Improve Performance. Patient received handouts and class discussion pertaining to the topic.

## 2023-10-25 ENCOUNTER — NON-PROVIDER VISIT (OUTPATIENT)
Dept: CARDIOLOGY | Facility: MEDICAL CENTER | Age: 83
End: 2023-10-25
Payer: MEDICARE

## 2023-10-25 DIAGNOSIS — Z95.5 S/P CORONARY ARTERY STENT PLACEMENT: ICD-10-CM

## 2023-10-25 PROCEDURE — G0423 INTENS CARDIAC REHAB NO EXER: HCPCS | Mod: 59,KX | Performed by: INTERNAL MEDICINE

## 2023-10-25 PROCEDURE — G0422 INTENS CARDIAC REHAB W/EXERC: HCPCS | Mod: KX | Performed by: INTERNAL MEDICINE

## 2023-10-25 NOTE — PROGRESS NOTES
LIAN More III attended Intensive Cardiac Rehab today from 0900 to 1100. During his time he exercised and attended class.   His education today was a cooking school titled: Fast and Healthy Breakfasts. Patient received handouts and class discussion pertaining to the topic.

## 2023-10-26 ENCOUNTER — NON-PROVIDER VISIT (OUTPATIENT)
Dept: CARDIOLOGY | Facility: MEDICAL CENTER | Age: 83
End: 2023-10-26
Payer: MEDICARE

## 2023-10-26 DIAGNOSIS — Z95.5 S/P CORONARY ARTERY STENT PLACEMENT: ICD-10-CM

## 2023-10-26 PROCEDURE — G0423 INTENS CARDIAC REHAB NO EXER: HCPCS | Mod: 59,KX | Performed by: INTERNAL MEDICINE

## 2023-10-26 PROCEDURE — G0422 INTENS CARDIAC REHAB W/EXERC: HCPCS | Mod: KX | Performed by: INTERNAL MEDICINE

## 2023-10-26 NOTE — PROGRESS NOTES
LIAN More III attended Intensive Cardiac Rehab today from 0900 to 1100. During his time he exercised and attended class.   His education today was a workshop titled: Armand. Patient received handouts and class discussion pertaining to the topic.

## 2023-10-31 ENCOUNTER — NON-PROVIDER VISIT (OUTPATIENT)
Dept: CARDIOLOGY | Facility: MEDICAL CENTER | Age: 83
End: 2023-10-31
Payer: MEDICARE

## 2023-10-31 DIAGNOSIS — Z95.5 S/P CORONARY ARTERY STENT PLACEMENT: ICD-10-CM

## 2023-10-31 PROCEDURE — G0422 INTENS CARDIAC REHAB W/EXERC: HCPCS | Mod: KX | Performed by: INTERNAL MEDICINE

## 2023-10-31 PROCEDURE — G0423 INTENS CARDIAC REHAB NO EXER: HCPCS | Mod: 59,KX | Performed by: INTERNAL MEDICINE

## 2023-10-31 NOTE — PROGRESS NOTES
LIAN More III attended Intensive Cardiac Rehab today from 0900 to 1100. During his time he exercised and attended class.   His education today was a workshop titled: resisting stress. Patient received handouts and class discussion pertaining to the topic.

## 2023-11-01 ENCOUNTER — NON-PROVIDER VISIT (OUTPATIENT)
Dept: CARDIOLOGY | Facility: MEDICAL CENTER | Age: 83
End: 2023-11-01
Payer: MEDICARE

## 2023-11-01 DIAGNOSIS — Z95.5 S/P CORONARY ARTERY STENT PLACEMENT: ICD-10-CM

## 2023-11-01 PROCEDURE — G0423 INTENS CARDIAC REHAB NO EXER: HCPCS | Mod: 59,KX | Performed by: INTERNAL MEDICINE

## 2023-11-01 PROCEDURE — G0422 INTENS CARDIAC REHAB W/EXERC: HCPCS | Mod: KX | Performed by: INTERNAL MEDICINE

## 2023-11-01 NOTE — PROGRESS NOTES
LIAN More III attended Intensive Cardiac Rehab today from 0900 to 1100. During his time he exercised and attended class.   His education today was a a COOKING CLASS titled: SALADS AND DRESSINGS.  Patient received handouts and class discussion pertaining to the topic.

## 2023-11-02 ENCOUNTER — NON-PROVIDER VISIT (OUTPATIENT)
Dept: CARDIOLOGY | Facility: MEDICAL CENTER | Age: 83
End: 2023-11-02
Payer: MEDICARE

## 2023-11-02 DIAGNOSIS — Z95.5 S/P CORONARY ARTERY STENT PLACEMENT: ICD-10-CM

## 2023-11-02 PROCEDURE — G0423 INTENS CARDIAC REHAB NO EXER: HCPCS | Mod: 59,KX | Performed by: INTERNAL MEDICINE

## 2023-11-02 PROCEDURE — G0422 INTENS CARDIAC REHAB W/EXERC: HCPCS | Mod: KX | Performed by: INTERNAL MEDICINE

## 2023-11-02 NOTE — PROGRESS NOTES
LIAN More III attended Intensive Cardiac Rehab today from 0900 to 1100. During his time he exercised and attended class.   His education today was a video titled: how our thoughts heal our hearts. Patient received handouts and class discussion pertaining to the topic.

## 2023-11-07 ENCOUNTER — NON-PROVIDER VISIT (OUTPATIENT)
Dept: CARDIOLOGY | Facility: MEDICAL CENTER | Age: 83
End: 2023-11-07
Payer: MEDICARE

## 2023-11-07 DIAGNOSIS — Z95.5 S/P CORONARY ARTERY STENT PLACEMENT: ICD-10-CM

## 2023-11-07 PROCEDURE — G0422 INTENS CARDIAC REHAB W/EXERC: HCPCS | Mod: KX | Performed by: INTERNAL MEDICINE

## 2023-11-07 PROCEDURE — G0423 INTENS CARDIAC REHAB NO EXER: HCPCS | Mod: 59,KX | Performed by: INTERNAL MEDICINE

## 2023-11-07 NOTE — PROGRESS NOTES
LIAN More III attended Intensive Cardiac Rehab today from 0900 to 1100. During his time he exercised and attended class.   His education today was a video titled: targeting nutritional priorities. Patient received handouts and class discussion pertaining to the topic.

## 2023-11-08 ENCOUNTER — NON-PROVIDER VISIT (OUTPATIENT)
Dept: CARDIOLOGY | Facility: MEDICAL CENTER | Age: 83
End: 2023-11-08
Payer: MEDICARE

## 2023-11-08 DIAGNOSIS — Z95.5 S/P CORONARY ARTERY STENT PLACEMENT: ICD-10-CM

## 2023-11-08 PROCEDURE — G0422 INTENS CARDIAC REHAB W/EXERC: HCPCS | Mod: KX | Performed by: INTERNAL MEDICINE

## 2023-11-08 PROCEDURE — G0423 INTENS CARDIAC REHAB NO EXER: HCPCS | Mod: 59,KX | Performed by: INTERNAL MEDICINE

## 2023-11-08 NOTE — PROGRESS NOTES
LIAN More III attended Intensive Cardiac Rehab today from 0900 to 1100. During his time he exercised and attended class.   His education today was a cooking school titled: [employer]. Patient received handouts and class discussion pertaining to the topic.

## 2023-11-09 ENCOUNTER — NON-PROVIDER VISIT (OUTPATIENT)
Dept: CARDIOLOGY | Facility: MEDICAL CENTER | Age: 83
End: 2023-11-09
Payer: MEDICARE

## 2023-11-09 DIAGNOSIS — Z95.5 S/P CORONARY ARTERY STENT PLACEMENT: ICD-10-CM

## 2023-11-09 PROCEDURE — G0423 INTENS CARDIAC REHAB NO EXER: HCPCS | Mod: 59,KX | Performed by: INTERNAL MEDICINE

## 2023-11-09 PROCEDURE — G0422 INTENS CARDIAC REHAB W/EXERC: HCPCS | Mod: KX | Performed by: INTERNAL MEDICINE

## 2023-11-09 NOTE — PROGRESS NOTES
LIAN More III attended Intensive Cardiac Rehab today from 0900 to 1100. During his time he exercised and attended class.   His education today was a workshop titled: Nutritional Priorities. Patient received handouts and class discussion pertaining to the topic.

## 2023-11-14 ENCOUNTER — NON-PROVIDER VISIT (OUTPATIENT)
Dept: CARDIOLOGY | Facility: MEDICAL CENTER | Age: 83
End: 2023-11-14
Payer: MEDICARE

## 2023-11-14 DIAGNOSIS — Z95.5 S/P CORONARY ARTERY STENT PLACEMENT: ICD-10-CM

## 2023-11-14 PROCEDURE — G0422 INTENS CARDIAC REHAB W/EXERC: HCPCS | Mod: KX | Performed by: INTERNAL MEDICINE

## 2023-11-14 PROCEDURE — G0423 INTENS CARDIAC REHAB NO EXER: HCPCS | Mod: 59,KX | Performed by: INTERNAL MEDICINE

## 2023-11-14 NOTE — PROGRESS NOTES
LIAN More III attended Intensive Cardiac Rehab today from 0900 to 1100. During his time he exercised and attended class.   His education today was a video titled: Biomechanical Limitations. Patient received handouts and class discussion pertaining to the topic.

## 2023-11-15 ENCOUNTER — NON-PROVIDER VISIT (OUTPATIENT)
Dept: CARDIOLOGY | Facility: MEDICAL CENTER | Age: 83
End: 2023-11-15
Payer: MEDICARE

## 2023-11-15 DIAGNOSIS — Z95.5 S/P CORONARY ARTERY STENT PLACEMENT: ICD-10-CM

## 2023-11-15 PROCEDURE — G0422 INTENS CARDIAC REHAB W/EXERC: HCPCS | Mod: KX | Performed by: INTERNAL MEDICINE

## 2023-11-15 PROCEDURE — G0423 INTENS CARDIAC REHAB NO EXER: HCPCS | Mod: 59,KX | Performed by: INTERNAL MEDICINE

## 2023-11-15 NOTE — PROGRESS NOTES
LIAN More III attended Intensive Cardiac Rehab today from 0900 to 1100. During his time he exercised and attended class.   His education today was a cooking school titled: Simple Sides and Sauces. Patient received handouts and class discussion pertaining to the topic.

## 2023-11-16 ENCOUNTER — NON-PROVIDER VISIT (OUTPATIENT)
Dept: CARDIOLOGY | Facility: MEDICAL CENTER | Age: 83
End: 2023-11-16
Payer: MEDICARE

## 2023-11-16 DIAGNOSIS — Z95.5 S/P CORONARY ARTERY STENT PLACEMENT: ICD-10-CM

## 2023-11-16 PROCEDURE — G0422 INTENS CARDIAC REHAB W/EXERC: HCPCS | Mod: KX | Performed by: INTERNAL MEDICINE

## 2023-11-16 PROCEDURE — G0423 INTENS CARDIAC REHAB NO EXER: HCPCS | Mod: 59,KX | Performed by: INTERNAL MEDICINE

## 2023-11-16 NOTE — PROGRESS NOTES
LIAN More III attended Intensive Cardiac Rehab today from 0900 to 1100. During his time he exercised and attended class.   His education today was a WORKSHOP titled: BIOMECHANICS, BALANCE AND FALL PREVENTION.Patient received handouts and class discussion pertaining to the topic.     
show

## 2023-11-21 ENCOUNTER — HOSPITAL ENCOUNTER (OUTPATIENT)
Dept: LAB | Facility: MEDICAL CENTER | Age: 83
End: 2023-11-21
Attending: INTERNAL MEDICINE
Payer: MEDICARE

## 2023-11-21 ENCOUNTER — NON-PROVIDER VISIT (OUTPATIENT)
Dept: CARDIOLOGY | Facility: MEDICAL CENTER | Age: 83
End: 2023-11-21
Payer: MEDICARE

## 2023-11-21 DIAGNOSIS — Z95.5 S/P CORONARY ARTERY STENT PLACEMENT: ICD-10-CM

## 2023-11-21 LAB
ALBUMIN SERPL BCP-MCNC: 4.2 G/DL (ref 3.2–4.9)
ALBUMIN/GLOB SERPL: 1.8 G/DL
ALP SERPL-CCNC: 55 U/L (ref 30–99)
ALT SERPL-CCNC: 20 U/L (ref 2–50)
ANION GAP SERPL CALC-SCNC: 13 MMOL/L (ref 7–16)
AST SERPL-CCNC: 22 U/L (ref 12–45)
BILIRUB SERPL-MCNC: 0.5 MG/DL (ref 0.1–1.5)
BUN SERPL-MCNC: 49 MG/DL (ref 8–22)
CALCIUM ALBUM COR SERPL-MCNC: 8.7 MG/DL (ref 8.5–10.5)
CALCIUM SERPL-MCNC: 8.9 MG/DL (ref 8.4–10.2)
CHLORIDE SERPL-SCNC: 99 MMOL/L (ref 96–112)
CHOLEST SERPL-MCNC: 109 MG/DL (ref 100–199)
CO2 SERPL-SCNC: 25 MMOL/L (ref 20–33)
CREAT SERPL-MCNC: 2.07 MG/DL (ref 0.5–1.4)
CREAT UR-MCNC: 88.44 MG/DL
EST. AVERAGE GLUCOSE BLD GHB EST-MCNC: 200 MG/DL
FASTING STATUS PATIENT QL REPORTED: NORMAL
GFR SERPLBLD CREATININE-BSD FMLA CKD-EPI: 32 ML/MIN/1.73 M 2
GLOBULIN SER CALC-MCNC: 2.3 G/DL (ref 1.9–3.5)
GLUCOSE SERPL-MCNC: 181 MG/DL (ref 65–99)
HBA1C MFR BLD: 8.6 % (ref 4–5.6)
HDLC SERPL-MCNC: 30 MG/DL
LDLC SERPL CALC-MCNC: 60 MG/DL
MICROALBUMIN UR-MCNC: 7.4 MG/DL
MICROALBUMIN/CREAT UR: 84 MG/G (ref 0–30)
POTASSIUM SERPL-SCNC: 4 MMOL/L (ref 3.6–5.5)
PROT SERPL-MCNC: 6.5 G/DL (ref 6–8.2)
SODIUM SERPL-SCNC: 137 MMOL/L (ref 135–145)
TRIGL SERPL-MCNC: 95 MG/DL (ref 0–149)
TSH SERPL DL<=0.005 MIU/L-ACNC: 1.47 UIU/ML (ref 0.38–5.33)

## 2023-11-21 PROCEDURE — G0423 INTENS CARDIAC REHAB NO EXER: HCPCS | Mod: 59,KX | Performed by: INTERNAL MEDICINE

## 2023-11-21 PROCEDURE — 36415 COLL VENOUS BLD VENIPUNCTURE: CPT

## 2023-11-21 PROCEDURE — 80053 COMPREHEN METABOLIC PANEL: CPT

## 2023-11-21 PROCEDURE — 82570 ASSAY OF URINE CREATININE: CPT

## 2023-11-21 PROCEDURE — 84443 ASSAY THYROID STIM HORMONE: CPT

## 2023-11-21 PROCEDURE — 83036 HEMOGLOBIN GLYCOSYLATED A1C: CPT

## 2023-11-21 PROCEDURE — G0422 INTENS CARDIAC REHAB W/EXERC: HCPCS | Mod: KX | Performed by: INTERNAL MEDICINE

## 2023-11-21 PROCEDURE — 80061 LIPID PANEL: CPT

## 2023-11-21 PROCEDURE — 82043 UR ALBUMIN QUANTITATIVE: CPT

## 2023-11-21 NOTE — PROGRESS NOTES
LIAN More III attended Intensive Cardiac Rehab today from 0900 to 1100. During his time he exercised and attended class.   His education today was a nutrition and cooking class titled: Apps and Snacks. Patient received handouts and class discussion pertaining to the topic.

## 2023-11-22 ENCOUNTER — HOSPITAL ENCOUNTER (OUTPATIENT)
Dept: CARDIOLOGY | Facility: MEDICAL CENTER | Age: 83
End: 2023-11-22
Payer: MEDICARE

## 2023-11-22 ENCOUNTER — NON-PROVIDER VISIT (OUTPATIENT)
Dept: CARDIOLOGY | Facility: MEDICAL CENTER | Age: 83
End: 2023-11-22
Payer: MEDICARE

## 2023-11-22 DIAGNOSIS — Z95.5 S/P CORONARY ARTERY STENT PLACEMENT: ICD-10-CM

## 2023-11-22 DIAGNOSIS — Z95.2 S/P TAVR (TRANSCATHETER AORTIC VALVE REPLACEMENT): ICD-10-CM

## 2023-11-22 LAB
LV EJECT FRACT  99904: 45
LV EJECT FRACT MOD 2C 99903: 34.04
LV EJECT FRACT MOD 4C 99902: 39.96
LV EJECT FRACT MOD BP 99901: 38.75

## 2023-11-22 PROCEDURE — G0422 INTENS CARDIAC REHAB W/EXERC: HCPCS | Mod: KX | Performed by: INTERNAL MEDICINE

## 2023-11-22 PROCEDURE — 93306 TTE W/DOPPLER COMPLETE: CPT

## 2023-11-22 PROCEDURE — G0423 INTENS CARDIAC REHAB NO EXER: HCPCS | Mod: 59,KX | Performed by: INTERNAL MEDICINE

## 2023-11-22 PROCEDURE — 93306 TTE W/DOPPLER COMPLETE: CPT | Mod: 26 | Performed by: INTERNAL MEDICINE

## 2023-11-22 NOTE — PROGRESS NOTES
LIAN More III attended Intensive Cardiac Rehab today from 1000 to 1200. During his time he exercised and attended class.   His education today was a video titled: Fueling a Healthy Body. Patient received handouts and class discussion pertaining to the topic.

## 2023-11-28 ENCOUNTER — NON-PROVIDER VISIT (OUTPATIENT)
Dept: CARDIOLOGY | Facility: MEDICAL CENTER | Age: 83
End: 2023-11-28
Payer: MEDICARE

## 2023-11-28 DIAGNOSIS — Z95.5 S/P CORONARY ARTERY STENT PLACEMENT: ICD-10-CM

## 2023-11-28 PROCEDURE — G0423 INTENS CARDIAC REHAB NO EXER: HCPCS | Mod: 59,KX | Performed by: INTERNAL MEDICINE

## 2023-11-28 PROCEDURE — G0422 INTENS CARDIAC REHAB W/EXERC: HCPCS | Mod: KX | Performed by: INTERNAL MEDICINE

## 2023-11-28 NOTE — PROGRESS NOTES
LIAN More III attended Intensive Cardiac Rehab today from 0900 to 1100. During his time he exercised and attended class.   His education today was a video titled: Hypertension and Heart Disease. Patient received handouts and class discussion pertaining to the topic.

## 2023-11-29 ENCOUNTER — PATIENT MESSAGE (OUTPATIENT)
Dept: HEALTH INFORMATION MANAGEMENT | Facility: OTHER | Age: 83
End: 2023-11-29

## 2023-11-29 ENCOUNTER — NON-PROVIDER VISIT (OUTPATIENT)
Dept: CARDIOLOGY | Facility: MEDICAL CENTER | Age: 83
End: 2023-11-29
Payer: MEDICARE

## 2023-11-29 DIAGNOSIS — Z95.5 S/P CORONARY ARTERY STENT PLACEMENT: ICD-10-CM

## 2023-11-29 PROCEDURE — G0422 INTENS CARDIAC REHAB W/EXERC: HCPCS | Mod: KX | Performed by: INTERNAL MEDICINE

## 2023-11-29 PROCEDURE — G0423 INTENS CARDIAC REHAB NO EXER: HCPCS | Mod: 59,KX | Performed by: INTERNAL MEDICINE

## 2023-11-29 NOTE — PROGRESS NOTES
LIAN More III attended Intensive Cardiac Rehab today from 0900 to 1100. During his time he exercised and attended class.   His education today was acooking class titled: delicious desserts.Patient received handouts and class discussion pertaining to the topic.

## 2023-11-30 ENCOUNTER — NON-PROVIDER VISIT (OUTPATIENT)
Dept: CARDIOLOGY | Facility: MEDICAL CENTER | Age: 83
End: 2023-11-30
Payer: MEDICARE

## 2023-11-30 DIAGNOSIS — Z95.5 S/P CORONARY ARTERY STENT PLACEMENT: ICD-10-CM

## 2023-11-30 PROCEDURE — G0423 INTENS CARDIAC REHAB NO EXER: HCPCS | Mod: 59,KX | Performed by: INTERNAL MEDICINE

## 2023-11-30 PROCEDURE — G0422 INTENS CARDIAC REHAB W/EXERC: HCPCS | Mod: KX | Performed by: INTERNAL MEDICINE

## 2023-11-30 NOTE — PROGRESS NOTES
LIAN More III attended Intensive Cardiac Rehab today from 0900 to 1100. During his time he exercised and attended class.   His education today was a workshop titled: Managing Heart Disease. Patient received handouts and class discussion pertaining to the topic.

## 2023-12-01 ENCOUNTER — TELEPHONE (OUTPATIENT)
Dept: CARDIOLOGY | Facility: MEDICAL CENTER | Age: 83
End: 2023-12-01
Payer: MEDICARE

## 2023-12-05 ENCOUNTER — NON-PROVIDER VISIT (OUTPATIENT)
Dept: CARDIOLOGY | Facility: MEDICAL CENTER | Age: 83
End: 2023-12-05
Payer: MEDICARE

## 2023-12-05 DIAGNOSIS — Z95.5 S/P CORONARY ARTERY STENT PLACEMENT: ICD-10-CM

## 2023-12-05 PROCEDURE — G0423 INTENS CARDIAC REHAB NO EXER: HCPCS | Mod: 59,KX | Performed by: INTERNAL MEDICINE

## 2023-12-05 PROCEDURE — G0422 INTENS CARDIAC REHAB W/EXERC: HCPCS | Mod: KX | Performed by: INTERNAL MEDICINE

## 2023-12-05 NOTE — PROGRESS NOTES
LIAN More III attended Intensive Cardiac Rehab today from 0900 to 1100. During his time he exercised and attended class.   His education today was a video titled: Label Reading. Patient received handouts and class discussion pertaining to the topic.       anxiety disorder

## 2023-12-06 ENCOUNTER — OFFICE VISIT (OUTPATIENT)
Dept: NEUROLOGY | Facility: MEDICAL CENTER | Age: 83
End: 2023-12-06
Payer: MEDICARE

## 2023-12-06 ENCOUNTER — APPOINTMENT (OUTPATIENT)
Dept: NEUROLOGY | Facility: MEDICAL CENTER | Age: 83
End: 2023-12-06
Attending: PSYCHIATRY & NEUROLOGY
Payer: MEDICARE

## 2023-12-06 ENCOUNTER — NON-PROVIDER VISIT (OUTPATIENT)
Dept: CARDIOLOGY | Facility: MEDICAL CENTER | Age: 83
End: 2023-12-06
Payer: MEDICARE

## 2023-12-06 VITALS
BODY MASS INDEX: 35.03 KG/M2 | DIASTOLIC BLOOD PRESSURE: 80 MMHG | SYSTOLIC BLOOD PRESSURE: 114 MMHG | HEIGHT: 73 IN | HEART RATE: 81 BPM | OXYGEN SATURATION: 93 % | WEIGHT: 264.33 LBS | TEMPERATURE: 97.6 F

## 2023-12-06 DIAGNOSIS — Z95.5 S/P CORONARY ARTERY STENT PLACEMENT: ICD-10-CM

## 2023-12-06 DIAGNOSIS — E11.42 DIABETIC POLYNEUROPATHY ASSOCIATED WITH TYPE 2 DIABETES MELLITUS (HCC): ICD-10-CM

## 2023-12-06 DIAGNOSIS — G56.22 ENTRAPMENT OF LEFT ULNAR NERVE: ICD-10-CM

## 2023-12-06 DIAGNOSIS — G56.00: ICD-10-CM

## 2023-12-06 PROCEDURE — G0423 INTENS CARDIAC REHAB NO EXER: HCPCS | Mod: 59,KX | Performed by: INTERNAL MEDICINE

## 2023-12-06 PROCEDURE — 3079F DIAST BP 80-89 MM HG: CPT

## 2023-12-06 PROCEDURE — 3074F SYST BP LT 130 MM HG: CPT

## 2023-12-06 PROCEDURE — 99215 OFFICE O/P EST HI 40 MIN: CPT

## 2023-12-06 PROCEDURE — G0422 INTENS CARDIAC REHAB W/EXERC: HCPCS | Mod: KX | Performed by: INTERNAL MEDICINE

## 2023-12-06 PROCEDURE — G2212 PROLONG OUTPT/OFFICE VIS: HCPCS

## 2023-12-06 PROCEDURE — 99212 OFFICE O/P EST SF 10 MIN: CPT

## 2023-12-06 RX ORDER — SEMAGLUTIDE 1.34 MG/ML
0.03 INJECTION, SOLUTION SUBCUTANEOUS
COMMUNITY
Start: 2023-11-02

## 2023-12-06 ASSESSMENT — ENCOUNTER SYMPTOMS
FALLS: 0
TINGLING: 1
WEIGHT LOSS: 0
TREMORS: 0
SENSORY CHANGE: 1

## 2023-12-06 ASSESSMENT — PATIENT HEALTH QUESTIONNAIRE - PHQ9: CLINICAL INTERPRETATION OF PHQ2 SCORE: 0

## 2023-12-06 ASSESSMENT — FIBROSIS 4 INDEX: FIB4 SCORE: 2.21

## 2023-12-06 NOTE — PROGRESS NOTES
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 He reports no change in his neuropathy in his legs that he has noticed In the last year. He still takes CBD for his neuropathy pain and states that he does not feel like it is working anymore and may stop when he runs out. He is concerned with neuropathic pain in his left thumb, left index finger, and left middle finger. He says that the pain will wake him up from sleep and if he rests his elbow on a surface he will increase pain in those left three fingers that does not resolve until he turns his arm counter clockwise. He has a history of trauma to his left arm that required metal rods to reattach his humerus to his shoulder.     LIAN FARNSWORTH fell and broke his right femur and right hip. He is still using a cane for short distance and a walker for long distances or exercise. He is still in cardiac rehabilitation. He is currently looking for a  who would live with him rent-free and just cook meals and clean the house.         Below is a previous note from Dr. Valderrama 12/6/22 .    LIAN More III is a 82 y.o. 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     HPI    JW states that the neuropathy symptoms are at about the same level of intensity.  He is still frustrated with the symptomatic balance difficulties that has resulted.  He denies any new motor or sensory distortions in the hands, everything still contained, involving the feet.  They have not ascended, sensory distortions at or below the ankles.  There is still the numbness as well.    Gabapentin was helping but made her feel \"drunk\", and he was already unsteady.  He now uses a CBD gummy every day and this has provided very good benefit without the dizziness and unsteadiness.  He still walks with a cane consistently.  He has not been falling.    He " "did start physical therapy for his gait difficulties, and evidently there was an injury to the left ankle during therapy itself, there was significant pain as well as swelling, he is looking for an orthopedic referral.    The internalized tremulousness he is always been complaining of, something I have never been able to document objectively on examination, has also attenuated with his CBD product. He is always denied EPS types of symptoms, head, neck, voice and upper extremities have never been involved.    Medical, surgical and family histories are reviewed, there are no new drug allergies.  He is still on Bumex, Zetia, Eliquis, baby aspirin, Prilosec, Cozaar, Coreg and vitamin D.    Review of Systems   Constitutional:  Positive for malaise/fatigue. Negative for weight loss.   Cardiovascular:  Positive for leg swelling.   Musculoskeletal:  Negative for falls.   Neurological:  Positive for tingling and sensory change. Negative for tremors.   All other systems reviewed and are negative.    Ambulatory Vitals  Encounter Vitals  Temperature: 36.4 °C (97.6 °F)  Temp src: Temporal  Blood Pressure : 114/80  Pulse: 81  Pulse Oximetry: 93 %  Weight: 120 kg (264 lb 5.3 oz)  Height: 185.4 cm (6' 1\")  BMI (Calculated): 34.87        PHYSICAL EXAM:  General/Medical:  He appears in no acute distress.  Vital signs are stable.  There is no malar rash, jaw or temporal tenderness, or jaw claudication.  The neck is supple, range of motion is full.   Cardiac auscultation revealed S1 and S2 without abnormal sounds.  There is still the significant vascular insufficiency changes in the lower extremities below the midshin.  The edema is unchanged. Capilary refilled is prolonged in all extremities. Palpable radial and pedal pulses.     Neuro:  MENTAL STATUS: awake and alert; no deficits of speech or language; oriented to person, place, and time; affect was appropriate to situation    CRANIAL NERVES:    II: acuity was: J16/J16; discs sharp; " pupils 4/4 to 2/2 without a relative afferent pupillary defect; fields intact to confrontation; (Patient reports double vision horizontal)    III/IV/VI: versions intact without nystagmus (patient reports dizziness with change in directionm bilateral ptosis)    V: facial sensation symmetric to light touch    VII: left mouth facial droop    VIII: hearing intact to finger rub    IX/X: palate elevates symmetrically    XI: shoulder shrug symmetric    XII: tongue midline    MOTOR:  - bulk and tone were increased throughout  Upper Extremity Strength  (R/L)    5/4+   Elbow flexion 5/5   Elbow extension 5/5   Shoulder abduction 5/5     Lower Extremity Strength  (R/L)   Hip flexion 4+/5   Knee extension 5/5   Knee flexion 5/5   Ankle plantarflexion unable   Ankle dorsiflexion unable     - heel-walk: unable to perform due to balance issues  - toe-walk: unable to perform due to balance issues  -  no pronator drift; bilateral hand tremor with movement or intention    SENSATION:  - light touch:intact  - vibration: inconsistent throughout  - pinprick: Decreased sensation mid forearm bilaterally up bilaterally. Decreased sensation bilateral feet until bilateral mid-calf  - temperature: diminished below bilateral calves. Consistent everywhere else  - proprioception: absent in the toes. Consistent at the ankles  - Romberg:not tested due to balance issues  -Tinel's sign: Positive bilateral hands    COORDINATION:  - finger to nose was normal, no ataxia on exam  - finger tapping was rapid and accurate, bilaterally  - foot tapping: was able to perform with left foot. Right foot with slow and low amplitude.     REFLEXES:  Reflex Right Left   BR 1+ 1+   Biceps absent absent   Triceps absent absent   Patellae absent absent   Achilles absent absent   Toes mute mute     GAIT:  - walks with cane large base with downward gaze  - heel-walk: unable to perform  - toe-walk: unable to perform  - tandem gait: unable to perform      Assessment &  Plan    1. 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  This pain wakes him up in the middle the night.  He can reproduce his pain with putting his elbow on any surface.  Pain is resolved when he turns his arm at the elbow in a counterclockwise motion.  His exam reveals absent reflexes in his upper extremities.  He reports diminished pinprick sensation from bilateral mid forearm to upper arms.  However he reported full sensation of pinprick in his fingers and wrists up to mid forearm where it became diminished.  He does not present with any muscle wasting in either of his hands or arms.   I suspect an entrapment of the ulnar nerve since the sensation and pain is reproducible when he places his elbow on any surface.  However, he does have a positive Tinel's sign at bilateral wrists indicating possible median nerve involvement.  I will refer him to [facility] orthopedics hand therapy for further evaluation and treatment.  - Referral to Orthopedics    2. Entrapment of left ulnar nerve  USMAN has reproducible pain, tingling, numbness of his left thumb, left index finger, and left middle finger.  It is reproducible when he bends his elbow and places any pressure on his elbow on any surface.  Therefore I will send him to [facility] hand therapy for further evaluation and treatment.  Will defer EMG and nerve conduction study to [facility] orthopedics.  - Referral to Orthopedics    3. Diabetic polyneuropathy associated with type 2 diabetes mellitus (HCC)  USMAN has increasing neuropathy of his lower extremities creeping up to about mid shin bilaterally.  He continues to have absent reflexes throughout.  He also has a tremor in bilateral hands with intended movement.  He also has diminished vibration and temperature in his lower extremities at the ankle.  We discussed how neuropathy is a slowly progressive  disorder that we are unable to cure but that we can JW treat positive symptoms such as pain, tingling, and burning with medication.  He is encouraged to continue cardiac rehabilitation and physical therapy to maintain his balance and his strength.  He indicates that CBD oil is no longer effective and that he may stop.  We discussed possible medications such as: Lyrica, duloxetine, nortriptyline, amitriptyline, and gabapentin as possible medications used to treat neuropathic pain.  JW does not want to address his neuropathy with any medication at this time.  He will follow-up in 1 year for assessment of his neuropathy.      BILLING DOCUMENTATION:   I spent 80 minutes in patient care. This includes time with chart review, pre-charting, records review, discussions with other healthcare providers, and documentation. This also includes face-to-face time with the patient for: exam review, discussion and treatment planning.      Karlene Yung MSN APRN-CNP  [facility] [place]

## 2023-12-06 NOTE — PROGRESS NOTES
LIAN More III attended Intensive Cardiac Rehab today from 1300 to 1500. During his time he exercised and attended class.   His education today was a cooking class titled: [employer] PROTEINS. Patient received handouts and class discussion pertaining to the topic.

## 2023-12-07 ENCOUNTER — NON-PROVIDER VISIT (OUTPATIENT)
Dept: CARDIOLOGY | Facility: MEDICAL CENTER | Age: 83
End: 2023-12-07
Payer: MEDICARE

## 2023-12-07 DIAGNOSIS — Z95.5 S/P CORONARY ARTERY STENT PLACEMENT: ICD-10-CM

## 2023-12-07 PROCEDURE — G0422 INTENS CARDIAC REHAB W/EXERC: HCPCS | Mod: KX | Performed by: INTERNAL MEDICINE

## 2023-12-07 PROCEDURE — G0423 INTENS CARDIAC REHAB NO EXER: HCPCS | Mod: 59,KX | Performed by: INTERNAL MEDICINE

## 2023-12-07 NOTE — PROGRESS NOTES
LIAN More III attended Intensive Cardiac Rehab today from 0900 to 1100. During his time he exercised and attended class.   His education today was a workshop titled: Label Reading. Patient received handouts and class discussion pertaining to the topic.

## 2023-12-08 ENCOUNTER — APPOINTMENT (OUTPATIENT)
Dept: CARDIOLOGY | Facility: MEDICAL CENTER | Age: 83
End: 2023-12-08
Attending: INTERNAL MEDICINE
Payer: MEDICARE

## 2023-12-11 ENCOUNTER — NON-PROVIDER VISIT (OUTPATIENT)
Dept: CARDIOLOGY | Facility: MEDICAL CENTER | Age: 83
End: 2023-12-11
Payer: MEDICARE

## 2023-12-11 PROCEDURE — 93295 DEV INTERROG REMOTE 1/2/MLT: CPT | Performed by: INTERNAL MEDICINE

## 2023-12-12 ENCOUNTER — NON-PROVIDER VISIT (OUTPATIENT)
Dept: CARDIOLOGY | Facility: MEDICAL CENTER | Age: 83
End: 2023-12-12
Payer: MEDICARE

## 2023-12-12 DIAGNOSIS — Z95.5 S/P CORONARY ARTERY STENT PLACEMENT: ICD-10-CM

## 2023-12-12 PROCEDURE — G0422 INTENS CARDIAC REHAB W/EXERC: HCPCS | Mod: KX | Performed by: INTERNAL MEDICINE

## 2023-12-12 PROCEDURE — G0423 INTENS CARDIAC REHAB NO EXER: HCPCS | Mod: 59,KX | Performed by: INTERNAL MEDICINE

## 2023-12-12 NOTE — CARDIAC REMOTE MONITOR - SCAN
Device transmission reviewed. Device demonstrated appropriate function.       Electronically Signed by: Suman Caldwell M.D.    12/14/2023  10:26 AM

## 2023-12-12 NOTE — PROGRESS NOTES
LIAN More III attended Intensive Cardiac Rehab today from 0900 to 1100. During his time he exercised and attended class.   His education today was a WORKSHOP titled: SLEEP.  Patient received handouts and class discussion pertaining to the topic.

## 2023-12-13 ENCOUNTER — NON-PROVIDER VISIT (OUTPATIENT)
Dept: CARDIOLOGY | Facility: MEDICAL CENTER | Age: 83
End: 2023-12-13
Payer: MEDICARE

## 2023-12-13 ENCOUNTER — OFFICE VISIT (OUTPATIENT)
Dept: URGENT CARE | Facility: CLINIC | Age: 83
End: 2023-12-13
Payer: MEDICARE

## 2023-12-13 VITALS
BODY MASS INDEX: 34.99 KG/M2 | OXYGEN SATURATION: 93 % | HEART RATE: 91 BPM | WEIGHT: 264 LBS | DIASTOLIC BLOOD PRESSURE: 74 MMHG | HEIGHT: 73 IN | SYSTOLIC BLOOD PRESSURE: 116 MMHG | TEMPERATURE: 98.2 F | RESPIRATION RATE: 18 BRPM

## 2023-12-13 DIAGNOSIS — L08.9 INFECTED SEBACEOUS CYST: ICD-10-CM

## 2023-12-13 DIAGNOSIS — L72.3 INFECTED SEBACEOUS CYST: ICD-10-CM

## 2023-12-13 DIAGNOSIS — Z95.5 S/P CORONARY ARTERY STENT PLACEMENT: ICD-10-CM

## 2023-12-13 PROCEDURE — 3078F DIAST BP <80 MM HG: CPT | Performed by: FAMILY MEDICINE

## 2023-12-13 PROCEDURE — G0423 INTENS CARDIAC REHAB NO EXER: HCPCS | Mod: 59,KX | Performed by: INTERNAL MEDICINE

## 2023-12-13 PROCEDURE — 10060 I&D ABSCESS SIMPLE/SINGLE: CPT | Performed by: FAMILY MEDICINE

## 2023-12-13 PROCEDURE — G0422 INTENS CARDIAC REHAB W/EXERC: HCPCS | Mod: KX | Performed by: INTERNAL MEDICINE

## 2023-12-13 PROCEDURE — 3074F SYST BP LT 130 MM HG: CPT | Performed by: FAMILY MEDICINE

## 2023-12-13 RX ORDER — AMOXICILLIN AND CLAVULANATE POTASSIUM 875; 125 MG/1; MG/1
1 TABLET, FILM COATED ORAL 2 TIMES DAILY
Qty: 14 TABLET | Refills: 0 | Status: SHIPPED | OUTPATIENT
Start: 2023-12-13 | End: 2023-12-20

## 2023-12-13 ASSESSMENT — FIBROSIS 4 INDEX: FIB4 SCORE: 2.21

## 2023-12-13 NOTE — PROGRESS NOTES
LIAN More III attended Intensive Cardiac Rehab today from 0900 to 1100. During his time he exercised and attended class.   His education today was a cooking school titled: Fast Evening Meals. Patient received handouts and class discussion pertaining to the topic.

## 2023-12-13 NOTE — PROGRESS NOTES
"Subjective:   LIAN More III is a 83 y.o. male who presents for Bump (X2-3 weeks, lump grown upper spine area )    2-3 cm raised, red, tender infected seb cyst  HPI    ROS    Medications, Allergies, and current problem list reviewed today in Epic.     Objective:     /74   Pulse 91   Temp 36.8 °C (98.2 °F) (Temporal)   Resp 18   Ht 1.854 m (6' 1\")   Wt 120 kg (264 lb)   SpO2 93%     Physical Exam    Assessment/Plan:     Diagnosis and associated orders:     1. Infected sebaceous cyst  I and D    amoxicillin-clavulanate (AUGMENTIN) 875-125 MG Tab         Comments/MDM:              Differential diagnosis, natural history, supportive care, and indications for immediate follow-up discussed.    Advised the patient to follow-up with the primary care physician for recheck, reevaluation, and consideration of further management.    Please note that this dictation was created using voice recognition software. I have made a reasonable attempt to correct obvious errors, but I expect that there are errors of grammar and possibly content that I did not discover before finalizing the note.    This note was electronically signed by Neal Cannon M.D.  "

## 2023-12-13 NOTE — PROCEDURES
I and D    Date/Time: 12/13/2023 12:24 PM    Performed by: Neal Cannon M.D.  Authorized by: Neal Cannon M.D.  Type: cyst  Body area: neck  Location details: left posterior neck  Anesthesia: local infiltration    Anesthesia:  Local Anesthetic: lidocaine 1% with epinephrine  Anesthetic total: 2 mL    Sedation:  Patient sedated: no    Scalpel size: 11  Incision type: single straight  Incision depth: subcutaneous  Complexity: simple  Drainage: purulent  Drainage amount: moderate  Wound treatment: wound left open  Packing material: 1/4 in gauze  Patient tolerance: patient tolerated the procedure well with no immediate complications  Comments: Return tomorrow for dressing change

## 2023-12-14 ENCOUNTER — OFFICE VISIT (OUTPATIENT)
Dept: URGENT CARE | Facility: CLINIC | Age: 83
End: 2023-12-14
Payer: MEDICARE

## 2023-12-14 ENCOUNTER — NON-PROVIDER VISIT (OUTPATIENT)
Dept: CARDIOLOGY | Facility: MEDICAL CENTER | Age: 83
End: 2023-12-14
Payer: MEDICARE

## 2023-12-14 VITALS
HEIGHT: 73 IN | DIASTOLIC BLOOD PRESSURE: 56 MMHG | RESPIRATION RATE: 18 BRPM | OXYGEN SATURATION: 91 % | BODY MASS INDEX: 34.99 KG/M2 | WEIGHT: 264 LBS | HEART RATE: 42 BPM | TEMPERATURE: 98.3 F | SYSTOLIC BLOOD PRESSURE: 110 MMHG

## 2023-12-14 DIAGNOSIS — Z95.5 S/P CORONARY ARTERY STENT PLACEMENT: ICD-10-CM

## 2023-12-14 DIAGNOSIS — Z51.89 WOUND CHECK, ABSCESS: ICD-10-CM

## 2023-12-14 PROCEDURE — 99024 POSTOP FOLLOW-UP VISIT: CPT | Performed by: REGISTERED NURSE

## 2023-12-14 PROCEDURE — 3078F DIAST BP <80 MM HG: CPT | Performed by: REGISTERED NURSE

## 2023-12-14 PROCEDURE — 3074F SYST BP LT 130 MM HG: CPT | Performed by: REGISTERED NURSE

## 2023-12-14 PROCEDURE — G0423 INTENS CARDIAC REHAB NO EXER: HCPCS | Mod: 59,KX | Performed by: INTERNAL MEDICINE

## 2023-12-14 PROCEDURE — G0422 INTENS CARDIAC REHAB W/EXERC: HCPCS | Mod: KX | Performed by: INTERNAL MEDICINE

## 2023-12-14 ASSESSMENT — FIBROSIS 4 INDEX: FIB4 SCORE: 2.21

## 2023-12-14 NOTE — NON-PROVIDER
LIAN More III attended Intensive Cardiac Rehab today from 0900 to 1100. During his time he exercised and attended class.   His education today was a video titled: Yoga. Patient received handouts and class discussion pertaining to the topic.

## 2023-12-14 NOTE — PROGRESS NOTES
"Subjective:     LIAN More III is a 83 y.o. male who presents for Follow-Up (Pt was told to come back to day to have a cyst drained more was seen on 12/13/23)      HPI  I&D yesterday, has not picked up his Augmentin started yet.  No fevers chills body aches  ROS negative unless mentioned in HPI    Allergies:   Allergies   Allergen Reactions    Statins [Hmg-Coa-R Inhibitors] Rash     Blisters      Atorvastatin Hives     Selected as representative agent for class- please do not delete       Current Medications:  amoxicillin-clavulanate Tabs  Aspirin Caps  bumetanide Tabs  carvedilol Tabs  CITRACAL PO  clomiPHENE  clopidogrel Tabs  coenzyme Q-10  cyanocobalamin Soln  Eliquis Tabs  ezetimibe Tabs  GLIPIZIDE PO  hydrocortisone Crea  L-Lysine Tabs  losartan Tabs  omeprazole  Ozempic (0.25 or 0.5 MG/DOSE) Sopn  testosterone cypionate  thiamine Soln  VITAMIN B COMPLEX PO  Vitamin C Tabs  Vitamin D3 Caps  vitamin E Caps    (Allergies, Medications, & Tobacco/Substance Use were reconciled by the Medical Assistant and reviewed by myself. )       Objective:     /56   Pulse (!) 42   Temp 36.8 °C (98.3 °F) (Temporal)   Resp 18   Ht 1.854 m (6' 1\")   Wt 120 kg (264 lb)   SpO2 91%   BMI 34.83 kg/m²     Physical Exam I&D with packing in place posterior neck, no erythema or warmth, the packing did have some scant purulent drainage as well as blood.  TTP.    Assessment/Plan:     1. Wound check, abscess          Packing replaced with small amount of 1/4, given purulent drainage present as well as bleeding.  Area is .  Will have him return in 2 days since he has not started his antibiotics yet.  Would anticipate at that visit that he does not require any additional packing.    Differential diagnosis, natural history, supportive care, and indications for immediate follow-up discussed.    Advised the patient to follow-up with the primary care physician for recheck, reevaluation, and consideration of further " management.    Please note that this dictation was created using voice recognition software. I have made reasonable attempt to correct obvious errors, but I expect that there are errors of grammar and possibly content that I did not discover before finalizing the note.    This note was electronically signed by NEAL Lockett

## 2023-12-14 NOTE — PROGRESS NOTES
Subjective:   LIAN More III is a 83 y.o. male who presents for Follow-Up (Pt was told to come back to day to have a cyst drained more was seen on 12/13/23)      HPI  ***    ROS    Allergies   Allergen Reactions    Statins [Hmg-Coa-R Inhibitors] Rash     Blisters      Atorvastatin Hives     Selected as representative agent for class- please do not delete       Patient Active Problem List    Diagnosis Date Noted    Stented coronary artery 07/18/2023    Left hand pain 03/21/2022    Contusion of left hand 03/21/2022    Encounter for geriatric assessment 03/20/2022    Abnormal CT scan, colon 03/20/2022    Chronic respiratory failure with hypoxia (HCC) 03/20/2022    Hormone replacement therapy 03/20/2022    BPH (benign prostatic hyperplasia) 03/20/2022    Seasonal allergies 03/19/2022    Trauma 03/18/2022    Fracture of ribs, three, closed, left, initial encounter 03/18/2022    Hip hematoma, left, initial encounter 03/18/2022    Antiplatelet or antithrombotic long-term use 03/18/2022    Mild depression 03/15/2022    Skin lesion 03/15/2022    Paroxysmal atrial fibrillation (HCC) 02/14/2022    Plantar fasciitis of right foot 07/19/2021    Diabetic polyneuropathy associated with type 2 diabetes mellitus (HCC) 06/04/2021    Ataxia 09/11/2020    Lumbar radiculopathy, chronic 07/10/2020    JAMES (obstructive sleep apnea) 04/30/2020    Hyperkalemia 04/29/2020    Encounter for screening for COVID-19 04/27/2020    Renal hematoma, left, initial encounter 04/26/2020    Essential hypertension 04/26/2020    ICD (implantable cardioverter-defibrillator) in place 03/30/2020    High risk medication use 02/26/2020    Insomnia 02/25/2020    S/P TAVR (transcatheter aortic valve replacement) 01/27/2020    Gastroesophageal reflux disease without esophagitis 01/20/2020    CHF (congestive heart failure), NYHA class II, chronic, systolic (HCC) 12/26/2019    Acute on chronic Stage 3b chronic kidney disease (HCC) 12/04/2019    Coronary artery disease  due to calcified coronary lesion 12/04/2019    Frequent falls 09/04/2019    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    Pancreatic lesion 04/01/2019    Macular degeneration 10/10/2018    Polycythemia 03/11/2017    Obesity 03/03/2016    Hypotestosteronism 03/03/2016    Disp fx of greater trochanter of left femur, init ([facility]) 06/25/2015    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 14 Tablet 0    Aspirin 81 MG Cap Take 81 mg by mouth one time.      clomiPHENE (CLOMID) 50 MG tablet Take 1 Tablet by mouth every day.      Semaglutide,0.25 or 0.5MG/DOS, (OZEMPIC, 0.25 OR 0.5 MG/DOSE,) 2 MG/1.5ML Solution Pen-injector Inject 0.025 mL under the skin every 7 days.      testosterone cypionate (DEPO-TESTOSTERONE) 200 MG/ML Solution injection INJECT 0.8 ML INTRAMUSCULARLY ONCE WEEKLY      hydrocortisone 2.5 % Cream topical cream APPLY 1 APPLICATION TO AFFECTED AREA ONCE DAILY      cyanocobalamin (VITAMIN B-12) 1000 MCG/ML Solution INJECT 1 ML INTRAMUSCULARLY ONCE A WEEK      vitamin E 180 MG (400 UNIT) Cap Take by oral route.      thiamine (B-1) 100 MG/ML Solution Take by injection route.      L-Lysine 1000 MG Tab Take by oral route.      carvedilol (COREG) 3.125 MG Tab Take 1 Tablet by mouth 2 times a day with meals. 200 Tablet 3    ezetimibe (ZETIA) 10 MG Tab Take 1 Tablet by mouth every day. 100 Tablet 3    ELIQUIS 5 MG Tab Take 1 tablet by mouth twice daily 180 Tablet 3    GLIPIZIDE PO Take 25 mg by mouth every day.      bumetanide (BUMEX) 1 MG Tab Take 2 tablets  by mouth twice daily (Patient taking differently: Take 2 mg by mouth 2 times a day. 2 mg = 2 tab) 360 Tablet 3    Calcium Citrate (CITRACAL PO) Take 1 Tab by mouth 2 Times a Day.      coenzyme Q-10 30 MG capsule Take 30 mg by mouth 2 times a day.      omeprazole (PRILOSEC) 40 MG delayed-release capsule Take 1 Capsule by mouth 2 times a day.      Cholecalciferol (VITAMIN D3) 2000 UNIT Cap Take 1 Capsule by mouth 2 times a day.      B Complex Vitamins (VITAMIN B COMPLEX PO) Take 1 Tab by mouth every day.      Ascorbic Acid (VITAMIN C) 1000 MG Tab Take 1 Tablet by mouth 2 times a day.      losartan (COZAAR) 25 MG Tab Take 1 Tablet by mouth every day. (Patient not taking: Reported on 12/6/2023) 100 Tablet 3    clopidogrel (PLAVIX) 75 MG Tab Take 1 Tablet by mouth every day. (Patient not taking: Reported on 12/6/2023) 100 Tablet 3     No current Epic-ordered facility-administered medications on file.       Past Surgical History:   Procedure Laterality Date    PB OPEN FIX INTER/SUBTROCH FX,IMPLNT Left 3/24/2022    Procedure: INSERTION, INTRAMEDULLARY LILLI, FEMUR, PROXIMAL - SHORT, FEMORAL;  Surgeon: Zen Srivastava M.D.;  Location: [facility];  Service: Orthopedics    LAP, REMOVE ADJUST LUIS RESTRICT D*  6/5/2020    Procedure: REMOVAL, GASTRIC BAND, LAPAROSCOPIC - ADJUSTABLE BAND AND PORT;  Surgeon: Deniz Rios M.D.;  Location: [facility];  Service: General    TRANSCATHETER AORTIC VALVE REPLACEMENT N/A 1/27/2020    Procedure: REPLACEMENT, AORTIC VALVE, TRANSCATHETER-;  Surgeon: Surendra Castro M.D.;  Location: [facility];  Service: Cardiac    OLGA  1/27/2020    Procedure: ECHOCARDIOGRAM, TRANSESOPHAGEAL;  Surgeon: Surendra Castro M.D.;  Location: [facility];  Service: Cardiac    GASTROSCOPY N/A 1/8/2016    Procedure: GASTROSCOPY;  Surgeon: Deniz Rios M.D.;  Location: [facility];  Service:     ORIF, FRACTURE, HUMERUS Left 6/25/2015    Procedure: HUMERUS  ORIF/ PROXIMAL;  Surgeon: Cristal Guido M.D.;  Location: [facility];  Service:     FUSION, SPINE, LUMBAR, PLIF  9/5/2013    Performed by Edith Sears M.D. at SURGERY [facility]    LUMBAR DECOMPRESSION  9/5/2013    Performed by Edith Sears M.D. at SURGERY [facility] ORS    MASS EXCISION NEURO  9/5/2013    Performed by Edith Sears M.D. at SURGERY [facility]    RECOVERY  6/26/2012    Performed by SURGERY, IR-RECOVERY at SURGERY SAME DAY [facility] ORS    DRAINAGE HEMATOMA  5/25/2012    Performed by SARINA SUE at [facility]    GASTRIC BAND LAPAROSCOPIC REVISION  5/11/2012    Performed by SARINA SUE at [facility]    FUSION, SPINE, LUMBAR, PLIF  3/26/2012    Performed by EDITH SEARS at SURGERY [facility]    LUMBAR DECOMPRESSION  3/26/2012    Performed by EDITH SEARS at SURGERY [facility] ORS    INJ,EPIDURAL/LUMB/SAC SINGLE  3/19/2012    Performed by KRISTIN BALTAZAR at [facility] ORS    INJ,EPIDURAL/LUMB/SAC SINGLE  3/5/2012    Performed by KRISTIN BALTAZAR at [facility]    GASTRIC BANDING LAPAROSCOPIC  3/24/2010    Performed by SARINA SUE at SURGERY [facility]    HIATAL HERNIA REPAIR  3/24/2010    Performed by SARINA SUE at SURGERY [facility] ORS    KNEE ARTHROPLASTY TOTAL  2000    bilateral    ABDOMINOPLASTY  1990    AORTIC VALVE REPLACEMENT      ARTHROSCOPY, KNEE      LAMINOTOMY      OTHER CARDIAC SURGERY      stents    MA REMV 2ND CATARACT,CORN-SCLER SECTN      SINUSCOPE      SLEEVE,CARRIE VASO THIGH      TONSILLECTOMY      ZZZ CARDIAC CATH         Social History     Tobacco Use    Smoking status: Never    Smokeless tobacco: Never    Tobacco comments:     0   Vaping Use    Vaping Use: Never used   Substance Use Topics    Alcohol use: Not Currently     Alcohol/week: 0.0 oz    Drug use: Yes     Types: Marijuana, Oral     Comment: CBD 30 mg gummie daily       family history includes Diabetes in an other  "family member; No Known Problems in his father, mother, sister, sister, and sister.     Problem list, medications, and allergies reviewed by myself today in Epic.     Objective:   /56   Pulse (!) 42   Temp 36.8 °C (98.3 °F) (Temporal)   Resp 18   Ht 1.854 m (6' 1\")   Wt 120 kg (264 lb)   SpO2 91%   BMI 34.83 kg/m²     Physical Exam    Assessment/Plan:     Differential diagnosis discussed     No diagnosis found.    Return to clinic or go to ED if symptoms worsen or persist. Indications for ED discussed at length. Red flag symptoms discussed. All side effects of medication discussed including allergic response, GI upset, tendon injury, rash, sedation etc. Patient and/or guardian voices understanding.     I personally reviewed prior external notes and test results pertinent to today's visit as well as additional imaging and testing completed in clinic today.     Please note that this dictation was created using voice recognition software. I have made every reasonable attempt to correct obvious errors, but I expect that there are errors of grammar and possibly content that I did not discover before finalizing the note.    This note was electronically signed by SHO Lockett.     "

## 2023-12-16 ENCOUNTER — OFFICE VISIT (OUTPATIENT)
Dept: URGENT CARE | Facility: CLINIC | Age: 83
End: 2023-12-16
Payer: MEDICARE

## 2023-12-16 VITALS
SYSTOLIC BLOOD PRESSURE: 116 MMHG | TEMPERATURE: 97.5 F | HEART RATE: 88 BPM | WEIGHT: 260 LBS | RESPIRATION RATE: 16 BRPM | DIASTOLIC BLOOD PRESSURE: 64 MMHG | OXYGEN SATURATION: 94 % | HEIGHT: 73 IN | BODY MASS INDEX: 34.46 KG/M2

## 2023-12-16 DIAGNOSIS — Z51.89 WOUND CHECK, ABSCESS: ICD-10-CM

## 2023-12-16 PROCEDURE — 99213 OFFICE O/P EST LOW 20 MIN: CPT

## 2023-12-16 PROCEDURE — 3078F DIAST BP <80 MM HG: CPT

## 2023-12-16 PROCEDURE — 3074F SYST BP LT 130 MM HG: CPT

## 2023-12-16 ASSESSMENT — FIBROSIS 4 INDEX: FIB4 SCORE: 2.21

## 2023-12-16 ASSESSMENT — ENCOUNTER SYMPTOMS
FEVER: 0
WEIGHT LOSS: 0
CHILLS: 0

## 2023-12-16 NOTE — PROGRESS NOTES
CHIEF COMPLAINT  Chief Complaint   Patient presents with    Wound Check     Cyst on back up near neck patient explains     Subjective:   LIAN More III is a 83 y.o. male who presents for to urgent care for wound check.  He reports being seen on 12/13 for a cyst to his upper back.  During initial visit patient received I&D of cyst and was started on a course of Augmentin.  Patient was seen again on 12/14 for repacking of wound and subsequent check.  Patient reports taking antibiotics as prescribed.  He denies any fever/chills.  He reports difficulty with changing dressing, as he lives alone.      Review of Systems   Constitutional:  Negative for chills, fever and weight loss.       PAST MEDICAL HISTORY  Patient Active Problem List    Diagnosis Date Noted    Stented coronary artery 07/18/2023    Left hand pain 03/21/2022    Contusion of left hand 03/21/2022    Encounter for geriatric assessment 03/20/2022    Abnormal CT scan, colon 03/20/2022    Chronic respiratory failure with hypoxia (HCC) 03/20/2022    Hormone replacement therapy 03/20/2022    BPH (benign prostatic hyperplasia) 03/20/2022    Seasonal allergies 03/19/2022    Trauma 03/18/2022    Fracture of ribs, three, closed, left, initial encounter 03/18/2022    Hip hematoma, left, initial encounter 03/18/2022    Antiplatelet or antithrombotic long-term use 03/18/2022    Mild depression 03/15/2022    Skin lesion 03/15/2022    Paroxysmal atrial fibrillation (HCC) 02/14/2022    Plantar fasciitis of right foot 07/19/2021    Diabetic polyneuropathy associated with type 2 diabetes mellitus (HCC) 06/04/2021    Ataxia 09/11/2020    Lumbar radiculopathy, chronic 07/10/2020    JAMES (obstructive sleep apnea) 04/30/2020    Hyperkalemia 04/29/2020    Encounter for screening for COVID-19 04/27/2020    Renal hematoma, left, initial encounter 04/26/2020    Essential hypertension 04/26/2020    ICD (implantable cardioverter-defibrillator) in place 03/30/2020    High risk  medication use 02/26/2020    Insomnia 02/25/2020    S/P TAVR (transcatheter aortic valve replacement) 01/27/2020    Gastroesophageal reflux disease without esophagitis 01/20/2020    CHF (congestive heart failure), NYHA class II, chronic, systolic ([facility]) 12/26/2019    Acute on chronic Stage 3b chronic kidney disease ([facility]) 12/04/2019    Coronary artery disease due to calcified coronary lesion 12/04/2019    Frequent falls 09/04/2019    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    Pancreatic lesion 04/01/2019    Macular degeneration 10/10/2018    Polycythemia 03/11/2017    Obesity 03/03/2016    Hypotestosteronism 03/03/2016    Disp fx of greater trochanter of left femur, init ([facility]) 06/25/2015    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    Contraindication to deep vein thrombosis (DVT) prophylaxis 11/24/2014    S/P gastric bypass 03/26/2010    Type 2 diabetes mellitus with hyperglycemia, without long-term current use of insulin ([facility]) 09/05/2009    S/P lumbar fusion 09/05/2009    Dyslipidemia 09/05/2009    Erectile dysfunction 09/05/2009       SURGICAL HISTORY   has a past surgical history that includes abdominoplasty (1990); gastric banding laparoscopic (3/24/2010); hiatal hernia repair (3/24/2010); inj,epidural/lumb/sac single (3/5/2012); inj,epidural/lumb/sac single (3/19/2012); fusion, spine, lumbar, plif (3/26/2012); lumbar decompression (3/26/2012); gastric band laparoscopic revision (5/11/2012); drainage hematoma (5/25/2012); recovery (6/26/2012); fusion, spine, lumbar, plif (9/5/2013); lumbar decompression (9/5/2013); mass excision neuro (9/5/2013); gastroscopy (N/A, 1/8/2016); zzz cardiac cath; laminotomy; Sleeve,Grant Vaso Thigh; remv 2nd cataract,corn-scler sectn; sinuscope; tonsillectomy; arthroscopy, knee; transcatheter aortic valve replacement (N/A, 1/27/2020); stephon (1/27/2020); aortic valve replacement; other cardiac surgery; lap, remove adjust pasha restrict d* (6/5/2020); knee arthroplasty total (2000); orif, fracture, humerus (Left,  6/25/2015); and open fix inter/subtroch fx,implnt (Left, 3/24/2022).    ALLERGIES  Allergies   Allergen Reactions    Statins [Hmg-Coa-R Inhibitors] Rash     Blisters      Atorvastatin Hives     Selected as representative agent for class- please do not delete       CURRENT MEDICATIONS  Home Medications       Reviewed by Lele Ordaz Ass't (Medical Assistant) on 12/16/23 at 1255  Med List Status: <None>     Medication Last Dose Status   amoxicillin-clavulanate (AUGMENTIN) 875-125 MG Tab Taking Active   Ascorbic Acid (VITAMIN C) 1000 MG Tab Taking Active   Aspirin 81 MG Cap Taking Active   B Complex Vitamins (VITAMIN B COMPLEX PO) Taking Active   bumetanide (BUMEX) 1 MG Tab Taking Active   Calcium Citrate (CITRACAL PO) Taking Active   carvedilol (COREG) 3.125 MG Tab Taking Active   Cholecalciferol (VITAMIN D3) 2000 UNIT Cap Taking Active   clomiPHENE (CLOMID) 50 MG tablet Taking Active   clopidogrel (PLAVIX) 75 MG Tab Not Taking Active   coenzyme Q-10 30 MG capsule Taking Active   cyanocobalamin (VITAMIN B-12) 1000 MCG/ML Solution Taking Active   ELIQUIS 5 MG Tab Taking Active   ezetimibe (ZETIA) 10 MG Tab Taking Active   GLIPIZIDE PO Taking Active   hydrocortisone 2.5 % Cream topical cream Taking Active   L-Lysine 1000 MG Tab Taking Active   losartan (COZAAR) 25 MG Tab Not Taking Active   omeprazole (PRILOSEC) 40 MG delayed-release capsule Taking Active   Semaglutide,0.25 or 0.5MG/DOS, (OZEMPIC, 0.25 OR 0.5 MG/DOSE,) 2 MG/1.5ML Solution Pen-injector Taking Active   testosterone cypionate (DEPO-TESTOSTERONE) 200 MG/ML Solution injection Taking Active   thiamine (B-1) 100 MG/ML Solution Taking Active   vitamin E 180 MG (400 UNIT) Cap Taking Active                    SOCIAL HISTORY  Social History     Tobacco Use    Smoking status: Never    Smokeless tobacco: Never    Tobacco comments:     0   Vaping Use    Vaping Use: Never used   Substance and Sexual Activity    Alcohol use: Not Currently     Alcohol/week: 0.0 oz  "   Drug use: Yes     Types: Marijuana, Oral     Comment: CBD 30 mg gummie daily    Sexual activity: Yes       FAMILY HISTORY  Family History   Problem Relation Age of Onset    No Known Problems Sister     No Known Problems Sister     No Known Problems Sister     Diabetes Other     No Known Problems Mother     No Known Problems Father     Heart Disease Neg Hx          Medications, Allergies, and current problem list reviewed today in Epic.     Objective:     /64 (BP Location: Left arm, Patient Position: Sitting, BP Cuff Size: Large adult)   Pulse 88   Temp 36.4 °C (97.5 °F)   Resp 16   Ht 1.854 m (6' 1\")   Wt 118 kg (260 lb)   SpO2 94%     Physical Exam  Vitals reviewed.   Constitutional:       General: He is not in acute distress.     Appearance: Normal appearance. He is not ill-appearing or toxic-appearing.   HENT:      Mouth/Throat:      Mouth: Mucous membranes are moist.      Pharynx: Oropharynx is clear.   Cardiovascular:      Rate and Rhythm: Normal rate and regular rhythm.      Pulses: Normal pulses.      Heart sounds: Normal heart sounds.   Pulmonary:      Effort: Pulmonary effort is normal. No respiratory distress.      Breath sounds: Normal breath sounds. No wheezing, rhonchi or rales.   Musculoskeletal:      Cervical back: Neck supple. No rigidity.   Lymphadenopathy:      Cervical: No cervical adenopathy.   Skin:     General: Skin is warm.             Comments: 3 cm in diameter area of induration to upper back.  1 cm site of incision.  Mild erythema noted.  No streaking.    Neurological:      Mental Status: He is alert.         Assessment/Plan:     Diagnosis and associated orders:     1. Wound check, abscess           Comments/MDM:     Patient presents to urgent care for wound check and removal of packing.  He reports that he is taking antibiotics as prescribed, and denies any fevers or chills.  Upon physical exam patient is alert and in no apparent signs of distress.  No lymphadenopathy.  3 cm " in diameter area of induration to upper back.  Mild erythema.  Packing removed.  Incision cleaned with normal saline.  No packing placed.  Xeroform gauze placed to site.  Nonadhesive gauze placed and Tegaderm applied.  Patient instructed to keep area clean.  He reports that he is unable to change site as he lives alone.  Patient instructed to return on Monday for dressing change and reevaluation.  Discussed red flag signs and symptoms.  Instructed to return to ER or urgent care if symptoms worsen or fail to improve.           Differential diagnosis, natural history, supportive care, and indications for immediate follow-up discussed.    Advised the patient to follow-up with the primary care physician for recheck, reevaluation, and consideration of further management.    Please note that this dictation was created using voice recognition software. I have made a reasonable attempt to correct obvious errors, but I expect that there are errors of grammar and possibly content that I did not discover before finalizing the note.    This note was electronically signed by NEAL Rubin

## 2023-12-18 ENCOUNTER — OFFICE VISIT (OUTPATIENT)
Dept: URGENT CARE | Facility: CLINIC | Age: 83
End: 2023-12-18
Payer: MEDICARE

## 2023-12-18 VITALS
TEMPERATURE: 98.9 F | RESPIRATION RATE: 16 BRPM | OXYGEN SATURATION: 94 % | DIASTOLIC BLOOD PRESSURE: 66 MMHG | HEART RATE: 82 BPM | HEIGHT: 73 IN | BODY MASS INDEX: 34.46 KG/M2 | SYSTOLIC BLOOD PRESSURE: 112 MMHG | WEIGHT: 260 LBS

## 2023-12-18 DIAGNOSIS — L72.0 INFECTED EPIDERMOID CYST: ICD-10-CM

## 2023-12-18 DIAGNOSIS — L08.9 INFECTED EPIDERMOID CYST: ICD-10-CM

## 2023-12-18 PROCEDURE — 99213 OFFICE O/P EST LOW 20 MIN: CPT | Performed by: STUDENT IN AN ORGANIZED HEALTH CARE EDUCATION/TRAINING PROGRAM

## 2023-12-18 PROCEDURE — 3074F SYST BP LT 130 MM HG: CPT | Performed by: STUDENT IN AN ORGANIZED HEALTH CARE EDUCATION/TRAINING PROGRAM

## 2023-12-18 PROCEDURE — 3078F DIAST BP <80 MM HG: CPT | Performed by: STUDENT IN AN ORGANIZED HEALTH CARE EDUCATION/TRAINING PROGRAM

## 2023-12-18 ASSESSMENT — FIBROSIS 4 INDEX: FIB4 SCORE: 2.21

## 2023-12-18 NOTE — PROGRESS NOTES
"Subjective:   CHIEF COMPLAINT  Chief Complaint   Patient presents with    Cyst     F/V Cyst on back of neck, \" Swelling has improved,  but I can still feel the lump on my neck.\"        HPI  LIAN More III is a 83 y.o. male who presents for wound check.  Seen in clinic 5 days ago for an infected epidermoid cyst along the right upper trapezius region.  No longer having pain but area  to palpation.  Currently on Augmentin.  Not established with a dermatologist.    REVIEW OF SYSTEMS  General: no fever or chills  GI: no nausea or vomiting  See HPI for further details.    PAST MEDICAL HISTORY  Patient Active Problem List    Diagnosis Date Noted    Stented coronary artery 07/18/2023    Left hand pain 03/21/2022    Contusion of left hand 03/21/2022    Encounter for geriatric assessment 03/20/2022    Abnormal CT scan, colon 03/20/2022    Chronic respiratory failure with hypoxia (HCC) 03/20/2022    Hormone replacement therapy 03/20/2022    BPH (benign prostatic hyperplasia) 03/20/2022    Seasonal allergies 03/19/2022    Trauma 03/18/2022    Fracture of ribs, three, closed, left, initial encounter 03/18/2022    Hip hematoma, left, initial encounter 03/18/2022    Antiplatelet or antithrombotic long-term use 03/18/2022    Mild depression 03/15/2022    Skin lesion 03/15/2022    Paroxysmal atrial fibrillation (HCC) 02/14/2022    Plantar fasciitis of right foot 07/19/2021    Diabetic polyneuropathy associated with type 2 diabetes mellitus (HCC) 06/04/2021    Ataxia 09/11/2020    Lumbar radiculopathy, chronic 07/10/2020    JAMES (obstructive sleep apnea) 04/30/2020    Hyperkalemia 04/29/2020    Encounter for screening for COVID-19 04/27/2020    Renal hematoma, left, initial encounter 04/26/2020    Essential hypertension 04/26/2020    ICD (implantable cardioverter-defibrillator) in place 03/30/2020    High risk medication use 02/26/2020    Insomnia 02/25/2020    S/P TAVR (transcatheter aortic valve replacement) 01/27/2020    " Gastroesophageal reflux disease without esophagitis 01/20/2020    CHF (congestive heart failure), NYHA class II, chronic, systolic ([facility]) 12/26/2019    Acute on chronic Stage 3b chronic kidney disease ([facility]) 12/04/2019    Coronary artery disease due to calcified coronary lesion 12/04/2019    Frequent falls 09/04/2019    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    Pancreatic lesion 04/01/2019    Macular degeneration 10/10/2018    Polycythemia 03/11/2017    Obesity 03/03/2016    Hypotestosteronism 03/03/2016    Disp fx of greater trochanter of left femur, init ([facility]) 06/25/2015    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    Contraindication to deep vein thrombosis (DVT) prophylaxis 11/24/2014    S/P gastric bypass 03/26/2010    Type 2 diabetes mellitus with hyperglycemia, without long-term current use of insulin ([facility]) 09/05/2009    S/P lumbar fusion 09/05/2009    Dyslipidemia 09/05/2009    Erectile dysfunction 09/05/2009       SURGICAL HISTORY   has a past surgical history that includes abdominoplasty (1990); gastric banding laparoscopic (3/24/2010); hiatal hernia repair (3/24/2010); inj,epidural/lumb/sac single (3/5/2012); inj,epidural/lumb/sac single (3/19/2012); fusion, spine, lumbar, plif (3/26/2012); lumbar decompression (3/26/2012); gastric band laparoscopic revision (5/11/2012); drainage hematoma (5/25/2012); recovery (6/26/2012); fusion, spine, lumbar, plif (9/5/2013); lumbar decompression (9/5/2013); mass excision neuro (9/5/2013); gastroscopy (N/A, 1/8/2016); zzz cardiac cath; laminotomy; Sleeve,Grant Vaso Thigh; remv 2nd cataract,corn-scler sectn; sinuscope; tonsillectomy; arthroscopy, knee; transcatheter aortic valve replacement (N/A, 1/27/2020); stephon (1/27/2020); aortic valve replacement; other cardiac surgery; lap, remove adjust pasha restrict d* (6/5/2020); knee arthroplasty total (2000); orif, fracture, humerus (Left, 6/25/2015); and open fix inter/subtroch fx,implnt (Left, 3/24/2022).    ALLERGIES  Allergies   Allergen Reactions     Statins [Hmg-Coa-R Inhibitors] Rash     Blisters      Atorvastatin Hives     Selected as representative agent for class- please do not delete       CURRENT MEDICATIONS  Home Medications       Reviewed by Dougie Omalley D.O. (Physician) on 12/18/23 at 1250  Med List Status: <None>     Medication Last Dose Status   amoxicillin-clavulanate (AUGMENTIN) 875-125 MG Tab Taking Active   Ascorbic Acid (VITAMIN C) 1000 MG Tab Taking Active   Aspirin 81 MG Cap Taking Active   B Complex Vitamins (VITAMIN B COMPLEX PO) Taking Active   bumetanide (BUMEX) 1 MG Tab Taking Active   Calcium Citrate (CITRACAL PO) Taking Active   carvedilol (COREG) 3.125 MG Tab Taking Active   Cholecalciferol (VITAMIN D3) 2000 UNIT Cap Taking Active   clomiPHENE (CLOMID) 50 MG tablet Taking Active   clopidogrel (PLAVIX) 75 MG Tab Not Taking Active   coenzyme Q-10 30 MG capsule Taking Active   cyanocobalamin (VITAMIN B-12) 1000 MCG/ML Solution Taking Active   ELIQUIS 5 MG Tab Taking Active   ezetimibe (ZETIA) 10 MG Tab Taking Active   GLIPIZIDE PO Taking Active   hydrocortisone 2.5 % Cream topical cream Taking Active   L-Lysine 1000 MG Tab Taking Active   losartan (COZAAR) 25 MG Tab Not Taking Active   omeprazole (PRILOSEC) 40 MG delayed-release capsule Taking Active   Semaglutide,0.25 or 0.5MG/DOS, (OZEMPIC, 0.25 OR 0.5 MG/DOSE,) 2 MG/1.5ML Solution Pen-injector Taking Active   testosterone cypionate (DEPO-TESTOSTERONE) 200 MG/ML Solution injection Taking Active   thiamine (B-1) 100 MG/ML Solution Taking Active   vitamin E 180 MG (400 UNIT) Cap Taking Active                    SOCIAL HISTORY  Social History     Tobacco Use    Smoking status: Never    Smokeless tobacco: Never    Tobacco comments:     0   Vaping Use    Vaping Use: Never used   Substance and Sexual Activity    Alcohol use: Not Currently     Alcohol/week: 0.0 oz    Drug use: Yes     Types: Marijuana, Oral     Comment: CBD 30 mg gummie daily    Sexual activity: Yes       FAMILY  "HISTORY  Family History   Problem Relation Age of Onset    No Known Problems Sister     No Known Problems Sister     No Known Problems Sister     Diabetes Other     No Known Problems Mother     No Known Problems Father     Heart Disease Neg Hx           Objective:   PHYSICAL EXAM  VITAL SIGNS: /66 (BP Location: Left arm, Patient Position: Sitting, BP Cuff Size: Adult)   Pulse 82   Temp 37.2 °C (98.9 °F) (Temporal)   Resp 16   Ht 1.854 m (6' 1\")   Wt 118 kg (260 lb)   SpO2 94%   BMI 34.30 kg/m²     Gen: no acute distress, normal voice  Skin: dry, intact, moist mucosal membranes  Head: Atraumatic, normocephalic  Psych: normal affect, normal judgement, alert, awake  Musculoskeletal: Quarter sized area of erythema with a central incision consistent with previous I&D.  I was able to express some sebum from the wound without any purulent drainage.  No extending erythema.  No induration.      Assessment/Plan:     1. Infected epidermoid cyst  Referral to Dermatology      Wound was covered with a bandage.    -Continue taking Augmentin through completion.    -Tylenol as needed for symptomatic relief.    -Ordered referral to follow-up dermatology for definitive management  -Routine wound care discussed.    -Return to urgent care any new/worsening symptoms or further questions or concerns.  Patient understood everything discussed.  All questions were answered.    Differential diagnosis and supportive care discussed. Follow-up as needed if symptoms worsen or fail to improve to PCP, Urgent care or Emergency Room.    E/M code billed because additional f/u is needed and ordered referral to Derm.     Please note that this dictation was created using voice recognition software. I have made a reasonable attempt to correct obvious errors, but I expect that there are errors of grammar and possibly content that I did not discover before finalizing the note.         "

## 2023-12-19 ENCOUNTER — NON-PROVIDER VISIT (OUTPATIENT)
Dept: CARDIOLOGY | Facility: MEDICAL CENTER | Age: 83
End: 2023-12-19
Payer: MEDICARE

## 2023-12-19 DIAGNOSIS — Z95.5 S/P CORONARY ARTERY STENT PLACEMENT: ICD-10-CM

## 2023-12-19 PROCEDURE — G0422 INTENS CARDIAC REHAB W/EXERC: HCPCS | Mod: KX | Performed by: INTERNAL MEDICINE

## 2023-12-19 PROCEDURE — G0423 INTENS CARDIAC REHAB NO EXER: HCPCS | Mod: 59,KX | Performed by: INTERNAL MEDICINE

## 2023-12-19 NOTE — PROGRESS NOTES
LIAN More III attended Intensive Cardiac Rehab today from 0900 to 1100. During his time he exercised and attended class.   His education today was a VIDEO titled: DINING OUT. Patient received handouts and class discussion pertaining to the topic.

## 2023-12-29 ENCOUNTER — HOSPITAL ENCOUNTER (OUTPATIENT)
Dept: RADIOLOGY | Facility: MEDICAL CENTER | Age: 83
End: 2023-12-29
Attending: INTERNAL MEDICINE
Payer: MEDICARE

## 2023-12-29 DIAGNOSIS — M81.0 AGE-RELATED OSTEOPOROSIS WITHOUT CURRENT PATHOLOGICAL FRACTURE: ICD-10-CM

## 2023-12-29 PROCEDURE — 77080 DXA BONE DENSITY AXIAL: CPT

## 2024-01-03 ENCOUNTER — OFFICE VISIT (OUTPATIENT)
Dept: CARDIOLOGY | Facility: MEDICAL CENTER | Age: 84
End: 2024-01-03
Attending: NURSE PRACTITIONER
Payer: MEDICARE

## 2024-01-03 VITALS
BODY MASS INDEX: 34.33 KG/M2 | RESPIRATION RATE: 22 BRPM | WEIGHT: 259 LBS | SYSTOLIC BLOOD PRESSURE: 114 MMHG | DIASTOLIC BLOOD PRESSURE: 62 MMHG | OXYGEN SATURATION: 95 % | HEART RATE: 75 BPM | HEIGHT: 73 IN

## 2024-01-03 DIAGNOSIS — I25.10 CORONARY ARTERY DISEASE DUE TO CALCIFIED CORONARY LESION: Chronic | ICD-10-CM

## 2024-01-03 DIAGNOSIS — I25.84 CORONARY ARTERY DISEASE DUE TO CALCIFIED CORONARY LESION: Chronic | ICD-10-CM

## 2024-01-03 DIAGNOSIS — R55 PRE-SYNCOPE: ICD-10-CM

## 2024-01-03 DIAGNOSIS — R25.1 OCCASIONAL TREMORS: ICD-10-CM

## 2024-01-03 DIAGNOSIS — Z95.810 ICD (IMPLANTABLE CARDIOVERTER-DEFIBRILLATOR) IN PLACE: ICD-10-CM

## 2024-01-03 DIAGNOSIS — I77.9 MILD CAROTID ARTERY DISEASE (HCC): ICD-10-CM

## 2024-01-03 DIAGNOSIS — I48.0 PAROXYSMAL ATRIAL FIBRILLATION (HCC): ICD-10-CM

## 2024-01-03 DIAGNOSIS — R42 DIZZINESS: ICD-10-CM

## 2024-01-03 PROCEDURE — 99215 OFFICE O/P EST HI 40 MIN: CPT | Mod: 25 | Performed by: NURSE PRACTITIONER

## 2024-01-03 PROCEDURE — 93005 ELECTROCARDIOGRAM TRACING: CPT | Performed by: NURSE PRACTITIONER

## 2024-01-03 PROCEDURE — 93283 PRGRMG EVAL IMPLANTABLE DFB: CPT | Performed by: NURSE PRACTITIONER

## 2024-01-03 PROCEDURE — 93283 PRGRMG EVAL IMPLANTABLE DFB: CPT | Mod: 26 | Performed by: NURSE PRACTITIONER

## 2024-01-03 PROCEDURE — 3074F SYST BP LT 130 MM HG: CPT | Performed by: NURSE PRACTITIONER

## 2024-01-03 PROCEDURE — 99213 OFFICE O/P EST LOW 20 MIN: CPT | Performed by: NURSE PRACTITIONER

## 2024-01-03 PROCEDURE — 3078F DIAST BP <80 MM HG: CPT | Performed by: NURSE PRACTITIONER

## 2024-01-03 ASSESSMENT — ENCOUNTER SYMPTOMS
PND: 0
GASTROINTESTINAL NEGATIVE: 1
TREMORS: 1
RESPIRATORY NEGATIVE: 1
DIZZINESS: 1
BRUISES/BLEEDS EASILY: 0
CLAUDICATION: 0
WHEEZING: 0
NERVOUS/ANXIOUS: 0
SENSORY CHANGE: 0
NAUSEA: 0
DEPRESSION: 0
ORTHOPNEA: 0
EYES NEGATIVE: 1
HALLUCINATIONS: 0
PALPITATIONS: 0
MUSCULOSKELETAL NEGATIVE: 1
SHORTNESS OF BREATH: 0

## 2024-01-03 ASSESSMENT — FIBROSIS 4 INDEX: FIB4 SCORE: 2.21

## 2024-01-03 NOTE — PROGRESS NOTES
"Cardiology Follow up Note    DOS: 1/3/2024  [id number]  J W Means III    Chief complaint/Reason for consult: tremors, dizziness, and pre-syncope    HPI: Pt is a 83 y.o. male who presents to the clinic today in follow up for longitudinal care, device check and new symptoms. Patient has a past medical history significant for but not limited to: CAD S/P stent to distal RCA; patent stent extending from LM to proximal LAD, TAVR, CKD3b, dyslipidemia, mild carotid disease per imaging 2020, Dual chamber ICD in situ, chronic HFrEF, JAMES, S/P lumbar fusion, S/P gastric bypass, paroxysmal atrial fibrillation, chronic anticoagulation, hypertension, type 2 diabetes, high risk falls. Patient main complaints today center around tremors and pre-syncope and dizziness. Patient raised his arms to shoulder level for 4-5 seconds and began to feel dizzy sitting down. In 2020 he hd a ultrasound that showed mild to moderate atherosclerotic plaque with less than 50% reduction in diameter of carotids; no occlusion. Due to patient symptoms and history of atherosclerotic disease with peripheral vascular disease as well as multiple stents in coronary anatomy with most recent in July 2023, ordering ultrasound to rule out possible deteriorating carotid disease as well as rule out possible subclavian steal syndrome. Patient also states that he intermittently gets tremors that are so bad that he is afraid he will fall if he is not close by to grab onto something. Patient echocardiogram has also dropped from previous now LVEF 40-45%. Patient not overly pacing and overall atrial fibrillation burden less than 1%. Minimal episodes of NSVT and no therapies delivered.       Past Medical History:   Diagnosis Date    Arthritis 06/15/2023    \"everywhere\"    Breath shortness 06/15/2023    pt states short of breath 24/7 O2 at night only    CAD (coronary artery disease)     CATARACT 06/15/2023    removed bilat    Chest pain     Chest tightness     CHF (congestive " heart failure), NYHA class II, chronic, systolic ([facility]) 12/26/2019    managed by cardiology Dr. Wilkinson: He appears euvolemic on physical exam.  He is on losartan, carvedilol, not on spironolactone due to low kidney function.  He was admitted to the hospital for CHF exacerbation, when he suddenly gained 15 pounds.  He was diuresed with IV Lasix at that time, had echocardiogram that showed significant improvement of his cardiac function, when compared to previous study    Chickenpox     Congestive heart failure ([facility])     Constipation     Coronary heart disease     Cough     Daytime sleepiness     Diabetes 06/15/2023    oral medication    Difficulty breathing     Dilated cardiomyopathy ([facility])     Fatigue     GERD (gastroesophageal reflux disease)     Hearing difficulty     Heart burn 06/15/2023    medicated    Heart murmur     Heart valve disease 06/15/2023    replaced    Hemorrhagic disorder ([facility]) 06/15/2023    eliquis    Hiatus hernia syndrome     High cholesterol 06/15/2023    medicated    History of BPH     History of chronic cough     Hyperlipidemia     Hypertension 06/15/2023    pt states well controlled on meds    Kidney disease 05/2020    ruptered left kidney     Mumps     Oxygen dependent     @HS, 2 liters    Pacemaker     AICD    Pain 06/15/2023    hip and legs, everywhere else as well    Palpitations     Peripheral neuropathy     Persistent atrial fibrillation ([facility]) 06/15/2023    medicated    Pneumonia 2007    Rhinitis     Ringing in ears     Severe aortic stenosis 12/04/2019    Sleep apnea 06/15/2023    CPAp with O2    Swelling of lower extremity     Tonsillitis     Tremor     Ulcer 2014    Dr. Porter, gastroenterologist    Urinary incontinence 06/15/2023    at times       Past Surgical History:   Procedure Laterality Date    PB OPEN FIX INTER/SUBTROCH FX,IMPLNT Left 3/24/2022    Procedure: INSERTION, INTRAMEDULLARY LILLI, FEMUR, PROXIMAL - SHORT, FEMORAL;  Surgeon: Zen Srivastava M.D.;  Location: SURGERY  [facility];  Service: Orthopedics    LAP, REMOVE ADJUST LUIS RESTRICT D*  6/5/2020    Procedure: REMOVAL, GASTRIC BAND, LAPAROSCOPIC - ADJUSTABLE BAND AND PORT;  Surgeon: Deniz Sue M.D.;  Location: [facility];  Service: General    TRANSCATHETER AORTIC VALVE REPLACEMENT N/A 1/27/2020    Procedure: REPLACEMENT, AORTIC VALVE, TRANSCATHETER-;  Surgeon: Surendra Castro M.D.;  Location: [facility];  Service: Cardiac    OLGA  1/27/2020    Procedure: ECHOCARDIOGRAM, TRANSESOPHAGEAL;  Surgeon: Surendra Castro M.D.;  Location: [facility];  Service: Cardiac    GASTROSCOPY N/A 1/8/2016    Procedure: GASTROSCOPY;  Surgeon: Deniz Sue M.D.;  Location: [facility];  Service:     ORIF, FRACTURE, HUMERUS Left 6/25/2015    Procedure: HUMERUS ORIF/ PROXIMAL;  Surgeon: Cristal Guido M.D.;  Location: [facility];  Service:     FUSION, SPINE, LUMBAR, PLIF  9/5/2013    Performed by Edith Sears M.D. at [facility]    LUMBAR DECOMPRESSION  9/5/2013    Performed by Edith Sears M.D. at [facility]    MASS EXCISION NEURO  9/5/2013    Performed by Edith Sears M.D. at [facility]    RECOVERY  6/26/2012    Performed by SURGERY, IR-RECOVERY at SURGERY SAME DAY [facility]    DRAINAGE HEMATOMA  5/25/2012    Performed by DENIZ SUE at [facility]    GASTRIC BAND LAPAROSCOPIC REVISION  5/11/2012    Performed by DENIZ SUE at [facility]    FUSION, SPINE, LUMBAR, PLIF  3/26/2012    Performed by EDITH SEARS at [facility]    LUMBAR DECOMPRESSION  3/26/2012    Performed by EDITH SEARS at [facility]    INJ,EPIDURAL/LUMB/SAC SINGLE  3/19/2012    Performed by KRISTIN BALTAZAR at [facility]    INJ,EPIDURAL/LUMB/SAC SINGLE  3/5/2012    Performed by KRISTIN BALTAZAR at [facility]    GASTRIC BANDING LAPAROSCOPIC  3/24/2010    Performed by DENIZ SUE  at SURGERY [facility]    HIATAL HERNIA REPAIR  3/24/2010    Performed by SARINA SUE at SURGERY [facility]    KNEE ARTHROPLASTY TOTAL  2000    bilateral    ABDOMINOPLASTY  1990    AORTIC VALVE REPLACEMENT      ARTHROSCOPY, KNEE      LAMINOTOMY      OTHER CARDIAC SURGERY      stents    MN REMV 2ND CATARACT,CORN-SCLER SECTN      SINUSCOPE      SLEEVE,CARRIE VASO THIGH      TONSILLECTOMY      ZZZ CARDIAC CATH         Social History     Socioeconomic History    Marital status:      Spouse name: Not on file    Number of children: Not on file    Years of education: Not on file    Highest education level: Bachelor's degree (e.g., BA, AB, BS)   Occupational History    Not on file   Tobacco Use    Smoking status: Never    Smokeless tobacco: Never    Tobacco comments:     0   Vaping Use    Vaping Use: Never used   Substance and Sexual Activity    Alcohol use: Not Currently     Alcohol/week: 0.0 oz    Drug use: Yes     Types: Marijuana, Oral     Comment: CBD 30 mg gummie daily    Sexual activity: Yes   Other Topics Concern    Not on file   Social History Narrative    Not on file     Social Determinants of Health     Financial Resource Strain: Low Risk  (3/12/2022)    Overall Financial Resource Strain (CARDIA)     Difficulty of Paying Living Expenses: Not very hard   Food Insecurity: No Food Insecurity (3/12/2022)    Hunger Vital Sign     Worried About Running Out of Food in the Last Year: Never true     Ran Out of Food in the Last Year: Never true   Transportation Needs: No Transportation Needs (3/12/2022)    PRAPARE - Transportation     Lack of Transportation (Medical): No     Lack of Transportation (Non-Medical): No   Physical Activity: Sufficiently Active (3/12/2022)    Exercise Vital Sign     Days of Exercise per Week: 3 days     Minutes of Exercise per Session: 150+ min   Stress: Stress Concern Present (3/12/2022)    [employer] [place] [employer] - Occupational Stress Questionnaire     Feeling  of Stress : To some extent   Social Connections: Socially Isolated (3/12/2022)    Social Connection and Isolation Panel [NHANES]     Frequency of Communication with Friends and Family: Once a week     Frequency of Social Gatherings with Friends and Family: Never     Attends [employer] Services: Never     Active Member of Clubs or Organizations: No     Attends Club or Organization Meetings: Never     Marital Status:    Intimate Partner Violence: Not on file   Housing Stability: Low Risk  (3/12/2022)    Housing Stability Vital Sign     Unable to Pay for Housing in the Last Year: No     Number of Places Lived in the Last Year: 1     Unstable Housing in the Last Year: No       Family History   Problem Relation Age of Onset    No Known Problems Sister     No Known Problems Sister     No Known Problems Sister     Diabetes Other     No Known Problems Mother     No Known Problems Father     Heart Disease Neg Hx        Allergies   Allergen Reactions    Statins [Hmg-Coa-R Inhibitors] Rash     Blisters      Atorvastatin Hives     Selected as representative agent for class- please do not delete       Current Outpatient Medications   Medication Sig Dispense Refill    Aspirin 81 MG Cap Take 81 mg by mouth one time.      clomiPHENE (CLOMID) 50 MG tablet Take 1 Tablet by mouth every day.      Semaglutide,0.25 or 0.5MG/DOS, (OZEMPIC, 0.25 OR 0.5 MG/DOSE,) 2 MG/1.5ML Solution Pen-injector Inject 0.025 mL under the skin every 7 days.      testosterone cypionate (DEPO-TESTOSTERONE) 200 MG/ML Solution injection INJECT 0.8 ML INTRAMUSCULARLY ONCE WEEKLY      hydrocortisone 2.5 % Cream topical cream APPLY 1 APPLICATION TO AFFECTED AREA ONCE DAILY      cyanocobalamin (VITAMIN B-12) 1000 MCG/ML Solution INJECT 1 ML INTRAMUSCULARLY ONCE A WEEK      vitamin E 180 MG (400 UNIT) Cap Take by oral route.      thiamine (B-1) 100 MG/ML Solution Take by injection route.      L-Lysine 1000 MG Tab Take by oral route.      carvedilol (COREG)  3.125 MG Tab Take 1 Tablet by mouth 2 times a day with meals. 200 Tablet 3    ezetimibe (ZETIA) 10 MG Tab Take 1 Tablet by mouth every day. 100 Tablet 3    losartan (COZAAR) 25 MG Tab Take 1 Tablet by mouth every day. 100 Tablet 3    clopidogrel (PLAVIX) 75 MG Tab Take 1 Tablet by mouth every day. 100 Tablet 3    ELIQUIS 5 MG Tab Take 1 tablet by mouth twice daily 180 Tablet 3    GLIPIZIDE PO Take 25 mg by mouth every day.      bumetanide (BUMEX) 1 MG Tab Take 2 tablets by mouth twice daily (Patient taking differently: Take 2 mg by mouth 2 times a day. 2 mg = 2 tab) 360 Tablet 3    Calcium Citrate (CITRACAL PO) Take 1 Tab by mouth 2 Times a Day.      coenzyme Q-10 30 MG capsule Take 30 mg by mouth 2 times a day.      omeprazole (PRILOSEC) 40 MG delayed-release capsule Take 1 Capsule by mouth 2 times a day.      Cholecalciferol (VITAMIN D3) 2000 UNIT Cap Take 1 Capsule by mouth 2 times a day.      B Complex Vitamins (VITAMIN B COMPLEX PO) Take 1 Tab by mouth every day.      Ascorbic Acid (VITAMIN C) 1000 MG Tab Take 1 Tablet by mouth 2 times a day.       No current facility-administered medications for this visit.       Vitals:    01/03/24 1336   BP: 114/62   Pulse: 75   Resp: (!) 22   SpO2: 95%         Review of Systems   Constitutional:  Positive for malaise/fatigue.   HENT: Negative.     Eyes: Negative.    Respiratory: Negative.  Negative for shortness of breath and wheezing.    Cardiovascular:  Negative for chest pain, palpitations, orthopnea, claudication, leg swelling and PND.   Gastrointestinal: Negative.  Negative for nausea.   Genitourinary: Negative.    Musculoskeletal: Negative.    Skin: Negative.    Neurological:  Positive for dizziness and tremors. Negative for sensory change.        Pre-syncope     Endo/Heme/Allergies: Negative.  Does not bruise/bleed easily.   Psychiatric/Behavioral:  Negative for depression and hallucinations. The patient is not nervous/anxious.         Prior echo/stress results  reviewed: 11/2023 - LVEF down to 45% from previous of 55%; TAVR functioning normal    Prior cath results reviewed:   Left Main:  Patent stent from left main into LAD.                LAD:  Patent stent extending from the proximal left mainTortuous vessel but no significant disease.  The 2 diagonals are normal.              LCx:  Minimal luminal irregularities.  Small caliber vessel              RCA: Dominant, highly tortuous vessel.  50% stenosis within the walsh's crook proximally.  Distally there is a 70% stenosis.  The PDA and PLB are not well visualized but showed no obstructive disease.    Ultrasound carotids 2020:  There is mild to moderate amount of atherosclerotic plaque.  Plaque is located in carotid bulbs and proximal internal carotid arteries.  Plaque characterization:  homogeneous, calcific     2.  There is no evidence of carotid occlusion.     3. Vertebral arteries demonstrate antegrade flow.     4. Diameter reduction in the internal carotid arteries: less than 50%. There is no hemodynamically significant stenosis.    EKG interpreted by me: Atrial sensed V paced    1/3/2024  [id number]  J W Means III    Patient here for annual in office device check    Device Type: Medtronic Dual Lead AICD  AP%:  3  %: 4.9  Battery Longevity: 6yrs  Lead Impedance: stable and within normal limits  Indication: primary prevention and chronic HFrEF  Events: low overall burden AF less than 1%; no therapies delivered    Device interrogated and programmed by Beto Serrato       Physical Exam  Constitutional:       Appearance: Normal appearance.   HENT:      Head: Normocephalic.   Eyes:      Pupils: Pupils are equal, round, and reactive to light.   Neck:      Vascular: No JVD.   Cardiovascular:      Rate and Rhythm: Normal rate and regular rhythm.      Pulses: Normal pulses.      Heart sounds: Normal heart sounds.   Pulmonary:      Effort: Pulmonary effort is normal.      Breath sounds: Normal breath sounds.   Abdominal:  "     General: Abdomen is flat.      Palpations: Abdomen is soft.   Musculoskeletal:      Cervical back: Normal range of motion.      Right lower leg: No edema.      Left lower leg: No edema.   Skin:     General: Skin is warm and dry.      Comments: Bluish hue to lower extremities   Neurological:      Mental Status: He is alert and oriented to person, place, and time.   Psychiatric:         Mood and Affect: Mood normal.         Behavior: Behavior normal.          Data:  Lipids:   Lab Results   Component Value Date/Time    CHOLSTRLTOT 109 11/21/2023 11:49 AM    TRIGLYCERIDE 95 11/21/2023 11:49 AM    HDL 30 (A) 11/21/2023 11:49 AM    LDL 60 11/21/2023 11:49 AM        BMP:  Lab Results   Component Value Date/Time    SODIUM 137 11/21/2023 1149    POTASSIUM 4.0 11/21/2023 1149    CHLORIDE 99 11/21/2023 1149    CO2 25 11/21/2023 1149    GLUCOSE 181 (H) 11/21/2023 1149    BUN 49 (H) 11/21/2023 1149    CREATININE 2.07 (H) 11/21/2023 1149    CALCIUM 8.9 11/21/2023 1149    ANION 13.0 11/21/2023 1149       GFR:  Lab Results   Component Value Date/Time    IFAFRICA 32 (A) 03/01/2022 1347    IFNOTAFR 26 (A) 03/01/2022 1347        TSH:   Lab Results   Component Value Date/Time    TSHULTRASEN 1.470 11/21/2023 1149       MAGNESIUM:  Lab Results   Component Value Date/Time    MAGNESIUM 2.7 (H) 03/28/2022 0346    MAGNESIUM 2.8 (H) 03/27/2022 0437    MAGNESIUM 2.9 (H) 03/25/2022 1415        THYROXINE (T4):   No results found for: \"FREEDIR\"     CBC:   Lab Results   Component Value Date/Time    WBC 6.6 07/10/2023 11:08 AM    WBC 4.5 02/08/2011 12:00 AM    RBC 6.63 (H) 07/10/2023 11:08 AM    RBC 5.36 02/08/2011 12:00 AM    HEMOGLOBIN 15.6 07/10/2023 11:08 AM    HEMATOCRIT 51.4 07/10/2023 11:08 AM    MCV 77.5 (L) 07/10/2023 11:08 AM    MCV 93 02/08/2011 12:00 AM    MCH 23.5 (L) 07/10/2023 11:08 AM    MCH 31.0 02/08/2011 12:00 AM    MCHC 30.4 (L) 07/10/2023 11:08 AM    RDW 56.1 (H) 07/10/2023 11:08 AM    RDW 13.4 02/08/2011 12:00 AM    " "PLATELETCT 180 07/10/2023 11:08 AM    MPV 9.7 07/10/2023 11:08 AM    NEUTSPOLYS 62.60 06/15/2023 11:20 AM    LYMPHOCYTES 21.80 (L) 06/15/2023 11:20 AM    MONOCYTES 11.90 06/15/2023 11:20 AM    EOSINOPHILS 2.70 06/15/2023 11:20 AM    BASOPHILS 0.40 06/15/2023 11:20 AM    IMMGRAN 0.60 06/15/2023 11:20 AM    IMMGRAN 0 02/08/2011 12:00 AM    NRBC 0.00 06/15/2023 11:20 AM    NEUTS 3.27 06/15/2023 11:20 AM    NEUTS 2.4 02/08/2011 12:00 AM    LYMPHS 1.14 06/15/2023 11:20 AM    LYMPHS 1.4 02/08/2011 12:00 AM    MONOS 0.62 06/15/2023 11:20 AM    MONOS 0.5 02/08/2011 12:00 AM    EOS 0.14 06/15/2023 11:20 AM    EOS 0.1 02/08/2011 12:00 AM    BASO 0.02 06/15/2023 11:20 AM    BASO 0.0 02/08/2011 12:00 AM    IMMGRANAB 0.03 06/15/2023 11:20 AM    IMMGRANAB 0.0 02/08/2011 12:00 AM    NRBCAB 0.00 06/15/2023 11:20 AM        CBC w/o DIFF  Lab Results   Component Value Date/Time    WBC 6.6 07/10/2023 11:08 AM    WBC 4.5 02/08/2011 12:00 AM    RBC 6.63 (H) 07/10/2023 11:08 AM    RBC 5.36 02/08/2011 12:00 AM    HEMOGLOBIN 15.6 07/10/2023 11:08 AM    MCV 77.5 (L) 07/10/2023 11:08 AM    MCV 93 02/08/2011 12:00 AM    MCH 23.5 (L) 07/10/2023 11:08 AM    MCH 31.0 02/08/2011 12:00 AM    MCHC 30.4 (L) 07/10/2023 11:08 AM    RDW 56.1 (H) 07/10/2023 11:08 AM    RDW 13.4 02/08/2011 12:00 AM    MPV 9.7 07/10/2023 11:08 AM       LIVER:  Lab Results   Component Value Date/Time    ALKPHOSPHAT 55 11/21/2023 11:49 AM    ASTSGOT 22 11/21/2023 11:49 AM    ALTSGPT 20 11/21/2023 11:49 AM    TBILIRUBIN 0.5 11/21/2023 11:49 AM       BNP:  No results found for: \"BNPBTYPENAT\"    PT/INR:  Lab Results   Component Value Date/Time    PROTHROMBTM 15.2 (H) 07/10/2023 11:08 AM    PROTHROMBTM 16.2 (H) 03/18/2022 08:38 PM    PROTHROMBTM 15.8 (H) 02/03/2022 09:42 AM    INR 1.22 (H) 07/10/2023 11:08 AM    INR 1.41 (H) 03/18/2022 08:38 PM    INR 1.29 (H) 02/03/2022 09:42 AM             Impression/Plan:  1. Paroxysmal atrial fibrillation (HCC)  EKG      2. Coronary artery " disease due to calcified coronary lesion  US-CAROTID DOPPLER BILAT      3. Mild carotid artery disease (HCC)  US-CAROTID DOPPLER BILAT      4. Dizziness  US-CAROTID DOPPLER BILAT      5. Pre-syncope  US-CAROTID DOPPLER BILAT      6. Occasional tremors  US-CAROTID DOPPLER BILAT      7. ICD (implantable cardioverter-defibrillator) in place         - device working well; continue remote monitoring and annual in office check   - continue Eliquis 5mg twice daily for stroke protection and rate control via beta blockade form carvedilol   - patient EF has recently dropped back to 40-45%    - continue heart failure regimen: SGLT2 inhibitor, carvedilol, Bumex, losartan   - continue dual antiplatelet therapy for one year from 7/2023 for RCA stent; Plavix and aspirin   - ultrasound to rule out possible carotid artery disease or subclavian steal syndrome as cause for new symptoms   - follow up in 2 months with structural team          A total of 45 minutes of time was spent on day of encounter reviewing medical record, performing history and examination, counseling, ordering medication/test/consults, collaborating with referring service, and documentation.    Beto Serrato [facility]  Cardiac Electrophysiology

## 2024-01-08 LAB — EKG IMPRESSION: NORMAL

## 2024-01-08 PROCEDURE — 93010 ELECTROCARDIOGRAM REPORT: CPT | Performed by: INTERNAL MEDICINE

## 2024-01-11 ENCOUNTER — HOSPITAL ENCOUNTER (OUTPATIENT)
Dept: RADIOLOGY | Facility: MEDICAL CENTER | Age: 84
End: 2024-01-11
Attending: NURSE PRACTITIONER
Payer: MEDICARE

## 2024-01-11 DIAGNOSIS — I25.84 CORONARY ARTERY DISEASE DUE TO CALCIFIED CORONARY LESION: Chronic | ICD-10-CM

## 2024-01-11 DIAGNOSIS — I25.10 CORONARY ARTERY DISEASE DUE TO CALCIFIED CORONARY LESION: Chronic | ICD-10-CM

## 2024-01-11 DIAGNOSIS — R25.1 OCCASIONAL TREMORS: ICD-10-CM

## 2024-01-11 DIAGNOSIS — R55 PRE-SYNCOPE: ICD-10-CM

## 2024-01-11 DIAGNOSIS — I77.9 MILD CAROTID ARTERY DISEASE (HCC): ICD-10-CM

## 2024-01-11 DIAGNOSIS — R42 DIZZINESS: ICD-10-CM

## 2024-01-11 PROCEDURE — 93880 EXTRACRANIAL BILAT STUDY: CPT

## 2024-01-11 PROCEDURE — 93880 EXTRACRANIAL BILAT STUDY: CPT | Mod: 26 | Performed by: INTERNAL MEDICINE

## 2024-01-17 ENCOUNTER — TELEPHONE (OUTPATIENT)
Dept: CARDIOLOGY | Facility: MEDICAL CENTER | Age: 84
End: 2024-01-17
Payer: MEDICARE

## 2024-01-17 NOTE — TELEPHONE ENCOUNTER
----- Message from NEAL Lei sent at 1/17/2024  7:58 AM PST -----  Please call this patient and ask him if his dizziness only happens when he is seated or standing and lifts his arms. Also does it happen when he just lifts one arm vs another? May need another imaging study as the carotids were not overly remarkable for a causality.

## 2024-01-17 NOTE — TELEPHONE ENCOUNTER
S/w patient regarding US and if patient is continuing to have dizziness.   Patient reported dizziness happens when he stands up or when he is lifting his arms to reach for something.  Not consistent with arms, but notices its more on his right arm.  Patient states its not consistent.   Patient reports dizziness only happens during sitting when he reaches sideway to his bookshelf.

## 2024-01-28 DIAGNOSIS — I50.23 ACUTE ON CHRONIC SYSTOLIC HEART FAILURE (HCC): ICD-10-CM

## 2024-01-29 RX ORDER — BUMETANIDE 1 MG/1
TABLET ORAL
Qty: 360 TABLET | Refills: 3 | Status: SHIPPED | OUTPATIENT
Start: 2024-01-29 | End: 2024-03-05 | Stop reason: SDUPTHER

## 2024-01-29 NOTE — TELEPHONE ENCOUNTER
Is the patient due for a refill? Yes    Was the patient seen the past year? Yes    Date of last office visit: 01/03/2024    Does the patient have an upcoming appointment?  Yes   If yes, When? 03/05/2024    Provider to refill:MT     Does the patients insurance require a 100 day supply?  Yes

## 2024-02-13 ENCOUNTER — HOSPITAL ENCOUNTER (OUTPATIENT)
Dept: LAB | Facility: MEDICAL CENTER | Age: 84
End: 2024-02-13
Attending: INTERNAL MEDICINE
Payer: MEDICARE

## 2024-02-13 LAB
ALBUMIN SERPL BCP-MCNC: 4.1 G/DL (ref 3.2–4.9)
ALBUMIN/GLOB SERPL: 1.7 G/DL
ALP SERPL-CCNC: 55 U/L (ref 30–99)
ALT SERPL-CCNC: 18 U/L (ref 2–50)
ANION GAP SERPL CALC-SCNC: 12 MMOL/L (ref 7–16)
AST SERPL-CCNC: 22 U/L (ref 12–45)
BASOPHILS # BLD AUTO: 0.2 % (ref 0–1.8)
BASOPHILS # BLD: 0.01 K/UL (ref 0–0.12)
BILIRUB SERPL-MCNC: 0.7 MG/DL (ref 0.1–1.5)
BUN SERPL-MCNC: 58 MG/DL (ref 8–22)
CALCIUM ALBUM COR SERPL-MCNC: 8.9 MG/DL (ref 8.5–10.5)
CALCIUM SERPL-MCNC: 9 MG/DL (ref 8.5–10.5)
CHLORIDE SERPL-SCNC: 101 MMOL/L (ref 96–112)
CHOLEST SERPL-MCNC: 99 MG/DL (ref 100–199)
CO2 SERPL-SCNC: 25 MMOL/L (ref 20–33)
CREAT SERPL-MCNC: 2.15 MG/DL (ref 0.5–1.4)
EOSINOPHIL # BLD AUTO: 0.16 K/UL (ref 0–0.51)
EOSINOPHIL NFR BLD: 2.5 % (ref 0–6.9)
ERYTHROCYTE [DISTWIDTH] IN BLOOD BY AUTOMATED COUNT: 52.3 FL (ref 35.9–50)
EST. AVERAGE GLUCOSE BLD GHB EST-MCNC: 166 MG/DL
ESTRADIOL SERPL-MCNC: 38.2 PG/ML
FASTING STATUS PATIENT QL REPORTED: NORMAL
GFR SERPLBLD CREATININE-BSD FMLA CKD-EPI: 30 ML/MIN/1.73 M 2
GLOBULIN SER CALC-MCNC: 2.4 G/DL (ref 1.9–3.5)
GLUCOSE SERPL-MCNC: 121 MG/DL (ref 65–99)
HBA1C MFR BLD: 7.4 % (ref 4–5.6)
HCT VFR BLD AUTO: 53.8 % (ref 42–52)
HDLC SERPL-MCNC: 25 MG/DL
HGB BLD-MCNC: 16.6 G/DL (ref 14–18)
IMM GRANULOCYTES # BLD AUTO: 0.02 K/UL (ref 0–0.11)
IMM GRANULOCYTES NFR BLD AUTO: 0.3 % (ref 0–0.9)
LDLC SERPL CALC-MCNC: 53 MG/DL
LYMPHOCYTES # BLD AUTO: 1.53 K/UL (ref 1–4.8)
LYMPHOCYTES NFR BLD: 23.8 % (ref 22–41)
MCH RBC QN AUTO: 23.3 PG (ref 27–33)
MCHC RBC AUTO-ENTMCNC: 30.9 G/DL (ref 32.3–36.5)
MCV RBC AUTO: 75.7 FL (ref 81.4–97.8)
MONOCYTES # BLD AUTO: 0.66 K/UL (ref 0–0.85)
MONOCYTES NFR BLD AUTO: 10.2 % (ref 0–13.4)
NEUTROPHILS # BLD AUTO: 4.06 K/UL (ref 1.82–7.42)
NEUTROPHILS NFR BLD: 63 % (ref 44–72)
NRBC # BLD AUTO: 0 K/UL
NRBC BLD-RTO: 0 /100 WBC (ref 0–0.2)
PLATELET # BLD AUTO: 164 K/UL (ref 164–446)
PMV BLD AUTO: 9.8 FL (ref 9–12.9)
POTASSIUM SERPL-SCNC: 3.8 MMOL/L (ref 3.6–5.5)
PROT SERPL-MCNC: 6.5 G/DL (ref 6–8.2)
PSA SERPL-MCNC: 1.08 NG/ML (ref 0–4)
RBC # BLD AUTO: 7.11 M/UL (ref 4.7–6.1)
SODIUM SERPL-SCNC: 138 MMOL/L (ref 135–145)
TRIGL SERPL-MCNC: 103 MG/DL (ref 0–149)
WBC # BLD AUTO: 6.4 K/UL (ref 4.8–10.8)

## 2024-02-13 PROCEDURE — 84402 ASSAY OF FREE TESTOSTERONE: CPT

## 2024-02-13 PROCEDURE — 82670 ASSAY OF TOTAL ESTRADIOL: CPT

## 2024-02-13 PROCEDURE — 84270 ASSAY OF SEX HORMONE GLOBUL: CPT

## 2024-02-13 PROCEDURE — 36415 COLL VENOUS BLD VENIPUNCTURE: CPT

## 2024-02-13 PROCEDURE — 83036 HEMOGLOBIN GLYCOSYLATED A1C: CPT

## 2024-02-13 PROCEDURE — 84305 ASSAY OF SOMATOMEDIN: CPT

## 2024-02-13 PROCEDURE — 84403 ASSAY OF TOTAL TESTOSTERONE: CPT

## 2024-02-13 PROCEDURE — 85025 COMPLETE CBC W/AUTO DIFF WBC: CPT

## 2024-02-13 PROCEDURE — 80053 COMPREHEN METABOLIC PANEL: CPT

## 2024-02-13 PROCEDURE — 84153 ASSAY OF PSA TOTAL: CPT

## 2024-02-13 PROCEDURE — 80061 LIPID PANEL: CPT

## 2024-02-15 LAB
IGF-I SERPL-MCNC: 115 NG/ML (ref 17–180)
IGF-I Z-SCORE SERPL: 0.6

## 2024-02-16 LAB
SHBG SERPL-SCNC: 21 NMOL/L (ref 19–76)
TESTOST FREE MFR SERPL: 2.6 % (ref 1.6–2.9)
TESTOST FREE SERPL-MCNC: 251 PG/ML (ref 47–244)
TESTOST SERPL-MCNC: 982 NG/DL (ref 300–720)

## 2024-03-05 ENCOUNTER — OFFICE VISIT (OUTPATIENT)
Dept: CARDIOLOGY | Facility: MEDICAL CENTER | Age: 84
End: 2024-03-05
Attending: INTERNAL MEDICINE
Payer: MEDICARE

## 2024-03-05 VITALS
HEART RATE: 45 BPM | WEIGHT: 246 LBS | SYSTOLIC BLOOD PRESSURE: 100 MMHG | OXYGEN SATURATION: 96 % | DIASTOLIC BLOOD PRESSURE: 64 MMHG | HEIGHT: 73 IN | BODY MASS INDEX: 32.6 KG/M2 | RESPIRATION RATE: 16 BRPM

## 2024-03-05 DIAGNOSIS — Z95.810 ICD (IMPLANTABLE CARDIOVERTER-DEFIBRILLATOR) IN PLACE: ICD-10-CM

## 2024-03-05 DIAGNOSIS — I48.0 PAROXYSMAL ATRIAL FIBRILLATION (HCC): ICD-10-CM

## 2024-03-05 DIAGNOSIS — J96.11 CHRONIC RESPIRATORY FAILURE WITH HYPOXIA (HCC): ICD-10-CM

## 2024-03-05 DIAGNOSIS — Z95.2 S/P TAVR (TRANSCATHETER AORTIC VALVE REPLACEMENT): ICD-10-CM

## 2024-03-05 DIAGNOSIS — Z95.5 STENTED CORONARY ARTERY: ICD-10-CM

## 2024-03-05 DIAGNOSIS — I77.9 MILD CAROTID ARTERY DISEASE (HCC): ICD-10-CM

## 2024-03-05 DIAGNOSIS — I50.23 ACUTE ON CHRONIC SYSTOLIC HEART FAILURE (HCC): ICD-10-CM

## 2024-03-05 DIAGNOSIS — E11.42 DIABETIC POLYNEUROPATHY ASSOCIATED WITH TYPE 2 DIABETES MELLITUS (HCC): ICD-10-CM

## 2024-03-05 DIAGNOSIS — N18.32 STAGE 3B CHRONIC KIDNEY DISEASE: Chronic | ICD-10-CM

## 2024-03-05 PROCEDURE — 99214 OFFICE O/P EST MOD 30 MIN: CPT | Performed by: INTERNAL MEDICINE

## 2024-03-05 PROCEDURE — 3074F SYST BP LT 130 MM HG: CPT | Performed by: INTERNAL MEDICINE

## 2024-03-05 PROCEDURE — 99213 OFFICE O/P EST LOW 20 MIN: CPT | Performed by: INTERNAL MEDICINE

## 2024-03-05 PROCEDURE — 3078F DIAST BP <80 MM HG: CPT | Performed by: INTERNAL MEDICINE

## 2024-03-05 RX ORDER — BUMETANIDE 1 MG/1
2 TABLET ORAL DAILY
Qty: 200 TABLET | Refills: 3
Start: 2024-03-05

## 2024-03-05 RX ORDER — SEMAGLUTIDE 1.34 MG/ML
1 INJECTION, SOLUTION SUBCUTANEOUS DAILY
COMMUNITY

## 2024-03-05 ASSESSMENT — FIBROSIS 4 INDEX: FIB4 SCORE: 2.56

## 2024-03-05 NOTE — Clinical Note
Can you check on the patient's blood pressure and edema in 1 week.  If BP is greater than 130/80 on average can resume losartan unless he is feeling a lot better.  Similarly if he is noticing a significant increase in lower extremity edema then can resume twice daily Bumex.

## 2024-03-05 NOTE — PROGRESS NOTES
"CARDIOLOGY OUTPATIENT FOLLOWUP    PCP: Pcp Pt States None    1. Stented coronary artery    2. S/P TAVR (transcatheter aortic valve replacement)    3. Mild carotid artery disease (HCC)    4. Paroxysmal atrial fibrillation (HCC)    5. Diabetic polyneuropathy associated with type 2 diabetes mellitus (HCC)    6. Chronic respiratory failure with hypoxia (HCC)    7. 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 ICD (implantable cardioverter-defibrillator) in place        LIAN More III is experiencing symptoms of gait unsteadiness and weakness-some of which may be medication side effect.  I recommended stopping losartan and also moving Bumex to once daily.  Will track his blood pressure and swelling and I will check in on him in 1 week to assess stability.  He will continue the other medications.    Follow up: 6 months    History: LIAN More III is a 83 y.o. male with history of systolic heart failure, recovered ejection fraction, left main and RCA PCI, TAVR, PAF, dual-chamber pacemaker/ICD, diabetes and 3B CKD presenting for follow-up.  LVEF is ~45%.  He underwent PCI last summer and completed cardiac rehab.    He has intermittent loss of balance as well as tremulous sensations.  He also reports near syncope when looking up and reaching overhead.    Carotid duplex was unremarkable.    Physical Exam:  /64 (BP Location: Left arm, Patient Position: Sitting, BP Cuff Size: Adult)   Pulse (!) 45   Resp 16   Ht 1.854 m (6' 1\")   Wt 112 kg (246 lb)   SpO2 96%   BMI 32.46 kg/m²   GEN: NAD  CARDIAC: Irregular. Normal S1, S2. Systolic murmur.   VASCULATURE: Normal carotid upstroke  RESP: Clear to auscultation bilaterally  ABD: Soft, non-tender, non-distended  EXT: Trace lower extremity edema Stasis dermatitis bilaterally  NEURO: No focal deficit       () Today's E/M visit is associated with medical care services that serve as the continuing focal point for all needed health care services and/or with " "medical care services that  are part of ongoing care related to a patient's single, serious condition, or a complex condition: This includes  furnishing services to patients on an ongoing basis that result in care that is personalized  to the patient. The services result in a comprehensive, longitudinal, and continuous  relationship with the patient and involve delivery of team-based care that is accessible, coordinated with other practitioners and providers, and integrated with the broader health  care landscape.     The ASCVD Risk score (Martin DK, et al., 2019) failed to calculate.    Studies  Lab Results   Component Value Date/Time    CHOLSTRLTOT 99 (L) 02/13/2024 02:27 PM    LDL 53 02/13/2024 02:27 PM    HDL 25 (A) 02/13/2024 02:27 PM    TRIGLYCERIDE 103 02/13/2024 02:27 PM       Lab Results   Component Value Date/Time    SODIUM 138 02/13/2024 02:27 PM    POTASSIUM 3.8 02/13/2024 02:27 PM    CHLORIDE 101 02/13/2024 02:27 PM    CO2 25 02/13/2024 02:27 PM    GLUCOSE 121 (H) 02/13/2024 02:27 PM    BUN 58 (H) 02/13/2024 02:27 PM    CREATININE 2.15 (H) 02/13/2024 02:27 PM    CREATININE 1.18 02/08/2011 12:00 AM    BUNCREATRAT 16 02/08/2011 12:00 AM    GLOMRATE >59 02/08/2011 12:00 AM     Lab Results   Component Value Date/Time    ALKPHOSPHAT 55 02/13/2024 02:27 PM    ASTSGOT 22 02/13/2024 02:27 PM    ALTSGPT 18 02/13/2024 02:27 PM    TBILIRUBIN 0.7 02/13/2024 02:27 PM      No results found for: \"HGB\"     Past Medical History:   Diagnosis Date    Arthritis 06/15/2023    \"everywhere\"    Breath shortness 06/15/2023    pt states short of breath 24/7 O2 at night only    CAD (coronary artery disease)     CATARACT 06/15/2023    removed bilat    Chest pain     Chest tightness     CHF (congestive heart failure), NYHA class II, chronic, systolic (HCC) 12/26/2019    managed by cardiology Dr. Wilkinson: He appears euvolemic on physical exam.  He is on losartan, carvedilol, not on spironolactone due to low kidney function.  He was " admitted to the hospital for CHF exacerbation, when he suddenly gained 15 pounds.  He was diuresed with IV Lasix at that time, had echocardiogram that showed significant improvement of his cardiac function, when compared to previous study    Chickenpox     Congestive heart failure (HCC)     Constipation     Coronary heart disease     Cough     Daytime sleepiness     Diabetes 06/15/2023    oral medication    Difficulty breathing     Dilated cardiomyopathy (HCC)     Fatigue     GERD (gastroesophageal reflux disease)     Hearing difficulty     Heart burn 06/15/2023    medicated    Heart murmur     Heart valve disease 06/15/2023    replaced    Hemorrhagic disorder (HCC) 06/15/2023    eliquis    Hiatus hernia syndrome     High cholesterol 06/15/2023    medicated    History of BPH     History of chronic cough     Hyperlipidemia     Hypertension 06/15/2023    pt states well controlled on meds    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 Porter, gastroenterologist    Urinary incontinence 06/15/2023    at times     Allergies   Allergen Reactions    Statins [Hmg-Coa-R Inhibitors] Rash     Blisters      Atorvastatin Hives     Selected as representative agent for class- please do not delete     Outpatient Encounter Medications as of 3/5/2024   Medication Sig Dispense Refill    bumetanide (BUMEX) 1 MG Tab Take 2 tablets by mouth twice daily 360 Tablet 3    Aspirin 81 MG Cap Take 81 mg by mouth one time.      clomiPHENE (CLOMID) 50 MG tablet Take 1 Tablet by mouth every day.       Semaglutide,0.25 or 0.5MG/DOS, (OZEMPIC, 0.25 OR 0.5 MG/DOSE,) 2 MG/1.5ML Solution Pen-injector Inject 0.025 mL under the skin every 7 days.      testosterone cypionate (DEPO-TESTOSTERONE) 200 MG/ML Solution injection INJECT 0.8 ML INTRAMUSCULARLY ONCE WEEKLY      hydrocortisone 2.5 % Cream topical cream APPLY 1 APPLICATION TO AFFECTED AREA ONCE DAILY      cyanocobalamin (VITAMIN B-12) 1000 MCG/ML Solution INJECT 1 ML INTRAMUSCULARLY ONCE A WEEK      vitamin E 180 MG (400 UNIT) Cap Take by oral route.      thiamine (B-1) 100 MG/ML Solution Take by injection route.      L-Lysine 1000 MG Tab Take by oral route.      carvedilol (COREG) 3.125 MG Tab Take 1 Tablet by mouth 2 times a day with meals. 200 Tablet 3    ezetimibe (ZETIA) 10 MG Tab Take 1 Tablet by mouth every day. 100 Tablet 3    losartan (COZAAR) 25 MG Tab Take 1 Tablet by mouth every day. 100 Tablet 3    clopidogrel (PLAVIX) 75 MG Tab Take 1 Tablet by mouth every day. 100 Tablet 3    ELIQUIS 5 MG Tab Take 1 tablet by mouth twice daily 180 Tablet 3    GLIPIZIDE PO Take 25 mg by mouth every day.      Calcium Citrate (CITRACAL PO) Take 1 Tab by mouth 2 Times a Day.      [DISCONTINUED] Home Care Oxygen Inhale 2 L/min by mouth Continuous.   Respiratory route via: Nasal Cannula   Oxygen supplier: [facility]  Indications: Hypoxia, to keep O2 sat over 90%      coenzyme Q-10 30 MG capsule Take 30 mg by mouth 2 times a day.      omeprazole (PRILOSEC) 40 MG delayed-release capsule Take 1 Capsule by mouth 2 times a day.      Cholecalciferol (VITAMIN D3) 2000 UNIT Cap Take 1 Capsule by mouth 2 times a day.      B Complex Vitamins (VITAMIN B COMPLEX PO) Take 1 Tab by mouth every day.      Ascorbic Acid (VITAMIN C) 1000 MG Tab Take 1 Tablet by mouth 2 times a day.       No facility-administered encounter medications on file as of 3/5/2024.     Social History     Socioeconomic History    Marital status:      Spouse name: Not on file    Number of children: Not on file     Years of education: Not on file    Highest education level: Bachelor's degree (e.g., BA, AB, BS)   Occupational History    Not on file   Tobacco Use    Smoking status: Never    Smokeless tobacco: Never    Tobacco comments:     0   Vaping Use    Vaping Use: Never used   Substance and Sexual Activity    Alcohol use: Not Currently     Alcohol/week: 0.0 oz    Drug use: Yes     Types: Marijuana, Oral     Comment: CBD 30 mg gummie daily    Sexual activity: Yes   Other Topics Concern    Not on file   Social History Narrative    Not on file     Social Determinants of Health     Financial Resource Strain: Low Risk  (3/12/2022)    Overall Financial Resource Strain (CARDIA)     Difficulty of Paying Living Expenses: Not very hard   Food Insecurity: No Food Insecurity (3/12/2022)    Hunger Vital Sign     Worried About Running Out of Food in the Last Year: Never true     Ran Out of Food in the Last Year: Never true   Transportation Needs: No Transportation Needs (3/12/2022)    PRAPARE - Transportation     Lack of Transportation (Medical): No     Lack of Transportation (Non-Medical): No   Physical Activity: Sufficiently Active (3/12/2022)    Exercise Vital Sign     Days of Exercise per Week: 3 days     Minutes of Exercise per Session: 150+ min   Stress: Stress Concern Present (3/12/2022)    [employer] [place] [employer] - Occupational Stress Questionnaire     Feeling of Stress : To some extent   Social Connections: Socially Isolated (3/12/2022)    Social Connection and Isolation Panel [NHANES]     Frequency of Communication with Friends and Family: Once a week     Frequency of Social Gatherings with Friends and Family: Never     Attends Sikhism Services: Never     Active Member of Clubs or Organizations: No     Attends Club or Organization Meetings: Never     Marital Status:    Intimate Partner Violence: Not on file   Housing Stability: Low Risk  (3/12/2022)    Housing Stability Vital Sign     Unable to Pay  for Housing in the Last Year: No     Number of Places Lived in the Last Year: 1     Unstable Housing in the Last Year: No         ROS:   10 point review systems is otherwise negative except as per the HPI    Chief Complaint   Patient presents with    Atrial Fibrillation    CHF (Systolic)    Hypertension

## 2024-03-11 ENCOUNTER — TELEPHONE (OUTPATIENT)
Dept: CARDIOLOGY | Facility: MEDICAL CENTER | Age: 84
End: 2024-03-11
Payer: MEDICARE

## 2024-03-11 NOTE — TELEPHONE ENCOUNTER
----- Message from Paul Wilkinson M.D. sent at 3/5/2024 11:39 AM PST -----  Can you check on the patient's blood pressure and edema in 1 week.  If BP is greater than 130/80 on average can resume losartan unless he is feeling a lot better.  Similarly if he is noticing a significant increase in lower extremity edema then can resume twice daily Bumex.

## 2024-03-13 ENCOUNTER — NON-PROVIDER VISIT (OUTPATIENT)
Dept: CARDIOLOGY | Facility: MEDICAL CENTER | Age: 84
End: 2024-03-13
Payer: MEDICARE

## 2024-03-13 PROCEDURE — 93295 DEV INTERROG REMOTE 1/2/MLT: CPT | Performed by: INTERNAL MEDICINE

## 2024-03-13 NOTE — CARDIAC REMOTE MONITOR - SCAN
Device transmission reviewed. Device demonstrated appropriate function.       Electronically Signed by: Suman Caldwell M.D.    3/21/2024  10:11 AM

## 2024-04-30 ENCOUNTER — TELEPHONE (OUTPATIENT)
Dept: HEALTH INFORMATION MANAGEMENT | Facility: OTHER | Age: 84
End: 2024-04-30
Payer: MEDICARE

## 2024-06-14 ENCOUNTER — NON-PROVIDER VISIT (OUTPATIENT)
Dept: CARDIOLOGY | Facility: MEDICAL CENTER | Age: 84
End: 2024-06-14
Payer: MEDICARE

## 2024-06-17 PROCEDURE — 93295 DEV INTERROG REMOTE 1/2/MLT: CPT | Performed by: INTERNAL MEDICINE

## 2024-06-20 DIAGNOSIS — R06.02 SOB (SHORTNESS OF BREATH): ICD-10-CM

## 2024-06-20 RX ORDER — CLOPIDOGREL BISULFATE 75 MG/1
75 TABLET ORAL DAILY
Qty: 100 TABLET | Refills: 2 | Status: SHIPPED | OUTPATIENT
Start: 2024-06-20

## 2024-06-20 NOTE — TELEPHONE ENCOUNTER
Is the patient due for a refill? Yes    Was the patient seen the past year? Yes    Date of last office visit: 3/5/24    Does the patient have an upcoming appointment?  Yes   If yes, When? 9/10/24    Provider to refill:BE    Does the patients insurance require a 100 day supply?  Yes

## 2024-06-24 ASSESSMENT — ACTIVITIES OF DAILY LIVING (ADL): BATHING_REQUIRES_ASSISTANCE: 1

## 2024-06-24 ASSESSMENT — PATIENT HEALTH QUESTIONNAIRE - PHQ9
2. FEELING DOWN, DEPRESSED, IRRITABLE, OR HOPELESS: NOT AT ALL
1. LITTLE INTEREST OR PLEASURE IN DOING THINGS: NOT AT ALL

## 2024-06-24 ASSESSMENT — ENCOUNTER SYMPTOMS: GENERAL WELL-BEING: GOOD

## 2024-06-25 PROBLEM — I48.91 HYPERCOAGULABLE STATE DUE TO ATRIAL FIBRILLATION (HCC): Status: ACTIVE | Noted: 2024-06-25

## 2024-06-25 PROBLEM — D68.69 HYPERCOAGULABLE STATE DUE TO ATRIAL FIBRILLATION (HCC): Status: ACTIVE | Noted: 2024-06-25

## 2024-06-25 NOTE — ASSESSMENT & PLAN NOTE
Patient fall-risk assessment in office is positive. He has severe balance issues and is unable to stand without the aid of another person/assistive device. He has a cane he uses when he leaves the house. He hires in-home caregivers that help him complete some of his ADLs. He states he is fine if he is sitting or lying down. We discussed some ways to increase activity while working on balance. He would like to try physical therapy to work on balance as well.

## 2024-06-25 NOTE — ASSESSMENT & PLAN NOTE
Chronic, stable. He does see podiatry. Recommend regular feet exams. Follow-up at least annually for further monitoring and management.

## 2024-06-25 NOTE — ASSESSMENT & PLAN NOTE
Chronic, stable. PHQ-9 in office today is ____. Currently not taking any medications. States he feels this has well controlled his mood. He sees psych?? Therapy?? Follow up with PCP as needed.

## 2024-06-25 NOTE — ASSESSMENT & PLAN NOTE
Chronic, stable. Last GFR in Feb 2024 was 30. He used to follow with nephrology but does not any longer. States his kidneys are working as they should for his age. Recommend avoiding nephrotoxic medication such as NSAIDs as well as maintaining adequate hydration. He is not on an ACE/ARB medication for kidney protection given that he has diabetes. Follow up with PCP for continued monitoring.

## 2024-06-25 NOTE — ASSESSMENT & PLAN NOTE
Chronic, stable. Last A1c in Feb 2024 was 7.4. He does not check his blood sugar at home. Currently takes glipizide 25 mg daily, Ozempic 1 mg weekly, and jardiance 25 mg daily. Last retinal screening was yesterday with his eye doctor. Will request records. Last microalbumin creat ratio in Nov 2023 was 84. He sees podiatry who does his feet exams. He is not on a statin. States the last time he took a statin he broke out in blisters. We discussed his lifestyle habits. Follow up with PCP for continued monitoring and management.

## 2024-06-25 NOTE — ASSESSMENT & PLAN NOTE
Chronic, Stable. BP in office today is 106/58. He currently takes carvedilol 3.125 mg BID & bumetanide 2 mg daily. Follow up with PCP for continued monitoring and management.

## 2024-06-25 NOTE — ASSESSMENT & PLAN NOTE
Chronic, stable. Continue taking carvedilol 3.125 mg BID & eliquis 5 mg BID . Follows with cardiology regularly.

## 2024-06-25 NOTE — ASSESSMENT & PLAN NOTE
Chronic, stable. Last lipid panel in Feb 2024 results below. He currently takes zetia 10 mg daily. We discussed his dietary/lifestyle regimen. Follow up with PCP for continued monitoring and management.  Lab Results   Component Value Date/Time    CHOLSTRLTOT 99 (L) 02/13/2024 02:27 PM    TRIGLYCERIDE 103 02/13/2024 02:27 PM    HDL 25 (A) 02/13/2024 02:27 PM    LDL 53 02/13/2024 02:27 PM

## 2024-06-25 NOTE — ASSESSMENT & PLAN NOTE
Chronic, stable. He has a hx of PCI. His relevant cardiac meds are as follows: zetia 10 mg daily, carvedilol 3.125 mg BID, plavix 75 mg daily. He is intolerant to statins. He does follow with cardiology.

## 2024-06-25 NOTE — ASSESSMENT & PLAN NOTE
Chronic, stable. He has atrial fibrillation and is on eliquis 5 mg BID. Also has defibrillator in place. Follows with cardiology.

## 2024-06-25 NOTE — ASSESSMENT & PLAN NOTE
Chronic, stable. Last echo in Nov 2023 with echo of 40-45% . His cardiac meds include: bumetanide 2 mg daily, carvdeilol 3.125 mg BID, plavix 75 mg daily, eliquis 5 mg daily, zetia 10 mg daily. He follows with cardiology.

## 2024-06-25 NOTE — ASSESSMENT & PLAN NOTE
Chronic, stable. Currently takes omerazole 40 mg BID. States this has well controlled his reflux. Follow up with PCP for continued monitoring.

## 2024-06-26 ENCOUNTER — OFFICE VISIT (OUTPATIENT)
Dept: FAMILY PLANNING/WOMEN'S HEALTH CLINIC | Facility: PHYSICIAN GROUP | Age: 84
End: 2024-06-26
Payer: MEDICARE

## 2024-06-26 VITALS
DIASTOLIC BLOOD PRESSURE: 58 MMHG | WEIGHT: 239 LBS | BODY MASS INDEX: 31.68 KG/M2 | HEIGHT: 73 IN | SYSTOLIC BLOOD PRESSURE: 106 MMHG

## 2024-06-26 DIAGNOSIS — R27.0 ATAXIA: ICD-10-CM

## 2024-06-26 DIAGNOSIS — E78.5 DYSLIPIDEMIA: Chronic | ICD-10-CM

## 2024-06-26 DIAGNOSIS — Z95.810 ICD (IMPLANTABLE CARDIOVERTER-DEFIBRILLATOR) IN PLACE: ICD-10-CM

## 2024-06-26 DIAGNOSIS — I25.10 CORONARY ARTERY DISEASE DUE TO CALCIFIED CORONARY LESION: Chronic | ICD-10-CM

## 2024-06-26 DIAGNOSIS — R29.6 FREQUENT FALLS: ICD-10-CM

## 2024-06-26 DIAGNOSIS — H35.3130 BILATERAL NONEXUDATIVE AGE-RELATED MACULAR DEGENERATION, UNSPECIFIED STAGE: ICD-10-CM

## 2024-06-26 DIAGNOSIS — I77.9 MILD CAROTID ARTERY DISEASE (HCC): ICD-10-CM

## 2024-06-26 DIAGNOSIS — I25.84 CORONARY ARTERY DISEASE DUE TO CALCIFIED CORONARY LESION: Chronic | ICD-10-CM

## 2024-06-26 DIAGNOSIS — D68.69 HYPERCOAGULABLE STATE DUE TO ATRIAL FIBRILLATION, UNSPECIFIED TYPE (HCC): ICD-10-CM

## 2024-06-26 DIAGNOSIS — I48.91 HYPERCOAGULABLE STATE DUE TO ATRIAL FIBRILLATION, UNSPECIFIED TYPE (HCC): ICD-10-CM

## 2024-06-26 DIAGNOSIS — K22.70 BARRETT'S ESOPHAGUS WITHOUT DYSPLASIA: Chronic | ICD-10-CM

## 2024-06-26 DIAGNOSIS — N18.32 STAGE 3B CHRONIC KIDNEY DISEASE: Chronic | ICD-10-CM

## 2024-06-26 DIAGNOSIS — E11.29 TYPE 2 DIABETES MELLITUS WITH DIABETIC MICROALBUMINURIA, WITHOUT LONG-TERM CURRENT USE OF INSULIN (HCC): ICD-10-CM

## 2024-06-26 DIAGNOSIS — I10 ESSENTIAL HYPERTENSION: ICD-10-CM

## 2024-06-26 DIAGNOSIS — R80.9 TYPE 2 DIABETES MELLITUS WITH DIABETIC MICROALBUMINURIA, WITHOUT LONG-TERM CURRENT USE OF INSULIN (HCC): ICD-10-CM

## 2024-06-26 DIAGNOSIS — I48.0 PAROXYSMAL ATRIAL FIBRILLATION (HCC): ICD-10-CM

## 2024-06-26 DIAGNOSIS — I50.22 CHF (CONGESTIVE HEART FAILURE), NYHA CLASS II, CHRONIC, SYSTOLIC (HCC): Chronic | ICD-10-CM

## 2024-06-26 DIAGNOSIS — E11.42 DIABETIC POLYNEUROPATHY ASSOCIATED WITH TYPE 2 DIABETES MELLITUS (HCC): ICD-10-CM

## 2024-06-26 DIAGNOSIS — J96.11 CHRONIC RESPIRATORY FAILURE WITH HYPOXIA (HCC): ICD-10-CM

## 2024-06-26 RX ORDER — EMPAGLIFLOZIN 25 MG/1
25 TABLET, FILM COATED ORAL DAILY
COMMUNITY
Start: 2024-04-18

## 2024-06-26 SDOH — ECONOMIC STABILITY: HOUSING INSECURITY: IN THE LAST 12 MONTHS, HOW MANY PLACES HAVE YOU LIVED?: 1

## 2024-06-26 SDOH — ECONOMIC STABILITY: FOOD INSECURITY: WITHIN THE PAST 12 MONTHS, YOU WORRIED THAT YOUR FOOD WOULD RUN OUT BEFORE YOU GOT MONEY TO BUY MORE.: NEVER TRUE

## 2024-06-26 SDOH — ECONOMIC STABILITY: INCOME INSECURITY: IN THE LAST 12 MONTHS, WAS THERE A TIME WHEN YOU WERE NOT ABLE TO PAY THE MORTGAGE OR RENT ON TIME?: NO

## 2024-06-26 SDOH — ECONOMIC STABILITY: FOOD INSECURITY: WITHIN THE PAST 12 MONTHS, THE FOOD YOU BOUGHT JUST DIDN'T LAST AND YOU DIDN'T HAVE MONEY TO GET MORE.: NEVER TRUE

## 2024-06-26 SDOH — ECONOMIC STABILITY: INCOME INSECURITY: HOW HARD IS IT FOR YOU TO PAY FOR THE VERY BASICS LIKE FOOD, HOUSING, MEDICAL CARE, AND HEATING?: NOT HARD AT ALL

## 2024-06-26 ASSESSMENT — FIBROSIS 4 INDEX: FIB4 SCORE: 2.56

## 2024-06-26 ASSESSMENT — PAIN SCALES - GENERAL: PAINLEVEL: NO PAIN

## 2024-06-26 ASSESSMENT — PATIENT HEALTH QUESTIONNAIRE - PHQ9: CLINICAL INTERPRETATION OF PHQ2 SCORE: 0

## 2024-06-26 NOTE — LETTER
[facility]  Pcp Pt States None  No address on file  Fax: [phone]   Authorization for Release/Disclosure of   Protected Health Information   Name: LIAN VILLA III : 1940 SSN: [id number]   Address: [address] [place] [address] [place] [address] Phone:    [phone] (home)    I authorize the entity listed below to release/disclose the PHI below to:   [facility]/Pcp Pt States None and Jennifer Vivas P.A.-C.   Provider or Entity Name:  [facility]// Rocco Hameed    Address   City, [facility], Zip  [address] [place] [address] Phone:  [phone]    Fax:     Reason for request: continuity of care   Information to be released:    [  ] LAST COLONOSCOPY,  including any PATH REPORT and follow-up  [  ] LAST FIT/COLOGUARD RESULT [  ] LAST DEXA  [  ] LAST MAMMOGRAM  [  ] LAST PAP  [  ] LAST LABS [X] RETINA EXAM REPORT  [  ] IMMUNIZATION RECORDS  [  ] Release all info      [  ] Check here and initial the line next to each item to release ALL health information INCLUDING  _____ Care and treatment for drug and / or alcohol abuse  _____ HIV testing, infection status, or AIDS  _____ Genetic Testing    DATES OF SERVICE OR TIME PERIOD TO BE DISCLOSED: _____________  I understand and acknowledge that:  * This Authorization may be revoked at any time by you in writing, except if your health information has already been used or disclosed.  * Your health information that will be used or disclosed as a result of you signing this authorization could be re-disclosed by the recipient. If this occurs, your re-disclosed health information may no longer be protected by State or Federal laws.  * You may refuse to sign this Authorization. Your refusal will not affect your ability to obtain treatment.  * This Authorization becomes effective upon signing and will  on (date) __________.      If no date is indicated, this Authorization will  one (1) year from the signature date.    Name: LIAN Villa III  Signature: Date:    6/26/2024     PLEASE FAX REQUESTED RECORDS BACK TO: [phone]

## 2024-06-26 NOTE — PROGRESS NOTES
Comprehensive Health Assessment Program     LIAN More III is a 83 y.o. here for his comprehensive health assessment.    Patient Active Problem List    Diagnosis Date Noted    Hypercoagulable state due to atrial fibrillation (HCC) 06/25/2024    Mild carotid artery disease (HCC) 01/03/2024    Stented coronary artery 07/18/2023    Left hand pain 03/21/2022    Contusion of left hand 03/21/2022    Encounter for geriatric assessment 03/20/2022    Abnormal CT scan, colon 03/20/2022    Chronic respiratory failure with hypoxia (HCC) 03/20/2022    Hormone replacement therapy 03/20/2022    BPH (benign prostatic hyperplasia) 03/20/2022    Seasonal allergies 03/19/2022    Trauma 03/18/2022    Fracture of ribs, three, closed, left, initial encounter 03/18/2022    Hip hematoma, left, initial encounter 03/18/2022    Antiplatelet or antithrombotic long-term use 03/18/2022    Mild depression 03/15/2022    Skin lesion 03/15/2022    Paroxysmal atrial fibrillation (HCC) 02/14/2022    Plantar fasciitis of right foot 07/19/2021    Diabetic polyneuropathy associated with type 2 diabetes mellitus (HCC) 06/04/2021    Ataxia 09/11/2020    Lumbar radiculopathy, chronic 07/10/2020    JAMES (obstructive sleep apnea) 04/30/2020    Hyperkalemia 04/29/2020    Encounter for screening for COVID-19 04/27/2020    Renal hematoma, left, initial encounter 04/26/2020    Essential hypertension 04/26/2020    ICD (implantable cardioverter-defibrillator) in place 03/30/2020    High risk medication use 02/26/2020    Insomnia 02/25/2020    S/P TAVR (transcatheter aortic valve replacement) 01/27/2020    Gastroesophageal reflux disease without esophagitis 01/20/2020    CHF (congestive heart failure), NYHA class II, chronic, systolic (HCC) 12/26/2019    Stage 3b chronic kidney disease 12/04/2019    Coronary artery disease due to calcified coronary lesion 12/04/2019    Frequent falls 09/04/2019    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    Pancreatic lesion 04/01/2019     Macular degeneration 10/10/2018    Polycythemia 03/11/2017    Obesity 03/03/2016    Hypotestosteronism 03/03/2016    Disp fx of greater trochanter of left femur, init (Formerly [facility]) 06/25/2015    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      clopidogrel (PLAVIX) 75 MG Tab Take 1 Tablet by mouth every day. 100 Tablet 2    Semaglutide, 1 MG/DOSE, (OZEMPIC, 1 MG/DOSE,) 4 MG/3ML Solution Pen-injector Take 1 mg by mouth every day.      bumetanide (BUMEX) 1 MG Tab Take 2 Tablets by mouth every day. 200 Tablet 3    testosterone cypionate (DEPO-TESTOSTERONE) 200 MG/ML Solution injection INJECT 0.8 ML INTRAMUSCULARLY ONCE WEEKLY      hydrocortisone 2.5 % Cream topical cream APPLY 1 APPLICATION TO AFFECTED AREA ONCE DAILY      cyanocobalamin (VITAMIN B-12) 1000 MCG/ML Solution INJECT 1 ML INTRAMUSCULARLY ONCE A WEEK      vitamin E 180 MG (400 UNIT) Cap Take by oral route.      L-Lysine 1000 MG Tab Take by oral route.      carvedilol (COREG) 3.125 MG Tab Take 1 Tablet by mouth 2 times a day with meals. 200 Tablet 3    ezetimibe (ZETIA) 10 MG Tab Take 1 Tablet by mouth every day. 100 Tablet 3    ELIQUIS 5 MG Tab Take 1 tablet by mouth twice daily 180 Tablet 3    GLIPIZIDE PO Take 25 mg by mouth every day.      Calcium Citrate (CITRACAL PO) Take 1 Tab by mouth 2 Times a Day.      coenzyme Q-10 30 MG capsule Take 30 mg by mouth 2 times a day.      omeprazole (PRILOSEC) 40 MG delayed-release capsule Take 1 Capsule by mouth 2 times a day.      Cholecalciferol (VITAMIN D3) 2000 UNIT Cap Take 1 Capsule by  mouth 2 times a day.      B Complex Vitamins (VITAMIN B COMPLEX PO) Take 1 Tab by mouth every day.      Ascorbic Acid (VITAMIN C) 1000 MG Tab Take 1 Tablet by mouth 2 times a day.       No current facility-administered medications for this visit.          Current supplements as per medication list.     Allergies:   Statins [hmg-coa-r inhibitors] and Atorvastatin  Social History     Tobacco Use    Smoking status: Never    Smokeless tobacco: Never    Tobacco comments:     0   Vaping Use    Vaping status: Never Used   Substance Use Topics    Alcohol use: Not Currently     Alcohol/week: 0.0 oz    Drug use: Not Currently     Types: Marijuana, Oral     Comment: CBD 30 mg gummie daily     Family History   Problem Relation Age of Onset    No Known Problems Sister     No Known Problems Sister     No Known Problems Sister     Diabetes Other     No Known Problems Mother     No Known Problems Father     Heart Disease Neg Hx      J W  has a past medical history of Arthritis (06/15/2023), Breath shortness (06/15/2023), CAD (coronary artery disease), CATARACT (06/15/2023), Chest pain, Chest tightness, CHF (congestive heart failure), NYHA class II, chronic, systolic ([facility]) (12/26/2019), Chickenpox, Congestive heart failure ([facility]), Constipation, Coronary heart disease, Cough, Daytime sleepiness, Diabetes (06/15/2023), Difficulty breathing, Dilated cardiomyopathy ([facility]), Fatigue, GERD (gastroesophageal reflux disease), Hearing difficulty, Heart burn (06/15/2023), Heart murmur, Heart valve disease (06/15/2023), Hemorrhagic disorder ([facility]) (06/15/2023), Hiatus hernia syndrome, High cholesterol (06/15/2023), History of BPH, History of chronic cough, Hyperlipidemia, Hypertension (06/15/2023), Kidney disease (05/2020), Mumps, Oxygen dependent, Pacemaker, Pain (06/15/2023), Palpitations, Peripheral neuropathy, Persistent atrial fibrillation ([facility]) (06/15/2023), Pneumonia (2007), Rhinitis, Ringing in ears, Severe aortic stenosis (12/04/2019),  Sleep apnea (06/15/2023), Swelling of lower extremity, Tonsillitis, Tremor, Ulcer (2014), and Urinary incontinence (06/15/2023).    He has no past medical history of Bowel habit changes or Dental disorder.   Past Surgical History:   Procedure Laterality Date    PB OPEN FIX INTER/SUBTROCH FX,IMPLNT Left 3/24/2022    Procedure: INSERTION, INTRAMEDULLARY LILLI, FEMUR, PROXIMAL - SHORT, FEMORAL;  Surgeon: Zen Srivastava M.D.;  Location: [facility];  Service: Orthopedics    LAP, REMOVE ADJUST LUIS RESTRICT D*  6/5/2020    Procedure: REMOVAL, GASTRIC BAND, LAPAROSCOPIC - ADJUSTABLE BAND AND PORT;  Surgeon: Deniz Sue M.D.;  Location: [facility];  Service: General    TRANSCATHETER AORTIC VALVE REPLACEMENT N/A 1/27/2020    Procedure: REPLACEMENT, AORTIC VALVE, TRANSCATHETER-;  Surgeon: Surendra Castro M.D.;  Location: [facility];  Service: Cardiac    OLGA  1/27/2020    Procedure: ECHOCARDIOGRAM, TRANSESOPHAGEAL;  Surgeon: Surendra Castro M.D.;  Location: [facility];  Service: Cardiac    GASTROSCOPY N/A 1/8/2016    Procedure: GASTROSCOPY;  Surgeon: Deniz Sue M.D.;  Location: [facility];  Service:     ORIF, FRACTURE, HUMERUS Left 6/25/2015    Procedure: HUMERUS ORIF/ PROXIMAL;  Surgeon: Cristal Guido M.D.;  Location: [facility];  Service:     FUSION, SPINE, LUMBAR, PLIF  9/5/2013    Performed by Jayro Sears M.D. at [facility]    LUMBAR DECOMPRESSION  9/5/2013    Performed by Jayro Sears M.D. at [facility]    MASS EXCISION NEURO  9/5/2013    Performed by Jayro Sears M.D. at [facility]    RECOVERY  6/26/2012    Performed by SURGERY, IR-RECOVERY at SURGERY SAME DAY [facility]    DRAINAGE HEMATOMA  5/25/2012    Performed by DENIZ SUE at [facility]    GASTRIC BAND LAPAROSCOPIC REVISION  5/11/2012    Performed by DENIZ SUE at [facility]    FUSION, SPINE, LUMBAR,  PLIF  3/26/2012    Performed by EDITH WHELAN at SURGERY [facility] ORS    LUMBAR DECOMPRESSION  3/26/2012    Performed by EDITH WHELAN at SURGERY [facility] ORS    INJ,EPIDURAL/LUMB/SAC SINGLE  3/19/2012    Performed by KRISTIN BALTAZAR at [facility] ORS    INJ,EPIDURAL/LUMB/SAC SINGLE  3/5/2012    Performed by KRISTIN BALTAZAR at SURGERY [facility] ORS    GASTRIC BANDING LAPAROSCOPIC  3/24/2010    Performed by SARINA SUE at SURGERY [facility] ORS    HIATAL HERNIA REPAIR  3/24/2010    Performed by SARINA SUE at SURGERY [facility] ORS    KNEE ARTHROPLASTY TOTAL  2000    bilateral    ABDOMINOPLASTY  1990    AORTIC VALVE REPLACEMENT      ARTHROSCOPY, KNEE      LAMINOTOMY      OTHER CARDIAC SURGERY      stents    NM REMV 2ND CATARACT,CORN-SCLER SECTN      SINUSCOPE      SLEEVE,CARRIE VASO THIGH      TONSILLECTOMY      ZZZ CARDIAC CATH         Screening:  In the last six months have you experienced any leakage of urine? No    Depression Screening  Little interest or pleasure in doing things?  0 - not at all  Feeling down, depressed , or hopeless? 0 - not at all  Patient Health Questionnaire Score: 0     If depressive symptoms identified deferred to follow up visit unless specifically addressed in assessment and plan.    Interpretation of PHQ-9 Total Score   Score Severity   1-4 No Depression   5-9 Mild Depression   10-14 Moderate Depression   15-19 Moderately Severe Depression   20-27 Severe Depression    Screening for Cognitive Impairment  Do you or any of your friends or family members have any concern about your memory? No  Three Minute Recall (Leader, Season, Table) 3/3    Juliano clock face with all 12 numbers and set the hands to show 10 minutes after 11.  Yes    Cognitive concerns identified deferred for follow up unless specifically addressed in assessment and plan.    Fall Risk Assessment  Has the patient had two or more falls in the last year or any fall with injury in the last year?  Yes,  He uses a cane to get around most of the time when he leaves the house. States he has to use the wall/furniture to hold onto in his home. States his balance is the reasoning why he cannot stand without assistance. He also has neuropathy. He hires a caregiver to assist him with his walking.     Safety Assessment  Do you always wear your seatbelt?  Yes  Any changes to home needed to function safely? Yes- Lives in 2-story house and goes up 15 stairs daily. States he is able to do this as long as he holds onto the wall/railing.   Difficulty hearing.  Yes, does not wear hearing aids. Not interested.   Patient counseled about all safety risks that were identified.    Functional Assessment ADLs - he hires a caregiver to come help him.   Are there any barriers preventing you from cooking for yourself or meeting nutritional needs?  Yes.    Are there any barriers preventing you from driving safely or obtaining transportation?  No.    Are there any barriers preventing you from using a telephone or calling for help?  No    Are there any barriers preventing you from shopping?  Yes.    Are there any barriers preventing you from taking care of your own finances?  No    Are there any barriers preventing you from managing your medications?  No    Are there any barriers preventing you from showering, bathing or dressing yourself? Yes Gets assistance with this   Are there any barriers preventing you from doing housework or laundry? Yes  Are there any barriers preventing you from using the toilet?No  Are you currently engaging in any exercise or physical activity?  No.  We discussed different balance exercises. Also referred to PT.    Self-Assessment of Health  What is your perception of your health? Good    Do you sleep more than six hours a night? No    In the past 7 days, how much did pain keep you from doing your normal work? A lot    Do you spend quality time with family or friends (virtually or in person)? No    Do you usually  eat a heart healthy diet that constists of a variety of fruits, vegetables, whole grains and fiber? No    Do you eat foods high in fat and/or Fast Food more than three times per week? No    How concerned are you that your medical conditions are not being well managed? extremely  - he does not have a PCP. States his feelings of not being well managed are surrounding his macular degeneration and balance issues.   Are you worried that in the next 2 months, you may not have stable housing that you own, rent, or stay in as part of a household? No      Advance Care Planning  Do you have an Advance Directive, Living Will, Durable Power of , or POLST? No            He has the documents to try to get this completed.     Health Maintenance Summary            Overdue - Hepatitis B Vaccine (Hep B) (2 of 3 - 19+ 3-dose series) Overdue since 12/22/2014 11/24/2014  Imm Admin: Hepatitis B Vaccine (Adol/Adult)              Overdue - Zoster (Shingles) Vaccines (2 of 3) Overdue since 6/13/2016 04/18/2016  Imm Admin: Zoster Vaccine Live (ZVL) (Zostavax) - HISTORICAL DATA    08/27/2013  Imm Admin: Zoster Vaccine Live (ZVL) (Zostavax) - HISTORICAL DATA              Overdue - Diabetes: Retinopathy Screening (Yearly) Overdue since 10/8/2019      10/08/2018  POCT Retinal Eye Exam              Overdue - Annual Wellness Visit (Yearly) Overdue since 9/4/2020 09/04/2019  Subsequent Annual Wellness Visit - Includes PPPS ()    07/02/2018  Visit Dx: Medicare annual wellness visit, subsequent    03/10/2017  Visit Dx: Medicare annual wellness visit, subsequent    03/03/2016  Visit Dx: Medicare annual wellness visit, subsequent    01/09/2015  Visit Dx: Medicare annual wellness visit, initial    Only the first 5 history entries have been loaded, but more history exists.              Overdue - Diabetes: Monofilament / LE Exam (Yearly) Overdue since 3/15/2023      03/15/2022  Diabetic Monofilament LE Exam    10/08/2018   Diabetic Monofilament Lower Extremity Exam              Overdue - COVID-19 Vaccine (3 - 2023-24 season) Overdue since 9/1/2023 02/24/2021  Imm Admin: PFIZER PURPLE CAP SARS-COV-2 VACCINATION (12+)    02/03/2021  Imm Admin: PFIZER PURPLE CAP SARS-COV-2 VACCINATION (12+)              A1c Screening (Every 6 Months) Tentatively due on 8/13/2024 02/13/2024  HEMOGLOBIN A1C    11/21/2023  HEMOGLOBIN A1C    06/15/2023  HEMOGLOBIN A1C    03/15/2022  HEMOGLOBIN A1C    09/16/2020  HEMOGLOBIN A1C    Only the first 5 history entries have been loaded, but more history exists.              Influenza Vaccine (Season Ended) Next due on 9/1/2024 12/29/2009  Imm Admin: INFLUENZA TIV (IM)    12/29/2009  Imm Admin: Influenza Seasonal Injectable - Historical Data              Diabetes: Urine Protein Screening (Yearly) Next due on 11/21/2024 11/21/2023  MICROALBUMIN CREAT RATIO URINE    03/15/2022  MICROALBUMIN CREAT RATIO URINE    11/13/2021  MICROALBUMIN CREAT RATIO URINE    09/04/2019  MICROALBUMIN CREAT RATIO URINE    07/02/2018  MICROALBUMIN CREAT RATIO URINE    Only the first 5 history entries have been loaded, but more history exists.              Fasting Lipid Profile (Yearly) Tentatively due on 2/13/2025 02/13/2024  Lipid Profile    11/21/2023  Lipid Profile    06/15/2023  Lipid Profile    06/15/2023  Lipid Profile    06/29/2022  Lipid Profile    Only the first 5 history entries have been loaded, but more history exists.              SERUM CREATININE (Yearly) Next due on 2/13/2025 02/13/2024  Comp Metabolic Panel    11/21/2023  Comp Metabolic Panel    07/10/2023  Comp Metabolic Panel    06/15/2023  Comp Metabolic Panel    06/15/2023  Comp Metabolic Panel    Only the first 5 history entries have been loaded, but more history exists.              IMM DTaP/Tdap/Td Vaccine (2 - Td or Tdap) Next due on 11/30/2030 11/30/2020  Imm Admin: Tdap Vaccine              Hepatitis A Vaccine (Hep A) (Series  "Information) Aged Out      11/24/2014  Imm Admin: Hepatitis A Vaccine, Adult              Pneumococcal Vaccine: 65+ Years (Series Information) Completed      03/10/2017  Imm Admin: Pneumococcal Conjugate Vaccine (Prevnar/PCV-13)    08/27/2013  Imm Admin: Pneumococcal polysaccharide vaccine (PPSV-23)              HPV Vaccines (Series Information) Aged Out      No completion history exists for this topic.              Polio Vaccine (Inactivated Polio) (Series Information) Aged Out      No completion history exists for this topic.              Meningococcal Immunization (Series Information) Aged Out      No completion history exists for this topic.              Discontinued - Colorectal Cancer Screening  Discontinued        Frequency changed to Never automatically (Topic No Longer Applies)    03/10/2017  OCCULT BLOOD FECES IMMUNOASSAY    01/09/2015  FIT    02/24/2011  Colonoscopy (Done - [facility])                    Patient Care Team:  Pcp Pt States None as PCP - General (Family Medicine)  Joni Miner M.D. as Medical Home Care Manager (Internal Medicine)  Shalonda Loja M.D. as Consulting Physician (Gastroenterology)  Cristal Guido M.D. as Consulting Physician (Orthopedic Surgery)  [employer] as Respiratory Therapist (DME Supplier)  Jania      Financial Resource Strain: Low Risk  (6/26/2024)    Overall Financial Resource Strain (CARDIA)     Difficulty of Paying Living Expenses: Not hard at all      Transportation Needs: No Transportation Needs (6/26/2024)    PRAPARE - Transportation     Lack of Transportation (Medical): No     Lack of Transportation (Non-Medical): No      Food Insecurity: No Food Insecurity (6/26/2024)    Hunger Vital Sign     Worried About Running Out of Food in the Last Year: Never true     Ran Out of Food in the Last Year: Never true        Encounter Vitals  Blood Pressure : 106/58  O2 Delivery Device: Nasal Cannula  Weight: 108 kg (239 lb)  Height: 185.4 cm (6' 1\")  BMI (Calculated): " 31.53  Pain Score: No pain  Pulmonary Vitals  O2 Flow Rate (L/min): 2  DME  O2 Delivery Device: Nasal Cannula     Alert, oriented in no acute distress.  Eye contact is good, speech goal directed, affect calm.    Assessment and Plan. The following treatment and monitoring plan is recommended:  Anderson esophagus  Chronic, stable. Currently takes omerazole 40 mg BID. States this has well controlled his reflux. Follow up as previously scheduled for continued monitoring.    CHF (congestive heart failure), NYHA class II, chronic, systolic (HCC)  Chronic, stable. Last echo in Nov 2023 with echo of 40-45% . His cardiac meds include: bumetanide 2 mg daily, carvdeilol 3.125 mg BID, plavix 75 mg daily, eliquis 5 mg daily, zetia 10 mg daily. He follows with cardiology.     Chronic respiratory failure with hypoxia (HCC)  Chronic, stable. Has JAMES and is on CPAP at night with 2L of supplemental O2 at night.    Mild carotid artery disease (HCC)  Coronary artery disease due to calcified coronary lesion  Chronic, stable. He has a hx of PCI. His relevant cardiac meds are as follows: zetia 10 mg daily, carvedilol 3.125 mg BID, plavix 75 mg daily. He is intolerant to statins. He does follow with cardiology.     Diabetic polyneuropathy associated with type 2 diabetes mellitus (HCC)  Chronic, stable. He does see podiatry. Recommend regular feet exams. Follow-up at least annually for further monitoring and management.     Dyslipidemia  Chronic, stable. Last lipid panel in Feb 2024 results below. He currently takes zetia 10 mg daily. We discussed his dietary/lifestyle regimen. Follow up at least annually for continued monitoring and management.  Lab Results   Component Value Date/Time    CHOLSTRLTOT 99 (L) 02/13/2024 02:27 PM    TRIGLYCERIDE 103 02/13/2024 02:27 PM    HDL 25 (A) 02/13/2024 02:27 PM    LDL 53 02/13/2024 02:27 PM       Essential hypertension  Chronic, Stable. BP in office today is 106/58. He currently takes carvedilol 3.125  mg BID & bumetanide 2 mg daily. Follow up with cardiology for continued monitoring and management.    Ataxia  Frequent falls  Patient fall-risk assessment in office is positive. He has severe balance issues and is unable to stand without the aid of another person/assistive device. He has a cane he uses when he leaves the house. He hires in-home caregivers that help him complete some of his ADLs. He states he is fine if he is sitting or lying down. We discussed some ways to increase activity while working on balance. He would like to try physical therapy to work on balance as well. I placed a referral for him at today's appointment.    Paroxysmal atrial fibrillation (HCC)  Chronic, stable. Continue taking carvedilol 3.125 mg BID & eliquis 5 mg BID . Follows with cardiology regularly.     Stage 3b chronic kidney disease (HCC)  Chronic, stable. Last GFR in Feb 2024 was 30. He used to follow with nephrology but does not any longer. States his kidneys are working as they should for his age. Recommend avoiding nephrotoxic medication such as NSAIDs as well as maintaining adequate hydration. He is not on an ACE/ARB medication for kidney protection given that he has diabetes. Follow up as previously scheduled for continued monitoring.    Type 2 diabetes mellitus with diabetic microalbuminuria, without long-term current use of insulin (HCC)  Chronic, stable. Last A1c in Feb 2024 was 7.4. He does not check his blood sugar at home. Currently takes glipizide 25 mg daily, Ozempic 1 mg weekly, and jardiance 25 mg daily. Last retinal screening was yesterday with his eye doctor. Will request records. Last microalbumin creat ratio in Nov 2023 was 84. He sees podiatry who does his feet exams. He is not on a statin. States the last time he took a statin he broke out in blisters. We discussed his lifestyle habits. Follow up with endocrinology for continued monitoring and management.    ICD (implantable cardioverter-defibrillator) in  place  Hypercoagulable state due to atrial fibrillation (HCC)  Chronic, stable. He has atrial fibrillation and is on eliquis 5 mg BID. Also has defibrillator in place. Follows with cardiology.     Macular degeneration  Chronic, stable. He has joined a study through Dr. 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 States he wishes this could be fixed as it is one of his biggest stressors. He is able to still drive. Follow up as previously scheduled.         Services suggested: No services needed at this time  Health Care Screening: Age-appropriate preventive services recommended by USPTF and ACIP covered by Medicare were discussed today. Services ordered if indicated and agreed upon by the patient.  Referrals offered: Community-based lifestyle interventions to reduce health risks and promote self-management and wellness, fall prevention, nutrition, physical activity, tobacco-use cessation, weight loss, and mental health services as per orders if indicated.    Discussion today about general wellness and lifestyle habits:    Prevent falls and reduce trip hazards; Cautioned about securing or removing rugs.  Have a working fire alarm and carbon monoxide detector.  Engage in regular physical activity and social activities.    Follow-up: Return for appointment with Primary Care Provider as needed..

## 2024-06-26 NOTE — ASSESSMENT & PLAN NOTE
Chronic, stable. He has joined a study through Dr. 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 States he wishes this could be fixed as it is one of his biggest stressors. He is able to still drive. Follow up as previously scheduled.

## 2024-07-07 DIAGNOSIS — I48.0 PAROXYSMAL ATRIAL FIBRILLATION (HCC): ICD-10-CM

## 2024-07-08 RX ORDER — APIXABAN 5 MG/1
TABLET, FILM COATED ORAL
Qty: 180 TABLET | Refills: 2 | Status: SHIPPED | OUTPATIENT
Start: 2024-07-08 | End: 2024-07-08

## 2024-08-28 ENCOUNTER — HOSPITAL ENCOUNTER (OUTPATIENT)
Dept: LAB | Facility: MEDICAL CENTER | Age: 84
End: 2024-08-28
Attending: INTERNAL MEDICINE
Payer: MEDICARE

## 2024-08-28 ENCOUNTER — HOSPITAL ENCOUNTER (OUTPATIENT)
Dept: LAB | Facility: MEDICAL CENTER | Age: 84
End: 2024-08-28
Attending: EMERGENCY MEDICINE
Payer: MEDICARE

## 2024-08-28 LAB
25(OH)D3 SERPL-MCNC: 115 NG/ML (ref 30–100)
ALBUMIN SERPL BCP-MCNC: 4.1 G/DL (ref 3.2–4.9)
ALBUMIN/GLOB SERPL: 1.9 G/DL
ALP SERPL-CCNC: 58 U/L (ref 30–99)
ALT SERPL-CCNC: 22 U/L (ref 2–50)
ANION GAP SERPL CALC-SCNC: 12 MMOL/L (ref 7–16)
ANISOCYTOSIS BLD QL SMEAR: ABNORMAL
APPEARANCE UR: CLEAR
AST SERPL-CCNC: 25 U/L (ref 12–45)
BACTERIA #/AREA URNS HPF: NEGATIVE /HPF
BASOPHILS # BLD AUTO: 0.4 % (ref 0–1.8)
BASOPHILS # BLD: 0.02 K/UL (ref 0–0.12)
BILIRUB SERPL-MCNC: 0.5 MG/DL (ref 0.1–1.5)
BILIRUB UR QL STRIP.AUTO: NEGATIVE
BUN SERPL-MCNC: 41 MG/DL (ref 8–22)
CALCIUM ALBUM COR SERPL-MCNC: 9.1 MG/DL (ref 8.5–10.5)
CALCIUM SERPL-MCNC: 9.2 MG/DL (ref 8.5–10.5)
CHLORIDE SERPL-SCNC: 104 MMOL/L (ref 96–112)
CHOLEST SERPL-MCNC: 93 MG/DL (ref 100–199)
CO2 SERPL-SCNC: 25 MMOL/L (ref 20–33)
COLLECT DURATION TIME SPEC: NORMAL HRS
COLLECT DURATION TIME SPEC: NORMAL HRS
COLOR UR: YELLOW
COMMENT 1642: NORMAL
CORTIS SERPL-MCNC: 12.3 UG/DL (ref 0–23)
CREAT SERPL-MCNC: 2.16 MG/DL (ref 0.5–1.4)
DHEA-S SERPL-MCNC: 641 UG/DL (ref 16.2–123)
EOSINOPHIL # BLD AUTO: 0.14 K/UL (ref 0–0.51)
EOSINOPHIL NFR BLD: 2.5 % (ref 0–6.9)
EPI CELLS #/AREA URNS HPF: NEGATIVE /HPF
ERYTHROCYTE [DISTWIDTH] IN BLOOD BY AUTOMATED COUNT: 54.6 FL (ref 35.9–50)
EST. AVERAGE GLUCOSE BLD GHB EST-MCNC: 140 MG/DL
GFR SERPLBLD CREATININE-BSD FMLA CKD-EPI: 30 ML/MIN/1.73 M 2
GLOBULIN SER CALC-MCNC: 2.2 G/DL (ref 1.9–3.5)
GLUCOSE SERPL-MCNC: 111 MG/DL (ref 65–99)
GLUCOSE UR STRIP.AUTO-MCNC: 250 MG/DL
HBA1C MFR BLD: 6.5 % (ref 4–5.6)
HCT VFR BLD AUTO: 50.1 % (ref 42–52)
HDLC SERPL-MCNC: 30 MG/DL
HGB BLD-MCNC: 14.9 G/DL (ref 14–18)
HYALINE CASTS #/AREA URNS LPF: ABNORMAL /LPF
HYPOCHROMIA BLD QL SMEAR: ABNORMAL
IMM GRANULOCYTES # BLD AUTO: 0.01 K/UL (ref 0–0.11)
IMM GRANULOCYTES NFR BLD AUTO: 0.2 % (ref 0–0.9)
KETONES UR STRIP.AUTO-MCNC: NEGATIVE MG/DL
LDLC SERPL CALC-MCNC: 51 MG/DL
LEUKOCYTE ESTERASE UR QL STRIP.AUTO: NEGATIVE
LYMPHOCYTES # BLD AUTO: 1.07 K/UL (ref 1–4.8)
LYMPHOCYTES NFR BLD: 19.4 % (ref 22–41)
MCH RBC QN AUTO: 22.3 PG (ref 27–33)
MCHC RBC AUTO-ENTMCNC: 29.7 G/DL (ref 32.3–36.5)
MCV RBC AUTO: 75 FL (ref 81.4–97.8)
MICRO URNS: ABNORMAL
MICROCYTES BLD QL SMEAR: ABNORMAL
MONOCYTES # BLD AUTO: 0.69 K/UL (ref 0–0.85)
MONOCYTES NFR BLD AUTO: 12.5 % (ref 0–13.4)
MORPHOLOGY BLD-IMP: NORMAL
NEUTROPHILS # BLD AUTO: 3.58 K/UL (ref 1.82–7.42)
NEUTROPHILS NFR BLD: 65 % (ref 44–72)
NITRITE UR QL STRIP.AUTO: NEGATIVE
NRBC # BLD AUTO: 0 K/UL
NRBC BLD-RTO: 0 /100 WBC (ref 0–0.2)
OVALOCYTES BLD QL SMEAR: NORMAL
PH UR STRIP.AUTO: 5.5 [PH] (ref 5–8)
PHOSPHATE SERPL-MCNC: 2.5 MG/DL (ref 2.5–4.5)
PLATELET # BLD AUTO: 168 K/UL (ref 164–446)
PLATELET BLD QL SMEAR: NORMAL
POIKILOCYTOSIS BLD QL SMEAR: NORMAL
POTASSIUM SERPL-SCNC: 4.1 MMOL/L (ref 3.6–5.5)
PROT SERPL-MCNC: 6.3 G/DL (ref 6–8.2)
PROT UR QL STRIP: 30 MG/DL
PSA SERPL-MCNC: 1.01 NG/ML (ref 0–4)
RBC # BLD AUTO: 6.68 M/UL (ref 4.7–6.1)
RBC # URNS HPF: ABNORMAL /HPF
RBC BLD AUTO: PRESENT
RBC UR QL AUTO: NEGATIVE
SODIUM SERPL-SCNC: 141 MMOL/L (ref 135–145)
SP GR UR STRIP.AUTO: 1.02
SPECIMEN VOL ?TM UR: NORMAL ML
T3FREE SERPL-MCNC: 2.71 PG/ML (ref 2–4.4)
TOTAL VOLUME 1105: NORMAL ML
TRIGL SERPL-MCNC: 58 MG/DL (ref 0–149)
TSH SERPL-ACNC: 1.06 UIU/ML (ref 0.35–5.5)
UROBILINOGEN UR STRIP.AUTO-MCNC: 0.2 MG/DL
WBC # BLD AUTO: 5.5 K/UL (ref 4.8–10.8)
WBC #/AREA URNS HPF: ABNORMAL /HPF

## 2024-08-28 PROCEDURE — 83525 ASSAY OF INSULIN: CPT

## 2024-08-28 PROCEDURE — 83525 ASSAY OF INSULIN: CPT | Mod: 91

## 2024-08-28 PROCEDURE — 84481 FREE ASSAY (FT-3): CPT

## 2024-08-28 PROCEDURE — 83090 ASSAY OF HOMOCYSTEINE: CPT

## 2024-08-28 PROCEDURE — 36415 COLL VENOUS BLD VENIPUNCTURE: CPT

## 2024-08-28 PROCEDURE — 82533 TOTAL CORTISOL: CPT

## 2024-08-28 PROCEDURE — 84443 ASSAY THYROID STIM HORMONE: CPT

## 2024-08-28 PROCEDURE — 84100 ASSAY OF PHOSPHORUS: CPT

## 2024-08-28 PROCEDURE — 80061 LIPID PANEL: CPT | Mod: 91

## 2024-08-28 PROCEDURE — 84403 ASSAY OF TOTAL TESTOSTERONE: CPT

## 2024-08-28 PROCEDURE — 84133 ASSAY OF URINE POTASSIUM: CPT

## 2024-08-28 PROCEDURE — 84443 ASSAY THYROID STIM HORMONE: CPT | Mod: 91

## 2024-08-28 PROCEDURE — 82306 VITAMIN D 25 HYDROXY: CPT

## 2024-08-28 PROCEDURE — 82306 VITAMIN D 25 HYDROXY: CPT | Mod: 91

## 2024-08-28 PROCEDURE — 82570 ASSAY OF URINE CREATININE: CPT

## 2024-08-28 PROCEDURE — 82652 VIT D 1 25-DIHYDROXY: CPT

## 2024-08-28 PROCEDURE — 84402 ASSAY OF FREE TESTOSTERONE: CPT

## 2024-08-28 PROCEDURE — 82172 ASSAY OF APOLIPOPROTEIN: CPT

## 2024-08-28 PROCEDURE — 84439 ASSAY OF FREE THYROXINE: CPT

## 2024-08-28 PROCEDURE — 82670 ASSAY OF TOTAL ESTRADIOL: CPT

## 2024-08-28 PROCEDURE — 84105 ASSAY OF URINE PHOSPHORUS: CPT

## 2024-08-28 PROCEDURE — 82533 TOTAL CORTISOL: CPT | Mod: 91

## 2024-08-28 PROCEDURE — 86141 C-REACTIVE PROTEIN HS: CPT

## 2024-08-28 PROCEDURE — 84305 ASSAY OF SOMATOMEDIN: CPT

## 2024-08-28 PROCEDURE — 83970 ASSAY OF PARATHORMONE: CPT

## 2024-08-28 PROCEDURE — 80061 LIPID PANEL: CPT

## 2024-08-28 PROCEDURE — 84481 FREE ASSAY (FT-3): CPT | Mod: 91

## 2024-08-28 PROCEDURE — 82340 ASSAY OF CALCIUM IN URINE: CPT

## 2024-08-28 PROCEDURE — 80053 COMPREHEN METABOLIC PANEL: CPT

## 2024-08-28 PROCEDURE — 83036 HEMOGLOBIN GLYCOSYLATED A1C: CPT

## 2024-08-28 PROCEDURE — 85025 COMPLETE CBC W/AUTO DIFF WBC: CPT

## 2024-08-28 PROCEDURE — 81001 URINALYSIS AUTO W/SCOPE: CPT

## 2024-08-28 PROCEDURE — 84270 ASSAY OF SEX HORMONE GLOBUL: CPT

## 2024-08-28 PROCEDURE — 84153 ASSAY OF PSA TOTAL: CPT

## 2024-08-28 PROCEDURE — 82728 ASSAY OF FERRITIN: CPT

## 2024-08-28 PROCEDURE — 82627 DEHYDROEPIANDROSTERONE: CPT

## 2024-08-28 PROCEDURE — 84305 ASSAY OF SOMATOMEDIN: CPT | Mod: 91

## 2024-08-28 PROCEDURE — 80053 COMPREHEN METABOLIC PANEL: CPT | Mod: 91

## 2024-08-28 PROCEDURE — 82088 ASSAY OF ALDOSTERONE: CPT

## 2024-08-28 PROCEDURE — 84300 ASSAY OF URINE SODIUM: CPT

## 2024-08-29 LAB
25(OH)D3 SERPL-MCNC: 115 NG/ML (ref 30–100)
ALBUMIN SERPL BCP-MCNC: 4 G/DL (ref 3.2–4.9)
ALBUMIN/GLOB SERPL: 1.7 G/DL
ALP SERPL-CCNC: 55 U/L (ref 30–99)
ALT SERPL-CCNC: 19 U/L (ref 2–50)
ANION GAP SERPL CALC-SCNC: 11 MMOL/L (ref 7–16)
AST SERPL-CCNC: 27 U/L (ref 12–45)
BILIRUB SERPL-MCNC: 0.5 MG/DL (ref 0.1–1.5)
BUN SERPL-MCNC: 40 MG/DL (ref 8–22)
CALCIUM ALBUM COR SERPL-MCNC: 9.1 MG/DL (ref 8.5–10.5)
CALCIUM SERPL-MCNC: 9.1 MG/DL (ref 8.5–10.5)
CHLORIDE SERPL-SCNC: 103 MMOL/L (ref 96–112)
CHOLEST SERPL-MCNC: 92 MG/DL (ref 100–199)
CO2 SERPL-SCNC: 25 MMOL/L (ref 20–33)
CORTIS SERPL-MCNC: 11.8 UG/DL (ref 0–23)
CREAT SERPL-MCNC: 2.1 MG/DL (ref 0.5–1.4)
CREAT UR-MCNC: 110.92 MG/DL
CRP SERPL HS-MCNC: 3.5 MG/L (ref 0–3)
ESTRADIOL SERPL-MCNC: 24.3 PG/ML
FERRITIN SERPL-MCNC: 21.9 NG/ML (ref 22–322)
GFR SERPLBLD CREATININE-BSD FMLA CKD-EPI: 31 ML/MIN/1.73 M 2
GLOBULIN SER CALC-MCNC: 2.4 G/DL (ref 1.9–3.5)
GLUCOSE SERPL-MCNC: 110 MG/DL (ref 65–99)
HCYS SERPL-SCNC: 14.61 UMOL/L
HDLC SERPL-MCNC: 30 MG/DL
LDLC SERPL CALC-MCNC: 51 MG/DL
POTASSIUM SERPL-SCNC: 4.3 MMOL/L (ref 3.6–5.5)
POTASSIUM UR-SCNC: 30 MMOL/L
PROT SERPL-MCNC: 6.4 G/DL (ref 6–8.2)
PTH-INTACT SERPL-MCNC: 22 PG/ML (ref 14–72)
SODIUM SERPL-SCNC: 139 MMOL/L (ref 135–145)
SODIUM UR-SCNC: 26 MMOL/L
T3FREE SERPL-MCNC: 2.64 PG/ML (ref 2–4.4)
T4 FREE SERPL-MCNC: 1.2 NG/DL (ref 0.93–1.7)
TRIGL SERPL-MCNC: 57 MG/DL (ref 0–149)
TSH SERPL-ACNC: 1.04 UIU/ML (ref 0.35–5.5)

## 2024-08-30 LAB
1,25(OH)2D3 SERPL-MCNC: 34.3 PG/ML (ref 19.9–79.3)
ALDOST SERPL-MCNC: 29.7 NG/DL
APO B100 SERPL-MCNC: 67 MG/DL (ref 66–133)
IGF-I SERPL-MCNC: 125 NG/ML (ref 17–180)
IGF-I Z-SCORE SERPL: 0.7
INSULIN P FAST SERPL-ACNC: 8 UIU/ML (ref 3–25)
INSULIN P FAST SERPL-ACNC: 8 UIU/ML (ref 3–25)
SHBG SERPL-SCNC: 21 NMOL/L (ref 19–76)
TESTOST FREE MFR SERPL: 2.5 % (ref 1.6–2.9)
TESTOST FREE SERPL-MCNC: 243 PG/ML (ref 47–244)
TESTOST SERPL-MCNC: 953 NG/DL (ref 300–720)

## 2024-08-31 LAB
IGF-I SERPL-MCNC: 114 NG/ML (ref 17–180)
IGF-I Z-SCORE SERPL: 0.6

## 2024-09-01 DIAGNOSIS — I25.84 CORONARY ARTERY DISEASE DUE TO CALCIFIED CORONARY LESION: Chronic | ICD-10-CM

## 2024-09-01 DIAGNOSIS — I50.22 CHF (CONGESTIVE HEART FAILURE), NYHA CLASS II, CHRONIC, SYSTOLIC (HCC): Chronic | ICD-10-CM

## 2024-09-01 DIAGNOSIS — I25.10 CORONARY ARTERY DISEASE DUE TO CALCIFIED CORONARY LESION: Chronic | ICD-10-CM

## 2024-09-01 LAB
CALCIUM 24H UR-MCNC: 1.8 MG/DL
CALCIUM 24H UR-MRATE: ABNORMAL MG/D (ref 100–250)
CALCIUM/CREAT 24H UR: 15 MG/G (ref 20–240)
COLLECT DURATION TIME SPEC: ABNORMAL HRS
COLLECT DURATION TIME SPEC: NORMAL HRS
CREAT 24H UR-MCNC: 124 MG/DL
PHOSPHATE 24H UR-MCNC: 56 MG/DL
PHOSPHATE 24H UR-MRATE: NORMAL MG/D (ref 400–1300)
PHOSPHATE/CREAT 24H UR: 452 MG/G
SPECIMEN VOL ?TM UR: ABNORMAL ML
T4 FREE SERPL DIALY-MCNC: 2.1 NG/DL (ref 1.1–2.4)
TOTAL VOLUME 1105: NORMAL ML

## 2024-09-03 RX ORDER — CARVEDILOL 3.12 MG/1
3.12 TABLET ORAL 2 TIMES DAILY WITH MEALS
Qty: 100 TABLET | Refills: 0 | Status: SHIPPED | OUTPATIENT
Start: 2024-09-03 | End: 2024-09-10

## 2024-09-03 NOTE — TELEPHONE ENCOUNTER
Is the patient due for a refill? Yes    Was the patient seen the past year? Yes    Date of last office visit: 3/5/2024    Does the patient have an upcoming appointment?  Yes   If yes, When? 9/10/2024    Provider to refill:BE    Does the patient have [facility] and need 100-day supply? (This applies to ALL medications) Yes, quantity updated to 100 days

## 2024-09-10 ENCOUNTER — OFFICE VISIT (OUTPATIENT)
Dept: CARDIOLOGY | Facility: MEDICAL CENTER | Age: 84
End: 2024-09-10
Attending: INTERNAL MEDICINE
Payer: MEDICARE

## 2024-09-10 VITALS
BODY MASS INDEX: 31.54 KG/M2 | RESPIRATION RATE: 18 BRPM | WEIGHT: 238 LBS | HEART RATE: 85 BPM | SYSTOLIC BLOOD PRESSURE: 102 MMHG | OXYGEN SATURATION: 94 % | DIASTOLIC BLOOD PRESSURE: 62 MMHG | HEIGHT: 73 IN

## 2024-09-10 DIAGNOSIS — Z95.2 HISTORY OF TRANSCATHETER AORTIC VALVE REPLACEMENT (TAVR): ICD-10-CM

## 2024-09-10 DIAGNOSIS — I50.22 CHF (CONGESTIVE HEART FAILURE), NYHA CLASS II, CHRONIC, SYSTOLIC (HCC): Chronic | ICD-10-CM

## 2024-09-10 DIAGNOSIS — G45.0 VERTEBROBASILAR ARTERY INSUFFICIENCY: ICD-10-CM

## 2024-09-10 DIAGNOSIS — Z95.5 STENTED CORONARY ARTERY: ICD-10-CM

## 2024-09-10 DIAGNOSIS — I25.84 CORONARY ARTERY DISEASE DUE TO CALCIFIED CORONARY LESION: Chronic | ICD-10-CM

## 2024-09-10 DIAGNOSIS — Z95.810 ICD (IMPLANTABLE CARDIOVERTER-DEFIBRILLATOR) IN PLACE: ICD-10-CM

## 2024-09-10 DIAGNOSIS — N18.32 STAGE 3B CHRONIC KIDNEY DISEASE: Chronic | ICD-10-CM

## 2024-09-10 DIAGNOSIS — I25.10 CORONARY ARTERY DISEASE DUE TO CALCIFIED CORONARY LESION: Chronic | ICD-10-CM

## 2024-09-10 PROCEDURE — 3078F DIAST BP <80 MM HG: CPT | Performed by: INTERNAL MEDICINE

## 2024-09-10 PROCEDURE — 99214 OFFICE O/P EST MOD 30 MIN: CPT | Performed by: INTERNAL MEDICINE

## 2024-09-10 PROCEDURE — 99213 OFFICE O/P EST LOW 20 MIN: CPT | Performed by: INTERNAL MEDICINE

## 2024-09-10 PROCEDURE — G2211 COMPLEX E/M VISIT ADD ON: HCPCS | Performed by: INTERNAL MEDICINE

## 2024-09-10 PROCEDURE — 3074F SYST BP LT 130 MM HG: CPT | Performed by: INTERNAL MEDICINE

## 2024-09-10 ASSESSMENT — FIBROSIS 4 INDEX: FIB4 SCORE: 2.57

## 2024-09-10 NOTE — PROGRESS NOTES
"CARDIOLOGY OUTPATIENT FOLLOWUP    PCP: Pcp Pt States None    1. Coronary artery disease due to calcified coronary lesion    2. CHF (congestive heart failure), NYHA class II, chronic, systolic (HCC)    3. Stented coronary artery    4. History of transcatheter aortic valve replacement (TAVR)    5. Stage 3b chronic kidney disease    6. ICD (implantable cardioverter-defibrillator) in place    7. 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  We discussed potential diagnostic evaluation-which would include dynamic transcranial Doppler assessment though given the limited treatment options we elected to defer testing.  He will simply avoid these movements.    I did however stop carvedilol as this may allow more reserve.  He will continue on both clopidogrel and apixaban though if syncope/frailty continues to be of concern then we could consider discontinuing clopidogrel    He will update an echocardiogram.    Follow up: 6 months    History: LIAN More III is a 83 y.o. male with systolic heart failure LVEF recovered to 45%, left main and RCA PCI, TAVR, PAF, dual-chamber pacemaker/ICD, diabetes, CKD 3B presenting for follow-up.    He continues to struggle with near syncope particularly when reaching overhead and looking up he also notices similar symptoms when opening the refrigerator if he is staring directly at the light.  He did have a couple falls-1 in the bank.      Physical Exam:  /62 (BP Location: Left arm, Patient Position: Sitting, BP Cuff Size: Adult)   Pulse 85   Resp 18   Ht 1.854 m (6' 1\")   Wt 108 kg (238 lb)   SpO2 94%   BMI 31.40 kg/m²   GEN: NAD  RESP: CTAB  CVS: RRR, systolic murmur  ABD: Soft, NT/ND  EXT: WWP, no edema, venous stasis with dermatitis    () Today's E/M visit is associated with medical care services that serve as the continuing focal point for all needed health care services and/or with " medical care services that  are part of ongoing care related to a patient's single, serious condition, or a complex condition: This includes  furnishing services to patients on an ongoing basis that result in care that is personalized  to the patient. The services result in a comprehensive, longitudinal, and continuous  relationship with the patient and involve delivery of team-based care that is accessible, coordinated with other practitioners and providers, and integrated with the broader health  care landscape.     The ASCVD Risk score (Martin GALE, et al., 2019) failed to calculate.    Studies  Lab Results   Component Value Date/Time    CHOLSTRLTOT 92 (L) 08/28/2024 11:40 AM    CHOLSTRLTOT 93 (L) 08/28/2024 11:40 AM    LDL 51 08/28/2024 11:40 AM    LDL 51 08/28/2024 11:40 AM    HDL 30 (A) 08/28/2024 11:40 AM    HDL 30 (A) 08/28/2024 11:40 AM    TRIGLYCERIDE 57 08/28/2024 11:40 AM    TRIGLYCERIDE 58 08/28/2024 11:40 AM       Lab Results   Component Value Date/Time    SODIUM 139 08/28/2024 11:40 AM    SODIUM 141 08/28/2024 11:40 AM    POTASSIUM 4.3 08/28/2024 11:40 AM    POTASSIUM 4.1 08/28/2024 11:40 AM    CHLORIDE 103 08/28/2024 11:40 AM    CHLORIDE 104 08/28/2024 11:40 AM    CO2 25 08/28/2024 11:40 AM    CO2 25 08/28/2024 11:40 AM    GLUCOSE 110 (H) 08/28/2024 11:40 AM    GLUCOSE 111 (H) 08/28/2024 11:40 AM    BUN 40 (H) 08/28/2024 11:40 AM    BUN 41 (H) 08/28/2024 11:40 AM    CREATININE 2.10 (H) 08/28/2024 11:40 AM    CREATININE 2.16 (H) 08/28/2024 11:40 AM    CREATININE 1.18 02/08/2011 12:00 AM    BUNCREATRAT 16 02/08/2011 12:00 AM    GLOMRATE >59 02/08/2011 12:00 AM      Lab Results   Component Value Date/Time    PROTHROMBTM 15.2 (H) 07/10/2023 11:08 AM    INR 1.22 (H) 07/10/2023 11:08 AM      Lab Results   Component Value Date/Time    WBC 5.5 08/28/2024 11:40 AM    WBC 4.5 02/08/2011 12:00 AM    RBC 6.68 (H) 08/28/2024 11:40 AM    RBC 5.36 02/08/2011 12:00 AM    HEMOGLOBIN 14.9 08/28/2024 11:40 AM     "HEMATOCRIT 50.1 08/28/2024 11:40 AM    MCV 75.0 (L) 08/28/2024 11:40 AM    MCV 93 02/08/2011 12:00 AM    MCH 22.3 (L) 08/28/2024 11:40 AM    MCH 31.0 02/08/2011 12:00 AM    MCHC 29.7 (L) 08/28/2024 11:40 AM    MPV 9.8 02/13/2024 02:27 PM    NEUTSPOLYS 65.00 08/28/2024 11:40 AM    LYMPHOCYTES 19.40 (L) 08/28/2024 11:40 AM    MONOCYTES 12.50 08/28/2024 11:40 AM    EOSINOPHILS 2.50 08/28/2024 11:40 AM    BASOPHILS 0.40 08/28/2024 11:40 AM    HYPOCHROMIA 1+ 08/28/2024 11:40 AM    ANISOCYTOSIS 3+ (A) 08/28/2024 11:40 AM        Past Medical History:   Diagnosis Date    Arthritis 06/15/2023    \"everywhere\"    Breath shortness 06/15/2023    pt states short of breath 24/7 O2 at night only    CAD (coronary artery disease)     CATARACT 06/15/2023    removed bilat    Chest pain     Chest tightness     CHF (congestive heart failure), NYHA class II, chronic, systolic (HCC) 12/26/2019    managed by cardiology Dr. Wilkinson: He appears euvolemic on physical exam.  He is on losartan, carvedilol, not on spironolactone due to low kidney function.  He was admitted to the hospital for CHF exacerbation, when he suddenly gained 15 pounds.  He was diuresed with IV Lasix at that time, had echocardiogram that showed significant improvement of his cardiac function, when compared to previous study    Chickenpox     Congestive heart failure (HCC)     Constipation     Coronary heart disease     Cough     Daytime sleepiness     Diabetes 06/15/2023    oral medication    Difficulty breathing     Dilated cardiomyopathy (HCC)     Fatigue     GERD (gastroesophageal reflux disease)     Hearing difficulty     Heart burn 06/15/2023    medicated    Heart murmur     Heart valve disease 06/15/2023    replaced    Hemorrhagic disorder (HCC) 06/15/2023    eliquis    Hiatus hernia syndrome     High cholesterol 06/15/2023    medicated    History of BPH     History of chronic cough     Hyperlipidemia     Hypertension 06/15/2023    pt states well controlled on meds    " 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 Porter, gastroenterologist    Urinary incontinence 06/15/2023    at times     Allergies   Allergen Reactions    Statins [Hmg-Coa-R Inhibitors] Rash     Blisters      Atorvastatin Hives     Selected as representative agent for class- please do not delete     Outpatient Encounter Medications as of 9/10/2024   Medication Sig Dispense Refill    Sotagliflozin 200 MG Tab Take 1 Tablet by mouth every day.      apixaban (ELIQUIS) 2.5mg Tab Take 1 Tablet by mouth 2 times a day. 180 Tablet 3    clopidogrel (PLAVIX) 75 MG Tab Take 1 Tablet by mouth every day. 100 Tablet 2    Semaglutide, 1 MG/DOSE, (OZEMPIC, 1 MG/DOSE,) 4 MG/3ML Solution Pen-injector Take 1 mg by mouth every day.      bumetanide (BUMEX) 1 MG Tab Take 2 Tablets by mouth every day. 200 Tablet 3    testosterone cypionate (DEPO-TESTOSTERONE) 200 MG/ML Solution injection INJECT 0.8 ML INTRAMUSCULARLY ONCE WEEKLY      hydrocortisone 2.5 % Cream topical cream APPLY 1 APPLICATION TO AFFECTED AREA ONCE DAILY      cyanocobalamin (VITAMIN B-12) 1000 MCG/ML Solution INJECT 1 ML INTRAMUSCULARLY ONCE A WEEK      vitamin E 180 MG (400 UNIT) Cap Take by oral route.      L-Lysine 1000 MG Tab Take by oral route.      ezetimibe (ZETIA) 10 MG Tab Take 1 Tablet by mouth every day. 100 Tablet 3    GLIPIZIDE PO Take 25 mg by mouth every day.      Calcium Citrate (CITRACAL PO) Take 1 Tab by mouth 2 Times a Day.      coenzyme Q-10 30 MG capsule Take 30 mg by mouth 2 times a day.      omeprazole  (PRILOSEC) 40 MG delayed-release capsule Take 1 Capsule by mouth 2 times a day.      Cholecalciferol (VITAMIN D3) 2000 UNIT Cap Take 1 Capsule by mouth 2 times a day.      B Complex Vitamins (VITAMIN B COMPLEX PO) Take 1 Tab by mouth every day.      Ascorbic Acid (VITAMIN C) 1000 MG Tab Take 1 Tablet by mouth 2 times a day.      [DISCONTINUED] carvedilol (COREG) 3.125 MG Tab TAKE 1 TABLET BY MOUTH TWICE DAILY WITH MEALS 100 Tablet 0    [DISCONTINUED] Empagliflozin (JARDIANCE) 25 MG Tab Take 25 mg by mouth every day. (Patient not taking: Reported on 9/10/2024)      [DISCONTINUED] Home Care Oxygen Inhale 2 L/min by mouth Continuous.   Respiratory route via: Nasal Cannula   Oxygen supplier: [facility]  Indications: Hypoxia, to keep O2 sat over 90%       No facility-administered encounter medications on file as of 9/10/2024.     Social History     Socioeconomic History    Marital status:      Spouse name: Not on file    Number of children: Not on file    Years of education: Not on file    Highest education level: Bachelor's degree (e.g., BA, AB, BS)   Occupational History    Not on file   Tobacco Use    Smoking status: Never    Smokeless tobacco: Never    Tobacco comments:     0   Vaping Use    Vaping status: Never Used   Substance and Sexual Activity    Alcohol use: Not Currently     Alcohol/week: 0.0 oz    Drug use: Not Currently     Types: Marijuana, Oral     Comment: CBD 30 mg gummie daily    Sexual activity: Yes   Other Topics Concern    Not on file   Social History Narrative    Not on file     Social Determinants of Health     Financial Resource Strain: Low Risk  (6/26/2024)    Overall Financial Resource Strain (CARDIA)     Difficulty of Paying Living Expenses: Not hard at all   Food Insecurity: No Food Insecurity (6/26/2024)    Hunger Vital Sign     Worried About Running Out of Food in the Last Year: Never true     Ran Out of Food in the Last Year: Never true   Transportation Needs: No Transportation Needs  (6/26/2024)    PRAPARE - Transportation     Lack of Transportation (Medical): No     Lack of Transportation (Non-Medical): No   Physical Activity: Sufficiently Active (3/12/2022)    Exercise Vital Sign     Days of Exercise per Week: 3 days     Minutes of Exercise per Session: 150+ min   Stress: Stress Concern Present (3/12/2022)    [employer] - Occupational Stress Questionnaire     Feeling of Stress : To some extent   Social Connections: Socially Isolated (3/12/2022)    Social Connection and Isolation Panel [NHANES]     Frequency of Communication with Friends and Family: Once a week     Frequency of Social Gatherings with Friends and Family: Never     Attends Jewish Services: Never     Active Member of Clubs or Organizations: No     Attends Club or Organization Meetings: Never     Marital Status:    Intimate Partner Violence: Not on file   Housing Stability: Low Risk  (6/26/2024)    Housing Stability Vital Sign     Unable to Pay for Housing in the Last Year: No     Number of Places Lived in the Last Year: 1     Unstable Housing in the Last Year: No         ROS:   10 point review systems is otherwise negative except as per the HPI    Chief Complaint   Patient presents with    Follow-Up     F/v Dx: Stented coronary artery    Atrial Fibrillation     Paroxysmal atrial fibrillation (HCC)    Hypertension     Essential hypertension

## 2024-09-16 ENCOUNTER — NON-PROVIDER VISIT (OUTPATIENT)
Dept: CARDIOLOGY | Facility: MEDICAL CENTER | Age: 84
End: 2024-09-16
Payer: MEDICARE

## 2024-09-16 DIAGNOSIS — E78.5 DYSLIPIDEMIA: Chronic | ICD-10-CM

## 2024-09-16 PROCEDURE — 93295 DEV INTERROG REMOTE 1/2/MLT: CPT | Performed by: INTERNAL MEDICINE

## 2024-09-16 RX ORDER — EZETIMIBE 10 MG/1
10 TABLET ORAL DAILY
Qty: 100 TABLET | Refills: 3 | Status: SHIPPED | OUTPATIENT
Start: 2024-09-16

## 2024-09-16 NOTE — TELEPHONE ENCOUNTER
Is the patient due for a refill? Yes    Was the patient seen the past year? Yes    Date of last office visit: 9.10.2024    Does the patient have an upcoming appointment?  No    Provider to refill:BE    Does the patient have [facility] and need 100-day supply? (This applies to ALL medications) Yes, quantity updated to 100 days

## 2024-09-28 ENCOUNTER — HOSPITAL ENCOUNTER (OUTPATIENT)
Dept: LAB | Facility: MEDICAL CENTER | Age: 84
End: 2024-09-28
Attending: INTERNAL MEDICINE
Payer: MEDICARE

## 2024-09-28 LAB
CREAT UR-MCNC: 68.81 MG/DL
PROT UR-MCNC: 12 MG/DL (ref 0–15)
PROT/CREAT UR: 174 MG/G (ref 15–68)

## 2024-09-28 PROCEDURE — 82340 ASSAY OF CALCIUM IN URINE: CPT

## 2024-09-28 PROCEDURE — 84156 ASSAY OF PROTEIN URINE: CPT

## 2024-09-28 PROCEDURE — 84105 ASSAY OF URINE PHOSPHORUS: CPT

## 2024-09-28 PROCEDURE — 84100 ASSAY OF PHOSPHORUS: CPT

## 2024-09-28 PROCEDURE — 83970 ASSAY OF PARATHORMONE: CPT

## 2024-09-28 PROCEDURE — 82570 ASSAY OF URINE CREATININE: CPT

## 2024-09-28 PROCEDURE — 82542 COL CHROMOTOGRAPHY QUAL/QUAN: CPT

## 2024-09-28 PROCEDURE — 80053 COMPREHEN METABOLIC PANEL: CPT

## 2024-09-28 PROCEDURE — 84300 ASSAY OF URINE SODIUM: CPT

## 2024-09-28 PROCEDURE — 36415 COLL VENOUS BLD VENIPUNCTURE: CPT

## 2024-09-28 PROCEDURE — 84133 ASSAY OF URINE POTASSIUM: CPT

## 2024-09-29 LAB
ALBUMIN SERPL BCP-MCNC: 3.9 G/DL (ref 3.2–4.9)
ALBUMIN/GLOB SERPL: 1.5 G/DL
ALP SERPL-CCNC: 81 U/L (ref 30–99)
ALT SERPL-CCNC: 20 U/L (ref 2–50)
ANION GAP SERPL CALC-SCNC: 12 MMOL/L (ref 7–16)
AST SERPL-CCNC: 26 U/L (ref 12–45)
BILIRUB SERPL-MCNC: 0.5 MG/DL (ref 0.1–1.5)
BUN SERPL-MCNC: 54 MG/DL (ref 8–22)
CALCIUM ALBUM COR SERPL-MCNC: 9.5 MG/DL (ref 8.5–10.5)
CALCIUM SERPL-MCNC: 9.4 MG/DL (ref 8.5–10.5)
CHLORIDE SERPL-SCNC: 102 MMOL/L (ref 96–112)
CO2 SERPL-SCNC: 25 MMOL/L (ref 20–33)
CREAT SERPL-MCNC: 2.18 MG/DL (ref 0.5–1.4)
GFR SERPLBLD CREATININE-BSD FMLA CKD-EPI: 30 ML/MIN/1.73 M 2
GLOBULIN SER CALC-MCNC: 2.6 G/DL (ref 1.9–3.5)
GLUCOSE SERPL-MCNC: 158 MG/DL (ref 65–99)
PHOSPHATE SERPL-MCNC: 3.5 MG/DL (ref 2.5–4.5)
POTASSIUM SERPL-SCNC: 4.5 MMOL/L (ref 3.6–5.5)
POTASSIUM UR-SCNC: 29.7 MMOL/L
PROT SERPL-MCNC: 6.5 G/DL (ref 6–8.2)
PTH-INTACT SERPL-MCNC: 20.9 PG/ML (ref 14–72)
SODIUM SERPL-SCNC: 139 MMOL/L (ref 135–145)
SODIUM UR-SCNC: 43 MMOL/L

## 2024-09-30 LAB
CALCIUM 24H UR-MCNC: 3.2 MG/DL
CALCIUM 24H UR-MRATE: NORMAL MG/D (ref 100–250)
CALCIUM/CREAT 24H UR: 44 MG/G (ref 20–240)
COLLECT DURATION TIME SPEC: NORMAL HRS
COLLECT DURATION TIME SPEC: NORMAL HRS
CREAT 24H UR-MCNC: 73 MG/DL
PHOSPHATE 24H UR-MCNC: 39 MG/DL
PHOSPHATE 24H UR-MRATE: NORMAL MG/D (ref 400–1300)
PHOSPHATE/CREAT 24H UR: 534 MG/G
SPECIMEN VOL ?TM UR: NORMAL ML
TOTAL VOLUME 1105: NORMAL ML

## 2024-10-04 LAB — PTH RELATED PROT SERPL-SCNC: 2.9 PMOL/L (ref 0–2.3)

## 2024-10-16 ENCOUNTER — HOSPITAL ENCOUNTER (OUTPATIENT)
Dept: CARDIOLOGY | Facility: MEDICAL CENTER | Age: 84
End: 2024-10-16
Attending: INTERNAL MEDICINE
Payer: MEDICARE

## 2024-10-16 DIAGNOSIS — I25.10 CORONARY ARTERY DISEASE DUE TO CALCIFIED CORONARY LESION: Chronic | ICD-10-CM

## 2024-10-16 DIAGNOSIS — Z95.2 HISTORY OF TRANSCATHETER AORTIC VALVE REPLACEMENT (TAVR): ICD-10-CM

## 2024-10-16 DIAGNOSIS — I25.84 CORONARY ARTERY DISEASE DUE TO CALCIFIED CORONARY LESION: Chronic | ICD-10-CM

## 2024-10-16 LAB — LV EJECT FRACT  99904: 35

## 2024-10-16 PROCEDURE — 700117 HCHG RX CONTRAST REV CODE 255: Performed by: INTERNAL MEDICINE

## 2024-10-16 PROCEDURE — 93306 TTE W/DOPPLER COMPLETE: CPT

## 2024-10-16 PROCEDURE — 93306 TTE W/DOPPLER COMPLETE: CPT | Mod: 26 | Performed by: INTERNAL MEDICINE

## 2024-10-16 RX ADMIN — HUMAN ALBUMIN MICROSPHERES AND PERFLUTREN 3 ML: 10; .22 INJECTION, SOLUTION INTRAVENOUS at 10:30

## 2024-10-17 ENCOUNTER — HOSPITAL ENCOUNTER (OUTPATIENT)
Dept: RADIOLOGY | Facility: MEDICAL CENTER | Age: 84
End: 2024-10-17
Attending: INTERNAL MEDICINE
Payer: MEDICARE

## 2024-10-17 DIAGNOSIS — M81.0 SENILE OSTEOPOROSIS: ICD-10-CM

## 2024-10-17 PROCEDURE — 77080 DXA BONE DENSITY AXIAL: CPT

## 2024-10-21 RX ORDER — CARVEDILOL 3.12 MG/1
TABLET ORAL
Qty: 180 TABLET | Refills: 0 | OUTPATIENT
Start: 2024-10-21

## 2024-10-25 NOTE — DISCHARGE PLANNING
Received Choice form at 5817  Agency/Facility Name: [facility]   Referral sent per Choice form @ 7870      <-- Click to add NO significant Past Surgical History

## 2024-10-28 ENCOUNTER — TELEPHONE (OUTPATIENT)
Dept: CARDIOLOGY | Facility: MEDICAL CENTER | Age: 84
End: 2024-10-28
Payer: MEDICARE

## 2024-12-18 ENCOUNTER — NON-PROVIDER VISIT (OUTPATIENT)
Dept: CARDIOLOGY | Facility: MEDICAL CENTER | Age: 84
End: 2024-12-18
Payer: MEDICARE

## 2024-12-18 PROCEDURE — 93295 DEV INTERROG REMOTE 1/2/MLT: CPT | Performed by: INTERNAL MEDICINE

## 2024-12-19 NOTE — CARDIAC REMOTE MONITOR - SCAN
Device transmission reviewed. Device demonstrated appropriate function.       Electronically Signed by: Denilson Steiner M.D.    12/22/2024  3:31 AM

## 2024-12-20 ENCOUNTER — TELEPHONE (OUTPATIENT)
Dept: CARDIOLOGY | Facility: MEDICAL CENTER | Age: 84
End: 2024-12-20
Payer: MEDICARE

## 2024-12-20 NOTE — TELEPHONE ENCOUNTER
BE    Caller: J W Means III    Topic/issue: Patient states that Dr. Wilkinson manages his prescription for his CPAP that he has used every night for the past three years and it stopped working last night.   Patient states to get a new machine, [facility] in [place] is requesting a new prescription.   Please advise.    Callback Number: [phone]    Thank you,  Amairani BUSTILLO

## 2024-12-21 NOTE — TELEPHONE ENCOUNTER
Phone Number Called: [phone]    Call outcome: Spoke to patient regarding message below.    Message: Called to inform patient Cardiology does not manage CPAP or oxygen and to follow up with PCP. Pt advised he does not have a PCP. Advised to call [facility] number to get established. Pt verbalized understanding.

## 2024-12-24 ENCOUNTER — TELEPHONE (OUTPATIENT)
Dept: SLEEP MEDICINE | Facility: MEDICAL CENTER | Age: 84
End: 2024-12-24
Payer: MEDICARE

## 2024-12-24 NOTE — TELEPHONE ENCOUNTER
BE  Caller: J W Means III     Topic/issue: Patient states he is getting desperate now as no one has called in regards to to CPAP prescription, he states it has been five days and he is still not able to receive his prescription.     Patient is no longer established with Sleep Medicine. Patient would need a new referral if he is to re establish. Patient does no have a PCP either.     Callback Number: [phone]      Thank you  Selma GOODSON

## 2024-12-24 NOTE — TELEPHONE ENCOUNTER
Patient LVM saying his CPAP machine has stopped working and that he left a voicemail last week about this and no one has gotten back to him. Patient was last seen 10.4.21 patient not been seen in that last three year, patient is no longer a patient of ours.

## 2024-12-24 NOTE — TELEPHONE ENCOUNTER
Phone Number Called: [phone]    Call outcome: Spoke to patient regarding message below.    Message: Called to inform patient he will need to go to urgent care for CPAP prescription since he has not heard from sleep medicine or established care with PCP. Pt appreciative of call.

## 2025-01-08 ENCOUNTER — TELEPHONE (OUTPATIENT)
Dept: CARDIOLOGY | Facility: MEDICAL CENTER | Age: 85
End: 2025-01-08
Payer: MEDICARE

## 2025-01-08 DIAGNOSIS — N18.32 STAGE 3B CHRONIC KIDNEY DISEASE: ICD-10-CM

## 2025-01-08 DIAGNOSIS — I50.22 CHF (CONGESTIVE HEART FAILURE), NYHA CLASS II, CHRONIC, SYSTOLIC (HCC): ICD-10-CM

## 2025-01-08 NOTE — TELEPHONE ENCOUNTER
Remote transmission received via home monitor, full report scanned into Media.     Patient presenting in persistent AF/AFL  -Onset: 12/17/2024 @ 10:19 PM  -Patient on OAC? Yes

## 2025-01-09 ENCOUNTER — HOSPITAL ENCOUNTER (OUTPATIENT)
Dept: LAB | Facility: MEDICAL CENTER | Age: 85
End: 2025-01-09
Attending: INTERNAL MEDICINE
Payer: MEDICARE

## 2025-01-09 DIAGNOSIS — I50.22 CHF (CONGESTIVE HEART FAILURE), NYHA CLASS II, CHRONIC, SYSTOLIC (HCC): ICD-10-CM

## 2025-01-09 LAB
ANION GAP SERPL CALC-SCNC: 11 MMOL/L (ref 7–16)
BUN SERPL-MCNC: 47 MG/DL (ref 8–22)
CALCIUM SERPL-MCNC: 8.9 MG/DL (ref 8.5–10.5)
CHLORIDE SERPL-SCNC: 103 MMOL/L (ref 96–112)
CO2 SERPL-SCNC: 26 MMOL/L (ref 20–33)
CREAT SERPL-MCNC: 2.56 MG/DL (ref 0.5–1.4)
GFR SERPLBLD CREATININE-BSD FMLA CKD-EPI: 24 ML/MIN/1.73 M 2
GLUCOSE SERPL-MCNC: 153 MG/DL (ref 65–99)
POTASSIUM SERPL-SCNC: 3.8 MMOL/L (ref 3.6–5.5)
SODIUM SERPL-SCNC: 140 MMOL/L (ref 135–145)

## 2025-01-09 PROCEDURE — 80048 BASIC METABOLIC PNL TOTAL CA: CPT

## 2025-01-09 PROCEDURE — 36415 COLL VENOUS BLD VENIPUNCTURE: CPT

## 2025-01-09 NOTE — TELEPHONE ENCOUNTER
Noted:  Paul Wilkinson M.D.  Shalonda Falcon R.N.18 hours ago (5:48 PM)       Lets have him take twice daily Bumex until his appointment Monday.  Can order a stat updated echocardiogram and BMP to be done Monday as well     Phone Number Called: [phone]     Call outcome: Spoke to patient regarding message below.    Message: Discussed BE recommendation to increase Bumex to BID until his appt Monday. STAT echo discussed and scheduling information provided. Pt will get labs by tomorrow morning.

## 2025-01-09 NOTE — TELEPHONE ENCOUNTER
Phone Number Called: [phone]    Call outcome: Spoke to patient regarding message below.    Message: Called to check on pt symptoms from remote transmission. Pt reports he is feeling ok and has not missed a dose of eliquis.     Pt went on ozempic and lost 17lbs. Pt reports in the last week to 10 days his calves are starting to swell up again. Pt has been taking double the dose of his bumex. Pt reports pt has gained about 6-7lbs in 3 days. He reports since taking the bumex he has not had an increase in weight but has not lost it.     Pt meets criteria for RRACC. Christian pt for Monday.     To BE, patient scheduled for RRACC Monday. That was the soonest available. Pt has been taking 4mg daily of bumex with no relief. Please advise on any recommendations you may have. ~thank you     ER precautions given to pt for increased weight gain.

## 2025-01-13 ENCOUNTER — TELEPHONE (OUTPATIENT)
Dept: CARDIOLOGY | Facility: MEDICAL CENTER | Age: 85
End: 2025-01-13

## 2025-01-13 ENCOUNTER — OFFICE VISIT (OUTPATIENT)
Dept: CARDIOLOGY | Facility: MEDICAL CENTER | Age: 85
End: 2025-01-13
Attending: INTERNAL MEDICINE
Payer: MEDICARE

## 2025-01-13 VITALS
WEIGHT: 255.6 LBS | BODY MASS INDEX: 33.88 KG/M2 | RESPIRATION RATE: 18 BRPM | HEART RATE: 76 BPM | HEIGHT: 73 IN | DIASTOLIC BLOOD PRESSURE: 68 MMHG | SYSTOLIC BLOOD PRESSURE: 120 MMHG | OXYGEN SATURATION: 94 %

## 2025-01-13 DIAGNOSIS — I50.9 ACUTE DECOMPENSATED HEART FAILURE (HCC): ICD-10-CM

## 2025-01-13 DIAGNOSIS — I50.22 CHF (CONGESTIVE HEART FAILURE), NYHA CLASS II, CHRONIC, SYSTOLIC (HCC): ICD-10-CM

## 2025-01-13 DIAGNOSIS — E11.29 TYPE 2 DIABETES MELLITUS WITH DIABETIC MICROALBUMINURIA, WITHOUT LONG-TERM CURRENT USE OF INSULIN (HCC): ICD-10-CM

## 2025-01-13 DIAGNOSIS — R80.9 TYPE 2 DIABETES MELLITUS WITH DIABETIC MICROALBUMINURIA, WITHOUT LONG-TERM CURRENT USE OF INSULIN (HCC): ICD-10-CM

## 2025-01-13 LAB
BLOOD UREA NITROGEN RWN: 63 MG/DL (ref 8–22)
CALCIUM RWN: 9.5 MG/DL (ref 8.5–10.5)
CHLORIDE RWN: 105 MMOL/L (ref 96–112)
CREATININE RWN: 2.2 MG/DL (ref 0.5–1.4)
GFR SERPL CREATININE-BSD FRML MDRD: 28 ML/MIN/{1.73_M2}
GLUCOSE RWN: 173 MG/DL (ref 65–99)
POTASSIUM RWN: 3.7 MMOL/L (ref 3.6–5.5)
SODIUM RWN: 146 MMOL/L (ref 135–145)
TOTAL CARBON DIOXIDE RWN: 26 MMOL/L (ref 20–33)

## 2025-01-13 PROCEDURE — 3074F SYST BP LT 130 MM HG: CPT | Performed by: NURSE PRACTITIONER

## 2025-01-13 PROCEDURE — 36415 COLL VENOUS BLD VENIPUNCTURE: CPT

## 2025-01-13 PROCEDURE — 700111 HCHG RX REV CODE 636 W/ 250 OVERRIDE (IP): Mod: JZ | Performed by: NURSE PRACTITIONER

## 2025-01-13 PROCEDURE — 99213 OFFICE O/P EST LOW 20 MIN: CPT | Performed by: NURSE PRACTITIONER

## 2025-01-13 PROCEDURE — A9270 NON-COVERED ITEM OR SERVICE: HCPCS | Mod: JZ | Performed by: NURSE PRACTITIONER

## 2025-01-13 PROCEDURE — 3078F DIAST BP <80 MM HG: CPT | Performed by: NURSE PRACTITIONER

## 2025-01-13 PROCEDURE — 80048 BASIC METABOLIC PNL TOTAL CA: CPT | Performed by: NURSE PRACTITIONER

## 2025-01-13 PROCEDURE — 99215 OFFICE O/P EST HI 40 MIN: CPT | Performed by: NURSE PRACTITIONER

## 2025-01-13 PROCEDURE — 96374 THER/PROPH/DIAG INJ IV PUSH: CPT

## 2025-01-13 PROCEDURE — 700102 HCHG RX REV CODE 250 W/ 637 OVERRIDE(OP): Mod: JZ | Performed by: NURSE PRACTITIONER

## 2025-01-13 RX ORDER — ANASTROZOLE 1 MG/1
1 TABLET ORAL
COMMUNITY

## 2025-01-13 RX ORDER — POTASSIUM CHLORIDE 1500 MG/1
40 TABLET, EXTENDED RELEASE ORAL ONCE
Status: CANCELLED | OUTPATIENT
Start: 2025-01-13 | End: 2025-01-13

## 2025-01-13 RX ORDER — POTASSIUM CHLORIDE 1500 MG/1
40 TABLET, EXTENDED RELEASE ORAL ONCE
Status: COMPLETED | OUTPATIENT
Start: 2025-01-13 | End: 2025-01-13

## 2025-01-13 RX ORDER — EMPAGLIFLOZIN 25 MG/1
TABLET, FILM COATED ORAL DAILY
COMMUNITY
End: 2025-01-16

## 2025-01-13 RX ORDER — FUROSEMIDE 10 MG/ML
80 INJECTION INTRAMUSCULAR; INTRAVENOUS ONCE
Status: COMPLETED | OUTPATIENT
Start: 2025-01-13 | End: 2025-01-13

## 2025-01-13 RX ORDER — GABAPENTIN 300 MG/1
300 CAPSULE ORAL PRN
COMMUNITY

## 2025-01-13 RX ORDER — FUROSEMIDE 10 MG/ML
80 INJECTION INTRAMUSCULAR; INTRAVENOUS ONCE
Status: CANCELLED | OUTPATIENT
Start: 2025-01-13 | End: 2025-01-13

## 2025-01-13 RX ORDER — GLIMEPIRIDE 1 MG/1
1 TABLET ORAL EVERY MORNING
COMMUNITY
End: 2025-01-16

## 2025-01-13 RX ADMIN — FUROSEMIDE 80 MG: 10 INJECTION INTRAMUSCULAR; INTRAVENOUS at 13:47

## 2025-01-13 RX ADMIN — POTASSIUM CHLORIDE 40 MEQ: 1500 TABLET, EXTENDED RELEASE ORAL at 13:48

## 2025-01-13 ASSESSMENT — ENCOUNTER SYMPTOMS
ABDOMINAL PAIN: 0
MYALGIAS: 0
SHORTNESS OF BREATH: 0
FEVER: 0
PND: 0
SHORTNESS OF BREATH: 1
DIZZINESS: 0
HEADACHES: 0
FEVER: 1
ORTHOPNEA: 0
COUGH: 1
PALPITATIONS: 0
COUGH: 0
CHILLS: 0
CLAUDICATION: 0

## 2025-01-13 ASSESSMENT — FIBROSIS 4 INDEX: FIB4 SCORE: 2.84

## 2025-01-14 ENCOUNTER — PHARMACY VISIT (OUTPATIENT)
Dept: PHARMACY | Facility: MEDICAL CENTER | Age: 85
End: 2025-01-14
Payer: COMMERCIAL

## 2025-01-14 ENCOUNTER — OFFICE VISIT (OUTPATIENT)
Dept: CARDIOLOGY | Facility: MEDICAL CENTER | Age: 85
End: 2025-01-14
Attending: INTERNAL MEDICINE
Payer: MEDICARE

## 2025-01-14 ENCOUNTER — TELEPHONE (OUTPATIENT)
Dept: CARDIOLOGY | Facility: MEDICAL CENTER | Age: 85
End: 2025-01-14

## 2025-01-14 VITALS
BODY MASS INDEX: 33.66 KG/M2 | DIASTOLIC BLOOD PRESSURE: 66 MMHG | HEIGHT: 73 IN | HEART RATE: 75 BPM | SYSTOLIC BLOOD PRESSURE: 108 MMHG | OXYGEN SATURATION: 96 % | RESPIRATION RATE: 20 BRPM | WEIGHT: 254 LBS

## 2025-01-14 DIAGNOSIS — I50.9 CONGESTIVE HEART FAILURE, NYHA CLASS 3 AND ACC/AHA STAGE C (HCC): ICD-10-CM

## 2025-01-14 DIAGNOSIS — I50.9 ACUTE DECOMPENSATED HEART FAILURE (HCC): ICD-10-CM

## 2025-01-14 DIAGNOSIS — R06.02 SOB (SHORTNESS OF BREATH): ICD-10-CM

## 2025-01-14 DIAGNOSIS — I50.23 ACUTE ON CHRONIC SYSTOLIC HEART FAILURE (HCC): ICD-10-CM

## 2025-01-14 LAB
BLOOD UREA NITROGEN RWN: 61 MG/DL (ref 8–22)
CALCIUM RWN: 9.6 MG/DL (ref 8.5–10.5)
CHLORIDE RWN: 102 MMOL/L (ref 96–112)
CREATININE RWN: 2.4 MG/DL (ref 0.5–1.4)
GFR SERPL CREATININE-BSD FRML MDRD: 27 ML/MIN/{1.73_M2}
GLUCOSE RWN: 134 MG/DL (ref 65–99)
POTASSIUM RWN: 3.5 MMOL/L (ref 3.6–5.5)
SODIUM RWN: 147 MMOL/L (ref 135–145)
TOTAL CARBON DIOXIDE RWN: 28 MMOL/L (ref 20–33)

## 2025-01-14 PROCEDURE — 3078F DIAST BP <80 MM HG: CPT | Performed by: NURSE PRACTITIONER

## 2025-01-14 PROCEDURE — 80048 BASIC METABOLIC PNL TOTAL CA: CPT | Performed by: NURSE PRACTITIONER

## 2025-01-14 PROCEDURE — 36415 COLL VENOUS BLD VENIPUNCTURE: CPT

## 2025-01-14 PROCEDURE — 3074F SYST BP LT 130 MM HG: CPT | Performed by: NURSE PRACTITIONER

## 2025-01-14 PROCEDURE — RXMED WILLOW AMBULATORY MEDICATION CHARGE: Performed by: NURSE PRACTITIONER

## 2025-01-14 PROCEDURE — 99213 OFFICE O/P EST LOW 20 MIN: CPT | Performed by: NURSE PRACTITIONER

## 2025-01-14 PROCEDURE — 99214 OFFICE O/P EST MOD 30 MIN: CPT | Performed by: NURSE PRACTITIONER

## 2025-01-14 RX ORDER — FUROSEMIDE INJECTION 80 MG/ 10 ML 8 MG/ML
80 INJECTION SUBCUTANEOUS DAILY
Qty: 2 EACH | Refills: 0 | Status: SHIPPED | OUTPATIENT
Start: 2025-01-14 | End: 2025-01-16

## 2025-01-14 ASSESSMENT — ENCOUNTER SYMPTOMS
PND: 0
COUGH: 0
DIZZINESS: 0
MYALGIAS: 0
ABDOMINAL PAIN: 0
ORTHOPNEA: 0
PALPITATIONS: 0
CHILLS: 0
SHORTNESS OF BREATH: 1
CLAUDICATION: 0
FEVER: 0

## 2025-01-14 ASSESSMENT — FIBROSIS 4 INDEX: FIB4 SCORE: 2.84

## 2025-01-14 NOTE — TELEPHONE ENCOUNTER
MARIAMA    Caller:[facility]     Topic/issue: PT usually get Furoscix samples but today order was placed and I  Ns will not cover, please advise:     Callback Number: [phone]      Thank you,   Susan BEAL

## 2025-01-14 NOTE — TELEPHONE ENCOUNTER
Phone Number Called:  [phone]     Call outcome: Spoke to patient regarding message below.    Message: Discussed referral issue   Pt states [facility] will not except patient without referral. I advised I spoke with MARIAMA yesterday and we discussed his referral needing to come from Primary care. Pt states he doesn't have primary care at the moment. I advised I would see what I could find out from [facility] the best course of action.  Will see patient later today. Pt appreciative of phone call back

## 2025-01-14 NOTE — TELEPHONE ENCOUNTER
Caller: J W Means III   Topic/issue: Patient is currently trying to  the medication that was order but the pharmacy is saying they need to speak with the office first.  Patient is at the [facility] at [place].    Please advise.    Callback Number: [phone]     Thank you,  Nora MEHTA

## 2025-01-14 NOTE — TELEPHONE ENCOUNTER
Called [facility] who said they would fax a referral to [facility] via Referrals department  Referrals Fax # provided: [phone]

## 2025-01-14 NOTE — TELEPHONE ENCOUNTER
"Called [facility] regarding patient sample. Pharmacist advised us that we need to note \"sample\" in the order. She advised that she will update order to note samples. Patient okay to receive medication.    Called patient to advise of conversation with [facility] x 2. No answer. No voice message left  Will await phone call back if matter not addressed.  "

## 2025-01-14 NOTE — TELEPHONE ENCOUNTER
BE    Caller:  YESSICA Means      Topic/issue:   LEON/ would like a call back from his appt today with Same Day Urgent nurse.     Callback Number:   [phone]    Thank you,   Nhung LOPEZ

## 2025-01-14 NOTE — PROGRESS NOTES
[facility] Documentation:    Subjective:      Patient ID: LIAN More III is an 84 y.o. male.    Chief Complaint:    [facility] Day 2    Weight Gain of 10 pounds in 10 days, Swellin+, mid-tibia, and Worsening Shortness of Breath    Patient was seen yesterday in the [facility] clinic.  He received furosemide 80 mg IV x 1 with potassium 40 mEq p.o. x 1.    He mentions overnight, he may have lost 1 pound.  He reported yesterday his weights were around 245 pounds, but states today that he may have overestimated as his scale is not digital.    He continues to have swelling and shortness of breath. He denies chest pain, palpitations, orthopnea, PND or dizziness/lightheadedness.    Patient was referred over to pharmacotherapy yesterday, appointment is scheduled on Thursday, while he is waiting to get back in with endocrinology for his diabetes.    Patient of Dr. Wilkinson.  His prior visit was on 9/10/2024 with Dr. Wilkinson.  At that visit, patient was recommended to discontinue his carvedilol.    Since that visit, patient did have a repeat echocardiogram.    Patient did have a remote transmission for his device that was scanned in on 2025 which showed patient had persistent A-fib from 2024.  Patient was contacted and mentions that he was started on Ozempic and lost approximately 27 pounds, then about a week to 10 days ago, patient noticed he was starting to gain weight again and this morning he weighed 245 pounds.  His weight about 10 days ago was 235 pounds.    He admits to low-sodium diet though he does eat salty foods, he does not eat a lot of those foods.    He maintains a fluid intake between 64 and 80 ounces per day.    Patient mentions he has not been able to get back in with endocrinology for a couple of months as his prior endocrinologist had left the practice.  Patient reports trying to call for local endocrinologist and has not had any return calls.  He is concerned about  "this.    Patient's medications, allergies, past medical, surgical, social and family histories were reviewed and updated as appropriate.    Review of Systems   Constitutional:  Negative for fever and malaise/fatigue.   Respiratory:  Positive for shortness of breath. Negative for cough.    Cardiovascular:  Positive for leg swelling. Negative for chest pain, palpitations, orthopnea, claudication and PND.   Gastrointestinal:  Negative for abdominal pain.   Musculoskeletal:  Negative for myalgias.   Neurological:  Negative for dizziness.   All other systems reviewed and are negative.     Objective:     Lab Results   Component Value Date/Time    NTPROBNP 854 (H) 06/29/2022 11:08 AM     Sodium   Date Value Ref Range Status   01/13/2025 146 (A) 135 - 145 mmol/L Final     Potassium   Date Value Ref Range Status   01/13/2025 3.7 3.6 - 5.5 mmol/L Final     Blood Urea Nitrogen   Date Value Ref Range Status   01/13/2025 63 (A) 8 - 22 mg/dL Final     Calcium   Date Value Ref Range Status   01/13/2025 9.5 8.5 - 10.5 mg/dL Final     Total Carbon Dioxide   Date Value Ref Range Status   01/13/2025 26 20 - 33 mmol/L Final     Chloride   Date Value Ref Range Status   01/13/2025 105 96 - 112 mmol/L Final     Creatinine   Date Value Ref Range Status   01/13/2025 2.2 (A) 0.5 - 1.4 mg/dL Final     Glucose   Date Value Ref Range Status   01/13/2025 173 (A) 65 - 99 mg/dL Final     eGFR   Date Value Ref Range Status   01/13/2025 28  Final       /66 (BP Location: Left arm, Patient Position: Sitting, BP Cuff Size: Adult)   Pulse 75   Resp 20   Ht 1.854 m (6' 1\")   Wt 115 kg (254 lb)   SpO2 96%   BMI 33.51 kg/m²   Wt Readings from Last 10 Encounters:   01/14/25 115 kg (254 lb)   01/13/25 116 kg (255 lb 9.6 oz)   09/10/24 108 kg (238 lb)   06/26/24 108 kg (239 lb)   03/05/24 112 kg (246 lb)   01/03/24 117 kg (259 lb)   12/18/23 118 kg (260 lb)   12/16/23 118 kg (260 lb)   12/14/23 120 kg (264 lb)   12/13/23 120 kg (264 lb) "       Physical Exam  Vitals and nursing note reviewed.   Constitutional:       Appearance: He is well-developed.   HENT:      Head: Normocephalic and atraumatic.   Neck:      Vascular: No JVD.   Cardiovascular:      Rate and Rhythm: Normal rate and regular rhythm.      Heart sounds: Normal heart sounds. No murmur heard.     Comments: Swelling of lower legs  Pulmonary:      Effort: Pulmonary effort is normal. No respiratory distress.      Breath sounds: Normal breath sounds. No wheezing or rales.   Abdominal:      General: Bowel sounds are normal.      Palpations: Abdomen is soft.   Musculoskeletal:         General: Normal range of motion.      Cervical back: Normal range of motion and neck supple.      Right lower le+ Edema present.      Left lower le+ Edema present.   Skin:     General: Skin is warm and dry.   Neurological:      Mental Status: He is alert and oriented to person, place, and time.      Assessment:     Diagnoses of Congestive heart failure, NYHA class 3 and ACC/AHA stage C (HCC), Acute on chronic systolic heart failure (HCC), SOB (shortness of breath), and Acute decompensated heart failure (HCC) were pertinent to this visit.    Plan:     Acute decompensated heart failure; ACC stage C; NYHA class 3: Fluid volume overloaded on exam  -Patient is acutely decompensated heart failure requiring IV Diuretic therapy/transfer to ER.  However, patient not diuresed due to kidney function  -Discussed with patient that his hamzah BMP results show worsening EGFR.  -Discussed since he really has not seen any significant improvement of his symptoms, recommend ER.  He does not want to pursue going to the ER at this time.  -Discussed trying Furoscix 80 mg Sub Q daily for the next 2 days and increase his Bumex to 4 mg twice a day.  -Patient to get a BMP and NT proBNP done on Saturday or early Monday morning if not able to complete on Saturday  -The plan was discussed with patient. Please review RN documentation  for additional details.  Instruction given on use of Furoscix    -ER precautions discussed with patient. Close follow-up next Monday with labs  -Patient has a follow-up with pharmacotherapy on Thursday while he is getting back in with endocrinology  -Labs ordered: BMP, NT proBNP  -Follow up labs to be completed: On Saturday

## 2025-01-14 NOTE — PROGRESS NOTES
[facility] Documentation:    Subjective:      Patient ID: LIAN More III is an 84 y.o. male.    Chief Complaint:    RRACC Day 1     Weight Gain of 10 pounds in 10 days, Swellin+, mid-tibia, and Worsening Shortness of Breath    Patient of Dr. Wilkinson.  Patient was last seen in clinic on 9/10/2024 with Dr. Wilkinson.  At that visit, patient was recommended to discontinue his carvedilol.    Since that visit, patient did have a repeat echocardiogram.    Patient did have a remote transmission for his device that was scanned in on 2025 which showed patient had persistent A-fib from 2024.  Patient was contacted and mentions that he was started on Ozempic and lost approximately 27 pounds, then about a week to 10 days ago, patient noticed he was starting to gain weight again and this morning he weighed 245 pounds.  His weight about 10 days ago was 235 pounds.    For his symptoms, he is reporting some shortness of breath, swelling.  He was recommended to increase his Bumex to 2 mg twice a day.  He has not noticed any improvement with the medication, but his weights have increased.    He denies chest pain, palpitations, orthopnea, PND or dizziness/lightheadedness.    He admits to low-sodium diet though he does eat salty foods, he does not eat a lot of those foods.    He maintains a fluid intake between 64 and 80 ounces per day.    Patient mentions he has not been able to get back in with endocrinology for a couple of months as his prior endocrinologist had left the practice.  Patient reports trying to call for local endocrinologist and has not had any return calls.  He is concerned about this.    Patient's medications, allergies, past medical, surgical, social and family histories were reviewed and updated as appropriate.    Review of Systems   Constitutional:  Negative for fever and malaise/fatigue.   Respiratory:  Negative for cough and shortness of breath.    Cardiovascular:  Negative for  "chest pain, palpitations, orthopnea, claudication, leg swelling and PND.   Gastrointestinal:  Negative for abdominal pain.   Musculoskeletal:  Negative for myalgias.   Neurological:  Negative for dizziness.   All other systems reviewed and are negative.     Objective:     Lab Results   Component Value Date/Time    NTPROBNP 854 (H) 06/29/2022 11:08 AM     Sodium   Date Value Ref Range Status   01/13/2025 146 (A) 135 - 145 mmol/L Final     Potassium   Date Value Ref Range Status   01/13/2025 3.7 3.6 - 5.5 mmol/L Final     Blood Urea Nitrogen   Date Value Ref Range Status   01/13/2025 63 (A) 8 - 22 mg/dL Final     Calcium   Date Value Ref Range Status   01/13/2025 9.5 8.5 - 10.5 mg/dL Final     Total Carbon Dioxide   Date Value Ref Range Status   01/13/2025 26 20 - 33 mmol/L Final     Chloride   Date Value Ref Range Status   01/13/2025 105 96 - 112 mmol/L Final     Creatinine   Date Value Ref Range Status   01/13/2025 2.2 (A) 0.5 - 1.4 mg/dL Final     Glucose   Date Value Ref Range Status   01/13/2025 173 (A) 65 - 99 mg/dL Final     eGFR   Date Value Ref Range Status   01/13/2025 28  Final       /68 (BP Location: Left arm, Patient Position: Sitting, BP Cuff Size: Adult)   Pulse 76   Resp 18   Ht 1.854 m (6' 1\")   Wt 116 kg (255 lb 9.6 oz)   SpO2 94%   BMI 33.72 kg/m²   Wt Readings from Last 10 Encounters:   01/13/25 116 kg (255 lb 9.6 oz)   09/10/24 108 kg (238 lb)   06/26/24 108 kg (239 lb)   03/05/24 112 kg (246 lb)   01/03/24 117 kg (259 lb)   12/18/23 118 kg (260 lb)   12/16/23 118 kg (260 lb)   12/14/23 120 kg (264 lb)   12/13/23 120 kg (264 lb)   12/06/23 120 kg (264 lb 5.3 oz)       Physical Exam  Vitals and nursing note reviewed.   Constitutional:       Appearance: He is well-developed.   HENT:      Head: Normocephalic and atraumatic.   Neck:      Vascular: No JVD.   Cardiovascular:      Rate and Rhythm: Normal rate and regular rhythm.      Heart sounds: Normal heart sounds. No murmur heard.     " Comments: Swelling of lower legs  Pulmonary:      Effort: Pulmonary effort is normal. No respiratory distress.      Breath sounds: Normal breath sounds. No wheezing or rales.   Abdominal:      General: Bowel sounds are normal.      Palpations: Abdomen is soft.   Musculoskeletal:         General: Normal range of motion.      Cervical back: Normal range of motion and neck supple.      Right lower le+ Edema present.      Left lower le+ Edema present.   Skin:     General: Skin is warm and dry.   Neurological:      Mental Status: He is alert and oriented to person, place, and time.        Assessment:     Diagnoses of CHF (congestive heart failure), NYHA class II, chronic, systolic (HCC), Type 2 diabetes mellitus with diabetic microalbuminuria, without long-term current use of insulin (HCC), and Acute decompensated heart failure (HCC) were pertinent to this visit.    Plan:     Acute decompensated heart failure; ACC stage C; NYHA class 3: Fluid volume overloaded on exam  -Patient is acutely decompensated heart failure requiring IV Diuretic therapy/transfer to ER.   -Discussed with patient that his hamzah BMP results were abnormal.  His GFR was 28.  Discussed that we could try IV diuretics, but if he had no improvement tomorrow or had worsening kidney function, would recommend patient to follow-up at the ER.  He is agreeable.  -IV Diuretic therapy is a high risk medication use where renal and electrolyte function were evaluated before administration of IV lasix and will be re-evaluated the following day in rapid access cardiology clinic. These unique results via hamzah were reviewed by myself personally and discussed with nursing.   -The therapy plan was discussed with patient and implemented in office today. Please review RN documentation for additional details.  -Patient was given furosemide 80 mg IV x 1 with potassium 40 mEq p.o. x 1  -ER precautions discussed with patient. Close follow-up tomorrow  -Additional  outpatient diagnostic testing ordered in office today: No additional diagnostic testing was ordered, but patient was referred over to pharmacotherapy to assist with his diabetes and Ozempic.  He reports his prior endocrinologist had left the practice.  He has been trying to get in with endocrinology for the past month and has not had any luck.  -Labs ordered: None  -Follow up labs to be completed: N/A

## 2025-01-14 NOTE — PATIENT INSTRUCTIONS
Increase Bumex to 4 mg two times per day     Use Furoscix 80 mg Subcutaneous for the next 2 days.     Get labs done on Saturday or Monday before your next visit

## 2025-01-14 NOTE — PROGRESS NOTES
"2nd [facility] visit    Pertinent S/S:  Same as yesterday, BLE edema, abdominal edema    Today's weight vs Last Visit's weight:    Office scale: 254 lbs today vs 255lbs 9oz   Home scale: 242lb today vs 242 lbs    IV inserted @ 11:45 , Labs drawn @ 11:46    Med administration : None,Furoscix ordered. Direction given to patient on medication use from this RN. Patient verbalized and demonstrated understanding    Directions also given to [facility]    IV discontinued @ 12:40    FV scheduled with AM on 01/20/25  ==================      Follow-Up (Dx: CHF (congestive heart failure), NYHA class II, chronic, systolic (HCC)//)       Past Medical History:   Diagnosis Date    Arthritis 06/15/2023    \"everywhere\"    Breath shortness 06/15/2023    pt states short of breath 24/7 O2 at night only    CAD (coronary artery disease)     CATARACT 06/15/2023    removed bilat    Chest pain     Chest tightness     CHF (congestive heart failure), NYHA class II, chronic, systolic (HCC) 12/26/2019    managed by cardiology Dr. Wilkinson: He appears euvolemic on physical exam.  He is on losartan, carvedilol, not on spironolactone due to low kidney function.  He was admitted to the hospital for CHF exacerbation, when he suddenly gained 15 pounds.  He was diuresed with IV Lasix at that time, had echocardiogram that showed significant improvement of his cardiac function, when compared to previous study    Chickenpox     Congestive heart failure (HCC)     Constipation     Coronary heart disease     Cough     Daytime sleepiness     Diabetes 06/15/2023    oral medication    Difficulty breathing     Dilated cardiomyopathy (HCC)     Fatigue     GERD (gastroesophageal reflux disease)     Hearing difficulty     Heart burn 06/15/2023    medicated    Heart murmur     Heart valve disease 06/15/2023    replaced    Hemorrhagic disorder (HCC) 06/15/2023    eliquis    Hiatus hernia syndrome     High cholesterol 06/15/2023    medicated    History of BPH     History " "of chronic cough     Hyperlipidemia     Hypertension 06/15/2023    pt states well controlled on meds    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 Porter, gastroenterologist    Urinary incontinence 06/15/2023    at times        Vitals  Blood Pressure : 108/66  Pulse: 75  Respiration: 20  Pulse Oximetry: 96 %  Height: 185.4 cm (6' 1\")  Weight: 115 kg (254 lb)  BMI (Calculated): 33.51     Symptom Onset:   Review of Systems   Constitutional:  Negative for chills and fever.   Respiratory:  Negative for cough.    Cardiovascular:  Positive for leg swelling. Negative for chest pain.   Neurological:  Negative for dizziness.        Weight Changes:  See above      MEDICATIONS TAKEN TODAY      No medication taken today  "

## 2025-01-14 NOTE — TELEPHONE ENCOUNTER
MARIAMA    Caller: LIAN FARNSWORTH Means III     Topic/issue: The patient has additional questions and information to follow-up on his visit on 1/13/25.     Callback Number: [phone]      Thank you,  Sae FARNSWORTH.

## 2025-01-16 ENCOUNTER — PATIENT MESSAGE (OUTPATIENT)
Dept: CARDIOLOGY | Facility: MEDICAL CENTER | Age: 85
End: 2025-01-16

## 2025-01-16 ENCOUNTER — NON-PROVIDER VISIT (OUTPATIENT)
Dept: CARDIOLOGY | Facility: MEDICAL CENTER | Age: 85
End: 2025-01-16
Attending: INTERNAL MEDICINE
Payer: MEDICARE

## 2025-01-16 VITALS
SYSTOLIC BLOOD PRESSURE: 120 MMHG | WEIGHT: 248 LBS | HEART RATE: 73 BPM | BODY MASS INDEX: 32.72 KG/M2 | DIASTOLIC BLOOD PRESSURE: 59 MMHG

## 2025-01-16 DIAGNOSIS — D75.1 POLYCYTHEMIA: Chronic | ICD-10-CM

## 2025-01-16 DIAGNOSIS — R80.9 TYPE 2 DIABETES MELLITUS WITH DIABETIC MICROALBUMINURIA, WITHOUT LONG-TERM CURRENT USE OF INSULIN (HCC): ICD-10-CM

## 2025-01-16 DIAGNOSIS — E11.29 TYPE 2 DIABETES MELLITUS WITH DIABETIC MICROALBUMINURIA, WITHOUT LONG-TERM CURRENT USE OF INSULIN (HCC): ICD-10-CM

## 2025-01-16 PROCEDURE — 99213 OFFICE O/P EST LOW 20 MIN: CPT | Performed by: PHARMACIST

## 2025-01-16 RX ORDER — SEMAGLUTIDE 2.68 MG/ML
2 INJECTION, SOLUTION SUBCUTANEOUS
Qty: 3 ML | Refills: 11 | Status: SHIPPED | OUTPATIENT
Start: 2025-01-16

## 2025-01-16 ASSESSMENT — FIBROSIS 4 INDEX: FIB4 SCORE: 2.84

## 2025-01-16 NOTE — PROGRESS NOTES
CHF Pharmacotherapy Visit    Date Referral Placed: 1/13/25    LIAN More III is here for CHF and DM    HPI  Pertinent Interval History since last visit:   Pt's initial appt w/ pharmacotherapy.  Of note, pt w/ recent 10 lb weight gain. Has undergone IV diuresis via RRACC on 1/13 & 1/14.    Most recent EF:  Lab Results   Component Value Date/Time    LVEF 35 10/16/2024 1141    LVEF 45 11/22/2023 1004    LVEF 55 02/15/2022 0839       Potential Barriers to Care:  Adherence: denies missed doses  Side effects: none  Affordability: No noted issues  Others: N/a    Current CHF Medications - including dose:   Entresto or ACE/ARB: None  Beta blocker: None   Diuretic: Bumetanide 4mg BID, also recently received two doses of furosemide 80 mg SQ this week  Aldosterone antagonist: None  SGLT1&2i: Inpefa 200 mg once daily - NOT taking    Home Vitals:  BP ~ 110/65  HR~ 70    Diabetes Data Review:  Lab Results   Component Value Date/Time    HBA1C 6.5 (H) 08/28/2024 11:40 AM    HBA1C 7.4 (H) 02/13/2024 02:27 PM    HBA1C 8.6 (H) 11/21/2023 11:49 AM        Home Blood Glucose Readings: Does not currently SMBG d/t controlled BG. Has glucometer and supplies at home if needed.    Current diabetes medications:  GLP-1 or GLP-1/GIP Analog: semaglutide (Ozempic) 1 mg weekly  SGLT-1&2 Inhibitor: Inpefa 200 mg once daily - NOT TAKING  Sulfonylurea: glimepiride 1 mg once daily    - Preventative management:  REC DM Score: 0  Care gaps addressed:   N/A  Care gaps updated in Health Maintenance      Lifestyle:  Change in weight:  Down ~ 6 lbs since last seen  Exercise habits: no regular exercise program - does some stretching exercises. Notes he's limited by his neuropathy, chf, and weakness.   Diet: low sodium - Lives alone. Notes that he mostly just snacks vs full meals. Since starting Ozempic, pt notes he eats ~ 2k calories daily (if that). Participated in cardiac rehab - very aware of Na in foods. Typically eats bagels, bananas, cheese, cottage  cheese, tomatoes, coleslaw, cold cut sandwich, cookies, sugar free ice cream popsicle, protein.     DATA REVIEW  Vitals:    01/16/25 0936   BP: 120/59   Pulse: 73       Lab Results   Component Value Date/Time    SODIUM 140 01/09/2025 01:40 PM    POTASSIUM 3.8 01/09/2025 01:40 PM    CHLORIDE 103 01/09/2025 01:40 PM    CO2 26 01/09/2025 01:40 PM    GLUCOSE 153 (H) 01/09/2025 01:40 PM    BUN 47 (H) 01/09/2025 01:40 PM    CREATININE 2.56 (H) 01/09/2025 01:40 PM    CREATININE 1.18 02/08/2011 12:00 AM    BUNCREATRAT 16 02/08/2011 12:00 AM    GLOMRATE >59 02/08/2011 12:00 AM     Lab Results   Component Value Date/Time    ALKPHOSPHAT 81 09/28/2024 09:57 AM    ASTSGOT 26 09/28/2024 09:57 AM    ALTSGPT 20 09/28/2024 09:57 AM    TBILIRUBIN 0.5 09/28/2024 09:57 AM    INR 1.22 (H) 07/10/2023 11:08 AM    ALBUMIN 3.9 09/28/2024 09:57 AM    ALBUMIN 3.94 09/12/2019 11:47 AM      Lab Results   Component Value Date/Time    MALBCRT 84 (H) 11/21/2023 11:49 AM    MICROALBUR 7.4 11/21/2023 11:49 AM       Renal function:  Calculated creatinine clearance: ~ 36 mL/min   eGFR:   GFR (CKD-EPI)   Date Value Ref Range Status   01/09/2025 24 (A) >60 mL/min/1.73 m 2 Final     Comment:     Estimated Glomerular Filtration Rate is calculated using  race neutral CKD-EPI 2021 equation per NKF-ASN recommendations.           Other:  Immunization History   Administered Date(s) Administered    Hepatitis A Vaccine, Adult 11/24/2014    Hepatitis B Vaccine (Adol/Adult) 11/24/2014    INFLUENZA TIV (IM) 12/29/2009    Influenza Seasonal Injectable - Historical Data 12/29/2009    PFIZER PURPLE CAP SARS-COV-2 VACCINATION (12+) 02/03/2021, 02/24/2021    Pneumococcal Conjugate Vaccine (Prevnar/PCV-13) 03/10/2017    Pneumococcal polysaccharide vaccine (PPSV-23) 08/27/2013    Tdap Vaccine 11/30/2020    Zoster Vaccine Live (ZVL) (Zostavax) - HISTORICAL DATA 08/27/2013, 04/18/2016       Recent Imaging Studies:    None since last visit    ASSESSMENT AND PLAN  -CHF  Not  addressed today d/t time constraints and also pt seen twice by luzma this week for CHF.   Of note, pt has lost ~ 6 lbs in the last two days. Notes his hand swelling has improved. Continues to note LE edema. He is taking increased dose of bumetanide as directed. FU w/ [facility] midlevel provider 1/20/24.  Pt inquired about CPAP Rx today - likely needs to come from pul, but pt not established. He refuses referral for new PCP today as he states he doesn't need a referral outlet.    CHF medications (changes are bolded)  Entresto or ACE/ARB: None  Beta blocker: None   Diuretic: Bumetanide 4mg BID  Aldosterone antagonist: None  SGLT1&2i: Inpefa 200 mg once daily - NOT taking. Advised pt to address on Monday w/ AM    -T2DM  Basic physiology of DMII was explained to patient as well as microvascular/macrovascular complications. The importance of increasing physical activity to improve diabetes control was discussed with the patient. Patient was also educated on changing diet and making better choices to help control blood sugar.   Discussed Goals: FBG 80 - 130, 2hPP < 180, a1c < 7.5%  Last a1c drawn on 8/28/24 was 6.5%, which is at goal and has improved since the previous reading (7.4% on 2/13/24).  Pt currently due for repeat A1c.  Pt not currently SMBG d/t controlled A1c. Has testing supplies and uses PRN. Notes occasional sx of hypoglycemia - will test in the future moving forward.  Pt established on GLP1 therapy w/ noted appetite suppression and controlled BG.   Will DC sulfonylurea therapy to decrease hypoglycemia risk and optimize Ozempic.  Pt prefers to follow w/ endocrinologist for DM management. Pended referral to [facility] today per pt request.    Diabetes medications (changes are bolded)  STOP glimepiride  INCREASE Ozempic up to 2 mg SQ Q7D      -Lifestyle   Recommendations From Today's Visit:   Continue to limit fluid intake to 2L/day and Na+ intake to 2gm/day  Continue to eat DASH/MED style diet.   Continue to exercise  as tolerated.     Blood Work/Studies Ordered At Today's visit:   None    Follow-Up:   Pt declined scheduling f/u today as he states getting to and from appt's is very difficult for him. Counseled pt that we can help to monitor DM until he establishes w/ endo and also can co-manage CHF. He states he will schedule an appt if he feels he needs f/u in the future w/ Pharmacotherapy.     Uri Hurley, PharmD, BCACP    CC:  Princess Valenzuela A.P.R.N.

## 2025-01-16 NOTE — PATIENT INSTRUCTIONS
[facility]: [phone]    STOP glimepiride    INCREASE Ozempic up to 2 mg once weekly (can use two injections of the 1 mg Rx until you get the 2 mg Rx)

## 2025-01-18 ENCOUNTER — HOSPITAL ENCOUNTER (OUTPATIENT)
Dept: LAB | Facility: MEDICAL CENTER | Age: 85
End: 2025-01-18
Attending: NURSE PRACTITIONER
Payer: MEDICARE

## 2025-01-18 DIAGNOSIS — R06.02 SOB (SHORTNESS OF BREATH): ICD-10-CM

## 2025-01-18 DIAGNOSIS — I50.9 ACUTE DECOMPENSATED HEART FAILURE (HCC): ICD-10-CM

## 2025-01-18 LAB
ANION GAP SERPL CALC-SCNC: 14 MMOL/L (ref 7–16)
BUN SERPL-MCNC: 64 MG/DL (ref 8–22)
CALCIUM SERPL-MCNC: 8.9 MG/DL (ref 8.5–10.5)
CHLORIDE SERPL-SCNC: 97 MMOL/L (ref 96–112)
CO2 SERPL-SCNC: 27 MMOL/L (ref 20–33)
CREAT SERPL-MCNC: 2.31 MG/DL (ref 0.5–1.4)
GFR SERPLBLD CREATININE-BSD FMLA CKD-EPI: 27 ML/MIN/1.73 M 2
GLUCOSE SERPL-MCNC: 170 MG/DL (ref 65–99)
NT-PROBNP SERPL IA-MCNC: 3374 PG/ML (ref 0–125)
POTASSIUM SERPL-SCNC: 3.7 MMOL/L (ref 3.6–5.5)
SODIUM SERPL-SCNC: 138 MMOL/L (ref 135–145)

## 2025-01-18 PROCEDURE — 80048 BASIC METABOLIC PNL TOTAL CA: CPT

## 2025-01-18 PROCEDURE — 36415 COLL VENOUS BLD VENIPUNCTURE: CPT

## 2025-01-18 PROCEDURE — 83880 ASSAY OF NATRIURETIC PEPTIDE: CPT

## 2025-01-20 ENCOUNTER — OFFICE VISIT (OUTPATIENT)
Dept: CARDIOLOGY | Facility: MEDICAL CENTER | Age: 85
End: 2025-01-20
Attending: PHYSICIAN ASSISTANT
Payer: MEDICARE

## 2025-01-20 VITALS
OXYGEN SATURATION: 96 % | HEART RATE: 77 BPM | HEIGHT: 73 IN | BODY MASS INDEX: 32.87 KG/M2 | RESPIRATION RATE: 16 BRPM | WEIGHT: 248 LBS | SYSTOLIC BLOOD PRESSURE: 110 MMHG | DIASTOLIC BLOOD PRESSURE: 66 MMHG

## 2025-01-20 DIAGNOSIS — E78.5 DYSLIPIDEMIA: ICD-10-CM

## 2025-01-20 DIAGNOSIS — I25.10 CORONARY ARTERY DISEASE DUE TO CALCIFIED CORONARY LESION: ICD-10-CM

## 2025-01-20 DIAGNOSIS — I10 ESSENTIAL HYPERTENSION: ICD-10-CM

## 2025-01-20 DIAGNOSIS — Z95.5 STENTED CORONARY ARTERY: ICD-10-CM

## 2025-01-20 DIAGNOSIS — N18.32 STAGE 3B CHRONIC KIDNEY DISEASE: ICD-10-CM

## 2025-01-20 DIAGNOSIS — I50.22 CHF (CONGESTIVE HEART FAILURE), NYHA CLASS II, CHRONIC, SYSTOLIC (HCC): Chronic | ICD-10-CM

## 2025-01-20 DIAGNOSIS — I48.0 PAROXYSMAL ATRIAL FIBRILLATION (HCC): ICD-10-CM

## 2025-01-20 DIAGNOSIS — E11.29 TYPE 2 DIABETES MELLITUS WITH DIABETIC MICROALBUMINURIA, WITHOUT LONG-TERM CURRENT USE OF INSULIN (HCC): ICD-10-CM

## 2025-01-20 DIAGNOSIS — I25.84 CORONARY ARTERY DISEASE DUE TO CALCIFIED CORONARY LESION: ICD-10-CM

## 2025-01-20 DIAGNOSIS — I50.9 CONGESTIVE HEART FAILURE, NYHA CLASS 3 AND ACC/AHA STAGE C (HCC): ICD-10-CM

## 2025-01-20 DIAGNOSIS — Z95.810 ICD (IMPLANTABLE CARDIOVERTER-DEFIBRILLATOR) IN PLACE: ICD-10-CM

## 2025-01-20 DIAGNOSIS — Z95.2 S/P TAVR (TRANSCATHETER AORTIC VALVE REPLACEMENT): ICD-10-CM

## 2025-01-20 DIAGNOSIS — R80.9 TYPE 2 DIABETES MELLITUS WITH DIABETIC MICROALBUMINURIA, WITHOUT LONG-TERM CURRENT USE OF INSULIN (HCC): ICD-10-CM

## 2025-01-20 DIAGNOSIS — I50.23 ACUTE ON CHRONIC SYSTOLIC HEART FAILURE (HCC): ICD-10-CM

## 2025-01-20 PROCEDURE — 3078F DIAST BP <80 MM HG: CPT | Performed by: PHYSICIAN ASSISTANT

## 2025-01-20 PROCEDURE — 99213 OFFICE O/P EST LOW 20 MIN: CPT | Performed by: PHYSICIAN ASSISTANT

## 2025-01-20 PROCEDURE — 3074F SYST BP LT 130 MM HG: CPT | Performed by: PHYSICIAN ASSISTANT

## 2025-01-20 PROCEDURE — 99214 OFFICE O/P EST MOD 30 MIN: CPT | Performed by: PHYSICIAN ASSISTANT

## 2025-01-20 ASSESSMENT — FIBROSIS 4 INDEX: FIB4 SCORE: 2.84

## 2025-01-20 NOTE — TELEPHONE ENCOUNTER
Hey everyone,    Can someone please assist with this patient's Furoscix PA if needed? It is not currently one of our followed medications.    Thank you!    Alexsandra WASSERMAN  Rx Coordinator

## 2025-01-20 NOTE — PROGRESS NOTES
"Chief Complaint   Patient presents with    Congestive Heart Failure     F/v dx: Congestive heart failure, NYHA class 3 and ACC/AHA stage C (HCC)    Coronary Artery Disease    Atrial Fibrillation     F/v dx: Paroxysmal atrial fibrillation (HCC)       Aman More III is a 84 y.o. male with a history of CAD s/p PCI BRANDI, systolic heart failure s/p ICD, severe aortic stenosis s/p TAVR, vertebrobasilar artery insufficiency, paroxysmal atrial fibrillation, hypertension, dyslipidemia, CKD stage III, JAMES and diabetes mellitus type 2 who presents today as a 1 week [facility] follow-up.    His primary cardiologist is Dr. Wilkinson.  He was last seen in office 1/14/2025.  At that exam, he was seen as a 1 day [facility] follow-up.  He did not have any significant weight loss after being diuresed with 80 mg IV Lasix.  He continued to have swelling and shortness of breath.  He was still volume overloaded on exam with 2+ pitting edema bilaterally.  He was not diuresed due to his kidney function and his BMP results had shown worsening GFR.  He was recommended to proceed with an emergent evaluation, but declined.  He was recommended to ED was 406 80 mg daily over the weekend and to increase his Bumex to 4 mg twice daily.  He was also recommended to complete stat labs over the weekend and to follow-up.    Today, he reports he is feeling better overall.  He does continue to have lower extremity edema and does not feel as though his weight is dropping as quickly as it should.  His breathing is better, but he still does have shortness of breath. No chest pain or palpitations. No orthopnea or PND. No dizziness or lightheadedness. No syncope or presyncope.      His home weight is 238.  His prior noted dry weight is 230.     Past Medical History:   Diagnosis Date    Arthritis 06/15/2023    \"everywhere\"    Breath shortness 06/15/2023    pt states short of breath 24/7 O2 at night only    CAD (coronary artery disease)     CATARACT 06/15/2023    " removed bilat    Chest pain     Chest tightness     CHF (congestive heart failure), NYHA class II, chronic, systolic ([facility]) 12/26/2019    managed by cardiology Dr. Wilkinson: He appears euvolemic on physical exam.  He is on losartan, carvedilol, not on spironolactone due to low kidney function.  He was admitted to the hospital for CHF exacerbation, when he suddenly gained 15 pounds.  He was diuresed with IV Lasix at that time, had echocardiogram that showed significant improvement of his cardiac function, when compared to previous study    Chickenpox     Congestive heart failure ([facility])     Constipation     Coronary heart disease     Cough     Daytime sleepiness     Diabetes 06/15/2023    oral medication    Difficulty breathing     Dilated cardiomyopathy ([facility])     Fatigue     GERD (gastroesophageal reflux disease)     Hearing difficulty     Heart burn 06/15/2023    medicated    Heart murmur     Heart valve disease 06/15/2023    replaced    Hemorrhagic disorder ([facility]) 06/15/2023    eliquis    Hiatus hernia syndrome     High cholesterol 06/15/2023    medicated    History of BPH     History of chronic cough     Hyperlipidemia     Hypertension 06/15/2023    pt states well controlled on meds    Kidney disease 05/2020    ruptered left kidney     Mumps     Oxygen dependent     @HS, 2 liters    Pacemaker     AICD    Pain 06/15/2023    hip and legs, everywhere else as well    Palpitations     Peripheral neuropathy     Persistent atrial fibrillation ([facility]) 06/15/2023    medicated    Pneumonia 2007    Rhinitis     Ringing in ears     Severe aortic stenosis 12/04/2019    Sleep apnea 06/15/2023    CPAp with O2    Swelling of lower extremity     Tonsillitis     Tremor     Ulcer 2014    Dr. Porter, gastroenterologist    Urinary incontinence 06/15/2023    at times     Past Surgical History:   Procedure Laterality Date    PB OPEN FIX INTER/SUBTROCH FX,IMPLNT Left 3/24/2022    Procedure: INSERTION, INTRAMEDULLARY LILLI, FEMUR, PROXIMAL - SHORT,  FEMORAL;  Surgeon: Zen Srivastava M.D.;  Location: [facility];  Service: Orthopedics    ID LAP REMOVE ADJUST LUIS RESTRICT D*  6/5/2020    Procedure: REMOVAL, GASTRIC BAND, LAPAROSCOPIC - ADJUSTABLE BAND AND PORT;  Surgeon: Deniz Sue M.D.;  Location: [facility];  Service: General    TRANSCATHETER AORTIC VALVE REPLACEMENT N/A 1/27/2020    Procedure: REPLACEMENT, AORTIC VALVE, TRANSCATHETER-;  Surgeon: Surendra Castro M.D.;  Location: [facility];  Service: Cardiac    OLGA  1/27/2020    Procedure: ECHOCARDIOGRAM, TRANSESOPHAGEAL;  Surgeon: Surendra Castro M.D.;  Location: [facility];  Service: Cardiac    GASTROSCOPY N/A 1/8/2016    Procedure: GASTROSCOPY;  Surgeon: Deniz Sue M.D.;  Location: [facility];  Service:     ORIF, FRACTURE, HUMERUS Left 6/25/2015    Procedure: HUMERUS ORIF/ PROXIMAL;  Surgeon: Cristal Guido M.D.;  Location: [facility];  Service:     FUSION, SPINE, LUMBAR, PLIF  9/5/2013    Performed by Edith Sears M.D. at [facility]    LUMBAR DECOMPRESSION  9/5/2013    Performed by Edith Sears M.D. at [facility]    MASS EXCISION NEURO  9/5/2013    Performed by Edith Sears M.D. at [facility]    RECOVERY  6/26/2012    Performed by SURGERY, IR-RECOVERY at SURGERY SAME DAY [facility]    DRAINAGE HEMATOMA  5/25/2012    Performed by DENIZ SUE at [facility]    GASTRIC BAND LAPAROSCOPIC REVISION  5/11/2012    Performed by DENIZ SUE at [facility]    FUSION, SPINE, LUMBAR, PLIF  3/26/2012    Performed by EDITH SEARS at [facility]    LUMBAR DECOMPRESSION  3/26/2012    Performed by EDITH SEARS at [facility]    INJ,EPIDURAL/LUMB/SAC SINGLE  3/19/2012    Performed by KRISTIN BALTAZAR at [facility]    INJ,EPIDURAL/LUMB/SAC SINGLE  3/5/2012    Performed by KRISTIN BALTAZAR at [facility] ORS     GASTRIC BANDING LAPAROSCOPIC  3/24/2010    Performed by SARINA SUE at SURGERY [facility] ORS    HIATAL HERNIA REPAIR  3/24/2010    Performed by SARINA SUE at SURGERY [facility] ORS    KNEE ARTHROPLASTY TOTAL  2000    bilateral    ABDOMINOPLASTY  1990    AORTIC VALVE REPLACEMENT      ARTHROSCOPY, KNEE      LAMINOTOMY      OTHER CARDIAC SURGERY      stents    KS REMV 2ND CATARACT,CORN-SCLER SECTN      SINUSCOPE      SLEEVE,CARRIE VASO THIGH      TONSILLECTOMY      ZZZ CARDIAC CATH       Family History   Problem Relation Age of Onset    No Known Problems Sister     No Known Problems Sister     No Known Problems Sister     Diabetes Other     No Known Problems Mother     No Known Problems Father     Heart Disease Neg Hx      Social History     Socioeconomic History    Marital status:      Spouse name: Not on file    Number of children: Not on file    Years of education: Not on file    Highest education level: Bachelor's degree (e.g., BA, AB, BS)   Occupational History    Not on file   Tobacco Use    Smoking status: Never    Smokeless tobacco: Never    Tobacco comments:     0   Vaping Use    Vaping status: Never Used   Substance and Sexual Activity    Alcohol use: Not Currently     Alcohol/week: 0.0 oz    Drug use: Not Currently     Types: Marijuana, Oral     Comment: CBD 30 mg gummie daily    Sexual activity: Yes   Other Topics Concern    Not on file   Social History Narrative    Not on file     Social Drivers of Health     Financial Resource Strain: Low Risk  (6/26/2024)    Overall Financial Resource Strain (CARDIA)     Difficulty of Paying Living Expenses: Not hard at all   Food Insecurity: No Food Insecurity (6/26/2024)    Hunger Vital Sign     Worried About Running Out of Food in the Last Year: Never true     Ran Out of Food in the Last Year: Never true   Transportation Needs: No Transportation Needs (6/26/2024)    PRAPARE - Transportation     Lack of Transportation (Medical): No     Lack of  Transportation (Non-Medical): No   Physical Activity: Sufficiently Active (3/12/2022)    Exercise Vital Sign     Days of Exercise per Week: 3 days     Minutes of Exercise per Session: 150+ min   Stress: Stress Concern Present (3/12/2022)    [place] [employer] - Occupational Stress Questionnaire     Feeling of Stress : To some extent   Social Connections: Socially Isolated (3/12/2022)    Social Connection and Isolation Panel [NHANES]     Frequency of Communication with Friends and Family: Once a week     Frequency of Social Gatherings with Friends and Family: Never     Attends [employer] Services: Never     Active Member of Clubs or Organizations: No     Attends Club or Organization Meetings: Never     Marital Status:    Intimate Partner Violence: Not on file   Housing Stability: Low Risk  (6/26/2024)    Housing Stability Vital Sign     Unable to Pay for Housing in the Last Year: No     Number of Places Lived in the Last Year: 1     Unstable Housing in the Last Year: No     Allergies   Allergen Reactions    Statins [Hmg-Coa-R Inhibitors] Rash     Blisters      Atorvastatin Hives     Selected as representative agent for class- please do not delete     Outpatient Encounter Medications as of 1/20/2025   Medication Sig Dispense Refill    Semaglutide, 2 MG/DOSE, (OZEMPIC, 2 MG/DOSE,) 8 MG/3ML Solution Pen-injector Inject 2 mg under the skin every 7 days. 3 mL 11    anastrozole (ARIMIDEX) 1 MG Tab Take 1 mg by mouth. 2 x a week      Fluticasone Propionate (FLONASE NA) Administer 2 Sprays into affected nostril(S) every day.      gabapentin (NEURONTIN) 300 MG Cap Take 300 mg by mouth as needed.      GARLIC PO Take  by mouth every day.      Probiotic Product (PROBIOTIC PO) Take  by mouth every day.      Glucosamine HCl (GLUCOSAMINE PO) Take  by mouth every day.      ezetimibe (ZETIA) 10 MG Tab Take 1 tablet by mouth once daily 100 Tablet 3    apixaban (ELIQUIS) 2.5mg Tab Take 1 Tablet by mouth 2  times a day. 180 Tablet 3    clopidogrel (PLAVIX) 75 MG Tab Take 1 Tablet by mouth every day. 100 Tablet 2    bumetanide (BUMEX) 1 MG Tab Take 2 Tablets by mouth every day. (Patient taking differently: Take 2 mg by mouth every day. Currently taking 4 mg BID) 200 Tablet 3    testosterone cypionate (DEPO-TESTOSTERONE) 200 MG/ML Solution injection INJECT 0.8 ML INTRAMUSCULARLY ONCE WEEKLY      hydrocortisone 2.5 % Cream topical cream APPLY 1 APPLICATION TO AFFECTED AREA ONCE DAILY      cyanocobalamin (VITAMIN B-12) 1000 MCG/ML Solution INJECT 1 ML INTRAMUSCULARLY ONCE A WEEK      L-Lysine 1000 MG Tab Take by oral route.      Calcium Citrate (CITRACAL PO) Take 1 Tab by mouth 2 Times a Day.      coenzyme Q-10 30 MG capsule Take 30 mg by mouth 2 times a day.      omeprazole (PRILOSEC) 40 MG delayed-release capsule Take 1 Capsule by mouth 2 times a day.      Cholecalciferol (VITAMIN D3) 2000 UNIT Cap Take 1 Capsule by mouth 2 times a day.      B Complex Vitamins (VITAMIN B COMPLEX PO) Take 1 Tab by mouth every day.      Ascorbic Acid (VITAMIN C) 1000 MG Tab Take 1 Tablet by mouth 2 times a day.      Sotagliflozin 200 MG Tab Take 1 Tablet by mouth every day. INPEFA      [DISCONTINUED] Home Care Oxygen Inhale 2 L/min by mouth Continuous.   Respiratory route via: Nasal Cannula   Oxygen supplier: [employer]  Indications: Hypoxia, to keep O2 sat over 90%       No facility-administered encounter medications on file as of 1/20/2025.     Review of Systems   Constitutional: Negative.  Negative for chills, fever and malaise/fatigue.   HENT: Negative.     Eyes: Negative.  Negative for blurred vision and double vision.   Respiratory:  Positive for shortness of breath. Negative for cough.    Cardiovascular:  Positive for leg swelling. Negative for chest pain, palpitations, orthopnea, claudication and PND.   Gastrointestinal: Negative.  Negative for abdominal pain, nausea and vomiting.   Genitourinary: Negative.    Musculoskeletal: Negative.   "Negative for myalgias.   Skin: Negative.  Negative for rash.   Neurological: Negative.  Negative for dizziness, loss of consciousness, weakness and headaches.   Endo/Heme/Allergies: Negative.  Does not bruise/bleed easily.   Psychiatric/Behavioral: Negative.                Objective     /66 (BP Location: Left arm, Patient Position: Sitting, BP Cuff Size: Adult)   Pulse 77   Resp 16   Ht 1.854 m (6' 1\")   Wt 112 kg (248 lb)   SpO2 96%   BMI 32.72 kg/m²     Physical Exam  Vitals reviewed.   Constitutional:       General: He is not in acute distress.     Appearance: Normal appearance.   HENT:      Head: Normocephalic and atraumatic.      Right Ear: External ear normal.      Left Ear: External ear normal.   Eyes:      General: No scleral icterus.     Extraocular Movements: Extraocular movements intact.      Conjunctiva/sclera: Conjunctivae normal.      Pupils: Pupils are equal, round, and reactive to light.   Cardiovascular:      Rate and Rhythm: Normal rate and regular rhythm.      Pulses: Normal pulses.      Heart sounds: Normal heart sounds. No murmur heard.     No friction rub. No gallop.   Pulmonary:      Effort: Pulmonary effort is normal.      Breath sounds: Rales present.   Abdominal:      General: Bowel sounds are normal.      Palpations: Abdomen is soft.      Tenderness: There is no abdominal tenderness.   Musculoskeletal:         General: Normal range of motion.      Cervical back: Normal range of motion and neck supple.      Right lower leg: Edema (1+) present.      Left lower leg: Edema (1+) present.   Skin:     General: Skin is warm and dry.      Capillary Refill: Capillary refill takes less than 2 seconds.   Neurological:      General: No focal deficit present.      Mental Status: He is alert and oriented to person, place, and time.   Psychiatric:         Mood and Affect: Mood normal.         Behavior: Behavior normal.         Judgment: Judgment normal.       Lab Results   Component Value " Date/Time    CHOLSTRLTOT 92 (L) 08/28/2024 11:40 AM    CHOLSTRLTOT 93 (L) 08/28/2024 11:40 AM    LDL 51 08/28/2024 11:40 AM    LDL 51 08/28/2024 11:40 AM    HDL 30 (A) 08/28/2024 11:40 AM    HDL 30 (A) 08/28/2024 11:40 AM    TRIGLYCERIDE 57 08/28/2024 11:40 AM    TRIGLYCERIDE 58 08/28/2024 11:40 AM       Lab Results   Component Value Date/Time    SODIUM 138 01/18/2025 09:42 AM    POTASSIUM 3.7 01/18/2025 09:42 AM    CHLORIDE 97 01/18/2025 09:42 AM    CO2 27 01/18/2025 09:42 AM    GLUCOSE 170 (H) 01/18/2025 09:42 AM    BUN 64 (H) 01/18/2025 09:42 AM    CREATININE 2.31 (H) 01/18/2025 09:42 AM    CREATININE 1.18 02/08/2011 12:00 AM    BUNCREATRAT 16 02/08/2011 12:00 AM    GLOMRATE >59 02/08/2011 12:00 AM     Lab Results   Component Value Date/Time    ALKPHOSPHAT 81 09/28/2024 09:57 AM    ASTSGOT 26 09/28/2024 09:57 AM    ALTSGPT 20 09/28/2024 09:57 AM    TBILIRUBIN 0.5 09/28/2024 09:57 AM       Cardiovascular imaging and procedures:    Echocardiogram 10/16/2024  CONCLUSIONS  Moderately reduced LV systolic function.  The left ventricle is not well visualized.  Known TAVR aortic valve that is functioning normally with normal   transvalvular gradients.           Assessment & Plan     1. CHF (congestive heart failure), NYHA class II, chronic, systolic (HCC) [I50.22]        2. Type 2 diabetes mellitus with diabetic microalbuminuria, without long-term current use of insulin ([facility])        3. Congestive heart failure, NYHA class 3 and ACC/AHA stage C ([facility])        4. Acute on chronic systolic heart failure ([facility])        5. Stage 3b chronic kidney disease        6. Coronary artery disease due to calcified coronary lesion        7. Dyslipidemia        8. Stented coronary artery        9. ICD (implantable cardioverter-defibrillator) in place        10. S/P TAVR (transcatheter aortic valve replacement)        11. Paroxysmal atrial fibrillation (HCC)        12. Essential hypertension            Medical Decision Making: Today's  Assessment/Status/Plan:        HFrEF, Stage C, Class III/IV, LVEF 35%:   S/p AICD  -Discussed Heart failure trajectory and prognosis with patient. Will continue to optimize medical therapy as tolerated. Advanced HF treatment, need for remote monitoring consideration at every visit.   -ACE-I/ARB/ARNI: Contraindicated due to renal function.  -Evidence Based Beta-blocker: Consider after diuresis.  -Aldosterone Antagonist: Contraindicated due to renal function.  -Diuretic: He remains volume overloaded on exam.  He did not see much improvement with Furoscix and increasing his Bumex to 4 mg twice daily though he has lost 4 pounds.  -SGLT2 inhibitor: On sotagliflozin 200 mg daily.  -Labs: Renal function Will continue to closely monitor for side effects of patient's high risk medication(s) including renal function, liver function NTproBNP/cardiac markers, and electrolytes as needed  -discussed importance of exercise and regular activity  -Discussed/reviewed maintaining a low Sodium and hydration recommendations  -Repeat Echo as scheduled.  -Reinforced s/sx of worsening heart failure with patient and weight monitoring. Pt verbalizes understanding. Pt to call office or RTC if present.    -PUMP line number [phone] (PUMP) reviewed with patient  -Heart Failure Education:  Discussed the Definition of heart failure (linking disease, symptoms, and treatment) and causes of heart failure  Recognition of escalating symptoms and concrete plan for response to particular symptoms  Discussed the indication for ACE-I/ARB/ARNI, Beta-Blocker, Aldosterone antagonist, beta-blocker, SGLT2 inhibitor  Risk modification for heart failure progression  Specific diet recommendations: individualized low-sodium diet, recommendation for alcohol intake, etc.  Specific activity and exercise recommendations  Importance of treatment adherence  Behavioral strategies to promote treatment adherence  -Advanced care planning: To be discussed at future  visits.  -Pharmacotherapy Referral: Patient not referred.  -Compliance Barriers: None.  -Genetic testing Consideration: None.  -Consideration for LVAD and/or Heart transplant as necessary.  -Continue regular device checks.    CAD s/p PCI BRANDI  Dyslipidemia  -No angina.  -Anticoagulated with Eliquis 2.5 mg twice daily and clopidogrel 75 mg daily.  Continue.  -Continue GDMT per above.  -Intolerant to statins.  Continue Zetia.  LDL is at goal.    Severe aortic stenosis s/p TAVR  -Asymptomatic.  -Repeat echo per above.    Paroxysmal atrial fibrillation  -Asymptomatic.  -Continue anticoagulation.    Hypertension  -Blood pressure is well-controlled.  -Continue antihypertensives per above.    CKD stage III  -Renal function is stable.  -Will repeat BMP at his next visit.    JAMES  -Encourage CPAP.    Diabetes mellitus type 2  -A1c 6.5.  -Continue Inpefa.  -Management per PCP.    Follow-up in 1 week or sooner clinical changes.    Encouraged him to reach out via MyChart or telephone with any questions or concerns.    Thank you for allowing me to participate in the care of LIAN JAVAD More III .    Jennifer Bailey PA-C, Cardiology  [facility], [facility]  [address] [address]  Phone: [phone]  Fax: [phone]    PLEASE NOTE: This note was created using voice recognition software. I have made every reasonable attempt to correct obvious errors, but I expect that there are errors of grammar and possibly content that I did not discover before finalizing the note.

## 2025-01-21 ENCOUNTER — HOSPITAL ENCOUNTER (OUTPATIENT)
Dept: CARDIOLOGY | Facility: MEDICAL CENTER | Age: 85
End: 2025-01-21
Attending: INTERNAL MEDICINE
Payer: MEDICARE

## 2025-01-21 DIAGNOSIS — I50.22 CHF (CONGESTIVE HEART FAILURE), NYHA CLASS II, CHRONIC, SYSTOLIC (HCC): ICD-10-CM

## 2025-01-21 PROCEDURE — 700117 HCHG RX CONTRAST REV CODE 255: Performed by: INTERNAL MEDICINE

## 2025-01-21 PROCEDURE — 93306 TTE W/DOPPLER COMPLETE: CPT

## 2025-01-21 RX ADMIN — HUMAN ALBUMIN MICROSPHERES AND PERFLUTREN 3 ML: 10; .22 INJECTION, SOLUTION INTRAVENOUS at 18:30

## 2025-01-22 LAB
LV EJECT FRACT  99904: 32
LV EJECT FRACT  99904: 32
LV EJECT FRACT MOD 2C 99903: 33.45
LV EJECT FRACT MOD 2C 99903: 33.45
LV EJECT FRACT MOD 4C 99902: 33.96
LV EJECT FRACT MOD 4C 99902: 33.96
LV EJECT FRACT MOD BP 99901: 32.12
LV EJECT FRACT MOD BP 99901: 32.12

## 2025-01-22 PROCEDURE — 93306 TTE W/DOPPLER COMPLETE: CPT | Mod: 26 | Performed by: INTERNAL MEDICINE

## 2025-01-23 ENCOUNTER — OFFICE VISIT (OUTPATIENT)
Dept: CARDIOLOGY | Facility: MEDICAL CENTER | Age: 85
End: 2025-01-23
Attending: INTERNAL MEDICINE
Payer: MEDICARE

## 2025-01-23 VITALS
DIASTOLIC BLOOD PRESSURE: 60 MMHG | BODY MASS INDEX: 32.68 KG/M2 | HEART RATE: 85 BPM | HEIGHT: 73 IN | SYSTOLIC BLOOD PRESSURE: 100 MMHG | RESPIRATION RATE: 17 BRPM | OXYGEN SATURATION: 96 % | WEIGHT: 246.6 LBS

## 2025-01-23 DIAGNOSIS — I50.22 CHF (CONGESTIVE HEART FAILURE), NYHA CLASS II, CHRONIC, SYSTOLIC (HCC): ICD-10-CM

## 2025-01-23 DIAGNOSIS — N18.32 STAGE 3B CHRONIC KIDNEY DISEASE: ICD-10-CM

## 2025-01-23 DIAGNOSIS — I10 ESSENTIAL HYPERTENSION: ICD-10-CM

## 2025-01-23 DIAGNOSIS — Z95.2 S/P TAVR (TRANSCATHETER AORTIC VALVE REPLACEMENT): ICD-10-CM

## 2025-01-23 DIAGNOSIS — I50.23 ACUTE ON CHRONIC SYSTOLIC HEART FAILURE (HCC): ICD-10-CM

## 2025-01-23 LAB
BLOOD UREA NITROGEN RWN: 66 MG/DL (ref 8–22)
CALCIUM RWN: 9.6 MG/DL (ref 8.5–10.5)
CHLORIDE RWN: 101 MMOL/L (ref 96–112)
CREATININE RWN: 2.3 MG/DL (ref 0.5–1.4)
GFR SERPL CREATININE-BSD FRML MDRD: 27 ML/MIN/{1.73_M2}
GLUCOSE RWN: 180 MG/DL (ref 65–99)
POTASSIUM RWN: 3.6 MMOL/L (ref 3.6–5.5)
SODIUM RWN: 144 MMOL/L (ref 135–145)
TOTAL CARBON DIOXIDE RWN: 29 MMOL/L (ref 20–33)

## 2025-01-23 PROCEDURE — 36415 COLL VENOUS BLD VENIPUNCTURE: CPT

## 2025-01-23 PROCEDURE — 99215 OFFICE O/P EST HI 40 MIN: CPT | Performed by: PHYSICIAN ASSISTANT

## 2025-01-23 PROCEDURE — 3074F SYST BP LT 130 MM HG: CPT | Performed by: PHYSICIAN ASSISTANT

## 2025-01-23 PROCEDURE — 3078F DIAST BP <80 MM HG: CPT | Performed by: PHYSICIAN ASSISTANT

## 2025-01-23 PROCEDURE — 99213 OFFICE O/P EST LOW 20 MIN: CPT | Performed by: INTERNAL MEDICINE

## 2025-01-23 PROCEDURE — 80048 BASIC METABOLIC PNL TOTAL CA: CPT | Performed by: PHYSICIAN ASSISTANT

## 2025-01-23 ASSESSMENT — ENCOUNTER SYMPTOMS
VOMITING: 0
EYES NEGATIVE: 1
DIZZINESS: 0
ABDOMINAL PAIN: 0
FEVER: 0
DOUBLE VISION: 0
CHILLS: 0
BLURRED VISION: 0
PND: 1
HEADACHES: 0
DIZZINESS: 0
ORTHOPNEA: 0
PSYCHIATRIC NEGATIVE: 1
DOUBLE VISION: 0
MUSCULOSKELETAL NEGATIVE: 1
BRUISES/BLEEDS EASILY: 0
SHORTNESS OF BREATH: 1
NEUROLOGICAL NEGATIVE: 1
NAUSEA: 0
PALPITATIONS: 0
HEADACHES: 0
MYALGIAS: 0
BRUISES/BLEEDS EASILY: 0
PND: 0
CONSTITUTIONAL NEGATIVE: 1
CHILLS: 0
MYALGIAS: 0
ABDOMINAL PAIN: 0
COUGH: 1
SHORTNESS OF BREATH: 1
PALPITATIONS: 0
NEUROLOGICAL NEGATIVE: 1
BLURRED VISION: 0
MUSCULOSKELETAL NEGATIVE: 1
LOSS OF CONSCIOUSNESS: 0
COUGH: 0
ORTHOPNEA: 0
WEAKNESS: 0
WEAKNESS: 0
CLAUDICATION: 0
GASTROINTESTINAL NEGATIVE: 1
PSYCHIATRIC NEGATIVE: 1
VOMITING: 0
COUGH: 0
PND: 0
CLAUDICATION: 0
GASTROINTESTINAL NEGATIVE: 1
CONSTITUTIONAL NEGATIVE: 1
FEVER: 0
LOSS OF CONSCIOUSNESS: 0
NAUSEA: 0
EYES NEGATIVE: 1

## 2025-01-23 ASSESSMENT — FIBROSIS 4 INDEX: FIB4 SCORE: 2.84

## 2025-01-23 NOTE — PROGRESS NOTES
IV removed tip intact.     Patient referred to ER d/t low GFR excluding from RRACC treatment. Pt refused to be transferred or have EMS called.

## 2025-01-23 NOTE — PROGRESS NOTES
"Wt Readings from Last 5 Encounters:   01/23/25 112 kg (246 lb 9.6 oz)   01/20/25 112 kg (248 lb)   01/16/25 112 kg (248 lb)   01/14/25 115 kg (254 lb)   01/13/25 116 kg (255 lb 9.6 oz)     Referred by: AM    Congestive Heart Failure (F/V DX: CHF (congestive heart failure), NYHA class II, chronic, systolic (HCC) [I50.22]//)       Past Medical History:   Diagnosis Date    Arthritis 06/15/2023    \"everywhere\"    Breath shortness 06/15/2023    pt states short of breath 24/7 O2 at night only    CAD (coronary artery disease)     CATARACT 06/15/2023    removed bilat    Chest pain     Chest tightness     CHF (congestive heart failure), NYHA class II, chronic, systolic ([facility]) 12/26/2019    managed by cardiology Dr. Wilkinson: He appears euvolemic on physical exam.  He is on losartan, carvedilol, not on spironolactone due to low kidney function.  He was admitted to the hospital for CHF exacerbation, when he suddenly gained 15 pounds.  He was diuresed with IV Lasix at that time, had echocardiogram that showed significant improvement of his cardiac function, when compared to previous study    Chickenpox     Congestive heart failure (HCC)     Constipation     Coronary heart disease     Cough     Daytime sleepiness     Diabetes 06/15/2023    oral medication    Difficulty breathing     Dilated cardiomyopathy (HCC)     Fatigue     GERD (gastroesophageal reflux disease)     Hearing difficulty     Heart burn 06/15/2023    medicated    Heart murmur     Heart valve disease 06/15/2023    replaced    Hemorrhagic disorder (HCC) 06/15/2023    eliquis    Hiatus hernia syndrome     High cholesterol 06/15/2023    medicated    History of BPH     History of chronic cough     Hyperlipidemia     Hypertension 06/15/2023    pt states well controlled on meds    Kidney disease 05/2020    ruptered left kidney     Mumps     Oxygen dependent     @HS, 2 liters    Pacemaker     AICD    Pain 06/15/2023    hip and legs, everywhere else as well    Palpitations  " "   Peripheral neuropathy     Persistent atrial fibrillation (HCC) 06/15/2023    medicated    Pneumonia 2007    Rhinitis     Ringing in ears     Severe aortic stenosis 12/04/2019    Sleep apnea 06/15/2023    CPAp with O2    Swelling of lower extremity     Tonsillitis     Tremor     Ulcer 2014    Dr. Porter, gastroenterologist    Urinary incontinence 06/15/2023    at times        Vitals  Blood Pressure : 100/60  Pulse: 85  Respiration: 17  Pulse Oximetry: 96 %  Height: 185.4 cm (6' 1\")  Weight: 112 kg (246 lb 9.6 oz)  BMI (Calculated): 32.53     Symptom Onset: 1 week ago  Review of Systems   Respiratory:  Positive for cough and shortness of breath.    Cardiovascular:  Positive for leg swelling and PND.    Cough only occurs with deep breath    Weight Changes: \"Do you weigh yourself daily?\" \"Has your weight increased?\" \"Recent weights?\"      Has lost 8 lbs over course of 1 week  SODIUM INTAKE:\"How much salt do you eat?\" \"Have you increased your salt intake recently?\" (Ask about fast food, canned, frozen, boxed foods)      Uses a lot of pre-cooked frozen, canned, convenient products  WATER INTAKE: \"How much water do you drink in one day?\" \"How much other fluid do you drink in a day?\"      48-80oz day  HEART RATE AND BLOOD PRESSURE READINGS: \"How has your BP/HR been at home?      Normal based on patient. States he does not take it regularly at home.   MEDICATIONS TAKEN TODAY      Has not taken any medication today. No Bumex                      "

## 2025-01-23 NOTE — PROGRESS NOTES
[facility] Documentation:    Subjective:      Patient ID: LIAN More III is an 84 y.o. male with a history of CAD s/p PCI BRANID, systolic heart failure s/p ICD, severe aortic stenosis s/p TAVR, vertebrobasilar artery insufficiency, paroxysmal atrial fibrillation, hypertension, dyslipidemia, CKD stage III, JAMES and diabetes mellitus type 2 who presents today as a  [facility] follow-up.     His primary cardiologist is Dr. Wilkinson.  He was last seen in office 1/14/2025.  At that exam, he was seen as a 1 day [facility] follow-up.  He did not have any significant weight loss after being diuresed with 80 mg IV Lasix.  He continued to have swelling and shortness of breath.  He was still volume overloaded on exam with 2+ pitting edema bilaterally.  He was not diuresed due to his kidney function and his BMP results had shown worsening GFR.  He was recommended to proceed with an emergent evaluation, but declined.  He was recommended to start furoscix 80 mg daily over the weekend and to increase his Bumex to 4 mg twice daily.  He was also recommended to complete stat labs over the weekend and to follow-up.  Monday, 1/20/2025 he was seen again in the office, but had only felt mild improvement.  He did not feel improvement with the Furoscix whatsoever.  He only had a weight loss of 4 pounds and felt as though it was not coming off as quickly as it should.  His renal function was stable with the increase in Bumex.  He was recommended to proceed to the emergency department due to depressed renal function and no progress with outpatient diuresis.  He declined and instead opted to continue outpatient therapy.  He was recommended to continue Bumex 4 mg twice daily and to follow-up in the [facility] today.    Chief Complaint:    Today, he reports feeling the same.  He did not have any improvement over the past few days.  His home weight remains stable at 238 pounds.  He has now developed itching all over.  He continues to have  shortness of breath on exertion and lower extremity edema. No chest pain or palpitations. No shortness of breath, dyspnea on exertion, orthopnea or PND. No lower extremity edema. No dizziness or lightheadedness. No syncope or presyncope.      Patient's medications, allergies, past medical, surgical, social and family histories were reviewed and updated as appropriate.    Review of Systems   Constitutional: Negative.  Negative for chills, fever and malaise/fatigue.   HENT: Negative.     Eyes: Negative.  Negative for blurred vision and double vision.   Respiratory:  Positive for shortness of breath. Negative for cough.    Cardiovascular:  Positive for leg swelling. Negative for chest pain, palpitations, orthopnea, claudication and PND.   Gastrointestinal: Negative.  Negative for abdominal pain, nausea and vomiting.   Genitourinary: Negative.    Musculoskeletal: Negative.  Negative for myalgias.   Skin:  Positive for itching. Negative for rash.   Neurological: Negative.  Negative for dizziness, loss of consciousness, weakness and headaches.   Endo/Heme/Allergies: Negative.  Does not bruise/bleed easily.   Psychiatric/Behavioral: Negative.        Objective:     Lab Results   Component Value Date/Time    NTPROBNP 3374 (H) 01/18/2025 09:42 AM     Sodium   Date Value Ref Range Status   01/23/2025 144 135 - 145 mmol/L Final     Potassium   Date Value Ref Range Status   01/23/2025 3.6 3.6 - 5.5 mmol/L Final     Blood Urea Nitrogen   Date Value Ref Range Status   01/23/2025 66 (A) 8 - 22 mg/dL Final     Calcium   Date Value Ref Range Status   01/23/2025 9.6 8.5 - 10.5 mg/dL Final     Total Carbon Dioxide   Date Value Ref Range Status   01/23/2025 29 20 - 33 mmol/L Final     Chloride   Date Value Ref Range Status   01/23/2025 101 96 - 112 mmol/L Final     Creatinine   Date Value Ref Range Status   01/23/2025 2.3 (A) 0.5 - 1.4 mg/dL Final     Glucose   Date Value Ref Range Status   01/23/2025 180 (A) 65 - 99 mg/dL Final  "    eGFR   Date Value Ref Range Status   01/23/2025 27  Final       /60 (BP Location: Left arm, Patient Position: Sitting, BP Cuff Size: Adult)   Pulse 85   Resp 17   Ht 1.854 m (6' 1\")   Wt 112 kg (246 lb 9.6 oz)   SpO2 96%   BMI 32.53 kg/m²   Wt Readings from Last 10 Encounters:   01/23/25 112 kg (246 lb 9.6 oz)   01/20/25 112 kg (248 lb)   01/16/25 112 kg (248 lb)   01/14/25 115 kg (254 lb)   01/13/25 116 kg (255 lb 9.6 oz)   09/10/24 108 kg (238 lb)   06/26/24 108 kg (239 lb)   03/05/24 112 kg (246 lb)   01/03/24 117 kg (259 lb)   12/18/23 118 kg (260 lb)       Physical Exam  Vitals and nursing note reviewed.   Constitutional:       Appearance: He is not toxic-appearing.   HENT:      Head: Normocephalic and atraumatic.      Right Ear: External ear normal.      Left Ear: External ear normal.      Nose: Nose normal.      Mouth/Throat:      Mouth: Mucous membranes are moist.      Pharynx: Oropharynx is clear.   Eyes:      General: No scleral icterus.     Extraocular Movements: Extraocular movements intact.      Conjunctiva/sclera: Conjunctivae normal.   Cardiovascular:      Rate and Rhythm: Normal rate and regular rhythm.      Pulses: Normal pulses.      Heart sounds: Normal heart sounds.   Pulmonary:      Breath sounds: Examination of the left-lower field reveals decreased breath sounds. Decreased breath sounds and rales present.   Abdominal:      General: There is distension.   Musculoskeletal:         General: Normal range of motion.      Cervical back: Normal range of motion and neck supple.      Right lower leg: Edema (2+) present.      Left lower leg: Edema (2+) present.   Skin:     General: Skin is warm.      Capillary Refill: Capillary refill takes less than 2 seconds.      Coloration: Skin is not jaundiced.   Neurological:      General: No focal deficit present.      Mental Status: He is alert and oriented to person, place, and time. Mental status is at baseline.   Psychiatric:         Mood and " Affect: Mood normal.         Behavior: Behavior normal.        Assessment:     Diagnoses of CHF (congestive heart failure), NYHA class II, chronic, systolic (HCC), Acute on chronic systolic heart failure (HCC), Stage 3b chronic kidney disease, Essential hypertension, and S/P TAVR (transcatheter aortic valve replacement) were pertinent to this visit.    Plan:     Acute decompensated heart failure; ACC stage C; NYHA class III: Fluid volume overloaded on exam  -Patient is acutely decompensated heart failure requiring IV Diuretic therapy/transfer to ER.   -IV Diuretic therapy is a high risk medication use where renal and electrolyte function were evaluated before administration of IV lasix and will be re-evaluated the following day in rapid access cardiology clinic. These unique results via hamzah were reviewed by myself personally and discussed with nursing.   -He does have a worsening of lung sounds and persistence of lower extremity edema on examination today.  -His renal function remains relatively unchanged compared to prior.  Creatinine 2.3 and GFR 27.  -The therapy plan was discussed with patient and implemented in office today. Please review RN documentation for additional details.  -Discussed that we have trialed almost 2 weeks of outpatient treatment with only minimal improvement.  Recommended that he proceed to the emergency department for further evaluation and potential admission for more effective diuresis.  He has agreed and indicates he will present after this visit.    Follow up to be determined based on ER recommendations, but should be within 1 week of hospital discharge.    Thank you for allowing me to participate in the care of LIAN More III .    Jennifer Bailey PA-C, Cardiology  [facility], [address]  [address] [address]  Phone: [phone]  Fax: [phone]    PLEASE NOTE: This note was created using  voice recognition software. I have made every reasonable attempt to correct obvious errors, but I expect that there are errors of grammar and possibly content that I did not discover before finalizing the note.

## 2025-01-24 NOTE — TELEPHONE ENCOUNTER
Jennifer Bailey P.A.-C.  You1 hour ago (1:07 PM)     AM  He did not feel improvement with furoscix per his report to me at his office visit yesterday I also advised presenting to the ED and he agreed, but I don't see he went. Please call him to see what his plans are. I think we can hold off on a PA for furoscix at this time since he did not see any improvements.     Phone number called:  [phone]    To AM: pt reports that his symptoms are the same but pt wants to monitor his symptoms for the next couple days just taking bumex 8mg daily pt reports he takes bumex 1mg 4 tabs in the morning and 4 tabs in the afternoon. Pt dont think that the furoscix helped. Pt told that if symptoms  gets worst to go to nearest ER. Pt do not check BP regularly.     01/24/25 237 lbs  01/23/25 238 lbs    Bumex 1mg 8 tablet a day

## 2025-01-24 NOTE — TELEPHONE ENCOUNTER
To AM: Furoscix form placed on AM's desk. Order for furoscix needs to be placed on EPIC. Thank you.

## 2025-02-05 ENCOUNTER — TELEPHONE (OUTPATIENT)
Dept: CARDIOLOGY | Facility: MEDICAL CENTER | Age: 85
End: 2025-02-05
Payer: MEDICARE

## 2025-02-05 NOTE — TELEPHONE ENCOUNTER
Asked schedulers to have him schedule a follow up appointment because he has still not been seen in the ED and has been taking bumex but has not had a follow up for acute exacerbation.     Per :    Hilary Bailey P.A.-C.  Hi,    I called pt to schd f/v and he said he doesn't want to schedule right now but he appreciates the call to check on him and we can continue to call and check in the future.    Thanks,    Hilary

## 2025-02-23 ENCOUNTER — TELEPHONE (OUTPATIENT)
Dept: CARDIOLOGY | Facility: MEDICAL CENTER | Age: 85
End: 2025-02-23
Payer: MEDICARE

## 2025-02-23 DIAGNOSIS — I50.23 ACUTE ON CHRONIC SYSTOLIC HEART FAILURE (HCC): ICD-10-CM

## 2025-02-24 NOTE — TELEPHONE ENCOUNTER
Is the patient due for a refill? Yes    Was the patient seen the last 15 months? Yes    Date of last office visit: 01.23.2025    Does the patient have an upcoming appointment?  No       Provider to refill: AM    Does the patient have [facility] and need 100-day supply? (This applies to ALL medications) Yes, quantity updated to 100 days

## 2025-02-25 RX ORDER — BUMETANIDE 1 MG/1
2 TABLET ORAL 2 TIMES DAILY
Qty: 360 TABLET | Refills: 3 | Status: SHIPPED | OUTPATIENT
Start: 2025-02-25 | End: 2025-02-28

## 2025-02-27 ENCOUNTER — TELEPHONE (OUTPATIENT)
Dept: CARDIOLOGY | Facility: MEDICAL CENTER | Age: 85
End: 2025-02-27
Payer: MEDICARE

## 2025-02-27 DIAGNOSIS — I50.23 ACUTE ON CHRONIC SYSTOLIC HEART FAILURE (HCC): ICD-10-CM

## 2025-02-27 NOTE — TELEPHONE ENCOUNTER
AM      Caller: JW Means     Topic/issue: Patient states that MARIAMA doubled his medication bumetanide (BUMEX) 1 MG Tab   and now he is out and the order that was sent to [facility] was the old order he needs the new order that reflects 4 pills a day Pt is out please send asap     Pt is also states that insurance is not aware that the dose has been doubled.    Callback Number: [phone]    Thank You   Azucena ALATORRE

## 2025-02-27 NOTE — TELEPHONE ENCOUNTER
AM    Caller: Laurie, pharmacy technician at [facility]    Topic/issue: Need clarification on medication Bumetanide. Patient told her he has been taking 4 pills twice daily and this is why he ran out of it so soon. Patient's insurance will not approve refill.    Callback Number: [phone]    Thank you,  Amairani BUSTILLO

## 2025-02-27 NOTE — ASSESSMENT & PLAN NOTE
Chronic condition treated with bumetanide and carvedilol  4/28 Carvedilol resumed  4/29 Zetia and Bumex resumed   Price (Do Not Change): 0.00 Instructions: This plan will send the code FBSE to the PM system.  DO NOT or CHANGE the price. Detail Level: Simple

## 2025-02-28 RX ORDER — BUMETANIDE 2 MG/1
4 TABLET ORAL 2 TIMES DAILY
Qty: 360 TABLET | Refills: 3 | Status: SHIPPED | OUTPATIENT
Start: 2025-02-28

## 2025-02-28 NOTE — TELEPHONE ENCOUNTER
Phone number called: [phone]    To AM: Just FYI. Pt called stating that he has been taking Bumex 4mg BID as instructed on his last OV with MARIAMA but pt's bumex prescription was not updated. Medication list updated and RX sent to pharmacy. Thank you.

## 2025-02-28 NOTE — TELEPHONE ENCOUNTER
I spoke to [facility] and I was told that they have received the prescription but they do not have the full quantity of his medication. They will contact the pt to let him know that they have received his medication.

## 2025-03-03 ENCOUNTER — HOSPITAL ENCOUNTER (OUTPATIENT)
Dept: LAB | Facility: MEDICAL CENTER | Age: 85
End: 2025-03-03
Attending: PHYSICIAN ASSISTANT
Payer: MEDICARE

## 2025-03-03 DIAGNOSIS — Z79.899 HIGH RISK MEDICATION USE: ICD-10-CM

## 2025-03-03 DIAGNOSIS — I50.23 ACUTE ON CHRONIC SYSTOLIC HEART FAILURE (HCC): ICD-10-CM

## 2025-03-03 LAB
ANION GAP SERPL CALC-SCNC: 14 MMOL/L (ref 7–16)
BUN SERPL-MCNC: 56 MG/DL (ref 8–22)
CALCIUM SERPL-MCNC: 9.7 MG/DL (ref 8.5–10.5)
CHLORIDE SERPL-SCNC: 97 MMOL/L (ref 96–112)
CO2 SERPL-SCNC: 28 MMOL/L (ref 20–33)
CREAT SERPL-MCNC: 2.1 MG/DL (ref 0.5–1.4)
GFR SERPLBLD CREATININE-BSD FMLA CKD-EPI: 31 ML/MIN/1.73 M 2
GLUCOSE SERPL-MCNC: 226 MG/DL (ref 65–99)
NT-PROBNP SERPL IA-MCNC: 3128 PG/ML (ref 0–125)
POTASSIUM SERPL-SCNC: 3.8 MMOL/L (ref 3.6–5.5)
SODIUM SERPL-SCNC: 139 MMOL/L (ref 135–145)

## 2025-03-03 PROCEDURE — 36415 COLL VENOUS BLD VENIPUNCTURE: CPT

## 2025-03-03 PROCEDURE — 83880 ASSAY OF NATRIURETIC PEPTIDE: CPT

## 2025-03-03 PROCEDURE — 80048 BASIC METABOLIC PNL TOTAL CA: CPT

## 2025-03-04 ENCOUNTER — RESULTS FOLLOW-UP (OUTPATIENT)
Dept: CARDIOLOGY | Facility: MEDICAL CENTER | Age: 85
End: 2025-03-04
Payer: MEDICARE

## 2025-03-21 ENCOUNTER — NON-PROVIDER VISIT (OUTPATIENT)
Dept: CARDIOLOGY | Facility: MEDICAL CENTER | Age: 85
End: 2025-03-21
Payer: MEDICARE

## 2025-03-24 PROCEDURE — 93295 DEV INTERROG REMOTE 1/2/MLT: CPT | Performed by: INTERNAL MEDICINE

## 2025-03-24 NOTE — CARDIAC REMOTE MONITOR - SCAN
Device transmission reviewed. Device demonstrated appropriate function.       Electronically Signed by: Denilson Steiner M.D.    4/18/2025  2:39 PM

## 2025-04-12 ENCOUNTER — HOSPITAL ENCOUNTER (OUTPATIENT)
Dept: LAB | Facility: MEDICAL CENTER | Age: 85
End: 2025-04-12
Payer: MEDICARE

## 2025-04-12 LAB
ALBUMIN SERPL BCP-MCNC: 3.9 G/DL (ref 3.2–4.9)
ALP SERPL-CCNC: 71 U/L (ref 30–99)
ALT SERPL-CCNC: 33 U/L (ref 2–50)
AST SERPL-CCNC: 28 U/L (ref 12–45)
BASOPHILS # BLD AUTO: 0.2 % (ref 0–1.8)
BASOPHILS # BLD: 0.01 K/UL (ref 0–0.12)
BILIRUB CONJ SERPL-MCNC: 0.3 MG/DL (ref 0.1–0.5)
BILIRUB INDIRECT SERPL-MCNC: 0.4 MG/DL (ref 0–1)
BILIRUB SERPL-MCNC: 0.7 MG/DL (ref 0.1–1.5)
EOSINOPHIL # BLD AUTO: 0.18 K/UL (ref 0–0.51)
EOSINOPHIL NFR BLD: 3.2 % (ref 0–6.9)
ERYTHROCYTE [DISTWIDTH] IN BLOOD BY AUTOMATED COUNT: 58.5 FL (ref 35.9–50)
ESTRADIOL SERPL-MCNC: <5 PG/ML
HCT VFR BLD AUTO: 42.4 % (ref 42–52)
HGB BLD-MCNC: 14.5 G/DL (ref 14–18)
IMM GRANULOCYTES # BLD AUTO: 0.03 K/UL (ref 0–0.11)
IMM GRANULOCYTES NFR BLD AUTO: 0.5 % (ref 0–0.9)
LYMPHOCYTES # BLD AUTO: 1.32 K/UL (ref 1–4.8)
LYMPHOCYTES NFR BLD: 23.3 % (ref 22–41)
MCH RBC QN AUTO: 28.5 PG (ref 27–33)
MCHC RBC AUTO-ENTMCNC: 34.2 G/DL (ref 32.3–36.5)
MCV RBC AUTO: 83.5 FL (ref 81.4–97.8)
MONOCYTES # BLD AUTO: 0.59 K/UL (ref 0–0.85)
MONOCYTES NFR BLD AUTO: 10.4 % (ref 0–13.4)
NEUTROPHILS # BLD AUTO: 3.53 K/UL (ref 1.82–7.42)
NEUTROPHILS NFR BLD: 62.4 % (ref 44–72)
NRBC # BLD AUTO: 0 K/UL
NRBC BLD-RTO: 0 /100 WBC (ref 0–0.2)
PLATELET # BLD AUTO: 149 K/UL (ref 164–446)
PMV BLD AUTO: 10.5 FL (ref 9–12.9)
PROT SERPL-MCNC: 6.4 G/DL (ref 6–8.2)
PSA SERPL DL<=0.01 NG/ML-MCNC: 0.3 NG/ML (ref 0–4)
RBC # BLD AUTO: 5.08 M/UL (ref 4.7–6.1)
TESTOST SERPL-MCNC: 75 NG/DL (ref 175–781)
WBC # BLD AUTO: 5.7 K/UL (ref 4.8–10.8)

## 2025-04-12 PROCEDURE — 36415 COLL VENOUS BLD VENIPUNCTURE: CPT

## 2025-04-12 PROCEDURE — 85025 COMPLETE CBC W/AUTO DIFF WBC: CPT

## 2025-04-12 PROCEDURE — 84153 ASSAY OF PSA TOTAL: CPT

## 2025-04-12 PROCEDURE — 82670 ASSAY OF TOTAL ESTRADIOL: CPT

## 2025-04-12 PROCEDURE — 80076 HEPATIC FUNCTION PANEL: CPT

## 2025-04-12 PROCEDURE — 84403 ASSAY OF TOTAL TESTOSTERONE: CPT

## 2025-04-18 ENCOUNTER — HOSPITAL ENCOUNTER (OUTPATIENT)
Dept: LAB | Facility: MEDICAL CENTER | Age: 85
End: 2025-04-18
Payer: MEDICARE

## 2025-04-18 PROCEDURE — 84403 ASSAY OF TOTAL TESTOSTERONE: CPT

## 2025-04-18 PROCEDURE — 36415 COLL VENOUS BLD VENIPUNCTURE: CPT

## 2025-04-19 LAB — TESTOST SERPL-MCNC: 61 NG/DL (ref 175–781)

## 2025-05-21 ENCOUNTER — TELEPHONE (OUTPATIENT)
Dept: HEALTH INFORMATION MANAGEMENT | Facility: OTHER | Age: 85
End: 2025-05-21
Payer: MEDICARE

## 2025-05-21 PROBLEM — I50.9 ACUTE DECOMPENSATED HEART FAILURE (HCC): Status: RESOLVED | Noted: 2020-04-26 | Resolved: 2025-05-21

## 2025-05-21 ASSESSMENT — ENCOUNTER SYMPTOMS: GENERAL WELL-BEING: FAIR

## 2025-05-21 ASSESSMENT — PATIENT HEALTH QUESTIONNAIRE - PHQ9
1. LITTLE INTEREST OR PLEASURE IN DOING THINGS: NOT AT ALL
2. FEELING DOWN, DEPRESSED, IRRITABLE, OR HOPELESS: NOT AT ALL

## 2025-05-21 ASSESSMENT — ACTIVITIES OF DAILY LIVING (ADL): BATHING_REQUIRES_ASSISTANCE: 0

## 2025-05-21 NOTE — ASSESSMENT & PLAN NOTE
Chronic, stable.  Most recent lipid panel from 8/2024 WNL. Continue ezetimbine 10mg daily daily. Recommend Mediterranean diet and regular physical activity. Follow up with PCP at least annually for continued monitoring and management.   Lab Results   Component Value Date/Time    CHOLSTRLTOT 92 (L) 08/28/2024 11:40 AM    CHOLSTRLTOT 93 (L) 08/28/2024 11:40 AM    LDL 51 08/28/2024 11:40 AM    LDL 51 08/28/2024 11:40 AM    HDL 30 (A) 08/28/2024 11:40 AM    HDL 30 (A) 08/28/2024 11:40 AM    TRIGLYCERIDE 57 08/28/2024 11:40 AM    TRIGLYCERIDE 58 08/28/2024 11:40 AM

## 2025-05-21 NOTE — ASSESSMENT & PLAN NOTE
Chronic, stable. Pt maintains on carvedilol 3.125 mg BID & eliquis 5 mg BID. Denies palpitations. Follows with cardiology regularly.

## 2025-05-21 NOTE — ASSESSMENT & PLAN NOTE
Chronic, stable. Has JAMES and is on CPAP at night with 2L of supplemental O2 at night. Follow up with sleep medicine per routine

## 2025-05-21 NOTE — ASSESSMENT & PLAN NOTE
Chronic, stable. Pt states he has completed more recent test at [facility] but do not have these available. Requesting records Last A1c 6.5 as of 2024. Last monofilament foot exam:  , last urine microalb/creat: 2024 , last retinal screenin2024. Maintains on Ozempic 2mg weekly, Jardiance 25mg daily. Continue to advise low carbohydrate diet with regular physical activity. Continue current treatment regime. Follow up with endocrinology per routine for continued monitoring and management.

## 2025-05-21 NOTE — ASSESSMENT & PLAN NOTE
Chronic, stable. BP today 112/62. Pt does not monitor BP at home. Denies dizziness/lightheadedness. Continue current treatment regime: bumetanide 4mg BID, carvdeilol 3.125 mg BID. Follow up with cardiology per routine for continued monitoring and management.

## 2025-05-21 NOTE — ASSESSMENT & PLAN NOTE
Chronic, ongoing. He does see podiatry. Recommend regular feet exams. He takes gabapentin 300mg PRN, sparingly due to s/e. Follow-up at least annually for further monitoring and management.

## 2025-05-21 NOTE — ASSESSMENT & PLAN NOTE
Chronic, ongoing. Continue to encourage adequate hydration and avoidance of nephrotoxins. Follow up with nephrology per routine   Latest Reference Range & Units 09/28/24 09:57 01/09/25 13:40 01/18/25 09:42 03/03/25 11:22   Bun 8 - 22 mg/dL 54 (H) 47 (H) 64 (H) 56 (H)   Creatinine 0.50 - 1.40 mg/dL 2.18 (H) 2.56 (H) 2.31 (H) 2.10 (H)   GFR (CKD-EPI) >60 mL/min/1.73 m 2 30 ! 24 ! 27 ! 31 !   (H): Data is abnormally high  !: Data is abnormal

## 2025-05-21 NOTE — ASSESSMENT & PLAN NOTE
Chronic, stable. S/p AICD. Last echo in 1/2025 with echo of 34% . His cardiac meds include: bumetanide 4mg BID, carvdeilol 3.125 mg BID, Jardiance 25mg daily, Plavix 75 mg daily, Eliquis 2.5 mg daily, zetia 10 mg daily. He continues to struggle with dyspnea on exertion but no significant edema today. He monitors his weight daily. Follow up with cardiology per routine.    1/2025 echo  Moderately reduced left ventricular systolic function.  Global hypokinesis with Inferior akinesis.  Grade III diastolic dysfunction (restrictive pattern).  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  Known TAVR aortic valve that is functioning normally with normal   transvalvular gradients.  Unable to estimate pulmonary artery pressure due to an inadequate   tricuspid regurgitant jet.

## 2025-05-22 ENCOUNTER — OFFICE VISIT (OUTPATIENT)
Dept: FAMILY PLANNING/WOMEN'S HEALTH CLINIC | Facility: PHYSICIAN GROUP | Age: 85
End: 2025-05-22
Payer: MEDICARE

## 2025-05-22 VITALS
DIASTOLIC BLOOD PRESSURE: 62 MMHG | HEART RATE: 68 BPM | SYSTOLIC BLOOD PRESSURE: 112 MMHG | WEIGHT: 228.4 LBS | HEIGHT: 73 IN | BODY MASS INDEX: 30.27 KG/M2 | OXYGEN SATURATION: 95 %

## 2025-05-22 DIAGNOSIS — Z91.81 AT RISK FOR FALLS: ICD-10-CM

## 2025-05-22 DIAGNOSIS — Z95.810 ICD (IMPLANTABLE CARDIOVERTER-DEFIBRILLATOR) IN PLACE: ICD-10-CM

## 2025-05-22 DIAGNOSIS — I48.0 PAROXYSMAL ATRIAL FIBRILLATION (HCC): ICD-10-CM

## 2025-05-22 DIAGNOSIS — R80.9 TYPE 2 DIABETES MELLITUS WITH DIABETIC MICROALBUMINURIA, WITHOUT LONG-TERM CURRENT USE OF INSULIN (HCC): Primary | ICD-10-CM

## 2025-05-22 DIAGNOSIS — E78.5 DYSLIPIDEMIA: Chronic | ICD-10-CM

## 2025-05-22 DIAGNOSIS — E11.42 DIABETIC POLYNEUROPATHY ASSOCIATED WITH TYPE 2 DIABETES MELLITUS (HCC): ICD-10-CM

## 2025-05-22 DIAGNOSIS — I10 ESSENTIAL HYPERTENSION: ICD-10-CM

## 2025-05-22 DIAGNOSIS — N18.32 STAGE 3B CHRONIC KIDNEY DISEASE: Chronic | ICD-10-CM

## 2025-05-22 DIAGNOSIS — J96.11 CHRONIC RESPIRATORY FAILURE WITH HYPOXIA (HCC): ICD-10-CM

## 2025-05-22 DIAGNOSIS — E11.29 TYPE 2 DIABETES MELLITUS WITH DIABETIC MICROALBUMINURIA, WITHOUT LONG-TERM CURRENT USE OF INSULIN (HCC): Primary | ICD-10-CM

## 2025-05-22 DIAGNOSIS — R26.81 GAIT INSTABILITY: ICD-10-CM

## 2025-05-22 DIAGNOSIS — I50.22 CHF (CONGESTIVE HEART FAILURE), NYHA CLASS II, CHRONIC, SYSTOLIC (HCC): Chronic | ICD-10-CM

## 2025-05-22 DIAGNOSIS — G47.33 OSA (OBSTRUCTIVE SLEEP APNEA): Chronic | ICD-10-CM

## 2025-05-22 PROBLEM — Z11.52 ENCOUNTER FOR SCREENING FOR COVID-19: Status: RESOLVED | Noted: 2020-04-27 | Resolved: 2025-05-22

## 2025-05-22 PROBLEM — Z01.89 ENCOUNTER FOR GERIATRIC ASSESSMENT: Status: RESOLVED | Noted: 2022-03-20 | Resolved: 2025-05-22

## 2025-05-22 RX ORDER — EMPAGLIFLOZIN 25 MG/1
25 TABLET, FILM COATED ORAL DAILY
COMMUNITY
Start: 2025-01-28

## 2025-05-22 SDOH — ECONOMIC STABILITY: HOUSING INSECURITY: IN THE LAST 12 MONTHS, WAS THERE A TIME WHEN YOU WERE NOT ABLE TO PAY THE MORTGAGE OR RENT ON TIME?: NO

## 2025-05-22 SDOH — ECONOMIC STABILITY: HOUSING INSECURITY: AT ANY TIME IN THE PAST 12 MONTHS, WERE YOU HOMELESS OR LIVING IN A SHELTER (INCLUDING NOW)?: NO

## 2025-05-22 SDOH — ECONOMIC STABILITY: FOOD INSECURITY: WITHIN THE PAST 12 MONTHS, THE FOOD YOU BOUGHT JUST DIDN'T LAST AND YOU DIDN'T HAVE MONEY TO GET MORE.: NEVER TRUE

## 2025-05-22 SDOH — ECONOMIC STABILITY: FOOD INSECURITY: HOW HARD IS IT FOR YOU TO PAY FOR THE VERY BASICS LIKE FOOD, HOUSING, MEDICAL CARE, AND HEATING?: NOT HARD AT ALL

## 2025-05-22 SDOH — ECONOMIC STABILITY: FOOD INSECURITY: WITHIN THE PAST 12 MONTHS, YOU WORRIED THAT YOUR FOOD WOULD RUN OUT BEFORE YOU GOT THE MONEY TO BUY MORE.: NEVER TRUE

## 2025-05-22 SDOH — ECONOMIC STABILITY: TRANSPORTATION INSECURITY: IN THE PAST 12 MONTHS, HAS LACK OF TRANSPORTATION KEPT YOU FROM MEDICAL APPOINTMENTS OR FROM GETTING MEDICATIONS?: NO

## 2025-05-22 ASSESSMENT — ACTIVITIES OF DAILY LIVING (ADL): LACK_OF_TRANSPORTATION: NO

## 2025-05-22 ASSESSMENT — PATIENT HEALTH QUESTIONNAIRE - PHQ9: CLINICAL INTERPRETATION OF PHQ2 SCORE: 0

## 2025-05-22 ASSESSMENT — FIBROSIS 4 INDEX: FIB4 SCORE: 2.68

## 2025-05-22 ASSESSMENT — PAIN SCALES - GENERAL: PAINLEVEL_OUTOF10: 5=MODERATE PAIN

## 2025-05-22 NOTE — LETTER
[facility]  Pcp Pt States None  No address on file  Fax: [phone]   Authorization for Release/Disclosure of   Protected Health Information   Name: LIAN MORE III : 1940 SSN: [id number]   Address: [address] [place] [address] [place] [address] Phone:    There are no phone numbers on file.   I authorize the entity listed below to release/disclose the PHI below to:   [facility]/Pcp Pt States None and Laly Arriaga P.A.-C.   Provider or Entity Name:     [facility]   Address   City, [facility], Zip    [address] [place] [address] Phone:  [phone]    Fax:  [phone]   Reason for request: continuity of care   Information to be released:    [  ] LAST COLONOSCOPY,  including any PATH REPORT and follow-up  [  ] LAST FIT/COLOGUARD RESULT [  ] LAST DEXA  [  ] LAST MAMMOGRAM  [  ] LAST PAP  [  ] LAST LABS [ X ] RETINA EXAM REPORT  [  ] IMMUNIZATION RECORDS  [  ] Release all info      [  ] Check here and initial the line next to each item to release ALL health information INCLUDING  _____ Care and treatment for drug and / or alcohol abuse  _____ HIV testing, infection status, or AIDS  _____ Genetic Testing    DATES OF SERVICE OR TIME PERIOD TO BE DISCLOSED: 2020--PRESENT  I understand and acknowledge that:  * This Authorization may be revoked at any time by you in writing, except if your health information has already been used or disclosed.  * Your health information that will be used or disclosed as a result of you signing this authorization could be re-disclosed by the recipient. If this occurs, your re-disclosed health information may no longer be protected by State or Federal laws.  * You may refuse to sign this Authorization. Your refusal will not affect your ability to obtain treatment.  * This Authorization becomes effective upon signing and will  on (date) 2026.      If no date is indicated, this Authorization will  one (1) year from the signature date.    Name: LIAN More  III  Signature: Date:   5/22/2025     PLEASE FAX REQUESTED RECORDS BACK TO: [phone]

## 2025-05-22 NOTE — LETTER
[facility]  Pcp Pt States None  No address on file  Fax: [phone]   Authorization for Release/Disclosure of   Protected Health Information   Name: LIAN VILLA III : 1940 SSN: [id number]   Address: [address] [place] [address] [place] [address] Phone:    There are no phone numbers on file.   I authorize the entity listed below to release/disclose the PHI below to:   [facility]/Pcp Pt States None and Laly Arriaga P.A.-C.   Provider or Entity Name:    [facility], [place]    [address] [place] [address] [facility], [place] [address] Phone:  [phone]    Fax:  [phone]   Reason for request: continuity of care   Information to be released:    [  ] LAST COLONOSCOPY,  including any PATH REPORT and follow-up  [  ] LAST FIT/COLOGUARD RESULT [  ] LAST DEXA  [  ] LAST MAMMOGRAM  [  ] LAST PAP  [ X ] LAST LABS [ X ] RETINA EXAM REPORT  [  ] IMMUNIZATION RECORDS  [ x ] Release all info  (X) LAST HEMOGLOBIN A1C, RETINA REPORT AND MICROALBUMIN      [  ] Check here and initial the line next to each item to release ALL health information INCLUDING  _____ Care and treatment for drug and / or alcohol abuse  _____ HIV testing, infection status, or AIDS  _____ Genetic Testing    DATES OF SERVICE OR TIME PERIOD TO BE DISCLOSED: 2020 --- PRESENT  I understand and acknowledge that:  * This Authorization may be revoked at any time by you in writing, except if your health information has already been used or disclosed.  * Your health information that will be used or disclosed as a result of you signing this authorization could be re-disclosed by the recipient. If this occurs, your re-disclosed health information may no longer be protected by State or Federal laws.  * You may refuse to sign this Authorization. Your refusal will not affect your ability to obtain treatment.  * This Authorization becomes effective upon signing and will  on (date) 2026.      If no date is indicated,  this Authorization will  one (1) year from the signature date.    Name: LIAN More III  Signature: Date:   2025     PLEASE FAX REQUESTED RECORDS BACK TO: [phone]

## 2025-05-22 NOTE — ASSESSMENT & PLAN NOTE
Chronic, stable. Pt maintains on CPAP nightly with 2L bleed in. He states he needs a new CPAP, but he isn't a fan of [facility] and is reluctant to establish with a PCP. Follow up with sleep medicine per routine.

## 2025-05-22 NOTE — PROGRESS NOTES
Comprehensive Health Assessment Program     LIAN More III is a 84 y.o. here for his comprehensive health assessment.    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    Hypercoagulable state due to atrial fibrillation (HCC) 06/25/2024    Mild carotid artery disease (HCC) 01/03/2024    Stented coronary artery 07/18/2023    Left hand pain 03/21/2022    Contusion of left hand 03/21/2022    Abnormal CT scan, colon 03/20/2022    Chronic respiratory failure with hypoxia (HCC) 03/20/2022    Hormone replacement therapy 03/20/2022    BPH (benign prostatic hyperplasia) 03/20/2022    Seasonal allergies 03/19/2022    Trauma 03/18/2022    Fracture of ribs, three, closed, left, initial encounter 03/18/2022    Hip hematoma, left, initial encounter 03/18/2022    Antiplatelet or antithrombotic long-term use 03/18/2022    Mild depression 03/15/2022    Skin lesion 03/15/2022    Paroxysmal atrial fibrillation (HCC) 02/14/2022    Plantar fasciitis of right foot 07/19/2021    Diabetic polyneuropathy associated with type 2 diabetes mellitus (HCC) 06/04/2021    Ataxia 09/11/2020    Lumbar radiculopathy, chronic 07/10/2020    JAMES (obstructive sleep apnea) 04/30/2020    Hyperkalemia 04/29/2020    Renal hematoma, left, initial encounter 04/26/2020    Essential hypertension 04/26/2020    ICD (implantable cardioverter-defibrillator) in place 03/30/2020    High risk medication use 02/26/2020    Insomnia 02/25/2020    S/P TAVR (transcatheter aortic valve replacement) 01/27/2020    Gastroesophageal reflux disease without esophagitis 01/20/2020    CHF (congestive heart failure), NYHA class II, chronic, systolic (HCC) 12/26/2019    Stage 3b chronic kidney disease 12/04/2019    Coronary artery disease due to calcified coronary lesion 12/04/2019    Frequent falls 09/04/2019    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    Pancreatic lesion  04/01/2019    Macular degeneration 10/10/2018    Polycythemia 03/11/2017    Obesity 03/03/2016    Hypotestosteronism 03/03/2016    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     Allergies:   Statins [hmg-coa-r inhibitors] and Atorvastatin  Social History[2]  Family History   Problem Relation Age of Onset    No Known Problems Sister     No Known Problems Sister     No Known Problems Sister     Diabetes Other     No Known Problems Mother     No Known Problems Father     Heart Disease Neg Hx      J W  has a past medical history of Acute decompensated heart failure ([facility]) (04/26/2020), Arthritis (06/15/2023), Breath shortness (06/15/2023), CAD (coronary artery disease), CATARACT (06/15/2023), Chest pain, Chest tightness, CHF (congestive heart failure), NYHA class II, chronic, systolic ([facility]) (12/26/2019), Chickenpox, Congestive heart failure ([facility]), Constipation, Coronary heart disease, Cough, Daytime sleepiness, Diabetes (06/15/2023), Difficulty breathing, Dilated cardiomyopathy ([facility]), Disp fx of greater trochanter of left femur, init ([facility]) (06/25/2015), Fatigue, GERD (gastroesophageal reflux disease), Hearing difficulty, Heart burn (06/15/2023), Heart murmur, Heart valve disease (06/15/2023), Hemorrhagic disorder ([facility]) (06/15/2023), Hiatus hernia syndrome, High cholesterol (06/15/2023), History of BPH, History of chronic cough, Hyperlipidemia, Hypertension (06/15/2023), Kidney disease (05/2020), Mumps, Oxygen dependent, Pacemaker, Pain (06/15/2023), Palpitations, Peripheral neuropathy, Persistent atrial fibrillation ([facility]) (06/15/2023), Pneumonia  (2007), Rhinitis, Ringing in ears, Severe aortic stenosis (12/04/2019), Sleep apnea (06/15/2023), Swelling of lower extremity, Tonsillitis, Tremor, Ulcer (2014), and Urinary incontinence (06/15/2023).    He has no past medical history of Bowel habit changes or Dental disorder.   Past Surgical History[3]    Screening:  In the last six months have you experienced any leakage of urine? No    Depression Screening  Little interest or pleasure in doing things?  0 - not at all  Feeling down, depressed , or hopeless? 0 - not at all  Patient Health Questionnaire Score: 0     If depressive symptoms identified deferred to follow up visit unless specifically addressed in assessment and plan.    Interpretation of PHQ-9 Total Score   Score Severity   1-4 No Depression   5-9 Mild Depression   10-14 Moderate Depression   15-19 Moderately Severe Depression   20-27 Severe Depression    Screening for Cognitive Impairment  Do you or any of your friends or family members have any concern about your memory? No  Three Minute Recall (Village, Kitchen, Baby) 3/3    Juliano clock face with all 12 numbers and set the hands to show 10 minutes past 11.  Yes 5/5  Cognitive concerns identified deferred for follow up unless specifically addressed in assessment and plan.    Fall Risk Assessment  Has the patient had two or more falls in the last year or any fall with injury in the last year?  Yes    Safety Assessment  Do you always wear your seatbelt?  Yes  Any changes to home needed to function safely? Yes  Difficulty hearing.  Yes  Patient counseled about all safety risks that were identified.    Functional Assessment ADLs  Are there any barriers preventing you from cooking for yourself or meeting nutritional needs?  Yes. No balance   Are there any barriers preventing you from driving safely or obtaining transportation?  No.    Are there any barriers preventing you from using a telephone or calling for help?  No    Are there any barriers preventing  you from shopping?  Yes. No balance   Are there any barriers preventing you from taking care of your own finances?  No    Are there any barriers preventing you from managing your medications?  No    Are there any barriers preventing you from showering, bathing or dressing yourself? No    Are there any barriers preventing you from doing housework or laundry? Yes  Are there any barriers preventing you from using the toilet?No  Are you currently engaging in any exercise or physical activity?  No.      Self-Assessment of Health  What is your perception of your health? Fair    Do you sleep more than six hours a night? No    In the past 7 days, how much did pain keep you from doing your normal work? A lot Peripheral neuropathy, back pain   Do you spend quality time with family or friends (virtually or in person)? No    Do you usually eat a heart healthy diet that constists of a variety of fruits, vegetables, whole grains and fiber? No    Do you eat foods high in fat and/or Fast Food more than three times per week? No    How concerned are you that your medical conditions are not being well managed? a little    Are you worried that in the next 2 months, you may not have stable housing that you own, rent, or stay in as part of a household? No      Advance Care Planning  Do you have an Advance Directive, Living Will, Durable Power of , or POLST? No                 Health Maintenance Summary            Current Care Gaps       Hepatitis B Vaccine (Hep B) (2 of 3 - 19+ 3-dose series) Overdue since 12/22/2014 11/24/2014  Imm Admin: Hepatitis B Vaccine (Adol/Adult)              Zoster (Shingles) Vaccines (2 of 3) Overdue since 6/13/2016 04/18/2016  Imm Admin: Zoster Vaccine Live (ZVL) (Zostavax) - HISTORICAL DATA    08/27/2013  Imm Admin: Zoster Vaccine Live (ZVL) (Zostavax) - HISTORICAL DATA              Diabetes: Monofilament / LE Exam (Yearly) Overdue since 3/15/2023      03/15/2022  Diabetic Monofilament LE  Exam    10/08/2018  Diabetic Monofilament Lower Extremity Exam              COVID-19 Vaccine (3 - 2024-25 season) Overdue since 9/1/2024 02/24/2021  Imm Admin: PFIZER PURPLE CAP SARS-COV-2 VACCINATION (12+)    02/03/2021  Imm Admin: PFIZER PURPLE CAP SARS-COV-2 VACCINATION (12+)              A1c Screening (Every 6 Months) Overdue since 2/28/2025 08/28/2024  HEMOGLOBIN A1C    02/13/2024  HEMOGLOBIN A1C    11/21/2023  HEMOGLOBIN A1C    06/15/2023  HEMOGLOBIN A1C    03/15/2022  HEMOGLOBIN A1C     Only the first 5 history entries have been loaded, but more history exists.            Diabetes: Retinopathy Screening (Yearly) Due soon on 6/17/2025 06/17/2024  RETINAL SCREENING RESULTS    10/08/2018  POCT Retinal Eye Exam                      Needs Review       Fasting Lipid Profile (Yearly) Tentatively due on 8/28/2025 08/28/2024  Lipid Profile    08/28/2024  Lipid Profile    02/13/2024  Lipid Profile    11/21/2023  Lipid Profile    06/15/2023  Lipid Profile      Only the first 5 history entries have been loaded, but more history exists.                      Upcoming       Influenza Vaccine (Season Ended) Next due on 9/1/2025 12/29/2009  Imm Admin: Influenza Seasonal Injectable - Historical Data    12/29/2009  Imm Admin: INFLUENZA TIV (IM)              Diabetes: Urine Protein Screening (Yearly) Next due on 9/28/2025 09/28/2024  PROTEIN/CREAT RATIO URINE    11/21/2023  MICROALBUMIN CREAT RATIO URINE    03/15/2022  MICROALBUMIN CREAT RATIO URINE    11/13/2021  MICROALBUMIN CREAT RATIO URINE    09/04/2019  MICROALBUMIN CREAT RATIO URINE      Only the first 5 history entries have been loaded, but more history exists.              SERUM CREATININE (Yearly) Next due on 3/3/2026      03/03/2025  Basic Metabolic Panel    01/18/2025  Basic Metabolic Panel    01/09/2025  Basic Metabolic Panel    09/28/2024  Comp Metabolic Panel    08/28/2024  Comp Metabolic Panel      Only the first 5 history entries  have been loaded, but more history exists.              Annual Wellness Visit (Yearly) Next due on 5/22/2026 05/22/2025  Level of Service: NC ANNUAL WELLNESS VISIT-INCLUDES PPPS SUBSEQUE*    06/26/2024  Level of Service: ANNUAL WELLNESS VISIT-INCLUDES PPPS SUBSEQUE*    09/04/2019  Subsequent Annual Wellness Visit - Includes PPPS ()    07/02/2018  Visit Dx: Medicare annual wellness visit, subsequent    03/10/2017  Visit Dx: Medicare annual wellness visit, subsequent      Only the first 5 history entries have been loaded, but more history exists.              IMM DTaP/Tdap/Td Vaccine (2 - Td or Tdap) Next due on 11/30/2030 11/30/2020  Imm Admin: Tdap Vaccine                      Completed or No Longer Recommended       Pneumococcal Vaccine: 50+ Years (Series Information) Completed      03/10/2017  Imm Admin: Pneumococcal Conjugate Vaccine (Prevnar/PCV-13)    08/27/2013  Imm Admin: Pneumococcal polysaccharide vaccine (PPSV-23)              Hepatitis A Vaccine (Hep A) (Series Information) Aged Out      11/24/2014  Imm Admin: Hepatitis A Vaccine, Adult              HPV Vaccines (Series Information) Aged Out      No completion history exists for this topic.              Polio Vaccine (Inactivated Polio) (Series Information) Aged Out     No completion history exists for this topic.              Meningococcal Immunization (Series Information) Aged Out     No completion history exists for this topic.              Meningococcal B Vaccine (Series Information) Aged Out     No completion history exists for this topic.              Colorectal Cancer Screening  Discontinued        Frequency changed to Never automatically (Topic No Longer Applies)    03/10/2017  OCCULT BLOOD FECES IMMUNOASSAY    01/09/2015  FIT    02/24/2011  Colonoscopy (Done - [facility])                            Patient Care Team:  Pcp Pt States None as PCP - General (Family Medicine)  Joni Miner M.D. as Medical Home Care Manager (Internal  "Medicine)  Shalonda Loja M.D. as Consulting Physician (Gastroenterology)  Cristal Guido M.D. as Consulting Physician (Orthopedic Surgery)  [employer] as Respiratory Therapist (DME Supplier)  Jania      Financial Resource Strain: Low Risk  (2025)    Overall Financial Resource Strain (CARDIA)     Difficulty of Paying Living Expenses: Not hard at all      Transportation Needs: No Transportation Needs (2025)    PRAPARE - Transportation     Lack of Transportation (Medical): No     Lack of Transportation (Non-Medical): No      Food Insecurity: No Food Insecurity (2025)    Hunger Vital Sign     Worried About Running Out of Food in the Last Year: Never true     Ran Out of Food in the Last Year: Never true        Encounter Vitals  Blood Pressure : 112/62  Pulse: 68  Pulse Oximetry: 95 %  O2 Delivery Device: CPAP  Weight: 104 kg (228 lb 6.4 oz)  Height: 185.4 cm (6' 1\")  BMI (Calculated): 30.13  Pain Score: 5=Moderate Pain  Pulmonary Vitals  O2 Flow Rate (L/min): 2  DME  O2 Delivery Device: CPAP     Physical Exam:  Constitutional: NAD  HENMT: NC/AT, EOMI,   Cardiovascular: RRR, No m/r/g  Lungs: CTAB, no w/r/r  Extremities: No c/c/e  Skin: No lesions notes, chronic discoloration of legs  Neurologic: Alert & oriented x3, CN II-XII grossly intact    Assessment and Plan. The following treatment and monitoring plan is recommended:  Type 2 diabetes mellitus with diabetic microalbuminuria, without long-term current use of insulin (HCC)  Chronic, stable. Pt states he has completed more recent test at [facility] but do not have these available. Requesting records Last A1c 6.5 as of 2024. Last monofilament foot exam:  , last urine microalb/creat: 2024 , last retinal screenin2024. Maintains on Ozempic 2mg weekly, Jardiance 25mg daily. Continue to advise low carbohydrate diet with regular physical activity. Continue current treatment regime. Follow up with endocrinology per routine for continued monitoring " and management.    Stage 3b chronic kidney disease (HCC)  Chronic, ongoing. Continue to encourage adequate hydration and avoidance of nephrotoxins. Follow up with nephrology per routine   Latest Reference Range & Units 09/28/24 09:57 01/09/25 13:40 01/18/25 09:42 03/03/25 11:22   Bun 8 - 22 mg/dL 54 (H) 47 (H) 64 (H) 56 (H)   Creatinine 0.50 - 1.40 mg/dL 2.18 (H) 2.56 (H) 2.31 (H) 2.10 (H)   GFR (CKD-EPI) >60 mL/min/1.73 m 2 30 ! 24 ! 27 ! 31 !   (H): Data is abnormally high  !: Data is abnormal    Paroxysmal atrial fibrillation (HCC)  Chronic, stable. Pt maintains on carvedilol 3.125 mg BID & eliquis 5 mg BID. Denies palpitations. Follows with cardiology regularly.     ICD (implantable cardioverter-defibrillator) in place  CHF (congestive heart failure), NYHA class II, chronic, systolic (HCC)  Chronic, stable. S/p AICD. Last echo in 1/2025 with echo of 34% . His cardiac meds include: bumetanide 4mg BID, carvdeilol 3.125 mg BID, Jardiance 25mg daily, Plavix 75 mg daily, Eliquis 2.5 mg daily, zetia 10 mg daily. He continues to struggle with dyspnea on exertion but no significant edema today. He monitors his weight daily. Follow up with cardiology per routine.    1/2025 echo  Moderately reduced left ventricular systolic function.  Global hypokinesis with Inferior akinesis.  Grade III diastolic dysfunction (restrictive pattern).  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  Known TAVR aortic valve that is functioning normally with normal   transvalvular gradients.  Unable to estimate pulmonary artery pressure due to an inadequate   tricuspid regurgitant jet.    Chronic respiratory failure with hypoxia (HCC)  Chronic, stable. Has JAMES and is on CPAP at night with 2L of supplemental O2 at night. Follow up with sleep medicine per routine    Diabetic polyneuropathy associated with type 2 diabetes mellitus (HCC)  Chronic, ongoing. He does see podiatry. Recommend regular feet exams. He takes gabapentin 300mg PRN,  sparingly due to s/e. Follow-up at least annually for further monitoring and management.     Dyslipidemia  Chronic, stable.  Most recent lipid panel from 8/2024 WNL. Continue ezetimbine 10mg daily daily. Recommend Mediterranean diet and regular physical activity. Follow up with PCP at least annually for continued monitoring and management.   Lab Results   Component Value Date/Time    CHOLSTRLTOT 92 (L) 08/28/2024 11:40 AM    CHOLSTRLTOT 93 (L) 08/28/2024 11:40 AM    LDL 51 08/28/2024 11:40 AM    LDL 51 08/28/2024 11:40 AM    HDL 30 (A) 08/28/2024 11:40 AM    HDL 30 (A) 08/28/2024 11:40 AM    TRIGLYCERIDE 57 08/28/2024 11:40 AM    TRIGLYCERIDE 58 08/28/2024 11:40 AM       Essential hypertension  Chronic, stable. BP today 112/62. Pt does not monitor BP at home. Denies dizziness/lightheadedness. Continue current treatment regime: bumetanide 4mg BID, carvdeilol 3.125 mg BID. Follow up with cardiology per routine for continued monitoring and management.     JAMES (obstructive sleep apnea)  Chronic, stable. Pt maintains on CPAP nightly with 2L bleed in. He states he needs a new CPAP, but he isn't a fan of [facility] and is reluctant to establish with a PCP. Follow up with sleep medicine per routine.    Gait instability  At risk for falls  Chronic, ongoing issue for patient. He reports balance issues, weakness, history of falls all exacerbated by peripheral neuropathy. He utilizes a cane to aide in ambulation but he would like a wheeled walker. He does not have a primary care provider and is reluctant to establish with one. Placing referral to PT. Encouraged establishing with PCP to manage DME needs, DMV paperwork etc.     Services suggested: No services needed at this time  Health Care Screening: Age-appropriate preventive services recommended by USPTF and ACIP covered by Medicare were discussed today. Services ordered if indicated and agreed upon by the patient.  Referrals offered: Community-based lifestyle interventions  to reduce health risks and promote self-management and wellness, fall prevention, nutrition, physical activity, tobacco-use cessation, weight loss, and mental health services as per orders if indicated.    Discussion today about general wellness and lifestyle habits:    Prevent falls and reduce trip hazards; Cautioned about securing or removing rugs.  Have a working fire alarm and carbon monoxide detector.  Engage in regular physical activity and social activities.    Follow-up: No follow-ups on file.              [1]   Current Outpatient Medications   Medication Sig Dispense Refill    JARDIANCE 25 MG Tab Take 25 mg by mouth every day.      bumetanide (BUMEX) 2 MG tablet Take 2 Tablets by mouth 2 times a day. TO ENSURE FURTHER REFILLS, PLEASE COMPLETE REQUIRED LAB WORK. 200 Tablet 0    Semaglutide, 2 MG/DOSE, (OZEMPIC, 2 MG/DOSE,) 8 MG/3ML Solution Pen-injector Inject 2 mg under the skin every 7 days. 3 mL 11    anastrozole (ARIMIDEX) 1 MG Tab Take 1 mg by mouth. 2 x a week      gabapentin (NEURONTIN) 300 MG Cap Take 300 mg by mouth as needed.      ezetimibe (ZETIA) 10 MG Tab Take 1 tablet by mouth once daily 100 Tablet 3    apixaban (ELIQUIS) 2.5mg Tab Take 1 Tablet by mouth 2 times a day. 180 Tablet 3    clopidogrel (PLAVIX) 75 MG Tab Take 1 Tablet by mouth every day. 100 Tablet 2    cyanocobalamin (VITAMIN B-12) 1000 MCG/ML Solution INJECT 1 ML INTRAMUSCULARLY ONCE A WEEK      L-Lysine 1000 MG Tab Take by oral route.      Calcium Citrate (CITRACAL PO) Take 1 Tab by mouth 2 Times a Day.      coenzyme Q-10 30 MG capsule Take 30 mg by mouth 2 times a day.      Cholecalciferol (VITAMIN D3) 2000 UNIT Cap Take 1 Capsule by mouth 2 times a day.      Fluticasone Propionate (FLONASE NA) Administer 2 Sprays into affected nostril(S) every day.      GARLIC PO Take  by mouth every day.      Probiotic Product (PROBIOTIC PO) Take  by mouth every day.      Glucosamine HCl (GLUCOSAMINE PO) Take  by mouth every day.       testosterone cypionate (DEPO-TESTOSTERONE) 200 MG/ML Solution injection INJECT 0.8 ML INTRAMUSCULARLY ONCE WEEKLY      hydrocortisone 2.5 % Cream topical cream APPLY 1 APPLICATION TO AFFECTED AREA ONCE DAILY      omeprazole (PRILOSEC) 40 MG delayed-release capsule Take 1 Capsule by mouth 2 times a day.      B Complex Vitamins (VITAMIN B COMPLEX PO) Take 1 Tab by mouth every day.      Ascorbic Acid (VITAMIN C) 1000 MG Tab Take 1 Tablet by mouth 2 times a day.       No current facility-administered medications for this visit.   [2]   Social History  Tobacco Use    Smoking status: Never    Smokeless tobacco: Never    Tobacco comments:     0   Vaping Use    Vaping status: Never Used   Substance Use Topics    Alcohol use: Not Currently     Alcohol/week: 0.0 oz    Drug use: Not Currently     Types: Marijuana, Oral     Comment: CBD 30 mg gummie daily   [3]   Past Surgical History:  Procedure Laterality Date    PB OPEN FIX INTER/SUBTROCH FX,IMPLNT Left 3/24/2022    Procedure: INSERTION, INTRAMEDULLARY LILLI, FEMUR, PROXIMAL - SHORT, FEMORAL;  Surgeon: Zen Srivastava M.D.;  Location: [facility];  Service: Orthopedics    MO LAP REMOVE ADJUST LUIS RESTRICT D*  6/5/2020    Procedure: REMOVAL, GASTRIC BAND, LAPAROSCOPIC - ADJUSTABLE BAND AND PORT;  Surgeon: Deniz Rios M.D.;  Location: [facility];  Service: General    TRANSCATHETER AORTIC VALVE REPLACEMENT N/A 1/27/2020    Procedure: REPLACEMENT, AORTIC VALVE, TRANSCATHETER-;  Surgeon: Surendra Castro M.D.;  Location: [facility];  Service: Cardiac    OLGA  1/27/2020    Procedure: ECHOCARDIOGRAM, TRANSESOPHAGEAL;  Surgeon: Surendra Castro M.D.;  Location: [facility];  Service: Cardiac    GASTROSCOPY N/A 1/8/2016    Procedure: GASTROSCOPY;  Surgeon: Deniz Rios M.D.;  Location: [facility];  Service:     ORIF, FRACTURE, HUMERUS Left 6/25/2015    Procedure: HUMERUS ORIF/ PROXIMAL;  Surgeon: Cristal Guido M.D.;   Location: [facility];  Service:     FUSION, SPINE, LUMBAR, PLIF  9/5/2013    Performed by Edith Whelan M.D. at SURGERY [facility]    LUMBAR DECOMPRESSION  9/5/2013    Performed by Edith Whelan M.D. at SURGERY [facility]    MASS EXCISION NEURO  9/5/2013    Performed by Edith Whelan M.D. at SURGERY [facility]    RECOVERY  6/26/2012    Performed by SURGERY, IR-RECOVERY at SURGERY SAME DAY [facility]    DRAINAGE HEMATOMA  5/25/2012    Performed by SARINA SUE at [facility]    GASTRIC BAND LAPAROSCOPIC REVISION  5/11/2012    Performed by SARINA SUE at [facility]    FUSION, SPINE, LUMBAR, PLIF  3/26/2012    Performed by EDITH WHELAN at SURGERY [facility]    LUMBAR DECOMPRESSION  3/26/2012    Performed by EDITH WHELAN at SURGERY [facility]    INJ,EPIDURAL/LUMB/SAC SINGLE  3/19/2012    Performed by KRISTIN BALTAZAR at [facility]    INJ,EPIDURAL/LUMB/SAC SINGLE  3/5/2012    Performed by KRISTIN BALTAZAR at [facility]    GASTRIC BANDING LAPAROSCOPIC  3/24/2010    Performed by SARINA SUE at SURGERY [facility]    HIATAL HERNIA REPAIR  3/24/2010    Performed by SARINA SUE at SURGERY [facility]    KNEE ARTHROPLASTY TOTAL  2000    bilateral    ABDOMINOPLASTY  1990    AORTIC VALVE REPLACEMENT      ARTHROSCOPY, KNEE      LAMINOTOMY      OTHER CARDIAC SURGERY      stents    CA REMV 2ND CATARACT,CORN-SCLER SECTN      SINUSCOPE      SLEEVE,CARRIE VASO THIGH      TONSILLECTOMY      Z CARDIAC CATH

## 2025-05-22 NOTE — ASSESSMENT & PLAN NOTE
Chronic, ongoing issue for patient. He reports balance issues, weakness, history of falls all exacerbated by peripheral neuropathy. He utilizes a cane to aide in ambulation but he would like a wheeled walker. He does not have a primary care provider and is reluctant to establish with one. Placing referral to PT. Encouraged establishing with PCP to manage DME needs, DMV paperwork etc.

## 2025-06-22 ENCOUNTER — NON-PROVIDER VISIT (OUTPATIENT)
Dept: CARDIOLOGY | Facility: MEDICAL CENTER | Age: 85
End: 2025-06-22
Payer: MEDICARE

## 2025-06-23 PROCEDURE — 93295 DEV INTERROG REMOTE 1/2/MLT: CPT | Performed by: INTERNAL MEDICINE

## 2025-07-16 DIAGNOSIS — I48.0 PAROXYSMAL ATRIAL FIBRILLATION (HCC): ICD-10-CM

## 2025-07-17 NOTE — TELEPHONE ENCOUNTER
BE        Caller: LIAN More III      Topic/issue: Patient asked for the apixaban (ELIQUIS) 2.5 MG Tab medication refill be sent to [id number] - [place], [place] - [address] [[id number]]. The medication was mis routed to [facility] and he asked for a call back. Please advise        Callback Number: [phone]      Thank you    -Raphael SZYMANSKI

## 2025-07-17 NOTE — TELEPHONE ENCOUNTER
RX resent to [facility].     To RX Coordinators: Please release RX to preferred pharmacy. Thank you.

## 2025-07-30 ENCOUNTER — OFFICE VISIT (OUTPATIENT)
Dept: CARDIOLOGY | Facility: MEDICAL CENTER | Age: 85
End: 2025-07-30
Attending: INTERNAL MEDICINE
Payer: MEDICARE

## 2025-07-30 VITALS
BODY MASS INDEX: 31.14 KG/M2 | HEART RATE: 51 BPM | OXYGEN SATURATION: 95 % | WEIGHT: 235 LBS | RESPIRATION RATE: 16 BRPM | HEIGHT: 73 IN | DIASTOLIC BLOOD PRESSURE: 70 MMHG | SYSTOLIC BLOOD PRESSURE: 110 MMHG

## 2025-07-30 DIAGNOSIS — I50.9 CONGESTIVE HEART FAILURE, NYHA CLASS 3 AND ACC/AHA STAGE C (HCC): Primary | ICD-10-CM

## 2025-07-30 DIAGNOSIS — Z95.2 S/P TAVR (TRANSCATHETER AORTIC VALVE REPLACEMENT): ICD-10-CM

## 2025-07-30 DIAGNOSIS — Z95.5 S/P CORONARY ARTERY STENT PLACEMENT: ICD-10-CM

## 2025-07-30 DIAGNOSIS — Z95.2 HISTORY OF TRANSCATHETER AORTIC VALVE REPLACEMENT (TAVR): ICD-10-CM

## 2025-07-30 DIAGNOSIS — E78.5 DYSLIPIDEMIA: Chronic | ICD-10-CM

## 2025-07-30 DIAGNOSIS — G62.89 OTHER POLYNEUROPATHY: ICD-10-CM

## 2025-07-30 DIAGNOSIS — E11.42 DIABETIC POLYNEUROPATHY ASSOCIATED WITH TYPE 2 DIABETES MELLITUS (HCC): ICD-10-CM

## 2025-07-30 DIAGNOSIS — N18.32 STAGE 3B CHRONIC KIDNEY DISEASE: ICD-10-CM

## 2025-07-30 DIAGNOSIS — I48.19 PERSISTENT ATRIAL FIBRILLATION (HCC): ICD-10-CM

## 2025-07-30 PROCEDURE — 99214 OFFICE O/P EST MOD 30 MIN: CPT | Performed by: INTERNAL MEDICINE

## 2025-07-30 RX ORDER — AMIODARONE HYDROCHLORIDE 200 MG/1
200 TABLET ORAL DAILY
Qty: 100 TABLET | Refills: 3 | Status: SHIPPED | OUTPATIENT
Start: 2025-07-30

## 2025-07-30 RX ORDER — EZETIMIBE 10 MG/1
10 TABLET ORAL DAILY
Qty: 100 TABLET | Refills: 3 | Status: SHIPPED | OUTPATIENT
Start: 2025-07-30

## 2025-07-30 ASSESSMENT — FIBROSIS 4 INDEX: FIB4 SCORE: 2.68

## 2025-07-30 NOTE — PROGRESS NOTES
"CARDIOLOGY OUTPATIENT FOLLOWUP    PCP: Pcp Pt States None    1. Congestive heart failure, NYHA class 3 and ACC/AHA stage C (HCC)    2. Dyslipidemia    3. Persistent atrial fibrillation (HCC)    4. S/P TAVR (transcatheter aortic valve replacement)    5. Stage 3b chronic kidney disease    6. History of transcatheter aortic valve replacement (TAVR)    7. Diabetic polyneuropathy associated with type 2 diabetes mellitus (HCC)    8. S/P coronary artery stent placement    9. Other polyneuropathy        LIAN More III has worsened fatigue but euvolemic since converting to persistent A-fib last January.  I recommend attempting to revert him into sinus rhythm.  He will complete an amiodarone load followed by cardioversion.  I will place a referral to EP to review the long-term rhythm management strategy.    Cardiovascular status is otherwise stable.  He will continue with the present medications.  Will update a lab profile in 3 months         I also provided him with a disability placard and walker prescription    Follow up: 3 months    History: LIAN More III is a 83 y.o. male with systolic heart failure LVEF recovered to 45%, left main and RCA PCI, TAVR, PAF, dual-chamber pacemaker/ICD, diabetes, CKD 3B presenting for follow-up.    Last January developed worsening heart failure which was successfully treated with diuretics.  Around that time he was found to go into persistent A-fib and is remained in this rhythm since.  His balance has been off more so and has more fatigue and low energy since that time.    Physical Exam:  /70 (BP Location: Left arm, Patient Position: Sitting, BP Cuff Size: Adult)   Pulse (!) 51   Resp 16   Ht 1.854 m (6' 1\")   Wt 107 kg (235 lb)   SpO2 95%   BMI 31.00 kg/m²   GEN: NAD  RESP: CTAB  CVS: Irregular, systolic murmur  ABD: Soft, NT/ND  EXT: WWP, no edema, venous stasis with dermatitis    () Today's E/M visit is associated with medical care services that serve as the continuing " focal point for all needed health care services and/or with medical care services that  are part of ongoing care related to a patient's single, serious condition, or a complex condition: This includes  furnishing services to patients on an ongoing basis that result in care that is personalized  to the patient. The services result in a comprehensive, longitudinal, and continuous  relationship with the patient and involve delivery of team-based care that is accessible, coordinated with other practitioners and providers, and integrated with the broader health  care landscape.     The ASCVD Risk score (Martin GALE, et al., 2019) failed to calculate.    60 minutes spent during today's    Studies  Lab Results   Component Value Date/Time    CHOLSTRLTOT 92 (L) 08/28/2024 11:40 AM    CHOLSTRLTOT 93 (L) 08/28/2024 11:40 AM    LDL 51 08/28/2024 11:40 AM    LDL 51 08/28/2024 11:40 AM    HDL 30 (A) 08/28/2024 11:40 AM    HDL 30 (A) 08/28/2024 11:40 AM    TRIGLYCERIDE 57 08/28/2024 11:40 AM    TRIGLYCERIDE 58 08/28/2024 11:40 AM       Lab Results   Component Value Date/Time    SODIUM 139 03/03/2025 11:22 AM    POTASSIUM 3.8 03/03/2025 11:22 AM    CHLORIDE 97 03/03/2025 11:22 AM    CO2 28 03/03/2025 11:22 AM    GLUCOSE 226 (H) 03/03/2025 11:22 AM    BUN 56 (H) 03/03/2025 11:22 AM    CREATININE 2.10 (H) 03/03/2025 11:22 AM    CREATININE 1.18 02/08/2011 12:00 AM    BUNCREATRAT 16 02/08/2011 12:00 AM    GLOMRATE >59 02/08/2011 12:00 AM      Lab Results   Component Value Date/Time    PROTHROMBTM 15.2 (H) 07/10/2023 11:08 AM    INR 1.22 (H) 07/10/2023 11:08 AM      Lab Results   Component Value Date/Time    WBC 5.7 04/12/2025 11:50 AM    WBC 4.5 02/08/2011 12:00 AM    RBC 5.08 04/12/2025 11:50 AM    RBC 5.36 02/08/2011 12:00 AM    HEMOGLOBIN 14.5 04/12/2025 11:50 AM    HEMATOCRIT 42.4 04/12/2025 11:50 AM    MCV 83.5 04/12/2025 11:50 AM    MCV 93 02/08/2011 12:00 AM    MCH 28.5 04/12/2025 11:50 AM    MCH 31.0 02/08/2011 12:00 AM    MCHC  "34.2 04/12/2025 11:50 AM    MPV 10.5 04/12/2025 11:50 AM    NEUTSPOLYS 62.40 04/12/2025 11:50 AM    LYMPHOCYTES 23.30 04/12/2025 11:50 AM    MONOCYTES 10.40 04/12/2025 11:50 AM    EOSINOPHILS 3.20 04/12/2025 11:50 AM    BASOPHILS 0.20 04/12/2025 11:50 AM    HYPOCHROMIA 1+ 08/28/2024 11:40 AM    ANISOCYTOSIS 3+ (A) 08/28/2024 11:40 AM        Past Medical History:   Diagnosis Date    Acute decompensated heart failure (HCC) 04/26/2020    Arthritis 06/15/2023    \"everywhere\"    Breath shortness 06/15/2023    pt states short of breath 24/7 O2 at night only    CAD (coronary artery disease)     CATARACT 06/15/2023    removed bilat    Chest pain     Chest tightness     CHF (congestive heart failure), NYHA class II, chronic, systolic ([facility]) 12/26/2019    managed by cardiology Dr. Wilkinson: He appears euvolemic on physical exam.  He is on losartan, carvedilol, not on spironolactone due to low kidney function.  He was admitted to the hospital for CHF exacerbation, when he suddenly gained 15 pounds.  He was diuresed with IV Lasix at that time, had echocardiogram that showed significant improvement of his cardiac function, when compared to previous study    Chickenpox     Congestive heart failure (HCC)     Constipation     Coronary heart disease     Cough     Daytime sleepiness     Diabetes 06/15/2023    oral medication    Difficulty breathing     Dilated cardiomyopathy (HCC)     Disp fx of greater trochanter of left femur, init ([facility]) 06/25/2015    Fatigue     GERD (gastroesophageal reflux disease)     Hearing difficulty     Heart burn 06/15/2023    medicated    Heart murmur     Heart valve disease 06/15/2023    replaced    Hemorrhagic disorder ([facility]) 06/15/2023    eliquis    Hiatus hernia syndrome     High cholesterol 06/15/2023    medicated    History of BPH     History of chronic cough     Hyperlipidemia     Hypertension 06/15/2023    pt states well controlled on meds    Kidney disease 05/2020    ruptered left kidney     Mumps     " Oxygen dependent     @HS, 2 liters    Pacemaker     AICD    Pain 06/15/2023    hip and legs, everywhere else as well    Palpitations     Peripheral neuropathy     Persistent atrial fibrillation (HCC) 06/15/2023    medicated    Pneumonia 2007    Rhinitis     Ringing in ears     Severe aortic stenosis 12/04/2019    Sleep apnea 06/15/2023    CPAp with O2    Swelling of lower extremity     Tonsillitis     Tremor     Ulcer 2014    Dr. Porter, gastroenterologist    Urinary incontinence 06/15/2023    at times     Allergies   Allergen Reactions    Statins [Hmg-Coa-R Inhibitors] Rash     Blisters      Atorvastatin Hives     Selected as representative agent for class- please do not delete     Outpatient Encounter Medications as of 7/30/2025   Medication Sig Dispense Refill    ezetimibe (ZETIA) 10 MG Tab Take 1 Tablet by mouth every day. 100 Tablet 3    amiodarone (CORDARONE) 200 MG Tab Take 1 Tablet by mouth every day. 100 Tablet 3    apixaban (ELIQUIS) 2.5 MG Tab Take 1 Tablet by mouth 2 times a day. 180 Tablet 3    JARDIANCE 25 MG Tab Take 25 mg by mouth every day.      bumetanide (BUMEX) 2 MG tablet Take 2 Tablets by mouth 2 times a day. TO ENSURE FURTHER REFILLS, PLEASE COMPLETE REQUIRED LAB WORK. 200 Tablet 0    Semaglutide, 2 MG/DOSE, (OZEMPIC, 2 MG/DOSE,) 8 MG/3ML Solution Pen-injector Inject 2 mg under the skin every 7 days. 3 mL 11    anastrozole (ARIMIDEX) 1 MG Tab Take 1 mg by mouth. 2 x a week      Fluticasone Propionate (FLONASE NA) Administer 2 Sprays into affected nostril(S) every day.      gabapentin (NEURONTIN) 300 MG Cap Take 300 mg by mouth as needed.      GARLIC PO Take  by mouth every day.      Probiotic Product (PROBIOTIC PO) Take  by mouth every day.      Glucosamine HCl (GLUCOSAMINE PO) Take  by mouth every day.      clopidogrel (PLAVIX) 75 MG Tab Take 1 Tablet by mouth every day. 100 Tablet 2    testosterone cypionate (DEPO-TESTOSTERONE) 200 MG/ML Solution injection INJECT 0.8 ML INTRAMUSCULARLY ONCE  WEEKLY      hydrocortisone 2.5 % Cream topical cream APPLY 1 APPLICATION TO AFFECTED AREA ONCE DAILY      cyanocobalamin (VITAMIN B-12) 1000 MCG/ML Solution INJECT 1 ML INTRAMUSCULARLY ONCE A WEEK      L-Lysine 1000 MG Tab Take by oral route.      Calcium Citrate (CITRACAL PO) Take 1 Tab by mouth 2 Times a Day.      coenzyme Q-10 30 MG capsule Take 30 mg by mouth 2 times a day.      omeprazole (PRILOSEC) 40 MG delayed-release capsule Take 1 Capsule by mouth 2 times a day.      Cholecalciferol (VITAMIN D3) 2000 UNIT Cap Take 1 Capsule by mouth 2 times a day.      B Complex Vitamins (VITAMIN B COMPLEX PO) Take 1 Tab by mouth every day.      Ascorbic Acid (VITAMIN C) 1000 MG Tab Take 1 Tablet by mouth 2 times a day.      [DISCONTINUED] ezetimibe (ZETIA) 10 MG Tab Take 1 tablet by mouth once daily 100 Tablet 3    [DISCONTINUED] Home Care Oxygen Inhale 2 L/min by mouth Continuous.   Respiratory route via: Nasal Cannula   Oxygen supplier: [facility]  Indications: Hypoxia, to keep O2 sat over 90%       No facility-administered encounter medications on file as of 7/30/2025.     Social History     Socioeconomic History    Marital status:      Spouse name: Not on file    Number of children: Not on file    Years of education: Not on file    Highest education level: Bachelor's degree (e.g., BA, AB, BS)   Occupational History    Not on file   Tobacco Use    Smoking status: Never    Smokeless tobacco: Never    Tobacco comments:     0   Vaping Use    Vaping status: Never Used   Substance and Sexual Activity    Alcohol use: Not Currently     Alcohol/week: 0.0 oz    Drug use: Not Currently     Types: Marijuana, Oral     Comment: CBD 30 mg gummie daily    Sexual activity: Yes   Other Topics Concern    Not on file   Social History Narrative    Not on file     Social Drivers of Health     Financial Resource Strain: Low Risk  (5/22/2025)    Overall Financial Resource Strain (CARDIA)     Difficulty of Paying Living Expenses: Not hard at  all   Food Insecurity: No Food Insecurity (5/22/2025)    Hunger Vital Sign     Worried About Running Out of Food in the Last Year: Never true     Ran Out of Food in the Last Year: Never true   Transportation Needs: No Transportation Needs (5/22/2025)    PRAPARE - Transportation     Lack of Transportation (Medical): No     Lack of Transportation (Non-Medical): No   Physical Activity: Sufficiently Active (3/12/2022)    Exercise Vital Sign     Days of Exercise per Week: 3 days     Minutes of Exercise per Session: 150+ min   Stress: Stress Concern Present (3/12/2022)    [place] [employer] - Occupational Stress Questionnaire     Feeling of Stress : To some extent   Social Connections: Socially Isolated (3/12/2022)    Social Connection and Isolation Panel [NHANES]     Frequency of Communication with Friends and Family: Once a week     Frequency of Social Gatherings with Friends and Family: Never     Attends Anabaptist Services: Never     Active Member of Clubs or Organizations: No     Attends Club or Organization Meetings: Never     Marital Status:    Intimate Partner Violence: Not on file   Housing Stability: Unknown (5/22/2025)    Housing Stability Vital Sign     Unable to Pay for Housing in the Last Year: No     Number of Times Moved in the Last Year: Not on file     Homeless in the Last Year: No         ROS:   10 point review systems is otherwise negative except as per the HPI    Chief Complaint   Patient presents with    Coronary Artery Disease     Coronary artery disease due to calcified coronary lesion

## 2025-07-31 ENCOUNTER — APPOINTMENT (OUTPATIENT)
Dept: RADIOLOGY | Facility: MEDICAL CENTER | Age: 85
DRG: 605 | End: 2025-07-31
Attending: EMERGENCY MEDICINE
Payer: OTHER MISCELLANEOUS

## 2025-07-31 ENCOUNTER — TELEPHONE (OUTPATIENT)
Dept: CARDIOLOGY | Facility: MEDICAL CENTER | Age: 85
End: 2025-07-31
Payer: MEDICARE

## 2025-07-31 ENCOUNTER — HOSPITAL ENCOUNTER (INPATIENT)
Facility: MEDICAL CENTER | Age: 85
LOS: 10 days | DRG: 605 | End: 2025-08-14
Attending: EMERGENCY MEDICINE | Admitting: INTERNAL MEDICINE
Payer: OTHER MISCELLANEOUS

## 2025-07-31 DIAGNOSIS — S70.02XA HIP HEMATOMA, LEFT, INITIAL ENCOUNTER: ICD-10-CM

## 2025-07-31 DIAGNOSIS — G47.00 INSOMNIA, UNSPECIFIED TYPE: ICD-10-CM
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
## 2025-07-31 DIAGNOSIS — Z98.1 S/P LUMBAR FUSION: Chronic | ICD-10-CM

## 2025-07-31 DIAGNOSIS — S81.812A NONINFECTED SKIN TEAR OF LEG, LEFT, INITIAL ENCOUNTER: ICD-10-CM

## 2025-07-31 DIAGNOSIS — D68.69 HYPERCOAGULABLE STATE DUE TO ATRIAL FIBRILLATION, UNSPECIFIED TYPE (HCC): ICD-10-CM

## 2025-07-31 DIAGNOSIS — K22.70 BARRETT'S ESOPHAGUS WITHOUT DYSPLASIA: Chronic | ICD-10-CM

## 2025-07-31 DIAGNOSIS — I77.9 MILD CAROTID ARTERY DISEASE (HCC): ICD-10-CM

## 2025-07-31 DIAGNOSIS — I10 ESSENTIAL HYPERTENSION: ICD-10-CM

## 2025-07-31 DIAGNOSIS — H35.3130 BILATERAL NONEXUDATIVE AGE-RELATED MACULAR DEGENERATION, UNSPECIFIED STAGE: ICD-10-CM

## 2025-07-31 DIAGNOSIS — S37.012A RENAL HEMATOMA, LEFT, INITIAL ENCOUNTER: ICD-10-CM

## 2025-07-31 DIAGNOSIS — M47.812 ARTHRITIS OF NECK: ICD-10-CM

## 2025-07-31 DIAGNOSIS — Z95.5 STENTED CORONARY ARTERY: ICD-10-CM

## 2025-07-31 DIAGNOSIS — E34.9 HYPOTESTOSTERONISM: ICD-10-CM

## 2025-07-31 DIAGNOSIS — Z91.81 AT RISK FOR FALLS: ICD-10-CM

## 2025-07-31 DIAGNOSIS — M54.16 LUMBAR RADICULOPATHY, CHRONIC: ICD-10-CM

## 2025-07-31 DIAGNOSIS — D75.1 POLYCYTHEMIA: Chronic | ICD-10-CM

## 2025-07-31 DIAGNOSIS — L98.9 SKIN LESION: ICD-10-CM

## 2025-07-31 DIAGNOSIS — Z79.890 HORMONE REPLACEMENT THERAPY: ICD-10-CM

## 2025-07-31 DIAGNOSIS — G47.33 OSA (OBSTRUCTIVE SLEEP APNEA): Chronic | ICD-10-CM

## 2025-07-31 DIAGNOSIS — I48.0 PAROXYSMAL ATRIAL FIBRILLATION (HCC): ICD-10-CM

## 2025-07-31 DIAGNOSIS — Z79.899 HIGH RISK MEDICATION USE: ICD-10-CM

## 2025-07-31 DIAGNOSIS — R27.0 ATAXIA: ICD-10-CM

## 2025-07-31 DIAGNOSIS — R80.9 TYPE 2 DIABETES MELLITUS WITH DIABETIC MICROALBUMINURIA, WITHOUT LONG-TERM CURRENT USE OF INSULIN (HCC): ICD-10-CM

## 2025-07-31 DIAGNOSIS — R29.6 FREQUENT FALLS: ICD-10-CM

## 2025-07-31 DIAGNOSIS — N40.0 BENIGN PROSTATIC HYPERPLASIA WITHOUT LOWER URINARY TRACT SYMPTOMS: ICD-10-CM

## 2025-07-31 DIAGNOSIS — E66.813 CLASS 3 SEVERE OBESITY DUE TO EXCESS CALORIES IN ADULT, UNSPECIFIED BMI, UNSPECIFIED WHETHER SERIOUS COMORBIDITY PRESENT: ICD-10-CM

## 2025-07-31 DIAGNOSIS — K86.9 PANCREATIC LESION: ICD-10-CM

## 2025-07-31 DIAGNOSIS — S22.42XA: ICD-10-CM

## 2025-07-31 DIAGNOSIS — E87.5 HYPERKALEMIA: ICD-10-CM

## 2025-07-31 DIAGNOSIS — Z98.84 S/P GASTRIC BYPASS: Chronic | ICD-10-CM

## 2025-07-31 DIAGNOSIS — N18.32 STAGE 3B CHRONIC KIDNEY DISEASE: Chronic | ICD-10-CM

## 2025-07-31 DIAGNOSIS — Z95.0 PACEMAKER: ICD-10-CM

## 2025-07-31 DIAGNOSIS — Z79.01 ANTICOAGULATED: ICD-10-CM

## 2025-07-31 DIAGNOSIS — I25.10 CORONARY ARTERY DISEASE DUE TO CALCIFIED CORONARY LESION: Chronic | ICD-10-CM

## 2025-07-31 DIAGNOSIS — H10.32 ACUTE BACTERIAL CONJUNCTIVITIS OF LEFT EYE: ICD-10-CM

## 2025-07-31 DIAGNOSIS — G45.0 VERTEBROBASILAR ARTERY INSUFFICIENCY: ICD-10-CM

## 2025-07-31 DIAGNOSIS — I48.91 HYPERCOAGULABLE STATE DUE TO ATRIAL FIBRILLATION, UNSPECIFIED TYPE (HCC): ICD-10-CM

## 2025-07-31 DIAGNOSIS — M79.642 LEFT HAND PAIN: ICD-10-CM

## 2025-07-31 DIAGNOSIS — M72.2 PLANTAR FASCIITIS OF RIGHT FOOT: ICD-10-CM

## 2025-07-31 DIAGNOSIS — Z95.2 S/P TAVR (TRANSCATHETER AORTIC VALVE REPLACEMENT): ICD-10-CM

## 2025-07-31 DIAGNOSIS — Z95.810 ICD (IMPLANTABLE CARDIOVERTER-DEFIBRILLATOR) IN PLACE: ICD-10-CM

## 2025-07-31 DIAGNOSIS — E11.29 TYPE 2 DIABETES MELLITUS WITH DIABETIC MICROALBUMINURIA, WITHOUT LONG-TERM CURRENT USE OF INSULIN (HCC): ICD-10-CM

## 2025-07-31 DIAGNOSIS — I50.42 CHRONIC COMBINED SYSTOLIC AND DIASTOLIC HEART FAILURE (HCC): ICD-10-CM

## 2025-07-31 DIAGNOSIS — F32.A MILD DEPRESSION: ICD-10-CM

## 2025-07-31 DIAGNOSIS — R26.2 IMPAIRED AMBULATION: Primary | ICD-10-CM

## 2025-07-31 DIAGNOSIS — R93.3 ABNORMAL CT SCAN, COLON: ICD-10-CM

## 2025-07-31 DIAGNOSIS — T14.90XA TRAUMA: ICD-10-CM

## 2025-07-31 DIAGNOSIS — E78.5 DYSLIPIDEMIA: Chronic | ICD-10-CM

## 2025-07-31 DIAGNOSIS — Z79.02 ANTIPLATELET OR ANTITHROMBOTIC LONG-TERM USE: ICD-10-CM

## 2025-07-31 DIAGNOSIS — M25.562 ACUTE PAIN OF LEFT KNEE: ICD-10-CM

## 2025-07-31 DIAGNOSIS — R26.9 GAIT DISTURBANCE: ICD-10-CM

## 2025-07-31 DIAGNOSIS — E11.42 DIABETIC POLYNEUROPATHY ASSOCIATED WITH TYPE 2 DIABETES MELLITUS (HCC): ICD-10-CM

## 2025-07-31 DIAGNOSIS — M79.605 LEFT LEG PAIN: ICD-10-CM

## 2025-07-31 DIAGNOSIS — J30.2 SEASONAL ALLERGIES: Chronic | ICD-10-CM

## 2025-07-31 DIAGNOSIS — T14.8XXA HEMATOMA: ICD-10-CM

## 2025-07-31 DIAGNOSIS — Z53.09 CONTRAINDICATION TO DEEP VEIN THROMBOSIS (DVT) PROPHYLAXIS: ICD-10-CM

## 2025-07-31 DIAGNOSIS — L03.116 LEFT LEG CELLULITIS: ICD-10-CM

## 2025-07-31 DIAGNOSIS — J96.11 CHRONIC RESPIRATORY FAILURE WITH HYPOXIA (HCC): ICD-10-CM

## 2025-07-31 DIAGNOSIS — I25.84 CORONARY ARTERY DISEASE DUE TO CALCIFIED CORONARY LESION: Chronic | ICD-10-CM

## 2025-07-31 DIAGNOSIS — K21.9 GASTROESOPHAGEAL REFLUX DISEASE WITHOUT ESOPHAGITIS: ICD-10-CM

## 2025-07-31 LAB
ANION GAP SERPL CALC-SCNC: 15 MMOL/L (ref 7–16)
BUN SERPL-MCNC: 58 MG/DL (ref 8–22)
CALCIUM SERPL-MCNC: 9.2 MG/DL (ref 8.5–10.5)
CHLORIDE SERPL-SCNC: 98 MMOL/L (ref 96–112)
CO2 SERPL-SCNC: 24 MMOL/L (ref 20–33)
CREAT SERPL-MCNC: 2.52 MG/DL (ref 0.5–1.4)
EKG IMPRESSION: NORMAL
ERYTHROCYTE [DISTWIDTH] IN BLOOD BY AUTOMATED COUNT: 51.3 FL (ref 35.9–50)
GFR SERPLBLD CREATININE-BSD FMLA CKD-EPI: 25 ML/MIN/1.73 M 2
GLUCOSE SERPL-MCNC: 183 MG/DL (ref 65–99)
HCT VFR BLD AUTO: 49.8 % (ref 42–52)
HGB BLD-MCNC: 15.8 G/DL (ref 14–18)
MAGNESIUM SERPL-MCNC: 2.5 MG/DL (ref 1.5–2.5)
MCH RBC QN AUTO: 26.6 PG (ref 27–33)
MCHC RBC AUTO-ENTMCNC: 31.7 G/DL (ref 32.3–36.5)
MCV RBC AUTO: 83.7 FL (ref 81.4–97.8)
NT-PROBNP SERPL IA-MCNC: 5737 PG/ML (ref 0–125)
PLATELET # BLD AUTO: 189 K/UL (ref 164–446)
PMV BLD AUTO: 10.9 FL (ref 9–12.9)
POTASSIUM SERPL-SCNC: 3.7 MMOL/L (ref 3.6–5.5)
RBC # BLD AUTO: 5.95 M/UL (ref 4.7–6.1)
SODIUM SERPL-SCNC: 137 MMOL/L (ref 135–145)
WBC # BLD AUTO: 8.5 K/UL (ref 4.8–10.8)

## 2025-07-31 PROCEDURE — 71045 X-RAY EXAM CHEST 1 VIEW: CPT

## 2025-07-31 PROCEDURE — 83880 ASSAY OF NATRIURETIC PEPTIDE: CPT

## 2025-07-31 PROCEDURE — 70450 CT HEAD/BRAIN W/O DYE: CPT

## 2025-07-31 PROCEDURE — 36415 COLL VENOUS BLD VENIPUNCTURE: CPT

## 2025-07-31 PROCEDURE — 83735 ASSAY OF MAGNESIUM: CPT

## 2025-07-31 PROCEDURE — G0378 HOSPITAL OBSERVATION PER HR: HCPCS

## 2025-07-31 PROCEDURE — 99285 EMERGENCY DEPT VISIT HI MDM: CPT

## 2025-07-31 PROCEDURE — A9270 NON-COVERED ITEM OR SERVICE: HCPCS | Performed by: EMERGENCY MEDICINE

## 2025-07-31 PROCEDURE — 85027 COMPLETE CBC AUTOMATED: CPT

## 2025-07-31 PROCEDURE — 94760 N-INVAS EAR/PLS OXIMETRY 1: CPT

## 2025-07-31 PROCEDURE — 93005 ELECTROCARDIOGRAM TRACING: CPT | Mod: TC | Performed by: EMERGENCY MEDICINE

## 2025-07-31 PROCEDURE — 700102 HCHG RX REV CODE 250 W/ 637 OVERRIDE(OP): Performed by: EMERGENCY MEDICINE

## 2025-07-31 PROCEDURE — 80048 BASIC METABOLIC PNL TOTAL CA: CPT

## 2025-07-31 PROCEDURE — 73562 X-RAY EXAM OF KNEE 3: CPT | Mod: LT

## 2025-07-31 RX ORDER — AMIODARONE HYDROCHLORIDE 200 MG/1
200 TABLET ORAL DAILY
Status: DISCONTINUED | OUTPATIENT
Start: 2025-08-01 | End: 2025-08-14 | Stop reason: HOSPADM

## 2025-07-31 RX ORDER — AMIODARONE HYDROCHLORIDE 200 MG/1
200 TABLET ORAL ONCE
Status: COMPLETED | OUTPATIENT
Start: 2025-07-31 | End: 2025-07-31

## 2025-07-31 RX ORDER — EZETIMIBE 10 MG/1
10 TABLET ORAL DAILY
Status: DISCONTINUED | OUTPATIENT
Start: 2025-08-01 | End: 2025-08-14 | Stop reason: HOSPADM

## 2025-07-31 RX ORDER — CLOPIDOGREL BISULFATE 75 MG/1
75 TABLET ORAL DAILY
Status: DISCONTINUED | OUTPATIENT
Start: 2025-08-01 | End: 2025-08-14 | Stop reason: HOSPADM

## 2025-07-31 RX ORDER — ANASTROZOLE 1 MG/1
1 TABLET ORAL DAILY
Status: DISCONTINUED | OUTPATIENT
Start: 2025-08-01 | End: 2025-08-01

## 2025-07-31 RX ORDER — GABAPENTIN 300 MG/1
300 CAPSULE ORAL 3 TIMES DAILY PRN
Status: DISCONTINUED | OUTPATIENT
Start: 2025-07-31 | End: 2025-08-01

## 2025-07-31 RX ORDER — ACETAMINOPHEN 500 MG
1000 TABLET ORAL ONCE
Status: COMPLETED | OUTPATIENT
Start: 2025-08-01 | End: 2025-08-01

## 2025-07-31 RX ORDER — BUMETANIDE 1 MG/1
4 TABLET ORAL 2 TIMES DAILY
Status: DISCONTINUED | OUTPATIENT
Start: 2025-08-01 | End: 2025-08-03

## 2025-07-31 RX ORDER — OMEPRAZOLE 20 MG/1
40 CAPSULE, DELAYED RELEASE ORAL 2 TIMES DAILY
Status: DISCONTINUED | OUTPATIENT
Start: 2025-08-01 | End: 2025-08-14 | Stop reason: HOSPADM

## 2025-07-31 RX ORDER — METHOCARBAMOL 500 MG/1
750 TABLET, FILM COATED ORAL ONCE
Status: COMPLETED | OUTPATIENT
Start: 2025-08-01 | End: 2025-08-01

## 2025-07-31 RX ADMIN — AMIODARONE HYDROCHLORIDE 200 MG: 200 TABLET ORAL at 23:11

## 2025-07-31 ASSESSMENT — FIBROSIS 4 INDEX: FIB4 SCORE: 2.68

## 2025-08-01 ENCOUNTER — TELEPHONE (OUTPATIENT)
Dept: CARDIOLOGY | Facility: MEDICAL CENTER | Age: 85
End: 2025-08-01

## 2025-08-01 PROBLEM — R26.9 GAIT DISTURBANCE: Status: ACTIVE | Noted: 2025-05-22

## 2025-08-01 PROBLEM — I50.9 CHRONIC HEART FAILURE (HCC): Status: ACTIVE | Noted: 2019-12-26

## 2025-08-01 PROBLEM — M25.569 KNEE PAIN: Status: ACTIVE | Noted: 2025-08-01

## 2025-08-01 PROCEDURE — 99223 1ST HOSP IP/OBS HIGH 75: CPT | Mod: AI | Performed by: INTERNAL MEDICINE

## 2025-08-01 PROCEDURE — 94760 N-INVAS EAR/PLS OXIMETRY 1: CPT

## 2025-08-01 PROCEDURE — 700101 HCHG RX REV CODE 250: Performed by: INTERNAL MEDICINE

## 2025-08-01 PROCEDURE — A9270 NON-COVERED ITEM OR SERVICE: HCPCS | Performed by: INTERNAL MEDICINE

## 2025-08-01 PROCEDURE — 97535 SELF CARE MNGMENT TRAINING: CPT

## 2025-08-01 PROCEDURE — 700102 HCHG RX REV CODE 250 W/ 637 OVERRIDE(OP): Performed by: INTERNAL MEDICINE

## 2025-08-01 PROCEDURE — G0378 HOSPITAL OBSERVATION PER HR: HCPCS

## 2025-08-01 PROCEDURE — A9270 NON-COVERED ITEM OR SERVICE: HCPCS | Performed by: EMERGENCY MEDICINE

## 2025-08-01 PROCEDURE — 700102 HCHG RX REV CODE 250 W/ 637 OVERRIDE(OP): Performed by: EMERGENCY MEDICINE

## 2025-08-01 PROCEDURE — 99233 SBSQ HOSP IP/OBS HIGH 50: CPT | Performed by: INTERNAL MEDICINE

## 2025-08-01 PROCEDURE — 97163 PT EVAL HIGH COMPLEX 45 MIN: CPT

## 2025-08-01 PROCEDURE — 97166 OT EVAL MOD COMPLEX 45 MIN: CPT

## 2025-08-01 PROCEDURE — 94660 CPAP INITIATION&MGMT: CPT

## 2025-08-01 RX ORDER — AMOXICILLIN 250 MG
2 CAPSULE ORAL EVERY EVENING
Status: DISCONTINUED | OUTPATIENT
Start: 2025-08-01 | End: 2025-08-14 | Stop reason: HOSPADM

## 2025-08-01 RX ORDER — ACETAMINOPHEN 325 MG/1
650 TABLET ORAL EVERY 6 HOURS PRN
Status: DISCONTINUED | OUTPATIENT
Start: 2025-08-01 | End: 2025-08-06

## 2025-08-01 RX ORDER — CARBOXYMETHYLCELLULOSE SODIUM 5 MG/ML
1 SOLUTION/ DROPS OPHTHALMIC PRN
Status: DISCONTINUED | OUTPATIENT
Start: 2025-08-01 | End: 2025-08-14 | Stop reason: HOSPADM

## 2025-08-01 RX ORDER — DAPAGLIFLOZIN 10 MG/1
10 TABLET, FILM COATED ORAL DAILY
Status: DISCONTINUED | OUTPATIENT
Start: 2025-08-01 | End: 2025-08-14 | Stop reason: HOSPADM

## 2025-08-01 RX ORDER — METHOCARBAMOL 500 MG/1
750 TABLET, FILM COATED ORAL 4 TIMES DAILY
Status: DISCONTINUED | OUTPATIENT
Start: 2025-08-01 | End: 2025-08-02

## 2025-08-01 RX ORDER — MUPIROCIN 2 %
OINTMENT (GRAM) TOPICAL 3 TIMES DAILY
Status: DISPENSED | OUTPATIENT
Start: 2025-08-01 | End: 2025-08-06

## 2025-08-01 RX ORDER — HYDRALAZINE HYDROCHLORIDE 20 MG/ML
10 INJECTION INTRAMUSCULAR; INTRAVENOUS EVERY 4 HOURS PRN
Status: DISCONTINUED | OUTPATIENT
Start: 2025-08-01 | End: 2025-08-14 | Stop reason: HOSPADM

## 2025-08-01 RX ORDER — POLYETHYLENE GLYCOL 3350 17 G/17G
1 POWDER, FOR SOLUTION ORAL
Status: DISCONTINUED | OUTPATIENT
Start: 2025-08-01 | End: 2025-08-14 | Stop reason: HOSPADM

## 2025-08-01 RX ADMIN — BUMETANIDE 4 MG: 1 TABLET ORAL at 06:21

## 2025-08-01 RX ADMIN — ACETAMINOPHEN 650 MG: 325 TABLET, FILM COATED ORAL at 06:21

## 2025-08-01 RX ADMIN — CLOPIDOGREL BISULFATE 75 MG: 75 TABLET, FILM COATED ORAL at 06:22

## 2025-08-01 RX ADMIN — DAPAGLIFLOZIN 10 MG: 10 TABLET, FILM COATED ORAL at 06:26

## 2025-08-01 RX ADMIN — MUPIROCIN 2 %: 20 OINTMENT TOPICAL at 06:20

## 2025-08-01 RX ADMIN — METHOCARBAMOL 750 MG: 500 TABLET ORAL at 22:20

## 2025-08-01 RX ADMIN — SENNOSIDES AND DOCUSATE SODIUM 2 TABLET: 50; 8.6 TABLET ORAL at 17:18

## 2025-08-01 RX ADMIN — ACETAMINOPHEN 650 MG: 325 TABLET, FILM COATED ORAL at 13:52

## 2025-08-01 RX ADMIN — OMEPRAZOLE 40 MG: 20 CAPSULE, DELAYED RELEASE ORAL at 17:19

## 2025-08-01 RX ADMIN — METHOCARBAMOL 750 MG: 500 TABLET ORAL at 00:15

## 2025-08-01 RX ADMIN — APIXABAN 2.5 MG: 2.5 TABLET, FILM COATED ORAL at 06:21

## 2025-08-01 RX ADMIN — Medication 2 G: at 17:19

## 2025-08-01 RX ADMIN — MUPIROCIN 1 DOSE: 20 OINTMENT TOPICAL at 17:19

## 2025-08-01 RX ADMIN — MUPIROCIN 1 DOSE: 20 OINTMENT TOPICAL at 13:53

## 2025-08-01 RX ADMIN — ACETAMINOPHEN 1000 MG: 500 TABLET ORAL at 00:15

## 2025-08-01 RX ADMIN — OMEPRAZOLE 40 MG: 20 CAPSULE, DELAYED RELEASE ORAL at 06:21

## 2025-08-01 RX ADMIN — METHOCARBAMOL 750 MG: 500 TABLET ORAL at 13:56

## 2025-08-01 RX ADMIN — ACETAMINOPHEN 650 MG: 325 TABLET, FILM COATED ORAL at 19:38

## 2025-08-01 RX ADMIN — BUMETANIDE 4 MG: 1 TABLET ORAL at 17:18

## 2025-08-01 RX ADMIN — METHOCARBAMOL 750 MG: 500 TABLET ORAL at 17:18

## 2025-08-01 RX ADMIN — APIXABAN 2.5 MG: 2.5 TABLET, FILM COATED ORAL at 17:19

## 2025-08-01 RX ADMIN — CARBOXYMETHYLCELLULOSE SODIUM 1 DROP: 5 SOLUTION/ DROPS OPHTHALMIC at 17:17

## 2025-08-01 RX ADMIN — AMIODARONE HYDROCHLORIDE 200 MG: 200 TABLET ORAL at 09:49

## 2025-08-01 RX ADMIN — EZETIMIBE 10 MG: 10 TABLET ORAL at 06:21

## 2025-08-01 ASSESSMENT — CHA2DS2 SCORE
AGE 65 TO 74: NO
SEX: MALE
AGE 75 OR GREATER: YES
CHA2DS2 VASC SCORE: 6
VASCULAR DISEASE: YES
AGE 65 TO 74: NO
PRIOR STROKE OR TIA OR THROMBOEMBOLISM: NO
SEX: MALE
DIABETES: YES
CHA2DS2 VASC SCORE: 6
CHF OR LEFT VENTRICULAR DYSFUNCTION: YES
HYPERTENSION: YES
DIABETES: YES
VASCULAR DISEASE: YES
CHF OR LEFT VENTRICULAR DYSFUNCTION: YES
AGE 75 OR GREATER: YES
HYPERTENSION: YES

## 2025-08-01 ASSESSMENT — COGNITIVE AND FUNCTIONAL STATUS - GENERAL
HELP NEEDED FOR BATHING: A LITTLE
DAILY ACTIVITIY SCORE: 20
MOVING TO AND FROM BED TO CHAIR: A LOT
SUGGESTED CMS G CODE MODIFIER DAILY ACTIVITY: CJ
DRESSING REGULAR LOWER BODY CLOTHING: A LOT
SUGGESTED CMS G CODE MODIFIER DAILY ACTIVITY: CJ
DAILY ACTIVITIY SCORE: 21
MOVING TO AND FROM BED TO CHAIR: A LOT
STANDING UP FROM CHAIR USING ARMS: A LOT
WALKING IN HOSPITAL ROOM: TOTAL
MOBILITY SCORE: 12
DRESSING REGULAR LOWER BODY CLOTHING: A LITTLE
MOBILITY SCORE: 12
TURNING FROM BACK TO SIDE WHILE IN FLAT BAD: A LITTLE
TOILETING: A LITTLE
MOVING FROM LYING ON BACK TO SITTING ON SIDE OF FLAT BED: A LITTLE
CLIMB 3 TO 5 STEPS WITH RAILING: TOTAL
CLIMB 3 TO 5 STEPS WITH RAILING: TOTAL
TOILETING: A LITTLE
WALKING IN HOSPITAL ROOM: TOTAL
HELP NEEDED FOR BATHING: A LITTLE
SUGGESTED CMS G CODE MODIFIER MOBILITY: CL
SUGGESTED CMS G CODE MODIFIER MOBILITY: CL
STANDING UP FROM CHAIR USING ARMS: A LOT
TURNING FROM BACK TO SIDE WHILE IN FLAT BAD: A LITTLE
MOVING FROM LYING ON BACK TO SITTING ON SIDE OF FLAT BED: A LITTLE

## 2025-08-01 ASSESSMENT — LIFESTYLE VARIABLES
HOW MANY TIMES IN THE PAST YEAR HAVE YOU HAD 5 OR MORE DRINKS IN A DAY: 0
HAVE PEOPLE ANNOYED YOU BY CRITICIZING YOUR DRINKING: NO
EVER HAD A DRINK FIRST THING IN THE MORNING TO STEADY YOUR NERVES TO GET RID OF A HANGOVER: NO
CONSUMPTION TOTAL: NEGATIVE
ALCOHOL_USE: NO
AVERAGE NUMBER OF DAYS PER WEEK YOU HAVE A DRINK CONTAINING ALCOHOL: 0
DOES PATIENT WANT TO STOP DRINKING: CANNOT ASSESS
ON A TYPICAL DAY WHEN YOU DRINK ALCOHOL HOW MANY DRINKS DO YOU HAVE: 0
EVER FELT BAD OR GUILTY ABOUT YOUR DRINKING: NO
TOTAL SCORE: 0
TOTAL SCORE: 0
HAVE YOU EVER FELT YOU SHOULD CUT DOWN ON YOUR DRINKING: NO
TOTAL SCORE: 0

## 2025-08-01 ASSESSMENT — ACTIVITIES OF DAILY LIVING (ADL): TOILETING: INDEPENDENT

## 2025-08-01 ASSESSMENT — ENCOUNTER SYMPTOMS
DIZZINESS: 0
LOSS OF CONSCIOUSNESS: 1
FEVER: 0
HEADACHES: 0
CHILLS: 0
VOMITING: 0
NAUSEA: 0
ABDOMINAL PAIN: 0
SHORTNESS OF BREATH: 0

## 2025-08-01 ASSESSMENT — PAIN DESCRIPTION - PAIN TYPE
TYPE: ACUTE PAIN
TYPE: ACUTE PAIN;CHRONIC PAIN

## 2025-08-01 ASSESSMENT — GAIT ASSESSMENTS: GAIT LEVEL OF ASSIST: UNABLE TO PARTICIPATE

## 2025-08-01 ASSESSMENT — FIBROSIS 4 INDEX
FIB4 SCORE: 2.11
FIB4 SCORE: 2.11

## 2025-08-02 LAB
ANION GAP SERPL CALC-SCNC: 13 MMOL/L (ref 7–16)
BUN SERPL-MCNC: 51 MG/DL (ref 8–22)
CALCIUM SERPL-MCNC: 8.7 MG/DL (ref 8.5–10.5)
CHLORIDE SERPL-SCNC: 98 MMOL/L (ref 96–112)
CO2 SERPL-SCNC: 25 MMOL/L (ref 20–33)
CREAT SERPL-MCNC: 2.44 MG/DL (ref 0.5–1.4)
ERYTHROCYTE [DISTWIDTH] IN BLOOD BY AUTOMATED COUNT: 52.4 FL (ref 35.9–50)
GFR SERPLBLD CREATININE-BSD FMLA CKD-EPI: 26 ML/MIN/1.73 M 2
GLUCOSE SERPL-MCNC: 148 MG/DL (ref 65–99)
HCT VFR BLD AUTO: 46.8 % (ref 42–52)
HGB BLD-MCNC: 14.6 G/DL (ref 14–18)
MCH RBC QN AUTO: 26.1 PG (ref 27–33)
MCHC RBC AUTO-ENTMCNC: 31.2 G/DL (ref 32.3–36.5)
MCV RBC AUTO: 83.6 FL (ref 81.4–97.8)
PLATELET # BLD AUTO: 155 K/UL (ref 164–446)
PMV BLD AUTO: 10.2 FL (ref 9–12.9)
POTASSIUM SERPL-SCNC: 3.1 MMOL/L (ref 3.6–5.5)
RBC # BLD AUTO: 5.6 M/UL (ref 4.7–6.1)
SODIUM SERPL-SCNC: 136 MMOL/L (ref 135–145)
WBC # BLD AUTO: 7.5 K/UL (ref 4.8–10.8)

## 2025-08-02 PROCEDURE — 700111 HCHG RX REV CODE 636 W/ 250 OVERRIDE (IP): Performed by: INTERNAL MEDICINE

## 2025-08-02 PROCEDURE — 94760 N-INVAS EAR/PLS OXIMETRY 1: CPT

## 2025-08-02 PROCEDURE — 80048 BASIC METABOLIC PNL TOTAL CA: CPT

## 2025-08-02 PROCEDURE — 94660 CPAP INITIATION&MGMT: CPT

## 2025-08-02 PROCEDURE — A9270 NON-COVERED ITEM OR SERVICE: HCPCS | Performed by: INTERNAL MEDICINE

## 2025-08-02 PROCEDURE — 97597 DBRDMT OPN WND 1ST 20 CM/<: CPT

## 2025-08-02 PROCEDURE — 99233 SBSQ HOSP IP/OBS HIGH 50: CPT | Performed by: INTERNAL MEDICINE

## 2025-08-02 PROCEDURE — G0378 HOSPITAL OBSERVATION PER HR: HCPCS

## 2025-08-02 PROCEDURE — 85027 COMPLETE CBC AUTOMATED: CPT

## 2025-08-02 PROCEDURE — 36415 COLL VENOUS BLD VENIPUNCTURE: CPT

## 2025-08-02 PROCEDURE — 700102 HCHG RX REV CODE 250 W/ 637 OVERRIDE(OP): Performed by: INTERNAL MEDICINE

## 2025-08-02 PROCEDURE — A9270 NON-COVERED ITEM OR SERVICE: HCPCS | Performed by: STUDENT IN AN ORGANIZED HEALTH CARE EDUCATION/TRAINING PROGRAM

## 2025-08-02 PROCEDURE — 96374 THER/PROPH/DIAG INJ IV PUSH: CPT

## 2025-08-02 PROCEDURE — 700102 HCHG RX REV CODE 250 W/ 637 OVERRIDE(OP): Performed by: STUDENT IN AN ORGANIZED HEALTH CARE EDUCATION/TRAINING PROGRAM

## 2025-08-02 RX ORDER — MORPHINE SULFATE 4 MG/ML
4 INJECTION INTRAVENOUS ONCE
Status: COMPLETED | OUTPATIENT
Start: 2025-08-02 | End: 2025-08-02

## 2025-08-02 RX ORDER — METHOCARBAMOL 500 MG/1
1000 TABLET, FILM COATED ORAL 4 TIMES DAILY
Status: DISCONTINUED | OUTPATIENT
Start: 2025-08-02 | End: 2025-08-05

## 2025-08-02 RX ORDER — POTASSIUM CHLORIDE 1500 MG/1
20 TABLET, EXTENDED RELEASE ORAL ONCE
Status: COMPLETED | OUTPATIENT
Start: 2025-08-02 | End: 2025-08-02

## 2025-08-02 RX ADMIN — METHOCARBAMOL 750 MG: 500 TABLET ORAL at 09:25

## 2025-08-02 RX ADMIN — BUMETANIDE 4 MG: 1 TABLET ORAL at 17:10

## 2025-08-02 RX ADMIN — APIXABAN 2.5 MG: 2.5 TABLET, FILM COATED ORAL at 06:05

## 2025-08-02 RX ADMIN — EZETIMIBE 10 MG: 10 TABLET ORAL at 06:04

## 2025-08-02 RX ADMIN — OMEPRAZOLE 40 MG: 20 CAPSULE, DELAYED RELEASE ORAL at 06:05

## 2025-08-02 RX ADMIN — BUMETANIDE 4 MG: 1 TABLET ORAL at 06:04

## 2025-08-02 RX ADMIN — MORPHINE SULFATE 4 MG: 4 INJECTION INTRAVENOUS at 15:13

## 2025-08-02 RX ADMIN — METHOCARBAMOL 1000 MG: 500 TABLET ORAL at 17:11

## 2025-08-02 RX ADMIN — METHOCARBAMOL 1000 MG: 500 TABLET ORAL at 20:52

## 2025-08-02 RX ADMIN — MUPIROCIN 1 APPLICATION: 20 OINTMENT TOPICAL at 06:05

## 2025-08-02 RX ADMIN — SENNOSIDES AND DOCUSATE SODIUM 2 TABLET: 50; 8.6 TABLET ORAL at 17:11

## 2025-08-02 RX ADMIN — ACETAMINOPHEN 650 MG: 325 TABLET, FILM COATED ORAL at 20:50

## 2025-08-02 RX ADMIN — MUPIROCIN 2 %: 20 OINTMENT TOPICAL at 13:50

## 2025-08-02 RX ADMIN — ACETAMINOPHEN 650 MG: 325 TABLET, FILM COATED ORAL at 06:05

## 2025-08-02 RX ADMIN — Medication 2 G: at 00:40

## 2025-08-02 RX ADMIN — MUPIROCIN 2 %: 20 OINTMENT TOPICAL at 18:00

## 2025-08-02 RX ADMIN — OMEPRAZOLE 40 MG: 20 CAPSULE, DELAYED RELEASE ORAL at 17:11

## 2025-08-02 RX ADMIN — DAPAGLIFLOZIN 10 MG: 10 TABLET, FILM COATED ORAL at 06:05

## 2025-08-02 RX ADMIN — AMIODARONE HYDROCHLORIDE 200 MG: 200 TABLET ORAL at 06:04

## 2025-08-02 RX ADMIN — CLOPIDOGREL BISULFATE 75 MG: 75 TABLET, FILM COATED ORAL at 06:04

## 2025-08-02 RX ADMIN — POTASSIUM CHLORIDE 20 MEQ: 1500 TABLET, EXTENDED RELEASE ORAL at 06:05

## 2025-08-02 RX ADMIN — METHOCARBAMOL 1000 MG: 500 TABLET ORAL at 13:50

## 2025-08-02 ASSESSMENT — ENCOUNTER SYMPTOMS
DIZZINESS: 0
COUGH: 0
WEAKNESS: 0
INSOMNIA: 0
CONSTIPATION: 0
SPEECH CHANGE: 0
SHORTNESS OF BREATH: 0
NERVOUS/ANXIOUS: 0
CLAUDICATION: 0
VOMITING: 0
HEARTBURN: 0
DEPRESSION: 0
MYALGIAS: 1
BLURRED VISION: 0
ABDOMINAL PAIN: 0
HEADACHES: 0
DIARRHEA: 0
SENSORY CHANGE: 0
PHOTOPHOBIA: 0
CHILLS: 0
FEVER: 0

## 2025-08-02 ASSESSMENT — PAIN DESCRIPTION - PAIN TYPE
TYPE: ACUTE PAIN
TYPE: ACUTE PAIN

## 2025-08-02 ASSESSMENT — FIBROSIS 4 INDEX: FIB4 SCORE: 2.11

## 2025-08-03 LAB
ANION GAP SERPL CALC-SCNC: 17 MMOL/L (ref 7–16)
BUN SERPL-MCNC: 51 MG/DL (ref 8–22)
CALCIUM SERPL-MCNC: 8.7 MG/DL (ref 8.5–10.5)
CHLORIDE SERPL-SCNC: 98 MMOL/L (ref 96–112)
CO2 SERPL-SCNC: 24 MMOL/L (ref 20–33)
CREAT SERPL-MCNC: 2.53 MG/DL (ref 0.5–1.4)
ERYTHROCYTE [DISTWIDTH] IN BLOOD BY AUTOMATED COUNT: 53.1 FL (ref 35.9–50)
GFR SERPLBLD CREATININE-BSD FMLA CKD-EPI: 25 ML/MIN/1.73 M 2
GLUCOSE SERPL-MCNC: 179 MG/DL (ref 65–99)
HCT VFR BLD AUTO: 48.2 % (ref 42–52)
HGB BLD-MCNC: 14.7 G/DL (ref 14–18)
MCH RBC QN AUTO: 25.8 PG (ref 27–33)
MCHC RBC AUTO-ENTMCNC: 30.5 G/DL (ref 32.3–36.5)
MCV RBC AUTO: 84.6 FL (ref 81.4–97.8)
PLATELET # BLD AUTO: 156 K/UL (ref 164–446)
PMV BLD AUTO: 9.9 FL (ref 9–12.9)
POTASSIUM SERPL-SCNC: 3.5 MMOL/L (ref 3.6–5.5)
RBC # BLD AUTO: 5.7 M/UL (ref 4.7–6.1)
SODIUM SERPL-SCNC: 139 MMOL/L (ref 135–145)
WBC # BLD AUTO: 8.9 K/UL (ref 4.8–10.8)

## 2025-08-03 PROCEDURE — 700102 HCHG RX REV CODE 250 W/ 637 OVERRIDE(OP): Performed by: INTERNAL MEDICINE

## 2025-08-03 PROCEDURE — A9270 NON-COVERED ITEM OR SERVICE: HCPCS | Performed by: INTERNAL MEDICINE

## 2025-08-03 PROCEDURE — 80048 BASIC METABOLIC PNL TOTAL CA: CPT

## 2025-08-03 PROCEDURE — 96375 TX/PRO/DX INJ NEW DRUG ADDON: CPT

## 2025-08-03 PROCEDURE — 99233 SBSQ HOSP IP/OBS HIGH 50: CPT | Performed by: INTERNAL MEDICINE

## 2025-08-03 PROCEDURE — 94760 N-INVAS EAR/PLS OXIMETRY 1: CPT

## 2025-08-03 PROCEDURE — G0378 HOSPITAL OBSERVATION PER HR: HCPCS

## 2025-08-03 PROCEDURE — 36415 COLL VENOUS BLD VENIPUNCTURE: CPT

## 2025-08-03 PROCEDURE — 700111 HCHG RX REV CODE 636 W/ 250 OVERRIDE (IP): Mod: JZ | Performed by: INTERNAL MEDICINE

## 2025-08-03 PROCEDURE — 85027 COMPLETE CBC AUTOMATED: CPT

## 2025-08-03 PROCEDURE — 94660 CPAP INITIATION&MGMT: CPT

## 2025-08-03 RX ORDER — FUROSEMIDE 10 MG/ML
40 INJECTION INTRAMUSCULAR; INTRAVENOUS ONCE
Status: COMPLETED | OUTPATIENT
Start: 2025-08-03 | End: 2025-08-03

## 2025-08-03 RX ORDER — BUMETANIDE 1 MG/1
4 TABLET ORAL 2 TIMES DAILY
Status: DISCONTINUED | OUTPATIENT
Start: 2025-08-04 | End: 2025-08-04

## 2025-08-03 RX ADMIN — BUMETANIDE 4 MG: 1 TABLET ORAL at 06:29

## 2025-08-03 RX ADMIN — DAPAGLIFLOZIN 10 MG: 10 TABLET, FILM COATED ORAL at 06:29

## 2025-08-03 RX ADMIN — AMIODARONE HYDROCHLORIDE 200 MG: 200 TABLET ORAL at 06:29

## 2025-08-03 RX ADMIN — EZETIMIBE 10 MG: 10 TABLET ORAL at 06:29

## 2025-08-03 RX ADMIN — CLOPIDOGREL BISULFATE 75 MG: 75 TABLET, FILM COATED ORAL at 06:29

## 2025-08-03 RX ADMIN — MUPIROCIN 1 APPLICATION: 20 OINTMENT TOPICAL at 06:29

## 2025-08-03 RX ADMIN — FUROSEMIDE 40 MG: 10 INJECTION, SOLUTION INTRAMUSCULAR; INTRAVENOUS at 18:19

## 2025-08-03 RX ADMIN — METHOCARBAMOL 1000 MG: 500 TABLET ORAL at 13:50

## 2025-08-03 RX ADMIN — OMEPRAZOLE 40 MG: 20 CAPSULE, DELAYED RELEASE ORAL at 18:19

## 2025-08-03 RX ADMIN — METHOCARBAMOL 1000 MG: 500 TABLET ORAL at 18:18

## 2025-08-03 RX ADMIN — OMEPRAZOLE 40 MG: 20 CAPSULE, DELAYED RELEASE ORAL at 06:29

## 2025-08-03 RX ADMIN — METHOCARBAMOL 1000 MG: 500 TABLET ORAL at 20:18

## 2025-08-03 RX ADMIN — METHOCARBAMOL 1000 MG: 500 TABLET ORAL at 09:40

## 2025-08-03 RX ADMIN — ACETAMINOPHEN 650 MG: 325 TABLET, FILM COATED ORAL at 09:52

## 2025-08-03 ASSESSMENT — ENCOUNTER SYMPTOMS
CLAUDICATION: 0
DIARRHEA: 0
SPEECH CHANGE: 0
PHOTOPHOBIA: 0
CONSTIPATION: 0
DIZZINESS: 0
DEPRESSION: 0
VOMITING: 0
BLURRED VISION: 0
WEAKNESS: 0
COUGH: 0
CHILLS: 0
FEVER: 0
INSOMNIA: 0
SHORTNESS OF BREATH: 0
HEARTBURN: 0
MYALGIAS: 1
ABDOMINAL PAIN: 0
NERVOUS/ANXIOUS: 0
HEADACHES: 0
SENSORY CHANGE: 0

## 2025-08-03 ASSESSMENT — PAIN DESCRIPTION - PAIN TYPE
TYPE: ACUTE PAIN
TYPE: ACUTE PAIN

## 2025-08-03 ASSESSMENT — FIBROSIS 4 INDEX: FIB4 SCORE: 2.56

## 2025-08-04 PROBLEM — M25.562 LEFT KNEE PAIN: Status: ACTIVE | Noted: 2025-08-04

## 2025-08-04 LAB
ANION GAP SERPL CALC-SCNC: 15 MMOL/L (ref 7–16)
BUN SERPL-MCNC: 57 MG/DL (ref 8–22)
CALCIUM SERPL-MCNC: 8.5 MG/DL (ref 8.5–10.5)
CHLORIDE SERPL-SCNC: 96 MMOL/L (ref 96–112)
CO2 SERPL-SCNC: 22 MMOL/L (ref 20–33)
CREAT SERPL-MCNC: 2.62 MG/DL (ref 0.5–1.4)
ERYTHROCYTE [DISTWIDTH] IN BLOOD BY AUTOMATED COUNT: 53.9 FL (ref 35.9–50)
GFR SERPLBLD CREATININE-BSD FMLA CKD-EPI: 24 ML/MIN/1.73 M 2
GLUCOSE SERPL-MCNC: 218 MG/DL (ref 65–99)
HCT VFR BLD AUTO: 45.5 % (ref 42–52)
HGB BLD-MCNC: 14 G/DL (ref 14–18)
MCH RBC QN AUTO: 26.1 PG (ref 27–33)
MCHC RBC AUTO-ENTMCNC: 30.8 G/DL (ref 32.3–36.5)
MCV RBC AUTO: 84.7 FL (ref 81.4–97.8)
PLATELET # BLD AUTO: 163 K/UL (ref 164–446)
PMV BLD AUTO: 11 FL (ref 9–12.9)
POTASSIUM SERPL-SCNC: 3.4 MMOL/L (ref 3.6–5.5)
RBC # BLD AUTO: 5.37 M/UL (ref 4.7–6.1)
SODIUM SERPL-SCNC: 133 MMOL/L (ref 135–145)
WBC # BLD AUTO: 8.1 K/UL (ref 4.8–10.8)

## 2025-08-04 PROCEDURE — A9270 NON-COVERED ITEM OR SERVICE: HCPCS | Performed by: INTERNAL MEDICINE

## 2025-08-04 PROCEDURE — 700102 HCHG RX REV CODE 250 W/ 637 OVERRIDE(OP): Performed by: INTERNAL MEDICINE

## 2025-08-04 PROCEDURE — 94660 CPAP INITIATION&MGMT: CPT

## 2025-08-04 PROCEDURE — 770001 HCHG ROOM/CARE - MED/SURG/GYN PRIV*

## 2025-08-04 PROCEDURE — 700111 HCHG RX REV CODE 636 W/ 250 OVERRIDE (IP): Mod: JZ | Performed by: INTERNAL MEDICINE

## 2025-08-04 PROCEDURE — 85027 COMPLETE CBC AUTOMATED: CPT

## 2025-08-04 PROCEDURE — 97530 THERAPEUTIC ACTIVITIES: CPT

## 2025-08-04 PROCEDURE — 80048 BASIC METABOLIC PNL TOTAL CA: CPT

## 2025-08-04 PROCEDURE — 36415 COLL VENOUS BLD VENIPUNCTURE: CPT

## 2025-08-04 PROCEDURE — 99233 SBSQ HOSP IP/OBS HIGH 50: CPT | Performed by: INTERNAL MEDICINE

## 2025-08-04 PROCEDURE — 94760 N-INVAS EAR/PLS OXIMETRY 1: CPT

## 2025-08-04 RX ORDER — POTASSIUM CHLORIDE 1500 MG/1
40 TABLET, EXTENDED RELEASE ORAL ONCE
Status: COMPLETED | OUTPATIENT
Start: 2025-08-04 | End: 2025-08-04

## 2025-08-04 RX ORDER — FUROSEMIDE 10 MG/ML
80 INJECTION INTRAMUSCULAR; INTRAVENOUS 2 TIMES DAILY
Status: DISCONTINUED | OUTPATIENT
Start: 2025-08-04 | End: 2025-08-05

## 2025-08-04 RX ADMIN — MUPIROCIN 2 %: 20 OINTMENT TOPICAL at 05:24

## 2025-08-04 RX ADMIN — AMIODARONE HYDROCHLORIDE 200 MG: 200 TABLET ORAL at 05:21

## 2025-08-04 RX ADMIN — APIXABAN 2.5 MG: 2.5 TABLET, FILM COATED ORAL at 17:07

## 2025-08-04 RX ADMIN — MUPIROCIN 1 G: 20 OINTMENT TOPICAL at 17:20

## 2025-08-04 RX ADMIN — OMEPRAZOLE 40 MG: 20 CAPSULE, DELAYED RELEASE ORAL at 05:21

## 2025-08-04 RX ADMIN — SENNOSIDES AND DOCUSATE SODIUM 2 TABLET: 50; 8.6 TABLET ORAL at 17:07

## 2025-08-04 RX ADMIN — METHOCARBAMOL 1000 MG: 500 TABLET ORAL at 12:32

## 2025-08-04 RX ADMIN — CLOPIDOGREL BISULFATE 75 MG: 75 TABLET, FILM COATED ORAL at 05:21

## 2025-08-04 RX ADMIN — METHOCARBAMOL 1000 MG: 500 TABLET ORAL at 08:13

## 2025-08-04 RX ADMIN — FUROSEMIDE 80 MG: 10 INJECTION, SOLUTION INTRAMUSCULAR; INTRAVENOUS at 17:07

## 2025-08-04 RX ADMIN — POTASSIUM CHLORIDE 40 MEQ: 1500 TABLET, EXTENDED RELEASE ORAL at 07:42

## 2025-08-04 RX ADMIN — Medication 2 G: at 17:07

## 2025-08-04 RX ADMIN — DAPAGLIFLOZIN 10 MG: 10 TABLET, FILM COATED ORAL at 05:21

## 2025-08-04 RX ADMIN — METHOCARBAMOL 1000 MG: 500 TABLET ORAL at 17:07

## 2025-08-04 RX ADMIN — EZETIMIBE 10 MG: 10 TABLET ORAL at 05:21

## 2025-08-04 RX ADMIN — ACETAMINOPHEN 650 MG: 325 TABLET, FILM COATED ORAL at 19:37

## 2025-08-04 RX ADMIN — Medication 2 G: at 22:00

## 2025-08-04 RX ADMIN — OMEPRAZOLE 40 MG: 20 CAPSULE, DELAYED RELEASE ORAL at 17:07

## 2025-08-04 RX ADMIN — METHOCARBAMOL 1000 MG: 500 TABLET ORAL at 21:44

## 2025-08-04 RX ADMIN — BUMETANIDE 4 MG: 1 TABLET ORAL at 05:21

## 2025-08-04 ASSESSMENT — ENCOUNTER SYMPTOMS
HEARTBURN: 0
HEADACHES: 0
SPEECH CHANGE: 0
SHORTNESS OF BREATH: 0
NERVOUS/ANXIOUS: 0
WEAKNESS: 0
PHOTOPHOBIA: 0
BLURRED VISION: 0
VOMITING: 0
FEVER: 0
MYALGIAS: 1
SENSORY CHANGE: 0
ABDOMINAL PAIN: 0
COUGH: 0
CHILLS: 0
DEPRESSION: 0
CONSTIPATION: 0
CLAUDICATION: 0
INSOMNIA: 0
DIARRHEA: 0
DIZZINESS: 0

## 2025-08-04 ASSESSMENT — COGNITIVE AND FUNCTIONAL STATUS - GENERAL
CLIMB 3 TO 5 STEPS WITH RAILING: TOTAL
MOVING TO AND FROM BED TO CHAIR: A LITTLE
MOBILITY SCORE: 14
STANDING UP FROM CHAIR USING ARMS: A LITTLE
SUGGESTED CMS G CODE MODIFIER MOBILITY: CL
WALKING IN HOSPITAL ROOM: TOTAL
TURNING FROM BACK TO SIDE WHILE IN FLAT BAD: A LITTLE
MOVING FROM LYING ON BACK TO SITTING ON SIDE OF FLAT BED: A LITTLE

## 2025-08-04 ASSESSMENT — PAIN DESCRIPTION - PAIN TYPE
TYPE: CHRONIC PAIN
TYPE: ACUTE PAIN
TYPE: CHRONIC PAIN;NEUROPATHIC PAIN

## 2025-08-04 ASSESSMENT — GAIT ASSESSMENTS: GAIT LEVEL OF ASSIST: UNABLE TO PARTICIPATE

## 2025-08-04 ASSESSMENT — FIBROSIS 4 INDEX: FIB4 SCORE: 2.45

## 2025-08-05 LAB
ANION GAP SERPL CALC-SCNC: 14 MMOL/L (ref 7–16)
BUN SERPL-MCNC: 57 MG/DL (ref 8–22)
CALCIUM SERPL-MCNC: 8.6 MG/DL (ref 8.5–10.5)
CHLORIDE SERPL-SCNC: 98 MMOL/L (ref 96–112)
CO2 SERPL-SCNC: 23 MMOL/L (ref 20–33)
CREAT SERPL-MCNC: 2.56 MG/DL (ref 0.5–1.4)
ERYTHROCYTE [DISTWIDTH] IN BLOOD BY AUTOMATED COUNT: 52.3 FL (ref 35.9–50)
GFR SERPLBLD CREATININE-BSD FMLA CKD-EPI: 24 ML/MIN/1.73 M 2
GLUCOSE SERPL-MCNC: 152 MG/DL (ref 65–99)
HCT VFR BLD AUTO: 42.7 % (ref 42–52)
HGB BLD-MCNC: 13.6 G/DL (ref 14–18)
MCH RBC QN AUTO: 26.3 PG (ref 27–33)
MCHC RBC AUTO-ENTMCNC: 31.9 G/DL (ref 32.3–36.5)
MCV RBC AUTO: 82.6 FL (ref 81.4–97.8)
PLATELET # BLD AUTO: 160 K/UL (ref 164–446)
PMV BLD AUTO: 11.1 FL (ref 9–12.9)
POTASSIUM SERPL-SCNC: 3.5 MMOL/L (ref 3.6–5.5)
RBC # BLD AUTO: 5.17 M/UL (ref 4.7–6.1)
SODIUM SERPL-SCNC: 135 MMOL/L (ref 135–145)
WBC # BLD AUTO: 8.7 K/UL (ref 4.8–10.8)

## 2025-08-05 PROCEDURE — 700111 HCHG RX REV CODE 636 W/ 250 OVERRIDE (IP): Mod: JZ | Performed by: INTERNAL MEDICINE

## 2025-08-05 PROCEDURE — A9270 NON-COVERED ITEM OR SERVICE: HCPCS | Performed by: INTERNAL MEDICINE

## 2025-08-05 PROCEDURE — 97535 SELF CARE MNGMENT TRAINING: CPT

## 2025-08-05 PROCEDURE — 700102 HCHG RX REV CODE 250 W/ 637 OVERRIDE(OP): Performed by: INTERNAL MEDICINE

## 2025-08-05 PROCEDURE — 770001 HCHG ROOM/CARE - MED/SURG/GYN PRIV*

## 2025-08-05 PROCEDURE — 36415 COLL VENOUS BLD VENIPUNCTURE: CPT

## 2025-08-05 PROCEDURE — 94760 N-INVAS EAR/PLS OXIMETRY 1: CPT

## 2025-08-05 PROCEDURE — 85027 COMPLETE CBC AUTOMATED: CPT

## 2025-08-05 PROCEDURE — 97602 WOUND(S) CARE NON-SELECTIVE: CPT

## 2025-08-05 PROCEDURE — 94660 CPAP INITIATION&MGMT: CPT

## 2025-08-05 PROCEDURE — 97112 NEUROMUSCULAR REEDUCATION: CPT

## 2025-08-05 PROCEDURE — 80048 BASIC METABOLIC PNL TOTAL CA: CPT

## 2025-08-05 PROCEDURE — 99233 SBSQ HOSP IP/OBS HIGH 50: CPT | Performed by: INTERNAL MEDICINE

## 2025-08-05 RX ORDER — CARISOPRODOL 350 MG/1
350 TABLET ORAL EVERY 6 HOURS
Status: DISCONTINUED | OUTPATIENT
Start: 2025-08-05 | End: 2025-08-08

## 2025-08-05 RX ORDER — METOCLOPRAMIDE HYDROCHLORIDE 5 MG/ML
10 INJECTION INTRAMUSCULAR; INTRAVENOUS EVERY 6 HOURS
Status: DISCONTINUED | OUTPATIENT
Start: 2025-08-05 | End: 2025-08-05

## 2025-08-05 RX ORDER — FUROSEMIDE 10 MG/ML
80 INJECTION INTRAMUSCULAR; INTRAVENOUS
Status: DISCONTINUED | OUTPATIENT
Start: 2025-08-05 | End: 2025-08-10

## 2025-08-05 RX ADMIN — CLOPIDOGREL BISULFATE 75 MG: 75 TABLET, FILM COATED ORAL at 05:41

## 2025-08-05 RX ADMIN — METHOCARBAMOL 1000 MG: 500 TABLET ORAL at 12:44

## 2025-08-05 RX ADMIN — Medication 2 G: at 06:15

## 2025-08-05 RX ADMIN — FUROSEMIDE 80 MG: 10 INJECTION, SOLUTION INTRAMUSCULAR; INTRAVENOUS at 10:43

## 2025-08-05 RX ADMIN — Medication 2 G: at 12:46

## 2025-08-05 RX ADMIN — METHOCARBAMOL 500 MG: 500 TABLET ORAL at 08:09

## 2025-08-05 RX ADMIN — APIXABAN 2.5 MG: 2.5 TABLET, FILM COATED ORAL at 05:41

## 2025-08-05 RX ADMIN — EZETIMIBE 10 MG: 10 TABLET ORAL at 05:41

## 2025-08-05 RX ADMIN — MUPIROCIN 1 APPLICATION: 20 OINTMENT TOPICAL at 06:15

## 2025-08-05 RX ADMIN — OMEPRAZOLE 40 MG: 20 CAPSULE, DELAYED RELEASE ORAL at 05:41

## 2025-08-05 RX ADMIN — METHOCARBAMOL 1000 MG: 500 TABLET ORAL at 17:57

## 2025-08-05 RX ADMIN — DAPAGLIFLOZIN 10 MG: 10 TABLET, FILM COATED ORAL at 05:41

## 2025-08-05 RX ADMIN — APIXABAN 2.5 MG: 2.5 TABLET, FILM COATED ORAL at 17:57

## 2025-08-05 RX ADMIN — Medication 2 G: at 17:57

## 2025-08-05 RX ADMIN — FUROSEMIDE 80 MG: 10 INJECTION, SOLUTION INTRAMUSCULAR; INTRAVENOUS at 05:42

## 2025-08-05 RX ADMIN — MUPIROCIN 2 G: 20 OINTMENT TOPICAL at 17:56

## 2025-08-05 RX ADMIN — SENNOSIDES AND DOCUSATE SODIUM 2 TABLET: 50; 8.6 TABLET ORAL at 17:57

## 2025-08-05 RX ADMIN — AMIODARONE HYDROCHLORIDE 200 MG: 200 TABLET ORAL at 05:41

## 2025-08-05 RX ADMIN — CARISOPRODOL 350 MG: 350 TABLET ORAL at 20:15

## 2025-08-05 RX ADMIN — OMEPRAZOLE 40 MG: 20 CAPSULE, DELAYED RELEASE ORAL at 17:57

## 2025-08-05 RX ADMIN — ACETAMINOPHEN 650 MG: 325 TABLET, FILM COATED ORAL at 08:09

## 2025-08-05 RX ADMIN — ACETAMINOPHEN 650 MG: 325 TABLET, FILM COATED ORAL at 16:00

## 2025-08-05 RX ADMIN — MUPIROCIN 2 G: 20 OINTMENT TOPICAL at 12:44

## 2025-08-05 RX ADMIN — FUROSEMIDE 80 MG: 10 INJECTION, SOLUTION INTRAMUSCULAR; INTRAVENOUS at 16:09

## 2025-08-05 ASSESSMENT — ENCOUNTER SYMPTOMS
HEARTBURN: 0
HEADACHES: 0
PHOTOPHOBIA: 0
FEVER: 0
INSOMNIA: 0
DIZZINESS: 0
SHORTNESS OF BREATH: 0
COUGH: 0
DEPRESSION: 0
BLURRED VISION: 0
SPEECH CHANGE: 0
WEAKNESS: 0
VOMITING: 0
CLAUDICATION: 0
SENSORY CHANGE: 0
NERVOUS/ANXIOUS: 0
DIARRHEA: 0
MYALGIAS: 1
CHILLS: 0
ABDOMINAL PAIN: 0
CONSTIPATION: 0

## 2025-08-05 ASSESSMENT — PAIN DESCRIPTION - PAIN TYPE
TYPE: ACUTE PAIN
TYPE: CHRONIC PAIN;ACUTE PAIN
TYPE: ACUTE PAIN

## 2025-08-05 ASSESSMENT — FIBROSIS 4 INDEX: FIB4 SCORE: 2.5

## 2025-08-05 ASSESSMENT — COGNITIVE AND FUNCTIONAL STATUS - GENERAL
HELP NEEDED FOR BATHING: A LOT
DRESSING REGULAR LOWER BODY CLOTHING: A LOT
TOILETING: A LOT
DRESSING REGULAR UPPER BODY CLOTHING: A LITTLE
PERSONAL GROOMING: A LITTLE
DAILY ACTIVITIY SCORE: 16
SUGGESTED CMS G CODE MODIFIER DAILY ACTIVITY: CK

## 2025-08-05 ASSESSMENT — GAIT ASSESSMENTS: DISTANCE (FEET): 2

## 2025-08-06 ENCOUNTER — APPOINTMENT (OUTPATIENT)
Dept: RADIOLOGY | Facility: MEDICAL CENTER | Age: 85
DRG: 605 | End: 2025-08-06
Attending: INTERNAL MEDICINE
Payer: OTHER MISCELLANEOUS

## 2025-08-06 LAB
ANION GAP SERPL CALC-SCNC: 13 MMOL/L (ref 7–16)
BUN SERPL-MCNC: 58 MG/DL (ref 8–22)
CALCIUM SERPL-MCNC: 8.7 MG/DL (ref 8.5–10.5)
CHLORIDE SERPL-SCNC: 97 MMOL/L (ref 96–112)
CO2 SERPL-SCNC: 24 MMOL/L (ref 20–33)
CREAT SERPL-MCNC: 2.34 MG/DL (ref 0.5–1.4)
ERYTHROCYTE [DISTWIDTH] IN BLOOD BY AUTOMATED COUNT: 53 FL (ref 35.9–50)
GFR SERPLBLD CREATININE-BSD FMLA CKD-EPI: 27 ML/MIN/1.73 M 2
GLUCOSE SERPL-MCNC: 145 MG/DL (ref 65–99)
HCT VFR BLD AUTO: 44.9 % (ref 42–52)
HGB BLD-MCNC: 13.7 G/DL (ref 14–18)
MCH RBC QN AUTO: 25.4 PG (ref 27–33)
MCHC RBC AUTO-ENTMCNC: 30.5 G/DL (ref 32.3–36.5)
MCV RBC AUTO: 83.1 FL (ref 81.4–97.8)
PLATELET # BLD AUTO: 159 K/UL (ref 164–446)
PMV BLD AUTO: 10.7 FL (ref 9–12.9)
POTASSIUM SERPL-SCNC: 3.3 MMOL/L (ref 3.6–5.5)
RBC # BLD AUTO: 5.4 M/UL (ref 4.7–6.1)
SODIUM SERPL-SCNC: 134 MMOL/L (ref 135–145)
WBC # BLD AUTO: 8.6 K/UL (ref 4.8–10.8)

## 2025-08-06 PROCEDURE — 85027 COMPLETE CBC AUTOMATED: CPT

## 2025-08-06 PROCEDURE — 700111 HCHG RX REV CODE 636 W/ 250 OVERRIDE (IP): Mod: JZ | Performed by: INTERNAL MEDICINE

## 2025-08-06 PROCEDURE — 700102 HCHG RX REV CODE 250 W/ 637 OVERRIDE(OP): Performed by: INTERNAL MEDICINE

## 2025-08-06 PROCEDURE — 770001 HCHG ROOM/CARE - MED/SURG/GYN PRIV*

## 2025-08-06 PROCEDURE — A9270 NON-COVERED ITEM OR SERVICE: HCPCS | Performed by: INTERNAL MEDICINE

## 2025-08-06 PROCEDURE — 80048 BASIC METABOLIC PNL TOTAL CA: CPT

## 2025-08-06 PROCEDURE — 73700 CT LOWER EXTREMITY W/O DYE: CPT | Mod: LT

## 2025-08-06 PROCEDURE — 94760 N-INVAS EAR/PLS OXIMETRY 1: CPT

## 2025-08-06 PROCEDURE — 94660 CPAP INITIATION&MGMT: CPT

## 2025-08-06 PROCEDURE — 36415 COLL VENOUS BLD VENIPUNCTURE: CPT

## 2025-08-06 PROCEDURE — 99233 SBSQ HOSP IP/OBS HIGH 50: CPT | Performed by: INTERNAL MEDICINE

## 2025-08-06 RX ORDER — MORPHINE SULFATE 4 MG/ML
4 INJECTION INTRAVENOUS
Status: DISCONTINUED | OUTPATIENT
Start: 2025-08-06 | End: 2025-08-13

## 2025-08-06 RX ORDER — ACETAMINOPHEN 325 MG/1
650 TABLET ORAL EVERY 6 HOURS PRN
Status: DISCONTINUED | OUTPATIENT
Start: 2025-08-06 | End: 2025-08-13

## 2025-08-06 RX ORDER — POTASSIUM CHLORIDE 1500 MG/1
40 TABLET, EXTENDED RELEASE ORAL 2 TIMES DAILY
Status: DISCONTINUED | OUTPATIENT
Start: 2025-08-06 | End: 2025-08-07

## 2025-08-06 RX ORDER — PREGABALIN 25 MG/1
50 CAPSULE ORAL 3 TIMES DAILY
Status: DISCONTINUED | OUTPATIENT
Start: 2025-08-06 | End: 2025-08-07

## 2025-08-06 RX ADMIN — APIXABAN 2.5 MG: 2.5 TABLET, FILM COATED ORAL at 17:03

## 2025-08-06 RX ADMIN — POTASSIUM CHLORIDE 40 MEQ: 1500 TABLET, EXTENDED RELEASE ORAL at 07:33

## 2025-08-06 RX ADMIN — OMEPRAZOLE 40 MG: 20 CAPSULE, DELAYED RELEASE ORAL at 06:36

## 2025-08-06 RX ADMIN — Medication 2 G: at 07:34

## 2025-08-06 RX ADMIN — AMIODARONE HYDROCHLORIDE 200 MG: 200 TABLET ORAL at 06:36

## 2025-08-06 RX ADMIN — ACETAMINOPHEN 650 MG: 325 TABLET, FILM COATED ORAL at 07:34

## 2025-08-06 RX ADMIN — FUROSEMIDE 80 MG: 10 INJECTION, SOLUTION INTRAMUSCULAR; INTRAVENOUS at 06:36

## 2025-08-06 RX ADMIN — DAPAGLIFLOZIN 10 MG: 10 TABLET, FILM COATED ORAL at 06:36

## 2025-08-06 RX ADMIN — PREGABALIN 50 MG: 25 CAPSULE ORAL at 13:20

## 2025-08-06 RX ADMIN — CLOPIDOGREL BISULFATE 75 MG: 75 TABLET, FILM COATED ORAL at 06:36

## 2025-08-06 RX ADMIN — CARISOPRODOL 350 MG: 350 TABLET ORAL at 13:20

## 2025-08-06 RX ADMIN — CARISOPRODOL 350 MG: 350 TABLET ORAL at 18:00

## 2025-08-06 RX ADMIN — APIXABAN 2.5 MG: 2.5 TABLET, FILM COATED ORAL at 06:36

## 2025-08-06 RX ADMIN — ACETAMINOPHEN 650 MG: 325 TABLET, FILM COATED ORAL at 22:37

## 2025-08-06 RX ADMIN — OMEPRAZOLE 40 MG: 20 CAPSULE, DELAYED RELEASE ORAL at 17:03

## 2025-08-06 RX ADMIN — CARISOPRODOL 350 MG: 350 TABLET ORAL at 06:36

## 2025-08-06 RX ADMIN — POTASSIUM CHLORIDE 40 MEQ: 1500 TABLET, EXTENDED RELEASE ORAL at 18:21

## 2025-08-06 RX ADMIN — CARBOXYMETHYLCELLULOSE SODIUM 1 DROP: 5 SOLUTION/ DROPS OPHTHALMIC at 13:20

## 2025-08-06 RX ADMIN — PREGABALIN 50 MG: 25 CAPSULE ORAL at 18:22

## 2025-08-06 RX ADMIN — FUROSEMIDE 80 MG: 10 INJECTION, SOLUTION INTRAMUSCULAR; INTRAVENOUS at 17:03

## 2025-08-06 RX ADMIN — MORPHINE SULFATE 4 MG: 4 INJECTION, SOLUTION INTRAMUSCULAR; INTRAVENOUS at 10:03

## 2025-08-06 RX ADMIN — CARISOPRODOL 350 MG: 350 TABLET ORAL at 00:59

## 2025-08-06 RX ADMIN — FUROSEMIDE 80 MG: 10 INJECTION, SOLUTION INTRAMUSCULAR; INTRAVENOUS at 10:04

## 2025-08-06 RX ADMIN — SENNOSIDES AND DOCUSATE SODIUM 2 TABLET: 50; 8.6 TABLET ORAL at 17:03

## 2025-08-06 RX ADMIN — CARISOPRODOL 350 MG: 350 TABLET ORAL at 23:46

## 2025-08-06 RX ADMIN — EZETIMIBE 10 MG: 10 TABLET ORAL at 06:36

## 2025-08-06 RX ADMIN — ACETAMINOPHEN 650 MG: 325 TABLET, FILM COATED ORAL at 01:00

## 2025-08-06 ASSESSMENT — ENCOUNTER SYMPTOMS
VOMITING: 0
ABDOMINAL PAIN: 0
SHORTNESS OF BREATH: 0
NERVOUS/ANXIOUS: 0
BLURRED VISION: 0
FEVER: 0
DIZZINESS: 0
INSOMNIA: 0
HEARTBURN: 0
CLAUDICATION: 0
SPEECH CHANGE: 0
MYALGIAS: 1
CONSTIPATION: 0
CHILLS: 0
SENSORY CHANGE: 0
PHOTOPHOBIA: 0
WEAKNESS: 0
DEPRESSION: 0
HEADACHES: 0
DIARRHEA: 0
COUGH: 0

## 2025-08-06 ASSESSMENT — PAIN DESCRIPTION - PAIN TYPE
TYPE: ACUTE PAIN

## 2025-08-07 LAB
ANION GAP SERPL CALC-SCNC: 15 MMOL/L (ref 7–16)
APPEARANCE UR: CLEAR
BILIRUB UR QL STRIP.AUTO: NEGATIVE
BUN SERPL-MCNC: 62 MG/DL (ref 8–22)
CALCIUM SERPL-MCNC: 8.8 MG/DL (ref 8.5–10.5)
CHLORIDE SERPL-SCNC: 97 MMOL/L (ref 96–112)
CO2 SERPL-SCNC: 24 MMOL/L (ref 20–33)
COLOR UR: YELLOW
CREAT SERPL-MCNC: 2.34 MG/DL (ref 0.5–1.4)
ERYTHROCYTE [DISTWIDTH] IN BLOOD BY AUTOMATED COUNT: 53.5 FL (ref 35.9–50)
GFR SERPLBLD CREATININE-BSD FMLA CKD-EPI: 27 ML/MIN/1.73 M 2
GLUCOSE SERPL-MCNC: 159 MG/DL (ref 65–99)
GLUCOSE UR STRIP.AUTO-MCNC: >=1000 MG/DL
HCT VFR BLD AUTO: 46.4 % (ref 42–52)
HGB BLD-MCNC: 14.2 G/DL (ref 14–18)
KETONES UR STRIP.AUTO-MCNC: NEGATIVE MG/DL
LEUKOCYTE ESTERASE UR QL STRIP.AUTO: NEGATIVE
MCH RBC QN AUTO: 25.4 PG (ref 27–33)
MCHC RBC AUTO-ENTMCNC: 30.6 G/DL (ref 32.3–36.5)
MCV RBC AUTO: 83 FL (ref 81.4–97.8)
MICRO URNS: ABNORMAL
NITRITE UR QL STRIP.AUTO: NEGATIVE
PH UR STRIP.AUTO: 5.5 [PH] (ref 5–8)
PLATELET # BLD AUTO: 164 K/UL (ref 164–446)
PMV BLD AUTO: 11.1 FL (ref 9–12.9)
POTASSIUM SERPL-SCNC: 4.1 MMOL/L (ref 3.6–5.5)
PROT UR QL STRIP: NEGATIVE MG/DL
RBC # BLD AUTO: 5.59 M/UL (ref 4.7–6.1)
RBC UR QL AUTO: NEGATIVE
SODIUM SERPL-SCNC: 136 MMOL/L (ref 135–145)
SP GR UR STRIP.AUTO: 1.01
UROBILINOGEN UR STRIP.AUTO-MCNC: 0.2 EU/DL
WBC # BLD AUTO: 8.6 K/UL (ref 4.8–10.8)

## 2025-08-07 PROCEDURE — 700102 HCHG RX REV CODE 250 W/ 637 OVERRIDE(OP): Performed by: INTERNAL MEDICINE

## 2025-08-07 PROCEDURE — 80048 BASIC METABOLIC PNL TOTAL CA: CPT

## 2025-08-07 PROCEDURE — 700105 HCHG RX REV CODE 258: Performed by: INTERNAL MEDICINE

## 2025-08-07 PROCEDURE — 700111 HCHG RX REV CODE 636 W/ 250 OVERRIDE (IP): Performed by: INTERNAL MEDICINE

## 2025-08-07 PROCEDURE — 36415 COLL VENOUS BLD VENIPUNCTURE: CPT

## 2025-08-07 PROCEDURE — A9270 NON-COVERED ITEM OR SERVICE: HCPCS | Performed by: INTERNAL MEDICINE

## 2025-08-07 PROCEDURE — 99233 SBSQ HOSP IP/OBS HIGH 50: CPT | Performed by: INTERNAL MEDICINE

## 2025-08-07 PROCEDURE — 97602 WOUND(S) CARE NON-SELECTIVE: CPT

## 2025-08-07 PROCEDURE — 85027 COMPLETE CBC AUTOMATED: CPT

## 2025-08-07 PROCEDURE — 97530 THERAPEUTIC ACTIVITIES: CPT

## 2025-08-07 PROCEDURE — 94760 N-INVAS EAR/PLS OXIMETRY 1: CPT

## 2025-08-07 PROCEDURE — 87086 URINE CULTURE/COLONY COUNT: CPT

## 2025-08-07 PROCEDURE — 81003 URINALYSIS AUTO W/O SCOPE: CPT

## 2025-08-07 PROCEDURE — 94660 CPAP INITIATION&MGMT: CPT

## 2025-08-07 PROCEDURE — 770001 HCHG ROOM/CARE - MED/SURG/GYN PRIV*

## 2025-08-07 PROCEDURE — 97110 THERAPEUTIC EXERCISES: CPT

## 2025-08-07 RX ORDER — POTASSIUM CHLORIDE 1500 MG/1
40 TABLET, EXTENDED RELEASE ORAL DAILY
Status: DISCONTINUED | OUTPATIENT
Start: 2025-08-08 | End: 2025-08-14 | Stop reason: HOSPADM

## 2025-08-07 RX ORDER — PREGABALIN 75 MG/1
75 CAPSULE ORAL 3 TIMES DAILY
Status: DISCONTINUED | OUTPATIENT
Start: 2025-08-07 | End: 2025-08-07

## 2025-08-07 RX ORDER — GABAPENTIN 100 MG/1
200 CAPSULE ORAL 3 TIMES DAILY
Status: DISCONTINUED | OUTPATIENT
Start: 2025-08-07 | End: 2025-08-08

## 2025-08-07 RX ADMIN — PREGABALIN 50 MG: 25 CAPSULE ORAL at 06:21

## 2025-08-07 RX ADMIN — AMIODARONE HYDROCHLORIDE 200 MG: 200 TABLET ORAL at 06:21

## 2025-08-07 RX ADMIN — APIXABAN 2.5 MG: 2.5 TABLET, FILM COATED ORAL at 06:20

## 2025-08-07 RX ADMIN — GABAPENTIN 200 MG: 100 CAPSULE ORAL at 11:12

## 2025-08-07 RX ADMIN — CARBOXYMETHYLCELLULOSE SODIUM 1 DROP: 5 SOLUTION/ DROPS OPHTHALMIC at 06:32

## 2025-08-07 RX ADMIN — DAPAGLIFLOZIN 10 MG: 10 TABLET, FILM COATED ORAL at 06:20

## 2025-08-07 RX ADMIN — CARISOPRODOL 350 MG: 350 TABLET ORAL at 11:12

## 2025-08-07 RX ADMIN — MORPHINE SULFATE 4 MG: 4 INJECTION, SOLUTION INTRAMUSCULAR; INTRAVENOUS at 00:35

## 2025-08-07 RX ADMIN — FUROSEMIDE 80 MG: 10 INJECTION, SOLUTION INTRAMUSCULAR; INTRAVENOUS at 17:01

## 2025-08-07 RX ADMIN — FUROSEMIDE 80 MG: 10 INJECTION, SOLUTION INTRAMUSCULAR; INTRAVENOUS at 06:21

## 2025-08-07 RX ADMIN — OMEPRAZOLE 40 MG: 20 CAPSULE, DELAYED RELEASE ORAL at 06:21

## 2025-08-07 RX ADMIN — POLYETHYLENE GLYCOL 3350 1 PACKET: 17 POWDER, FOR SOLUTION ORAL at 14:11

## 2025-08-07 RX ADMIN — EZETIMIBE 10 MG: 10 TABLET ORAL at 06:21

## 2025-08-07 RX ADMIN — CARISOPRODOL 350 MG: 350 TABLET ORAL at 06:21

## 2025-08-07 RX ADMIN — APIXABAN 2.5 MG: 2.5 TABLET, FILM COATED ORAL at 17:01

## 2025-08-07 RX ADMIN — OMEPRAZOLE 40 MG: 20 CAPSULE, DELAYED RELEASE ORAL at 17:41

## 2025-08-07 RX ADMIN — CLOPIDOGREL BISULFATE 75 MG: 75 TABLET, FILM COATED ORAL at 06:21

## 2025-08-07 RX ADMIN — ACETAMINOPHEN 650 MG: 325 TABLET, FILM COATED ORAL at 17:00

## 2025-08-07 RX ADMIN — POTASSIUM CHLORIDE 40 MEQ: 1500 TABLET, EXTENDED RELEASE ORAL at 06:21

## 2025-08-07 RX ADMIN — SENNOSIDES AND DOCUSATE SODIUM 2 TABLET: 50; 8.6 TABLET ORAL at 17:01

## 2025-08-07 RX ADMIN — CEFAZOLIN 2 G: 2 INJECTION, POWDER, FOR SOLUTION INTRAVENOUS at 17:48

## 2025-08-07 RX ADMIN — CARISOPRODOL 350 MG: 350 TABLET ORAL at 17:01

## 2025-08-07 RX ADMIN — GABAPENTIN 200 MG: 100 CAPSULE ORAL at 17:01

## 2025-08-07 RX ADMIN — FUROSEMIDE 80 MG: 10 INJECTION, SOLUTION INTRAMUSCULAR; INTRAVENOUS at 11:12

## 2025-08-07 ASSESSMENT — ENCOUNTER SYMPTOMS
ABDOMINAL PAIN: 0
DEPRESSION: 0
VOMITING: 0
DIZZINESS: 0
DIARRHEA: 0
NERVOUS/ANXIOUS: 0
INSOMNIA: 0
CLAUDICATION: 0
FEVER: 0
CONSTIPATION: 0
PHOTOPHOBIA: 0
CHILLS: 0
BLURRED VISION: 0
MYALGIAS: 1
HEADACHES: 0
SHORTNESS OF BREATH: 0
SENSORY CHANGE: 0
WEAKNESS: 0
COUGH: 0
SPEECH CHANGE: 0
HEARTBURN: 0

## 2025-08-07 ASSESSMENT — PAIN DESCRIPTION - PAIN TYPE
TYPE: ACUTE PAIN
TYPE: ACUTE PAIN

## 2025-08-07 ASSESSMENT — COGNITIVE AND FUNCTIONAL STATUS - GENERAL
SUGGESTED CMS G CODE MODIFIER MOBILITY: CL
MOVING TO AND FROM BED TO CHAIR: A LITTLE
CLIMB 3 TO 5 STEPS WITH RAILING: TOTAL
WALKING IN HOSPITAL ROOM: TOTAL
MOBILITY SCORE: 14
STANDING UP FROM CHAIR USING ARMS: A LITTLE
TURNING FROM BACK TO SIDE WHILE IN FLAT BAD: A LITTLE
MOVING FROM LYING ON BACK TO SITTING ON SIDE OF FLAT BED: A LITTLE

## 2025-08-07 ASSESSMENT — GAIT ASSESSMENTS: GAIT LEVEL OF ASSIST: UNABLE TO PARTICIPATE

## 2025-08-08 PROBLEM — R50.9 FEVER: Status: ACTIVE | Noted: 2025-08-08

## 2025-08-08 LAB
ANION GAP SERPL CALC-SCNC: 17 MMOL/L (ref 7–16)
BUN SERPL-MCNC: 68 MG/DL (ref 8–22)
CALCIUM SERPL-MCNC: 8.9 MG/DL (ref 8.5–10.5)
CHLORIDE SERPL-SCNC: 94 MMOL/L (ref 96–112)
CO2 SERPL-SCNC: 24 MMOL/L (ref 20–33)
CREAT SERPL-MCNC: 3.14 MG/DL (ref 0.5–1.4)
ERYTHROCYTE [DISTWIDTH] IN BLOOD BY AUTOMATED COUNT: 54.5 FL (ref 35.9–50)
GFR SERPLBLD CREATININE-BSD FMLA CKD-EPI: 19 ML/MIN/1.73 M 2
GLUCOSE BLD STRIP.AUTO-MCNC: 187 MG/DL (ref 65–99)
GLUCOSE BLD STRIP.AUTO-MCNC: 193 MG/DL (ref 65–99)
GLUCOSE BLD STRIP.AUTO-MCNC: 205 MG/DL (ref 65–99)
GLUCOSE SERPL-MCNC: 169 MG/DL (ref 65–99)
HCT VFR BLD AUTO: 46 % (ref 42–52)
HGB BLD-MCNC: 13.8 G/DL (ref 14–18)
MCH RBC QN AUTO: 25.3 PG (ref 27–33)
MCHC RBC AUTO-ENTMCNC: 30 G/DL (ref 32.3–36.5)
MCV RBC AUTO: 84.4 FL (ref 81.4–97.8)
PLATELET # BLD AUTO: 183 K/UL (ref 164–446)
PMV BLD AUTO: 11.2 FL (ref 9–12.9)
POTASSIUM SERPL-SCNC: 4.1 MMOL/L (ref 3.6–5.5)
RBC # BLD AUTO: 5.45 M/UL (ref 4.7–6.1)
SODIUM SERPL-SCNC: 135 MMOL/L (ref 135–145)
WBC # BLD AUTO: 10.3 K/UL (ref 4.8–10.8)

## 2025-08-08 PROCEDURE — 700105 HCHG RX REV CODE 258: Performed by: INTERNAL MEDICINE

## 2025-08-08 PROCEDURE — 700102 HCHG RX REV CODE 250 W/ 637 OVERRIDE(OP): Performed by: INTERNAL MEDICINE

## 2025-08-08 PROCEDURE — A9270 NON-COVERED ITEM OR SERVICE: HCPCS | Performed by: INTERNAL MEDICINE

## 2025-08-08 PROCEDURE — 99233 SBSQ HOSP IP/OBS HIGH 50: CPT | Performed by: INTERNAL MEDICINE

## 2025-08-08 PROCEDURE — 82962 GLUCOSE BLOOD TEST: CPT | Performed by: INTERNAL MEDICINE

## 2025-08-08 PROCEDURE — 85027 COMPLETE CBC AUTOMATED: CPT

## 2025-08-08 PROCEDURE — 97535 SELF CARE MNGMENT TRAINING: CPT

## 2025-08-08 PROCEDURE — 770001 HCHG ROOM/CARE - MED/SURG/GYN PRIV*

## 2025-08-08 PROCEDURE — 94760 N-INVAS EAR/PLS OXIMETRY 1: CPT

## 2025-08-08 PROCEDURE — 36415 COLL VENOUS BLD VENIPUNCTURE: CPT

## 2025-08-08 PROCEDURE — 80048 BASIC METABOLIC PNL TOTAL CA: CPT

## 2025-08-08 PROCEDURE — 700111 HCHG RX REV CODE 636 W/ 250 OVERRIDE (IP): Mod: JZ | Performed by: INTERNAL MEDICINE

## 2025-08-08 PROCEDURE — 94660 CPAP INITIATION&MGMT: CPT

## 2025-08-08 RX ORDER — TAMSULOSIN HYDROCHLORIDE 0.4 MG/1
0.4 CAPSULE ORAL
Status: DISCONTINUED | OUTPATIENT
Start: 2025-08-08 | End: 2025-08-14 | Stop reason: HOSPADM

## 2025-08-08 RX ORDER — DEXTROSE MONOHYDRATE 25 G/50ML
25 INJECTION, SOLUTION INTRAVENOUS
Status: DISCONTINUED | OUTPATIENT
Start: 2025-08-08 | End: 2025-08-10

## 2025-08-08 RX ORDER — INSULIN LISPRO 100 [IU]/ML
1-6 INJECTION, SOLUTION INTRAVENOUS; SUBCUTANEOUS
Status: DISCONTINUED | OUTPATIENT
Start: 2025-08-08 | End: 2025-08-10

## 2025-08-08 RX ADMIN — APIXABAN 2.5 MG: 2.5 TABLET, FILM COATED ORAL at 06:09

## 2025-08-08 RX ADMIN — CLOPIDOGREL BISULFATE 75 MG: 75 TABLET, FILM COATED ORAL at 06:09

## 2025-08-08 RX ADMIN — CARISOPRODOL 350 MG: 350 TABLET ORAL at 00:19

## 2025-08-08 RX ADMIN — DAPAGLIFLOZIN 10 MG: 10 TABLET, FILM COATED ORAL at 06:10

## 2025-08-08 RX ADMIN — INSULIN LISPRO 1 UNITS: 100 INJECTION, SOLUTION INTRAVENOUS; SUBCUTANEOUS at 11:48

## 2025-08-08 RX ADMIN — ACETAMINOPHEN 650 MG: 325 TABLET, FILM COATED ORAL at 09:42

## 2025-08-08 RX ADMIN — CEFAZOLIN 2 G: 2 INJECTION, POWDER, FOR SOLUTION INTRAVENOUS at 06:31

## 2025-08-08 RX ADMIN — EZETIMIBE 10 MG: 10 TABLET ORAL at 06:09

## 2025-08-08 RX ADMIN — OMEPRAZOLE 40 MG: 20 CAPSULE, DELAYED RELEASE ORAL at 16:59

## 2025-08-08 RX ADMIN — SENNOSIDES AND DOCUSATE SODIUM 2 TABLET: 50; 8.6 TABLET ORAL at 16:59

## 2025-08-08 RX ADMIN — CEFAZOLIN 2 G: 2 INJECTION, POWDER, FOR SOLUTION INTRAVENOUS at 17:04

## 2025-08-08 RX ADMIN — APIXABAN 2.5 MG: 2.5 TABLET, FILM COATED ORAL at 16:59

## 2025-08-08 RX ADMIN — OMEPRAZOLE 40 MG: 20 CAPSULE, DELAYED RELEASE ORAL at 06:09

## 2025-08-08 RX ADMIN — GABAPENTIN 200 MG: 100 CAPSULE ORAL at 06:09

## 2025-08-08 RX ADMIN — POTASSIUM CHLORIDE 40 MEQ: 1500 TABLET, EXTENDED RELEASE ORAL at 06:10

## 2025-08-08 RX ADMIN — POLYETHYLENE GLYCOL 3350 1 PACKET: 17 POWDER, FOR SOLUTION ORAL at 09:50

## 2025-08-08 RX ADMIN — INSULIN LISPRO 1 UNITS: 100 INJECTION, SOLUTION INTRAVENOUS; SUBCUTANEOUS at 16:56

## 2025-08-08 RX ADMIN — TAMSULOSIN HYDROCHLORIDE 0.4 MG: 0.4 CAPSULE ORAL at 11:50

## 2025-08-08 RX ADMIN — CARISOPRODOL 350 MG: 350 TABLET ORAL at 06:09

## 2025-08-08 RX ADMIN — INSULIN LISPRO 2 UNITS: 100 INJECTION, SOLUTION INTRAVENOUS; SUBCUTANEOUS at 21:37

## 2025-08-08 RX ADMIN — AMIODARONE HYDROCHLORIDE 200 MG: 200 TABLET ORAL at 06:09

## 2025-08-08 ASSESSMENT — ENCOUNTER SYMPTOMS
VOMITING: 0
CLAUDICATION: 0
CONSTIPATION: 0
DIARRHEA: 0
FEVER: 0
HEARTBURN: 0
SPEECH CHANGE: 0
MYALGIAS: 1
INSOMNIA: 0
SHORTNESS OF BREATH: 0
DEPRESSION: 0
COUGH: 0
SENSORY CHANGE: 0
ABDOMINAL PAIN: 0
CHILLS: 0
HEADACHES: 0
WEAKNESS: 0
DIZZINESS: 0
PHOTOPHOBIA: 0
BLURRED VISION: 0
NERVOUS/ANXIOUS: 0

## 2025-08-08 ASSESSMENT — PAIN DESCRIPTION - PAIN TYPE
TYPE: ACUTE PAIN
TYPE: ACUTE PAIN

## 2025-08-08 ASSESSMENT — COGNITIVE AND FUNCTIONAL STATUS - GENERAL
SUGGESTED CMS G CODE MODIFIER DAILY ACTIVITY: CK
DRESSING REGULAR UPPER BODY CLOTHING: A LITTLE
PERSONAL GROOMING: A LITTLE
HELP NEEDED FOR BATHING: A LOT
TOILETING: A LOT
DAILY ACTIVITIY SCORE: 16
DRESSING REGULAR LOWER BODY CLOTHING: A LOT

## 2025-08-08 ASSESSMENT — FIBROSIS 4 INDEX: FIB4 SCORE: 2.18

## 2025-08-09 LAB
ANION GAP SERPL CALC-SCNC: 17 MMOL/L (ref 7–16)
BUN SERPL-MCNC: 74 MG/DL (ref 8–22)
CALCIUM SERPL-MCNC: 9 MG/DL (ref 8.5–10.5)
CHLORIDE SERPL-SCNC: 95 MMOL/L (ref 96–112)
CO2 SERPL-SCNC: 23 MMOL/L (ref 20–33)
CREAT SERPL-MCNC: 2.69 MG/DL (ref 0.5–1.4)
ERYTHROCYTE [DISTWIDTH] IN BLOOD BY AUTOMATED COUNT: 54.3 FL (ref 35.9–50)
GFR SERPLBLD CREATININE-BSD FMLA CKD-EPI: 23 ML/MIN/1.73 M 2
GLUCOSE BLD STRIP.AUTO-MCNC: 155 MG/DL (ref 65–99)
GLUCOSE BLD STRIP.AUTO-MCNC: 186 MG/DL (ref 65–99)
GLUCOSE BLD STRIP.AUTO-MCNC: 196 MG/DL (ref 65–99)
GLUCOSE BLD STRIP.AUTO-MCNC: 211 MG/DL (ref 65–99)
GLUCOSE SERPL-MCNC: 124 MG/DL (ref 65–99)
HCT VFR BLD AUTO: 44.1 % (ref 42–52)
HGB BLD-MCNC: 13.3 G/DL (ref 14–18)
MCH RBC QN AUTO: 25.5 PG (ref 27–33)
MCHC RBC AUTO-ENTMCNC: 30.2 G/DL (ref 32.3–36.5)
MCV RBC AUTO: 84.6 FL (ref 81.4–97.8)
PLATELET # BLD AUTO: 169 K/UL (ref 164–446)
PMV BLD AUTO: 10.7 FL (ref 9–12.9)
POTASSIUM SERPL-SCNC: 4 MMOL/L (ref 3.6–5.5)
RBC # BLD AUTO: 5.21 M/UL (ref 4.7–6.1)
SODIUM SERPL-SCNC: 135 MMOL/L (ref 135–145)
WBC # BLD AUTO: 9.2 K/UL (ref 4.8–10.8)

## 2025-08-09 PROCEDURE — 700102 HCHG RX REV CODE 250 W/ 637 OVERRIDE(OP): Performed by: INTERNAL MEDICINE

## 2025-08-09 PROCEDURE — 80048 BASIC METABOLIC PNL TOTAL CA: CPT

## 2025-08-09 PROCEDURE — A9270 NON-COVERED ITEM OR SERVICE: HCPCS | Performed by: INTERNAL MEDICINE

## 2025-08-09 PROCEDURE — 99233 SBSQ HOSP IP/OBS HIGH 50: CPT | Performed by: INTERNAL MEDICINE

## 2025-08-09 PROCEDURE — 36415 COLL VENOUS BLD VENIPUNCTURE: CPT

## 2025-08-09 PROCEDURE — 770001 HCHG ROOM/CARE - MED/SURG/GYN PRIV*

## 2025-08-09 PROCEDURE — 85027 COMPLETE CBC AUTOMATED: CPT

## 2025-08-09 PROCEDURE — 700105 HCHG RX REV CODE 258: Performed by: INTERNAL MEDICINE

## 2025-08-09 PROCEDURE — 82962 GLUCOSE BLOOD TEST: CPT | Performed by: INTERNAL MEDICINE

## 2025-08-09 PROCEDURE — 700111 HCHG RX REV CODE 636 W/ 250 OVERRIDE (IP): Mod: JZ | Performed by: INTERNAL MEDICINE

## 2025-08-09 PROCEDURE — 94660 CPAP INITIATION&MGMT: CPT

## 2025-08-09 PROCEDURE — 94760 N-INVAS EAR/PLS OXIMETRY 1: CPT

## 2025-08-09 RX ADMIN — TAMSULOSIN HYDROCHLORIDE 0.4 MG: 0.4 CAPSULE ORAL at 08:09

## 2025-08-09 RX ADMIN — POLYETHYLENE GLYCOL 3350 1 PACKET: 17 POWDER, FOR SOLUTION ORAL at 14:07

## 2025-08-09 RX ADMIN — OMEPRAZOLE 40 MG: 20 CAPSULE, DELAYED RELEASE ORAL at 17:06

## 2025-08-09 RX ADMIN — INSULIN LISPRO 2 UNITS: 100 INJECTION, SOLUTION INTRAVENOUS; SUBCUTANEOUS at 21:03

## 2025-08-09 RX ADMIN — CEFAZOLIN 2 G: 2 INJECTION, POWDER, FOR SOLUTION INTRAVENOUS at 17:16

## 2025-08-09 RX ADMIN — APIXABAN 2.5 MG: 2.5 TABLET, FILM COATED ORAL at 06:07

## 2025-08-09 RX ADMIN — INSULIN LISPRO 1 UNITS: 100 INJECTION, SOLUTION INTRAVENOUS; SUBCUTANEOUS at 17:09

## 2025-08-09 RX ADMIN — DAPAGLIFLOZIN 10 MG: 10 TABLET, FILM COATED ORAL at 06:07

## 2025-08-09 RX ADMIN — CEFAZOLIN 2 G: 2 INJECTION, POWDER, FOR SOLUTION INTRAVENOUS at 06:38

## 2025-08-09 RX ADMIN — SENNOSIDES AND DOCUSATE SODIUM 2 TABLET: 50; 8.6 TABLET ORAL at 17:03

## 2025-08-09 RX ADMIN — MORPHINE SULFATE 4 MG: 4 INJECTION, SOLUTION INTRAMUSCULAR; INTRAVENOUS at 14:37

## 2025-08-09 RX ADMIN — INSULIN LISPRO 1 UNITS: 100 INJECTION, SOLUTION INTRAVENOUS; SUBCUTANEOUS at 06:54

## 2025-08-09 RX ADMIN — OMEPRAZOLE 40 MG: 20 CAPSULE, DELAYED RELEASE ORAL at 06:06

## 2025-08-09 RX ADMIN — AMIODARONE HYDROCHLORIDE 200 MG: 200 TABLET ORAL at 06:06

## 2025-08-09 RX ADMIN — APIXABAN 2.5 MG: 2.5 TABLET, FILM COATED ORAL at 17:06

## 2025-08-09 RX ADMIN — ACETAMINOPHEN 650 MG: 325 TABLET, FILM COATED ORAL at 21:05

## 2025-08-09 RX ADMIN — ACETAMINOPHEN 650 MG: 325 TABLET, FILM COATED ORAL at 00:01

## 2025-08-09 RX ADMIN — POTASSIUM CHLORIDE 40 MEQ: 1500 TABLET, EXTENDED RELEASE ORAL at 06:07

## 2025-08-09 RX ADMIN — FUROSEMIDE 80 MG: 10 INJECTION, SOLUTION INTRAMUSCULAR; INTRAVENOUS at 17:05

## 2025-08-09 RX ADMIN — EZETIMIBE 10 MG: 10 TABLET ORAL at 06:06

## 2025-08-09 RX ADMIN — CLOPIDOGREL BISULFATE 75 MG: 75 TABLET, FILM COATED ORAL at 06:07

## 2025-08-09 RX ADMIN — ACETAMINOPHEN 650 MG: 325 TABLET, FILM COATED ORAL at 08:21

## 2025-08-09 ASSESSMENT — ENCOUNTER SYMPTOMS
FEVER: 0
SPEECH CHANGE: 0
DIZZINESS: 0
BLURRED VISION: 0
CHILLS: 0
WEAKNESS: 0
PHOTOPHOBIA: 0
SHORTNESS OF BREATH: 0
VOMITING: 0
CLAUDICATION: 0
HEARTBURN: 0
INSOMNIA: 0
SENSORY CHANGE: 0
MYALGIAS: 1
COUGH: 0
DEPRESSION: 0
NERVOUS/ANXIOUS: 0
DIARRHEA: 0
ABDOMINAL PAIN: 0
CONSTIPATION: 0
HEADACHES: 0

## 2025-08-09 ASSESSMENT — PAIN DESCRIPTION - PAIN TYPE
TYPE: ACUTE PAIN

## 2025-08-10 PROBLEM — T14.8XXA TRAUMATIC HEMATOMA: Status: ACTIVE | Noted: 2025-08-01

## 2025-08-10 LAB
ANION GAP SERPL CALC-SCNC: 14 MMOL/L (ref 7–16)
BACTERIA UR CULT: NORMAL
BUN SERPL-MCNC: 70 MG/DL (ref 8–22)
CALCIUM SERPL-MCNC: 8.6 MG/DL (ref 8.5–10.5)
CHLORIDE SERPL-SCNC: 98 MMOL/L (ref 96–112)
CK SERPL-CCNC: 29 U/L (ref 0–154)
CO2 SERPL-SCNC: 20 MMOL/L (ref 20–33)
CREAT SERPL-MCNC: 2.42 MG/DL (ref 0.5–1.4)
ERYTHROCYTE [DISTWIDTH] IN BLOOD BY AUTOMATED COUNT: 54.4 FL (ref 35.9–50)
GFR SERPLBLD CREATININE-BSD FMLA CKD-EPI: 26 ML/MIN/1.73 M 2
GLUCOSE BLD STRIP.AUTO-MCNC: 162 MG/DL (ref 65–99)
GLUCOSE BLD STRIP.AUTO-MCNC: 183 MG/DL (ref 65–99)
GLUCOSE BLD STRIP.AUTO-MCNC: 186 MG/DL (ref 65–99)
GLUCOSE BLD STRIP.AUTO-MCNC: 216 MG/DL (ref 65–99)
GLUCOSE SERPL-MCNC: 222 MG/DL (ref 65–99)
HCT VFR BLD AUTO: 41.2 % (ref 42–52)
HGB BLD-MCNC: 12.8 G/DL (ref 14–18)
MCH RBC QN AUTO: 25.7 PG (ref 27–33)
MCHC RBC AUTO-ENTMCNC: 31.1 G/DL (ref 32.3–36.5)
MCV RBC AUTO: 82.6 FL (ref 81.4–97.8)
PLATELET # BLD AUTO: 176 K/UL (ref 164–446)
PMV BLD AUTO: 10.4 FL (ref 9–12.9)
POTASSIUM SERPL-SCNC: 4.1 MMOL/L (ref 3.6–5.5)
PROCALCITONIN SERPL-MCNC: 0.41 NG/ML
RBC # BLD AUTO: 4.99 M/UL (ref 4.7–6.1)
SCCMEC + MECA PNL NOSE NAA+PROBE: NEGATIVE
SIGNIFICANT IND 70042: NORMAL
SITE SITE: NORMAL
SODIUM SERPL-SCNC: 132 MMOL/L (ref 135–145)
SOURCE SOURCE: NORMAL
WBC # BLD AUTO: 7.5 K/UL (ref 4.8–10.8)

## 2025-08-10 PROCEDURE — 84145 PROCALCITONIN (PCT): CPT

## 2025-08-10 PROCEDURE — A9270 NON-COVERED ITEM OR SERVICE: HCPCS | Performed by: HOSPITALIST

## 2025-08-10 PROCEDURE — 94660 CPAP INITIATION&MGMT: CPT

## 2025-08-10 PROCEDURE — 700102 HCHG RX REV CODE 250 W/ 637 OVERRIDE(OP): Performed by: INTERNAL MEDICINE

## 2025-08-10 PROCEDURE — A9270 NON-COVERED ITEM OR SERVICE: HCPCS | Performed by: INTERNAL MEDICINE

## 2025-08-10 PROCEDURE — 80048 BASIC METABOLIC PNL TOTAL CA: CPT

## 2025-08-10 PROCEDURE — 82550 ASSAY OF CK (CPK): CPT

## 2025-08-10 PROCEDURE — 94760 N-INVAS EAR/PLS OXIMETRY 1: CPT

## 2025-08-10 PROCEDURE — 700105 HCHG RX REV CODE 258: Performed by: INTERNAL MEDICINE

## 2025-08-10 PROCEDURE — 82962 GLUCOSE BLOOD TEST: CPT | Performed by: HOSPITALIST

## 2025-08-10 PROCEDURE — 36415 COLL VENOUS BLD VENIPUNCTURE: CPT

## 2025-08-10 PROCEDURE — 700102 HCHG RX REV CODE 250 W/ 637 OVERRIDE(OP): Performed by: HOSPITALIST

## 2025-08-10 PROCEDURE — 85027 COMPLETE CBC AUTOMATED: CPT

## 2025-08-10 PROCEDURE — 99233 SBSQ HOSP IP/OBS HIGH 50: CPT | Performed by: HOSPITALIST

## 2025-08-10 PROCEDURE — 700111 HCHG RX REV CODE 636 W/ 250 OVERRIDE (IP): Performed by: INTERNAL MEDICINE

## 2025-08-10 PROCEDURE — 87641 MR-STAPH DNA AMP PROBE: CPT

## 2025-08-10 PROCEDURE — 770001 HCHG ROOM/CARE - MED/SURG/GYN PRIV*

## 2025-08-10 RX ORDER — FUROSEMIDE 10 MG/ML
40 INJECTION INTRAMUSCULAR; INTRAVENOUS DAILY
Status: DISCONTINUED | OUTPATIENT
Start: 2025-08-11 | End: 2025-08-11

## 2025-08-10 RX ORDER — LINEZOLID 600 MG/1
600 TABLET, FILM COATED ORAL EVERY 12 HOURS
Status: DISCONTINUED | OUTPATIENT
Start: 2025-08-10 | End: 2025-08-11

## 2025-08-10 RX ORDER — DEXTROSE MONOHYDRATE 25 G/50ML
25 INJECTION, SOLUTION INTRAVENOUS
Status: DISCONTINUED | OUTPATIENT
Start: 2025-08-10 | End: 2025-08-14 | Stop reason: HOSPADM

## 2025-08-10 RX ORDER — INSULIN LISPRO 100 [IU]/ML
3-14 INJECTION, SOLUTION INTRAVENOUS; SUBCUTANEOUS EVERY 6 HOURS
Status: DISCONTINUED | OUTPATIENT
Start: 2025-08-10 | End: 2025-08-14 | Stop reason: HOSPADM

## 2025-08-10 RX ADMIN — AMIODARONE HYDROCHLORIDE 200 MG: 200 TABLET ORAL at 05:11

## 2025-08-10 RX ADMIN — POTASSIUM CHLORIDE 40 MEQ: 1500 TABLET, EXTENDED RELEASE ORAL at 05:12

## 2025-08-10 RX ADMIN — MORPHINE SULFATE 4 MG: 4 INJECTION, SOLUTION INTRAMUSCULAR; INTRAVENOUS at 20:55

## 2025-08-10 RX ADMIN — ACETAMINOPHEN 650 MG: 325 TABLET, FILM COATED ORAL at 16:08

## 2025-08-10 RX ADMIN — FUROSEMIDE 80 MG: 10 INJECTION, SOLUTION INTRAMUSCULAR; INTRAVENOUS at 11:48

## 2025-08-10 RX ADMIN — INSULIN LISPRO 4 UNITS: 100 INJECTION, SOLUTION INTRAVENOUS; SUBCUTANEOUS at 17:37

## 2025-08-10 RX ADMIN — APIXABAN 2.5 MG: 2.5 TABLET, FILM COATED ORAL at 17:37

## 2025-08-10 RX ADMIN — SENNOSIDES AND DOCUSATE SODIUM 2 TABLET: 50; 8.6 TABLET ORAL at 17:37

## 2025-08-10 RX ADMIN — MORPHINE SULFATE 4 MG: 4 INJECTION, SOLUTION INTRAMUSCULAR; INTRAVENOUS at 01:20

## 2025-08-10 RX ADMIN — APIXABAN 2.5 MG: 2.5 TABLET, FILM COATED ORAL at 05:11

## 2025-08-10 RX ADMIN — ACETAMINOPHEN 650 MG: 325 TABLET, FILM COATED ORAL at 08:21

## 2025-08-10 RX ADMIN — OMEPRAZOLE 40 MG: 20 CAPSULE, DELAYED RELEASE ORAL at 05:11

## 2025-08-10 RX ADMIN — DAPAGLIFLOZIN 10 MG: 10 TABLET, FILM COATED ORAL at 05:12

## 2025-08-10 RX ADMIN — TAMSULOSIN HYDROCHLORIDE 0.4 MG: 0.4 CAPSULE ORAL at 08:21

## 2025-08-10 RX ADMIN — LINEZOLID 600 MG: 600 TABLET, FILM COATED ORAL at 17:37

## 2025-08-10 RX ADMIN — INSULIN LISPRO 1 UNITS: 100 INJECTION, SOLUTION INTRAVENOUS; SUBCUTANEOUS at 06:18

## 2025-08-10 RX ADMIN — OMEPRAZOLE 40 MG: 20 CAPSULE, DELAYED RELEASE ORAL at 17:36

## 2025-08-10 RX ADMIN — CLOPIDOGREL BISULFATE 75 MG: 75 TABLET, FILM COATED ORAL at 05:15

## 2025-08-10 RX ADMIN — INSULIN LISPRO 1 UNITS: 100 INJECTION, SOLUTION INTRAVENOUS; SUBCUTANEOUS at 11:48

## 2025-08-10 RX ADMIN — POLYETHYLENE GLYCOL 3350 1 PACKET: 17 POWDER, FOR SOLUTION ORAL at 10:36

## 2025-08-10 RX ADMIN — CEFAZOLIN 2 G: 2 INJECTION, POWDER, FOR SOLUTION INTRAVENOUS at 05:21

## 2025-08-10 RX ADMIN — EZETIMIBE 10 MG: 10 TABLET ORAL at 05:11

## 2025-08-10 RX ADMIN — INSULIN LISPRO 3 UNITS: 100 INJECTION, SOLUTION INTRAVENOUS; SUBCUTANEOUS at 23:48

## 2025-08-10 RX ADMIN — FUROSEMIDE 80 MG: 10 INJECTION, SOLUTION INTRAMUSCULAR; INTRAVENOUS at 05:09

## 2025-08-10 ASSESSMENT — PAIN DESCRIPTION - PAIN TYPE
TYPE: ACUTE PAIN

## 2025-08-10 ASSESSMENT — ENCOUNTER SYMPTOMS
NAUSEA: 0
HEMOPTYSIS: 0
PALPITATIONS: 0
VOMITING: 0
ORTHOPNEA: 0
DIZZINESS: 0
SPUTUM PRODUCTION: 0
COUGH: 0
CHILLS: 0

## 2025-08-11 LAB
ANION GAP SERPL CALC-SCNC: 14 MMOL/L (ref 7–16)
BUN SERPL-MCNC: 68 MG/DL (ref 8–22)
CALCIUM SERPL-MCNC: 8.7 MG/DL (ref 8.5–10.5)
CHLORIDE SERPL-SCNC: 98 MMOL/L (ref 96–112)
CO2 SERPL-SCNC: 23 MMOL/L (ref 20–33)
CREAT SERPL-MCNC: 2.25 MG/DL (ref 0.5–1.4)
EKG IMPRESSION: NORMAL
ERYTHROCYTE [DISTWIDTH] IN BLOOD BY AUTOMATED COUNT: 54.7 FL (ref 35.9–50)
GFR SERPLBLD CREATININE-BSD FMLA CKD-EPI: 28 ML/MIN/1.73 M 2
GLUCOSE BLD STRIP.AUTO-MCNC: 161 MG/DL (ref 65–99)
GLUCOSE BLD STRIP.AUTO-MCNC: 164 MG/DL (ref 65–99)
GLUCOSE BLD STRIP.AUTO-MCNC: 210 MG/DL (ref 65–99)
GLUCOSE BLD STRIP.AUTO-MCNC: 214 MG/DL (ref 65–99)
GLUCOSE SERPL-MCNC: 201 MG/DL (ref 65–99)
HCT VFR BLD AUTO: 43.1 % (ref 42–52)
HGB BLD-MCNC: 13.3 G/DL (ref 14–18)
MCH RBC QN AUTO: 25.8 PG (ref 27–33)
MCHC RBC AUTO-ENTMCNC: 30.9 G/DL (ref 32.3–36.5)
MCV RBC AUTO: 83.5 FL (ref 81.4–97.8)
PLATELET # BLD AUTO: 211 K/UL (ref 164–446)
PMV BLD AUTO: 11.5 FL (ref 9–12.9)
POTASSIUM SERPL-SCNC: 3.8 MMOL/L (ref 3.6–5.5)
RBC # BLD AUTO: 5.16 M/UL (ref 4.7–6.1)
SODIUM SERPL-SCNC: 135 MMOL/L (ref 135–145)
WBC # BLD AUTO: 6.9 K/UL (ref 4.8–10.8)

## 2025-08-11 PROCEDURE — A9270 NON-COVERED ITEM OR SERVICE: HCPCS | Performed by: INTERNAL MEDICINE

## 2025-08-11 PROCEDURE — 97110 THERAPEUTIC EXERCISES: CPT

## 2025-08-11 PROCEDURE — 700102 HCHG RX REV CODE 250 W/ 637 OVERRIDE(OP): Performed by: INTERNAL MEDICINE

## 2025-08-11 PROCEDURE — 82962 GLUCOSE BLOOD TEST: CPT | Performed by: HOSPITALIST

## 2025-08-11 PROCEDURE — 94760 N-INVAS EAR/PLS OXIMETRY 1: CPT

## 2025-08-11 PROCEDURE — A9270 NON-COVERED ITEM OR SERVICE: HCPCS | Performed by: HOSPITALIST

## 2025-08-11 PROCEDURE — 700102 HCHG RX REV CODE 250 W/ 637 OVERRIDE(OP): Performed by: HOSPITALIST

## 2025-08-11 PROCEDURE — 700111 HCHG RX REV CODE 636 W/ 250 OVERRIDE (IP): Mod: JZ | Performed by: HOSPITALIST

## 2025-08-11 PROCEDURE — 94660 CPAP INITIATION&MGMT: CPT

## 2025-08-11 PROCEDURE — 80048 BASIC METABOLIC PNL TOTAL CA: CPT

## 2025-08-11 PROCEDURE — 99239 HOSP IP/OBS DSCHRG MGMT >30: CPT | Performed by: HOSPITALIST

## 2025-08-11 PROCEDURE — 770001 HCHG ROOM/CARE - MED/SURG/GYN PRIV*

## 2025-08-11 PROCEDURE — 93005 ELECTROCARDIOGRAM TRACING: CPT | Mod: TC | Performed by: HOSPITALIST

## 2025-08-11 PROCEDURE — 36415 COLL VENOUS BLD VENIPUNCTURE: CPT

## 2025-08-11 PROCEDURE — 93010 ELECTROCARDIOGRAM REPORT: CPT | Performed by: STUDENT IN AN ORGANIZED HEALTH CARE EDUCATION/TRAINING PROGRAM

## 2025-08-11 PROCEDURE — 85027 COMPLETE CBC AUTOMATED: CPT

## 2025-08-11 PROCEDURE — 99233 SBSQ HOSP IP/OBS HIGH 50: CPT | Performed by: HOSPITALIST

## 2025-08-11 PROCEDURE — 700111 HCHG RX REV CODE 636 W/ 250 OVERRIDE (IP): Mod: JZ | Performed by: INTERNAL MEDICINE

## 2025-08-11 PROCEDURE — 97530 THERAPEUTIC ACTIVITIES: CPT

## 2025-08-11 RX ORDER — FUROSEMIDE 40 MG/1
40 TABLET ORAL
Status: DISCONTINUED | OUTPATIENT
Start: 2025-08-11 | End: 2025-08-14 | Stop reason: HOSPADM

## 2025-08-11 RX ORDER — CEPHALEXIN 500 MG/1
500 CAPSULE ORAL EVERY 6 HOURS
Status: DISPENSED | OUTPATIENT
Start: 2025-08-11 | End: 2025-08-13

## 2025-08-11 RX ADMIN — DAPAGLIFLOZIN 10 MG: 10 TABLET, FILM COATED ORAL at 05:08

## 2025-08-11 RX ADMIN — ACETAMINOPHEN 650 MG: 325 TABLET, FILM COATED ORAL at 05:13

## 2025-08-11 RX ADMIN — INSULIN LISPRO 4 UNITS: 100 INJECTION, SOLUTION INTRAVENOUS; SUBCUTANEOUS at 06:23

## 2025-08-11 RX ADMIN — INSULIN LISPRO 4 UNITS: 100 INJECTION, SOLUTION INTRAVENOUS; SUBCUTANEOUS at 17:17

## 2025-08-11 RX ADMIN — MORPHINE SULFATE 4 MG: 4 INJECTION, SOLUTION INTRAMUSCULAR; INTRAVENOUS at 01:11

## 2025-08-11 RX ADMIN — SENNOSIDES AND DOCUSATE SODIUM 2 TABLET: 50; 8.6 TABLET ORAL at 17:13

## 2025-08-11 RX ADMIN — EZETIMIBE 10 MG: 10 TABLET ORAL at 05:07

## 2025-08-11 RX ADMIN — OMEPRAZOLE 40 MG: 20 CAPSULE, DELAYED RELEASE ORAL at 17:14

## 2025-08-11 RX ADMIN — CLOPIDOGREL BISULFATE 75 MG: 75 TABLET, FILM COATED ORAL at 05:08

## 2025-08-11 RX ADMIN — CEPHALEXIN 500 MG: 500 CAPSULE ORAL at 17:13

## 2025-08-11 RX ADMIN — CEPHALEXIN 500 MG: 500 CAPSULE ORAL at 23:16

## 2025-08-11 RX ADMIN — INSULIN LISPRO 3 UNITS: 100 INJECTION, SOLUTION INTRAVENOUS; SUBCUTANEOUS at 23:15

## 2025-08-11 RX ADMIN — TAMSULOSIN HYDROCHLORIDE 0.4 MG: 0.4 CAPSULE ORAL at 08:51

## 2025-08-11 RX ADMIN — APIXABAN 2.5 MG: 2.5 TABLET, FILM COATED ORAL at 05:07

## 2025-08-11 RX ADMIN — INSULIN LISPRO 3 UNITS: 100 INJECTION, SOLUTION INTRAVENOUS; SUBCUTANEOUS at 11:51

## 2025-08-11 RX ADMIN — OMEPRAZOLE 40 MG: 20 CAPSULE, DELAYED RELEASE ORAL at 05:07

## 2025-08-11 RX ADMIN — POTASSIUM CHLORIDE 40 MEQ: 1500 TABLET, EXTENDED RELEASE ORAL at 05:08

## 2025-08-11 RX ADMIN — FUROSEMIDE 40 MG: 10 INJECTION, SOLUTION INTRAVENOUS at 05:15

## 2025-08-11 RX ADMIN — APIXABAN 2.5 MG: 2.5 TABLET, FILM COATED ORAL at 17:13

## 2025-08-11 RX ADMIN — FUROSEMIDE 40 MG: 40 TABLET ORAL at 17:13

## 2025-08-11 RX ADMIN — MORPHINE SULFATE 4 MG: 4 INJECTION, SOLUTION INTRAMUSCULAR; INTRAVENOUS at 16:09

## 2025-08-11 RX ADMIN — AMIODARONE HYDROCHLORIDE 200 MG: 200 TABLET ORAL at 05:07

## 2025-08-11 RX ADMIN — MORPHINE SULFATE 4 MG: 4 INJECTION, SOLUTION INTRAMUSCULAR; INTRAVENOUS at 08:49

## 2025-08-11 RX ADMIN — MORPHINE SULFATE 4 MG: 4 INJECTION, SOLUTION INTRAMUSCULAR; INTRAVENOUS at 22:02

## 2025-08-11 RX ADMIN — LINEZOLID 600 MG: 600 TABLET, FILM COATED ORAL at 05:08

## 2025-08-11 ASSESSMENT — PAIN DESCRIPTION - PAIN TYPE
TYPE: ACUTE PAIN

## 2025-08-11 ASSESSMENT — ENCOUNTER SYMPTOMS
ORTHOPNEA: 0
PALPITATIONS: 0
VOMITING: 0
SPUTUM PRODUCTION: 0
DIZZINESS: 0
CHILLS: 0
HEMOPTYSIS: 0
COUGH: 0
NAUSEA: 0

## 2025-08-11 ASSESSMENT — COGNITIVE AND FUNCTIONAL STATUS - GENERAL
MOVING TO AND FROM BED TO CHAIR: A LITTLE
MOBILITY SCORE: 15
WALKING IN HOSPITAL ROOM: A LOT
SUGGESTED CMS G CODE MODIFIER MOBILITY: CK
CLIMB 3 TO 5 STEPS WITH RAILING: TOTAL
TURNING FROM BACK TO SIDE WHILE IN FLAT BAD: A LITTLE
MOVING FROM LYING ON BACK TO SITTING ON SIDE OF FLAT BED: A LITTLE
STANDING UP FROM CHAIR USING ARMS: A LITTLE

## 2025-08-11 ASSESSMENT — FIBROSIS 4 INDEX: FIB4 SCORE: 1.89

## 2025-08-11 ASSESSMENT — GAIT ASSESSMENTS: GAIT LEVEL OF ASSIST: UNABLE TO PARTICIPATE

## 2025-08-12 LAB
ANION GAP SERPL CALC-SCNC: 13 MMOL/L (ref 7–16)
BUN SERPL-MCNC: 61 MG/DL (ref 8–22)
CALCIUM SERPL-MCNC: 9 MG/DL (ref 8.5–10.5)
CHLORIDE SERPL-SCNC: 100 MMOL/L (ref 96–112)
CO2 SERPL-SCNC: 23 MMOL/L (ref 20–33)
CREAT SERPL-MCNC: 2.25 MG/DL (ref 0.5–1.4)
ERYTHROCYTE [DISTWIDTH] IN BLOOD BY AUTOMATED COUNT: 56.1 FL (ref 35.9–50)
GFR SERPLBLD CREATININE-BSD FMLA CKD-EPI: 28 ML/MIN/1.73 M 2
GLUCOSE BLD STRIP.AUTO-MCNC: 152 MG/DL (ref 65–99)
GLUCOSE BLD STRIP.AUTO-MCNC: 174 MG/DL (ref 65–99)
GLUCOSE BLD STRIP.AUTO-MCNC: 192 MG/DL (ref 65–99)
GLUCOSE SERPL-MCNC: 112 MG/DL (ref 65–99)
HCT VFR BLD AUTO: 44.2 % (ref 42–52)
HGB BLD-MCNC: 13.2 G/DL (ref 14–18)
MCH RBC QN AUTO: 25.3 PG (ref 27–33)
MCHC RBC AUTO-ENTMCNC: 29.9 G/DL (ref 32.3–36.5)
MCV RBC AUTO: 84.7 FL (ref 81.4–97.8)
PLATELET # BLD AUTO: 215 K/UL (ref 164–446)
PMV BLD AUTO: 10.8 FL (ref 9–12.9)
POTASSIUM SERPL-SCNC: 4.2 MMOL/L (ref 3.6–5.5)
RBC # BLD AUTO: 5.22 M/UL (ref 4.7–6.1)
SODIUM SERPL-SCNC: 136 MMOL/L (ref 135–145)
WBC # BLD AUTO: 7.2 K/UL (ref 4.8–10.8)

## 2025-08-12 PROCEDURE — 700102 HCHG RX REV CODE 250 W/ 637 OVERRIDE(OP): Performed by: INTERNAL MEDICINE

## 2025-08-12 PROCEDURE — 80048 BASIC METABOLIC PNL TOTAL CA: CPT

## 2025-08-12 PROCEDURE — 85027 COMPLETE CBC AUTOMATED: CPT

## 2025-08-12 PROCEDURE — 770001 HCHG ROOM/CARE - MED/SURG/GYN PRIV*

## 2025-08-12 PROCEDURE — A9270 NON-COVERED ITEM OR SERVICE: HCPCS | Performed by: INTERNAL MEDICINE

## 2025-08-12 PROCEDURE — 700102 HCHG RX REV CODE 250 W/ 637 OVERRIDE(OP): Performed by: HOSPITALIST

## 2025-08-12 PROCEDURE — 99233 SBSQ HOSP IP/OBS HIGH 50: CPT | Performed by: INTERNAL MEDICINE

## 2025-08-12 PROCEDURE — A9270 NON-COVERED ITEM OR SERVICE: HCPCS | Performed by: HOSPITALIST

## 2025-08-12 PROCEDURE — 82962 GLUCOSE BLOOD TEST: CPT | Performed by: INTERNAL MEDICINE

## 2025-08-12 PROCEDURE — 36415 COLL VENOUS BLD VENIPUNCTURE: CPT

## 2025-08-12 PROCEDURE — 94660 CPAP INITIATION&MGMT: CPT

## 2025-08-12 RX ADMIN — OMEPRAZOLE 40 MG: 20 CAPSULE, DELAYED RELEASE ORAL at 16:25

## 2025-08-12 RX ADMIN — APIXABAN 2.5 MG: 2.5 TABLET, FILM COATED ORAL at 05:08

## 2025-08-12 RX ADMIN — APIXABAN 2.5 MG: 2.5 TABLET, FILM COATED ORAL at 16:25

## 2025-08-12 RX ADMIN — POTASSIUM CHLORIDE 40 MEQ: 1500 TABLET, EXTENDED RELEASE ORAL at 05:08

## 2025-08-12 RX ADMIN — CEPHALEXIN 500 MG: 500 CAPSULE ORAL at 12:14

## 2025-08-12 RX ADMIN — TAMSULOSIN HYDROCHLORIDE 0.4 MG: 0.4 CAPSULE ORAL at 08:17

## 2025-08-12 RX ADMIN — AMIODARONE HYDROCHLORIDE 200 MG: 200 TABLET ORAL at 05:08

## 2025-08-12 RX ADMIN — CEPHALEXIN 500 MG: 500 CAPSULE ORAL at 16:25

## 2025-08-12 RX ADMIN — INSULIN LISPRO 3 UNITS: 100 INJECTION, SOLUTION INTRAVENOUS; SUBCUTANEOUS at 12:19

## 2025-08-12 RX ADMIN — EZETIMIBE 10 MG: 10 TABLET ORAL at 05:07

## 2025-08-12 RX ADMIN — INSULIN LISPRO 3 UNITS: 100 INJECTION, SOLUTION INTRAVENOUS; SUBCUTANEOUS at 17:30

## 2025-08-12 RX ADMIN — CLOPIDOGREL BISULFATE 75 MG: 75 TABLET, FILM COATED ORAL at 05:08

## 2025-08-12 RX ADMIN — ACETAMINOPHEN 650 MG: 325 TABLET, FILM COATED ORAL at 21:42

## 2025-08-12 RX ADMIN — OMEPRAZOLE 40 MG: 20 CAPSULE, DELAYED RELEASE ORAL at 05:07

## 2025-08-12 RX ADMIN — CEPHALEXIN 500 MG: 500 CAPSULE ORAL at 05:08

## 2025-08-12 RX ADMIN — DAPAGLIFLOZIN 10 MG: 10 TABLET, FILM COATED ORAL at 05:08

## 2025-08-12 RX ADMIN — INSULIN LISPRO 3 UNITS: 100 INJECTION, SOLUTION INTRAVENOUS; SUBCUTANEOUS at 06:28

## 2025-08-12 RX ADMIN — FUROSEMIDE 40 MG: 40 TABLET ORAL at 06:27

## 2025-08-12 ASSESSMENT — ENCOUNTER SYMPTOMS
FEVER: 0
DIARRHEA: 0
VOMITING: 0
SHORTNESS OF BREATH: 0
BACK PAIN: 0
HEADACHES: 0
ABDOMINAL PAIN: 0
PALPITATIONS: 0
DIZZINESS: 0
CHILLS: 0
NAUSEA: 0
CONSTIPATION: 0
COUGH: 0

## 2025-08-12 ASSESSMENT — FIBROSIS 4 INDEX: FIB4 SCORE: 1.89

## 2025-08-12 ASSESSMENT — PAIN DESCRIPTION - PAIN TYPE: TYPE: ACUTE PAIN

## 2025-08-13 LAB
ANION GAP SERPL CALC-SCNC: 12 MMOL/L (ref 7–16)
BUN SERPL-MCNC: 61 MG/DL (ref 8–22)
CALCIUM SERPL-MCNC: 8.8 MG/DL (ref 8.5–10.5)
CHLORIDE SERPL-SCNC: 103 MMOL/L (ref 96–112)
CO2 SERPL-SCNC: 22 MMOL/L (ref 20–33)
CREAT SERPL-MCNC: 1.87 MG/DL (ref 0.5–1.4)
ERYTHROCYTE [DISTWIDTH] IN BLOOD BY AUTOMATED COUNT: 55.7 FL (ref 35.9–50)
GFR SERPLBLD CREATININE-BSD FMLA CKD-EPI: 35 ML/MIN/1.73 M 2
GLUCOSE BLD STRIP.AUTO-MCNC: 147 MG/DL (ref 65–99)
GLUCOSE BLD STRIP.AUTO-MCNC: 150 MG/DL (ref 65–99)
GLUCOSE BLD STRIP.AUTO-MCNC: 164 MG/DL (ref 65–99)
GLUCOSE BLD STRIP.AUTO-MCNC: 180 MG/DL (ref 65–99)
GLUCOSE BLD STRIP.AUTO-MCNC: 199 MG/DL (ref 65–99)
GLUCOSE SERPL-MCNC: 142 MG/DL (ref 65–99)
HCT VFR BLD AUTO: 42.7 % (ref 42–52)
HGB BLD-MCNC: 13.1 G/DL (ref 14–18)
MCH RBC QN AUTO: 25.8 PG (ref 27–33)
MCHC RBC AUTO-ENTMCNC: 30.7 G/DL (ref 32.3–36.5)
MCV RBC AUTO: 84.1 FL (ref 81.4–97.8)
PLATELET # BLD AUTO: 206 K/UL (ref 164–446)
PMV BLD AUTO: 10.9 FL (ref 9–12.9)
POTASSIUM SERPL-SCNC: 4.2 MMOL/L (ref 3.6–5.5)
RBC # BLD AUTO: 5.08 M/UL (ref 4.7–6.1)
SODIUM SERPL-SCNC: 137 MMOL/L (ref 135–145)
WBC # BLD AUTO: 7.3 K/UL (ref 4.8–10.8)

## 2025-08-13 PROCEDURE — A9270 NON-COVERED ITEM OR SERVICE: HCPCS | Performed by: INTERNAL MEDICINE

## 2025-08-13 PROCEDURE — 700101 HCHG RX REV CODE 250: Performed by: INTERNAL MEDICINE

## 2025-08-13 PROCEDURE — 94660 CPAP INITIATION&MGMT: CPT

## 2025-08-13 PROCEDURE — 80048 BASIC METABOLIC PNL TOTAL CA: CPT

## 2025-08-13 PROCEDURE — 85027 COMPLETE CBC AUTOMATED: CPT

## 2025-08-13 PROCEDURE — 700111 HCHG RX REV CODE 636 W/ 250 OVERRIDE (IP): Mod: JZ | Performed by: INTERNAL MEDICINE

## 2025-08-13 PROCEDURE — A9270 NON-COVERED ITEM OR SERVICE: HCPCS | Performed by: HOSPITALIST

## 2025-08-13 PROCEDURE — 99233 SBSQ HOSP IP/OBS HIGH 50: CPT | Performed by: INTERNAL MEDICINE

## 2025-08-13 PROCEDURE — 97110 THERAPEUTIC EXERCISES: CPT

## 2025-08-13 PROCEDURE — 700102 HCHG RX REV CODE 250 W/ 637 OVERRIDE(OP): Performed by: INTERNAL MEDICINE

## 2025-08-13 PROCEDURE — 97535 SELF CARE MNGMENT TRAINING: CPT

## 2025-08-13 PROCEDURE — 97530 THERAPEUTIC ACTIVITIES: CPT

## 2025-08-13 PROCEDURE — 82962 GLUCOSE BLOOD TEST: CPT | Performed by: INTERNAL MEDICINE

## 2025-08-13 PROCEDURE — 700102 HCHG RX REV CODE 250 W/ 637 OVERRIDE(OP): Performed by: HOSPITALIST

## 2025-08-13 PROCEDURE — 94760 N-INVAS EAR/PLS OXIMETRY 1: CPT

## 2025-08-13 PROCEDURE — 97116 GAIT TRAINING THERAPY: CPT

## 2025-08-13 PROCEDURE — 36415 COLL VENOUS BLD VENIPUNCTURE: CPT

## 2025-08-13 PROCEDURE — 770001 HCHG ROOM/CARE - MED/SURG/GYN PRIV*

## 2025-08-13 PROCEDURE — 97112 NEUROMUSCULAR REEDUCATION: CPT

## 2025-08-13 RX ORDER — OXYCODONE HYDROCHLORIDE 10 MG/1
10 TABLET ORAL
Refills: 0 | Status: DISCONTINUED | OUTPATIENT
Start: 2025-08-13 | End: 2025-08-14 | Stop reason: HOSPADM

## 2025-08-13 RX ORDER — CIPROFLOXACIN HYDROCHLORIDE 3.5 MG/ML
1 SOLUTION/ DROPS TOPICAL
Status: DISCONTINUED | OUTPATIENT
Start: 2025-08-13 | End: 2025-08-14 | Stop reason: HOSPADM

## 2025-08-13 RX ORDER — ACETAMINOPHEN 325 MG/1
650 TABLET ORAL EVERY 6 HOURS PRN
Status: DISCONTINUED | OUTPATIENT
Start: 2025-08-13 | End: 2025-08-14 | Stop reason: HOSPADM

## 2025-08-13 RX ORDER — OXYCODONE HYDROCHLORIDE 5 MG/1
5 TABLET ORAL
Refills: 0 | Status: DISCONTINUED | OUTPATIENT
Start: 2025-08-13 | End: 2025-08-14 | Stop reason: HOSPADM

## 2025-08-13 RX ADMIN — ACETAMINOPHEN 650 MG: 325 TABLET, FILM COATED ORAL at 05:40

## 2025-08-13 RX ADMIN — DAPAGLIFLOZIN 10 MG: 10 TABLET, FILM COATED ORAL at 05:38

## 2025-08-13 RX ADMIN — MORPHINE SULFATE 4 MG: 4 INJECTION, SOLUTION INTRAMUSCULAR; INTRAVENOUS at 00:24

## 2025-08-13 RX ADMIN — OMEPRAZOLE 40 MG: 20 CAPSULE, DELAYED RELEASE ORAL at 05:37

## 2025-08-13 RX ADMIN — CLOPIDOGREL BISULFATE 75 MG: 75 TABLET, FILM COATED ORAL at 05:38

## 2025-08-13 RX ADMIN — FUROSEMIDE 40 MG: 40 TABLET ORAL at 05:41

## 2025-08-13 RX ADMIN — CEPHALEXIN 500 MG: 500 CAPSULE ORAL at 05:38

## 2025-08-13 RX ADMIN — AMIODARONE HYDROCHLORIDE 200 MG: 200 TABLET ORAL at 05:38

## 2025-08-13 RX ADMIN — INSULIN LISPRO 3 UNITS: 100 INJECTION, SOLUTION INTRAVENOUS; SUBCUTANEOUS at 17:39

## 2025-08-13 RX ADMIN — CEPHALEXIN 500 MG: 500 CAPSULE ORAL at 11:42

## 2025-08-13 RX ADMIN — OMEPRAZOLE 40 MG: 20 CAPSULE, DELAYED RELEASE ORAL at 17:37

## 2025-08-13 RX ADMIN — INSULIN LISPRO 3 UNITS: 100 INJECTION, SOLUTION INTRAVENOUS; SUBCUTANEOUS at 00:20

## 2025-08-13 RX ADMIN — CIPROFLOXACIN HYDROCHLORIDE 1 DROP: 3 SOLUTION/ DROPS OPHTHALMIC at 21:32

## 2025-08-13 RX ADMIN — SENNOSIDES AND DOCUSATE SODIUM 2 TABLET: 50; 8.6 TABLET ORAL at 17:37

## 2025-08-13 RX ADMIN — EZETIMIBE 10 MG: 10 TABLET ORAL at 05:38

## 2025-08-13 RX ADMIN — APIXABAN 2.5 MG: 2.5 TABLET, FILM COATED ORAL at 05:38

## 2025-08-13 RX ADMIN — CEPHALEXIN 500 MG: 500 CAPSULE ORAL at 17:37

## 2025-08-13 RX ADMIN — CEPHALEXIN 500 MG: 500 CAPSULE ORAL at 00:21

## 2025-08-13 RX ADMIN — APIXABAN 2.5 MG: 2.5 TABLET, FILM COATED ORAL at 17:37

## 2025-08-13 RX ADMIN — MORPHINE SULFATE 4 MG: 4 INJECTION, SOLUTION INTRAMUSCULAR; INTRAVENOUS at 11:42

## 2025-08-13 RX ADMIN — INSULIN LISPRO 3 UNITS: 100 INJECTION, SOLUTION INTRAVENOUS; SUBCUTANEOUS at 23:26

## 2025-08-13 RX ADMIN — OXYCODONE 5 MG: 5 TABLET ORAL at 16:24

## 2025-08-13 RX ADMIN — POTASSIUM CHLORIDE 40 MEQ: 1500 TABLET, EXTENDED RELEASE ORAL at 05:38

## 2025-08-13 RX ADMIN — OXYCODONE 5 MG: 5 TABLET ORAL at 23:23

## 2025-08-13 RX ADMIN — TAMSULOSIN HYDROCHLORIDE 0.4 MG: 0.4 CAPSULE ORAL at 08:57

## 2025-08-13 ASSESSMENT — ENCOUNTER SYMPTOMS
BACK PAIN: 0
CONSTIPATION: 0
NAUSEA: 0
PALPITATIONS: 0
DIARRHEA: 0
COUGH: 0
ABDOMINAL PAIN: 0
CHILLS: 0
VOMITING: 0
DIZZINESS: 0
FEVER: 0
SHORTNESS OF BREATH: 0
HEADACHES: 0

## 2025-08-13 ASSESSMENT — COGNITIVE AND FUNCTIONAL STATUS - GENERAL
MOVING FROM LYING ON BACK TO SITTING ON SIDE OF FLAT BED: A LITTLE
MOBILITY SCORE: 15
CLIMB 3 TO 5 STEPS WITH RAILING: TOTAL
MOVING TO AND FROM BED TO CHAIR: A LITTLE
SUGGESTED CMS G CODE MODIFIER MOBILITY: CK
DAILY ACTIVITIY SCORE: 15
PERSONAL GROOMING: A LITTLE
WALKING IN HOSPITAL ROOM: A LOT
DRESSING REGULAR UPPER BODY CLOTHING: A LOT
DRESSING REGULAR LOWER BODY CLOTHING: A LOT
HELP NEEDED FOR BATHING: A LOT
STANDING UP FROM CHAIR USING ARMS: A LITTLE
TOILETING: A LOT
SUGGESTED CMS G CODE MODIFIER DAILY ACTIVITY: CK
TURNING FROM BACK TO SIDE WHILE IN FLAT BAD: A LITTLE

## 2025-08-13 ASSESSMENT — PAIN DESCRIPTION - PAIN TYPE
TYPE: ACUTE PAIN

## 2025-08-13 ASSESSMENT — GAIT ASSESSMENTS
ASSISTIVE DEVICE: FRONT WHEEL WALKER
GAIT LEVEL OF ASSIST: MINIMAL ASSIST
DISTANCE (FEET): 5

## 2025-08-13 ASSESSMENT — FIBROSIS 4 INDEX: FIB4 SCORE: 1.94

## 2025-08-14 ENCOUNTER — HOSPITAL ENCOUNTER (EMERGENCY)
Facility: MEDICAL CENTER | Age: 85
End: 2025-08-15
Attending: EMERGENCY MEDICINE | Admitting: STUDENT IN AN ORGANIZED HEALTH CARE EDUCATION/TRAINING PROGRAM
Payer: MEDICARE

## 2025-08-14 ENCOUNTER — APPOINTMENT (OUTPATIENT)
Dept: RADIOLOGY | Facility: MEDICAL CENTER | Age: 85
DRG: 605 | End: 2025-08-14
Attending: INTERNAL MEDICINE
Payer: OTHER MISCELLANEOUS

## 2025-08-14 VITALS
HEART RATE: 90 BPM | HEIGHT: 72 IN | WEIGHT: 258.6 LBS | DIASTOLIC BLOOD PRESSURE: 65 MMHG | TEMPERATURE: 98.4 F | RESPIRATION RATE: 18 BRPM | BODY MASS INDEX: 35.03 KG/M2 | OXYGEN SATURATION: 91 % | SYSTOLIC BLOOD PRESSURE: 121 MMHG

## 2025-08-14 DIAGNOSIS — G47.33 OSA ON CPAP: Primary | ICD-10-CM

## 2025-08-14 DIAGNOSIS — G47.30 SLEEP APNEA, UNSPECIFIED TYPE: ICD-10-CM

## 2025-08-14 DIAGNOSIS — Z13.9 ENCOUNTER FOR MEDICAL SCREENING EXAMINATION: ICD-10-CM

## 2025-08-14 LAB
ANION GAP SERPL CALC-SCNC: 13 MMOL/L (ref 7–16)
BUN SERPL-MCNC: 54 MG/DL (ref 8–22)
CALCIUM SERPL-MCNC: 8.8 MG/DL (ref 8.5–10.5)
CHLORIDE SERPL-SCNC: 103 MMOL/L (ref 96–112)
CO2 SERPL-SCNC: 19 MMOL/L (ref 20–33)
CREAT SERPL-MCNC: 2.07 MG/DL (ref 0.5–1.4)
ERYTHROCYTE [DISTWIDTH] IN BLOOD BY AUTOMATED COUNT: 57.5 FL (ref 35.9–50)
GFR SERPLBLD CREATININE-BSD FMLA CKD-EPI: 31 ML/MIN/1.73 M 2
GLUCOSE BLD STRIP.AUTO-MCNC: 136 MG/DL (ref 65–99)
GLUCOSE BLD STRIP.AUTO-MCNC: 152 MG/DL (ref 65–99)
GLUCOSE SERPL-MCNC: 138 MG/DL (ref 65–99)
HCT VFR BLD AUTO: 43.8 % (ref 42–52)
HGB BLD-MCNC: 13.2 G/DL (ref 14–18)
MCH RBC QN AUTO: 25.5 PG (ref 27–33)
MCHC RBC AUTO-ENTMCNC: 30.1 G/DL (ref 32.3–36.5)
MCV RBC AUTO: 84.7 FL (ref 81.4–97.8)
PLATELET # BLD AUTO: 211 K/UL (ref 164–446)
PMV BLD AUTO: 10.7 FL (ref 9–12.9)
POTASSIUM SERPL-SCNC: 4.4 MMOL/L (ref 3.6–5.5)
RBC # BLD AUTO: 5.17 M/UL (ref 4.7–6.1)
SODIUM SERPL-SCNC: 135 MMOL/L (ref 135–145)
WBC # BLD AUTO: 7.5 K/UL (ref 4.8–10.8)

## 2025-08-14 PROCEDURE — 94660 CPAP INITIATION&MGMT: CPT

## 2025-08-14 PROCEDURE — 700102 HCHG RX REV CODE 250 W/ 637 OVERRIDE(OP): Performed by: INTERNAL MEDICINE

## 2025-08-14 PROCEDURE — 36415 COLL VENOUS BLD VENIPUNCTURE: CPT

## 2025-08-14 PROCEDURE — 82962 GLUCOSE BLOOD TEST: CPT | Performed by: INTERNAL MEDICINE

## 2025-08-14 PROCEDURE — 73718 MRI LOWER EXTREMITY W/O DYE: CPT | Mod: LT

## 2025-08-14 PROCEDURE — A9270 NON-COVERED ITEM OR SERVICE: HCPCS | Performed by: INTERNAL MEDICINE

## 2025-08-14 PROCEDURE — 85027 COMPLETE CBC AUTOMATED: CPT

## 2025-08-14 PROCEDURE — 99222 1ST HOSP IP/OBS MODERATE 55: CPT | Performed by: STUDENT IN AN ORGANIZED HEALTH CARE EDUCATION/TRAINING PROGRAM

## 2025-08-14 PROCEDURE — 700102 HCHG RX REV CODE 250 W/ 637 OVERRIDE(OP): Performed by: HOSPITALIST

## 2025-08-14 PROCEDURE — 97535 SELF CARE MNGMENT TRAINING: CPT

## 2025-08-14 PROCEDURE — 80048 BASIC METABOLIC PNL TOTAL CA: CPT

## 2025-08-14 PROCEDURE — 99239 HOSP IP/OBS DSCHRG MGMT >30: CPT | Performed by: INTERNAL MEDICINE

## 2025-08-14 PROCEDURE — 99284 EMERGENCY DEPT VISIT MOD MDM: CPT

## 2025-08-14 PROCEDURE — A9270 NON-COVERED ITEM OR SERVICE: HCPCS | Performed by: HOSPITALIST

## 2025-08-14 RX ORDER — CLOPIDOGREL BISULFATE 75 MG/1
75 TABLET ORAL DAILY
Status: DISCONTINUED | OUTPATIENT
Start: 2025-08-15 | End: 2025-08-15 | Stop reason: HOSPADM

## 2025-08-14 RX ORDER — OXYCODONE HYDROCHLORIDE 10 MG/1
10 TABLET ORAL EVERY 6 HOURS PRN
Refills: 0 | Status: DISCONTINUED | OUTPATIENT
Start: 2025-08-14 | End: 2025-08-15 | Stop reason: HOSPADM

## 2025-08-14 RX ORDER — CIPROFLOXACIN HYDROCHLORIDE 3.5 MG/ML
1 SOLUTION/ DROPS TOPICAL
Status: ACTIVE | DISCHARGE
Start: 2025-08-14 | End: 2025-08-21

## 2025-08-14 RX ORDER — BUMETANIDE 1 MG/1
4 TABLET ORAL 2 TIMES DAILY
Status: DISCONTINUED | OUTPATIENT
Start: 2025-08-15 | End: 2025-08-15 | Stop reason: HOSPADM

## 2025-08-14 RX ORDER — TAMSULOSIN HYDROCHLORIDE 0.4 MG/1
0.4 CAPSULE ORAL
Status: DISCONTINUED | OUTPATIENT
Start: 2025-08-15 | End: 2025-08-15 | Stop reason: HOSPADM

## 2025-08-14 RX ORDER — TAMSULOSIN HYDROCHLORIDE 0.4 MG/1
0.4 CAPSULE ORAL
Status: SHIPPED
Start: 2025-08-15

## 2025-08-14 RX ORDER — DAPAGLIFLOZIN 10 MG/1
10 TABLET, FILM COATED ORAL DAILY
Status: DISCONTINUED | OUTPATIENT
Start: 2025-08-15 | End: 2025-08-15 | Stop reason: HOSPADM

## 2025-08-14 RX ORDER — CEPHALEXIN 500 MG/1
500 CAPSULE ORAL 4 TIMES DAILY
Status: ACTIVE | DISCHARGE
Start: 2025-08-14 | End: 2025-08-18

## 2025-08-14 RX ORDER — AMIODARONE HYDROCHLORIDE 200 MG/1
200 TABLET ORAL DAILY
Status: DISCONTINUED | OUTPATIENT
Start: 2025-08-15 | End: 2025-08-15 | Stop reason: HOSPADM

## 2025-08-14 RX ORDER — CIPROFLOXACIN HYDROCHLORIDE 3.5 MG/ML
1 SOLUTION/ DROPS TOPICAL
Status: DISCONTINUED | OUTPATIENT
Start: 2025-08-15 | End: 2025-08-15 | Stop reason: HOSPADM

## 2025-08-14 RX ORDER — EZETIMIBE 10 MG/1
10 TABLET ORAL DAILY
Status: DISCONTINUED | OUTPATIENT
Start: 2025-08-15 | End: 2025-08-15 | Stop reason: HOSPADM

## 2025-08-14 RX ORDER — OMEPRAZOLE 20 MG/1
40 CAPSULE, DELAYED RELEASE ORAL 2 TIMES DAILY
Status: DISCONTINUED | OUTPATIENT
Start: 2025-08-15 | End: 2025-08-15 | Stop reason: HOSPADM

## 2025-08-14 RX ORDER — OXYCODONE HYDROCHLORIDE 10 MG/1
10 TABLET ORAL EVERY 6 HOURS PRN
Qty: 24 TABLET | Refills: 0 | Status: SHIPPED | OUTPATIENT
Start: 2025-08-14 | End: 2025-08-20

## 2025-08-14 RX ADMIN — FUROSEMIDE 40 MG: 40 TABLET ORAL at 05:41

## 2025-08-14 RX ADMIN — CIPROFLOXACIN HYDROCHLORIDE 1 DROP: 3 SOLUTION/ DROPS OPHTHALMIC at 05:41

## 2025-08-14 RX ADMIN — APIXABAN 2.5 MG: 2.5 TABLET, FILM COATED ORAL at 05:40

## 2025-08-14 RX ADMIN — POTASSIUM CHLORIDE 40 MEQ: 1500 TABLET, EXTENDED RELEASE ORAL at 05:40

## 2025-08-14 RX ADMIN — TAMSULOSIN HYDROCHLORIDE 0.4 MG: 0.4 CAPSULE ORAL at 07:44

## 2025-08-14 RX ADMIN — CLOPIDOGREL BISULFATE 75 MG: 75 TABLET, FILM COATED ORAL at 05:40

## 2025-08-14 RX ADMIN — DAPAGLIFLOZIN 10 MG: 10 TABLET, FILM COATED ORAL at 05:41

## 2025-08-14 RX ADMIN — OMEPRAZOLE 40 MG: 20 CAPSULE, DELAYED RELEASE ORAL at 05:40

## 2025-08-14 RX ADMIN — OXYCODONE HYDROCHLORIDE 10 MG: 10 TABLET ORAL at 12:11

## 2025-08-14 RX ADMIN — CIPROFLOXACIN HYDROCHLORIDE 1 DROP: 3 SOLUTION/ DROPS OPHTHALMIC at 15:08

## 2025-08-14 RX ADMIN — AMIODARONE HYDROCHLORIDE 200 MG: 200 TABLET ORAL at 05:40

## 2025-08-14 RX ADMIN — INSULIN LISPRO 3 UNITS: 100 INJECTION, SOLUTION INTRAVENOUS; SUBCUTANEOUS at 12:14

## 2025-08-14 RX ADMIN — EZETIMIBE 10 MG: 10 TABLET ORAL at 05:40

## 2025-08-14 RX ADMIN — OXYCODONE HYDROCHLORIDE 10 MG: 10 TABLET ORAL at 07:44

## 2025-08-14 ASSESSMENT — PAIN DESCRIPTION - PAIN TYPE
TYPE: ACUTE PAIN

## 2025-08-14 ASSESSMENT — COGNITIVE AND FUNCTIONAL STATUS - GENERAL
DRESSING REGULAR UPPER BODY CLOTHING: A LITTLE
DAILY ACTIVITIY SCORE: 16
TOILETING: A LOT
PERSONAL GROOMING: A LITTLE
SUGGESTED CMS G CODE MODIFIER DAILY ACTIVITY: CK
DRESSING REGULAR LOWER BODY CLOTHING: A LOT
HELP NEEDED FOR BATHING: A LOT

## 2025-08-14 ASSESSMENT — ENCOUNTER SYMPTOMS
CHILLS: 0
DIARRHEA: 0
ABDOMINAL PAIN: 0
NAUSEA: 0
SHORTNESS OF BREATH: 0
FEVER: 0
VOMITING: 0
COUGH: 0

## 2025-08-14 ASSESSMENT — FIBROSIS 4 INDEX
FIB4 SCORE: 1.89
FIB4 SCORE: 1.89

## 2025-08-14 ASSESSMENT — GAIT ASSESSMENTS: DISTANCE (FEET): 5

## 2025-08-15 VITALS
RESPIRATION RATE: 20 BRPM | TEMPERATURE: 98.7 F | WEIGHT: 260.14 LBS | OXYGEN SATURATION: 98 % | HEART RATE: 87 BPM | DIASTOLIC BLOOD PRESSURE: 62 MMHG | SYSTOLIC BLOOD PRESSURE: 108 MMHG | BODY MASS INDEX: 34.32 KG/M2

## 2025-08-15 PROCEDURE — 700102 HCHG RX REV CODE 250 W/ 637 OVERRIDE(OP): Performed by: STUDENT IN AN ORGANIZED HEALTH CARE EDUCATION/TRAINING PROGRAM

## 2025-08-15 PROCEDURE — A9270 NON-COVERED ITEM OR SERVICE: HCPCS | Performed by: STUDENT IN AN ORGANIZED HEALTH CARE EDUCATION/TRAINING PROGRAM

## 2025-08-15 PROCEDURE — 700101 HCHG RX REV CODE 250: Performed by: STUDENT IN AN ORGANIZED HEALTH CARE EDUCATION/TRAINING PROGRAM

## 2025-08-15 PROCEDURE — 94660 CPAP INITIATION&MGMT: CPT

## 2025-08-15 PROCEDURE — 99284 EMERGENCY DEPT VISIT MOD MDM: CPT

## 2025-08-15 RX ORDER — AMOXICILLIN 250 MG
2 CAPSULE ORAL
COMMUNITY

## 2025-08-15 RX ADMIN — OMEPRAZOLE 40 MG: 20 CAPSULE, DELAYED RELEASE ORAL at 06:41

## 2025-08-15 RX ADMIN — CEPHALEXIN 500 MG: 250 CAPSULE ORAL at 12:14

## 2025-08-15 RX ADMIN — CEPHALEXIN 500 MG: 250 CAPSULE ORAL at 06:40

## 2025-08-15 RX ADMIN — APIXABAN 2.5 MG: 2.5 TABLET, FILM COATED ORAL at 10:36

## 2025-08-15 RX ADMIN — AMIODARONE HYDROCHLORIDE 200 MG: 200 TABLET ORAL at 06:41

## 2025-08-15 RX ADMIN — EZETIMIBE 10 MG: 10 TABLET ORAL at 06:41

## 2025-08-15 RX ADMIN — OXYCODONE HYDROCHLORIDE 10 MG: 10 TABLET ORAL at 04:38

## 2025-08-15 RX ADMIN — CLOPIDOGREL BISULFATE 75 MG: 75 TABLET, FILM COATED ORAL at 06:41

## 2025-08-15 RX ADMIN — CIPROFLOXACIN 1 DROP: 3 SOLUTION OPHTHALMIC at 15:01

## 2025-08-15 RX ADMIN — BUMETANIDE 4 MG: 1 TABLET ORAL at 06:41

## 2025-08-15 RX ADMIN — CIPROFLOXACIN 1 DROP: 3 SOLUTION OPHTHALMIC at 10:36

## 2025-08-15 RX ADMIN — TAMSULOSIN HYDROCHLORIDE 0.4 MG: 0.4 CAPSULE ORAL at 10:36

## 2025-08-15 RX ADMIN — DAPAGLIFLOZIN 10 MG: 10 TABLET, FILM COATED ORAL at 10:35

## 2025-08-15 ASSESSMENT — CHA2DS2 SCORE
DIABETES: YES
AGE 65 TO 74: NO
SEX: MALE
HYPERTENSION: YES
CHF OR LEFT VENTRICULAR DYSFUNCTION: YES
VASCULAR DISEASE: YES
CHA2DS2 VASC SCORE: 6
AGE 75 OR GREATER: YES
PRIOR STROKE OR TIA OR THROMBOEMBOLISM: NO

## (undated) DEVICE — MICRODRIP PRIMARY VENTED 60 (48EA/CA) THIS WAS PART #2C8428 WHICH WAS DISCONTINUED

## (undated) DEVICE — GLOVE BIOGEL PI INDICATOR SZ 8.0 SURGICAL PF LF -(50/BX 4BX/CA)

## (undated) DEVICE — KIT ANESTHESIA W/CIRCUIT & 3/LT BAG W/FILTER (20EA/CA)

## (undated) DEVICE — ELECTRODE DFBR ADLT 6.5X5IN (12PR/CA) *8900-4003 PART # FOR CA QTY. PART #8900-4004 IS EACH QTY

## (undated) DEVICE — COVER FOOT UNIVERSAL DISP. - (25EA/CA)

## (undated) DEVICE — CHLORAPREP 26 ML APPLICATOR - ORANGE TINT(25/CA)

## (undated) DEVICE — CATHETER 6FR AL1 100CM (5/BX)

## (undated) DEVICE — IV SET, NON-VENT PLUMB PUMP

## (undated) DEVICE — SET EXTENSION WITH 2 PORTS (48EA/CA) ***PART #2C8610 IS A SUBSTITUTE*****

## (undated) DEVICE — MASK ANESTHESIA ADULT  - (100/CA)

## (undated) DEVICE — GUIDEWIRE STARTER STRAIGHT FIXED CORE .035 150CM 4 STRAIGHT PTFE/HEPARIN COATED (10/BX)

## (undated) DEVICE — TUBE CONNECT SUCTION CLEAR 120 X 1/4" (50EA/CA)"

## (undated) DEVICE — TUBING CLEARLINK DUO-VENT - C-FLO (48EA/CA)

## (undated) DEVICE — GLOVE BIOGEL SZ 7.5 SURGICAL PF LTX - (50PR/BX 4BX/CA)

## (undated) DEVICE — TOWELS CLOTH SURGICAL - (4/PK 20PK/CA)

## (undated) DEVICE — DRAPE MAYO STAND - (30/CA)

## (undated) DEVICE — SOD. CHL. INJ. 0.9% 1000 ML - (14EA/CA 60CA/PF)

## (undated) DEVICE — ELECTRODE 5MM LHK LAPSCP STERILE DISP- MEGADYNE  (5/CA)

## (undated) DEVICE — CANISTER SUCTION 3000ML MECHANICAL FILTER AUTO SHUTOFF MEDI-VAC NONSTERILE LF DISP  (40EA/CA)

## (undated) DEVICE — SODIUM CHL. INJ. 0.9% 500ML (24EA/CA 50CA/PF)

## (undated) DEVICE — SUTURE 4-0 VICRYL PLUS SH - UNDYED 27 INCH (36PK/BX)

## (undated) DEVICE — DERMABOND ADVANCED - (12EA/BX)

## (undated) DEVICE — GLOVE SZ 7.5 BIOGEL PI MICRO - PF LF (50PR/BX)

## (undated) DEVICE — DRESSING TRANSPARENT FILM TEGADERM 4 X 4.75" (50EA/BX)"

## (undated) DEVICE — PROTECTOR ULNA NERVE - (36PR/CA)

## (undated) DEVICE — GUIDE PIN CALIBRATED (5EA/PK) (4TX6=24)

## (undated) DEVICE — GOWN SURGEONS X-LARGE - DISP. (30/CA)

## (undated) DEVICE — HEAD HOLDER JUNIOR/ADULT

## (undated) DEVICE — KIT ROOM DECONTAMINATION

## (undated) DEVICE — TROCAR SEPARATOR 15MMZTHREAD - (6/BX)

## (undated) DEVICE — SPONGE GAUZE STER 4X4 8-PL - (2/PK 50PK/BX 12BX/CS)

## (undated) DEVICE — ELECTRODE DUAL RETURN W/ CORD - (50/PK)

## (undated) DEVICE — GAUZE PACKING STRIP IODOFORM 2 X 5 (12EA/CA)

## (undated) DEVICE — TROCAR 5X100 NON BLADED Z-TH - READ KII (6/BX)

## (undated) DEVICE — SLEEVE, VASO, REPROC, LARGE

## (undated) DEVICE — SENSOR SPO2 NEO LNCS ADHESIVE (20/BX) SEE USER NOTES

## (undated) DEVICE — TRANSDUCER BIFURCATED MONITORING KIT (10EA/CA)

## (undated) DEVICE — GLOVE SZ 7 BIOGEL PI MICRO - PF LF (50PR/BX 4BX/CA)

## (undated) DEVICE — DEVICE CLOSER PERCLOSE - (10/BX) PROGLIDE SYSTEM

## (undated) DEVICE — SUTURE GENERAL

## (undated) DEVICE — GLOVE BIOGEL INDICATOR SZ 8 SURGICAL PF LTX - (50/BX 4BX/CA)

## (undated) DEVICE — SUCTION INSTRUMENT YANKAUER BULBOUS TIP W/O VENT (50EA/CA)

## (undated) DEVICE — SUTURE 2-0 VICRYL PLUS CT-1 - 8 X 18 INCH(12/BX)

## (undated) DEVICE — DRAPE CLEAR W/ELASTIC BAND RAD CARM 40 X40" (20EA/CA)"

## (undated) DEVICE — BAG SPONGE COUNT 10.25 X 32 - BLUE (250/CA)

## (undated) DEVICE — LACTATED RINGERS INJ 1000 ML - (14EA/CA 60CA/PF)

## (undated) DEVICE — INTRODUCER SHEATH 6FR 2.5CM - DILATOR PROTRUDING (10/BX)

## (undated) DEVICE — SCISSORS 5MM CVD (6EA/BX)

## (undated) DEVICE — KIT SURGIFLO W/OUT THROMBIN - (6EA/CA)

## (undated) DEVICE — STOPCOCK 3-WAY W/SWIVEL LEVER LOCK (50EA/CA)

## (undated) DEVICE — SET LEADWIRE 5 LEAD BEDSIDE DISPOSABLE ECG (1SET OF 5/EA)

## (undated) DEVICE — SUCTION INSTRUMENT YANKAUER OPEN TIP W/O VENT (50EA/CA)

## (undated) DEVICE — ELECTRODE 850 FOAM ADHESIVE - HYDROGEL RADIOTRNSPRNT (50/PK)

## (undated) DEVICE — SLEEVE, VASO, THIGH, MED

## (undated) DEVICE — SET BIFURCATED BLOOD - (48EA/CS)

## (undated) DEVICE — GOWN WARMING STANDARD FLEX - (30/CA)

## (undated) DEVICE — CONTAINER, SPECIMEN, STERILE

## (undated) DEVICE — GLOVE BIOGEL PI INDICATOR SZ 7.5 SURGICAL PF LF -(50/BX 4BX/CA)

## (undated) DEVICE — PACK TAVR (3EA/CA)

## (undated) DEVICE — COVER LIGHT HANDLE FLEXIBLE - SOFT (2EA/PK 80PK/CA)

## (undated) DEVICE — DRAPE 36X28IN RAD CARM BND BG - (25/CA) O

## (undated) DEVICE — INTRODUCER CATHETER  DILATOR PROTRUDING 8FR 2.5CM (10EA/BX)

## (undated) DEVICE — GLOVE SIZE 7.0 SURGEON ACCELERATOR FREE GREEN (50PR/BX, 4BX/CA)

## (undated) DEVICE — SUTURE 0 ETHIBOND MO6 C/R - (12/BX) 8-18 INCH ETHICON

## (undated) DEVICE — ARMBOARD  SMALL IV 9 INLONG - (25EA/CA)

## (undated) DEVICE — TROCAR Z THREAD12MM OPTICAL - NON BLADED (6/BX)

## (undated) DEVICE — DECANTER FLD BLS - (50/CA)

## (undated) DEVICE — GUIDEWIRE STARTER J CURVED FIXED CORE .035 260CM 10 3MM PTFE/HEPARIN COATED (5EA/BX)

## (undated) DEVICE — SODIUM CHL IRRIGATION 0.9% 1000ML (12EA/CA)

## (undated) DEVICE — NEEDLE PERCUTANEOUS THINWALL 7CM 18GA BSDN 18-7.0

## (undated) DEVICE — BAG RESUSCITATION DISPOSABLE - WITH MASK (10 EA/CA)

## (undated) DEVICE — CANNULA W/SEAL 5X100 Z-THRE - ADED KII (12/BX)

## (undated) DEVICE — KIT RADIAL ARTERY 20GA W/MAX BARRIER AND BIOPATCH  (5EA/CA) #10740 IS FOR THE SET RADIAL ARTERIAL

## (undated) DEVICE — SUTURE 0 VICRYL PLUS UR-6 - 27 INCH (36/BX)

## (undated) DEVICE — WIRE GUIDE LUNDQST.035X180 - TSMG-35-180-4-LES ORDER BY BOX (5EA/BX)

## (undated) DEVICE — CATHETER PIGTAIL 6FR 145 (5EA/BX)

## (undated) DEVICE — NEPTUNE 4 PORT MANIFOLD - (20/PK)

## (undated) DEVICE — PAD LAP STERILE 18 X 18 - (5/PK 40PK/CA)

## (undated) DEVICE — CABLE TEMPORARY PACING

## (undated) DEVICE — ELECTRODE RADIOLUCNT SOLID GEL DEFIB PADS (12EA/CA)

## (undated) DEVICE — STERI STRIP COMPOUND BENZOIN - TINCTURE 0.6ML WITH APPLICATOR (40EA/BX)

## (undated) DEVICE — PENCIL ELECTSURG 10FT BTN SWH - (50/CA)

## (undated) DEVICE — SUTURE 2-0 VICRYL PLUS CT-1 36 (36PK/BX)"

## (undated) DEVICE — DRAPE LARGE 3 QUARTER - (20/CA)

## (undated) DEVICE — GLOVE COTTON STERILE (50/CA)

## (undated) DEVICE — SET FLUID WARMING STANDARD FLOW - (10/CA)

## (undated) DEVICE — SYS DLV COST CLS RM TEMP - INJECTATE (CO-SET II) (10EA/CA)

## (undated) DEVICE — TUBE E-T HI-LO CUFF 7.0MM (10EA/PK)

## (undated) DEVICE — PACK MAJOR ORTHO - (2EA/CA)

## (undated) DEVICE — TUBING PRSS MNTR 72IN M/ M LL - (25/BX) MONIT. LINE W/MALE L/L

## (undated) DEVICE — BIT DRILL LONG CALIBRATED 4.2MM X 330MM (4TX2=8)

## (undated) DEVICE — APPLICATOR SURGIFLO - (6EA/CA)

## (undated) DEVICE — SUTURE 4-0 VICRYL PLUSFS-1 - 27 INCH (36/BX)

## (undated) DEVICE — TOWEL STOP TIMEOUT SAFETY FLAG (40EA/CA)

## (undated) DEVICE — SET SUCTION/IRRIGATION WITH DISPOSABLE TIP (6/CA )PART #0250-070-520 IS A SUB

## (undated) DEVICE — BLANKET UNDERBODY FULL ACCES - (5/CA)

## (undated) DEVICE — WATER IRRIGATION STERILE 1000ML (12EA/CA)

## (undated) DEVICE — STOPCOCK IV 400 PSI 3W ROT (50EA/BX)

## (undated) DEVICE — WIRE GUIDE AES .035 260CM WITH 3MM J TIP"

## (undated) DEVICE — BLADE SURGICAL #11 - (50/BX)

## (undated) DEVICE — SUTURE OHS

## (undated) DEVICE — PACK GASTRIC BANDING OR - (1/CA)

## (undated) DEVICE — SUTURE 0 VICRYL PLUS CT-1 - 36 INCH (36/BX)

## (undated) DEVICE — STAPLER SKIN DISP - (6/BX 10BX/CA) VISISTAT

## (undated) DEVICE — CLIP APPLIER 10MM ENDO LARGE (3EA/BX)